# Patient Record
Sex: FEMALE | Race: WHITE | NOT HISPANIC OR LATINO
[De-identification: names, ages, dates, MRNs, and addresses within clinical notes are randomized per-mention and may not be internally consistent; named-entity substitution may affect disease eponyms.]

---

## 2019-08-08 ENCOUNTER — APPOINTMENT (OUTPATIENT)
Dept: VASCULAR SURGERY | Facility: CLINIC | Age: 39
End: 2019-08-08
Payer: COMMERCIAL

## 2019-08-08 VITALS — DIASTOLIC BLOOD PRESSURE: 73 MMHG | HEART RATE: 72 BPM | SYSTOLIC BLOOD PRESSURE: 112 MMHG

## 2019-08-08 VITALS
BODY MASS INDEX: 20.83 KG/M2 | HEIGHT: 65 IN | WEIGHT: 125 LBS | HEART RATE: 69 BPM | DIASTOLIC BLOOD PRESSURE: 59 MMHG | TEMPERATURE: 98.1 F | SYSTOLIC BLOOD PRESSURE: 91 MMHG

## 2019-08-08 DIAGNOSIS — Z86.69 PERSONAL HISTORY OF OTHER DISEASES OF THE NERVOUS SYSTEM AND SENSE ORGANS: ICD-10-CM

## 2019-08-08 DIAGNOSIS — Z87.828 PERSONAL HISTORY OF OTHER (HEALED) PHYSICAL INJURY AND TRAUMA: ICD-10-CM

## 2019-08-08 DIAGNOSIS — Z82.49 FAMILY HISTORY OF ISCHEMIC HEART DISEASE AND OTHER DISEASES OF THE CIRCULATORY SYSTEM: ICD-10-CM

## 2019-08-08 PROCEDURE — 93970 EXTREMITY STUDY: CPT

## 2019-08-08 PROCEDURE — 99203 OFFICE O/P NEW LOW 30 MIN: CPT

## 2019-08-08 RX ORDER — DALFAMPRIDINE 10 MG/1
10 TABLET, FILM COATED, EXTENDED RELEASE ORAL
Refills: 0 | Status: ACTIVE | COMMUNITY

## 2019-08-08 RX ORDER — SERTRALINE HYDROCHLORIDE 100 MG/1
100 TABLET, FILM COATED ORAL
Refills: 0 | Status: ACTIVE | COMMUNITY

## 2019-08-08 NOTE — PHYSICAL EXAM
[2+] : left 2+ [Ankle Swelling On The Left] : moderate [Varicose Veins Of Lower Extremities] : bilaterally [Alert] : alert [Oriented to Person] : oriented to person [Oriented to Place] : oriented to place [Oriented to Time] : oriented to time [JVD] : no jugular venous distention  [Skin Ulcer] : no ulcer [de-identified] : thin pale female with unsteady gait in NAD [Skin Induration] : no induration [de-identified] : unlabored respirations [de-identified] : regular rhythm [de-identified] : a quarter-size area of ecchymosis in the center of her forehead with an area of eschar. no signs of infection [de-identified] : cooperative, pleasant, flat affect [de-identified] : LE vein findings: \par Varicosities (spider, reticular, varicose) bilateral L > R in the GSV distribution\par Edema bilateral\par Skin changes  dry skin, stasis dermatitis\par Ulcer - none\par CEAP classification C4a\par Palpable DP pulses bilaterally

## 2019-08-08 NOTE — VITALS
[Dull] : dull [Gnawing] : gnawing [Aching] : aching [de-identified] : intermittemt [FreeTextEntry3] : b

## 2019-08-08 NOTE — ASSESSMENT
[FreeTextEntry1] : Ms. JAYA AGGARWAL is a 38 year with persistent and worsening bilateral lower extremity venous insufficiency, CEAP classification C4a which is unresponsive to at least 3 months of compression stockings 20-30 mmHg, leg elevation, and over the counter pain medication (Ibuprofen).  Patient has complaints of worsening leg discomfort with swelling, fatigue, heaviness, achiness, cramping, restlessness, and painful varicosities.  The patient’s symptoms interfere with their ADL’s, such as walking, shopping,  and house work. Patient has intact pulses in both legs without evidence of arterial insufficiency.  \par \par Treatment is indicated not for cosmetic reasons but for symptomatic venous reflux disease with symptoms which is refractory to conservative therapy. Venous duplex study demonstrates bilateral lower extremity venous insufficiency. The need for definitive effective treatment is based on severe, interfering and worsening reflux symptoms with evidence of venous insufficiency on venous ultrasound. \par \par Patient is a candidate for endovenous ablation treatment of the bilateral GSV. \par The risks and benefits of endovenous ablation treatment versus continued conservative management were discussed with the patient.  Patient chooses endovenous ablation treatments. Treatment plan to be scheduled. \par \par
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
## 2019-08-08 NOTE — REVIEW OF SYSTEMS
[Lower Ext Edema] : lower extremity edema [Itching] : itching [Feelings Of Weakness] : feelings of weakness [Fever] : no fever [Chills] : no chills [Palpitations] : no palpitations [Chest Pain] : no chest pain [Shortness Of Breath] : no shortness of breath [Leg Claudication] : no intermittent leg claudication [Abdominal Pain] : no abdominal pain [Skin Wound] : no skin wound [Easy Bleeding] : no tendency for easy bleeding [Easy Bruising] : no tendency for easy bruising

## 2019-08-08 NOTE — DATA REVIEWED
[FreeTextEntry1] : Lower extremity venous duplex done today demonstrates bilateral venous insufficiency of the great saphenous veins as well as reflux in the tributary veins. No evidence of DVT.\par

## 2019-10-18 RX ORDER — SODIUM CHLORIDE 9 G/ML
0.9 INJECTION, SOLUTION INTRAVENOUS
Qty: 500 | Refills: 0 | Status: COMPLETED | COMMUNITY
Start: 2019-10-18 | End: 2019-10-25

## 2019-10-18 RX ORDER — SODIUM BICARBONATE 84 MG/ML
8.4 INJECTION, SOLUTION INTRAVENOUS
Qty: 5 | Refills: 0 | Status: COMPLETED | COMMUNITY
Start: 2019-10-18 | End: 2019-10-25

## 2019-10-25 ENCOUNTER — APPOINTMENT (OUTPATIENT)
Dept: VASCULAR SURGERY | Facility: CLINIC | Age: 39
End: 2019-10-25
Payer: COMMERCIAL

## 2019-10-25 PROCEDURE — 36475 ENDOVENOUS RF 1ST VEIN: CPT | Mod: LT

## 2019-10-25 NOTE — PROCEDURE
[FreeTextEntry1] : left GSV RFA [FreeTextEntry3] : Procedural safety checklist and time out completed:\par Confirmed patient identification (Patient Name, , and/or medical record number including when possible affirmation by patient or parent/family/other).\par Confirmed procedure with the patient. Consent present, accurate and signed. \par Confirmed special equipment and supplies are present.\par Sterility confirmed. Position verified. \par Site/ side is marked and visible and confirmed. \par Procedure confirmed by consent. Accurate consent including side and site.\par Review of medical records, including venous ultrasound, noting correct procedure including site and side.\par MD/PA verifies presence and review of imaging studies and or written report of imaging studies.\par Agreement on the procedure to be performed\par Time out completed.\par All of the above has been confirmed by the team.\par All patient-specific concerns have been addressed. \par \par Indication: Left / R lower extremity varicose veins with leg pain, leg swelling, and leg cramping.  Venous insufficiency/ reflux.\par \par Procedure: radiofrequency ablation of the left great saphenous vein. \par 	\par Ms. JAYA AGGARWAL is a 39 year old F with a history of left lower extremity varicose veins previously seen in the office.  Ultrasound examination demonstrated venous insufficiency. A trial of compression stockings, exercise, elevation, and pain medication was attempted without relief and definitive treatment with radiofrequency ablation was offered. \par \par The patient has come for radiofrequency ablation treatment of the left saphenous vein. \par I have discussed the risks of the procedure at length with the patient. The risks discussed were inclusive of but not limited to infection, irritation at the site of infiltration of local anesthesia, and also rare risk of deep venous thrombosis and pulmonary emboli. The patient agrees to proceed with the procedure. \par The patient was escorted into the procedure room and a time out called.\par The entire limb was prepped and draped in sterile fashion. The RF fiber was placed on the sterile field and connected by a sterile cable. Actuation, temperature, and impedance testing were performed to ensure that all components were connected and operating properly. \par The patient was placed on the procedure table and local anesthesia was instilled in the skin overlying the access site. Under ultrasound guidance, the vein was punctured with a micropuncture needle, using the anterior wall technique. A guide wire was now introduced through the needle, and the needle was then exchanged over the guide wire for a 7F sheath. The guide wire was removed and the RF probe was then placed into the left great saphenous vein through the sheath and position confirmed using ultrasound guidance. After the RF probe position was verified by ultrasound, tumescent anesthesia consisting of normal saline, 1% lidocaine with 8.4% sodium bicarbonate was infiltrated, under ultrasound guidance, precisely into the perivenous compartment along the entire length of the vein until a “halo” of fluid was noted around the vein. After RF probe position was again confirmed with ultrasound imaging, RF energy was applied. The probe was gradually and carefully withdrawn at a rate of 6.5cm/20seconds. \par \par The total volume injected was 200 cc. \par Total treatment time was 60 seconds.\par Treatment length was 13 cm and 3 cycles of RF performed using the 7 cm probe. \par The probe is 3.6 cm from the SFJ.\par \par Estimated Blood Loss: minimal\par Repeat ultrasound of the treated vein was performed confirming successful treatment. The catheter and sheath were withdrawn and hemostasis established with direct pressure. After assuring hemostasis, a sterile 4x4 was placed on the access site and an ACE compression wrap was applied. Patient tolerated procedure well. Patient was given post-procedure instructions and follow up appointment was scheduled.\par \par \par

## 2019-10-28 ENCOUNTER — APPOINTMENT (OUTPATIENT)
Dept: VASCULAR SURGERY | Facility: CLINIC | Age: 39
End: 2019-10-28
Payer: COMMERCIAL

## 2019-10-28 PROCEDURE — 93971 EXTREMITY STUDY: CPT

## 2019-11-01 ENCOUNTER — APPOINTMENT (OUTPATIENT)
Dept: VASCULAR SURGERY | Facility: CLINIC | Age: 39
End: 2019-11-01

## 2019-11-04 ENCOUNTER — APPOINTMENT (OUTPATIENT)
Dept: VASCULAR SURGERY | Facility: CLINIC | Age: 39
End: 2019-11-04

## 2019-12-06 ENCOUNTER — APPOINTMENT (OUTPATIENT)
Dept: VASCULAR SURGERY | Facility: CLINIC | Age: 39
End: 2019-12-06

## 2020-09-24 ENCOUNTER — APPOINTMENT (OUTPATIENT)
Dept: VASCULAR SURGERY | Facility: CLINIC | Age: 40
End: 2020-09-24
Payer: COMMERCIAL

## 2020-09-24 VITALS
BODY MASS INDEX: 20.83 KG/M2 | HEIGHT: 65 IN | WEIGHT: 125 LBS | HEART RATE: 80 BPM | SYSTOLIC BLOOD PRESSURE: 103 MMHG | DIASTOLIC BLOOD PRESSURE: 63 MMHG

## 2020-09-24 VITALS — HEART RATE: 75 BPM | SYSTOLIC BLOOD PRESSURE: 106 MMHG | DIASTOLIC BLOOD PRESSURE: 69 MMHG

## 2020-09-24 PROCEDURE — 99213 OFFICE O/P EST LOW 20 MIN: CPT

## 2020-09-24 PROCEDURE — 93970 EXTREMITY STUDY: CPT

## 2020-09-29 NOTE — HISTORY OF PRESENT ILLNESS
[FreeTextEntry1] : JAYA AGGARWAL is a 40 year old female who presents today for leg pain. She previously has L GSV RFA with Dr. Varela in 10/2019. Today she reports worsening symptoms in the right leg. She complains of bilateral LE pain, swelling, fatigue, occasional cramps, heaviness, achiness, pruritus, and skin dryness. Symptoms persist despite routine leg elevation, CS, exercise, and NSAIDs prn. Risk factors include multiple past pregnancies (6 children), family history, and past h/o of venous insufficiency. Otherwise denies claudication, rest pain, and tissue loss. \par \par VLE from today is negative for DVT/SVT. + reflux in the R GSV and L femoral vein. L GSV is not visualized mid thigh-distal calf. Chronic non-occlusive SVT noted in the L thigh. \par \par PMH: MS, VI s/p L GSV RFA, bilateral ORIF

## 2020-09-29 NOTE — PHYSICAL EXAM
[2+] : left 2+ [Skin Ulcer] : no ulcer [Alert] : alert [Oriented to Person] : oriented to person [Oriented to Place] : oriented to place [Oriented to Time] : oriented to time [Calm] : calm [de-identified] : NAD  [de-identified] : unlabored  [de-identified] : regular rate  [FreeTextEntry1] : Mild LE edema\par +VV bilateral LE (L>R)\par xerosis [de-identified] : soft, NT

## 2020-09-29 NOTE — ASSESSMENT
[FreeTextEntry1] : A/P: JAYA AGGARWAL is a 40 year old female with symptomatic varicose veins despite long term adherence to conservative measures. R GSV reflux is evident on ultrasound and amenable to RFA. Treatment is indicated for relief of symptoms. Patient also has palpable ropey varicosities in bilateral legs, worse on the left, which are uncomfortable and often ache. Would recommend LLE stab phlebectomy in addition to R GSV RFA. Nature of both procedures as well as r/b/a discussed in detail with the patient. All questions/concerns addressed. Patient expresses understanding and wishes to proceed.

## 2020-09-29 NOTE — REVIEW OF SYSTEMS
[Chest Pain] : no chest pain [Leg Claudication] : no intermittent leg claudication [Lower Ext Edema] : lower extremity edema [Shortness Of Breath] : no shortness of breath [Abdominal Pain] : no abdominal pain [As Noted in HPI] : as noted in HPI [Negative] : Constitutional

## 2020-10-19 ENCOUNTER — APPOINTMENT (OUTPATIENT)
Dept: VASCULAR SURGERY | Facility: CLINIC | Age: 40
End: 2020-10-19

## 2020-10-20 LAB — SARS-COV-2 N GENE NPH QL NAA+PROBE: NOT DETECTED

## 2020-10-23 ENCOUNTER — APPOINTMENT (OUTPATIENT)
Dept: VASCULAR SURGERY | Facility: CLINIC | Age: 40
End: 2020-10-23
Payer: COMMERCIAL

## 2020-10-23 PROCEDURE — 37766 PHLEB VEINS - EXTREM 20+: CPT | Mod: LT

## 2020-10-23 PROCEDURE — 99072 ADDL SUPL MATRL&STAF TM PHE: CPT

## 2020-10-30 RX ORDER — SODIUM BICARBONATE 84 MG/ML
8.4 INJECTION, SOLUTION INTRAVENOUS
Qty: 5 | Refills: 0 | Status: COMPLETED | COMMUNITY
Start: 2020-10-30 | End: 2020-11-06

## 2020-10-30 RX ORDER — LIDOCAINE HYDROCHLORIDE 10 MG/ML
1 INJECTION, SOLUTION INFILTRATION; PERINEURAL
Qty: 50 | Refills: 0 | Status: COMPLETED | COMMUNITY
Start: 2020-10-30 | End: 2020-11-06

## 2020-11-05 RX ORDER — SODIUM BICARBONATE 84 MG/ML
8.4 INJECTION, SOLUTION INTRAVENOUS
Qty: 5 | Refills: 0 | Status: COMPLETED | COMMUNITY
Start: 2020-11-05 | End: 2020-11-13

## 2020-11-06 RX ORDER — SODIUM BICARBONATE 84 MG/ML
8.4 INJECTION, SOLUTION INTRAVENOUS
Qty: 5 | Refills: 0 | Status: COMPLETED | COMMUNITY
Start: 2020-11-06 | End: 2020-11-13

## 2020-11-10 ENCOUNTER — APPOINTMENT (OUTPATIENT)
Dept: VASCULAR SURGERY | Facility: CLINIC | Age: 40
End: 2020-11-10
Payer: COMMERCIAL

## 2020-11-10 VITALS
WEIGHT: 130 LBS | BODY MASS INDEX: 21.66 KG/M2 | HEIGHT: 65 IN | DIASTOLIC BLOOD PRESSURE: 67 MMHG | SYSTOLIC BLOOD PRESSURE: 110 MMHG | HEART RATE: 99 BPM | TEMPERATURE: 97.7 F

## 2020-11-10 PROCEDURE — 99213 OFFICE O/P EST LOW 20 MIN: CPT

## 2020-11-10 PROCEDURE — 93971 EXTREMITY STUDY: CPT

## 2020-11-10 PROCEDURE — 99072 ADDL SUPL MATRL&STAF TM PHE: CPT

## 2020-11-10 NOTE — HISTORY OF PRESENT ILLNESS
[FreeTextEntry1] : Ms. JAYA AGGARWAL is a 40 year who presents to the office for follow-up evaluation of her varicose veins and venous insufficiency. Ms. AGGARWAL is s/p left leg phlebectomy on 10/23/2020. \par Patient was doing well until a few days ago when she started to have redness, swelling and pain of the left ankle. No fever or chills. No numbness or bruising of the leg. Ms. AGGARWAL has not been compliant with wearing compression stockings. She denies drainage. She reports difficulty walking however she has difficulty walking at baseline due to her MS. \par \par

## 2020-11-10 NOTE — PHYSICAL EXAM
[2+] : left 2+ [Alert] : alert [Oriented to Person] : oriented to person [Oriented to Place] : oriented to place [Oriented to Time] : oriented to time [Calm] : calm [de-identified] : pleasant female in NAD, ambulates with difficulty with the assistance of her daughter [de-identified] : unlabored respirations [de-identified] : reg rhythm [de-identified] : left medial calf incision healed with surrounding erythem, induration and tender to palpation. No drainage. All incisions clean, dry and intact. No parasthesias or ecchymosis.

## 2020-11-10 NOTE — ASSESSMENT
[FreeTextEntry1] : Ms. JAYA AGGARWAL is a 40 year F who is almost 3 weeks s/p left leg phlebectomy. All incisions well healed. Probable cellulitis of the left leg at the most distal incision.\par \par Recommend a course of Keflex, warm compresses 3-4 x/day, ibuprofen TID and leg elevation. She is scheduled for a right GSV ablation on friday. Advised her to reschedule to a later date once the cellulitis has resolved. Follow-up in 2 weeks.\par

## 2020-11-10 NOTE — REVIEW OF SYSTEMS
[Fever] : no fever [Chills] : no chills [Chest Pain] : no chest pain [Lower Ext Edema] : lower extremity edema [Shortness Of Breath] : no shortness of breath [Skin Wound] : no skin wound [Difficulty Walking] : difficulty walking [Easy Bleeding] : no tendency for easy bleeding [Easy Bruising] : no tendency for easy bruising

## 2020-11-12 ENCOUNTER — APPOINTMENT (OUTPATIENT)
Dept: VASCULAR SURGERY | Facility: CLINIC | Age: 40
End: 2020-11-12
Payer: COMMERCIAL

## 2020-11-12 DIAGNOSIS — I83.893 VARICOSE VEINS OF BILATERAL LOWER EXTREMITIES WITH OTHER COMPLICATIONS: ICD-10-CM

## 2020-11-12 DIAGNOSIS — L03.116 CELLULITIS OF LEFT LOWER LIMB: ICD-10-CM

## 2020-11-12 PROCEDURE — 99212 OFFICE O/P EST SF 10 MIN: CPT

## 2020-11-12 PROCEDURE — 99072 ADDL SUPL MATRL&STAF TM PHE: CPT

## 2020-11-12 RX ORDER — LIDOCAINE HYDROCHLORIDE 10 MG/ML
1 INJECTION, SOLUTION INFILTRATION; PERINEURAL
Qty: 1 | Refills: 0 | Status: DISCONTINUED | COMMUNITY
Start: 2019-10-18 | End: 2020-11-12

## 2020-11-12 RX ORDER — ALPRAZOLAM 0.5 MG/1
0.5 TABLET ORAL
Qty: 2 | Refills: 0 | Status: DISCONTINUED | COMMUNITY
Start: 2020-10-16 | End: 2020-11-12

## 2020-11-12 RX ORDER — LIDOCAINE HYDROCHLORIDE 10 MG/ML
1 INJECTION, SOLUTION INFILTRATION; PERINEURAL
Qty: 50 | Refills: 0 | Status: DISCONTINUED | COMMUNITY
Start: 2020-11-06 | End: 2020-11-12

## 2020-11-12 RX ORDER — ALPRAZOLAM 0.5 MG/1
0.5 TABLET ORAL
Qty: 1 | Refills: 0 | Status: DISCONTINUED | COMMUNITY
Start: 2019-10-18 | End: 2020-11-12

## 2020-11-12 RX ORDER — LIDOCAINE HYDROCHLORIDE 10 MG/ML
1 INJECTION, SOLUTION INFILTRATION; PERINEURAL
Qty: 50 | Refills: 0 | Status: DISCONTINUED | COMMUNITY
Start: 2020-11-05 | End: 2020-11-12

## 2020-11-12 NOTE — DATA REVIEWED
[FreeTextEntry1] : Left lower extremity venous duplex done 2 days ago showed no evidence of DVT.\par \par

## 2020-11-12 NOTE — ASSESSMENT
[FreeTextEntry1] : Ms. AJYA AGGARWAL is a 40 year F who presents to the office for an acute visit to followup left leg cellulitis. She is s/p left leg phlebectomy 10/23/2020. Currently D2 or 7 day course of Keflex. No fever. Erythema, induration and edema have improved. \par Strongly recommended and reiterated to apply warm compresses to the area 3-4 x day (do not use a heating pad) and to wear compression stockings daily. She stated she has them at home. I applied a compression stocking to her left leg while she was in the office. Keep area clean and dry. No baths, may shower daily. Continue Keflex and follow-up in 2 weeks.\par \par Patient was also seen by Dr Varela.\par

## 2020-11-12 NOTE — PHYSICAL EXAM
[2+] : left 2+ [No Rash or Lesion] : No rash or lesion [Skin Ulcer] : no ulcer [Alert] : alert [Oriented to Person] : oriented to person [Oriented to Place] : oriented to place [Oriented to Time] : oriented to time [Anxious] : anxious [de-identified] : Patient is well appearing female in NAD, ambulated alone without assistance into the exam room [de-identified] : unlabored respirations [de-identified] : reg rhythm [de-identified] : left medial calf with mild erythema and induration, improved from 2 days prior, with less tenderness to palpation. Minimal serous drainage from the incisional site. No purulent drainage. Not warm to the tough. + edema, however, the patint was wearing ankle socks and the edema was pronounced at the area of the incision. The area cleaned with Betadine and bacitracin applied. [de-identified] : pleasant and cooperative

## 2020-11-12 NOTE — HISTORY OF PRESENT ILLNESS
[FreeTextEntry1] : Ms. JAYA AGGARWAL called the office last night and this am stating she had more pain in her left leg.I advised her to come in for a visit. Ms Aggarwal was seen 2 days ago for a right leg infection and started on a course of Keflex. She denies fever or chills. She states that her left leg is still painful and she has difficluty walking. Her baseline is that she has difficulty walking 2/2 her MS, however, she states she has pain when she walks. She ambulated into the exam room alone without assistance. Ms Aggarwal stated last night the pain and swelling were worse than today and she was concerned. She has not been compliant with compression stockings and she has not applied warm compresses 3-4 x day to the area. She was using a heating pad occasionally. She has been active, bike riding, and not elevating her leg too much. \par

## 2020-11-12 NOTE — VITALS
[Aching] : aching [Throbbing] : throbbing [Continuous] : continuous [FreeTextEntry3] : left lower leg

## 2020-11-12 NOTE — REVIEW OF SYSTEMS
[Fever] : no fever [Chills] : no chills [Lower Ext Edema] : lower extremity edema [Limb Swelling] : limb swelling [Difficulty Walking] : difficulty walking

## 2020-11-13 ENCOUNTER — APPOINTMENT (OUTPATIENT)
Dept: VASCULAR SURGERY | Facility: CLINIC | Age: 40
End: 2020-11-13

## 2020-11-16 ENCOUNTER — APPOINTMENT (OUTPATIENT)
Dept: VASCULAR SURGERY | Facility: CLINIC | Age: 40
End: 2020-11-16

## 2020-11-19 ENCOUNTER — NON-APPOINTMENT (OUTPATIENT)
Age: 40
End: 2020-11-19

## 2021-10-21 ENCOUNTER — APPOINTMENT (OUTPATIENT)
Dept: VASCULAR SURGERY | Facility: CLINIC | Age: 41
End: 2021-10-21
Payer: COMMERCIAL

## 2021-10-21 VITALS — DIASTOLIC BLOOD PRESSURE: 60 MMHG | SYSTOLIC BLOOD PRESSURE: 100 MMHG | HEART RATE: 65 BPM

## 2021-10-21 VITALS
WEIGHT: 130 LBS | DIASTOLIC BLOOD PRESSURE: 50 MMHG | TEMPERATURE: 97.6 F | BODY MASS INDEX: 21.66 KG/M2 | HEIGHT: 65 IN | SYSTOLIC BLOOD PRESSURE: 91 MMHG | HEART RATE: 62 BPM

## 2021-10-21 DIAGNOSIS — I87.2 VENOUS INSUFFICIENCY (CHRONIC) (PERIPHERAL): ICD-10-CM

## 2021-10-21 PROCEDURE — 93971 EXTREMITY STUDY: CPT

## 2021-10-21 PROCEDURE — 99213 OFFICE O/P EST LOW 20 MIN: CPT

## 2021-10-22 PROBLEM — I87.2 VENOUS INSUFFICIENCY OF RIGHT LOWER EXTREMITY: Status: ACTIVE | Noted: 2021-10-22

## 2021-10-22 NOTE — ASSESSMENT
[FreeTextEntry1] : A/P:\par \par Patient with symptomatic venous insufficiency of the RLE. Symptoms persist despite conservative measures for >3 months. Treatment is indicated not for symptomatic relief. Recommend R GSV RFA and RLE stab phlebectomy. \par \par Patient left the office prior to discussion regarding results and plan. I called the home # which rang but had no options to leave a message. I called the cell # and left a VM for call back. \par \par

## 2021-10-22 NOTE — HISTORY OF PRESENT ILLNESS
[FreeTextEntry1] : JAYA AGGARWAL is a 41 year old female who presents today for RLE swelling and pain. She is s/p L GSV RFA (2019) and stab phlebectomy (2020) - has no issues with the LLE. Today she complains of RLE pain, swelling, fatigue, occasional cramps, heaviness, achiness, pruritus, and skin dryness. Symptoms persist despite routine leg elevation, CS, exercise, and NSAIDs prn. Risk factors include multiple pregnancies (6 children), family history, and past h/o of venous insufficiency. Otherwise denies claudication, rest pain, and tissue loss. \par \par PMH: MS, VI s/p L GSV RFA, bilateral ORIF\par \par VLE from today is negative for DVT/SVT. Reflux in the R GSV with tributaries noted in the thigh/calf. \par

## 2021-10-22 NOTE — PHYSICAL EXAM
[2+] : left 2+ [Ankle Swelling (On Exam)] : present [Varicose Veins Of Lower Extremities] : present [] : present [Alert] : alert [Oriented to Person] : oriented to person [Oriented to Place] : oriented to place [Oriented to Time] : oriented to time [Calm] : calm [Skin Ulcer] : no ulcer [de-identified] : well appearing female, NAD  [de-identified] : unlabored  [de-identified] : regular rate  [FreeTextEntry1] : RLE edema, mild hyperpigmentation, and stasis dermatitis\par +VV in the right thigh/calf [de-identified] : soft, NT

## 2023-04-21 ENCOUNTER — APPOINTMENT (OUTPATIENT)
Dept: ORTHOPEDIC SURGERY | Facility: CLINIC | Age: 43
End: 2023-04-21
Payer: COMMERCIAL

## 2023-04-21 VITALS — HEIGHT: 65 IN | WEIGHT: 130 LBS | BODY MASS INDEX: 21.66 KG/M2

## 2023-04-21 DIAGNOSIS — Z63.5 DISRUPTION OF FAMILY BY SEPARATION AND DIVORCE: ICD-10-CM

## 2023-04-21 DIAGNOSIS — R22.42 LOCALIZED SWELLING, MASS AND LUMP, LEFT LOWER LIMB: ICD-10-CM

## 2023-04-21 PROCEDURE — 73564 X-RAY EXAM KNEE 4 OR MORE: CPT | Mod: LT

## 2023-04-21 PROCEDURE — 99203 OFFICE O/P NEW LOW 30 MIN: CPT

## 2023-04-21 SDOH — SOCIAL STABILITY - SOCIAL INSECURITY: DISRUPTION OF FAMILY BY SEPARATION AND DIVORCE: Z63.5

## 2023-04-21 NOTE — IMAGING
[Left] : left knee [There are no fractures, subluxations or dislocations. No significant abnormalities are seen] : There are no fractures, subluxations or dislocations. No significant abnormalities are seen [FreeTextEntry9] : amanda in place

## 2023-04-21 NOTE — HISTORY OF PRESENT ILLNESS
[Sudden] : sudden [7] : 7 [2] : 2 [Stabbing] : stabbing [Intermittent] : intermittent [Leisure] : leisure [Meds] : meds [Nothing helps with pain getting better] : Nothing helps with pain getting better [Standing] : standing [Exercising] : exercising [Stairs] : stairs [Dull/Aching] : dull/aching [] : Post Surgical Visit: no [FreeTextEntry1] : Left knee [FreeTextEntry5] : Patient has had numerous falls and the most recent fall has caused the knee to swell up [FreeTextEntry3] : 04/19/23 [FreeTextEntry7] : moves slightly down to the shin [de-identified] : Patient uses Advil to help with pain.

## 2023-04-21 NOTE — PHYSICAL EXAM
[Left] : left knee [NL (0)] : extension 0 degrees [5___] : hamstring 5[unfilled]/5 [Negative] : negative Dayami's [] : no pain with varus stress [FreeTextEntry3] : swelling anterior patella  [FreeTextEntry8] : firm mass anterior to patella  [TWNoteComboBox7] : flexion 120 degrees

## 2023-04-21 NOTE — DISCUSSION/SUMMARY
[de-identified] : General Dx Discussion\par The patient was advised of the diagnosis. The natural history of the pathology was explained in full to the patient in layman's terms. All questions were answered. The risks and benefits of surgical and non-surgical treatment alternatives were explained in full to the patient.\par \par will get a mri lt knee f/u after mri \par mri to eval mass bursa vs hematoma or occult process \par questions answered

## 2023-04-26 ENCOUNTER — APPOINTMENT (OUTPATIENT)
Dept: ORTHOPEDIC SURGERY | Facility: CLINIC | Age: 43
End: 2023-04-26

## 2023-05-15 ENCOUNTER — NON-APPOINTMENT (OUTPATIENT)
Age: 43
End: 2023-05-15

## 2023-06-08 ENCOUNTER — APPOINTMENT (OUTPATIENT)
Dept: ORTHOPEDIC SURGERY | Facility: CLINIC | Age: 43
End: 2023-06-08
Payer: COMMERCIAL

## 2023-06-08 VITALS — BODY MASS INDEX: 21.66 KG/M2 | WEIGHT: 130 LBS | HEIGHT: 65 IN

## 2023-06-08 DIAGNOSIS — S40.021A CONTUSION OF RIGHT UPPER ARM, INITIAL ENCOUNTER: ICD-10-CM

## 2023-06-08 PROCEDURE — 73080 X-RAY EXAM OF ELBOW: CPT | Mod: RT

## 2023-06-08 PROCEDURE — 99213 OFFICE O/P EST LOW 20 MIN: CPT

## 2023-06-08 NOTE — ASSESSMENT
[FreeTextEntry1] : The patient was advised of the diagnosis. The natural history of the pathology was explained in full to the patient in layman's terms. All questions were answered. The risks and benefits of surgical and non-surgical treatment alternatives were explained in full to the patient.\par Family provided reassurance.\par Will rto prn. \par Recommend prn otc nsaid.\par Formal PT declined.

## 2023-06-08 NOTE — IMAGING
[Right] : right elbow [No acute displaced fracture or dislocation] : No acute displaced fracture or dislocation [de-identified] : Right upper extremity with resolving ecchymosis over the proximal ulna / ttp over this region\par ROM is full with no ligamentous laxity noted.\par STrength is 5/5 in all planes.\par Right shoulder with full and pain free ROM. \par \par PMH: MS\par Allergies: nkda

## 2023-06-08 NOTE — HISTORY OF PRESENT ILLNESS
[de-identified] : 6/8/2023: rhd 41 yo female here with complaint of right arm pain s/p trip and fall last Friday\par Pt had previous hx of forearm fracture.\par Pt complains of right forearm pain to the ulnar region with leaning on it. \par \par PMH: Pt has hx of MS.\par Allergies: NKDA\par

## 2023-11-10 ENCOUNTER — APPOINTMENT (OUTPATIENT)
Dept: ORTHOPEDIC SURGERY | Facility: CLINIC | Age: 43
End: 2023-11-10
Payer: COMMERCIAL

## 2023-11-10 DIAGNOSIS — R06.02 SHORTNESS OF BREATH: ICD-10-CM

## 2023-11-10 DIAGNOSIS — M54.2 CERVICALGIA: ICD-10-CM

## 2023-11-10 DIAGNOSIS — S22.42XA MULTIPLE FRACTURES OF RIBS, LEFT SIDE, INITIAL ENCOUNTER FOR CLOSED FRACTURE: ICD-10-CM

## 2023-11-10 PROCEDURE — 73030 X-RAY EXAM OF SHOULDER: CPT | Mod: LT

## 2023-11-10 PROCEDURE — 99213 OFFICE O/P EST LOW 20 MIN: CPT

## 2023-11-10 PROCEDURE — 73000 X-RAY EXAM OF COLLAR BONE: CPT | Mod: LT

## 2023-11-10 PROCEDURE — 72050 X-RAY EXAM NECK SPINE 4/5VWS: CPT

## 2023-11-10 PROCEDURE — 71100 X-RAY EXAM RIBS UNI 2 VIEWS: CPT | Mod: LT

## 2023-11-15 ENCOUNTER — APPOINTMENT (OUTPATIENT)
Dept: ORTHOPEDIC SURGERY | Facility: CLINIC | Age: 43
End: 2023-11-15
Payer: COMMERCIAL

## 2023-11-15 VITALS — HEIGHT: 65 IN | BODY MASS INDEX: 21.66 KG/M2 | WEIGHT: 130 LBS

## 2023-11-15 DIAGNOSIS — R22.2 LOCALIZED SWELLING, MASS AND LUMP, TRUNK: ICD-10-CM

## 2023-11-15 DIAGNOSIS — S29.011A STRAIN OF MUSCLE AND TENDON OF FRONT WALL OF THORAX, INITIAL ENCOUNTER: ICD-10-CM

## 2023-11-15 PROCEDURE — 99212 OFFICE O/P EST SF 10 MIN: CPT

## 2023-11-28 ENCOUNTER — APPOINTMENT (OUTPATIENT)
Dept: ORTHOPEDIC SURGERY | Facility: CLINIC | Age: 43
End: 2023-11-28

## 2023-12-05 ENCOUNTER — APPOINTMENT (OUTPATIENT)
Dept: MRI IMAGING | Facility: CLINIC | Age: 43
End: 2023-12-05
Payer: COMMERCIAL

## 2023-12-05 ENCOUNTER — APPOINTMENT (OUTPATIENT)
Dept: MRI IMAGING | Facility: CLINIC | Age: 43
End: 2023-12-05

## 2023-12-05 ENCOUNTER — APPOINTMENT (OUTPATIENT)
Dept: ORTHOPEDIC SURGERY | Facility: CLINIC | Age: 43
End: 2023-12-05
Payer: COMMERCIAL

## 2023-12-05 DIAGNOSIS — M75.02 ADHESIVE CAPSULITIS OF LEFT SHOULDER: ICD-10-CM

## 2023-12-05 PROCEDURE — 71550 MRI CHEST W/O DYE: CPT

## 2023-12-05 PROCEDURE — 99213 OFFICE O/P EST LOW 20 MIN: CPT

## 2023-12-06 ENCOUNTER — INPATIENT (INPATIENT)
Facility: HOSPITAL | Age: 43
LOS: 29 days | Discharge: NURSING, REHAB OR LONG-TERM CARE | DRG: 507 | End: 2024-01-05
Attending: GENERAL ACUTE CARE HOSPITAL | Admitting: STUDENT IN AN ORGANIZED HEALTH CARE EDUCATION/TRAINING PROGRAM
Payer: COMMERCIAL

## 2023-12-06 VITALS
HEART RATE: 97 BPM | TEMPERATURE: 98 F | OXYGEN SATURATION: 98 % | WEIGHT: 138.01 LBS | RESPIRATION RATE: 18 BRPM | SYSTOLIC BLOOD PRESSURE: 105 MMHG | HEIGHT: 65.5 IN | DIASTOLIC BLOOD PRESSURE: 69 MMHG

## 2023-12-06 LAB
ALBUMIN SERPL ELPH-MCNC: 3.7 G/DL — SIGNIFICANT CHANGE UP (ref 3.3–5)
ALBUMIN SERPL ELPH-MCNC: 3.7 G/DL — SIGNIFICANT CHANGE UP (ref 3.3–5)
ALP SERPL-CCNC: 248 U/L — HIGH (ref 40–120)
ALP SERPL-CCNC: 248 U/L — HIGH (ref 40–120)
ALT FLD-CCNC: 21 U/L — SIGNIFICANT CHANGE UP (ref 10–45)
ALT FLD-CCNC: 21 U/L — SIGNIFICANT CHANGE UP (ref 10–45)
ANION GAP SERPL CALC-SCNC: 11 MMOL/L — SIGNIFICANT CHANGE UP (ref 5–17)
ANION GAP SERPL CALC-SCNC: 11 MMOL/L — SIGNIFICANT CHANGE UP (ref 5–17)
AST SERPL-CCNC: 23 U/L — SIGNIFICANT CHANGE UP (ref 10–40)
AST SERPL-CCNC: 23 U/L — SIGNIFICANT CHANGE UP (ref 10–40)
BASE EXCESS BLDV CALC-SCNC: 4.3 MMOL/L — HIGH (ref -2–3)
BASE EXCESS BLDV CALC-SCNC: 4.3 MMOL/L — HIGH (ref -2–3)
BASOPHILS # BLD AUTO: 0.06 K/UL — SIGNIFICANT CHANGE UP (ref 0–0.2)
BASOPHILS # BLD AUTO: 0.06 K/UL — SIGNIFICANT CHANGE UP (ref 0–0.2)
BASOPHILS NFR BLD AUTO: 0.7 % — SIGNIFICANT CHANGE UP (ref 0–2)
BASOPHILS NFR BLD AUTO: 0.7 % — SIGNIFICANT CHANGE UP (ref 0–2)
BILIRUB SERPL-MCNC: 0.2 MG/DL — SIGNIFICANT CHANGE UP (ref 0.2–1.2)
BILIRUB SERPL-MCNC: 0.2 MG/DL — SIGNIFICANT CHANGE UP (ref 0.2–1.2)
BUN SERPL-MCNC: 15 MG/DL — SIGNIFICANT CHANGE UP (ref 7–23)
BUN SERPL-MCNC: 15 MG/DL — SIGNIFICANT CHANGE UP (ref 7–23)
CA-I SERPL-SCNC: 1.22 MMOL/L — SIGNIFICANT CHANGE UP (ref 1.15–1.33)
CA-I SERPL-SCNC: 1.22 MMOL/L — SIGNIFICANT CHANGE UP (ref 1.15–1.33)
CALCIUM SERPL-MCNC: 9.8 MG/DL — SIGNIFICANT CHANGE UP (ref 8.4–10.5)
CALCIUM SERPL-MCNC: 9.8 MG/DL — SIGNIFICANT CHANGE UP (ref 8.4–10.5)
CHLORIDE BLDV-SCNC: 103 MMOL/L — SIGNIFICANT CHANGE UP (ref 96–108)
CHLORIDE BLDV-SCNC: 103 MMOL/L — SIGNIFICANT CHANGE UP (ref 96–108)
CHLORIDE SERPL-SCNC: 100 MMOL/L — SIGNIFICANT CHANGE UP (ref 96–108)
CHLORIDE SERPL-SCNC: 100 MMOL/L — SIGNIFICANT CHANGE UP (ref 96–108)
CO2 BLDV-SCNC: 32 MMOL/L — HIGH (ref 22–26)
CO2 BLDV-SCNC: 32 MMOL/L — HIGH (ref 22–26)
CO2 SERPL-SCNC: 24 MMOL/L — SIGNIFICANT CHANGE UP (ref 22–31)
CO2 SERPL-SCNC: 24 MMOL/L — SIGNIFICANT CHANGE UP (ref 22–31)
CREAT SERPL-MCNC: 0.46 MG/DL — LOW (ref 0.5–1.3)
CREAT SERPL-MCNC: 0.46 MG/DL — LOW (ref 0.5–1.3)
CRP SERPL-MCNC: 126 MG/L — HIGH (ref 0–4)
CRP SERPL-MCNC: 126 MG/L — HIGH (ref 0–4)
EGFR: 121 ML/MIN/1.73M2 — SIGNIFICANT CHANGE UP
EGFR: 121 ML/MIN/1.73M2 — SIGNIFICANT CHANGE UP
EOSINOPHIL # BLD AUTO: 0.2 K/UL — SIGNIFICANT CHANGE UP (ref 0–0.5)
EOSINOPHIL # BLD AUTO: 0.2 K/UL — SIGNIFICANT CHANGE UP (ref 0–0.5)
EOSINOPHIL NFR BLD AUTO: 2.2 % — SIGNIFICANT CHANGE UP (ref 0–6)
EOSINOPHIL NFR BLD AUTO: 2.2 % — SIGNIFICANT CHANGE UP (ref 0–6)
GAS PNL BLDV: 135 MMOL/L — LOW (ref 136–145)
GAS PNL BLDV: 135 MMOL/L — LOW (ref 136–145)
GAS PNL BLDV: SIGNIFICANT CHANGE UP
GAS PNL BLDV: SIGNIFICANT CHANGE UP
GLUCOSE BLDV-MCNC: 88 MG/DL — SIGNIFICANT CHANGE UP (ref 70–99)
GLUCOSE BLDV-MCNC: 88 MG/DL — SIGNIFICANT CHANGE UP (ref 70–99)
GLUCOSE SERPL-MCNC: 81 MG/DL — SIGNIFICANT CHANGE UP (ref 70–99)
GLUCOSE SERPL-MCNC: 81 MG/DL — SIGNIFICANT CHANGE UP (ref 70–99)
HCO3 BLDV-SCNC: 30 MMOL/L — HIGH (ref 22–29)
HCO3 BLDV-SCNC: 30 MMOL/L — HIGH (ref 22–29)
HCT VFR BLD CALC: 30.7 % — LOW (ref 34.5–45)
HCT VFR BLD CALC: 30.7 % — LOW (ref 34.5–45)
HCT VFR BLDA CALC: 30 % — LOW (ref 34.5–46.5)
HCT VFR BLDA CALC: 30 % — LOW (ref 34.5–46.5)
HGB BLD CALC-MCNC: 9.9 G/DL — LOW (ref 11.7–16.1)
HGB BLD CALC-MCNC: 9.9 G/DL — LOW (ref 11.7–16.1)
HGB BLD-MCNC: 9.6 G/DL — LOW (ref 11.5–15.5)
HGB BLD-MCNC: 9.6 G/DL — LOW (ref 11.5–15.5)
IMM GRANULOCYTES NFR BLD AUTO: 0.6 % — SIGNIFICANT CHANGE UP (ref 0–0.9)
IMM GRANULOCYTES NFR BLD AUTO: 0.6 % — SIGNIFICANT CHANGE UP (ref 0–0.9)
LACTATE BLDV-MCNC: 0.7 MMOL/L — SIGNIFICANT CHANGE UP (ref 0.5–2)
LACTATE BLDV-MCNC: 0.7 MMOL/L — SIGNIFICANT CHANGE UP (ref 0.5–2)
LYMPHOCYTES # BLD AUTO: 2.4 K/UL — SIGNIFICANT CHANGE UP (ref 1–3.3)
LYMPHOCYTES # BLD AUTO: 2.4 K/UL — SIGNIFICANT CHANGE UP (ref 1–3.3)
LYMPHOCYTES # BLD AUTO: 26.4 % — SIGNIFICANT CHANGE UP (ref 13–44)
LYMPHOCYTES # BLD AUTO: 26.4 % — SIGNIFICANT CHANGE UP (ref 13–44)
MCHC RBC-ENTMCNC: 25.3 PG — LOW (ref 27–34)
MCHC RBC-ENTMCNC: 25.3 PG — LOW (ref 27–34)
MCHC RBC-ENTMCNC: 31.3 GM/DL — LOW (ref 32–36)
MCHC RBC-ENTMCNC: 31.3 GM/DL — LOW (ref 32–36)
MCV RBC AUTO: 81 FL — SIGNIFICANT CHANGE UP (ref 80–100)
MCV RBC AUTO: 81 FL — SIGNIFICANT CHANGE UP (ref 80–100)
MONOCYTES # BLD AUTO: 1.02 K/UL — HIGH (ref 0–0.9)
MONOCYTES # BLD AUTO: 1.02 K/UL — HIGH (ref 0–0.9)
MONOCYTES NFR BLD AUTO: 11.2 % — SIGNIFICANT CHANGE UP (ref 2–14)
MONOCYTES NFR BLD AUTO: 11.2 % — SIGNIFICANT CHANGE UP (ref 2–14)
NEUTROPHILS # BLD AUTO: 5.35 K/UL — SIGNIFICANT CHANGE UP (ref 1.8–7.4)
NEUTROPHILS # BLD AUTO: 5.35 K/UL — SIGNIFICANT CHANGE UP (ref 1.8–7.4)
NEUTROPHILS NFR BLD AUTO: 58.9 % — SIGNIFICANT CHANGE UP (ref 43–77)
NEUTROPHILS NFR BLD AUTO: 58.9 % — SIGNIFICANT CHANGE UP (ref 43–77)
NRBC # BLD: 0 /100 WBCS — SIGNIFICANT CHANGE UP (ref 0–0)
NRBC # BLD: 0 /100 WBCS — SIGNIFICANT CHANGE UP (ref 0–0)
PCO2 BLDV: 51 MMHG — HIGH (ref 39–42)
PCO2 BLDV: 51 MMHG — HIGH (ref 39–42)
PH BLDV: 7.38 — SIGNIFICANT CHANGE UP (ref 7.32–7.43)
PH BLDV: 7.38 — SIGNIFICANT CHANGE UP (ref 7.32–7.43)
PLATELET # BLD AUTO: 504 K/UL — HIGH (ref 150–400)
PLATELET # BLD AUTO: 504 K/UL — HIGH (ref 150–400)
PO2 BLDV: 27 MMHG — SIGNIFICANT CHANGE UP (ref 25–45)
PO2 BLDV: 27 MMHG — SIGNIFICANT CHANGE UP (ref 25–45)
POTASSIUM BLDV-SCNC: 4 MMOL/L — SIGNIFICANT CHANGE UP (ref 3.5–5.1)
POTASSIUM BLDV-SCNC: 4 MMOL/L — SIGNIFICANT CHANGE UP (ref 3.5–5.1)
POTASSIUM SERPL-MCNC: 4.7 MMOL/L — SIGNIFICANT CHANGE UP (ref 3.5–5.3)
POTASSIUM SERPL-MCNC: 4.7 MMOL/L — SIGNIFICANT CHANGE UP (ref 3.5–5.3)
POTASSIUM SERPL-SCNC: 4.7 MMOL/L — SIGNIFICANT CHANGE UP (ref 3.5–5.3)
POTASSIUM SERPL-SCNC: 4.7 MMOL/L — SIGNIFICANT CHANGE UP (ref 3.5–5.3)
PROT SERPL-MCNC: 7.2 G/DL — SIGNIFICANT CHANGE UP (ref 6–8.3)
PROT SERPL-MCNC: 7.2 G/DL — SIGNIFICANT CHANGE UP (ref 6–8.3)
RBC # BLD: 3.79 M/UL — LOW (ref 3.8–5.2)
RBC # BLD: 3.79 M/UL — LOW (ref 3.8–5.2)
RBC # FLD: 13.8 % — SIGNIFICANT CHANGE UP (ref 10.3–14.5)
RBC # FLD: 13.8 % — SIGNIFICANT CHANGE UP (ref 10.3–14.5)
SAO2 % BLDV: 28.1 % — LOW (ref 67–88)
SAO2 % BLDV: 28.1 % — LOW (ref 67–88)
SODIUM SERPL-SCNC: 135 MMOL/L — SIGNIFICANT CHANGE UP (ref 135–145)
SODIUM SERPL-SCNC: 135 MMOL/L — SIGNIFICANT CHANGE UP (ref 135–145)
WBC # BLD: 9.08 K/UL — SIGNIFICANT CHANGE UP (ref 3.8–10.5)
WBC # BLD: 9.08 K/UL — SIGNIFICANT CHANGE UP (ref 3.8–10.5)
WBC # FLD AUTO: 9.08 K/UL — SIGNIFICANT CHANGE UP (ref 3.8–10.5)
WBC # FLD AUTO: 9.08 K/UL — SIGNIFICANT CHANGE UP (ref 3.8–10.5)

## 2023-12-06 PROCEDURE — 99285 EMERGENCY DEPT VISIT HI MDM: CPT

## 2023-12-06 PROCEDURE — 71045 X-RAY EXAM CHEST 1 VIEW: CPT | Mod: 26

## 2023-12-06 PROCEDURE — 71260 CT THORAX DX C+: CPT | Mod: 26,MA

## 2023-12-06 RX ORDER — ACETAMINOPHEN 500 MG
1000 TABLET ORAL ONCE
Refills: 0 | Status: COMPLETED | OUTPATIENT
Start: 2023-12-06 | End: 2023-12-06

## 2023-12-06 RX ORDER — LIDOCAINE 4 G/100G
1 CREAM TOPICAL ONCE
Refills: 0 | Status: COMPLETED | OUTPATIENT
Start: 2023-12-06 | End: 2023-12-06

## 2023-12-06 RX ORDER — PIPERACILLIN AND TAZOBACTAM 4; .5 G/20ML; G/20ML
3.38 INJECTION, POWDER, LYOPHILIZED, FOR SOLUTION INTRAVENOUS ONCE
Refills: 0 | Status: COMPLETED | OUTPATIENT
Start: 2023-12-06 | End: 2023-12-06

## 2023-12-06 RX ORDER — VANCOMYCIN HCL 1 G
1000 VIAL (EA) INTRAVENOUS ONCE
Refills: 0 | Status: COMPLETED | OUTPATIENT
Start: 2023-12-06 | End: 2023-12-06

## 2023-12-06 RX ADMIN — LIDOCAINE 1 PATCH: 4 CREAM TOPICAL at 21:27

## 2023-12-06 RX ADMIN — Medication 1000 MILLIGRAM(S): at 22:00

## 2023-12-06 RX ADMIN — Medication 400 MILLIGRAM(S): at 21:27

## 2023-12-06 RX ADMIN — PIPERACILLIN AND TAZOBACTAM 200 GRAM(S): 4; .5 INJECTION, POWDER, LYOPHILIZED, FOR SOLUTION INTRAVENOUS at 23:48

## 2023-12-06 NOTE — ED PROVIDER NOTE - PHYSICAL EXAMINATION
Pantera Allen DO (PGY2)   Physical Exam:    Gen: NAD, AOx3  Head: NCAT  HEENT: EOMI, PEERLA  Lung: CTAB, no respiratory distress, no wheezes/rhonchi/rales B/L  CV: RRR, no murmurs, rubs or gallops  Abd: soft, NT, ND, no guarding, no rigidity, no rebound tenderness, no CVA tenderness   MSK: no visible deformities, No midline tenderness to entire spine.  Distal pulses intact to left upper extremity.  Neuro: No focal sensory or motor deficits. Sensation intact to light touch all extremities.  Skin: Erythema and tenderness to palpation over the left sternoclavicular joint, no area of fluctuance.  Limited range of motion to left shoulder secondary to pain.

## 2023-12-06 NOTE — ED PROVIDER NOTE - PROGRESS NOTE DETAILS
Discussed with Ortho resident.  They know about the patient and they recommend we consult thoracic surgery.  Radiology results appreciated and CT chest with IV contrast ordered at the recommendation of the radiologist who read the MR scan from yesterday. [facility] at desk - will see pt. Pantera Allen DO (PGY2)  discussed with thoracic team - recommending IV abx, pre-op labs and admission. stated that there is not a primary thoracic admission service and patient to be admitted to medicine with thoracic team consulted and following.

## 2023-12-06 NOTE — ED PROVIDER NOTE - OBJECTIVE STATEMENT
43-year-old female history of MS presenting with left chest wall and shoulder pain.  Patient denies any recent trauma or falls however was sent in for further evaluation by her orthopedic surgeon.  Patient had an MRI of the left sternoclavicular joint.  The results showed significant marrow edema and swelling of the left sternoclavicular joint with a 2 cm fluid collection and adjacent pleural edema in the left chest with concerns for infectious etiology.  Patient initially saw orthopedics with for concerns for septic arthritis of this joint.  Impression of the read showed soft tissue abscess superficial to the joint.  Recommended CT scan with IV contrast of the chest.  Patient denies any fevers however endorsing erythema to the left sternoclavicular joint region. Patient states she was recently hospitalized over a week ago at [facility] for falls secondary to unsteadiness due to her MS.  Denies any recent falls today, vision changes. Endorsing headache. Denies any chest pain, shortness of breath, abdominal pain, nausea vomiting diarrhea, urinary complaints. 43-year-old female history of MS presenting with left chest wall and shoulder pain.  Patient denies any recent trauma or falls however was sent in for further evaluation by her orthopedic surgeon.  Patient had an MRI of the left sternoclavicular joint.  The results showed significant marrow edema and swelling of the left sternoclavicular joint with a 2 cm fluid collection and adjacent pleural edema in the left chest with concerns for infectious etiology.  Patient initially saw orthopedics with for concerns for septic arthritis of this joint.  Impression of the read showed soft tissue abscess superficial to the joint.  Recommended CT scan with IV contrast of the chest.  Patient denies any fevers however endorsing erythema to the left sternoclavicular joint region. Patient states she was recently hospitalized over a week ago at [facility] for falls secondary to unsteadiness due to her MS.  Denies any recent falls today, vision changes. Endorsing headache. Denies any chest pain, shortness of breath, abdominal pain, nausea vomiting diarrhea, urinary complaints. 43-year-old female history of MS presenting with left chest wall and shoulder pain.  Patient denies any recent trauma or falls however was sent in for further evaluation by her orthopedic surgeon.  Patient had an MRI of the left sternoclavicular joint.  The results showed significant marrow edema and swelling of the left sternoclavicular joint with a 2 cm fluid collection and adjacent pleural edema in the left chest with concerns for infectious etiology.  Patient initially saw orthopedics with for concerns for septic arthritis of this joint.  Impression of the read showed soft tissue abscess superficial to the joint.  Recommended CT scan with IV contrast of the chest.  Patient denies any fevers however endorsing erythema to the left sternoclavicular joint region. Patient states she was recently hospitalized over a week ago at [facility] for falls secondary to unsteadiness due to her MS.  Denies any recent falls today, vision changes. Endorsing headache. Denies any chest pain, shortness of breath, abdominal pain, nausea vomiting diarrhea, urinary complaints.    Patient Orthopedic Jesus Bar MD 43-year-old female history of MS presenting with left chest wall and shoulder pain.  Patient denies any recent trauma or falls however was sent in for further evaluation by her orthopedic surgeon.  Patient had an MRI of the left sternoclavicular joint.  The results showed significant marrow edema and swelling of the left sternoclavicular joint with a 2 cm fluid collection and adjacent pleural edema in the left chest with concerns for infectious etiology.  Patient initially saw orthopedics with for concerns for septic arthritis of this joint.  Impression of the read showed soft tissue abscess superficial to the joint.  Recommended CT scan with IV contrast of the chest.  Patient denies any fevers however endorsing erythema to the left sternoclavicular joint region. Patient states she was recently hospitalized over a week ago at [facility] for falls secondary to unsteadiness due to her MS.  Denies any recent falls today, vision changes. Endorsing headache. Denies any chest pain, shortness of breath, abdominal pain, nausea vomiting diarrhea, urinary complaints.    Patient Orthopedic Jesus Bar MD

## 2023-12-06 NOTE — ED ADULT NURSE NOTE - NSFALLUNIVINTERV_ED_ALL_ED
Bed/Stretcher in lowest position, wheels locked, appropriate side rails in place/Call bell, personal items and telephone in reach/Instruct patient to call for assistance before getting out of bed/chair/stretcher/Non-slip footwear applied when patient is off stretcher/[place] to call system/Physically safe environment - no spills, clutter or unnecessary equipment/Purposeful proactive rounding/Room/bathroom lighting operational, light cord in reach Bed/Stretcher in lowest position, wheels locked, appropriate side rails in place/Call bell, personal items and telephone in reach/Instruct patient to call for assistance before getting out of bed/chair/stretcher/Non-slip footwear applied when patient is off stretcher/[place] to call system/Physically safe environment - no spills, clutter or unnecessary equipment/Purposeful proactive rounding/Room/bathroom lighting operational, light cord in reach

## 2023-12-06 NOTE — ED PROVIDER NOTE - ATTENDING CONTRIBUTION TO CARE
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  Left sternal wall is tender and slight skin redness  Discussed with Ortho.  Blood cultures.  I reviewed the blood work from outpatient and the ESR was 80.  MRI results as noted.

## 2023-12-06 NOTE — ED PROVIDER NOTE - CLINICAL SUMMARY MEDICAL DECISION MAKING FREE TEXT BOX
43-year-old female history of MS presenting with left chest wall and shoulder pain.  Patient denies any recent trauma or falls however was sent in for further evaluation by her orthopedic surgeon.  Patient had an MRI of the left sternoclavicular joint.  The results showed significant marrow edema and swelling of the left sternoclavicular joint with a 2 cm fluid collection and adjacent pleural edema in the left chest with concerns for infectious etiology.   Vital signs stable, afebrile, not hypoxic. Plan for basic labs, ESR, CRP, blood cultures, CT chest with IV contrast Eucrisa Pregnancy And Lactation Text: This medication has not been assigned a Pregnancy Risk Category but animal studies failed to show danger with the topical medication. It is unknown if the medication is excreted in breast milk.

## 2023-12-06 NOTE — ED ADULT NURSE NOTE - OBJECTIVE STATEMENT
43 year old female, PMH of MS, presenting to ED complaining of left chest wall and shoulder pain. Patient was evaluated outpatient with MRI results showing edema and swelling of the left sternoclavicular joint. Patient recommended to come to ED today by orthopedic surgeon. Patient endorsing severe 10/10, left sided pain in the shoulder and arm with decreased ROM d/t pain. Denies CP, SOB, HA, n/v/d, abdominal pain, difficulty urinating at this time. Patient is ambulatory with walker at home. IV placed and labs drawn. Safety and comfort measures maintained.

## 2023-12-07 ENCOUNTER — TRANSCRIPTION ENCOUNTER (OUTPATIENT)
Age: 43
End: 2023-12-07

## 2023-12-07 ENCOUNTER — RESULT REVIEW (OUTPATIENT)
Age: 43
End: 2023-12-07

## 2023-12-07 DIAGNOSIS — M00.9 PYOGENIC ARTHRITIS, UNSPECIFIED: ICD-10-CM

## 2023-12-07 DIAGNOSIS — Z98.891 HISTORY OF UTERINE SCAR FROM PREVIOUS SURGERY: Chronic | ICD-10-CM

## 2023-12-07 DIAGNOSIS — G35 MULTIPLE SCLEROSIS: ICD-10-CM

## 2023-12-07 DIAGNOSIS — D64.9 ANEMIA, UNSPECIFIED: ICD-10-CM

## 2023-12-07 DIAGNOSIS — F41.9 ANXIETY DISORDER, UNSPECIFIED: ICD-10-CM

## 2023-12-07 DIAGNOSIS — R09.89 OTHER SPECIFIED SYMPTOMS AND SIGNS INVOLVING THE CIRCULATORY AND RESPIRATORY SYSTEMS: ICD-10-CM

## 2023-12-07 DIAGNOSIS — Z29.9 ENCOUNTER FOR PROPHYLACTIC MEASURES, UNSPECIFIED: ICD-10-CM

## 2023-12-07 LAB
ERYTHROCYTE [SEDIMENTATION RATE] IN BLOOD: 109 MM/HR — HIGH (ref 0–15)
ERYTHROCYTE [SEDIMENTATION RATE] IN BLOOD: 109 MM/HR — HIGH (ref 0–15)
GRAM STN FLD: SIGNIFICANT CHANGE UP
SPECIMEN SOURCE: SIGNIFICANT CHANGE UP

## 2023-12-07 PROCEDURE — 88342 IMHCHEM/IMCYTCHM 1ST ANTB: CPT | Mod: 26

## 2023-12-07 PROCEDURE — 99223 1ST HOSP IP/OBS HIGH 75: CPT

## 2023-12-07 PROCEDURE — 21600 PARTIAL REMOVAL OF RIB: CPT

## 2023-12-07 PROCEDURE — 99222 1ST HOSP IP/OBS MODERATE 55: CPT | Mod: 25

## 2023-12-07 PROCEDURE — 11044 DBRDMT BONE 1ST 20 SQ CM/<: CPT | Mod: 59

## 2023-12-07 PROCEDURE — 88305 TISSUE EXAM BY PATHOLOGIST: CPT | Mod: 26

## 2023-12-07 PROCEDURE — 71045 X-RAY EXAM CHEST 1 VIEW: CPT | Mod: 26

## 2023-12-07 PROCEDURE — 88311 DECALCIFY TISSUE: CPT | Mod: 26

## 2023-12-07 DEVICE — ARISTA 3GR: Type: IMPLANTABLE DEVICE | Site: LEFT | Status: FUNCTIONAL

## 2023-12-07 DEVICE — SURGICEL FIBRILLAR 2 X 4": Type: IMPLANTABLE DEVICE | Site: LEFT | Status: FUNCTIONAL

## 2023-12-07 DEVICE — TACHOSIL 4.8 X 4.8CM: Type: IMPLANTABLE DEVICE | Site: LEFT | Status: FUNCTIONAL

## 2023-12-07 RX ORDER — VANCOMYCIN HCL 1 G
1000 VIAL (EA) INTRAVENOUS EVERY 12 HOURS
Refills: 0 | Status: DISCONTINUED | OUTPATIENT
Start: 2023-12-07 | End: 2023-12-07

## 2023-12-07 RX ORDER — ONDANSETRON 8 MG/1
4 TABLET, FILM COATED ORAL ONCE
Refills: 0 | Status: DISCONTINUED | OUTPATIENT
Start: 2023-12-07 | End: 2023-12-07

## 2023-12-07 RX ORDER — CEFEPIME 1 G/1
2000 INJECTION, POWDER, FOR SOLUTION INTRAMUSCULAR; INTRAVENOUS EVERY 8 HOURS
Refills: 0 | Status: DISCONTINUED | OUTPATIENT
Start: 2023-12-07 | End: 2023-12-07

## 2023-12-07 RX ORDER — LANOLIN ALCOHOL/MO/W.PET/CERES
3 CREAM (GRAM) TOPICAL AT BEDTIME
Refills: 0 | Status: DISCONTINUED | OUTPATIENT
Start: 2023-12-07 | End: 2023-12-07

## 2023-12-07 RX ORDER — HYDROMORPHONE HYDROCHLORIDE 2 MG/ML
0.5 INJECTION INTRAMUSCULAR; INTRAVENOUS; SUBCUTANEOUS
Refills: 0 | Status: DISCONTINUED | OUTPATIENT
Start: 2023-12-07 | End: 2023-12-07

## 2023-12-07 RX ORDER — MIRABEGRON 50 MG/1
50 TABLET, EXTENDED RELEASE ORAL DAILY
Refills: 0 | Status: DISCONTINUED | OUTPATIENT
Start: 2023-12-07 | End: 2023-12-07

## 2023-12-07 RX ORDER — DULOXETINE HYDROCHLORIDE 30 MG/1
60 CAPSULE, DELAYED RELEASE ORAL AT BEDTIME
Refills: 0 | Status: DISCONTINUED | OUTPATIENT
Start: 2023-12-07 | End: 2023-12-07

## 2023-12-07 RX ORDER — VANCOMYCIN HCL 1 G
1000 VIAL (EA) INTRAVENOUS EVERY 12 HOURS
Refills: 0 | Status: DISCONTINUED | OUTPATIENT
Start: 2023-12-07 | End: 2023-12-08

## 2023-12-07 RX ORDER — ACETAMINOPHEN 500 MG
650 TABLET ORAL EVERY 6 HOURS
Refills: 0 | Status: DISCONTINUED | OUTPATIENT
Start: 2023-12-07 | End: 2023-12-07

## 2023-12-07 RX ORDER — NALOXONE HYDROCHLORIDE 4 MG/.1ML
0.1 SPRAY NASAL
Refills: 0 | Status: DISCONTINUED | OUTPATIENT
Start: 2023-12-07 | End: 2023-12-09

## 2023-12-07 RX ORDER — HYDROMORPHONE HYDROCHLORIDE 2 MG/ML
30 INJECTION INTRAMUSCULAR; INTRAVENOUS; SUBCUTANEOUS
Refills: 0 | Status: DISCONTINUED | OUTPATIENT
Start: 2023-12-07 | End: 2023-12-09

## 2023-12-07 RX ORDER — CEFEPIME 1 G/1
2000 INJECTION, POWDER, FOR SOLUTION INTRAMUSCULAR; INTRAVENOUS EVERY 8 HOURS
Refills: 0 | Status: DISCONTINUED | OUTPATIENT
Start: 2023-12-07 | End: 2023-12-15

## 2023-12-07 RX ORDER — SERTRALINE 25 MG/1
100 TABLET, FILM COATED ORAL AT BEDTIME
Refills: 0 | Status: DISCONTINUED | OUTPATIENT
Start: 2023-12-07 | End: 2023-12-07

## 2023-12-07 RX ORDER — HYDROMORPHONE HYDROCHLORIDE 2 MG/ML
0.5 INJECTION INTRAMUSCULAR; INTRAVENOUS; SUBCUTANEOUS
Refills: 0 | Status: DISCONTINUED | OUTPATIENT
Start: 2023-12-07 | End: 2023-12-09

## 2023-12-07 RX ORDER — VIBEGRON 75 MG/1
1 TABLET, FILM COATED ORAL
Refills: 0 | DISCHARGE

## 2023-12-07 RX ORDER — OXYCODONE HYDROCHLORIDE 5 MG/1
5 TABLET ORAL EVERY 6 HOURS
Refills: 0 | Status: DISCONTINUED | OUTPATIENT
Start: 2023-12-07 | End: 2023-12-07

## 2023-12-07 RX ORDER — ONDANSETRON 8 MG/1
4 TABLET, FILM COATED ORAL EVERY 6 HOURS
Refills: 0 | Status: DISCONTINUED | OUTPATIENT
Start: 2023-12-07 | End: 2023-12-09

## 2023-12-07 RX ORDER — CLONAZEPAM 1 MG
0.5 TABLET ORAL AT BEDTIME
Refills: 0 | Status: DISCONTINUED | OUTPATIENT
Start: 2023-12-07 | End: 2023-12-07

## 2023-12-07 RX ORDER — CHLORHEXIDINE GLUCONATE 213 G/1000ML
1 SOLUTION TOPICAL
Refills: 0 | Status: DISCONTINUED | OUTPATIENT
Start: 2023-12-07 | End: 2023-12-15

## 2023-12-07 RX ADMIN — HYDROMORPHONE HYDROCHLORIDE 0.5 MILLIGRAM(S): 2 INJECTION INTRAMUSCULAR; INTRAVENOUS; SUBCUTANEOUS at 13:50

## 2023-12-07 RX ADMIN — HYDROMORPHONE HYDROCHLORIDE 30 MILLILITER(S): 2 INJECTION INTRAMUSCULAR; INTRAVENOUS; SUBCUTANEOUS at 16:29

## 2023-12-07 RX ADMIN — CEFEPIME 100 MILLIGRAM(S): 1 INJECTION, POWDER, FOR SOLUTION INTRAMUSCULAR; INTRAVENOUS at 06:28

## 2023-12-07 RX ADMIN — Medication 250 MILLIGRAM(S): at 01:00

## 2023-12-07 RX ADMIN — HYDROMORPHONE HYDROCHLORIDE 30 MILLILITER(S): 2 INJECTION INTRAMUSCULAR; INTRAVENOUS; SUBCUTANEOUS at 15:03

## 2023-12-07 RX ADMIN — CEFEPIME 100 MILLIGRAM(S): 1 INJECTION, POWDER, FOR SOLUTION INTRAMUSCULAR; INTRAVENOUS at 15:46

## 2023-12-07 RX ADMIN — Medication 650 MILLIGRAM(S): at 04:04

## 2023-12-07 RX ADMIN — Medication 650 MILLIGRAM(S): at 04:37

## 2023-12-07 RX ADMIN — CHLORHEXIDINE GLUCONATE 1 APPLICATION(S): 213 SOLUTION TOPICAL at 22:57

## 2023-12-07 RX ADMIN — HYDROMORPHONE HYDROCHLORIDE 0.5 MILLIGRAM(S): 2 INJECTION INTRAMUSCULAR; INTRAVENOUS; SUBCUTANEOUS at 15:59

## 2023-12-07 RX ADMIN — CEFEPIME 100 MILLIGRAM(S): 1 INJECTION, POWDER, FOR SOLUTION INTRAMUSCULAR; INTRAVENOUS at 22:58

## 2023-12-07 RX ADMIN — MIRABEGRON 50 MILLIGRAM(S): 50 TABLET, EXTENDED RELEASE ORAL at 04:05

## 2023-12-07 RX ADMIN — HYDROMORPHONE HYDROCHLORIDE 30 MILLILITER(S): 2 INJECTION INTRAMUSCULAR; INTRAVENOUS; SUBCUTANEOUS at 16:01

## 2023-12-07 RX ADMIN — LIDOCAINE 1 PATCH: 4 CREAM TOPICAL at 07:06

## 2023-12-07 RX ADMIN — Medication 250 MILLIGRAM(S): at 17:19

## 2023-12-07 RX ADMIN — HYDROMORPHONE HYDROCHLORIDE 30 MILLILITER(S): 2 INJECTION INTRAMUSCULAR; INTRAVENOUS; SUBCUTANEOUS at 19:04

## 2023-12-07 RX ADMIN — Medication 0.5 MILLIGRAM(S): at 04:03

## 2023-12-07 RX ADMIN — HYDROMORPHONE HYDROCHLORIDE 0.5 MILLIGRAM(S): 2 INJECTION INTRAMUSCULAR; INTRAVENOUS; SUBCUTANEOUS at 14:05

## 2023-12-07 NOTE — CONSULT NOTE ADULT - ASSESSMENT
43F PMHx of MS on immunosuppressive medication, presenting with left chest wall and shoulder pain. Imaging workup findings suggestive of left sternoclavicular joint abscess, c/f osteomyeletis.     Plan:  - Recommend medical admission for medical optimization and management of MS and suspected left sternoclavicular joint infection  - Thoracic surgery plan to take to OR tomorrow, 12/8 for left sternoclavicular joint abscess washout and debridement with attending, Dr. Bautista  - OR  aware  - please obtain preop labs, T&S, CBC, CMP, coags, HCg levels  - recommend IV abx  - NPO after midnight  - pain control  -care per primary  will follow    Plan discussed with and approved by attending on call, Dr. Lily Wong MD PGY-3  Thoracic Surgery   [id number] 43F PMHx of MS on immunosuppressive medication, presenting with left chest wall and shoulder pain. Imaging workup findings suggestive of left sternoclavicular joint abscess, c/f osteomyeletis.     Plan:  - Recommend medical admission for medical optimization and management of MS and suspected left sternoclavicular joint infection  - Thoracic surgery plan to take to OR tomorrow, 12/8 for left sternoclavicular joint abscess washout and debridement with attending, Dr. Bautista  - OR  aware  - please obtain preop labs, T&S, CBC, CMP, coags, HCg levels  - recommend IV abx  - NPO after midnight  - pain control  -care per primary  will follow    Plan discussed with and approved by attending on call, Dr. Lily Wong MD PGY-3  Thoracic Surgery   [id number]

## 2023-12-07 NOTE — H&P ADULT - NSHPPHYSICALEXAM_GEN_ALL_CORE
Vital Signs Last 24 Hrs  T(C): 36.6 (12-06-23 @ 22:36), Max: 37 (12-06-23 @ 19:56)  T(F): 97.9 (12-06-23 @ 22:36), Max: 98.6 (12-06-23 @ 19:56)  HR: 87 (12-06-23 @ 22:36) (87 - 97)  BP: 116/58 (12-06-23 @ 22:36) (101/68 - 116/58)  BP(mean): --  RR: 16 (12-06-23 @ 22:36) (16 - 18)  SpO2: 100% (12-06-23 @ 22:36) (97% - 100%)

## 2023-12-07 NOTE — H&P ADULT - NSHPPOADEEPVENOUSTHROMB_GEN_A_CORE
Med refill request:  ALPRAZolam (XANAX) 0.25 MG tablet 90 tablet 1       Last Seen:11/4/21    Next Appointment:5/4/22     Writer looked pt up in PDMP, past fill dates are as follows:     Qty: 90  Days: 90  Refills: 1 Prescribed: 7/30/2021  Dispensed: 12/13/2021          PDMP reviewed?  Yes  [x]    No  []   Requesting/dispensing early refills?  Yes  [x]    No  []   Other controlled substances? norco     Is the patient due for refill of this medication(s): no last dispensed on 12/13/21 for 90 days  PDMP review: Criteria not met     
suspected

## 2023-12-07 NOTE — PROGRESS NOTE ADULT - SUBJECTIVE AND OBJECTIVE BOX
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  She had a recent fall in 2023 secondary to unsteadiness due to multiple sclerosis, with subsequent left chest wall and shoulder pain.  Was seen at [facility] with concern for MS exacerbation but no MRI brain and cervical spine with an without contrast were done at the time without enhancement.  At the time infectious workup was done and notable for UTI which was treated.  She continued to have pain.  In the interim, she had an outpatient MRI of left sternoclavicular joint with results significant for marrow edema and swelling of the left sternoclavicular joint with a 2 cm fluid collection and adjacent pleural edema in the left chest with concerns for infectious etiology.   CT chest w/ IV contrast suggestive of septic arthritis with surrounding erythema. Thoracic surgery consulted and patient going to OR.      ******    Past Medical History:  Multiple sclerosis [G35]        Past Surgical History:  History of  [Z98.891]        Social History:  No toxic habits    Family History:  FAMILY HISTORY:      Allergies:  No Known Allergies      ROS:  Constitutional: Patient offers no complaints of fevers or significant weight loss  Ears, Nose, Mouth and Throat: The patient presents with no abnormalities of the head, ears, eyes, nose or throat  Skin: Patient offers no concerns of new rashes or lesions  Respiratory: The patient presents with no abnormalities of the respiratory tract  Cardiovascular: The patient presents with no cardiac abnormalities  Gastrointestinal: The patient presents with no abnormalities of the GI system  Genitourinary: The patient presents with no dysuria, hematuria or frequent urination  Neurological: See HPI  Endocrine: Patient offers no complaints of excessive thirst, urination, or heat/cold intolerance    Advanced care planning reviewed and noted in the chart.    Medications:  acetaminophen     Tablet .. 650 milliGRAM(s) Oral every 6 hours PRN  cefepime   IVPB 2000 milliGRAM(s) IV Intermittent every 8 hours  clonazePAM  Tablet 0.5 milliGRAM(s) Oral at bedtime PRN  DULoxetine 60 milliGRAM(s) Oral at bedtime  melatonin 3 milliGRAM(s) Oral at bedtime PRN  mirabegron ER 50 milliGRAM(s) Oral daily  ondansetron Injectable 4 milliGRAM(s) IV Push once PRN  oxyCODONE    IR 5 milliGRAM(s) Oral every 6 hours PRN  sertraline 100 milliGRAM(s) Oral at bedtime  vancomycin  IVPB 1000 milliGRAM(s) IV Intermittent every 12 hours      Labs:  CBC Full  -  ( 06 Dec 2023 20:39 )  WBC Count : 9.08 K/uL  RBC Count : 3.79 M/uL  Hemoglobin : 9.6 g/dL  Hematocrit : 30.7 %  Platelet Count - Automated : 504 K/uL  Mean Cell Volume : 81.0 fl  Mean Cell Hemoglobin : 25.3 pg  Mean Cell Hemoglobin Concentration : 31.3 gm/dL  Auto Neutrophil # : 5.35 K/uL  Auto Lymphocyte # : 2.40 K/uL  Auto Monocyte # : 1.02 K/uL  Auto Eosinophil # : 0.20 K/uL  Auto Basophil # : 0.06 K/uL  Auto Neutrophil % : 58.9 %  Auto Lymphocyte % : 26.4 %  Auto Monocyte % : 11.2 %  Auto Eosinophil % : 2.2 %  Auto Basophil % : 0.7 %        135  |  100  |  15  ----------------------------<  81  4.7   |  24  |  0.46<L>    Ca    9.8      06 Dec 2023 20:39    TPro  7.2  /  Alb  3.7  /  TBili  0.2  /  DBili  x   /  AST  23  /  ALT  21  /  AlkPhos  248<H>  12-    CAPILLARY BLOOD GLUCOSE        LIVER FUNCTIONS - ( 06 Dec 2023 20:39 )  Alb: 3.7 g/dL / Pro: 7.2 g/dL / ALK PHOS: 248 U/L / ALT: 21 U/L / AST: 23 U/L / GGT: x             Urinalysis Basic - ( 06 Dec 2023 20:39 )    Color: x / Appearance: x / SG: x / pH: x  Gluc: 81 mg/dL / Ketone: x  / Bili: x / Urobili: x   Blood: x / Protein: x / Nitrite: x   Leuk Esterase: x / RBC: x / WBC x   Sq Epi: x / Non Sq Epi: x / Bacteria: x      Female    Vitals:  Vital Signs Last 24 Hrs  T(C): 36.4 (07 Dec 2023 09:00), Max: 37 (06 Dec 2023 19:56)  T(F): 97.5 (07 Dec 2023 09:00), Max: 98.6 (06 Dec 2023 19:56)  HR: 97 (07 Dec 2023 09:00) (81 - 97)  BP: 104/65 (07 Dec 2023 09:00) (101/65 - 118/72)  BP(mean): --  RR: 18 (07 Dec 2023 09:00) (16 - 18)  SpO2: 99% (07 Dec 2023 09:00) (96% - 100%)    Parameters below as of 07 Dec 2023 09:00  Patient On (Oxygen Delivery Method): room air        NEUROLOGICAL EXAM:    Mental status: Awake, alert, and in no apparent distress. Oriented to person, place and time. Language function is normal.  Tearful and anxious regarding surgery.    Cranial Nerves: Pupils were equal, round, reactive to light. Extraocular movements were intact. Visual field were full. Fundoscopic exam was deferred. Facial sensation was intact to light touch. There was slight left facia droop. The palate was upgoing symmetrically and tongue was midline.     Motor exam: Bulk and tone were normal. Strength was 5/5 in all four extremities except for left arm which was pain-limited.      Reflexes: deferred     Sensation: Intact to light touch    Coordination: did not assess for dysmetria on left side due to severe pain limited state    Gait: did not assess gait, patient going to OR         ACC: [id number] EXAM:  CT CHEST IC   ORDERED BY:  CHARLY FARIAS     PROCEDURE DATE:  2023          INTERPRETATION:  CLINICAL INFORMATION: Left chest wall erythema and   swelling undergoing orthopedic evaluation for septic arthritis of the   left sternoclavicular joint. According to the emergency room physician   note, the patient underwent MRI of the left sternoclavicular joint which   revealed was suspicious for septic arthritis with 2 cm fluid collection.    COMPARISON: No comparison MRI or other images are available.    CONTRAST/COMPLICATIONS:  IV Contrast: Omnipaque 350  60 cc administered   40 cc discarded  Oral Contrast: NONE  Complications: None reported at time of study completion    PROCEDURE:  CT of the Chest was performed.  Sagittal and coronal reformats were performed.    FINDINGS:    LUNGS AND AIRWAYS: Mild tracheal secretions.  Subpleural consolidation   anterior aspect left lobe deep to the region of the left sternoclavicular   joint. Otherwise clear lungs.  PLEURA: No pleural effusion.  MEDIASTINUM AND NELLY: No lymphadenopathy.  VESSELS: Within normal limits.  HEART: Heart size is normal. No pericardial effusion.  VISUALIZED UPPER ABDOMEN: Within normal limits.  BONES, CHEST WALL: Erosions of the left manubrium of the sternum at its   articulation with the left clavicular head and left first chondral   cartilage. Additional erosions of the inferior aspect of the left medial   clavicular head. Surrounding extensive soft tissue swelling with   thickening of the overlying pectoralis musculature, and phlegmonous   material extending posteriorly adjacent to the pleura, into the   retrosternal mediastinal fat. Edema/inflammation extends to the patient's   right across midline. No formed fluid collection is evident. Mild left   supraclavicular lymphadenopathy.    IMPRESSION:  Septic arthritis of the left sternoclavicular joint and involving the   articulation of the sternum with the first costal cartilage.    Extensive surrounding inflammation, with pneumonia in the underlying   subpleural left upper lobe.    No fluid collection is evident.        --- End of Report ---          TWILA LABOY MD; Resident Radiologist  This document has been electronically signed.   MIHIR LOMAS MD; Attending Radiologist  This document has been electronically signed. 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  She had a recent fall in 2023 secondary to unsteadiness due to multiple sclerosis, with subsequent left chest wall and shoulder pain.  Was seen at [facility] with concern for MS exacerbation but no MRI brain and cervical spine with an without contrast were done at the time without enhancement.  At the time infectious workup was done and notable for UTI which was treated.  She continued to have pain.  In the interim, she had an outpatient MRI of left sternoclavicular joint with results significant for marrow edema and swelling of the left sternoclavicular joint with a 2 cm fluid collection and adjacent pleural edema in the left chest with concerns for infectious etiology.   CT chest w/ IV contrast suggestive of septic arthritis with surrounding erythema. Thoracic surgery consulted and patient going to OR.      ******    Past Medical History:  Multiple sclerosis [G35]        Past Surgical History:  History of  [Z98.891]        Social History:  No toxic habits    Family History:  FAMILY HISTORY:      Allergies:  No Known Allergies      ROS:  Constitutional: Patient offers no complaints of fevers or significant weight loss  Ears, Nose, Mouth and Throat: The patient presents with no abnormalities of the head, ears, eyes, nose or throat  Skin: Patient offers no concerns of new rashes or lesions  Respiratory: The patient presents with no abnormalities of the respiratory tract  Cardiovascular: The patient presents with no cardiac abnormalities  Gastrointestinal: The patient presents with no abnormalities of the GI system  Genitourinary: The patient presents with no dysuria, hematuria or frequent urination  Neurological: See HPI  Endocrine: Patient offers no complaints of excessive thirst, urination, or heat/cold intolerance    Advanced care planning reviewed and noted in the chart.    Medications:  acetaminophen     Tablet .. 650 milliGRAM(s) Oral every 6 hours PRN  cefepime   IVPB 2000 milliGRAM(s) IV Intermittent every 8 hours  clonazePAM  Tablet 0.5 milliGRAM(s) Oral at bedtime PRN  DULoxetine 60 milliGRAM(s) Oral at bedtime  melatonin 3 milliGRAM(s) Oral at bedtime PRN  mirabegron ER 50 milliGRAM(s) Oral daily  ondansetron Injectable 4 milliGRAM(s) IV Push once PRN  oxyCODONE    IR 5 milliGRAM(s) Oral every 6 hours PRN  sertraline 100 milliGRAM(s) Oral at bedtime  vancomycin  IVPB 1000 milliGRAM(s) IV Intermittent every 12 hours      Labs:  CBC Full  -  ( 06 Dec 2023 20:39 )  WBC Count : 9.08 K/uL  RBC Count : 3.79 M/uL  Hemoglobin : 9.6 g/dL  Hematocrit : 30.7 %  Platelet Count - Automated : 504 K/uL  Mean Cell Volume : 81.0 fl  Mean Cell Hemoglobin : 25.3 pg  Mean Cell Hemoglobin Concentration : 31.3 gm/dL  Auto Neutrophil # : 5.35 K/uL  Auto Lymphocyte # : 2.40 K/uL  Auto Monocyte # : 1.02 K/uL  Auto Eosinophil # : 0.20 K/uL  Auto Basophil # : 0.06 K/uL  Auto Neutrophil % : 58.9 %  Auto Lymphocyte % : 26.4 %  Auto Monocyte % : 11.2 %  Auto Eosinophil % : 2.2 %  Auto Basophil % : 0.7 %        135  |  100  |  15  ----------------------------<  81  4.7   |  24  |  0.46<L>    Ca    9.8      06 Dec 2023 20:39    TPro  7.2  /  Alb  3.7  /  TBili  0.2  /  DBili  x   /  AST  23  /  ALT  21  /  AlkPhos  248<H>  12-    CAPILLARY BLOOD GLUCOSE        LIVER FUNCTIONS - ( 06 Dec 2023 20:39 )  Alb: 3.7 g/dL / Pro: 7.2 g/dL / ALK PHOS: 248 U/L / ALT: 21 U/L / AST: 23 U/L / GGT: x             Urinalysis Basic - ( 06 Dec 2023 20:39 )    Color: x / Appearance: x / SG: x / pH: x  Gluc: 81 mg/dL / Ketone: x  / Bili: x / Urobili: x   Blood: x / Protein: x / Nitrite: x   Leuk Esterase: x / RBC: x / WBC x   Sq Epi: x / Non Sq Epi: x / Bacteria: x      Female    Vitals:  Vital Signs Last 24 Hrs  T(C): 36.4 (07 Dec 2023 09:00), Max: 37 (06 Dec 2023 19:56)  T(F): 97.5 (07 Dec 2023 09:00), Max: 98.6 (06 Dec 2023 19:56)  HR: 97 (07 Dec 2023 09:00) (81 - 97)  BP: 104/65 (07 Dec 2023 09:00) (101/65 - 118/72)  BP(mean): --  RR: 18 (07 Dec 2023 09:00) (16 - 18)  SpO2: 99% (07 Dec 2023 09:00) (96% - 100%)    Parameters below as of 07 Dec 2023 09:00  Patient On (Oxygen Delivery Method): room air        NEUROLOGICAL EXAM:    Mental status: Awake, alert, and in no apparent distress. Oriented to person, place and time. Language function is normal.  Tearful and anxious regarding surgery.    Cranial Nerves: Pupils were equal, round, reactive to light. Extraocular movements were intact. Visual field were full. Fundoscopic exam was deferred. Facial sensation was intact to light touch. There was slight left facia droop. The palate was upgoing symmetrically and tongue was midline.     Motor exam: Bulk and tone were normal. Strength was 5/5 in all four extremities except for left arm which was pain-limited.      Reflexes: deferred     Sensation: Intact to light touch    Coordination: did not assess for dysmetria on left side due to severe pain limited state    Gait: did not assess gait, patient going to OR         ACC: [id number] EXAM:  CT CHEST IC   ORDERED BY:  CHARLY FARIAS     PROCEDURE DATE:  2023          INTERPRETATION:  CLINICAL INFORMATION: Left chest wall erythema and   swelling undergoing orthopedic evaluation for septic arthritis of the   left sternoclavicular joint. According to the emergency room physician   note, the patient underwent MRI of the left sternoclavicular joint which   revealed was suspicious for septic arthritis with 2 cm fluid collection.    COMPARISON: No comparison MRI or other images are available.    CONTRAST/COMPLICATIONS:  IV Contrast: Omnipaque 350  60 cc administered   40 cc discarded  Oral Contrast: NONE  Complications: None reported at time of study completion    PROCEDURE:  CT of the Chest was performed.  Sagittal and coronal reformats were performed.    FINDINGS:    LUNGS AND AIRWAYS: Mild tracheal secretions.  Subpleural consolidation   anterior aspect left lobe deep to the region of the left sternoclavicular   joint. Otherwise clear lungs.  PLEURA: No pleural effusion.  MEDIASTINUM AND NELLY: No lymphadenopathy.  VESSELS: Within normal limits.  HEART: Heart size is normal. No pericardial effusion.  VISUALIZED UPPER ABDOMEN: Within normal limits.  BONES, CHEST WALL: Erosions of the left manubrium of the sternum at its   articulation with the left clavicular head and left first chondral   cartilage. Additional erosions of the inferior aspect of the left medial   clavicular head. Surrounding extensive soft tissue swelling with   thickening of the overlying pectoralis musculature, and phlegmonous   material extending posteriorly adjacent to the pleura, into the   retrosternal mediastinal fat. Edema/inflammation extends to the patient's   right across midline. No formed fluid collection is evident. Mild left   supraclavicular lymphadenopathy.    IMPRESSION:  Septic arthritis of the left sternoclavicular joint and involving the   articulation of the sternum with the first costal cartilage.    Extensive surrounding inflammation, with pneumonia in the underlying   subpleural left upper lobe.    No fluid collection is evident.        --- End of Report ---          TWILA LABOY MD; Resident Radiologist  This document has been electronically signed.   MIHIR LOMAS MD; Attending Radiologist  This document has been electronically signed. Dec  7 2023 12:04AM

## 2023-12-07 NOTE — CONSULT NOTE ADULT - ATTENDING COMMENTS
patient with left sternoclavicular joint infection - extending down to substernal mediastinal fat. discussed plan for surgery including possible need for open wound vs. vac versus drain placement . discussed possible need to resect clavicle, sternum/manubrium and/or rib . patient very upset - concerned about scar - discussed risks of descending infection and was clear that there would be scarring, decreased mobility of the arm, possible open wound that would require healing by secondary intention (inside --> out). patient was very upset about the possibility of a big scar . made it clear this was a procedure to control the source of infection and unfortunately would have some type of scar - discussed with patient and daughter. including risks of surgery not limited to bleeding, infection, scarring, injury to surrounding tissue. all questions answered  and agreeable to proceed.

## 2023-12-07 NOTE — BRIEF OPERATIVE NOTE - OPERATION/FINDINGS
a Debridement of left sternoclavicular joint with excision of left clavicular head. Purulent drainage at the sternoclavicular joint. Tissue surrounding left clavicle found to be very inflamed. Copious irrigation of surgical site.

## 2023-12-07 NOTE — H&P ADULT - HISTORY OF PRESENT ILLNESS
43F PMHx of MS on immunosuppressive medication, presenting with left chest wall and shoulder pain. Recent history of fall secondary to unsteadiness due to MS. Obtained outpatient MRI of left sternoclavicular joint with results significant for marrow edema and swelling of the left sternoclavicular joint with a 2 cm fluid collection and adjacent pleural edema in the left chest with concerns for infectious etiology.  Patient initially saw orthopedics with for concerns for septic arthritis of this joint.  Impression of the read showed soft tissue abscess superficial to the joint.  Recommended CT scan with IV contrast of the chest.  Patient denies any fevers however endorsing erythema to the left sternoclavicular joint region. Patient states she was recently hospitalized over a week ago at [facility] for falls secondary to unsteadiness due to her MS.  Denies any recent falls today, vision changes. Endorsing headache. Denies any chest pain, shortness of breath, abdominal pain, nausea vomiting diarrhea, urinary complaints.    CT chest with IV in ED significant for Septic arthritis of the left sternoclavicular joint and involving the articulation of the sternum with the first costal cartilage.Extensive surrounding inflammation, with pneumonia in the underlying subpleural left upper lobe.No fluid collection is evident.    Workup significant for elevated alk phos (248), . VSS and afebrile    Thoracic surgery consulted for evaluation of left sternoclavicular joint abscess    In ER: Given IV Zosyn, IV Vancomycin, Tylenol 1gm IVPB, lidocaine patch 43F PMHx of MS on immunosuppressive medication, presenting with left chest wall and shoulder pain. Recent history of fall secondary to unsteadiness due to MS. Obtained outpatient MRI of left sternoclavicular joint with results significant for marrow edema and swelling of the left sternoclavicular joint with a 2 cm fluid collection and adjacent pleural edema in the left chest with concerns for infectious etiology.  Patient initially saw orthopedics with for concerns for septic arthritis of this joint.  Impression of the read showed soft tissue abscess superficial to the joint.  Recommended CT scan with IV contrast of the chest.  Patient denies any fevers however endorsing erythema to the left sternoclavicular joint region. Patient states she was recently hospitalized over a week ago at [facility] for falls secondary to unsteadiness due to her MS.  Denies any recent falls today, vision changes. Endorsing headache. Denies any chest pain, shortness of breath, abdominal pain, nausea vomiting diarrhea, urinary complaints.    CT chest with IV in ED significant for Septic arthritis of the left sternoclavicular joint and involving the articulation of the sternum with the first costal cartilage.Extensive surrounding inflammation, with pneumonia in the underlying subpleural left upper lobe.No fluid collection is evident.    Workup significant for elevated alk phos (248), . VSS and afebrile    Thoracic surgery consulted for evaluation of left sternoclavicular joint abscess    In ER: Given IV Zosyn, IV Vancomycin, Tylenol 1gm IVPB, lidocaine patch 43F PMHx of MS on Rituxan, presenting with left chest wall and shoulder pain. Recent history of fall secondary to unsteadiness due to MS. On review of ortho notes, pt has had multiple falls in November. Obtained outpatient MRI of left sternoclavicular joint with results significant for marrow edema and swelling of the left sternoclavicular joint with a 2 cm fluid collection and adjacent pleural edema in the left chest with concerns for infectious etiology.  Patient initially saw orthopedics with for concerns for septic arthritis of this joint.  Impression of the read showed soft tissue abscess superficial to the joint.  Recommended CT scan with IV contrast of the chest.  Patient denies any fevers however endorsing erythema to the left sternoclavicular joint region. Patient states she was recently hospitalized over a week ago at [facility] for falls secondary to unsteadiness due to her MS and UTI. Hospital stay was for about 6 days. Denies any recent falls today, vision changes. Endorsing headache. Denies any chest pain, shortness of breath, abdominal pain, nausea vomiting diarrhea, urinary complaints    Patient obtained CT chest w/ IV contrast which showed likely septic arthritis with surrounding erythema. Thoracic surgery consulted, plan for OR today with washout and debridement.     In ER: Given IV Zosyn, IV Vancomycin, Tylenol 1gm IVPB, lidocaine patch 43F PMHx of MS on Rituxan, presenting with left chest wall and shoulder pain. Recent history of fall secondary to unsteadiness due to MS. On review of ortho notes, pt has had multiple falls in November. Obtained outpatient MRI of left sternoclavicular joint with results significant for marrow edema and swelling of the left sternoclavicular joint with a 2 cm fluid collection and adjacent pleural edema in the left chest with concerns for infectious etiology.  Patient initially saw orthopedics with for concerns for septic arthritis of this joint.  Impression of the read showed soft tissue abscess superficial to the joint.  Recommended CT scan with IV contrast of the chest.  Patient denies any fevers however endorsing erythema to the left sternoclavicular joint region. Patient states she was recently hospitalized over a week ago at [facility] for falls secondary to unsteadiness due to her MS and UTI. Hospital stay was for about 6 days. Denies any recent falls today, vision changes. Endorsing headache. Denies any chest pain, shortness of breath, abdominal pain, nausea vomiting diarrhea, urinary complaints    Patient obtained CT chest w/ IV contrast which showed likely septic arthritis with surrounding erythema. Thoracic surgery consulted, plan for OR today with washout and debridement.     In ER: Given IV Zosyn, IV Vancomycin, Tylenol 1gm IVPB, lidocaine patch

## 2023-12-07 NOTE — BRIEF OPERATIVE NOTE - NSICDXBRIEFPOSTOP_GEN_ALL_CORE_FT
POST-OP DIAGNOSIS:  Septic arthritis of left sternoclavicular joint 07-Dec-2023 13:39:52  Divya Ansari   POST-OP DIAGNOSIS:  Septic arthritis of left sternoclavicular joint 07-Dec-2023 13:39:52  Divya Ansair

## 2023-12-07 NOTE — H&P ADULT - PROBLEM SELECTOR PLAN 2
Gets Rituxan every 6 months, next dose due tomorrow as per patient. Multiple recent falls.   -On Ampyra BID at home, need to bring home med  -Resume baclofen if oxycodone insufficient for pain  -Mirabegron substitution for Gemtesa  -  -Need full med rec Gets Rituxan every 6 months, next dose due tomorrow as per patient. Multiple recent falls.   -On Ampyra BID at home, need to bring home med  -Resume baclofen if oxycodone insufficient for pain  -Mirabegron substitution for Gemtesa  -Left message with answering service of Dr. Tracy office regarding kitty and postop management of MS.   -Need full med rec

## 2023-12-07 NOTE — H&P ADULT - NSHPLABSRESULTS_GEN_ALL_CORE
I have personally reviewed the labs, imaging and ekg. CXR w/o focal consolidations, EKG with NSR HR 86 Qtc 454 relatively normal appearing ekg.

## 2023-12-07 NOTE — H&P ADULT - PROBLEM SELECTOR PLAN 1
Appreciate ED, ortho and thoracic surgery recommendations. Imaging consistent with septic arthritis. CRP very elevated. Case discussed with thoracics and gen surgery. Patient is add on case. Currently pt is very reluctant to proceed with further discussion with surgery service over details of procedure and sequelae. Will cont. broad spectrum antibiotics given immunocompromised status and recent hospitalization  -Cont. IV Vancomycin  -Cont. IV Cefepime   -F/u BCxs  -Trend inflammatory markers  -NPO  -Preop labs  -RCRI score tentatively 0, unable to assess functional status 2/2 MS. Denies cardiac history. Given urgency of procedure and limited risk factors, further medical work up unlikely to benefit.   -Pain control  -F/u Thoracic recommendations  -Asked surgical team to further discuss with patient Appreciate ED, ortho and thoracic surgery recommendations. Imaging consistent with septic arthritis. CRP very elevated. Case discussed with thoracics and gen surgery. Patient is add on case. Currently pt is very reluctant to proceed with further discussion with surgery service over details of procedure and sequelae. Will cont. broad spectrum antibiotics given immunocompromised status and recent hospitalization  -Cont. IV Vancomycin  -Cont. IV Cefepime   -F/u BCxs  -Trend inflammatory markers  -NPO  -Preop labs  -RCRI score tentatively 0, unable to assess functional status 2/2 MS. Denies cardiac history. Given urgency of procedure and limited risk factors, further medical work up unlikely to benefit.   -Pain control  -F/u Thoracic recommendations  -Asked surgical team to further discuss with patient  -Need full med rec

## 2023-12-07 NOTE — H&P ADULT - PROBLEM SELECTOR PLAN 4
DVT PPx  -SCDs for now given upcoming surgery hgb 9, lower than prior year. Pt endorses some recent anemia but cannot specify  -Trend cbc  -Monitor for bleeding

## 2023-12-07 NOTE — PRE PROCEDURE NOTE - PRE PROCEDURE EVALUATION
Cardiac Surgery Pre-op Note:  CC: Patient is a 43y old  Female who presents with a chief complaint of L shoulder and chest pain (07 Dec 2023 02:49)      Referring Physician:                                                                                             Surgeon: Dr. Bautista  Procedure: (Date) (Procedure) washout & debridement of left sternoclavicular joint    Allergies    No Known Allergies    Intolerances      HPI:  43F PMHx of MS on Rituxan, presenting with left chest wall and shoulder pain. Recent history of fall secondary to unsteadiness due to MS. On review of ortho notes, pt has had multiple falls in November. Obtained outpatient MRI of left sternoclavicular joint with results significant for marrow edema and swelling of the left sternoclavicular joint with a 2 cm fluid collection and adjacent pleural edema in the left chest with concerns for infectious etiology.  Patient initially saw orthopedics with for concerns for septic arthritis of this joint.  Impression of the read showed soft tissue abscess superficial to the joint.  Recommended CT scan with IV contrast of the chest.  Patient denies any fevers however endorsing erythema to the left sternoclavicular joint region. Patient states she was recently hospitalized over a week ago at [facility] for falls secondary to unsteadiness due to her MS and UTI. Hospital stay was for about 6 days. Denies any recent falls today, vision changes. Endorsing headache. Denies any chest pain, shortness of breath, abdominal pain, nausea vomiting diarrhea, urinary complaints    Patient obtained CT chest w/ IV contrast which showed likely septic arthritis with surrounding erythema. Thoracic surgery consulted, plan for OR today with washout and debridement.     In ER: Given IV Zosyn, IV Vancomycin, Tylenol 1gm IVPB, lidocaine patch (07 Dec 2023 02:49)      PAST MEDICAL & SURGICAL HISTORY:  Multiple sclerosis      History of     MEDICATIONS  (STANDING):  cefepime   IVPB 2000 milliGRAM(s) IV Intermittent every 8 hours  DULoxetine 60 milliGRAM(s) Oral at bedtime  mirabegron ER 50 milliGRAM(s) Oral daily  sertraline 100 milliGRAM(s) Oral at bedtime  vancomycin  IVPB 1000 milliGRAM(s) IV Intermittent every 12 hours    MEDICATIONS  (PRN):  acetaminophen     Tablet .. 650 milliGRAM(s) Oral every 6 hours PRN Temp greater or equal to 38C (100.4F), Mild Pain (1 - 3)  clonazePAM  Tablet 0.5 milliGRAM(s) Oral at bedtime PRN anxiety  melatonin 3 milliGRAM(s) Oral at bedtime PRN Insomnia  ondansetron Injectable 4 milliGRAM(s) IV Push once PRN Nausea and/or Vomiting  oxyCODONE    IR 5 milliGRAM(s) Oral every 6 hours PRN Severe Pain (7 - 10)      Labs:                        9.6    9.08  )-----------( 504      ( 06 Dec 2023 20:39 )             30.7         135  |  100  |  15  ----------------------------<  81  4.7   |  24  |  0.46<L>    Ca    9.8      06 Dec 2023 20:39    TPro  7.2  /  Alb  3.7  /  TBili  0.2  /  DBili  x   /  AST  23  /  ALT  21  /  AlkPhos  248<H>      Blood Type: ABO Interpretation: A ( @ 01:54)   hcg negative  Urinalysis Basic - ( 06 Dec 2023 20:39 )    Color: x / Appearance: x / SG: x / pH: x  Gluc: 81 mg/dL / Ketone: x  / Bili: x / Urobili: x   Blood: x / Protein: x / Nitrite: x   Leuk Esterase: x / RBC: x / WBC x   Sq Epi: x / Non Sq Epi: x / Bacteria: x    CXR: < from: Xray Chest 1 View- PORTABLE-Urgent (Xray Chest 1 View- PORTABLE-Urgent .) (23 @ 22:59) >  IMPRESSION:    Clear lungs. Follow-up CT chest for further evaluation.    Gen: WN/WD NAD  Neuro: AAOx3, nonfocal  Pulm: CTA B/L  CV: RRR, S1S2  Abd: Soft, NT, ND +BS  Ext: No edema, + peripheral pulses    Pt has AICD/PPM [ ] Yes  [x ] No             Brand Name:  Type & Cross  [x ] Yes  [ ] No  NPO after Midnight [x ] Yes  [ ] No  Pre-op ABX ordered, to be taped on chart:  [ x] Yes  [ ] No     Consent obtained  [x ] Yes  [ ] No   Cardiac Surgery Pre-op Note:  CC: Patient is a 43y old  Female who presents with a chief complaint of L shoulder and chest pain (07 Dec 2023 02:49)      Referring Physician:                                                                                             Surgeon: Dr. Bautista  Procedure: (Date) (Procedure) washout & debridement of left sternoclavicular joint    Allergies    No Known Allergies    Intolerances      HPI:  43F PMHx of MS on Rituxan, presenting with left chest wall and shoulder pain. Recent history of fall secondary to unsteadiness due to MS. On review of ortho notes, pt has had multiple falls in November. Obtained outpatient MRI of left sternoclavicular joint with results significant for marrow edema and swelling of the left sternoclavicular joint with a 2 cm fluid collection and adjacent pleural edema in the left chest with concerns for infectious etiology.  Patient initially saw orthopedics with for concerns for septic arthritis of this joint.  Impression of the read showed soft tissue abscess superficial to the joint.  Recommended CT scan with IV contrast of the chest.  Patient denies any fevers however endorsing erythema to the left sternoclavicular joint region. Patient states she was recently hospitalized over a week ago at [facility] for falls secondary to unsteadiness due to her MS and UTI. Hospital stay was for about 6 days. Denies any recent falls today, vision changes. Endorsing headache. Denies any chest pain, shortness of breath, abdominal pain, nausea vomiting diarrhea, urinary complaints    Patient obtained CT chest w/ IV contrast which showed likely septic arthritis with surrounding erythema. Thoracic surgery consulted, plan for OR today with washout and debridement.     In ER: Given IV Zosyn, IV Vancomycin, Tylenol 1gm IVPB, lidocaine patch (07 Dec 2023 02:49)      PAST MEDICAL & SURGICAL HISTORY:  Multiple sclerosis      History of     MEDICATIONS  (STANDING):  cefepime   IVPB 2000 milliGRAM(s) IV Intermittent every 8 hours  DULoxetine 60 milliGRAM(s) Oral at bedtime  mirabegron ER 50 milliGRAM(s) Oral daily  sertraline 100 milliGRAM(s) Oral at bedtime  vancomycin  IVPB 1000 milliGRAM(s) IV Intermittent every 12 hours    MEDICATIONS  (PRN):  acetaminophen     Tablet .. 650 milliGRAM(s) Oral every 6 hours PRN Temp greater or equal to 38C (100.4F), Mild Pain (1 - 3)  clonazePAM  Tablet 0.5 milliGRAM(s) Oral at bedtime PRN anxiety  melatonin 3 milliGRAM(s) Oral at bedtime PRN Insomnia  ondansetron Injectable 4 milliGRAM(s) IV Push once PRN Nausea and/or Vomiting  oxyCODONE    IR 5 milliGRAM(s) Oral every 6 hours PRN Severe Pain (7 - 10)      Labs:                        9.6    9.08  )-----------( 504      ( 06 Dec 2023 20:39 )             30.7         135  |  100  |  15  ----------------------------<  81  4.7   |  24  |  0.46<L>    Ca    9.8      06 Dec 2023 20:39    TPro  7.2  /  Alb  3.7  /  TBili  0.2  /  DBili  x   /  AST  23  /  ALT  21  /  AlkPhos  248<H>      Blood Type: ABO Interpretation: A ( @ 01:54)   hcg negative  Urinalysis Basic - ( 06 Dec 2023 20:39 )    Color: x / Appearance: x / SG: x / pH: x  Gluc: 81 mg/dL / Ketone: x  / Bili: x / Urobili: x   Blood: x / Protein: x / Nitrite: x   Leuk Esterase: x / RBC: x / WBC x   Sq Epi: x / Non Sq Epi: x / Bacteria: x    CXR: < from: Xray Chest 1 View- PORTABLE-Urgent (Xray Chest 1 View- PORTABLE-Urgent .) (23 @ 22:59) >  IMPRESSION:    Clear lungs. Follow-up CT chest for further evaluation.    Gen: WN/WD NAD  Neuro: AAOx3, nonfocal  Pulm: CTA B/L  CV: RRR, S1S2  Abd: Soft, NT, ND +BS  Ext: No edema, + peripheral pulses    Pt has AICD/PPM [ ] Yes  [x ] No             Brand Name:  Type & Cross  [x ] Yes  [ ] No  NPO after Midnight [x ] Yes  [ ] No  Pre-op ABX ordered, to be taped on chart:  [ x] Yes  [ ] No     Consent obtained  [x ] Yes  [ ] No

## 2023-12-07 NOTE — H&P ADULT - ASSESSMENT
no 43F PMHx of MS on immunosuppressive medication, presenting with left chest wall and shoulder pain w/ concern for infection now admitted with likely septic arthritis with need for possible washout and debridement 43F PMHx of MS on Rituxan, presenting with left chest wall and shoulder pain w/ concern for infection now admitted with likely septic arthritis with need for possible washout and debridement

## 2023-12-07 NOTE — PATIENT PROFILE ADULT - FALL HARM RISK - HARM RISK INTERVENTIONS
Assistance with ambulation/Assistance OOB with selected safe patient handling equipment/Communicate Risk of Fall with Harm to all staff/Discuss with provider need for PT consult/Monitor gait and stability/Provide patient with walking aids - walker, cane, crutches/Reinforce activity limits and safety measures with patient and family/Tailored Fall Risk Interventions/Visual Cue: Yellow wristband and red socks/Bed in lowest position, wheels locked, appropriate side rails in place/Call bell, personal items and telephone in reach/Instruct patient to call for assistance before getting out of bed or chair/Non-slip footwear when patient is out of bed/[place] to call system/Physically safe environment - no spills, clutter or unnecessary equipment/Purposeful Proactive Rounding/Room/bathroom lighting operational, light cord in reach Assistance with ambulation/Assistance OOB with selected safe patient handling equipment/Communicate Risk of Fall with Harm to all staff/Discuss with provider need for PT consult/Monitor gait and stability/Provide patient with walking aids - walker, cane, crutches/Reinforce activity limits and safety measures with patient and family/Tailored Fall Risk Interventions/Visual Cue: Yellow wristband and red socks/Bed in lowest position, wheels locked, appropriate side rails in place/Call bell, personal items and telephone in reach/Instruct patient to call for assistance before getting out of bed or chair/Non-slip footwear when patient is out of bed/[place] to call system/Physically safe environment - no spills, clutter or unnecessary equipment/Purposeful Proactive Rounding/Room/bathroom lighting operational, light cord in reach

## 2023-12-07 NOTE — BRIEF OPERATIVE NOTE - NSICDXBRIEFPROCEDURE_GEN_ALL_CORE_FT
PROCEDURES:  Arthrotomy, sternoclavicular joint 07-Dec-2023 13:39:17  Divya Ansari  Excision, clavicle, proximal 07-Dec-2023 13:41:23  Divya Ansari   PROCEDURES:  Arthrotomy, sternoclavicular joint 07-Dec-2023 13:39:17  Divya Ansari  Excision, clavicle, proximal 07-Dec-2023 13:41:23  Divya Ansari  Debridement of septic joint 07-Dec-2023 18:02:00  Divya Ansari

## 2023-12-07 NOTE — H&P ADULT - PROBLEM SELECTOR PLAN 3
ISTOP reviewed Reference #: [id number]  -Cont. Clonazepam 0.5mg QHS PRN  -Cont. Sertraline 100mg QHS  -Cont. Duloxetine 60mg QHS, pt unsure of dose ISTOP reviewed Reference #: [id number]  -Cont. Clonazepam 0.5mg QHS PRN  -Cont. Sertraline 100mg QHS  -Cont. Duloxetine 60mg QHS, pt unsure of dose ISTOP reviewed Reference #: [id number]  -Cont. Clonazepam 0.5mg QHS PRN  -Cont. Sertraline 100mg QHS  -Cont. Duloxetine 60mg QHS, pt unsure of dose  -Need full med rec ISTOP reviewed Reference #: [id number]  -Cont. Clonazepam 0.5mg QHS PRN  -Cont. Sertraline 100mg QHS  -Cont. Duloxetine 60mg QHS, pt unsure of dose  -Need full med rec

## 2023-12-07 NOTE — CONSULT NOTE ADULT - SUBJECTIVE AND OBJECTIVE BOX
Thoracic Surgery Consult  Consulting surgical team: Thoracic Surgery  Consulting attending: Deidre Bautista MD    HPI:  43F PMHx of MS on immunosuppressive medication, presenting with left chest wall and shoulder pain. Recent history of fall secondary to unsteadiness due to MS. Obtained outpatient MRI of left sternoclavicular joint with results significant for marrow edema and swelling of the left sternoclavicular joint with a 2 cm fluid collection and adjacent pleural edema in the left chest with concerns for infectious etiology.  Patient initially saw orthopedics with for concerns for septic arthritis of this joint.  Impression of the read showed soft tissue abscess superficial to the joint.  Recommended CT scan with IV contrast of the chest.  Patient denies any fevers however endorsing erythema to the left sternoclavicular joint region. Patient states she was recently hospitalized over a week ago at [facility] for falls secondary to unsteadiness due to her MS.  Denies any recent falls today, vision changes. Endorsing headache. Denies any chest pain, shortness of breath, abdominal pain, nausea vomiting diarrhea, urinary complaints.    CT chest with IV in ED significant for Septic arthritis of the left sternoclavicular joint and involving the articulation of the sternum with the first costal cartilage.Extensive surrounding inflammation, with pneumonia in the underlying subpleural left upper lobe.No fluid collection is evident.    Workup significant for elevated alk phos (248), . VSS and afebrile    Thoracic surgery consulted for evaluation of left sternoclavicular joint abscess    PAST MEDICAL HISTORY:  Multiple sclerosis    PAST SURGICAL HISTORY:    MEDICATIONS:  vancomycin  IVPB. 1000 milliGRAM(s) IV Intermittent once    ALLERGIES:  No Known Allergies      VITALS & I/Os:  Vital Signs Last 24 Hrs  T(C): 36.6 (06 Dec 2023 22:36), Max: 37 (06 Dec 2023 19:56)  T(F): 97.9 (06 Dec 2023 22:36), Max: 98.6 (06 Dec 2023 19:56)  HR: 87 (06 Dec 2023 22:36) (87 - 97)  BP: 116/58 (06 Dec 2023 22:36) (101/68 - 116/58)  BP(mean): --  RR: 16 (06 Dec 2023 22:36) (16 - 18)  SpO2: 100% (06 Dec 2023 22:36) (97% - 100%)    Parameters below as of 06 Dec 2023 22:36  Patient On (Oxygen Delivery Method): room air        I&O's Summary      PHYSICAL EXAM:  General: No acute distress  Respiratory: Nonlabored  Cardiovascular: RRR  Chest: mild left erythema and TTP over left sternoclavicular joint, no area of fluctuance limited ROM to L shoulder 2/2 pain  Abdominal: Soft, nondistended, nontender. No rebound or guarding. No organomegaly, no palpable mass.  Extremities: Warm    LABS:                        9.6    9.08  )-----------( 504      ( 06 Dec 2023 20:39 )             30.7     12-06    135  |  100  |  15  ----------------------------<  81  4.7   |  24  |  0.46<L>    Ca    9.8      06 Dec 2023 20:39    TPro  7.2  /  Alb  3.7  /  TBili  0.2  /  DBili  x   /  AST  23  /  ALT  21  /  AlkPhos  248<H>  12-06    Lactate:  12-06 @ 20:27  0.7              Urinalysis Basic - ( 06 Dec 2023 20:39 )    Color: x / Appearance: x / SG: x / pH: x  Gluc: 81 mg/dL / Ketone: x  / Bili: x / Urobili: x   Blood: x / Protein: x / Nitrite: x   Leuk Esterase: x / RBC: x / WBC x   Sq Epi: x / Non Sq Epi: x / Bacteria: x        IMAGING:                                                                                               Thoracic Surgery Consult  Consulting surgical team: Thoracic Surgery  Consulting attending: Deidre Bautista MD    HPI:  43F PMHx of MS on immunosuppressive medication, presenting with left chest wall and shoulder pain. Recent history of fall secondary to unsteadiness due to MS. Obtained outpatient MRI of left sternoclavicular joint with results significant for marrow edema and swelling of the left sternoclavicular joint with a 2 cm fluid collection and adjacent pleural edema in the left chest with concerns for infectious etiology.  Patient initially saw orthopedics with for concerns for septic arthritis of this joint.  Impression of the read showed soft tissue abscess superficial to the joint.  Recommended CT scan with IV contrast of the chest.  Patient denies any fevers however endorsing erythema to the left sternoclavicular joint region. Patient states she was recently hospitalized over a week ago at [facility] for falls secondary to unsteadiness due to her MS.  Denies any recent falls today, vision changes. Endorsing headache. Denies any chest pain, shortness of breath, abdominal pain, nausea vomiting diarrhea, urinary complaints.    CT chest with IV in ED significant for Septic arthritis of the left sternoclavicular joint and involving the articulation of the sternum with the first costal cartilage.Extensive surrounding inflammation, with pneumonia in the underlying subpleural left upper lobe.No fluid collection is evident.    Workup significant for elevated alk phos (248), . VSS and afebrile    Thoracic surgery consulted for evaluation of left sternoclavicular joint abscess    PAST MEDICAL HISTORY:  Multiple sclerosis    PAST SURGICAL HISTORY:    MEDICATIONS:  vancomycin  IVPB. 1000 milliGRAM(s) IV Intermittent once    ALLERGIES:  No Known Allergies      VITALS & I/Os:  Vital Signs Last 24 Hrs  T(C): 36.6 (06 Dec 2023 22:36), Max: 37 (06 Dec 2023 19:56)  T(F): 97.9 (06 Dec 2023 22:36), Max: 98.6 (06 Dec 2023 19:56)  HR: 87 (06 Dec 2023 22:36) (87 - 97)  BP: 116/58 (06 Dec 2023 22:36) (101/68 - 116/58)  BP(mean): --  RR: 16 (06 Dec 2023 22:36) (16 - 18)  SpO2: 100% (06 Dec 2023 22:36) (97% - 100%)    Parameters below as of 06 Dec 2023 22:36  Patient On (Oxygen Delivery Method): room air        I&O's Summary      PHYSICAL EXAM:  General: No acute distress  Respiratory: Nonlabored  Cardiovascular: RRR  Chest: mild left erythema and TTP over left sternoclavicular joint, no area of fluctuance limited ROM to L shoulder 2/2 pain  Abdominal: Soft, nondistended, nontender. No rebound or guarding. No organomegaly, no palpable mass.  Extremities: Warm    LABS:                        9.6    9.08  )-----------( 504      ( 06 Dec 2023 20:39 )             30.7     12-06    135  |  100  |  15  ----------------------------<  81  4.7   |  24  |  0.46<L>    Ca    9.8      06 Dec 2023 20:39    TPro  7.2  /  Alb  3.7  /  TBili  0.2  /  DBili  x   /  AST  23  /  ALT  21  /  AlkPhos  248<H>  12-06    Lactate:  12-06 @ 20:27  0.7              Urinalysis Basic - ( 06 Dec 2023 20:39 )    Color: x / Appearance: x / SG: x / pH: x  Gluc: 81 mg/dL / Ketone: x  / Bili: x / Urobili: x   Blood: x / Protein: x / Nitrite: x   Leuk Esterase: x / RBC: x / WBC x   Sq Epi: x / Non Sq Epi: x / Bacteria: x        IMAGING:

## 2023-12-07 NOTE — H&P ADULT - TIME BILLING
Need to interview and examine patient, discuss care with family, discuss case with thoracic and general surgery, provide counseling, coordinate care, place orders, document, personally review imaging, ekg and review prior medical records

## 2023-12-07 NOTE — PROGRESS NOTE ADULT - ASSESSMENT
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  She had a recent fall in November 2023 secondary to unsteadiness due to multiple sclerosis, with subsequent left chest wall and shoulder pain.  Was seen at [facility] with concern for MS exacerbation but no MRI brain and cervical spine with an without contrast were done at the time without enhancement.  At the time infectious workup was done and notable for UTI which was treated.  She continued to have pain.  In the interim, she had an outpatient MRI of left sternoclavicular joint with results significant for marrow edema and swelling of the left sternoclavicular joint with a 2 cm fluid collection and adjacent pleural edema in the left chest with concerns for infectious etiology.   CT chest w/ IV contrast suggestive of septic arthritis with surrounding erythema. Thoracic surgery consulted and patient going to OR.    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  Now likely pseudo-flare in setting of septic arthritis    Recommendations:  Management of septic arthritis per primary team  Thoracic surgery consulted and patient going to OR  hold ocrelizumab for now given septic arthritis  continue home meds once able to take PO post-surgically  pain management  discussed with patient and daughter at bedside  discussed with outpatient neurologist, my colleague Dr. 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  She had a recent fall in November 2023 secondary to unsteadiness due to multiple sclerosis, with subsequent left chest wall and shoulder pain.  Was seen at [facility] with concern for MS exacerbation but no MRI brain and cervical spine with an without contrast were done at the time without enhancement.  At the time infectious workup was done and notable for UTI which was treated.  She continued to have pain.  In the interim, she had an outpatient MRI of left sternoclavicular joint with results significant for marrow edema and swelling of the left sternoclavicular joint with a 2 cm fluid collection and adjacent pleural edema in the left chest with concerns for infectious etiology.   CT chest w/ IV contrast suggestive of septic arthritis with surrounding erythema. Thoracic surgery consulted and patient going to OR.    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  Now likely pseudo-flare in setting of septic arthritis    Recommendations:  Management of septic arthritis per primary team  Thoracic surgery consulted and patient going to OR  hold ocrelizumab for now given septic arthritis  continue home meds once able to take PO post-surgically  pain management  discussed with patient and daughter at bedside  discussed with outpatient neurologist, my colleague Dr. Selby

## 2023-12-08 LAB
ALBUMIN SERPL ELPH-MCNC: 3.6 G/DL — SIGNIFICANT CHANGE UP (ref 3.3–5)
ALBUMIN SERPL ELPH-MCNC: 3.6 G/DL — SIGNIFICANT CHANGE UP (ref 3.3–5)
ALP SERPL-CCNC: 158 U/L — HIGH (ref 40–120)
ALP SERPL-CCNC: 158 U/L — HIGH (ref 40–120)
ALT FLD-CCNC: 11 U/L — SIGNIFICANT CHANGE UP (ref 10–45)
ALT FLD-CCNC: 11 U/L — SIGNIFICANT CHANGE UP (ref 10–45)
ANION GAP SERPL CALC-SCNC: 9 MMOL/L — SIGNIFICANT CHANGE UP (ref 5–17)
ANION GAP SERPL CALC-SCNC: 9 MMOL/L — SIGNIFICANT CHANGE UP (ref 5–17)
APTT BLD: 31.7 SEC — SIGNIFICANT CHANGE UP (ref 24.5–35.6)
APTT BLD: 31.7 SEC — SIGNIFICANT CHANGE UP (ref 24.5–35.6)
AST SERPL-CCNC: 12 U/L — SIGNIFICANT CHANGE UP (ref 10–40)
AST SERPL-CCNC: 12 U/L — SIGNIFICANT CHANGE UP (ref 10–40)
BILIRUB SERPL-MCNC: 0.2 MG/DL — SIGNIFICANT CHANGE UP (ref 0.2–1.2)
BILIRUB SERPL-MCNC: 0.2 MG/DL — SIGNIFICANT CHANGE UP (ref 0.2–1.2)
BLD GP AB SCN SERPL QL: NEGATIVE — SIGNIFICANT CHANGE UP
BLD GP AB SCN SERPL QL: NEGATIVE — SIGNIFICANT CHANGE UP
BUN SERPL-MCNC: 12 MG/DL — SIGNIFICANT CHANGE UP (ref 7–23)
BUN SERPL-MCNC: 12 MG/DL — SIGNIFICANT CHANGE UP (ref 7–23)
CALCIUM SERPL-MCNC: 9.1 MG/DL — SIGNIFICANT CHANGE UP (ref 8.4–10.5)
CALCIUM SERPL-MCNC: 9.1 MG/DL — SIGNIFICANT CHANGE UP (ref 8.4–10.5)
CHLORIDE SERPL-SCNC: 102 MMOL/L — SIGNIFICANT CHANGE UP (ref 96–108)
CHLORIDE SERPL-SCNC: 102 MMOL/L — SIGNIFICANT CHANGE UP (ref 96–108)
CO2 SERPL-SCNC: 27 MMOL/L — SIGNIFICANT CHANGE UP (ref 22–31)
CO2 SERPL-SCNC: 27 MMOL/L — SIGNIFICANT CHANGE UP (ref 22–31)
CREAT SERPL-MCNC: 0.48 MG/DL — LOW (ref 0.5–1.3)
CREAT SERPL-MCNC: 0.48 MG/DL — LOW (ref 0.5–1.3)
EGFR: 120 ML/MIN/1.73M2 — SIGNIFICANT CHANGE UP
EGFR: 120 ML/MIN/1.73M2 — SIGNIFICANT CHANGE UP
FERRITIN SERPL-MCNC: 134 NG/ML — SIGNIFICANT CHANGE UP (ref 15–150)
FERRITIN SERPL-MCNC: 134 NG/ML — SIGNIFICANT CHANGE UP (ref 15–150)
FOLATE SERPL-MCNC: 13 NG/ML — SIGNIFICANT CHANGE UP
FOLATE SERPL-MCNC: 13 NG/ML — SIGNIFICANT CHANGE UP
GLUCOSE SERPL-MCNC: 112 MG/DL — HIGH (ref 70–99)
GLUCOSE SERPL-MCNC: 112 MG/DL — HIGH (ref 70–99)
GRAM STN FLD: SIGNIFICANT CHANGE UP
GRAM STN FLD: SIGNIFICANT CHANGE UP
HAPTOGLOB SERPL-MCNC: 319 MG/DL — HIGH (ref 34–200)
HAPTOGLOB SERPL-MCNC: 319 MG/DL — HIGH (ref 34–200)
HCT VFR BLD CALC: 23.6 % — LOW (ref 34.5–45)
HCT VFR BLD CALC: 23.6 % — LOW (ref 34.5–45)
HCT VFR BLD CALC: 24.9 % — LOW (ref 34.5–45)
HCT VFR BLD CALC: 24.9 % — LOW (ref 34.5–45)
HGB BLD-MCNC: 7.3 G/DL — LOW (ref 11.5–15.5)
HGB BLD-MCNC: 7.3 G/DL — LOW (ref 11.5–15.5)
HGB BLD-MCNC: 7.6 G/DL — LOW (ref 11.5–15.5)
HGB BLD-MCNC: 7.6 G/DL — LOW (ref 11.5–15.5)
IGA FLD-MCNC: 87 MG/DL — SIGNIFICANT CHANGE UP (ref 84–499)
IGA FLD-MCNC: 87 MG/DL — SIGNIFICANT CHANGE UP (ref 84–499)
IGG FLD-MCNC: 640 MG/DL — SIGNIFICANT CHANGE UP (ref 610–1660)
IGG FLD-MCNC: 640 MG/DL — SIGNIFICANT CHANGE UP (ref 610–1660)
IGM SERPL-MCNC: 59 MG/DL — SIGNIFICANT CHANGE UP (ref 35–242)
IGM SERPL-MCNC: 59 MG/DL — SIGNIFICANT CHANGE UP (ref 35–242)
INR BLD: 1.16 RATIO — SIGNIFICANT CHANGE UP (ref 0.85–1.18)
INR BLD: 1.16 RATIO — SIGNIFICANT CHANGE UP (ref 0.85–1.18)
IRON SATN MFR SERPL: 11 % — LOW (ref 14–50)
IRON SATN MFR SERPL: 11 % — LOW (ref 14–50)
IRON SATN MFR SERPL: 31 UG/DL — SIGNIFICANT CHANGE UP (ref 30–160)
IRON SATN MFR SERPL: 31 UG/DL — SIGNIFICANT CHANGE UP (ref 30–160)
KAPPA LC SER QL IFE: 2.27 MG/DL — HIGH (ref 0.33–1.94)
KAPPA LC SER QL IFE: 2.27 MG/DL — HIGH (ref 0.33–1.94)
KAPPA/LAMBDA FREE LIGHT CHAIN RATIO, SERUM: 1.75 RATIO — HIGH (ref 0.26–1.65)
KAPPA/LAMBDA FREE LIGHT CHAIN RATIO, SERUM: 1.75 RATIO — HIGH (ref 0.26–1.65)
LAMBDA LC SER QL IFE: 1.3 MG/DL — SIGNIFICANT CHANGE UP (ref 0.57–2.63)
LAMBDA LC SER QL IFE: 1.3 MG/DL — SIGNIFICANT CHANGE UP (ref 0.57–2.63)
LDH SERPL L TO P-CCNC: 104 U/L — SIGNIFICANT CHANGE UP (ref 50–242)
LDH SERPL L TO P-CCNC: 104 U/L — SIGNIFICANT CHANGE UP (ref 50–242)
MCHC RBC-ENTMCNC: 25.2 PG — LOW (ref 27–34)
MCHC RBC-ENTMCNC: 25.2 PG — LOW (ref 27–34)
MCHC RBC-ENTMCNC: 25.6 PG — LOW (ref 27–34)
MCHC RBC-ENTMCNC: 25.6 PG — LOW (ref 27–34)
MCHC RBC-ENTMCNC: 30.5 GM/DL — LOW (ref 32–36)
MCHC RBC-ENTMCNC: 30.5 GM/DL — LOW (ref 32–36)
MCHC RBC-ENTMCNC: 30.9 GM/DL — LOW (ref 32–36)
MCHC RBC-ENTMCNC: 30.9 GM/DL — LOW (ref 32–36)
MCV RBC AUTO: 82.5 FL — SIGNIFICANT CHANGE UP (ref 80–100)
MCV RBC AUTO: 82.5 FL — SIGNIFICANT CHANGE UP (ref 80–100)
MCV RBC AUTO: 82.8 FL — SIGNIFICANT CHANGE UP (ref 80–100)
MCV RBC AUTO: 82.8 FL — SIGNIFICANT CHANGE UP (ref 80–100)
MRSA PCR RESULT.: SIGNIFICANT CHANGE UP
MRSA PCR RESULT.: SIGNIFICANT CHANGE UP
NIGHT BLUE STAIN TISS: SIGNIFICANT CHANGE UP
NRBC # BLD: 0 /100 WBCS — SIGNIFICANT CHANGE UP (ref 0–0)
PLATELET # BLD AUTO: 386 K/UL — SIGNIFICANT CHANGE UP (ref 150–400)
PLATELET # BLD AUTO: 386 K/UL — SIGNIFICANT CHANGE UP (ref 150–400)
PLATELET # BLD AUTO: 401 K/UL — HIGH (ref 150–400)
PLATELET # BLD AUTO: 401 K/UL — HIGH (ref 150–400)
POTASSIUM SERPL-MCNC: 3.8 MMOL/L — SIGNIFICANT CHANGE UP (ref 3.5–5.3)
POTASSIUM SERPL-MCNC: 3.8 MMOL/L — SIGNIFICANT CHANGE UP (ref 3.5–5.3)
POTASSIUM SERPL-SCNC: 3.8 MMOL/L — SIGNIFICANT CHANGE UP (ref 3.5–5.3)
POTASSIUM SERPL-SCNC: 3.8 MMOL/L — SIGNIFICANT CHANGE UP (ref 3.5–5.3)
PROT SERPL-MCNC: 6.2 G/DL — SIGNIFICANT CHANGE UP (ref 6–8.3)
PROT SERPL-MCNC: 6.2 G/DL — SIGNIFICANT CHANGE UP (ref 6–8.3)
PROTHROM AB SERPL-ACNC: 12.1 SEC — SIGNIFICANT CHANGE UP (ref 9.5–13)
PROTHROM AB SERPL-ACNC: 12.1 SEC — SIGNIFICANT CHANGE UP (ref 9.5–13)
RAPID RVP RESULT: DETECTED
RAPID RVP RESULT: DETECTED
RBC # BLD: 2.85 M/UL — LOW (ref 3.8–5.2)
RBC # BLD: 3.02 M/UL — LOW (ref 3.8–5.2)
RBC # BLD: 3.02 M/UL — LOW (ref 3.8–5.2)
RBC # FLD: 13.8 % — SIGNIFICANT CHANGE UP (ref 10.3–14.5)
RBC # FLD: 13.8 % — SIGNIFICANT CHANGE UP (ref 10.3–14.5)
RBC # FLD: 13.9 % — SIGNIFICANT CHANGE UP (ref 10.3–14.5)
RBC # FLD: 13.9 % — SIGNIFICANT CHANGE UP (ref 10.3–14.5)
RETICS #: 46.7 K/UL — SIGNIFICANT CHANGE UP (ref 25–125)
RETICS #: 46.7 K/UL — SIGNIFICANT CHANGE UP (ref 25–125)
RETICS/RBC NFR: 1.6 % — SIGNIFICANT CHANGE UP (ref 0.5–2.5)
RETICS/RBC NFR: 1.6 % — SIGNIFICANT CHANGE UP (ref 0.5–2.5)
RH IG SCN BLD-IMP: POSITIVE — SIGNIFICANT CHANGE UP
RH IG SCN BLD-IMP: POSITIVE — SIGNIFICANT CHANGE UP
RV+EV RNA SPEC QL NAA+PROBE: DETECTED
RV+EV RNA SPEC QL NAA+PROBE: DETECTED
S AUREUS DNA NOSE QL NAA+PROBE: SIGNIFICANT CHANGE UP
S AUREUS DNA NOSE QL NAA+PROBE: SIGNIFICANT CHANGE UP
SARS-COV-2 RNA SPEC QL NAA+PROBE: SIGNIFICANT CHANGE UP
SARS-COV-2 RNA SPEC QL NAA+PROBE: SIGNIFICANT CHANGE UP
SODIUM SERPL-SCNC: 138 MMOL/L — SIGNIFICANT CHANGE UP (ref 135–145)
SODIUM SERPL-SCNC: 138 MMOL/L — SIGNIFICANT CHANGE UP (ref 135–145)
SPECIMEN SOURCE: SIGNIFICANT CHANGE UP
TIBC SERPL-MCNC: 275 UG/DL — SIGNIFICANT CHANGE UP (ref 220–430)
TIBC SERPL-MCNC: 275 UG/DL — SIGNIFICANT CHANGE UP (ref 220–430)
UIBC SERPL-MCNC: 244 UG/DL — SIGNIFICANT CHANGE UP (ref 110–370)
UIBC SERPL-MCNC: 244 UG/DL — SIGNIFICANT CHANGE UP (ref 110–370)
URATE SERPL-MCNC: 3.2 MG/DL — SIGNIFICANT CHANGE UP (ref 2.5–7)
URATE SERPL-MCNC: 3.2 MG/DL — SIGNIFICANT CHANGE UP (ref 2.5–7)
VANCOMYCIN TROUGH SERPL-MCNC: 5.1 UG/ML — LOW (ref 10–20)
VANCOMYCIN TROUGH SERPL-MCNC: 5.1 UG/ML — LOW (ref 10–20)
VIT B12 SERPL-MCNC: 1828 PG/ML — HIGH (ref 232–1245)
VIT B12 SERPL-MCNC: 1828 PG/ML — HIGH (ref 232–1245)
WBC # BLD: 6.64 K/UL — SIGNIFICANT CHANGE UP (ref 3.8–10.5)
WBC # BLD: 6.64 K/UL — SIGNIFICANT CHANGE UP (ref 3.8–10.5)
WBC # BLD: 7.68 K/UL — SIGNIFICANT CHANGE UP (ref 3.8–10.5)
WBC # BLD: 7.68 K/UL — SIGNIFICANT CHANGE UP (ref 3.8–10.5)
WBC # FLD AUTO: 6.64 K/UL — SIGNIFICANT CHANGE UP (ref 3.8–10.5)
WBC # FLD AUTO: 6.64 K/UL — SIGNIFICANT CHANGE UP (ref 3.8–10.5)
WBC # FLD AUTO: 7.68 K/UL — SIGNIFICANT CHANGE UP (ref 3.8–10.5)
WBC # FLD AUTO: 7.68 K/UL — SIGNIFICANT CHANGE UP (ref 3.8–10.5)

## 2023-12-08 PROCEDURE — 99231 SBSQ HOSP IP/OBS SF/LOW 25: CPT

## 2023-12-08 PROCEDURE — 99222 1ST HOSP IP/OBS MODERATE 55: CPT

## 2023-12-08 PROCEDURE — 93306 TTE W/DOPPLER COMPLETE: CPT | Mod: 26

## 2023-12-08 RX ORDER — SERTRALINE 25 MG/1
100 TABLET, FILM COATED ORAL AT BEDTIME
Refills: 0 | Status: DISCONTINUED | OUTPATIENT
Start: 2023-12-08 | End: 2023-12-15

## 2023-12-08 RX ORDER — OXYCODONE HYDROCHLORIDE 5 MG/1
5 TABLET ORAL EVERY 6 HOURS
Refills: 0 | Status: DISCONTINUED | OUTPATIENT
Start: 2023-12-08 | End: 2023-12-08

## 2023-12-08 RX ORDER — ACETAMINOPHEN 500 MG
650 TABLET ORAL EVERY 6 HOURS
Refills: 0 | Status: DISCONTINUED | OUTPATIENT
Start: 2023-12-08 | End: 2023-12-09

## 2023-12-08 RX ORDER — OXYCODONE HYDROCHLORIDE 5 MG/1
10 TABLET ORAL EVERY 8 HOURS
Refills: 0 | Status: DISCONTINUED | OUTPATIENT
Start: 2023-12-08 | End: 2023-12-08

## 2023-12-08 RX ORDER — CLONAZEPAM 1 MG
0.5 TABLET ORAL AT BEDTIME
Refills: 0 | Status: DISCONTINUED | OUTPATIENT
Start: 2023-12-08 | End: 2023-12-11

## 2023-12-08 RX ORDER — HYDROMORPHONE HYDROCHLORIDE 2 MG/ML
1 INJECTION INTRAMUSCULAR; INTRAVENOUS; SUBCUTANEOUS EVERY 8 HOURS
Refills: 0 | Status: DISCONTINUED | OUTPATIENT
Start: 2023-12-08 | End: 2023-12-14

## 2023-12-08 RX ORDER — ACETAMINOPHEN 500 MG
650 TABLET ORAL ONCE
Refills: 0 | Status: COMPLETED | OUTPATIENT
Start: 2023-12-08 | End: 2023-12-08

## 2023-12-08 RX ORDER — DULOXETINE HYDROCHLORIDE 30 MG/1
60 CAPSULE, DELAYED RELEASE ORAL AT BEDTIME
Refills: 0 | Status: DISCONTINUED | OUTPATIENT
Start: 2023-12-08 | End: 2023-12-15

## 2023-12-08 RX ORDER — ENOXAPARIN SODIUM 100 MG/ML
40 INJECTION SUBCUTANEOUS EVERY 24 HOURS
Refills: 0 | Status: DISCONTINUED | OUTPATIENT
Start: 2023-12-08 | End: 2023-12-14

## 2023-12-08 RX ORDER — SENNA PLUS 8.6 MG/1
2 TABLET ORAL AT BEDTIME
Refills: 0 | Status: DISCONTINUED | OUTPATIENT
Start: 2023-12-08 | End: 2023-12-15

## 2023-12-08 RX ORDER — VANCOMYCIN HCL 1 G
1000 VIAL (EA) INTRAVENOUS EVERY 8 HOURS
Refills: 0 | Status: DISCONTINUED | OUTPATIENT
Start: 2023-12-08 | End: 2023-12-10

## 2023-12-08 RX ORDER — DALFAMPRIDINE 10 MG/1
10 TABLET, FILM COATED, EXTENDED RELEASE ORAL EVERY 12 HOURS
Refills: 0 | Status: DISCONTINUED | OUTPATIENT
Start: 2023-12-08 | End: 2023-12-15

## 2023-12-08 RX ADMIN — ENOXAPARIN SODIUM 40 MILLIGRAM(S): 100 INJECTION SUBCUTANEOUS at 12:14

## 2023-12-08 RX ADMIN — CEFEPIME 100 MILLIGRAM(S): 1 INJECTION, POWDER, FOR SOLUTION INTRAMUSCULAR; INTRAVENOUS at 05:53

## 2023-12-08 RX ADMIN — SERTRALINE 100 MILLIGRAM(S): 25 TABLET, FILM COATED ORAL at 22:23

## 2023-12-08 RX ADMIN — HYDROMORPHONE HYDROCHLORIDE 30 MILLILITER(S): 2 INJECTION INTRAMUSCULAR; INTRAVENOUS; SUBCUTANEOUS at 07:33

## 2023-12-08 RX ADMIN — SENNA PLUS 2 TABLET(S): 8.6 TABLET ORAL at 22:23

## 2023-12-08 RX ADMIN — Medication 250 MILLIGRAM(S): at 05:57

## 2023-12-08 RX ADMIN — Medication 650 MILLIGRAM(S): at 19:10

## 2023-12-08 RX ADMIN — Medication 650 MILLIGRAM(S): at 03:25

## 2023-12-08 RX ADMIN — Medication 650 MILLIGRAM(S): at 03:01

## 2023-12-08 RX ADMIN — CEFEPIME 100 MILLIGRAM(S): 1 INJECTION, POWDER, FOR SOLUTION INTRAMUSCULAR; INTRAVENOUS at 16:12

## 2023-12-08 RX ADMIN — CEFEPIME 100 MILLIGRAM(S): 1 INJECTION, POWDER, FOR SOLUTION INTRAMUSCULAR; INTRAVENOUS at 22:24

## 2023-12-08 RX ADMIN — Medication 0.5 MILLIGRAM(S): at 22:22

## 2023-12-08 RX ADMIN — DULOXETINE HYDROCHLORIDE 60 MILLIGRAM(S): 30 CAPSULE, DELAYED RELEASE ORAL at 22:23

## 2023-12-08 RX ADMIN — HYDROMORPHONE HYDROCHLORIDE 30 MILLILITER(S): 2 INJECTION INTRAMUSCULAR; INTRAVENOUS; SUBCUTANEOUS at 19:45

## 2023-12-08 NOTE — CONSULT NOTE ADULT - SUBJECTIVE AND OBJECTIVE BOX
Patient is a 43y old  Female who presents with a chief complaint of L shoulder and chest pain (08 Dec 2023 10:03)    HPI:  43F PMHx of MS on Rituxan, presenting with left chest wall and shoulder pain. Recent history of fall secondary to unsteadiness due to MS. On review of ortho notes, pt has had multiple falls in November. Obtained outpatient MRI of left sternoclavicular joint with results significant for marrow edema and swelling of the left sternoclavicular joint with a 2 cm fluid collection and adjacent pleural edema in the left chest with concerns for infectious etiology.  Patient initially saw orthopedics with for concerns for septic arthritis of this joint.  Impression of the read showed soft tissue abscess superficial to the joint.  Recommended CT scan with IV contrast of the chest.  Patient denies any fevers however endorsing erythema to the left sternoclavicular joint region. Patient states she was recently hospitalized over a week ago at [facility] for falls secondary to unsteadiness due to her MS and UTI. Hospital stay was for about 6 days. Denies any recent falls today, vision changes. Endorsing headache. Denies any chest pain, shortness of breath, abdominal pain, nausea vomiting diarrhea, urinary complaints    Patient obtained CT chest w/ IV contrast which showed likely septic arthritis with surrounding erythema. Thoracic surgery consulted, underwent washout and debridement.     OR Note: Debridement of left sternoclavicular joint with excision of left clavicular head. Purulent drainage at the sternoclavicular joint. Tissue surrounding left clavicle found to be very inflamed. Copious irrigation of surgical site.  Specimen sent: Head of left clavicle, culture of left sternoclavicular joint, culture of left manubrium, culture of deep sternal head of clavicle, culture of first rib costal cartilage      In ER: Afebrile, WBC 9.8, , . Given IV Zosyn, IV Vancomycin.     Remains afebrile, WBC stable.     CT Chest  Septic arthritis of the left sternoclavicular joint and involving the   articulation of the sternum with the first costal cartilage.    Extensive surrounding inflammation, with pneumonia in the underlying   subpleural left upper lobe.    prior hospital charts reviewed [  ]  primary team notes reviewed [ x ]  other consultant notes reviewed [ x ]    PAST MEDICAL & SURGICAL HISTORY:  Multiple sclerosis      History of           Allergies  No Known Allergies    ANTIMICROBIALS (past 90 days)  MEDICATIONS  (STANDING):  cefepime   IVPB   100 mL/Hr IV Intermittent (23 @ 05:53)   100 mL/Hr IV Intermittent (23 @ 22:58)   100 mL/Hr IV Intermittent (23 @ 15:46)    cefepime   IVPB   100 mL/Hr IV Intermittent (23 @ 06:28)    piperacillin/tazobactam IVPB...   200 mL/Hr IV Intermittent (23 @ 23:48)    vancomycin  IVPB   250 mL/Hr IV Intermittent (23 @ 05:57)   250 mL/Hr IV Intermittent (23 @ 17:19)    vancomycin  IVPB.   250 mL/Hr IV Intermittent (23 @ 01:00)        cefepime   IVPB 2000 every 8 hours  vancomycin  IVPB 1000 every 12 hours    MEDICATIONS  (STANDING):  enoxaparin Injectable 40 every 24 hours  HYDROmorphone  Injectable 1 every 8 hours PRN  HYDROmorphone PCA (1 mG/mL) 30 PCA Continuous  HYDROmorphone PCA (1 mG/mL) Rescue Clinician Bolus 0.5 every 15 minutes PRN  ondansetron Injectable 4 every 6 hours PRN  oxyCODONE    IR 5 every 6 hours PRN  oxyCODONE    IR 10 every 8 hours PRN  senna 2 at bedtime    SOCIAL HISTORY:       FAMILY HISTORY:    REVIEW OF SYSTEMS  [  ] ROS unobtainable because:    [  ] All other systems negative except as noted below:	    Constitutional:  [ ] fever [ ] chills  [ ] weight loss  [ ] weakness  Skin:  [ ] rash [ ] phlebitis	  Eyes: [ ] icterus [ ] pain  [ ] discharge	  ENMT: [ ] sore throat  [ ] thrush [ ] ulcers [ ] exudates  Respiratory: [ ] dyspnea [ ] hemoptysis [ ] cough [ ] sputum	  Cardiovascular:  [ ] chest pain [ ] palpitations [ ] edema	  Gastrointestinal:  [ ] nausea [ ] vomiting [ ] diarrhea [ ] constipation [ ] pain	  Genitourinary:  [ ] dysuria [ ] frequency [ ] hematuria [ ] discharge [ ] flank pain  [ ] incontinence  Musculoskeletal:  [ ] myalgias [ ] arthralgias [ ] arthritis  [ ] back pain  Neurological:  [ ] headache [ ] seizures  [ ] confusion/altered mental status  Psychiatric:  [ ] anxiety [ ] depression	  Hematology/Lymphatics:  [ ] lymphadenopathy  Endocrine:  [ ] adrenal [ ] thyroid  Allergic/Immunologic:	 [ ] transplant [ ] seasonal    Vital Signs Last 24 Hrs  T(F): 98.6 (23 @ 10:01), Max: 98.6 (23 @ 19:56)  Vital Signs Last 24 Hrs  HR: 91 (23 @ 10:01) (82 - 107)  BP: 105/69 (23 @ 10:01) (93/63 - 139/63)  RR: 18 (23 @ 10:01)  SpO2: 96% (23 @ 10:01) (92% - 100%)  Wt(kg): --    PHYSICAL EXAM:                              7.3    6.64  )-----------( 401      ( 08 Dec 2023 07:20 )             23.6   12-    138  |  102  |  12  ----------------------------<  112<H>  3.8   |  27  |  0.48<L>    Ca    9.1      08 Dec 2023 07:18    TPro  6.2  /  Alb  3.6  /  TBili  0.2  /  DBili  x   /  AST  12  /  ALT  11  /  AlkPhos  158<H>  12-08    Urinalysis Basic - ( 08 Dec 2023 07:18 )    Color: x / Appearance: x / SG: x / pH: x  Gluc: 112 mg/dL / Ketone: x  / Bili: x / Urobili: x   Blood: x / Protein: x / Nitrite: x   Leuk Esterase: x / RBC: x / WBC x   Sq Epi: x / Non Sq Epi: x / Bacteria: x    MICROBIOLOGY:  Culture - Acid Fast - Tissue w/Smear (collected 07 Dec 2023 15:21)  Source: .Tissue Other    Culture - Acid Fast - Tissue w/Smear (collected 07 Dec 2023 15:21)  Source: .Tissue Other    Culture - Tissue with Gram Stain (collected 07 Dec 2023 15:21)  Source: .Tissue Left Sternal Clavicular Joint # 2  Gram Stain (08 Dec 2023 02:20):    No polymorphonuclear leukocytes seen per low power field    No organisms seen per oil power field    Culture - Acid Fast - Tissue w/Smear (collected 07 Dec 2023 15:21)  Source: .Tissue Other    Culture - Acid Fast - Tissue w/Smear (collected 07 Dec 2023 15:21)  Source: .Tissue Other    Culture - Fungal, Tissue (collected 07 Dec 2023 15:21)  Source: .Tissue Other  Preliminary Report (08 Dec 2023 07:51):    Testing in progress    Culture - Acid Fast - Tissue w/Smear (collected 07 Dec 2023 15:21)  Source: .Tissue Other    Culture - Fungal, Tissue (collected 07 Dec 2023 15:21)  Source: .Tissue Other  Preliminary Report (08 Dec 2023 07:51):    Testing in progress    Culture - Fungal, Tissue (collected 07 Dec 2023 15:21)  Source: .Tissue Other  Preliminary Report (08 Dec 2023 08:07):    Testing in progress    Culture - Fungal, Tissue (collected 07 Dec 2023 15:21)  Source: .Tissue Left Sternal Clavicular Joint # 2  Preliminary Report (08 Dec 2023 08:16):    Testing in progress    Culture - Fungal, Tissue (collected 07 Dec 2023 15:21)  Source: .Tissue Other  Preliminary Report (08 Dec 2023 08:07):    Testing in progress    Culture - Fungal, Tissue (collected 07 Dec 2023 15:21)  Source: .Tissue Other  Preliminary Report (08 Dec 2023 08:07):    Testing in progress    Culture - Fungal, Other (collected 07 Dec 2023 15:21)  Source: .Other Other  Preliminary Report (08 Dec 2023 07:51):    Testing in progress    Culture - Tissue with Gram Stain (collected 07 Dec 2023 15:21)  Source: .Tissue Other  Gram Stain (07 Dec 2023 23:02):    No polymorphonuclear leukocytes seen per low power field    No organisms seen per oil power field    Culture - Tissue with Gram Stain (collected 07 Dec 2023 15:21)  Source: .Tissue Other  Gram Stain (07 Dec 2023 23:29):    No polymorphonuclear cells seen per low power field    No organisms seen per oil power field    Culture - Tissue with Gram Stain (collected 07 Dec 2023 15:21)  Source: .Tissue Other  Gram Stain (07 Dec 2023 23:28):    No polymorphonuclear cells seen per low power field    No organisms seen per oil power field    Culture - Tissue with Gram Stain (collected 07 Dec 2023 15:21)  Source: .Tissue Other  Gram Stain (07 Dec 2023 23:28):    Few polymorphonuclear leukocytes per low power field    No organisms seen per oil power field    Culture - Tissue with Gram Stain (collected 07 Dec 2023 15:21)  Source: .Tissue Other  Gram Stain (07 Dec 2023 23:28):    Few polymorphonuclear leukocytes per low power field    No organisms seen per oil power field    Culture - Blood (collected 06 Dec 2023 20:27)  Source: .Blood Blood  Preliminary Report (08 Dec 2023 04:02):    No growth at 24 hours    Culture - Blood (collected 06 Dec 2023 20:10)  Source: .Blood Blood  Preliminary Report (08 Dec 2023 04:02):    No growth at 24 hours                  RADIOLOGY:  imaging below personally reviewed and agree with findings    < from: Xray Chest 1 View- PORTABLE-Urgent (Xray Chest 1 View- PORTABLE-Urgent .) (23 @ 13:50) >  ACC: [id number] EXAM:  XR CHEST PORTABLE URGENT 1V   ORDERED BY: JOSE RAMON ZUNIGA     PROCEDURE DATE:  2023          INTERPRETATION:  EXAMINATION: XR CHEST URGENT    CLINICAL INDICATION: s/p L sternoclavicular debridement, clavicular head   removal    TECHNIQUE: Single frontal, portable view of the chest was obtained.    COMPARISON: Chest x-ray 2023    FINDINGS:  The heart size is normal in size.  The lungs are clear.  There is no pleural effusion. There is no pneumothorax.  Status post removal of proximal sternum and clavicle with surgical   staples are intact.    IMPRESSION:  Status post removal of proximal sternum and clavicle with surgical   staples intact.    --- End of Report ---    < end of copied text >  < from: CT Chest w/ IV Cont (23 @ 22:35) >    ACC: [id number] EXAM:  CT CHEST IC   ORDERED BY:  CHARLY FARIAS     PROCEDURE DATE:  2023          INTERPRETATION:  CLINICAL INFORMATION: Left chest wall erythema and   swelling undergoing orthopedic evaluation for septic arthritis of the   left sternoclavicular joint. According to the emergency room physician   note, the patient underwent MRI of the left sternoclavicular joint which   revealed was suspicious for septic arthritis with 2 cm fluid collection.    COMPARISON: No comparison MRI or other images are available.    CONTRAST/COMPLICATIONS:  IV Contrast: Omnipaque 350  60 cc administered   40 cc discarded  Oral Contrast: NONE  Complications: None reported at time of study completion    PROCEDURE:  CT of the Chest was performed.  Sagittal and coronal reformats were performed.    FINDINGS:    LUNGS AND AIRWAYS: Mild tracheal secretions.  Subpleural consolidation   anterior aspect left lobe deep to the region of the left sternoclavicular   joint. Otherwise clear lungs.  PLEURA:No pleural effusion.  MEDIASTINUM AND NELLY: No lymphadenopathy.  VESSELS: Within normal limits.  HEART: Heart size is normal. No pericardial effusion.  VISUALIZED UPPER ABDOMEN: Within normal limits.  BONES, CHEST WALL: Erosions of the left manubriumof the sternum at its   articulation with the left clavicular head and left first chondral   cartilage. Additional erosions of the inferior aspect of the left medial   clavicular head. Surrounding extensive soft tissue swelling with   thickening of the overlying pectoralis musculature, and phlegmonous   material extending posteriorly adjacent to the pleura, into the   retrosternal mediastinal fat. Edema/inflammation extends to the patient's   right across midline. No formed fluid collection is evident. Mild left   supraclavicular lymphadenopathy.    IMPRESSION:  Septic arthritis of the left sternoclavicular joint and involving the   articulation of the sternum with the first costal cartilage.    Extensive surrounding inflammation, with pneumonia in the underlying   subpleural left upper lobe.    No fluid collection is evident.        < end of copied text >   Patient is a 43y old  Female who presents with a chief complaint of L shoulder and chest pain (08 Dec 2023 10:03)    HPI:  43F PMHx of MS on Rituxan, presenting with left chest wall and shoulder pain. Recent history of fall secondary to unsteadiness due to MS. On review of ortho notes, pt has had multiple falls in November. Obtained outpatient MRI of left sternoclavicular joint with results significant for marrow edema and swelling of the left sternoclavicular joint with a 2 cm fluid collection and adjacent pleural edema in the left chest with concerns for infectious etiology.  Patient initially saw orthopedics with for concerns for septic arthritis of this joint.  Impression of the read showed soft tissue abscess superficial to the joint.  Recommended CT scan with IV contrast of the chest.  Patient denies any fevers however endorsing erythema to the left sternoclavicular joint region. Patient states she was recently hospitalized over a week ago at [facility] for falls secondary to unsteadiness due to her MS and UTI. Hospital stay was for about 6 days. Denies any recent falls today, vision changes. Endorsing headache. Denies any chest pain, shortness of breath, abdominal pain, nausea vomiting diarrhea, urinary complaints    Patient obtained CT chest w/ IV contrast which showed likely septic arthritis with surrounding erythema. Thoracic surgery consulted, underwent washout and debridement.     OR Note: Debridement of left sternoclavicular joint with excision of left clavicular head. Purulent drainage at the sternoclavicular joint. Tissue surrounding left clavicle found to be very inflamed. Copious irrigation of surgical site.  Specimen sent: Head of left clavicle, culture of left sternoclavicular joint, culture of left manubrium, culture of deep sternal head of clavicle, culture of first rib costal cartilage      In ER: Afebrile, WBC 9.8, , . Given IV Zosyn, IV Vancomycin.     Remains afebrile, WBC stable.     CT Chest  Septic arthritis of the left sternoclavicular joint and involving the   articulation of the sternum with the first costal cartilage.    Extensive surrounding inflammation, with pneumonia in the underlying   subpleural left upper lobe.    prior hospital charts reviewed [  ]  primary team notes reviewed [ x ]  other consultant notes reviewed [ x ]    PAST MEDICAL & SURGICAL HISTORY:  Multiple sclerosis      History of           Allergies  No Known Allergies    ANTIMICROBIALS (past 90 days)  MEDICATIONS  (STANDING):  cefepime   IVPB   100 mL/Hr IV Intermittent (23 @ 05:53)   100 mL/Hr IV Intermittent (23 @ 22:58)   100 mL/Hr IV Intermittent (23 @ 15:46)    cefepime   IVPB   100 mL/Hr IV Intermittent (23 @ 06:28)    piperacillin/tazobactam IVPB...   200 mL/Hr IV Intermittent (23 @ 23:48)    vancomycin  IVPB   250 mL/Hr IV Intermittent (23 @ 05:57)   250 mL/Hr IV Intermittent (23 @ 17:19)    vancomycin  IVPB.   250 mL/Hr IV Intermittent (23 @ 01:00)        cefepime   IVPB 2000 every 8 hours  vancomycin  IVPB 1000 every 12 hours    MEDICATIONS  (STANDING):  enoxaparin Injectable 40 every 24 hours  HYDROmorphone  Injectable 1 every 8 hours PRN  HYDROmorphone PCA (1 mG/mL) 30 PCA Continuous  HYDROmorphone PCA (1 mG/mL) Rescue Clinician Bolus 0.5 every 15 minutes PRN  ondansetron Injectable 4 every 6 hours PRN  oxyCODONE    IR 5 every 6 hours PRN  oxyCODONE    IR 10 every 8 hours PRN  senna 2 at bedtime    SOCIAL HISTORY:       FAMILY HISTORY:    REVIEW OF SYSTEMS  [  ] ROS unobtainable because:    [  ] All other systems negative except as noted below:	    Constitutional:  [ ] fever [ ] chills  [ ] weight loss  [ ] weakness  Skin:  [ ] rash [ ] phlebitis	  Eyes: [ ] icterus [ ] pain  [ ] discharge	  ENMT: [ ] sore throat  [ ] thrush [ ] ulcers [ ] exudates  Respiratory: [ ] dyspnea [ ] hemoptysis [ ] cough [ ] sputum	  Cardiovascular:  [ ] chest pain [ ] palpitations [ ] edema	  Gastrointestinal:  [ ] nausea [ ] vomiting [ ] diarrhea [ ] constipation [ ] pain	  Genitourinary:  [ ] dysuria [ ] frequency [ ] hematuria [ ] discharge [ ] flank pain  [ ] incontinence  Musculoskeletal:  [ ] myalgias [ ] arthralgias [ ] arthritis  [ ] back pain  Neurological:  [ ] headache [ ] seizures  [ ] confusion/altered mental status  Psychiatric:  [ ] anxiety [ ] depression	  Hematology/Lymphatics:  [ ] lymphadenopathy  Endocrine:  [ ] adrenal [ ] thyroid  Allergic/Immunologic:	 [ ] transplant [ ] seasonal    Vital Signs Last 24 Hrs  T(F): 98.6 (23 @ 10:01), Max: 98.6 (23 @ 19:56)  Vital Signs Last 24 Hrs  HR: 91 (23 @ 10:01) (82 - 107)  BP: 105/69 (23 @ 10:01) (93/63 - 139/63)  RR: 18 (23 @ 10:01)  SpO2: 96% (23 @ 10:01) (92% - 100%)  Wt(kg): --    PHYSICAL EXAM:                              7.3    6.64  )-----------( 401      ( 08 Dec 2023 07:20 )             23.6   12-    138  |  102  |  12  ----------------------------<  112<H>  3.8   |  27  |  0.48<L>    Ca    9.1      08 Dec 2023 07:18    TPro  6.2  /  Alb  3.6  /  TBili  0.2  /  DBili  x   /  AST  12  /  ALT  11  /  AlkPhos  158<H>  12-08    Urinalysis Basic - ( 08 Dec 2023 07:18 )    Color: x / Appearance: x / SG: x / pH: x  Gluc: 112 mg/dL / Ketone: x  / Bili: x / Urobili: x   Blood: x / Protein: x / Nitrite: x   Leuk Esterase: x / RBC: x / WBC x   Sq Epi: x / Non Sq Epi: x / Bacteria: x    MICROBIOLOGY:  Culture - Acid Fast - Tissue w/Smear (collected 07 Dec 2023 15:21)  Source: .Tissue Other    Culture - Acid Fast - Tissue w/Smear (collected 07 Dec 2023 15:21)  Source: .Tissue Other    Culture - Tissue with Gram Stain (collected 07 Dec 2023 15:21)  Source: .Tissue Left Sternal Clavicular Joint # 2  Gram Stain (08 Dec 2023 02:20):    No polymorphonuclear leukocytes seen per low power field    No organisms seen per oil power field    Culture - Acid Fast - Tissue w/Smear (collected 07 Dec 2023 15:21)  Source: .Tissue Other    Culture - Acid Fast - Tissue w/Smear (collected 07 Dec 2023 15:21)  Source: .Tissue Other    Culture - Fungal, Tissue (collected 07 Dec 2023 15:21)  Source: .Tissue Other  Preliminary Report (08 Dec 2023 07:51):    Testing in progress    Culture - Acid Fast - Tissue w/Smear (collected 07 Dec 2023 15:21)  Source: .Tissue Other    Culture - Fungal, Tissue (collected 07 Dec 2023 15:21)  Source: .Tissue Other  Preliminary Report (08 Dec 2023 07:51):    Testing in progress    Culture - Fungal, Tissue (collected 07 Dec 2023 15:21)  Source: .Tissue Other  Preliminary Report (08 Dec 2023 08:07):    Testing in progress    Culture - Fungal, Tissue (collected 07 Dec 2023 15:21)  Source: .Tissue Left Sternal Clavicular Joint # 2  Preliminary Report (08 Dec 2023 08:16):    Testing in progress    Culture - Fungal, Tissue (collected 07 Dec 2023 15:21)  Source: .Tissue Other  Preliminary Report (08 Dec 2023 08:07):    Testing in progress    Culture - Fungal, Tissue (collected 07 Dec 2023 15:21)  Source: .Tissue Other  Preliminary Report (08 Dec 2023 08:07):    Testing in progress    Culture - Fungal, Other (collected 07 Dec 2023 15:21)  Source: .Other Other  Preliminary Report (08 Dec 2023 07:51):    Testing in progress    Culture - Tissue with Gram Stain (collected 07 Dec 2023 15:21)  Source: .Tissue Other  Gram Stain (07 Dec 2023 23:02):    No polymorphonuclear leukocytes seen per low power field    No organisms seen per oil power field    Culture - Tissue with Gram Stain (collected 07 Dec 2023 15:21)  Source: .Tissue Other  Gram Stain (07 Dec 2023 23:29):    No polymorphonuclear cells seen per low power field    No organisms seen per oil power field    Culture - Tissue with Gram Stain (collected 07 Dec 2023 15:21)  Source: .Tissue Other  Gram Stain (07 Dec 2023 23:28):    No polymorphonuclear cells seen per low power field    No organisms seen per oil power field    Culture - Tissue with Gram Stain (collected 07 Dec 2023 15:21)  Source: .Tissue Other  Gram Stain (07 Dec 2023 23:28):    Few polymorphonuclear leukocytes per low power field    No organisms seen per oil power field    Culture - Tissue with Gram Stain (collected 07 Dec 2023 15:21)  Source: .Tissue Other  Gram Stain (07 Dec 2023 23:28):    Few polymorphonuclear leukocytes per low power field    No organisms seen per oil power field    Culture - Blood (collected 06 Dec 2023 20:27)  Source: .Blood Blood  Preliminary Report (08 Dec 2023 04:02):    No growth at 24 hours    Culture - Blood (collected 06 Dec 2023 20:10)  Source: .Blood Blood  Preliminary Report (08 Dec 2023 04:02):    No growth at 24 hours                  RADIOLOGY:  imaging below personally reviewed and agree with findings    < from: Xray Chest 1 View- PORTABLE-Urgent (Xray Chest 1 View- PORTABLE-Urgent .) (23 @ 13:50) >  ACC: [id number] EXAM:  XR CHEST PORTABLE URGENT 1V   ORDERED BY: JOSE RAMON ZUNIGA     PROCEDURE DATE:  2023          INTERPRETATION:  EXAMINATION: XR CHEST URGENT    CLINICAL INDICATION: s/p L sternoclavicular debridement, clavicular head   removal    TECHNIQUE: Single frontal, portable view of the chest was obtained.    COMPARISON: Chest x-ray 2023    FINDINGS:  The heart size is normal in size.  The lungs are clear.  There is no pleural effusion. There is no pneumothorax.  Status post removal of proximal sternum and clavicle with surgical   staples are intact.    IMPRESSION:  Status post removal of proximal sternum and clavicle with surgical   staples intact.    --- End of Report ---    < end of copied text >  < from: CT Chest w/ IV Cont (23 @ 22:35) >    ACC: [id number] EXAM:  CT CHEST IC   ORDERED BY:  CHARLY FARIAS     PROCEDURE DATE:  2023          INTERPRETATION:  CLINICAL INFORMATION: Left chest wall erythema and   swelling undergoing orthopedic evaluation for septic arthritis of the   left sternoclavicular joint. According to the emergency room physician   note, the patient underwent MRI of the left sternoclavicular joint which   revealed was suspicious for septic arthritis with 2 cm fluid collection.    COMPARISON: No comparison MRI or other images are available.    CONTRAST/COMPLICATIONS:  IV Contrast: Omnipaque 350  60 cc administered   40 cc discarded  Oral Contrast: NONE  Complications: None reported at time of study completion    PROCEDURE:  CT of the Chest was performed.  Sagittal and coronal reformats were performed.    FINDINGS:    LUNGS AND AIRWAYS: Mild tracheal secretions.  Subpleural consolidation   anterior aspect left lobe deep to the region of the left sternoclavicular   joint. Otherwise clear lungs.  PLEURA:No pleural effusion.  MEDIASTINUM AND NELLY: No lymphadenopathy.  VESSELS: Within normal limits.  HEART: Heart size is normal. No pericardial effusion.  VISUALIZED UPPER ABDOMEN: Within normal limits.  BONES, CHEST WALL: Erosions of the left manubriumof the sternum at its   articulation with the left clavicular head and left first chondral   cartilage. Additional erosions of the inferior aspect of the left medial   clavicular head. Surrounding extensive soft tissue swelling with   thickening of the overlying pectoralis musculature, and phlegmonous   material extending posteriorly adjacent to the pleura, into the   retrosternal mediastinal fat. Edema/inflammation extends to the patient's   right across midline. No formed fluid collection is evident. Mild left   supraclavicular lymphadenopathy.    IMPRESSION:  Septic arthritis of the left sternoclavicular joint and involving the   articulation of the sternum with the first costal cartilage.    Extensive surrounding inflammation, with pneumonia in the underlying   subpleural left upper lobe.    No fluid collection is evident.        < end of copied text >   Patient is a 43y old  Female who presents with a chief complaint of L shoulder and chest pain (08 Dec 2023 10:03)    HPI:  43F PMHx of MS on (Rituxan, Ocrevus) presenting with left chest wall and shoulder pain. Recent history of fall secondary to unsteadiness due to MS. On review of ortho notes, pt has had multiple falls in November. SHe was admitted to [facility] on Last week of November and was found to have Rhinovirus and pseudomonas UTI. She completed 5-7 days course with Ciprofloxacin. Left shoulder continued to remain painful and with skin discoloration so obtained outpatient MRI of left sternoclavicular joint with results significant for marrow edema and swelling of the left sternoclavicular joint with a 2 cm fluid collection and adjacent pleural edema in the left chest with concerns for infectious etiology.  Patient initially saw orthopedics with for concerns for septic arthritis of this joint.  Impression of the read showed soft tissue abscess superficial to the joint.  Recommended CT scan with IV contrast of the chest.  Patient denies any fevers however endorsing erythema to the left sternoclavicular joint region. Patient states she was recently hospitalized over a week ago at [facility] for falls secondary to unsteadiness due to her MS and UTI. Hospital stay was for about 6 days. Denies any recent falls today, vision changes. Endorsing headache. Denies any chest pain, shortness of breath, abdominal pain, nausea vomiting diarrhea, urinary complaints    Patient obtained CT chest w/ IV contrast which showed likely septic arthritis with surrounding erythema. Thoracic surgery consulted, underwent washout and debridement.     OR Note: Debridement of left sternoclavicular joint with excision of left clavicular head. Purulent drainage at the sternoclavicular joint. Tissue surrounding left clavicle found to be very inflamed. Copious irrigation of surgical site.  Specimen sent: Head of left clavicle, culture of left sternoclavicular joint, culture of left manubrium, culture of deep sternal head of clavicle, culture of first rib costal cartilage      In ER: Afebrile, WBC 9.8, , . Given IV Zosyn, IV Vancomycin.     Remains afebrile, WBC stable.     CT Chest  Septic arthritis of the left sternoclavicular joint and involving the   articulation of the sternum with the first costal cartilage.    Extensive surrounding inflammation, with pneumonia in the underlying   subpleural left upper lobe.    prior hospital charts reviewed [  ]  primary team notes reviewed [ x ]  other consultant notes reviewed [ x ]    PAST MEDICAL & SURGICAL HISTORY:  Multiple sclerosis      History of           Allergies  No Known Allergies    ANTIMICROBIALS (past 90 days)  MEDICATIONS  (STANDING):  cefepime   IVPB   100 mL/Hr IV Intermittent (23 @ 05:53)   100 mL/Hr IV Intermittent (23 @ 22:58)   100 mL/Hr IV Intermittent (23 @ 15:46)    cefepime   IVPB   100 mL/Hr IV Intermittent (23 @ 06:28)    piperacillin/tazobactam IVPB...   200 mL/Hr IV Intermittent (23 @ 23:48)    vancomycin  IVPB   250 mL/Hr IV Intermittent (23 @ 05:57)   250 mL/Hr IV Intermittent (23 @ 17:19)    vancomycin  IVPB.   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 No drug, alcohol use, sexually active with men, not monogamous, recent travel [place] 1.5 yrs back. No pets in the house    FAMILY HISTORY: No history of autoiimune/inflammatory disease, cancers in family    REVIEW OF SYSTEMS  [  ] ROS unobtainable because:    [ x ] All other systems negative except as noted below:	    Constitutional:  [ ] fever [ ] chills  [ ] weight loss  [ ] weakness  Skin:  [ ] rash [ ] phlebitis	  Eyes: [ ] icterus [ ] pain  [ ] discharge	  ENMT: [ ] sore throat  [ ] thrush [ ] ulcers [ ] exudates  Respiratory: [ ] dyspnea [ ] hemoptysis [ ] cough [ ] sputum	  Cardiovascular:  [ ] chest pain [ ] palpitations [ ] edema	  Gastrointestinal:  [ ] nausea [ ] vomiting [ ] diarrhea [ ] constipation [ ] pain	  Genitourinary:  [ ] dysuria [ ] frequency [ ] hematuria [ ] discharge [ ] flank pain  [ ] incontinence  Musculoskeletal:  [ ] myalgias [ ] arthralgias [x ] arthritis  [ ] back pain  Neurological:  [ ] headache [ ] seizures  [ ] confusion/altered mental status  Psychiatric:  [ ] anxiety [ ] depression	  Hematology/Lymphatics:  [ ] lymphadenopathy  Endocrine:  [ ] adrenal [ ] thyroid  Allergic/Immunologic:	 [ ] transplant [ ] seasonal    Vital Signs Last 24 Hrs  T(F): 98.6 (23 @ 10:01), Max: 98.6 (23 @ 19:56)  Vital Signs Last 24 Hrs  HR: 91 (23 @ 10:01) (82 - 107)  BP: 105/69 (23 @ 10:01) (93/63 - 139/63)  RR: 18 (23 @ 10:01)  SpO2: 96% (23 @ 10:01) (92% - 100%)  Wt(kg): --    PHYSICAL EXAM:    General: Patient in NAD  HEENT: NCAT, EOMI, PERRL, no oral lesions  CV: S1+S2, no m/r/g appreciated   Lungs: No respiratory distress, CTAB  Abd: Soft, nontender, no guarding, no rebound tenderness, + bowel sounds   : No suprapubic tenderness  Neuro: Alert and oriented to time, place and person. No focal deficits noted.   Ext: No cyanosis, no edema  Skin: Drain Left chest; serosanguinous collection                            7.3    6.64  )-----------( 401      ( 08 Dec 2023 07:20 )             23.6   12-    138  |  102  |  12  ----------------------------<  112<H>  3.8   |  27  |  0.48<L>    Ca    9.1      08 Dec 2023 07:18    TPro  6.2  /  Alb  3.6  /  TBili  0.2  /  DBili  x   /  AST  12  /  ALT  11  /  AlkPhos  158<H>  12-08    Urinalysis Basic - ( 08 Dec 2023 07:18 )    Color: x / Appearance: x / SG: x / pH: x  Gluc: 112 mg/dL / Ketone: x  / Bili: x / Urobili: x   Blood: x / Protein: x / Nitrite: x   Leuk Esterase: x / RBC: x / WBC x   Sq Epi: x / Non Sq Epi: x / Bacteria: x    MICROBIOLOGY:  Culture - Acid Fast - Tissue w/Smear (collected 07 Dec 2023 15:21)  Source: .Tissue Other    Culture - Acid Fast - Tissue w/Smear (collected 07 Dec 2023 15:21)  Source: .Tissue Other    Culture - Tissue with Gram Stain (collected 07 Dec 2023 15:21)  Source: .Tissue Left Sternal Clavicular Joint # 2  Gram Stain (08 Dec 2023 02:20):    No polymorphonuclear leukocytes seen per low power field    No organisms seen per oil power field    Culture - Acid Fast - Tissue w/Smear (collected 07 Dec 2023 15:21)  Source: .Tissue Other    Culture - Acid Fast - Tissue w/Smear (collected 07 Dec 2023 15:21)  Source: .Tissue Other    Culture - Fungal, Tissue (collected 07 Dec 2023 15:21)  Source: .Tissue Other  Preliminary Report (08 Dec 2023 07:51):    Testing in progress    Culture - Acid Fast - Tissue w/Smear (collected 07 Dec 2023 15:21)  Source: .Tissue Other    Culture - Fungal, Tissue (collected 07 Dec 2023 15:21)  Source: .Tissue Other  Preliminary Report (08 Dec 2023 07:51):    Testing in progress    Culture - Fungal, Tissue (collected 07 Dec 2023 15:21)  Source: .Tissue Other  Preliminary Report (08 Dec 2023 08:07):    Testing in progress    Culture - Fungal, Tissue (collected 07 Dec 2023 15:21)  Source: .Tissue Left Sternal Clavicular Joint # 2  Preliminary Report (08 Dec 2023 08:16):    Testing in progress    Culture - Fungal, Tissue (collected 07 Dec 2023 15:21)  Source: .Tissue Other  Preliminary Report (08 Dec 2023 08:07):    Testing in progress    Culture - Fungal, Tissue (collected 07 Dec 2023 15:21)  Source: .Tissue Other  Preliminary Report (08 Dec 2023 08:07):    Testing in progress    Culture - Fungal, Other (collected 07 Dec 2023 15:21)  Source: .Other Other  Preliminary Report (08 Dec 2023 07:51):    Testing in progress    Culture - Tissue with Gram Stain (collected 07 Dec 2023 15:21)  Source: .Tissue Other  Gram Stain (07 Dec 2023 23:02):    No polymorphonuclear leukocytes seen per low power field    No organisms seen per oil power field    Culture - Tissue with Gram Stain (collected 07 Dec 2023 15:21)  Source: .Tissue Other  Gram Stain (07 Dec 2023 23:29):    No polymorphonuclear cells seen per low power field    No organisms seen per oil power field    Culture - Tissue with Gram Stain (collected 07 Dec 2023 15:21)  Source: .Tissue Other  Gram Stain (07 Dec 2023 23:28):    No polymorphonuclear cells seen per low power field    No organisms seen per oil power field    Culture - Tissue with Gram Stain (collected 07 Dec 2023 15:21)  Source: .Tissue Other  Gram Stain (07 Dec 2023 23:28):    Few polymorphonuclear leukocytes per low power field    No organisms seen per oil power field    Culture - Tissue with Gram Stain (collected 07 Dec 2023 15:21)  Source: .Tissue Other  Gram Stain (07 Dec 2023 23:28):    Few polymorphonuclear leukocytes per low power field    No organisms seen per oil power field    Culture - Blood (collected 06 Dec 2023 20:27)  Source: .Blood Blood  Preliminary Report (08 Dec 2023 04:02):    No growth at 24 hours    Culture - Blood (collected 06 Dec 2023 20:10)  Source: .Blood Blood  Preliminary Report (08 Dec 2023 04:02):    No growth at 24 hours            RADIOLOGY:  imaging below personally reviewed and agree with findings    < from: Xray Chest 1 View- PORTABLE-Urgent (Xray Chest 1 View- PORTABLE-Urgent .) (23 @ 13:50) >  ACC: [id number] EXAM:  XR CHEST PORTABLE URGENT 1V   ORDERED BY: JOSE RAMON ZUNIGA     PROCEDURE DATE:  2023          INTERPRETATION:  EXAMINATION: XR CHEST URGENT    CLINICAL INDICATION: s/p L sternoclavicular debridement, clavicular head   removal    TECHNIQUE: Single frontal, portable view of the chest was obtained.    COMPARISON: Chest x-ray 2023    FINDINGS:  The heart size is normal in size.  The lungs are clear.  There is no pleural effusion. There is no pneumothorax.  Status post removal of proximal sternum and clavicle with surgical   staples are intact.    IMPRESSION:  Status post removal of proximal sternum and clavicle with surgical   staples intact.    --- End of Report ---    < end of copied text >  < from: CT Chest w/ IV Cont (23 @ 22:35) >    ACC: [id number] EXAM:  CT CHEST IC   ORDERED BY:  CHARLY JARRETT     PROCEDURE DATE:  2023          INTERPRETATION:  CLINICAL INFORMATION: Left chest wall erythema and   swelling undergoing orthopedic evaluation for septic arthritis of the   left sternoclavicular joint. According to the emergency room physician   note, the patient underwent MRI of the left sternoclavicular joint which   revealed was suspicious for septic arthritis with 2 cm fluid collection.    COMPARISON: No comparison MRI or other images are available.    CONTRAST/COMPLICATIONS:  IV Contrast: Omnipaque 350  60 cc administered   40 cc discarded  Oral Contrast: NONE  Complications: None reported at time of study completion    PROCEDURE:  CT of the Chest was performed.  Sagittal and coronal reformats were performed.    FINDINGS:    LUNGS AND AIRWAYS: Mild tracheal secretions.  Subpleural consolidation   anterior aspect left lobe deep to the region of the left sternoclavicular   joint. Otherwise clear lungs.  PLEURA:No pleural effusion.  MEDIASTINUM AND NELLY: No lymphadenopathy.  VESSELS: Within normal limits.  HEART: Heart size is normal. No pericardial effusion.  VISUALIZED UPPER ABDOMEN: Within normal limits.  BONES, CHEST WALL: Erosions of the left manubriumof the sternum at its   articulation with the left clavicular head and left first chondral   cartilage. Additional erosions of the inferior aspect of the left medial   clavicular head. Surrounding extensive soft tissue swelling with   thickening of the overlying pectoralis musculature, and phlegmonous   material extending posteriorly adjacent to the pleura, into the   retrosternal mediastinal fat. Edema/inflammation extends to the patient's   right across midline. No formed fluid collection is evident. Mild left   supraclavicular lymphadenopathy.    IMPRESSION:  Septic arthritis of the left sternoclavicular joint and involving the   articulation of the sternum with the first costal cartilage.    Extensive surrounding inflammation, with pneumonia in the underlying   subpleural left upper lobe.    No fluid collection is evident.     Patient is a 43y old  Female who presents with a chief complaint of L shoulder and chest pain (08 Dec 2023 10:03)    HPI:  43F PMHx of MS on (Rituxan, Ocrevus) presenting with left chest wall and shoulder pain. Recent history of fall secondary to unsteadiness due to MS. On review of ortho notes, pt has had multiple falls in November. SHe was admitted to [facility] on Last week of November and was found to have Rhinovirus and pseudomonas UTI. She completed 5-7 days course with Ciprofloxacin. Left shoulder continued to remain painful and with skin discoloration so obtained outpatient MRI of left sternoclavicular joint with results significant for marrow edema and swelling of the left sternoclavicular joint with a 2 cm fluid collection and adjacent pleural edema in the left chest with concerns for infectious etiology.  Patient initially saw orthopedics with for concerns for septic arthritis of this joint.  Impression of the read showed soft tissue abscess superficial to the joint.  Recommended CT scan with IV contrast of the chest.  Patient denies any fevers however endorsing erythema to the left sternoclavicular joint region. Patient states she was recently hospitalized over a week ago at [facility] for falls secondary to unsteadiness due to her MS and UTI. Hospital stay was for about 6 days. Denies any recent falls today, vision changes. Endorsing headache. Denies any chest pain, shortness of breath, abdominal pain, nausea vomiting diarrhea, urinary complaints    Patient obtained CT chest w/ IV contrast which showed likely septic arthritis with surrounding erythema. Thoracic surgery consulted, underwent washout and debridement.     OR Note: Debridement of left sternoclavicular joint with excision of left clavicular head. Purulent drainage at the sternoclavicular joint. Tissue surrounding left clavicle found to be very inflamed. Copious irrigation of surgical site.  Specimen sent: Head of left clavicle, culture of left sternoclavicular joint, culture of left manubrium, culture of deep sternal head of clavicle, culture of first rib costal cartilage      In ER: Afebrile, WBC 9.8, , . Given IV Zosyn, IV Vancomycin.     Remains afebrile, WBC stable.     CT Chest  Septic arthritis of the left sternoclavicular joint and involving the   articulation of the sternum with the first costal cartilage.    Extensive surrounding inflammation, with pneumonia in the underlying   subpleural left upper lobe.    prior hospital charts reviewed [  ]  primary team notes reviewed [ x ]  other consultant notes reviewed [ x ]    PAST MEDICAL & SURGICAL HISTORY:  Multiple sclerosis      History of           Allergies  No Known Allergies    ANTIMICROBIALS (past 90 days)  MEDICATIONS  (STANDING):  cefepime   IVPB   100 mL/Hr IV Intermittent (23 @ 05:53)   100 mL/Hr IV Intermittent (23 @ 22:58)   100 mL/Hr IV Intermittent (23 @ 15:46)    cefepime   IVPB   100 mL/Hr IV Intermittent (23 @ 06:28)    piperacillin/tazobactam IVPB...   200 mL/Hr IV Intermittent (23 @ 23:48)    vancomycin  IVPB   250 mL/Hr IV Intermittent (23 @ 05:57)   250 mL/Hr IV Intermittent (23 @ 17:19)    vancomycin  IVPB.   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 No drug, alcohol use, sexually active with men, not monogamous, recent travel [place] 1.5 yrs back. No pets in the house    FAMILY HISTORY: No history of autoiimune/inflammatory disease, cancers in family    REVIEW OF SYSTEMS  [  ] ROS unobtainable because:    [ x ] All other systems negative except as noted below:	    Constitutional:  [ ] fever [ ] chills  [ ] weight loss  [ ] weakness  Skin:  [ ] rash [ ] phlebitis	  Eyes: [ ] icterus [ ] pain  [ ] discharge	  ENMT: [ ] sore throat  [ ] thrush [ ] ulcers [ ] exudates  Respiratory: [ ] dyspnea [ ] hemoptysis [ ] cough [ ] sputum	  Cardiovascular:  [ ] chest pain [ ] palpitations [ ] edema	  Gastrointestinal:  [ ] nausea [ ] vomiting [ ] diarrhea [ ] constipation [ ] pain	  Genitourinary:  [ ] dysuria [ ] frequency [ ] hematuria [ ] discharge [ ] flank pain  [ ] incontinence  Musculoskeletal:  [ ] myalgias [ ] arthralgias [x ] arthritis  [ ] back pain  Neurological:  [ ] headache [ ] seizures  [ ] confusion/altered mental status  Psychiatric:  [ ] anxiety [ ] depression	  Hematology/Lymphatics:  [ ] lymphadenopathy  Endocrine:  [ ] adrenal [ ] thyroid  Allergic/Immunologic:	 [ ] transplant [ ] seasonal    Vital Signs Last 24 Hrs  T(F): 98.6 (23 @ 10:01), Max: 98.6 (23 @ 19:56)  Vital Signs Last 24 Hrs  HR: 91 (23 @ 10:01) (82 - 107)  BP: 105/69 (23 @ 10:01) (93/63 - 139/63)  RR: 18 (23 @ 10:01)  SpO2: 96% (23 @ 10:01) (92% - 100%)  Wt(kg): --    PHYSICAL EXAM:    General: Patient in NAD  HEENT: NCAT, EOMI, PERRL, no oral lesions  CV: S1+S2, no m/r/g appreciated   Lungs: No respiratory distress, CTAB  Abd: Soft, nontender, no guarding, no rebound tenderness, + bowel sounds   : No suprapubic tenderness  Neuro: Alert and oriented to time, place and person. No focal deficits noted.   Ext: No cyanosis, no edema  Skin: Drain Left chest; serosanguinous collection                            7.3    6.64  )-----------( 401      ( 08 Dec 2023 07:20 )             23.6   12-    138  |  102  |  12  ----------------------------<  112<H>  3.8   |  27  |  0.48<L>    Ca    9.1      08 Dec 2023 07:18    TPro  6.2  /  Alb  3.6  /  TBili  0.2  /  DBili  x   /  AST  12  /  ALT  11  /  AlkPhos  158<H>  12-08    Urinalysis Basic - ( 08 Dec 2023 07:18 )    Color: x / Appearance: x / SG: x / pH: x  Gluc: 112 mg/dL / Ketone: x  / Bili: x / Urobili: x   Blood: x / Protein: x / Nitrite: x   Leuk Esterase: x / RBC: x / WBC x   Sq Epi: x / Non Sq Epi: x / Bacteria: x    MICROBIOLOGY:  Culture - Acid Fast - Tissue w/Smear (collected 07 Dec 2023 15:21)  Source: .Tissue Other    Culture - Acid Fast - Tissue w/Smear (collected 07 Dec 2023 15:21)  Source: .Tissue Other    Culture - Tissue with Gram Stain (collected 07 Dec 2023 15:21)  Source: .Tissue Left Sternal Clavicular Joint # 2  Gram Stain (08 Dec 2023 02:20):    No polymorphonuclear leukocytes seen per low power field    No organisms seen per oil power field    Culture - Acid Fast - Tissue w/Smear (collected 07 Dec 2023 15:21)  Source: .Tissue Other    Culture - Acid Fast - Tissue w/Smear (collected 07 Dec 2023 15:21)  Source: .Tissue Other    Culture - Fungal, Tissue (collected 07 Dec 2023 15:21)  Source: .Tissue Other  Preliminary Report (08 Dec 2023 07:51):    Testing in progress    Culture - Acid Fast - Tissue w/Smear (collected 07 Dec 2023 15:21)  Source: .Tissue Other    Culture - Fungal, Tissue (collected 07 Dec 2023 15:21)  Source: .Tissue Other  Preliminary Report (08 Dec 2023 07:51):    Testing in progress    Culture - Fungal, Tissue (collected 07 Dec 2023 15:21)  Source: .Tissue Other  Preliminary Report (08 Dec 2023 08:07):    Testing in progress    Culture - Fungal, Tissue (collected 07 Dec 2023 15:21)  Source: .Tissue Left Sternal Clavicular Joint # 2  Preliminary Report (08 Dec 2023 08:16):    Testing in progress    Culture - Fungal, Tissue (collected 07 Dec 2023 15:21)  Source: .Tissue Other  Preliminary Report (08 Dec 2023 08:07):    Testing in progress    Culture - Fungal, Tissue (collected 07 Dec 2023 15:21)  Source: .Tissue Other  Preliminary Report (08 Dec 2023 08:07):    Testing in progress    Culture - Fungal, Other (collected 07 Dec 2023 15:21)  Source: .Other Other  Preliminary Report (08 Dec 2023 07:51):    Testing in progress    Culture - Tissue with Gram Stain (collected 07 Dec 2023 15:21)  Source: .Tissue Other  Gram Stain (07 Dec 2023 23:02):    No polymorphonuclear leukocytes seen per low power field    No organisms seen per oil power field    Culture - Tissue with Gram Stain (collected 07 Dec 2023 15:21)  Source: .Tissue Other  Gram Stain (07 Dec 2023 23:29):    No polymorphonuclear cells seen per low power field    No organisms seen per oil power field    Culture - Tissue with Gram Stain (collected 07 Dec 2023 15:21)  Source: .Tissue Other  Gram Stain (07 Dec 2023 23:28):    No polymorphonuclear cells seen per low power field    No organisms seen per oil power field    Culture - Tissue with Gram Stain (collected 07 Dec 2023 15:21)  Source: .Tissue Other  Gram Stain (07 Dec 2023 23:28):    Few polymorphonuclear leukocytes per low power field    No organisms seen per oil power field    Culture - Tissue with Gram Stain (collected 07 Dec 2023 15:21)  Source: .Tissue Other  Gram Stain (07 Dec 2023 23:28):    Few polymorphonuclear leukocytes per low power field    No organisms seen per oil power field    Culture - Blood (collected 06 Dec 2023 20:27)  Source: .Blood Blood  Preliminary Report (08 Dec 2023 04:02):    No growth at 24 hours    Culture - Blood (collected 06 Dec 2023 20:10)  Source: .Blood Blood  Preliminary Report (08 Dec 2023 04:02):    No growth at 24 hours            RADIOLOGY:  imaging below personally reviewed and agree with findings    < from: Xray Chest 1 View- PORTABLE-Urgent (Xray Chest 1 View- PORTABLE-Urgent .) (23 @ 13:50) >  ACC: [id number] EXAM:  XR CHEST PORTABLE URGENT 1V   ORDERED BY: JOSE RAMON ZUNIGA     PROCEDURE DATE:  2023          INTERPRETATION:  EXAMINATION: XR CHEST URGENT    CLINICAL INDICATION: s/p L sternoclavicular debridement, clavicular head   removal    TECHNIQUE: Single frontal, portable view of the chest was obtained.    COMPARISON: Chest x-ray 2023    FINDINGS:  The heart size is normal in size.  The lungs are clear.  There is no pleural effusion. There is no pneumothorax.  Status post removal of proximal sternum and clavicle with surgical   staples are intact.    IMPRESSION:  Status post removal of proximal sternum and clavicle with surgical   staples intact.    --- End of Report ---    < end of copied text >  < from: CT Chest w/ IV Cont (23 @ 22:35) >    ACC: [id number] EXAM:  CT CHEST IC   ORDERED BY:  CHARLY JARRETT     PROCEDURE DATE:  2023          INTERPRETATION:  CLINICAL INFORMATION: Left chest wall erythema and   swelling undergoing orthopedic evaluation for septic arthritis of the   left sternoclavicular joint. According to the emergency room physician   note, the patient underwent MRI of the left sternoclavicular joint which   revealed was suspicious for septic arthritis with 2 cm fluid collection.    COMPARISON: No comparison MRI or other images are available.    CONTRAST/COMPLICATIONS:  IV Contrast: Omnipaque 350  60 cc administered   40 cc discarded  Oral Contrast: NONE  Complications: None reported at time of study completion    PROCEDURE:  CT of the Chest was performed.  Sagittal and coronal reformats were performed.    FINDINGS:    LUNGS AND AIRWAYS: Mild tracheal secretions.  Subpleural consolidation   anterior aspect left lobe deep to the region of the left sternoclavicular   joint. Otherwise clear lungs.  PLEURA:No pleural effusion.  MEDIASTINUM AND NELLY: No lymphadenopathy.  VESSELS: Within normal limits.  HEART: Heart size is normal. No pericardial effusion.  VISUALIZED UPPER ABDOMEN: Within normal limits.  BONES, CHEST WALL: Erosions of the left manubriumof the sternum at its   articulation with the left clavicular head and left first chondral   cartilage. Additional erosions of the inferior aspect of the left medial   clavicular head. Surrounding extensive soft tissue swelling with   thickening of the overlying pectoralis musculature, and phlegmonous   material extending posteriorly adjacent to the pleura, into the   retrosternal mediastinal fat. Edema/inflammation extends to the patient's   right across midline. No formed fluid collection is evident. Mild left   supraclavicular lymphadenopathy.    IMPRESSION:  Septic arthritis of the left sternoclavicular joint and involving the   articulation of the sternum with the first costal cartilage.    Extensive surrounding inflammation, with pneumonia in the underlying   subpleural left upper lobe.    No fluid collection is evident.     Patient is a 43y old  Female who presents with a chief complaint of L shoulder and chest pain (08 Dec 2023 10:03)    HPI:  43F PMHx of MS on (Rituxan, Ocrevus) presenting with left chest wall and shoulder pain. Recent history of fall secondary to unsteadiness due to MS. On review of ortho notes, pt has had multiple falls in November. She was admitted to [facility] on Last week of November and was found to have Rhinovirus and pseudomonas UTI. She completed 5-7 days course with Ciprofloxacin. Left shoulder continued to remain painful and with skin discoloration so obtained outpatient MRI of left sternoclavicular joint with results significant for marrow edema and swelling of the left sternoclavicular joint with a 2 cm fluid collection and adjacent pleural edema in the left chest with concerns for infectious etiology.  Patient initially saw orthopedics with for concerns for septic arthritis of this joint.  Impression of the read showed soft tissue abscess superficial to the joint.  Recommended CT scan with IV contrast of the chest. She reports having recent dental works, had a crown placed and RCT recently unclear exact timeline. Reports having fevers while and [facility]. ROS otherwise negative.    Patient obtained CT chest w/ IV contrast which showed likely septic arthritis with surrounding erythema. Thoracic surgery consulted, underwent washout and debridement.     OR Note: Debridement of left sternoclavicular joint with excision of left clavicular head. Purulent drainage at the sternoclavicular joint. Tissue surrounding left clavicle found to be very inflamed. Copious irrigation of surgical site.  Specimen sent: Head of left clavicle, culture of left sternoclavicular joint, culture of left manubrium, culture of deep sternal head of clavicle, culture of first rib costal cartilage      In ER: Afebrile, WBC 9.8, , . Given IV Zosyn, IV Vancomycin.     Remains afebrile, WBC stable.     CT Chest  Septic arthritis of the left sternoclavicular joint and involving the   articulation of the sternum with the first costal cartilage.    Extensive surrounding inflammation, with pneumonia in the underlying   subpleural left upper lobe.    prior hospital charts reviewed [  ]  primary team notes reviewed [ x ]  other consultant notes reviewed [ x ]    PAST MEDICAL & SURGICAL HISTORY:  Multiple sclerosis      History of           Allergies  No Known Allergies    ANTIMICROBIALS (past 90 days)  MEDICATIONS  (STANDING):  cefepime   IVPB   100 mL/Hr IV Intermittent (23 @ 05:53)   100 mL/Hr IV Intermittent (23 @ 22:58)   100 mL/Hr IV Intermittent (23 @ 15:46)    cefepime   IVPB   100 mL/Hr IV Intermittent (23 @ 06:28)    piperacillin/tazobactam IVPB...   200 mL/Hr IV Intermittent (23 @ 23:48)    vancomycin  IVPB   250 mL/Hr IV Intermittent (23 @ 05:57)   250 mL/Hr IV Intermittent (23 @ 17:19)    vancomycin  IVPB.   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 No drug, alcohol use, sexually active with men, not monogamous, recent travel [place] 1.5 yrs back. No pets in the house    FAMILY HISTORY: No history of autoiimune/inflammatory disease, cancers in family    REVIEW OF SYSTEMS  [  ] ROS unobtainable because:    [ x ] All other systems negative except as noted below:	    Constitutional:  [ ] fever [ ] chills  [ ] weight loss  [ ] weakness  Skin:  [ ] rash [ ] phlebitis	  Eyes: [ ] icterus [ ] pain  [ ] discharge	  ENMT: [ ] sore throat  [ ] thrush [ ] ulcers [ ] exudates  Respiratory: [ ] dyspnea [ ] hemoptysis [ ] cough [ ] sputum	  Cardiovascular:  [ ] chest pain [ ] palpitations [ ] edema	  Gastrointestinal:  [ ] nausea [ ] vomiting [ ] diarrhea [ ] constipation [ ] pain	  Genitourinary:  [ ] dysuria [ ] frequency [ ] hematuria [ ] discharge [ ] flank pain  [ ] incontinence  Musculoskeletal:  [ ] myalgias [ ] arthralgias [x ] arthritis  [ ] back pain  Neurological:  [ ] headache [ ] seizures  [ ] confusion/altered mental status  Psychiatric:  [ ] anxiety [ ] depression	  Hematology/Lymphatics:  [ ] lymphadenopathy  Endocrine:  [ ] adrenal [ ] thyroid  Allergic/Immunologic:	 [ ] transplant [ ] seasonal    Vital Signs Last 24 Hrs  T(F): 98.6 (23 @ 10:01), Max: 98.6 (23 @ 19:56)  Vital Signs Last 24 Hrs  HR: 91 (23 @ 10:01) (82 - 107)  BP: 105/69 (23 @ 10:01) (93/63 - 139/63)  RR: 18 (23 @ 10:01)  SpO2: 96% (23 @ 10:01) (92% - 100%)  Wt(kg): --    PHYSICAL EXAM:    General: Patient in NAD  HEENT: NCAT, EOMI, PERRL, no oral lesions  CV: S1+S2, no m/r/g appreciated   Lungs: No respiratory distress, CTAB  Abd: Soft, nontender, no guarding, no rebound tenderness, + bowel sounds   : No suprapubic tenderness  Neuro: Alert and oriented to time, place and person. No focal deficits noted.   Ext: No cyanosis, no edema  Skin: Drain Left chest; serosanguinous collection                            7.3    6.64  )-----------( 401      ( 08 Dec 2023 07:20 )             23.6   12-08    138  |  102  |  12  ----------------------------<  112<H>  3.8   |  27  |  0.48<L>    Ca    9.1      08 Dec 2023 07:18    TPro  6.2  /  Alb  3.6  /  TBili  0.2  /  DBili  x   /  AST  12  /  ALT  11  /  AlkPhos  158<H>  12-08    Urinalysis Basic - ( 08 Dec 2023 07:18 )    Color: x / Appearance: x / SG: x / pH: x  Gluc: 112 mg/dL / Ketone: x  / Bili: x / Urobili: x   Blood: x / Protein: x / Nitrite: x   Leuk Esterase: x / RBC: x / WBC x   Sq Epi: x / Non Sq Epi: x / Bacteria: x    MICROBIOLOGY:  Culture - Acid Fast - Tissue w/Smear (collected 07 Dec 2023 15:21)  Source: .Tissue Other    Culture - Acid Fast - Tissue w/Smear (collected 07 Dec 2023 15:21)  Source: .Tissue Other    Culture - Tissue with Gram Stain (collected 07 Dec 2023 15:21)  Source: .Tissue Left Sternal Clavicular Joint # 2  Gram Stain (08 Dec 2023 02:20):    No polymorphonuclear leukocytes seen per low power field    No organisms seen per oil power field    Culture - Acid Fast - Tissue w/Smear (collected 07 Dec 2023 15:21)  Source: .Tissue Other    Culture - Acid Fast - Tissue w/Smear (collected 07 Dec 2023 15:21)  Source: .Tissue Other    Culture - Fungal, Tissue (collected 07 Dec 2023 15:21)  Source: .Tissue Other  Preliminary Report (08 Dec 2023 07:51):    Testing in progress    Culture - Acid Fast - Tissue w/Smear (collected 07 Dec 2023 15:21)  Source: .Tissue Other    Culture - Fungal, Tissue (collected 07 Dec 2023 15:21)  Source: .Tissue Other  Preliminary Report (08 Dec 2023 07:51):    Testing in progress    Culture - Fungal, Tissue (collected 07 Dec 2023 15:21)  Source: .Tissue Other  Preliminary Report (08 Dec 2023 08:07):    Testing in progress    Culture - Fungal, Tissue (collected 07 Dec 2023 15:21)  Source: .Tissue Left Sternal Clavicular Joint # 2  Preliminary Report (08 Dec 2023 08:16):    Testing in progress    Culture - Fungal, Tissue (collected 07 Dec 2023 15:21)  Source: .Tissue Other  Preliminary Report (08 Dec 2023 08:07):    Testing in progress    Culture - Fungal, Tissue (collected 07 Dec 2023 15:21)  Source: .Tissue Other  Preliminary Report (08 Dec 2023 08:07):    Testing in progress    Culture - Fungal, Other (collected 07 Dec 2023 15:21)  Source: .Other Other  Preliminary Report (08 Dec 2023 07:51):    Testing in progress    Culture - Tissue with Gram Stain (collected 07 Dec 2023 15:21)  Source: .Tissue Other  Gram Stain (07 Dec 2023 23:02):    No polymorphonuclear leukocytes seen per low power field    No organisms seen per oil power field    Culture - Tissue with Gram Stain (collected 07 Dec 2023 15:21)  Source: .Tissue Other  Gram Stain (07 Dec 2023 23:29):    No polymorphonuclear cells seen per low power field    No organisms seen per oil power field    Culture - Tissue with Gram Stain (collected 07 Dec 2023 15:21)  Source: .Tissue Other  Gram Stain (07 Dec 2023 23:28):    No polymorphonuclear cells seen per low power field    No organisms seen per oil power field    Culture - Tissue with Gram Stain (collected 07 Dec 2023 15:21)  Source: .Tissue Other  Gram Stain (07 Dec 2023 23:28):    Few polymorphonuclear leukocytes per low power field    No organisms seen per oil power field    Culture - Tissue with Gram Stain (collected 07 Dec 2023 15:21)  Source: .Tissue Other  Gram Stain (07 Dec 2023 23:28):    Few polymorphonuclear leukocytes per low power field    No organisms seen per oil power field    Culture - Blood (collected 06 Dec 2023 20:27)  Source: .Blood Blood  Preliminary Report (08 Dec 2023 04:02):    No growth at 24 hours    Culture - Blood (collected 06 Dec 2023 20:10)  Source: .Blood Blood  Preliminary Report (08 Dec 2023 04:02):    No growth at 24 hours            RADIOLOGY:  imaging below personally reviewed and agree with findings    < from: Xray Chest 1 View- PORTABLE-Urgent (Xray Chest 1 View- PORTABLE-Urgent .) (23 @ 13:50) >  ACC: [id number] EXAM:  XR CHEST PORTABLE URGENT 1V   ORDERED BY: JOSE RAMON ZUNIGA     PROCEDURE DATE:  2023          INTERPRETATION:  EXAMINATION: XR CHEST URGENT    CLINICAL INDICATION: s/p L sternoclavicular debridement, clavicular head   removal    TECHNIQUE: Single frontal, portable view of the chest was obtained.    COMPARISON: Chest x-ray 2023    FINDINGS:  The heart size is normal in size.  The lungs are clear.  There is no pleural effusion. There is no pneumothorax.  Status post removal of proximal sternum and clavicle with surgical   staples are intact.    IMPRESSION:  Status post removal of proximal sternum and clavicle with surgical   staples intact.    --- End of Report ---    < end of copied text >  < from: CT Chest w/ IV Cont (23 @ 22:35) >    ACC: [id number] EXAM:  CT CHEST IC   ORDERED BY:  CHARLY FARIAS     PROCEDURE DATE:  2023          INTERPRETATION:  CLINICAL INFORMATION: Left chest wall erythema and   swelling undergoing orthopedic evaluation for septic arthritis of the   left sternoclavicular joint. According to the emergency room physician   note, the patient underwent MRI of the left sternoclavicular joint which   revealed was suspicious for septic arthritis with 2 cm fluid collection.    COMPARISON: No comparison MRI or other images are available.    CONTRAST/COMPLICATIONS:  IV Contrast: Omnipaque 350  60 cc administered   40 cc discarded  Oral Contrast: NONE  Complications: None reported at time of study completion    PROCEDURE:  CT of the Chest was performed.  Sagittal and coronal reformats were performed.    FINDINGS:    LUNGS AND AIRWAYS: Mild tracheal secretions.  Subpleural consolidation   anterior aspect left lobe deep to the region of the left sternoclavicular   joint. Otherwise clear lungs.  PLEURA:No pleural effusion.  MEDIASTINUM AND NELLY: No lymphadenopathy.  VESSELS: Within normal limits.  HEART: Heart size is normal. No pericardial effusion.  VISUALIZED UPPER ABDOMEN: Within normal limits.  BONES, CHEST WALL: Erosions of the left manubriumof the sternum at its   articulation with the left clavicular head and left first chondral   cartilage. Additional erosions of the inferior aspect of the left medial   clavicular head. Surrounding extensive soft tissue swelling with   thickening of the overlying pectoralis musculature, and phlegmonous   material extending posteriorly adjacent to the pleura, into the   retrosternal mediastinal fat. Edema/inflammation extends to the patient's   right across midline. No formed fluid collection is evident. Mild left   supraclavicular lymphadenopathy.    IMPRESSION:  Septic arthritis of the left sternoclavicular joint and involving the   articulation of the sternum with the first costal cartilage.    Extensive surrounding inflammation, with pneumonia in the underlying   subpleural left upper lobe.    No fluid collection is evident.     Patient is a 43y old  Female who presents with a chief complaint of L shoulder and chest pain (08 Dec 2023 10:03)    HPI:  43F PMHx of MS on (Rituxan, Ocrevus) presenting with left chest wall and shoulder pain. Recent history of fall secondary to unsteadiness due to MS. On review of ortho notes, pt has had multiple falls in November. She was admitted to [facility] on Last week of November and was found to have Rhinovirus and pseudomonas UTI. She completed 5-7 days course with Ciprofloxacin. Left shoulder continued to remain painful and with skin discoloration so obtained outpatient MRI of left sternoclavicular joint with results significant for marrow edema and swelling of the left sternoclavicular joint with a 2 cm fluid collection and adjacent pleural edema in the left chest with concerns for infectious etiology.  Patient initially saw orthopedics with for concerns for septic arthritis of this joint.  Impression of the read showed soft tissue abscess superficial to the joint.  Recommended CT scan with IV contrast of the chest. She reports having recent dental works, had a crown placed and RCT recently unclear exact timeline. Reports having fevers while and [facility]. ROS otherwise negative.    Patient obtained CT chest w/ IV contrast which showed likely septic arthritis with surrounding erythema. Thoracic surgery consulted, underwent washout and debridement.     OR Note: Debridement of left sternoclavicular joint with excision of left clavicular head. Purulent drainage at the sternoclavicular joint. Tissue surrounding left clavicle found to be very inflamed. Copious irrigation of surgical site.  Specimen sent: Head of left clavicle, culture of left sternoclavicular joint, culture of left manubrium, culture of deep sternal head of clavicle, culture of first rib costal cartilage      In ER: Afebrile, WBC 9.8, , . Given IV Zosyn, IV Vancomycin.     Remains afebrile, WBC stable.     CT Chest  Septic arthritis of the left sternoclavicular joint and involving the   articulation of the sternum with the first costal cartilage.    Extensive surrounding inflammation, with pneumonia in the underlying   subpleural left upper lobe.    prior hospital charts reviewed [  ]  primary team notes reviewed [ x ]  other consultant notes reviewed [ x ]    PAST MEDICAL & SURGICAL HISTORY:  Multiple sclerosis      History of           Allergies  No Known Allergies    ANTIMICROBIALS (past 90 days)  MEDICATIONS  (STANDING):  cefepime   IVPB   100 mL/Hr IV Intermittent (23 @ 05:53)   100 mL/Hr IV Intermittent (23 @ 22:58)   100 mL/Hr IV Intermittent (23 @ 15:46)    cefepime   IVPB   100 mL/Hr IV Intermittent (23 @ 06:28)    piperacillin/tazobactam IVPB...   200 mL/Hr IV Intermittent (23 @ 23:48)    vancomycin  IVPB   250 mL/Hr IV Intermittent (23 @ 05:57)   250 mL/Hr IV Intermittent (23 @ 17:19)    vancomycin  IVPB.   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 No drug, alcohol use, sexually active with men, not monogamous, recent travel [place] 1.5 yrs back. No pets in the house    FAMILY HISTORY: No history of autoiimune/inflammatory disease, cancers in family    REVIEW OF SYSTEMS  [  ] ROS unobtainable because:    [ x ] All other systems negative except as noted below:	    Constitutional:  [ ] fever [ ] chills  [ ] weight loss  [ ] weakness  Skin:  [ ] rash [ ] phlebitis	  Eyes: [ ] icterus [ ] pain  [ ] discharge	  ENMT: [ ] sore throat  [ ] thrush [ ] ulcers [ ] exudates  Respiratory: [ ] dyspnea [ ] hemoptysis [ ] cough [ ] sputum	  Cardiovascular:  [ ] chest pain [ ] palpitations [ ] edema	  Gastrointestinal:  [ ] nausea [ ] vomiting [ ] diarrhea [ ] constipation [ ] pain	  Genitourinary:  [ ] dysuria [ ] frequency [ ] hematuria [ ] discharge [ ] flank pain  [ ] incontinence  Musculoskeletal:  [ ] myalgias [ ] arthralgias [x ] arthritis  [ ] back pain  Neurological:  [ ] headache [ ] seizures  [ ] confusion/altered mental status  Psychiatric:  [ ] anxiety [ ] depression	  Hematology/Lymphatics:  [ ] lymphadenopathy  Endocrine:  [ ] adrenal [ ] thyroid  Allergic/Immunologic:	 [ ] transplant [ ] seasonal    Vital Signs Last 24 Hrs  T(F): 98.6 (23 @ 10:01), Max: 98.6 (23 @ 19:56)  Vital Signs Last 24 Hrs  HR: 91 (23 @ 10:01) (82 - 107)  BP: 105/69 (23 @ 10:01) (93/63 - 139/63)  RR: 18 (23 @ 10:01)  SpO2: 96% (23 @ 10:01) (92% - 100%)  Wt(kg): --    PHYSICAL EXAM:    General: Patient in NAD  HEENT: NCAT, EOMI, PERRL, no oral lesions  CV: S1+S2, no m/r/g appreciated   Lungs: No respiratory distress, CTAB  Abd: Soft, nontender, no guarding, no rebound tenderness, + bowel sounds   : No suprapubic tenderness  Neuro: Alert and oriented to time, place and person. No focal deficits noted.   Ext: No cyanosis, no edema  Skin: Drain Left chest; serosanguinous collection                            7.3    6.64  )-----------( 401      ( 08 Dec 2023 07:20 )             23.6   12-08    138  |  102  |  12  ----------------------------<  112<H>  3.8   |  27  |  0.48<L>    Ca    9.1      08 Dec 2023 07:18    TPro  6.2  /  Alb  3.6  /  TBili  0.2  /  DBili  x   /  AST  12  /  ALT  11  /  AlkPhos  158<H>  12-08    Urinalysis Basic - ( 08 Dec 2023 07:18 )    Color: x / Appearance: x / SG: x / pH: x  Gluc: 112 mg/dL / Ketone: x  / Bili: x / Urobili: x   Blood: x / Protein: x / Nitrite: x   Leuk Esterase: x / RBC: x / WBC x   Sq Epi: x / Non Sq Epi: x / Bacteria: x    MICROBIOLOGY:  Culture - Acid Fast - Tissue w/Smear (collected 07 Dec 2023 15:21)  Source: .Tissue Other    Culture - Acid Fast - Tissue w/Smear (collected 07 Dec 2023 15:21)  Source: .Tissue Other    Culture - Tissue with Gram Stain (collected 07 Dec 2023 15:21)  Source: .Tissue Left Sternal Clavicular Joint # 2  Gram Stain (08 Dec 2023 02:20):    No polymorphonuclear leukocytes seen per low power field    No organisms seen per oil power field    Culture - Acid Fast - Tissue w/Smear (collected 07 Dec 2023 15:21)  Source: .Tissue Other    Culture - Acid Fast - Tissue w/Smear (collected 07 Dec 2023 15:21)  Source: .Tissue Other    Culture - Fungal, Tissue (collected 07 Dec 2023 15:21)  Source: .Tissue Other  Preliminary Report (08 Dec 2023 07:51):    Testing in progress    Culture - Acid Fast - Tissue w/Smear (collected 07 Dec 2023 15:21)  Source: .Tissue Other    Culture - Fungal, Tissue (collected 07 Dec 2023 15:21)  Source: .Tissue Other  Preliminary Report (08 Dec 2023 07:51):    Testing in progress    Culture - Fungal, Tissue (collected 07 Dec 2023 15:21)  Source: .Tissue Other  Preliminary Report (08 Dec 2023 08:07):    Testing in progress    Culture - Fungal, Tissue (collected 07 Dec 2023 15:21)  Source: .Tissue Left Sternal Clavicular Joint # 2  Preliminary Report (08 Dec 2023 08:16):    Testing in progress    Culture - Fungal, Tissue (collected 07 Dec 2023 15:21)  Source: .Tissue Other  Preliminary Report (08 Dec 2023 08:07):    Testing in progress    Culture - Fungal, Tissue (collected 07 Dec 2023 15:21)  Source: .Tissue Other  Preliminary Report (08 Dec 2023 08:07):    Testing in progress    Culture - Fungal, Other (collected 07 Dec 2023 15:21)  Source: .Other Other  Preliminary Report (08 Dec 2023 07:51):    Testing in progress    Culture - Tissue with Gram Stain (collected 07 Dec 2023 15:21)  Source: .Tissue Other  Gram Stain (07 Dec 2023 23:02):    No polymorphonuclear leukocytes seen per low power field    No organisms seen per oil power field    Culture - Tissue with Gram Stain (collected 07 Dec 2023 15:21)  Source: .Tissue Other  Gram Stain (07 Dec 2023 23:29):    No polymorphonuclear cells seen per low power field    No organisms seen per oil power field    Culture - Tissue with Gram Stain (collected 07 Dec 2023 15:21)  Source: .Tissue Other  Gram Stain (07 Dec 2023 23:28):    No polymorphonuclear cells seen per low power field    No organisms seen per oil power field    Culture - Tissue with Gram Stain (collected 07 Dec 2023 15:21)  Source: .Tissue Other  Gram Stain (07 Dec 2023 23:28):    Few polymorphonuclear leukocytes per low power field    No organisms seen per oil power field    Culture - Tissue with Gram Stain (collected 07 Dec 2023 15:21)  Source: .Tissue Other  Gram Stain (07 Dec 2023 23:28):    Few polymorphonuclear leukocytes per low power field    No organisms seen per oil power field    Culture - Blood (collected 06 Dec 2023 20:27)  Source: .Blood Blood  Preliminary Report (08 Dec 2023 04:02):    No growth at 24 hours    Culture - Blood (collected 06 Dec 2023 20:10)  Source: .Blood Blood  Preliminary Report (08 Dec 2023 04:02):    No growth at 24 hours            RADIOLOGY:  imaging below personally reviewed and agree with findings    < from: Xray Chest 1 View- PORTABLE-Urgent (Xray Chest 1 View- PORTABLE-Urgent .) (23 @ 13:50) >  ACC: [id number] EXAM:  XR CHEST PORTABLE URGENT 1V   ORDERED BY: JOSE RAMON ZUNIGA     PROCEDURE DATE:  2023          INTERPRETATION:  EXAMINATION: XR CHEST URGENT    CLINICAL INDICATION: s/p L sternoclavicular debridement, clavicular head   removal    TECHNIQUE: Single frontal, portable view of the chest was obtained.    COMPARISON: Chest x-ray 2023    FINDINGS:  The heart size is normal in size.  The lungs are clear.  There is no pleural effusion. There is no pneumothorax.  Status post removal of proximal sternum and clavicle with surgical   staples are intact.    IMPRESSION:  Status post removal of proximal sternum and clavicle with surgical   staples intact.    --- End of Report ---    < end of copied text >  < from: CT Chest w/ IV Cont (23 @ 22:35) >    ACC: [id number] EXAM:  CT CHEST IC   ORDERED BY:  CHARLY FARIAS     PROCEDURE DATE:  2023          INTERPRETATION:  CLINICAL INFORMATION: Left chest wall erythema and   swelling undergoing orthopedic evaluation for septic arthritis of the   left sternoclavicular joint. According to the emergency room physician   note, the patient underwent MRI of the left sternoclavicular joint which   revealed was suspicious for septic arthritis with 2 cm fluid collection.    COMPARISON: No comparison MRI or other images are available.    CONTRAST/COMPLICATIONS:  IV Contrast: Omnipaque 350  60 cc administered   40 cc discarded  Oral Contrast: NONE  Complications: None reported at time of study completion    PROCEDURE:  CT of the Chest was performed.  Sagittal and coronal reformats were performed.    FINDINGS:    LUNGS AND AIRWAYS: Mild tracheal secretions.  Subpleural consolidation   anterior aspect left lobe deep to the region of the left sternoclavicular   joint. Otherwise clear lungs.  PLEURA:No pleural effusion.  MEDIASTINUM AND NELLY: No lymphadenopathy.  VESSELS: Within normal limits.  HEART: Heart size is normal. No pericardial effusion.  VISUALIZED UPPER ABDOMEN: Within normal limits.  BONES, CHEST WALL: Erosions of the left manubriumof the sternum at its   articulation with the left clavicular head and left first chondral   cartilage. Additional erosions of the inferior aspect of the left medial   clavicular head. Surrounding extensive soft tissue swelling with   thickening of the overlying pectoralis musculature, and phlegmonous   material extending posteriorly adjacent to the pleura, into the   retrosternal mediastinal fat. Edema/inflammation extends to the patient's   right across midline. No formed fluid collection is evident. Mild left   supraclavicular lymphadenopathy.    IMPRESSION:  Septic arthritis of the left sternoclavicular joint and involving the   articulation of the sternum with the first costal cartilage.    Extensive surrounding inflammation, with pneumonia in the underlying   subpleural left upper lobe.    No fluid collection is evident.     Patient is a 43y old  Female who presents with a chief complaint of L shoulder and chest pain (08 Dec 2023 10:03)    HPI:  43F PMHx of MS on (Rituxan, Ocrevus) presenting with left chest wall and shoulder pain. Recent history of fall secondary to unsteadiness due to MS. On review of ortho notes, pt has had multiple falls in November. She was admitted to [facility] on Last week of November and was found to have Rhinovirus and pseudomonas UTI. She completed 5-7 days course with Ciprofloxacin. Left shoulder continued to remain painful and with skin discoloration so obtained outpatient MRI of left sternoclavicular joint with results significant for marrow edema and swelling of the left sternoclavicular joint with a 2 cm fluid collection and adjacent pleural edema in the left chest with concerns for infectious etiology.  Patient initially saw orthopedics with for concerns for septic arthritis of this joint.  Impression of the read showed soft tissue abscess superficial to the joint.  Recommended CT scan with IV contrast of the chest. She reports having recent dental works, had a crown placed and RCT recently unclear exact timeline. Reports having fevers while and [facility]. ROS otherwise negative.    Patient obtained CT chest w/ IV contrast which showed likely septic arthritis with surrounding erythema. Thoracic surgery consulted, underwent washout and debridement.     OR Note: Debridement of left sternoclavicular joint with excision of left clavicular head. Purulent drainage at the sternoclavicular joint. Tissue surrounding left clavicle found to be very inflamed. Copious irrigation of surgical site.  Specimen sent: Head of left clavicle, culture of left sternoclavicular joint, culture of left manubrium, culture of deep sternal head of clavicle, culture of first rib costal cartilage      In ER: Afebrile, WBC 9.8, , . Given IV Zosyn, IV Vancomycin.     Remains afebrile, WBC stable.     CT Chest  Septic arthritis of the left sternoclavicular joint and involving the   articulation of the sternum with the first costal cartilage.    Extensive surrounding inflammation, with pneumonia in the underlying   subpleural left upper lobe.    prior hospital charts reviewed [  ]  primary team notes reviewed [ x ]  other consultant notes reviewed [ x ]      PAST MEDICAL & SURGICAL HISTORY:  Multiple sclerosis      History of       Allergies  No Known Allergies    ANTIMICROBIALS (past 90 days)  MEDICATIONS  (STANDING):  cefepime   IVPB   100 mL/Hr IV Intermittent (23 @ 05:53)   100 mL/Hr IV Intermittent (23 @ 22:58)   100 mL/Hr IV Intermittent (23 @ 15:46)    cefepime   IVPB   100 mL/Hr IV Intermittent (23 @ 06:28)    piperacillin/tazobactam IVPB...   200 mL/Hr IV Intermittent (23 @ 23:48)    vancomycin  IVPB   250 mL/Hr IV Intermittent (23 @ 05:57)   250 mL/Hr IV Intermittent (23 @ 17:19)    vancomycin  IVPB.   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 No drug, alcohol use, sexually active with men, not monogamous, recent travel [place] 1.5 yrs back. No pets in the house      FAMILY HISTORY:   No history of autoiimune/inflammatory disease, cancers in family        REVIEW OF SYSTEMS  [  ] ROS unobtainable because:    [ x ] All other systems negative except as noted below:	    Constitutional:  [ ] fever [ ] chills   Skin:  [ ] rash [ ] phlebitis	  Eyes: [ ] icterus [ ] pain  [ ] discharge	  ENMT: [ ] sore throat  [ ] thrush   Respiratory: [ ] dyspnea [ ] cough [ ] sputum	  Cardiovascular:  [ ] chest pain  Gastrointestinal:  [ ] nausea [ ] vomiting [ ] diarrhea [ ] constipation [ ] pain	  Genitourinary:  [ ] dysuria [ ] frequency   Musculoskeletal:  [ ] myalgias [ ] arthralgias [x ] arthritis  [ ] back pain  Neurological:  [ ] headache [ ] confusion/altered mental status  Psychiatric:  [ ] anxiety [ ] depression	  Endocrine:  [ ] adrenal [ ] thyroid  Allergic/Immunologic:	 [ ] transplant [ ] seasonal      Vital Signs Last 24 Hrs  T(F): 98.6 (23 @ 10:01), Max: 98.6 (23 @ 19:56)  Vital Signs Last 24 Hrs  HR: 91 (23 @ 10:01) (82 - 107)  BP: 105/69 (23 @ 10:01) (93/63 - 139/63)  RR: 18 (23 @ 10:01)  SpO2: 96% (23 @ 10:01) (92% - 100%)  Wt(kg): --        PHYSICAL EXAM:    General: Patient in NAD  EYES: No discharge   ENT: no oral ulcers.   Neck: lt upper chest dressing.   CV: S1+S2, normal   Lungs: No respiratory distress,  Abd: Soft, nontender,   : No suprapubic tenderness  Neuro: Alert and oriented, No focal deficits noted.   Ext: No edema  Skin: Drain Left chest; serosanguinous collection                            7.3    6.64  )-----------( 401      ( 08 Dec 2023 07:20 )             23.6   12-    138  |  102  |  12  ----------------------------<  112<H>  3.8   |  27  |  0.48<L>    Ca    9.1      08 Dec 2023 07:18    TPro  6.2  /  Alb  3.6  /  TBili  0.2  /  DBili  x   /  AST  12  /  ALT  11  /  AlkPhos  158<H>  12-08    Urinalysis Basic - ( 08 Dec 2023 07:18 )    Color: x / Appearance: x / SG: x / pH: x  Gluc: 112 mg/dL / Ketone: x  / Bili: x / Urobili: x   Blood: x / Protein: x / Nitrite: x   Leuk Esterase: x / RBC: x / WBC x   Sq Epi: x / Non Sq Epi: x / Bacteria: x    MICROBIOLOGY:  Culture - Acid Fast - Tissue w/Smear (collected 07 Dec 2023 15:21)  Source: .Tissue Other    Culture - Acid Fast - Tissue w/Smear (collected 07 Dec 2023 15:21)  Source: .Tissue Other    Culture - Tissue with Gram Stain (collected 07 Dec 2023 15:21)  Source: .Tissue Left Sternal Clavicular Joint # 2  Gram Stain (08 Dec 2023 02:20):    No polymorphonuclear leukocytes seen per low power field    No organisms seen per oil power field    Culture - Acid Fast - Tissue w/Smear (collected 07 Dec 2023 15:21)  Source: .Tissue Other    Culture - Acid Fast - Tissue w/Smear (collected 07 Dec 2023 15:21)  Source: .Tissue Other    Culture - Fungal, Tissue (collected 07 Dec 2023 15:21)  Source: .Tissue Other  Preliminary Report (08 Dec 2023 07:51):    Testing in progress    Culture - Acid Fast - Tissue w/Smear (collected 07 Dec 2023 15:21)  Source: .Tissue Other    Culture - Fungal, Tissue (collected 07 Dec 2023 15:21)  Source: .Tissue Other  Preliminary Report (08 Dec 2023 07:51):    Testing in progress    Culture - Fungal, Tissue (collected 07 Dec 2023 15:21)  Source: .Tissue Other  Preliminary Report (08 Dec 2023 08:07):    Testing in progress    Culture - Fungal, Tissue (collected 07 Dec 2023 15:21)  Source: .Tissue Left Sternal Clavicular Joint # 2  Preliminary Report (08 Dec 2023 08:16):    Testing in progress    Culture - Fungal, Tissue (collected 07 Dec 2023 15:21)  Source: .Tissue Other  Preliminary Report (08 Dec 2023 08:07):    Testing in progress    Culture - Fungal, Tissue (collected 07 Dec 2023 15:21)  Source: .Tissue Other  Preliminary Report (08 Dec 2023 08:07):    Testing in progress    Culture - Fungal, Other (collected 07 Dec 2023 15:21)  Source: .Other Other  Preliminary Report (08 Dec 2023 07:51):    Testing in progress    Culture - Tissue with Gram Stain (collected 07 Dec 2023 15:21)  Source: .Tissue Other  Gram Stain (07 Dec 2023 23:02):    No polymorphonuclear leukocytes seen per low power field    No organisms seen per oil power field    Culture - Tissue with Gram Stain (collected 07 Dec 2023 15:21)  Source: .Tissue Other  Gram Stain (07 Dec 2023 23:29):    No polymorphonuclear cells seen per low power field    No organisms seen per oil power field    Culture - Tissue with Gram Stain (collected 07 Dec 2023 15:21)  Source: .Tissue Other  Gram Stain (07 Dec 2023 23:28):    No polymorphonuclear cells seen per low power field    No organisms seen per oil power field    Culture - Tissue with Gram Stain (collected 07 Dec 2023 15:21)  Source: .Tissue Other  Gram Stain (07 Dec 2023 23:28):    Few polymorphonuclear leukocytes per low power field    No organisms seen per oil power field    Culture - Tissue with Gram Stain (collected 07 Dec 2023 15:21)  Source: .Tissue Other  Gram Stain (07 Dec 2023 23:28):    Few polymorphonuclear leukocytes per low power field    No organisms seen per oil power field    Culture - Blood (collected 06 Dec 2023 20:27)  Source: .Blood Blood  Preliminary Report (08 Dec 2023 04:02):    No growth at 24 hours    Culture - Blood (collected 06 Dec 2023 20:10)  Source: .Blood Blood  Preliminary Report (08 Dec 2023 04:02):    No growth at 24 hours            RADIOLOGY:  imaging below personally reviewed and agree with findings    < from: Xray Chest 1 View- PORTABLE-Urgent (Xray Chest 1 View- PORTABLE-Urgent .) (23 @ 13:50) >      IMPRESSION:  Status post removal of proximal sternum and clavicle with surgical   staples intact.      < from: CT Chest w/ IV Cont (23 @ 22:35) >      IMPRESSION:  Septic arthritis of the left sternoclavicular joint and involving the   articulation of the sternum with the first costal cartilage.    Extensive surrounding inflammation, with pneumonia in the underlying   subpleural left upper lobe.    No fluid collection is evident.     Patient is a 43y old  Female who presents with a chief complaint of L shoulder and chest pain (08 Dec 2023 10:03)    HPI:  43F PMHx of MS on (Rituxan, Ocrevus) presenting with left chest wall and shoulder pain. Recent history of fall secondary to unsteadiness due to MS. On review of ortho notes, pt has had multiple falls in November. She was admitted to [facility] on Last week of November and was found to have Rhinovirus and pseudomonas UTI. She completed 5-7 days course with Ciprofloxacin. Left shoulder continued to remain painful and with skin discoloration so obtained outpatient MRI of left sternoclavicular joint with results significant for marrow edema and swelling of the left sternoclavicular joint with a 2 cm fluid collection and adjacent pleural edema in the left chest with concerns for infectious etiology.  Patient initially saw orthopedics with for concerns for septic arthritis of this joint.  Impression of the read showed soft tissue abscess superficial to the joint.  Recommended CT scan with IV contrast of the chest. She reports having recent dental works, had a crown placed and RCT recently unclear exact timeline. Reports having fevers while and [facility]. ROS otherwise negative.    Patient obtained CT chest w/ IV contrast which showed likely septic arthritis with surrounding erythema. Thoracic surgery consulted, underwent washout and debridement.     OR Note: Debridement of left sternoclavicular joint with excision of left clavicular head. Purulent drainage at the sternoclavicular joint. Tissue surrounding left clavicle found to be very inflamed. Copious irrigation of surgical site.  Specimen sent: Head of left clavicle, culture of left sternoclavicular joint, culture of left manubrium, culture of deep sternal head of clavicle, culture of first rib costal cartilage      In ER: Afebrile, WBC 9.8, , . Given IV Zosyn, IV Vancomycin.     Remains afebrile, WBC stable.     CT Chest  Septic arthritis of the left sternoclavicular joint and involving the   articulation of the sternum with the first costal cartilage.    Extensive surrounding inflammation, with pneumonia in the underlying   subpleural left upper lobe.    prior hospital charts reviewed [  ]  primary team notes reviewed [ x ]  other consultant notes reviewed [ x ]      PAST MEDICAL & SURGICAL HISTORY:  Multiple sclerosis      History of       Allergies  No Known Allergies    ANTIMICROBIALS (past 90 days)  MEDICATIONS  (STANDING):  cefepime   IVPB   100 mL/Hr IV Intermittent (23 @ 05:53)   100 mL/Hr IV Intermittent (23 @ 22:58)   100 mL/Hr IV Intermittent (23 @ 15:46)    cefepime   IVPB   100 mL/Hr IV Intermittent (23 @ 06:28)    piperacillin/tazobactam IVPB...   200 mL/Hr IV Intermittent (23 @ 23:48)    vancomycin  IVPB   250 mL/Hr IV Intermittent (23 @ 05:57)   250 mL/Hr IV Intermittent (23 @ 17:19)    vancomycin  IVPB.   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 No drug, alcohol use, sexually active with men, not monogamous, recent travel [place] 1.5 yrs back. No pets in the house      FAMILY HISTORY:   No history of autoiimune/inflammatory disease, cancers in family        REVIEW OF SYSTEMS  [  ] ROS unobtainable because:    [ x ] All other systems negative except as noted below:	    Constitutional:  [ ] fever [ ] chills   Skin:  [ ] rash [ ] phlebitis	  Eyes: [ ] icterus [ ] pain  [ ] discharge	  ENMT: [ ] sore throat  [ ] thrush   Respiratory: [ ] dyspnea [ ] cough [ ] sputum	  Cardiovascular:  [ ] chest pain  Gastrointestinal:  [ ] nausea [ ] vomiting [ ] diarrhea [ ] constipation [ ] pain	  Genitourinary:  [ ] dysuria [ ] frequency   Musculoskeletal:  [ ] myalgias [ ] arthralgias [x ] arthritis  [ ] back pain  Neurological:  [ ] headache [ ] confusion/altered mental status  Psychiatric:  [ ] anxiety [ ] depression	  Endocrine:  [ ] adrenal [ ] thyroid  Allergic/Immunologic:	 [ ] transplant [ ] seasonal      Vital Signs Last 24 Hrs  T(F): 98.6 (23 @ 10:01), Max: 98.6 (23 @ 19:56)  Vital Signs Last 24 Hrs  HR: 91 (23 @ 10:01) (82 - 107)  BP: 105/69 (23 @ 10:01) (93/63 - 139/63)  RR: 18 (23 @ 10:01)  SpO2: 96% (23 @ 10:01) (92% - 100%)  Wt(kg): --        PHYSICAL EXAM:    General: Patient in NAD  EYES: No discharge   ENT: no oral ulcers.   Neck: lt upper chest dressing.   CV: S1+S2, normal   Lungs: No respiratory distress,  Abd: Soft, nontender,   : No suprapubic tenderness  Neuro: Alert and oriented, No focal deficits noted.   Ext: No edema  Skin: Drain Left chest; serosanguinous collection                            7.3    6.64  )-----------( 401      ( 08 Dec 2023 07:20 )             23.6   12-    138  |  102  |  12  ----------------------------<  112<H>  3.8   |  27  |  0.48<L>    Ca    9.1      08 Dec 2023 07:18    TPro  6.2  /  Alb  3.6  /  TBili  0.2  /  DBili  x   /  AST  12  /  ALT  11  /  AlkPhos  158<H>  12-08    Urinalysis Basic - ( 08 Dec 2023 07:18 )    Color: x / Appearance: x / SG: x / pH: x  Gluc: 112 mg/dL / Ketone: x  / Bili: x / Urobili: x   Blood: x / Protein: x / Nitrite: x   Leuk Esterase: x / RBC: x / WBC x   Sq Epi: x / Non Sq Epi: x / Bacteria: x    MICROBIOLOGY:  Culture - Acid Fast - Tissue w/Smear (collected 07 Dec 2023 15:21)  Source: .Tissue Other    Culture - Acid Fast - Tissue w/Smear (collected 07 Dec 2023 15:21)  Source: .Tissue Other    Culture - Tissue with Gram Stain (collected 07 Dec 2023 15:21)  Source: .Tissue Left Sternal Clavicular Joint # 2  Gram Stain (08 Dec 2023 02:20):    No polymorphonuclear leukocytes seen per low power field    No organisms seen per oil power field    Culture - Acid Fast - Tissue w/Smear (collected 07 Dec 2023 15:21)  Source: .Tissue Other    Culture - Acid Fast - Tissue w/Smear (collected 07 Dec 2023 15:21)  Source: .Tissue Other    Culture - Fungal, Tissue (collected 07 Dec 2023 15:21)  Source: .Tissue Other  Preliminary Report (08 Dec 2023 07:51):    Testing in progress    Culture - Acid Fast - Tissue w/Smear (collected 07 Dec 2023 15:21)  Source: .Tissue Other    Culture - Fungal, Tissue (collected 07 Dec 2023 15:21)  Source: .Tissue Other  Preliminary Report (08 Dec 2023 07:51):    Testing in progress    Culture - Fungal, Tissue (collected 07 Dec 2023 15:21)  Source: .Tissue Other  Preliminary Report (08 Dec 2023 08:07):    Testing in progress    Culture - Fungal, Tissue (collected 07 Dec 2023 15:21)  Source: .Tissue Left Sternal Clavicular Joint # 2  Preliminary Report (08 Dec 2023 08:16):    Testing in progress    Culture - Fungal, Tissue (collected 07 Dec 2023 15:21)  Source: .Tissue Other  Preliminary Report (08 Dec 2023 08:07):    Testing in progress    Culture - Fungal, Tissue (collected 07 Dec 2023 15:21)  Source: .Tissue Other  Preliminary Report (08 Dec 2023 08:07):    Testing in progress    Culture - Fungal, Other (collected 07 Dec 2023 15:21)  Source: .Other Other  Preliminary Report (08 Dec 2023 07:51):    Testing in progress    Culture - Tissue with Gram Stain (collected 07 Dec 2023 15:21)  Source: .Tissue Other  Gram Stain (07 Dec 2023 23:02):    No polymorphonuclear leukocytes seen per low power field    No organisms seen per oil power field    Culture - Tissue with Gram Stain (collected 07 Dec 2023 15:21)  Source: .Tissue Other  Gram Stain (07 Dec 2023 23:29):    No polymorphonuclear cells seen per low power field    No organisms seen per oil power field    Culture - Tissue with Gram Stain (collected 07 Dec 2023 15:21)  Source: .Tissue Other  Gram Stain (07 Dec 2023 23:28):    No polymorphonuclear cells seen per low power field    No organisms seen per oil power field    Culture - Tissue with Gram Stain (collected 07 Dec 2023 15:21)  Source: .Tissue Other  Gram Stain (07 Dec 2023 23:28):    Few polymorphonuclear leukocytes per low power field    No organisms seen per oil power field    Culture - Tissue with Gram Stain (collected 07 Dec 2023 15:21)  Source: .Tissue Other  Gram Stain (07 Dec 2023 23:28):    Few polymorphonuclear leukocytes per low power field    No organisms seen per oil power field    Culture - Blood (collected 06 Dec 2023 20:27)  Source: .Blood Blood  Preliminary Report (08 Dec 2023 04:02):    No growth at 24 hours    Culture - Blood (collected 06 Dec 2023 20:10)  Source: .Blood Blood  Preliminary Report (08 Dec 2023 04:02):    No growth at 24 hours            RADIOLOGY:  imaging below personally reviewed and agree with findings    < from: Xray Chest 1 View- PORTABLE-Urgent (Xray Chest 1 View- PORTABLE-Urgent .) (23 @ 13:50) >      IMPRESSION:  Status post removal of proximal sternum and clavicle with surgical   staples intact.      < from: CT Chest w/ IV Cont (23 @ 22:35) >      IMPRESSION:  Septic arthritis of the left sternoclavicular joint and involving the   articulation of the sternum with the first costal cartilage.    Extensive surrounding inflammation, with pneumonia in the underlying   subpleural left upper lobe.    No fluid collection is evident.

## 2023-12-08 NOTE — PHYSICAL THERAPY INITIAL EVALUATION ADULT - MODALITIES TREATMENT COMMENTS
PT woundcare consult rec'd for manageent of subclavicular wound VAC, dressing rec'd C/D/I, pt premedicated for pain. Wound appeared mostly clean, [id number], with 2nd wound .[id number].[id number]. wound cleansed with NS, cavilon to periwound, white foam to base, VAC with black sponge to 50mmHg, continuous therapy, good seal. PT woundcare to continue to follow 3x per week PT woundcare consult rec'd for manageent of subclavicular wound VAC, dressing rec'd C/D/I, pt premedicated for pain. Wound appeared mostly clean, 11.5cmx4.0gfv2ol, with 2nd wound .5cmx.[id number]. wound cleansed with NS, cavilon to periwound, white foam to base, VAC with black sponge to 50mmHg, continuous therapy, good seal. PT woundcare to continue to follow 3x per week

## 2023-12-08 NOTE — PROGRESS NOTE ADULT - ASSESSMENT
43F PMHx of MS on Rituxan, presenting with left chest wall and shoulder pain w/ concern for infection now admitted with likely septic arthritis with need for possible washout and debridement      Problem/Plan - 1:  ·  Problem: Septic arthritis.   ·  Plan: Imaging consistent with septic arthritis  s/p OR   cont abx and fu cultures   check ECHO   ID input       Problem/Plan - 2:  ·  Problem: Multiple sclerosis.   ·  Plan: Gets Rituxan every 6 months, next dose due : holding   -On Ampyra BID at home, need to bring home med    Problem/Plan - 3:  ·  Problem: Anxiety.   ·  Plan: ISTOP reviewed Reference #: [id number]  -Cont. Clonazepam 0.5mg QHS PRN  -Cont. Sertraline 100mg QHS  -Cont. Duloxetine 60mg QHS, pt unsure of dose  -Need full med rec.    Problem/Plan - 4:  ·  Problem: Anemia.   ·  Plan: hgb 9, lower than prior year. Pt endorses some recent anemia but cannot specify  -Trend cbc  -Monitor for bleeding.    Problem/Plan - 5:  ·  Problem: Prophylactic measure.   ·  Plan: DVT PPx  -SCDs for now given upcoming surgery. 43F PMHx of MS on Rituxan, presenting with left chest wall and shoulder pain w/ concern for infection now admitted with likely septic arthritis with need for possible washout and debridement      Problem/Plan - 1:  ·  Problem: Septic arthritis.   ·  Plan: Imaging consistent with septic arthritis  s/p OR   cont abx and fu cultures   check ECHO   ID input       Problem/Plan - 2:  ·  Problem: Multiple sclerosis.   ·  Plan: Gets Rituxan every 6 months, next dose due : holding   -On Ampyra BID at home, need to bring home med    Problem/Plan - 3:  ·  Problem: Anxiety.   ·  Plan: ISTOP reviewed Reference #: [id number]  -Cont. Clonazepam 0.5mg QHS PRN  -Cont. Sertraline 100mg QHS  -Cont. Duloxetine 60mg QHS, pt unsure of dose  -Need full med rec.    Problem/Plan - 4:  ·  Problem: Anemia.   ·  Plan: hgb 9, lower than prior year. Pt endorses some recent anemia but cannot specify  -Trend cbc  -Monitor for bleeding.    Problem/Plan - 5:  ·  Problem: Prophylactic measure.   ·  Plan: DVT PPx  -SCDs for now given upcoming surgery.

## 2023-12-08 NOTE — PROGRESS NOTE ADULT - SUBJECTIVE AND OBJECTIVE BOX
Date of service: 12-08-23 @ 12:31      Patient is a 43y old  Female who presents with a chief complaint of L shoulder and chest pain (08 Dec 2023 11:25)                                                               INTERVAL HPI/OVERNIGHT EVENTS:    REVIEW OF SYSTEMS:     CONSTITUTIONAL: No weakness, fevers or chills  EYES/ENT: No visual changes , no ear ache   NECK: No pain or stiffness  RESPIRATORY: No cough, wheezing,  No shortness of breath  CARDIOVASCULAR: No chest pain or palpitations  GASTROINTESTINAL: No abdominal pain  . No nausea, vomiting, or hematemesis; No diarrhea or constipation. No melena or hematochezia.  GENITOURINARY: No dysuria, frequency or hematuria  NEUROLOGICAL: No numbness or weakness  SKIN: No itching, burning, rashes, or lesions                                                                                                                                                                                                                                                                                 Medications:  MEDICATIONS  (STANDING):  cefepime   IVPB 2000 milliGRAM(s) IV Intermittent every 8 hours  chlorhexidine 2% Cloths 1 Application(s) Topical <User Schedule>  enoxaparin Injectable 40 milliGRAM(s) SubCutaneous every 24 hours  HYDROmorphone PCA (1 mG/mL) 30 milliLiter(s) PCA Continuous PCA Continuous  senna 2 Tablet(s) Oral at bedtime  vancomycin  IVPB 1000 milliGRAM(s) IV Intermittent every 12 hours    MEDICATIONS  (PRN):  HYDROmorphone  Injectable 1 milliGRAM(s) IV Push every 8 hours PRN Severe Pain (7 - 10)  HYDROmorphone PCA (1 mG/mL) Rescue Clinician Bolus 0.5 milliGRAM(s) IV Push every 15 minutes PRN for Pain Scale GREATER THAN 6  naloxone Injectable 0.1 milliGRAM(s) IV Push every 3 minutes PRN For ANY of the following changes in patient status:  A. RR LESS THAN 10 breaths per minute, B. Oxygen saturation LESS THAN 90%, C. Sedation score of 6  ondansetron Injectable 4 milliGRAM(s) IV Push every 6 hours PRN Nausea  oxyCODONE    IR 5 milliGRAM(s) Oral every 6 hours PRN Moderate Pain (4 - 6)  oxyCODONE    IR 10 milliGRAM(s) Oral every 8 hours PRN Severe Pain (7 - 10)       Allergies    No Known Allergies    Intolerances      Vital Signs Last 24 Hrs  T(C): 37 (08 Dec 2023 10:01), Max: 37 (08 Dec 2023 10:01)  T(F): 98.6 (08 Dec 2023 10:01), Max: 98.6 (08 Dec 2023 10:01)  HR: 91 (08 Dec 2023 10:01) (82 - 107)  BP: 105/69 (08 Dec 2023 10:01) (93/63 - 139/63)  BP(mean): 78 (07 Dec 2023 16:00) (70 - 98)  RR: 18 (08 Dec 2023 10:01) (16 - 18)  SpO2: 96% (08 Dec 2023 10:01) (92% - 100%)    Parameters below as of 08 Dec 2023 10:01  Patient On (Oxygen Delivery Method): room air      CAPILLARY BLOOD GLUCOSE          12-07 @ 07:01  -  12-08 @ 07:00  --------------------------------------------------------  IN: 400 mL / OUT: 780 mL / NET: -380 mL    12-08 @ 07:01  -  12-08 @ 12:31  --------------------------------------------------------  IN: 200 mL / OUT: 20 mL / NET: 180 mL      Physical Exam:    Daily     Daily   General:  Well appearing, NAD, not cachetic  HEENT:  Nonicteric, PERRLA  CV:  RRR, S1S2   Lungs:  chest wall : dressing in place and drain in place   Abdomen:  Soft, non-tender, no distended, positive BS  Extremities:  no edema                                                                                                                                                                                                                                                                                                LABS:                               7.6    7.68  )-----------( 386      ( 08 Dec 2023 12:22 )             24.9                      12-08    138  |  102  |  12  ----------------------------<  112<H>  3.8   |  27  |  0.48<L>    Ca    9.1      08 Dec 2023 07:18    TPro  6.2  /  Alb  3.6  /  TBili  0.2  /  DBili  x   /  AST  12  /  ALT  11  /  AlkPhos  158<H>  12-08                       RADIOLOGY & ADDITIONAL TESTS         I personally reviewed: [  ]EKG   [  ]CXR    [  ] CT      A/P:         Discussed with :     [facility] consultants' Notes   Time spent :

## 2023-12-08 NOTE — CONSULT NOTE ADULT - ASSESSMENT
43F PMHx of MS on immunosuppressive medication, presenting with left chest wall and shoulder pain. Recent history of fall secondary to unsteadiness due to MS. Obtained outpatient MRI of left sternoclavicular joint with results significant for marrow edema and swelling of the left sternoclavicular joint with a 2 cm fluid collection and adjacent pleural edema in the left chest with concerns for infectious etiology.  Patient initially saw orthopedics with for concerns for septic arthritis of this joint.  Impression of the read showed soft tissue abscess superficial to the joint.  Recommended CT scan with IV contrast of the chest.  Patient denies any fevers however endorsing erythema to the left sternoclavicular joint region. Patient states she was recently hospitalized over a week ago at [facility] for falls secondary to unsteadiness due to her MS.  Denies any recent falls today, vision changes. Endorsing headache. Denies any chest pain, shortness of breath, abdominal pain, nausea vomiting diarrhea, urinary complaints.  CT chest with IV in ED significant for Septic arthritis of the left sternoclavicular joint and involving the articulation of the sternum with the first costal cartilage.Extensive surrounding inflammation, with pneumonia in the underlying subpleural left upper lobe.No fluid collection is evident.  Workup significant for elevated alk phos (248), . VSS and afebrile  s/p Debridement of left sternoclavicular joint with excision of left clavicular head. Purulent drainage at the sternoclavicular joint. Tissue surrounding left clavicle found to be very inflamed. Copious irrigation of surgical site.   Concerned about pain at surgical site.    at bedside     43F PMHx of MS on immunosuppressive medication, presenting with left chest wall and shoulder pain. Recent history of fall secondary to unsteadiness due to MS. Obtained outpatient MRI of left sternoclavicular joint with results significant for marrow edema and swelling of the left sternoclavicular joint with a 2 cm fluid collection and adjacent pleural edema in the left chest with concerns for infectious etiology.  Patient initially saw orthopedics with for concerns for septic arthritis of this joint.  Impression of the read showed soft tissue abscess superficial to the joint.  Recommended CT scan with IV contrast of the chest.  Patient denies any fevers however endorsing erythema to the left sternoclavicular joint region. Patient states she was recently hospitalized over a week ago at [facility] for falls secondary to unsteadiness due to her MS.  Denies any recent falls today, vision changes. Endorsing headache. Denies any chest pain, shortness of breath, abdominal pain, nausea vomiting diarrhea, urinary complaints.  CT chest with IV in ED significant for Septic arthritis of the left sternoclavicular joint and involving the articulation of the sternum with the first costal cartilage.Extensive surrounding inflammation, with pneumonia in the underlying subpleural left upper lobe.No fluid collection is evident.  Workup significant for elevated alk phos (248), . VSS and afebrile  s/p Debridement of left sternoclavicular joint with excision of left clavicular head. Purulent drainage at the sternoclavicular joint. Tissue surrounding left clavicle found to be very inflamed. Copious irrigation of surgical site.   Concerned about pain at surgical site.    at bedside

## 2023-12-08 NOTE — CONSULT NOTE ADULT - SUBJECTIVE AND OBJECTIVE BOX
HPI:  43F PMHx of MS on Rituxan, presenting with left chest wall and shoulder pain. Recent history of fall secondary to unsteadiness due to MS. On review of ortho notes, pt has had multiple falls in November. Obtained outpatient MRI of left sternoclavicular joint with results significant for marrow edema and swelling of the left sternoclavicular joint with a 2 cm fluid collection and adjacent pleural edema in the left chest with concerns for infectious etiology.  Patient initially saw orthopedics with for concerns for septic arthritis of this joint.  Impression of the read showed soft tissue abscess superficial to the joint.  Recommended CT scan with IV contrast of the chest.  Patient denies any fevers however endorsing erythema to the left sternoclavicular joint region. Patient states she was recently hospitalized over a week ago at [facility] for falls secondary to unsteadiness due to her MS and UTI. Hospital stay was for about 6 days. Denies any recent falls today, vision changes. Endorsing headache. Denies any chest pain, shortness of breath, abdominal pain, nausea vomiting diarrhea, urinary complaints    Patient obtained CT chest w/ IV contrast which showed likely septic arthritis with surrounding erythema. Thoracic surgery consulted, plan for OR today with washout and debridement.     In ER: Given IV Zosyn, IV Vancomycin, Tylenol 1gm IVPB, lidocaine patch (07 Dec 2023 02:49)      PAST MEDICAL & SURGICAL HISTORY:  Multiple sclerosis      History of           Allergies    No Known Allergies    Intolerances        MEDICATIONS  (STANDING):  cefepime   IVPB 2000 milliGRAM(s) IV Intermittent every 8 hours  chlorhexidine 2% Cloths 1 Application(s) Topical <User Schedule>  enoxaparin Injectable 40 milliGRAM(s) SubCutaneous every 24 hours  HYDROmorphone PCA (1 mG/mL) 30 milliLiter(s) PCA Continuous PCA Continuous  senna 2 Tablet(s) Oral at bedtime  vancomycin  IVPB 1000 milliGRAM(s) IV Intermittent every 12 hours    MEDICATIONS  (PRN):  HYDROmorphone  Injectable 1 milliGRAM(s) IV Push every 8 hours PRN Severe Pain (7 - 10)  HYDROmorphone PCA (1 mG/mL) Rescue Clinician Bolus 0.5 milliGRAM(s) IV Push every 15 minutes PRN for Pain Scale GREATER THAN 6  naloxone Injectable 0.1 milliGRAM(s) IV Push every 3 minutes PRN For ANY of the following changes in patient status:  A. RR LESS THAN 10 breaths per minute, B. Oxygen saturation LESS THAN 90%, C. Sedation score of 6  ondansetron Injectable 4 milliGRAM(s) IV Push every 6 hours PRN Nausea  oxyCODONE    IR 5 milliGRAM(s) Oral every 6 hours PRN Moderate Pain (4 - 6)  oxyCODONE    IR 10 milliGRAM(s) Oral every 8 hours PRN Severe Pain (7 - 10)      FAMILY HISTORY:      SOCIAL HISTORY: No EtOH, no tobacco    REVIEW OF SYSTEMS:    CONSTITUTIONAL: No weakness, fevers or chills  EYES/ENT: No visual changes;  No vertigo or throat pain   NECK: No pain or stiffness  RESPIRATORY: No cough, wheezing, hemoptysis; No shortness of breath  CARDIOVASCULAR: No chest pain or palpitations  GASTROINTESTINAL: No abdominal or epigastric pain. No nausea, vomiting, or hematemesis; No diarrhea or constipation. No melena or hematochezia.  GENITOURINARY: No dysuria, frequency or hematuria  NEUROLOGICAL: No numbness or weakness  SKIN: No itching, burning, rashes, or lesions   All other review of systems is negative unless indicated above.        T(F): 98.4 (23 @ 06:34), Max: 98.4 (23 @ 14:00)  HR: 84 (23 @ 06:34)  BP: 93/63 (23 @ 06:34)  RR: 18 (23 @ 06:34)  SpO2: 96% (23 @ 06:34)  Wt(kg): --    GENERAL: NAD, well-developed  HEAD:  Atraumatic, Normocephalic  EYES: EOMI, PERRLA, conjunctiva and sclera clear  NECK: Supple, No JVD  CHEST/LUNG: Clear to auscultation bilaterally; No wheeze  HEART: Regular rate and rhythm; No murmurs, rubs, or gallops  ABDOMEN: Soft, Nontender, Nondistended; Bowel sounds present  EXTREMITIES:  2+ Peripheral Pulses, No clubbing, cyanosis, or edema  NEUROLOGY: non-focal  SKIN: No rashes or lesions                          7.3    6.64  )-----------( 401      ( 08 Dec 2023 07:20 )             23.6       12    138  |  102  |  12  ----------------------------<  112<H>  3.8   |  27  |  0.48<L>    Ca    9.1      08 Dec 2023 07:18    TPro  6.2  /  Alb  3.6  /  TBili  0.2  /  DBili  x   /  AST  12  /  ALT  11  /  AlkPhos  158<H>        Lactate Dehydrogenase, Serum: 104 U/L ( @ 07:18)  Uric Acid: 3.2 mg/dL ( @ 07:18)      PT/INR - ( 08 Dec 2023 07:21 )   PT: 12.1 sec;   INR: 1.16 ratio         PTT - ( 08 Dec 2023 07:21 )  PTT:31.7 sec    .Tissue Other   @ 15:21   Testing in progress  --    No polymorphonuclear leukocytes seen per low power field  No organisms seen per oil power field      .Blood Blood   @ 20:27   No growth at 24 hours  --  --      .Blood Blood   @ 20:10   No growth at 24 hours  --  --       HPI:  43F PMHx of MS on Rituxan, presenting with left chest wall and shoulder pain. Recent history of fall secondary to unsteadiness due to MS. On review of ortho notes, pt has had multiple falls in November. Obtained outpatient MRI of left sternoclavicular joint with results significant for marrow edema and swelling of the left sternoclavicular joint with a 2 cm fluid collection and adjacent pleural edema in the left chest with concerns for infectious etiology.  Patient initially saw orthopedics with for concerns for septic arthritis of this joint.  Impression of the read showed soft tissue abscess superficial to the joint.  Recommended CT scan with IV contrast of the chest.  Patient denies any fevers however endorsing erythema to the left sternoclavicular joint region. Patient states she was recently hospitalized over a week ago at [facility] for falls secondary to unsteadiness due to her MS and UTI. Hospital stay was for about 6 days. Denies any recent falls today, vision changes. Endorsing headache. Denies any chest pain, shortness of breath, abdominal pain, nausea vomiting diarrhea, urinary complaints    Patient obtained CT chest w/ IV contrast which showed likely septic arthritis with surrounding erythema. Thoracic surgery consulted, plan for OR today with washout and debridement.     In ER: Given IV Zosyn, IV Vancomycin, Tylenol 1gm IVPB, lidocaine patch (07 Dec 2023 02:49)      PAST MEDICAL & SURGICAL HISTORY:  Multiple sclerosis      History of           Allergies    No Known Allergies    Intolerances        MEDICATIONS  (STANDING):  cefepime   IVPB 2000 milliGRAM(s) IV Intermittent every 8 hours  chlorhexidine 2% Cloths 1 Application(s) Topical <User Schedule>  enoxaparin Injectable 40 milliGRAM(s) SubCutaneous every 24 hours  HYDROmorphone PCA (1 mG/mL) 30 milliLiter(s) PCA Continuous PCA Continuous  senna 2 Tablet(s) Oral at bedtime  vancomycin  IVPB 1000 milliGRAM(s) IV Intermittent every 12 hours    MEDICATIONS  (PRN):  HYDROmorphone  Injectable 1 milliGRAM(s) IV Push every 8 hours PRN Severe Pain (7 - 10)  HYDROmorphone PCA (1 mG/mL) Rescue Clinician Bolus 0.5 milliGRAM(s) IV Push every 15 minutes PRN for Pain Scale GREATER THAN 6  naloxone Injectable 0.1 milliGRAM(s) IV Push every 3 minutes PRN For ANY of the following changes in patient status:  A. RR LESS THAN 10 breaths per minute, B. Oxygen saturation LESS THAN 90%, C. Sedation score of 6  ondansetron Injectable 4 milliGRAM(s) IV Push every 6 hours PRN Nausea  oxyCODONE    IR 5 milliGRAM(s) Oral every 6 hours PRN Moderate Pain (4 - 6)  oxyCODONE    IR 10 milliGRAM(s) Oral every 8 hours PRN Severe Pain (7 - 10)      FAMILY HISTORY:      SOCIAL HISTORY: No EtOH, no tobacco    REVIEW OF SYSTEMS:    CONSTITUTIONAL: No weakness, fevers or chills  EYES/ENT: No visual changes;  No vertigo or throat pain   NECK: No pain or stiffness  RESPIRATORY: No cough, wheezing, hemoptysis; No shortness of breath  CARDIOVASCULAR: No chest pain or palpitations  GASTROINTESTINAL: No abdominal or epigastric pain. No nausea, vomiting, or hematemesis; No diarrhea or constipation. No melena or hematochezia.  GENITOURINARY: No dysuria, frequency or hematuria  NEUROLOGICAL: No numbness or weakness  SKIN: No itching, burning, rashes, or lesions   All other review of systems is negative unless indicated above.        T(F): 98.4 (23 @ 06:34), Max: 98.4 (23 @ 14:00)  HR: 84 (23 @ 06:34)  BP: 93/63 (23 @ 06:34)  RR: 18 (23 @ 06:34)  SpO2: 96% (23 @ 06:34)  Wt(kg): --    GENERAL: NAD, well-developed  HEAD:  Atraumatic, Normocephalic  EYES: EOMI, PERRLA, conjunctiva and sclera clear  NECK: Supple, No JVD  CHEST/LUNG: Clear to auscultation bilaterally; No wheeze  HEART: Regular rate and rhythm; No murmurs, rubs, or gallops  ABDOMEN: Soft, Nontender, Nondistended; Bowel sounds present  EXTREMITIES:  2+ Peripheral Pulses, No clubbing, cyanosis, or edema  NEUROLOGY: non-focal  SKIN: No rashes or lesions                          7.3    6.64  )-----------( 401      ( 08 Dec 2023 07:20 )             23.6       12    138  |  102  |  12  ----------------------------<  112<H>  3.8   |  27  |  0.48<L>    Ca    9.1      08 Dec 2023 07:18    TPro  6.2  /  Alb  3.6  /  TBili  0.2  /  DBili  x   /  AST  12  /  ALT  11  /  AlkPhos  158<H>        Lactate Dehydrogenase, Serum: 104 U/L ( @ 07:18)  Uric Acid: 3.2 mg/dL ( @ 07:18)      PT/INR - ( 08 Dec 2023 07:21 )   PT: 12.1 sec;   INR: 1.16 ratio         PTT - ( 08 Dec 2023 07:21 )  PTT:31.7 sec    .Tissue Other   @ 15:21   Testing in progress  --    No polymorphonuclear leukocytes seen per low power field  No organisms seen per oil power field      .Blood Blood   @ 20:27   No growth at 24 hours  --  --      .Blood Blood   @ 20:10   No growth at 24 hours  --  --

## 2023-12-08 NOTE — PHYSICAL THERAPY INITIAL EVALUATION ADULT - PERTINENT HX OF CURRENT PROBLEM, REHAB EVAL
43F PMHx of MS on Rituxan, presenting with left chest wall and shoulder pain. Recent history of fall secondary to unsteadiness due to MS. On review of ortho notes, pt has had multiple falls in November. Obtained outpatient MRI of left sternoclavicular joint with results significant for marrow edema and swelling of the left sternoclavicular joint with a 2 cm fluid collection and adjacent pleural edema in the left chest with concerns for infectious etiology.  Patient initially saw orthopedics with for concerns for septic arthritis of this joint.  Impression of the read showed soft tissue abscess superficial to the joint. Recommended CT scan with IV contrast of the chest.  Patient denies any fevers however endorsing erythema to the left sternoclavicular joint region. Patient states she was recently hospitalized over a week ago at [facility] for falls secondary to unsteadiness due to her MS and UTI. Hospital stay was for about 6 days. Denies any recent falls today, vision changes. Endorsing headache. Denies any chest pain, shortness of breath, abdominal pain, nausea vomiting diarrhea, urinary complaints. Patient obtained CT chest w/ IV contrast which showed likely septic arthritis with surrounding erythema. Thoracic surgery consulted, plan for OR today with washout and debridement. In ER: Given IV Zosyn, IV Vancomycin, Tylenol 1gm IVPB, lidocaine patch. Now s/p I&D L clavicular head and arthrotomy SC joint on 12/7. 43F PMHx of MS on Rituxan, presenting with left chest wall and shoulder pain. Recent history of fall secondary to unsteadiness due to MS. On review of ortho notes, pt has had multiple falls in November. Obtained outpatient MRI of left sternoclavicular joint with results significant for marrow edema and swelling of the left sternoclavicular joint with a 2 cm fluid collection and adjacent pleural edema in the left chest with concerns for infectious etiology.  Patient initially saw orthopedics with for concerns for septic arthritis of this joint.  Impression of the read showed soft tissue abscess superficial to the joint. Recommended CT scan with IV contrast of the chest.  Patient denies any fevers however endorsing erythema to the left sternoclavicular joint region. Patient states she was recently hospitalized over a week ago at [facility] for falls secondary to unsteadiness due to her MS and UTI. Hospital stay was for about 6 days. Denies any recent falls today, vision changes. Endorsing headache. Denies any chest pain, shortness of breath, abdominal pain, nausea vomiting diarrhea, urinary complaints. Patient obtained CT chest w/ IV contrast which showed likely septic arthritis with surrounding erythema. Thoracic surgery consulted, plan for OR today with washout and debridement. In ER: Given IV Zosyn, IV Vancomycin, Tylenol 1gm IVPB, lidocaine patch. Now s/p I&D L clavicular head and arthrotomy SC joint on 12/7.

## 2023-12-08 NOTE — CONSULT NOTE ADULT - ASSESSMENT
43F PMHx of MS on Rituxan, presenting with left chest wall and shoulder pain. Recent history of fall secondary to unsteadiness due to MS. On review of ortho notes, pt has had multiple falls in November. Obtained outpatient MRI of left sternoclavicular joint with results significant for marrow edema and swelling of the left sternoclavicular joint with a 2 cm fluid collection and adjacent pleural edema in the left chest with concerns for infectious etiology.  Patient initially saw orthopedics with for concerns for septic arthritis of this joint.  Impression of the read showed soft tissue abscess superficial to the joint.  Recommended CT scan with IV contrast of the chest.  Patient denies any fevers however endorsing erythema to the left sternoclavicular joint region. Patient states she was recently hospitalized over a week ago at [facility] for falls secondary to unsteadiness due to her MS and UTI. Hospital stay was for about 6 days. Denies any recent falls today, vision changes. Endorsing headache. Denies any chest pain, shortness of breath, abdominal pain, nausea vomiting diarrhea, urinary complaints    Patient obtained CT chest w/ IV contrast which showed likely septic arthritis with surrounding erythema. Thoracic surgery consulted, underwent washout and debridement.     OR Note: Debridement of left sternoclavicular joint with excision of left clavicular head. Purulent drainage at the sternoclavicular joint. Tissue surrounding left clavicle found to be very inflamed. Copious irrigation of surgical site.  Specimen sent: Head of left clavicle, culture of left sternoclavicular joint, culture of left manubrium, culture of deep sternal head of clavicle, culture of first rib costal cartilage      In ER: Afebrile, WBC 9.8, , . Given IV Zosyn, IV Vancomycin.     Remains afebrile, WBC stable.     Septic arthritis of the left sternoclavicular joint and involving the   articulation of the sternum with the first costal cartilage.    Extensive surrounding inflammation, with pneumonia in the underlying   subpleural left upper lobe.    # Septic arthritis of L sternoclavicular joint s/p excision and drainage  # L upper lobe pneumonia  # Immunosuppressed patient  _ agree with vancomycin and Zosyn for now  - follow cultures; negative so far  - Trend CBC/ESR/CRP  - will discuss possible workup for unusual pathogen    Incomplete note        All recommendations are tentative pending Attending Attestation.    Calitxo Leroy MD, PGY-4  ID Fellow  [employer] Preferred  After 5pm/weekends call [phone]     43F PMHx of MS on Rituxan, presenting with left chest wall and shoulder pain. Recent history of fall secondary to unsteadiness due to MS. On review of ortho notes, pt has had multiple falls in November. Obtained outpatient MRI of left sternoclavicular joint with results significant for marrow edema and swelling of the left sternoclavicular joint with a 2 cm fluid collection and adjacent pleural edema in the left chest with concerns for infectious etiology.  Patient initially saw orthopedics with for concerns for septic arthritis of this joint.  Impression of the read showed soft tissue abscess superficial to the joint.  Recommended CT scan with IV contrast of the chest.  Patient denies any fevers however endorsing erythema to the left sternoclavicular joint region. Patient states she was recently hospitalized over a week ago at [facility] for falls secondary to unsteadiness due to her MS and UTI. Hospital stay was for about 6 days. Denies any recent falls today, vision changes. Endorsing headache. Denies any chest pain, shortness of breath, abdominal pain, nausea vomiting diarrhea, urinary complaints    Patient obtained CT chest w/ IV contrast which showed likely septic arthritis with surrounding erythema. Thoracic surgery consulted, underwent washout and debridement.     OR Note: Debridement of left sternoclavicular joint with excision of left clavicular head. Purulent drainage at the sternoclavicular joint. Tissue surrounding left clavicle found to be very inflamed. Copious irrigation of surgical site.  Specimen sent: Head of left clavicle, culture of left sternoclavicular joint, culture of left manubrium, culture of deep sternal head of clavicle, culture of first rib costal cartilage      In ER: Afebrile, WBC 9.8, , . Given IV Zosyn, IV Vancomycin.     Remains afebrile, WBC stable.     Septic arthritis of the left sternoclavicular joint and involving the   articulation of the sternum with the first costal cartilage.    Extensive surrounding inflammation, with pneumonia in the underlying   subpleural left upper lobe.    # Septic arthritis of L sternoclavicular joint s/p excision and drainage  # L upper lobe pneumonia  # Immunosuppressed patient  _ agree with vancomycin and Zosyn for now  - follow cultures; negative so far  - Trend CBC/ESR/CRP  - will discuss possible workup for unusual pathogen    Incomplete note        All recommendations are tentative pending Attending Attestation.    Calixto Leroy MD, PGY-4  ID Fellow  [employer] Preferred  After 5pm/weekends call [phone]     43F PMHx of MS on (Rituxan, Ocrevus) presenting with left chest wall and shoulder pain. Recent history of fall secondary to unsteadiness due to MS. On review of ortho notes, pt has had multiple falls in November. She was admitted to [facility] on Last week of November and was found to have Rhinovirus and pseudomonas UTI. She completed 5-7 days course with Ciprofloxacin. Found to have Septic arthritis of SCM joint now s/p excision.    Patient obtained CT chest w/ IV contrast which showed likely septic arthritis with surrounding erythema. Thoracic surgery consulted, underwent washout and debridement.     OR Note: Debridement of left sternoclavicular joint with excision of left clavicular head. Purulent drainage at the sternoclavicular joint. Tissue surrounding left clavicle found to be very inflamed. Copious irrigation of surgical site.  Specimen sent: Head of left clavicle, culture of left sternoclavicular joint, culture of left manubrium, culture of deep sternal head of clavicle, culture of first rib costal cartilage      In ER: Afebrile, WBC 9.8, , . Given IV Zosyn, IV Vancomycin.     Remains afebrile, WBC stable.     Septic arthritis of the left sternoclavicular joint and involving the   articulation of the sternum with the first costal cartilage.    Extensive surrounding inflammation, with pneumonia in the underlying   subpleural left upper lobe.    # Septic arthritis of L sternoclavicular joint s/p excision and drainage  # L upper lobe pneumonia  # Immunosuppressed patient  _ agree with vancomycin 1 gm Q12; check trough before fourth dose; continue cefepime 2 gm Q8hrs  - follow cultures; negative so far  - Trend CBC/ESR/CRP  - will discuss possible workup for unusual pathogen    Incomplete note        All recommendations are tentative pending Attending Attestation.    Calixto Leroy MD, PGY-4  ID Fellow  [employer] Teams Preferred  After 5pm/weekends call [phone]     43F PMHx of MS on (Rituxan, Ocrevus) presenting with left chest wall and shoulder pain. Recent history of fall secondary to unsteadiness due to MS. On review of ortho notes, pt has had multiple falls in November. She was admitted to [facility] on Last week of November and was found to have Rhinovirus and pseudomonas UTI. She completed 5-7 days course with Ciprofloxacin. Found to have Septic arthritis of SCM joint now s/p excision.    Patient obtained CT chest w/ IV contrast which showed likely septic arthritis with surrounding erythema. Thoracic surgery consulted, underwent washout and debridement.     OR Note: Debridement of left sternoclavicular joint with excision of left clavicular head. Purulent drainage at the sternoclavicular joint. Tissue surrounding left clavicle found to be very inflamed. Copious irrigation of surgical site.  Specimen sent: Head of left clavicle, culture of left sternoclavicular joint, culture of left manubrium, culture of deep sternal head of clavicle, culture of first rib costal cartilage      In ER: Afebrile, WBC 9.8, , . Given IV Zosyn, IV Vancomycin.     Remains afebrile, WBC stable.     Septic arthritis of the left sternoclavicular joint and involving the   articulation of the sternum with the first costal cartilage.    Extensive surrounding inflammation, with pneumonia in the underlying   subpleural left upper lobe.    # Septic arthritis of L sternoclavicular joint s/p excision and drainage  # L upper lobe pneumonia  # Immunosuppressed patient  _ agree with vancomycin 1 gm Q12; check trough before fourth dose; continue cefepime 2 gm Q8hrs  - follow cultures; negative so far  - Trend CBC/ESR/CRP  - will discuss possible workup for unusual pathogen    Incomplete note        All recommendations are tentative pending Attending Attestation.    Calixto Leroy MD, PGY-4  ID Fellow  [employer] Teams Preferred  After 5pm/weekends call [phone]     43F PMHx of MS on (Rituxan, Ocrevus) presenting with left chest wall and shoulder pain. Recent history of fall secondary to unsteadiness due to MS. On review of ortho notes, pt has had multiple falls in November. She was admitted to [facility] on Last week of November and was found to have Rhinovirus and pseudomonas UTI. She completed 5-7 days course with Ciprofloxacin. Found to have Septic arthritis of SCM joint now s/p excision.    Patient obtained CT chest w/ IV contrast which showed likely septic arthritis with surrounding erythema. Thoracic surgery consulted, underwent washout and debridement.     OR Note: Debridement of left sternoclavicular joint with excision of left clavicular head. Purulent drainage at the sternoclavicular joint. Tissue surrounding left clavicle found to be very inflamed. Copious irrigation of surgical site.  Specimen sent: Head of left clavicle, culture of left sternoclavicular joint, culture of left manubrium, culture of deep sternal head of clavicle, culture of first rib costal cartilage      In ER: Afebrile, WBC 9.8, , . Given IV Zosyn, IV Vancomycin.     Remains afebrile, WBC stable.     Septic arthritis of the left sternoclavicular joint and involving the   articulation of the sternum with the first costal cartilage.    Extensive surrounding inflammation, with pneumonia in the underlying   subpleural left upper lobe.    # Septic arthritis of L sternoclavicular joint s/p excision and drainage  # L upper lobe opacity  # Immunosuppressed patient  - CATRACHITA opacity likely from continuous inflammation  - agree with vancomycin 1 gm Q12; check trough before fourth dose; continue cefepime 2 gm Q8hrs  - will follow routine/fungal/AFB cultures; negative so far  - Trend CBC/ESR/CRP    Discussed with attending.  Calixto Leroy MD, PGY-4  ID Fellow  [employer] Teams Preferred  After 5pm/weekends call [phone]     43F PMHx of MS on (Rituxan, Ocrevus) presenting with left chest wall and shoulder pain. Recent history of fall secondary to unsteadiness due to MS. On review of ortho notes, pt has had multiple falls in November. She was admitted to [facility] on Last week of November and was found to have Rhinovirus and pseudomonas UTI. She completed 5-7 days course with Ciprofloxacin. Found to have Septic arthritis of SCM joint now s/p excision.    Patient obtained CT chest w/ IV contrast which showed likely septic arthritis with surrounding erythema. Thoracic surgery consulted, underwent washout and debridement.     OR Note: Debridement of left sternoclavicular joint with excision of left clavicular head. Purulent drainage at the sternoclavicular joint. Tissue surrounding left clavicle found to be very inflamed. Copious irrigation of surgical site.  Specimen sent: Head of left clavicle, culture of left sternoclavicular joint, culture of left manubrium, culture of deep sternal head of clavicle, culture of first rib costal cartilage      In ER: Afebrile, WBC 9.8, , . Given IV Zosyn, IV Vancomycin.     Remains afebrile, WBC stable.     Septic arthritis of the left sternoclavicular joint and involving the   articulation of the sternum with the first costal cartilage.    Extensive surrounding inflammation, with pneumonia in the underlying   subpleural left upper lobe.    # Septic arthritis of L sternoclavicular joint s/p excision and drainage  # L upper lobe opacity  # Immunosuppressed patient  - CATRACHITA opacity likely from continuous inflammation  - agree with vancomycin 1 gm Q12; check trough before fourth dose; continue cefepime 2 gm Q8hrs  - will follow routine/fungal/AFB cultures; negative so far  - Trend CBC/ESR/CRP    Discussed with attending.  Calixto Leroy MD, PGY-4  ID Fellow  [employer] Preferred  After 5pm/weekends call [phone]

## 2023-12-08 NOTE — PROGRESS NOTE ADULT - ASSESSMENT
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  She had a recent fall in November 2023 secondary to unsteadiness due to multiple sclerosis, with subsequent left chest wall and shoulder pain.  Was seen at [facility] with concern for MS exacerbation but no MRI brain and cervical spine with an without contrast were done at the time without enhancement.  At the time infectious workup was done and notable for UTI which was treated.  She continued to have pain.  In the interim, she had an outpatient MRI of left sternoclavicular joint with results significant for marrow edema and swelling of the left sternoclavicular joint with a 2 cm fluid collection and adjacent pleural edema in the left chest with concerns for infectious etiology.   CT chest w/ IV contrast suggestive of septic arthritis with surrounding erythema. Thoracic surgery consulted.  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  Now likely pseudo-flare in setting of septic arthritis    Recommendations:  Management of septic arthritis per primary team  s/p surgical debridement 12/7/23  hold ocrelizumab for now given septic arthritis  continue home meds once able to take PO post-surgically  pain management  consider infectious disease consultation given unclear etiology of her septic arthritis, and given her immune compromised state   discussed with patient   discussed with RN Impression:  43-year-old woman with PMHx most pertinent for multiple sclerosis first diagnosed at age 17 with dragging of her leg, since then she has had a complicated course including optic neuritis, 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  She had a recent fall in November 2023 secondary to unsteadiness due to multiple sclerosis, with subsequent left chest wall and shoulder pain.  Was seen at [facility] with concern for MS exacerbation but no MRI brain and cervical spine with an without contrast were done at the time without enhancement.  At the time infectious workup was done and notable for UTI which was treated.  She continued to have pain.  In the interim, she had an outpatient MRI of left sternoclavicular joint with results significant for marrow edema and swelling of the left sternoclavicular joint with a 2 cm fluid collection and adjacent pleural edema in the left chest with concerns for infectious etiology.   CT chest w/ IV contrast suggestive of septic arthritis with surrounding erythema. Thoracic surgery consulted.  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  Now likely pseudo-flare in setting of septic arthritis    Recommendations:  Management of septic arthritis per primary team  s/p surgical debridement 12/7/23  hold ocrelizumab for now given septic arthritis  continue home meds once able to take PO post-surgically  pain management  consider infectious disease consultation given unclear etiology of her septic arthritis, and given her immune compromised state   discussed with patient   discussed with RN

## 2023-12-08 NOTE — PHYSICAL THERAPY INITIAL EVALUATION ADULT - FUNCTIONAL LIMITATIONS, PT EVAL
[FreeTextEntry1] : 56 year old postmenopausal  1 para 0010 is here for an annual exam.  Her Pap smear from 2017 was negative and no oncogenic human papilloma virus was detected.\par  \par Ms. MUELLER had been on Prempro but self-discontinued about one year ago and still has manageable hot flashes, night sweats.\par \par DYLAN’s mother had breast cancer and her mammogram from 2017 was BI-RADS 1.  On inspection of the breasts, there were no skin color changes, dimpling or retraction seen.  No nipple discharge was expressible and no masses or axillary lymphadenopathy were appreciated.  Continued monthly self-breast examination was advised and a referral for mammogram was provided. \par \par The importance of weight bearing exercise and adequate daily calcium with vitamin D3 to slow the progression of bone loss was underscored. \par  \par Ms. MUELLER has no known drug allergies.  She had a right hand surgery in  and a history of eczema.  \par Her father had hypertension and diabetes and her maternal uncle had colon cancer.  The patient had a colonoscopy within 10 years.\par \par All of her concerns were addressed, questions answered and reassurance was given. self-care/home management

## 2023-12-08 NOTE — CHART NOTE - NSCHARTNOTEFT_GEN_A_CORE
Notified by RN patient febrile.       Interventions:     Instructed to take rectal temp- 101.6.   Bcx 2 ordered (last done 12/6).   PO tylenol ordered- observe while on PCA pump.   Monitor fever curve.   Memo tobin ordered.   Endorsed to night ACP.  RVP ordered r/o upper respiratory infections.      [id number] Notified by RN patient febrile.       Interventions:     Instructed to take rectal temp- 101.6.   Bcx 2 ordered (last done 12/6).   PO tylenol ordered- observe while on PCA pump.   Monitor fever curve.   Memo tobin ordered.   Endorsed to night ACP.  RVP ordered r/o upper respiratory infections.      [id number]

## 2023-12-08 NOTE — PROGRESS NOTE ADULT - SUBJECTIVE AND OBJECTIVE BOX
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  She had a recent fall in 2023 secondary to unsteadiness due to multiple sclerosis, with subsequent left chest wall and shoulder pain.  Was seen at [facility] with concern for MS exacerbation but no MRI brain and cervical spine with an without contrast were done at the time without enhancement.  At the time infectious workup was done and notable for UTI which was treated.  She continued to have pain.  In the interim, she had an outpatient MRI of left sternoclavicular joint with results significant for marrow edema and swelling of the left sternoclavicular joint with a 2 cm fluid collection and adjacent pleural edema in the left chest with concerns for infectious etiology.   CT chest w/ IV contrast suggestive of septic arthritis with surrounding erythema. Thoracic surgery consulted.  Patient s/p surgical debridement on 23  ******    Past Medical History:  Multiple sclerosis [G35]        Past Surgical History:  History of  [Z98.891]        Social History:  No toxic habits    Family History:  FAMILY HISTORY:      Allergies:  No Known Allergies      ROS:  Constitutional: Patient offers no complaints of fevers or significant weight loss  Ears, Nose, Mouth and Throat: The patient presents with no abnormalities of the head, ears, eyes, nose or throat  Skin: Patient offers no concerns of new rashes or lesions  Respiratory: The patient presents with no abnormalities of the respiratory tract  Cardiovascular: The patient presents with no cardiac abnormalities  Gastrointestinal: The patient presents with no abnormalities of the GI system  Genitourinary: The patient presents with no dysuria, hematuria or frequent urination  Neurological: See HPI  Endocrine: Patient offers no complaints of excessive thirst, urination, or heat/cold intolerance    Advanced care planning reviewed and noted in the chart.    Medications:  cefepime   IVPB 2000 milliGRAM(s) IV Intermittent every 8 hours  chlorhexidine 2% Cloths 1 Application(s) Topical <User Schedule>  HYDROmorphone PCA (1 mG/mL) 30 milliLiter(s) PCA Continuous PCA Continuous  HYDROmorphone PCA (1 mG/mL) Rescue Clinician Bolus 0.5 milliGRAM(s) IV Push every 15 minutes PRN  naloxone Injectable 0.1 milliGRAM(s) IV Push every 3 minutes PRN  ondansetron Injectable 4 milliGRAM(s) IV Push every 6 hours PRN  vancomycin  IVPB 1000 milliGRAM(s) IV Intermittent every 12 hours      Labs:  CBC Full  -  ( 08 Dec 2023 07:20 )  WBC Count : 6.64 K/uL  RBC Count : 2.85 M/uL  Hemoglobin : 7.3 g/dL  Hematocrit : 23.6 %  Platelet Count - Automated : 401 K/uL  Mean Cell Volume : 82.8 fl  Mean Cell Hemoglobin : 25.6 pg  Mean Cell Hemoglobin Concentration : 30.9 gm/dL  Auto Neutrophil # : x  Auto Lymphocyte # : x  Auto Monocyte # : x  Auto Eosinophil # : x  Auto Basophil # : x  Auto Neutrophil % : x  Auto Lymphocyte % : x  Auto Monocyte % : x  Auto Eosinophil % : x  Auto Basophil % : x    12-08    138  |  102  |  12  ----------------------------<  112<H>  3.8   |  27  |  0.48<L>    Ca    9.1      08 Dec 2023 07:18    TPro  6.2  /  Alb  3.6  /  TBili  0.2  /  DBili  x   /  AST  12  /  ALT  11  /  AlkPhos  158<H>  12-08    CAPILLARY BLOOD GLUCOSE        LIVER FUNCTIONS - ( 08 Dec 2023 07:18 )  Alb: 3.6 g/dL / Pro: 6.2 g/dL / ALK PHOS: 158 U/L / ALT: 11 U/L / AST: 12 U/L / GGT: x           PT/INR - ( 08 Dec 2023 07:21 )   PT: 12.1 sec;   INR: 1.16 ratio         PTT - ( 08 Dec 2023 07:21 )  PTT:31.7 sec  Urinalysis Basic - ( 08 Dec 2023 07:18 )    Color: x / Appearance: x / SG: x / pH: x  Gluc: 112 mg/dL / Ketone: x  / Bili: x / Urobili: x   Blood: x / Protein: x / Nitrite: x   Leuk Esterase: x / RBC: x / WBC x   Sq Epi: x / Non Sq Epi: x / Bacteria: x          Vitals:  Vital Signs Last 24 Hrs  T(C): 36.9 (08 Dec 2023 06:34), Max: 36.9 (07 Dec 2023 14:00)  T(F): 98.4 (08 Dec 2023 06:34), Max: 98.4 (07 Dec 2023 14:00)  HR: 84 (08 Dec 2023 06:34) (82 - 107)  BP: 93/63 (08 Dec 2023 06:34) (93/63 - 139/63)  BP(mean): 78 (07 Dec 2023 16:00) (70 - 98)  RR: 18 (08 Dec 2023 06:34) (16 - 18)  SpO2: 96% (08 Dec 2023 06:34) (92% - 100%)    Parameters below as of 08 Dec 2023 06:34  Patient On (Oxygen Delivery Method): room air            NEUROLOGICAL EXAM:    Mental status: Awake, alert, and in no apparent distress. Oriented to person, place and time. Language function is normal.      Cranial Nerves: Pupils were equal, round, reactive to light. Extraocular movements were intact. Visual field were full. Fundoscopic exam was deferred. Facial sensation was intact to light touch. There was slight left facia droop. The palate was upgoing symmetrically and tongue was midline.     Motor exam: Bulk and tone were normal. Strength was 5/5 in all four extremities distally, did not assess proximal arms due to pain    Reflexes: deferred     Sensation: Intact to light touch    Coordination: did not assess for dysmetria due to recent surgery    Gait: defer         ACC: [id number] EXAM:  CT CHEST IC   ORDERED BY:  CHARLY FARIAS     PROCEDURE DATE:  2023          INTERPRETATION:  CLINICAL INFORMATION: Left chest wall erythema and   swelling undergoing orthopedic evaluation for septic arthritis of the   left sternoclavicular joint. According to the emergency room physician   note, the patient underwent MRI of the left sternoclavicular joint which   revealed was suspicious for septic arthritis with 2 cm fluid collection.    COMPARISON: No comparison MRI or other images are available.    CONTRAST/COMPLICATIONS:  IV Contrast: Omnipaque 350  60 cc administered   40 cc discarded  Oral Contrast: NONE  Complications: None reported at time of study completion    PROCEDURE:  CT of the Chest was performed.  Sagittal and coronal reformats were performed.    FINDINGS:    LUNGS AND AIRWAYS: Mild tracheal secretions.  Subpleural consolidation   anterior aspect left lobe deep to the region of the left sternoclavicular   joint. Otherwise clear lungs.  PLEURA: No pleural effusion.  MEDIASTINUM AND NELLY: No lymphadenopathy.  VESSELS: Within normal limits.  HEART: Heart size is normal. No pericardial effusion.  VISUALIZED UPPER ABDOMEN: Within normal limits.  BONES, CHEST WALL: Erosions of the left manubrium of the sternum at its   articulation with the left clavicular head and left first chondral   cartilage. Additional erosions of the inferior aspect of the left medial   clavicular head. Surrounding extensive soft tissue swelling with   thickening of the overlying pectoralis musculature, and phlegmonous   material extending posteriorly adjacent to the pleura, into the   retrosternal mediastinal fat. Edema/inflammation extends to the patient's   right across midline. No formed fluid collection is evident. Mild left   supraclavicular lymphadenopathy.    IMPRESSION:  Septic arthritis of the left sternoclavicular joint and involving the   articulation of the sternum with the first costal cartilage.    Extensive surrounding inflammation, with pneumonia in the underlying   subpleural left upper lobe.    No fluid collection is evident.        --- End of Report ---          TWILA LABOY MD; Resident Radiologist  This document has been electronically signed.   MIHIR LOMAS MD; Attending Radiologist  This document has been electronically signed. 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  She had a recent fall in 2023 secondary to unsteadiness due to multiple sclerosis, with subsequent left chest wall and shoulder pain.  Was seen at [facility] with concern for MS exacerbation but no MRI brain and cervical spine with an without contrast were done at the time without enhancement.  At the time infectious workup was done and notable for UTI which was treated.  She continued to have pain.  In the interim, she had an outpatient MRI of left sternoclavicular joint with results significant for marrow edema and swelling of the left sternoclavicular joint with a 2 cm fluid collection and adjacent pleural edema in the left chest with concerns for infectious etiology.   CT chest w/ IV contrast suggestive of septic arthritis with surrounding erythema. Thoracic surgery consulted.  Patient s/p surgical debridement on 23  ******    Past Medical History:  Multiple sclerosis [G35]        Past Surgical History:  History of  [Z98.891]        Social History:  No toxic habits    Family History:  FAMILY HISTORY:      Allergies:  No Known Allergies      ROS:  Constitutional: Patient offers no complaints of fevers or significant weight loss  Ears, Nose, Mouth and Throat: The patient presents with no abnormalities of the head, ears, eyes, nose or throat  Skin: Patient offers no concerns of new rashes or lesions  Respiratory: The patient presents with no abnormalities of the respiratory tract  Cardiovascular: The patient presents with no cardiac abnormalities  Gastrointestinal: The patient presents with no abnormalities of the GI system  Genitourinary: The patient presents with no dysuria, hematuria or frequent urination  Neurological: See HPI  Endocrine: Patient offers no complaints of excessive thirst, urination, or heat/cold intolerance    Advanced care planning reviewed and noted in the chart.    Medications:  cefepime   IVPB 2000 milliGRAM(s) IV Intermittent every 8 hours  chlorhexidine 2% Cloths 1 Application(s) Topical <User Schedule>  HYDROmorphone PCA (1 mG/mL) 30 milliLiter(s) PCA Continuous PCA Continuous  HYDROmorphone PCA (1 mG/mL) Rescue Clinician Bolus 0.5 milliGRAM(s) IV Push every 15 minutes PRN  naloxone Injectable 0.1 milliGRAM(s) IV Push every 3 minutes PRN  ondansetron Injectable 4 milliGRAM(s) IV Push every 6 hours PRN  vancomycin  IVPB 1000 milliGRAM(s) IV Intermittent every 12 hours      Labs:  CBC Full  -  ( 08 Dec 2023 07:20 )  WBC Count : 6.64 K/uL  RBC Count : 2.85 M/uL  Hemoglobin : 7.3 g/dL  Hematocrit : 23.6 %  Platelet Count - Automated : 401 K/uL  Mean Cell Volume : 82.8 fl  Mean Cell Hemoglobin : 25.6 pg  Mean Cell Hemoglobin Concentration : 30.9 gm/dL  Auto Neutrophil # : x  Auto Lymphocyte # : x  Auto Monocyte # : x  Auto Eosinophil # : x  Auto Basophil # : x  Auto Neutrophil % : x  Auto Lymphocyte % : x  Auto Monocyte % : x  Auto Eosinophil % : x  Auto Basophil % : x    12-08    138  |  102  |  12  ----------------------------<  112<H>  3.8   |  27  |  0.48<L>    Ca    9.1      08 Dec 2023 07:18    TPro  6.2  /  Alb  3.6  /  TBili  0.2  /  DBili  x   /  AST  12  /  ALT  11  /  AlkPhos  158<H>  12-08    CAPILLARY BLOOD GLUCOSE        LIVER FUNCTIONS - ( 08 Dec 2023 07:18 )  Alb: 3.6 g/dL / Pro: 6.2 g/dL / ALK PHOS: 158 U/L / ALT: 11 U/L / AST: 12 U/L / GGT: x           PT/INR - ( 08 Dec 2023 07:21 )   PT: 12.1 sec;   INR: 1.16 ratio         PTT - ( 08 Dec 2023 07:21 )  PTT:31.7 sec  Urinalysis Basic - ( 08 Dec 2023 07:18 )    Color: x / Appearance: x / SG: x / pH: x  Gluc: 112 mg/dL / Ketone: x  / Bili: x / Urobili: x   Blood: x / Protein: x / Nitrite: x   Leuk Esterase: x / RBC: x / WBC x   Sq Epi: x / Non Sq Epi: x / Bacteria: x          Vitals:  Vital Signs Last 24 Hrs  T(C): 36.9 (08 Dec 2023 06:34), Max: 36.9 (07 Dec 2023 14:00)  T(F): 98.4 (08 Dec 2023 06:34), Max: 98.4 (07 Dec 2023 14:00)  HR: 84 (08 Dec 2023 06:34) (82 - 107)  BP: 93/63 (08 Dec 2023 06:34) (93/63 - 139/63)  BP(mean): 78 (07 Dec 2023 16:00) (70 - 98)  RR: 18 (08 Dec 2023 06:34) (16 - 18)  SpO2: 96% (08 Dec 2023 06:34) (92% - 100%)    Parameters below as of 08 Dec 2023 06:34  Patient On (Oxygen Delivery Method): room air            NEUROLOGICAL EXAM:    Mental status: Awake, alert, and in no apparent distress. Oriented to person, place and time. Language function is normal.      Cranial Nerves: Pupils were equal, round, reactive to light. Extraocular movements were intact. Visual field were full. Fundoscopic exam was deferred. Facial sensation was intact to light touch. There was slight left facia droop. The palate was upgoing symmetrically and tongue was midline.     Motor exam: Bulk and tone were normal. Strength was 5/5 in all four extremities distally, did not assess proximal arms due to pain    Reflexes: deferred     Sensation: Intact to light touch    Coordination: did not assess for dysmetria due to recent surgery    Gait: defer         ACC: [id number] EXAM:  CT CHEST IC   ORDERED BY:  CHARLY FARIAS     PROCEDURE DATE:  2023          INTERPRETATION:  CLINICAL INFORMATION: Left chest wall erythema and   swelling undergoing orthopedic evaluation for septic arthritis of the   left sternoclavicular joint. According to the emergency room physician   note, the patient underwent MRI of the left sternoclavicular joint which   revealed was suspicious for septic arthritis with 2 cm fluid collection.    COMPARISON: No comparison MRI or other images are available.    CONTRAST/COMPLICATIONS:  IV Contrast: Omnipaque 350  60 cc administered   40 cc discarded  Oral Contrast: NONE  Complications: None reported at time of study completion    PROCEDURE:  CT of the Chest was performed.  Sagittal and coronal reformats were performed.    FINDINGS:    LUNGS AND AIRWAYS: Mild tracheal secretions.  Subpleural consolidation   anterior aspect left lobe deep to the region of the left sternoclavicular   joint. Otherwise clear lungs.  PLEURA: No pleural effusion.  MEDIASTINUM AND NELLY: No lymphadenopathy.  VESSELS: Within normal limits.  HEART: Heart size is normal. No pericardial effusion.  VISUALIZED UPPER ABDOMEN: Within normal limits.  BONES, CHEST WALL: Erosions of the left manubrium of the sternum at its   articulation with the left clavicular head and left first chondral   cartilage. Additional erosions of the inferior aspect of the left medial   clavicular head. Surrounding extensive soft tissue swelling with   thickening of the overlying pectoralis musculature, and phlegmonous   material extending posteriorly adjacent to the pleura, into the   retrosternal mediastinal fat. Edema/inflammation extends to the patient's   right across midline. No formed fluid collection is evident. Mild left   supraclavicular lymphadenopathy.    IMPRESSION:  Septic arthritis of the left sternoclavicular joint and involving the   articulation of the sternum with the first costal cartilage.    Extensive surrounding inflammation, with pneumonia in the underlying   subpleural left upper lobe.    No fluid collection is evident.        --- End of Report ---          TWILA LABOY MD; Resident Radiologist  This document has been electronically signed.   MIHIR LOMAS MD; Attending Radiologist  This document has been electronically signed. Dec  7 2023 12:04AM

## 2023-12-08 NOTE — OCCUPATIONAL THERAPY INITIAL EVALUATION ADULT - RANGE OF MOTION EXAMINATION, UPPER EXTREMITY
L shoulder limited 2/2 post op discomfort/bilateral UE Active ROM was WFL  (within functional limits)

## 2023-12-08 NOTE — PHYSICAL THERAPY INITIAL EVALUATION ADULT - PLANNED THERAPY INTERVENTIONS, PT EVAL
stair training: GOAL: Pt will negotiate up/down 1 flight with 1 handrail ascending independently in 2 weeks./balance training/bed mobility training/gait training/strengthening/transfer training

## 2023-12-08 NOTE — CONSULT NOTE ADULT - SUBJECTIVE AND OBJECTIVE BOX
CHIEF COMPLAINT:    HISTORY OF PRESENT ILLNESS:  43F PMHx of MS on immunosuppressive medication, presenting with left chest wall and shoulder pain. Recent history of fall secondary to unsteadiness due to MS. Obtained outpatient MRI of left sternoclavicular joint with results significant for marrow edema and swelling of the left sternoclavicular joint with a 2 cm fluid collection and adjacent pleural edema in the left chest with concerns for infectious etiology.  Patient initially saw orthopedics with for concerns for septic arthritis of this joint.  Impression of the read showed soft tissue abscess superficial to the joint.  Recommended CT scan with IV contrast of the chest.  Patient denies any fevers however endorsing erythema to the left sternoclavicular joint region. Patient states she was recently hospitalized over a week ago at [facility] for falls secondary to unsteadiness due to her MS.  Denies any recent falls today, vision changes. Endorsing headache. Denies any chest pain, shortness of breath, abdominal pain, nausea vomiting diarrhea, urinary complaints.  CT chest with IV in ED significant for Septic arthritis of the left sternoclavicular joint and involving the articulation of the sternum with the first costal cartilage.Extensive surrounding inflammation, with pneumonia in the underlying subpleural left upper lobe.No fluid collection is evident.  Workup significant for elevated alk phos (248), . VSS and afebrile  s/p Debridement of left sternoclavicular joint with excision of left clavicular head. Purulent drainage at the sternoclavicular joint. Tissue surrounding left clavicle found to be very inflamed. Copious irrigation of surgical site.   Concerned about pain at surgical site.    at bedside       PAST MEDICAL & SURGICAL HISTORY:  Multiple sclerosis  History of     MEDICATIONS:    cefepime   IVPB 2000 milliGRAM(s) IV Intermittent every 8 hours  vancomycin  IVPB 1000 milliGRAM(s) IV Intermittent every 12 hours      HYDROmorphone PCA (1 mG/mL) 30 milliLiter(s) PCA Continuous PCA Continuous  HYDROmorphone PCA (1 mG/mL) Rescue Clinician Bolus 0.5 milliGRAM(s) IV Push every 15 minutes PRN  ondansetron Injectable 4 milliGRAM(s) IV Push every 6 hours PRN        chlorhexidine 2% Cloths 1 Application(s) Topical <User Schedule>      FAMILY HISTORY:      SOCIAL HISTORY:    [ ] Non-smoker  [ ] Smoker  [ ] Alcohol    Allergies    No Known Allergies    Intolerances    	    REVIEW OF SYSTEMS:  CONSTITUTIONAL: No fever, weight loss, + fatigue  EYES: No eye pain, visual disturbances, or discharge  ENMT:  No difficulty hearing, tinnitus, vertigo; No sinus or throat pain  NECK: No pain or stiffness  RESPIRATORY: No cough, wheezing, chills or hemoptysis; No Shortness of Breath  CARDIOVASCULAR: No chest pain, palpitations, passing out, dizziness, or leg swelling  GASTROINTESTINAL: No abdominal or epigastric pain. No nausea, vomiting, or hematemesis; No diarrhea or constipation. No melena or hematochezia.  GENITOURINARY: No dysuria, frequency, hematuria, or incontinence  NEUROLOGICAL: No headaches, memory loss, loss of strength, numbness, or tremors  SKIN: No itching, burning, rashes, or lesions   LYMPH Nodes: No enlarged glands  ENDOCRINE: No heat or cold intolerance; No hair loss  MUSCULOSKELETAL: No joint pain or swelling; No muscle, back, or extremity pain  PSYCHIATRIC: No depression, anxiety, mood swings, or difficulty sleeping  HEME/LYMPH: No easy bruising, or bleeding gums  ALLERY AND IMMUNOLOGIC: No hives or eczema	    [ ] All others negative	  [ ] Unable to obtain    PHYSICAL EXAM:  T(C): 36.9 (23 @ 06:34), Max: 36.9 (23 @ 14:00)  HR: 84 (23 @ 06:34) (82 - 107)  BP: 93/63 (23 @ 06:34) (93/63 - 139/63)  RR: 18 (23 @ 06:34) (16 - 18)  SpO2: 96% (23 @ 06:34) (92% - 100%)  Wt(kg): --  I&O's Summary    07 Dec 2023 07:01  -  08 Dec 2023 07:00  --------------------------------------------------------  IN: 400 mL / OUT: 780 mL / NET: -380 mL        Appearance: NAD  HEENT:   Dry  oral mucosa, PERRL, EOMI	  Lymphatic: No lymphadenopathy  Cardiovascular: Normal S1 S2, No JVD, No murmurs, No edema  Respiratory: Lungs clear to auscultation	  Psychiatry: A & O x 3, Mood & affect appropriate  Gastrointestinal:  Soft, Non-tender, + BS	  Skin: No rashes, No ecchymoses, No cyanosis	  Neurologic: Non-focal  Extremities: Normal range of motion, No clubbing, cyanosis or edema  Vascular: Peripheral pulses palpable 2+ bilaterally    TELEMETRY: 	    ECG:  	NSr no acute ischemic stt changes   RADIOLOGY:  < from: CT Chest w/ IV Cont (23 @ 22:35) >    ACC: [id number] EXAM:  CT CHEST IC   ORDERED BY:  CHARLY FARIAS     PROCEDURE DATE:  2023          INTERPRETATION:  CLINICAL INFORMATION: Left chest wall erythema and   swelling undergoing orthopedic evaluation for septic arthritis of the   left sternoclavicular joint. According to the emergency room physician   note, the patient underwent MRI of the left sternoclavicular joint which   revealed was suspicious for septic arthritis with 2 cm fluid collection.    COMPARISON: No comparison MRI or other images are available.    CONTRAST/COMPLICATIONS:  IV Contrast: Omnipaque 350  60 cc administered   40 cc discarded  Oral Contrast: NONE  Complications: None reported at time of study completion    PROCEDURE:  CT of the Chest was performed.  Sagittal and coronal reformats were performed.    FINDINGS:    LUNGS AND AIRWAYS: Mild tracheal secretions.  Subpleural consolidation   anterior aspect left lobe deep to the region of the left sternoclavicular   joint. Otherwise clear lungs.  PLEURA:No pleural effusion.  MEDIASTINUM AND NELLY: No lymphadenopathy.  VESSELS: Within normal limits.  HEART: Heart size is normal. No pericardial effusion.  VISUALIZED UPPER ABDOMEN: Within normal limits.  BONES, CHEST WALL: Erosions of the left manubriumof the sternum at its   articulation with the left clavicular head and left first chondral   cartilage. Additional erosions of the inferior aspect of the left medial   clavicular head. Surrounding extensive soft tissue swelling with   thickening of the overlying pectoralis musculature, and phlegmonous   material extending posteriorly adjacent to the pleura, into the   retrosternal mediastinal fat. Edema/inflammation extends to the patient's   right across midline. No formed fluid collection is evident. Mild left   supraclavicular lymphadenopathy.    IMPRESSION:  Septic arthritis of the left sternoclavicular joint and involving the   articulation of the sternum with the first costal cartilage.    Extensive surrounding inflammation, with pneumonia in the underlying   subpleural left upper lobe.    No fluid collection is evident.        --- End of Report ---      < end of copied text >    OTHER: 	  	  LABS:	 	    CARDIAC MARKERS:      Culture - Fungal, Tissue (23 @ 15:21)   Specimen Source: .Tissue Left Sternal Clavicular Joint # 2  Culture Results:   Testing in progressCulture - Tissue with Gram Stain (23 @ 15:21)   Gram Stain:   No polymorphonuclear leukocytes seen per low power field   No organisms seen per oil power field  Specimen Source: .Tissue Left Sternal Clavicular Joint # 2Culture - Tissue with Gram Stain (23 @ 15:21)   Gram Stain:   Few polymorphonuclear leukocytes per low power field   No organisms seen per oil power field  Specimen Source: .Tissue OtherCulture - Fungal, Tissue (23 @ 15:21)   Specimen Source: .Tissue Other  Culture Results:   Testing in progress                            7.3    6.64  )-----------( 401      ( 08 Dec 2023 07:20 )             23.6     12-    138  |  102  |  12  ----------------------------<  112<H>  3.8   |  27  |  0.48<L>    Ca    9.1      08 Dec 2023 07:18    TPro  6.2  /  Alb  3.6  /  TBili  0.2  /  DBili  x   /  AST  12  /  ALT  11  /  AlkPhos  158<H>  12-08    proBNP:   Lipid Profile:   HgA1c:   TSH:            CHIEF COMPLAINT:    HISTORY OF PRESENT ILLNESS:  43F PMHx of MS on immunosuppressive medication, presenting with left chest wall and shoulder pain. Recent history of fall secondary to unsteadiness due to MS. Obtained outpatient MRI of left sternoclavicular joint with results significant for marrow edema and swelling of the left sternoclavicular joint with a 2 cm fluid collection and adjacent pleural edema in the left chest with concerns for infectious etiology.  Patient initially saw orthopedics with for concerns for septic arthritis of this joint.  Impression of the read showed soft tissue abscess superficial to the joint.  Recommended CT scan with IV contrast of the chest.  Patient denies any fevers however endorsing erythema to the left sternoclavicular joint region. Patient states she was recently hospitalized over a week ago at [facility] for falls secondary to unsteadiness due to her MS.  Denies any recent falls today, vision changes. Endorsing headache. Denies any chest pain, shortness of breath, abdominal pain, nausea vomiting diarrhea, urinary complaints.  CT chest with IV in ED significant for Septic arthritis of the left sternoclavicular joint and involving the articulation of the sternum with the first costal cartilage.Extensive surrounding inflammation, with pneumonia in the underlying subpleural left upper lobe.No fluid collection is evident.  Workup significant for elevated alk phos (248), . VSS and afebrile  s/p Debridement of left sternoclavicular joint with excision of left clavicular head. Purulent drainage at the sternoclavicular joint. Tissue surrounding left clavicle found to be very inflamed. Copious irrigation of surgical site.   Concerned about pain at surgical site.    at bedside       PAST MEDICAL & SURGICAL HISTORY:  Multiple sclerosis  History of     MEDICATIONS:    cefepime   IVPB 2000 milliGRAM(s) IV Intermittent every 8 hours  vancomycin  IVPB 1000 milliGRAM(s) IV Intermittent every 12 hours      HYDROmorphone PCA (1 mG/mL) 30 milliLiter(s) PCA Continuous PCA Continuous  HYDROmorphone PCA (1 mG/mL) Rescue Clinician Bolus 0.5 milliGRAM(s) IV Push every 15 minutes PRN  ondansetron Injectable 4 milliGRAM(s) IV Push every 6 hours PRN        chlorhexidine 2% Cloths 1 Application(s) Topical <User Schedule>      FAMILY HISTORY:      SOCIAL HISTORY:    [ ] Non-smoker  [ ] Smoker  [ ] Alcohol    Allergies    No Known Allergies    Intolerances    	    REVIEW OF SYSTEMS:  CONSTITUTIONAL: No fever, weight loss, + fatigue  EYES: No eye pain, visual disturbances, or discharge  ENMT:  No difficulty hearing, tinnitus, vertigo; No sinus or throat pain  NECK: No pain or stiffness  RESPIRATORY: No cough, wheezing, chills or hemoptysis; No Shortness of Breath  CARDIOVASCULAR: No chest pain, palpitations, passing out, dizziness, or leg swelling  GASTROINTESTINAL: No abdominal or epigastric pain. No nausea, vomiting, or hematemesis; No diarrhea or constipation. No melena or hematochezia.  GENITOURINARY: No dysuria, frequency, hematuria, or incontinence  NEUROLOGICAL: No headaches, memory loss, loss of strength, numbness, or tremors  SKIN: No itching, burning, rashes, or lesions   LYMPH Nodes: No enlarged glands  ENDOCRINE: No heat or cold intolerance; No hair loss  MUSCULOSKELETAL: No joint pain or swelling; No muscle, back, or extremity pain  PSYCHIATRIC: No depression, anxiety, mood swings, or difficulty sleeping  HEME/LYMPH: No easy bruising, or bleeding gums  ALLERY AND IMMUNOLOGIC: No hives or eczema	    [ ] All others negative	  [ ] Unable to obtain    PHYSICAL EXAM:  T(C): 36.9 (23 @ 06:34), Max: 36.9 (23 @ 14:00)  HR: 84 (23 @ 06:34) (82 - 107)  BP: 93/63 (23 @ 06:34) (93/63 - 139/63)  RR: 18 (23 @ 06:34) (16 - 18)  SpO2: 96% (23 @ 06:34) (92% - 100%)  Wt(kg): --  I&O's Summary    07 Dec 2023 07:01  -  08 Dec 2023 07:00  --------------------------------------------------------  IN: 400 mL / OUT: 780 mL / NET: -380 mL        Appearance: NAD  HEENT:   Dry  oral mucosa, PERRL, EOMI	  Lymphatic: No lymphadenopathy  Cardiovascular: Normal S1 S2, No JVD, No murmurs, No edema  Respiratory: Lungs clear to auscultation	  Psychiatry: A & O x 3, Mood & affect appropriate  Gastrointestinal:  Soft, Non-tender, + BS	  Skin: No rashes, No ecchymoses, No cyanosis	  Neurologic: Non-focal  Extremities: Normal range of motion, No clubbing, cyanosis or edema  Vascular: Peripheral pulses palpable 2+ bilaterally    TELEMETRY: 	    ECG:  	NSr no acute ischemic stt changes   RADIOLOGY:  < from: CT Chest w/ IV Cont (23 @ 22:35) >    ACC: [id number] EXAM:  CT CHEST IC   ORDERED BY:  CHARLY FARIAS     PROCEDURE DATE:  2023          INTERPRETATION:  CLINICAL INFORMATION: Left chest wall erythema and   swelling undergoing orthopedic evaluation for septic arthritis of the   left sternoclavicular joint. According to the emergency room physician   note, the patient underwent MRI of the left sternoclavicular joint which   revealed was suspicious for septic arthritis with 2 cm fluid collection.    COMPARISON: No comparison MRI or other images are available.    CONTRAST/COMPLICATIONS:  IV Contrast: Omnipaque 350  60 cc administered   40 cc discarded  Oral Contrast: NONE  Complications: None reported at time of study completion    PROCEDURE:  CT of the Chest was performed.  Sagittal and coronal reformats were performed.    FINDINGS:    LUNGS AND AIRWAYS: Mild tracheal secretions.  Subpleural consolidation   anterior aspect left lobe deep to the region of the left sternoclavicular   joint. Otherwise clear lungs.  PLEURA:No pleural effusion.  MEDIASTINUM AND NELLY: No lymphadenopathy.  VESSELS: Within normal limits.  HEART: Heart size is normal. No pericardial effusion.  VISUALIZED UPPER ABDOMEN: Within normal limits.  BONES, CHEST WALL: Erosions of the left manubriumof the sternum at its   articulation with the left clavicular head and left first chondral   cartilage. Additional erosions of the inferior aspect of the left medial   clavicular head. Surrounding extensive soft tissue swelling with   thickening of the overlying pectoralis musculature, and phlegmonous   material extending posteriorly adjacent to the pleura, into the   retrosternal mediastinal fat. Edema/inflammation extends to the patient's   right across midline. No formed fluid collection is evident. Mild left   supraclavicular lymphadenopathy.    IMPRESSION:  Septic arthritis of the left sternoclavicular joint and involving the   articulation of the sternum with the first costal cartilage.    Extensive surrounding inflammation, with pneumonia in the underlying   subpleural left upper lobe.    No fluid collection is evident.        --- End of Report ---      < end of copied text >    OTHER: 	  	  LABS:	 	    CARDIAC MARKERS:      Culture - Fungal, Tissue (23 @ 15:21)   Specimen Source: .Tissue Left Sternal Clavicular Joint # 2  Culture Results:   Testing in progressCulture - Tissue with Gram Stain (23 @ 15:21)   Gram Stain:   No polymorphonuclear leukocytes seen per low power field   No organisms seen per oil power field  Specimen Source: .Tissue Left Sternal Clavicular Joint # 2Culture - Tissue with Gram Stain (23 @ 15:21)   Gram Stain:   Few polymorphonuclear leukocytes per low power field   No organisms seen per oil power field  Specimen Source: .Tissue OtherCulture - Fungal, Tissue (23 @ 15:21)   Specimen Source: .Tissue Other  Culture Results:   Testing in progress                            7.3    6.64  )-----------( 401      ( 08 Dec 2023 07:20 )             23.6     12-    138  |  102  |  12  ----------------------------<  112<H>  3.8   |  27  |  0.48<L>    Ca    9.1      08 Dec 2023 07:18    TPro  6.2  /  Alb  3.6  /  TBili  0.2  /  DBili  x   /  AST  12  /  ALT  11  /  AlkPhos  158<H>  12-08    proBNP:   Lipid Profile:   HgA1c:   TSH:

## 2023-12-08 NOTE — PROGRESS NOTE ADULT - SUBJECTIVE AND OBJECTIVE BOX
Subjective:  c/o discomfort at surgical site                           MEDICATIONS  cefepime   IVPB 2000 milliGRAM(s) IV Intermittent every 8 hours  chlorhexidine 2% Cloths 1 Application(s) Topical <User Schedule>  enoxaparin Injectable 40 milliGRAM(s) SubCutaneous every 24 hours  HYDROmorphone  Injectable 1 milliGRAM(s) IV Push every 8 hours PRN  HYDROmorphone PCA (1 mG/mL) 30 milliLiter(s) PCA Continuous PCA Continuous  HYDROmorphone PCA (1 mG/mL) Rescue Clinician Bolus 0.5 milliGRAM(s) IV Push every 15 minutes PRN  naloxone Injectable 0.1 milliGRAM(s) IV Push every 3 minutes PRN  ondansetron Injectable 4 milliGRAM(s) IV Push every 6 hours PRN  oxyCODONE    IR 5 milliGRAM(s) Oral every 6 hours PRN  oxyCODONE    IR 10 milliGRAM(s) Oral every 8 hours PRN  senna 2 Tablet(s) Oral at bedtime  vancomycin  IVPB 1000 milliGRAM(s) IV Intermittent every 12 hours      Vital Signs Last 24 Hrs  T(C): 37 (08 Dec 2023 10:01), Max: 37 (08 Dec 2023 10:01)  T(F): 98.6 (08 Dec 2023 10:01), Max: 98.6 (08 Dec 2023 10:01)  HR: 91 (08 Dec 2023 10:01) (82 - 107)  BP: 105/69 (08 Dec 2023 10:01) (93/63 - 139/63)  BP(mean): 78 (07 Dec 2023 16:00) (70 - 98)  RR: 18 (08 Dec 2023 10:01) (16 - 18)  SpO2: 96% (08 Dec 2023 10:01) (92% - 100%)    Parameters below as of 08 Dec 2023 10:01  Patient On (Oxygen Delivery Method): room air          PHYSICAL EXAM:  Neurology: alert and oriented x 3, nonfocal, no gross deficits      sita 80cc since OR serosanguinous.  area non erythematous    LABS      138  |  102  |  12  ----------------------------<  112<H>  3.8   |  27  |  0.48<L>    Ca    9.1      08 Dec 2023 07:18    TPro  6.2  /  Alb  3.6  /  TBili  0.2  /  DBili  x   /  AST  12  /  ALT  11  /  AlkPhos  158<H>                                   7.6    7.68  )-----------( 386      ( 08 Dec 2023 12:22 )             24.9          PT/INR - ( 08 Dec 2023 07:21 )   PT: 12.1 sec;   INR: 1.16 ratio         PTT - ( 08 Dec 2023 07:21 )  PTT:31.7 sec    Culture - Fungal, Tissue (collected 23 @ 15:21)  Source: .Tissue Other  Preliminary Report (23 @ 08:07):    Testing in progress    Culture - Tissue with Gram Stain (collected 23 @ 15:21)  Source: .Tissue Left Sternal Clavicular Joint # 2  Gram Stain (23 @ 02:20):    No polymorphonuclear leukocytes seen per low power field    No organisms seen per oil power field    Culture - Acid Fast - Tissue w/Smear (collected 23 @ 15:21)  Source: .Tissue Other    Culture - Acid Fast - Tissue w/Smear (collected 23 @ 15:21)  Source: .Tissue Other    Culture - Acid Fast - Tissue w/Smear (collected 23 @ 15:21)  Source: .Tissue Other    Culture - Acid Fast - Tissue w/Smear (collected 23 @ 15:21)  Source: .Tissue Other    Culture - Acid Fast - Tissue w/Smear (collected 23 @ 15:21)  Source: .Tissue Other    Culture - Fungal, Tissue (collected 23 @ 15:21)  Source: .Tissue Left Sternal Clavicular Joint # 2  Preliminary Report (23 @ 08:16):    Testing in progress    Culture - Fungal, Tissue (collected 23 @ 15:21)  Source: .Tissue Other  Preliminary Report (23 @ 08:07):    Testing in progress    Culture - Fungal, Tissue (collected 23 @ 15:21)  Source: .Tissue Other  Preliminary Report (23 @ 08:07):    Testing in progress    Culture - Fungal, Other (collected 23 @ 15:21)  Source: .Other Other  Preliminary Report (23 @ 07:51):    Testing in progress    Culture - Fungal, Tissue (collected 23 @ 15:21)  Source: .Tissue Other  Preliminary Report (23:51):    Testing in progress    Culture - Fungal, Tissue (collected 23 @ 15:21)  Source: .Tissue Other  Preliminary Report (23 07:51):    Testing in progress    Culture - Tissue with Gram Stain (collected 23 @ 15:21)  Source: .Tissue Other  Gram Stain (23 @ 23:02):    No polymorphonuclear leukocytes seen per low power field    No organisms seen per oil power field    Culture - Tissue with Gram Stain (collected 23 @ 15:21)  Source: .Tissue Other  Gram Stain (23 @ 23:29):    No polymorphonuclear cells seen per low power field    No organisms seen per oil power field    Culture - Tissue with Gram Stain (collected 23 @ 15:21)  Source: .Tissue Other  Gram Stain (23 @ 23:28):    No polymorphonuclear cells seen per low power field    No organisms seen per oil power field    Culture - Tissue with Gram Stain (collected 23 @ 15:21)  Source: .Tissue Other  Gram Stain (23 @ 23:28):    Few polymorphonuclear leukocytes per low power field    No organisms seen per oil power field    Culture - Tissue with Gram Stain (collected 23 @ 15:21)  Source: .Tissue Other  Gram Stain (23 @ 23:28):    Few polymorphonuclear leukocytes per low power field    No organisms seen per oil power field                      PAST MEDICAL & SURGICAL HISTORY:  Multiple sclerosis      History of

## 2023-12-08 NOTE — CONSULT NOTE ADULT - PROBLEM SELECTOR RECOMMENDATION 9
s/p Debridement of left sternoclavicular joint with excision of left clavicular head. Purulent drainage at the sternoclavicular joint. Tissue surrounding left clavicle found to be very inflamed. Copious irrigation of surgical site.   IV abx   Pain control   Check TTE  Monitor Hgb    DVT ppx

## 2023-12-08 NOTE — PROGRESS NOTE ADULT - SUBJECTIVE AND OBJECTIVE BOX
Day 1 of Anesthesia Pain Management Service    SUBJECTIVE: I'm doing ok    Pain Scale Score:	[X] Refer to charted pain scores    THERAPY:    [ ] IV PCA Morphine		[ ] 5 mg/mL	[ ] 1 mg/mL  [X] IV PCA Hydromorphone	[ ] 5 mg/mL	[X] 1 mg/mL  [ ] IV PCA Fentanyl		[ ] 50 micrograms/mL    Demand dose: 0.2 mg     Lockout: 6 minutes   Continuous Rate: 0 mg/hr  4 Hour Limit: 4 mg    MEDICATIONS  (STANDING):  cefepime   IVPB 2000 milliGRAM(s) IV Intermittent every 8 hours  chlorhexidine 2% Cloths 1 Application(s) Topical <User Schedule>  enoxaparin Injectable 40 milliGRAM(s) SubCutaneous every 24 hours  HYDROmorphone PCA (1 mG/mL) 30 milliLiter(s) PCA Continuous PCA Continuous  senna 2 Tablet(s) Oral at bedtime  vancomycin  IVPB 1000 milliGRAM(s) IV Intermittent every 12 hours    MEDICATIONS  (PRN):  HYDROmorphone  Injectable 1 milliGRAM(s) IV Push every 8 hours PRN Severe Pain (7 - 10)  HYDROmorphone PCA (1 mG/mL) Rescue Clinician Bolus 0.5 milliGRAM(s) IV Push every 15 minutes PRN for Pain Scale GREATER THAN 6  naloxone Injectable 0.1 milliGRAM(s) IV Push every 3 minutes PRN For ANY of the following changes in patient status:  A. RR LESS THAN 10 breaths per minute, B. Oxygen saturation LESS THAN 90%, C. Sedation score of 6  ondansetron Injectable 4 milliGRAM(s) IV Push every 6 hours PRN Nausea  oxyCODONE    IR 5 milliGRAM(s) Oral every 6 hours PRN Moderate Pain (4 - 6)  oxyCODONE    IR 10 milliGRAM(s) Oral every 8 hours PRN Severe Pain (7 - 10)      OBJECTIVE:    Sedation Score:	[ X] Alert 	[ ] Drowsy 	[ ] Arousable	[ ] Asleep	[ ] Unresponsive    Side Effects:	[X ] None	[ ] Nausea	[ ] Vomiting	[ ] Pruritus  		[ ] Other:    Vital Signs Last 24 Hrs  T(C): 37 (08 Dec 2023 10:01), Max: 37 (08 Dec 2023 10:01)  T(F): 98.6 (08 Dec 2023 10:01), Max: 98.6 (08 Dec 2023 10:01)  HR: 91 (08 Dec 2023 10:01) (82 - 107)  BP: 105/69 (08 Dec 2023 10:01) (93/63 - 139/63)  BP(mean): 78 (07 Dec 2023 16:00) (70 - 98)  RR: 18 (08 Dec 2023 10:01) (16 - 18)  SpO2: 96% (08 Dec 2023 10:01) (92% - 100%)    Parameters below as of 08 Dec 2023 10:01  Patient On (Oxygen Delivery Method): room air        ASSESSMENT/ PLAN    Therapy to  be:               [X] Continued   [ ] Discontinued   [ ] Changed to PRN Analgesics    Documentation and Verification of current medications:   [X] Done	[ ] Not done, not eligible    Comments:  Total PCA use 3.4mg / 12 hours. Pain controlled with PCA.

## 2023-12-08 NOTE — PHYSICAL THERAPY INITIAL EVALUATION ADULT - ADDITIONAL COMMENTS
Pt reports that she lives in a pvt house with her 6 children +2 CECE with no handrail +1 flight once inside with a unilateral handrail. Pt states that she was ambulating with a rolling walker intermittently however her ambulation has worsened recently required greater use of rolling walker. Pt reports that family is able to assist as needed.

## 2023-12-08 NOTE — OCCUPATIONAL THERAPY INITIAL EVALUATION ADULT - PERTINENT HX OF CURRENT PROBLEM, REHAB EVAL
43 year old female with MS who was following at [facility] for Ocrevus infusions. Normal hemoglobin from last visit in 2022, now admitted with septic arthritis of sternoclavicular joint s/p surgical debridement and worsening anemia. 43 year old female with MS who was following at [facility] for Ocrevus infusions. Normal hemoglobin from last visit in 2022, now admitted with septic arthritis of sternoclavicular joint s/p surgical debridement and worsening anemia.

## 2023-12-08 NOTE — PROGRESS NOTE ADULT - ASSESSMENT
43F PMHx of MS on Rituxan, presenting with left chest wall and shoulder pain w/ concern for infection now admitted with likely septic arthritis with need for possible washout and debridement    POD # 1 Debridement and washout of Left.  sternoclavicular joint excision Left clavicular head  Tj 80cc serosanguinous.  GS no org seen.  Cultures pending.

## 2023-12-08 NOTE — PROGRESS NOTE ADULT - PROBLEM SELECTOR PLAN 1
-continue Tj  -record outpt  -Cont. IV Vancomycin  -Cont. IV Cefepime   -F/u BCxs  -F/U OR cultures  -Pain control

## 2023-12-08 NOTE — CONSULT NOTE ADULT - ATTENDING COMMENTS
43F PMHx of MS on (Rituxan, Ocrevus) presenting with left chest wall and shoulder pain. Recent history of fall secondary to unsteadiness due to MS. On review of ortho notes, pt has had multiple falls in November. She was admitted to [facility] on Last week of November and was found to have Rhinovirus and pseudomonas UTI. She completed 5-7 days course with Ciprofloxacin. Found to have Septic arthritis of SCM joint now s/p excision.    Patient obtained CT chest w/ IV contrast which showed likely septic arthritis with surrounding erythema. Thoracic surgery consulted, underwent washout and debridement.     OR Note: Debridement of left sternoclavicular joint with excision of left clavicular head. Purulent drainage at the sternoclavicular joint. Tissue surrounding left clavicle found to be very inflamed. Copious irrigation of surgical site.  Specimen sent: Head of left clavicle, culture of left sternoclavicular joint, culture of left manubrium, culture of deep sternal head of clavicle, culture of first rib costal cartilage      In ER: Afebrile, WBC 9.8, , . Given IV Zosyn, IV Vancomycin.     Remains afebrile, WBC stable.     Septic arthritis of the left sternoclavicular joint and involving the   articulation of the sternum with the first costal cartilage.    Extensive surrounding inflammation, with pneumonia in the underlying   subpleural left upper lobe.    # Septic arthritis of L sternoclavicular joint s/p excision and drainage  # L upper lobe opacity  # Immunosuppressed patient  # Elevated ESR/CRP.       PLAN:  - CATRACHITA opacity likely from continuous inflammation  - agree with vancomycin 1 gm Q12; continue cefepime 2 gm Q8hrs  - monitor vancomycin trough and creatinine to avoid nephrotoxicity and ensure efficacy   - will follow routine/fungal/AFB cultures; negative so far  - Trend CBC/ESR/CRP  - Check Quantiferon.       Plan discussed with consulting team.       Odalis Reynaga  Please contact through MS Teams   If no response or past 5 pm/weekend call [phone]. 43F PMHx of MS on (Rituxan, Ocrevus) presenting with left chest wall and shoulder pain. Recent history of fall secondary to unsteadiness due to MS. On review of ortho notes, pt has had multiple falls in November. She was admitted to [facility] on Last week of November and was found to have Rhinovirus and pseudomonas UTI. She completed 5-7 days course with Ciprofloxacin. Found to have Septic arthritis of SCM joint now s/p excision.    Patient obtained CT chest w/ IV contrast which showed likely septic arthritis with surrounding erythema. Thoracic surgery consulted, underwent washout and debridement.     OR Note: Debridement of left sternoclavicular joint with excision of left clavicular head. Purulent drainage at the sternoclavicular joint. Tissue surrounding left clavicle found to be very inflamed. Copious irrigation of surgical site.  Specimen sent: Head of left clavicle, culture of left sternoclavicular joint, culture of left manubrium, culture of deep sternal head of clavicle, culture of first rib costal cartilage      In ER: Afebrile, WBC 9.8, , . Given IV Zosyn, IV Vancomycin.     Remains afebrile, WBC stable.     Septic arthritis of the left sternoclavicular joint and involving the   articulation of the sternum with the first costal cartilage.    Extensive surrounding inflammation, with pneumonia in the underlying   subpleural left upper lobe.    # Septic arthritis of L sternoclavicular joint s/p excision and drainage  # L upper lobe opacity  # Immunosuppressed patient  # Elevated ESR/CRP.       PLAN:  - CATRACHITA opacity likely from continuous inflammation  - agree with vancomycin 1 gm Q12; continue cefepime 2 gm Q8hrs  - monitor vancomycin trough and creatinine to avoid nephrotoxicity and ensure efficacy   - will follow routine/fungal/AFB cultures; negative so far  - Trend CBC/ESR/CRP  - Check Quantiferon.       Plan discussed with consulting team.       Odalis Reynaga  Please contact through MS Teams   If no response or past 5 pm/weekend call [phone].

## 2023-12-08 NOTE — CONSULT NOTE ADULT - ASSESSMENT
43 year old female with MS who was following at [facility] for Ocrevus infusions. Normal hemoglobin from last visit in 2022, now admitted with septic arthritis of sternoclavicular joint s/p surgical debridement and worsening anemia.     1. Anemia   - likely in the setting of inflammation and infection now a sudden decline likely related to postop   - send iron studies, b12, folate, hemolysis labs   - transfuse for Hb <7   - f/u with Dr. Woods after discharge at [facility]     2. MS   - on immunosuppressants   - send quantitative immunoglobulins          43 year old female with MS who was following at [facility] for Ocrevus infusions. Normal hemoglobin from last visit in 2022, now admitted with septic arthritis of sternoclavicular joint s/p surgical debridement and worsening anemia.     1. Anemia   - likely in the setting of inflammation and infection now a sudden decline likely related to postop   - send iron studies, b12, folate, hemolysis labs   - transfuse for Hb <7   - f/u with Dr. Woods after discharge at [facility]     2. MS   - on immunosuppressants   - send quantitative immunoglobulins          43 year old female with MS who was following at [facility] for Ocrevus infusions. Normal hemoglobin from last visit in 2022, now admitted with septic arthritis of sternoclavicular joint s/p surgical debridement and worsening anemia.     1. Anemia   - likely in the setting of inflammation and infection now a sudden decline likely related to postop   - send iron studies, b12, folate, hemolysis labs   - transfuse for Hb <7   - f/u with Dr. Woods after discharge at [facility]     2. MS   - on immunosuppressants   - send quantitative immunoglobulins         Wang Bernal MD   Hematology/Oncology  [facility]  [phone] (office) 43 year old female with MS who was following at [facility] for Ocrevus infusions. Normal hemoglobin from last visit in 2022, now admitted with septic arthritis of sternoclavicular joint s/p surgical debridement and worsening anemia.     1. Anemia   - likely in the setting of inflammation and infection now a sudden decline likely related to postop   - send iron studies, b12, folate, hemolysis labs   - transfuse for Hb <7   - f/u with Dr. Woods after discharge at [facility]     2. MS   - on immunosuppressants   - send quantitative immunoglobulins         Wang Bernal MD   Hematology/Oncology  [facility]  [phone] (office) 43 year old female with MS who was following at [facility] for Ocrevus infusions. Normal hemoglobin from last visit in 2022, now admitted with septic arthritis of sternoclavicular joint s/p surgical debridement and worsening anemia.     1. Anemia   - likely in the setting of inflammation and infection now a sudden decline likely related to postop   - send iron studies, b12, folate, hemolysis labs   - transfuse for Hb <7   - f/u with Dr. Woods after discharge at [facility]     2. thrombocytosis   - reactive post op    3. MS   - on immunosuppressants   - send quantitative immunoglobulins   - plan per neurology       Wang Bernal MD   Hematology/Oncology  [facility]  [phone] (office) 43 year old female with MS who was following at [facility] for Ocrevus infusions. Normal hemoglobin from last visit in 2022, now admitted with septic arthritis of sternoclavicular joint s/p surgical debridement and worsening anemia.     1. Anemia   - likely in the setting of inflammation and infection now a sudden decline likely related to postop   - send iron studies, b12, folate, hemolysis labs   - transfuse for Hb <7   - f/u with Dr. Woods after discharge at [facility]     2. thrombocytosis   - reactive post op    3. MS   - on immunosuppressants   - send quantitative immunoglobulins   - plan per neurology       Wang Bernal MD   Hematology/Oncology  [facility]  [phone] (office)

## 2023-12-09 LAB
FERRITIN SERPL-MCNC: 79 NG/ML — SIGNIFICANT CHANGE UP (ref 15–150)
FERRITIN SERPL-MCNC: 79 NG/ML — SIGNIFICANT CHANGE UP (ref 15–150)
FOLATE SERPL-MCNC: 12.5 NG/ML — SIGNIFICANT CHANGE UP
FOLATE SERPL-MCNC: 12.5 NG/ML — SIGNIFICANT CHANGE UP
IRON SATN MFR SERPL: 27 UG/DL — LOW (ref 30–160)
IRON SATN MFR SERPL: 27 UG/DL — LOW (ref 30–160)
IRON SATN MFR SERPL: 9 % — LOW (ref 14–50)
IRON SATN MFR SERPL: 9 % — LOW (ref 14–50)
TIBC SERPL-MCNC: 291 UG/DL — SIGNIFICANT CHANGE UP (ref 220–430)
TIBC SERPL-MCNC: 291 UG/DL — SIGNIFICANT CHANGE UP (ref 220–430)
TSH SERPL-MCNC: 1.51 UIU/ML — SIGNIFICANT CHANGE UP (ref 0.27–4.2)
TSH SERPL-MCNC: 1.51 UIU/ML — SIGNIFICANT CHANGE UP (ref 0.27–4.2)
UIBC SERPL-MCNC: 264 UG/DL — SIGNIFICANT CHANGE UP (ref 110–370)
UIBC SERPL-MCNC: 264 UG/DL — SIGNIFICANT CHANGE UP (ref 110–370)
VIT B12 SERPL-MCNC: >2000 PG/ML — HIGH (ref 232–1245)
VIT B12 SERPL-MCNC: >2000 PG/ML — HIGH (ref 232–1245)

## 2023-12-09 RX ORDER — SODIUM CHLORIDE 9 MG/ML
1000 INJECTION, SOLUTION INTRAVENOUS
Refills: 0 | Status: DISCONTINUED | OUTPATIENT
Start: 2023-12-09 | End: 2023-12-15

## 2023-12-09 RX ORDER — ACETAMINOPHEN 500 MG
1000 TABLET ORAL ONCE
Refills: 0 | Status: COMPLETED | OUTPATIENT
Start: 2023-12-09 | End: 2023-12-09

## 2023-12-09 RX ADMIN — SODIUM CHLORIDE 75 MILLILITER(S): 9 INJECTION, SOLUTION INTRAVENOUS at 22:26

## 2023-12-09 RX ADMIN — CEFEPIME 100 MILLIGRAM(S): 1 INJECTION, POWDER, FOR SOLUTION INTRAMUSCULAR; INTRAVENOUS at 13:41

## 2023-12-09 RX ADMIN — CEFEPIME 100 MILLIGRAM(S): 1 INJECTION, POWDER, FOR SOLUTION INTRAMUSCULAR; INTRAVENOUS at 05:18

## 2023-12-09 RX ADMIN — Medication 250 MILLIGRAM(S): at 15:01

## 2023-12-09 RX ADMIN — SERTRALINE 100 MILLIGRAM(S): 25 TABLET, FILM COATED ORAL at 21:09

## 2023-12-09 RX ADMIN — Medication 650 MILLIGRAM(S): at 13:40

## 2023-12-09 RX ADMIN — CEFEPIME 100 MILLIGRAM(S): 1 INJECTION, POWDER, FOR SOLUTION INTRAMUSCULAR; INTRAVENOUS at 21:08

## 2023-12-09 RX ADMIN — CHLORHEXIDINE GLUCONATE 1 APPLICATION(S): 213 SOLUTION TOPICAL at 08:12

## 2023-12-09 RX ADMIN — Medication 250 MILLIGRAM(S): at 22:23

## 2023-12-09 RX ADMIN — Medication 250 MILLIGRAM(S): at 06:35

## 2023-12-09 RX ADMIN — DULOXETINE HYDROCHLORIDE 60 MILLIGRAM(S): 30 CAPSULE, DELAYED RELEASE ORAL at 21:09

## 2023-12-09 RX ADMIN — Medication 0.5 MILLIGRAM(S): at 23:19

## 2023-12-09 RX ADMIN — Medication 650 MILLIGRAM(S): at 01:44

## 2023-12-09 RX ADMIN — HYDROMORPHONE HYDROCHLORIDE 30 MILLILITER(S): 2 INJECTION INTRAMUSCULAR; INTRAVENOUS; SUBCUTANEOUS at 07:41

## 2023-12-09 RX ADMIN — ENOXAPARIN SODIUM 40 MILLIGRAM(S): 100 INJECTION SUBCUTANEOUS at 11:37

## 2023-12-09 RX ADMIN — SENNA PLUS 2 TABLET(S): 8.6 TABLET ORAL at 21:09

## 2023-12-09 RX ADMIN — Medication 400 MILLIGRAM(S): at 18:11

## 2023-12-09 NOTE — PROGRESS NOTE ADULT - ASSESSMENT
43F PMHx of MS on Rituxan, presenting with left chest wall and shoulder pain w/ concern for infection now admitted with likely septic arthritis with need for possible washout and debridement    POD # 1 Debridement and washout of Left.  sternoclavicular joint excision Left clavicular head  Tj 80cc serosanguinous.  GS no org seen.  Cultures pending.  12/9 + RVP>no treatment as per ID  DC PCA  somnolent  Pain mgmt notified Transition oral Rx

## 2023-12-09 NOTE — CHART NOTE - NSCHARTNOTEFT_GEN_A_CORE
Pt c/o of Left upper arm fullness/pain that started today. denies fever, chills  No redness, some selling on upper arm noted.  ROM good, Radial pulse palpable.                          7.6    7.68  )-----------( 386      ( 08 Dec 2023 12:22 )             24.9     Vital Signs Last 24 Hrs  T(C): 37 (12-09-23 @ 17:30), Max: 37.3 (12-08-23 @ 21:06)  T(F): 98.6 (12-09-23 @ 17:30), Max: 99.2 (12-08-23 @ 21:06)  HR: 100 (12-09-23 @ 17:30) (94 - 101)  BP: 124/60 (12-09-23 @ 17:30) (98/61 - 124/60)  RR: 18 (12-09-23 @ 17:30) (18 - 18)  SpO2: 100% (12-09-23 @ 17:30) (97% - 100%)        Plan - LUE doppler r/o DVT    [id number] Pt c/o of Left upper arm fullness/pain that started today. denies fever, chills  No redness, some selling on upper arm noted.  ROM good, Radial pulse palpable.                          7.6    7.68  )-----------( 386      ( 08 Dec 2023 12:22 )             24.9     Vital Signs Last 24 Hrs  T(C): 37 (12-09-23 @ 17:30), Max: 37.3 (12-08-23 @ 21:06)  T(F): 98.6 (12-09-23 @ 17:30), Max: 99.2 (12-08-23 @ 21:06)  HR: 100 (12-09-23 @ 17:30) (94 - 101)  BP: 124/60 (12-09-23 @ 17:30) (98/61 - 124/60)  RR: 18 (12-09-23 @ 17:30) (18 - 18)  SpO2: 100% (12-09-23 @ 17:30) (97% - 100%)        Plan - LUE doppler r/o DVT    [id number]

## 2023-12-09 NOTE — PROGRESS NOTE ADULT - ASSESSMENT
43 year old female with MS who was following at [facility] for Ocrevus infusions. Normal hemoglobin from last visit in 2022, now admitted with septic arthritis of sternoclavicular joint s/p surgical debridement and worsening anemia.     1. Anemia   - likely in the setting of inflammation and infection now a sudden decline likely related to postop   - iron studies show iron deficiency anemia  - no sign of other deficiency or hemolysis  - daily CBC  - transfuse for Hb <7   - f/u with Dr. Woods after discharge at [facility]     2. thrombocytosis   - reactive post op    3. MS   - on immunosuppressants   - send quantitative immunoglobulins   - plan per neurology     Will continue to follow 43 year old female with MS who was following at [facility] for Ocrevus infusions. Normal hemoglobin from last visit in 2022, now admitted with septic arthritis of sternoclavicular joint s/p surgical debridement and worsening anemia.     1. Anemia   - likely in the setting of inflammation and infection now a sudden decline likely related to postop   - iron studies show iron deficiency anemia  - no sign of other deficiency or hemolysis  - daily CBC  - transfuse for Hb <7   - f/u with Dr. Woods after discharge at [facility]     2. thrombocytosis   - reactive post op    3. MS   - on immunosuppressants   - send quantitative immunoglobulins   - plan per neurology     Will continue to follow

## 2023-12-09 NOTE — PROGRESS NOTE ADULT - SUBJECTIVE AND OBJECTIVE BOX
Subjective:  "Hi"  Sister at bedside  Lying in bed      V/S  T(C): 36.7 (23 @ 10:00), Max: 38.7 (23 @ 18:58)  HR: 94 (23 @ 10:00) (94 - 110)  BP: 98/61 (23 @ 10:00) (98/61 - 120/64)  RR: 18 (23 @ 10:00) (18 - 18)  SpO2: 97% (23 @ 10:00) (95% - 98%)    I&O's Detail    08 Dec 2023 07:01  -  09 Dec 2023 07:00  --------------------------------------------------------  IN:    IV PiggyBack: 360 mL    Oral Fluid: 1090 mL  Total IN: 1450 mL    OUT:    Bulb (mL): 90 mL  Total OUT: 90 mL    Total NET: 1360 mL      09 Dec 2023 07:01  -  09 Dec 2023 10:39  --------------------------------------------------------  IN:    Oral Fluid: 240 mL  Total IN: 240 mL    OUT:  Total OUT: 0 mL    Total NET: 240 mL      Respiratory Viral Panel with COVID-19 by JOVANNY (23 @ 21:17)    Rapid RVP Result: Detected   SARS-CoV-2: [facility]: This Respiratory Panel uses polymerase chain reaction (PCR) to detect for  adenovirus; coronavirus (HKU1, NL63, 229E, OC43); human metapneumovirus  (hMPV); human enterovirus/rhinovirus (Entero/RV); influenza A; influenza  A/H1; influenza A/H3; influenza A/H1-2009; influenza B; parainfluenza  viruses 1, 2, 3, 4; respiratory syncytial virus; Mycoplasma pneumoniae;  Chlamydophila pneumoniae; and SARS-CoV-2.   Entero/Rhinovirus (RapRVP): Detected        23 @ 07:01  -  23 @ 07:00  --------------------------------------------------------  IN: 1450 mL / OUT: 90 mL / NET: 1360 mL    23 @ 07:01  -  23 @ 10:39  --------------------------------------------------------  IN: 240 mL / OUT: 0 mL / NET: 240 mL      MEDICATIONS  (STANDING):  cefepime   IVPB 2000 milliGRAM(s) IV Intermittent every 8 hours  chlorhexidine 2% Cloths 1 Application(s) Topical <User Schedule>  dalfampridine ER 10 milliGRAM(s) Oral every 12 hours  DULoxetine 60 milliGRAM(s) Oral at bedtime  enoxaparin Injectable 40 milliGRAM(s) SubCutaneous every 24 hours  senna 2 Tablet(s) Oral at bedtime  sertraline 100 milliGRAM(s) Oral at bedtime  vancomycin  IVPB 1000 milliGRAM(s) IV Intermittent every 8 hours  Vibegron (Gemtesa) 75 milliGRAM(s)   Oral daily          138  |  102  |  12  ----------------------------<  112<H>  3.8   |  27  |  0.48<L>    Ca    9.1      08 Dec 2023 07:18    TPro  6.2  /  Alb  3.6  /  TBili  0.2  /  DBili  x   /  AST  12  /  ALT  11  /  AlkPhos  158<H>                                 7.6    7.68  )-----------( 386      ( 08 Dec 2023 12:22 )             24.9        PT/INR - ( 08 Dec 2023 07:21 )   PT: 12.1 sec;   INR: 1.16 ratio         PTT - ( 08 Dec 2023 07:21 )  PTT:31.7 sec      Physical Exam:      Gen:  Lethargic, slurs words, eyes closing during interview  RES : clear , no wheezing    LISBETH self sx serosang           CVS: Regular  rhythm. Normal S1/S2  Abd: Soft, non-distended. Bowel sounds present.  Skin: No rash.  Ext:  no edema              PAST MEDICAL & SURGICAL HISTORY:  Multiple sclerosis      History of                Subjective:  "Hi"  Sister at bedside  Lying in bed      V/S  T(C): 36.7 (23 @ 10:00), Max: 38.7 (23 @ 18:58)  HR: 94 (23 @ 10:00) (94 - 110)  BP: 98/61 (23 @ 10:00) (98/61 - 120/64)  RR: 18 (23 @ 10:00) (18 - 18)  SpO2: 97% (23 @ 10:00) (95% - 98%)    I&O's Detail    08 Dec 2023 07:01  -  09 Dec 2023 07:00  --------------------------------------------------------  IN:    IV PiggyBack: 360 mL    Oral Fluid: 1090 mL  Total IN: 1450 mL    OUT:    Bulb (mL): 90 mL  Total OUT: 90 mL    Total NET: 1360 mL      09 Dec 2023 07:01  -  09 Dec 2023 10:39  --------------------------------------------------------  IN:    Oral Fluid: 240 mL  Total IN: 240 mL    OUT:  Total OUT: 0 mL    Total NET: 240 mL      Respiratory Viral Panel with COVID-19 by JOVANNY (23 @ 21:17)    Rapid RVP Result: Detected   SARS-CoV-2: [facility]: This Respiratory Panel uses polymerase chain reaction (PCR) to detect for  adenovirus; coronavirus (HKU1, NL63, 229E, OC43); human metapneumovirus  (hMPV); human enterovirus/rhinovirus (Entero/RV); influenza A; influenza  A/H1; influenza A/H3; influenza A/H1-2009; influenza B; parainfluenza  viruses 1, 2, 3, 4; respiratory syncytial virus; Mycoplasma pneumoniae;  Chlamydophila pneumoniae; and SARS-CoV-2.   Entero/Rhinovirus (RapRVP): Detected        23 @ 07:01  -  23 @ 07:00  --------------------------------------------------------  IN: 1450 mL / OUT: 90 mL / NET: 1360 mL    23 @ 07:01  -  23 @ 10:39  --------------------------------------------------------  IN: 240 mL / OUT: 0 mL / NET: 240 mL      MEDICATIONS  (STANDING):  cefepime   IVPB 2000 milliGRAM(s) IV Intermittent every 8 hours  chlorhexidine 2% Cloths 1 Application(s) Topical <User Schedule>  dalfampridine ER 10 milliGRAM(s) Oral every 12 hours  DULoxetine 60 milliGRAM(s) Oral at bedtime  enoxaparin Injectable 40 milliGRAM(s) SubCutaneous every 24 hours  senna 2 Tablet(s) Oral at bedtime  sertraline 100 milliGRAM(s) Oral at bedtime  vancomycin  IVPB 1000 milliGRAM(s) IV Intermittent every 8 hours  Vibegron (Gemtesa) 75 milliGRAM(s)   Oral daily          138  |  102  |  12  ----------------------------<  112<H>  3.8   |  27  |  0.48<L>    Ca    9.1      08 Dec 2023 07:18    TPro  6.2  /  Alb  3.6  /  TBili  0.2  /  DBili  x   /  AST  12  /  ALT  11  /  AlkPhos  158<H>                                 7.6    7.68  )-----------( 386      ( 08 Dec 2023 12:22 )             24.9        PT/INR - ( 08 Dec 2023 07:21 )   PT: 12.1 sec;   INR: 1.16 ratio         PTT - ( 08 Dec 2023 07:21 )  PTT:31.7 sec      Physical Exam:      Gen:  Lethargic, slurs words, eyes closing during interview  RES : clear , no wheezing    LISBETH self sx serosang           CVS: Regular  rhythm. Normal S1/S2  Abd: Soft, non-distended. Bowel sounds present.  Skin: No rash.  Ext:  no edema              PAST MEDICAL & SURGICAL HISTORY:  Multiple sclerosis      History of

## 2023-12-09 NOTE — PROGRESS NOTE ADULT - SUBJECTIVE AND OBJECTIVE BOX
Day __2_ of Anesthesia Pain Management Service    SUBJECTIVE:    Pain Scale Score	At rest: ___ 	With Activity: ___ 	[x ] Refer to charted pain scores    THERAPY:    [ ] IV PCA Morphine		[ ] 5 mg/mL	[ ] 1 mg/mL  [x ] IV PCA Hydromorphone	[ ] 5 mg/mL	[ x] 1 mg/mL  [ ] IV PCA Fentanyl		[ ] 50 micrograms/mL    Demand dose: _0.2_    lockout:_6_  minutes  Continuous Rate:_0_       MEDICATIONS  (STANDING):  cefepime   IVPB 2000 milliGRAM(s) IV Intermittent every 8 hours  chlorhexidine 2% Cloths 1 Application(s) Topical <User Schedule>  dalfampridine ER 10 milliGRAM(s) Oral every 12 hours  DULoxetine 60 milliGRAM(s) Oral at bedtime  enoxaparin Injectable 40 milliGRAM(s) SubCutaneous every 24 hours  HYDROmorphone PCA (1 mG/mL) 30 milliLiter(s) PCA Continuous PCA Continuous  senna 2 Tablet(s) Oral at bedtime  sertraline 100 milliGRAM(s) Oral at bedtime  vancomycin  IVPB 1000 milliGRAM(s) IV Intermittent every 8 hours  Vibegron (Gemtesa) 75 milliGRAM(s)   Oral daily    MEDICATIONS  (PRN):  acetaminophen     Tablet .. 650 milliGRAM(s) Oral every 6 hours PRN Temp greater or equal to 38C (100.4F)  clonazePAM  Tablet 0.5 milliGRAM(s) Oral at bedtime PRN Anxiety  HYDROmorphone  Injectable 1 milliGRAM(s) IV Push every 8 hours PRN Severe Pain (7 - 10)  HYDROmorphone PCA (1 mG/mL) Rescue Clinician Bolus 0.5 milliGRAM(s) IV Push every 15 minutes PRN for Pain Scale GREATER THAN 6  naloxone Injectable 0.1 milliGRAM(s) IV Push every 3 minutes PRN For ANY of the following changes in patient status:  A. RR LESS THAN 10 breaths per minute, B. Oxygen saturation LESS THAN 90%, C. Sedation score of 6  ondansetron Injectable 4 milliGRAM(s) IV Push every 6 hours PRN Nausea      OBJECTIVE:    Sedation Score:	[x ] Alert	[ ] Drowsy 	[ ] Arousable	[ ] Asleep	[ ] Unresponsive    Side Effects:	[x ] None	[ ] Nausea	[ ] Vomiting	[ ] Pruritus  		[ ] Other:    Vital Signs Last 24 Hrs  T(C): 36.8 (09 Dec 2023 05:13), Max: 38.7 (08 Dec 2023 18:58)  T(F): 98.2 (09 Dec 2023 05:13), Max: 101.6 (08 Dec 2023 18:58)  HR: 94 (09 Dec 2023 05:13) (91 - 110)  BP: 108/70 (09 Dec 2023 05:13) (99/61 - 120/64)  BP(mean): --  RR: 18 (09 Dec 2023 05:13) (18 - 18)  SpO2: 98% (09 Dec 2023 05:13) (95% - 98%)    Parameters below as of 09 Dec 2023 05:13  Patient On (Oxygen Delivery Method): room air        ASSESSMENT/ PLAN    Therapy to  be:	[x ] Continue   [ ] Discontinued   [ ] Change to prn Analgesics    Documentation and Verification of current medications:   [X] Done	[ ] Not done, not eligible

## 2023-12-09 NOTE — PROGRESS NOTE ADULT - ASSESSMENT
43F PMHx of MS on Rituxan, presenting with left chest wall and shoulder pain w/ concern for infection now admitted with likely septic arthritis with need for possible washout and debridement       Problem/Plan - 1:  ·  Problem: Septic arthritis. left shoulder joint   ·  Plan: Imaging consistent with septic arthritis  s/p OR   cont abx and fu cultures   check ECHO       # c/o left arm swelling and pain in elbow region :  -doppler ordered to r/o DVT   -swelling is not that much but she is c/o weakness of left UE   -? brachial plexus injury ?   -need to find from anesthesia : if she received any nerve block        Problem/Plan - 2:  ·  Problem: Multiple sclerosis.   ·  Plan: Gets Rituxan every 6 months, next dose due : holding   -On Ampyra BID at home, need to bring home med     Problem/Plan - 3:  ·  Problem: Anxiety.   ·  Plan: ISTOP reviewed Reference #: [id number]  -Cont. Clonazepam 0.5mg QHS PRN  -Cont. Sertraline 100mg QHS  -Cont. Duloxetine 60mg QHS, pt unsure of dose  -Need full med rec.     Problem/Plan - 4:  ·  Problem: Anemia.   ·  Plan: hgb 9, lower than prior year. Pt endorses some recent anemia but cannot specify  -Trend cbc  -Monitor for bleeding.     Problem/Plan - 5:  ·  Problem: Prophylactic measure.   ·  Plan: DVT PPx      dispo: please ask anesthesia regarding any nerve block she received during OR for left shoulder joint wash out , as she is c/o some weakness of Left UE    43F PMHx of MS on Rituxan, presenting with left chest wall and shoulder pain w/ concern for infection now admitted with likely septic arthritis with need for possible washout and debridement       Problem/Plan - 1:  ·  Problem: Septic arthritis. left shoulder joint   ·  Plan: Imaging consistent with septic arthritis  s/p OR   cont abx and fu cultures   check ECHO       # c/o left arm swelling and pain in elbow region :  -doppler ordered to r/o DVT   -swelling is not that much but she is c/o weakness of left UE   -? brachial plexus injury ?   -need to find from anesthesia : if she received any nerve block        Problem/Plan - 2:  ·  Problem: Multiple sclerosis.   ·  Plan: Gets Rituxan every 6 months, next dose due : holding   -On Ampyra BID at home, need to bring home med     Problem/Plan - 3:  ·  Problem: Anxiety.   ·  Plan: ISTOP reviewed Reference #: [id number]  -Cont. Clonazepam 0.5mg QHS PRN  -Cont. Sertraline 100mg QHS  -Cont. Duloxetine 60mg QHS, pt unsure of dose  -Need full med rec.     Problem/Plan - 4:  ·  Problem: Anemia.   ·  Plan: hgb 9, lower than prior year. Pt endorses some recent anemia but cannot specify  -Trend cbc  -Monitor for bleeding.     Problem/Plan - 5:  ·  Problem: Prophylactic measure.   ·  Plan: DVT PPx      dispo: please ask anesthesia regarding any nerve block she received during OR for left shoulder joint wash out , as she is c/o some weakness of Left UE

## 2023-12-09 NOTE — PROGRESS NOTE ADULT - PROBLEM SELECTOR PLAN 1
-continue Tj  -record outpt  -Cont. IV Vancomycin  -Cont. IV Cefepime   -F/u BCxs  -F/U OR cultures  -Pain control>oral percocet

## 2023-12-09 NOTE — PROGRESS NOTE ADULT - SUBJECTIVE AND OBJECTIVE BOX
Patient is a 43y old  Female who presents with a chief complaint of L shoulder and chest pain (09 Dec 2023 20:33)      INTERVAL HPI/OVERNIGHT EVENTS: c/o left elbow pain and some swelling of left arm   T(C): 36.6 (23 @ 21:42), Max: 37 (23 @ 17:30)  HR: 91 (23 @ 21:42) (91 - 100)  BP: 106/69 (23 @ 21:42) (98/61 - 124/60)  RR: 18 (23 @ 21:42) (18 - 18)  SpO2: 99% (23 @ 21:42) (97% - 100%)  Wt(kg): --  I&O's Summary    08 Dec 2023 07:01  -  09 Dec 2023 07:00  --------------------------------------------------------  IN: 1450 mL / OUT: 90 mL / NET: 1360 mL    09 Dec 2023 07:01  -  09 Dec 2023 23:44  --------------------------------------------------------  IN: 840 mL / OUT: 0 mL / NET: 840 mL        PAST MEDICAL & SURGICAL HISTORY:  Multiple sclerosis      History of           SOCIAL HISTORY  Alcohol:  Tobacco:  Illicit substance use:    FAMILY HISTORY:    REVIEW OF SYSTEMS:  CONSTITUTIONAL: No fever, weight loss, or fatigue  EYES: No eye pain, visual disturbances, or discharge  ENMT:  No difficulty hearing, tinnitus, vertigo; No sinus or throat pain  NECK: No pain or stiffness  RESPIRATORY: No cough, wheezing, chills or hemoptysis; No shortness of breath  CARDIOVASCULAR: No chest pain, palpitations, dizziness, or leg swelling  GASTROINTESTINAL: No abdominal or epigastric pain. No nausea, vomiting, or hematemesis; No diarrhea or constipation. No melena or hematochezia.  GENITOURINARY: No dysuria, frequency, hematuria, or incontinence  NEUROLOGICAL: No headaches, memory loss, loss of strength, numbness, or tremors  SKIN: No itching, burning, rashes, or lesions   LYMPH NODES: No enlarged glands  ENDOCRINE: No heat or cold intolerance; No hair loss  MUSCULOSKELETAL: No joint pain or swelling; No muscle, back, or extremity pain  PSYCHIATRIC: No depression, anxiety, mood swings, or difficulty sleeping  HEME/LYMPH: No easy bruising, or bleeding gums  ALLERY AND IMMUNOLOGIC: No hives or eczema    RADIOLOGY & ADDITIONAL TESTS:    Imaging Personally Reviewed:  [ ] YES  [ ] NO    Consultant(s) Notes Reviewed:  [ ] YES  [ ] NO    PHYSICAL EXAM:  GENERAL: NAD, well-groomed, well-developed  HEAD:  Atraumatic, Normocephalic  EYES: EOMI, PERRLA, conjunctiva and sclera clear  ENMT: No tonsillar erythema, exudates, or enlargement; Moist mucous membranes, Good dentition, No lesions  NECK: Supple, No JVD, Normal thyroid  NERVOUS SYSTEM:  Alert & Oriented X3, Good concentration; Motor Strength 5/5 B/L upper and lower extremities; DTRs 2+ intact and symmetric  CHEST/LUNG: Clear to percussion bilaterally; No rales, rhonchi, wheezing, or rubs  HEART: Regular rate and rhythm; No murmurs, rubs, or gallops  ABDOMEN: Soft, Nontender, Nondistended; Bowel sounds present  EXTREMITIES:  2+ Peripheral Pulses, No clubbing, cyanosis, or edema  LYMPH: No lymphadenopathy noted  SKIN: No rashes or lesions    LABS:                        7.6    7.68  )-----------( 386      ( 08 Dec 2023 12:22 )             24.9     12-    138  |  102  |  12  ----------------------------<  112<H>  3.8   |  27  |  0.48<L>    Ca    9.1      08 Dec 2023 07:18    TPro  6.2  /  Alb  3.6  /  TBili  0.2  /  DBili  x   /  AST  12  /  ALT  11  /  AlkPhos  158<H>  12-08    PT/INR - ( 08 Dec 2023 07:21 )   PT: 12.1 sec;   INR: 1.16 ratio         PTT - ( 08 Dec 2023 07:21 )  PTT:31.7 sec  Urinalysis Basic - ( 08 Dec 2023 07:18 )    Color: x / Appearance: x / SG: x / pH: x  Gluc: 112 mg/dL / Ketone: x  / Bili: x / Urobili: x   Blood: x / Protein: x / Nitrite: x   Leuk Esterase: x / RBC: x / WBC x   Sq Epi: x / Non Sq Epi: x / Bacteria: x      CAPILLARY BLOOD GLUCOSE            Urinalysis Basic - ( 08 Dec 2023 07:18 )    Color: x / Appearance: x / SG: x / pH: x  Gluc: 112 mg/dL / Ketone: x  / Bili: x / Urobili: x   Blood: x / Protein: x / Nitrite: x   Leuk Esterase: x / RBC: x / WBC x   Sq Epi: x / Non Sq Epi: x / Bacteria: x        MEDICATIONS  (STANDING):  cefepime   IVPB 2000 milliGRAM(s) IV Intermittent every 8 hours  chlorhexidine 2% Cloths 1 Application(s) Topical <User Schedule>  dalfampridine ER 10 milliGRAM(s) Oral every 12 hours  DULoxetine 60 milliGRAM(s) Oral at bedtime  enoxaparin Injectable 40 milliGRAM(s) SubCutaneous every 24 hours  lactated ringers. 1000 milliLiter(s) (75 mL/Hr) IV Continuous <Continuous>  senna 2 Tablet(s) Oral at bedtime  sertraline 100 milliGRAM(s) Oral at bedtime  vancomycin  IVPB 1000 milliGRAM(s) IV Intermittent every 8 hours  Vibegron (Gemtesa) 75 milliGRAM(s) 1 Tablet(s) Oral daily    MEDICATIONS  (PRN):  clonazePAM  Tablet 0.5 milliGRAM(s) Oral at bedtime PRN Anxiety  HYDROmorphone  Injectable 1 milliGRAM(s) IV Push every 8 hours PRN Severe Pain (7 - 10)  oxycodone    5 mG/acetaminophen 325 mG 1 Tablet(s) Oral every 4 hours PRN Moderate Pain (4 - 6)      Care Discussed with Consultants/Other Providers [ ] YES  [ ] NO

## 2023-12-09 NOTE — PROGRESS NOTE ADULT - SUBJECTIVE AND OBJECTIVE BOX
HPI:  43F PMHx of MS on Rituxan, presenting with left chest wall and shoulder pain. Recent history of fall secondary to unsteadiness due to MS. On review of ortho notes, pt has had multiple falls in November. Obtained outpatient MRI of left sternoclavicular joint with results significant for marrow edema and swelling of the left sternoclavicular joint with a 2 cm fluid collection and adjacent pleural edema in the left chest with concerns for infectious etiology.  Patient initially saw orthopedics with for concerns for septic arthritis of this joint.  Impression of the read showed soft tissue abscess superficial to the joint.  Recommended CT scan with IV contrast of the chest.  Patient denies any fevers however endorsing erythema to the left sternoclavicular joint region. Patient states she was recently hospitalized over a week ago at [facility] for falls secondary to unsteadiness due to her MS and UTI. Hospital stay was for about 6 days. Denies any recent falls today, vision changes. Endorsing headache. Denies any chest pain, shortness of breath, abdominal pain, nausea vomiting diarrhea, urinary complaints    Patient obtained CT chest w/ IV contrast which showed likely septic arthritis with surrounding erythema. Thoracic surgery consulted, plan for OR today with washout and debridement.     Subjective:  Patient seen at bedside.  Patient is comfortable.  Still sore at the procedural site. No overnight events.  Afebrile    ROS:  14 point ROS negative except for above    Allergies    No Known Allergies    Intolerances    MEDICATIONS  (STANDING):  cefepime   IVPB 2000 milliGRAM(s) IV Intermittent every 8 hours  chlorhexidine 2% Cloths 1 Application(s) Topical <User Schedule>  enoxaparin Injectable 40 milliGRAM(s) SubCutaneous every 24 hours  HYDROmorphone PCA (1 mG/mL) 30 milliLiter(s) PCA Continuous PCA Continuous  senna 2 Tablet(s) Oral at bedtime  vancomycin  IVPB 1000 milliGRAM(s) IV Intermittent every 12 hours    MEDICATIONS  (PRN):  HYDROmorphone  Injectable 1 milliGRAM(s) IV Push every 8 hours PRN Severe Pain (7 - 10)  HYDROmorphone PCA (1 mG/mL) Rescue Clinician Bolus 0.5 milliGRAM(s) IV Push every 15 minutes PRN for Pain Scale GREATER THAN 6  naloxone Injectable 0.1 milliGRAM(s) IV Push every 3 minutes PRN For ANY of the following changes in patient status:  A. RR LESS THAN 10 breaths per minute, B. Oxygen saturation LESS THAN 90%, C. Sedation score of 6  ondansetron Injectable 4 milliGRAM(s) IV Push every 6 hours PRN Nausea  oxyCODONE    IR 5 milliGRAM(s) Oral every 6 hours PRN Moderate Pain (4 - 6)  oxyCODONE    IR 10 milliGRAM(s) Oral every 8 hours PRN Severe Pain (7 - 10)      Vital Signs Last 24 Hrs  T(C): 37 (09 Dec 2023 17:30), Max: 38.7 (08 Dec 2023 18:58)  T(F): 98.6 (09 Dec 2023 17:30), Max: 101.6 (08 Dec 2023 18:58)  HR: 100 (09 Dec 2023 17:30) (94 - 101)  BP: 124/60 (09 Dec 2023 17:30) (98/61 - 124/60)  BP(mean): --  RR: 18 (09 Dec 2023 17:30) (18 - 18)  SpO2: 100% (09 Dec 2023 17:30) (97% - 100%)    Parameters below as of 09 Dec 2023 17:30  Patient On (Oxygen Delivery Method): room air    GENERAL: NAD, well-developed  HEAD:  Atraumatic, Normocephalic  EYES: EOMI, PERRLA, conjunctiva and sclera clear  NECK: Supple, No JVD  CHEST/LUNG: Clear to auscultation bilaterally; No wheeze  HEART: Regular rate and rhythm; No murmurs, rubs, or gallops  ABDOMEN: Soft, Nontender, Nondistended; Bowel sounds present  EXTREMITIES:  2+ Peripheral Pulses, No clubbing, cyanosis, or edema  NEUROLOGY: non-focal  SKIN: No rashes or lesions                                   7.6    7.68  )-----------( 386      ( 08 Dec 2023 12:22 )             24.9   12-08    138  |  102  |  12  ----------------------------<  112<H>  3.8   |  27  |  0.48<L>    Ca    9.1      08 Dec 2023 07:18    TPro  6.2  /  Alb  3.6  /  TBili  0.2  /  DBili  x   /  AST  12  /  ALT  11  /  AlkPhos  158<H>  12-08   HPI:  43F PMHx of MS on Rituxan, presenting with left chest wall and shoulder pain. Recent history of fall secondary to unsteadiness due to MS. On review of ortho notes, pt has had multiple falls in November. Obtained outpatient MRI of left sternoclavicular joint with results significant for marrow edema and swelling of the left sternoclavicular joint with a 2 cm fluid collection and adjacent pleural edema in the left chest with concerns for infectious etiology.  Patient initially saw orthopedics with for concerns for septic arthritis of this joint.  Impression of the read showed soft tissue abscess superficial to the joint.  Recommended CT scan with IV contrast of the chest.  Patient denies any fevers however endorsing erythema to the left sternoclavicular joint region. Patient states she was recently hospitalized over a week ago at [facility] for falls secondary to unsteadiness due to her MS and UTI. Hospital stay was for about 6 days. Denies any recent falls today, vision changes. Endorsing headache. Denies any chest pain, shortness of breath, abdominal pain, nausea vomiting diarrhea, urinary complaints    Patient obtained CT chest w/ IV contrast which showed likely septic arthritis with surrounding erythema. Thoracic surgery consulted, plan for OR today with washout and debridement.     Subjective:  Patient seen at bedside.  Patient is comfortable.  Still sore at the procedural site. No overnight events.  Afebrile    ROS:  14 point ROS negative except for above    Allergies    No Known Allergies    Intolerances    MEDICATIONS  (STANDING):  cefepime   IVPB 2000 milliGRAM(s) IV Intermittent every 8 hours  chlorhexidine 2% Cloths 1 Application(s) Topical <User Schedule>  enoxaparin Injectable 40 milliGRAM(s) SubCutaneous every 24 hours  HYDROmorphone PCA (1 mG/mL) 30 milliLiter(s) PCA Continuous PCA Continuous  senna 2 Tablet(s) Oral at bedtime  vancomycin  IVPB 1000 milliGRAM(s) IV Intermittent every 12 hours    MEDICATIONS  (PRN):  HYDROmorphone  Injectable 1 milliGRAM(s) IV Push every 8 hours PRN Severe Pain (7 - 10)  HYDROmorphone PCA (1 mG/mL) Rescue Clinician Bolus 0.5 milliGRAM(s) IV Push every 15 minutes PRN for Pain Scale GREATER THAN 6  naloxone Injectable 0.1 milliGRAM(s) IV Push every 3 minutes PRN For ANY of the following changes in patient status:  A. RR LESS THAN 10 breaths per minute, B. Oxygen saturation LESS THAN 90%, C. Sedation score of 6  ondansetron Injectable 4 milliGRAM(s) IV Push every 6 hours PRN Nausea  oxyCODONE    IR 5 milliGRAM(s) Oral every 6 hours PRN Moderate Pain (4 - 6)  oxyCODONE    IR 10 milliGRAM(s) Oral every 8 hours PRN Severe Pain (7 - 10)      Vital Signs Last 24 Hrs  T(C): 37 (09 Dec 2023 17:30), Max: 38.7 (08 Dec 2023 18:58)  T(F): 98.6 (09 Dec 2023 17:30), Max: 101.6 (08 Dec 2023 18:58)  HR: 100 (09 Dec 2023 17:30) (94 - 101)  BP: 124/60 (09 Dec 2023 17:30) (98/61 - 124/60)  BP(mean): --  RR: 18 (09 Dec 2023 17:30) (18 - 18)  SpO2: 100% (09 Dec 2023 17:30) (97% - 100%)    Parameters below as of 09 Dec 2023 17:30  Patient On (Oxygen Delivery Method): room air    GENERAL: NAD, well-developed  HEAD:  Atraumatic, Normocephalic  EYES: EOMI, PERRLA, conjunctiva and sclera clear  NECK: Supple, No JVD  CHEST/LUNG: Clear to auscultation bilaterally; No wheeze  HEART: Regular rate and rhythm; No murmurs, rubs, or gallops  ABDOMEN: Soft, Nontender, Nondistended; Bowel sounds present  EXTREMITIES:  2+ Peripheral Pulses, No clubbing, cyanosis, or edema  NEUROLOGY: non-focal  SKIN: No rashes or lesions                                   7.6    7.68  )-----------( 386      ( 08 Dec 2023 12:22 )             24.9   12-08    138  |  102  |  12  ----------------------------<  112<H>  3.8   |  27  |  0.48<L>    Ca    9.1      08 Dec 2023 07:18    TPro  6.2  /  Alb  3.6  /  TBili  0.2  /  DBili  x   /  AST  12  /  ALT  11  /  AlkPhos  158<H>  12-08

## 2023-12-09 NOTE — PROGRESS NOTE ADULT - ASSESSMENT
43F PMHx of MS on immunosuppressive medication, presenting with left chest wall and shoulder pain. Recent history of fall secondary to unsteadiness due to MS. Obtained outpatient MRI of left sternoclavicular joint with results significant for marrow edema and swelling of the left sternoclavicular joint with a 2 cm fluid collection and adjacent pleural edema in the left chest with concerns for infectious etiology.  Patient initially saw orthopedics with for concerns for septic arthritis of this joint.  Impression of the read showed soft tissue abscess superficial to the joint.  Recommended CT scan with IV contrast of the chest.  Patient denies any fevers however endorsing erythema to the left sternoclavicular joint region. Patient states she was recently hospitalized over a week ago at [facility] for falls secondary to unsteadiness due to her MS.  Denies any recent falls today, vision changes. Endorsing headache. Denies any chest pain, shortness of breath, abdominal pain, nausea vomiting diarrhea, urinary complaints.  CT chest with IV in ED significant for Septic arthritis of the left sternoclavicular joint and involving the articulation of the sternum with the first costal cartilage.Extensive surrounding inflammation, with pneumonia in the underlying subpleural left upper lobe.No fluid collection is evident.  Workup significant for elevated alk phos (248), . VSS and afebrile  s/p Debridement of left sternoclavicular joint with excision of left clavicular head. Purulent drainage at the sternoclavicular joint. Tissue surrounding left clavicle found to be very inflamed. Copious irrigation of surgical site.   Concerned about pain at surgical site.    at bedside     43F PMHx of MS on immunosuppressive medication, presenting with left chest wall and shoulder pain. Recent history of fall secondary to unsteadiness due to MS. Obtained outpatient MRI of left sternoclavicular joint with results significant for marrow edema and swelling of the left sternoclavicular joint with a 2 cm fluid collection and adjacent pleural edema in the left chest with concerns for infectious etiology.  Patient initially saw orthopedics with for concerns for septic arthritis of this joint.  Impression of the read showed soft tissue abscess superficial to the joint.  Recommended CT scan with IV contrast of the chest.  Patient denies any fevers however endorsing erythema to the left sternoclavicular joint region. Patient states she was recently hospitalized over a week ago at [facility] for falls secondary to unsteadiness due to her MS.  Denies any recent falls today, vision changes. Endorsing headache. Denies any chest pain, shortness of breath, abdominal pain, nausea vomiting diarrhea, urinary complaints.  CT chest with IV in ED significant for Septic arthritis of the left sternoclavicular joint and involving the articulation of the sternum with the first costal cartilage.Extensive surrounding inflammation, with pneumonia in the underlying subpleural left upper lobe.No fluid collection is evident.  Workup significant for elevated alk phos (248), . VSS and afebrile  s/p Debridement of left sternoclavicular joint with excision of left clavicular head. Purulent drainage at the sternoclavicular joint. Tissue surrounding left clavicle found to be very inflamed. Copious irrigation of surgical site.   Concerned about pain at surgical site.    at bedside

## 2023-12-09 NOTE — PROGRESS NOTE ADULT - SUBJECTIVE AND OBJECTIVE BOX
Subjective: Patient seen and examined. No new events except as noted.    Left upper arm fullness/pain   febrile     REVIEW OF SYSTEMS:    CONSTITUTIONAL: + weakness, fevers or chills  EYES/ENT: No visual changes;  No vertigo or throat pain   NECK: No pain or stiffness  RESPIRATORY: No cough, wheezing, hemoptysis; No shortness of breath  CARDIOVASCULAR: No chest pain or palpitations  GASTROINTESTINAL: No abdominal or epigastric pain. No nausea, vomiting, or hematemesis; No diarrhea or constipation. No melena or hematochezia.  GENITOURINARY: No dysuria, frequency or hematuria  NEUROLOGICAL: No numbness or weakness  SKIN: No itching, burning, rashes, or lesions   All other review of systems is negative unless indicated above.    MEDICATIONS:  MEDICATIONS  (STANDING):  cefepime   IVPB 2000 milliGRAM(s) IV Intermittent every 8 hours  chlorhexidine 2% Cloths 1 Application(s) Topical <User Schedule>  dalfampridine ER 10 milliGRAM(s) Oral every 12 hours  DULoxetine 60 milliGRAM(s) Oral at bedtime  enoxaparin Injectable 40 milliGRAM(s) SubCutaneous every 24 hours  senna 2 Tablet(s) Oral at bedtime  sertraline 100 milliGRAM(s) Oral at bedtime  vancomycin  IVPB 1000 milliGRAM(s) IV Intermittent every 8 hours  Vibegron (Gemtesa) 75 milliGRAM(s)   Oral daily      PHYSICAL EXAM:  T(C): 37 (12-09-23 @ 17:30), Max: 37.3 (12-08-23 @ 21:06)  HR: 100 (12-09-23 @ 17:30) (94 - 101)  BP: 124/60 (12-09-23 @ 17:30) (98/61 - 124/60)  RR: 18 (12-09-23 @ 17:30) (18 - 18)  SpO2: 100% (12-09-23 @ 17:30) (97% - 100%)  Wt(kg): --  I&O's Summary    08 Dec 2023 07:01  -  09 Dec 2023 07:00  --------------------------------------------------------  IN: 1450 mL / OUT: 90 mL / NET: 1360 mL    09 Dec 2023 07:01  -  09 Dec 2023 20:33  --------------------------------------------------------  IN: 840 mL / OUT: 0 mL / NET: 840 mL          Appearance: NAD  HEENT:   Dry  oral mucosa, PERRL, EOMI	  Lymphatic: No lymphadenopathy  Cardiovascular: Normal S1 S2, No JVD, No murmurs, No edema  Respiratory: Decreased bs   Psychiatry: A & O x 3, sleepy   Gastrointestinal:  Soft, Non-tender, + BS	  Skin: No rashes, No ecchymoses, No cyanosis	  Neurologic: Non-focal  Extremities: Normal range of motion, No clubbing, cyanosis or edema  Vascular: Peripheral pulses palpable 2+ bilaterally    LABS:    CARDIAC MARKERS:                                7.6    7.68  )-----------( 386      ( 08 Dec 2023 12:22 )             24.9     12-08    138  |  102  |  12  ----------------------------<  112<H>  3.8   |  27  |  0.48<L>    Ca    9.1      08 Dec 2023 07:18    TPro  6.2  /  Alb  3.6  /  TBili  0.2  /  DBili  x   /  AST  12  /  ALT  11  /  AlkPhos  158<H>  12-08    proBNP:   Lipid Profile:   HgA1c:   TSH: Thyroid Stimulating Hormone, Serum: 1.51 uIU/mL (12-09 @ 07:17)              TELEMETRY: 	    ECG:  	  RADIOLOGY:   DIAGNOSTIC TESTING:  [ ] Echocardiogram:  [ ]  Catheterization:  [ ] Stress Test:    OTHER:

## 2023-12-09 NOTE — PROGRESS NOTE ADULT - PROBLEM SELECTOR PLAN 1
s/p Debridement of left sternoclavicular joint with excision of left clavicular head. Purulent drainage at the sternoclavicular joint. Tissue surrounding left clavicle found to be very inflamed. Copious irrigation of surgical site.   IV abx   Pain control   Check TTE  Monitor Hgb    DVT ppx.  start Gentle IVF

## 2023-12-10 LAB
ANION GAP SERPL CALC-SCNC: 12 MMOL/L — SIGNIFICANT CHANGE UP (ref 5–17)
ANION GAP SERPL CALC-SCNC: 12 MMOL/L — SIGNIFICANT CHANGE UP (ref 5–17)
BUN SERPL-MCNC: 7 MG/DL — SIGNIFICANT CHANGE UP (ref 7–23)
BUN SERPL-MCNC: 7 MG/DL — SIGNIFICANT CHANGE UP (ref 7–23)
CALCIUM SERPL-MCNC: 9.1 MG/DL — SIGNIFICANT CHANGE UP (ref 8.4–10.5)
CALCIUM SERPL-MCNC: 9.1 MG/DL — SIGNIFICANT CHANGE UP (ref 8.4–10.5)
CHLORIDE SERPL-SCNC: 100 MMOL/L — SIGNIFICANT CHANGE UP (ref 96–108)
CHLORIDE SERPL-SCNC: 100 MMOL/L — SIGNIFICANT CHANGE UP (ref 96–108)
CO2 SERPL-SCNC: 24 MMOL/L — SIGNIFICANT CHANGE UP (ref 22–31)
CO2 SERPL-SCNC: 24 MMOL/L — SIGNIFICANT CHANGE UP (ref 22–31)
CREAT SERPL-MCNC: 0.41 MG/DL — LOW (ref 0.5–1.3)
CREAT SERPL-MCNC: 0.41 MG/DL — LOW (ref 0.5–1.3)
EGFR: 124 ML/MIN/1.73M2 — SIGNIFICANT CHANGE UP
EGFR: 124 ML/MIN/1.73M2 — SIGNIFICANT CHANGE UP
GLUCOSE SERPL-MCNC: 109 MG/DL — HIGH (ref 70–99)
GLUCOSE SERPL-MCNC: 109 MG/DL — HIGH (ref 70–99)
HCT VFR BLD CALC: 25 % — LOW (ref 34.5–45)
HCT VFR BLD CALC: 25 % — LOW (ref 34.5–45)
HGB BLD-MCNC: 7.6 G/DL — LOW (ref 11.5–15.5)
HGB BLD-MCNC: 7.6 G/DL — LOW (ref 11.5–15.5)
MCHC RBC-ENTMCNC: 24.8 PG — LOW (ref 27–34)
MCHC RBC-ENTMCNC: 24.8 PG — LOW (ref 27–34)
MCHC RBC-ENTMCNC: 30.4 GM/DL — LOW (ref 32–36)
MCHC RBC-ENTMCNC: 30.4 GM/DL — LOW (ref 32–36)
MCV RBC AUTO: 81.7 FL — SIGNIFICANT CHANGE UP (ref 80–100)
MCV RBC AUTO: 81.7 FL — SIGNIFICANT CHANGE UP (ref 80–100)
NRBC # BLD: 0 /100 WBCS — SIGNIFICANT CHANGE UP (ref 0–0)
NRBC # BLD: 0 /100 WBCS — SIGNIFICANT CHANGE UP (ref 0–0)
PLATELET # BLD AUTO: 411 K/UL — HIGH (ref 150–400)
PLATELET # BLD AUTO: 411 K/UL — HIGH (ref 150–400)
POTASSIUM SERPL-MCNC: 3.9 MMOL/L — SIGNIFICANT CHANGE UP (ref 3.5–5.3)
POTASSIUM SERPL-MCNC: 3.9 MMOL/L — SIGNIFICANT CHANGE UP (ref 3.5–5.3)
POTASSIUM SERPL-SCNC: 3.9 MMOL/L — SIGNIFICANT CHANGE UP (ref 3.5–5.3)
POTASSIUM SERPL-SCNC: 3.9 MMOL/L — SIGNIFICANT CHANGE UP (ref 3.5–5.3)
RBC # BLD: 3.06 M/UL — LOW (ref 3.8–5.2)
RBC # BLD: 3.06 M/UL — LOW (ref 3.8–5.2)
RBC # FLD: 13.9 % — SIGNIFICANT CHANGE UP (ref 10.3–14.5)
RBC # FLD: 13.9 % — SIGNIFICANT CHANGE UP (ref 10.3–14.5)
SODIUM SERPL-SCNC: 136 MMOL/L — SIGNIFICANT CHANGE UP (ref 135–145)
SODIUM SERPL-SCNC: 136 MMOL/L — SIGNIFICANT CHANGE UP (ref 135–145)
VANCOMYCIN TROUGH SERPL-MCNC: 9.8 UG/ML — LOW (ref 10–20)
VANCOMYCIN TROUGH SERPL-MCNC: 9.8 UG/ML — LOW (ref 10–20)
WBC # BLD: 9.7 K/UL — SIGNIFICANT CHANGE UP (ref 3.8–10.5)
WBC # BLD: 9.7 K/UL — SIGNIFICANT CHANGE UP (ref 3.8–10.5)
WBC # FLD AUTO: 9.7 K/UL — SIGNIFICANT CHANGE UP (ref 3.8–10.5)
WBC # FLD AUTO: 9.7 K/UL — SIGNIFICANT CHANGE UP (ref 3.8–10.5)

## 2023-12-10 PROCEDURE — 93971 EXTREMITY STUDY: CPT | Mod: 26,LT

## 2023-12-10 PROCEDURE — 71045 X-RAY EXAM CHEST 1 VIEW: CPT | Mod: 26

## 2023-12-10 PROCEDURE — 99232 SBSQ HOSP IP/OBS MODERATE 35: CPT

## 2023-12-10 RX ORDER — VANCOMYCIN HCL 1 G
VIAL (EA) INTRAVENOUS
Refills: 0 | Status: DISCONTINUED | OUTPATIENT
Start: 2023-12-10 | End: 2023-12-10

## 2023-12-10 RX ORDER — VANCOMYCIN HCL 1 G
1250 VIAL (EA) INTRAVENOUS ONCE
Refills: 0 | Status: COMPLETED | OUTPATIENT
Start: 2023-12-10 | End: 2023-12-10

## 2023-12-10 RX ORDER — VANCOMYCIN HCL 1 G
1250 VIAL (EA) INTRAVENOUS EVERY 8 HOURS
Refills: 0 | Status: DISCONTINUED | OUTPATIENT
Start: 2023-12-10 | End: 2023-12-10

## 2023-12-10 RX ADMIN — Medication 0.5 MILLIGRAM(S): at 22:26

## 2023-12-10 RX ADMIN — HYDROMORPHONE HYDROCHLORIDE 1 MILLIGRAM(S): 2 INJECTION INTRAMUSCULAR; INTRAVENOUS; SUBCUTANEOUS at 10:03

## 2023-12-10 RX ADMIN — Medication 166.67 MILLIGRAM(S): at 10:06

## 2023-12-10 RX ADMIN — HYDROMORPHONE HYDROCHLORIDE 1 MILLIGRAM(S): 2 INJECTION INTRAMUSCULAR; INTRAVENOUS; SUBCUTANEOUS at 00:15

## 2023-12-10 RX ADMIN — HYDROMORPHONE HYDROCHLORIDE 1 MILLIGRAM(S): 2 INJECTION INTRAMUSCULAR; INTRAVENOUS; SUBCUTANEOUS at 18:40

## 2023-12-10 RX ADMIN — HYDROMORPHONE HYDROCHLORIDE 1 MILLIGRAM(S): 2 INJECTION INTRAMUSCULAR; INTRAVENOUS; SUBCUTANEOUS at 00:45

## 2023-12-10 RX ADMIN — CEFEPIME 100 MILLIGRAM(S): 1 INJECTION, POWDER, FOR SOLUTION INTRAMUSCULAR; INTRAVENOUS at 21:17

## 2023-12-10 RX ADMIN — DULOXETINE HYDROCHLORIDE 60 MILLIGRAM(S): 30 CAPSULE, DELAYED RELEASE ORAL at 21:17

## 2023-12-10 RX ADMIN — DALFAMPRIDINE 10 MILLIGRAM(S): 10 TABLET, FILM COATED, EXTENDED RELEASE ORAL at 22:53

## 2023-12-10 RX ADMIN — HYDROMORPHONE HYDROCHLORIDE 1 MILLIGRAM(S): 2 INJECTION INTRAMUSCULAR; INTRAVENOUS; SUBCUTANEOUS at 18:10

## 2023-12-10 RX ADMIN — HYDROMORPHONE HYDROCHLORIDE 1 MILLIGRAM(S): 2 INJECTION INTRAMUSCULAR; INTRAVENOUS; SUBCUTANEOUS at 11:03

## 2023-12-10 RX ADMIN — CHLORHEXIDINE GLUCONATE 1 APPLICATION(S): 213 SOLUTION TOPICAL at 06:05

## 2023-12-10 RX ADMIN — ENOXAPARIN SODIUM 40 MILLIGRAM(S): 100 INJECTION SUBCUTANEOUS at 11:43

## 2023-12-10 RX ADMIN — CEFEPIME 100 MILLIGRAM(S): 1 INJECTION, POWDER, FOR SOLUTION INTRAMUSCULAR; INTRAVENOUS at 05:57

## 2023-12-10 RX ADMIN — SODIUM CHLORIDE 75 MILLILITER(S): 9 INJECTION, SOLUTION INTRAVENOUS at 10:06

## 2023-12-10 RX ADMIN — SODIUM CHLORIDE 75 MILLILITER(S): 9 INJECTION, SOLUTION INTRAVENOUS at 11:45

## 2023-12-10 RX ADMIN — SERTRALINE 100 MILLIGRAM(S): 25 TABLET, FILM COATED ORAL at 21:17

## 2023-12-10 RX ADMIN — SENNA PLUS 2 TABLET(S): 8.6 TABLET ORAL at 21:17

## 2023-12-10 RX ADMIN — CEFEPIME 100 MILLIGRAM(S): 1 INJECTION, POWDER, FOR SOLUTION INTRAMUSCULAR; INTRAVENOUS at 14:02

## 2023-12-10 RX ADMIN — DALFAMPRIDINE 10 MILLIGRAM(S): 10 TABLET, FILM COATED, EXTENDED RELEASE ORAL at 00:12

## 2023-12-10 NOTE — PROGRESS NOTE ADULT - ASSESSMENT
43 year old female with MS who was following at [facility] for Ocrevus infusions. Normal hemoglobin from last visit in 2022, now admitted with septic arthritis of sternoclavicular joint s/p surgical debridement and worsening anemia.     1. Anemia   - likely in the setting of inflammation and infection now a sudden decline likely related to postop   - iron studies show iron deficiency anemia  - no sign of other deficiency or hemolysis  - daily CBC  - transfuse for Hb <7   - f/u with Dr. Woods after discharge at [facility]     2. thrombocytosis   - reactive post op    3. MS   - on immunosuppressants   - send quantitative immunoglobulins   - plan per neurology     Will continue to follow 43 year old female with MS who was following at [facility] for Ocrevus infusions. Normal hemoglobin from last visit in 2022, now admitted with septic arthritis of sternoclavicular joint s/p surgical debridement and worsening anemia.     1. Anemia   - likely in the setting of inflammation and infection now a sudden decline likely related to postop   - iron studies show iron deficiency anemia  - no sign of other deficiency or hemolysis  - daily CBC  - transfuse for Hb <7   - f/u with Dr. Woods after discharge at [facility]     2. thrombocytosis   - reactive post op    3. MS   - on immunosuppressants   - send quantitative immunoglobulins   - plan per neurology     Will continue to follow

## 2023-12-10 NOTE — PROGRESS NOTE ADULT - SUBJECTIVE AND OBJECTIVE BOX
Patient is a 43y old  Female who presents with a chief complaint of sp Lt Sterno clavicular joint debridement (10 Dec 2023 14:04)      INTERVAL HPI/OVERNIGHT EVENTS:  T(C): 36.9 (12-10-23 @ 21:13), Max: 36.9 (12-10-23 @ 05:06)  HR: 88 (12-10-23 @ 21:13) (84 - 99)  BP: 124/83 (12-10-23 @ 21:13) (94/61 - 124/83)  RR: 18 (12-10-23 @ 21:13) (18 - 18)  SpO2: 99% (12-10-23 @ 21:13) (95% - 99%)  Wt(kg): --  I&O's Summary    09 Dec 2023 07:01  -  10 Dec 2023 07:00  --------------------------------------------------------  IN: 2505 mL / OUT: 5 mL / NET: 2500 mL    10 Dec 2023 07:01  -  11 Dec 2023 00:03  --------------------------------------------------------  IN: 1805 mL / OUT: 60 mL / NET: 1745 mL        PAST MEDICAL & SURGICAL HISTORY:  Multiple sclerosis      History of           SOCIAL HISTORY  Alcohol:  Tobacco:  Illicit substance use:    FAMILY HISTORY:    REVIEW OF SYSTEMS:  CONSTITUTIONAL: No fever, weight loss, or fatigue  EYES: No eye pain, visual disturbances, or discharge  ENMT:  No difficulty hearing, tinnitus, vertigo; No sinus or throat pain  NECK: No pain or stiffness  RESPIRATORY: No cough, wheezing, chills or hemoptysis; No shortness of breath  CARDIOVASCULAR: No chest pain, palpitations, dizziness, or leg swelling  GASTROINTESTINAL: No abdominal or epigastric pain. No nausea, vomiting, or hematemesis; No diarrhea or constipation. No melena or hematochezia.  GENITOURINARY: No dysuria, frequency, hematuria, or incontinence  NEUROLOGICAL: No headaches, memory loss, loss of strength, numbness, or tremors  SKIN: No itching, burning, rashes, or lesions   LYMPH NODES: No enlarged glands  ENDOCRINE: No heat or cold intolerance; No hair loss  MUSCULOSKELETAL: No joint pain or swelling; No muscle, back, or extremity pain  PSYCHIATRIC: No depression, anxiety, mood swings, or difficulty sleeping  HEME/LYMPH: No easy bruising, or bleeding gums  ALLERY AND IMMUNOLOGIC: No hives or eczema    RADIOLOGY & ADDITIONAL TESTS:    Imaging Personally Reviewed:  [ ] YES  [ ] NO    Consultant(s) Notes Reviewed:  [ ] YES  [ ] NO    PHYSICAL EXAM:  GENERAL: NAD, well-groomed, well-developed  HEAD:  Atraumatic, Normocephalic  EYES: EOMI, PERRLA, conjunctiva and sclera clear  ENMT: No tonsillar erythema, exudates, or enlargement; Moist mucous membranes, Good dentition, No lesions  NECK: Supple, No JVD, Normal thyroid  NERVOUS SYSTEM:  Alert & Oriented X3, Good concentration; Motor Strength 5/5 B/L upper and lower extremities; DTRs 2+ intact and symmetric  CHEST/LUNG: Clear to percussion bilaterally; No rales, rhonchi, wheezing, or rubs  HEART: Regular rate and rhythm; No murmurs, rubs, or gallops  ABDOMEN: Soft, Nontender, Nondistended; Bowel sounds present  EXTREMITIES:  2+ Peripheral Pulses, No clubbing, cyanosis, or edema  LYMPH: No lymphadenopathy noted  SKIN: No rashes or lesions    LABS:                        7.6    9.70  )-----------( 411      ( 10 Dec 2023 07:26 )             25.0     12-10    136  |  100  |  7   ----------------------------<  109<H>  3.9   |  24  |  0.41<L>    Ca    9.1      10 Dec 2023 07:28        Urinalysis Basic - ( 10 Dec 2023 07:28 )    Color: x / Appearance: x / SG: x / pH: x  Gluc: 109 mg/dL / Ketone: x  / Bili: x / Urobili: x   Blood: x / Protein: x / Nitrite: x   Leuk Esterase: x / RBC: x / WBC x   Sq Epi: x / Non Sq Epi: x / Bacteria: x      CAPILLARY BLOOD GLUCOSE            Urinalysis Basic - ( 10 Dec 2023 07:28 )    Color: x / Appearance: x / SG: x / pH: x  Gluc: 109 mg/dL / Ketone: x  / Bili: x / Urobili: x   Blood: x / Protein: x / Nitrite: x   Leuk Esterase: x / RBC: x / WBC x   Sq Epi: x / Non Sq Epi: x / Bacteria: x        MEDICATIONS  (STANDING):  cefepime   IVPB 2000 milliGRAM(s) IV Intermittent every 8 hours  chlorhexidine 2% Cloths 1 Application(s) Topical <User Schedule>  dalfampridine ER 10 milliGRAM(s) Oral every 12 hours  DULoxetine 60 milliGRAM(s) Oral at bedtime  enoxaparin Injectable 40 milliGRAM(s) SubCutaneous every 24 hours  lactated ringers. 1000 milliLiter(s) (75 mL/Hr) IV Continuous <Continuous>  senna 2 Tablet(s) Oral at bedtime  sertraline 100 milliGRAM(s) Oral at bedtime  Vibegron (Gemtesa) 75 milliGRAM(s) 1 Tablet(s) Oral daily    MEDICATIONS  (PRN):  clonazePAM  Tablet 0.5 milliGRAM(s) Oral at bedtime PRN Anxiety  HYDROmorphone  Injectable 1 milliGRAM(s) IV Push every 8 hours PRN Severe Pain (7 - 10)  oxycodone    5 mG/acetaminophen 325 mG 1 Tablet(s) Oral every 4 hours PRN Moderate Pain (4 - 6)      Care Discussed with Consultants/Other Providers [ ] YES  [ ] NO

## 2023-12-10 NOTE — CHART NOTE - NSCHARTNOTEFT_GEN_A_CORE
Vanco trough 9.8  Increased Vancomycin to 1250mg G3Osdly  Check vanco trough pre fourth dose    [id number] Vanco trough 9.8  Increased Vancomycin to 1250mg [id number]  Check vanco trough pre fourth dose    [id number]

## 2023-12-10 NOTE — PROGRESS NOTE ADULT - SUBJECTIVE AND OBJECTIVE BOX
HPI:  43F PMHx of MS on Rituxan, presenting with left chest wall and shoulder pain. Recent history of fall secondary to unsteadiness due to MS. On review of ortho notes, pt has had multiple falls in November. Obtained outpatient MRI of left sternoclavicular joint with results significant for marrow edema and swelling of the left sternoclavicular joint with a 2 cm fluid collection and adjacent pleural edema in the left chest with concerns for infectious etiology.  Patient initially saw orthopedics with for concerns for septic arthritis of this joint.  Impression of the read showed soft tissue abscess superficial to the joint.  Recommended CT scan with IV contrast of the chest.  Patient denies any fevers however endorsing erythema to the left sternoclavicular joint region. Patient states she was recently hospitalized over a week ago at [facility] for falls secondary to unsteadiness due to her MS and UTI. Hospital stay was for about 6 days. Denies any recent falls today, vision changes. Endorsing headache. Denies any chest pain, shortness of breath, abdominal pain, nausea vomiting diarrhea, urinary complaints    Patient obtained CT chest w/ IV contrast which showed likely septic arthritis with surrounding erythema. Thoracic surgery consulted, plan for OR today with washout and debridement.     Subjective:  Patient seen at bedside.  Patient is comfortable.  Still sore at the procedural site. No overnight events.  Afebrile.  Her left arm is weak and swollen.    ROS:  14 point ROS negative except for above    Allergies    No Known Allergies    Intolerances    MEDICATIONS  (STANDING):  cefepime   IVPB 2000 milliGRAM(s) IV Intermittent every 8 hours  chlorhexidine 2% Cloths 1 Application(s) Topical <User Schedule>  enoxaparin Injectable 40 milliGRAM(s) SubCutaneous every 24 hours  HYDROmorphone PCA (1 mG/mL) 30 milliLiter(s) PCA Continuous PCA Continuous  senna 2 Tablet(s) Oral at bedtime  vancomycin  IVPB 1000 milliGRAM(s) IV Intermittent every 12 hours    MEDICATIONS  (PRN):  HYDROmorphone  Injectable 1 milliGRAM(s) IV Push every 8 hours PRN Severe Pain (7 - 10)  HYDROmorphone PCA (1 mG/mL) Rescue Clinician Bolus 0.5 milliGRAM(s) IV Push every 15 minutes PRN for Pain Scale GREATER THAN 6  naloxone Injectable 0.1 milliGRAM(s) IV Push every 3 minutes PRN For ANY of the following changes in patient status:  A. RR LESS THAN 10 breaths per minute, B. Oxygen saturation LESS THAN 90%, C. Sedation score of 6  ondansetron Injectable 4 milliGRAM(s) IV Push every 6 hours PRN Nausea  oxyCODONE    IR 5 milliGRAM(s) Oral every 6 hours PRN Moderate Pain (4 - 6)  oxyCODONE    IR 10 milliGRAM(s) Oral every 8 hours PRN Severe Pain (7 - 10)      Vital Signs Last 24 Hrs  T(C): 36.4 (10 Dec 2023 10:20), Max: 37 (09 Dec 2023 17:30)  T(F): 97.5 (10 Dec 2023 10:20), Max: 98.6 (09 Dec 2023 17:30)  HR: 89 (10 Dec 2023 10:20) (89 - 100)  BP: 113/74 (10 Dec 2023 10:20) (102/65 - 124/60)  BP(mean): --  RR: 18 (10 Dec 2023 10:20) (18 - 18)  SpO2: 97% (10 Dec 2023 10:20) (95% - 100%)    Parameters below as of 10 Dec 2023 10:20  Patient On (Oxygen Delivery Method): room air    GENERAL: NAD, well-developed  HEAD:  Atraumatic, Normocephalic  EYES: EOMI, PERRLA, conjunctiva and sclera clear  NECK: Supple, No JVD  CHEST/LUNG: Clear to auscultation bilaterally; No wheeze  HEART: Regular rate and rhythm; No murmurs, rubs, or gallops  ABDOMEN: Soft, Nontender, Nondistended; Bowel sounds present  EXTREMITIES:  2+ Peripheral Pulses, No clubbing, cyanosis, or edema  NEUROLOGY: non-focal  SKIN: No rashes or lesions                                 7.6    9.70  )-----------( 411      ( 10 Dec 2023 07:26 )             25.0   12-10    136  |  100  |  7   ----------------------------<  109<H>  3.9   |  24  |  0.41<L>    Ca    9.1      10 Dec 2023 07:28     HPI:  43F PMHx of MS on Rituxan, presenting with left chest wall and shoulder pain. Recent history of fall secondary to unsteadiness due to MS. On review of ortho notes, pt has had multiple falls in November. Obtained outpatient MRI of left sternoclavicular joint with results significant for marrow edema and swelling of the left sternoclavicular joint with a 2 cm fluid collection and adjacent pleural edema in the left chest with concerns for infectious etiology.  Patient initially saw orthopedics with for concerns for septic arthritis of this joint.  Impression of the read showed soft tissue abscess superficial to the joint.  Recommended CT scan with IV contrast of the chest.  Patient denies any fevers however endorsing erythema to the left sternoclavicular joint region. Patient states she was recently hospitalized over a week ago at [facility] for falls secondary to unsteadiness due to her MS and UTI. Hospital stay was for about 6 days. Denies any recent falls today, vision changes. Endorsing headache. Denies any chest pain, shortness of breath, abdominal pain, nausea vomiting diarrhea, urinary complaints    Patient obtained CT chest w/ IV contrast which showed likely septic arthritis with surrounding erythema. Thoracic surgery consulted, plan for OR today with washout and debridement.     Subjective:  Patient seen at bedside.  Patient is comfortable.  Still sore at the procedural site. No overnight events.  Afebrile.  Her left arm is weak and swollen.    ROS:  14 point ROS negative except for above    Allergies    No Known Allergies    Intolerances    MEDICATIONS  (STANDING):  cefepime   IVPB 2000 milliGRAM(s) IV Intermittent every 8 hours  chlorhexidine 2% Cloths 1 Application(s) Topical <User Schedule>  enoxaparin Injectable 40 milliGRAM(s) SubCutaneous every 24 hours  HYDROmorphone PCA (1 mG/mL) 30 milliLiter(s) PCA Continuous PCA Continuous  senna 2 Tablet(s) Oral at bedtime  vancomycin  IVPB 1000 milliGRAM(s) IV Intermittent every 12 hours    MEDICATIONS  (PRN):  HYDROmorphone  Injectable 1 milliGRAM(s) IV Push every 8 hours PRN Severe Pain (7 - 10)  HYDROmorphone PCA (1 mG/mL) Rescue Clinician Bolus 0.5 milliGRAM(s) IV Push every 15 minutes PRN for Pain Scale GREATER THAN 6  naloxone Injectable 0.1 milliGRAM(s) IV Push every 3 minutes PRN For ANY of the following changes in patient status:  A. RR LESS THAN 10 breaths per minute, B. Oxygen saturation LESS THAN 90%, C. Sedation score of 6  ondansetron Injectable 4 milliGRAM(s) IV Push every 6 hours PRN Nausea  oxyCODONE    IR 5 milliGRAM(s) Oral every 6 hours PRN Moderate Pain (4 - 6)  oxyCODONE    IR 10 milliGRAM(s) Oral every 8 hours PRN Severe Pain (7 - 10)      Vital Signs Last 24 Hrs  T(C): 36.4 (10 Dec 2023 10:20), Max: 37 (09 Dec 2023 17:30)  T(F): 97.5 (10 Dec 2023 10:20), Max: 98.6 (09 Dec 2023 17:30)  HR: 89 (10 Dec 2023 10:20) (89 - 100)  BP: 113/74 (10 Dec 2023 10:20) (102/65 - 124/60)  BP(mean): --  RR: 18 (10 Dec 2023 10:20) (18 - 18)  SpO2: 97% (10 Dec 2023 10:20) (95% - 100%)    Parameters below as of 10 Dec 2023 10:20  Patient On (Oxygen Delivery Method): room air    GENERAL: NAD, well-developed  HEAD:  Atraumatic, Normocephalic  EYES: EOMI, PERRLA, conjunctiva and sclera clear  NECK: Supple, No JVD  CHEST/LUNG: Clear to auscultation bilaterally; No wheeze  HEART: Regular rate and rhythm; No murmurs, rubs, or gallops  ABDOMEN: Soft, Nontender, Nondistended; Bowel sounds present  EXTREMITIES:  2+ Peripheral Pulses, No clubbing, cyanosis, or edema  NEUROLOGY: non-focal  SKIN: No rashes or lesions                                 7.6    9.70  )-----------( 411      ( 10 Dec 2023 07:26 )             25.0   12-10    136  |  100  |  7   ----------------------------<  109<H>  3.9   |  24  |  0.41<L>    Ca    9.1      10 Dec 2023 07:28

## 2023-12-10 NOTE — PROGRESS NOTE ADULT - SUBJECTIVE AND OBJECTIVE BOX
Subjective: Patient seen and examined. No new events except as noted.   feels ok   daughter at bedside   Left arm pain   REVIEW OF SYSTEMS:    CONSTITUTIONAL: +weakness, fevers or chills  EYES/ENT: No visual changes;  No vertigo or throat pain   NECK: No pain or stiffness  RESPIRATORY: No cough, wheezing, hemoptysis; No shortness of breath  CARDIOVASCULAR: No chest pain or palpitations  GASTROINTESTINAL: No abdominal or epigastric pain. No nausea, vomiting, or hematemesis; No diarrhea or constipation. No melena or hematochezia.  GENITOURINARY: No dysuria, frequency or hematuria  NEUROLOGICAL: No numbness or weakness  SKIN: No itching, burning, rashes, or lesions   All other review of systems is negative unless indicated above.    MEDICATIONS:  MEDICATIONS  (STANDING):  cefepime   IVPB 2000 milliGRAM(s) IV Intermittent every 8 hours  chlorhexidine 2% Cloths 1 Application(s) Topical <User Schedule>  dalfampridine ER 10 milliGRAM(s) Oral every 12 hours  DULoxetine 60 milliGRAM(s) Oral at bedtime  enoxaparin Injectable 40 milliGRAM(s) SubCutaneous every 24 hours  lactated ringers. 1000 milliLiter(s) (75 mL/Hr) IV Continuous <Continuous>  senna 2 Tablet(s) Oral at bedtime  sertraline 100 milliGRAM(s) Oral at bedtime  vancomycin  IVPB 1250 milliGRAM(s) IV Intermittent once  vancomycin  IVPB 1250 milliGRAM(s) IV Intermittent every 8 hours  vancomycin  IVPB      Vibegron (Gemtesa) 75 milliGRAM(s) 1 Tablet(s) Oral daily      PHYSICAL EXAM:  T(C): 36.9 (12-10-23 @ 05:06), Max: 37 (12-09-23 @ 17:30)  HR: 93 (12-10-23 @ 05:06) (91 - 100)  BP: 102/65 (12-10-23 @ 05:06) (98/61 - 124/60)  RR: 18 (12-10-23 @ 05:06) (18 - 18)  SpO2: 96% (12-10-23 @ 05:06) (95% - 100%)  Wt(kg): --  I&O's Summary    09 Dec 2023 07:01  -  10 Dec 2023 07:00  --------------------------------------------------------  IN: 2505 mL / OUT: 5 mL / NET: 2500 mL          Appearance: NAD  HEENT:   Dry  oral mucosa, PERRL, EOMI	  Lymphatic: No lymphadenopathy  Cardiovascular: Normal S1 S2, No JVD, No murmurs, No edema  Respiratory: Decreased bs   Psychiatry: A & O x 3, sleepy   Gastrointestinal:  Soft, Non-tender, + BS	  Skin: No rashes, No ecchymoses, No cyanosis	  Neurologic: Non-focal  Extremities: Normal range of motion, No clubbing, cyanosis or edema  Vascular: Peripheral pulses palpable 2+ bilaterally        LABS:    CARDIAC MARKERS:                                7.6    9.70  )-----------( 411      ( 10 Dec 2023 07:26 )             25.0     12-10    136  |  100  |  7   ----------------------------<  109<H>  3.9   |  24  |  0.41<L>    Ca    9.1      10 Dec 2023 07:28      proBNP:   Lipid Profile:   HgA1c:   TSH:             TELEMETRY: 	    ECG:  	  RADIOLOGY:   DIAGNOSTIC TESTING:  [ ] Echocardiogram:  [ ]  Catheterization:  [ ] Stress Test:    OTHER:

## 2023-12-10 NOTE — PROGRESS NOTE ADULT - ASSESSMENT
43F PMHx of MS on immunosuppressive medication, presenting with left chest wall and shoulder pain. Recent history of fall secondary to unsteadiness due to MS. Obtained outpatient MRI of left sternoclavicular joint with results significant for marrow edema and swelling of the left sternoclavicular joint with a 2 cm fluid collection and adjacent pleural edema in the left chest with concerns for infectious etiology.  Patient initially saw orthopedics with for concerns for septic arthritis of this joint.  Impression of the read showed soft tissue abscess superficial to the joint.  Recommended CT scan with IV contrast of the chest.  Patient denies any fevers however endorsing erythema to the left sternoclavicular joint region. Patient states she was recently hospitalized over a week ago at [facility] for falls secondary to unsteadiness due to her MS.  Denies any recent falls today, vision changes. Endorsing headache. Denies any chest pain, shortness of breath, abdominal pain, nausea vomiting diarrhea, urinary complaints.  CT chest with IV in ED significant for Septic arthritis of the left sternoclavicular joint and involving the articulation of the sternum with the first costal cartilage.Extensive surrounding inflammation, with pneumonia in the underlying subpleural left upper lobe.No fluid collection is evident.  Workup significant for elevated alk phos (248), . VSS and afebrile  s/p Debridement of left sternoclavicular joint with excision of left clavicular head. Purulent drainage at the sternoclavicular joint. Tissue surrounding left clavicle found to be very inflamed. Copious irrigation of surgical site.   Concerned about pain at surgical site.    at bedside     43F PMHx of MS on immunosuppressive medication, presenting with left chest wall and shoulder pain. Recent history of fall secondary to unsteadiness due to MS. Obtained outpatient MRI of left sternoclavicular joint with results significant for marrow edema and swelling of the left sternoclavicular joint with a 2 cm fluid collection and adjacent pleural edema in the left chest with concerns for infectious etiology.  Patient initially saw orthopedics with for concerns for septic arthritis of this joint.  Impression of the read showed soft tissue abscess superficial to the joint.  Recommended CT scan with IV contrast of the chest.  Patient denies any fevers however endorsing erythema to the left sternoclavicular joint region. Patient states she was recently hospitalized over a week ago at [facility] for falls secondary to unsteadiness due to her MS.  Denies any recent falls today, vision changes. Endorsing headache. Denies any chest pain, shortness of breath, abdominal pain, nausea vomiting diarrhea, urinary complaints.  CT chest with IV in ED significant for Septic arthritis of the left sternoclavicular joint and involving the articulation of the sternum with the first costal cartilage.Extensive surrounding inflammation, with pneumonia in the underlying subpleural left upper lobe.No fluid collection is evident.  Workup significant for elevated alk phos (248), . VSS and afebrile  s/p Debridement of left sternoclavicular joint with excision of left clavicular head. Purulent drainage at the sternoclavicular joint. Tissue surrounding left clavicle found to be very inflamed. Copious irrigation of surgical site.   Concerned about pain at surgical site.    at bedside

## 2023-12-10 NOTE — PROGRESS NOTE ADULT - PROBLEM SELECTOR PLAN 1
s/p Debridement of left sternoclavicular joint with excision of left clavicular head. Purulent drainage at the sternoclavicular joint. Tissue surrounding left clavicle found to be very inflamed. Copious irrigation of surgical site.   IV abx   Pain control   Check TTE  Monitor Hgb    DVT ppx.  Cont Gentle IVF

## 2023-12-10 NOTE — PROGRESS NOTE ADULT - SUBJECTIVE AND OBJECTIVE BOX
Follow Up:  left chest wall and shoulder pain.    Interval History/ROS:  left arm pain, wants MRI done, anxious about underlying condition    Allergies  No Known Allergies      ANTIMICROBIALS:  cefepime   IVPB 2000 every 8 hours  vancomycin  IVPB 1250 every 8 hours  vancomycin  IVPB        OTHER MEDS:  MEDICATIONS  (STANDING):  clonazePAM  Tablet 0.5 at bedtime PRN  dalfampridine ER 10 every 12 hours  DULoxetine 60 at bedtime  enoxaparin Injectable 40 every 24 hours  HYDROmorphone  Injectable 1 every 8 hours PRN  oxycodone    5 mG/acetaminophen 325 mG 1 every 4 hours PRN  senna 2 at bedtime  sertraline 100 at bedtime      Vital Signs Last 24 Hrs  T(C): 36.4 (10 Dec 2023 10:20), Max: 37 (09 Dec 2023 17:30)  T(F): 97.5 (10 Dec 2023 10:20), Max: 98.6 (09 Dec 2023 17:30)  HR: 89 (10 Dec 2023 10:20) (89 - 100)  BP: 113/74 (10 Dec 2023 10:20) (102/65 - 124/60)  BP(mean): --  RR: 18 (10 Dec 2023 10:20) (18 - 18)  SpO2: 97% (10 Dec 2023 10:20) (95% - 100%)    Parameters below as of 10 Dec 2023 10:20  Patient On (Oxygen Delivery Method): room air      PHYSICAL EXAM:  General: WN/WD NAD, Non-toxic  Neurology: A&Ox3, nonfocal  Respiratory: no resp distress  Chest, intack staple line over area of  left clavicile with dull reactive erythema  Abdominal: Soft, Non-tender, non-distended,  Extremities: No edema, + peripheral pulses  Line Sites: Clear  Skin: No rash                          7.6    9.70  )-----------( 411      ( 10 Dec 2023 07:26 )             25.0   WBC Count: 9.70 (12-10 @ 07:26)  WBC Count: 7.68 (12-08 @ 12:22)  WBC Count: 6.64 (12-08 @ 07:20)  WBC Count: 9.08 (12-06 @ 20:39)      12-10    136  |  100  |  7   ----------------------------<  109<H>  3.9   |  24  |  0.41<L>    Ca    9.1      10 Dec 2023 07:28    MICROBIOLOGY:  .Blood Blood-Peripheral  12-08-23   No growth at 24 hours  --  --      .Blood Blood-Peripheral  12-08-23   No growth at 24 hours  --  --      .Tissue Other  12-07-23   Culture is being performed.  --    No polymorphonuclear leukocytes seen per low power field  No organisms seen per oil power field      .Blood Blood  12-06-23   No growth at 72 Hours  --  --      .Blood Blood  12-06-23   No growth at 72 Hours  --  --      Vancomycin Level, Trough: 9.8 ug/mL (12-10-23 @ 07:28)    Rapid RVP Result: Detected (12-08 @ 21:17)  ENTERO/Rhino    RADIOLOGY:  < from: [facility] Duplex Upper Ext Vein Scan, Left (12.10.23 @ 10:57) >  IMPRESSION:  No evidence of left upper extremity deep venous thrombosis.    < end of copied text >  < from: Xray Chest 1 View- PORTABLE-Urgent (Xray Chest 1 View- PORTABLE-Urgent .) (12.07.23 @ 13:50) >  IMPRESSION:  Status post removal of proximal sternum and clavicle with surgical   staples intact.    < end of copied text >  < from: CT Chest w/ IV Cont (12.06.23 @ 22:35) >  BONES, CHEST WALL: Erosions of the left manubriumof the sternum at its   articulation with the left clavicular head and left first chondral   cartilage. Additional erosions of the inferior aspect of the left medial   clavicular head. Surrounding extensive soft tissue swelling with   thickening of the overlying pectoralis musculature, and phlegmonous   material extending posteriorly adjacent to the pleura, into the   retrosternal mediastinal fat. Edema/inflammation extends to the patient's   right across midline. No formed fluid collection is evident. Mild left   supraclavicular lymphadenopathy.    IMPRESSION:  Septic arthritis of the left sternoclavicular joint and involving the   articulation of the sternum with the first costal cartilage.    Extensive surrounding inflammation, with pneumonia in the underlying   subpleural left upper lobe.    No fluid collection is evident.    < end of copied text >      Ta Belle MD; Division of Infectious Disease; Pager: [phone]; nights and weekends: [phone]   Follow Up:  left chest wall and shoulder pain.    Interval History/ROS:  left arm pain, wants MRI done, anxious about underlying condition    Allergies  No Known Allergies      ANTIMICROBIALS:  cefepime   IVPB 2000 every 8 hours  vancomycin  IVPB 1250 every 8 hours  vancomycin  IVPB        OTHER MEDS:  MEDICATIONS  (STANDING):  clonazePAM  Tablet 0.5 at bedtime PRN  dalfampridine ER 10 every 12 hours  DULoxetine 60 at bedtime  enoxaparin Injectable 40 every 24 hours  HYDROmorphone  Injectable 1 every 8 hours PRN  oxycodone    5 mG/acetaminophen 325 mG 1 every 4 hours PRN  senna 2 at bedtime  sertraline 100 at bedtime      Vital Signs Last 24 Hrs  T(C): 36.4 (10 Dec 2023 10:20), Max: 37 (09 Dec 2023 17:30)  T(F): 97.5 (10 Dec 2023 10:20), Max: 98.6 (09 Dec 2023 17:30)  HR: 89 (10 Dec 2023 10:20) (89 - 100)  BP: 113/74 (10 Dec 2023 10:20) (102/65 - 124/60)  BP(mean): --  RR: 18 (10 Dec 2023 10:20) (18 - 18)  SpO2: 97% (10 Dec 2023 10:20) (95% - 100%)    Parameters below as of 10 Dec 2023 10:20  Patient On (Oxygen Delivery Method): room air      PHYSICAL EXAM:  General: WN/WD NAD, Non-toxic  Neurology: A&Ox3, nonfocal  Respiratory: no resp distress  Chest, intack staple line over area of  left clavicile with dull reactive erythema  Abdominal: Soft, Non-tender, non-distended,  Extremities: No edema, + peripheral pulses  Line Sites: Clear  Skin: No rash                          7.6    9.70  )-----------( 411      ( 10 Dec 2023 07:26 )             25.0   WBC Count: 9.70 (12-10 @ 07:26)  WBC Count: 7.68 (12-08 @ 12:22)  WBC Count: 6.64 (12-08 @ 07:20)  WBC Count: 9.08 (12-06 @ 20:39)      12-10    136  |  100  |  7   ----------------------------<  109<H>  3.9   |  24  |  0.41<L>    Ca    9.1      10 Dec 2023 07:28    MICROBIOLOGY:  .Blood Blood-Peripheral  12-08-23   No growth at 24 hours  --  --      .Blood Blood-Peripheral  12-08-23   No growth at 24 hours  --  --      .Tissue Other  12-07-23   Culture is being performed.  --    No polymorphonuclear leukocytes seen per low power field  No organisms seen per oil power field      .Blood Blood  12-06-23   No growth at 72 Hours  --  --      .Blood Blood  12-06-23   No growth at 72 Hours  --  --      Vancomycin Level, Trough: 9.8 ug/mL (12-10-23 @ 07:28)    Rapid RVP Result: Detected (12-08 @ 21:17)  ENTERO/Rhino    RADIOLOGY:  < from: [facility] Duplex Upper Ext Vein Scan, Left (12.10.23 @ 10:57) >  IMPRESSION:  No evidence of left upper extremity deep venous thrombosis.    < end of copied text >  < from: Xray Chest 1 View- PORTABLE-Urgent (Xray Chest 1 View- PORTABLE-Urgent .) (12.07.23 @ 13:50) >  IMPRESSION:  Status post removal of proximal sternum and clavicle with surgical   staples intact.    < end of copied text >  < from: CT Chest w/ IV Cont (12.06.23 @ 22:35) >  BONES, CHEST WALL: Erosions of the left manubriumof the sternum at its   articulation with the left clavicular head and left first chondral   cartilage. Additional erosions of the inferior aspect of the left medial   clavicular head. Surrounding extensive soft tissue swelling with   thickening of the overlying pectoralis musculature, and phlegmonous   material extending posteriorly adjacent to the pleura, into the   retrosternal mediastinal fat. Edema/inflammation extends to the patient's   right across midline. No formed fluid collection is evident. Mild left   supraclavicular lymphadenopathy.    IMPRESSION:  Septic arthritis of the left sternoclavicular joint and involving the   articulation of the sternum with the first costal cartilage.    Extensive surrounding inflammation, with pneumonia in the underlying   subpleural left upper lobe.    No fluid collection is evident.    < end of copied text >      Ta Belle MD; Division of Infectious Disease; Pager: [phone]; nights and weekends: [phone]

## 2023-12-10 NOTE — PROGRESS NOTE ADULT - SUBJECTIVE AND OBJECTIVE BOX
VITAL SIGNS        Vital Signs Last 24 Hrs  T(C): 36.8 (12-10-23 @ 13:26), Max: 37 (12-09-23 @ 17:30)  T(F): 98.2 (12-10-23 @ 13:26), Max: 98.6 (12-09-23 @ 17:30)  HR: 84 (12-10-23 @ 13:26) (84 - 100)  BP: 94/61 (12-10-23 @ 13:26) (94/61 - 124/60)  RR: 18 (12-10-23 @ 13:26) (18 - 18)  SpO2: 96% (12-10-23 @ 13:26) (95% - 100%)                   Daily     Daily         CAPILLARY BLOOD GLUCOSE              Drains:    lt  CW Bulb    5  cc 24 hrs                      PHYSICAL EXAM    Neurology: alert and oriented x 3,   CV :  RRR     lt CW incision   w / staples   slight clear drainage slight erythema  Lungs:   CTA B/L  Abdomen: soft, nontender, nondistended, positive bowel sounds, last bowel movement   preop  Extremities:     no edema

## 2023-12-10 NOTE — PROGRESS NOTE ADULT - ASSESSMENT
43F PMHx of MS on Rituxan, presenting with left chest wall and shoulder pain w/ concern for infection now admitted with likely septic arthritis with need for possible washout and debridement    POD # 1 Debridement and washout of Left.  sternoclavicular joint excision Left clavicular head  Tj 80cc serosanguinous.  GS no org seen.  Cultures pending.  12/9 + RVP>no treatment as per ID  DC PCA  somnolent  Pain mgmt notified Transition oral Rx    12/10    pain medication w oxycodone and dilaudid for breakthrough,     Lt CW staples w bulb   w mininimal drainage   abx per ID

## 2023-12-10 NOTE — PROGRESS NOTE ADULT - PROBLEM SELECTOR PLAN 1
-continue Tj from lt CW  -record outpt   per shift    -F/u BCxs  -F/U OR cultures  -Pain control>oral percocet   Physical therapy

## 2023-12-10 NOTE — PROGRESS NOTE ADULT - ASSESSMENT
43F PMHx of MS on (Rituxan, Ocrevus) admitted 12/7/23 with left chest wall and shoulder pain. Recent history of fall secondary to unsteadiness due to MS. On review of ortho notes, pt has had multiple falls in November. She was admitted to [facility] on Last week of November and was found to have Rhinovirus and pseudomonas UTI. She completed 5-7 days course with Ciprofloxacin. Found to have Septic arthritis of SCM joint now s/p excision.    Patient obtained CT chest w/ IV contrast which showed likely septic arthritis with surrounding erythema. Thoracic surgery consulted, underwent washout and debridement.     12/7/23: OR Note: Debridement of left sternoclavicular joint with excision of left clavicular head. Purulent drainage at the sternoclavicular joint. Tissue surrounding left clavicle found to be very inflamed. Copious irrigation of surgical site.  Specimen sent: Head of left clavicle, culture of left sternoclavicular joint, culture of left manubrium, culture of deep sternal head of clavicle, culture of first rib costal cartilage      In ER: Afebrile,   12/6/23: WBC 9.8, , .   Given IV Zosyn, IV Vancomycin.  Remains afebrile, WBC stable.   Apparent Septic arthritis of the left sternoclavicular joint and involving the   articulation of the sternum with the first costal cartilage.  Extensive surrounding inflammation, with pneumonia in the underlying   subpleural left upper lobe.  (12.07.23 @ 20:28) MRSA PCR Result.: NotDetec:   Staph aureus PCR Result: NotDetected    # Apparent Septic arthritis of L sternoclavicular joint s/p excision and drainage 12/7 OR cultures negative to date  # L upper lobe opacity  # Immunosuppressed patient  # Elevated ESR/CRP.     Given negative cultures and NEG MRSA/MSSA PCR, Vanco not required  Negative cultures in this immunocompromised host raises possiblity of mycobacterial or fungal etiology  - CATRACHITA opacity likely from continuous inflammation      PLAN:  continue cefepime 2 gm Q8hrs  STOP vancomycin   ID service will continue to monitor cultures  - Check Quantiferon gold tb (in lab 12/9), fungitell, galactomanann     Patient is concerned about LUE pain and requests MRI     43F PMHx of MS on (Rituxan, Ocrevus) admitted 12/7/23 with left chest wall and shoulder pain. Recent history of fall secondary to unsteadiness due to MS. On review of ortho notes, pt has had multiple falls in November. She was admitted to [facility] on Last week of November and was found to have Rhinovirus and pseudomonas UTI. She completed 5-7 days course with Ciprofloxacin. Found to have Septic arthritis of SCM joint now s/p excision.    Patient obtained CT chest w/ IV contrast which showed likely septic arthritis with surrounding erythema. Thoracic surgery consulted, underwent washout and debridement.     12/7/23: OR Note: Debridement of left sternoclavicular joint with excision of left clavicular head. Purulent drainage at the sternoclavicular joint. Tissue surrounding left clavicle found to be very inflamed. Copious irrigation of surgical site.  Specimen sent: Head of left clavicle, culture of left sternoclavicular joint, culture of left manubrium, culture of deep sternal head of clavicle, culture of first rib costal cartilage      In ER: Afebrile,   12/6/23: WBC 9.8, , .   Given IV Zosyn, IV Vancomycin.  Remains afebrile, WBC stable.   Apparent Septic arthritis of the left sternoclavicular joint and involving the   articulation of the sternum with the first costal cartilage.  Extensive surrounding inflammation, with pneumonia in the underlying   subpleural left upper lobe.  (12.07.23 @ 20:28) MRSA PCR Result.: NotDetec:   Staph aureus PCR Result: NotDetected    # Apparent Septic arthritis of L sternoclavicular joint s/p excision and drainage 12/7 OR cultures negative to date  # L upper lobe opacity  # Immunosuppressed patient  # Elevated ESR/CRP.     Given negative cultures and NEG MRSA/MSSA PCR, Vanco not required  Negative cultures in this immunocompromised host raises possiblity of mycobacterial or fungal etiology  - CATRACHITA opacity likely from continuous inflammation      PLAN:  continue cefepime 2 gm Q8hrs  STOP vancomycin   ID service will continue to monitor cultures  - Check Quantiferon gold tb (in lab 12/9), fungitell, galactomanann     Patient is concerned about LUE pain and requests MRI

## 2023-12-10 NOTE — PROGRESS NOTE ADULT - ASSESSMENT
43F PMHx of MS on Rituxan, presenting with left chest wall and shoulder pain w/ concern for infection now admitted with likely septic arthritis with need for possible washout and debridement       Problem/Plan - 1:  ·  Problem: Septic arthritis. left shoulder joint   ·  Plan: Imaging consistent with septic arthritis  s/p OR   cont abx and fu cultures   check ECHO       # c/o left arm swelling and pain in elbow region :  -doppler ordered to r/o DVT   -swelling is not that much but she is c/o weakness of left UE      Problem/Plan - 2:  ·  Problem: Multiple sclerosis.   ·  Plan: Gets Rituxan every 6 months, next dose due : holding   -On Ampyra BID at home, need to bring home med     Problem/Plan - 3:  ·  Problem: Anxiety.   ·  Plan: ISTOP reviewed Reference #: [id number]  -Cont. Clonazepam 0.5mg QHS PRN  -Cont. Sertraline 100mg QHS  -Cont. Duloxetine 60mg QHS, pt unsure of dose  -Need full med rec.     Problem/Plan - 4:  ·  Problem: Anemia.   ·  Plan: hgb 9, lower than prior year. Pt endorses some recent anemia but cannot specify  -Trend cbc  -Monitor for bleeding.     Problem/Plan - 5:  ·  Problem: Prophylactic measure.   ·  Plan: DVT PPx         43F PMHx of MS on Rituxan, presenting with left chest wall and shoulder pain w/ concern for infection now admitted with likely septic arthritis with need for possible washout and debridement       Problem/Plan - 1:  ·  Problem: Septic arthritis. left shoulder joint   ·  Plan: Imaging consistent with septic arthritis  s/p OR   cont abx and fu cultures   check ECHO       # c/o left arm swelling and pain in elbow region :  -doppler ordered to r/o DVT   -swelling is not that much but she is c/o weakness of left UE      Problem/Plan - 2:  ·  Problem: Multiple sclerosis.   ·  Plan: Gets Rituxan every 6 months, next dose due : holding   -On Ampyra BID at home, need to bring home med     Problem/Plan - 3:  ·  Problem: Anxiety.   ·  Plan: ISTOP reviewed Reference #: [id number]  -Cont. Clonazepam 0.5mg QHS PRN  -Cont. Sertraline 100mg QHS  -Cont. Duloxetine 60mg QHS, pt unsure of dose  -Need full med rec.     Problem/Plan - 4:  ·  Problem: Anemia.   ·  Plan: hgb 9, lower than prior year. Pt endorses some recent anemia but cannot specify  -Trend cbc  -Monitor for bleeding.     Problem/Plan - 5:  ·  Problem: Prophylactic measure.   ·  Plan: DVT PPx

## 2023-12-11 LAB
HCT VFR BLD CALC: 29 % — LOW (ref 34.5–45)
HCT VFR BLD CALC: 29 % — LOW (ref 34.5–45)
HGB BLD-MCNC: 8.7 G/DL — LOW (ref 11.5–15.5)
HGB BLD-MCNC: 8.7 G/DL — LOW (ref 11.5–15.5)
MCHC RBC-ENTMCNC: 24.8 PG — LOW (ref 27–34)
MCHC RBC-ENTMCNC: 24.8 PG — LOW (ref 27–34)
MCHC RBC-ENTMCNC: 30 GM/DL — LOW (ref 32–36)
MCHC RBC-ENTMCNC: 30 GM/DL — LOW (ref 32–36)
MCV RBC AUTO: 82.6 FL — SIGNIFICANT CHANGE UP (ref 80–100)
MCV RBC AUTO: 82.6 FL — SIGNIFICANT CHANGE UP (ref 80–100)
NRBC # BLD: 0 /100 WBCS — SIGNIFICANT CHANGE UP (ref 0–0)
NRBC # BLD: 0 /100 WBCS — SIGNIFICANT CHANGE UP (ref 0–0)
PLATELET # BLD AUTO: 496 K/UL — HIGH (ref 150–400)
PLATELET # BLD AUTO: 496 K/UL — HIGH (ref 150–400)
RBC # BLD: 3.51 M/UL — LOW (ref 3.8–5.2)
RBC # BLD: 3.51 M/UL — LOW (ref 3.8–5.2)
RBC # FLD: 14.2 % — SIGNIFICANT CHANGE UP (ref 10.3–14.5)
RBC # FLD: 14.2 % — SIGNIFICANT CHANGE UP (ref 10.3–14.5)
WBC # BLD: 12.26 K/UL — HIGH (ref 3.8–10.5)
WBC # BLD: 12.26 K/UL — HIGH (ref 3.8–10.5)
WBC # FLD AUTO: 12.26 K/UL — HIGH (ref 3.8–10.5)
WBC # FLD AUTO: 12.26 K/UL — HIGH (ref 3.8–10.5)

## 2023-12-11 PROCEDURE — 71260 CT THORAX DX C+: CPT | Mod: 26

## 2023-12-11 PROCEDURE — 99221 1ST HOSP IP/OBS SF/LOW 40: CPT

## 2023-12-11 PROCEDURE — 73221 MRI JOINT UPR EXTREM W/O DYE: CPT | Mod: 26,59,LT

## 2023-12-11 PROCEDURE — 73223 MRI JOINT UPR EXTR W/O&W/DYE: CPT | Mod: 26,LT

## 2023-12-11 RX ORDER — OXYCODONE HYDROCHLORIDE 5 MG/1
10 TABLET ORAL EVERY 8 HOURS
Refills: 0 | Status: DISCONTINUED | OUTPATIENT
Start: 2023-12-11 | End: 2023-12-14

## 2023-12-11 RX ORDER — ACETAMINOPHEN 500 MG
650 TABLET ORAL ONCE
Refills: 0 | Status: COMPLETED | OUTPATIENT
Start: 2023-12-11 | End: 2023-12-11

## 2023-12-11 RX ORDER — CLONAZEPAM 1 MG
0.5 TABLET ORAL AT BEDTIME
Refills: 0 | Status: DISCONTINUED | OUTPATIENT
Start: 2023-12-11 | End: 2023-12-11

## 2023-12-11 RX ORDER — OXYCODONE HYDROCHLORIDE 5 MG/1
5 TABLET ORAL EVERY 6 HOURS
Refills: 0 | Status: DISCONTINUED | OUTPATIENT
Start: 2023-12-11 | End: 2023-12-15

## 2023-12-11 RX ORDER — HYDROMORPHONE HYDROCHLORIDE 2 MG/ML
1 INJECTION INTRAMUSCULAR; INTRAVENOUS; SUBCUTANEOUS EVERY 8 HOURS
Refills: 0 | Status: DISCONTINUED | OUTPATIENT
Start: 2023-12-11 | End: 2023-12-11

## 2023-12-11 RX ORDER — CLONAZEPAM 1 MG
0.5 TABLET ORAL
Refills: 0 | Status: DISCONTINUED | OUTPATIENT
Start: 2023-12-11 | End: 2023-12-14

## 2023-12-11 RX ADMIN — CEFEPIME 100 MILLIGRAM(S): 1 INJECTION, POWDER, FOR SOLUTION INTRAMUSCULAR; INTRAVENOUS at 06:04

## 2023-12-11 RX ADMIN — Medication 650 MILLIGRAM(S): at 16:30

## 2023-12-11 RX ADMIN — HYDROMORPHONE HYDROCHLORIDE 1 MILLIGRAM(S): 2 INJECTION INTRAMUSCULAR; INTRAVENOUS; SUBCUTANEOUS at 18:42

## 2023-12-11 RX ADMIN — DALFAMPRIDINE 10 MILLIGRAM(S): 10 TABLET, FILM COATED, EXTENDED RELEASE ORAL at 13:49

## 2023-12-11 RX ADMIN — ENOXAPARIN SODIUM 40 MILLIGRAM(S): 100 INJECTION SUBCUTANEOUS at 13:45

## 2023-12-11 RX ADMIN — HYDROMORPHONE HYDROCHLORIDE 1 MILLIGRAM(S): 2 INJECTION INTRAMUSCULAR; INTRAVENOUS; SUBCUTANEOUS at 09:40

## 2023-12-11 RX ADMIN — OXYCODONE HYDROCHLORIDE 10 MILLIGRAM(S): 5 TABLET ORAL at 22:50

## 2023-12-11 RX ADMIN — HYDROMORPHONE HYDROCHLORIDE 1 MILLIGRAM(S): 2 INJECTION INTRAMUSCULAR; INTRAVENOUS; SUBCUTANEOUS at 09:10

## 2023-12-11 RX ADMIN — SENNA PLUS 2 TABLET(S): 8.6 TABLET ORAL at 22:04

## 2023-12-11 RX ADMIN — DALFAMPRIDINE 10 MILLIGRAM(S): 10 TABLET, FILM COATED, EXTENDED RELEASE ORAL at 22:14

## 2023-12-11 RX ADMIN — Medication 650 MILLIGRAM(S): at 17:15

## 2023-12-11 RX ADMIN — DULOXETINE HYDROCHLORIDE 60 MILLIGRAM(S): 30 CAPSULE, DELAYED RELEASE ORAL at 22:04

## 2023-12-11 RX ADMIN — CEFEPIME 100 MILLIGRAM(S): 1 INJECTION, POWDER, FOR SOLUTION INTRAMUSCULAR; INTRAVENOUS at 13:42

## 2023-12-11 RX ADMIN — HYDROMORPHONE HYDROCHLORIDE 1 MILLIGRAM(S): 2 INJECTION INTRAMUSCULAR; INTRAVENOUS; SUBCUTANEOUS at 19:10

## 2023-12-11 RX ADMIN — OXYCODONE HYDROCHLORIDE 10 MILLIGRAM(S): 5 TABLET ORAL at 14:03

## 2023-12-11 RX ADMIN — OXYCODONE HYDROCHLORIDE 10 MILLIGRAM(S): 5 TABLET ORAL at 23:50

## 2023-12-11 RX ADMIN — CEFEPIME 100 MILLIGRAM(S): 1 INJECTION, POWDER, FOR SOLUTION INTRAMUSCULAR; INTRAVENOUS at 22:03

## 2023-12-11 RX ADMIN — SERTRALINE 100 MILLIGRAM(S): 25 TABLET, FILM COATED ORAL at 22:04

## 2023-12-11 RX ADMIN — CHLORHEXIDINE GLUCONATE 1 APPLICATION(S): 213 SOLUTION TOPICAL at 06:12

## 2023-12-11 RX ADMIN — OXYCODONE HYDROCHLORIDE 10 MILLIGRAM(S): 5 TABLET ORAL at 13:39

## 2023-12-11 RX ADMIN — Medication 0.5 MILLIGRAM(S): at 22:04

## 2023-12-11 NOTE — PROGRESS NOTE ADULT - PROBLEM SELECTOR PLAN 1
-continue Tj from Left CW  -record outpt per shift  -F/u BCxs and OR cultures  -Pain control   -Physical therapy -continue Tj from Left CW  strict output measurement per shift  may shower covering Incision/staple site with dry dressing and tegaderm    -F/u BCxs and OR cultures  -Pain control   -Physical therapy

## 2023-12-11 NOTE — PROGRESS NOTE ADULT - ASSESSMENT
43F PMHx of MS on immunosuppressive medication, presenting with left chest wall and shoulder pain. Recent history of fall secondary to unsteadiness due to MS. Obtained outpatient MRI of left sternoclavicular joint with results significant for marrow edema and swelling of the left sternoclavicular joint with a 2 cm fluid collection and adjacent pleural edema in the left chest with concerns for infectious etiology.  Patient initially saw orthopedics with for concerns for septic arthritis of this joint.  Impression of the read showed soft tissue abscess superficial to the joint.  Recommended CT scan with IV contrast of the chest.  Patient denies any fevers however endorsing erythema to the left sternoclavicular joint region. Patient states she was recently hospitalized over a week ago at [facility] for falls secondary to unsteadiness due to her MS.  Denies any recent falls today, vision changes. Endorsing headache. Denies any chest pain, shortness of breath, abdominal pain, nausea vomiting diarrhea, urinary complaints.  CT chest with IV in ED significant for Septic arthritis of the left sternoclavicular joint and involving the articulation of the sternum with the first costal cartilage.Extensive surrounding inflammation, with pneumonia in the underlying subpleural left upper lobe.No fluid collection is evident.  Workup significant for elevated alk phos (248), . VSS and afebrile  s/p Debridement of left sternoclavicular joint with excision of left clavicular head. Purulent drainage at the sternoclavicular joint. Tissue surrounding left clavicle found to be very inflamed. Copious irrigation of surgical site.   Concerned about pain at surgical site.    at bedside     43F PMHx of MS on immunosuppressive medication, presenting with left chest wall and shoulder pain. Recent history of fall secondary to unsteadiness due to MS. Obtained outpatient MRI of left sternoclavicular joint with results significant for marrow edema and swelling of the left sternoclavicular joint with a 2 cm fluid collection and adjacent pleural edema in the left chest with concerns for infectious etiology.  Patient initially saw orthopedics with for concerns for septic arthritis of this joint.  Impression of the read showed soft tissue abscess superficial to the joint.  Recommended CT scan with IV contrast of the chest.  Patient denies any fevers however endorsing erythema to the left sternoclavicular joint region. Patient states she was recently hospitalized over a week ago at [facility] for falls secondary to unsteadiness due to her MS.  Denies any recent falls today, vision changes. Endorsing headache. Denies any chest pain, shortness of breath, abdominal pain, nausea vomiting diarrhea, urinary complaints.  CT chest with IV in ED significant for Septic arthritis of the left sternoclavicular joint and involving the articulation of the sternum with the first costal cartilage.Extensive surrounding inflammation, with pneumonia in the underlying subpleural left upper lobe.No fluid collection is evident.  Workup significant for elevated alk phos (248), . VSS and afebrile  s/p Debridement of left sternoclavicular joint with excision of left clavicular head. Purulent drainage at the sternoclavicular joint. Tissue surrounding left clavicle found to be very inflamed. Copious irrigation of surgical site.   Concerned about pain at surgical site.    at bedside

## 2023-12-11 NOTE — PROGRESS NOTE ADULT - SUBJECTIVE AND OBJECTIVE BOX
Date of service: 12-11-23 @ 14:45      Patient is a 43y old  Female who presents with a chief complaint of L shoulder and chest pain (11 Dec 2023 09:28)                                                               INTERVAL HPI/OVERNIGHT EVENTS:    REVIEW OF SYSTEMS:    L chest wall  swellling /pain                                                                                                                                                                                                                                                                              Medications:  MEDICATIONS  (STANDING):  acetaminophen     Tablet .. 650 milliGRAM(s) Oral once  cefepime   IVPB 2000 milliGRAM(s) IV Intermittent every 8 hours  chlorhexidine 2% Cloths 1 Application(s) Topical <User Schedule>  clonazePAM  Tablet 0.5 milliGRAM(s) Oral <User Schedule>  dalfampridine ER 10 milliGRAM(s) Oral every 12 hours  DULoxetine 60 milliGRAM(s) Oral at bedtime  enoxaparin Injectable 40 milliGRAM(s) SubCutaneous every 24 hours  lactated ringers. 1000 milliLiter(s) (75 mL/Hr) IV Continuous <Continuous>  senna 2 Tablet(s) Oral at bedtime  sertraline 100 milliGRAM(s) Oral at bedtime  Vibegron (Gemtesa) 75 milliGRAM(s) 1 Tablet(s) Oral daily    MEDICATIONS  (PRN):  HYDROmorphone  Injectable 1 milliGRAM(s) IV Push every 8 hours PRN Severe Pain (7 - 10)  oxyCODONE    IR 5 milliGRAM(s) Oral every 6 hours PRN Mild Pain (1 - 3)  oxyCODONE    IR 10 milliGRAM(s) Oral every 8 hours PRN Moderate Pain (4 - 6)       Allergies    No Known Allergies    Intolerances      Vital Signs Last 24 Hrs  T(C): 36.9 (11 Dec 2023 13:55), Max: 37 (11 Dec 2023 05:12)  T(F): 98.4 (11 Dec 2023 13:55), Max: 98.6 (11 Dec 2023 05:12)  HR: 98 (11 Dec 2023 13:55) (88 - 99)  BP: 113/59 (11 Dec 2023 13:55) (103/69 - 124/83)  BP(mean): --  RR: 18 (11 Dec 2023 13:55) (18 - 18)  SpO2: 97% (11 Dec 2023 13:55) (94% - 99%)    Parameters below as of 11 Dec 2023 13:55  Patient On (Oxygen Delivery Method): room air      CAPILLARY BLOOD GLUCOSE          12-10 @ 07:01 - 12-11 @ 07:00  --------------------------------------------------------  IN: 3065 mL / OUT: 90 mL / NET: 2975 mL    12-11 @ 07:01 - 12-11 @ 14:45  --------------------------------------------------------  IN: 240 mL / OUT: 0 mL / NET: 240 mL      Physical Exam:    Daily     Daily   General:  Well appearing, NAD, not cachetic  HEENT:  Nonicteric, PERRLA  CV:  RRR, S1S2   Lungs:  CTA B/L, no wheezes, rales, rhonchi  Abdomen:  Soft, non-tender, no distended, positive BS  Extremities:  2+ pulses, no c/c, no edema  Skin:  Warm and dry, no rashes  :  No stanford  Neuro:  AAOx3, non-focal, grossly intact      chect wall : staples in place   surrounding erythema   no warmth but swelling / firmness                                                                                                                                                                                                                                                                                             LABS:                               8.7    12.26 )-----------( 496      ( 11 Dec 2023 08:03 )             29.0                      12-10    136  |  100  |  7   ----------------------------<  109<H>  3.9   |  24  |  0.41<L>    Ca    9.1      10 Dec 2023 07:28                         RADIOLOGY & ADDITIONAL TESTS         I personally reviewed: [  ]EKG   [  ]CXR    [  ] CT      A/P:         Discussed with :     [facility] consultants' Notes   Time spent :

## 2023-12-11 NOTE — PROGRESS NOTE ADULT - PROBLEM SELECTOR PLAN 1
s/p Debridement of left sternoclavicular joint with excision of left clavicular head. Purulent drainage at the sternoclavicular joint. Tissue surrounding left clavicle found to be very inflamed. Copious irrigation of surgical site.   IV abx   Would check MRI Left shloulder arm  Pain control   Check TTE  Monitor Hgb    DVT ppx.

## 2023-12-11 NOTE — PROGRESS NOTE ADULT - ASSESSMENT
43F PMHx of MS on Rituxan, presenting with left chest wall and shoulder pain w/ concern for infection now admitted with likely septic arthritis with need for possible washout and debridement       Problem/Plan - 1:  ·  Problem: Septic arthritis. left shoulder joint   ·  Plan: Imaging consistent with septic arthritis  s/p OR   cont abx and fu cultures   check ECHO : no vegetation   fu with ID   check CT chest r/o collection   MR also ordered         # c/o left arm swelling and pain in elbow region :  -doppler ordered to r/o DVT : Negative      Problem/Plan - 2:  ·  Problem: Multiple sclerosis.   ·  Plan: Gets Rituxan every 6 months, next dose due : holding   -On Ampyra BID at home, need to bring home med     Problem/Plan - 3:  ·  Problem: Anxiety.   ·  Plan: ISTOP reviewed Reference #: [id number]  -Cont. Clonazepam 0.5mg QHS PRN  -Cont. Sertraline 100mg QHS  -Cont. Duloxetine 60mg QHS, pt unsure of dose  -Need full med rec.     Problem/Plan - 4:  ·  Problem: Anemia.   ·  Plan: hgb 9, lower than prior year. Pt endorses some recent anemia but cannot specify  -Trend cbc  -Monitor for bleeding.     Problem/Plan - 5:  ·  Problem: Prophylactic measure.   ·  Plan: DVT PPx         43F PMHx of MS on Rituxan, presenting with left chest wall and shoulder pain w/ concern for infection now admitted with likely septic arthritis with need for possible washout and debridement       Problem/Plan - 1:  ·  Problem: Septic arthritis. left shoulder joint   ·  Plan: Imaging consistent with septic arthritis  s/p OR   cont abx and fu cultures   check ECHO : no vegetation   fu with ID   check CT chest r/o collection   MR also ordered         # c/o left arm swelling and pain in elbow region :  -doppler ordered to r/o DVT : Negative      Problem/Plan - 2:  ·  Problem: Multiple sclerosis.   ·  Plan: Gets Rituxan every 6 months, next dose due : holding   -On Ampyra BID at home, need to bring home med     Problem/Plan - 3:  ·  Problem: Anxiety.   ·  Plan: ISTOP reviewed Reference #: [id number]  -Cont. Clonazepam 0.5mg QHS PRN  -Cont. Sertraline 100mg QHS  -Cont. Duloxetine 60mg QHS, pt unsure of dose  -Need full med rec.     Problem/Plan - 4:  ·  Problem: Anemia.   ·  Plan: hgb 9, lower than prior year. Pt endorses some recent anemia but cannot specify  -Trend cbc  -Monitor for bleeding.     Problem/Plan - 5:  ·  Problem: Prophylactic measure.   ·  Plan: DVT PPx

## 2023-12-11 NOTE — PROGRESS NOTE ADULT - SUBJECTIVE AND OBJECTIVE BOX
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  She had a recent fall in 2023 secondary to unsteadiness due to multiple sclerosis, with subsequent left chest wall and shoulder pain.  Was seen at [facility] with concern for MS exacerbation but no MRI brain and cervical spine with an without contrast were done at the time without enhancement.  At the time infectious workup was done and notable for UTI which was treated.  She continued to have pain.  In the interim, she had an outpatient MRI of left sternoclavicular joint with results significant for marrow edema and swelling of the left sternoclavicular joint with a 2 cm fluid collection and adjacent pleural edema in the left chest with concerns for infectious etiology.   CT chest w/ IV contrast suggestive of septic arthritis with surrounding erythema. Thoracic surgery consulted.  Patient s/p surgical debridement on 23  ******    Pt still with sternal pain    Past Medical History:  Multiple sclerosis [G35]        Past Surgical History:  History of  [Z98.891]        Social History:  No toxic habits    Family History:  FAMILY HISTORY:      Allergies:  No Known Allergies      ROS:  Constitutional: Patient offers no complaints of fevers or significant weight loss  Ears, Nose, Mouth and Throat: The patient presents with no abnormalities of the head, ears, eyes, nose or throat  Skin: Patient offers no concerns of new rashes or lesions  Respiratory: The patient presents with no abnormalities of the respiratory tract  Cardiovascular: The patient presents with no cardiac abnormalities  Gastrointestinal: The patient presents with no abnormalities of the GI system  Genitourinary: The patient presents with no dysuria, hematuria or frequent urination  Neurological: See HPI  Endocrine: Patient offers no complaints of excessive thirst, urination, or heat/cold intolerance    Advanced care planning reviewed and noted in the chart.    Medications:  cefepime   IVPB 2000 milliGRAM(s) IV Intermittent every 8 hours  chlorhexidine 2% Cloths 1 Application(s) Topical <User Schedule>  clonazePAM  Tablet 0.5 milliGRAM(s) Oral at bedtime PRN  dalfampridine ER 10 milliGRAM(s) Oral every 12 hours  DULoxetine 60 milliGRAM(s) Oral at bedtime  enoxaparin Injectable 40 milliGRAM(s) SubCutaneous every 24 hours  HYDROmorphone  Injectable 1 milliGRAM(s) IV Push every 8 hours PRN  lactated ringers. 1000 milliLiter(s) IV Continuous <Continuous>  oxycodone    5 mG/acetaminophen 325 mG 1 Tablet(s) Oral every 4 hours PRN  senna 2 Tablet(s) Oral at bedtime  sertraline 100 milliGRAM(s) Oral at bedtime  Vibegron (Gemtesa) 75 milliGRAM(s) 1 Tablet(s) Oral daily      Labs:  CBC Full  -  ( 11 Dec 2023 08:03 )  WBC Count : 12.26 K/uL  RBC Count : 3.51 M/uL  Hemoglobin : 8.7 g/dL  Hematocrit : 29.0 %  Platelet Count - Automated : 496 K/uL  Mean Cell Volume : 82.6 fl  Mean Cell Hemoglobin : 24.8 pg  Mean Cell Hemoglobin Concentration : 30.0 gm/dL  Auto Neutrophil # : x  Auto Lymphocyte # : x  Auto Monocyte # : x  Auto Eosinophil # : x  Auto Basophil # : x  Auto Neutrophil % : x  Auto Lymphocyte % : x  Auto Monocyte % : x  Auto Eosinophil % : x  Auto Basophil % : x    12-10    136  |  100  |  7   ----------------------------<  109<H>  3.9   |  24  |  0.41<L>    Ca    9.1      10 Dec 2023 07:28      CAPILLARY BLOOD GLUCOSE            Urinalysis Basic - ( 10 Dec 2023 07:28 )    Color: x / Appearance: x / SG: x / pH: x  Gluc: 109 mg/dL / Ketone: x  / Bili: x / Urobili: x   Blood: x / Protein: x / Nitrite: x   Leuk Esterase: x / RBC: x / WBC x   Sq Epi: x / Non Sq Epi: x / Bacteria: x          Vitals:  Vital Signs Last 24 Hrs  T(C): 37 (11 Dec 2023 05:12), Max: 37 (11 Dec 2023 05:12)  T(F): 98.6 (11 Dec 2023 05:12), Max: 98.6 (11 Dec 2023 05:12)  HR: 95 (11 Dec 2023 05:12) (84 - 95)  BP: 108/73 (11 Dec 2023 05:12) (94/61 - 124/83)  BP(mean): --  RR: 18 (11 Dec 2023 05:12) (18 - 18)  SpO2: 99% (11 Dec 2023 05:12) (96% - 99%)    Parameters below as of 11 Dec 2023 05:12  Patient On (Oxygen Delivery Method): room air            NEUROLOGICAL EXAM:    Mental status: Awake, alert, and in no apparent distress. Oriented to person, place and time. Language function is normal.      Cranial Nerves: Pupils were equal, round, reactive to light. Extraocular movements were intact. Visual field were full. Fundoscopic exam was deferred. Facial sensation was intact to light touch. There was slight left facia droop. The palate was upgoing symmetrically and tongue was midline.     Motor exam: Bulk and tone were normal. 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  She had a recent fall in 2023 secondary to unsteadiness due to multiple sclerosis, with subsequent left chest wall and shoulder pain.  Was seen at [facility] with concern for MS exacerbation but no MRI brain and cervical spine with an without contrast were done at the time without enhancement.  At the time infectious workup was done and notable for UTI which was treated.  She continued to have pain.  In the interim, she had an outpatient MRI of left sternoclavicular joint with results significant for marrow edema and swelling of the left sternoclavicular joint with a 2 cm fluid collection and adjacent pleural edema in the left chest with concerns for infectious etiology.   CT chest w/ IV contrast suggestive of septic arthritis with surrounding erythema. Thoracic surgery consulted.  Patient s/p surgical debridement on 23  ******    Pt still with sternal pain    Past Medical History:  Multiple sclerosis [G35]        Past Surgical History:  History of  [Z98.891]        Social History:  No toxic habits    Family History:  FAMILY HISTORY:      Allergies:  No Known Allergies      ROS:  Constitutional: Patient offers no complaints of fevers or significant weight loss  Ears, Nose, Mouth and Throat: The patient presents with no abnormalities of the head, ears, eyes, nose or throat  Skin: Patient offers no concerns of new rashes or lesions  Respiratory: The patient presents with no abnormalities of the respiratory tract  Cardiovascular: The patient presents with no cardiac abnormalities  Gastrointestinal: The patient presents with no abnormalities of the GI system  Genitourinary: The patient presents with no dysuria, hematuria or frequent urination  Neurological: See HPI  Endocrine: Patient offers no complaints of excessive thirst, urination, or heat/cold intolerance    Advanced care planning reviewed and noted in the chart.    Medications:  cefepime   IVPB 2000 milliGRAM(s) IV Intermittent every 8 hours  chlorhexidine 2% Cloths 1 Application(s) Topical <User Schedule>  clonazePAM  Tablet 0.5 milliGRAM(s) Oral at bedtime PRN  dalfampridine ER 10 milliGRAM(s) Oral every 12 hours  DULoxetine 60 milliGRAM(s) Oral at bedtime  enoxaparin Injectable 40 milliGRAM(s) SubCutaneous every 24 hours  HYDROmorphone  Injectable 1 milliGRAM(s) IV Push every 8 hours PRN  lactated ringers. 1000 milliLiter(s) IV Continuous <Continuous>  oxycodone    5 mG/acetaminophen 325 mG 1 Tablet(s) Oral every 4 hours PRN  senna 2 Tablet(s) Oral at bedtime  sertraline 100 milliGRAM(s) Oral at bedtime  Vibegron (Gemtesa) 75 milliGRAM(s) 1 Tablet(s) Oral daily      Labs:  CBC Full  -  ( 11 Dec 2023 08:03 )  WBC Count : 12.26 K/uL  RBC Count : 3.51 M/uL  Hemoglobin : 8.7 g/dL  Hematocrit : 29.0 %  Platelet Count - Automated : 496 K/uL  Mean Cell Volume : 82.6 fl  Mean Cell Hemoglobin : 24.8 pg  Mean Cell Hemoglobin Concentration : 30.0 gm/dL  Auto Neutrophil # : x  Auto Lymphocyte # : x  Auto Monocyte # : x  Auto Eosinophil # : x  Auto Basophil # : x  Auto Neutrophil % : x  Auto Lymphocyte % : x  Auto Monocyte % : x  Auto Eosinophil % : x  Auto Basophil % : x    12-10    136  |  100  |  7   ----------------------------<  109<H>  3.9   |  24  |  0.41<L>    Ca    9.1      10 Dec 2023 07:28      CAPILLARY BLOOD GLUCOSE            Urinalysis Basic - ( 10 Dec 2023 07:28 )    Color: x / Appearance: x / SG: x / pH: x  Gluc: 109 mg/dL / Ketone: x  / Bili: x / Urobili: x   Blood: x / Protein: x / Nitrite: x   Leuk Esterase: x / RBC: x / WBC x   Sq Epi: x / Non Sq Epi: x / Bacteria: x          Vitals:  Vital Signs Last 24 Hrs  T(C): 37 (11 Dec 2023 05:12), Max: 37 (11 Dec 2023 05:12)  T(F): 98.6 (11 Dec 2023 05:12), Max: 98.6 (11 Dec 2023 05:12)  HR: 95 (11 Dec 2023 05:12) (84 - 95)  BP: 108/73 (11 Dec 2023 05:12) (94/61 - 124/83)  BP(mean): --  RR: 18 (11 Dec 2023 05:12) (18 - 18)  SpO2: 99% (11 Dec 2023 05:12) (96% - 99%)    Parameters below as of 11 Dec 2023 05:12  Patient On (Oxygen Delivery Method): room air            NEUROLOGICAL EXAM:    Mental status: Awake, alert, and in no apparent distress. Oriented to person, place and time. Language function is normal.      Cranial Nerves: Pupils were equal, round, reactive to light. Extraocular movements were intact. Visual field were full. Fundoscopic exam was deferred. Facial sensation was intact to light touch. There was slight left facia droop. The palate was upgoing symmetrically and tongue was midline.     Motor exam: Bulk and tone were normal. Strength was 5/5 in all four extremities distally, did not assess proximal arms due to pain    Reflexes: deferred     Sensation: Intact to light touch/temp    Coordination: no gross dysmetria    Gait: defer   sterna wound seen

## 2023-12-11 NOTE — PROGRESS NOTE ADULT - SUBJECTIVE AND OBJECTIVE BOX
Subjective: Patient seen and examined. No new events except as noted.   feels ok   left arm pain   dad at bedside     REVIEW OF SYSTEMS:    CONSTITUTIONAL:+ weakness, fevers or chills  EYES/ENT: No visual changes;  No vertigo or throat pain   NECK: No pain or stiffness  RESPIRATORY: No cough, wheezing, hemoptysis; No shortness of breath  CARDIOVASCULAR: No chest pain or palpitations  GASTROINTESTINAL: No abdominal or epigastric pain. No nausea, vomiting, or hematemesis; No diarrhea or constipation. No melena or hematochezia.  GENITOURINARY: No dysuria, frequency or hematuria  NEUROLOGICAL: No numbness or weakness  SKIN: No itching, burning, rashes, or lesions   All other review of systems is negative unless indicated above.    MEDICATIONS:  MEDICATIONS  (STANDING):  cefepime   IVPB 2000 milliGRAM(s) IV Intermittent every 8 hours  chlorhexidine 2% Cloths 1 Application(s) Topical <User Schedule>  dalfampridine ER 10 milliGRAM(s) Oral every 12 hours  DULoxetine 60 milliGRAM(s) Oral at bedtime  enoxaparin Injectable 40 milliGRAM(s) SubCutaneous every 24 hours  lactated ringers. 1000 milliLiter(s) (75 mL/Hr) IV Continuous <Continuous>  senna 2 Tablet(s) Oral at bedtime  sertraline 100 milliGRAM(s) Oral at bedtime  Vibegron (Gemtesa) 75 milliGRAM(s) 1 Tablet(s) Oral daily      PHYSICAL EXAM:  T(C): 37 (12-11-23 @ 05:12), Max: 37 (12-11-23 @ 05:12)  HR: 95 (12-11-23 @ 05:12) (84 - 95)  BP: 108/73 (12-11-23 @ 05:12) (94/61 - 124/83)  RR: 18 (12-11-23 @ 05:12) (18 - 18)  SpO2: 99% (12-11-23 @ 05:12) (96% - 99%)  Wt(kg): --  I&O's Summary    10 Dec 2023 07:01  -  11 Dec 2023 07:00  --------------------------------------------------------  IN: 3065 mL / OUT: 90 mL / NET: 2975 mL          Appearance: NAD  HEENT:   Dry  oral mucosa, PERRL, EOMI	  Lymphatic: No lymphadenopathy  Incision erythematous tender to touch edema  Cardiovascular: Normal S1 S2, No JVD, No murmurs, No edema  Respiratory: Decreased bs   Psychiatry: A & O x 3, sleepy   Gastrointestinal:  Soft, Non-tender, + BS	  Skin: No rashes, No ecchymoses, No cyanosis	  Neurologic: Non-focal  Extremities: Normal range of motion, No clubbing, cyanosis or edema  Vascular: Peripheral pulses palpable 2+ bilaterally        LABS:    CARDIAC MARKERS:                                8.7    12.26 )-----------( 496      ( 11 Dec 2023 08:03 )             29.0     12-10    136  |  100  |  7   ----------------------------<  109<H>  3.9   |  24  |  0.41<L>    Ca    9.1      10 Dec 2023 07:28      proBNP:   Lipid Profile:   HgA1c:   TSH:             TELEMETRY: 	    ECG:  	  RADIOLOGY:   DIAGNOSTIC TESTING:  [ ] Echocardiogram:  [ ]  Catheterization:  [ ] Stress Test:    OTHER:

## 2023-12-11 NOTE — PROGRESS NOTE ADULT - ASSESSMENT
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  She had a recent fall in November 2023 secondary to unsteadiness due to multiple sclerosis, with subsequent left chest wall and shoulder pain.  Was seen at [facility] with concern for MS exacerbation but no MRI brain and cervical spine with an without contrast were done at the time without enhancement.  At the time infectious workup was done and notable for UTI which was treated.  She continued to have pain.  In the interim, she had an outpatient MRI of left sternoclavicular joint with results significant for marrow edema and swelling of the left sternoclavicular joint with a 2 cm fluid collection and adjacent pleural edema in the left chest with concerns for infectious etiology.   CT chest w/ IV contrast suggestive of septic arthritis with surrounding erythema. Thoracic surgery consulted.  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  Now likely pseudo-flare in setting of septic arthritis    Recommendations:  Management of septic arthritis per primary team  s/p surgical debridement 12/7/23  hold ocrelizumab for now given septic arthritis  continue home meds once able to take PO post-surgically  pain management  ID not appreciated, pt and  asked about antibiotic regimen, told that is determined by the ID and primary team  discussed with patient and family at bedside Impression:  43-year-old woman with PMHx most pertinent for multiple sclerosis first diagnosed at age 17 with dragging of her leg, since then she has had a complicated course including optic neuritis, 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  She had a recent fall in November 2023 secondary to unsteadiness due to multiple sclerosis, with subsequent left chest wall and shoulder pain.  Was seen at [facility] with concern for MS exacerbation but no MRI brain and cervical spine with an without contrast were done at the time without enhancement.  At the time infectious workup was done and notable for UTI which was treated.  She continued to have pain.  In the interim, she had an outpatient MRI of left sternoclavicular joint with results significant for marrow edema and swelling of the left sternoclavicular joint with a 2 cm fluid collection and adjacent pleural edema in the left chest with concerns for infectious etiology.   CT chest w/ IV contrast suggestive of septic arthritis with surrounding erythema. Thoracic surgery consulted.  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  Now likely pseudo-flare in setting of septic arthritis    Recommendations:  Management of septic arthritis per primary team  s/p surgical debridement 12/7/23  hold ocrelizumab for now given septic arthritis  continue home meds once able to take PO post-surgically  pain management  ID not appreciated, pt and  asked about antibiotic regimen, told that is determined by the ID and primary team  discussed with patient and family at bedside

## 2023-12-11 NOTE — PROGRESS NOTE ADULT - ASSESSMENT
43F PMHx of MS on Rituxan, presenting with left chest wall and shoulder pain w/ concern for infection now admitted with likely septic arthritis with need for possible washout and debridement    POD # 1 Debridement and washout of Left.  sternoclavicular joint excision Left clavicular head  Tj 80cc serosanguinous.  GS no org seen.  Cultures pending.  12/9 + RVP>no treatment as per ID  DC PCA  somnolent  Pain mgmt notified Transition oral Rx    12/10    pain medication w oxycodone and dilaudid for breakthrough,     Lt CW staples w bulb   w mininimal drainage   abx per ID  12/11: Left CW with staples with bulb, with minimal drainage. continue antibiotics per ID. continue optimal pain control.

## 2023-12-11 NOTE — PROGRESS NOTE ADULT - SUBJECTIVE AND OBJECTIVE BOX
SUBJECTIVE: "Hi." Denies acute chest pain, palpitations, or shortness of breath.    VITAL SIGNS:  Vital Signs Last 24 Hrs  T(C): 36.9 (12-11-23 @ 13:55), Max: 37 (12-11-23 @ 05:12)  T(F): 98.4 (12-11-23 @ 13:55), Max: 98.6 (12-11-23 @ 05:12)  HR: 98 (12-11-23 @ 13:55) (88 - 99)  BP: 113/59 (12-11-23 @ 13:55) (103/69 - 124/83)  RR: 18 (12-11-23 @ 13:55) (18 - 18)  SpO2: 97% (12-11-23 @ 13:55) (94% - 99%)           INPUT/OUTPUT:  10 Dec 2023 07:01  -  11 Dec 2023 07:00  --------------------------------------------------------  IN:    IV PiggyBack: 250 mL    Lactated Ringers: 1575 mL    Oral Fluid: 1240 mL  Total IN: 3065 mL    OUT:    Bulb (mL): 90 mL  Total OUT: 90 mL    Total NET: 2975 mL      11 Dec 2023 07:01  -  11 Dec 2023 14:38  --------------------------------------------------------  IN:    Oral Fluid: 240 mL  Total IN: 240 mL    OUT:  Total OUT: 0 mL    Total NET: 240 mL        LABS:  12-10    136  |  100  |  7   ----------------------------<  109<H>  3.9   |  24  |  0.41<L>    Ca    9.1      10 Dec 2023 07:28               8.7    12.26 )-----------( 496      ( 11 Dec 2023 08:03 )             29.0            PHYSICAL EXAM:  Neurology: alert and oriented x 3  CV :  s1,s2  + LEFT CW incision   w / staples, slight clear drainage slight erythema  Lungs:   CTA B/L, non-labored respirations  Abdomen: soft, nontender, nondistended, positive bowel sounds  Extremities:  no edema         ACTIVE MEDICATIONS:  acetaminophen     Tablet .. 650 milliGRAM(s) Oral once  cefepime   IVPB 2000 milliGRAM(s) IV Intermittent every 8 hours  chlorhexidine 2% Cloths 1 Application(s) Topical <User Schedule>  clonazePAM  Tablet 0.5 milliGRAM(s) Oral <User Schedule>  dalfampridine ER 10 milliGRAM(s) Oral every 12 hours  DULoxetine 60 milliGRAM(s) Oral at bedtime  enoxaparin Injectable 40 milliGRAM(s) SubCutaneous every 24 hours  HYDROmorphone  Injectable 1 milliGRAM(s) IV Push every 8 hours PRN  lactated ringers. 1000 milliLiter(s) IV Continuous <Continuous>  oxyCODONE    IR 5 milliGRAM(s) Oral every 6 hours PRN  oxyCODONE    IR 10 milliGRAM(s) Oral every 8 hours PRN  senna 2 Tablet(s) Oral at bedtime  sertraline 100 milliGRAM(s) Oral at bedtime  Vibegron (Gemtesa) 75 milliGRAM(s) 1 Tablet(s) Oral daily      Case discussed in detail with Thoracic Team and Attending Dr. Bautista. Plan below as per discussion.

## 2023-12-12 ENCOUNTER — APPOINTMENT (OUTPATIENT)
Dept: MRI IMAGING | Facility: CLINIC | Age: 43
End: 2023-12-12

## 2023-12-12 LAB
1,3 BETA GLUCAN SER QL: NEGATIVE — SIGNIFICANT CHANGE UP
1,3 BETA GLUCAN SER QL: NEGATIVE — SIGNIFICANT CHANGE UP
CRYPTOC AG FLD QL: NEGATIVE — SIGNIFICANT CHANGE UP
CRYPTOC AG FLD QL: NEGATIVE — SIGNIFICANT CHANGE UP
CULTURE RESULTS: SIGNIFICANT CHANGE UP
FUNGITELL: <31 PG/ML — SIGNIFICANT CHANGE UP
FUNGITELL: <31 PG/ML — SIGNIFICANT CHANGE UP
GAMMA INTERFERON BACKGROUND BLD IA-ACNC: 0.02 IU/ML — SIGNIFICANT CHANGE UP
GAMMA INTERFERON BACKGROUND BLD IA-ACNC: 0.02 IU/ML — SIGNIFICANT CHANGE UP
HCT VFR BLD CALC: 27.9 % — LOW (ref 34.5–45)
HCT VFR BLD CALC: 27.9 % — LOW (ref 34.5–45)
HGB BLD-MCNC: 8.4 G/DL — LOW (ref 11.5–15.5)
HGB BLD-MCNC: 8.4 G/DL — LOW (ref 11.5–15.5)
M TB IFN-G BLD-IMP: NEGATIVE — SIGNIFICANT CHANGE UP
M TB IFN-G BLD-IMP: NEGATIVE — SIGNIFICANT CHANGE UP
M TB IFN-G CD4+ BCKGRND COR BLD-ACNC: 0 IU/ML — SIGNIFICANT CHANGE UP
M TB IFN-G CD4+ BCKGRND COR BLD-ACNC: 0 IU/ML — SIGNIFICANT CHANGE UP
M TB IFN-G CD4+CD8+ BCKGRND COR BLD-ACNC: 0.09 IU/ML — SIGNIFICANT CHANGE UP
M TB IFN-G CD4+CD8+ BCKGRND COR BLD-ACNC: 0.09 IU/ML — SIGNIFICANT CHANGE UP
MCHC RBC-ENTMCNC: 25 PG — LOW (ref 27–34)
MCHC RBC-ENTMCNC: 25 PG — LOW (ref 27–34)
MCHC RBC-ENTMCNC: 30.1 GM/DL — LOW (ref 32–36)
MCHC RBC-ENTMCNC: 30.1 GM/DL — LOW (ref 32–36)
MCV RBC AUTO: 83 FL — SIGNIFICANT CHANGE UP (ref 80–100)
MCV RBC AUTO: 83 FL — SIGNIFICANT CHANGE UP (ref 80–100)
NRBC # BLD: 0 /100 WBCS — SIGNIFICANT CHANGE UP (ref 0–0)
NRBC # BLD: 0 /100 WBCS — SIGNIFICANT CHANGE UP (ref 0–0)
PLATELET # BLD AUTO: 453 K/UL — HIGH (ref 150–400)
PLATELET # BLD AUTO: 453 K/UL — HIGH (ref 150–400)
QUANT TB PLUS MITOGEN MINUS NIL: >10 IU/ML — SIGNIFICANT CHANGE UP
QUANT TB PLUS MITOGEN MINUS NIL: >10 IU/ML — SIGNIFICANT CHANGE UP
RBC # BLD: 3.36 M/UL — LOW (ref 3.8–5.2)
RBC # BLD: 3.36 M/UL — LOW (ref 3.8–5.2)
RBC # FLD: 14.2 % — SIGNIFICANT CHANGE UP (ref 10.3–14.5)
RBC # FLD: 14.2 % — SIGNIFICANT CHANGE UP (ref 10.3–14.5)
SPECIMEN SOURCE: SIGNIFICANT CHANGE UP
WBC # BLD: 12.79 K/UL — HIGH (ref 3.8–10.5)
WBC # BLD: 12.79 K/UL — HIGH (ref 3.8–10.5)
WBC # FLD AUTO: 12.79 K/UL — HIGH (ref 3.8–10.5)
WBC # FLD AUTO: 12.79 K/UL — HIGH (ref 3.8–10.5)

## 2023-12-12 PROCEDURE — 72156 MRI NECK SPINE W/O & W/DYE: CPT | Mod: 26

## 2023-12-12 PROCEDURE — 99233 SBSQ HOSP IP/OBS HIGH 50: CPT

## 2023-12-12 RX ORDER — ACETAMINOPHEN 500 MG
650 TABLET ORAL ONCE
Refills: 0 | Status: COMPLETED | OUTPATIENT
Start: 2023-12-12 | End: 2023-12-12

## 2023-12-12 RX ADMIN — DULOXETINE HYDROCHLORIDE 60 MILLIGRAM(S): 30 CAPSULE, DELAYED RELEASE ORAL at 21:09

## 2023-12-12 RX ADMIN — OXYCODONE HYDROCHLORIDE 10 MILLIGRAM(S): 5 TABLET ORAL at 20:20

## 2023-12-12 RX ADMIN — CHLORHEXIDINE GLUCONATE 1 APPLICATION(S): 213 SOLUTION TOPICAL at 05:43

## 2023-12-12 RX ADMIN — HYDROMORPHONE HYDROCHLORIDE 1 MILLIGRAM(S): 2 INJECTION INTRAMUSCULAR; INTRAVENOUS; SUBCUTANEOUS at 04:06

## 2023-12-12 RX ADMIN — OXYCODONE HYDROCHLORIDE 10 MILLIGRAM(S): 5 TABLET ORAL at 08:40

## 2023-12-12 RX ADMIN — Medication 650 MILLIGRAM(S): at 15:50

## 2023-12-12 RX ADMIN — HYDROMORPHONE HYDROCHLORIDE 1 MILLIGRAM(S): 2 INJECTION INTRAMUSCULAR; INTRAVENOUS; SUBCUTANEOUS at 22:20

## 2023-12-12 RX ADMIN — Medication 0.5 MILLIGRAM(S): at 21:10

## 2023-12-12 RX ADMIN — CEFEPIME 100 MILLIGRAM(S): 1 INJECTION, POWDER, FOR SOLUTION INTRAMUSCULAR; INTRAVENOUS at 13:11

## 2023-12-12 RX ADMIN — OXYCODONE HYDROCHLORIDE 10 MILLIGRAM(S): 5 TABLET ORAL at 19:21

## 2023-12-12 RX ADMIN — CEFEPIME 100 MILLIGRAM(S): 1 INJECTION, POWDER, FOR SOLUTION INTRAMUSCULAR; INTRAVENOUS at 05:38

## 2023-12-12 RX ADMIN — CEFEPIME 100 MILLIGRAM(S): 1 INJECTION, POWDER, FOR SOLUTION INTRAMUSCULAR; INTRAVENOUS at 21:10

## 2023-12-12 RX ADMIN — HYDROMORPHONE HYDROCHLORIDE 1 MILLIGRAM(S): 2 INJECTION INTRAMUSCULAR; INTRAVENOUS; SUBCUTANEOUS at 22:03

## 2023-12-12 RX ADMIN — Medication 650 MILLIGRAM(S): at 14:54

## 2023-12-12 RX ADMIN — ENOXAPARIN SODIUM 40 MILLIGRAM(S): 100 INJECTION SUBCUTANEOUS at 13:11

## 2023-12-12 RX ADMIN — OXYCODONE HYDROCHLORIDE 10 MILLIGRAM(S): 5 TABLET ORAL at 09:30

## 2023-12-12 RX ADMIN — SENNA PLUS 2 TABLET(S): 8.6 TABLET ORAL at 21:09

## 2023-12-12 RX ADMIN — HYDROMORPHONE HYDROCHLORIDE 1 MILLIGRAM(S): 2 INJECTION INTRAMUSCULAR; INTRAVENOUS; SUBCUTANEOUS at 03:36

## 2023-12-12 RX ADMIN — DALFAMPRIDINE 10 MILLIGRAM(S): 10 TABLET, FILM COATED, EXTENDED RELEASE ORAL at 13:11

## 2023-12-12 RX ADMIN — HYDROMORPHONE HYDROCHLORIDE 1 MILLIGRAM(S): 2 INJECTION INTRAMUSCULAR; INTRAVENOUS; SUBCUTANEOUS at 11:28

## 2023-12-12 RX ADMIN — HYDROMORPHONE HYDROCHLORIDE 1 MILLIGRAM(S): 2 INJECTION INTRAMUSCULAR; INTRAVENOUS; SUBCUTANEOUS at 12:20

## 2023-12-12 RX ADMIN — SERTRALINE 100 MILLIGRAM(S): 25 TABLET, FILM COATED ORAL at 21:09

## 2023-12-12 NOTE — PROGRESS NOTE ADULT - ASSESSMENT
43F PMHx of MS on Rituxan, presenting with left chest wall and shoulder pain w/ concern for infection now admitted with likely septic arthritis with need for possible washout and debridement    POD # 1 Debridement and washout of Left.  sternoclavicular joint excision Left clavicular head  Tj 80cc serosanguinous.  GS no org seen.  Cultures pending.  12/9 + RVP>no treatment as per ID  DC PCA  somnolent  Pain mgmt notified Transition oral Rx    12/10    pain medication w oxycodone and dilaudid for breakthrough,     Lt CW staples w bulb   w mininimal drainage   abx per ID  12/11: Left CW with staples with bulb, with minimal drainage. continue antibiotics per ID. continue optimal pain control.   12/12    lt cw  staples  w erythma  drain    several staples removed and packed by Dr Bautista,  abx  per ID

## 2023-12-12 NOTE — PROGRESS NOTE ADULT - SUBJECTIVE AND OBJECTIVE BOX
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  She had a recent fall in 2023 secondary to unsteadiness due to multiple sclerosis, with subsequent left chest wall and shoulder pain.  Was seen at [facility] with concern for MS exacerbation but no MRI brain and cervical spine with an without contrast were done at the time without enhancement.  At the time infectious workup was done and notable for UTI which was treated.  She continued to have pain.  In the interim, she had an outpatient MRI of left sternoclavicular joint with results significant for marrow edema and swelling of the left sternoclavicular joint with a 2 cm fluid collection and adjacent pleural edema in the left chest with concerns for infectious etiology.   CT chest w/ IV contrast suggestive of septic arthritis with surrounding erythema. Thoracic surgery consulted.  Patient s/p surgical debridement on 23    Int hx:  mri with 80 x 25 mm rim-enhancing fluid collection persists in the surgical cavity with well-placed drain within the fluid collection.   Marrow signal abnormality in the manubrium concerning for acute   osteomyelitis.    Low-grade partial-thickness bursal surface tear of the supraspinatus tendon.        ******    Pt still with sternal pain    Past Medical History:  Multiple sclerosis [G35]        Past Surgical History:  History of  [Z98.891]        Social History:  No toxic habits    Family History:  FAMILY HISTORY:      Allergies:  No Known Allergies      ROS:  Constitutional: Patient offers no complaints of fevers or significant weight loss  Ears, Nose, Mouth and Throat: The patient presents with no abnormalities of the head, ears, eyes, nose or throat  Skin: Patient offers no concerns of new rashes or lesions  Respiratory: The patient presents with no abnormalities of the respiratory tract  Cardiovascular: The patient presents with no cardiac abnormalities  Gastrointestinal: The patient presents with no abnormalities of the GI system  Genitourinary: The patient presents with no dysuria, hematuria or frequent urination  Neurological: See HPI  Endocrine: Patient offers no complaints of excessive thirst, urination, or heat/cold intolerance    Advanced care planning reviewed and noted in the chart.    Medications:  cefepime   IVPB 2000 milliGRAM(s) IV Intermittent every 8 hours  chlorhexidine 2% Cloths 1 Application(s) Topical <User Schedule>  clonazePAM  Tablet 0.5 milliGRAM(s) Oral at bedtime PRN  dalfampridine ER 10 milliGRAM(s) Oral every 12 hours  DULoxetine 60 milliGRAM(s) Oral at bedtime  enoxaparin Injectable 40 milliGRAM(s) SubCutaneous every 24 hours  HYDROmorphone  Injectable 1 milliGRAM(s) IV Push every 8 hours PRN  lactated ringers. 1000 milliLiter(s) IV Continuous <Continuous>  oxycodone    5 mG/acetaminophen 325 mG 1 Tablet(s) Oral every 4 hours PRN  senna 2 Tablet(s) Oral at bedtime  sertraline 100 milliGRAM(s) Oral at bedtime  Vibegron (Gemtesa) 75 milliGRAM(s) 1 Tablet(s) Oral daily      Labs:  CBC Full  -  ( 11 Dec 2023 08:03 )  WBC Count : 12.26 K/uL  RBC Count : 3.51 M/uL  Hemoglobin : 8.7 g/dL  Hematocrit : 29.0 %  Platelet Count - Automated : 496 K/uL  Mean Cell Volume : 82.6 fl  Mean Cell Hemoglobin : 24.8 pg  Mean Cell Hemoglobin Concentration : 30.0 gm/dL  Auto Neutrophil # : x  Auto Lymphocyte # : x  Auto Monocyte # : x  Auto Eosinophil # : x  Auto Basophil # : x  Auto Neutrophil % : x  Auto Lymphocyte % : x  Auto Monocyte % : x  Auto Eosinophil % : x  Auto Basophil % : x    12-10    136  |  100  |  7   ----------------------------<  109<H>  3.9   |  24  |  0.41<L>    Ca    9.1      10 Dec 2023 07:28      CAPILLARY BLOOD GLUCOSE            Urinalysis Basic - ( 10 Dec 2023 07:28 )    Color: x / Appearance: x / SG: x / pH: x  Gluc: 109 mg/dL / Ketone: x  / Bili: x / Urobili: x   Blood: x / Protein: x / Nitrite: x   Leuk Esterase: x / RBC: x / WBC x   Sq Epi: x / Non Sq Epi: x / Bacteria: x          Vitals:  Vital Signs Last 24 Hrs  T(C): 37 (11 Dec 2023 05:12), Max: 37 (11 Dec 2023 05:12)  T(F): 98.6 (11 Dec 2023 05:12), Max: 98.6 (11 Dec 2023 05:12)  HR: 95 (11 Dec 2023 05:12) (84 - 95)  BP: 108/73 (11 Dec 2023 05:12) (94/61 - 124/83)  BP(mean): --  RR: 18 (11 Dec 2023 05:12) (18 - 18)  SpO2: 99% (11 Dec 2023 05:12) (96% - 99%)    Parameters below as of 11 Dec 2023 05:12  Patient On (Oxygen Delivery Method): room air            NEUROLOGICAL EXAM:    Mental status: Awake, alert, and in no apparent distress. Oriented to person, place and time. Language function is normal.      Cranial Nerves: Pupils were equal, round, reactive to light. Extraocular movements were intact. Visual field were full. Fundoscopic exam was deferred. Facial sensation was intact to light touch. There was slight left facia droop. The palate was upgoing symmetrically and tongue was midline.     Motor exam: Bulk and tone were normal. Strength was 5/5 in all four extremities distally, did not assess proximal arms due to pain    Reflexes: deferred     Sensation: Intact to light touch/temp    Coordination: no gross dysmetria    Gait: defer   sternal wound seen          ACC: [id number] EXAM:  MR SHOULDER WAW IC LT   ORDERED BY:  BRAN SANCHEZ     ACC: [id number] EXAM:  MR STERNOCLAVICULAR JNT LT   ORDERED BY: ANASTASIIA WALKER     PROCEDURE DATE:  2023          INTERPRETATION:  MRI OF THE LEFT STERNOCLAVICULAR JOINT AND SHOULDER    CLINICAL INFORMATION: Left sternal abscess status post washout with   persistent purulent drainage and left shoulder pain.  TECHNIQUE: Multisequence, multiplanar MRI of the left sternoclavicular   joint. The study was performed before and after the intravenous   administration of 6 ml Gadavist (1.5 cc discarded) .    COMPARISON: Chest CT 2023 and 2023.    FINDINGS:    STERNOCLAVICULAR JOINT:    BONE: Patient status post surgical resection of the left clavicular head.   The surgical margin is sharp with trace edema and no loss of T1   hyperintense signal. In the manubrium there is increased STIR signal with   loss of T1 hyperintense signal and postcontrast enhancement involving the   superolateral leftward aspect of the bone concerning for acute   osteomyelitis (6:9 5:9). No acute fracture. No osteonecrosis.    JOINTS: A residual rim-enhancing fluid collection is seen in the region   of the left sternoclavicular joint measuring 80 x 25 mm. A drainage   catheter is seen within the fluid collection.    SOFT TISSUE: Postsurgical changes are seen along the anterior aspect of   the left sternoclavicular joint. There is a mild amount of edema in the   adjacent pectoralis major muscle. No focal fluid collection in the   anterior mediastinum. Susceptibility artifact from surgical staples are   seen along the anterior chest wall.      SHOULDER JOINT:    ROTATOR CUFF: Low-grade partial-thickness bursal surface tearing of the   supraspinatus tendon. Rotator cuff is otherwise intact.  MUSCLES: No focal muscle edema or atrophy.  BICEPS TENDON: Normal in course and caliber.  GLENOID LABRUM AND GLENOHUMERAL LIGAMENTS: No displaced labral tear.   Inferior glenohumeral ligament is intact.  GLENOHUMERAL CARTILAGE AND SUBCHONDRAL BONE: No full-thickness chondral   loss.  AC JOINT: No AC joint arthropathy.  SYNOVIUM/JOINT FLUID: No glenohumeral joint effusion. No focal fluid in   the subacromial/subdeltoid bursa.  BONE MARROW: No fracture or osteonecrosis. No marrow signal change to   suggest acute osteomyelitis.  NEUROVASCULAR STRUCTURES: Structures of the suprascapular notch,   spinoglenoid notch, and quadrilateral space are normal in course and   caliber.  SUBCUTANEOUS SOFT TISSUES: Edema in the subcutaneous fat of the left   shoulder. No rim-enhancing fluid collection to suggest abscess.        IMPRESSION:  1.  Patient status post surgical resection of the left clavicular head   with washout of the left sternoclavicular joint.  2.  A 80 x 25 mm rim-enhancing fluid collection persists in the surgical   cavity with well-placed drain within the fluid collection.  3.  Marrow signal abnormality in the manubrium concerning for acute   osteomyelitis.  4.  Low-grade partial-thickness bursal surface tear of the supraspinatus   tendon.    --- End of Report ---            RASHIDA HYDE MD; Attending Radiologist  This document has been electronically signed. 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  She had a recent fall in 2023 secondary to unsteadiness due to multiple sclerosis, with subsequent left chest wall and shoulder pain.  Was seen at [facility] with concern for MS exacerbation but no MRI brain and cervical spine with an without contrast were done at the time without enhancement.  At the time infectious workup was done and notable for UTI which was treated.  She continued to have pain.  In the interim, she had an outpatient MRI of left sternoclavicular joint with results significant for marrow edema and swelling of the left sternoclavicular joint with a 2 cm fluid collection and adjacent pleural edema in the left chest with concerns for infectious etiology.   CT chest w/ IV contrast suggestive of septic arthritis with surrounding erythema. Thoracic surgery consulted.  Patient s/p surgical debridement on 23    Int hx:  mri with 80 x 25 mm rim-enhancing fluid collection persists in the surgical cavity with well-placed drain within the fluid collection.   Marrow signal abnormality in the manubrium concerning for acute   osteomyelitis.    Low-grade partial-thickness bursal surface tear of the supraspinatus tendon.        ******    Pt still with sternal pain    Past Medical History:  Multiple sclerosis [G35]        Past Surgical History:  History of  [Z98.891]        Social History:  No toxic habits    Family History:  FAMILY HISTORY:      Allergies:  No Known Allergies      ROS:  Constitutional: Patient offers no complaints of fevers or significant weight loss  Ears, Nose, Mouth and Throat: The patient presents with no abnormalities of the head, ears, eyes, nose or throat  Skin: Patient offers no concerns of new rashes or lesions  Respiratory: The patient presents with no abnormalities of the respiratory tract  Cardiovascular: The patient presents with no cardiac abnormalities  Gastrointestinal: The patient presents with no abnormalities of the GI system  Genitourinary: The patient presents with no dysuria, hematuria or frequent urination  Neurological: See HPI  Endocrine: Patient offers no complaints of excessive thirst, urination, or heat/cold intolerance    Advanced care planning reviewed and noted in the chart.    Medications:  cefepime   IVPB 2000 milliGRAM(s) IV Intermittent every 8 hours  chlorhexidine 2% Cloths 1 Application(s) Topical <User Schedule>  clonazePAM  Tablet 0.5 milliGRAM(s) Oral at bedtime PRN  dalfampridine ER 10 milliGRAM(s) Oral every 12 hours  DULoxetine 60 milliGRAM(s) Oral at bedtime  enoxaparin Injectable 40 milliGRAM(s) SubCutaneous every 24 hours  HYDROmorphone  Injectable 1 milliGRAM(s) IV Push every 8 hours PRN  lactated ringers. 1000 milliLiter(s) IV Continuous <Continuous>  oxycodone    5 mG/acetaminophen 325 mG 1 Tablet(s) Oral every 4 hours PRN  senna 2 Tablet(s) Oral at bedtime  sertraline 100 milliGRAM(s) Oral at bedtime  Vibegron (Gemtesa) 75 milliGRAM(s) 1 Tablet(s) Oral daily      Labs:  CBC Full  -  ( 11 Dec 2023 08:03 )  WBC Count : 12.26 K/uL  RBC Count : 3.51 M/uL  Hemoglobin : 8.7 g/dL  Hematocrit : 29.0 %  Platelet Count - Automated : 496 K/uL  Mean Cell Volume : 82.6 fl  Mean Cell Hemoglobin : 24.8 pg  Mean Cell Hemoglobin Concentration : 30.0 gm/dL  Auto Neutrophil # : x  Auto Lymphocyte # : x  Auto Monocyte # : x  Auto Eosinophil # : x  Auto Basophil # : x  Auto Neutrophil % : x  Auto Lymphocyte % : x  Auto Monocyte % : x  Auto Eosinophil % : x  Auto Basophil % : x    12-10    136  |  100  |  7   ----------------------------<  109<H>  3.9   |  24  |  0.41<L>    Ca    9.1      10 Dec 2023 07:28      CAPILLARY BLOOD GLUCOSE            Urinalysis Basic - ( 10 Dec 2023 07:28 )    Color: x / Appearance: x / SG: x / pH: x  Gluc: 109 mg/dL / Ketone: x  / Bili: x / Urobili: x   Blood: x / Protein: x / Nitrite: x   Leuk Esterase: x / RBC: x / WBC x   Sq Epi: x / Non Sq Epi: x / Bacteria: x          Vitals:  Vital Signs Last 24 Hrs  T(C): 37 (11 Dec 2023 05:12), Max: 37 (11 Dec 2023 05:12)  T(F): 98.6 (11 Dec 2023 05:12), Max: 98.6 (11 Dec 2023 05:12)  HR: 95 (11 Dec 2023 05:12) (84 - 95)  BP: 108/73 (11 Dec 2023 05:12) (94/61 - 124/83)  BP(mean): --  RR: 18 (11 Dec 2023 05:12) (18 - 18)  SpO2: 99% (11 Dec 2023 05:12) (96% - 99%)    Parameters below as of 11 Dec 2023 05:12  Patient On (Oxygen Delivery Method): room air            NEUROLOGICAL EXAM:    Mental status: Awake, alert, and in no apparent distress. Oriented to person, place and time. Language function is normal.      Cranial Nerves: Pupils were equal, round, reactive to light. Extraocular movements were intact. Visual field were full. Fundoscopic exam was deferred. Facial sensation was intact to light touch. There was slight left facia droop. The palate was upgoing symmetrically and tongue was midline.     Motor exam: Bulk and tone were normal. Strength was 5/5 in all four extremities distally, did not assess proximal arms due to pain    Reflexes: deferred     Sensation: Intact to light touch/temp    Coordination: no gross dysmetria    Gait: defer   sternal wound seen          ACC: [id number] EXAM:  MR SHOULDER WAW IC LT   ORDERED BY:  BRAN SANCHEZ     ACC: [id number] EXAM:  MR STERNOCLAVICULAR JNT LT   ORDERED BY: ANASTASIIA WALKER     PROCEDURE DATE:  2023          INTERPRETATION:  MRI OF THE LEFT STERNOCLAVICULAR JOINT AND SHOULDER    CLINICAL INFORMATION: Left sternal abscess status post washout with   persistent purulent drainage and left shoulder pain.  TECHNIQUE: Multisequence, multiplanar MRI of the left sternoclavicular   joint. The study was performed before and after the intravenous   administration of 6 ml Gadavist (1.5 cc discarded) .    COMPARISON: Chest CT 2023 and 2023.    FINDINGS:    STERNOCLAVICULAR JOINT:    BONE: Patient status post surgical resection of the left clavicular head.   The surgical margin is sharp with trace edema and no loss of T1   hyperintense signal. In the manubrium there is increased STIR signal with   loss of T1 hyperintense signal and postcontrast enhancement involving the   superolateral leftward aspect of the bone concerning for acute   osteomyelitis (6:9 5:9). No acute fracture. No osteonecrosis.    JOINTS: A residual rim-enhancing fluid collection is seen in the region   of the left sternoclavicular joint measuring 80 x 25 mm. A drainage   catheter is seen within the fluid collection.    SOFT TISSUE: Postsurgical changes are seen along the anterior aspect of   the left sternoclavicular joint. There is a mild amount of edema in the   adjacent pectoralis major muscle. No focal fluid collection in the   anterior mediastinum. Susceptibility artifact from surgical staples are   seen along the anterior chest wall.      SHOULDER JOINT:    ROTATOR CUFF: Low-grade partial-thickness bursal surface tearing of the   supraspinatus tendon. Rotator cuff is otherwise intact.  MUSCLES: No focal muscle edema or atrophy.  BICEPS TENDON: Normal in course and caliber.  GLENOID LABRUM AND GLENOHUMERAL LIGAMENTS: No displaced labral tear.   Inferior glenohumeral ligament is intact.  GLENOHUMERAL CARTILAGE AND SUBCHONDRAL BONE: No full-thickness chondral   loss.  AC JOINT: No AC joint arthropathy.  SYNOVIUM/JOINT FLUID: No glenohumeral joint effusion. No focal fluid in   the subacromial/subdeltoid bursa.  BONE MARROW: No fracture or osteonecrosis. No marrow signal change to   suggest acute osteomyelitis.  NEUROVASCULAR STRUCTURES: Structures of the suprascapular notch,   spinoglenoid notch, and quadrilateral space are normal in course and   caliber.  SUBCUTANEOUS SOFT TISSUES: Edema in the subcutaneous fat of the left   shoulder. No rim-enhancing fluid collection to suggest abscess.        IMPRESSION:  1.  Patient status post surgical resection of the left clavicular head   with washout of the left sternoclavicular joint.  2.  A 80 x 25 mm rim-enhancing fluid collection persists in the surgical   cavity with well-placed drain within the fluid collection.  3.  Marrow signal abnormality in the manubrium concerning for acute   osteomyelitis.  4.  Low-grade partial-thickness bursal surface tear of the supraspinatus   tendon.    --- End of Report ---            RASHIDA HYDE MD; Attending Radiologist  This document has been electronically signed. Dec 11 2023  5:11PM

## 2023-12-12 NOTE — PROGRESS NOTE ADULT - PROBLEM SELECTOR PLAN 1
-continue Tj from Left CW  strict output measurement per shift  Lt cw staples with  packing   ,  please leave  Intact will be  changed by CTS surgery wednesday    -F/u BCxs and OR cultures  -Pain control

## 2023-12-12 NOTE — PROGRESS NOTE ADULT - PROBLEM SELECTOR PLAN 1
s/p Debridement of left sternoclavicular joint with excision of left clavicular head. Purulent drainage at the sternoclavicular joint. Tissue surrounding left clavicle found to be very inflamed. Copious irrigation of surgical site.   IV abx   MRI reviewed. Thoracic follow up to remove staples and possible RTOR  Ortho eval regarding supraspinatus tear  Pain control   Check TTE  Monitor Hgb    DVT ppx.

## 2023-12-12 NOTE — PROGRESS NOTE ADULT - SUBJECTIVE AND OBJECTIVE BOX
43y old  Female who presents with a chief complaint of L shoulder and chest pain (12 Dec 2023 13:23)      Interval history:  Afebrile, complains of pain down the arm. Concerned about being tired.       Allergies:   No Known Allergies      Antimicrobials:  cefepime   IVPB 2000 milliGRAM(s) IV Intermittent every 8 hours      REVIEW OF SYSTEMS:  No SOB  No abdominal pain  No dysuria   No rash.       Vital Signs Last 24 Hrs  T(C): 36.6 (12-12-23 @ 14:20), Max: 37 (12-12-23 @ 05:10)  T(F): 97.8 (12-12-23 @ 14:20), Max: 98.6 (12-12-23 @ 05:10)  HR: 100 (12-12-23 @ 14:20) (90 - 100)  BP: 101/62 (12-12-23 @ 14:20) (101/62 - 114/75)  BP(mean): --  RR: 18 (12-12-23 @ 14:20) (18 - 18)  SpO2: 100% (12-12-23 @ 14:20) (92% - 100%)        PHYSICAL EXAM:  Pt in no acute distress, alert, awake.   lt chest dressing, + LISBETH   non distended abdomen  no edema LE   no phlebitis                             8.4    12.79 )-----------( 453      ( 12 Dec 2023 07:29 )             27.9             Culture - Tissue with Gram Stain (12.07.23 @ 15:21)   Gram Stain:   No polymorphonuclear leukocytes seen per low power field   No organisms seen per oil power field  Specimen Source: .Tissue Left Sternal Clavicular Joint # 2  Culture Results:   No growth at 5 days      Radiology:    < from: MR Shoulder Joint w/wo IV Cont, Left (12.11.23 @ 15:47) >  IMPRESSION:  1.  Patient status post surgical resection of the left clavicular head   with washout of the left sternoclavicular joint.  2.  A 80 x 25 mm rim-enhancing fluid collection persists in the surgical   cavity with well-placed drain within the fluid collection.  3.  Marrow signal abnormality in the manubrium concerning for acute   osteomyelitis.  4.  Low-grade partial-thickness bursal surface tear ofthe supraspinatus   tendon.

## 2023-12-12 NOTE — PROGRESS NOTE ADULT - ASSESSMENT
43F PMHx of MS on Rituxan, presenting with left chest wall and shoulder pain w/ concern for infection now admitted with likely septic arthritis with need for possible washout and debridement       Problem/Plan - 1:  ·  Problem: Septic arthritis. left shoulder joint   ·  Plan: Imaging consistent with septic arthritis  s/p OR   cont abx and fu cultures   check ECHO : no vegetation   fu with ID   d/w with CTS at bedside : drainage adjusted and fx improved   cont current abx   pt with LUE pain : tessie referred pain from L [id number] and chest however will check MR cervical spine         # c/o left arm swelling and pain in elbow region :  -doppler ordered to r/o DVT : Negative      Problem/Plan - 2:  ·  Problem: Multiple sclerosis.   ·  Plan: Gets Rituxan every 6 months, next dose due : holding   -On Ampyra BID at home, need to bring home med     Problem/Plan - 3:  ·  Problem: Anxiety.   ·  Plan: ISTOP reviewed Reference #: [id number]  -Cont. Clonazepam 0.5mg QHS PRN  -Cont. Sertraline 100mg QHS  -Cont. Duloxetine 60mg QHS, pt unsure of dose  -Need full med rec.     Problem/Plan - 4:  ·  Problem: Anemia.   ·  Plan: hgb 9, lower than prior year. Pt endorses some recent anemia but cannot specify  -Trend cbc  -Monitor for bleeding.     Problem/Plan - 5:  ·  Problem: Prophylactic measure.   ·  Plan: DVT PPx         43F PMHx of MS on Rituxan, presenting with left chest wall and shoulder pain w/ concern for infection now admitted with likely septic arthritis with need for possible washout and debridement       Problem/Plan - 1:  ·  Problem: Septic arthritis. left shoulder joint   ·  Plan: Imaging consistent with septic arthritis  s/p OR   cont abx and fu cultures   check ECHO : no vegetation   fu with ID   d/w with CTS at bedside : drainage adjusted and fx improved   cont current abx   pt with LUE pain : tessie referred pain from L [id number] and chest however will check MR cervical spine         # c/o left arm swelling and pain in elbow region :  -doppler ordered to r/o DVT : Negative      Problem/Plan - 2:  ·  Problem: Multiple sclerosis.   ·  Plan: Gets Rituxan every 6 months, next dose due : holding   -On Ampyra BID at home, need to bring home med     Problem/Plan - 3:  ·  Problem: Anxiety.   ·  Plan: ISTOP reviewed Reference #: [id number]  -Cont. Clonazepam 0.5mg QHS PRN  -Cont. Sertraline 100mg QHS  -Cont. Duloxetine 60mg QHS, pt unsure of dose  -Need full med rec.     Problem/Plan - 4:  ·  Problem: Anemia.   ·  Plan: hgb 9, lower than prior year. Pt endorses some recent anemia but cannot specify  -Trend cbc  -Monitor for bleeding.     Problem/Plan - 5:  ·  Problem: Prophylactic measure.   ·  Plan: DVT PPx

## 2023-12-12 NOTE — PROGRESS NOTE ADULT - ASSESSMENT
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  She had a recent fall in November 2023 secondary to unsteadiness due to multiple sclerosis, with subsequent left chest wall and shoulder pain.  Was seen at [facility] with concern for MS exacerbation but no MRI brain and cervical spine with an without contrast were done at the time without enhancement.  At the time infectious workup was done and notable for UTI which was treated.  She continued to have pain.  In the interim, she had an outpatient MRI of left sternoclavicular joint with results significant for marrow edema and swelling of the left sternoclavicular joint with a 2 cm fluid collection and adjacent pleural edema in the left chest with concerns for infectious etiology.   CT chest w/ IV contrast suggestive of septic arthritis with surrounding erythema. Thoracic surgery consulted.  Patient s/p surgical debridement on 12/7/23    mri with 80 x 25 mm rim-enhancing fluid collection persists in the surgical cavity with well-placed drain within the fluid collection.   Marrow signal abnormality in the manubrium concerning for acute   osteomyelitis.    Low-grade partial-thickness bursal surface tear of the supraspinatus tendon.    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  Now likely pseudo-flare in setting of septic arthritis    Recommendations:  Management of septic arthritis per primary team  s/p surgical debridement 12/7/23  hold ocrelizumab for now given septic arthritis  continue home meds once able to take PO post-surgically  pain management  ID for infection  defer to primary team for low-grade partial-thickness bursal surface tear of the supraspinatus tendon.    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  She had a recent fall in November 2023 secondary to unsteadiness due to multiple sclerosis, with subsequent left chest wall and shoulder pain.  Was seen at [facility] with concern for MS exacerbation but no MRI brain and cervical spine with an without contrast were done at the time without enhancement.  At the time infectious workup was done and notable for UTI which was treated.  She continued to have pain.  In the interim, she had an outpatient MRI of left sternoclavicular joint with results significant for marrow edema and swelling of the left sternoclavicular joint with a 2 cm fluid collection and adjacent pleural edema in the left chest with concerns for infectious etiology.   CT chest w/ IV contrast suggestive of septic arthritis with surrounding erythema. Thoracic surgery consulted.  Patient s/p surgical debridement on 12/7/23    mri with 80 x 25 mm rim-enhancing fluid collection persists in the surgical cavity with well-placed drain within the fluid collection.   Marrow signal abnormality in the manubrium concerning for acute   osteomyelitis.    Low-grade partial-thickness bursal surface tear of the supraspinatus tendon.    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  Now likely pseudo-flare in setting of septic arthritis    Recommendations:  Management of septic arthritis per primary team  s/p surgical debridement 12/7/23  hold ocrelizumab for now given septic arthritis  continue home meds once able to take PO post-surgically  pain management  ID for infection  defer to primary team for low-grade partial-thickness bursal surface tear of the supraspinatus tendon.    discussed with patient and family at bedside

## 2023-12-12 NOTE — PROGRESS NOTE ADULT - NS ATTEND AMEND GEN_ALL_CORE FT
patient with worsening redness, and induration - staples removed in mid portion of wound - drainage of murky fluid -   drain will keep in place one additional day  but likely removal tomorrow.   will continue wet to dry - discussed possible need for return to OR if not improving.

## 2023-12-12 NOTE — PROGRESS NOTE ADULT - ASSESSMENT
43F PMHx of MS on immunosuppressive medication, presenting with left chest wall and shoulder pain. Recent history of fall secondary to unsteadiness due to MS. Obtained outpatient MRI of left sternoclavicular joint with results significant for marrow edema and swelling of the left sternoclavicular joint with a 2 cm fluid collection and adjacent pleural edema in the left chest with concerns for infectious etiology.  Patient initially saw orthopedics with for concerns for septic arthritis of this joint.  Impression of the read showed soft tissue abscess superficial to the joint.  Recommended CT scan with IV contrast of the chest.  Patient denies any fevers however endorsing erythema to the left sternoclavicular joint region. Patient states she was recently hospitalized over a week ago at [facility] for falls secondary to unsteadiness due to her MS.  Denies any recent falls today, vision changes. Endorsing headache. Denies any chest pain, shortness of breath, abdominal pain, nausea vomiting diarrhea, urinary complaints.  CT chest with IV in ED significant for Septic arthritis of the left sternoclavicular joint and involving the articulation of the sternum with the first costal cartilage.Extensive surrounding inflammation, with pneumonia in the underlying subpleural left upper lobe.No fluid collection is evident.  Workup significant for elevated alk phos (248), . VSS and afebrile  s/p Debridement of left sternoclavicular joint with excision of left clavicular head. Purulent drainage at the sternoclavicular joint. Tissue surrounding left clavicle found to be very inflamed. Copious irrigation of surgical site.   Concerned about pain at surgical site.    at bedside     43F PMHx of MS on immunosuppressive medication, presenting with left chest wall and shoulder pain. Recent history of fall secondary to unsteadiness due to MS. Obtained outpatient MRI of left sternoclavicular joint with results significant for marrow edema and swelling of the left sternoclavicular joint with a 2 cm fluid collection and adjacent pleural edema in the left chest with concerns for infectious etiology.  Patient initially saw orthopedics with for concerns for septic arthritis of this joint.  Impression of the read showed soft tissue abscess superficial to the joint.  Recommended CT scan with IV contrast of the chest.  Patient denies any fevers however endorsing erythema to the left sternoclavicular joint region. Patient states she was recently hospitalized over a week ago at [facility] for falls secondary to unsteadiness due to her MS.  Denies any recent falls today, vision changes. Endorsing headache. Denies any chest pain, shortness of breath, abdominal pain, nausea vomiting diarrhea, urinary complaints.  CT chest with IV in ED significant for Septic arthritis of the left sternoclavicular joint and involving the articulation of the sternum with the first costal cartilage.Extensive surrounding inflammation, with pneumonia in the underlying subpleural left upper lobe.No fluid collection is evident.  Workup significant for elevated alk phos (248), . VSS and afebrile  s/p Debridement of left sternoclavicular joint with excision of left clavicular head. Purulent drainage at the sternoclavicular joint. Tissue surrounding left clavicle found to be very inflamed. Copious irrigation of surgical site.   Concerned about pain at surgical site.    at bedside

## 2023-12-12 NOTE — PROGRESS NOTE ADULT - ASSESSMENT
43F PMHx of MS on (Rituxan, Ocrevus) presenting with left chest wall and shoulder pain. Recent history of fall secondary to unsteadiness due to MS. On review of ortho notes, pt has had multiple falls in November. She was admitted to [facility] on Last week of November and was found to have Rhinovirus and pseudomonas UTI. She completed 5-7 days course with Ciprofloxacin. Found to have Septic arthritis of SCM joint now s/p excision.    Patient obtained CT chest w/ IV contrast which showed likely septic arthritis with surrounding erythema. Thoracic surgery consulted, underwent washout and debridement.     OR Note: Debridement of left sternoclavicular joint with excision of left clavicular head. Purulent drainage at the sternoclavicular joint. Tissue surrounding left clavicle found to be very inflamed. Copious irrigation of surgical site.  Specimen sent: Head of left clavicle, culture of left sternoclavicular joint, culture of left manubrium, culture of deep sternal head of clavicle, culture of first rib costal cartilage      In ER: Afebrile, WBC 9.8, , . Given IV Zosyn, IV Vancomycin.     Remains afebrile, WBC stable.     Septic arthritis of the left sternoclavicular joint and involving the   articulation of the sternum with the first costal cartilage.    Extensive surrounding inflammation, with pneumonia in the underlying   subpleural left upper lobe.    # Septic arthritis of L sternoclavicular joint s/p excision and drainage  # L upper lobe opacity  # Immunosuppressed patient  # Elevated ESR/CRP.   #Manubrium OM.         PLAN:  - continue cefepime 2 gm Q8hrs  - OR cx negative for bacterial cx.   - No MRSA in cx and MRSA PCR negative, off vanco.   - follow fungal/AFB cultures; negative so far  - recheck ESR/CRP in am   - Quantiferon in lab   - All blood cultures negative to date, repeat in lab  - TTE negative   - Recent CT chest with improvement.       Plan discussed with pt's outpt Neurology Attending and ID Attending at prior hospital admission.          Odalis Reynaga  Please contact through MS Teams   If no response or past 5 pm/weekend call [phone].    43F PMHx of MS on (Rituxan, Ocrevus) presenting with left chest wall and shoulder pain. Recent history of fall secondary to unsteadiness due to MS. On review of ortho notes, pt has had multiple falls in November. She was admitted to [facility] on Last week of November and was found to have Rhinovirus and pseudomonas UTI. She completed 5-7 days course with Ciprofloxacin. Found to have Septic arthritis of SCM joint now s/p excision.    Patient obtained CT chest w/ IV contrast which showed likely septic arthritis with surrounding erythema. Thoracic surgery consulted, underwent washout and debridement.     OR Note: Debridement of left sternoclavicular joint with excision of left clavicular head. Purulent drainage at the sternoclavicular joint. Tissue surrounding left clavicle found to be very inflamed. Copious irrigation of surgical site.  Specimen sent: Head of left clavicle, culture of left sternoclavicular joint, culture of left manubrium, culture of deep sternal head of clavicle, culture of first rib costal cartilage      In ER: Afebrile, WBC 9.8, , . Given IV Zosyn, IV Vancomycin.     Remains afebrile, WBC stable.     Septic arthritis of the left sternoclavicular joint and involving the   articulation of the sternum with the first costal cartilage.    Extensive surrounding inflammation, with pneumonia in the underlying   subpleural left upper lobe.    # Septic arthritis of L sternoclavicular joint s/p excision and drainage  # L upper lobe opacity  # Immunosuppressed patient  # Elevated ESR/CRP.   #Manubrium OM.         PLAN:  - continue cefepime 2 gm Q8hrs  - OR cx negative for bacterial cx.   - No MRSA in cx and MRSA PCR negative, off vanco.   - follow fungal/AFB cultures; negative so far  - recheck ESR/CRP in am   - Quantiferon in lab   - All blood cultures negative to date, repeat in lab  - TTE negative   - Recent CT chest with improvement.       Plan discussed with pt's outpt Neurology Attending and ID Attending at prior hospital admission.          Odalis Reynaga  Please contact through MS Teams   If no response or past 5 pm/weekend call [phone].

## 2023-12-12 NOTE — PROGRESS NOTE ADULT - SUBJECTIVE AND OBJECTIVE BOX
Subjective: Patient seen and examined. No new events except as noted.   L arm pain   father and BF at bedside     REVIEW OF SYSTEMS:    CONSTITUTIONAL: +weakness, fevers or chills  EYES/ENT: No visual changes;  No vertigo or throat pain   NECK: No pain or stiffness  RESPIRATORY: No cough, wheezing, hemoptysis; No shortness of breath  CARDIOVASCULAR: No chest pain or palpitations  GASTROINTESTINAL: No abdominal or epigastric pain. No nausea, vomiting, or hematemesis; No diarrhea or constipation. No melena or hematochezia.  GENITOURINARY: No dysuria, frequency or hematuria  NEUROLOGICAL: No numbness or weakness  SKIN: No itching, burning, rashes, or lesions   All other review of systems is negative unless indicated above.    MEDICATIONS:  MEDICATIONS  (STANDING):  cefepime   IVPB 2000 milliGRAM(s) IV Intermittent every 8 hours  chlorhexidine 2% Cloths 1 Application(s) Topical <User Schedule>  clonazePAM  Tablet 0.5 milliGRAM(s) Oral <User Schedule>  dalfampridine ER 10 milliGRAM(s) Oral every 12 hours  DULoxetine 60 milliGRAM(s) Oral at bedtime  enoxaparin Injectable 40 milliGRAM(s) SubCutaneous every 24 hours  lactated ringers. 1000 milliLiter(s) (75 mL/Hr) IV Continuous <Continuous>  senna 2 Tablet(s) Oral at bedtime  sertraline 100 milliGRAM(s) Oral at bedtime  Vibegron (Gemtesa) 75 milliGRAM(s) 1 Tablet(s) Oral daily      PHYSICAL EXAM:  T(C): 36.8 (12-12-23 @ 09:52), Max: 37 (12-12-23 @ 05:10)  HR: 90 (12-12-23 @ 09:52) (90 - 100)  BP: 114/75 (12-12-23 @ 09:52) (101/62 - 114/75)  RR: 18 (12-12-23 @ 09:52) (18 - 18)  SpO2: 92% (12-12-23 @ 09:52) (92% - 98%)  Wt(kg): --  I&O's Summary    11 Dec 2023 07:01  -  12 Dec 2023 07:00  --------------------------------------------------------  IN: 2950 mL / OUT: 105 mL / NET: 2845 mL            Appearance: NAD  HEENT:   Dry  oral mucosa, PERRL, EOMI	  Lymphatic: No lymphadenopathy  Incision erythematous tender to touch edema erythema demarcated   Cardiovascular: Normal S1 S2, No JVD, No murmurs, No edema  Respiratory: Decreased bs   Psychiatry: A & O x 3, sleepy   Gastrointestinal:  Soft, Non-tender, + BS	  Skin: No rashes, No ecchymoses, No cyanosis	  Neurologic: Non-focal  Extremities: Normal range of motion, No clubbing, cyanosis or edema  Vascular: Peripheral pulses palpable 2+ bilaterally        LABS:    CARDIAC MARKERS:                                8.4    12.79 )-----------( 453      ( 12 Dec 2023 07:29 )             27.9                 TELEMETRY: 	    ECG:  	  RADIOLOGY: < from: MR Shoulder Joint w/wo IV Cont, Left (12.11.23 @ 15:47) >    ACC: [id number] EXAM:  MR SHOULDER WAW IC LT   ORDERED BY:  BRAN SANCHEZ     ACC: [id number] EXAM:  MR STERNOCLAVICULAR JNT LT   ORDERED BY: ANASTASIIA WALKER     PROCEDURE DATE:  12/11/2023          INTERPRETATION:  MRI OF THE LEFT STERNOCLAVICULAR JOINT AND SHOULDER    CLINICAL INFORMATION: Left sternal abscess status post washout with   persistent purulent drainage and left shoulder pain.  TECHNIQUE: Multisequence, multiplanar MRI of the left sternoclavicular   joint. The study was performed before and after the intravenous   administration of 6 ml Gadavist (1.5 cc discarded) .    COMPARISON: Chest CT 12/6/2023 and 12/11/2023.    FINDINGS:    STERNOCLAVICULAR JOINT:    BONE: Patient status post surgical resection of the left clavicular head.  The surgical margin is sharp with trace edema and no loss of T1   hyperintense signal. In the manubrium there is increased STIR signal with   loss of T1 hyperintense signal and postcontrast enhancement involving the   superolateral leftward aspect of the bone concerning for acute   osteomyelitis (6:9 5:9). No acute fracture. No osteonecrosis.    JOINTS: A residual rim-enhancing fluid collection is seen in the region   of the left sternoclavicular joint measuring 80 x 25 mm. A drainage   catheter is seen within the fluid collection.    SOFT TISSUE: Postsurgical changes are seen along the anterior aspect of   the left sternoclavicular joint. There is a mild amount of edema in the   adjacent pectoralis major muscle. No focal fluid collection in the   anterior mediastinum. Susceptibility artifact from surgical staples are   seen along the anterior chest wall.      SHOULDER JOINT:    ROTATOR CUFF: Low-grade partial-thickness bursal surface tearing of the   supraspinatus tendon. Rotator cuff is otherwise intact.  MUSCLES: No focal muscle edema or atrophy.  BICEPS TENDON: Normal in course and caliber.  GLENOID LABRUM AND GLENOHUMERAL LIGAMENTS: No displaced labral tear.   Inferior glenohumeral ligament is intact.  GLENOHUMERAL CARTILAGE AND SUBCHONDRAL BONE: No full-thickness chondral   loss.  AC JOINT: No AC joint arthropathy.  SYNOVIUM/JOINT FLUID: No glenohumeral joint effusion. No focal fluid in   the subacromial/subdeltoid bursa.  BONE MARROW: No fracture or osteonecrosis. No marrow signal change to   suggest acute osteomyelitis.  NEUROVASCULAR STRUCTURES: Structures of the suprascapular notch,   spinoglenoid notch, and quadrilateral space are normal in course and   caliber.  SUBCUTANEOUS SOFT TISSUES: Edema in the subcutaneous fat of the left   shoulder. No rim-enhancing fluid collection to suggest abscess.        IMPRESSION:  1.  Patient status post surgical resection of the left clavicular head   with washout of the left sternoclavicular joint.  2.  A 80 x 25 mm rim-enhancing fluid collection persists in the surgical   cavity with well-placed drain within the fluid collection.  3.  Marrow signal abnormality in the manubrium concerning for acute   osteomyelitis.  4.  Low-grade partial-thickness bursal surface tear ofthe supraspinatus   tendon.    --- End of Report ---            RASHIDA HYDE MD; Attending Radiologist  This document has been electronically signed. Dec 11 2023  5:11PM    < end of copied text >  Culture - Blood (12.08.23 @ 21:10)   Specimen Source: .Blood Blood-Peripheral  Culture Results:   No growth at 72 HoursCulture - Blood (12.08.23 @ 21:03)   Specimen Source: .Blood Blood-Peripheral  Culture Results:   No growth at 72 Hours    Culture - Fungal, Tissue (12.07.23 @ 15:21)   Specimen Source: .Tissue Left Sternal Clavicular Joint # 2  Culture Results:   Culture is being performed. Fungal cultures are held for 4 weeks.  DIAGNOSTIC TESTING:  [ ] Echocardiogram:  [ ]  Catheterization:  [ ] Stress Test:    OTHER: 	           Subjective: Patient seen and examined. No new events except as noted.   L arm pain   father and BF at bedside     REVIEW OF SYSTEMS:    CONSTITUTIONAL: +weakness, fevers or chills  EYES/ENT: No visual changes;  No vertigo or throat pain   NECK: No pain or stiffness  RESPIRATORY: No cough, wheezing, hemoptysis; No shortness of breath  CARDIOVASCULAR: No chest pain or palpitations  GASTROINTESTINAL: No abdominal or epigastric pain. No nausea, vomiting, or hematemesis; No diarrhea or constipation. No melena or hematochezia.  GENITOURINARY: No dysuria, frequency or hematuria  NEUROLOGICAL: No numbness or weakness  SKIN: No itching, burning, rashes, or lesions   All other review of systems is negative unless indicated above.    MEDICATIONS:  MEDICATIONS  (STANDING):  cefepime   IVPB 2000 milliGRAM(s) IV Intermittent every 8 hours  chlorhexidine 2% Cloths 1 Application(s) Topical <User Schedule>  clonazePAM  Tablet 0.5 milliGRAM(s) Oral <User Schedule>  dalfampridine ER 10 milliGRAM(s) Oral every 12 hours  DULoxetine 60 milliGRAM(s) Oral at bedtime  enoxaparin Injectable 40 milliGRAM(s) SubCutaneous every 24 hours  lactated ringers. 1000 milliLiter(s) (75 mL/Hr) IV Continuous <Continuous>  senna 2 Tablet(s) Oral at bedtime  sertraline 100 milliGRAM(s) Oral at bedtime  Vibegron (Gemtesa) 75 milliGRAM(s) 1 Tablet(s) Oral daily      PHYSICAL EXAM:  T(C): 36.8 (12-12-23 @ 09:52), Max: 37 (12-12-23 @ 05:10)  HR: 90 (12-12-23 @ 09:52) (90 - 100)  BP: 114/75 (12-12-23 @ 09:52) (101/62 - 114/75)  RR: 18 (12-12-23 @ 09:52) (18 - 18)  SpO2: 92% (12-12-23 @ 09:52) (92% - 98%)  Wt(kg): --  I&O's Summary    11 Dec 2023 07:01  -  12 Dec 2023 07:00  --------------------------------------------------------  IN: 2950 mL / OUT: 105 mL / NET: 2845 mL            Appearance: NAD  HEENT:   Dry  oral mucosa, PERRL, EOMI	  Lymphatic: No lymphadenopathy  Incision erythematous tender to touch edema erythema demarcated   Cardiovascular: Normal S1 S2, No JVD, No murmurs, No edema  Respiratory: Decreased bs   Psychiatry: A & O x 3, sleepy   Gastrointestinal:  Soft, Non-tender, + BS	  Skin: No rashes, No ecchymoses, No cyanosis	  Neurologic: Non-focal  Extremities: Normal range of motion, No clubbing, cyanosis or edema  Vascular: Peripheral pulses palpable 2+ bilaterally        LABS:    CARDIAC MARKERS:                                8.4    12.79 )-----------( 453      ( 12 Dec 2023 07:29 )             27.9                 TELEMETRY: 	    ECG:  	  RADIOLOGY: < from: MR Shoulder Joint w/wo IV Cont, Left (12.11.23 @ 15:47) >    ACC: [id number] EXAM:  MR SHOULDER WAW IC LT   ORDERED BY:  BRAN SANCHEZ     ACC: [id number] EXAM:  MR STERNOCLAVICULAR JNT LT   ORDERED BY: ANASTASIIA WALKER     PROCEDURE DATE:  12/11/2023          INTERPRETATION:  MRI OF THE LEFT STERNOCLAVICULAR JOINT AND SHOULDER    CLINICAL INFORMATION: Left sternal abscess status post washout with   persistent purulent drainage and left shoulder pain.  TECHNIQUE: Multisequence, multiplanar MRI of the left sternoclavicular   joint. The study was performed before and after the intravenous   administration of 6 ml Gadavist (1.5 cc discarded) .    COMPARISON: Chest CT 12/6/2023 and 12/11/2023.    FINDINGS:    STERNOCLAVICULAR JOINT:    BONE: Patient status post surgical resection of the left clavicular head.  The surgical margin is sharp with trace edema and no loss of T1   hyperintense signal. In the manubrium there is increased STIR signal with   loss of T1 hyperintense signal and postcontrast enhancement involving the   superolateral leftward aspect of the bone concerning for acute   osteomyelitis (6:9 5:9). No acute fracture. No osteonecrosis.    JOINTS: A residual rim-enhancing fluid collection is seen in the region   of the left sternoclavicular joint measuring 80 x 25 mm. A drainage   catheter is seen within the fluid collection.    SOFT TISSUE: Postsurgical changes are seen along the anterior aspect of   the left sternoclavicular joint. There is a mild amount of edema in the   adjacent pectoralis major muscle. No focal fluid collection in the   anterior mediastinum. Susceptibility artifact from surgical staples are   seen along the anterior chest wall.      SHOULDER JOINT:    ROTATOR CUFF: Low-grade partial-thickness bursal surface tearing of the   supraspinatus tendon. Rotator cuff is otherwise intact.  MUSCLES: No focal muscle edema or atrophy.  BICEPS TENDON: Normal in course and caliber.  GLENOID LABRUM AND GLENOHUMERAL LIGAMENTS: No displaced labral tear.   Inferior glenohumeral ligament is intact.  GLENOHUMERAL CARTILAGE AND SUBCHONDRAL BONE: No full-thickness chondral   loss.  AC JOINT: No AC joint arthropathy.  SYNOVIUM/JOINT FLUID: No glenohumeral joint effusion. No focal fluid in   the subacromial/subdeltoid bursa.  BONE MARROW: No fracture or osteonecrosis. No marrow signal change to   suggest acute osteomyelitis.  NEUROVASCULAR STRUCTURES: Structures of the suprascapular notch,   spinoglenoid notch, and quadrilateral space are normal in course and   caliber.  SUBCUTANEOUS SOFT TISSUES: Edema in the subcutaneous fat of the left   shoulder. No rim-enhancing fluid collection to suggest abscess.        IMPRESSION:  1.  Patient status post surgical resection of the left clavicular head   with washout of the left sternoclavicular joint.  2.  A 80 x 25 mm rim-enhancing fluid collection persists in the surgical   cavity with well-placed drain within the fluid collection.  3.  Marrow signal abnormality in the manubrium concerning for acute   osteomyelitis.  4.  Low-grade partial-thickness bursal surface tear ofthe supraspinatus   tendon.    --- End of Report ---            RASHIDA HYDE MD; Attending Radiologist  This document has been electronically signed. Dec 11 2023  5:11PM    < end of copied text >  Culture - Blood (12.08.23 @ 21:10)   Specimen Source: .Blood Blood-Peripheral  Culture Results:   No growth at 72 HoursCulture - Blood (12.08.23 @ 21:03)   Specimen Source: .Blood Blood-Peripheral  Culture Results:   No growth at 72 Hours    Culture - Fungal, Tissue (12.07.23 @ 15:21)   Specimen Source: .Tissue Left Sternal Clavicular Joint # 2  Culture Results:   Culture is being performed. Fungal cultures are held for 4 weeks.  DIAGNOSTIC TESTING:  [ ] Echocardiogram:  [ ]  Catheterization:  [ ] Stress Test:    OTHER:

## 2023-12-12 NOTE — PROGRESS NOTE ADULT - SUBJECTIVE AND OBJECTIVE BOX
Date of service: 12-12-23 @ 13:24      Patient is a 43y old  Female who presents with a chief complaint of L shoulder and chest pain (12 Dec 2023 11:19)                                                               INTERVAL HPI/OVERNIGHT EVENTS:    REVIEW OF SYSTEMS:     CONSTITUTIONAL: No weakness, fevers or chills  EYES/ENT: No visual changes , no ear ache   NECK: No pain or stiffness  RESPIRATORY: No cough, wheezing,  No shortness of breath  CARDIOVASCULAR: No chest pain or palpitations  GASTROINTESTINAL: No abdominal pain  . No nausea, vomiting, or hematemesis; No diarrhea or constipation. No melena or hematochezia.  GENITOURINARY: No dysuria, frequency or hematuria  NEUROLOGICAL: No numbness or weakness  SKIN: No itching, burning, rashes, or lesions                                                                                                                                                                                                                                                                                 Medications:  MEDICATIONS  (STANDING):  cefepime   IVPB 2000 milliGRAM(s) IV Intermittent every 8 hours  chlorhexidine 2% Cloths 1 Application(s) Topical <User Schedule>  clonazePAM  Tablet 0.5 milliGRAM(s) Oral <User Schedule>  dalfampridine ER 10 milliGRAM(s) Oral every 12 hours  DULoxetine 60 milliGRAM(s) Oral at bedtime  enoxaparin Injectable 40 milliGRAM(s) SubCutaneous every 24 hours  lactated ringers. 1000 milliLiter(s) (75 mL/Hr) IV Continuous <Continuous>  senna 2 Tablet(s) Oral at bedtime  sertraline 100 milliGRAM(s) Oral at bedtime  Vibegron (Gemtesa) 75 milliGRAM(s) 1 Tablet(s) Oral daily    MEDICATIONS  (PRN):  HYDROmorphone  Injectable 1 milliGRAM(s) IV Push every 8 hours PRN Severe Pain (7 - 10)  oxyCODONE    IR 5 milliGRAM(s) Oral every 6 hours PRN Mild Pain (1 - 3)  oxyCODONE    IR 10 milliGRAM(s) Oral every 8 hours PRN Moderate Pain (4 - 6)       Allergies    No Known Allergies    Intolerances      Vital Signs Last 24 Hrs  T(C): 36.8 (12 Dec 2023 09:52), Max: 37 (12 Dec 2023 05:10)  T(F): 98.3 (12 Dec 2023 09:52), Max: 98.6 (12 Dec 2023 05:10)  HR: 90 (12 Dec 2023 09:52) (90 - 100)  BP: 114/75 (12 Dec 2023 09:52) (101/62 - 114/75)  BP(mean): --  RR: 18 (12 Dec 2023 09:52) (18 - 18)  SpO2: 92% (12 Dec 2023 09:52) (92% - 98%)    Parameters below as of 12 Dec 2023 09:52  Patient On (Oxygen Delivery Method): room air      CAPILLARY BLOOD GLUCOSE          12-11 @ 07:01  -  12-12 @ 07:00  --------------------------------------------------------  IN: 2950 mL / OUT: 105 mL / NET: 2845 mL      Physical Exam:    Daily     Daily   General:  Well appearing, NAD, not cachetic  HEENT:  Nonicteric, PERRLA  CV:  RRR, S1S2   Lungs:  CTA B/L, no wheezes, rales, rhonchi  Abdomen:  Soft, non-tender, no distended, positive BS  Extremities:  no edema   L chest : referr to CT   Neuro:  AAOx3, non-focal, grossly intact    LUE limited ROM sec to pain ?                                                                                                                                                                                                                                                                                               LABS:                               8.4    12.79 )-----------( 453      ( 12 Dec 2023 07:29 )             27.9                                               RADIOLOGY & ADDITIONAL TESTS         I personally reviewed: [  ]EKG   [  ]CXR    [  ] CT      A/P:         Discussed with :     [facility] consultants' Notes   Time spent :

## 2023-12-12 NOTE — PROGRESS NOTE ADULT - SUBJECTIVE AND OBJECTIVE BOX
VITAL SIGNS    Telemetry:      Vital Signs Last 24 Hrs  T(C): 36.8 (12-12-23 @ 09:52), Max: 37 (12-12-23 @ 05:10)  T(F): 98.3 (12-12-23 @ 09:52), Max: 98.6 (12-12-23 @ 05:10)  HR: 90 (12-12-23 @ 09:52) (90 - 100)  BP: 114/75 (12-12-23 @ 09:52) (101/62 - 114/75)  RR: 18 (12-12-23 @ 09:52) (18 - 18)  SpO2: 92% (12-12-23 @ 09:52) (92% - 98%)                   Daily     Daily         CAPILLARY BLOOD GLUCOSE                              PHYSICAL EXAM    Neurology: alert and oriented x 3, moves all extremities with no defecits  CV :  RRR  lt clavicular incison w errythema/staples   drain  Extremities:     no edema

## 2023-12-13 PROBLEM — G35 MULTIPLE SCLEROSIS: Chronic | Status: ACTIVE | Noted: 2023-12-06

## 2023-12-13 LAB
ALBUMIN SERPL ELPH-MCNC: 3.8 G/DL — SIGNIFICANT CHANGE UP (ref 3.3–5)
ALBUMIN SERPL ELPH-MCNC: 3.8 G/DL — SIGNIFICANT CHANGE UP (ref 3.3–5)
ALP SERPL-CCNC: 199 U/L — HIGH (ref 40–120)
ALP SERPL-CCNC: 199 U/L — HIGH (ref 40–120)
ALT FLD-CCNC: 14 U/L — SIGNIFICANT CHANGE UP (ref 10–45)
ALT FLD-CCNC: 14 U/L — SIGNIFICANT CHANGE UP (ref 10–45)
ANION GAP SERPL CALC-SCNC: 18 MMOL/L — HIGH (ref 5–17)
ANION GAP SERPL CALC-SCNC: 18 MMOL/L — HIGH (ref 5–17)
ANION GAP SERPL CALC-SCNC: 9 MMOL/L — SIGNIFICANT CHANGE UP (ref 5–17)
ANION GAP SERPL CALC-SCNC: 9 MMOL/L — SIGNIFICANT CHANGE UP (ref 5–17)
AST SERPL-CCNC: 14 U/L — SIGNIFICANT CHANGE UP (ref 10–40)
AST SERPL-CCNC: 14 U/L — SIGNIFICANT CHANGE UP (ref 10–40)
BILIRUB SERPL-MCNC: 0.1 MG/DL — LOW (ref 0.2–1.2)
BILIRUB SERPL-MCNC: 0.1 MG/DL — LOW (ref 0.2–1.2)
BUN SERPL-MCNC: 10 MG/DL — SIGNIFICANT CHANGE UP (ref 7–23)
BUN SERPL-MCNC: 10 MG/DL — SIGNIFICANT CHANGE UP (ref 7–23)
BUN SERPL-MCNC: 7 MG/DL — SIGNIFICANT CHANGE UP (ref 7–23)
BUN SERPL-MCNC: 7 MG/DL — SIGNIFICANT CHANGE UP (ref 7–23)
CALCIUM SERPL-MCNC: 9.5 MG/DL — SIGNIFICANT CHANGE UP (ref 8.4–10.5)
CALCIUM SERPL-MCNC: 9.5 MG/DL — SIGNIFICANT CHANGE UP (ref 8.4–10.5)
CALCIUM SERPL-MCNC: <3 MG/DL — CRITICAL LOW (ref 8.4–10.5)
CALCIUM SERPL-MCNC: <3 MG/DL — CRITICAL LOW (ref 8.4–10.5)
CHLORIDE SERPL-SCNC: 97 MMOL/L — SIGNIFICANT CHANGE UP (ref 96–108)
CHLORIDE SERPL-SCNC: 97 MMOL/L — SIGNIFICANT CHANGE UP (ref 96–108)
CHLORIDE SERPL-SCNC: 98 MMOL/L — SIGNIFICANT CHANGE UP (ref 96–108)
CHLORIDE SERPL-SCNC: 98 MMOL/L — SIGNIFICANT CHANGE UP (ref 96–108)
CK SERPL-CCNC: 18 U/L — LOW (ref 25–170)
CK SERPL-CCNC: 18 U/L — LOW (ref 25–170)
CO2 SERPL-SCNC: 22 MMOL/L — SIGNIFICANT CHANGE UP (ref 22–31)
CO2 SERPL-SCNC: 22 MMOL/L — SIGNIFICANT CHANGE UP (ref 22–31)
CO2 SERPL-SCNC: 29 MMOL/L — SIGNIFICANT CHANGE UP (ref 22–31)
CO2 SERPL-SCNC: 29 MMOL/L — SIGNIFICANT CHANGE UP (ref 22–31)
CREAT SERPL-MCNC: 0.43 MG/DL — LOW (ref 0.5–1.3)
EGFR: 123 ML/MIN/1.73M2 — SIGNIFICANT CHANGE UP
GLUCOSE SERPL-MCNC: 121 MG/DL — HIGH (ref 70–99)
GLUCOSE SERPL-MCNC: 121 MG/DL — HIGH (ref 70–99)
GLUCOSE SERPL-MCNC: 92 MG/DL — SIGNIFICANT CHANGE UP (ref 70–99)
GLUCOSE SERPL-MCNC: 92 MG/DL — SIGNIFICANT CHANGE UP (ref 70–99)
HCT VFR BLD CALC: 24 % — LOW (ref 34.5–45)
HCT VFR BLD CALC: 24 % — LOW (ref 34.5–45)
HCT VFR BLD CALC: 24.7 % — LOW (ref 34.5–45)
HCT VFR BLD CALC: 24.7 % — LOW (ref 34.5–45)
HGB BLD-MCNC: 7.4 G/DL — LOW (ref 11.5–15.5)
HGB BLD-MCNC: 7.4 G/DL — LOW (ref 11.5–15.5)
HGB BLD-MCNC: 7.6 G/DL — LOW (ref 11.5–15.5)
HGB BLD-MCNC: 7.6 G/DL — LOW (ref 11.5–15.5)
MCHC RBC-ENTMCNC: 24.7 PG — LOW (ref 27–34)
MCHC RBC-ENTMCNC: 24.7 PG — LOW (ref 27–34)
MCHC RBC-ENTMCNC: 24.8 PG — LOW (ref 27–34)
MCHC RBC-ENTMCNC: 24.8 PG — LOW (ref 27–34)
MCHC RBC-ENTMCNC: 30.8 GM/DL — LOW (ref 32–36)
MCV RBC AUTO: 80.3 FL — SIGNIFICANT CHANGE UP (ref 80–100)
MCV RBC AUTO: 80.3 FL — SIGNIFICANT CHANGE UP (ref 80–100)
MCV RBC AUTO: 80.5 FL — SIGNIFICANT CHANGE UP (ref 80–100)
MCV RBC AUTO: 80.5 FL — SIGNIFICANT CHANGE UP (ref 80–100)
NRBC # BLD: 0 /100 WBCS — SIGNIFICANT CHANGE UP (ref 0–0)
PLATELET # BLD AUTO: 388 K/UL — SIGNIFICANT CHANGE UP (ref 150–400)
PLATELET # BLD AUTO: 388 K/UL — SIGNIFICANT CHANGE UP (ref 150–400)
PLATELET # BLD AUTO: 433 K/UL — HIGH (ref 150–400)
PLATELET # BLD AUTO: 433 K/UL — HIGH (ref 150–400)
POTASSIUM SERPL-MCNC: 4.3 MMOL/L — SIGNIFICANT CHANGE UP (ref 3.5–5.3)
POTASSIUM SERPL-MCNC: 4.3 MMOL/L — SIGNIFICANT CHANGE UP (ref 3.5–5.3)
POTASSIUM SERPL-MCNC: >9 MMOL/L — CRITICAL HIGH (ref 3.5–5.3)
POTASSIUM SERPL-MCNC: >9 MMOL/L — CRITICAL HIGH (ref 3.5–5.3)
POTASSIUM SERPL-SCNC: 4.3 MMOL/L — SIGNIFICANT CHANGE UP (ref 3.5–5.3)
POTASSIUM SERPL-SCNC: 4.3 MMOL/L — SIGNIFICANT CHANGE UP (ref 3.5–5.3)
POTASSIUM SERPL-SCNC: >9 MMOL/L — CRITICAL HIGH (ref 3.5–5.3)
POTASSIUM SERPL-SCNC: >9 MMOL/L — CRITICAL HIGH (ref 3.5–5.3)
PROT SERPL-MCNC: 6.7 G/DL — SIGNIFICANT CHANGE UP (ref 6–8.3)
PROT SERPL-MCNC: 6.7 G/DL — SIGNIFICANT CHANGE UP (ref 6–8.3)
RBC # BLD: 2.99 M/UL — LOW (ref 3.8–5.2)
RBC # BLD: 2.99 M/UL — LOW (ref 3.8–5.2)
RBC # BLD: 3.07 M/UL — LOW (ref 3.8–5.2)
RBC # BLD: 3.07 M/UL — LOW (ref 3.8–5.2)
RBC # FLD: 14.2 % — SIGNIFICANT CHANGE UP (ref 10.3–14.5)
SODIUM SERPL-SCNC: 136 MMOL/L — SIGNIFICANT CHANGE UP (ref 135–145)
SODIUM SERPL-SCNC: 136 MMOL/L — SIGNIFICANT CHANGE UP (ref 135–145)
SODIUM SERPL-SCNC: 137 MMOL/L — SIGNIFICANT CHANGE UP (ref 135–145)
SODIUM SERPL-SCNC: 137 MMOL/L — SIGNIFICANT CHANGE UP (ref 135–145)
WBC # BLD: 10.89 K/UL — HIGH (ref 3.8–10.5)
WBC # BLD: 10.89 K/UL — HIGH (ref 3.8–10.5)
WBC # BLD: 14.06 K/UL — HIGH (ref 3.8–10.5)
WBC # BLD: 14.06 K/UL — HIGH (ref 3.8–10.5)
WBC # FLD AUTO: 10.89 K/UL — HIGH (ref 3.8–10.5)
WBC # FLD AUTO: 10.89 K/UL — HIGH (ref 3.8–10.5)
WBC # FLD AUTO: 14.06 K/UL — HIGH (ref 3.8–10.5)
WBC # FLD AUTO: 14.06 K/UL — HIGH (ref 3.8–10.5)

## 2023-12-13 PROCEDURE — 73020 X-RAY EXAM OF SHOULDER: CPT | Mod: 26,59,LT

## 2023-12-13 PROCEDURE — 99221 1ST HOSP IP/OBS SF/LOW 40: CPT

## 2023-12-13 PROCEDURE — 99231 SBSQ HOSP IP/OBS SF/LOW 25: CPT

## 2023-12-13 PROCEDURE — 73060 X-RAY EXAM OF HUMERUS: CPT | Mod: 26,LT

## 2023-12-13 PROCEDURE — 73030 X-RAY EXAM OF SHOULDER: CPT | Mod: 26,LT

## 2023-12-13 PROCEDURE — 99232 SBSQ HOSP IP/OBS MODERATE 35: CPT

## 2023-12-13 PROCEDURE — 73090 X-RAY EXAM OF FOREARM: CPT | Mod: 26,LT

## 2023-12-13 RX ORDER — HYDROMORPHONE HYDROCHLORIDE 2 MG/ML
0.5 INJECTION INTRAMUSCULAR; INTRAVENOUS; SUBCUTANEOUS ONCE
Refills: 0 | Status: DISCONTINUED | OUTPATIENT
Start: 2023-12-13 | End: 2023-12-13

## 2023-12-13 RX ORDER — ACETAMINOPHEN 500 MG
650 TABLET ORAL ONCE
Refills: 0 | Status: COMPLETED | OUTPATIENT
Start: 2023-12-13 | End: 2023-12-13

## 2023-12-13 RX ADMIN — SODIUM CHLORIDE 75 MILLILITER(S): 9 INJECTION, SOLUTION INTRAVENOUS at 17:17

## 2023-12-13 RX ADMIN — Medication 0.5 MILLIGRAM(S): at 22:28

## 2023-12-13 RX ADMIN — HYDROMORPHONE HYDROCHLORIDE 1 MILLIGRAM(S): 2 INJECTION INTRAMUSCULAR; INTRAVENOUS; SUBCUTANEOUS at 09:13

## 2023-12-13 RX ADMIN — OXYCODONE HYDROCHLORIDE 10 MILLIGRAM(S): 5 TABLET ORAL at 12:43

## 2023-12-13 RX ADMIN — DALFAMPRIDINE 10 MILLIGRAM(S): 10 TABLET, FILM COATED, EXTENDED RELEASE ORAL at 00:52

## 2023-12-13 RX ADMIN — Medication 650 MILLIGRAM(S): at 01:51

## 2023-12-13 RX ADMIN — CEFEPIME 100 MILLIGRAM(S): 1 INJECTION, POWDER, FOR SOLUTION INTRAMUSCULAR; INTRAVENOUS at 05:08

## 2023-12-13 RX ADMIN — OXYCODONE HYDROCHLORIDE 10 MILLIGRAM(S): 5 TABLET ORAL at 11:43

## 2023-12-13 RX ADMIN — SENNA PLUS 2 TABLET(S): 8.6 TABLET ORAL at 22:29

## 2023-12-13 RX ADMIN — HYDROMORPHONE HYDROCHLORIDE 1 MILLIGRAM(S): 2 INJECTION INTRAMUSCULAR; INTRAVENOUS; SUBCUTANEOUS at 17:42

## 2023-12-13 RX ADMIN — CHLORHEXIDINE GLUCONATE 1 APPLICATION(S): 213 SOLUTION TOPICAL at 09:18

## 2023-12-13 RX ADMIN — Medication 650 MILLIGRAM(S): at 00:51

## 2023-12-13 RX ADMIN — HYDROMORPHONE HYDROCHLORIDE 0.5 MILLIGRAM(S): 2 INJECTION INTRAMUSCULAR; INTRAVENOUS; SUBCUTANEOUS at 23:58

## 2023-12-13 RX ADMIN — OXYCODONE HYDROCHLORIDE 10 MILLIGRAM(S): 5 TABLET ORAL at 04:11

## 2023-12-13 RX ADMIN — OXYCODONE HYDROCHLORIDE 10 MILLIGRAM(S): 5 TABLET ORAL at 05:11

## 2023-12-13 RX ADMIN — SERTRALINE 100 MILLIGRAM(S): 25 TABLET, FILM COATED ORAL at 22:29

## 2023-12-13 RX ADMIN — CEFEPIME 100 MILLIGRAM(S): 1 INJECTION, POWDER, FOR SOLUTION INTRAMUSCULAR; INTRAVENOUS at 13:07

## 2023-12-13 RX ADMIN — SODIUM CHLORIDE 75 MILLILITER(S): 9 INJECTION, SOLUTION INTRAVENOUS at 09:15

## 2023-12-13 RX ADMIN — DALFAMPRIDINE 10 MILLIGRAM(S): 10 TABLET, FILM COATED, EXTENDED RELEASE ORAL at 13:14

## 2023-12-13 RX ADMIN — HYDROMORPHONE HYDROCHLORIDE 1 MILLIGRAM(S): 2 INJECTION INTRAMUSCULAR; INTRAVENOUS; SUBCUTANEOUS at 09:43

## 2023-12-13 RX ADMIN — DULOXETINE HYDROCHLORIDE 60 MILLIGRAM(S): 30 CAPSULE, DELAYED RELEASE ORAL at 22:29

## 2023-12-13 RX ADMIN — Medication 650 MILLIGRAM(S): at 14:42

## 2023-12-13 RX ADMIN — HYDROMORPHONE HYDROCHLORIDE 1 MILLIGRAM(S): 2 INJECTION INTRAMUSCULAR; INTRAVENOUS; SUBCUTANEOUS at 17:12

## 2023-12-13 RX ADMIN — CEFEPIME 100 MILLIGRAM(S): 1 INJECTION, POWDER, FOR SOLUTION INTRAMUSCULAR; INTRAVENOUS at 22:28

## 2023-12-13 RX ADMIN — OXYCODONE HYDROCHLORIDE 10 MILLIGRAM(S): 5 TABLET ORAL at 21:38

## 2023-12-13 RX ADMIN — ENOXAPARIN SODIUM 40 MILLIGRAM(S): 100 INJECTION SUBCUTANEOUS at 13:07

## 2023-12-13 RX ADMIN — OXYCODONE HYDROCHLORIDE 10 MILLIGRAM(S): 5 TABLET ORAL at 20:38

## 2023-12-13 RX ADMIN — Medication 650 MILLIGRAM(S): at 13:42

## 2023-12-13 NOTE — PROGRESS NOTE ADULT - PROBLEM SELECTOR PLAN 1
s/p Debridement of left sternoclavicular joint with excision of left clavicular head. Purulent drainage at the sternoclavicular joint. Tissue surrounding left clavicle found to be very inflamed. Copious irrigation of surgical site.   IV abx    OR c/s NGTD  BC  NGTD.   MRI reviewed. s/p  removal of staples  Ortho eval regarding supraspinatus tear  Pain control   Check TTE  Monitor Hgb    DVT ppx.

## 2023-12-13 NOTE — PROGRESS NOTE ADULT - ASSESSMENT
43F PMHx of MS on (Rituxan, Ocrevus) presenting with left chest wall and shoulder pain. Recent history of fall secondary to unsteadiness due to MS. On review of ortho notes, pt has had multiple falls in November. She was admitted to [facility] on Last week of November and was found to have Rhinovirus and pseudomonas UTI. She completed 5-7 days course with Ciprofloxacin. Found to have Septic arthritis of SCM joint now s/p excision.    Patient obtained CT chest w/ IV contrast which showed likely septic arthritis with surrounding erythema. Thoracic surgery consulted, underwent washout and debridement.     OR Note: Debridement of left sternoclavicular joint with excision of left clavicular head. Purulent drainage at the sternoclavicular joint. Tissue surrounding left clavicle found to be very inflamed. Copious irrigation of surgical site.  Specimen sent: Head of left clavicle, culture of left sternoclavicular joint, culture of left manubrium, culture of deep sternal head of clavicle, culture of first rib costal cartilage      In ER: Afebrile, WBC 9.8, , . Given IV Zosyn, IV Vancomycin.     Remains afebrile, WBC stable.     Septic arthritis of the left sternoclavicular joint and involving the   articulation of the sternum with the first costal cartilage.    Extensive surrounding inflammation, with pneumonia in the underlying   subpleural left upper lobe.    # Septic arthritis of L sternoclavicular joint s/p excision and drainage  # L upper lobe opacity  # Immunosuppressed patient  # Elevated ESR/CRP.   #Manubrium OM.         PLAN:  - continue cefepime 2 gm Q8hrs  - OR cx negative for bacterial cx.   - No MRSA in cx and MRSA PCR negative, off vanco.   - follow fungal/AFB cultures; negative so far  - recheck ESR/CRP in am   - Quantiferon negative   - All blood cultures negative to date  - TTE negative   - Recent CT chest with improvement.   - reviewed MRI with radiology the collection could be infected fluid, discussed with thoracic surgery, plan for repeat washout on friday.       Plan discussed with Medicine, Thoracic surgery and radiology Attending.         Odalis Reynaga  Please contact through MS Teams   If no response or past 5 pm/weekend call [phone].    43F PMHx of MS on (Rituxan, Ocrevus) presenting with left chest wall and shoulder pain. Recent history of fall secondary to unsteadiness due to MS. On review of ortho notes, pt has had multiple falls in November. She was admitted to [facility] on Last week of November and was found to have Rhinovirus and pseudomonas UTI. She completed 5-7 days course with Ciprofloxacin. Found to have Septic arthritis of SCM joint now s/p excision.    Patient obtained CT chest w/ IV contrast which showed likely septic arthritis with surrounding erythema. Thoracic surgery consulted, underwent washout and debridement.     OR Note: Debridement of left sternoclavicular joint with excision of left clavicular head. Purulent drainage at the sternoclavicular joint. Tissue surrounding left clavicle found to be very inflamed. Copious irrigation of surgical site.  Specimen sent: Head of left clavicle, culture of left sternoclavicular joint, culture of left manubrium, culture of deep sternal head of clavicle, culture of first rib costal cartilage      In ER: Afebrile, WBC 9.8, , . Given IV Zosyn, IV Vancomycin.     Remains afebrile, WBC stable.     Septic arthritis of the left sternoclavicular joint and involving the   articulation of the sternum with the first costal cartilage.    Extensive surrounding inflammation, with pneumonia in the underlying   subpleural left upper lobe.    # Septic arthritis of L sternoclavicular joint s/p excision and drainage  # L upper lobe opacity  # Immunosuppressed patient  # Elevated ESR/CRP.   #Manubrium OM.         PLAN:  - continue cefepime 2 gm Q8hrs  - OR cx negative for bacterial cx.   - No MRSA in cx and MRSA PCR negative, off vanco.   - follow fungal/AFB cultures; negative so far  - recheck ESR/CRP in am   - Quantiferon negative   - All blood cultures negative to date  - TTE negative   - Recent CT chest with improvement.   - reviewed MRI with radiology the collection could be infected fluid, discussed with thoracic surgery, plan for repeat washout on friday.       Plan discussed with Medicine, Thoracic surgery and radiology Attending.         Odalis Reynaga  Please contact through MS Teams   If no response or past 5 pm/weekend call [phone].

## 2023-12-13 NOTE — PROGRESS NOTE ADULT - ASSESSMENT
43F PMHx of MS on Rituxan, presenting with left chest wall and shoulder pain w/ concern for infection now admitted with likely septic arthritis with need for possible washout and debridement    POD # 1 Debridement and washout of Left.  sternoclavicular joint excision Left clavicular head  Tj 80cc serosanguinous.  GS no org seen.  Cultures pending.  12/9 + RVP>no treatment as per ID  DC PCA  somnolent  Pain mgmt notified Transition oral Rx    12/10    pain medication w oxycodone and dilaudid for breakthrough,     Lt CW staples w bulb   w mininimal drainage   abx per ID  12/11: Left CW with staples with bulb, with minimal drainage. continue antibiotics per ID. continue optimal pain control.   12/12    lt cw  staples  w erythma  drain    several staples removed and packed by Dr Bautista,  abx  per ID 43F PMHx of MS on Rituxan, presenting with left chest wall and shoulder pain w/ concern for infection now admitted with likely septic arthritis with need for possible washout and debridement    POD # 1 Debridement and washout of Left.  sternoclavicular joint excision Left clavicular head  Tj 80cc serosanguinous.  GS no org seen.  Cultures pending.  12/9 + RVP>no treatment as per ID  DC PCA  somnolent  Pain mgmt notified Transition oral Rx    12/10    pain medication w oxycodone and dilaudid for breakthrough,     Lt CW staples w bulb   w mininimal drainage   abx per ID  12/11: Left CW with staples with bulb, with minimal drainage. continue antibiotics per ID. continue optimal pain control.   12/12    lt cw  staples  w erythma  drain    several staples removed and packed by Dr Bautista,  abx  per ID  12/13  wbc 10.8  afebrile.  wound without change since yesterday.    OR c/s NGTD  BC  NGTD.

## 2023-12-13 NOTE — PROGRESS NOTE ADULT - ASSESSMENT
43F PMHx of MS on Rituxan, presenting with left chest wall and shoulder pain w/ concern for infection now admitted with likely septic arthritis with need for possible washout and debridement       Problem/Plan - 1:  ·  Problem: Septic arthritis. left shoulder joint   ·  Plan: Imaging consistent with septic arthritis  s/p OR   cont abx and fu cultures   check ECHO : no vegetation   fu with ID   d/w with CTS at bedside : drainage adjusted and fx improved   cont current abx   pt with LUE pain : tessie referred pain from L [id number] and chest however  Cervical MRI : no disciits / abcess           # c/o left arm swelling and pain in elbow region :  -doppler ordered to r/o DVT : Negative      Problem/Plan - 2:  ·  Problem: Multiple sclerosis.   ·  Plan: Gets Rituxan every 6 months, next dose due : holding   -On Ampyra BID at home, need to bring home med     Problem/Plan - 3:  ·  Problem: Anxiety.   ·  Plan: ISTOP reviewed Reference #: [id number]  -Cont. Clonazepam 0.5mg QHS PRN  -Cont. Sertraline 100mg QHS  -Cont. Duloxetine 60mg QHS, pt unsure of dose  -Need full med rec.     Problem/Plan - 4:  ·  Problem: Anemia.   ·  Plan: hgb 9, lower than prior year. Pt endorses some recent anemia but cannot specify  -Trend cbc  -Monitor for bleeding.     Problem/Plan - 5:  ·  Problem: Prophylactic measure.   ·  Plan: DVT PPx    hyperkalemia : repeat   likely error        43F PMHx of MS on Rituxan, presenting with left chest wall and shoulder pain w/ concern for infection now admitted with likely septic arthritis with need for possible washout and debridement       Problem/Plan - 1:  ·  Problem: Septic arthritis. left shoulder joint   ·  Plan: Imaging consistent with septic arthritis  s/p OR   cont abx and fu cultures   check ECHO : no vegetation   fu with ID   d/w with CTS at bedside : drainage adjusted and fx improved   cont current abx   pt with LUE pain : tessie referred pain from L [id number] and chest however  Cervical MRI : no disciits / abcess           # c/o left arm swelling and pain in elbow region :  -doppler ordered to r/o DVT : Negative      Problem/Plan - 2:  ·  Problem: Multiple sclerosis.   ·  Plan: Gets Rituxan every 6 months, next dose due : holding   -On Ampyra BID at home, need to bring home med     Problem/Plan - 3:  ·  Problem: Anxiety.   ·  Plan: ISTOP reviewed Reference #: [id number]  -Cont. Clonazepam 0.5mg QHS PRN  -Cont. Sertraline 100mg QHS  -Cont. Duloxetine 60mg QHS, pt unsure of dose  -Need full med rec.     Problem/Plan - 4:  ·  Problem: Anemia.   ·  Plan: hgb 9, lower than prior year. Pt endorses some recent anemia but cannot specify  -Trend cbc  -Monitor for bleeding.     Problem/Plan - 5:  ·  Problem: Prophylactic measure.   ·  Plan: DVT PPx    hyperkalemia : repeat   likely error

## 2023-12-13 NOTE — PROGRESS NOTE ADULT - SUBJECTIVE AND OBJECTIVE BOX
Subjective: Patient seen and examined. No new events except as noted.   father at bedside   staples removed yesterday   no cp or sob     REVIEW OF SYSTEMS:    CONSTITUTIONAL: + weakness, fevers or chills  EYES/ENT: No visual changes;  No vertigo or throat pain   NECK: No pain or stiffness  RESPIRATORY: No cough, wheezing, hemoptysis; No shortness of breath  CARDIOVASCULAR: No chest pain or palpitations  GASTROINTESTINAL: No abdominal or epigastric pain. No nausea, vomiting, or hematemesis; No diarrhea or constipation. No melena or hematochezia.  GENITOURINARY: No dysuria, frequency or hematuria  NEUROLOGICAL: No numbness or weakness  SKIN: No itching, burning, rashes, or lesions   All other review of systems is negative unless indicated above.    MEDICATIONS:  MEDICATIONS  (STANDING):  cefepime   IVPB 2000 milliGRAM(s) IV Intermittent every 8 hours  chlorhexidine 2% Cloths 1 Application(s) Topical <User Schedule>  clonazePAM  Tablet 0.5 milliGRAM(s) Oral <User Schedule>  dalfampridine ER 10 milliGRAM(s) Oral every 12 hours  DULoxetine 60 milliGRAM(s) Oral at bedtime  enoxaparin Injectable 40 milliGRAM(s) SubCutaneous every 24 hours  lactated ringers. 1000 milliLiter(s) (75 mL/Hr) IV Continuous <Continuous>  senna 2 Tablet(s) Oral at bedtime  sertraline 100 milliGRAM(s) Oral at bedtime  Vibegron (Gemtesa) 75 milliGRAM(s) 1 Tablet(s) Oral daily      PHYSICAL EXAM:  T(C): 37 (12-13-23 @ 13:57), Max: 37 (12-13-23 @ 13:57)  HR: 92 (12-13-23 @ 13:57) (84 - 100)  BP: 116/75 (12-13-23 @ 13:57) (96/60 - 123/73)  RR: 18 (12-13-23 @ 13:57) (18 - 18)  SpO2: 99% (12-13-23 @ 13:57) (95% - 99%)  Wt(kg): --  I&O's Summary    12 Dec 2023 07:01  -  13 Dec 2023 07:00  --------------------------------------------------------  IN: 2300 mL / OUT: 20 mL / NET: 2280 mL    13 Dec 2023 07:01  -  13 Dec 2023 14:56  --------------------------------------------------------  IN: 1130 mL / OUT: 2 mL / NET: 1128 mL          Appearance: Normal	  HEENT:   Normal oral mucosa, PERRL, EOMI	  Lymphatic: No lymphadenopathy , no edema    upper anterior chest wound open drainage catheter visible no pus. some staples were removed. dressing was saturated with brownish drainage. + erythema around wound which has not extended  Cardiovascular: Normal S1 S2, No JVD, No murmurs , Peripheral pulses palpable 2+ bilaterally  Respiratory: Lungs clear to auscultation, normal effort 	  Gastrointestinal:  Soft, Non-tender, + BS	  Skin: No rashes, No ecchymoses, No cyanosis, warm to touch  Musculoskeletal: Normal range of motion, normal strength  Psychiatry:  Mood & affect appropriate  Ext: No edema      LABS:    CARDIAC MARKERS:                                7.4    10.89 )-----------( 433      ( 13 Dec 2023 07:16 )             24.0     12-13    137  |  97  |  7   ----------------------------<  92  >9.0<HH>   |  22  |  0.43<L>    Ca    <3.0<LL>      13 Dec 2023 07:18      proBNP:   Lipid Profile:   HgA1c:   TSH:             TELEMETRY: 	    ECG:  	  RADIOLOGY:   DIAGNOSTIC TESTING:  [ ] Echocardiogram:  [ ]  Catheterization:  [ ] Stress Test:    OTHER:

## 2023-12-13 NOTE — CONSULT NOTE ADULT - SUBJECTIVE AND OBJECTIVE BOX
43yFemale c/o Lshoulder pain s/p mechanical fall. Fall happened 6 weeks ago with aggravating episode 4 weeks ago where she tugged the left arm. Patient denies head hit or LOC.   Patient followed up with Dr. Bar because of persistent left shoulder pain. Patient was noted to have a hematoma in left chest which had consolidated around left SC joint. OP MRI of SC ordered and patient was found to have OM and was told to come into ED.  CT Surgery resected head of left rib and did I&D of SC joint.  Patient had persistent left shoulder pain and MR of shoulder and cervical spine were obtained.  Patient denies numbness or tingling in LUE at baseline or currently. Patient denies any other injuries.  No neck pain, no radicular symptoms down LUE.     ROS: 10 point ROS otherwise negative    PMH:  Multiple sclerosis      PSH:  History of       AH:  No Known Allergies    Meds: See med rec    T(C): 37 (23 @ 13:57)  HR: 92 (23 @ 13:57)  BP: 116/75 (23 @ 13:57)  RR: 18 (23 @ 13:57)  SpO2: 99% (23 @ 13:57)  Wt(kg): --    Gen: NAD  Resp: Unlabored breathing  PE LUE:  Skin intact,    SILT axillary/med/rad/ulnar  +Motor AIN/PIN/Ulnar  +painless elbow/wrist ROM,   shoulder ROM limited 2/2 pain and weakness; AROM FF 30, Abduction 30 degrees; PROM , Abduction 130  +radial pulse, soft compartments.  No TTP over Cervical spine    Secondary Survey:   No TTP over bony prominences, SILT, palpable pulses, full/painless A/PROM, compartments soft. No TTP over spinous processes or paraspinal muscles at C/T/L spine. No palpable step off. No other injuries or complaints.      Imaging:  MRI L Shoulder  L Rotator cuff tear partial bursal sided  MRI CSp: Negative for compression  MRI SC joint: possible manubrium OM    43yFemale with L Rotator cuff tear partial bursal sided  -Shoulder pain with significant loss of active motion of left arm 2/2 pain and some weakness  -Patient offered Intraarticular joint injection with lidocaine and corticosteroid, both for therapeutic and diagnostic benefit. Patient and  will consider it and will reach out to orthopaedic team with decision  -pain control  -WBAT  -Would recommend PT/OT to left shoulder for rotator cuff partial tear, pending no objections from CTSx after their ipsilateral procedure  -Can follow up with Dr. Herman or Dr. Bar in 1-2 weeks or upon discharge  -Will update plan based on clinical course

## 2023-12-13 NOTE — PROGRESS NOTE ADULT - PROBLEM SELECTOR PLAN 1
-continue Tj from Left CW  strict output measurement per shift  Lt cw staples with  packing   ,  please leave  Intact will be  changed by CTS surgery wednesday    -F/u BCxs and OR cultures  -Pain control -continue Tj from Left CW  strict output measurement per shift  Lt cw linda with  packing.   dressing changed   wet to dry bid  OOB  add protein supplement to meals  -F/u BCxs and OR cultures  -Pain control

## 2023-12-13 NOTE — PROGRESS NOTE ADULT - SUBJECTIVE AND OBJECTIVE BOX
43yPatient is a 43y old  Female who presents with a chief complaint of L shoulder and chest pain (13 Dec 2023 17:15)      Interval history:  Afebrile, concerned about the pain in arm, worried if she is getting better or not.     Allergies:   No Known Allergies      Antimicrobials:  cefepime   IVPB 2000 milliGRAM(s) IV Intermittent every 8 hours      REVIEW OF SYSTEMS:  No SOB  No abdominal pain  No rash.     Vital Signs Last 24 Hrs  T(C): 36.9 (12-13-23 @ 21:52), Max: 37 (12-13-23 @ 13:57)  T(F): 98.5 (12-13-23 @ 21:52), Max: 98.6 (12-13-23 @ 13:57)  HR: 90 (12-13-23 @ 21:52) (84 - 100)  BP: 112/64 (12-13-23 @ 21:52) (96/60 - 123/73)  BP(mean): --  RR: 18 (12-13-23 @ 21:52) (18 - 18)  SpO2: 98% (12-13-23 @ 21:52) (95% - 99%)      PHYSICAL EXAM:  Pt in no acute distress, alert, awake.   lt chest dressing, + LISBETH   non distended abdomen  no edema LE   no phlebitis                               7.6    14.06 )-----------( 388      ( 13 Dec 2023 17:11 )             24.7   12-13    136  |  98  |  10  ----------------------------<  121<H>  4.3   |  29  |  0.43<L>    Ca    9.5      13 Dec 2023 17:11    TPro  6.7  /  Alb  3.8  /  TBili  0.1<L>  /  DBili  x   /  AST  14  /  ALT  14  /  AlkPhos  199<H>  12-13      LIVER FUNCTIONS - ( 13 Dec 2023 17:11 )  Alb: 3.8 g/dL / Pro: 6.7 g/dL / ALK PHOS: 199 U/L / ALT: 14 U/L / AST: 14 U/L / GGT: x               Culture - Blood (collected 11 Dec 2023 17:46)  Source: .Blood Blood  Preliminary Report (12 Dec 2023 23:02):    No growth at 24 hours    Culture - Blood (collected 11 Dec 2023 17:45)  Source: .Blood Blood  Preliminary Report (12 Dec 2023 23:02):    No growth at 24 hours        Radiology:  < from: Xray Shoulder Axillary View, Left (12.13.23 @ 14:49) >  IMPRESSION:  No acute displaced fracture.      < from: MR Cervical Spine w/wo IV Cont (12.12.23 @ 16:54) >    IMPRESSION:    No cervical spine discitis/osteomyelitis or spinal abscess identified.   Partially visualized left inferior sternocleidomastoid myositis, better   visualized on MRI chest 12/11/23.

## 2023-12-13 NOTE — PROGRESS NOTE ADULT - SUBJECTIVE AND OBJECTIVE BOX
Date of service: 23 @ 14:45      Patient is a 43y old  Female who presents with a chief complaint of L shoulder and chest pain (13 Dec 2023 09:34)                                                               INTERVAL HPI/OVERNIGHT EVENTS:    REVIEW OF SYSTEMS:     L  chest wall pain                                                                                                                                                                                                                                                                          Medications:  MEDICATIONS  (STANDING):  cefepime   IVPB 2000 milliGRAM(s) IV Intermittent every 8 hours  chlorhexidine 2% Cloths 1 Application(s) Topical <User Schedule>  clonazePAM  Tablet 0.5 milliGRAM(s) Oral <User Schedule>  dalfampridine ER 10 milliGRAM(s) Oral every 12 hours  DULoxetine 60 milliGRAM(s) Oral at bedtime  enoxaparin Injectable 40 milliGRAM(s) SubCutaneous every 24 hours  lactated ringers. 1000 milliLiter(s) (75 mL/Hr) IV Continuous <Continuous>  senna 2 Tablet(s) Oral at bedtime  sertraline 100 milliGRAM(s) Oral at bedtime  Vibegron (Gemtesa) 75 milliGRAM(s) 1 Tablet(s) Oral daily    MEDICATIONS  (PRN):  HYDROmorphone  Injectable 1 milliGRAM(s) IV Push every 8 hours PRN Severe Pain (7 - 10)  oxyCODONE    IR 5 milliGRAM(s) Oral every 6 hours PRN Mild Pain (1 - 3)  oxyCODONE    IR 10 milliGRAM(s) Oral every 8 hours PRN Moderate Pain (4 - 6)       Allergies    No Known Allergies    Intolerances      Vital Signs Last 24 Hrs  T(C): 37 (13 Dec 2023 13:57), Max: 37 (13 Dec 2023 13:57)  T(F): 98.6 (13 Dec 2023 13:57), Max: 98.6 (13 Dec 2023 13:57)  HR: 92 (13 Dec 2023 13:57) (84 - 100)  BP: 116/75 (13 Dec 2023 13:57) (96/60 - 123/73)  BP(mean): --  RR: 18 (13 Dec 2023 13:57) (18 - 18)  SpO2: 99% (13 Dec 2023 13:57) (95% - 99%)    Parameters below as of 13 Dec 2023 13:57  Patient On (Oxygen Delivery Method): room air      CAPILLARY BLOOD GLUCOSE          - @ 07:01  -  12-13 @ 07:00  --------------------------------------------------------  IN: 2300 mL / OUT: 20 mL / NET: 2280 mL     @ 07:01  -   @ 14:45  --------------------------------------------------------  IN: 1130 mL / OUT: 2 mL / NET: 1128 mL      Physical Exam:    Daily     Daily Weight in k.2 (13 Dec 2023 10:53)  General:  Well appearing, NAD, not cachetic  HEENT:  Nonicteric, PERRLA  CV:  RRR, S1S2   Lungs:  CTA B/L, no wheezes, rales, rhonchi  Abdomen:  Soft, non-tender, no distended, positive BS  Extremities: no edema                                                                                                                                                                                                                                                                                        LABS:                               7.4    10.89 )-----------( 433      ( 13 Dec 2023 07:16 )             24.0                          137  |  97  |  7   ----------------------------<  92  >9.0<HH>   |  22  |  0.43<L>    Ca    <3.0<LL>      13 Dec 2023 07:18                         RADIOLOGY & ADDITIONAL TESTS         I personally reviewed: [  ]EKG   [  ]CXR    [  ] CT      A/P:         Discussed with :     [facility] consultants' Notes   Time spent :

## 2023-12-13 NOTE — PROGRESS NOTE ADULT - ASSESSMENT
43F PMHx of MS on immunosuppressive medication, presenting with left chest wall and shoulder pain. Recent history of fall secondary to unsteadiness due to MS. Obtained outpatient MRI of left sternoclavicular joint with results significant for marrow edema and swelling of the left sternoclavicular joint with a 2 cm fluid collection and adjacent pleural edema in the left chest with concerns for infectious etiology.  Patient initially saw orthopedics with for concerns for septic arthritis of this joint.  Impression of the read showed soft tissue abscess superficial to the joint.  Recommended CT scan with IV contrast of the chest.  Patient denies any fevers however endorsing erythema to the left sternoclavicular joint region. Patient states she was recently hospitalized over a week ago at [facility] for falls secondary to unsteadiness due to her MS.  Denies any recent falls today, vision changes. Endorsing headache. Denies any chest pain, shortness of breath, abdominal pain, nausea vomiting diarrhea, urinary complaints.  CT chest with IV in ED significant for Septic arthritis of the left sternoclavicular joint and involving the articulation of the sternum with the first costal cartilage.Extensive surrounding inflammation, with pneumonia in the underlying subpleural left upper lobe.No fluid collection is evident.  Workup significant for elevated alk phos (248), . VSS and afebrile  s/p Debridement of left sternoclavicular joint with excision of left clavicular head. Purulent drainage at the sternoclavicular joint. Tissue surrounding left clavicle found to be very inflamed. Copious irrigation of surgical site.   Concerned about pain at surgical site.    at bedside     43F PMHx of MS on immunosuppressive medication, presenting with left chest wall and shoulder pain. Recent history of fall secondary to unsteadiness due to MS. Obtained outpatient MRI of left sternoclavicular joint with results significant for marrow edema and swelling of the left sternoclavicular joint with a 2 cm fluid collection and adjacent pleural edema in the left chest with concerns for infectious etiology.  Patient initially saw orthopedics with for concerns for septic arthritis of this joint.  Impression of the read showed soft tissue abscess superficial to the joint.  Recommended CT scan with IV contrast of the chest.  Patient denies any fevers however endorsing erythema to the left sternoclavicular joint region. Patient states she was recently hospitalized over a week ago at [facility] for falls secondary to unsteadiness due to her MS.  Denies any recent falls today, vision changes. Endorsing headache. Denies any chest pain, shortness of breath, abdominal pain, nausea vomiting diarrhea, urinary complaints.  CT chest with IV in ED significant for Septic arthritis of the left sternoclavicular joint and involving the articulation of the sternum with the first costal cartilage.Extensive surrounding inflammation, with pneumonia in the underlying subpleural left upper lobe.No fluid collection is evident.  Workup significant for elevated alk phos (248), . VSS and afebrile  s/p Debridement of left sternoclavicular joint with excision of left clavicular head. Purulent drainage at the sternoclavicular joint. Tissue surrounding left clavicle found to be very inflamed. Copious irrigation of surgical site.   Concerned about pain at surgical site.    at bedside

## 2023-12-13 NOTE — PROGRESS NOTE ADULT - SUBJECTIVE AND OBJECTIVE BOX
Subjective:                     MEDICATIONS  cefepime   IVPB 2000 milliGRAM(s) IV Intermittent every 8 hours  chlorhexidine 2% Cloths 1 Application(s) Topical <User Schedule>  clonazePAM  Tablet 0.5 milliGRAM(s) Oral <User Schedule>  dalfampridine ER 10 milliGRAM(s) Oral every 12 hours  DULoxetine 60 milliGRAM(s) Oral at bedtime  enoxaparin Injectable 40 milliGRAM(s) SubCutaneous every 24 hours  HYDROmorphone  Injectable 1 milliGRAM(s) IV Push every 8 hours PRN  lactated ringers. 1000 milliLiter(s) IV Continuous <Continuous>  oxyCODONE    IR 5 milliGRAM(s) Oral every 6 hours PRN  oxyCODONE    IR 10 milliGRAM(s) Oral every 8 hours PRN  senna 2 Tablet(s) Oral at bedtime  sertraline 100 milliGRAM(s) Oral at bedtime  Vibegron (Gemtesa) 75 milliGRAM(s) 1 Tablet(s) Oral daily      Vital Signs Last 24 Hrs  T(C): 36.6 (13 Dec 2023 06:00), Max: 36.8 (12 Dec 2023 09:52)  T(F): 97.8 (13 Dec 2023 06:00), Max: 98.3 (12 Dec 2023 09:52)  HR: 84 (13 Dec 2023 06:00) (84 - 100)  BP: 96/60 (13 Dec 2023 06:00) (96/60 - 116/62)  BP(mean): --  RR: 18 (13 Dec 2023 06:00) (18 - 18)  SpO2: 99% (13 Dec 2023 06:00) (92% - 100%)    Parameters below as of 13 Dec 2023 06:00  Patient On (Oxygen Delivery Method): room air          PHYSICAL EXAM:        LABS      137  |  97  |  7   ----------------------------<  92  >9.0<HH>   |  22  |  0.43<L>    Ca    <3.0<LL>      13 Dec 2023 07:18                                   7.4    10.89 )-----------( 433      ( 13 Dec 2023 07:16 )             24.0                              PAST MEDICAL & SURGICAL HISTORY:  Multiple sclerosis      History of                       Subjective:  c/o pain at surgical site                     MEDICATIONS  cefepime   IVPB 2000 milliGRAM(s) IV Intermittent every 8 hours  chlorhexidine 2% Cloths 1 Application(s) Topical <User Schedule>  clonazePAM  Tablet 0.5 milliGRAM(s) Oral <User Schedule>  dalfampridine ER 10 milliGRAM(s) Oral every 12 hours  DULoxetine 60 milliGRAM(s) Oral at bedtime  enoxaparin Injectable 40 milliGRAM(s) SubCutaneous every 24 hours  HYDROmorphone  Injectable 1 milliGRAM(s) IV Push every 8 hours PRN  lactated ringers. 1000 milliLiter(s) IV Continuous <Continuous>  oxyCODONE    IR 5 milliGRAM(s) Oral every 6 hours PRN  oxyCODONE    IR 10 milliGRAM(s) Oral every 8 hours PRN  senna 2 Tablet(s) Oral at bedtime  sertraline 100 milliGRAM(s) Oral at bedtime  Vibegron (Gemtesa) 75 milliGRAM(s) 1 Tablet(s) Oral daily      Vital Signs Last 24 Hrs  T(C): 36.6 (13 Dec 2023 06:00), Max: 36.8 (12 Dec 2023 09:52)  T(F): 97.8 (13 Dec 2023 06:00), Max: 98.3 (12 Dec 2023 09:52)  HR: 84 (13 Dec 2023 06:00) (84 - 100)  BP: 96/60 (13 Dec 2023 06:00) (96/60 - 116/62)  BP(mean): --  RR: 18 (13 Dec 2023 06:00) (18 - 18)  SpO2: 99% (13 Dec 2023 06:00) (92% - 100%)    Parameters below as of 13 Dec 2023 06:00  Patient On (Oxygen Delivery Method): room air          PHYSICAL EXAM:        LABS      137  |  97  |  7   ----------------------------<  92  >9.0<HH>   |  22  |  0.43<L>    Ca    <3.0<LL>      13 Dec 2023 07:18                                   7.4    10.89 )-----------( 433      ( 13 Dec 2023 07:16 )             24.0                upper anterior chest wound open drainage catheter visible no pus. some staples were removed. dressing was saturated with brownish drainage. + erythema around wound which has not extended               PAST MEDICAL & SURGICAL HISTORY:  Multiple sclerosis      History of

## 2023-12-13 NOTE — CONSULT NOTE ADULT - ATTENDING COMMENTS
An extensive discussion is had with this patient with her family present.  Her pain does not seem to be coming from the incidental finding of a rotator cuff tear on her MRI.  Her pain is more chest and clavicle based.  She has some tightness in her left upper extremity with passive range of motion.  She has minimal active range of motion.  There is no signs of radiculopathy.  Her pain seems to be coming from her infection and recent surgery.  She is instructed on gentle passive range of motion exercises for the left shoulder to prevent a frozen shoulder.  The patient understands that she will need physical therapy as an outpatient.  Ice is applied to the left shoulder and clavicle region to help with inflammation and pain.  She will follow-up as an outpatient for physical therapy.  At this point, care as per medicine, infectious disease and cardiothoracic surgery.

## 2023-12-14 ENCOUNTER — TRANSCRIPTION ENCOUNTER (OUTPATIENT)
Age: 43
End: 2023-12-14

## 2023-12-14 LAB
ALBUMIN SERPL ELPH-MCNC: 3.7 G/DL — SIGNIFICANT CHANGE UP (ref 3.3–5)
ALBUMIN SERPL ELPH-MCNC: 3.7 G/DL — SIGNIFICANT CHANGE UP (ref 3.3–5)
ALP SERPL-CCNC: 185 U/L — HIGH (ref 40–120)
ALP SERPL-CCNC: 185 U/L — HIGH (ref 40–120)
ALT FLD-CCNC: 13 U/L — SIGNIFICANT CHANGE UP (ref 10–45)
ALT FLD-CCNC: 13 U/L — SIGNIFICANT CHANGE UP (ref 10–45)
ANION GAP SERPL CALC-SCNC: 14 MMOL/L — SIGNIFICANT CHANGE UP (ref 5–17)
ANION GAP SERPL CALC-SCNC: 14 MMOL/L — SIGNIFICANT CHANGE UP (ref 5–17)
APTT BLD: 31.2 SEC — SIGNIFICANT CHANGE UP (ref 24.5–35.6)
APTT BLD: 31.2 SEC — SIGNIFICANT CHANGE UP (ref 24.5–35.6)
AST SERPL-CCNC: 12 U/L — SIGNIFICANT CHANGE UP (ref 10–40)
AST SERPL-CCNC: 12 U/L — SIGNIFICANT CHANGE UP (ref 10–40)
BILIRUB SERPL-MCNC: 0.1 MG/DL — LOW (ref 0.2–1.2)
BILIRUB SERPL-MCNC: 0.1 MG/DL — LOW (ref 0.2–1.2)
BLD GP AB SCN SERPL QL: NEGATIVE — SIGNIFICANT CHANGE UP
BLD GP AB SCN SERPL QL: NEGATIVE — SIGNIFICANT CHANGE UP
BUN SERPL-MCNC: 9 MG/DL — SIGNIFICANT CHANGE UP (ref 7–23)
BUN SERPL-MCNC: 9 MG/DL — SIGNIFICANT CHANGE UP (ref 7–23)
CALCIUM SERPL-MCNC: 9.4 MG/DL — SIGNIFICANT CHANGE UP (ref 8.4–10.5)
CALCIUM SERPL-MCNC: 9.4 MG/DL — SIGNIFICANT CHANGE UP (ref 8.4–10.5)
CHLORIDE SERPL-SCNC: 99 MMOL/L — SIGNIFICANT CHANGE UP (ref 96–108)
CHLORIDE SERPL-SCNC: 99 MMOL/L — SIGNIFICANT CHANGE UP (ref 96–108)
CO2 SERPL-SCNC: 25 MMOL/L — SIGNIFICANT CHANGE UP (ref 22–31)
CO2 SERPL-SCNC: 25 MMOL/L — SIGNIFICANT CHANGE UP (ref 22–31)
CREAT SERPL-MCNC: 0.42 MG/DL — LOW (ref 0.5–1.3)
CREAT SERPL-MCNC: 0.42 MG/DL — LOW (ref 0.5–1.3)
CRP SERPL-MCNC: 134 MG/L — HIGH (ref 0–4)
CRP SERPL-MCNC: 134 MG/L — HIGH (ref 0–4)
CULTURE RESULTS: SIGNIFICANT CHANGE UP
EGFR: 124 ML/MIN/1.73M2 — SIGNIFICANT CHANGE UP
EGFR: 124 ML/MIN/1.73M2 — SIGNIFICANT CHANGE UP
ERYTHROCYTE [SEDIMENTATION RATE] IN BLOOD: 60 MM/HR — HIGH (ref 0–15)
ERYTHROCYTE [SEDIMENTATION RATE] IN BLOOD: 60 MM/HR — HIGH (ref 0–15)
GLUCOSE SERPL-MCNC: 106 MG/DL — HIGH (ref 70–99)
GLUCOSE SERPL-MCNC: 106 MG/DL — HIGH (ref 70–99)
HCT VFR BLD CALC: 24.1 % — LOW (ref 34.5–45)
HCT VFR BLD CALC: 24.1 % — LOW (ref 34.5–45)
HGB BLD-MCNC: 7.6 G/DL — LOW (ref 11.5–15.5)
HGB BLD-MCNC: 7.6 G/DL — LOW (ref 11.5–15.5)
INR BLD: 1.2 RATIO — HIGH (ref 0.85–1.18)
INR BLD: 1.2 RATIO — HIGH (ref 0.85–1.18)
MCHC RBC-ENTMCNC: 25.4 PG — LOW (ref 27–34)
MCHC RBC-ENTMCNC: 25.4 PG — LOW (ref 27–34)
MCHC RBC-ENTMCNC: 31.5 GM/DL — LOW (ref 32–36)
MCHC RBC-ENTMCNC: 31.5 GM/DL — LOW (ref 32–36)
MCV RBC AUTO: 80.6 FL — SIGNIFICANT CHANGE UP (ref 80–100)
MCV RBC AUTO: 80.6 FL — SIGNIFICANT CHANGE UP (ref 80–100)
NRBC # BLD: 0 /100 WBCS — SIGNIFICANT CHANGE UP (ref 0–0)
NRBC # BLD: 0 /100 WBCS — SIGNIFICANT CHANGE UP (ref 0–0)
PLATELET # BLD AUTO: 397 K/UL — SIGNIFICANT CHANGE UP (ref 150–400)
PLATELET # BLD AUTO: 397 K/UL — SIGNIFICANT CHANGE UP (ref 150–400)
POTASSIUM SERPL-MCNC: 4 MMOL/L — SIGNIFICANT CHANGE UP (ref 3.5–5.3)
POTASSIUM SERPL-MCNC: 4 MMOL/L — SIGNIFICANT CHANGE UP (ref 3.5–5.3)
POTASSIUM SERPL-SCNC: 4 MMOL/L — SIGNIFICANT CHANGE UP (ref 3.5–5.3)
POTASSIUM SERPL-SCNC: 4 MMOL/L — SIGNIFICANT CHANGE UP (ref 3.5–5.3)
PROT SERPL-MCNC: 6.4 G/DL — SIGNIFICANT CHANGE UP (ref 6–8.3)
PROT SERPL-MCNC: 6.4 G/DL — SIGNIFICANT CHANGE UP (ref 6–8.3)
PROTHROM AB SERPL-ACNC: 13.1 SEC — HIGH (ref 9.5–13)
PROTHROM AB SERPL-ACNC: 13.1 SEC — HIGH (ref 9.5–13)
RBC # BLD: 2.99 M/UL — LOW (ref 3.8–5.2)
RBC # BLD: 2.99 M/UL — LOW (ref 3.8–5.2)
RBC # FLD: 14.2 % — SIGNIFICANT CHANGE UP (ref 10.3–14.5)
RBC # FLD: 14.2 % — SIGNIFICANT CHANGE UP (ref 10.3–14.5)
RH IG SCN BLD-IMP: POSITIVE — SIGNIFICANT CHANGE UP
RH IG SCN BLD-IMP: POSITIVE — SIGNIFICANT CHANGE UP
SODIUM SERPL-SCNC: 138 MMOL/L — SIGNIFICANT CHANGE UP (ref 135–145)
SODIUM SERPL-SCNC: 138 MMOL/L — SIGNIFICANT CHANGE UP (ref 135–145)
SPECIMEN SOURCE: SIGNIFICANT CHANGE UP
WBC # BLD: 9.09 K/UL — SIGNIFICANT CHANGE UP (ref 3.8–10.5)
WBC # BLD: 9.09 K/UL — SIGNIFICANT CHANGE UP (ref 3.8–10.5)
WBC # FLD AUTO: 9.09 K/UL — SIGNIFICANT CHANGE UP (ref 3.8–10.5)
WBC # FLD AUTO: 9.09 K/UL — SIGNIFICANT CHANGE UP (ref 3.8–10.5)

## 2023-12-14 PROCEDURE — 99231 SBSQ HOSP IP/OBS SF/LOW 25: CPT | Mod: GC

## 2023-12-14 PROCEDURE — 99232 SBSQ HOSP IP/OBS MODERATE 35: CPT

## 2023-12-14 RX ORDER — CLONAZEPAM 1 MG
0.5 TABLET ORAL THREE TIMES A DAY
Refills: 0 | Status: DISCONTINUED | OUTPATIENT
Start: 2023-12-14 | End: 2023-12-15

## 2023-12-14 RX ORDER — OXYCODONE HYDROCHLORIDE 5 MG/1
10 TABLET ORAL EVERY 6 HOURS
Refills: 0 | Status: DISCONTINUED | OUTPATIENT
Start: 2023-12-14 | End: 2023-12-15

## 2023-12-14 RX ORDER — HYDROMORPHONE HYDROCHLORIDE 2 MG/ML
1 INJECTION INTRAMUSCULAR; INTRAVENOUS; SUBCUTANEOUS EVERY 6 HOURS
Refills: 0 | Status: DISCONTINUED | OUTPATIENT
Start: 2023-12-14 | End: 2023-12-14

## 2023-12-14 RX ORDER — HYDROMORPHONE HYDROCHLORIDE 2 MG/ML
1 INJECTION INTRAMUSCULAR; INTRAVENOUS; SUBCUTANEOUS
Refills: 0 | Status: DISCONTINUED | OUTPATIENT
Start: 2023-12-14 | End: 2023-12-15

## 2023-12-14 RX ADMIN — OXYCODONE HYDROCHLORIDE 10 MILLIGRAM(S): 5 TABLET ORAL at 06:29

## 2023-12-14 RX ADMIN — HYDROMORPHONE HYDROCHLORIDE 1 MILLIGRAM(S): 2 INJECTION INTRAMUSCULAR; INTRAVENOUS; SUBCUTANEOUS at 08:58

## 2023-12-14 RX ADMIN — HYDROMORPHONE HYDROCHLORIDE 1 MILLIGRAM(S): 2 INJECTION INTRAMUSCULAR; INTRAVENOUS; SUBCUTANEOUS at 18:31

## 2023-12-14 RX ADMIN — DALFAMPRIDINE 10 MILLIGRAM(S): 10 TABLET, FILM COATED, EXTENDED RELEASE ORAL at 12:47

## 2023-12-14 RX ADMIN — OXYCODONE HYDROCHLORIDE 10 MILLIGRAM(S): 5 TABLET ORAL at 18:21

## 2023-12-14 RX ADMIN — HYDROMORPHONE HYDROCHLORIDE 1 MILLIGRAM(S): 2 INJECTION INTRAMUSCULAR; INTRAVENOUS; SUBCUTANEOUS at 02:03

## 2023-12-14 RX ADMIN — HYDROMORPHONE HYDROCHLORIDE 1 MILLIGRAM(S): 2 INJECTION INTRAMUSCULAR; INTRAVENOUS; SUBCUTANEOUS at 01:33

## 2023-12-14 RX ADMIN — HYDROMORPHONE HYDROCHLORIDE 1 MILLIGRAM(S): 2 INJECTION INTRAMUSCULAR; INTRAVENOUS; SUBCUTANEOUS at 14:50

## 2023-12-14 RX ADMIN — DALFAMPRIDINE 10 MILLIGRAM(S): 10 TABLET, FILM COATED, EXTENDED RELEASE ORAL at 01:41

## 2023-12-14 RX ADMIN — HYDROMORPHONE HYDROCHLORIDE 1 MILLIGRAM(S): 2 INJECTION INTRAMUSCULAR; INTRAVENOUS; SUBCUTANEOUS at 23:03

## 2023-12-14 RX ADMIN — SENNA PLUS 2 TABLET(S): 8.6 TABLET ORAL at 21:17

## 2023-12-14 RX ADMIN — HYDROMORPHONE HYDROCHLORIDE 1 MILLIGRAM(S): 2 INJECTION INTRAMUSCULAR; INTRAVENOUS; SUBCUTANEOUS at 19:30

## 2023-12-14 RX ADMIN — OXYCODONE HYDROCHLORIDE 10 MILLIGRAM(S): 5 TABLET ORAL at 17:21

## 2023-12-14 RX ADMIN — CHLORHEXIDINE GLUCONATE 1 APPLICATION(S): 213 SOLUTION TOPICAL at 05:48

## 2023-12-14 RX ADMIN — DULOXETINE HYDROCHLORIDE 60 MILLIGRAM(S): 30 CAPSULE, DELAYED RELEASE ORAL at 21:18

## 2023-12-14 RX ADMIN — HYDROMORPHONE HYDROCHLORIDE 1 MILLIGRAM(S): 2 INJECTION INTRAMUSCULAR; INTRAVENOUS; SUBCUTANEOUS at 14:02

## 2023-12-14 RX ADMIN — HYDROMORPHONE HYDROCHLORIDE 0.5 MILLIGRAM(S): 2 INJECTION INTRAMUSCULAR; INTRAVENOUS; SUBCUTANEOUS at 00:28

## 2023-12-14 RX ADMIN — CEFEPIME 100 MILLIGRAM(S): 1 INJECTION, POWDER, FOR SOLUTION INTRAMUSCULAR; INTRAVENOUS at 21:15

## 2023-12-14 RX ADMIN — SERTRALINE 100 MILLIGRAM(S): 25 TABLET, FILM COATED ORAL at 21:18

## 2023-12-14 RX ADMIN — HYDROMORPHONE HYDROCHLORIDE 1 MILLIGRAM(S): 2 INJECTION INTRAMUSCULAR; INTRAVENOUS; SUBCUTANEOUS at 09:50

## 2023-12-14 RX ADMIN — CEFEPIME 100 MILLIGRAM(S): 1 INJECTION, POWDER, FOR SOLUTION INTRAMUSCULAR; INTRAVENOUS at 14:02

## 2023-12-14 RX ADMIN — OXYCODONE HYDROCHLORIDE 10 MILLIGRAM(S): 5 TABLET ORAL at 05:29

## 2023-12-14 RX ADMIN — HYDROMORPHONE HYDROCHLORIDE 1 MILLIGRAM(S): 2 INJECTION INTRAMUSCULAR; INTRAVENOUS; SUBCUTANEOUS at 23:45

## 2023-12-14 RX ADMIN — OXYCODONE HYDROCHLORIDE 10 MILLIGRAM(S): 5 TABLET ORAL at 12:27

## 2023-12-14 RX ADMIN — CEFEPIME 100 MILLIGRAM(S): 1 INJECTION, POWDER, FOR SOLUTION INTRAMUSCULAR; INTRAVENOUS at 05:29

## 2023-12-14 RX ADMIN — Medication 0.5 MILLIGRAM(S): at 22:36

## 2023-12-14 RX ADMIN — OXYCODONE HYDROCHLORIDE 10 MILLIGRAM(S): 5 TABLET ORAL at 13:20

## 2023-12-14 NOTE — PROGRESS NOTE ADULT - ASSESSMENT
43F PMHx of MS on Rituxan, presenting with left chest wall and shoulder pain w/ concern for infection now admitted with likely septic arthritis with need for possible washout and debridement    POD # 1 Debridement and washout of Left.  sternoclavicular joint excision Left clavicular head  Sita 80cc serosanguinous.  GS no org seen.  Cultures pending.  12/9 + RVP>no treatment as per ID  DC PCA  somnolent  Pain mgmt notified Transition oral Rx    12/10    pain medication w oxycodone and dilaudid for breakthrough,     Lt CW staples w bulb   w mininimal drainage   abx per ID  12/11: Left CW with staples with bulb, with minimal drainage. continue antibiotics per ID. continue optimal pain control.   12/12    lt cw  staples  w erythma  drain    several staples removed and packed by Dr Bautista,  abx  per ID  12/13  wbc 10.8  afebrile.  wound without change since yesterday.    OR c/s NGTD  BC  NGTD.    12/14 VSS. afebrile. wound dressing changed at bedside. Awaiting final OR/Blood cultures. Left sita d/c'ed (output 0cc/2cc overnight/ 24hr). Keep NPO p MN. Need 2 active T&S. Plan for OR washing out & wound vac placement tomorrow.

## 2023-12-14 NOTE — PROGRESS NOTE ADULT - SUBJECTIVE AND OBJECTIVE BOX
43yPatient is a 43y old  Female who presents with a chief complaint of L shoulder and chest pain (14 Dec 2023 16:11)      Interval history:  Afebrile, complains of the arm pain, feels like something wrong with her shoulder.         Allergies:   No Known Allergies      Antimicrobials:  cefepime   IVPB 2000 milliGRAM(s) IV Intermittent every 8 hours      REVIEW OF SYSTEMS:  No SOB  No abdominal pain  No dysuria   No rash.       Vital Signs Last 24 Hrs  T(C): 37.1 (12-14-23 @ 13:55), Max: 37.5 (12-14-23 @ 01:07)  T(F): 98.8 (12-14-23 @ 13:55), Max: 99.5 (12-14-23 @ 01:07)  HR: 97 (12-14-23 @ 13:55) (90 - 98)  BP: 110/65 (12-14-23 @ 13:55) (105/65 - 130/84)  BP(mean): --  RR: 18 (12-14-23 @ 13:55) (18 - 18)  SpO2: 98% (12-14-23 @ 13:55) (95% - 98%)      PHYSICAL EXAM:  Pt in no acute distress, alert, awake.   lt chest dressing, + LISBETH   non distended abdomen  no edema LE   no phlebitis                             7.6    9.09  )-----------( 397      ( 14 Dec 2023 07:15 )             24.1   12-14    138  |  99  |  9   ----------------------------<  106<H>  4.0   |  25  |  0.42<L>    Ca    9.4      14 Dec 2023 07:12    TPro  6.4  /  Alb  3.7  /  TBili  0.1<L>  /  DBili  x   /  AST  12  /  ALT  13  /  AlkPhos  185<H>  12-14      LIVER FUNCTIONS - ( 14 Dec 2023 07:12 )  Alb: 3.7 g/dL / Pro: 6.4 g/dL / ALK PHOS: 185 U/L / ALT: 13 U/L / AST: 12 U/L / GGT: x

## 2023-12-14 NOTE — PROGRESS NOTE ADULT - PROBLEM SELECTOR PLAN 1
- Left Tj d/c'ed today  - Left cw staples with packing  - dressing changed  - wet to dry bid  - OOB  - add protein supplement to meals  - F/u BCxs and OR cultures  - Pain control  - Please keep NPOpMN. Need 2 active T&S. Plan for OR washout with wound VAC placement tomorrow 12/15.   - Plan d/w Attending

## 2023-12-14 NOTE — PROGRESS NOTE ADULT - ASSESSMENT
43F PMHx of MS on Rituxan, presenting with left chest wall and shoulder pain w/ concern for infection now admitted with likely septic arthritis with need for possible washout and debridement       Problem/Plan - 1:  ·  Problem: Septic arthritis. left shoulder joint   ·  Plan: Imaging consistent with septic arthritis  s/p OR   cont abx and fu cultures   check ECHO : no vegetation   fu with ID   d/w with CTS at bedside : drainage adjusted and fx improved   cont current abx   pt with LUE pain : tessie referred pain from L [id number] and chest however  Cervical MRI : no disciits / abcess   discussed with pt , family at length at bedside   d/w surgery : plan for OR tmmrw for vac and washout       # c/o left arm swelling and pain in elbow region :  -doppler ordered to r/o DVT : Negative      Problem/Plan - 2:  ·  Problem: Multiple sclerosis.   ·  Plan: Gets Rituxan every 6 months, next dose due : holding   -On Ampyra BID at home, need to bring home med     Problem/Plan - 3:  ·  Problem: Anxiety.   ·  Plan: ISTOP reviewed Reference #: [id number]  -Cont. Clonazepam 0.5mg QHS PRN  -Cont. Sertraline 100mg QHS  -Cont. Duloxetine 60mg QHS, pt unsure of dose  -Need full med rec.     Problem/Plan - 4:  ·  Problem: Anemia.   ·  Plan: hgb 9, lower than prior year. Pt endorses some recent anemia but cannot specify  -Trend cbc  -Monitor for bleeding.     Problem/Plan - 5:  ·  Problem: Prophylactic measure.   ·  Plan: DVT PPx    hyperkalemia : normalized    43F PMHx of MS on Rituxan, presenting with left chest wall and shoulder pain w/ concern for infection now admitted with likely septic arthritis with need for possible washout and debridement       Problem/Plan - 1:  ·  Problem: Septic arthritis. left shoulder joint   ·  Plan: Imaging consistent with septic arthritis  s/p OR   cont abx and fu cultures   check ECHO : no vegetation   fu with ID   d/w with CTS at bedside : drainage adjusted and fx improved   cont current abx   pt with LUE pain : tessie referred pain from L [id number] and chest however  Cervical MRI : no disciits / abcess   discussed with pt , family at length at bedside   d/w surgery : plan for OR tmmrw for vac and washout       # c/o left arm swelling and pain in elbow region :  -doppler ordered to r/o DVT : Negative      Problem/Plan - 2:  ·  Problem: Multiple sclerosis.   ·  Plan: Gets Rituxan every 6 months, next dose due : holding   -On Ampyra BID at home, need to bring home med     Problem/Plan - 3:  ·  Problem: Anxiety.   ·  Plan: ISTOP reviewed Reference #: [id number]  -Cont. Clonazepam 0.5mg QHS PRN  -Cont. Sertraline 100mg QHS  -Cont. Duloxetine 60mg QHS, pt unsure of dose  -Need full med rec.     Problem/Plan - 4:  ·  Problem: Anemia.   ·  Plan: hgb 9, lower than prior year. Pt endorses some recent anemia but cannot specify  -Trend cbc  -Monitor for bleeding.     Problem/Plan - 5:  ·  Problem: Prophylactic measure.   ·  Plan: DVT PPx    hyperkalemia : normalized

## 2023-12-14 NOTE — PROGRESS NOTE ADULT - SUBJECTIVE AND OBJECTIVE BOX
Subjective: Patient seen and examined. No new events except as noted.   family at bedside   dressing changed by Dr. Bautista at bedside   L arm pain   plan for RTOR tomorrow     REVIEW OF SYSTEMS:    CONSTITUTIONAL:+ weakness, fevers or chills  EYES/ENT: No visual changes;  No vertigo or throat pain   NECK: No pain or stiffness  RESPIRATORY: No cough, wheezing, hemoptysis; No shortness of breath  CARDIOVASCULAR: No chest pain or palpitations  GASTROINTESTINAL: No abdominal or epigastric pain. No nausea, vomiting, or hematemesis; No diarrhea or constipation. No melena or hematochezia.  GENITOURINARY: No dysuria, frequency or hematuria  NEUROLOGICAL: No numbness or weakness  SKIN: No itching, burning, rashes, or lesions   All other review of systems is negative unless indicated above.    MEDICATIONS:  MEDICATIONS  (STANDING):  cefepime   IVPB 2000 milliGRAM(s) IV Intermittent every 8 hours  chlorhexidine 2% Cloths 1 Application(s) Topical <User Schedule>  clonazePAM  Tablet 0.5 milliGRAM(s) Oral <User Schedule>  dalfampridine ER 10 milliGRAM(s) Oral every 12 hours  DULoxetine 60 milliGRAM(s) Oral at bedtime  enoxaparin Injectable 40 milliGRAM(s) SubCutaneous every 24 hours  lactated ringers. 1000 milliLiter(s) (75 mL/Hr) IV Continuous <Continuous>  senna 2 Tablet(s) Oral at bedtime  sertraline 100 milliGRAM(s) Oral at bedtime  Vibegron (Gemtesa) 75 milliGRAM(s) 1 Tablet(s) Oral daily      PHYSICAL EXAM:  T(C): 36.6 (12-14-23 @ 10:37), Max: 37.5 (12-14-23 @ 01:07)  HR: 95 (12-14-23 @ 10:37) (90 - 98)  BP: 117/69 (12-14-23 @ 10:37) (100/66 - 130/84)  RR: 18 (12-14-23 @ 10:37) (18 - 18)  SpO2: 96% (12-14-23 @ 10:37) (95% - 99%)  Wt(kg): --  I&O's Summary    13 Dec 2023 07:01  -  14 Dec 2023 07:00  --------------------------------------------------------  IN: 2815 mL / OUT: 2 mL / NET: 2813 mL    14 Dec 2023 07:01  -  14 Dec 2023 10:55  --------------------------------------------------------  IN: 240 mL / OUT: 0 mL / NET: 240 mL              Appearance: Normal	  HEENT:   Normal oral mucosa, PERRL, EOMI	  Lymphatic: No lymphadenopathy , no edema    upper anterior chest wound open drainage catheter visible no pus. some staples were removed. dressing was saturated with brownish drainage. + erythema around wound which has not extended  Cardiovascular: Normal S1 S2, No JVD, No murmurs , Peripheral pulses palpable 2+ bilaterally  Respiratory: Lungs clear to auscultation, normal effort 	  Gastrointestinal:  Soft, Non-tender, + BS	  Skin: No rashes, No ecchymoses, No cyanosis, warm to touch  Musculoskeletal: Normal range of motion, normal strength  Psychiatry:  Mood & affect appropriate  Ext: No edema        LABS:    CARDIAC MARKERS:  CARDIAC MARKERS ( 13 Dec 2023 17:11 )  x     / x     / 18 U/L / x     / x                                    7.6    9.09  )-----------( 397      ( 14 Dec 2023 07:15 )             24.1     12-14    138  |  99  |  9   ----------------------------<  106<H>  4.0   |  25  |  0.42<L>    Ca    9.4      14 Dec 2023 07:12    TPro  6.4  /  Alb  3.7  /  TBili  0.1<L>  /  DBili  x   /  AST  12  /  ALT  13  /  AlkPhos  185<H>  12-14    proBNP:   Lipid Profile:   HgA1c:   TSH:             TELEMETRY: 	    ECG:  	  RADIOLOGY:   DIAGNOSTIC TESTING:  [ ] Echocardiogram:  [ ]  Catheterization:  [ ] Stress Test:    OTHER:

## 2023-12-14 NOTE — PROGRESS NOTE ADULT - ASSESSMENT
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  She had a recent fall in November 2023 secondary to unsteadiness due to multiple sclerosis, with subsequent left chest wall and shoulder pain.  Was seen at [facility] with concern for MS exacerbation but no MRI brain and cervical spine with an without contrast were done at the time without enhancement.  At the time infectious workup was done and notable for UTI which was treated.  She continued to have pain.  In the interim, she had an outpatient MRI of left sternoclavicular joint with results significant for marrow edema and swelling of the left sternoclavicular joint with a 2 cm fluid collection and adjacent pleural edema in the left chest with concerns for infectious etiology.   CT chest w/ IV contrast suggestive of septic arthritis with surrounding erythema. Thoracic surgery consulted.  Patient s/p surgical debridement on 12/7/23    mri with 80 x 25 mm rim-enhancing fluid collection persists in the surgical cavity with well-placed drain within the fluid collection.   Marrow signal abnormality in the manubrium concerning for acute   osteomyelitis.    Low-grade partial-thickness bursal surface tear of the supraspinatus tendon.    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  Now likely pseudo-flare in setting of septic arthritis    Recommendations:  Management of septic arthritis per primary team  s/p surgical debridement 12/7/23  hold ocrelizumab for now given septic arthritis  continue home meds once able to take PO post-surgically  pain management  ID for infection  defer to primary team for low-grade partial-thickness bursal surface tear of the supraspinatus tendon.  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  She had a recent fall in November 2023 secondary to unsteadiness due to multiple sclerosis, with subsequent left chest wall and shoulder pain.  Was seen at [facility] with concern for MS exacerbation but no MRI brain and cervical spine with an without contrast were done at the time without enhancement.  At the time infectious workup was done and notable for UTI which was treated.  She continued to have pain.  In the interim, she had an outpatient MRI of left sternoclavicular joint with results significant for marrow edema and swelling of the left sternoclavicular joint with a 2 cm fluid collection and adjacent pleural edema in the left chest with concerns for infectious etiology.   CT chest w/ IV contrast suggestive of septic arthritis with surrounding erythema. Thoracic surgery consulted.  Patient s/p surgical debridement on 12/7/23    mri with 80 x 25 mm rim-enhancing fluid collection persists in the surgical cavity with well-placed drain within the fluid collection.   Marrow signal abnormality in the manubrium concerning for acute   osteomyelitis.    Low-grade partial-thickness bursal surface tear of the supraspinatus tendon.    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  Now likely pseudo-flare in setting of septic arthritis    Recommendations:  Management of septic arthritis per primary team  s/p surgical debridement 12/7/23  hold ocrelizumab for now given septic arthritis  continue home meds once able to take PO post-surgically  pain management  ID for infection  defer to primary team for low-grade partial-thickness bursal surface tear of the supraspinatus tendon.  ID needs to tell pt whether it is acceptable to have orthopedic procedure  Possible wash out planned  Pt level of pain high, spoke to RN, called PA to ask to have pain regimen reevaluated

## 2023-12-14 NOTE — PROGRESS NOTE ADULT - SUBJECTIVE AND OBJECTIVE BOX
Patient is a 43y old  Female who presents with a chief complaint of L shoulder and chest pain (14 Dec 2023 10:55)    Patient seen and examined. No acute overnight events  Pt reports constipation x 1 week    MEDICATIONS  (STANDING):  cefepime   IVPB 2000 milliGRAM(s) IV Intermittent every 8 hours  chlorhexidine 2% Cloths 1 Application(s) Topical <User Schedule>  dalfampridine ER 10 milliGRAM(s) Oral every 12 hours  DULoxetine 60 milliGRAM(s) Oral at bedtime  lactated ringers. 1000 milliLiter(s) (75 mL/Hr) IV Continuous <Continuous>  senna 2 Tablet(s) Oral at bedtime  sertraline 100 milliGRAM(s) Oral at bedtime  Vibegron (Gemtesa) 75 milliGRAM(s) 1 Tablet(s) Oral daily    MEDICATIONS  (PRN):  clonazePAM  Tablet 0.5 milliGRAM(s) Oral three times a day PRN anxiety  HYDROmorphone  Injectable 1 milliGRAM(s) IV Push every 3 hours PRN Severe Pain (7 - 10)  oxyCODONE    IR 5 milliGRAM(s) Oral every 6 hours PRN Mild Pain (1 - 3)  oxyCODONE    IR 10 milliGRAM(s) Oral every 6 hours PRN Moderate Pain (4 - 6)      Vital Signs Last 24 Hrs  T(C): 36.6 (14 Dec 2023 10:37), Max: 37.5 (14 Dec 2023 01:07)  T(F): 97.9 (14 Dec 2023 10:37), Max: 99.5 (14 Dec 2023 01:07)  HR: 95 (14 Dec 2023 10:37) (90 - 98)  BP: 117/69 (14 Dec 2023 10:37) (100/66 - 130/84)  BP(mean): --  RR: 18 (14 Dec 2023 10:37) (18 - 18)  SpO2: 96% (14 Dec 2023 10:37) (95% - 99%)    Parameters below as of 14 Dec 2023 10:37  Patient On (Oxygen Delivery Method): room air        PE  NAD  Awake, alert  Anicteric, MMM  RRR  CTAB  Abd soft, NT, ND  No c/c/e  No rash grossly  FROM                          7.6    9.09  )-----------( 397      ( 14 Dec 2023 07:15 )             24.1       12-14    138  |  99  |  9   ----------------------------<  106<H>  4.0   |  25  |  0.42<L>    Ca    9.4      14 Dec 2023 07:12    TPro  6.4  /  Alb  3.7  /  TBili  0.1<L>  /  DBili  x   /  AST  12  /  ALT  13  /  AlkPhos  185<H>  12-14

## 2023-12-14 NOTE — PROGRESS NOTE ADULT - ASSESSMENT
43F PMHx of MS on (Rituxan, Ocrevus) presenting with left chest wall and shoulder pain. Recent history of fall secondary to unsteadiness due to MS. On review of ortho notes, pt has had multiple falls in November. She was admitted to [facility] on Last week of November and was found to have Rhinovirus and pseudomonas UTI. She completed 5-7 days course with Ciprofloxacin. Found to have Septic arthritis of SCM joint now s/p excision.    Patient obtained CT chest w/ IV contrast which showed likely septic arthritis with surrounding erythema. Thoracic surgery consulted, underwent washout and debridement.     OR Note: Debridement of left sternoclavicular joint with excision of left clavicular head. Purulent drainage at the sternoclavicular joint. Tissue surrounding left clavicle found to be very inflamed. Copious irrigation of surgical site.  Specimen sent: Head of left clavicle, culture of left sternoclavicular joint, culture of left manubrium, culture of deep sternal head of clavicle, culture of first rib costal cartilage      In ER: Afebrile, WBC 9.8, , . Given IV Zosyn, IV Vancomycin.     Remains afebrile, WBC stable.     Septic arthritis of the left sternoclavicular joint and involving the   articulation of the sternum with the first costal cartilage.    Extensive surrounding inflammation, with pneumonia in the underlying   subpleural left upper lobe.    # Septic arthritis of L sternoclavicular joint s/p excision and drainage  # L upper lobe opacity  # Immunosuppressed patient  # Elevated ESR/CRP.   #Manubrium OM.         PLAN:  - continue cefepime 2 gm Q8hrs  - OR cx negative for bacterial cx.   - MRSA PCR negative,  - follow fungal/AFB cultures; negative so far  - repeat ESR/CRP noted  - Quantiferon negative   - All blood cultures negative to date  - TTE negative   - Recent CT chest with improvement.   - thoracic following, plan for repeat washout on friday.           Odalis Reynaga  Please contact through MS Teams   If no response or past 5 pm/weekend call [phone].    43F PMHx of MS on (Rituxan, Ocrevus) presenting with left chest wall and shoulder pain. Recent history of fall secondary to unsteadiness due to MS. On review of ortho notes, pt has had multiple falls in November. She was admitted to [facility] on Last week of November and was found to have Rhinovirus and pseudomonas UTI. She completed 5-7 days course with Ciprofloxacin. Found to have Septic arthritis of SCM joint now s/p excision.    Patient obtained CT chest w/ IV contrast which showed likely septic arthritis with surrounding erythema. Thoracic surgery consulted, underwent washout and debridement.     OR Note: Debridement of left sternoclavicular joint with excision of left clavicular head. Purulent drainage at the sternoclavicular joint. Tissue surrounding left clavicle found to be very inflamed. Copious irrigation of surgical site.  Specimen sent: Head of left clavicle, culture of left sternoclavicular joint, culture of left manubrium, culture of deep sternal head of clavicle, culture of first rib costal cartilage      In ER: Afebrile, WBC 9.8, , . Given IV Zosyn, IV Vancomycin.     Remains afebrile, WBC stable.     Septic arthritis of the left sternoclavicular joint and involving the   articulation of the sternum with the first costal cartilage.    Extensive surrounding inflammation, with pneumonia in the underlying   subpleural left upper lobe.    # Septic arthritis of L sternoclavicular joint s/p excision and drainage  # L upper lobe opacity  # Immunosuppressed patient  # Elevated ESR/CRP.   #Manubrium OM.         PLAN:  - continue cefepime 2 gm Q8hrs  - OR cx negative for bacterial cx.   - MRSA PCR negative,  - follow fungal/AFB cultures; negative so far  - repeat ESR/CRP noted  - Quantiferon negative   - All blood cultures negative to date  - TTE negative   - Recent CT chest with improvement.   - thoracic following, plan for repeat washout on friday.           Odalis Reyngaa  Please contact through MS Teams   If no response or past 5 pm/weekend call [phone].    Size Of Lesion In Cm: 0.6

## 2023-12-14 NOTE — PROGRESS NOTE ADULT - ASSESSMENT
43 year old female with MS who was following at [facility] for Ocrevus infusions. Normal hemoglobin from last visit in 2022, now admitted with septic arthritis of sternoclavicular joint s/p surgical debridement and worsening anemia.     1. Anemia   - stable  - likely in the setting of inflammation and infection now a sudden decline likely related to postop   - iron studies show iron deficiency anemia  - no sign of other deficiency or hemolysis  - daily CBC  - transfuse for Hb <7   - f/u with Dr. Woods after discharge at [facility]     2. thrombocytosis   - reactive post op    3. MS   - on immunosuppressants   - send quantitative immunoglobulins   - plan per neurology     Will continue to follow    Chasity Shaver NP  Hematology/ Oncology  [facility]  [phone] (office)  [phone] (alt office)  Evenings and weekends please call MD on call or office   43 year old female with MS who was following at [facility] for Ocrevus infusions. Normal hemoglobin from last visit in 2022, now admitted with septic arthritis of sternoclavicular joint s/p surgical debridement and worsening anemia.     1. Anemia   - stable  - likely in the setting of inflammation and infection now a sudden decline likely related to postop   - iron studies show iron deficiency anemia  - no sign of other deficiency or hemolysis  - daily CBC  - transfuse for Hb <7   - f/u with Dr. Woods after discharge at [facility]     2. thrombocytosis   - reactive post op    3. MS   - on immunosuppressants   - send quantitative immunoglobulins   - plan per neurology     Will continue to follow    Chasity Shaver NP  Hematology/ Oncology  [facility]  [phone] (office)  [phone] (alt office)  Evenings and weekends please call MD on call or office

## 2023-12-14 NOTE — PROGRESS NOTE ADULT - NS ATTEND AMEND GEN_ALL_CORE FT
MS who was following at [facility] for Ocrevus infusions  here with septic arthritis  anemia w/component of iron deficiency, wouldn't give oral iron now d/t constipation, f/u w/Dr. Woods after d/c for IV iron  advise more aggressive bowel regimen, pt reports no BM in the last week, on opioids MS who was following at [facility] for Ocrevus infusions  here with septic arthritis  anemia w/component of iron deficiency, wouldn't give oral iron now d/t constipation, f/u w/Dr. Woods after d/c for IV iron  advise more aggressive bowel regimen, pt reports no BM in the last week, on opioids

## 2023-12-14 NOTE — PROGRESS NOTE ADULT - SUBJECTIVE AND OBJECTIVE BOX
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  She had a recent fall in 2023 secondary to unsteadiness due to multiple sclerosis, with subsequent left chest wall and shoulder pain.  Was seen at [facility] with concern for MS exacerbation but no MRI brain and cervical spine with an without contrast were done at the time without enhancement.  At the time infectious workup was done and notable for UTI which was treated.  She continued to have pain.  In the interim, she had an outpatient MRI of left sternoclavicular joint with results significant for marrow edema and swelling of the left sternoclavicular joint with a 2 cm fluid collection and adjacent pleural edema in the left chest with concerns for infectious etiology.   CT chest w/ IV contrast suggestive of septic arthritis with surrounding erythema. Thoracic surgery consulted.  Patient s/p surgical debridement on 23    Int hx:  mri with 80 x 25 mm rim-enhancing fluid collection persists in the surgical cavity with well-placed drain within the fluid collection.   Marrow signal abnormality in the manubrium concerning for acute   osteomyelitis.    Low-grade partial-thickness bursal surface tear of the supraspinatus tendon.        ******    Pt still with sternal pain, says 10/10    Past Medical History:  Multiple sclerosis [G35]        Past Surgical History:  History of  [Z98.891]        Social History:  No toxic habits    Family History:  FAMILY HISTORY:      Allergies:  No Known Allergies      ROS:  Constitutional: Patient offers no complaints of fevers or significant weight loss  Ears, Nose, Mouth and Throat: The patient presents with no abnormalities of the head, ears, eyes, nose or throat  Skin: Patient offers no concerns of new rashes or lesions  Respiratory: The patient presents with no abnormalities of the respiratory tract  Cardiovascular: The patient presents with no cardiac abnormalities  Gastrointestinal: The patient presents with no abnormalities of the GI system  Genitourinary: The patient presents with no dysuria, hematuria or frequent urination  Neurological: See HPI  Endocrine: Patient offers no complaints of excessive thirst, urination, or heat/cold intolerance    Advanced care planning reviewed and noted in the chart.    Medications:  cefepime   IVPB 2000 milliGRAM(s) IV Intermittent every 8 hours  chlorhexidine 2% Cloths 1 Application(s) Topical <User Schedule>  clonazePAM  Tablet 0.5 milliGRAM(s) Oral <User Schedule>  dalfampridine ER 10 milliGRAM(s) Oral every 12 hours  DULoxetine 60 milliGRAM(s) Oral at bedtime  enoxaparin Injectable 40 milliGRAM(s) SubCutaneous every 24 hours  HYDROmorphone  Injectable 1 milliGRAM(s) IV Push every 8 hours PRN  lactated ringers. 1000 milliLiter(s) IV Continuous <Continuous>  oxyCODONE    IR 5 milliGRAM(s) Oral every 6 hours PRN  oxyCODONE    IR 10 milliGRAM(s) Oral every 8 hours PRN  senna 2 Tablet(s) Oral at bedtime  sertraline 100 milliGRAM(s) Oral at bedtime  Vibegron (Gemtesa) 75 milliGRAM(s) 1 Tablet(s) Oral daily      Labs:  CBC Full  -  ( 14 Dec 2023 07:15 )  WBC Count : 9.09 K/uL  RBC Count : 2.99 M/uL  Hemoglobin : 7.6 g/dL  Hematocrit : 24.1 %  Platelet Count - Automated : 397 K/uL  Mean Cell Volume : 80.6 fl  Mean Cell Hemoglobin : 25.4 pg  Mean Cell Hemoglobin Concentration : 31.5 gm/dL  Auto Neutrophil # : x  Auto Lymphocyte # : x  Auto Monocyte # : x  Auto Eosinophil # : x  Auto Basophil # : x  Auto Neutrophil % : x  Auto Lymphocyte % : x  Auto Monocyte % : x  Auto Eosinophil % : x  Auto Basophil % : x    12-14    138  |  99  |  9   ----------------------------<  106<H>  4.0   |  25  |  0.42<L>    Ca    9.4      14 Dec 2023 07:12    TPro  6.4  /  Alb  3.7  /  TBili  0.1<L>  /  DBili  x   /  AST  12  /  ALT  13  /  AlkPhos  185<H>  12-14    CAPILLARY BLOOD GLUCOSE        LIVER FUNCTIONS - ( 14 Dec 2023 07:12 )  Alb: 3.7 g/dL / Pro: 6.4 g/dL / ALK PHOS: 185 U/L / ALT: 13 U/L / AST: 12 U/L / GGT: x           PT/INR - ( 14 Dec 2023 07:10 )   PT: 13.1 sec;   INR: 1.20 ratio         PTT - ( 14 Dec 2023 07:10 )  PTT:31.2 sec  Urinalysis Basic - ( 14 Dec 2023 07:12 )    Color: x / Appearance: x / SG: x / pH: x  Gluc: 106 mg/dL / Ketone: x  / Bili: x / Urobili: x   Blood: x / Protein: x / Nitrite: x   Leuk Esterase: x / RBC: x / WBC x   Sq Epi: x / Non Sq Epi: x / Bacteria: x          Vitals:  Vital Signs Last 24 Hrs  T(C): 36.5 (14 Dec 2023 05:35), Max: 37.5 (14 Dec 2023 01:07)  T(F): 97.7 (14 Dec 2023 05:35), Max: 99.5 (14 Dec 2023 01:07)  HR: 95 (14 Dec 2023 05:35) (86 - 98)  BP: 130/84 (14 Dec 2023 05:35) (100/66 - 130/84)  BP(mean): --  RR: 18 (14 Dec 2023 05:35) (18 - 18)  SpO2: 96% (14 Dec 2023 05:35) (95% - 99%)    Parameters below as of 14 Dec 2023 05:35  Patient On (Oxygen Delivery Method): room air          NEUROLOGICAL EXAM:    Mental status: Awake, alert, and in much apparent distress. Oriented to person, place and time. Language function is normal.      Cranial Nerves: Pupils were equal, round, reactive to light. Extraocular movements were intact. Visual field were full. Fundoscopic exam was deferred. Facial sensation was intact to light touch. There was slight left facia droop. The palate was upgoing symmetrically and tongue was midline.     Motor exam: Bulk and tone were normal. moves all ext difficult to assess due to overall pain    Reflexes: deferred     Sensation: Intact to light touch/temp    Coordination: no gross dysmetria    Gait: defer   sternal wound seen          ACC: [id number] EXAM:  MR SHOULDER WAW IC LT   ORDERED BY:  BRAN SANCHEZ     ACC: [id number] EXAM:  MR STERNOCLAVICULAR JNT LT   ORDERED BY: ANASTASIIA WALKER     PROCEDURE DATE:  2023          INTERPRETATION:  MRI OF THE LEFT STERNOCLAVICULAR JOINT AND SHOULDER    CLINICAL INFORMATION: Left sternal abscess status post washout with   persistent purulent drainage and left shoulder pain.  TECHNIQUE: Multisequence, multiplanar MRI of the left sternoclavicular   joint. The study was performed before and after the intravenous   administration of 6 ml Gadavist (1.5 cc discarded) .    COMPARISON: Chest CT 2023 and 2023.    FINDINGS:    STERNOCLAVICULAR JOINT:    BONE: Patient status post surgical resection of the left clavicular head.   The surgical margin is sharp with trace edema and no loss of T1   hyperintense signal. In the manubrium there is increased STIR signal with   loss of T1 hyperintense signal and postcontrast enhancement involving the   superolateral leftward aspect of the bone concerning for acute   osteomyelitis (6:9 5:9). No acute fracture. No osteonecrosis.    JOINTS: A residual rim-enhancing fluid collection is seen in the region   of the left sternoclavicular joint measuring 80 x 25 mm. A drainage   catheter is seen within the fluid collection.    SOFT TISSUE: Postsurgical changes are seen along the anterior aspect of   the left sternoclavicular joint. There is a mild amount of edema in the   adjacent pectoralis major muscle. No focal fluid collection in the   anterior mediastinum. Susceptibility artifact from surgical staples are   seen along the anterior chest wall.      SHOULDER JOINT:    ROTATOR CUFF: Low-grade partial-thickness bursal surface tearing of the   supraspinatus tendon. Rotator cuff is otherwise intact.  MUSCLES: No focal muscle edema or atrophy.  BICEPS TENDON: Normal in course and caliber.  GLENOID LABRUM AND GLENOHUMERAL LIGAMENTS: No displaced labral tear.   Inferior glenohumeral ligament is intact.  GLENOHUMERAL CARTILAGE AND SUBCHONDRAL BONE: No full-thickness chondral   loss.  AC JOINT: No AC joint arthropathy.  SYNOVIUM/JOINT FLUID: No glenohumeral joint effusion. No focal fluid in   the subacromial/subdeltoid bursa.  BONE MARROW: No fracture or osteonecrosis. No marrow signal change to   suggest acute osteomyelitis.  NEUROVASCULAR STRUCTURES: Structures of the suprascapular notch,   spinoglenoid notch, and quadrilateral space are normal in course and   caliber.  SUBCUTANEOUS SOFT TISSUES: Edema in the subcutaneous fat of the left   shoulder. No rim-enhancing fluid collection to suggest abscess.        IMPRESSION:  1.  Patient status post surgical resection of the left clavicular head   with washout of the left sternoclavicular joint.  2.  A 80 x 25 mm rim-enhancing fluid collection persists in the surgical   cavity with well-placed drain within the fluid collection.  3.  Marrow signal abnormality in the manubrium concerning for acute   osteomyelitis.  4.  Low-grade partial-thickness bursal surface tear of the supraspinatus   tendon.    --- End of Report ---            RASHIDA HYDE MD; Attending Radiologist  This document has been electronically signed. 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  She had a recent fall in 2023 secondary to unsteadiness due to multiple sclerosis, with subsequent left chest wall and shoulder pain.  Was seen at [facility] with concern for MS exacerbation but no MRI brain and cervical spine with an without contrast were done at the time without enhancement.  At the time infectious workup was done and notable for UTI which was treated.  She continued to have pain.  In the interim, she had an outpatient MRI of left sternoclavicular joint with results significant for marrow edema and swelling of the left sternoclavicular joint with a 2 cm fluid collection and adjacent pleural edema in the left chest with concerns for infectious etiology.   CT chest w/ IV contrast suggestive of septic arthritis with surrounding erythema. Thoracic surgery consulted.  Patient s/p surgical debridement on 23    Int hx:  mri with 80 x 25 mm rim-enhancing fluid collection persists in the surgical cavity with well-placed drain within the fluid collection.   Marrow signal abnormality in the manubrium concerning for acute   osteomyelitis.    Low-grade partial-thickness bursal surface tear of the supraspinatus tendon.        ******    Pt still with sternal pain, says 10/10    Past Medical History:  Multiple sclerosis [G35]        Past Surgical History:  History of  [Z98.891]        Social History:  No toxic habits    Family History:  FAMILY HISTORY:      Allergies:  No Known Allergies      ROS:  Constitutional: Patient offers no complaints of fevers or significant weight loss  Ears, Nose, Mouth and Throat: The patient presents with no abnormalities of the head, ears, eyes, nose or throat  Skin: Patient offers no concerns of new rashes or lesions  Respiratory: The patient presents with no abnormalities of the respiratory tract  Cardiovascular: The patient presents with no cardiac abnormalities  Gastrointestinal: The patient presents with no abnormalities of the GI system  Genitourinary: The patient presents with no dysuria, hematuria or frequent urination  Neurological: See HPI  Endocrine: Patient offers no complaints of excessive thirst, urination, or heat/cold intolerance    Advanced care planning reviewed and noted in the chart.    Medications:  cefepime   IVPB 2000 milliGRAM(s) IV Intermittent every 8 hours  chlorhexidine 2% Cloths 1 Application(s) Topical <User Schedule>  clonazePAM  Tablet 0.5 milliGRAM(s) Oral <User Schedule>  dalfampridine ER 10 milliGRAM(s) Oral every 12 hours  DULoxetine 60 milliGRAM(s) Oral at bedtime  enoxaparin Injectable 40 milliGRAM(s) SubCutaneous every 24 hours  HYDROmorphone  Injectable 1 milliGRAM(s) IV Push every 8 hours PRN  lactated ringers. 1000 milliLiter(s) IV Continuous <Continuous>  oxyCODONE    IR 5 milliGRAM(s) Oral every 6 hours PRN  oxyCODONE    IR 10 milliGRAM(s) Oral every 8 hours PRN  senna 2 Tablet(s) Oral at bedtime  sertraline 100 milliGRAM(s) Oral at bedtime  Vibegron (Gemtesa) 75 milliGRAM(s) 1 Tablet(s) Oral daily      Labs:  CBC Full  -  ( 14 Dec 2023 07:15 )  WBC Count : 9.09 K/uL  RBC Count : 2.99 M/uL  Hemoglobin : 7.6 g/dL  Hematocrit : 24.1 %  Platelet Count - Automated : 397 K/uL  Mean Cell Volume : 80.6 fl  Mean Cell Hemoglobin : 25.4 pg  Mean Cell Hemoglobin Concentration : 31.5 gm/dL  Auto Neutrophil # : x  Auto Lymphocyte # : x  Auto Monocyte # : x  Auto Eosinophil # : x  Auto Basophil # : x  Auto Neutrophil % : x  Auto Lymphocyte % : x  Auto Monocyte % : x  Auto Eosinophil % : x  Auto Basophil % : x    12-14    138  |  99  |  9   ----------------------------<  106<H>  4.0   |  25  |  0.42<L>    Ca    9.4      14 Dec 2023 07:12    TPro  6.4  /  Alb  3.7  /  TBili  0.1<L>  /  DBili  x   /  AST  12  /  ALT  13  /  AlkPhos  185<H>  12-14    CAPILLARY BLOOD GLUCOSE        LIVER FUNCTIONS - ( 14 Dec 2023 07:12 )  Alb: 3.7 g/dL / Pro: 6.4 g/dL / ALK PHOS: 185 U/L / ALT: 13 U/L / AST: 12 U/L / GGT: x           PT/INR - ( 14 Dec 2023 07:10 )   PT: 13.1 sec;   INR: 1.20 ratio         PTT - ( 14 Dec 2023 07:10 )  PTT:31.2 sec  Urinalysis Basic - ( 14 Dec 2023 07:12 )    Color: x / Appearance: x / SG: x / pH: x  Gluc: 106 mg/dL / Ketone: x  / Bili: x / Urobili: x   Blood: x / Protein: x / Nitrite: x   Leuk Esterase: x / RBC: x / WBC x   Sq Epi: x / Non Sq Epi: x / Bacteria: x          Vitals:  Vital Signs Last 24 Hrs  T(C): 36.5 (14 Dec 2023 05:35), Max: 37.5 (14 Dec 2023 01:07)  T(F): 97.7 (14 Dec 2023 05:35), Max: 99.5 (14 Dec 2023 01:07)  HR: 95 (14 Dec 2023 05:35) (86 - 98)  BP: 130/84 (14 Dec 2023 05:35) (100/66 - 130/84)  BP(mean): --  RR: 18 (14 Dec 2023 05:35) (18 - 18)  SpO2: 96% (14 Dec 2023 05:35) (95% - 99%)    Parameters below as of 14 Dec 2023 05:35  Patient On (Oxygen Delivery Method): room air          NEUROLOGICAL EXAM:    Mental status: Awake, alert, and in much apparent distress. Oriented to person, place and time. Language function is normal.      Cranial Nerves: Pupils were equal, round, reactive to light. Extraocular movements were intact. Visual field were full. Fundoscopic exam was deferred. Facial sensation was intact to light touch. There was slight left facia droop. The palate was upgoing symmetrically and tongue was midline.     Motor exam: Bulk and tone were normal. moves all ext difficult to assess due to overall pain    Reflexes: deferred     Sensation: Intact to light touch/temp    Coordination: no gross dysmetria    Gait: defer   sternal wound seen          ACC: [id number] EXAM:  MR SHOULDER WAW IC LT   ORDERED BY:  BRAN SANCHEZ     ACC: [id number] EXAM:  MR STERNOCLAVICULAR JNT LT   ORDERED BY: ANASTASIIA WALKER     PROCEDURE DATE:  2023          INTERPRETATION:  MRI OF THE LEFT STERNOCLAVICULAR JOINT AND SHOULDER    CLINICAL INFORMATION: Left sternal abscess status post washout with   persistent purulent drainage and left shoulder pain.  TECHNIQUE: Multisequence, multiplanar MRI of the left sternoclavicular   joint. The study was performed before and after the intravenous   administration of 6 ml Gadavist (1.5 cc discarded) .    COMPARISON: Chest CT 2023 and 2023.    FINDINGS:    STERNOCLAVICULAR JOINT:    BONE: Patient status post surgical resection of the left clavicular head.   The surgical margin is sharp with trace edema and no loss of T1   hyperintense signal. In the manubrium there is increased STIR signal with   loss of T1 hyperintense signal and postcontrast enhancement involving the   superolateral leftward aspect of the bone concerning for acute   osteomyelitis (6:9 5:9). No acute fracture. No osteonecrosis.    JOINTS: A residual rim-enhancing fluid collection is seen in the region   of the left sternoclavicular joint measuring 80 x 25 mm. A drainage   catheter is seen within the fluid collection.    SOFT TISSUE: Postsurgical changes are seen along the anterior aspect of   the left sternoclavicular joint. There is a mild amount of edema in the   adjacent pectoralis major muscle. No focal fluid collection in the   anterior mediastinum. Susceptibility artifact from surgical staples are   seen along the anterior chest wall.      SHOULDER JOINT:    ROTATOR CUFF: Low-grade partial-thickness bursal surface tearing of the   supraspinatus tendon. Rotator cuff is otherwise intact.  MUSCLES: No focal muscle edema or atrophy.  BICEPS TENDON: Normal in course and caliber.  GLENOID LABRUM AND GLENOHUMERAL LIGAMENTS: No displaced labral tear.   Inferior glenohumeral ligament is intact.  GLENOHUMERAL CARTILAGE AND SUBCHONDRAL BONE: No full-thickness chondral   loss.  AC JOINT: No AC joint arthropathy.  SYNOVIUM/JOINT FLUID: No glenohumeral joint effusion. No focal fluid in   the subacromial/subdeltoid bursa.  BONE MARROW: No fracture or osteonecrosis. No marrow signal change to   suggest acute osteomyelitis.  NEUROVASCULAR STRUCTURES: Structures of the suprascapular notch,   spinoglenoid notch, and quadrilateral space are normal in course and   caliber.  SUBCUTANEOUS SOFT TISSUES: Edema in the subcutaneous fat of the left   shoulder. No rim-enhancing fluid collection to suggest abscess.        IMPRESSION:  1.  Patient status post surgical resection of the left clavicular head   with washout of the left sternoclavicular joint.  2.  A 80 x 25 mm rim-enhancing fluid collection persists in the surgical   cavity with well-placed drain within the fluid collection.  3.  Marrow signal abnormality in the manubrium concerning for acute   osteomyelitis.  4.  Low-grade partial-thickness bursal surface tear of the supraspinatus   tendon.    --- End of Report ---            RASHIDA HYDE MD; Attending Radiologist  This document has been electronically signed. Dec 11 2023  5:11PM

## 2023-12-14 NOTE — PROGRESS NOTE ADULT - NS ATTEND AMEND GEN_ALL_CORE FT
drain removed - wound packed - improved appearance overall but would proceed with OR for debridement/irrigation and vac placement.

## 2023-12-14 NOTE — PROGRESS NOTE ADULT - ASSESSMENT
43F PMHx of MS on immunosuppressive medication, presenting with left chest wall and shoulder pain. Recent history of fall secondary to unsteadiness due to MS. Obtained outpatient MRI of left sternoclavicular joint with results significant for marrow edema and swelling of the left sternoclavicular joint with a 2 cm fluid collection and adjacent pleural edema in the left chest with concerns for infectious etiology.  Patient initially saw orthopedics with for concerns for septic arthritis of this joint.  Impression of the read showed soft tissue abscess superficial to the joint.  Recommended CT scan with IV contrast of the chest.  Patient denies any fevers however endorsing erythema to the left sternoclavicular joint region. Patient states she was recently hospitalized over a week ago at [facility] for falls secondary to unsteadiness due to her MS.  Denies any recent falls today, vision changes. Endorsing headache. Denies any chest pain, shortness of breath, abdominal pain, nausea vomiting diarrhea, urinary complaints.  CT chest with IV in ED significant for Septic arthritis of the left sternoclavicular joint and involving the articulation of the sternum with the first costal cartilage.Extensive surrounding inflammation, with pneumonia in the underlying subpleural left upper lobe.No fluid collection is evident.  Workup significant for elevated alk phos (248), . VSS and afebrile  s/p Debridement of left sternoclavicular joint with excision of left clavicular head. Purulent drainage at the sternoclavicular joint. Tissue surrounding left clavicle found to be very inflamed. Copious irrigation of surgical site.   Concerned about pain at surgical site.    at bedside     43F PMHx of MS on immunosuppressive medication, presenting with left chest wall and shoulder pain. Recent history of fall secondary to unsteadiness due to MS. Obtained outpatient MRI of left sternoclavicular joint with results significant for marrow edema and swelling of the left sternoclavicular joint with a 2 cm fluid collection and adjacent pleural edema in the left chest with concerns for infectious etiology.  Patient initially saw orthopedics with for concerns for septic arthritis of this joint.  Impression of the read showed soft tissue abscess superficial to the joint.  Recommended CT scan with IV contrast of the chest.  Patient denies any fevers however endorsing erythema to the left sternoclavicular joint region. Patient states she was recently hospitalized over a week ago at [facility] for falls secondary to unsteadiness due to her MS.  Denies any recent falls today, vision changes. Endorsing headache. Denies any chest pain, shortness of breath, abdominal pain, nausea vomiting diarrhea, urinary complaints.  CT chest with IV in ED significant for Septic arthritis of the left sternoclavicular joint and involving the articulation of the sternum with the first costal cartilage.Extensive surrounding inflammation, with pneumonia in the underlying subpleural left upper lobe.No fluid collection is evident.  Workup significant for elevated alk phos (248), . VSS and afebrile  s/p Debridement of left sternoclavicular joint with excision of left clavicular head. Purulent drainage at the sternoclavicular joint. Tissue surrounding left clavicle found to be very inflamed. Copious irrigation of surgical site.   Concerned about pain at surgical site.    at bedside

## 2023-12-14 NOTE — PROGRESS NOTE ADULT - PROBLEM SELECTOR PLAN 1
s/p Debridement of left sternoclavicular joint with excision of left clavicular head. Purulent drainage at the sternoclavicular joint. Tissue surrounding left clavicle found to be very inflamed. Copious irrigation of surgical site.   IV abx    OR c/s NGTD  BC  NGTD.   Plan for RTOR tomorrow   MRI reviewed. s/p  removal of staples  Ortho consulted regarding supraspinatus tear. Would prefer NO steroid injection at present time given infection   Pain control   Check TTE  Monitor Hgb    DVT ppx.

## 2023-12-14 NOTE — PROGRESS NOTE ADULT - SUBJECTIVE AND OBJECTIVE BOX
SUBJECTIVE:     TELEMETRY:      VITAL SIGNS:    Vital Signs Last 24 Hrs  T(C): 36.5 (23 @ 05:35), Max: 37.5 (23 @ 01:07)  T(F): 97.7 (23 @ 05:35), Max: 99.5 (23 @ 01:07)  HR: 95 (23 @ 05:35) (86 - 98)  BP: 130/84 (23 @ 05:35) (100/66 - 130/84)  RR: 18 (23 @ 05:35) (18 - 18)  SpO2: 96% (23 @ 05:35) (95% - 99%)           INPUT/OUTPUT:     @ 07:01  -   @ 07:00  --------------------------------------------------------  IN: 2815 mL / OUT: 2 mL / NET: 2813 mL     @ 07:  -   @ 10:36  --------------------------------------------------------  IN: 240 mL / OUT: 0 mL / NET: 240 mL        OR      I&O's Detail    13 Dec 2023 07:  -  14 Dec 2023 07:00  --------------------------------------------------------  IN:    IV PiggyBack: 950 mL    Lactated Ringers: 825 mL    Oral Fluid: 1040 mL  Total IN: 2815 mL    OUT:    Bulb (mL): 2 mL  Total OUT: 2 mL    Total NET: 2813 mL      14 Dec 2023 07:01  -  14 Dec 2023 10:36  --------------------------------------------------------  IN:    Oral Fluid: 240 mL  Total IN: 240 mL    OUT:  Total OUT: 0 mL    Total NET: 240 mL            LABS:      138  |  99  |  9   ----------------------------<  106<H>  4.0   |  25  |  0.42<L>    Ca    9.4      14 Dec 2023 07:12    TPro  6.4  /  Alb  3.7  /  TBili  0.1<L>  /  DBili  x   /  AST  12  /  ALT  13  /  AlkPhos  185<H>                        7.6    9.09  )-----------( 397      ( 14 Dec 2023 07:15 )             24.1          PT/INR - ( 14 Dec 2023 07:10 )   PT: 13.1 sec;   INR: 1.20 ratio      PTT - ( 14 Dec 2023 07:10 )  PTT:31.2 sec      Daily Weight in k.2 (13 Dec 2023 10:53)        PHYSICAL EXAM:  GEN: NAD  HEAD: NCAT  CVS: S1, S2  (+)Left clavicular incisions with errthema/staples, +left sita to LISBETH bulb (OP 0cc/2cc drainage overnight/24 hours)  RESP: non-labored respirations;   GI: +BS in all four quadrants  EXT: no edema  NEURO: AAOx3    ACTIVE MEDICATIONS:  cefepime   IVPB 2000 milliGRAM(s) IV Intermittent every 8 hours  chlorhexidine 2% Cloths 1 Application(s) Topical <User Schedule>  clonazePAM  Tablet 0.5 milliGRAM(s) Oral <User Schedule>  dalfampridine ER 10 milliGRAM(s) Oral every 12 hours  DULoxetine 60 milliGRAM(s) Oral at bedtime  enoxaparin Injectable 40 milliGRAM(s) SubCutaneous every 24 hours  HYDROmorphone  Injectable 1 milliGRAM(s) IV Push every 6 hours PRN  lactated ringers. 1000 milliLiter(s) IV Continuous <Continuous>  oxyCODONE    IR 5 milliGRAM(s) Oral every 6 hours PRN  oxyCODONE    IR 10 milliGRAM(s) Oral every 6 hours PRN  senna 2 Tablet(s) Oral at bedtime  sertraline 100 milliGRAM(s) Oral at bedtime  Vibegron (Gemtesa) 75 milliGRAM(s) 1 Tablet(s) Oral daily      Case discussed in detail with Thoracic Team and Attending Dr. Bautista. Plan below as per discussion. SUBJECTIVE: "Hi"    VITAL SIGNS:  Vital Signs Last 24 Hrs  T(C): 36.5 (23 @ 05:35), Max: 37.5 (23 @ 01:07)  T(F): 97.7 (23 @ 05:35), Max: 99.5 (23 @ 01:07)  HR: 95 (23 @ 05:35) (86 - 98)  BP: 130/84 (23 @ 05:35) (100/66 - 130/84)  RR: 18 (23 @ 05:35) (18 - 18)  SpO2: 96% (23 @ 05:35) (95% - 99%)           INPUT/OUTPUT:    13 Dec 2023 07:01  -  14 Dec 2023 07:00  --------------------------------------------------------  IN:    IV PiggyBack: 950 mL    Lactated Ringers: 825 mL    Oral Fluid: 1040 mL  Total IN: 2815 mL    OUT:    Bulb (mL): 2 mL  Total OUT: 2 mL    Total NET: 2813 mL      LABS:      138  |  99  |  9   ----------------------------<  106<H>  4.0   |  25  |  0.42<L>    Ca    9.4      14 Dec 2023 07:12    TPro  6.4  /  Alb  3.7  /  TBili  0.1<L>  /  DBili  x   /  AST  12  /  ALT  13  /  AlkPhos  185<H>                        7.6    9.09  )-----------( 397      ( 14 Dec 2023 07:15 )             24.1          PT/INR - ( 14 Dec 2023 07:10 )   PT: 13.1 sec;   INR: 1.20 ratio      PTT - ( 14 Dec 2023 07:10 )  PTT:31.2 sec      Daily Weight in k.2 (13 Dec 2023 10:53)        PHYSICAL EXAM:  GEN: NAD  HEAD: NCAT  CVS: S1, S2  (+)Left clavicular incisions with errthema/staples, +left sita to LISBETH bulb (output 0cc/2cc drainage overnight/24 hours)  RESP: non-labored respirations;   GI: +BS in all four quadrants  EXT: no edema  NEURO: AAOx3    ACTIVE MEDICATIONS:  cefepime   IVPB 2000 milliGRAM(s) IV Intermittent every 8 hours  chlorhexidine 2% Cloths 1 Application(s) Topical <User Schedule>  clonazePAM  Tablet 0.5 milliGRAM(s) Oral <User Schedule>  dalfampridine ER 10 milliGRAM(s) Oral every 12 hours  DULoxetine 60 milliGRAM(s) Oral at bedtime  enoxaparin Injectable 40 milliGRAM(s) SubCutaneous every 24 hours  HYDROmorphone  Injectable 1 milliGRAM(s) IV Push every 6 hours PRN  lactated ringers. 1000 milliLiter(s) IV Continuous <Continuous>  oxyCODONE    IR 5 milliGRAM(s) Oral every 6 hours PRN  oxyCODONE    IR 10 milliGRAM(s) Oral every 6 hours PRN  senna 2 Tablet(s) Oral at bedtime  sertraline 100 milliGRAM(s) Oral at bedtime  Vibegron (Gemtesa) 75 milliGRAM(s) 1 Tablet(s) Oral daily      Case discussed in detail with Thoracic Team and Attending Dr. Bautista. Plan below as per discussion.

## 2023-12-14 NOTE — PROGRESS NOTE ADULT - SUBJECTIVE AND OBJECTIVE BOX
Date of service: 12-14-23 @ 16:11      Patient is a 43y old  Female who presents with a chief complaint of L shoulder and chest pain (14 Dec 2023 12:52)                                                               INTERVAL HPI/OVERNIGHT EVENTS:    REVIEW OF SYSTEMS:     CONSTITUTIONAL: No weakness, fevers or chills  EYES/ENT: No visual changes , no ear ache   NECK: No pain or stiffness  RESPIRATORY: No cough, wheezing,  No shortness of breath  CARDIOVASCULAR: No chest pain or palpitations  GASTROINTESTINAL: No abdominal pain  . No nausea, vomiting, or hematemesis; No diarrhea or constipation. No melena or hematochezia.  GENITOURINARY: No dysuria, frequency or hematuria  NEUROLOGICAL: No numbness or weakness  SKIN: No itching, burning, rashes, or lesions                                                                                                                                                                                                                                                                                 Medications:  MEDICATIONS  (STANDING):  cefepime   IVPB 2000 milliGRAM(s) IV Intermittent every 8 hours  chlorhexidine 2% Cloths 1 Application(s) Topical <User Schedule>  dalfampridine ER 10 milliGRAM(s) Oral every 12 hours  DULoxetine 60 milliGRAM(s) Oral at bedtime  lactated ringers. 1000 milliLiter(s) (75 mL/Hr) IV Continuous <Continuous>  senna 2 Tablet(s) Oral at bedtime  sertraline 100 milliGRAM(s) Oral at bedtime  Vibegron (Gemtesa) 75 milliGRAM(s) 1 Tablet(s) Oral daily    MEDICATIONS  (PRN):  clonazePAM  Tablet 0.5 milliGRAM(s) Oral three times a day PRN anxiety  HYDROmorphone  Injectable 1 milliGRAM(s) IV Push every 3 hours PRN Severe Pain (7 - 10)  oxyCODONE    IR 5 milliGRAM(s) Oral every 6 hours PRN Mild Pain (1 - 3)  oxyCODONE    IR 10 milliGRAM(s) Oral every 6 hours PRN Moderate Pain (4 - 6)       Allergies    No Known Allergies    Intolerances      Vital Signs Last 24 Hrs  T(C): 37.1 (14 Dec 2023 13:55), Max: 37.5 (14 Dec 2023 01:07)  T(F): 98.8 (14 Dec 2023 13:55), Max: 99.5 (14 Dec 2023 01:07)  HR: 97 (14 Dec 2023 13:55) (90 - 98)  BP: 110/65 (14 Dec 2023 13:55) (100/66 - 130/84)  BP(mean): --  RR: 18 (14 Dec 2023 13:55) (18 - 18)  SpO2: 98% (14 Dec 2023 13:55) (95% - 99%)    Parameters below as of 14 Dec 2023 13:55  Patient On (Oxygen Delivery Method): room air      CAPILLARY BLOOD GLUCOSE          12-13 @ 07:01  -  12-14 @ 07:00  --------------------------------------------------------  IN: 2815 mL / OUT: 2 mL / NET: 2813 mL    12-14 @ 07:01  -  12-14 @ 16:11  --------------------------------------------------------  IN: 480 mL / OUT: 0 mL / NET: 480 mL      Physical Exam:    Daily     Daily   General:  Well appearing, NAD, not cachetic  HEENT:  Nonicteric, PERRLA  CV:  RRR, S1S2   Lungs:  CTA B/L, no wheezes, rales, rhonchi  Abdomen:  Soft, non-tender, no distended, positive BS  Extremities: no edema   Neuro:  AAOx3, non-focal, grossly intact                                                                                                                                                                                                                                                                                                LABS:                               7.6    9.09  )-----------( 397      ( 14 Dec 2023 07:15 )             24.1                      12-14    138  |  99  |  9   ----------------------------<  106<H>  4.0   |  25  |  0.42<L>    Ca    9.4      14 Dec 2023 07:12    TPro  6.4  /  Alb  3.7  /  TBili  0.1<L>  /  DBili  x   /  AST  12  /  ALT  13  /  AlkPhos  185<H>  12-14                       RADIOLOGY & ADDITIONAL TESTS         I personally reviewed: [  ]EKG   [  ]CXR    [  ] CT      A/P:         Discussed with :     [facility] consultants' Notes   Time spent :   Date of service: 12-14-23 @ 16:11      Patient is a 43y old  Female who presents with a chief complaint of L shoulder and chest pain (14 Dec 2023 12:52)                                                               INTERVAL HPI/OVERNIGHT EVENTS:    REVIEW OF SYSTEMS:     CONSTITUTIONAL: No weakness, fevers or chills  EYES/ENT: No visual changes , no ear ache   NECK: No pain or stiffness  RESPIRATORY: No cough, wheezing,  No shortness of breath  CARDIOVASCULAR: No chest pain or palpitations  GASTROINTESTINAL: No abdominal pain  . No nausea, vomiting, or hematemesis; No diarrhea or constipation. No melena or hematochezia.  GENITOURINARY: No dysuria, frequency or hematuria  NEUROLOGICAL: No numbness or weakness  SKIN: No itching, burning, rashes, or lesions                                                                                                                                                                                                                                                                                 Medications:  MEDICATIONS  (STANDING):  cefepime   IVPB 2000 milliGRAM(s) IV Intermittent every 8 hours  chlorhexidine 2% Cloths 1 Application(s) Topical <User Schedule>  dalfampridine ER 10 milliGRAM(s) Oral every 12 hours  DULoxetine 60 milliGRAM(s) Oral at bedtime  lactated ringers. 1000 milliLiter(s) (75 mL/Hr) IV Continuous <Continuous>  senna 2 Tablet(s) Oral at bedtime  sertraline 100 milliGRAM(s) Oral at bedtime  Vibegron (Gemtesa) 75 milliGRAM(s) 1 Tablet(s) Oral daily    MEDICATIONS  (PRN):  clonazePAM  Tablet 0.5 milliGRAM(s) Oral three times a day PRN anxiety  HYDROmorphone  Injectable 1 milliGRAM(s) IV Push every 3 hours PRN Severe Pain (7 - 10)  oxyCODONE    IR 5 milliGRAM(s) Oral every 6 hours PRN Mild Pain (1 - 3)  oxyCODONE    IR 10 milliGRAM(s) Oral every 6 hours PRN Moderate Pain (4 - 6)       Allergies    No Known Allergies    Intolerances      Vital Signs Last 24 Hrs  T(C): 37.1 (14 Dec 2023 13:55), Max: 37.5 (14 Dec 2023 01:07)  T(F): 98.8 (14 Dec 2023 13:55), Max: 99.5 (14 Dec 2023 01:07)  HR: 97 (14 Dec 2023 13:55) (90 - 98)  BP: 110/65 (14 Dec 2023 13:55) (100/66 - 130/84)  BP(mean): --  RR: 18 (14 Dec 2023 13:55) (18 - 18)  SpO2: 98% (14 Dec 2023 13:55) (95% - 99%)    Parameters below as of 14 Dec 2023 13:55  Patient On (Oxygen Delivery Method): room air      CAPILLARY BLOOD GLUCOSE          12-13 @ 07:01  -  12-14 @ 07:00  --------------------------------------------------------  IN: 2815 mL / OUT: 2 mL / NET: 2813 mL    12-14 @ 07:01  -  12-14 @ 16:11  --------------------------------------------------------  IN: 480 mL / OUT: 0 mL / NET: 480 mL      Physical Exam:    Daily     Daily   General:  Well appearing, NAD, not cachetic  HEENT:  Nonicteric, PERRLA  CV:  RRR, S1S2   Lungs:  CTA B/L, no wheezes, rales, rhonchi  Abdomen:  Soft, non-tender, no distended, positive BS  Extremities: no edema   Neuro:  AAOx3, non-focal, grossly intact                                                                                                                                                                                                                                                                                                LABS:                               7.6    9.09  )-----------( 397      ( 14 Dec 2023 07:15 )             24.1                      12-14    138  |  99  |  9   ----------------------------<  106<H>  4.0   |  25  |  0.42<L>    Ca    9.4      14 Dec 2023 07:12    TPro  6.4  /  Alb  3.7  /  TBili  0.1<L>  /  DBili  x   /  AST  12  /  ALT  13  /  AlkPhos  185<H>  12-14                       RADIOLOGY & ADDITIONAL TESTS         I personally reviewed: [  ]EKG   [  ]CXR    [  ] CT      A/P:         Discussed with :     [employer] consultants' Notes   Time spent :

## 2023-12-14 NOTE — PROGRESS NOTE ADULT - SUBJECTIVE AND OBJECTIVE BOX
43yFemale c/o Lshoulder pain s/p mechanical fall 6 weeks ago.  Patient endorsing left shoulder pain is unchanged from yesterday.  Patient denies numbness or tingling in LUE at baseline or currently. Patient denies any other injuries.    LABS:                        7.6    9.09  )-----------( 397      ( 14 Dec 2023 07:15 )             24.1     12-14    138  |  99  |  9   ----------------------------<  106<H>  4.0   |  25  |  0.42<L>    Ca    9.4      14 Dec 2023 07:12    TPro  6.4  /  Alb  3.7  /  TBili  0.1<L>  /  DBili  x   /  AST  12  /  ALT  13  /  AlkPhos  185<H>  12-14    PT/INR - ( 14 Dec 2023 07:10 )   PT: 13.1 sec;   INR: 1.20 ratio         PTT - ( 14 Dec 2023 07:10 )  PTT:31.2 sec  Urinalysis Basic - ( 14 Dec 2023 07:12 )    Color: x / Appearance: x / SG: x / pH: x  Gluc: 106 mg/dL / Ketone: x  / Bili: x / Urobili: x   Blood: x / Protein: x / Nitrite: x   Leuk Esterase: x / RBC: x / WBC x   Sq Epi: x / Non Sq Epi: x / Bacteria: x        VITAL SIGNS:  T(C): 36.5 (12-14-23 @ 05:35), Max: 37.5 (12-14-23 @ 01:07)  HR: 95 (12-14-23 @ 05:35) (86 - 98)  BP: 130/84 (12-14-23 @ 05:35) (100/66 - 130/84)  RR: 18 (12-14-23 @ 05:35) (18 - 18)  SpO2: 96% (12-14-23 @ 05:35) (95% - 99%)    Gen: NAD  Resp: Unlabored breathing  PE LUE:  Skin intact,    SILT axillary/med/rad/ulnar  +Motor AIN/PIN/Ulnar  +painless elbow/wrist ROM,   shoulder ROM limited 2/2 pain and weakness; AROM FF 30, Abduction 30 degrees; PROM , Abduction 130  +radial pulse, soft compartments.        43yFemale with L Rotator cuff tear partial bursal sided  -Shoulder pain with significant loss of active motion of left arm 2/2 pain and some weakness  -Patient offered Intraarticular joint injection with lidocaine and corticosteroid, both for therapeutic and diagnostic benefit. Revisited topic with patient this morning, patient states that she is still considering it but is currently concerned with any persistent infection in her sternum/manubrium.  -Patient and  will consider it and will reach out to orthopaedic team with decision  -pain control  -WBAT  -Would recommend PT/OT to left shoulder for rotator cuff partial tear, pending no objections from CTSx after their ipsilateral procedure  -Can follow up with Dr. Herman or Dr. Bar in 1-2 weeks or upon discharge  -Will update plan based on clinical course

## 2023-12-15 ENCOUNTER — APPOINTMENT (OUTPATIENT)
Dept: THORACIC SURGERY | Facility: HOSPITAL | Age: 43
End: 2023-12-15

## 2023-12-15 ENCOUNTER — TRANSCRIPTION ENCOUNTER (OUTPATIENT)
Age: 43
End: 2023-12-15

## 2023-12-15 LAB
ANION GAP SERPL CALC-SCNC: 10 MMOL/L — SIGNIFICANT CHANGE UP (ref 5–17)
ANION GAP SERPL CALC-SCNC: 10 MMOL/L — SIGNIFICANT CHANGE UP (ref 5–17)
BUN SERPL-MCNC: 9 MG/DL — SIGNIFICANT CHANGE UP (ref 7–23)
BUN SERPL-MCNC: 9 MG/DL — SIGNIFICANT CHANGE UP (ref 7–23)
CALCIUM SERPL-MCNC: 9.5 MG/DL — SIGNIFICANT CHANGE UP (ref 8.4–10.5)
CALCIUM SERPL-MCNC: 9.5 MG/DL — SIGNIFICANT CHANGE UP (ref 8.4–10.5)
CHLORIDE SERPL-SCNC: 99 MMOL/L — SIGNIFICANT CHANGE UP (ref 96–108)
CHLORIDE SERPL-SCNC: 99 MMOL/L — SIGNIFICANT CHANGE UP (ref 96–108)
CO2 SERPL-SCNC: 28 MMOL/L — SIGNIFICANT CHANGE UP (ref 22–31)
CO2 SERPL-SCNC: 28 MMOL/L — SIGNIFICANT CHANGE UP (ref 22–31)
CREAT SERPL-MCNC: 0.43 MG/DL — LOW (ref 0.5–1.3)
CREAT SERPL-MCNC: 0.43 MG/DL — LOW (ref 0.5–1.3)
EGFR: 123 ML/MIN/1.73M2 — SIGNIFICANT CHANGE UP
EGFR: 123 ML/MIN/1.73M2 — SIGNIFICANT CHANGE UP
GALACTOMANNAN AG SERPL-ACNC: 0.05 INDEX — SIGNIFICANT CHANGE UP (ref 0–0.49)
GALACTOMANNAN AG SERPL-ACNC: 0.05 INDEX — SIGNIFICANT CHANGE UP (ref 0–0.49)
GLUCOSE SERPL-MCNC: 96 MG/DL — SIGNIFICANT CHANGE UP (ref 70–99)
GLUCOSE SERPL-MCNC: 96 MG/DL — SIGNIFICANT CHANGE UP (ref 70–99)
HCT VFR BLD CALC: 23 % — LOW (ref 34.5–45)
HCT VFR BLD CALC: 23 % — LOW (ref 34.5–45)
HGB BLD-MCNC: 7.1 G/DL — LOW (ref 11.5–15.5)
HGB BLD-MCNC: 7.1 G/DL — LOW (ref 11.5–15.5)
MAGNESIUM SERPL-MCNC: 2 MG/DL — SIGNIFICANT CHANGE UP (ref 1.6–2.6)
MAGNESIUM SERPL-MCNC: 2 MG/DL — SIGNIFICANT CHANGE UP (ref 1.6–2.6)
MCHC RBC-ENTMCNC: 25.1 PG — LOW (ref 27–34)
MCHC RBC-ENTMCNC: 25.1 PG — LOW (ref 27–34)
MCHC RBC-ENTMCNC: 30.9 GM/DL — LOW (ref 32–36)
MCHC RBC-ENTMCNC: 30.9 GM/DL — LOW (ref 32–36)
MCV RBC AUTO: 81.3 FL — SIGNIFICANT CHANGE UP (ref 80–100)
MCV RBC AUTO: 81.3 FL — SIGNIFICANT CHANGE UP (ref 80–100)
NRBC # BLD: 0 /100 WBCS — SIGNIFICANT CHANGE UP (ref 0–0)
NRBC # BLD: 0 /100 WBCS — SIGNIFICANT CHANGE UP (ref 0–0)
PHOSPHATE SERPL-MCNC: 3.6 MG/DL — SIGNIFICANT CHANGE UP (ref 2.5–4.5)
PHOSPHATE SERPL-MCNC: 3.6 MG/DL — SIGNIFICANT CHANGE UP (ref 2.5–4.5)
PLATELET # BLD AUTO: 371 K/UL — SIGNIFICANT CHANGE UP (ref 150–400)
PLATELET # BLD AUTO: 371 K/UL — SIGNIFICANT CHANGE UP (ref 150–400)
POTASSIUM SERPL-MCNC: 4.2 MMOL/L — SIGNIFICANT CHANGE UP (ref 3.5–5.3)
POTASSIUM SERPL-MCNC: 4.2 MMOL/L — SIGNIFICANT CHANGE UP (ref 3.5–5.3)
POTASSIUM SERPL-SCNC: 4.2 MMOL/L — SIGNIFICANT CHANGE UP (ref 3.5–5.3)
POTASSIUM SERPL-SCNC: 4.2 MMOL/L — SIGNIFICANT CHANGE UP (ref 3.5–5.3)
RBC # BLD: 2.83 M/UL — LOW (ref 3.8–5.2)
RBC # BLD: 2.83 M/UL — LOW (ref 3.8–5.2)
RBC # FLD: 14.3 % — SIGNIFICANT CHANGE UP (ref 10.3–14.5)
RBC # FLD: 14.3 % — SIGNIFICANT CHANGE UP (ref 10.3–14.5)
SODIUM SERPL-SCNC: 137 MMOL/L — SIGNIFICANT CHANGE UP (ref 135–145)
SODIUM SERPL-SCNC: 137 MMOL/L — SIGNIFICANT CHANGE UP (ref 135–145)
WBC # BLD: 7.58 K/UL — SIGNIFICANT CHANGE UP (ref 3.8–10.5)
WBC # BLD: 7.58 K/UL — SIGNIFICANT CHANGE UP (ref 3.8–10.5)
WBC # FLD AUTO: 7.58 K/UL — SIGNIFICANT CHANGE UP (ref 3.8–10.5)
WBC # FLD AUTO: 7.58 K/UL — SIGNIFICANT CHANGE UP (ref 3.8–10.5)

## 2023-12-15 PROCEDURE — 99232 SBSQ HOSP IP/OBS MODERATE 35: CPT

## 2023-12-15 PROCEDURE — 97606 NEG PRS WND THER DME>50 SQCM: CPT

## 2023-12-15 PROCEDURE — 21620 OSTECTOMY STERNUM PARTIAL: CPT | Mod: 78

## 2023-12-15 PROCEDURE — 99231 SBSQ HOSP IP/OBS SF/LOW 25: CPT

## 2023-12-15 PROCEDURE — 21600 PARTIAL REMOVAL OF RIB: CPT | Mod: 78

## 2023-12-15 PROCEDURE — 11042 DBRDMT SUBQ TIS 1ST 20SQCM/<: CPT | Mod: 59,78

## 2023-12-15 DEVICE — SURGICEL NU-KNIT 6 X 9": Type: IMPLANTABLE DEVICE | Site: LEFT | Status: FUNCTIONAL

## 2023-12-15 RX ORDER — ACETAMINOPHEN 500 MG
1000 TABLET ORAL ONCE
Refills: 0 | Status: COMPLETED | OUTPATIENT
Start: 2023-12-15 | End: 2023-12-15

## 2023-12-15 RX ORDER — SODIUM CHLORIDE 9 MG/ML
1000 INJECTION, SOLUTION INTRAVENOUS
Refills: 0 | Status: DISCONTINUED | OUTPATIENT
Start: 2023-12-15 | End: 2023-12-17

## 2023-12-15 RX ORDER — SERTRALINE 25 MG/1
100 TABLET, FILM COATED ORAL AT BEDTIME
Refills: 0 | Status: DISCONTINUED | OUTPATIENT
Start: 2023-12-15 | End: 2024-01-05

## 2023-12-15 RX ORDER — SENNA PLUS 8.6 MG/1
2 TABLET ORAL AT BEDTIME
Refills: 0 | Status: DISCONTINUED | OUTPATIENT
Start: 2023-12-15 | End: 2024-01-05

## 2023-12-15 RX ORDER — DULOXETINE HYDROCHLORIDE 30 MG/1
60 CAPSULE, DELAYED RELEASE ORAL AT BEDTIME
Refills: 0 | Status: DISCONTINUED | OUTPATIENT
Start: 2023-12-15 | End: 2023-12-15

## 2023-12-15 RX ORDER — CEFEPIME 1 G/1
2000 INJECTION, POWDER, FOR SOLUTION INTRAMUSCULAR; INTRAVENOUS EVERY 8 HOURS
Refills: 0 | Status: DISCONTINUED | OUTPATIENT
Start: 2023-12-15 | End: 2023-12-15

## 2023-12-15 RX ORDER — SODIUM CHLORIDE 9 MG/ML
1000 INJECTION, SOLUTION INTRAVENOUS
Refills: 0 | Status: DISCONTINUED | OUTPATIENT
Start: 2023-12-15 | End: 2023-12-15

## 2023-12-15 RX ORDER — DULOXETINE HYDROCHLORIDE 30 MG/1
60 CAPSULE, DELAYED RELEASE ORAL AT BEDTIME
Refills: 0 | Status: DISCONTINUED | OUTPATIENT
Start: 2023-12-15 | End: 2024-01-05

## 2023-12-15 RX ORDER — CHLORHEXIDINE GLUCONATE 213 G/1000ML
1 SOLUTION TOPICAL
Refills: 0 | Status: DISCONTINUED | OUTPATIENT
Start: 2023-12-15 | End: 2023-12-15

## 2023-12-15 RX ORDER — OXYCODONE HYDROCHLORIDE 5 MG/1
5 TABLET ORAL EVERY 6 HOURS
Refills: 0 | Status: DISCONTINUED | OUTPATIENT
Start: 2023-12-15 | End: 2023-12-19

## 2023-12-15 RX ORDER — CEFEPIME 1 G/1
2000 INJECTION, POWDER, FOR SOLUTION INTRAMUSCULAR; INTRAVENOUS EVERY 8 HOURS
Refills: 0 | Status: DISCONTINUED | OUTPATIENT
Start: 2023-12-15 | End: 2024-01-05

## 2023-12-15 RX ORDER — CLONAZEPAM 1 MG
0.5 TABLET ORAL THREE TIMES A DAY
Refills: 0 | Status: DISCONTINUED | OUTPATIENT
Start: 2023-12-15 | End: 2023-12-15

## 2023-12-15 RX ORDER — OXYCODONE HYDROCHLORIDE 5 MG/1
10 TABLET ORAL EVERY 6 HOURS
Refills: 0 | Status: DISCONTINUED | OUTPATIENT
Start: 2023-12-15 | End: 2023-12-19

## 2023-12-15 RX ORDER — SENNA PLUS 8.6 MG/1
2 TABLET ORAL AT BEDTIME
Refills: 0 | Status: DISCONTINUED | OUTPATIENT
Start: 2023-12-15 | End: 2023-12-15

## 2023-12-15 RX ORDER — CLONAZEPAM 1 MG
0.5 TABLET ORAL THREE TIMES A DAY
Refills: 0 | Status: DISCONTINUED | OUTPATIENT
Start: 2023-12-15 | End: 2023-12-21

## 2023-12-15 RX ORDER — OXYCODONE HYDROCHLORIDE 5 MG/1
5 TABLET ORAL EVERY 6 HOURS
Refills: 0 | Status: DISCONTINUED | OUTPATIENT
Start: 2023-12-15 | End: 2023-12-15

## 2023-12-15 RX ORDER — SERTRALINE 25 MG/1
100 TABLET, FILM COATED ORAL AT BEDTIME
Refills: 0 | Status: DISCONTINUED | OUTPATIENT
Start: 2023-12-15 | End: 2023-12-15

## 2023-12-15 RX ORDER — OXYCODONE HYDROCHLORIDE 5 MG/1
10 TABLET ORAL EVERY 6 HOURS
Refills: 0 | Status: DISCONTINUED | OUTPATIENT
Start: 2023-12-15 | End: 2023-12-15

## 2023-12-15 RX ORDER — HYDROMORPHONE HYDROCHLORIDE 2 MG/ML
1 INJECTION INTRAMUSCULAR; INTRAVENOUS; SUBCUTANEOUS
Refills: 0 | Status: DISCONTINUED | OUTPATIENT
Start: 2023-12-15 | End: 2023-12-22

## 2023-12-15 RX ORDER — CHLORHEXIDINE GLUCONATE 213 G/1000ML
1 SOLUTION TOPICAL
Refills: 0 | Status: DISCONTINUED | OUTPATIENT
Start: 2023-12-15 | End: 2024-01-05

## 2023-12-15 RX ORDER — HYDROMORPHONE HYDROCHLORIDE 2 MG/ML
1 INJECTION INTRAMUSCULAR; INTRAVENOUS; SUBCUTANEOUS
Refills: 0 | Status: DISCONTINUED | OUTPATIENT
Start: 2023-12-15 | End: 2023-12-15

## 2023-12-15 RX ADMIN — Medication 0.5 MILLIGRAM(S): at 15:11

## 2023-12-15 RX ADMIN — Medication 400 MILLIGRAM(S): at 16:20

## 2023-12-15 RX ADMIN — SODIUM CHLORIDE 75 MILLILITER(S): 9 INJECTION, SOLUTION INTRAVENOUS at 01:19

## 2023-12-15 RX ADMIN — HYDROMORPHONE HYDROCHLORIDE 1 MILLIGRAM(S): 2 INJECTION INTRAMUSCULAR; INTRAVENOUS; SUBCUTANEOUS at 03:16

## 2023-12-15 RX ADMIN — HYDROMORPHONE HYDROCHLORIDE 1 MILLIGRAM(S): 2 INJECTION INTRAMUSCULAR; INTRAVENOUS; SUBCUTANEOUS at 09:05

## 2023-12-15 RX ADMIN — DALFAMPRIDINE 10 MILLIGRAM(S): 10 TABLET, FILM COATED, EXTENDED RELEASE ORAL at 00:44

## 2023-12-15 RX ADMIN — CHLORHEXIDINE GLUCONATE 1 APPLICATION(S): 213 SOLUTION TOPICAL at 06:04

## 2023-12-15 RX ADMIN — HYDROMORPHONE HYDROCHLORIDE 1 MILLIGRAM(S): 2 INJECTION INTRAMUSCULAR; INTRAVENOUS; SUBCUTANEOUS at 10:05

## 2023-12-15 RX ADMIN — OXYCODONE HYDROCHLORIDE 10 MILLIGRAM(S): 5 TABLET ORAL at 06:30

## 2023-12-15 RX ADMIN — OXYCODONE HYDROCHLORIDE 10 MILLIGRAM(S): 5 TABLET ORAL at 06:04

## 2023-12-15 RX ADMIN — CEFEPIME 100 MILLIGRAM(S): 1 INJECTION, POWDER, FOR SOLUTION INTRAMUSCULAR; INTRAVENOUS at 13:07

## 2023-12-15 RX ADMIN — DALFAMPRIDINE 10 MILLIGRAM(S): 10 TABLET, FILM COATED, EXTENDED RELEASE ORAL at 13:12

## 2023-12-15 RX ADMIN — CEFEPIME 100 MILLIGRAM(S): 1 INJECTION, POWDER, FOR SOLUTION INTRAMUSCULAR; INTRAVENOUS at 06:04

## 2023-12-15 RX ADMIN — HYDROMORPHONE HYDROCHLORIDE 1 MILLIGRAM(S): 2 INJECTION INTRAMUSCULAR; INTRAVENOUS; SUBCUTANEOUS at 03:40

## 2023-12-15 NOTE — PROGRESS NOTE ADULT - ASSESSMENT
43F PMHx of MS on Rituxan, presenting with left chest wall and shoulder pain w/ concern for infection now admitted with likely septic arthritis with need for possible washout and debridement       Problem/Plan - 1:  ·  Problem: Septic arthritis. left shoulder joint   ·  Plan: Imaging consistent with septic arthritis  s/p OR   cont abx and fu cultures   check ECHO : no vegetation   fu with ID   d/w with CTS at bedside : drainage adjusted and fx improved   cont current abx   pt with LUE pain : tessie referred pain from L [id number] and chest however  Cervical MRI : no disciits / abcess   discussed with pt , family at length at bedside   d/w surgery : OR today       # c/o left arm swelling and pain in elbow region :  -doppler ordered to r/o DVT : Negative      Problem/Plan - 2:  ·  Problem: Multiple sclerosis.   ·  Plan: Gets Rituxan every 6 months, next dose due : holding   -On Ampyra BID at home, need to bring home med     Problem/Plan - 3:  ·  Problem: Anxiety.   ·  Plan: ISTOP reviewed Reference #: [id number]  -Cont. Clonazepam 0.5mg QHS PRN  -Cont. Sertraline 100mg QHS  -Cont. Duloxetine 60mg QHS, pt unsure of dose  -Need full med rec.     Problem/Plan - 4:  ·  Problem: Anemia.   ·  Plan: hgb 9, lower than prior year. Pt endorses some recent anemia but cannot specify  -Trend cbc  -Monitor for bleeding.     Problem/Plan - 5:  ·  Problem: Prophylactic measure.   ·  Plan: DVT PPx    hyperkalemia : normalized    43F PMHx of MS on Rituxan, presenting with left chest wall and shoulder pain w/ concern for infection now admitted with likely septic arthritis with need for possible washout and debridement       Problem/Plan - 1:  ·  Problem: Septic arthritis. left shoulder joint   ·  Plan: Imaging consistent with septic arthritis  s/p OR   cont abx and fu cultures   check ECHO : no vegetation   fu with ID   d/w with CTS at bedside : drainage adjusted and fx improved   cont current abx   pt with LUE pain : tessie referred pain from L [id number] and chest however  Cervical MRI : no disciits / abcess   discussed with pt , family at length at bedside   d/w surgery : OR today       # c/o left arm swelling and pain in elbow region :  -doppler ordered to r/o DVT : Negative      Problem/Plan - 2:  ·  Problem: Multiple sclerosis.   ·  Plan: Gets Rituxan every 6 months, next dose due : holding   -On Ampyra BID at home, need to bring home med     Problem/Plan - 3:  ·  Problem: Anxiety.   ·  Plan: ISTOP reviewed Reference #: [id number]  -Cont. Clonazepam 0.5mg QHS PRN  -Cont. Sertraline 100mg QHS  -Cont. Duloxetine 60mg QHS, pt unsure of dose  -Need full med rec.     Problem/Plan - 4:  ·  Problem: Anemia.   ·  Plan: hgb 9, lower than prior year. Pt endorses some recent anemia but cannot specify  -Trend cbc  -Monitor for bleeding.     Problem/Plan - 5:  ·  Problem: Prophylactic measure.   ·  Plan: DVT PPx    hyperkalemia : normalized

## 2023-12-15 NOTE — PROGRESS NOTE ADULT - ASSESSMENT
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  She had a recent fall in November 2023 secondary to unsteadiness due to multiple sclerosis, with subsequent left chest wall and shoulder pain.  Was seen at [facility] with concern for MS exacerbation but no MRI brain and cervical spine with an without contrast were done at the time without enhancement.  At the time infectious workup was done and notable for UTI which was treated.  She continued to have pain.  In the interim, she had an outpatient MRI of left sternoclavicular joint with results significant for marrow edema and swelling of the left sternoclavicular joint with a 2 cm fluid collection and adjacent pleural edema in the left chest with concerns for infectious etiology.   CT chest w/ IV contrast suggestive of septic arthritis with surrounding erythema. Thoracic surgery consulted.  Patient s/p surgical debridement on 12/7/23    mri with 80 x 25 mm rim-enhancing fluid collection persists in the surgical cavity with well-placed drain within the fluid collection.   Marrow signal abnormality in the manubrium concerning for acute   osteomyelitis.    Low-grade partial-thickness bursal surface tear of the supraspinatus tendon.    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  She had a recent fall in November 2023 secondary to unsteadiness due to multiple sclerosis, with subsequent left chest wall and shoulder pain.  Was seen at [facility] with concern for MS exacerbation but no MRI brain and cervical spine with an without contrast were done at the time without enhancement.  At the time infectious workup was done and notable for UTI which was treated.  She continued to have pain.  In the interim, she had an outpatient MRI of left sternoclavicular joint with results significant for marrow edema and swelling of the left sternoclavicular joint with a 2 cm fluid collection and adjacent pleural edema in the left chest with concerns for infectious etiology.   CT chest w/ IV contrast suggestive of septic arthritis with surrounding erythema. Thoracic surgery consulted.  Patient s/p surgical debridement on 12/7/23    mri with 80 x 25 mm rim-enhancing fluid collection persists in the surgical cavity with well-placed drain within the fluid collection.   Marrow signal abnormality in the manubrium concerning for acute   osteomyelitis.    Low-grade partial-thickness bursal surface tear of the supraspinatus tendon.    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  Now likely pseudo-flare in setting of septic arthritis    Recommendations:  Management of septic arthritis per primary team  s/p surgical debridement 12/7/23  hold ocrelizumab for now given septic arthritis  continue home meds once able to take PO post-surgically  abx as per ID  pain seems under better control, for washout today  d/w pt at bedside

## 2023-12-15 NOTE — BRIEF OPERATIVE NOTE - NSICDXBRIEFPROCEDURE_GEN_ALL_CORE_FT
PROCEDURES:  Arthrotomy, sternoclavicular joint 07-Dec-2023 13:39:17  Divya Ansari  Debridement of septic joint 07-Dec-2023 18:02:00  Divya Ansari  Excision, manubrium 15-Dec-2023 23:09:42  Zahra Wong

## 2023-12-15 NOTE — PRE-ANESTHESIA EVALUATION ADULT - NSANTHPEFT_GEN_ALL_CORE
GENERAL: NAD  HEAD:  Atraumatic, Normocephalic  CHEST/LUNG: Clear to auscultation bilaterally  HEART: Normal S1/S2  PSYCH: AAOx3  NEUROLOGY: non-focal  SKIN: No obvious rashes or lesions, chest wound
PE reviewed

## 2023-12-15 NOTE — PROGRESS NOTE ADULT - ASSESSMENT
43F PMHx of MS on (Rituxan, Ocrevus) presenting with left chest wall and shoulder pain. Recent history of fall secondary to unsteadiness due to MS. On review of ortho notes, pt has had multiple falls in November. She was admitted to [facility] on Last week of November and was found to have Rhinovirus and pseudomonas UTI. She completed 5-7 days course with Ciprofloxacin. Found to have Septic arthritis of SCM joint now s/p excision.    Patient obtained CT chest w/ IV contrast which showed likely septic arthritis with surrounding erythema. Thoracic surgery consulted, underwent washout and debridement.     OR Note: Debridement of left sternoclavicular joint with excision of left clavicular head. Purulent drainage at the sternoclavicular joint. Tissue surrounding left clavicle found to be very inflamed. Copious irrigation of surgical site.  Specimen sent: Head of left clavicle, culture of left sternoclavicular joint, culture of left manubrium, culture of deep sternal head of clavicle, culture of first rib costal cartilage      In ER: Afebrile, WBC 9.8, , . Given IV Zosyn, IV Vancomycin.     Remains afebrile, WBC stable.     Septic arthritis of the left sternoclavicular joint and involving the   articulation of the sternum with the first costal cartilage.    Extensive surrounding inflammation, with pneumonia in the underlying   subpleural left upper lobe.      # Septic arthritis of L sternoclavicular joint s/p excision and drainage  # L upper lobe opacity, improving on recent CT   # Immunosuppressed patient  # Elevated ESR/CRP.   #Manubrium OM.         PLAN:  - continue cefepime 2 gm Q8hrs  - OR cx negative for bacterial cx.   - MRSA PCR negative,  - follow fungal/AFB cultures; negative so far  - repeat ESR/CRP noted  - Quantiferon negative   - All blood cultures negative to date  - TTE negative   - Recent CT chest with improvement.   - thoracic following, plan for repeat washout today.  - will need PICC and 6 weeks of abx. iv.          Odalis Reynaga  Please contact through MS Teams   If no response or past 5 pm/weekend call [phone].    43F PMHx of MS on (Rituxan, Ocrevus) presenting with left chest wall and shoulder pain. Recent history of fall secondary to unsteadiness due to MS. On review of ortho notes, pt has had multiple falls in November. She was admitted to [facility] on Last week of November and was found to have Rhinovirus and pseudomonas UTI. She completed 5-7 days course with Ciprofloxacin. Found to have Septic arthritis of SCM joint now s/p excision.    Patient obtained CT chest w/ IV contrast which showed likely septic arthritis with surrounding erythema. Thoracic surgery consulted, underwent washout and debridement.     OR Note: Debridement of left sternoclavicular joint with excision of left clavicular head. Purulent drainage at the sternoclavicular joint. Tissue surrounding left clavicle found to be very inflamed. Copious irrigation of surgical site.  Specimen sent: Head of left clavicle, culture of left sternoclavicular joint, culture of left manubrium, culture of deep sternal head of clavicle, culture of first rib costal cartilage      In ER: Afebrile, WBC 9.8, , . Given IV Zosyn, IV Vancomycin.     Remains afebrile, WBC stable.     Septic arthritis of the left sternoclavicular joint and involving the   articulation of the sternum with the first costal cartilage.    Extensive surrounding inflammation, with pneumonia in the underlying   subpleural left upper lobe.      # Septic arthritis of L sternoclavicular joint s/p excision and drainage  # L upper lobe opacity, improving on recent CT   # Immunosuppressed patient  # Elevated ESR/CRP.   #Manubrium OM.         PLAN:  - continue cefepime 2 gm Q8hrs  - OR cx negative for bacterial cx.   - MRSA PCR negative,  - follow fungal/AFB cultures; negative so far  - repeat ESR/CRP noted  - Quantiferon negative   - All blood cultures negative to date  - TTE negative   - Recent CT chest with improvement.   - thoracic following, plan for repeat washout today.  - will need PICC and 6 weeks of abx. iv.          Odalis Reynaga  Please contact through MS Teams   If no response or past 5 pm/weekend call [phone].

## 2023-12-15 NOTE — PROGRESS NOTE ADULT - SUBJECTIVE AND OBJECTIVE BOX
43yPatient is a 43y old  Female who presents with a chief complaint of L shoulder and chest pain (15 Dec 2023 15:22)      Interval history:  Afebrile, s/p removal of LISBETH.       Allergies:   No Known Allergies      Antimicrobials:  cefepime   IVPB 2000 milliGRAM(s) IV Intermittent every 8 hours      REVIEW OF SYSTEMS:  No SOB  No abdominal pain  No dysuria   No rash.       Vital Signs Last 24 Hrs  T(C): 36.7 (12-15-23 @ 13:15), Max: 37.3 (12-14-23 @ 17:50)  T(F): 98 (12-15-23 @ 13:15), Max: 99.1 (12-14-23 @ 17:50)  HR: 90 (12-15-23 @ 14:25) (71 - 95)  BP: 108/64 (12-15-23 @ 14:25) (97/63 - 116/70)  BP(mean): --  RR: 18 (12-15-23 @ 14:25) (18 - 18)  SpO2: 96% (12-15-23 @ 14:25) (96% - 99%)      PHYSICAL EXAM:  Pt in no acute distress, alert, awake.   lt chest dressing.  non distended abdomen  no edema LE   no phlebitis                             7.1    7.58  )-----------( 371      ( 15 Dec 2023 06:37 )             23.0   12-15    137  |  99  |  9   ----------------------------<  96  4.2   |  28  |  0.43<L>    Ca    9.5      15 Dec 2023 06:37  Phos  3.6     12-15  Mg     2.0     12-15    TPro  6.4  /  Alb  3.7  /  TBili  0.1<L>  /  DBili  x   /  AST  12  /  ALT  13  /  AlkPhos  185<H>  12-14      LIVER FUNCTIONS - ( 14 Dec 2023 07:12 )  Alb: 3.7 g/dL / Pro: 6.4 g/dL / ALK PHOS: 185 U/L / ALT: 13 U/L / AST: 12 U/L / GGT: x               Culture - Blood (12.11.23 @ 17:46)   Specimen Source: .Blood Blood  Culture Results:   No growth at 72 Hours

## 2023-12-15 NOTE — PROGRESS NOTE ADULT - PROBLEM SELECTOR PLAN 1
s/p Debridement of left sternoclavicular joint with excision of left clavicular head. Purulent drainage at the sternoclavicular joint. Tissue surrounding left clavicle found to be very inflamed. Copious irrigation of surgical site.   IV abx    OR c/s NGTD  BC  NGTD.   Plan for RTOR today   MRI reviewed. s/p  removal of staples  Ortho consulted regarding supraspinatus tear. Would prefer NO steroid injection at present time given infection   Pain control   normal TTE  Monitor Hgb    DVT ppx.

## 2023-12-15 NOTE — PROGRESS NOTE ADULT - SUBJECTIVE AND OBJECTIVE BOX
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  She had a recent fall in 2023 secondary to unsteadiness due to multiple sclerosis, with subsequent left chest wall and shoulder pain.  Was seen at [facility] with concern for MS exacerbation but no MRI brain and cervical spine with an without contrast were done at the time without enhancement.  At the time infectious workup was done and notable for UTI which was treated.  She continued to have pain.  In the interim, she had an outpatient MRI of left sternoclavicular joint with results significant for marrow edema and swelling of the left sternoclavicular joint with a 2 cm fluid collection and adjacent pleural edema in the left chest with concerns for infectious etiology.   CT chest w/ IV contrast suggestive of septic arthritis with surrounding erythema. Thoracic surgery consulted.  Patient s/p surgical debridement on 23    Int hx:  mri with 80 x 25 mm rim-enhancing fluid collection persists in the surgical cavity with well-placed drain within the fluid collection.   Marrow signal abnormality in the manubrium concerning for acute   osteomyelitis.    Low-grade partial-thickness bursal surface tear of the supraspinatus tendon.        ******    Pt still with sternal pain, says 10/10, pain also in left arm    Past Medical History:  Multiple sclerosis [G35]        Past Surgical History:  History of  [Z98.891]        Social History:  No toxic habits    Family History:  FAMILY HISTORY:      Allergies:  No Known Allergies      ROS:  Constitutional: Patient offers no complaints of fevers or significant weight loss  Ears, Nose, Mouth and Throat: The patient presents with no abnormalities of the head, ears, eyes, nose or throat  Skin: Patient offers no concerns of new rashes or lesions  Respiratory: The patient presents with no abnormalities of the respiratory tract  Cardiovascular: The patient presents with no cardiac abnormalities  Gastrointestinal: The patient presents with no abnormalities of the GI system  Genitourinary: The patient presents with no dysuria, hematuria or frequent urination  Neurological: See HPI  Endocrine: Patient offers no complaints of excessive thirst, urination, or heat/cold intolerance    Advanced care planning reviewed and noted in the chart.    Medications:  cefepime   IVPB 2000 milliGRAM(s) IV Intermittent every 8 hours  chlorhexidine 2% Cloths 1 Application(s) Topical <User Schedule>  clonazePAM  Tablet 0.5 milliGRAM(s) Oral three times a day PRN  dalfampridine ER 10 milliGRAM(s) Oral every 12 hours  DULoxetine 60 milliGRAM(s) Oral at bedtime  HYDROmorphone  Injectable 1 milliGRAM(s) IV Push every 3 hours PRN  lactated ringers. 1000 milliLiter(s) IV Continuous <Continuous>  oxyCODONE    IR 5 milliGRAM(s) Oral every 6 hours PRN  oxyCODONE    IR 10 milliGRAM(s) Oral every 6 hours PRN  senna 2 Tablet(s) Oral at bedtime  sertraline 100 milliGRAM(s) Oral at bedtime  Vibegron (Gemtesa) 75 milliGRAM(s) 1 Tablet(s) Oral daily      Labs:  CBC Full  -  ( 15 Dec 2023 06:37 )  WBC Count : 7.58 K/uL  RBC Count : 2.83 M/uL  Hemoglobin : 7.1 g/dL  Hematocrit : 23.0 %  Platelet Count - Automated : 371 K/uL  Mean Cell Volume : 81.3 fl  Mean Cell Hemoglobin : 25.1 pg  Mean Cell Hemoglobin Concentration : 30.9 gm/dL  Auto Neutrophil # : x  Auto Lymphocyte # : x  Auto Monocyte # : x  Auto Eosinophil # : x  Auto Basophil # : x  Auto Neutrophil % : x  Auto Lymphocyte % : x  Auto Monocyte % : x  Auto Eosinophil % : x  Auto Basophil % : x    12-15    137  |  99  |  9   ----------------------------<  96  4.2   |  28  |  0.43<L>    Ca    9.5      15 Dec 2023 06:37  Phos  3.6     12-15  Mg     2.0     12-15    TPro  6.4  /  Alb  3.7  /  TBili  0.1<L>  /  DBili  x   /  AST  12  /  ALT  13  /  AlkPhos  185<H>  12-14    CAPILLARY BLOOD GLUCOSE        LIVER FUNCTIONS - ( 14 Dec 2023 07:12 )  Alb: 3.7 g/dL / Pro: 6.4 g/dL / ALK PHOS: 185 U/L / ALT: 13 U/L / AST: 12 U/L / GGT: x           PT/INR - ( 14 Dec 2023 07:10 )   PT: 13.1 sec;   INR: 1.20 ratio         PTT - ( 14 Dec 2023 07:10 )  PTT:31.2 sec  Urinalysis Basic - ( 15 Dec 2023 06:37 )    Color: x / Appearance: x / SG: x / pH: x  Gluc: 96 mg/dL / Ketone: x  / Bili: x / Urobili: x   Blood: x / Protein: x / Nitrite: x   Leuk Esterase: x / RBC: x / WBC x   Sq Epi: x / Non Sq Epi: x / Bacteria: x          Vitals:  Vital Signs Last 24 Hrs  T(C): 36.5 (15 Dec 2023 05:42), Max: 37.3 (14 Dec 2023 17:50)  T(F): 97.7 (15 Dec 2023 05:42), Max: 99.1 (14 Dec 2023 17:50)  HR: 85 (15 Dec 2023 05:42) (85 - 97)  BP: 103/68 (15 Dec 2023 05:42) (101/66 - 117/69)  BP(mean): --  RR: 18 (15 Dec 2023 05:42) (18 - 18)  SpO2: 98% (15 Dec 2023 05:42) (96% - 99%)    Parameters below as of 15 Dec 2023 05:42  Patient On (Oxygen Delivery Method): room air                NEUROLOGICAL EXAM:    Mental status: Awake, alert, and in less apparent distress. Oriented to person, place and time. Language function is normal.      Cranial Nerves: Pupils were equal, round, reactive to light. Extraocular movements were intact. Visual field were full. Fundoscopic exam was deferred. Facial sensation was intact to light touch. There was slight left facia droop. The palate was upgoing symmetrically and tongue was midline.     Motor exam: Bulk and tone were normal. able to move all ext fully without much pain    Reflexes: deferred     Sensation: Intact to light touch/temp  Coordination: no gross dysmetria    Gait: defer   sternal wound seen          ACC: [id number] EXAM:  MR SHOULDER WAW IC LT   ORDERED BY:  BRAN SANCHEZ     ACC: [id number] EXAM:  MR STERNOCLAVICULAR JNT LT   ORDERED BY: ANASTASIIA WALKER     PROCEDURE DATE:  2023          INTERPRETATION:  MRI OF THE LEFT STERNOCLAVICULAR JOINT AND SHOULDER    CLINICAL INFORMATION: Left sternal abscess status post washout with   persistent purulent drainage and left shoulder pain.  TECHNIQUE: Multisequence, multiplanar MRI of the left sternoclavicular   joint. The study was performed before and after the intravenous   administration of 6 ml Gadavist (1.5 cc discarded) .    COMPARISON: Chest CT 2023 and 2023.    FINDINGS:    STERNOCLAVICULAR JOINT:    BONE: Patient status post surgical resection of the left clavicular head.   The surgical margin is sharp with trace edema and no loss of T1   hyperintense signal. In the manubrium there is increased STIR signal with   loss of T1 hyperintense signal and postcontrast enhancement involving the   superolateral leftward aspect of the bone concerning for acute   osteomyelitis (6:9 5:9). No acute fracture. No osteonecrosis.    JOINTS: A residual rim-enhancing fluid collection is seen in the region   of the left sternoclavicular joint measuring 80 x 25 mm. A drainage   catheter is seen within the fluid collection.    SOFT TISSUE: Postsurgical changes are seen along the anterior aspect of   the left sternoclavicular joint. There is a mild amount of edema in the   adjacent pectoralis major muscle. No focal fluid collection in the   anterior mediastinum. Susceptibility artifact from surgical staples are   seen along the anterior chest wall.      SHOULDER JOINT:    ROTATOR CUFF: Low-grade partial-thickness bursal surface tearing of the   supraspinatus tendon. Rotator cuff is otherwise intact.  MUSCLES: No focal muscle edema or atrophy.  BICEPS TENDON: Normal in course and caliber.  GLENOID LABRUM AND GLENOHUMERAL LIGAMENTS: No displaced labral tear.   Inferior glenohumeral ligament is intact.  GLENOHUMERAL CARTILAGE AND SUBCHONDRAL BONE: No full-thickness chondral   loss.  AC JOINT: No AC joint arthropathy.  SYNOVIUM/JOINT FLUID: No glenohumeral joint effusion. No focal fluid in   the subacromial/subdeltoid bursa.  BONE MARROW: No fracture or osteonecrosis. No marrow signal change to   suggest acute osteomyelitis.  NEUROVASCULAR STRUCTURES: Structures of the suprascapular notch,   spinoglenoid notch, and quadrilateral space are normal in course and   caliber.  SUBCUTANEOUS SOFT TISSUES: Edema in the subcutaneous fat of the left   shoulder. No rim-enhancing fluid collection to suggest abscess.        IMPRESSION:  1.  Patient status post surgical resection of the left clavicular head   with washout of the left sternoclavicular joint.  2.  A 80 x 25 mm rim-enhancing fluid collection persists in the surgical   cavity with well-placed drain within the fluid collection.  3.  Marrow signal abnormality in the manubrium concerning for acute   osteomyelitis.  4.  Low-grade partial-thickness bursal surface tear of the supraspinatus   tendon.    --- End of Report ---            RASHIDA HYDE MD; Attending Radiologist  This document has been electronically signed. 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  She had a recent fall in 2023 secondary to unsteadiness due to multiple sclerosis, with subsequent left chest wall and shoulder pain.  Was seen at [facility] with concern for MS exacerbation but no MRI brain and cervical spine with an without contrast were done at the time without enhancement.  At the time infectious workup was done and notable for UTI which was treated.  She continued to have pain.  In the interim, she had an outpatient MRI of left sternoclavicular joint with results significant for marrow edema and swelling of the left sternoclavicular joint with a 2 cm fluid collection and adjacent pleural edema in the left chest with concerns for infectious etiology.   CT chest w/ IV contrast suggestive of septic arthritis with surrounding erythema. Thoracic surgery consulted.  Patient s/p surgical debridement on 23    Int hx:  mri with 80 x 25 mm rim-enhancing fluid collection persists in the surgical cavity with well-placed drain within the fluid collection.   Marrow signal abnormality in the manubrium concerning for acute   osteomyelitis.    Low-grade partial-thickness bursal surface tear of the supraspinatus tendon.        ******    Pt still with sternal pain, says 10/10, pain also in left arm    Past Medical History:  Multiple sclerosis [G35]        Past Surgical History:  History of  [Z98.891]        Social History:  No toxic habits    Family History:  FAMILY HISTORY:      Allergies:  No Known Allergies      ROS:  Constitutional: Patient offers no complaints of fevers or significant weight loss  Ears, Nose, Mouth and Throat: The patient presents with no abnormalities of the head, ears, eyes, nose or throat  Skin: Patient offers no concerns of new rashes or lesions  Respiratory: The patient presents with no abnormalities of the respiratory tract  Cardiovascular: The patient presents with no cardiac abnormalities  Gastrointestinal: The patient presents with no abnormalities of the GI system  Genitourinary: The patient presents with no dysuria, hematuria or frequent urination  Neurological: See HPI  Endocrine: Patient offers no complaints of excessive thirst, urination, or heat/cold intolerance    Advanced care planning reviewed and noted in the chart.    Medications:  cefepime   IVPB 2000 milliGRAM(s) IV Intermittent every 8 hours  chlorhexidine 2% Cloths 1 Application(s) Topical <User Schedule>  clonazePAM  Tablet 0.5 milliGRAM(s) Oral three times a day PRN  dalfampridine ER 10 milliGRAM(s) Oral every 12 hours  DULoxetine 60 milliGRAM(s) Oral at bedtime  HYDROmorphone  Injectable 1 milliGRAM(s) IV Push every 3 hours PRN  lactated ringers. 1000 milliLiter(s) IV Continuous <Continuous>  oxyCODONE    IR 5 milliGRAM(s) Oral every 6 hours PRN  oxyCODONE    IR 10 milliGRAM(s) Oral every 6 hours PRN  senna 2 Tablet(s) Oral at bedtime  sertraline 100 milliGRAM(s) Oral at bedtime  Vibegron (Gemtesa) 75 milliGRAM(s) 1 Tablet(s) Oral daily      Labs:  CBC Full  -  ( 15 Dec 2023 06:37 )  WBC Count : 7.58 K/uL  RBC Count : 2.83 M/uL  Hemoglobin : 7.1 g/dL  Hematocrit : 23.0 %  Platelet Count - Automated : 371 K/uL  Mean Cell Volume : 81.3 fl  Mean Cell Hemoglobin : 25.1 pg  Mean Cell Hemoglobin Concentration : 30.9 gm/dL  Auto Neutrophil # : x  Auto Lymphocyte # : x  Auto Monocyte # : x  Auto Eosinophil # : x  Auto Basophil # : x  Auto Neutrophil % : x  Auto Lymphocyte % : x  Auto Monocyte % : x  Auto Eosinophil % : x  Auto Basophil % : x    12-15    137  |  99  |  9   ----------------------------<  96  4.2   |  28  |  0.43<L>    Ca    9.5      15 Dec 2023 06:37  Phos  3.6     12-15  Mg     2.0     12-15    TPro  6.4  /  Alb  3.7  /  TBili  0.1<L>  /  DBili  x   /  AST  12  /  ALT  13  /  AlkPhos  185<H>  12-14    CAPILLARY BLOOD GLUCOSE        LIVER FUNCTIONS - ( 14 Dec 2023 07:12 )  Alb: 3.7 g/dL / Pro: 6.4 g/dL / ALK PHOS: 185 U/L / ALT: 13 U/L / AST: 12 U/L / GGT: x           PT/INR - ( 14 Dec 2023 07:10 )   PT: 13.1 sec;   INR: 1.20 ratio         PTT - ( 14 Dec 2023 07:10 )  PTT:31.2 sec  Urinalysis Basic - ( 15 Dec 2023 06:37 )    Color: x / Appearance: x / SG: x / pH: x  Gluc: 96 mg/dL / Ketone: x  / Bili: x / Urobili: x   Blood: x / Protein: x / Nitrite: x   Leuk Esterase: x / RBC: x / WBC x   Sq Epi: x / Non Sq Epi: x / Bacteria: x          Vitals:  Vital Signs Last 24 Hrs  T(C): 36.5 (15 Dec 2023 05:42), Max: 37.3 (14 Dec 2023 17:50)  T(F): 97.7 (15 Dec 2023 05:42), Max: 99.1 (14 Dec 2023 17:50)  HR: 85 (15 Dec 2023 05:42) (85 - 97)  BP: 103/68 (15 Dec 2023 05:42) (101/66 - 117/69)  BP(mean): --  RR: 18 (15 Dec 2023 05:42) (18 - 18)  SpO2: 98% (15 Dec 2023 05:42) (96% - 99%)    Parameters below as of 15 Dec 2023 05:42  Patient On (Oxygen Delivery Method): room air                NEUROLOGICAL EXAM:    Mental status: Awake, alert, and in less apparent distress. Oriented to person, place and time. Language function is normal.      Cranial Nerves: Pupils were equal, round, reactive to light. Extraocular movements were intact. Visual field were full. Fundoscopic exam was deferred. Facial sensation was intact to light touch. There was slight left facia droop. The palate was upgoing symmetrically and tongue was midline.     Motor exam: Bulk and tone were normal. able to move all ext fully without much pain    Reflexes: deferred     Sensation: Intact to light touch/temp  Coordination: no gross dysmetria    Gait: defer   sternal wound seen          ACC: [id number] EXAM:  MR SHOULDER WAW IC LT   ORDERED BY:  BRAN SANCHEZ     ACC: [id number] EXAM:  MR STERNOCLAVICULAR JNT LT   ORDERED BY: ANASTASIIA WALKER     PROCEDURE DATE:  2023          INTERPRETATION:  MRI OF THE LEFT STERNOCLAVICULAR JOINT AND SHOULDER    CLINICAL INFORMATION: Left sternal abscess status post washout with   persistent purulent drainage and left shoulder pain.  TECHNIQUE: Multisequence, multiplanar MRI of the left sternoclavicular   joint. The study was performed before and after the intravenous   administration of 6 ml Gadavist (1.5 cc discarded) .    COMPARISON: Chest CT 2023 and 2023.    FINDINGS:    STERNOCLAVICULAR JOINT:    BONE: Patient status post surgical resection of the left clavicular head.   The surgical margin is sharp with trace edema and no loss of T1   hyperintense signal. In the manubrium there is increased STIR signal with   loss of T1 hyperintense signal and postcontrast enhancement involving the   superolateral leftward aspect of the bone concerning for acute   osteomyelitis (6:9 5:9). No acute fracture. No osteonecrosis.    JOINTS: A residual rim-enhancing fluid collection is seen in the region   of the left sternoclavicular joint measuring 80 x 25 mm. A drainage   catheter is seen within the fluid collection.    SOFT TISSUE: Postsurgical changes are seen along the anterior aspect of   the left sternoclavicular joint. There is a mild amount of edema in the   adjacent pectoralis major muscle. No focal fluid collection in the   anterior mediastinum. Susceptibility artifact from surgical staples are   seen along the anterior chest wall.      SHOULDER JOINT:    ROTATOR CUFF: Low-grade partial-thickness bursal surface tearing of the   supraspinatus tendon. Rotator cuff is otherwise intact.  MUSCLES: No focal muscle edema or atrophy.  BICEPS TENDON: Normal in course and caliber.  GLENOID LABRUM AND GLENOHUMERAL LIGAMENTS: No displaced labral tear.   Inferior glenohumeral ligament is intact.  GLENOHUMERAL CARTILAGE AND SUBCHONDRAL BONE: No full-thickness chondral   loss.  AC JOINT: No AC joint arthropathy.  SYNOVIUM/JOINT FLUID: No glenohumeral joint effusion. No focal fluid in   the subacromial/subdeltoid bursa.  BONE MARROW: No fracture or osteonecrosis. No marrow signal change to   suggest acute osteomyelitis.  NEUROVASCULAR STRUCTURES: Structures of the suprascapular notch,   spinoglenoid notch, and quadrilateral space are normal in course and   caliber.  SUBCUTANEOUS SOFT TISSUES: Edema in the subcutaneous fat of the left   shoulder. No rim-enhancing fluid collection to suggest abscess.        IMPRESSION:  1.  Patient status post surgical resection of the left clavicular head   with washout of the left sternoclavicular joint.  2.  A 80 x 25 mm rim-enhancing fluid collection persists in the surgical   cavity with well-placed drain within the fluid collection.  3.  Marrow signal abnormality in the manubrium concerning for acute   osteomyelitis.  4.  Low-grade partial-thickness bursal surface tear of the supraspinatus   tendon.    --- End of Report ---            RASHIDA HYDE MD; Attending Radiologist  This document has been electronically signed. Dec 11 2023  5:11PM

## 2023-12-15 NOTE — PROGRESS NOTE ADULT - ASSESSMENT
43F PMHx of MS on Rituxan, presenting with left chest wall and shoulder pain w/ concern for infection now admitted with likely septic arthritis with need for possible washout and debridement    POD # 1 Debridement and washout of Left.  sternoclavicular joint excision Left clavicular head  Sita 80cc serosanguinous.  GS no org seen.  Cultures pending.  12/9 + RVP>no treatment as per ID  DC PCA  somnolent  Pain mgmt notified Transition oral Rx    12/10    pain medication w oxycodone and dilaudid for breakthrough,     Lt CW staples w bulb   w mininimal drainage   abx per ID  12/11: Left CW with staples with bulb, with minimal drainage. continue antibiotics per ID. continue optimal pain control.   12/12    lt cw  staples  w erythma  drain    several staples removed and packed by Dr Bautista,  abx  per ID  12/13  wbc 10.8  afebrile.  wound without change since yesterday.    OR c/s NGTD  BC  NGTD.    12/14 VSS. afebrile. wound dressing changed at bedside. Awaiting final OR/Blood cultures. Left sita d/c'ed (output 0cc/2cc overnight/ 24hr). Keep NPO p MN. Need 2 active T&S. Plan for OR washing out & wound vac placement tomorrow.  12/15 OR today

## 2023-12-15 NOTE — PROGRESS NOTE ADULT - PROBLEM SELECTOR PLAN 1
- Left cw staples with packing  - dressing changed  - OOB  - add protein supplement to meals  - Pain control  - Please keep NPOpMN. Need 2 active T&S. Plan for OR washout with wound VAC placement today 12/15.   - Plan d/w Attending

## 2023-12-15 NOTE — PROGRESS NOTE ADULT - ASSESSMENT
43F PMHx of MS on immunosuppressive medication, presenting with left chest wall and shoulder pain. Recent history of fall secondary to unsteadiness due to MS. Obtained outpatient MRI of left sternoclavicular joint with results significant for marrow edema and swelling of the left sternoclavicular joint with a 2 cm fluid collection and adjacent pleural edema in the left chest with concerns for infectious etiology.  Patient initially saw orthopedics with for concerns for septic arthritis of this joint.  Impression of the read showed soft tissue abscess superficial to the joint.  Recommended CT scan with IV contrast of the chest.  Patient denies any fevers however endorsing erythema to the left sternoclavicular joint region. Patient states she was recently hospitalized over a week ago at [facility] for falls secondary to unsteadiness due to her MS.  Denies any recent falls today, vision changes. Endorsing headache. Denies any chest pain, shortness of breath, abdominal pain, nausea vomiting diarrhea, urinary complaints.  CT chest with IV in ED significant for Septic arthritis of the left sternoclavicular joint and involving the articulation of the sternum with the first costal cartilage.Extensive surrounding inflammation, with pneumonia in the underlying subpleural left upper lobe.No fluid collection is evident.  Workup significant for elevated alk phos (248), . VSS and afebrile  s/p Debridement of left sternoclavicular joint with excision of left clavicular head. Purulent drainage at the sternoclavicular joint. Tissue surrounding left clavicle found to be very inflamed. Copious irrigation of surgical site.   Concerned about pain at surgical site.    at bedside     43F PMHx of MS on immunosuppressive medication, presenting with left chest wall and shoulder pain. Recent history of fall secondary to unsteadiness due to MS. Obtained outpatient MRI of left sternoclavicular joint with results significant for marrow edema and swelling of the left sternoclavicular joint with a 2 cm fluid collection and adjacent pleural edema in the left chest with concerns for infectious etiology.  Patient initially saw orthopedics with for concerns for septic arthritis of this joint.  Impression of the read showed soft tissue abscess superficial to the joint.  Recommended CT scan with IV contrast of the chest.  Patient denies any fevers however endorsing erythema to the left sternoclavicular joint region. Patient states she was recently hospitalized over a week ago at [facility] for falls secondary to unsteadiness due to her MS.  Denies any recent falls today, vision changes. Endorsing headache. Denies any chest pain, shortness of breath, abdominal pain, nausea vomiting diarrhea, urinary complaints.  CT chest with IV in ED significant for Septic arthritis of the left sternoclavicular joint and involving the articulation of the sternum with the first costal cartilage.Extensive surrounding inflammation, with pneumonia in the underlying subpleural left upper lobe.No fluid collection is evident.  Workup significant for elevated alk phos (248), . VSS and afebrile  s/p Debridement of left sternoclavicular joint with excision of left clavicular head. Purulent drainage at the sternoclavicular joint. Tissue surrounding left clavicle found to be very inflamed. Copious irrigation of surgical site.   Concerned about pain at surgical site.    at bedside

## 2023-12-15 NOTE — BRIEF OPERATIVE NOTE - OPERATION/FINDINGS
washout and debridement of sternoclavicular joint; resection of medial manubrium; white and black wound vac placed

## 2023-12-15 NOTE — PROGRESS NOTE ADULT - SUBJECTIVE AND OBJECTIVE BOX
Subjective: feels ok                         MEDICATIONS  cefepime   IVPB 2000 milliGRAM(s) IV Intermittent every 8 hours  chlorhexidine 2% Cloths 1 Application(s) Topical <User Schedule>  clonazePAM  Tablet 0.5 milliGRAM(s) Oral three times a day PRN  dalfampridine ER 10 milliGRAM(s) Oral every 12 hours  DULoxetine 60 milliGRAM(s) Oral at bedtime  HYDROmorphone  Injectable 1 milliGRAM(s) IV Push every 3 hours PRN  lactated ringers. 1000 milliLiter(s) IV Continuous <Continuous>  oxyCODONE    IR 5 milliGRAM(s) Oral every 6 hours PRN  oxyCODONE    IR 10 milliGRAM(s) Oral every 6 hours PRN  senna 2 Tablet(s) Oral at bedtime  sertraline 100 milliGRAM(s) Oral at bedtime  Vibegron (Gemtesa) 75 milliGRAM(s) 1 Tablet(s) Oral daily      Vital Signs Last 24 Hrs  T(C): 36.7 (15 Dec 2023 13:15), Max: 37.3 (14 Dec 2023 17:50)  T(F): 98 (15 Dec 2023 13:15), Max: 99.1 (14 Dec 2023 17:50)  HR: 90 (15 Dec 2023 14:25) (71 - 95)  BP: 108/64 (15 Dec 2023 14:25) (97/63 - 116/70)  BP(mean): --  RR: 18 (15 Dec 2023 14:25) (18 - 18)  SpO2: 96% (15 Dec 2023 14:25) (96% - 99%)    Parameters below as of 15 Dec 2023 14:25  Patient On (Oxygen Delivery Method): room air          PHYSICAL EXAM:     left sterno clavicular wound partially opened.  copious brownish drainage in wound.  surrounding skin less erythematous    LABS  12-15    137  |  99  |  9   ----------------------------<  96  4.2   |  28  |  0.43<L>    Ca    9.5      15 Dec 2023 06:37  Phos  3.6     12-15  Mg     2.0     12-15    TPro  6.4  /  Alb  3.7  /  TBili  0.1<L>  /  DBili  x   /  AST  12  /  ALT  13  /  AlkPhos  185<H>  12-14                                 7.1    7.58  )-----------( 371      ( 15 Dec 2023 06:37 )             23.0          PT/INR - ( 14 Dec 2023 07:10 )   PT: 13.1 sec;   INR: 1.20 ratio         PTT - ( 14 Dec 2023 07:10 )  PTT:31.2 sec                    PAST MEDICAL & SURGICAL HISTORY:  Multiple sclerosis      History of

## 2023-12-15 NOTE — PROGRESS NOTE ADULT - SUBJECTIVE AND OBJECTIVE BOX
Subjective: Patient seen and examined. No new events except as noted.   For OR today   feels nervous   left arm pain   no cp or sob    REVIEW OF SYSTEMS:    CONSTITUTIONAL: No weakness, fevers or chills  EYES/ENT: No visual changes;  No vertigo or throat pain   NECK: No pain or stiffness  RESPIRATORY: No cough, wheezing, hemoptysis; No shortness of breath  CARDIOVASCULAR: No chest pain or palpitations  GASTROINTESTINAL: No abdominal or epigastric pain. No nausea, vomiting, or hematemesis; No diarrhea or constipation. No melena or hematochezia.  GENITOURINARY: No dysuria, frequency or hematuria  NEUROLOGICAL: No numbness or weakness  SKIN: No itching, burning, rashes, or lesions   All other review of systems is negative unless indicated above.    MEDICATIONS:  MEDICATIONS  (STANDING):  cefepime   IVPB 2000 milliGRAM(s) IV Intermittent every 8 hours  chlorhexidine 2% Cloths 1 Application(s) Topical <User Schedule>  dalfampridine ER 10 milliGRAM(s) Oral every 12 hours  DULoxetine 60 milliGRAM(s) Oral at bedtime  lactated ringers. 1000 milliLiter(s) (75 mL/Hr) IV Continuous <Continuous>  senna 2 Tablet(s) Oral at bedtime  sertraline 100 milliGRAM(s) Oral at bedtime  Vibegron (Gemtesa) 75 milliGRAM(s) 1 Tablet(s) Oral daily      PHYSICAL EXAM:  T(C): 36.6 (12-15-23 @ 09:01), Max: 37.3 (12-14-23 @ 17:50)  HR: 88 (12-15-23 @ 09:01) (85 - 97)  BP: 102/67 (12-15-23 @ 09:01) (101/66 - 117/69)  RR: 18 (12-15-23 @ 09:01) (18 - 18)  SpO2: 98% (12-15-23 @ 09:01) (96% - 99%)  Wt(kg): --  I&O's Summary    14 Dec 2023 07:01  -  15 Dec 2023 07:00  --------------------------------------------------------  IN: 1160 mL / OUT: 0 mL / NET: 1160 mL        Appearance: Normal	  HEENT:   Normal oral mucosa, PERRL, EOMI	  Lymphatic: No lymphadenopathy , no edema    upper anterior chest wound open drainage catheter visible no pus. some staples were removed. dressing was saturated with brownish drainage. + erythema around wound which has not extended  Cardiovascular: Normal S1 S2, No JVD, No murmurs , Peripheral pulses palpable 2+ bilaterally  Respiratory: Lungs clear to auscultation, normal effort 	  Gastrointestinal:  Soft, Non-tender, + BS	  Skin: No rashes, No ecchymoses, No cyanosis, warm to touch  Musculoskeletal: Normal range of motion, normal strength  Psychiatry:  Mood & affect appropriate  Ext: No edema      LABS:    CARDIAC MARKERS:  CARDIAC MARKERS ( 13 Dec 2023 17:11 )  x     / x     / 18 U/L / x     / x                                    7.1    7.58  )-----------( 371      ( 15 Dec 2023 06:37 )             23.0     12-15    137  |  99  |  9   ----------------------------<  96  4.2   |  28  |  0.43<L>    Ca    9.5      15 Dec 2023 06:37  Phos  3.6     12-15  Mg     2.0     12-15    TPro  6.4  /  Alb  3.7  /  TBili  0.1<L>  /  DBili  x   /  AST  12  /  ALT  13  /  AlkPhos  185<H>  12-14    proBNP:   Lipid Profile:   HgA1c:   TSH:             TELEMETRY: 	    ECG:  	  RADIOLOGY:   DIAGNOSTIC TESTING:  [ ] Echocardiogram:  < from: TTE W or WO Ultrasound Enhancing Agent (12.08.23 @ 15:48) >    TRANSTHORACIC ECHOCARDIOGRAM REPORT  ________________________________________________________________________________                                      _______       Pt. Name:       JAYA M ALESSIA Study Date:    12/8/2023  MRN:            [id number]         YOB: 1980  Accession #:    [id number]         Age:           43 years  Account#:       [id number]      Gender:        F  Heart Rate:     133 bpm           Height:        65.00 in (165.10 cm)  Rhythm:         sinus tachycardia Weight:        138.00 lb (62.60 kg)  Blood Pressure: 99/65 mmHg        BSA/BMI:       1.69 m² / 22.96 kg/m²  ________________________________________________________________________________________  Referring Physician:    [id number] Vel Brantley  Interpreting Physician: Sherry Maier  Primary Sonographer:    Refugio Randle [facility]    CPT:               ECHO TTE WO CON COMP W DOPP - 56241.m  Indication(s):     Acute and subacute endocarditis, unspecified - I33.9  Procedure:         Transthoracic echocardiogram with 2-D, M-mode and complete                     spectral and color flow Doppler.  Ordering Location: [facility]  Admission Status:  Inpatient  Study Information: Image quality for this study is fair.    _______________________________________________________________________________________     CONCLUSIONS:      1. Left ventricular cavity is normal. Left ventricular wall thickness is normal. Left ventricular systolic function is normal with an ejection fraction of 58 % by Tomlinson's method of disks. There are no regional wall motion abnormalities seen.   2. Normal left ventricular diastolic function, with normal filling pressure.   3. Normal right ventricular cavity size and systolic function.   4. Normal atria.   5. No significant valvular disease.   6. Pulmonary artery systolic pressure could not be estimated.   7. No pericardial effusion seen.   8. No prior echocardiogram is available for comparison.    ________________________________________________________________________________________    < end of copied text >    [ ]  Catheterization:  [ ] Stress Test:    OTHER: 	           Subjective: Patient seen and examined. No new events except as noted.   For OR today   feels nervous   left arm pain   no cp or sob    REVIEW OF SYSTEMS:    CONSTITUTIONAL: No weakness, fevers or chills  EYES/ENT: No visual changes;  No vertigo or throat pain   NECK: No pain or stiffness  RESPIRATORY: No cough, wheezing, hemoptysis; No shortness of breath  CARDIOVASCULAR: No chest pain or palpitations  GASTROINTESTINAL: No abdominal or epigastric pain. No nausea, vomiting, or hematemesis; No diarrhea or constipation. No melena or hematochezia.  GENITOURINARY: No dysuria, frequency or hematuria  NEUROLOGICAL: No numbness or weakness  SKIN: No itching, burning, rashes, or lesions   All other review of systems is negative unless indicated above.    MEDICATIONS:  MEDICATIONS  (STANDING):  cefepime   IVPB 2000 milliGRAM(s) IV Intermittent every 8 hours  chlorhexidine 2% Cloths 1 Application(s) Topical <User Schedule>  dalfampridine ER 10 milliGRAM(s) Oral every 12 hours  DULoxetine 60 milliGRAM(s) Oral at bedtime  lactated ringers. 1000 milliLiter(s) (75 mL/Hr) IV Continuous <Continuous>  senna 2 Tablet(s) Oral at bedtime  sertraline 100 milliGRAM(s) Oral at bedtime  Vibegron (Gemtesa) 75 milliGRAM(s) 1 Tablet(s) Oral daily      PHYSICAL EXAM:  T(C): 36.6 (12-15-23 @ 09:01), Max: 37.3 (12-14-23 @ 17:50)  HR: 88 (12-15-23 @ 09:01) (85 - 97)  BP: 102/67 (12-15-23 @ 09:01) (101/66 - 117/69)  RR: 18 (12-15-23 @ 09:01) (18 - 18)  SpO2: 98% (12-15-23 @ 09:01) (96% - 99%)  Wt(kg): --  I&O's Summary    14 Dec 2023 07:01  -  15 Dec 2023 07:00  --------------------------------------------------------  IN: 1160 mL / OUT: 0 mL / NET: 1160 mL        Appearance: Normal	  HEENT:   Normal oral mucosa, PERRL, EOMI	  Lymphatic: No lymphadenopathy , no edema    upper anterior chest wound open drainage catheter visible no pus. some staples were removed. dressing was saturated with brownish drainage. + erythema around wound which has not extended  Cardiovascular: Normal S1 S2, No JVD, No murmurs , Peripheral pulses palpable 2+ bilaterally  Respiratory: Lungs clear to auscultation, normal effort 	  Gastrointestinal:  Soft, Non-tender, + BS	  Skin: No rashes, No ecchymoses, No cyanosis, warm to touch  Musculoskeletal: Normal range of motion, normal strength  Psychiatry:  Mood & affect appropriate  Ext: No edema      LABS:    CARDIAC MARKERS:  CARDIAC MARKERS ( 13 Dec 2023 17:11 )  x     / x     / 18 U/L / x     / x                                    7.1    7.58  )-----------( 371      ( 15 Dec 2023 06:37 )             23.0     12-15    137  |  99  |  9   ----------------------------<  96  4.2   |  28  |  0.43<L>    Ca    9.5      15 Dec 2023 06:37  Phos  3.6     12-15  Mg     2.0     12-15    TPro  6.4  /  Alb  3.7  /  TBili  0.1<L>  /  DBili  x   /  AST  12  /  ALT  13  /  AlkPhos  185<H>  12-14    proBNP:   Lipid Profile:   HgA1c:   TSH:             TELEMETRY: 	    ECG:  	  RADIOLOGY:   DIAGNOSTIC TESTING:  [ ] Echocardiogram:  < from: TTE W or WO Ultrasound Enhancing Agent (12.08.23 @ 15:48) >    TRANSTHORACIC ECHOCARDIOGRAM REPORT  ________________________________________________________________________________                                      _______       Pt. Name:       JAYA M ALESSIA Study Date:    12/8/2023  MRN:            [id number]         YOB: 1980  Accession #:    [id number]         Age:           43 years  Account#:       [id number]      Gender:        F  Heart Rate:     133 bpm           Height:        65.00 in (165.10 cm)  Rhythm:         sinus tachycardia Weight:        138.00 lb (62.60 kg)  Blood Pressure: 99/65 mmHg        BSA/BMI:       1.69 m² / 22.96 kg/m²  ________________________________________________________________________________________  Referring Physician:    [id number] Vel Brantley  Interpreting Physician: Sherry Maier  Primary Sonographer:    Refugio Randle [facility]    CPT:               ECHO TTE WO CON COMP W DOPP - 48224.m  Indication(s):     Acute and subacute endocarditis, unspecified - I33.9  Procedure:         Transthoracic echocardiogram with 2-D, M-mode and complete                     spectral and color flow Doppler.  Ordering Location: [facility]  Admission Status:  Inpatient  Study Information: Image quality for this study is fair.    _______________________________________________________________________________________     CONCLUSIONS:      1. Left ventricular cavity is normal. Left ventricular wall thickness is normal. Left ventricular systolic function is normal with an ejection fraction of 58 % by Tomlinson's method of disks. There are no regional wall motion abnormalities seen.   2. Normal left ventricular diastolic function, with normal filling pressure.   3. Normal right ventricular cavity size and systolic function.   4. Normal atria.   5. No significant valvular disease.   6. Pulmonary artery systolic pressure could not be estimated.   7. No pericardial effusion seen.   8. No prior echocardiogram is available for comparison.    ________________________________________________________________________________________    < end of copied text >    [ ]  Catheterization:  [ ] Stress Test:    OTHER:

## 2023-12-15 NOTE — PROGRESS NOTE ADULT - SUBJECTIVE AND OBJECTIVE BOX
Date of service: 12-15-23 @ 15:22      Patient is a 43y old  Female who presents with a chief complaint of L shoulder and chest pain (15 Dec 2023 10:15)                                                               INTERVAL HPI/OVERNIGHT EVENTS:    REVIEW OF SYSTEMS:     CONSTITUTIONAL: No weakness, fevers or chills  EYES/ENT: No visual changes , no ear ache   NECK: No pain or stiffness  RESPIRATORY: No cough, wheezing,  No shortness of breath  CARDIOVASCULAR: No chest pain or palpitations  GASTROINTESTINAL: No abdominal pain  . No nausea, vomiting, or hematemesis; No diarrhea or constipation. No melena or hematochezia.  GENITOURINARY: No dysuria, frequency or hematuria  NEUROLOGICAL: No numbness or weakness  SKIN: No itching, burning, rashes, or lesions                                                                                                                                                                                                                                                                                 Medications:  MEDICATIONS  (STANDING):  cefepime   IVPB 2000 milliGRAM(s) IV Intermittent every 8 hours  chlorhexidine 2% Cloths 1 Application(s) Topical <User Schedule>  dalfampridine ER 10 milliGRAM(s) Oral every 12 hours  DULoxetine 60 milliGRAM(s) Oral at bedtime  lactated ringers. 1000 milliLiter(s) (75 mL/Hr) IV Continuous <Continuous>  senna 2 Tablet(s) Oral at bedtime  sertraline 100 milliGRAM(s) Oral at bedtime  Vibegron (Gemtesa) 75 milliGRAM(s) 1 Tablet(s) Oral daily    MEDICATIONS  (PRN):  clonazePAM  Tablet 0.5 milliGRAM(s) Oral three times a day PRN anxiety  HYDROmorphone  Injectable 1 milliGRAM(s) IV Push every 3 hours PRN Severe Pain (7 - 10)  oxyCODONE    IR 5 milliGRAM(s) Oral every 6 hours PRN Mild Pain (1 - 3)  oxyCODONE    IR 10 milliGRAM(s) Oral every 6 hours PRN Moderate Pain (4 - 6)       Allergies    No Known Allergies    Intolerances      Vital Signs Last 24 Hrs  T(C): 36.7 (15 Dec 2023 13:15), Max: 37.3 (14 Dec 2023 17:50)  T(F): 98 (15 Dec 2023 13:15), Max: 99.1 (14 Dec 2023 17:50)  HR: 90 (15 Dec 2023 14:25) (71 - 95)  BP: 108/64 (15 Dec 2023 14:25) (97/63 - 116/70)  BP(mean): --  RR: 18 (15 Dec 2023 14:25) (18 - 18)  SpO2: 96% (15 Dec 2023 14:25) (96% - 99%)    Parameters below as of 15 Dec 2023 14:25  Patient On (Oxygen Delivery Method): room air      CAPILLARY BLOOD GLUCOSE          12-14 @ 07:01  -  12-15 @ 07:00  --------------------------------------------------------  IN: 1160 mL / OUT: 0 mL / NET: 1160 mL    12-15 @ 07:01  -  12-15 @ 15:22  --------------------------------------------------------  IN: 450 mL / OUT: 0 mL / NET: 450 mL      Physical Exam:    Daily     Daily   General:  Well appearing, NAD, not cachetic  HEENT:  Nonicteric, PERRLA  CV:  RRR, S1S2   Lungs:  CTA B/L, no wheezes, rales, rhonchi   chest wall  Abdomen:  Soft, non-tender, no distended, positive BS  Extremities:  2+ pulses, no c/c, no edema  Skin:  Warm and dry, no rashes  :  No stanford  Neuro:  AAOx3, non-focal, grossly intact                                                                                                                                                                                                                                                                                                LABS:                               7.1    7.58  )-----------( 371      ( 15 Dec 2023 06:37 )             23.0                      12-15    137  |  99  |  9   ----------------------------<  96  4.2   |  28  |  0.43<L>    Ca    9.5      15 Dec 2023 06:37  Phos  3.6     12-15  Mg     2.0     12-15    TPro  6.4  /  Alb  3.7  /  TBili  0.1<L>  /  DBili  x   /  AST  12  /  ALT  13  /  AlkPhos  185<H>  12-14                       RADIOLOGY & ADDITIONAL TESTS         I personally reviewed: [  ]EKG   [  ]CXR    [  ] CT      A/P:         Discussed with :     [facility] consultants' Notes   Time spent :

## 2023-12-15 NOTE — BRIEF OPERATIVE NOTE - NSICDXBRIEFPOSTOP_GEN_ALL_CORE_FT
POST-OP DIAGNOSIS:  Septic arthritis of left sternoclavicular joint 07-Dec-2023 13:39:52  Divya Ansari

## 2023-12-16 LAB
ALBUMIN SERPL ELPH-MCNC: 3.2 G/DL — LOW (ref 3.3–5)
ALBUMIN SERPL ELPH-MCNC: 3.2 G/DL — LOW (ref 3.3–5)
ALP SERPL-CCNC: 155 U/L — HIGH (ref 40–120)
ALP SERPL-CCNC: 155 U/L — HIGH (ref 40–120)
ALT FLD-CCNC: 10 U/L — SIGNIFICANT CHANGE UP (ref 10–45)
ALT FLD-CCNC: 10 U/L — SIGNIFICANT CHANGE UP (ref 10–45)
ANION GAP SERPL CALC-SCNC: 11 MMOL/L — SIGNIFICANT CHANGE UP (ref 5–17)
ANION GAP SERPL CALC-SCNC: 11 MMOL/L — SIGNIFICANT CHANGE UP (ref 5–17)
AST SERPL-CCNC: 9 U/L — LOW (ref 10–40)
AST SERPL-CCNC: 9 U/L — LOW (ref 10–40)
BASE EXCESS BLDV CALC-SCNC: 3 MMOL/L — SIGNIFICANT CHANGE UP (ref -2–3)
BASE EXCESS BLDV CALC-SCNC: 3 MMOL/L — SIGNIFICANT CHANGE UP (ref -2–3)
BASOPHILS # BLD AUTO: 0.04 K/UL — SIGNIFICANT CHANGE UP (ref 0–0.2)
BASOPHILS # BLD AUTO: 0.04 K/UL — SIGNIFICANT CHANGE UP (ref 0–0.2)
BASOPHILS NFR BLD AUTO: 0.3 % — SIGNIFICANT CHANGE UP (ref 0–2)
BASOPHILS NFR BLD AUTO: 0.3 % — SIGNIFICANT CHANGE UP (ref 0–2)
BILIRUB SERPL-MCNC: 0.2 MG/DL — SIGNIFICANT CHANGE UP (ref 0.2–1.2)
BILIRUB SERPL-MCNC: 0.2 MG/DL — SIGNIFICANT CHANGE UP (ref 0.2–1.2)
BUN SERPL-MCNC: 8 MG/DL — SIGNIFICANT CHANGE UP (ref 7–23)
BUN SERPL-MCNC: 8 MG/DL — SIGNIFICANT CHANGE UP (ref 7–23)
CALCIUM SERPL-MCNC: 8.9 MG/DL — SIGNIFICANT CHANGE UP (ref 8.4–10.5)
CALCIUM SERPL-MCNC: 8.9 MG/DL — SIGNIFICANT CHANGE UP (ref 8.4–10.5)
CHLORIDE SERPL-SCNC: 99 MMOL/L — SIGNIFICANT CHANGE UP (ref 96–108)
CHLORIDE SERPL-SCNC: 99 MMOL/L — SIGNIFICANT CHANGE UP (ref 96–108)
CO2 BLDV-SCNC: 30 MMOL/L — HIGH (ref 22–26)
CO2 BLDV-SCNC: 30 MMOL/L — HIGH (ref 22–26)
CO2 SERPL-SCNC: 26 MMOL/L — SIGNIFICANT CHANGE UP (ref 22–31)
CO2 SERPL-SCNC: 26 MMOL/L — SIGNIFICANT CHANGE UP (ref 22–31)
CREAT SERPL-MCNC: 0.41 MG/DL — LOW (ref 0.5–1.3)
CREAT SERPL-MCNC: 0.41 MG/DL — LOW (ref 0.5–1.3)
CULTURE RESULTS: SIGNIFICANT CHANGE UP
EGFR: 124 ML/MIN/1.73M2 — SIGNIFICANT CHANGE UP
EGFR: 124 ML/MIN/1.73M2 — SIGNIFICANT CHANGE UP
EOSINOPHIL # BLD AUTO: 0.15 K/UL — SIGNIFICANT CHANGE UP (ref 0–0.5)
EOSINOPHIL # BLD AUTO: 0.15 K/UL — SIGNIFICANT CHANGE UP (ref 0–0.5)
EOSINOPHIL NFR BLD AUTO: 1.1 % — SIGNIFICANT CHANGE UP (ref 0–6)
EOSINOPHIL NFR BLD AUTO: 1.1 % — SIGNIFICANT CHANGE UP (ref 0–6)
GAS PNL BLDV: SIGNIFICANT CHANGE UP
GAS PNL BLDV: SIGNIFICANT CHANGE UP
GLUCOSE SERPL-MCNC: 122 MG/DL — HIGH (ref 70–99)
GLUCOSE SERPL-MCNC: 122 MG/DL — HIGH (ref 70–99)
GRAM STN FLD: SIGNIFICANT CHANGE UP
HCO3 BLDV-SCNC: 28 MMOL/L — SIGNIFICANT CHANGE UP (ref 22–29)
HCO3 BLDV-SCNC: 28 MMOL/L — SIGNIFICANT CHANGE UP (ref 22–29)
HCT VFR BLD CALC: 20.9 % — CRITICAL LOW (ref 34.5–45)
HCT VFR BLD CALC: 20.9 % — CRITICAL LOW (ref 34.5–45)
HCT VFR BLD CALC: 23.2 % — LOW (ref 34.5–45)
HCT VFR BLD CALC: 23.2 % — LOW (ref 34.5–45)
HCT VFR BLD CALC: 23.3 % — LOW (ref 34.5–45)
HCT VFR BLD CALC: 23.3 % — LOW (ref 34.5–45)
HGB BLD-MCNC: 6.4 G/DL — CRITICAL LOW (ref 11.5–15.5)
HGB BLD-MCNC: 6.4 G/DL — CRITICAL LOW (ref 11.5–15.5)
HGB BLD-MCNC: 6.9 G/DL — CRITICAL LOW (ref 11.5–15.5)
HGB BLD-MCNC: 6.9 G/DL — CRITICAL LOW (ref 11.5–15.5)
HGB BLD-MCNC: 7.2 G/DL — LOW (ref 11.5–15.5)
HGB BLD-MCNC: 7.2 G/DL — LOW (ref 11.5–15.5)
IMM GRANULOCYTES NFR BLD AUTO: 0.8 % — SIGNIFICANT CHANGE UP (ref 0–0.9)
IMM GRANULOCYTES NFR BLD AUTO: 0.8 % — SIGNIFICANT CHANGE UP (ref 0–0.9)
LYMPHOCYTES # BLD AUTO: 1.41 K/UL — SIGNIFICANT CHANGE UP (ref 1–3.3)
LYMPHOCYTES # BLD AUTO: 1.41 K/UL — SIGNIFICANT CHANGE UP (ref 1–3.3)
LYMPHOCYTES # BLD AUTO: 10 % — LOW (ref 13–44)
LYMPHOCYTES # BLD AUTO: 10 % — LOW (ref 13–44)
MAGNESIUM SERPL-MCNC: 2.1 MG/DL — SIGNIFICANT CHANGE UP (ref 1.6–2.6)
MAGNESIUM SERPL-MCNC: 2.1 MG/DL — SIGNIFICANT CHANGE UP (ref 1.6–2.6)
MCHC RBC-ENTMCNC: 24.1 PG — LOW (ref 27–34)
MCHC RBC-ENTMCNC: 24.1 PG — LOW (ref 27–34)
MCHC RBC-ENTMCNC: 24.7 PG — LOW (ref 27–34)
MCHC RBC-ENTMCNC: 29.7 GM/DL — LOW (ref 32–36)
MCHC RBC-ENTMCNC: 29.7 GM/DL — LOW (ref 32–36)
MCHC RBC-ENTMCNC: 30.6 GM/DL — LOW (ref 32–36)
MCHC RBC-ENTMCNC: 30.6 GM/DL — LOW (ref 32–36)
MCHC RBC-ENTMCNC: 30.9 GM/DL — LOW (ref 32–36)
MCHC RBC-ENTMCNC: 30.9 GM/DL — LOW (ref 32–36)
MCV RBC AUTO: 79.8 FL — LOW (ref 80–100)
MCV RBC AUTO: 79.8 FL — LOW (ref 80–100)
MCV RBC AUTO: 80.7 FL — SIGNIFICANT CHANGE UP (ref 80–100)
MCV RBC AUTO: 80.7 FL — SIGNIFICANT CHANGE UP (ref 80–100)
MCV RBC AUTO: 81.1 FL — SIGNIFICANT CHANGE UP (ref 80–100)
MCV RBC AUTO: 81.1 FL — SIGNIFICANT CHANGE UP (ref 80–100)
MONOCYTES # BLD AUTO: 0.43 K/UL — SIGNIFICANT CHANGE UP (ref 0–0.9)
MONOCYTES # BLD AUTO: 0.43 K/UL — SIGNIFICANT CHANGE UP (ref 0–0.9)
MONOCYTES NFR BLD AUTO: 3 % — SIGNIFICANT CHANGE UP (ref 2–14)
MONOCYTES NFR BLD AUTO: 3 % — SIGNIFICANT CHANGE UP (ref 2–14)
NEUTROPHILS # BLD AUTO: 11.98 K/UL — HIGH (ref 1.8–7.4)
NEUTROPHILS # BLD AUTO: 11.98 K/UL — HIGH (ref 1.8–7.4)
NEUTROPHILS NFR BLD AUTO: 84.8 % — HIGH (ref 43–77)
NEUTROPHILS NFR BLD AUTO: 84.8 % — HIGH (ref 43–77)
NRBC # BLD: 0 /100 WBCS — SIGNIFICANT CHANGE UP (ref 0–0)
PCO2 BLDV: 46 MMHG — HIGH (ref 39–42)
PCO2 BLDV: 46 MMHG — HIGH (ref 39–42)
PH BLDV: 7.4 — SIGNIFICANT CHANGE UP (ref 7.32–7.43)
PH BLDV: 7.4 — SIGNIFICANT CHANGE UP (ref 7.32–7.43)
PHOSPHATE SERPL-MCNC: 3.1 MG/DL — SIGNIFICANT CHANGE UP (ref 2.5–4.5)
PHOSPHATE SERPL-MCNC: 3.1 MG/DL — SIGNIFICANT CHANGE UP (ref 2.5–4.5)
PLATELET # BLD AUTO: 406 K/UL — HIGH (ref 150–400)
PLATELET # BLD AUTO: 406 K/UL — HIGH (ref 150–400)
PLATELET # BLD AUTO: 411 K/UL — HIGH (ref 150–400)
PLATELET # BLD AUTO: 411 K/UL — HIGH (ref 150–400)
PLATELET # BLD AUTO: 415 K/UL — HIGH (ref 150–400)
PLATELET # BLD AUTO: 415 K/UL — HIGH (ref 150–400)
PO2 BLDV: 67 MMHG — HIGH (ref 25–45)
PO2 BLDV: 67 MMHG — HIGH (ref 25–45)
POTASSIUM SERPL-MCNC: 4.4 MMOL/L — SIGNIFICANT CHANGE UP (ref 3.5–5.3)
POTASSIUM SERPL-MCNC: 4.4 MMOL/L — SIGNIFICANT CHANGE UP (ref 3.5–5.3)
POTASSIUM SERPL-SCNC: 4.4 MMOL/L — SIGNIFICANT CHANGE UP (ref 3.5–5.3)
POTASSIUM SERPL-SCNC: 4.4 MMOL/L — SIGNIFICANT CHANGE UP (ref 3.5–5.3)
PROT SERPL-MCNC: 6 G/DL — SIGNIFICANT CHANGE UP (ref 6–8.3)
PROT SERPL-MCNC: 6 G/DL — SIGNIFICANT CHANGE UP (ref 6–8.3)
RBC # BLD: 2.59 M/UL — LOW (ref 3.8–5.2)
RBC # BLD: 2.59 M/UL — LOW (ref 3.8–5.2)
RBC # BLD: 2.86 M/UL — LOW (ref 3.8–5.2)
RBC # BLD: 2.86 M/UL — LOW (ref 3.8–5.2)
RBC # BLD: 2.92 M/UL — LOW (ref 3.8–5.2)
RBC # BLD: 2.92 M/UL — LOW (ref 3.8–5.2)
RBC # FLD: 14.1 % — SIGNIFICANT CHANGE UP (ref 10.3–14.5)
RBC # FLD: 14.1 % — SIGNIFICANT CHANGE UP (ref 10.3–14.5)
RBC # FLD: 14.2 % — SIGNIFICANT CHANGE UP (ref 10.3–14.5)
RBC # FLD: 14.2 % — SIGNIFICANT CHANGE UP (ref 10.3–14.5)
RBC # FLD: 14.3 % — SIGNIFICANT CHANGE UP (ref 10.3–14.5)
RBC # FLD: 14.3 % — SIGNIFICANT CHANGE UP (ref 10.3–14.5)
SAO2 % BLDV: 98.8 % — HIGH (ref 67–88)
SAO2 % BLDV: 98.8 % — HIGH (ref 67–88)
SODIUM SERPL-SCNC: 136 MMOL/L — SIGNIFICANT CHANGE UP (ref 135–145)
SODIUM SERPL-SCNC: 136 MMOL/L — SIGNIFICANT CHANGE UP (ref 135–145)
SPECIMEN SOURCE: SIGNIFICANT CHANGE UP
WBC # BLD: 14.13 K/UL — HIGH (ref 3.8–10.5)
WBC # BLD: 14.13 K/UL — HIGH (ref 3.8–10.5)
WBC # BLD: 8.4 K/UL — SIGNIFICANT CHANGE UP (ref 3.8–10.5)
WBC # BLD: 8.4 K/UL — SIGNIFICANT CHANGE UP (ref 3.8–10.5)
WBC # BLD: 9.28 K/UL — SIGNIFICANT CHANGE UP (ref 3.8–10.5)
WBC # BLD: 9.28 K/UL — SIGNIFICANT CHANGE UP (ref 3.8–10.5)
WBC # FLD AUTO: 14.13 K/UL — HIGH (ref 3.8–10.5)
WBC # FLD AUTO: 14.13 K/UL — HIGH (ref 3.8–10.5)
WBC # FLD AUTO: 8.4 K/UL — SIGNIFICANT CHANGE UP (ref 3.8–10.5)
WBC # FLD AUTO: 8.4 K/UL — SIGNIFICANT CHANGE UP (ref 3.8–10.5)
WBC # FLD AUTO: 9.28 K/UL — SIGNIFICANT CHANGE UP (ref 3.8–10.5)
WBC # FLD AUTO: 9.28 K/UL — SIGNIFICANT CHANGE UP (ref 3.8–10.5)

## 2023-12-16 PROCEDURE — 71045 X-RAY EXAM CHEST 1 VIEW: CPT | Mod: 26

## 2023-12-16 RX ORDER — ACETAMINOPHEN 500 MG
1000 TABLET ORAL ONCE
Refills: 0 | Status: COMPLETED | OUTPATIENT
Start: 2023-12-16 | End: 2023-12-16

## 2023-12-16 RX ORDER — ONDANSETRON 8 MG/1
4 TABLET, FILM COATED ORAL ONCE
Refills: 0 | Status: DISCONTINUED | OUTPATIENT
Start: 2023-12-16 | End: 2023-12-16

## 2023-12-16 RX ORDER — HYDROMORPHONE HYDROCHLORIDE 2 MG/ML
0.5 INJECTION INTRAMUSCULAR; INTRAVENOUS; SUBCUTANEOUS
Refills: 0 | Status: DISCONTINUED | OUTPATIENT
Start: 2023-12-16 | End: 2023-12-16

## 2023-12-16 RX ORDER — ENOXAPARIN SODIUM 100 MG/ML
40 INJECTION SUBCUTANEOUS EVERY 24 HOURS
Refills: 0 | Status: DISCONTINUED | OUTPATIENT
Start: 2023-12-17 | End: 2023-12-18

## 2023-12-16 RX ORDER — FENTANYL CITRATE 50 UG/ML
25 INJECTION INTRAVENOUS
Refills: 0 | Status: DISCONTINUED | OUTPATIENT
Start: 2023-12-16 | End: 2023-12-16

## 2023-12-16 RX ADMIN — HYDROMORPHONE HYDROCHLORIDE 1 MILLIGRAM(S): 2 INJECTION INTRAMUSCULAR; INTRAVENOUS; SUBCUTANEOUS at 10:13

## 2023-12-16 RX ADMIN — HYDROMORPHONE HYDROCHLORIDE 1 MILLIGRAM(S): 2 INJECTION INTRAMUSCULAR; INTRAVENOUS; SUBCUTANEOUS at 07:00

## 2023-12-16 RX ADMIN — HYDROMORPHONE HYDROCHLORIDE 1 MILLIGRAM(S): 2 INJECTION INTRAMUSCULAR; INTRAVENOUS; SUBCUTANEOUS at 06:30

## 2023-12-16 RX ADMIN — Medication 1000 MILLIGRAM(S): at 09:36

## 2023-12-16 RX ADMIN — HYDROMORPHONE HYDROCHLORIDE 0.5 MILLIGRAM(S): 2 INJECTION INTRAMUSCULAR; INTRAVENOUS; SUBCUTANEOUS at 00:30

## 2023-12-16 RX ADMIN — CEFEPIME 100 MILLIGRAM(S): 1 INJECTION, POWDER, FOR SOLUTION INTRAMUSCULAR; INTRAVENOUS at 06:32

## 2023-12-16 RX ADMIN — CHLORHEXIDINE GLUCONATE 1 APPLICATION(S): 213 SOLUTION TOPICAL at 16:40

## 2023-12-16 RX ADMIN — CEFEPIME 100 MILLIGRAM(S): 1 INJECTION, POWDER, FOR SOLUTION INTRAMUSCULAR; INTRAVENOUS at 23:04

## 2023-12-16 RX ADMIN — HYDROMORPHONE HYDROCHLORIDE 1 MILLIGRAM(S): 2 INJECTION INTRAMUSCULAR; INTRAVENOUS; SUBCUTANEOUS at 15:09

## 2023-12-16 RX ADMIN — HYDROMORPHONE HYDROCHLORIDE 0.5 MILLIGRAM(S): 2 INJECTION INTRAMUSCULAR; INTRAVENOUS; SUBCUTANEOUS at 00:46

## 2023-12-16 RX ADMIN — SODIUM CHLORIDE 75 MILLILITER(S): 9 INJECTION, SOLUTION INTRAVENOUS at 22:29

## 2023-12-16 RX ADMIN — OXYCODONE HYDROCHLORIDE 10 MILLIGRAM(S): 5 TABLET ORAL at 14:01

## 2023-12-16 RX ADMIN — HYDROMORPHONE HYDROCHLORIDE 1 MILLIGRAM(S): 2 INJECTION INTRAMUSCULAR; INTRAVENOUS; SUBCUTANEOUS at 17:39

## 2023-12-16 RX ADMIN — HYDROMORPHONE HYDROCHLORIDE 1 MILLIGRAM(S): 2 INJECTION INTRAMUSCULAR; INTRAVENOUS; SUBCUTANEOUS at 09:43

## 2023-12-16 RX ADMIN — HYDROMORPHONE HYDROCHLORIDE 1 MILLIGRAM(S): 2 INJECTION INTRAMUSCULAR; INTRAVENOUS; SUBCUTANEOUS at 20:34

## 2023-12-16 RX ADMIN — Medication 400 MILLIGRAM(S): at 09:06

## 2023-12-16 RX ADMIN — OXYCODONE HYDROCHLORIDE 10 MILLIGRAM(S): 5 TABLET ORAL at 13:01

## 2023-12-16 RX ADMIN — Medication 1000 MILLIGRAM(S): at 23:00

## 2023-12-16 RX ADMIN — Medication 0.5 MILLIGRAM(S): at 22:21

## 2023-12-16 RX ADMIN — HYDROMORPHONE HYDROCHLORIDE 1 MILLIGRAM(S): 2 INJECTION INTRAMUSCULAR; INTRAVENOUS; SUBCUTANEOUS at 21:30

## 2023-12-16 RX ADMIN — HYDROMORPHONE HYDROCHLORIDE 1 MILLIGRAM(S): 2 INJECTION INTRAMUSCULAR; INTRAVENOUS; SUBCUTANEOUS at 17:09

## 2023-12-16 RX ADMIN — HYDROMORPHONE HYDROCHLORIDE 0.5 MILLIGRAM(S): 2 INJECTION INTRAMUSCULAR; INTRAVENOUS; SUBCUTANEOUS at 00:25

## 2023-12-16 RX ADMIN — CEFEPIME 100 MILLIGRAM(S): 1 INJECTION, POWDER, FOR SOLUTION INTRAMUSCULAR; INTRAVENOUS at 14:40

## 2023-12-16 RX ADMIN — Medication 400 MILLIGRAM(S): at 22:24

## 2023-12-16 RX ADMIN — HYDROMORPHONE HYDROCHLORIDE 1 MILLIGRAM(S): 2 INJECTION INTRAMUSCULAR; INTRAVENOUS; SUBCUTANEOUS at 14:39

## 2023-12-16 RX ADMIN — SERTRALINE 100 MILLIGRAM(S): 25 TABLET, FILM COATED ORAL at 22:23

## 2023-12-16 RX ADMIN — HYDROMORPHONE HYDROCHLORIDE 0.5 MILLIGRAM(S): 2 INJECTION INTRAMUSCULAR; INTRAVENOUS; SUBCUTANEOUS at 00:51

## 2023-12-16 RX ADMIN — SENNA PLUS 2 TABLET(S): 8.6 TABLET ORAL at 22:22

## 2023-12-16 RX ADMIN — DULOXETINE HYDROCHLORIDE 60 MILLIGRAM(S): 30 CAPSULE, DELAYED RELEASE ORAL at 22:23

## 2023-12-16 RX ADMIN — SODIUM CHLORIDE 75 MILLILITER(S): 9 INJECTION, SOLUTION INTRAVENOUS at 01:31

## 2023-12-16 NOTE — PROGRESS NOTE ADULT - SUBJECTIVE AND OBJECTIVE BOX
Date of service: 12-16-23 @ 22:39      Patient is a 43y old  Female who presents with a chief complaint of L shoulder and chest pain (16 Dec 2023 20:12)                                                               INTERVAL HPI/OVERNIGHT EVENTS:    REVIEW OF SYSTEMS:     CONSTITUTIONAL: No weakness, fevers or chills  EYES/ENT: No visual changes , no ear ache   NECK: No pain or stiffness  RESPIRATORY: No cough, wheezing,  No shortness of breath  CARDIOVASCULAR: No chest pain or palpitations  GASTROINTESTINAL: No abdominal pain  . No nausea, vomiting, or hematemesis; No diarrhea or constipation. No melena or hematochezia.  GENITOURINARY: No dysuria, frequency or hematuria  NEUROLOGICAL: No numbness or weakness  chest wall pain                                                                                                                                                                                                                                                                                    Medications:  MEDICATIONS  (STANDING):  cefepime   IVPB 2000 milliGRAM(s) IV Intermittent every 8 hours  chlorhexidine 2% Cloths 1 Application(s) Topical <User Schedule>  DULoxetine 60 milliGRAM(s) Oral at bedtime  lactated ringers. 1000 milliLiter(s) (75 mL/Hr) IV Continuous <Continuous>  senna 2 Tablet(s) Oral at bedtime  sertraline 100 milliGRAM(s) Oral at bedtime    MEDICATIONS  (PRN):  clonazePAM  Tablet 0.5 milliGRAM(s) Oral three times a day PRN anxiety  HYDROmorphone  Injectable 1 milliGRAM(s) IV Push every 3 hours PRN Severe Pain (7 - 10)  oxyCODONE    IR 5 milliGRAM(s) Oral every 6 hours PRN Mild Pain (1 - 3)  oxyCODONE    IR 10 milliGRAM(s) Oral every 6 hours PRN Moderate Pain (4 - 6)       Allergies    No Known Allergies    Intolerances      Vital Signs Last 24 Hrs  T(C): 36.7 (16 Dec 2023 19:46), Max: 36.7 (16 Dec 2023 04:30)  T(F): 98 (16 Dec 2023 19:46), Max: 98 (16 Dec 2023 04:30)  HR: 85 (16 Dec 2023 19:46) (78 - 95)  BP: 108/68 (16 Dec 2023 19:46) (92/49 - 133/58)  BP(mean): 74 (16 Dec 2023 01:30) (74 - 89)  RR: 17 (16 Dec 2023 19:46) (16 - 18)  SpO2: 99% (16 Dec 2023 19:46) (95% - 99%)    Parameters below as of 16 Dec 2023 19:46  Patient On (Oxygen Delivery Method): room air      CAPILLARY BLOOD GLUCOSE          12-15 @ 07:01  -  12-16 @ 07:00  --------------------------------------------------------  IN: 2000 mL / OUT: 950 mL / NET: 1050 mL    12-16 @ 07:01  -  12-16 @ 22:39  --------------------------------------------------------  IN: 1300 mL / OUT: 0 mL / NET: 1300 mL      Physical Exam:    Daily     Daily   General:  Well appearing, NAD, not cachetic  HEENT:  Nonicteric, PERRLA  CV:  RRR, S1S2   Lungs:  CTA B/L, no wheezes, rales, rhonchi  chest wall dressing   Abdomen:  Soft, non-tender, no distended, positive BS  Extremities:  2+ pulses, no c/c, no edema  Skin:  Warm and dry, no rashes  :  No stanford  Neuro:  AAOx3, non-focal, grossly intact                                                                                                                                                                                                                                                                                                LABS:                               6.4    8.40  )-----------( 406      ( 16 Dec 2023 10:16 )             20.9                      12-15    136  |  99  |  8   ----------------------------<  122<H>  4.4   |  26  |  0.41<L>    Ca    8.9      15 Dec 2023 23:59  Phos  3.1     12-15  Mg     2.1     12-15    TPro  6.0  /  Alb  3.2<L>  /  TBili  0.2  /  DBili  x   /  AST  9<L>  /  ALT  10  /  AlkPhos  155<H>  12-15                       RADIOLOGY & ADDITIONAL TESTS         I personally reviewed: [  ]EKG   [  ]CXR    [  ] CT      A/P:         Discussed with :     [facility] consultants' Notes   Time spent :

## 2023-12-16 NOTE — PROGRESS NOTE ADULT - ASSESSMENT
43F PMHx of MS on immunosuppressive medication, presenting with left chest wall and shoulder pain. Recent history of fall secondary to unsteadiness due to MS. Obtained outpatient MRI of left sternoclavicular joint with results significant for marrow edema and swelling of the left sternoclavicular joint with a 2 cm fluid collection and adjacent pleural edema in the left chest with concerns for infectious etiology.  Patient initially saw orthopedics with for concerns for septic arthritis of this joint.  Impression of the read showed soft tissue abscess superficial to the joint.  Recommended CT scan with IV contrast of the chest.  Patient denies any fevers however endorsing erythema to the left sternoclavicular joint region. Patient states she was recently hospitalized over a week ago at [facility] for falls secondary to unsteadiness due to her MS.  Denies any recent falls today, vision changes. Endorsing headache. Denies any chest pain, shortness of breath, abdominal pain, nausea vomiting diarrhea, urinary complaints.  CT chest with IV in ED significant for Septic arthritis of the left sternoclavicular joint and involving the articulation of the sternum with the first costal cartilage.Extensive surrounding inflammation, with pneumonia in the underlying subpleural left upper lobe.No fluid collection is evident.  Workup significant for elevated alk phos (248), . VSS and afebrile  s/p Debridement of left sternoclavicular joint with excision of left clavicular head. Purulent drainage at the sternoclavicular joint. Tissue surrounding left clavicle found to be very inflamed. Copious irrigation of surgical site.   Concerned about pain at surgical site.    at bedside     43F PMHx of MS on immunosuppressive medication, presenting with left chest wall and shoulder pain. Recent history of fall secondary to unsteadiness due to MS. Obtained outpatient MRI of left sternoclavicular joint with results significant for marrow edema and swelling of the left sternoclavicular joint with a 2 cm fluid collection and adjacent pleural edema in the left chest with concerns for infectious etiology.  Patient initially saw orthopedics with for concerns for septic arthritis of this joint.  Impression of the read showed soft tissue abscess superficial to the joint.  Recommended CT scan with IV contrast of the chest.  Patient denies any fevers however endorsing erythema to the left sternoclavicular joint region. Patient states she was recently hospitalized over a week ago at [facility] for falls secondary to unsteadiness due to her MS.  Denies any recent falls today, vision changes. Endorsing headache. Denies any chest pain, shortness of breath, abdominal pain, nausea vomiting diarrhea, urinary complaints.  CT chest with IV in ED significant for Septic arthritis of the left sternoclavicular joint and involving the articulation of the sternum with the first costal cartilage.Extensive surrounding inflammation, with pneumonia in the underlying subpleural left upper lobe.No fluid collection is evident.  Workup significant for elevated alk phos (248), . VSS and afebrile  s/p Debridement of left sternoclavicular joint with excision of left clavicular head. Purulent drainage at the sternoclavicular joint. Tissue surrounding left clavicle found to be very inflamed. Copious irrigation of surgical site.   Concerned about pain at surgical site.    at bedside

## 2023-12-16 NOTE — PROGRESS NOTE ADULT - SUBJECTIVE AND OBJECTIVE BOX
Subjective: Patient seen and examined. No new events except as noted.   s/p washout and debridement of sternoclavicular joint; resection of medial manubrium; white and black wound vac placedREVIEW OF SYSTEMS:    CONSTITUTIONAL: N+weakness, fevers or chills  EYES/ENT: No visual changes;  No vertigo or throat pain   NECK: No pain or stiffness  RESPIRATORY: No cough, wheezing, hemoptysis; No shortness of breath  CARDIOVASCULAR: No chest pain or palpitations  GASTROINTESTINAL: No abdominal or epigastric pain. No nausea, vomiting, or hematemesis; No diarrhea or constipation. No melena or hematochezia.  GENITOURINARY: No dysuria, frequency or hematuria  NEUROLOGICAL: No numbness or weakness  SKIN: No itching, burning, rashes, or lesions   All other review of systems is negative unless indicated above.    MEDICATIONS:  MEDICATIONS  (STANDING):  cefepime   IVPB 2000 milliGRAM(s) IV Intermittent every 8 hours  chlorhexidine 2% Cloths 1 Application(s) Topical <User Schedule>  DULoxetine 60 milliGRAM(s) Oral at bedtime  lactated ringers. 1000 milliLiter(s) (75 mL/Hr) IV Continuous <Continuous>  senna 2 Tablet(s) Oral at bedtime  sertraline 100 milliGRAM(s) Oral at bedtime      PHYSICAL EXAM:  T(C): 36.7 (12-16-23 @ 19:46), Max: 36.7 (12-16-23 @ 04:30)  HR: 85 (12-16-23 @ 19:46) (78 - 95)  BP: 108/68 (12-16-23 @ 19:46) (92/49 - 133/58)  RR: 17 (12-16-23 @ 19:46) (16 - 18)  SpO2: 99% (12-16-23 @ 19:46) (95% - 99%)  Wt(kg): --  I&O's Summary    15 Dec 2023 07:01  -  16 Dec 2023 07:00  --------------------------------------------------------  IN: 2000 mL / OUT: 950 mL / NET: 1050 mL    16 Dec 2023 07:01  -  16 Dec 2023 20:12  --------------------------------------------------------  IN: 1120 mL / OUT: 0 mL / NET: 1120 mL      Height (cm): 166.4 (12-15 @ 20:36)  Weight (kg): 62.6 (12-15 @ 20:36)  BMI (kg/m2): 22.6 (12-15 @ 20:36)  BSA (m2): 1.7 (12-15 @ 20:36)        Appearance: NAD  HEENT:   Normal oral mucosa, PERRL, EOMI	  Lymphatic: No lymphadenopathy , no edema   L clavicular region w/ black sponge CAV dsg secured w/ good seal, minimal serousag drainage noted in collection chamber  Cardiovascular: Normal S1 S2, No JVD, No murmurs , Peripheral pulses palpable 2+ bilaterally  Respiratory: Lungs clear to auscultation, normal effort 	  Gastrointestinal:  Soft, Non-tender, + BS	  Skin: No rashes, No ecchymoses, No cyanosis, warm to touch  Musculoskeletal: Normal range of motion, normal strength  Psychiatry:  Mood & affect appropriate  Ext: No edema        LABS:    CARDIAC MARKERS:                                6.4    8.40  )-----------( 406      ( 16 Dec 2023 10:16 )             20.9     12-15    136  |  99  |  8   ----------------------------<  122<H>  4.4   |  26  |  0.41<L>    Ca    8.9      15 Dec 2023 23:59  Phos  3.1     12-15  Mg     2.1     12-15    TPro  6.0  /  Alb  3.2<L>  /  TBili  0.2  /  DBili  x   /  AST  9<L>  /  ALT  10  /  AlkPhos  155<H>  12-15    proBNP:   Lipid Profile:   HgA1c:   TSH:             TELEMETRY: 	    ECG:  	  RADIOLOGY:   DIAGNOSTIC TESTING:  [ ] Echocardiogram:  [ ]  Catheterization:  [ ] Stress Test:    OTHER:

## 2023-12-16 NOTE — PROGRESS NOTE ADULT - PROBLEM SELECTOR PLAN 1
- Left cw VAC in place w/ good seal  - OOB  - add protein supplement to meals  - Pain control   - Plan d/w Attending

## 2023-12-16 NOTE — PROGRESS NOTE ADULT - ASSESSMENT
43F PMHx of MS on Rituxan, presenting with left chest wall and shoulder pain w/ concern for infection now admitted with likely septic arthritis with need for possible washout and debridement    POD # 1 Debridement and washout of Left.  sternoclavicular joint excision Left clavicular head  Sita 80cc serosanguinous.  GS no org seen.  Cultures pending.  12/9 + RVP>no treatment as per ID  DC PCA  somnolent  Pain mgmt notified Transition oral Rx    12/10    pain medication w oxycodone and dilaudid for breakthrough,     Lt CW staples w bulb   w mininimal drainage   abx per ID  12/11: Left CW with staples with bulb, with minimal drainage. continue antibiotics per ID. continue optimal pain control.   12/12    lt cw  staples  w erythma  drain    several staples removed and packed by Dr Bautista,  abx  per ID  12/13  wbc 10.8  afebrile.  wound without change since yesterday.    OR c/s NGTD  BC  NGTD.    12/14 VSS. afebrile. wound dressing changed at bedside. Awaiting final OR/Blood cultures. Left sita d/c'ed (output 0cc/2cc overnight/ 24hr). Keep NPO p MN. Need 2 active T&S. Plan for OR washing out & wound vac placement tomorrow.  12/15 OR today  Washout/debridement sternoclavicular joint> VAC placed  12/16 Cont abx as per ID Maintain VAC

## 2023-12-16 NOTE — PROVIDER CONTACT NOTE (CRITICAL VALUE NOTIFICATION) - BACKGROUND
pt w/ PMH MS and falls. currently admitted for septic arthritic left shoulder, s/p mult. I&D/washouts
pt w/ PMH MS and falls. currently admitted for septic arthritic left shoulder, s/p mult. I&D/washouts

## 2023-12-16 NOTE — PROGRESS NOTE ADULT - PROBLEM SELECTOR PLAN 1
s/p Debridement of left sternoclavicular joint with excision of left clavicular head. Purulent drainage at the sternoclavicular joint. Tissue surrounding left clavicle found to be very inflamed. Copious irrigation of surgical site.   IV abx    OR c/s NGTD  BC  NGTD.   s/p RTOR debridement  MRI reviewed. s/p  removal of staples  Ortho consulted regarding supraspinatus tear. Would prefer NO steroid injection at present time given infection   Pain control   normal TTE  Monitor Hgb . transfuse PRBC x 1   DVT ppx.

## 2023-12-16 NOTE — CHART NOTE - NSCHARTNOTEFT_GEN_A_CORE
43F PMHx of MS on Rituxan, presenting with left shoulder pain w/ admitted with septic arthritis s/p or debridement 12/7, 12/15 on cefepime; now with hgb 6.4/hct 20.9, no apparent active bleeding; one unit PRBC+ stool occult blood ordered; Dr. Brantley informed.

## 2023-12-16 NOTE — PROGRESS NOTE ADULT - SUBJECTIVE AND OBJECTIVE BOX
Patient is a 43y old  Female who presents with a chief complaint of L shoulder pain w/ clavicular joint infection      Vital Signs Last 24 Hrs  T(C): 36.6 (12-16-23 @ 08:37), Max: 36.7 (12-15-23 @ 13:15)  T(F): 97.9 (12-16-23 @ 08:37), Max: 98 (12-15-23 @ 13:15)  HR: 84 (12-16-23 @ 08:37) (71 - 95)  BP: 92/49 (12-16-23 @ 08:37) (92/49 - 133/58)  RR: 18 (12-16-23 @ 08:37) (16 - 18)  SpO2: 97% (12-16-23 @ 05:45) (95% - 99%)                12-15-23 @ 07:01  -  12-16-23 @ 07:00  --------------------------------------------------------  IN: 2000 mL / OUT: 950 mL / NET: 1050 mL        Daily Height in cm: 166.37 (15 Dec 2023 20:36)    Daily                           6.9    9.28  )-----------( 415      ( 16 Dec 2023 07:14 )             23.2     12-15    136  |  99  |  8   ----------------------------<  122<H>  4.4   |  26  |  0.41<L>    Ca    8.9      15 Dec 2023 23:59  Phos  3.1     12-15  Mg     2.1     12-15    TPro  6.0  /  Alb  3.2<L>  /  TBili  0.2  /  DBili  x   /  AST  9<L>  /  ALT  10  /  AlkPhos  155<H>  12-15          PHYSICAL EXAM  Neurology: A&Ox3, NAD, no gross deficits  CV : RRR+S1S2  Lungs: Respirations non-labored, B/L BS  Abdomen: Soft, NT/ND, +BSx4Q  Extremities: B/L LE edema, negative calf tenderness, +PP         Wound: L clavicular region w/ black sponge CAV dsg secured w/ good seal, minimal serousag drainage noted in collection chamber  MEDICATIONS  acetaminophen   IVPB .. 1000 milliGRAM(s) IV Intermittent once  cefepime   IVPB 2000 milliGRAM(s) IV Intermittent every 8 hours  chlorhexidine 2% Cloths 1 Application(s) Topical <User Schedule>  clonazePAM  Tablet 0.5 milliGRAM(s) Oral three times a day PRN  DULoxetine 60 milliGRAM(s) Oral at bedtime  HYDROmorphone  Injectable 1 milliGRAM(s) IV Push every 3 hours PRN  lactated ringers. 1000 milliLiter(s) IV Continuous <Continuous>  oxyCODONE    IR 5 milliGRAM(s) Oral every 6 hours PRN  oxyCODONE    IR 10 milliGRAM(s) Oral every 6 hours PRN  senna 2 Tablet(s) Oral at bedtime  sertraline 100 milliGRAM(s) Oral at bedtime

## 2023-12-16 NOTE — PROGRESS NOTE ADULT - ASSESSMENT
43F PMHx of MS on Rituxan, presenting with left chest wall and shoulder pain w/ concern for infection now admitted with likely septic arthritis with need for possible washout and debridement       Problem/Plan - 1:  ·  Problem: Septic arthritis. left shoulder joint   ·  Plan: Imaging consistent with septic arthritis  s/p OR   cont abx and fu cultures   check ECHO : no vegetation   fu with ID   d/w with CTS at bedside : drainage adjusted and fx improved   cont current abx   pt with LUE pain : tessie referred pain from L [id number] and chest however  Cervical MRI : no disciits / abcess   discussed with pt , family at length at bedside   d/w surgery : OR :   washout and debridement of sternoclavicular joint; resection of medial manubrium; white and black wound vac placed      # c/o left arm swelling and pain in elbow region :  -doppler ordered to r/o DVT : Negative      Problem/Plan - 2:  ·  Problem: Multiple sclerosis.   ·  Plan: Gets Rituxan every 6 months, next dose due : holding   -On Ampyra BID at home, need to bring home med     Problem/Plan - 3:  ·  Problem: Anxiety.   ·  Plan: ISTOP reviewed Reference #: [id number]  -Cont. Clonazepam 0.5mg QHS PRN  -Cont. Sertraline 100mg QHS  -Cont. Duloxetine 60mg QHS, pt unsure of dose  -Need full med rec.     Problem/Plan - 4:  ·  Problem: Anemia.   ·  Plan: hgb 9, lower than prior year. Pt endorses some recent anemia but cannot specify  -Trend cbc  -Monitor for bleeding.     Problem/Plan - 5:  ·  Problem: Prophylactic measure.   ·  Plan: DVT PPx    hyperkalemia : normalized    43F PMHx of MS on Rituxan, presenting with left chest wall and shoulder pain w/ concern for infection now admitted with likely septic arthritis with need for possible washout and debridement       Problem/Plan - 1:  ·  Problem: Septic arthritis. left shoulder joint   ·  Plan: Imaging consistent with septic arthritis  s/p OR   cont abx and fu cultures   check ECHO : no vegetation   fu with ID   d/w with CTS at bedside : drainage adjusted and fx improved   cont current abx   pt with LUE pain : tessie referred pain from L [id number] and chest however  Cervical MRI : no disciits / abcess   discussed with pt , family at length at bedside   d/w surgery : OR :   washout and debridement of sternoclavicular joint; resection of medial manubrium; white and black wound vac placed      # c/o left arm swelling and pain in elbow region :  -doppler ordered to r/o DVT : Negative      Problem/Plan - 2:  ·  Problem: Multiple sclerosis.   ·  Plan: Gets Rituxan every 6 months, next dose due : holding   -On Ampyra BID at home, need to bring home med     Problem/Plan - 3:  ·  Problem: Anxiety.   ·  Plan: ISTOP reviewed Reference #: [id number]  -Cont. Clonazepam 0.5mg QHS PRN  -Cont. Sertraline 100mg QHS  -Cont. Duloxetine 60mg QHS, pt unsure of dose  -Need full med rec.     Problem/Plan - 4:  ·  Problem: Anemia.   ·  Plan: hgb 9, lower than prior year. Pt endorses some recent anemia but cannot specify  -Trend cbc  -Monitor for bleeding.     Problem/Plan - 5:  ·  Problem: Prophylactic measure.   ·  Plan: DVT PPx    hyperkalemia : normalized

## 2023-12-16 NOTE — PROVIDER CONTACT NOTE (CRITICAL VALUE NOTIFICATION) - ASSESSMENT
pt A&Ox4, VSS. no s/s bleeding noted. wound vac dressing on left shoulder remains intact and no output from wound vac this AM thus far.
pt A&Ox4, VSS. no s/s bleeding noted. wound vac dressing on left shoulder remains intact and no output from wound vac at this moment

## 2023-12-17 ENCOUNTER — TRANSCRIPTION ENCOUNTER (OUTPATIENT)
Age: 43
End: 2023-12-17

## 2023-12-17 LAB
GRAM STN FLD: SIGNIFICANT CHANGE UP
GRAM STN FLD: SIGNIFICANT CHANGE UP
HCT VFR BLD CALC: 26.3 % — LOW (ref 34.5–45)
HCT VFR BLD CALC: 26.3 % — LOW (ref 34.5–45)
HGB BLD-MCNC: 8 G/DL — LOW (ref 11.5–15.5)
HGB BLD-MCNC: 8 G/DL — LOW (ref 11.5–15.5)
MCHC RBC-ENTMCNC: 25.5 PG — LOW (ref 27–34)
MCHC RBC-ENTMCNC: 25.5 PG — LOW (ref 27–34)
MCHC RBC-ENTMCNC: 30.4 GM/DL — LOW (ref 32–36)
MCHC RBC-ENTMCNC: 30.4 GM/DL — LOW (ref 32–36)
MCV RBC AUTO: 83.8 FL — SIGNIFICANT CHANGE UP (ref 80–100)
MCV RBC AUTO: 83.8 FL — SIGNIFICANT CHANGE UP (ref 80–100)
NRBC # BLD: 0 /100 WBCS — SIGNIFICANT CHANGE UP (ref 0–0)
NRBC # BLD: 0 /100 WBCS — SIGNIFICANT CHANGE UP (ref 0–0)
PLATELET # BLD AUTO: 421 K/UL — HIGH (ref 150–400)
PLATELET # BLD AUTO: 421 K/UL — HIGH (ref 150–400)
RBC # BLD: 3.14 M/UL — LOW (ref 3.8–5.2)
RBC # BLD: 3.14 M/UL — LOW (ref 3.8–5.2)
RBC # FLD: 14.7 % — HIGH (ref 10.3–14.5)
RBC # FLD: 14.7 % — HIGH (ref 10.3–14.5)
SPECIMEN SOURCE: SIGNIFICANT CHANGE UP
SPECIMEN SOURCE: SIGNIFICANT CHANGE UP
WBC # BLD: 10.9 K/UL — HIGH (ref 3.8–10.5)
WBC # BLD: 10.9 K/UL — HIGH (ref 3.8–10.5)
WBC # FLD AUTO: 10.9 K/UL — HIGH (ref 3.8–10.5)
WBC # FLD AUTO: 10.9 K/UL — HIGH (ref 3.8–10.5)

## 2023-12-17 PROCEDURE — 99232 SBSQ HOSP IP/OBS MODERATE 35: CPT

## 2023-12-17 RX ORDER — ACETAMINOPHEN 500 MG
1000 TABLET ORAL ONCE
Refills: 0 | Status: COMPLETED | OUTPATIENT
Start: 2023-12-17 | End: 2023-12-17

## 2023-12-17 RX ORDER — DALFAMPRIDINE 10 MG/1
10 TABLET, FILM COATED, EXTENDED RELEASE ORAL EVERY 12 HOURS
Refills: 0 | Status: DISCONTINUED | OUTPATIENT
Start: 2023-12-17 | End: 2024-01-05

## 2023-12-17 RX ADMIN — ENOXAPARIN SODIUM 40 MILLIGRAM(S): 100 INJECTION SUBCUTANEOUS at 05:32

## 2023-12-17 RX ADMIN — HYDROMORPHONE HYDROCHLORIDE 1 MILLIGRAM(S): 2 INJECTION INTRAMUSCULAR; INTRAVENOUS; SUBCUTANEOUS at 21:41

## 2023-12-17 RX ADMIN — DULOXETINE HYDROCHLORIDE 60 MILLIGRAM(S): 30 CAPSULE, DELAYED RELEASE ORAL at 21:22

## 2023-12-17 RX ADMIN — HYDROMORPHONE HYDROCHLORIDE 1 MILLIGRAM(S): 2 INJECTION INTRAMUSCULAR; INTRAVENOUS; SUBCUTANEOUS at 14:01

## 2023-12-17 RX ADMIN — HYDROMORPHONE HYDROCHLORIDE 1 MILLIGRAM(S): 2 INJECTION INTRAMUSCULAR; INTRAVENOUS; SUBCUTANEOUS at 00:40

## 2023-12-17 RX ADMIN — HYDROMORPHONE HYDROCHLORIDE 1 MILLIGRAM(S): 2 INJECTION INTRAMUSCULAR; INTRAVENOUS; SUBCUTANEOUS at 09:17

## 2023-12-17 RX ADMIN — CEFEPIME 100 MILLIGRAM(S): 1 INJECTION, POWDER, FOR SOLUTION INTRAMUSCULAR; INTRAVENOUS at 05:32

## 2023-12-17 RX ADMIN — Medication 400 MILLIGRAM(S): at 16:09

## 2023-12-17 RX ADMIN — HYDROMORPHONE HYDROCHLORIDE 1 MILLIGRAM(S): 2 INJECTION INTRAMUSCULAR; INTRAVENOUS; SUBCUTANEOUS at 06:10

## 2023-12-17 RX ADMIN — OXYCODONE HYDROCHLORIDE 10 MILLIGRAM(S): 5 TABLET ORAL at 08:35

## 2023-12-17 RX ADMIN — HYDROMORPHONE HYDROCHLORIDE 1 MILLIGRAM(S): 2 INJECTION INTRAMUSCULAR; INTRAVENOUS; SUBCUTANEOUS at 18:33

## 2023-12-17 RX ADMIN — DALFAMPRIDINE 10 MILLIGRAM(S): 10 TABLET, FILM COATED, EXTENDED RELEASE ORAL at 11:43

## 2023-12-17 RX ADMIN — Medication 0.5 MILLIGRAM(S): at 16:11

## 2023-12-17 RX ADMIN — CHLORHEXIDINE GLUCONATE 1 APPLICATION(S): 213 SOLUTION TOPICAL at 11:41

## 2023-12-17 RX ADMIN — HYDROMORPHONE HYDROCHLORIDE 1 MILLIGRAM(S): 2 INJECTION INTRAMUSCULAR; INTRAVENOUS; SUBCUTANEOUS at 14:31

## 2023-12-17 RX ADMIN — HYDROMORPHONE HYDROCHLORIDE 1 MILLIGRAM(S): 2 INJECTION INTRAMUSCULAR; INTRAVENOUS; SUBCUTANEOUS at 05:33

## 2023-12-17 RX ADMIN — HYDROMORPHONE HYDROCHLORIDE 1 MILLIGRAM(S): 2 INJECTION INTRAMUSCULAR; INTRAVENOUS; SUBCUTANEOUS at 08:47

## 2023-12-17 RX ADMIN — CEFEPIME 100 MILLIGRAM(S): 1 INJECTION, POWDER, FOR SOLUTION INTRAMUSCULAR; INTRAVENOUS at 13:58

## 2023-12-17 RX ADMIN — HYDROMORPHONE HYDROCHLORIDE 1 MILLIGRAM(S): 2 INJECTION INTRAMUSCULAR; INTRAVENOUS; SUBCUTANEOUS at 00:10

## 2023-12-17 RX ADMIN — CEFEPIME 100 MILLIGRAM(S): 1 INJECTION, POWDER, FOR SOLUTION INTRAMUSCULAR; INTRAVENOUS at 21:37

## 2023-12-17 RX ADMIN — Medication 0.5 MILLIGRAM(S): at 21:20

## 2023-12-17 RX ADMIN — OXYCODONE HYDROCHLORIDE 10 MILLIGRAM(S): 5 TABLET ORAL at 07:35

## 2023-12-17 RX ADMIN — SERTRALINE 100 MILLIGRAM(S): 25 TABLET, FILM COATED ORAL at 21:21

## 2023-12-17 RX ADMIN — SENNA PLUS 2 TABLET(S): 8.6 TABLET ORAL at 21:21

## 2023-12-17 RX ADMIN — HYDROMORPHONE HYDROCHLORIDE 1 MILLIGRAM(S): 2 INJECTION INTRAMUSCULAR; INTRAVENOUS; SUBCUTANEOUS at 17:33

## 2023-12-17 RX ADMIN — HYDROMORPHONE HYDROCHLORIDE 1 MILLIGRAM(S): 2 INJECTION INTRAMUSCULAR; INTRAVENOUS; SUBCUTANEOUS at 22:30

## 2023-12-17 RX ADMIN — Medication 1000 MILLIGRAM(S): at 16:39

## 2023-12-17 NOTE — PROGRESS NOTE ADULT - ASSESSMENT
43F PMHx of MS on Rituxan, presenting with left chest wall and shoulder pain w/ concern for infection now admitted with likely septic arthritis with need for possible washout and debridement    POD # 1 Debridement and washout of Left.  sternoclavicular joint excision Left clavicular head  Sita 80cc serosanguinous.  GS no org seen.  Cultures pending.  12/9 + RVP>no treatment as per ID  DC PCA  somnolent  Pain mgmt notified Transition oral Rx    12/10    pain medication w oxycodone and dilaudid for breakthrough,     Lt CW staples w bulb   w mininimal drainage   abx per ID  12/11: Left CW with staples with bulb, with minimal drainage. continue antibiotics per ID. continue optimal pain control.   12/12    lt cw  staples  w erythma  drain    several staples removed and packed by Dr Bautista,  abx  per ID  12/13  wbc 10.8  afebrile.  wound without change since yesterday.    OR c/s NGTD  BC  NGTD.    12/14 VSS. afebrile. wound dressing changed at bedside. Awaiting final OR/Blood cultures. Left sita d/c'ed (output 0cc/2cc overnight/ 24hr). Keep NPO p MN. Need 2 active T&S. Plan for OR washing out & wound vac placement tomorrow.  12/15 OR today  Washout/debridement sternoclavicular joint> VAC placed  12/16 Cont abx as per ID Maintain VAC  12/17 VSS - IV antibx - Maintain VAC.  Care as per Primary Team.

## 2023-12-17 NOTE — PROGRESS NOTE ADULT - SUBJECTIVE AND OBJECTIVE BOX
Subjective: Patient seen and examined. No new events except as noted.   anxious   left arm pain   sister at bedside   didnt sleep last night   REVIEW OF SYSTEMS:    CONSTITUTIONAL: +weakness, fevers or chills  EYES/ENT: No visual changes;  No vertigo or throat pain   NECK: No pain or stiffness  RESPIRATORY: No cough, wheezing, hemoptysis; No shortness of breath  CARDIOVASCULAR: No chest pain or palpitations  GASTROINTESTINAL: No abdominal or epigastric pain. No nausea, vomiting, or hematemesis; No diarrhea or constipation. No melena or hematochezia.  GENITOURINARY: No dysuria, frequency or hematuria  NEUROLOGICAL: No numbness or weakness  SKIN: No itching, burning, rashes, or lesions   All other review of systems is negative unless indicated above.    MEDICATIONS:  MEDICATIONS  (STANDING):  acetaminophen   IVPB .. 1000 milliGRAM(s) IV Intermittent once  cefepime   IVPB 2000 milliGRAM(s) IV Intermittent every 8 hours  chlorhexidine 2% Cloths 1 Application(s) Topical <User Schedule>  DULoxetine 60 milliGRAM(s) Oral at bedtime  enoxaparin Injectable 40 milliGRAM(s) SubCutaneous every 24 hours  lactated ringers. 1000 milliLiter(s) (75 mL/Hr) IV Continuous <Continuous>  senna 2 Tablet(s) Oral at bedtime  sertraline 100 milliGRAM(s) Oral at bedtime      PHYSICAL EXAM:  T(C): 36.8 (12-17-23 @ 05:18), Max: 36.9 (12-16-23 @ 22:45)  HR: 84 (12-17-23 @ 05:18) (82 - 91)  BP: 106/69 (12-17-23 @ 05:18) (93/58 - 108/68)  RR: 18 (12-17-23 @ 05:18) (17 - 18)  SpO2: 97% (12-17-23 @ 05:18) (96% - 99%)  Wt(kg): --  I&O's Summary    16 Dec 2023 07:01  -  17 Dec 2023 07:00  --------------------------------------------------------  IN: 1300 mL / OUT: 0 mL / NET: 1300 mL          Appearance: NAD  HEENT:   Normal oral mucosa, PERRL, EOMI	  Lymphatic: No lymphadenopathy , no edema   L clavicular region w/ black sponge CAV dsg secured w/ good seal, minimal serousag drainage noted in collection chamber  Cardiovascular: Normal S1 S2, No JVD, No murmurs , Peripheral pulses palpable 2+ bilaterally  Respiratory: Lungs clear to auscultation, normal effort 	  Gastrointestinal:  Soft, Non-tender, + BS	  Skin: No rashes, No ecchymoses, No cyanosis, warm to touch  Musculoskeletal: Normal range of motion, normal strength  Psychiatry:  Mood & affect appropriate  Ext: No edema        LABS:    CARDIAC MARKERS:                                8.0    10.90 )-----------( 421      ( 17 Dec 2023 07:29 )             26.3     12-15    136  |  99  |  8   ----------------------------<  122<H>  4.4   |  26  |  0.41<L>    Ca    8.9      15 Dec 2023 23:59  Phos  3.1     12-15  Mg     2.1     12-15    TPro  6.0  /  Alb  3.2<L>  /  TBili  0.2  /  DBili  x   /  AST  9<L>  /  ALT  10  /  AlkPhos  155<H>  12-15    proBNP:   Lipid Profile:   HgA1c:   TSH:             TELEMETRY: 	    ECG:  	  RADIOLOGY:   DIAGNOSTIC TESTING:  [ ] Echocardiogram:  [ ]  Catheterization:  [ ] Stress Test:    OTHER:

## 2023-12-17 NOTE — PROGRESS NOTE ADULT - SUBJECTIVE AND OBJECTIVE BOX
Date of service: 12-17-23 @ 13:23      Patient is a 43y old  Female who presents with a chief complaint of L shoulder and chest pain (17 Dec 2023 10:43)                                                               INTERVAL HPI/OVERNIGHT EVENTS:    REVIEW OF SYSTEMS:     CONSTITUTIONAL: No weakness, fevers or chills  RESPIRATORY: No cough, wheezing,  No shortness of breath  CARDIOVASCULAR: No chest pain or palpitations  GASTROINTESTINAL: No abdominal pain  . No nausea, vomiting, or hematemesis; No diarrhea or constipation. No melena or hematochezia.  GENITOURINARY: No dysuria, frequency or hematuria  NEUROLOGICAL: No numbness or weakness                                                                                                                                                                                                                                                                                   Medications:  MEDICATIONS  (STANDING):  acetaminophen   IVPB .. 1000 milliGRAM(s) IV Intermittent once  cefepime   IVPB 2000 milliGRAM(s) IV Intermittent every 8 hours  chlorhexidine 2% Cloths 1 Application(s) Topical <User Schedule>  dalfampridine ER 10 milliGRAM(s) Oral every 12 hours  DULoxetine 60 milliGRAM(s) Oral at bedtime  enoxaparin Injectable 40 milliGRAM(s) SubCutaneous every 24 hours  senna 2 Tablet(s) Oral at bedtime  sertraline 100 milliGRAM(s) Oral at bedtime  Vibegron (Gemtesa) 75 milliGRAM(s),Vibegron (Gemtesa) 75mg tablet 1 Tablet(s) 1 Tablet(s) Oral daily    MEDICATIONS  (PRN):  clonazePAM  Tablet 0.5 milliGRAM(s) Oral three times a day PRN anxiety  HYDROmorphone  Injectable 1 milliGRAM(s) IV Push every 3 hours PRN Severe Pain (7 - 10)  oxyCODONE    IR 10 milliGRAM(s) Oral every 6 hours PRN Moderate Pain (4 - 6)  oxyCODONE    IR 5 milliGRAM(s) Oral every 6 hours PRN Mild Pain (1 - 3)       Allergies    No Known Allergies    Intolerances      Vital Signs Last 24 Hrs  T(C): 36.6 (17 Dec 2023 13:20), Max: 36.9 (16 Dec 2023 22:45)  T(F): 97.9 (17 Dec 2023 13:20), Max: 98.4 (16 Dec 2023 22:45)  HR: 81 (17 Dec 2023 13:20) (81 - 91)  BP: 106/70 (17 Dec 2023 13:20) (93/58 - 109/65)  BP(mean): --  RR: 18 (17 Dec 2023 13:20) (17 - 18)  SpO2: 98% (17 Dec 2023 13:20) (95% - 99%)    Parameters below as of 17 Dec 2023 13:20  Patient On (Oxygen Delivery Method): room air      CAPILLARY BLOOD GLUCOSE          12-16 @ 07:01  -  12-17 @ 07:00  --------------------------------------------------------  IN: 1300 mL / OUT: 0 mL / NET: 1300 mL    12-17 @ 07:01  -  12-17 @ 13:23  --------------------------------------------------------  IN: 1215 mL / OUT: 0 mL / NET: 1215 mL      Physical Exam:    Daily     Daily   General:  Well appearing, NAD, not cachetic  HEENT:  Nonicteric, PERRLA  CV:  RRR, S1S2   Lungs:  CTA B/L, no wheezes, rales, rhonchi  vac in place   chest wall in dressing   Abdomen:  Soft, non-tender, no distended, positive BS  Extremities:  2+ pulses, no c/c, no edema  Skin:  Warm and dry, no rashes  :  No st[place]  Neuro:  AAOx3, non-focal, grossly intact                                                                                                                                                                                                                                                                                                LABS:                               8.0    10.90 )-----------( 421      ( 17 Dec 2023 07:29 )             26.3                      12-15    136  |  99  |  8   ----------------------------<  122<H>  4.4   |  26  |  0.41<L>    Ca    8.9      15 Dec 2023 23:59  Phos  3.1     12-15  Mg     2.1     12-15    TPro  6.0  /  Alb  3.2<L>  /  TBili  0.2  /  DBili  x   /  AST  9<L>  /  ALT  10  /  AlkPhos  155<H>  12-15                       RADIOLOGY & ADDITIONAL TESTS         I personally reviewed: [  ]EKG   [  ]CXR    [  ] CT      A/P:         Discussed with :     [facility] consultants' Notes   Time spent :

## 2023-12-17 NOTE — PROGRESS NOTE ADULT - PROBLEM SELECTOR PLAN 1
s/p Debridement of left sternoclavicular joint with excision of left clavicular head. Purulent drainage at the sternoclavicular joint. Tissue surrounding left clavicle found to be very inflamed. Copious irrigation of surgical site.   IV abx   OR c/s NGTD  BC  NGTD.   s/p RTOR debridement. VAC placement   MRI reviewed. s/p  removal of staples  Ortho consulted regarding supraspinatus tear. Would prefer NO steroid injection at present time given infection   Pain control   normal TTE  Monitor Hgb . s/p  PRBC x 1 transfusion  DVT ppx.

## 2023-12-17 NOTE — PROGRESS NOTE ADULT - ASSESSMENT
43F PMHx of MS on (Rituxan, Ocrevus) presenting with left chest wall and shoulder pain. Recent history of fall secondary to unsteadiness due to MS. On review of ortho notes, pt has had multiple falls in November. She was admitted to [facility] on Last week of November and was found to have Rhinovirus and pseudomonas UTI. She completed 5-7 days course with Ciprofloxacin. Found to have Septic arthritis of SCM joint now s/p excision.    Patient obtained CT chest w/ IV contrast which showed likely septic arthritis with surrounding erythema. Thoracic surgery consulted, underwent washout and debridement.     OR Note: Debridement of left sternoclavicular joint with excision of left clavicular head. Purulent drainage at the sternoclavicular joint. Tissue surrounding left clavicle found to be very inflamed. Copious irrigation of surgical site.  Specimen sent: Head of left clavicle, culture of left sternoclavicular joint, culture of left manubrium, culture of deep sternal head of clavicle, culture of first rib costal cartilage      In ER: Afebrile, WBC 9.8, , . Given IV Zosyn, IV Vancomycin.     Remains afebrile, WBC stable.     Septic arthritis of the left sternoclavicular joint and involving the   articulation of the sternum with the first costal cartilage.    Extensive surrounding inflammation, with pneumonia in the underlying   subpleural left upper lobe.      # Septic arthritis of L sternoclavicular joint s/p excision and drainage  # L upper lobe opacity, improving on recent CT   # Immunosuppressed patient  # Elevated ESR/CRP.   #Manubrium OM.         PLAN:  - continue cefepime 2 gm Q8hrs  - OR cx negative for bacterial cx.   - MRSA PCR negative,  - follow fungal/AFB cultures; negative so far  - repeat ESR/CRP noted  - Quantiferon negative   - All blood cultures negative to date  - TTE negative   - Recent CT chest with improvement.   - thoracic following, plan for repeat washout today - f/up repeat OR cultures, wound vac in place  - will need PICC and 6 weeks of abx. iv.          D/w pt and family and cardiology    My associate to see pt tomorrow.  ID service available today x [phone]    Leslie Navarro MD  [phone] (pager)  [phone] (office)    43F PMHx of MS on (Rituxan, Ocrevus) presenting with left chest wall and shoulder pain. Recent history of fall secondary to unsteadiness due to MS. On review of ortho notes, pt has had multiple falls in November. She was admitted to [facility] on Last week of November and was found to have Rhinovirus and pseudomonas UTI. She completed 5-7 days course with Ciprofloxacin. Found to have Septic arthritis of SCM joint now s/p excision.    Patient obtained CT chest w/ IV contrast which showed likely septic arthritis with surrounding erythema. Thoracic surgery consulted, underwent washout and debridement.     OR Note: Debridement of left sternoclavicular joint with excision of left clavicular head. Purulent drainage at the sternoclavicular joint. Tissue surrounding left clavicle found to be very inflamed. Copious irrigation of surgical site.  Specimen sent: Head of left clavicle, culture of left sternoclavicular joint, culture of left manubrium, culture of deep sternal head of clavicle, culture of first rib costal cartilage      In ER: Afebrile, WBC 9.8, , . Given IV Zosyn, IV Vancomycin.     Remains afebrile, WBC stable.     Septic arthritis of the left sternoclavicular joint and involving the   articulation of the sternum with the first costal cartilage.    Extensive surrounding inflammation, with pneumonia in the underlying   subpleural left upper lobe.      # Septic arthritis of L sternoclavicular joint s/p excision and drainage  # L upper lobe opacity, improving on recent CT   # Immunosuppressed patient  # Elevated ESR/CRP.   #Manubrium OM.         PLAN:  - continue cefepime 2 gm Q8hrs  - OR cx negative for bacterial cx.   - MRSA PCR negative,  - follow fungal/AFB cultures; negative so far  - repeat ESR/CRP noted  - Quantiferon negative   - All blood cultures negative to date  - TTE negative   - Recent CT chest with improvement.   - thoracic following, plan for repeat washout today - f/up repeat OR cultures, wound vac in place  - will need PICC and 6 weeks of abx. iv.          D/w pt and family and cardiology    My associate to see pt tomorrow.  ID service available today x [phone]    Leslie Navarro MD  [phone] (pager)  [phone] (office)

## 2023-12-17 NOTE — PROGRESS NOTE ADULT - ASSESSMENT
43F PMHx of MS on immunosuppressive medication, presenting with left chest wall and shoulder pain. Recent history of fall secondary to unsteadiness due to MS. Obtained outpatient MRI of left sternoclavicular joint with results significant for marrow edema and swelling of the left sternoclavicular joint with a 2 cm fluid collection and adjacent pleural edema in the left chest with concerns for infectious etiology.  Patient initially saw orthopedics with for concerns for septic arthritis of this joint.  Impression of the read showed soft tissue abscess superficial to the joint.  Recommended CT scan with IV contrast of the chest.  Patient denies any fevers however endorsing erythema to the left sternoclavicular joint region. Patient states she was recently hospitalized over a week ago at [facility] for falls secondary to unsteadiness due to her MS.  Denies any recent falls today, vision changes. Endorsing headache. Denies any chest pain, shortness of breath, abdominal pain, nausea vomiting diarrhea, urinary complaints.  CT chest with IV in ED significant for Septic arthritis of the left sternoclavicular joint and involving the articulation of the sternum with the first costal cartilage.Extensive surrounding inflammation, with pneumonia in the underlying subpleural left upper lobe.No fluid collection is evident.  Workup significant for elevated alk phos (248), . VSS and afebrile  s/p Debridement of left sternoclavicular joint with excision of left clavicular head. Purulent drainage at the sternoclavicular joint. Tissue surrounding left clavicle found to be very inflamed. Copious irrigation of surgical site.   Concerned about pain at surgical site.    at bedside     43F PMHx of MS on immunosuppressive medication, presenting with left chest wall and shoulder pain. Recent history of fall secondary to unsteadiness due to MS. Obtained outpatient MRI of left sternoclavicular joint with results significant for marrow edema and swelling of the left sternoclavicular joint with a 2 cm fluid collection and adjacent pleural edema in the left chest with concerns for infectious etiology.  Patient initially saw orthopedics with for concerns for septic arthritis of this joint.  Impression of the read showed soft tissue abscess superficial to the joint.  Recommended CT scan with IV contrast of the chest.  Patient denies any fevers however endorsing erythema to the left sternoclavicular joint region. Patient states she was recently hospitalized over a week ago at [facility] for falls secondary to unsteadiness due to her MS.  Denies any recent falls today, vision changes. Endorsing headache. Denies any chest pain, shortness of breath, abdominal pain, nausea vomiting diarrhea, urinary complaints.  CT chest with IV in ED significant for Septic arthritis of the left sternoclavicular joint and involving the articulation of the sternum with the first costal cartilage.Extensive surrounding inflammation, with pneumonia in the underlying subpleural left upper lobe.No fluid collection is evident.  Workup significant for elevated alk phos (248), . VSS and afebrile  s/p Debridement of left sternoclavicular joint with excision of left clavicular head. Purulent drainage at the sternoclavicular joint. Tissue surrounding left clavicle found to be very inflamed. Copious irrigation of surgical site.   Concerned about pain at surgical site.    at bedside

## 2023-12-17 NOTE — PROGRESS NOTE ADULT - SUBJECTIVE AND OBJECTIVE BOX
43yPatient is a 43y old  Female who presents with a chief complaint of L shoulder and chest pain (15 Dec 2023 15:22)      Interval history:  Afebrile, s/p removal of LISBETH.   Has wound vac now  S/p debridement  OR cultures pending      Allergies:   No Known Allergies      Antimicrobials:  cefepime   IVPB 2000 milliGRAM(s) IV Intermittent every 8 hours      REVIEW OF SYSTEMS:  No SOB  No abdominal pain  No dysuria   No rash.         Vital Signs Last 24 Hrs  T(F): 98.3 (12-17-23 @ 05:18), Max: 98.4 (12-16-23 @ 22:45)  HR: 84 (12-17-23 @ 05:18)  BP: 106/69 (12-17-23 @ 05:18)  RR: 18 (12-17-23 @ 05:18)  SpO2: 97% (12-17-23 @ 05:18) (96% - 99%)  Wt(kg): --      PHYSICAL EXAM:  Pt in no acute distress, alert, awake.   lt chest dressing.  non distended abdomen  no edema LE   no phlebitis   Wound vac in place left shoulder                          8.0    10.90 )-----------( 421      ( 17 Dec 2023 07:29 )             26.3 12-15    136  |  99  |  8   ----------------------------<  122  4.4   |  26  |  0.41  Ca    8.9      15 Dec 2023 23:59Phos  3.1     12-15Mg     2.1     12-15  TPro  6.0  /  Alb  3.2  /  TBili  0.2  /  DBili  x   /  AST  9   /  ALT  10  /  AlkPhos  155  12-15                            7.1    7.58  )-----------( 371      ( 15 Dec 2023 06:37 )             23.0   12-15    137  |  99  |  9   ----------------------------<  96  4.2   |  28  |  0.43<L>    Ca    9.5      15 Dec 2023 06:37  Phos  3.6     12-15  Mg     2.0     12-15    TPro  6.4  /  Alb  3.7  /  TBili  0.1<L>  /  DBili  x   /  AST  12  /  ALT  13  /  AlkPhos  185<H>  12-14      LIVER FUNCTIONS - ( 14 Dec 2023 07:12 )  Alb: 3.7 g/dL / Pro: 6.4 g/dL / ALK PHOS: 185 U/L / ALT: 13 U/L / AST: 12 U/L / GGT: x             Culture - Tissue with Gram Stain (collected 16 Dec 2023 03:41)  Source: .Tissue Other  Gram Stain (16 Dec 2023 22:11):    No polymorphonuclear cells seen per low power field    No organisms seen per oil power field  Preliminary Report (17 Dec 2023 08:02):    No growth    Culture - Tissue with Gram Stain (collected 16 Dec 2023 03:30)  Source: .Tissue Other  Gram Stain (16 Dec 2023 22:15):    No polymorphonuclear cells seen per low power field    No organisms seen per oil power field  Preliminary Report (17 Dec 2023 08:00):    No growth        Culture - Blood (12.11.23 @ 17:46)   Specimen Source: .Blood Blood  Culture Results:   No growth at 72 Hours      r< from: Xray Chest 1 View AP/PA (12.16.23 @ 02:42) >  IMPRESSION:  No pneumothorax. Lungs are clear.    < end of copied text >

## 2023-12-17 NOTE — PROGRESS NOTE ADULT - ASSESSMENT
43F PMHx of MS on Rituxan, presenting with left chest wall and shoulder pain w/ concern for infection now admitted with likely septic arthritis with need for possible washout and debridement       Problem/Plan - 1:  ·  Problem: Septic arthritis. left shoulder joint   ·  Plan: Imaging consistent with septic arthritis  s/p OR   cont abx and fu cultures   check ECHO : no vegetation   fu with ID   d/w with CTS at bedside : drainage adjusted and fx improved   cont current abx   pt with LUE pain : tessie referred pain from L [id number] and chest however  Cervical MRI : no disciits / abcess   discussed with pt , family at length at bedside   d/w surgery : OR :   washout and debridement of sternoclavicular joint; resection of medial manubrium; white and black wound vac placed  will plan picc         # c/o left arm swelling and pain in elbow region :  -doppler ordered to r/o DVT : Negative      Problem/Plan - 2:  ·  Problem: Multiple sclerosis.   ·  Plan: Gets Rituxan every 6 months, next dose due : holding   -On Ampyra BID at home, need to bring home med     Problem/Plan - 3:  ·  Problem: Anxiety.   ·  Plan: ISTOP reviewed Reference #: [id number]  -Cont. Clonazepam 0.5mg QHS PRN  -Cont. Sertraline 100mg QHS  -Cont. Duloxetine 60mg QHS, pt unsure of dose  -Need full med rec.     Problem/Plan - 4:  ·  Problem: Anemia.   ·  Plan: hgb 9, lower than prior year. Pt endorses some recent anemia but cannot specify  -Trend cbc  -Monitor for bleeding.     Problem/Plan - 5:  ·  Problem: Prophylactic measure.   ·  Plan: DVT PPx    hyperkalemia : normalized    43F PMHx of MS on Rituxan, presenting with left chest wall and shoulder pain w/ concern for infection now admitted with likely septic arthritis with need for possible washout and debridement       Problem/Plan - 1:  ·  Problem: Septic arthritis. left shoulder joint   ·  Plan: Imaging consistent with septic arthritis  s/p OR   cont abx and fu cultures   check ECHO : no vegetation   fu with ID   d/w with CTS at bedside : drainage adjusted and fx improved   cont current abx   pt with LUE pain : tessie referred pain from L [id number] and chest however  Cervical MRI : no disciits / abcess   discussed with pt , family at length at bedside   d/w surgery : OR :   washout and debridement of sternoclavicular joint; resection of medial manubrium; white and black wound vac placed  will plan picc         # c/o left arm swelling and pain in elbow region :  -doppler ordered to r/o DVT : Negative      Problem/Plan - 2:  ·  Problem: Multiple sclerosis.   ·  Plan: Gets Rituxan every 6 months, next dose due : holding   -On Ampyra BID at home, need to bring home med     Problem/Plan - 3:  ·  Problem: Anxiety.   ·  Plan: ISTOP reviewed Reference #: [id number]  -Cont. Clonazepam 0.5mg QHS PRN  -Cont. Sertraline 100mg QHS  -Cont. Duloxetine 60mg QHS, pt unsure of dose  -Need full med rec.     Problem/Plan - 4:  ·  Problem: Anemia.   ·  Plan: hgb 9, lower than prior year. Pt endorses some recent anemia but cannot specify  -Trend cbc  -Monitor for bleeding.     Problem/Plan - 5:  ·  Problem: Prophylactic measure.   ·  Plan: DVT PPx    hyperkalemia : normalized

## 2023-12-18 ENCOUNTER — TRANSCRIPTION ENCOUNTER (OUTPATIENT)
Age: 43
End: 2023-12-18

## 2023-12-18 LAB
HCT VFR BLD CALC: 26.7 % — LOW (ref 34.5–45)
HCT VFR BLD CALC: 26.7 % — LOW (ref 34.5–45)
HGB BLD-MCNC: 8.3 G/DL — LOW (ref 11.5–15.5)
HGB BLD-MCNC: 8.3 G/DL — LOW (ref 11.5–15.5)
MCHC RBC-ENTMCNC: 25.4 PG — LOW (ref 27–34)
MCHC RBC-ENTMCNC: 25.4 PG — LOW (ref 27–34)
MCHC RBC-ENTMCNC: 31.1 GM/DL — LOW (ref 32–36)
MCHC RBC-ENTMCNC: 31.1 GM/DL — LOW (ref 32–36)
MCV RBC AUTO: 81.7 FL — SIGNIFICANT CHANGE UP (ref 80–100)
MCV RBC AUTO: 81.7 FL — SIGNIFICANT CHANGE UP (ref 80–100)
NRBC # BLD: 0 /100 WBCS — SIGNIFICANT CHANGE UP (ref 0–0)
NRBC # BLD: 0 /100 WBCS — SIGNIFICANT CHANGE UP (ref 0–0)
PLATELET # BLD AUTO: 404 K/UL — HIGH (ref 150–400)
PLATELET # BLD AUTO: 404 K/UL — HIGH (ref 150–400)
RBC # BLD: 3.27 M/UL — LOW (ref 3.8–5.2)
RBC # BLD: 3.27 M/UL — LOW (ref 3.8–5.2)
RBC # FLD: 14.8 % — HIGH (ref 10.3–14.5)
RBC # FLD: 14.8 % — HIGH (ref 10.3–14.5)
WBC # BLD: 12.51 K/UL — HIGH (ref 3.8–10.5)
WBC # BLD: 12.51 K/UL — HIGH (ref 3.8–10.5)
WBC # FLD AUTO: 12.51 K/UL — HIGH (ref 3.8–10.5)
WBC # FLD AUTO: 12.51 K/UL — HIGH (ref 3.8–10.5)

## 2023-12-18 PROCEDURE — 99232 SBSQ HOSP IP/OBS MODERATE 35: CPT

## 2023-12-18 PROCEDURE — 97606 NEG PRS WND THER DME>50 SQCM: CPT | Mod: 78

## 2023-12-18 RX ORDER — HYDROMORPHONE HYDROCHLORIDE 2 MG/ML
2 INJECTION INTRAMUSCULAR; INTRAVENOUS; SUBCUTANEOUS ONCE
Refills: 0 | Status: DISCONTINUED | OUTPATIENT
Start: 2023-12-18 | End: 2023-12-18

## 2023-12-18 RX ORDER — HYDROMORPHONE HYDROCHLORIDE 2 MG/ML
1 INJECTION INTRAMUSCULAR; INTRAVENOUS; SUBCUTANEOUS
Refills: 0 | Status: DISCONTINUED | OUTPATIENT
Start: 2023-12-18 | End: 2023-12-18

## 2023-12-18 RX ORDER — ACETAMINOPHEN 500 MG
1000 TABLET ORAL ONCE
Refills: 0 | Status: COMPLETED | OUTPATIENT
Start: 2023-12-18 | End: 2023-12-18

## 2023-12-18 RX ORDER — HYDROMORPHONE HYDROCHLORIDE 2 MG/ML
0.5 INJECTION INTRAMUSCULAR; INTRAVENOUS; SUBCUTANEOUS
Refills: 0 | Status: DISCONTINUED | OUTPATIENT
Start: 2023-12-18 | End: 2023-12-18

## 2023-12-18 RX ADMIN — Medication 1000 MILLIGRAM(S): at 09:16

## 2023-12-18 RX ADMIN — ENOXAPARIN SODIUM 40 MILLIGRAM(S): 100 INJECTION SUBCUTANEOUS at 05:35

## 2023-12-18 RX ADMIN — HYDROMORPHONE HYDROCHLORIDE 0.5 MILLIGRAM(S): 2 INJECTION INTRAMUSCULAR; INTRAVENOUS; SUBCUTANEOUS at 17:30

## 2023-12-18 RX ADMIN — HYDROMORPHONE HYDROCHLORIDE 1 MILLIGRAM(S): 2 INJECTION INTRAMUSCULAR; INTRAVENOUS; SUBCUTANEOUS at 10:45

## 2023-12-18 RX ADMIN — CEFEPIME 100 MILLIGRAM(S): 1 INJECTION, POWDER, FOR SOLUTION INTRAMUSCULAR; INTRAVENOUS at 05:34

## 2023-12-18 RX ADMIN — HYDROMORPHONE HYDROCHLORIDE 1 MILLIGRAM(S): 2 INJECTION INTRAMUSCULAR; INTRAVENOUS; SUBCUTANEOUS at 21:27

## 2023-12-18 RX ADMIN — DALFAMPRIDINE 10 MILLIGRAM(S): 10 TABLET, FILM COATED, EXTENDED RELEASE ORAL at 22:15

## 2023-12-18 RX ADMIN — Medication 1000 MILLIGRAM(S): at 02:30

## 2023-12-18 RX ADMIN — Medication 0.5 MILLIGRAM(S): at 14:44

## 2023-12-18 RX ADMIN — HYDROMORPHONE HYDROCHLORIDE 1 MILLIGRAM(S): 2 INJECTION INTRAMUSCULAR; INTRAVENOUS; SUBCUTANEOUS at 05:35

## 2023-12-18 RX ADMIN — HYDROMORPHONE HYDROCHLORIDE 1 MILLIGRAM(S): 2 INJECTION INTRAMUSCULAR; INTRAVENOUS; SUBCUTANEOUS at 17:30

## 2023-12-18 RX ADMIN — HYDROMORPHONE HYDROCHLORIDE 0.5 MILLIGRAM(S): 2 INJECTION INTRAMUSCULAR; INTRAVENOUS; SUBCUTANEOUS at 16:50

## 2023-12-18 RX ADMIN — CHLORHEXIDINE GLUCONATE 1 APPLICATION(S): 213 SOLUTION TOPICAL at 08:42

## 2023-12-18 RX ADMIN — Medication 0.5 MILLIGRAM(S): at 23:18

## 2023-12-18 RX ADMIN — HYDROMORPHONE HYDROCHLORIDE 1 MILLIGRAM(S): 2 INJECTION INTRAMUSCULAR; INTRAVENOUS; SUBCUTANEOUS at 12:54

## 2023-12-18 RX ADMIN — HYDROMORPHONE HYDROCHLORIDE 1 MILLIGRAM(S): 2 INJECTION INTRAMUSCULAR; INTRAVENOUS; SUBCUTANEOUS at 06:05

## 2023-12-18 RX ADMIN — HYDROMORPHONE HYDROCHLORIDE 1 MILLIGRAM(S): 2 INJECTION INTRAMUSCULAR; INTRAVENOUS; SUBCUTANEOUS at 09:45

## 2023-12-18 RX ADMIN — HYDROMORPHONE HYDROCHLORIDE 1 MILLIGRAM(S): 2 INJECTION INTRAMUSCULAR; INTRAVENOUS; SUBCUTANEOUS at 21:57

## 2023-12-18 RX ADMIN — CEFEPIME 100 MILLIGRAM(S): 1 INJECTION, POWDER, FOR SOLUTION INTRAMUSCULAR; INTRAVENOUS at 14:43

## 2023-12-18 RX ADMIN — HYDROMORPHONE HYDROCHLORIDE 1 MILLIGRAM(S): 2 INJECTION INTRAMUSCULAR; INTRAVENOUS; SUBCUTANEOUS at 13:55

## 2023-12-18 RX ADMIN — Medication 400 MILLIGRAM(S): at 08:16

## 2023-12-18 RX ADMIN — DULOXETINE HYDROCHLORIDE 60 MILLIGRAM(S): 30 CAPSULE, DELAYED RELEASE ORAL at 21:29

## 2023-12-18 RX ADMIN — SERTRALINE 100 MILLIGRAM(S): 25 TABLET, FILM COATED ORAL at 21:29

## 2023-12-18 RX ADMIN — DALFAMPRIDINE 10 MILLIGRAM(S): 10 TABLET, FILM COATED, EXTENDED RELEASE ORAL at 00:18

## 2023-12-18 RX ADMIN — HYDROMORPHONE HYDROCHLORIDE 0.5 MILLIGRAM(S): 2 INJECTION INTRAMUSCULAR; INTRAVENOUS; SUBCUTANEOUS at 17:00

## 2023-12-18 RX ADMIN — Medication 400 MILLIGRAM(S): at 01:35

## 2023-12-18 RX ADMIN — CEFEPIME 100 MILLIGRAM(S): 1 INJECTION, POWDER, FOR SOLUTION INTRAMUSCULAR; INTRAVENOUS at 21:31

## 2023-12-18 RX ADMIN — HYDROMORPHONE HYDROCHLORIDE 0.5 MILLIGRAM(S): 2 INJECTION INTRAMUSCULAR; INTRAVENOUS; SUBCUTANEOUS at 17:15

## 2023-12-18 RX ADMIN — DALFAMPRIDINE 10 MILLIGRAM(S): 10 TABLET, FILM COATED, EXTENDED RELEASE ORAL at 09:46

## 2023-12-18 RX ADMIN — SENNA PLUS 2 TABLET(S): 8.6 TABLET ORAL at 21:28

## 2023-12-18 NOTE — BRIEF OPERATIVE NOTE - NSICDXBRIEFPROCEDURE_GEN_ALL_CORE_FT
PROCEDURES:  Wound VAC dressing change for area 50 sq cm or less 18-Dec-2023 16:31:13  Deidre Bautista E

## 2023-12-18 NOTE — PRE-ANESTHESIA EVALUATION ADULT - NSPREOPDXFT_GEN_ALL_CORE
Septic Arthritis Left Sternoclavicular Joint
septic sternoclavicular joint
Septic sternoclavicular joint

## 2023-12-18 NOTE — PRE-OP CHECKLIST - 2.
emotional support provided
Patient stated she has a tubal ligation. Dr. Bautista aware, no need for HCG.

## 2023-12-18 NOTE — BRIEF OPERATIVE NOTE - NSICDXBRIEFPREOP_GEN_ALL_CORE_FT
PRE-OP DIAGNOSIS:  Septic arthritis of left sternoclavicular joint 07-Dec-2023 13:39:40  Divya Ansari  

## 2023-12-18 NOTE — PROGRESS NOTE ADULT - SUBJECTIVE AND OBJECTIVE BOX
VITAL SIGNS  Vital Signs Last 24 Hrs  T(C): 36.4 (12-18-23 @ 05:18), Max: 36.7 (12-17-23 @ 10:46)  T(F): 97.6 (12-18-23 @ 05:18), Max: 98.1 (12-17-23 @ 17:06)  HR: 80 (12-18-23 @ 05:18) (80 - 88)  BP: 118/75 (12-18-23 @ 05:18) (100/65 - 135/75)  RR: 18 (12-18-23 @ 05:18) (18 - 18)  SpO2: 97% (12-18-23 @ 05:18) (95% - 99%)         12-17 @ 07:01  -  12-18 @ 07:00  --------------------------------------------------------  IN: 2025 mL / OUT: 0 mL / NET: 2025 mL    PHYSICAL EXAM:  Neurology: alert and oriented x 3, nonfocal, no gross deficits  CV : S1S2  LCW VAC IN PLACE-  Lungs: cta  Abdomen: soft, nontender, nondistended, positive bowel sounds, last bowel movement    12/17     Extremities:     no edema, no calf tenderness    cefepime   IVPB 2000 milliGRAM(s) IV Intermittent every 8 hours  chlorhexidine 2% Cloths 1 Application(s) Topical <User Schedule>  clonazePAM  Tablet 0.5 milliGRAM(s) Oral three times a day PRN  dalfampridine ER 10 milliGRAM(s) Oral every 12 hours  DULoxetine 60 milliGRAM(s) Oral at bedtime  enoxaparin Injectable 40 milliGRAM(s) SubCutaneous every 24 hours  HYDROmorphone  Injectable 1 milliGRAM(s) IV Push every 3 hours PRN  oxyCODONE    IR 5 milliGRAM(s) Oral every 6 hours PRN  oxyCODONE    IR 10 milliGRAM(s) Oral every 6 hours PRN  senna 2 Tablet(s) Oral at bedtime  sertraline 100 milliGRAM(s) Oral at bedtime  Vibegron (Gemtesa) 75 milliGRAM(s),Vibegron (Gemtesa) 75mg tablet 1 Tablet(s) 1 Tablet(s) Oral daily    Physical Therapy Rec:   Home  [  ]   Home w/ PT  [ X ]  Rehab  [  ]  Discussed with Cardiothoracic Team at AM rounds.

## 2023-12-18 NOTE — PRE-ANESTHESIA EVALUATION ADULT - NSANTHOSAYNRD_GEN_A_CORE
No. NESTOR screening performed.  STOP BANG Legend: 0-2 = LOW Risk; 3-4 = INTERMEDIATE Risk; 5-8 = HIGH Risk
No. NESTOR screening performed.  STOP BANG Legend: 0-2 = LOW Risk; 3-4 = INTERMEDIATE Risk; 5-8 = HIGH Risk

## 2023-12-18 NOTE — PROGRESS NOTE ADULT - SUBJECTIVE AND OBJECTIVE BOX
Subjective: Patient seen and examined. No new events except as noted.   seen this am   mom at bedside   for VAC change today     REVIEW OF SYSTEMS:    CONSTITUTIONAL: + weakness, fevers or chills  EYES/ENT: No visual changes;  No vertigo or throat pain   NECK: No pain or stiffness  RESPIRATORY: No cough, wheezing, hemoptysis; No shortness of breath  CARDIOVASCULAR: No chest pain or palpitations  GASTROINTESTINAL: No abdominal or epigastric pain. No nausea, vomiting, or hematemesis; No diarrhea or constipation. No melena or hematochezia.  GENITOURINARY: No dysuria, frequency or hematuria  NEUROLOGICAL: No numbness or weakness  SKIN: No itching, burning, rashes, or lesions   All other review of systems is negative unless indicated above.    MEDICATIONS:  MEDICATIONS  (STANDING):  cefepime   IVPB 2000 milliGRAM(s) IV Intermittent every 8 hours  chlorhexidine 2% Cloths 1 Application(s) Topical <User Schedule>  dalfampridine ER 10 milliGRAM(s) Oral every 12 hours  DULoxetine 60 milliGRAM(s) Oral at bedtime  senna 2 Tablet(s) Oral at bedtime  sertraline 100 milliGRAM(s) Oral at bedtime  Vibegron (Gemtesa) 75 milliGRAM(s),Vibegron (Gemtesa) 75mg tablet 1 Tablet(s) 1 Tablet(s) Oral daily      PHYSICAL EXAM:  T(C): 37.1 (12-18-23 @ 19:01), Max: 37.1 (12-18-23 @ 19:01)  HR: 90 (12-18-23 @ 19:01) (80 - 100)  BP: 101/68 (12-18-23 @ 19:01) (100/65 - 135/75)  RR: 18 (12-18-23 @ 19:01) (14 - 18)  SpO2: 98% (12-18-23 @ 19:01) (94% - 99%)  Wt(kg): --  I&O's Summary    17 Dec 2023 07:01  -  18 Dec 2023 07:00  --------------------------------------------------------  IN: 2025 mL / OUT: 0 mL / NET: 2025 mL    18 Dec 2023 07:01  -  18 Dec 2023 19:06  --------------------------------------------------------  IN: 500 mL / OUT: 0 mL / NET: 500 mL      Height (cm): 166.4 (12-18 @ 15:11)  Weight (kg): 62.6 (12-18 @ 15:11)  BMI (kg/m2): 22.6 (12-18 @ 15:11)  BSA (m2): 1.7 (12-18 @ 15:11)    Appearance: NAD  HEENT:   Normal oral mucosa, PERRL, EOMI	  Lymphatic: No lymphadenopathy , no edema   L clavicular region w/ black sponge CAV dsg secured w/ good seal, minimal serousag drainage noted in collection chamber  Cardiovascular: Normal S1 S2, No JVD, No murmurs , Peripheral pulses palpable 2+ bilaterally  Respiratory: Lungs clear to auscultation, normal effort 	  Gastrointestinal:  Soft, Non-tender, + BS	  Skin: No rashes, No ecchymoses, No cyanosis, warm to touch  Musculoskeletal: Normal range of motion, normal strength  Psychiatry:  Mood & affect appropriate  Ext: No edema      LABS:    CARDIAC MARKERS:                                8.3    12.51 )-----------( 404      ( 18 Dec 2023 12:00 )             26.7           proBNP:   Lipid Profile:   HgA1c:   TSH:             TELEMETRY: 	    ECG:  	  RADIOLOGY:   DIAGNOSTIC TESTING:  [ ] Echocardiogram:  [ ]  Catheterization:  [ ] Stress Test:    OTHER:

## 2023-12-18 NOTE — BRIEF OPERATIVE NOTE - OPERATION/FINDINGS
exchange of left chest wall vac - 6 x 4 base, 11 x 4 x 1.5 cm superficial   white sponge at base, black on top.   small drain wound - packed with piece of black sponge and bridge for suction

## 2023-12-18 NOTE — PROGRESS NOTE ADULT - ASSESSMENT
43F PMHx of MS on (Rituxan, Ocrevus) presenting with left chest wall and shoulder pain. Recent history of fall secondary to unsteadiness due to MS. On review of ortho notes, pt has had multiple falls in November. She was admitted to [facility] on Last week of November and was found to have Rhinovirus and pseudomonas UTI. She completed 5-7 days course with Ciprofloxacin. Found to have Septic arthritis of SCM joint now s/p excision.    Patient obtained CT chest w/ IV contrast which showed likely septic arthritis with surrounding erythema. Thoracic surgery consulted, underwent washout and debridement.     OR Note: Debridement of left sternoclavicular joint with excision of left clavicular head. Purulent drainage at the sternoclavicular joint. Tissue surrounding left clavicle found to be very inflamed. Copious irrigation of surgical site.  Specimen sent: Head of left clavicle, culture of left sternoclavicular joint, culture of left manubrium, culture of deep sternal head of clavicle, culture of first rib costal cartilage      In ER: Afebrile, WBC 9.8, , . Given IV Zosyn, IV Vancomycin.     Remains afebrile, WBC stable.     Septic arthritis of the left sternoclavicular joint and involving the   articulation of the sternum with the first costal cartilage.    Extensive surrounding inflammation, with pneumonia in the underlying   subpleural left upper lobe.      # Septic arthritis of L sternoclavicular joint s/p excision and drainage  # L upper lobe opacity, improving on recent CT   # Immunosuppressed patient  # Elevated ESR/CRP.   #Manubrium OM. s/p repeat washout and debridement.         PLAN:  - continue cefepime 2 gm Q8hrs  - OR cx negative for bacterial cx.   - MRSA PCR negative,  - follow fungal/AFB cultures; negative so far  - repeat ESR/CRP noted  - Quantiferon negative   - All blood cultures negative.   - TTE negative   - thoracic following, plan for vac in OR today.   - will need PICC and 6 weeks of abx. iv. from the day of repeat debridement until 1/25/24.           Odalis Reynaga  Please contact through MS Teams   If no response or past 5 pm/weekend call [phone].    43F PMHx of MS on (Rituxan, Ocrevus) presenting with left chest wall and shoulder pain. Recent history of fall secondary to unsteadiness due to MS. On review of ortho notes, pt has had multiple falls in November. She was admitted to [facility] on Last week of November and was found to have Rhinovirus and pseudomonas UTI. She completed 5-7 days course with Ciprofloxacin. Found to have Septic arthritis of SCM joint now s/p excision.    Patient obtained CT chest w/ IV contrast which showed likely septic arthritis with surrounding erythema. Thoracic surgery consulted, underwent washout and debridement.     OR Note: Debridement of left sternoclavicular joint with excision of left clavicular head. Purulent drainage at the sternoclavicular joint. Tissue surrounding left clavicle found to be very inflamed. Copious irrigation of surgical site.  Specimen sent: Head of left clavicle, culture of left sternoclavicular joint, culture of left manubrium, culture of deep sternal head of clavicle, culture of first rib costal cartilage      In ER: Afebrile, WBC 9.8, , . Given IV Zosyn, IV Vancomycin.     Remains afebrile, WBC stable.     Septic arthritis of the left sternoclavicular joint and involving the   articulation of the sternum with the first costal cartilage.    Extensive surrounding inflammation, with pneumonia in the underlying   subpleural left upper lobe.      # Septic arthritis of L sternoclavicular joint s/p excision and drainage  # L upper lobe opacity, improving on recent CT   # Immunosuppressed patient  # Elevated ESR/CRP.   #Manubrium OM. s/p repeat washout and debridement.         PLAN:  - continue cefepime 2 gm Q8hrs  - OR cx negative for bacterial cx.   - MRSA PCR negative,  - follow fungal/AFB cultures; negative so far  - repeat ESR/CRP noted  - Quantiferon negative   - All blood cultures negative.   - TTE negative   - thoracic following, plan for vac in OR today.   - will need PICC and 6 weeks of abx. iv. from the day of repeat debridement until 1/25/24.           Odalis Reynaga  Please contact through MS Teams   If no response or past 5 pm/weekend call [phone].

## 2023-12-18 NOTE — PROGRESS NOTE ADULT - PROBLEM SELECTOR PLAN 1
- Left cw VAC in place w/ good seal  - OOB  - add protein supplement to meals  - Pain control   - Plan d/w Attending  12/18 NPO FOR VAC CHANGE TODAY

## 2023-12-18 NOTE — BRIEF OPERATIVE NOTE - SPECIMENS
Head of left clavicle, culture of left sternoclavicular joint, culture of left manubrium, culture of deep sternal head of clavicle, culture of first rib costal cartilage
manubrium; left 1st rib
none

## 2023-12-18 NOTE — PROGRESS NOTE ADULT - ASSESSMENT
43F PMHx of MS on immunosuppressive medication, presenting with left chest wall and shoulder pain. Recent history of fall secondary to unsteadiness due to MS. Obtained outpatient MRI of left sternoclavicular joint with results significant for marrow edema and swelling of the left sternoclavicular joint with a 2 cm fluid collection and adjacent pleural edema in the left chest with concerns for infectious etiology.  Patient initially saw orthopedics with for concerns for septic arthritis of this joint.  Impression of the read showed soft tissue abscess superficial to the joint.  Recommended CT scan with IV contrast of the chest.  Patient denies any fevers however endorsing erythema to the left sternoclavicular joint region. Patient states she was recently hospitalized over a week ago at [facility] for falls secondary to unsteadiness due to her MS.  Denies any recent falls today, vision changes. Endorsing headache. Denies any chest pain, shortness of breath, abdominal pain, nausea vomiting diarrhea, urinary complaints.  CT chest with IV in ED significant for Septic arthritis of the left sternoclavicular joint and involving the articulation of the sternum with the first costal cartilage.Extensive surrounding inflammation, with pneumonia in the underlying subpleural left upper lobe.No fluid collection is evident.  Workup significant for elevated alk phos (248), . VSS and afebrile  s/p Debridement of left sternoclavicular joint with excision of left clavicular head. Purulent drainage at the sternoclavicular joint. Tissue surrounding left clavicle found to be very inflamed. Copious irrigation of surgical site.   Concerned about pain at surgical site.    at bedside     43F PMHx of MS on immunosuppressive medication, presenting with left chest wall and shoulder pain. Recent history of fall secondary to unsteadiness due to MS. Obtained outpatient MRI of left sternoclavicular joint with results significant for marrow edema and swelling of the left sternoclavicular joint with a 2 cm fluid collection and adjacent pleural edema in the left chest with concerns for infectious etiology.  Patient initially saw orthopedics with for concerns for septic arthritis of this joint.  Impression of the read showed soft tissue abscess superficial to the joint.  Recommended CT scan with IV contrast of the chest.  Patient denies any fevers however endorsing erythema to the left sternoclavicular joint region. Patient states she was recently hospitalized over a week ago at [facility] for falls secondary to unsteadiness due to her MS.  Denies any recent falls today, vision changes. Endorsing headache. Denies any chest pain, shortness of breath, abdominal pain, nausea vomiting diarrhea, urinary complaints.  CT chest with IV in ED significant for Septic arthritis of the left sternoclavicular joint and involving the articulation of the sternum with the first costal cartilage.Extensive surrounding inflammation, with pneumonia in the underlying subpleural left upper lobe.No fluid collection is evident.  Workup significant for elevated alk phos (248), . VSS and afebrile  s/p Debridement of left sternoclavicular joint with excision of left clavicular head. Purulent drainage at the sternoclavicular joint. Tissue surrounding left clavicle found to be very inflamed. Copious irrigation of surgical site.   Concerned about pain at surgical site.    at bedside

## 2023-12-18 NOTE — PROGRESS NOTE ADULT - ASSESSMENT
43F PMHx of MS on Rituxan, presenting with left chest wall and shoulder pain w/ concern for infection now admitted with likely septic arthritis with need for possible washout and debridement    POD # 1 Debridement and washout of Left.  sternoclavicular joint excision Left clavicular head  Sita 80cc serosanguinous.  GS no org seen.  Cultures pending.  12/9 + RVP>no treatment as per ID  DC PCA  somnolent  Pain mgmt notified Transition oral Rx    12/10    pain medication w oxycodone and dilaudid for breakthrough,     Lt CW staples w bulb   w mininimal drainage   abx per ID  12/11: Left CW with staples with bulb, with minimal drainage. continue antibiotics per ID. continue optimal pain control.   12/12    lt cw  staples  w erythma  drain    several staples removed and packed by Dr Lilly,  abx  per ID  12/13  wbc 10.8  afebrile.  wound without change since yesterday.    OR c/s NGTD  BC  NGTD.    12/14 VSS. afebrile. wound dressing changed at bedside. Awaiting final OR/Blood cultures. Left sita d/c'ed (output 0cc/2cc overnight/ 24hr). Keep NPO p MN. Need 2 active T&S. Plan for OR washing out & wound vac placement tomorrow.  12/15 OR today  Washout/debridement sternoclavicular joint> VAC placed  12/16 Cont abx as per ID Maintain VAC  12/17 VSS - IV antibx - Maintain VAC.  Care as per Primary Team.  12/18 vss- NPO FOR VAC CHANGE IN OR - NO PREGNANCY TEST REQUIRED AS PT. UNDERWENT TUBAL LIGATION.  SPOKE TO HERB IN OR- DR LILLY MADE AWARE

## 2023-12-18 NOTE — PROGRESS NOTE ADULT - SUBJECTIVE AND OBJECTIVE BOX
Date of service: 12-18-23 @ 15:52      Patient is a 43y old  Female who presents with a chief complaint of L shoulder and chest pain (18 Dec 2023 15:37)                                                               INTERVAL HPI/OVERNIGHT EVENTS:    REVIEW OF SYSTEMS:     CONSTITUTIONAL: No weakness, fevers or chills  RESPIRATORY: No cough, wheezing,  No shortness of breath  CARDIOVASCULAR: No chest pain or palpitations  GASTROINTESTINAL: No abdominal pain  . No nausea, vomiting, or hematemesis; No diarrhea or constipation. No melena or hematochezia.  GENITOURINARY: No dysuria, frequency or hematuria  NEUROLOGICAL: No numbness or weakness                                                                                                                                                                                                                                                                             Medications:  MEDICATIONS  (STANDING):  cefepime   IVPB 2000 milliGRAM(s) IV Intermittent every 8 hours  chlorhexidine 2% Cloths 1 Application(s) Topical <User Schedule>  dalfampridine ER 10 milliGRAM(s) Oral every 12 hours  DULoxetine 60 milliGRAM(s) Oral at bedtime  senna 2 Tablet(s) Oral at bedtime  sertraline 100 milliGRAM(s) Oral at bedtime  Vibegron (Gemtesa) 75 milliGRAM(s),Vibegron (Gemtesa) 75mg tablet 1 Tablet(s) 1 Tablet(s) Oral daily    MEDICATIONS  (PRN):  clonazePAM  Tablet 0.5 milliGRAM(s) Oral three times a day PRN anxiety  HYDROmorphone  Injectable 1 milliGRAM(s) IV Push every 3 hours PRN Severe Pain (7 - 10)  oxyCODONE    IR 10 milliGRAM(s) Oral every 6 hours PRN Moderate Pain (4 - 6)  oxyCODONE    IR 5 milliGRAM(s) Oral every 6 hours PRN Mild Pain (1 - 3)       Allergies    No Known Allergies    Intolerances      Vital Signs Last 24 Hrs  T(C): 36.7 (18 Dec 2023 15:34), Max: 36.7 (17 Dec 2023 17:06)  T(F): 98.1 (18 Dec 2023 15:34), Max: 98.1 (17 Dec 2023 17:06)  HR: 90 (18 Dec 2023 15:34) (80 - 90)  BP: 107/71 (18 Dec 2023 15:34) (100/65 - 135/75)  BP(mean): 74 (18 Dec 2023 15:11) (74 - 74)  RR: 16 (18 Dec 2023 15:34) (16 - 18)  SpO2: 97% (18 Dec 2023 15:34) (96% - 99%)    Parameters below as of 18 Dec 2023 15:11    O2 Flow (L/min): 2    CAPILLARY BLOOD GLUCOSE          12-17 @ 07:01  -  12-18 @ 07:00  --------------------------------------------------------  IN: 2025 mL / OUT: 0 mL / NET: 2025 mL      Physical Exam:    Daily Height in cm: 166.37 (18 Dec 2023 15:11)    Daily   General:  Well appearing, NAD, not cachetic  HEENT:  Nonicteric, PERRLA  CV:  RRR, S1S2   Lungs:  CTA B/L, no wheezes, rales, rhonchi  chest walll dressing and   Abdomen:  Soft, non-tender, no distended, positive BS  Extremities:  no edema   Neuro:  AAOx3, non-focal, grossly intact                                                                                                                                                                                                                                                                                                LABS:                               8.3    12.51 )-----------( 404      ( 18 Dec 2023 12:00 )             26.7                                               RADIOLOGY & ADDITIONAL TESTS         I personally reviewed: [  ]EKG   [  ]CXR    [  ] CT      A/P:         Discussed with :     [facility] consultants' Notes   Time spent :

## 2023-12-18 NOTE — PROGRESS NOTE ADULT - ASSESSMENT
43F PMHx of MS on Rituxan, presenting with left chest wall and shoulder pain w/ concern for infection now admitted with likely septic arthritis with need for possible washout and debridement       Problem/Plan - 1:  ·  Problem: Septic arthritis. left shoulder joint   ·  Plan: Imaging consistent with septic arthritis  s/p OR   cont abx and fu cultures   check ECHO : no vegetation   fu with ID   d/w with CTS at bedside : drainage adjusted and fx improved   cont current abx   pt with LUE pain : tessie referred pain from L [id number] and chest however  Cervical MRI : no disciits / abcess   discussed with pt , family at length at bedside   washout and debridement of sternoclavicular joint; resection of medial manubrium; white and black wound vac placed    again discussed with surgery :plan for vac change in OR   PICC   and arrangements for dc         # c/o left arm swelling and pain in elbow region :  -doppler ordered to r/o DVT : Negative      Problem/Plan - 2:  ·  Problem: Multiple sclerosis.   ·  Plan: Gets Rituxan every 6 months, next dose due : holding   -On Ampyra BID at home, need to bring home med     Problem/Plan - 3:  ·  Problem: Anxiety.   ·  Plan: ISTOP reviewed Reference #: [id number]  -Cont. Clonazepam 0.5mg QHS PRN  -Cont. Sertraline 100mg QHS  -Cont. Duloxetine 60mg QHS, pt unsure of dose  -Need full med rec.     Problem/Plan - 4:  ·  Problem: Anemia.   ·  Plan: hgb 9, lower than prior year. Pt endorses some recent anemia but cannot specify  -Trend cbc  -Monitor for bleeding.     Problem/Plan - 5:  ·  Problem: Prophylactic measure.   ·  Plan: DVT PPx    hyperkalemia : normalized    43F PMHx of MS on Rituxan, presenting with left chest wall and shoulder pain w/ concern for infection now admitted with likely septic arthritis with need for possible washout and debridement       Problem/Plan - 1:  ·  Problem: Septic arthritis. left shoulder joint   ·  Plan: Imaging consistent with septic arthritis  s/p OR   cont abx and fu cultures   check ECHO : no vegetation   fu with ID   d/w with CTS at bedside : drainage adjusted and fx improved   cont current abx   pt with LUE pain : tessie referred pain from L [id number] and chest however  Cervical MRI : no disciits / abcess   discussed with pt , family at length at bedside   washout and debridement of sternoclavicular joint; resection of medial manubrium; white and black wound vac placed    again discussed with surgery :plan for vac change in OR   PICC   and arrangements for dc         # c/o left arm swelling and pain in elbow region :  -doppler ordered to r/o DVT : Negative      Problem/Plan - 2:  ·  Problem: Multiple sclerosis.   ·  Plan: Gets Rituxan every 6 months, next dose due : holding   -On Ampyra BID at home, need to bring home med     Problem/Plan - 3:  ·  Problem: Anxiety.   ·  Plan: ISTOP reviewed Reference #: [id number]  -Cont. Clonazepam 0.5mg QHS PRN  -Cont. Sertraline 100mg QHS  -Cont. Duloxetine 60mg QHS, pt unsure of dose  -Need full med rec.     Problem/Plan - 4:  ·  Problem: Anemia.   ·  Plan: hgb 9, lower than prior year. Pt endorses some recent anemia but cannot specify  -Trend cbc  -Monitor for bleeding.     Problem/Plan - 5:  ·  Problem: Prophylactic measure.   ·  Plan: DVT PPx    hyperkalemia : normalized

## 2023-12-18 NOTE — PRE-OP CHECKLIST - 1.
Emotional support and pre op teaching provided to patient and boyfriend NADJA with verbalized understanding,.
anxious

## 2023-12-19 ENCOUNTER — APPOINTMENT (OUTPATIENT)
Dept: ORTHOPEDIC SURGERY | Facility: CLINIC | Age: 43
End: 2023-12-19

## 2023-12-19 LAB
ANION GAP SERPL CALC-SCNC: 13 MMOL/L — SIGNIFICANT CHANGE UP (ref 5–17)
ANION GAP SERPL CALC-SCNC: 13 MMOL/L — SIGNIFICANT CHANGE UP (ref 5–17)
BUN SERPL-MCNC: 6 MG/DL — LOW (ref 7–23)
BUN SERPL-MCNC: 6 MG/DL — LOW (ref 7–23)
CALCIUM SERPL-MCNC: 9.4 MG/DL — SIGNIFICANT CHANGE UP (ref 8.4–10.5)
CALCIUM SERPL-MCNC: 9.4 MG/DL — SIGNIFICANT CHANGE UP (ref 8.4–10.5)
CHLORIDE SERPL-SCNC: 98 MMOL/L — SIGNIFICANT CHANGE UP (ref 96–108)
CHLORIDE SERPL-SCNC: 98 MMOL/L — SIGNIFICANT CHANGE UP (ref 96–108)
CO2 SERPL-SCNC: 26 MMOL/L — SIGNIFICANT CHANGE UP (ref 22–31)
CO2 SERPL-SCNC: 26 MMOL/L — SIGNIFICANT CHANGE UP (ref 22–31)
CREAT SERPL-MCNC: 0.41 MG/DL — LOW (ref 0.5–1.3)
CREAT SERPL-MCNC: 0.41 MG/DL — LOW (ref 0.5–1.3)
EGFR: 124 ML/MIN/1.73M2 — SIGNIFICANT CHANGE UP
EGFR: 124 ML/MIN/1.73M2 — SIGNIFICANT CHANGE UP
GLUCOSE SERPL-MCNC: 101 MG/DL — HIGH (ref 70–99)
GLUCOSE SERPL-MCNC: 101 MG/DL — HIGH (ref 70–99)
HCT VFR BLD CALC: 28.5 % — LOW (ref 34.5–45)
HCT VFR BLD CALC: 28.5 % — LOW (ref 34.5–45)
HGB BLD-MCNC: 8.9 G/DL — LOW (ref 11.5–15.5)
HGB BLD-MCNC: 8.9 G/DL — LOW (ref 11.5–15.5)
MCHC RBC-ENTMCNC: 25.4 PG — LOW (ref 27–34)
MCHC RBC-ENTMCNC: 25.4 PG — LOW (ref 27–34)
MCHC RBC-ENTMCNC: 31.2 GM/DL — LOW (ref 32–36)
MCHC RBC-ENTMCNC: 31.2 GM/DL — LOW (ref 32–36)
MCV RBC AUTO: 81.2 FL — SIGNIFICANT CHANGE UP (ref 80–100)
MCV RBC AUTO: 81.2 FL — SIGNIFICANT CHANGE UP (ref 80–100)
NRBC # BLD: 0 /100 WBCS — SIGNIFICANT CHANGE UP (ref 0–0)
NRBC # BLD: 0 /100 WBCS — SIGNIFICANT CHANGE UP (ref 0–0)
PLATELET # BLD AUTO: 471 K/UL — HIGH (ref 150–400)
PLATELET # BLD AUTO: 471 K/UL — HIGH (ref 150–400)
POTASSIUM SERPL-MCNC: 4.2 MMOL/L — SIGNIFICANT CHANGE UP (ref 3.5–5.3)
POTASSIUM SERPL-MCNC: 4.2 MMOL/L — SIGNIFICANT CHANGE UP (ref 3.5–5.3)
POTASSIUM SERPL-SCNC: 4.2 MMOL/L — SIGNIFICANT CHANGE UP (ref 3.5–5.3)
POTASSIUM SERPL-SCNC: 4.2 MMOL/L — SIGNIFICANT CHANGE UP (ref 3.5–5.3)
RBC # BLD: 3.51 M/UL — LOW (ref 3.8–5.2)
RBC # BLD: 3.51 M/UL — LOW (ref 3.8–5.2)
RBC # FLD: 15.3 % — HIGH (ref 10.3–14.5)
RBC # FLD: 15.3 % — HIGH (ref 10.3–14.5)
SARS-COV-2 RNA SPEC QL NAA+PROBE: SIGNIFICANT CHANGE UP
SARS-COV-2 RNA SPEC QL NAA+PROBE: SIGNIFICANT CHANGE UP
SODIUM SERPL-SCNC: 137 MMOL/L — SIGNIFICANT CHANGE UP (ref 135–145)
SODIUM SERPL-SCNC: 137 MMOL/L — SIGNIFICANT CHANGE UP (ref 135–145)
WBC # BLD: 9.37 K/UL — SIGNIFICANT CHANGE UP (ref 3.8–10.5)
WBC # BLD: 9.37 K/UL — SIGNIFICANT CHANGE UP (ref 3.8–10.5)
WBC # FLD AUTO: 9.37 K/UL — SIGNIFICANT CHANGE UP (ref 3.8–10.5)
WBC # FLD AUTO: 9.37 K/UL — SIGNIFICANT CHANGE UP (ref 3.8–10.5)

## 2023-12-19 PROCEDURE — 99232 SBSQ HOSP IP/OBS MODERATE 35: CPT

## 2023-12-19 PROCEDURE — 71045 X-RAY EXAM CHEST 1 VIEW: CPT | Mod: 26

## 2023-12-19 PROCEDURE — 99222 1ST HOSP IP/OBS MODERATE 55: CPT

## 2023-12-19 RX ORDER — HYDROMORPHONE HYDROCHLORIDE 2 MG/ML
4 INJECTION INTRAMUSCULAR; INTRAVENOUS; SUBCUTANEOUS EVERY 6 HOURS
Refills: 0 | Status: DISCONTINUED | OUTPATIENT
Start: 2023-12-19 | End: 2023-12-26

## 2023-12-19 RX ORDER — ACETAMINOPHEN 500 MG
650 TABLET ORAL EVERY 6 HOURS
Refills: 0 | Status: DISCONTINUED | OUTPATIENT
Start: 2023-12-19 | End: 2023-12-27

## 2023-12-19 RX ORDER — CYCLOBENZAPRINE HYDROCHLORIDE 10 MG/1
5 TABLET, FILM COATED ORAL
Refills: 0 | Status: DISCONTINUED | OUTPATIENT
Start: 2023-12-19 | End: 2024-01-05

## 2023-12-19 RX ORDER — OXYCODONE HYDROCHLORIDE 5 MG/1
10 TABLET ORAL EVERY 12 HOURS
Refills: 0 | Status: DISCONTINUED | OUTPATIENT
Start: 2023-12-19 | End: 2023-12-26

## 2023-12-19 RX ORDER — HYDROMORPHONE HYDROCHLORIDE 2 MG/ML
2 INJECTION INTRAMUSCULAR; INTRAVENOUS; SUBCUTANEOUS EVERY 4 HOURS
Refills: 0 | Status: DISCONTINUED | OUTPATIENT
Start: 2023-12-19 | End: 2023-12-26

## 2023-12-19 RX ADMIN — SERTRALINE 100 MILLIGRAM(S): 25 TABLET, FILM COATED ORAL at 22:19

## 2023-12-19 RX ADMIN — CHLORHEXIDINE GLUCONATE 1 APPLICATION(S): 213 SOLUTION TOPICAL at 05:21

## 2023-12-19 RX ADMIN — HYDROMORPHONE HYDROCHLORIDE 1 MILLIGRAM(S): 2 INJECTION INTRAMUSCULAR; INTRAVENOUS; SUBCUTANEOUS at 02:12

## 2023-12-19 RX ADMIN — Medication 650 MILLIGRAM(S): at 18:24

## 2023-12-19 RX ADMIN — HYDROMORPHONE HYDROCHLORIDE 1 MILLIGRAM(S): 2 INJECTION INTRAMUSCULAR; INTRAVENOUS; SUBCUTANEOUS at 05:46

## 2023-12-19 RX ADMIN — SENNA PLUS 2 TABLET(S): 8.6 TABLET ORAL at 22:19

## 2023-12-19 RX ADMIN — HYDROMORPHONE HYDROCHLORIDE 1 MILLIGRAM(S): 2 INJECTION INTRAMUSCULAR; INTRAVENOUS; SUBCUTANEOUS at 01:42

## 2023-12-19 RX ADMIN — CEFEPIME 100 MILLIGRAM(S): 1 INJECTION, POWDER, FOR SOLUTION INTRAMUSCULAR; INTRAVENOUS at 14:10

## 2023-12-19 RX ADMIN — HYDROMORPHONE HYDROCHLORIDE 1 MILLIGRAM(S): 2 INJECTION INTRAMUSCULAR; INTRAVENOUS; SUBCUTANEOUS at 18:50

## 2023-12-19 RX ADMIN — HYDROMORPHONE HYDROCHLORIDE 4 MILLIGRAM(S): 2 INJECTION INTRAMUSCULAR; INTRAVENOUS; SUBCUTANEOUS at 22:04

## 2023-12-19 RX ADMIN — HYDROMORPHONE HYDROCHLORIDE 1 MILLIGRAM(S): 2 INJECTION INTRAMUSCULAR; INTRAVENOUS; SUBCUTANEOUS at 12:25

## 2023-12-19 RX ADMIN — CEFEPIME 100 MILLIGRAM(S): 1 INJECTION, POWDER, FOR SOLUTION INTRAMUSCULAR; INTRAVENOUS at 22:19

## 2023-12-19 RX ADMIN — CYCLOBENZAPRINE HYDROCHLORIDE 5 MILLIGRAM(S): 10 TABLET, FILM COATED ORAL at 18:24

## 2023-12-19 RX ADMIN — Medication 0.5 MILLIGRAM(S): at 20:29

## 2023-12-19 RX ADMIN — DALFAMPRIDINE 10 MILLIGRAM(S): 10 TABLET, FILM COATED, EXTENDED RELEASE ORAL at 10:04

## 2023-12-19 RX ADMIN — OXYCODONE HYDROCHLORIDE 10 MILLIGRAM(S): 5 TABLET ORAL at 18:24

## 2023-12-19 RX ADMIN — DULOXETINE HYDROCHLORIDE 60 MILLIGRAM(S): 30 CAPSULE, DELAYED RELEASE ORAL at 22:19

## 2023-12-19 RX ADMIN — HYDROMORPHONE HYDROCHLORIDE 1 MILLIGRAM(S): 2 INJECTION INTRAMUSCULAR; INTRAVENOUS; SUBCUTANEOUS at 08:32

## 2023-12-19 RX ADMIN — CEFEPIME 100 MILLIGRAM(S): 1 INJECTION, POWDER, FOR SOLUTION INTRAMUSCULAR; INTRAVENOUS at 05:16

## 2023-12-19 RX ADMIN — HYDROMORPHONE HYDROCHLORIDE 4 MILLIGRAM(S): 2 INJECTION INTRAMUSCULAR; INTRAVENOUS; SUBCUTANEOUS at 15:47

## 2023-12-19 RX ADMIN — HYDROMORPHONE HYDROCHLORIDE 1 MILLIGRAM(S): 2 INJECTION INTRAMUSCULAR; INTRAVENOUS; SUBCUTANEOUS at 05:16

## 2023-12-19 RX ADMIN — HYDROMORPHONE HYDROCHLORIDE 4 MILLIGRAM(S): 2 INJECTION INTRAMUSCULAR; INTRAVENOUS; SUBCUTANEOUS at 22:34

## 2023-12-19 NOTE — DIETITIAN INITIAL EVALUATION ADULT - PROBLEM SELECTOR PLAN 2
Gets Rituxan every 6 months, next dose due tomorrow as per patient. Multiple recent falls.   -On Ampyra BID at home, need to bring home med  -Resume baclofen if oxycodone insufficient for pain  -Mirabegron substitution for Gemtesa  -Left message with answering service of Dr. Tracy office regarding kitty and postop management of MS.   -Need full med rec

## 2023-12-19 NOTE — DIETITIAN INITIAL EVALUATION ADULT - NSFNSGIASSESSMENTFT_GEN_A_CORE
Denies nausea/vomiting/constipation/diarrhea at present. Last BM was 2 days ago, not on any bowel regimen. Will monitor GI function.

## 2023-12-19 NOTE — PROGRESS NOTE ADULT - SUBJECTIVE AND OBJECTIVE BOX
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  She had a recent fall in 2023 secondary to unsteadiness due to multiple sclerosis, with subsequent left chest wall and shoulder pain.  Was seen at [facility] with concern for MS exacerbation but no MRI brain and cervical spine with an without contrast were done at the time without enhancement.  At the time infectious workup was done and notable for UTI which was treated.  She continued to have pain.  In the interim, she had an outpatient MRI of left sternoclavicular joint with results significant for marrow edema and swelling of the left sternoclavicular joint with a 2 cm fluid collection and adjacent pleural edema in the left chest with concerns for infectious etiology.   CT chest w/ IV contrast suggestive of septic arthritis with surrounding erythema. Thoracic surgery consulted.  Patient s/p surgical debridement on 23    Int hx:  mri with 80 x 25 mm rim-enhancing fluid collection persists in the surgical cavity with well-placed drain within the fluid collection.   Marrow signal abnormality in the manubrium concerning for acute   osteomyelitis.    Low-grade partial-thickness bursal surface tear of the supraspinatus tendon.    Wound vac replaced yesterday.  Patient complains of pain        ******    Pt still with sternal pain, says 10/10, pain also in left arm    Past Medical History:  Multiple sclerosis [G35]        Past Surgical History:  History of  [Z98.891]        Social History:  No toxic habits    Family History:  FAMILY HISTORY:      Allergies:  No Known Allergies      ROS:  Constitutional: Patient offers no complaints of fevers or significant weight loss  Ears, Nose, Mouth and Throat: The patient presents with no abnormalities of the head, ears, eyes, nose or throat  Skin: Patient offers no concerns of new rashes or lesions  Respiratory: The patient presents with no abnormalities of the respiratory tract  Cardiovascular: The patient presents with no cardiac abnormalities  Gastrointestinal: The patient presents with no abnormalities of the GI system  Genitourinary: The patient presents with no dysuria, hematuria or frequent urination  Neurological: See HPI  Endocrine: Patient offers no complaints of excessive thirst, urination, or heat/cold intolerance    Advanced care planning reviewed and noted in the chart.    Medications:  cefepime   IVPB 2000 milliGRAM(s) IV Intermittent every 8 hours  chlorhexidine 2% Cloths 1 Application(s) Topical <User Schedule>  clonazePAM  Tablet 0.5 milliGRAM(s) Oral three times a day PRN  dalfampridine ER 10 milliGRAM(s) Oral every 12 hours  DULoxetine 60 milliGRAM(s) Oral at bedtime  HYDROmorphone  Injectable 1 milliGRAM(s) IV Push every 3 hours PRN  oxyCODONE    IR 5 milliGRAM(s) Oral every 6 hours PRN  oxyCODONE    IR 10 milliGRAM(s) Oral every 6 hours PRN  senna 2 Tablet(s) Oral at bedtime  sertraline 100 milliGRAM(s) Oral at bedtime  Vibegron (Gemtesa) 75 milliGRAM(s),Vibegron (Gemtesa) 75mg tablet 1 Tablet(s) 1 Tablet(s) Oral daily      Labs:  CBC Full  -  ( 19 Dec 2023 06:48 )  WBC Count : 9.37 K/uL  RBC Count : 3.51 M/uL  Hemoglobin : 8.9 g/dL  Hematocrit : 28.5 %  Platelet Count - Automated : 471 K/uL  Mean Cell Volume : 81.2 fl  Mean Cell Hemoglobin : 25.4 pg  Mean Cell Hemoglobin Concentration : 31.2 gm/dL  Auto Neutrophil # : x  Auto Lymphocyte # : x  Auto Monocyte # : x  Auto Eosinophil # : x  Auto Basophil # : x  Auto Neutrophil % : x  Auto Lymphocyte % : x  Auto Monocyte % : x  Auto Eosinophil % : x  Auto Basophil % : x    -    137  |  98  |  6<L>  ----------------------------<  101<H>  4.2   |  26  |  0.41<L>    Ca    9.4      19 Dec 2023 06:48      CAPILLARY BLOOD GLUCOSE            Urinalysis Basic - ( 19 Dec 2023 06:48 )    Color: x / Appearance: x / SG: x / pH: x  Gluc: 101 mg/dL / Ketone: x  / Bili: x / Urobili: x   Blood: x / Protein: x / Nitrite: x   Leuk Esterase: x / RBC: x / WBC x   Sq Epi: x / Non Sq Epi: x / Bacteria: x          Vitals:  Vital Signs Last 24 Hrs  T(C): 36.8 (19 Dec 2023 09:09), Max: 37.3 (18 Dec 2023 20:00)  T(F): 98.3 (19 Dec 2023 09:09), Max: 99.2 (18 Dec 2023 20:00)  HR: 90 (19 Dec 2023 09:09) (80 - 100)  BP: 108/71 (19 Dec 2023 09:09) (101/52 - 126/72)  BP(mean): 80 (18 Dec 2023 18:00) (71 - 84)  RR: 18 (19 Dec 2023 09:09) (14 - 18)  SpO2: 95% (19 Dec 2023 09:09) (94% - 99%)    Parameters below as of 19 Dec 2023 09:09  Patient On (Oxygen Delivery Method): room air                  NEUROLOGICAL EXAM:    Mental status: Awake, alert, and in less apparent distress. Oriented to person, place and time. Language function is normal.      Cranial Nerves: Pupils were equal, round, reactive to light. Extraocular movements were intact. Visual field were full. Fundoscopic exam was deferred. Facial sensation was intact to light touch. There was slight left facia droop. The palate was upgoing symmetrically and tongue was midline.     Motor exam: Bulk and tone were normal. able to move all ext fully without much pain    Reflexes: deferred     Sensation: Intact to light touch/temp    Coordination: no gross dysmetria    Gait: defer   sternal wound seen          ACC: [id number] EXAM:  MR SHOULDER WAW IC LT   ORDERED BY:  BRAN SANCHEZ     ACC: [id number] EXAM:  MR STERNOCLAVICULAR JNT LT   ORDERED BY: ANASTASIIA WALKER     PROCEDURE DATE:  2023          INTERPRETATION:  MRI OF THE LEFT STERNOCLAVICULAR JOINT AND SHOULDER    CLINICAL INFORMATION: Left sternal abscess status post washout with   persistent purulent drainage and left shoulder pain.  TECHNIQUE: Multisequence, multiplanar MRI of the left sternoclavicular   joint. The study was performed before and after the intravenous   administration of 6 ml Gadavist (1.5 cc discarded) .    COMPARISON: Chest CT 2023 and 2023.    FINDINGS:    STERNOCLAVICULAR JOINT:    BONE: Patient status post surgical resection of the left clavicular head.   The surgical margin is sharp with trace edema and no loss of T1   hyperintense signal. In the manubrium there is increased STIR signal with   loss of T1 hyperintense signal and postcontrast enhancement involving the   superolateral leftward aspect of the bone concerning for acute   osteomyelitis (6:9 5:9). No acute fracture. No osteonecrosis.    JOINTS: A residual rim-enhancing fluid collection is seen in the region   of the left sternoclavicular joint measuring 80 x 25 mm. A drainage   catheter is seen within the fluid collection.    SOFT TISSUE: Postsurgical changes are seen along the anterior aspect of   the left sternoclavicular joint. There is a mild amount of edema in the   adjacent pectoralis major muscle. No focal fluid collection in the   anterior mediastinum. Susceptibility artifact from surgical staples are   seen along the anterior chest wall.      SHOULDER JOINT:    ROTATOR CUFF: Low-grade partial-thickness bursal surface tearing of the   supraspinatus tendon. Rotator cuff is otherwise intact.  MUSCLES: No focal muscle edema or atrophy.  BICEPS TENDON: Normal in course and caliber.  GLENOID LABRUM AND GLENOHUMERAL LIGAMENTS: No displaced labral tear.   Inferior glenohumeral ligament is intact.  GLENOHUMERAL CARTILAGE AND SUBCHONDRAL BONE: No full-thickness chondral   loss.  AC JOINT: No AC joint arthropathy.  SYNOVIUM/JOINT FLUID: No glenohumeral joint effusion. No focal fluid in   the subacromial/subdeltoid bursa.  BONE MARROW: No fracture or osteonecrosis. No marrow signal change to   suggest acute osteomyelitis.  NEUROVASCULAR STRUCTURES: Structures of the suprascapular notch,   spinoglenoid notch, and quadrilateral space are normal in course and   caliber.  SUBCUTANEOUS SOFT TISSUES: Edema in the subcutaneous fat of the left   shoulder. No rim-enhancing fluid collection to suggest abscess.        IMPRESSION:  1.  Patient status post surgical resection of the left clavicular head   with washout of the left sternoclavicular joint.  2.  A 80 x 25 mm rim-enhancing fluid collection persists in the surgical   cavity with well-placed drain within the fluid collection.  3.  Marrow signal abnormality in the manubrium concerning for acute   osteomyelitis.  4.  Low-grade partial-thickness bursal surface tear of the supraspinatus   tendon.    --- End of Report ---            RASHIDA HYDE MD; Attending Radiologist  This document has been electronically signed. 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  She had a recent fall in 2023 secondary to unsteadiness due to multiple sclerosis, with subsequent left chest wall and shoulder pain.  Was seen at [facility] with concern for MS exacerbation but no MRI brain and cervical spine with an without contrast were done at the time without enhancement.  At the time infectious workup was done and notable for UTI which was treated.  She continued to have pain.  In the interim, she had an outpatient MRI of left sternoclavicular joint with results significant for marrow edema and swelling of the left sternoclavicular joint with a 2 cm fluid collection and adjacent pleural edema in the left chest with concerns for infectious etiology.   CT chest w/ IV contrast suggestive of septic arthritis with surrounding erythema. Thoracic surgery consulted.  Patient s/p surgical debridement on 23    Int hx:  mri with 80 x 25 mm rim-enhancing fluid collection persists in the surgical cavity with well-placed drain within the fluid collection.   Marrow signal abnormality in the manubrium concerning for acute   osteomyelitis.    Low-grade partial-thickness bursal surface tear of the supraspinatus tendon.    Wound vac replaced yesterday.  Patient complains of pain        ******    Pt still with sternal pain, says 10/10, pain also in left arm    Past Medical History:  Multiple sclerosis [G35]        Past Surgical History:  History of  [Z98.891]        Social History:  No toxic habits    Family History:  FAMILY HISTORY:      Allergies:  No Known Allergies      ROS:  Constitutional: Patient offers no complaints of fevers or significant weight loss  Ears, Nose, Mouth and Throat: The patient presents with no abnormalities of the head, ears, eyes, nose or throat  Skin: Patient offers no concerns of new rashes or lesions  Respiratory: The patient presents with no abnormalities of the respiratory tract  Cardiovascular: The patient presents with no cardiac abnormalities  Gastrointestinal: The patient presents with no abnormalities of the GI system  Genitourinary: The patient presents with no dysuria, hematuria or frequent urination  Neurological: See HPI  Endocrine: Patient offers no complaints of excessive thirst, urination, or heat/cold intolerance    Advanced care planning reviewed and noted in the chart.    Medications:  cefepime   IVPB 2000 milliGRAM(s) IV Intermittent every 8 hours  chlorhexidine 2% Cloths 1 Application(s) Topical <User Schedule>  clonazePAM  Tablet 0.5 milliGRAM(s) Oral three times a day PRN  dalfampridine ER 10 milliGRAM(s) Oral every 12 hours  DULoxetine 60 milliGRAM(s) Oral at bedtime  HYDROmorphone  Injectable 1 milliGRAM(s) IV Push every 3 hours PRN  oxyCODONE    IR 5 milliGRAM(s) Oral every 6 hours PRN  oxyCODONE    IR 10 milliGRAM(s) Oral every 6 hours PRN  senna 2 Tablet(s) Oral at bedtime  sertraline 100 milliGRAM(s) Oral at bedtime  Vibegron (Gemtesa) 75 milliGRAM(s),Vibegron (Gemtesa) 75mg tablet 1 Tablet(s) 1 Tablet(s) Oral daily      Labs:  CBC Full  -  ( 19 Dec 2023 06:48 )  WBC Count : 9.37 K/uL  RBC Count : 3.51 M/uL  Hemoglobin : 8.9 g/dL  Hematocrit : 28.5 %  Platelet Count - Automated : 471 K/uL  Mean Cell Volume : 81.2 fl  Mean Cell Hemoglobin : 25.4 pg  Mean Cell Hemoglobin Concentration : 31.2 gm/dL  Auto Neutrophil # : x  Auto Lymphocyte # : x  Auto Monocyte # : x  Auto Eosinophil # : x  Auto Basophil # : x  Auto Neutrophil % : x  Auto Lymphocyte % : x  Auto Monocyte % : x  Auto Eosinophil % : x  Auto Basophil % : x    -    137  |  98  |  6<L>  ----------------------------<  101<H>  4.2   |  26  |  0.41<L>    Ca    9.4      19 Dec 2023 06:48      CAPILLARY BLOOD GLUCOSE            Urinalysis Basic - ( 19 Dec 2023 06:48 )    Color: x / Appearance: x / SG: x / pH: x  Gluc: 101 mg/dL / Ketone: x  / Bili: x / Urobili: x   Blood: x / Protein: x / Nitrite: x   Leuk Esterase: x / RBC: x / WBC x   Sq Epi: x / Non Sq Epi: x / Bacteria: x          Vitals:  Vital Signs Last 24 Hrs  T(C): 36.8 (19 Dec 2023 09:09), Max: 37.3 (18 Dec 2023 20:00)  T(F): 98.3 (19 Dec 2023 09:09), Max: 99.2 (18 Dec 2023 20:00)  HR: 90 (19 Dec 2023 09:09) (80 - 100)  BP: 108/71 (19 Dec 2023 09:09) (101/52 - 126/72)  BP(mean): 80 (18 Dec 2023 18:00) (71 - 84)  RR: 18 (19 Dec 2023 09:09) (14 - 18)  SpO2: 95% (19 Dec 2023 09:09) (94% - 99%)    Parameters below as of 19 Dec 2023 09:09  Patient On (Oxygen Delivery Method): room air                  NEUROLOGICAL EXAM:    Mental status: Awake, alert, and in less apparent distress. Oriented to person, place and time. Language function is normal.      Cranial Nerves: Pupils were equal, round, reactive to light. Extraocular movements were intact. Visual field were full. Fundoscopic exam was deferred. Facial sensation was intact to light touch. There was slight left facia droop. The palate was upgoing symmetrically and tongue was midline.     Motor exam: Bulk and tone were normal. able to move all ext fully without much pain    Reflexes: deferred     Sensation: Intact to light touch/temp    Coordination: no gross dysmetria    Gait: defer   sternal wound seen          ACC: [id number] EXAM:  MR SHOULDER WAW IC LT   ORDERED BY:  BRAN SANCHEZ     ACC: [id number] EXAM:  MR STERNOCLAVICULAR JNT LT   ORDERED BY: ANASTASIIA WALKER     PROCEDURE DATE:  2023          INTERPRETATION:  MRI OF THE LEFT STERNOCLAVICULAR JOINT AND SHOULDER    CLINICAL INFORMATION: Left sternal abscess status post washout with   persistent purulent drainage and left shoulder pain.  TECHNIQUE: Multisequence, multiplanar MRI of the left sternoclavicular   joint. The study was performed before and after the intravenous   administration of 6 ml Gadavist (1.5 cc discarded) .    COMPARISON: Chest CT 2023 and 2023.    FINDINGS:    STERNOCLAVICULAR JOINT:    BONE: Patient status post surgical resection of the left clavicular head.   The surgical margin is sharp with trace edema and no loss of T1   hyperintense signal. In the manubrium there is increased STIR signal with   loss of T1 hyperintense signal and postcontrast enhancement involving the   superolateral leftward aspect of the bone concerning for acute   osteomyelitis (6:9 5:9). No acute fracture. No osteonecrosis.    JOINTS: A residual rim-enhancing fluid collection is seen in the region   of the left sternoclavicular joint measuring 80 x 25 mm. A drainage   catheter is seen within the fluid collection.    SOFT TISSUE: Postsurgical changes are seen along the anterior aspect of   the left sternoclavicular joint. There is a mild amount of edema in the   adjacent pectoralis major muscle. No focal fluid collection in the   anterior mediastinum. Susceptibility artifact from surgical staples are   seen along the anterior chest wall.      SHOULDER JOINT:    ROTATOR CUFF: Low-grade partial-thickness bursal surface tearing of the   supraspinatus tendon. Rotator cuff is otherwise intact.  MUSCLES: No focal muscle edema or atrophy.  BICEPS TENDON: Normal in course and caliber.  GLENOID LABRUM AND GLENOHUMERAL LIGAMENTS: No displaced labral tear.   Inferior glenohumeral ligament is intact.  GLENOHUMERAL CARTILAGE AND SUBCHONDRAL BONE: No full-thickness chondral   loss.  AC JOINT: No AC joint arthropathy.  SYNOVIUM/JOINT FLUID: No glenohumeral joint effusion. No focal fluid in   the subacromial/subdeltoid bursa.  BONE MARROW: No fracture or osteonecrosis. No marrow signal change to   suggest acute osteomyelitis.  NEUROVASCULAR STRUCTURES: Structures of the suprascapular notch,   spinoglenoid notch, and quadrilateral space are normal in course and   caliber.  SUBCUTANEOUS SOFT TISSUES: Edema in the subcutaneous fat of the left   shoulder. No rim-enhancing fluid collection to suggest abscess.        IMPRESSION:  1.  Patient status post surgical resection of the left clavicular head   with washout of the left sternoclavicular joint.  2.  A 80 x 25 mm rim-enhancing fluid collection persists in the surgical   cavity with well-placed drain within the fluid collection.  3.  Marrow signal abnormality in the manubrium concerning for acute   osteomyelitis.  4.  Low-grade partial-thickness bursal surface tear of the supraspinatus   tendon.    --- End of Report ---            RASHIDA HYDE MD; Attending Radiologist  This document has been electronically signed. Dec 11 2023  5:11PM

## 2023-12-19 NOTE — DIETITIAN INITIAL EVALUATION ADULT - ADD RECOMMEND
-- RD will provide vegan protein-fortified smoothie of day 1x/day (Monday-Friday, 300 amanda 18 gm protein) to provide additional calories and nutrients to encourage PO intake while in house and to aid wound healing.   -- Recommend MVI, pending no medical contraindications, for micronutrient support.   -- Monitor PO intake, GI tolerance, skin integrity, labs, weight, and bowel movement regularity.   -- Will continue to honor food and beverage preferences to maximize level of nutrient intake.  -- Assist with meals PRN and encourage PO intake.

## 2023-12-19 NOTE — DIETITIAN INITIAL EVALUATION ADULT - ENERGY INTAKE
Pt reports good PO intake ~75% of foods provided while in house. Pt aware of menu ordering procedure in house, has been ordering foods as needed to optimize PO intake while in house. Willing to try protein-fortified smoothie of day 1x/day (Monday-Friday, 300 amanda 18 gm protein) to ensure adequate PO intake while in house. Adequate (%)

## 2023-12-19 NOTE — DIETITIAN INITIAL EVALUATION ADULT - OTHER INFO
-- On Abx for septic arthritis, s/p washout and debridement of sternoclavicular joint; resection of medial manubrium, wound vac placed.   -- Most recent lab on 12/19 revealed abnormal BUN 6L, Cr 0.41L.   -- s/p PRBC on 12/16

## 2023-12-19 NOTE — PROGRESS NOTE ADULT - ASSESSMENT
43F PMHx of MS on Rituxan, presenting with left chest wall and shoulder pain w/ concern for infection now admitted with likely septic arthritis with need for possible washout and debridement       Problem/Plan - 1:  ·  Problem: Septic arthritis. left shoulder joint   ·  Plan: Imaging consistent with septic arthritis  s/p OR   cont abx and fu cultures   check ECHO : no vegetation   fu with ID   d/w with CTS at bedside : drainage adjusted and fx improved   cont current abx   pt with LUE pain : kaciley referred pain from L [id number] and chest however  Cervical MRI : no disciits / abcess   discussed with pt , family at length at bedside   washout and debridement of sternoclavicular joint; resection of medial manubrium; white and black wound vac placed  now s/p vac change  PICC oredered   and arrangements for dc : d/w pt and family at [facility]   d/w CM    # c/o left arm swelling and pain in elbow region :  -doppler ordered to r/o DVT : Negative      Problem/Plan - 2:  ·  Problem: Multiple sclerosis.   ·  Plan: Gets Rituxan every 6 months, next dose due : holding   -On Ampyra BID at home, need to bring home med     Problem/Plan - 3:  ·  Problem: Anxiety.   ·  Plan: ISTOP reviewed Reference #: [id number]  -Cont. Clonazepam 0.5mg QHS PRN  -Cont. Sertraline 100mg QHS  -Cont. Duloxetine 60mg QHS, pt unsure of dose  -Need full med rec.     Problem/Plan - 4:  ·  Problem: Anemia.   ·  Plan: hgb 9, lower than prior year. Pt endorses some recent anemia but cannot specify  -Trend cbc  -Monitor for bleeding.     Problem/Plan - 5:  ·  Problem: Prophylactic measure.   ·  Plan: DVT PPx    hyperkalemia : normalized    43F PMHx of MS on Rituxan, presenting with left chest wall and shoulder pain w/ concern for infection now admitted with likely septic arthritis with need for possible washout and debridement       Problem/Plan - 1:  ·  Problem: Septic arthritis. left shoulder joint   ·  Plan: Imaging consistent with septic arthritis  s/p OR   cont abx and fu cultures   check ECHO : no vegetation   fu with ID   d/w with CTS at bedside : drainage adjusted and fx improved   cont current abx   pt with LUE pain : kaciley referred pain from L [id number] and chest however  Cervical MRI : no disciits / abcess   discussed with pt , family at length at bedside   washout and debridement of sternoclavicular joint; resection of medial manubrium; white and black wound vac placed  now s/p vac change  PICC oredered   and arrangements for dc : d/w pt and family at [facility]   d/w CM    # c/o left arm swelling and pain in elbow region :  -doppler ordered to r/o DVT : Negative      Problem/Plan - 2:  ·  Problem: Multiple sclerosis.   ·  Plan: Gets Rituxan every 6 months, next dose due : holding   -On Ampyra BID at home, need to bring home med     Problem/Plan - 3:  ·  Problem: Anxiety.   ·  Plan: ISTOP reviewed Reference #: [id number]  -Cont. Clonazepam 0.5mg QHS PRN  -Cont. Sertraline 100mg QHS  -Cont. Duloxetine 60mg QHS, pt unsure of dose  -Need full med rec.     Problem/Plan - 4:  ·  Problem: Anemia.   ·  Plan: hgb 9, lower than prior year. Pt endorses some recent anemia but cannot specify  -Trend cbc  -Monitor for bleeding.     Problem/Plan - 5:  ·  Problem: Prophylactic measure.   ·  Plan: DVT PPx    hyperkalemia : normalized

## 2023-12-19 NOTE — DIETITIAN INITIAL EVALUATION ADULT - PROBLEM SELECTOR PLAN 1
Appreciate ED, ortho and thoracic surgery recommendations. Imaging consistent with septic arthritis. CRP very elevated. Case discussed with thoracics and gen surgery. Patient is add on case. Currently pt is very reluctant to proceed with further discussion with surgery service over details of procedure and sequelae. Will cont. broad spectrum antibiotics given immunocompromised status and recent hospitalization  -Cont. IV Vancomycin  -Cont. IV Cefepime   -F/u BCxs  -Trend inflammatory markers  -NPO  -Preop labs  -RCRI score tentatively 0, unable to assess functional status 2/2 MS. Denies cardiac history. Given urgency of procedure and limited risk factors, further medical work up unlikely to benefit.   -Pain control  -F/u Thoracic recommendations  -Asked surgical team to further discuss with patient  -Need full med rec

## 2023-12-19 NOTE — PROGRESS NOTE ADULT - SUBJECTIVE AND OBJECTIVE BOX
43y old  Female who presents with a chief complaint of Septic arthritis        Interval history:  Afebrile, lt arm ROM much improved, feels like rt side is now hurting.       Allergies:   No Known Allergies      Antimicrobials:  cefepime   IVPB 2000 milliGRAM(s) IV Intermittent every 8 hours      REVIEW OF SYSTEMS:  No SOB  No abdominal pain  No dysuria   No rash.       Vital Signs Last 24 Hrs  T(C): 37 (12-19-23 @ 17:23), Max: 37.3 (12-18-23 @ 20:00)  T(F): 98.6 (12-19-23 @ 17:23), Max: 99.2 (12-18-23 @ 20:00)  HR: 94 (12-19-23 @ 17:23) (84 - 94)  BP: 105/66 (12-19-23 @ 17:23) (101/68 - 126/72)  BP(mean): --  RR: 18 (12-19-23 @ 17:23) (18 - 18)  SpO2: 94% (12-19-23 @ 17:23) (94% - 99%)      PHYSICAL EXAM:  Pt in no acute distress, alert, awake.   non distended abdomen  no edema LE   no phlebitis   Wound vac in place left shoulder  ROM rt side mostly preserved.                           8.9    9.37  )-----------( 471      ( 19 Dec 2023 06:48 )             28.5   12-19    137  |  98  |  6<L>  ----------------------------<  101<H>  4.2   |  26  |  0.41<L>    Ca    9.4      19 Dec 2023 06:48          Culture - Tissue with Gram Stain (12.16.23 @ 03:41)   Gram Stain:   No polymorphonuclear cells seen per low power field   No organisms seen per oil power field  Specimen Source: .Tissue Other  Culture Results:   No growth    Radiology:      < from: Xray Chest 1 View AP/PA (12.16.23 @ 02:42) >  IMPRESSION:  No pneumothorax. Lungs are clear.

## 2023-12-19 NOTE — PROGRESS NOTE ADULT - ASSESSMENT
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  She had a recent fall in November 2023 secondary to unsteadiness due to multiple sclerosis, with subsequent left chest wall and shoulder pain.  Was seen at [facility] with concern for MS exacerbation but no MRI brain and cervical spine with an without contrast were done at the time without enhancement.  At the time infectious workup was done and notable for UTI which was treated.  She continued to have pain.  In the interim, she had an outpatient MRI of left sternoclavicular joint with results significant for marrow edema and swelling of the left sternoclavicular joint with a 2 cm fluid collection and adjacent pleural edema in the left chest with concerns for infectious etiology.   CT chest w/ IV contrast suggestive of septic arthritis with surrounding erythema. Thoracic surgery consulted.  Patient s/p surgical debridement on 12/7/23    mri with 80 x 25 mm rim-enhancing fluid collection persists in the surgical cavity with well-placed drain within the fluid collection.   Marrow signal abnormality in the manubrium concerning for acute   osteomyelitis.    Low-grade partial-thickness bursal surface tear of the supraspinatus tendon.    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  She had a recent fall in November 2023 secondary to unsteadiness due to multiple sclerosis, with subsequent left chest wall and shoulder pain.  Was seen at [facility] with concern for MS exacerbation but no MRI brain and cervical spine with an without contrast were done at the time without enhancement.  At the time infectious workup was done and notable for UTI which was treated.  She continued to have pain.  In the interim, she had an outpatient MRI of left sternoclavicular joint with results significant for marrow edema and swelling of the left sternoclavicular joint with a 2 cm fluid collection and adjacent pleural edema in the left chest with concerns for infectious etiology.   CT chest w/ IV contrast suggestive of septic arthritis with surrounding erythema. Thoracic surgery consulted.  Patient s/p surgical debridement on 12/7/23    mri with 80 x 25 mm rim-enhancing fluid collection persists in the surgical cavity with well-placed drain within the fluid collection.   Marrow signal abnormality in the manubrium concerning for acute   osteomyelitis.    Low-grade partial-thickness bursal surface tear of the supraspinatus tendon.    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  Now likely pseudo-flare in setting of septic arthritis    Recommendations:  Management of septic arthritis per primary team  s/p surgical debridement 12/7/23  hold ocrelizumab for now given septic arthritis  continue home meds once able to take PO post-surgically  abx as per ID  defer to primary team for pain control   d/w pt and con at bedside

## 2023-12-19 NOTE — DIETITIAN INITIAL EVALUATION ADULT - REASON INDICATOR FOR ASSESSMENT
seen for length of stay. Information obtained from pt, RN, electronic medical record. Chart reviewed, events noted.

## 2023-12-19 NOTE — PROGRESS NOTE ADULT - ASSESSMENT
43F PMHx of MS on Rituxan, presenting with left chest wall and shoulder pain w/ concern for infection now admitted with likely septic arthritis with need for possible washout and debridement    POD # 1 Debridement and washout of Left.  sternoclavicular joint excision Left clavicular head  Sita 80cc serosanguinous.  GS no org seen.  Cultures pending.  12/9 + RVP>no treatment as per ID  DC PCA  somnolent  Pain mgmt notified Transition oral Rx    12/10    pain medication w oxycodone and dilaudid for breakthrough,     Lt CW staples w bulb   w mininimal drainage   abx per ID  12/11: Left CW with staples with bulb, with minimal drainage. continue antibiotics per ID. continue optimal pain control.   12/12    lt cw  staples  w erythma  drain    several staples removed and packed by Dr Lilly,  abx  per ID  12/13  wbc 10.8  afebrile.  wound without change since yesterday.    OR c/s NGTD  BC  NGTD.    12/14 VSS. afebrile. wound dressing changed at bedside. Awaiting final OR/Blood cultures. Left sita d/c'ed (output 0cc/2cc overnight/ 24hr). Keep NPO p MN. Need 2 active T&S. Plan for OR washing out & wound vac placement tomorrow.  12/15 OR today  Washout/debridement sternoclavicular joint> VAC placed  12/16 Cont abx as per ID Maintain VAC  12/17 VSS - IV antibx - Maintain VAC.  Care as per Primary Team.  12/18 vss- NPO FOR VAC CHANGE IN OR - NO PREGNANCY TEST REQUIRED AS PT. UNDERWENT TUBAL LIGATION.  SPOKE TO HERB IN OR- DR LILLY MADE AWARE  12/19 VSS - L sternoclavicular wound vac dressing CDI, draining scant amount 2-3mL of dark blood. + LUE pain radiating to upper back and R shoulder. Receiving Dilaudid 1mg q3h. Discussed with Attending Dr. Lilly.

## 2023-12-19 NOTE — DIETITIAN INITIAL EVALUATION ADULT - PERTINENT MEDS FT
MEDICATIONS  (STANDING):  acetaminophen     Tablet .. 650 milliGRAM(s) Oral every 6 hours  cefepime   IVPB 2000 milliGRAM(s) IV Intermittent every 8 hours  chlorhexidine 2% Cloths 1 Application(s) Topical <User Schedule>  cyclobenzaprine 5 milliGRAM(s) Oral two times a day  dalfampridine ER 10 milliGRAM(s) Oral every 12 hours  DULoxetine 60 milliGRAM(s) Oral at bedtime  oxyCODONE  ER Tablet 10 milliGRAM(s) Oral every 12 hours  senna 2 Tablet(s) Oral at bedtime  sertraline 100 milliGRAM(s) Oral at bedtime  Vibegron (Gemtesa) 75 milliGRAM(s),Vibegron (Gemtesa) 75mg tablet 1 Tablet(s) 1 Tablet(s) Oral daily    MEDICATIONS  (PRN):  clonazePAM  Tablet 0.5 milliGRAM(s) Oral three times a day PRN anxiety  HYDROmorphone   Tablet 4 milliGRAM(s) Oral every 6 hours PRN Severe Pain (7 - 10)  HYDROmorphone   Tablet 2 milliGRAM(s) Oral every 4 hours PRN Moderate Pain (4 - 6)  HYDROmorphone  Injectable 1 milliGRAM(s) IV Push every 3 hours PRN Breakthrough pain

## 2023-12-19 NOTE — DIETITIAN INITIAL EVALUATION ADULT - PERTINENT LABORATORY DATA
12-19    137  |  98  |  6<L>  ----------------------------<  101<H>  4.2   |  26  |  0.41<L>    Ca    9.4      19 Dec 2023 06:48

## 2023-12-19 NOTE — PROGRESS NOTE ADULT - ASSESSMENT
43F PMHx of MS on (Rituxan, Ocrevus) presenting with left chest wall and shoulder pain. Recent history of fall secondary to unsteadiness due to MS. On review of ortho notes, pt has had multiple falls in November. She was admitted to [place] on Last week of November and was found to have Rhinovirus and pseudomonas UTI. She completed 5-7 days course with Ciprofloxacin. Found to have Septic arthritis of SCM joint now s/p excision.    Patient obtained CT chest w/ IV contrast which showed likely septic arthritis with surrounding erythema. Thoracic surgery consulted, underwent washout and debridement.     OR Note: Debridement of left sternoclavicular joint with excision of left clavicular head. Purulent drainage at the sternoclavicular joint. Tissue surrounding left clavicle found to be very inflamed. Copious irrigation of surgical site.  Specimen sent: Head of left clavicle, culture of left sternoclavicular joint, culture of left manubrium, culture of deep sternal head of clavicle, culture of first rib costal cartilage      In ER: Afebrile, WBC 9.8, , . Given IV Zosyn, IV Vancomycin.     Remains afebrile, WBC stable.     Septic arthritis of the left sternoclavicular joint and involving the   articulation of the sternum with the first costal cartilage.    Extensive surrounding inflammation, with pneumonia in the underlying   subpleural left upper lobe.      # Septic arthritis of L sternoclavicular joint s/p excision and drainage  # L upper lobe opacity, improving on recent CT   # Immunosuppressed patient  # Elevated ESR/CRP.   #Manubrium OM. s/p repeat washout and debridement.         PLAN:  - continue cefepime 2 gm Q8hrs  - OR cx negative for bacterial cx.   - MRSA PCR negative,  - follow fungal/AFB cultures; negative so far  - Quantiferon negative   - All blood cultures negative.   - TTE negative   - thoracic following, plan for vac in OR today.   - pending PICC and 6 weeks of abx. iv. from the day of repeat debridement until 1/25/24. CBC, CMP once a week while on OPAT.   Pt to follow wit me in office in 4 weeks.       Plan discussed with Medicine Attending.       Odalis Reynaga  Please contact through MS Teams   If no response or past 5 pm/weekend call [phone].    43F PMHx of MS on (Rituxan, Ocrevus) presenting with left chest wall and shoulder pain. Recent history of fall secondary to unsteadiness due to MS. On review of ortho notes, pt has had multiple falls in November. She was admitted to [facility] on Last week of November and was found to have Rhinovirus and pseudomonas UTI. She completed 5-7 days course with Ciprofloxacin. Found to have Septic arthritis of SCM joint now s/p excision.    Patient obtained CT chest w/ IV contrast which showed likely septic arthritis with surrounding erythema. Thoracic surgery consulted, underwent washout and debridement.     OR Note: Debridement of left sternoclavicular joint with excision of left clavicular head. Purulent drainage at the sternoclavicular joint. Tissue surrounding left clavicle found to be very inflamed. Copious irrigation of surgical site.  Specimen sent: Head of left clavicle, culture of left sternoclavicular joint, culture of left manubrium, culture of deep sternal head of clavicle, culture of first rib costal cartilage      In ER: Afebrile, WBC 9.8, , . Given IV Zosyn, IV Vancomycin.     Remains afebrile, WBC stable.     Septic arthritis of the left sternoclavicular joint and involving the   articulation of the sternum with the first costal cartilage.    Extensive surrounding inflammation, with pneumonia in the underlying   subpleural left upper lobe.      # Septic arthritis of L sternoclavicular joint s/p excision and drainage  # L upper lobe opacity, improving on recent CT   # Immunosuppressed patient  # Elevated ESR/CRP.   #Manubrium OM. s/p repeat washout and debridement.         PLAN:  - continue cefepime 2 gm Q8hrs  - OR cx negative for bacterial cx.   - MRSA PCR negative,  - follow fungal/AFB cultures; negative so far  - Quantiferon negative   - All blood cultures negative.   - TTE negative   - thoracic following, plan for vac in OR today.   - pending PICC and 6 weeks of abx. iv. from the day of repeat debridement until 1/25/24. CBC, CMP once a week while on OPAT.   Pt to follow wit me in office in 4 weeks.       Plan discussed with Medicine Attending.       Odalis Reynaga  Please contact through MS Teams   If no response or past 5 pm/weekend call [phone].

## 2023-12-19 NOTE — DIETITIAN INITIAL EVALUATION ADULT - OBTAIN DAILY WEIGHT
[Good] : ~his/her~  mood as  good [Yes] : Yes [2 - 4 times a month (2 pts)] : 2-4 times a month (2 points) [No] : In the past 12 months have you used drugs other than those required for medical reasons? No [No falls in past year] : Patient reported no falls in the past year [0] : 2) Feeling down, depressed, or hopeless: Not at all (0) [PHQ-2 Negative - No further assessment needed] : PHQ-2 Negative - No further assessment needed [Patient reported mammogram was normal] : Patient reported mammogram was normal [Patient reported PAP Smear was normal] : Patient reported PAP Smear was normal [Never] : Never [Change in mental status noted] : No change in mental status noted [ColonoscopyComments] : due yes

## 2023-12-19 NOTE — PROGRESS NOTE ADULT - SUBJECTIVE AND OBJECTIVE BOX
Subjective: Patient seen and examined. No new events except as noted.   didnt sleep last night due to pain   daughter at bedside   states to feel very weak     REVIEW OF SYSTEMS:    CONSTITUTIONAL: + weakness, fevers or chills  EYES/ENT: No visual changes;  No vertigo or throat pain   NECK: No pain or stiffness  RESPIRATORY: No cough, wheezing, hemoptysis; No shortness of breath  CARDIOVASCULAR: No chest pain or palpitations  GASTROINTESTINAL: No abdominal or epigastric pain. No nausea, vomiting, or hematemesis; No diarrhea or constipation. No melena or hematochezia.  GENITOURINARY: No dysuria, frequency or hematuria  NEUROLOGICAL: No numbness or weakness  SKIN: No itching, burning, rashes, or lesions   All other review of systems is negative unless indicated above.    MEDICATIONS:  MEDICATIONS  (STANDING):  cefepime   IVPB 2000 milliGRAM(s) IV Intermittent every 8 hours  chlorhexidine 2% Cloths 1 Application(s) Topical <User Schedule>  dalfampridine ER 10 milliGRAM(s) Oral every 12 hours  DULoxetine 60 milliGRAM(s) Oral at bedtime  senna 2 Tablet(s) Oral at bedtime  sertraline 100 milliGRAM(s) Oral at bedtime  Vibegron (Gemtesa) 75 milliGRAM(s),Vibegron (Gemtesa) 75mg tablet 1 Tablet(s) 1 Tablet(s) Oral daily      PHYSICAL EXAM:  T(C): 36.8 (12-19-23 @ 09:09), Max: 37.3 (12-18-23 @ 20:00)  HR: 90 (12-19-23 @ 09:09) (80 - 100)  BP: 108/71 (12-19-23 @ 09:09) (101/52 - 126/72)  RR: 18 (12-19-23 @ 09:09) (14 - 18)  SpO2: 95% (12-19-23 @ 09:09) (94% - 99%)  Wt(kg): --  I&O's Summary    18 Dec 2023 07:01  -  19 Dec 2023 07:00  --------------------------------------------------------  IN: 900 mL / OUT: 0 mL / NET: 900 mL      Height (cm): 166.4 (12-18 @ 15:11)  Weight (kg): 62.6 (12-18 @ 15:11)  BMI (kg/m2): 22.6 (12-18 @ 15:11)  BSA (m2): 1.7 (12-18 @ 15:11)      Appearance: NAD  HEENT:   Normal oral mucosa, PERRL, EOMI	  Lymphatic: No lymphadenopathy , no edema   L clavicular region w/ black sponge CAV dsg secured w/ good seal, minimal serousag drainage noted in collection chamber  Cardiovascular: Normal S1 S2, No JVD, No murmurs , Peripheral pulses palpable 2+ bilaterally  Respiratory: Lungs clear to auscultation, normal effort 	  Gastrointestinal:  Soft, Non-tender, + BS	  Skin: No rashes, No ecchymoses, No cyanosis, warm to touch  Musculoskeletal: Normal range of motion, normal strength  Psychiatry:  lethargic weak   Ext: No edema        LABS:    CARDIAC MARKERS:                                8.9    9.37  )-----------( 471      ( 19 Dec 2023 06:48 )             28.5     12-19    137  |  98  |  6<L>  ----------------------------<  101<H>  4.2   |  26  |  0.41<L>    Ca    9.4      19 Dec 2023 06:48      proBNP:   Lipid Profile:   HgA1c:   TSH:             TELEMETRY: 	    ECG:  	  RADIOLOGY:   DIAGNOSTIC TESTING:  [ ] Echocardiogram:  [ ]  Catheterization:  [ ] Stress Test:    OTHER:

## 2023-12-19 NOTE — DIETITIAN INITIAL EVALUATION ADULT - ORAL INTAKE PTA/DIET HISTORY
Pt reports good PO intake, denies history of poor PO intake PTA.   Denies difficulty chewing/swallowing PTA  Denies nausea/vomiting/constipation/diarrhea PTA  Follows kosher diet at home   Confirms NKFA/intolerance  Micronutrient/Other supplementation: none  Protein-energy supplementation: none

## 2023-12-19 NOTE — PROGRESS NOTE ADULT - SUBJECTIVE AND OBJECTIVE BOX
Date of service: 12-19-23 @ 14:43      Patient is a 43y old  Female who presents with a chief complaint of L shoulder and chest pain (19 Dec 2023 12:33)                                                               INTERVAL HPI/OVERNIGHT EVENTS:    REVIEW OF SYSTEMS:     CONSTITUTIONAL: No weakness, fevers or chills  EYES/ENT: No visual changes , no ear ache   NECK: No pain or stiffness  RESPIRATORY: No cough, wheezing,  No shortness of breath  CARDIOVASCULAR: No chest pain or palpitations  GASTROINTESTINAL: No abdominal pain  . No nausea, vomiting, or hematemesis; No diarrhea or constipation. No melena or hematochezia.  GENITOURINARY: No dysuria, frequency or hematuria  NEUROLOGICAL: No numbness or weakness  SKIN: No itching, burning, rashes, or lesions              R shoulder pain                                                                                                                                                                                                                                                                      Medications:  MEDICATIONS  (STANDING):  acetaminophen     Tablet .. 650 milliGRAM(s) Oral every 6 hours  cefepime   IVPB 2000 milliGRAM(s) IV Intermittent every 8 hours  chlorhexidine 2% Cloths 1 Application(s) Topical <User Schedule>  cyclobenzaprine 5 milliGRAM(s) Oral two times a day  dalfampridine ER 10 milliGRAM(s) Oral every 12 hours  DULoxetine 60 milliGRAM(s) Oral at bedtime  oxyCODONE  ER Tablet 10 milliGRAM(s) Oral every 12 hours  senna 2 Tablet(s) Oral at bedtime  sertraline 100 milliGRAM(s) Oral at bedtime  Vibegron (Gemtesa) 75 milliGRAM(s),Vibegron (Gemtesa) 75mg tablet 1 Tablet(s) 1 Tablet(s) Oral daily    MEDICATIONS  (PRN):  clonazePAM  Tablet 0.5 milliGRAM(s) Oral three times a day PRN anxiety  HYDROmorphone   Tablet 2 milliGRAM(s) Oral every 4 hours PRN Moderate Pain (4 - 6)  HYDROmorphone   Tablet 4 milliGRAM(s) Oral every 6 hours PRN Severe Pain (7 - 10)  HYDROmorphone  Injectable 1 milliGRAM(s) IV Push every 3 hours PRN Severe Pain (7 - 10)       Allergies    No Known Allergies    Intolerances      Vital Signs Last 24 Hrs  T(C): 36.6 (19 Dec 2023 13:22), Max: 37.3 (18 Dec 2023 20:00)  T(F): 97.8 (19 Dec 2023 13:22), Max: 99.2 (18 Dec 2023 20:00)  HR: 84 (19 Dec 2023 13:22) (80 - 100)  BP: 109/73 (19 Dec 2023 13:22) (101/52 - 126/72)  BP(mean): 80 (18 Dec 2023 18:00) (71 - 84)  RR: 18 (19 Dec 2023 13:22) (14 - 18)  SpO2: 94% (19 Dec 2023 13:22) (94% - 99%)    Parameters below as of 19 Dec 2023 13:22  Patient On (Oxygen Delivery Method): room air      CAPILLARY BLOOD GLUCOSE          12-18 @ 07:01  -  12-19 @ 07:00  --------------------------------------------------------  IN: 900 mL / OUT: 0 mL / NET: 900 mL    12-19 @ 07:01  -  12-19 @ 14:43  --------------------------------------------------------  IN: 300 mL / OUT: 0 mL / NET: 300 mL      Physical Exam:    Daily Height in cm: 166.37 (18 Dec 2023 15:11)    Daily   General:  Well appearing, NAD, not cachetic  HEENT:  Nonicteric, PERRLA  CV:  RRR, S1S2   Lungs:  CTA  chest wall in dressing /vac  Abdomen:  Soft, non-tender, no distended, positive BS  Extremities:  2+ pulses, no c/c, no edema  Skin:  Warm and dry, no rashes  :  No stanford  Neuro:  AAOx3, non-focal, grossly intact                                                                                                                                                                                                                                                                                                LABS:                               8.9    9.37  )-----------( 471      ( 19 Dec 2023 06:48 )             28.5                      12-19    137  |  98  |  6<L>  ----------------------------<  101<H>  4.2   |  26  |  0.41<L>    Ca    9.4      19 Dec 2023 06:48                         RADIOLOGY & ADDITIONAL TESTS         I personally reviewed: [  ]EKG   [  ]CXR    [  ] CT      A/P:         Discussed with :     [facility] consultants' Notes   Time spent :

## 2023-12-19 NOTE — PROGRESS NOTE ADULT - NSPROGADDITIONALINFOA_GEN_ALL_CORE
Will continue to follow up.  Please contact [phone] if you have further questions. Will continue to follow up.  Please contact [phone] if you have further questions.

## 2023-12-19 NOTE — PROGRESS NOTE ADULT - PROBLEM SELECTOR PLAN 1
12/18: Vac change   - Remain left VAC in place w/ good seal  - OOB  - Optimize nutrition supplement to meals  - Optimize pain control   - Plan d/w Attending Dr. Bautista

## 2023-12-19 NOTE — PROGRESS NOTE ADULT - ASSESSMENT
43F PMHx of MS on immunosuppressive medication, presenting with left chest wall and shoulder pain. Recent history of fall secondary to unsteadiness due to MS. Obtained outpatient MRI of left sternoclavicular joint with results significant for marrow edema and swelling of the left sternoclavicular joint with a 2 cm fluid collection and adjacent pleural edema in the left chest with concerns for infectious etiology.  Patient initially saw orthopedics with for concerns for septic arthritis of this joint.  Impression of the read showed soft tissue abscess superficial to the joint.  Recommended CT scan with IV contrast of the chest.  Patient denies any fevers however endorsing erythema to the left sternoclavicular joint region. Patient states she was recently hospitalized over a week ago at [facility] for falls secondary to unsteadiness due to her MS.  Denies any recent falls today, vision changes. Endorsing headache. Denies any chest pain, shortness of breath, abdominal pain, nausea vomiting diarrhea, urinary complaints.  CT chest with IV in ED significant for Septic arthritis of the left sternoclavicular joint and involving the articulation of the sternum with the first costal cartilage.Extensive surrounding inflammation, with pneumonia in the underlying subpleural left upper lobe.No fluid collection is evident.  Workup significant for elevated alk phos (248), . VSS and afebrile  s/p Debridement of left sternoclavicular joint with excision of left clavicular head. Purulent drainage at the sternoclavicular joint. Tissue surrounding left clavicle found to be very inflamed. Copious irrigation of surgical site.   Concerned about pain at surgical site.    at bedside     43F PMHx of MS on immunosuppressive medication, presenting with left chest wall and shoulder pain. Recent history of fall secondary to unsteadiness due to MS. Obtained outpatient MRI of left sternoclavicular joint with results significant for marrow edema and swelling of the left sternoclavicular joint with a 2 cm fluid collection and adjacent pleural edema in the left chest with concerns for infectious etiology.  Patient initially saw orthopedics with for concerns for septic arthritis of this joint.  Impression of the read showed soft tissue abscess superficial to the joint.  Recommended CT scan with IV contrast of the chest.  Patient denies any fevers however endorsing erythema to the left sternoclavicular joint region. Patient states she was recently hospitalized over a week ago at [facility] for falls secondary to unsteadiness due to her MS.  Denies any recent falls today, vision changes. Endorsing headache. Denies any chest pain, shortness of breath, abdominal pain, nausea vomiting diarrhea, urinary complaints.  CT chest with IV in ED significant for Septic arthritis of the left sternoclavicular joint and involving the articulation of the sternum with the first costal cartilage.Extensive surrounding inflammation, with pneumonia in the underlying subpleural left upper lobe.No fluid collection is evident.  Workup significant for elevated alk phos (248), . VSS and afebrile  s/p Debridement of left sternoclavicular joint with excision of left clavicular head. Purulent drainage at the sternoclavicular joint. Tissue surrounding left clavicle found to be very inflamed. Copious irrigation of surgical site.   Concerned about pain at surgical site.    at bedside

## 2023-12-19 NOTE — DIETITIAN INITIAL EVALUATION ADULT - EDUCATION DIETARY MODIFICATIONS
Encouraged PO intake as tolerated with emphasis on protein foods as tolerated. Educated pt on foods rich in protein and encouraged consumption as able. Pt amenable to recommendations. All nutrition-related questions answered. Pt made aware RD remains available./(1) partially meets; needs review/practice

## 2023-12-19 NOTE — DIETITIAN INITIAL EVALUATION ADULT - PHYSCIAL ASSESSMENT
Drug Dosing Weight  Height (cm): 166.4 (18 Dec 2023 15:11)  Weight (kg): 62.6 (18 Dec 2023 15:11)  BMI (kg/m2): 22.6 (18 Dec 2023 15:11)    Daily wt (bed scale): 61.2kg (12/13)   UBW: ~138lb per pt, denies history of wt loss PTA  Wt history per [facility] HIE: 59kg (6/8/23), 59kg (10/21/21), 61.5kg (2/10/21)      ** no significant wt change since admission. RD will continue to monitor wt trends as available/able.     IBW: 128lb, 108% IBW Drug Dosing Weight  Height (cm): 166.4 (18 Dec 2023 15:11)  Weight (kg): 62.6 (18 Dec 2023 15:11)  BMI (kg/m2): 22.6 (18 Dec 2023 15:11)    Daily wt (bed scale): 61.2kg (12/13)   UBW: ~138lb per pt, denies history of wt loss PTA  Wt history per [facility] HIE: 59kg (6/8/23), 59kg (10/21/21), 61.5kg (2/10/21)      ** no significant wt change since admission. RD will continue to monitor wt trends as available/able.     IBW: 128lb, 108% IBW

## 2023-12-19 NOTE — DIETITIAN INITIAL EVALUATION ADULT - PROBLEM SELECTOR PLAN 4
hgb 9, lower than prior year. Pt endorses some recent anemia but cannot specify  -Trend cbc  -Monitor for bleeding

## 2023-12-19 NOTE — PROGRESS NOTE ADULT - SUBJECTIVE AND OBJECTIVE BOX
Patient is a 43y old  Female who presents with a chief complaint of L shoulder and chest pain (19 Dec 2023 09:57)      Vital Signs Last 24 Hrs  T(C): 36.8 (12-19-23 @ 09:09), Max: 37.3 (12-18-23 @ 20:00)  T(F): 98.3 (12-19-23 @ 09:09), Max: 99.2 (12-18-23 @ 20:00)  HR: 90 (12-19-23 @ 09:09) (80 - 100)  BP: 108/71 (12-19-23 @ 09:09) (101/52 - 126/72)  RR: 18 (12-19-23 @ 09:09) (14 - 18)  SpO2: 95% (12-19-23 @ 09:09) (94% - 99%)                12-18-23 @ 07:01  -  12-19-23 @ 07:00  --------------------------------------------------------  IN: 900 mL / OUT: 0 mL / NET: 900 mL        Daily Height in cm: 166.37 (18 Dec 2023 15:11)    Daily                           8.9    9.37  )-----------( 471      ( 19 Dec 2023 06:48 )             28.5     12-19    137  |  98  |  6<L>  ----------------------------<  101<H>  4.2   |  26  |  0.41<L>    Ca    9.4      19 Dec 2023 06:48            PHYSICAL EXAM  Neurology: A&Ox3, NAD  CV : RRR+S1S2  Lungs: Respirations non-labored, B/L BS CTA  Abdomen: Soft, NT/ND, +BSx4Q  Extremities: B/L LE warm, no edema, +PP         Skin: L sternoclavicular wound vac dressing CDI, draining scant amount 2-3mL of dark blood.    MEDICATIONS  cefepime   IVPB 2000 milliGRAM(s) IV Intermittent every 8 hours  chlorhexidine 2% Cloths 1 Application(s) Topical <User Schedule>  clonazePAM  Tablet 0.5 milliGRAM(s) Oral three times a day PRN  dalfampridine ER 10 milliGRAM(s) Oral every 12 hours  DULoxetine 60 milliGRAM(s) Oral at bedtime  HYDROmorphone  Injectable 1 milliGRAM(s) IV Push every 3 hours PRN  oxyCODONE    IR 5 milliGRAM(s) Oral every 6 hours PRN  oxyCODONE    IR 10 milliGRAM(s) Oral every 6 hours PRN  senna 2 Tablet(s) Oral at bedtime  sertraline 100 milliGRAM(s) Oral at bedtime  Vibegron (Gemtesa) 75 milliGRAM(s),Vibegron (Gemtesa) 75mg tablet 1 Tablet(s) 1 Tablet(s) Oral daily

## 2023-12-19 NOTE — CONSULT NOTE ADULT - SUBJECTIVE AND OBJECTIVE BOX
HPI:  43F PMHx of MS on Rituxan, presenting with left chest wall and shoulder pain. Recent history of fall secondary to unsteadiness due to MS. On review of ortho notes, pt has had multiple falls in November. Obtained outpatient MRI of left sternoclavicular joint with results significant for marrow edema and swelling of the left sternoclavicular joint with a 2 cm fluid collection and adjacent pleural edema in the left chest with concerns for infectious etiology.  Patient initially saw orthopedics with for concerns for septic arthritis of this joint.  Impression of the read showed soft tissue abscess superficial to the joint.  Recommended CT scan with IV contrast of the chest.  Patient denies any fevers however endorsing erythema to the left sternoclavicular joint region. Patient states she was recently hospitalized over a week ago at [facility] for falls secondary to unsteadiness due to her MS and UTI. Hospital stay was for about 6 days. Denies any recent falls today, vision changes. Endorsing headache. Denies any chest pain, shortness of breath, abdominal pain, nausea vomiting diarrhea, urinary complaints    Patient obtained CT chest w/ IV contrast which showed likely septic arthritis with surrounding erythema. Thoracic surgery consulted, plan for OR today with washout and debridement.     In ER: Given IV Zosyn, IV Vancomycin, Tylenol 1gm IVPB, lidocaine patch (07 Dec 2023 02:49)    Patient was admitted on , s/p debridement of left SC Joint, excision of left clavicular head, return to OR on 12/15 for washout, resection of medial manubrium, wound vac, seen today, visitors at bedside, complains of pain.     REVIEW OF SYSTEMS  Denies chest pain, SOB, N/V, F/C, abdominal pain     VITALS  T(C): 36.8 (23 @ 09:09), Max: 37.3 (23 @ 20:00)  HR: 90 (23 @ 09:09) (80 - 100)  BP: 108/71 (23 @ 09:09) (101/52 - 126/72)  RR: 18 (23 @ 09:09) (14 - 18)  SpO2: 95% (23 @ 09:09) (94% - 99%)  Wt(kg): --    PAST MEDICAL & SURGICAL HISTORY  Multiple sclerosis    History of         FUNCTIONAL HISTORY  Lives with family, 2 steps to enter, one flight inside   Independent AMB and ADLs PTA, started using RW after falls      CURRENT FUNCTIONAL STATUS  PT    transfers min assist   gait mod assist x2, HHA   sitting rest     OT   bed mobility  CG  transfers min to mod assist   dressing mod to max assist     RECENT LABS/IMAGING  CBC Full  -  ( 19 Dec 2023 06:48 )  WBC Count : 9.37 K/uL  RBC Count : 3.51 M/uL  Hemoglobin : 8.9 g/dL  Hematocrit : 28.5 %  Platelet Count - Automated : 471 K/uL  Mean Cell Volume : 81.2 fl  Mean Cell Hemoglobin : 25.4 pg  Mean Cell Hemoglobin Concentration : 31.2 gm/dL  Auto Neutrophil # : x  Auto Lymphocyte # : x  Auto Monocyte # : x  Auto Eosinophil # : x  Auto Basophil # : x  Auto Neutrophil % : x  Auto Lymphocyte % : x  Auto Monocyte % : x  Auto Eosinophil % : x  Auto Basophil % : x        137  |  98  |  6<L>  ----------------------------<  101<H>  4.2   |  26  |  0.41<L>    Ca    9.4      19 Dec 2023 06:48      Urinalysis Basic - ( 19 Dec 2023 06:48 )    Color: x / Appearance: x / SG: x / pH: x  Gluc: 101 mg/dL / Ketone: x  / Bili: x / Urobili: x   Blood: x / Protein: x / Nitrite: x   Leuk Esterase: x / RBC: x / WBC x   Sq Epi: x / Non Sq Epi: x / Bacteria: x    < from: MR Cervical Spine w/wo IV Cont (23 @ 16:54) >    IMPRESSION:    No cervical spine discitis/osteomyelitis or spinal abscess identified.   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    < end of copied text >    < from: Xray Chest 1 View AP/PA (23 @ 02:42) >    IMPRESSION:  No pneumothorax. Lungs are clear.    < end of copied text >    < from: MR Shoulder Joint w/wo IV Cont, Left (23 @ 15:47) >      IMPRESSION:  1.  Patient status post surgical resection of the left clavicular head   with washout of the left sternoclavicular joint.  2.  A 80 x 25 mm rim-enhancing fluid collection persists in the surgical   cavity with well-placed drain within the fluid collection.  3.  Marrow signal abnormality in the manubrium concerning for acute   osteomyelitis.  4.  Low-grade partial-thickness bursal surface tear ofthe supraspinatus   tendon.    < end of copied text >      ALLERGIES  No Known Allergies      MEDICATIONS   cefepime   IVPB 2000 milliGRAM(s) IV Intermittent every 8 hours  chlorhexidine 2% Cloths 1 Application(s) Topical <User Schedule>  clonazePAM  Tablet 0.5 milliGRAM(s) Oral three times a day PRN  dalfampridine ER 10 milliGRAM(s) Oral every 12 hours  DULoxetine 60 milliGRAM(s) Oral at bedtime  HYDROmorphone  Injectable 1 milliGRAM(s) IV Push every 3 hours PRN  oxyCODONE    IR 10 milliGRAM(s) Oral every 6 hours PRN  oxyCODONE    IR 5 milliGRAM(s) Oral every 6 hours PRN  senna 2 Tablet(s) Oral at bedtime  sertraline 100 milliGRAM(s) Oral at bedtime  Vibegron (Gemtesa) 75 milliGRAM(s),Vibegron (Gemtesa) 75mg tablet 1 Tablet(s) 1 Tablet(s) Oral daily      ----------------------------------------------------------------------------------------  PHYSICAL EXAM  Constitutional - NAD, Comfortable, in chair  +wound vac, left chest wall   Chest - Breathing comfortably  Cardiovascular - S1S2   Abdomen - Soft   Extremities - limited ROM b/l shoulders   Neurologic Exam -     commands                 Cognitive - Awake, Alert, AAO to self, place, date, year, situation     Communication - Fluent, No dysarthria        Motor - 5/5 within range        Sensory - Intact to LT     Psychiatric - Mood stable, Affect WNL  ----------------------------------------------------------------------------------------  ASSESSMENT/PLAN  43yFemale h/o MS on rituxan with functional deficits after fall, septic arthritis  s/p debridement, left clavicular head excision, resection of medial manubrium, NWB LUCHERELLE, wound vac  on cefepime, plan for PICC, 6 weeks through 24  Pain - Tylenol oxycodone, dilaudid IV    DVT PPX - SCDs  Rehab - Will continue to follow for ongoing rehab needs and recommendations.   patient with falls at home, requires min to mod assist with transfers, mobility    Recommend ACUTE inpatient rehabilitation for the functional deficits consisting of 3 hours of therapy/day & x 4 weeks, 24 hour RN/daily PMR physician for comorbid medical management. Patient will be able to tolerate 3 hours a day.    HPI:  43F PMHx of MS on Rituxan, presenting with left chest wall and shoulder pain. Recent history of fall secondary to unsteadiness due to MS. On review of ortho notes, pt has had multiple falls in November. Obtained outpatient MRI of left sternoclavicular joint with results significant for marrow edema and swelling of the left sternoclavicular joint with a 2 cm fluid collection and adjacent pleural edema in the left chest with concerns for infectious etiology.  Patient initially saw orthopedics with for concerns for septic arthritis of this joint.  Impression of the read showed soft tissue abscess superficial to the joint.  Recommended CT scan with IV contrast of the chest.  Patient denies any fevers however endorsing erythema to the left sternoclavicular joint region. Patient states she was recently hospitalized over a week ago at [facility] for falls secondary to unsteadiness due to her MS and UTI. Hospital stay was for about 6 days. Denies any recent falls today, vision changes. Endorsing headache. Denies any chest pain, shortness of breath, abdominal pain, nausea vomiting diarrhea, urinary complaints    Patient obtained CT chest w/ IV contrast which showed likely septic arthritis with surrounding erythema. Thoracic surgery consulted, plan for OR today with washout and debridement.     In ER: Given IV Zosyn, IV Vancomycin, Tylenol 1gm IVPB, lidocaine patch (07 Dec 2023 02:49)    Patient was admitted on , s/p debridement of left SC Joint, excision of left clavicular head, return to OR on 12/15 for washout, resection of medial manubrium, wound vac, seen today, visitors at bedside, complains of pain.     REVIEW OF SYSTEMS  Denies chest pain, SOB, N/V, F/C, abdominal pain     VITALS  T(C): 36.8 (23 @ 09:09), Max: 37.3 (23 @ 20:00)  HR: 90 (23 @ 09:09) (80 - 100)  BP: 108/71 (23 @ 09:09) (101/52 - 126/72)  RR: 18 (23 @ 09:09) (14 - 18)  SpO2: 95% (23 @ 09:09) (94% - 99%)  Wt(kg): --    PAST MEDICAL & SURGICAL HISTORY  Multiple sclerosis    History of         FUNCTIONAL HISTORY  Lives with family, 2 steps to enter, one flight inside   Independent AMB and ADLs PTA, started using RW after falls      CURRENT FUNCTIONAL STATUS  PT    transfers min assist   gait mod assist x2, HHA   sitting rest     OT   bed mobility  CG  transfers min to mod assist   dressing mod to max assist     RECENT LABS/IMAGING  CBC Full  -  ( 19 Dec 2023 06:48 )  WBC Count : 9.37 K/uL  RBC Count : 3.51 M/uL  Hemoglobin : 8.9 g/dL  Hematocrit : 28.5 %  Platelet Count - Automated : 471 K/uL  Mean Cell Volume : 81.2 fl  Mean Cell Hemoglobin : 25.4 pg  Mean Cell Hemoglobin Concentration : 31.2 gm/dL  Auto Neutrophil # : x  Auto Lymphocyte # : x  Auto Monocyte # : x  Auto Eosinophil # : x  Auto Basophil # : x  Auto Neutrophil % : x  Auto Lymphocyte % : x  Auto Monocyte % : x  Auto Eosinophil % : x  Auto Basophil % : x        137  |  98  |  6<L>  ----------------------------<  101<H>  4.2   |  26  |  0.41<L>    Ca    9.4      19 Dec 2023 06:48      Urinalysis Basic - ( 19 Dec 2023 06:48 )    Color: x / Appearance: x / SG: x / pH: x  Gluc: 101 mg/dL / Ketone: x  / Bili: x / Urobili: x   Blood: x / Protein: x / Nitrite: x   Leuk Esterase: x / RBC: x / WBC x   Sq Epi: x / Non Sq Epi: x / Bacteria: x    < from: MR Cervical Spine w/wo IV Cont (23 @ 16:54) >    IMPRESSION:    No cervical spine discitis/osteomyelitis or spinal abscess identified.   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    < end of copied text >    < from: Xray Chest 1 View AP/PA (23 @ 02:42) >    IMPRESSION:  No pneumothorax. Lungs are clear.    < end of copied text >    < from: MR Shoulder Joint w/wo IV Cont, Left (23 @ 15:47) >      IMPRESSION:  1.  Patient status post surgical resection of the left clavicular head   with washout of the left sternoclavicular joint.  2.  A 80 x 25 mm rim-enhancing fluid collection persists in the surgical   cavity with well-placed drain within the fluid collection.  3.  Marrow signal abnormality in the manubrium concerning for acute   osteomyelitis.  4.  Low-grade partial-thickness bursal surface tear ofthe supraspinatus   tendon.    < end of copied text >      ALLERGIES  No Known Allergies      MEDICATIONS   cefepime   IVPB 2000 milliGRAM(s) IV Intermittent every 8 hours  chlorhexidine 2% Cloths 1 Application(s) Topical <User Schedule>  clonazePAM  Tablet 0.5 milliGRAM(s) Oral three times a day PRN  dalfampridine ER 10 milliGRAM(s) Oral every 12 hours  DULoxetine 60 milliGRAM(s) Oral at bedtime  HYDROmorphone  Injectable 1 milliGRAM(s) IV Push every 3 hours PRN  oxyCODONE    IR 10 milliGRAM(s) Oral every 6 hours PRN  oxyCODONE    IR 5 milliGRAM(s) Oral every 6 hours PRN  senna 2 Tablet(s) Oral at bedtime  sertraline 100 milliGRAM(s) Oral at bedtime  Vibegron (Gemtesa) 75 milliGRAM(s),Vibegron (Gemtesa) 75mg tablet 1 Tablet(s) 1 Tablet(s) Oral daily      ----------------------------------------------------------------------------------------  PHYSICAL EXAM  Constitutional - NAD, Comfortable, in chair  +wound vac, left chest wall   Chest - Breathing comfortably  Cardiovascular - S1S2   Abdomen - Soft   Extremities - limited ROM b/l shoulders   Neurologic Exam -     commands                 Cognitive - Awake, Alert, AAO to self, place, date, year, situation     Communication - Fluent, No dysarthria        Motor - 5/5 within range        Sensory - Intact to LT     Psychiatric - Mood stable, Affect WNL  ----------------------------------------------------------------------------------------  ASSESSMENT/PLAN  43yFemale h/o MS on rituxan with functional deficits after fall, septic arthritis  s/p debridement, left clavicular head excision, resection of medial manubrium, NWB LUCHERELLE, wound vac  on cefepime, plan for PICC, 6 weeks through 24  Pain - Tylenol oxycodone, dilaudid IV    DVT PPX - SCDs  Rehab - Will continue to follow for ongoing rehab needs and recommendations.   patient with falls at home, requires min to mod assist with transfers, mobility    Recommend ACUTE inpatient rehabilitation for the functional deficits consisting of 3 hours of therapy/day & x 4 weeks, 24 hour RN/daily PMR physician for comorbid medical management. Patient will be able to tolerate 3 hours a day.

## 2023-12-19 NOTE — DIETITIAN INITIAL EVALUATION ADULT - PROBLEM SELECTOR PLAN 3
ISTOP reviewed Reference #: [id number]  -Cont. Clonazepam 0.5mg QHS PRN  -Cont. Sertraline 100mg QHS  -Cont. Duloxetine 60mg QHS, pt unsure of dose  -Need full med rec ISTOP reviewed Reference #: [id number]  -Cont. Clonazepam 0.5mg QHS PRN  -Cont. Sertraline 100mg QHS  -Cont. Duloxetine 60mg QHS, pt unsure of dose  -Need full med rec

## 2023-12-20 LAB
ANION GAP SERPL CALC-SCNC: 12 MMOL/L — SIGNIFICANT CHANGE UP (ref 5–17)
ANION GAP SERPL CALC-SCNC: 12 MMOL/L — SIGNIFICANT CHANGE UP (ref 5–17)
BUN SERPL-MCNC: 8 MG/DL — SIGNIFICANT CHANGE UP (ref 7–23)
BUN SERPL-MCNC: 8 MG/DL — SIGNIFICANT CHANGE UP (ref 7–23)
CALCIUM SERPL-MCNC: 9.9 MG/DL — SIGNIFICANT CHANGE UP (ref 8.4–10.5)
CALCIUM SERPL-MCNC: 9.9 MG/DL — SIGNIFICANT CHANGE UP (ref 8.4–10.5)
CHLORIDE SERPL-SCNC: 99 MMOL/L — SIGNIFICANT CHANGE UP (ref 96–108)
CHLORIDE SERPL-SCNC: 99 MMOL/L — SIGNIFICANT CHANGE UP (ref 96–108)
CO2 SERPL-SCNC: 26 MMOL/L — SIGNIFICANT CHANGE UP (ref 22–31)
CO2 SERPL-SCNC: 26 MMOL/L — SIGNIFICANT CHANGE UP (ref 22–31)
CREAT SERPL-MCNC: 0.49 MG/DL — LOW (ref 0.5–1.3)
CREAT SERPL-MCNC: 0.49 MG/DL — LOW (ref 0.5–1.3)
EGFR: 119 ML/MIN/1.73M2 — SIGNIFICANT CHANGE UP
EGFR: 119 ML/MIN/1.73M2 — SIGNIFICANT CHANGE UP
GLUCOSE SERPL-MCNC: 104 MG/DL — HIGH (ref 70–99)
GLUCOSE SERPL-MCNC: 104 MG/DL — HIGH (ref 70–99)
HCT VFR BLD CALC: 30.2 % — LOW (ref 34.5–45)
HCT VFR BLD CALC: 30.2 % — LOW (ref 34.5–45)
HGB BLD-MCNC: 9.2 G/DL — LOW (ref 11.5–15.5)
HGB BLD-MCNC: 9.2 G/DL — LOW (ref 11.5–15.5)
MCHC RBC-ENTMCNC: 25.1 PG — LOW (ref 27–34)
MCHC RBC-ENTMCNC: 25.1 PG — LOW (ref 27–34)
MCHC RBC-ENTMCNC: 30.5 GM/DL — LOW (ref 32–36)
MCHC RBC-ENTMCNC: 30.5 GM/DL — LOW (ref 32–36)
MCV RBC AUTO: 82.5 FL — SIGNIFICANT CHANGE UP (ref 80–100)
MCV RBC AUTO: 82.5 FL — SIGNIFICANT CHANGE UP (ref 80–100)
NRBC # BLD: 0 /100 WBCS — SIGNIFICANT CHANGE UP (ref 0–0)
NRBC # BLD: 0 /100 WBCS — SIGNIFICANT CHANGE UP (ref 0–0)
PLATELET # BLD AUTO: 564 K/UL — HIGH (ref 150–400)
PLATELET # BLD AUTO: 564 K/UL — HIGH (ref 150–400)
POTASSIUM SERPL-MCNC: 4 MMOL/L — SIGNIFICANT CHANGE UP (ref 3.5–5.3)
POTASSIUM SERPL-MCNC: 4 MMOL/L — SIGNIFICANT CHANGE UP (ref 3.5–5.3)
POTASSIUM SERPL-SCNC: 4 MMOL/L — SIGNIFICANT CHANGE UP (ref 3.5–5.3)
POTASSIUM SERPL-SCNC: 4 MMOL/L — SIGNIFICANT CHANGE UP (ref 3.5–5.3)
RBC # BLD: 3.66 M/UL — LOW (ref 3.8–5.2)
RBC # BLD: 3.66 M/UL — LOW (ref 3.8–5.2)
RBC # FLD: 15.5 % — HIGH (ref 10.3–14.5)
RBC # FLD: 15.5 % — HIGH (ref 10.3–14.5)
SODIUM SERPL-SCNC: 137 MMOL/L — SIGNIFICANT CHANGE UP (ref 135–145)
SODIUM SERPL-SCNC: 137 MMOL/L — SIGNIFICANT CHANGE UP (ref 135–145)
WBC # BLD: 11.69 K/UL — HIGH (ref 3.8–10.5)
WBC # BLD: 11.69 K/UL — HIGH (ref 3.8–10.5)
WBC # FLD AUTO: 11.69 K/UL — HIGH (ref 3.8–10.5)
WBC # FLD AUTO: 11.69 K/UL — HIGH (ref 3.8–10.5)

## 2023-12-20 PROCEDURE — 99232 SBSQ HOSP IP/OBS MODERATE 35: CPT

## 2023-12-20 PROCEDURE — 71045 X-RAY EXAM CHEST 1 VIEW: CPT | Mod: 26,76

## 2023-12-20 RX ORDER — FENTANYL CITRATE 50 UG/ML
1 INJECTION INTRAVENOUS
Refills: 0 | Status: DISCONTINUED | OUTPATIENT
Start: 2023-12-20 | End: 2023-12-26

## 2023-12-20 RX ORDER — ACETAMINOPHEN 500 MG
1000 TABLET ORAL ONCE
Refills: 0 | Status: COMPLETED | OUTPATIENT
Start: 2023-12-20 | End: 2023-12-21

## 2023-12-20 RX ADMIN — OXYCODONE HYDROCHLORIDE 10 MILLIGRAM(S): 5 TABLET ORAL at 18:18

## 2023-12-20 RX ADMIN — FENTANYL CITRATE 1 PATCH: 50 INJECTION INTRAVENOUS at 19:04

## 2023-12-20 RX ADMIN — SENNA PLUS 2 TABLET(S): 8.6 TABLET ORAL at 21:02

## 2023-12-20 RX ADMIN — CEFEPIME 100 MILLIGRAM(S): 1 INJECTION, POWDER, FOR SOLUTION INTRAMUSCULAR; INTRAVENOUS at 21:00

## 2023-12-20 RX ADMIN — HYDROMORPHONE HYDROCHLORIDE 1 MILLIGRAM(S): 2 INJECTION INTRAMUSCULAR; INTRAVENOUS; SUBCUTANEOUS at 09:08

## 2023-12-20 RX ADMIN — HYDROMORPHONE HYDROCHLORIDE 4 MILLIGRAM(S): 2 INJECTION INTRAMUSCULAR; INTRAVENOUS; SUBCUTANEOUS at 08:35

## 2023-12-20 RX ADMIN — HYDROMORPHONE HYDROCHLORIDE 1 MILLIGRAM(S): 2 INJECTION INTRAMUSCULAR; INTRAVENOUS; SUBCUTANEOUS at 18:37

## 2023-12-20 RX ADMIN — DULOXETINE HYDROCHLORIDE 60 MILLIGRAM(S): 30 CAPSULE, DELAYED RELEASE ORAL at 21:01

## 2023-12-20 RX ADMIN — HYDROMORPHONE HYDROCHLORIDE 1 MILLIGRAM(S): 2 INJECTION INTRAMUSCULAR; INTRAVENOUS; SUBCUTANEOUS at 23:42

## 2023-12-20 RX ADMIN — DALFAMPRIDINE 10 MILLIGRAM(S): 10 TABLET, FILM COATED, EXTENDED RELEASE ORAL at 21:16

## 2023-12-20 RX ADMIN — SERTRALINE 100 MILLIGRAM(S): 25 TABLET, FILM COATED ORAL at 21:01

## 2023-12-20 RX ADMIN — HYDROMORPHONE HYDROCHLORIDE 1 MILLIGRAM(S): 2 INJECTION INTRAMUSCULAR; INTRAVENOUS; SUBCUTANEOUS at 02:41

## 2023-12-20 RX ADMIN — HYDROMORPHONE HYDROCHLORIDE 4 MILLIGRAM(S): 2 INJECTION INTRAMUSCULAR; INTRAVENOUS; SUBCUTANEOUS at 14:07

## 2023-12-20 RX ADMIN — HYDROMORPHONE HYDROCHLORIDE 1 MILLIGRAM(S): 2 INJECTION INTRAMUSCULAR; INTRAVENOUS; SUBCUTANEOUS at 02:11

## 2023-12-20 RX ADMIN — Medication 650 MILLIGRAM(S): at 12:25

## 2023-12-20 RX ADMIN — HYDROMORPHONE HYDROCHLORIDE 1 MILLIGRAM(S): 2 INJECTION INTRAMUSCULAR; INTRAVENOUS; SUBCUTANEOUS at 19:15

## 2023-12-20 RX ADMIN — HYDROMORPHONE HYDROCHLORIDE 4 MILLIGRAM(S): 2 INJECTION INTRAMUSCULAR; INTRAVENOUS; SUBCUTANEOUS at 07:59

## 2023-12-20 RX ADMIN — OXYCODONE HYDROCHLORIDE 10 MILLIGRAM(S): 5 TABLET ORAL at 17:20

## 2023-12-20 RX ADMIN — OXYCODONE HYDROCHLORIDE 10 MILLIGRAM(S): 5 TABLET ORAL at 05:42

## 2023-12-20 RX ADMIN — DALFAMPRIDINE 10 MILLIGRAM(S): 10 TABLET, FILM COATED, EXTENDED RELEASE ORAL at 01:07

## 2023-12-20 RX ADMIN — Medication 650 MILLIGRAM(S): at 06:15

## 2023-12-20 RX ADMIN — CEFEPIME 100 MILLIGRAM(S): 1 INJECTION, POWDER, FOR SOLUTION INTRAMUSCULAR; INTRAVENOUS at 05:44

## 2023-12-20 RX ADMIN — HYDROMORPHONE HYDROCHLORIDE 1 MILLIGRAM(S): 2 INJECTION INTRAMUSCULAR; INTRAVENOUS; SUBCUTANEOUS at 15:53

## 2023-12-20 RX ADMIN — HYDROMORPHONE HYDROCHLORIDE 4 MILLIGRAM(S): 2 INJECTION INTRAMUSCULAR; INTRAVENOUS; SUBCUTANEOUS at 21:19

## 2023-12-20 RX ADMIN — CEFEPIME 100 MILLIGRAM(S): 1 INJECTION, POWDER, FOR SOLUTION INTRAMUSCULAR; INTRAVENOUS at 14:55

## 2023-12-20 RX ADMIN — HYDROMORPHONE HYDROCHLORIDE 1 MILLIGRAM(S): 2 INJECTION INTRAMUSCULAR; INTRAVENOUS; SUBCUTANEOUS at 16:30

## 2023-12-20 RX ADMIN — OXYCODONE HYDROCHLORIDE 10 MILLIGRAM(S): 5 TABLET ORAL at 06:15

## 2023-12-20 RX ADMIN — HYDROMORPHONE HYDROCHLORIDE 1 MILLIGRAM(S): 2 INJECTION INTRAMUSCULAR; INTRAVENOUS; SUBCUTANEOUS at 10:00

## 2023-12-20 RX ADMIN — Medication 650 MILLIGRAM(S): at 13:10

## 2023-12-20 RX ADMIN — HYDROMORPHONE HYDROCHLORIDE 4 MILLIGRAM(S): 2 INJECTION INTRAMUSCULAR; INTRAVENOUS; SUBCUTANEOUS at 14:59

## 2023-12-20 RX ADMIN — CYCLOBENZAPRINE HYDROCHLORIDE 5 MILLIGRAM(S): 10 TABLET, FILM COATED ORAL at 17:20

## 2023-12-20 RX ADMIN — HYDROMORPHONE HYDROCHLORIDE 4 MILLIGRAM(S): 2 INJECTION INTRAMUSCULAR; INTRAVENOUS; SUBCUTANEOUS at 22:19

## 2023-12-20 RX ADMIN — Medication 650 MILLIGRAM(S): at 18:18

## 2023-12-20 RX ADMIN — CYCLOBENZAPRINE HYDROCHLORIDE 5 MILLIGRAM(S): 10 TABLET, FILM COATED ORAL at 05:44

## 2023-12-20 RX ADMIN — DALFAMPRIDINE 10 MILLIGRAM(S): 10 TABLET, FILM COATED, EXTENDED RELEASE ORAL at 11:21

## 2023-12-20 RX ADMIN — Medication 650 MILLIGRAM(S): at 05:45

## 2023-12-20 RX ADMIN — Medication 650 MILLIGRAM(S): at 17:20

## 2023-12-20 RX ADMIN — CHLORHEXIDINE GLUCONATE 1 APPLICATION(S): 213 SOLUTION TOPICAL at 05:45

## 2023-12-20 NOTE — PROGRESS NOTE ADULT - ASSESSMENT
43F PMHx of MS on (Rituxan, Ocrevus) presenting with left chest wall and shoulder pain. Recent history of fall secondary to unsteadiness due to MS. On review of ortho notes, pt has had multiple falls in November. She was admitted to [facility] on Last week of November and was found to have Rhinovirus and pseudomonas UTI. She completed 5-7 days course with Ciprofloxacin. Found to have Septic arthritis of SCM joint now s/p excision.    Patient obtained CT chest w/ IV contrast which showed likely septic arthritis with surrounding erythema. Thoracic surgery consulted, underwent washout and debridement.     OR Note: Debridement of left sternoclavicular joint with excision of left clavicular head. Purulent drainage at the sternoclavicular joint. Tissue surrounding left clavicle found to be very inflamed. Copious irrigation of surgical site.  Specimen sent: Head of left clavicle, culture of left sternoclavicular joint, culture of left manubrium, culture of deep sternal head of clavicle, culture of first rib costal cartilage      In ER: Afebrile, WBC 9.8, , . Given IV Zosyn, IV Vancomycin.     Remains afebrile, WBC stable.     Septic arthritis of the left sternoclavicular joint and involving the   articulation of the sternum with the first costal cartilage.    Extensive surrounding inflammation, with pneumonia in the underlying   subpleural left upper lobe.      # Septic arthritis of L sternoclavicular joint s/p excision and drainage  # L upper lobe opacity, improving on recent CT   # Immunosuppressed patient  # Elevated ESR/CRP.   #Manubrium OM. s/p repeat washout and debridement.     clinically improving.     PLAN:  - continue cefepime 2 gm Q8hrs  - OR cx negative for bacterial cx.   - MRSA PCR negative,  - follow fungal/AFB cultures; negative so far  - Quantiferon negative   - All blood cultures negative.   - TTE negative   - thoracic following, plan for vac in OR today.   - s/p PICC and 6 weeks of abx. iv. from the day of repeat debridement until 1/25/24. CBC, CMP once a week while on OPAT.   Pt to follow wit me in office in 4 weeks.         Odalis Reynaga  Please contact through MS Teams   If no response or past 5 pm/weekend call [phone].     My colleague will cover 12/21 43F PMHx of MS on (Rituxan, Ocrevus) presenting with left chest wall and shoulder pain. Recent history of fall secondary to unsteadiness due to MS. On review of ortho notes, pt has had multiple falls in November. She was admitted to [facility] on Last week of November and was found to have Rhinovirus and pseudomonas UTI. She completed 5-7 days course with Ciprofloxacin. Found to have Septic arthritis of SCM joint now s/p excision.    Patient obtained CT chest w/ IV contrast which showed likely septic arthritis with surrounding erythema. Thoracic surgery consulted, underwent washout and debridement.     OR Note: Debridement of left sternoclavicular joint with excision of left clavicular head. Purulent drainage at the sternoclavicular joint. Tissue surrounding left clavicle found to be very inflamed. Copious irrigation of surgical site.  Specimen sent: Head of left clavicle, culture of left sternoclavicular joint, culture of left manubrium, culture of deep sternal head of clavicle, culture of first rib costal cartilage      In ER: Afebrile, WBC 9.8, , . Given IV Zosyn, IV Vancomycin.     Remains afebrile, WBC stable.     Septic arthritis of the left sternoclavicular joint and involving the   articulation of the sternum with the first costal cartilage.    Extensive surrounding inflammation, with pneumonia in the underlying   subpleural left upper lobe.      # Septic arthritis of L sternoclavicular joint s/p excision and drainage  # L upper lobe opacity, improving on recent CT   # Immunosuppressed patient  # Elevated ESR/CRP.   #Manubrium OM. s/p repeat washout and debridement.     clinically improving.     PLAN:  - continue cefepime 2 gm Q8hrs  - OR cx negative for bacterial cx.   - MRSA PCR negative,  - follow fungal/AFB cultures; negative so far  - Quantiferon negative   - All blood cultures negative.   - TTE negative   - thoracic following, plan for vac in OR today.   - s/p PICC and 6 weeks of abx. iv. from the day of repeat debridement until 1/25/24. CBC, CMP once a week while on OPAT.   Pt to follow wit me in office in 4 weeks.         Odalis Reynaga  Please contact through MS Teams   If no response or past 5 pm/weekend call [phone].     My colleague will cover 12/21

## 2023-12-20 NOTE — PROGRESS NOTE ADULT - ASSESSMENT
43F PMHx of MS on immunosuppressive medication, presenting with left chest wall and shoulder pain. Recent history of fall secondary to unsteadiness due to MS. Obtained outpatient MRI of left sternoclavicular joint with results significant for marrow edema and swelling of the left sternoclavicular joint with a 2 cm fluid collection and adjacent pleural edema in the left chest with concerns for infectious etiology.  Patient initially saw orthopedics with for concerns for septic arthritis of this joint.  Impression of the read showed soft tissue abscess superficial to the joint.  Recommended CT scan with IV contrast of the chest.  Patient denies any fevers however endorsing erythema to the left sternoclavicular joint region. Patient states she was recently hospitalized over a week ago at [facility] for falls secondary to unsteadiness due to her MS.  Denies any recent falls today, vision changes. Endorsing headache. Denies any chest pain, shortness of breath, abdominal pain, nausea vomiting diarrhea, urinary complaints.  CT chest with IV in ED significant for Septic arthritis of the left sternoclavicular joint and involving the articulation of the sternum with the first costal cartilage.Extensive surrounding inflammation, with pneumonia in the underlying subpleural left upper lobe.No fluid collection is evident.  Workup significant for elevated alk phos (248), . VSS and afebrile  s/p Debridement of left sternoclavicular joint with excision of left clavicular head. Purulent drainage at the sternoclavicular joint. Tissue surrounding left clavicle found to be very inflamed. Copious irrigation of surgical site.   Concerned about pain at surgical site.    at bedside     43F PMHx of MS on immunosuppressive medication, presenting with left chest wall and shoulder pain. Recent history of fall secondary to unsteadiness due to MS. Obtained outpatient MRI of left sternoclavicular joint with results significant for marrow edema and swelling of the left sternoclavicular joint with a 2 cm fluid collection and adjacent pleural edema in the left chest with concerns for infectious etiology.  Patient initially saw orthopedics with for concerns for septic arthritis of this joint.  Impression of the read showed soft tissue abscess superficial to the joint.  Recommended CT scan with IV contrast of the chest.  Patient denies any fevers however endorsing erythema to the left sternoclavicular joint region. Patient states she was recently hospitalized over a week ago at [facility] for falls secondary to unsteadiness due to her MS.  Denies any recent falls today, vision changes. Endorsing headache. Denies any chest pain, shortness of breath, abdominal pain, nausea vomiting diarrhea, urinary complaints.  CT chest with IV in ED significant for Septic arthritis of the left sternoclavicular joint and involving the articulation of the sternum with the first costal cartilage.Extensive surrounding inflammation, with pneumonia in the underlying subpleural left upper lobe.No fluid collection is evident.  Workup significant for elevated alk phos (248), . VSS and afebrile  s/p Debridement of left sternoclavicular joint with excision of left clavicular head. Purulent drainage at the sternoclavicular joint. Tissue surrounding left clavicle found to be very inflamed. Copious irrigation of surgical site.   Concerned about pain at surgical site.    at bedside

## 2023-12-20 NOTE — PROGRESS NOTE ADULT - SUBJECTIVE AND OBJECTIVE BOX
43yPatient is a 43y old  Female who presents with a chief complaint of L shoulder and chest pain (20 Dec 2023 18:28)      Interval history:  Afebrile, walked in the [place] today.       Allergies:   No Known Allergies      Antimicrobials:  cefepime   IVPB 2000 milliGRAM(s) IV Intermittent every 8 hours      REVIEW OF SYSTEMS:  No SOB  No abdominal pain  No rash.       Vital Signs Last 24 Hrs  T(C): 36.7 (12-20-23 @ 22:37), Max: 36.7 (12-20-23 @ 13:25)  T(F): 98 (12-20-23 @ 22:37), Max: 98.1 (12-20-23 @ 13:25)  HR: 89 (12-20-23 @ 22:37) (61 - 89)  BP: 100/62 (12-20-23 @ 22:37) (100/62 - 109/75)  BP(mean): --  RR: 18 (12-20-23 @ 22:37) (18 - 18)  SpO2: 96% (12-20-23 @ 22:37) (96% - 100%)      PHYSICAL EXAM:  Pt in no acute distress, alert, awake.   non distended abdomen  no edema LE   rt arm PICC    Wound vac in place left shoulder  ROM rt side mostly preserved.                             9.2    11.69 )-----------( 564      ( 20 Dec 2023 07:23 )             30.2   12-20    137  |  99  |  8   ----------------------------<  104<H>  4.0   |  26  |  0.49<L>    Ca    9.9      20 Dec 2023 07:20        Culture - Tissue with Gram Stain (12.16.23 @ 03:41)   Gram Stain:   No polymorphonuclear cells seen per low power field   No organisms seen per oil power field  Specimen Source: .Tissue Other  Culture Results:   No growth

## 2023-12-20 NOTE — PROGRESS NOTE ADULT - SUBJECTIVE AND OBJECTIVE BOX
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  She had a recent fall in 2023 secondary to unsteadiness due to multiple sclerosis, with subsequent left chest wall and shoulder pain.  Was seen at [facility] with concern for MS exacerbation but no MRI brain and cervical spine with an without contrast were done at the time without enhancement.  At the time infectious workup was done and notable for UTI which was treated.  She continued to have pain.  In the interim, she had an outpatient MRI of left sternoclavicular joint with results significant for marrow edema and swelling of the left sternoclavicular joint with a 2 cm fluid collection and adjacent pleural edema in the left chest with concerns for infectious etiology.   CT chest w/ IV contrast suggestive of septic arthritis with surrounding erythema. Thoracic surgery consulted.  Patient s/p surgical debridement on 23    Int hx:  mri with 80 x 25 mm rim-enhancing fluid collection persists in the surgical cavity with well-placed drain within the fluid collection.   Marrow signal abnormality in the manubrium concerning for acute   osteomyelitis.    Low-grade partial-thickness bursal surface tear of the supraspinatus tendon.    Patient in pain, tearful.  Having wound sponges replaced         ******    Pt still with sternal pain, says 10/10, pain also in left arm    Past Medical History:  Multiple sclerosis [G35]        Past Surgical History:  History of  [Z98.891]        Social History:  No toxic habits    Family History:  FAMILY HISTORY:      Allergies:  No Known Allergies      ROS:  Constitutional: Patient offers no complaints of fevers or significant weight loss  Ears, Nose, Mouth and Throat: The patient presents with no abnormalities of the head, ears, eyes, nose or throat  Skin: Patient offers no concerns of new rashes or lesions  Respiratory: The patient presents with no abnormalities of the respiratory tract  Cardiovascular: The patient presents with no cardiac abnormalities  Gastrointestinal: The patient presents with no abnormalities of the GI system  Genitourinary: The patient presents with no dysuria, hematuria or frequent urination  Neurological: See HPI  Endocrine: Patient offers no complaints of excessive thirst, urination, or heat/cold intolerance    Advanced care planning reviewed and noted in the chart.    Medications:  acetaminophen     Tablet .. 650 milliGRAM(s) Oral every 6 hours  cefepime   IVPB 2000 milliGRAM(s) IV Intermittent every 8 hours  chlorhexidine 2% Cloths 1 Application(s) Topical <User Schedule>  clonazePAM  Tablet 0.5 milliGRAM(s) Oral three times a day PRN  cyclobenzaprine 5 milliGRAM(s) Oral two times a day  dalfampridine ER 10 milliGRAM(s) Oral every 12 hours  DULoxetine 60 milliGRAM(s) Oral at bedtime  HYDROmorphone   Tablet 2 milliGRAM(s) Oral every 4 hours PRN  HYDROmorphone   Tablet 4 milliGRAM(s) Oral every 6 hours PRN  HYDROmorphone  Injectable 1 milliGRAM(s) IV Push every 3 hours PRN  oxyCODONE  ER Tablet 10 milliGRAM(s) Oral every 12 hours  senna 2 Tablet(s) Oral at bedtime  sertraline 100 milliGRAM(s) Oral at bedtime  Vibegron (Gemtesa) 75 milliGRAM(s),Vibegron (Gemtesa) 75mg tablet 1 Tablet(s) 1 Tablet(s) Oral daily      Labs:  CBC Full  -  ( 20 Dec 2023 07:23 )  WBC Count : 11.69 K/uL  RBC Count : 3.66 M/uL  Hemoglobin : 9.2 g/dL  Hematocrit : 30.2 %  Platelet Count - Automated : 564 K/uL  Mean Cell Volume : 82.5 fl  Mean Cell Hemoglobin : 25.1 pg  Mean Cell Hemoglobin Concentration : 30.5 gm/dL  Auto Neutrophil # : x  Auto Lymphocyte # : x  Auto Monocyte # : x  Auto Eosinophil # : x  Auto Basophil # : x  Auto Neutrophil % : x  Auto Lymphocyte % : x  Auto Monocyte % : x  Auto Eosinophil % : x  Auto Basophil % : x    12-20    137  |  99  |  8   ----------------------------<  104<H>  4.0   |  26  |  0.49<L>    Ca    9.9      20 Dec 2023 07:20      CAPILLARY BLOOD GLUCOSE            Urinalysis Basic - ( 20 Dec 2023 07:20 )    Color: x / Appearance: x / SG: x / pH: x  Gluc: 104 mg/dL / Ketone: x  / Bili: x / Urobili: x   Blood: x / Protein: x / Nitrite: x   Leuk Esterase: x / RBC: x / WBC x   Sq Epi: x / Non Sq Epi: x / Bacteria: x          Vitals:  Vital Signs Last 24 Hrs  T(C): 36.5 (20 Dec 2023 05:47), Max: 37 (19 Dec 2023 17:23)  T(F): 97.7 (20 Dec 2023 05:47), Max: 98.6 (19 Dec 2023 17:23)  HR: 62 (20 Dec 2023 05:47) (62 - 94)  BP: 107/73 (20 Dec 2023 05:47) (104/67 - 109/73)  BP(mean): --  RR: 18 (20 Dec 2023 05:47) (18 - 18)  SpO2: 100% (20 Dec 2023 05:47) (94% - 100%)    Parameters below as of 20 Dec 2023 05:47  Patient On (Oxygen Delivery Method): room air        NEUROLOGICAL EXAM:    Mental status: Awake, alert, and in less apparent distress. Oriented to person, place and time. Language function is normal.      Cranial Nerves: Pupils were equal, round, reactive to light. Extraocular movements were intact. Visual field were full. Fundoscopic exam was deferred. Facial sensation was intact to light touch. There was slight left facia droop. The palate was upgoing symmetrically and tongue was midline.     Motor exam: Bulk and tone were normal. able to move all ext fully without much pain    Reflexes: deferred     Sensation: Intact to light touch/temp    Coordination: no gross dysmetria    Gait: defer   sternal wound seen          ACC: [id number] EXAM:  MR SHOULDER WAW IC LT   ORDERED BY:  BRAN SANCHEZ     ACC: [id number] EXAM:  MR STERNOCLAVICULAR JNT LT   ORDERED BY: ANASTASIIA WALKER     PROCEDURE DATE:  2023          INTERPRETATION:  MRI OF THE LEFT STERNOCLAVICULAR JOINT AND SHOULDER    CLINICAL INFORMATION: Left sternal abscess status post washout with   persistent purulent drainage and left shoulder pain.  TECHNIQUE: Multisequence, multiplanar MRI of the left sternoclavicular   joint. The study was performed before and after the intravenous   administration of 6 ml Gadavist (1.5 cc discarded) .    COMPARISON: Chest CT 2023 and 2023.    FINDINGS:    STERNOCLAVICULAR JOINT:    BONE: Patient status post surgical resection of the left clavicular head.   The surgical margin is sharp with trace edema and no loss of T1   hyperintense signal. In the manubrium there is increased STIR signal with   loss of T1 hyperintense signal and postcontrast enhancement involving the   superolateral leftward aspect of the bone concerning for acute   osteomyelitis (6:9 5:9). No acute fracture. No osteonecrosis.    JOINTS: A residual rim-enhancing fluid collection is seen in the region   of the left sternoclavicular joint measuring 80 x 25 mm. A drainage   catheter is seen within the fluid collection.    SOFT TISSUE: Postsurgical changes are seen along the anterior aspect of   the left sternoclavicular joint. There is a mild amount of edema in the   adjacent pectoralis major muscle. No focal fluid collection in the   anterior mediastinum. Susceptibility artifact from surgical staples are   seen along the anterior chest wall.      SHOULDER JOINT:    ROTATOR CUFF: Low-grade partial-thickness bursal surface tearing of the   supraspinatus tendon. Rotator cuff is otherwise intact.  MUSCLES: No focal muscle edema or atrophy.  BICEPS TENDON: Normal in course and caliber.  GLENOID LABRUM AND GLENOHUMERAL LIGAMENTS: No displaced labral tear.   Inferior glenohumeral ligament is intact.  GLENOHUMERAL CARTILAGE AND SUBCHONDRAL BONE: No full-thickness chondral   loss.  AC JOINT: No AC joint arthropathy.  SYNOVIUM/JOINT FLUID: No glenohumeral joint effusion. No focal fluid in   the subacromial/subdeltoid bursa.  BONE MARROW: No fracture or osteonecrosis. No marrow signal change to   suggest acute osteomyelitis.  NEUROVASCULAR STRUCTURES: Structures of the suprascapular notch,   spinoglenoid notch, and quadrilateral space are normal in course and   caliber.  SUBCUTANEOUS SOFT TISSUES: Edema in the subcutaneous fat of the left   shoulder. No rim-enhancing fluid collection to suggest abscess.        IMPRESSION:  1.  Patient status post surgical resection of the left clavicular head   with washout of the left sternoclavicular joint.  2.  A 80 x 25 mm rim-enhancing fluid collection persists in the surgical   cavity with well-placed drain within the fluid collection.  3.  Marrow signal abnormality in the manubrium concerning for acute   osteomyelitis.  4.  Low-grade partial-thickness bursal surface tear of the supraspinatus   tendon.    --- End of Report ---            RASHIDA HYDE MD; Attending Radiologist  This document has been electronically signed. 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  She had a recent fall in 2023 secondary to unsteadiness due to multiple sclerosis, with subsequent left chest wall and shoulder pain.  Was seen at [facility] with concern for MS exacerbation but no MRI brain and cervical spine with an without contrast were done at the time without enhancement.  At the time infectious workup was done and notable for UTI which was treated.  She continued to have pain.  In the interim, she had an outpatient MRI of left sternoclavicular joint with results significant for marrow edema and swelling of the left sternoclavicular joint with a 2 cm fluid collection and adjacent pleural edema in the left chest with concerns for infectious etiology.   CT chest w/ IV contrast suggestive of septic arthritis with surrounding erythema. Thoracic surgery consulted.  Patient s/p surgical debridement on 23    Int hx:  mri with 80 x 25 mm rim-enhancing fluid collection persists in the surgical cavity with well-placed drain within the fluid collection.   Marrow signal abnormality in the manubrium concerning for acute   osteomyelitis.    Low-grade partial-thickness bursal surface tear of the supraspinatus tendon.    Patient in pain, tearful.  Having wound sponges replaced         ******    Pt still with sternal pain, says 10/10, pain also in left arm    Past Medical History:  Multiple sclerosis [G35]        Past Surgical History:  History of  [Z98.891]        Social History:  No toxic habits    Family History:  FAMILY HISTORY:      Allergies:  No Known Allergies      ROS:  Constitutional: Patient offers no complaints of fevers or significant weight loss  Ears, Nose, Mouth and Throat: The patient presents with no abnormalities of the head, ears, eyes, nose or throat  Skin: Patient offers no concerns of new rashes or lesions  Respiratory: The patient presents with no abnormalities of the respiratory tract  Cardiovascular: The patient presents with no cardiac abnormalities  Gastrointestinal: The patient presents with no abnormalities of the GI system  Genitourinary: The patient presents with no dysuria, hematuria or frequent urination  Neurological: See HPI  Endocrine: Patient offers no complaints of excessive thirst, urination, or heat/cold intolerance    Advanced care planning reviewed and noted in the chart.    Medications:  acetaminophen     Tablet .. 650 milliGRAM(s) Oral every 6 hours  cefepime   IVPB 2000 milliGRAM(s) IV Intermittent every 8 hours  chlorhexidine 2% Cloths 1 Application(s) Topical <User Schedule>  clonazePAM  Tablet 0.5 milliGRAM(s) Oral three times a day PRN  cyclobenzaprine 5 milliGRAM(s) Oral two times a day  dalfampridine ER 10 milliGRAM(s) Oral every 12 hours  DULoxetine 60 milliGRAM(s) Oral at bedtime  HYDROmorphone   Tablet 2 milliGRAM(s) Oral every 4 hours PRN  HYDROmorphone   Tablet 4 milliGRAM(s) Oral every 6 hours PRN  HYDROmorphone  Injectable 1 milliGRAM(s) IV Push every 3 hours PRN  oxyCODONE  ER Tablet 10 milliGRAM(s) Oral every 12 hours  senna 2 Tablet(s) Oral at bedtime  sertraline 100 milliGRAM(s) Oral at bedtime  Vibegron (Gemtesa) 75 milliGRAM(s),Vibegron (Gemtesa) 75mg tablet 1 Tablet(s) 1 Tablet(s) Oral daily      Labs:  CBC Full  -  ( 20 Dec 2023 07:23 )  WBC Count : 11.69 K/uL  RBC Count : 3.66 M/uL  Hemoglobin : 9.2 g/dL  Hematocrit : 30.2 %  Platelet Count - Automated : 564 K/uL  Mean Cell Volume : 82.5 fl  Mean Cell Hemoglobin : 25.1 pg  Mean Cell Hemoglobin Concentration : 30.5 gm/dL  Auto Neutrophil # : x  Auto Lymphocyte # : x  Auto Monocyte # : x  Auto Eosinophil # : x  Auto Basophil # : x  Auto Neutrophil % : x  Auto Lymphocyte % : x  Auto Monocyte % : x  Auto Eosinophil % : x  Auto Basophil % : x    12-20    137  |  99  |  8   ----------------------------<  104<H>  4.0   |  26  |  0.49<L>    Ca    9.9      20 Dec 2023 07:20      CAPILLARY BLOOD GLUCOSE            Urinalysis Basic - ( 20 Dec 2023 07:20 )    Color: x / Appearance: x / SG: x / pH: x  Gluc: 104 mg/dL / Ketone: x  / Bili: x / Urobili: x   Blood: x / Protein: x / Nitrite: x   Leuk Esterase: x / RBC: x / WBC x   Sq Epi: x / Non Sq Epi: x / Bacteria: x          Vitals:  Vital Signs Last 24 Hrs  T(C): 36.5 (20 Dec 2023 05:47), Max: 37 (19 Dec 2023 17:23)  T(F): 97.7 (20 Dec 2023 05:47), Max: 98.6 (19 Dec 2023 17:23)  HR: 62 (20 Dec 2023 05:47) (62 - 94)  BP: 107/73 (20 Dec 2023 05:47) (104/67 - 109/73)  BP(mean): --  RR: 18 (20 Dec 2023 05:47) (18 - 18)  SpO2: 100% (20 Dec 2023 05:47) (94% - 100%)    Parameters below as of 20 Dec 2023 05:47  Patient On (Oxygen Delivery Method): room air        NEUROLOGICAL EXAM:    Mental status: Awake, alert, and in less apparent distress. Oriented to person, place and time. Language function is normal.      Cranial Nerves: Pupils were equal, round, reactive to light. Extraocular movements were intact. Visual field were full. Fundoscopic exam was deferred. Facial sensation was intact to light touch. There was slight left facia droop. The palate was upgoing symmetrically and tongue was midline.     Motor exam: Bulk and tone were normal. able to move all ext fully without much pain    Reflexes: deferred     Sensation: Intact to light touch/temp    Coordination: no gross dysmetria    Gait: defer   sternal wound seen          ACC: [id number] EXAM:  MR SHOULDER WAW IC LT   ORDERED BY:  BRAN SANCHEZ     ACC: [id number] EXAM:  MR STERNOCLAVICULAR JNT LT   ORDERED BY: ANASTASIIA WALKER     PROCEDURE DATE:  2023          INTERPRETATION:  MRI OF THE LEFT STERNOCLAVICULAR JOINT AND SHOULDER    CLINICAL INFORMATION: Left sternal abscess status post washout with   persistent purulent drainage and left shoulder pain.  TECHNIQUE: Multisequence, multiplanar MRI of the left sternoclavicular   joint. The study was performed before and after the intravenous   administration of 6 ml Gadavist (1.5 cc discarded) .    COMPARISON: Chest CT 2023 and 2023.    FINDINGS:    STERNOCLAVICULAR JOINT:    BONE: Patient status post surgical resection of the left clavicular head.   The surgical margin is sharp with trace edema and no loss of T1   hyperintense signal. In the manubrium there is increased STIR signal with   loss of T1 hyperintense signal and postcontrast enhancement involving the   superolateral leftward aspect of the bone concerning for acute   osteomyelitis (6:9 5:9). No acute fracture. No osteonecrosis.    JOINTS: A residual rim-enhancing fluid collection is seen in the region   of the left sternoclavicular joint measuring 80 x 25 mm. A drainage   catheter is seen within the fluid collection.    SOFT TISSUE: Postsurgical changes are seen along the anterior aspect of   the left sternoclavicular joint. There is a mild amount of edema in the   adjacent pectoralis major muscle. No focal fluid collection in the   anterior mediastinum. Susceptibility artifact from surgical staples are   seen along the anterior chest wall.      SHOULDER JOINT:    ROTATOR CUFF: Low-grade partial-thickness bursal surface tearing of the   supraspinatus tendon. Rotator cuff is otherwise intact.  MUSCLES: No focal muscle edema or atrophy.  BICEPS TENDON: Normal in course and caliber.  GLENOID LABRUM AND GLENOHUMERAL LIGAMENTS: No displaced labral tear.   Inferior glenohumeral ligament is intact.  GLENOHUMERAL CARTILAGE AND SUBCHONDRAL BONE: No full-thickness chondral   loss.  AC JOINT: No AC joint arthropathy.  SYNOVIUM/JOINT FLUID: No glenohumeral joint effusion. No focal fluid in   the subacromial/subdeltoid bursa.  BONE MARROW: No fracture or osteonecrosis. No marrow signal change to   suggest acute osteomyelitis.  NEUROVASCULAR STRUCTURES: Structures of the suprascapular notch,   spinoglenoid notch, and quadrilateral space are normal in course and   caliber.  SUBCUTANEOUS SOFT TISSUES: Edema in the subcutaneous fat of the left   shoulder. No rim-enhancing fluid collection to suggest abscess.        IMPRESSION:  1.  Patient status post surgical resection of the left clavicular head   with washout of the left sternoclavicular joint.  2.  A 80 x 25 mm rim-enhancing fluid collection persists in the surgical   cavity with well-placed drain within the fluid collection.  3.  Marrow signal abnormality in the manubrium concerning for acute   osteomyelitis.  4.  Low-grade partial-thickness bursal surface tear of the supraspinatus   tendon.    --- End of Report ---            RASHIDA HYDE MD; Attending Radiologist  This document has been electronically signed. Dec 11 2023  5:11PM

## 2023-12-20 NOTE — PROGRESS NOTE ADULT - PROBLEM SELECTOR PLAN 1
12/18: Vac change   - Remain left VAC in place w/ good seal  - OOB  - Optimize nutrition supplement to meals  - Optimize pain control   - Plan d/w Attending Dr. Bautista 12/18: Vac change   - Remain left VAC in place w/ good seal  VAC change Mon - wed- fri  plan - ordered   d/w PT   12/20 VAC change @bedside  - OOB  - Optimize nutrition supplement to meals  - Optimize pain control   - Plan d/w Attending Dr. Bautista

## 2023-12-20 NOTE — ADVANCED PRACTICE NURSE CONSULT - ASSESSMENT
Central Line Catheter Insertion Note  Patient or patient representative educated about central line associated blood stream infection prevention practices.    Catheter type: SL Power PICC  : Bard/ [id number]  Power injectable: Yes  Procedure assisted by: Aliyah Purdy RN    Informed consent obtained by covering floor team. Time out was preformed, confirming the patient's first and last name, date of birth, procedure, and correct site prior to state of procedure.    Patient was placed with HOB up 30 degrees. Patient placement site was prepped with chlorhexidine solution, then draped using maximum sterile barrier protection. The area was injected with 2 ml of 1% lidocaine. Using the ultrasound, the catheter was introduced. Strict adherence to outline aseptic technique. Upon completion of line placement, the insertion site was covered with a sterile occlusive dressing. Pt tolerated procedure well. Minimal blood loss.     All materials used for catheter insertion, including the intact guide wires, were accounted for at the end of the procedure.    Number of attempts: 1  Complications/Comments: None    Emergency Placement: No  Site: New site  Anatomical Site of insertion: R Brachial   Catheter size/length: 4Fr., 33 cm.  US guided Bard SL Power PICC placed    Pre-procedure vitals  T:36.5C  HR:62  BP:107/73  RR:16  02:100%  Post-procedure vitals WDL    Post procedure verification with CXR as per orders.   Central Line Catheter Insertion Note  Patient or patient representative educated about central line associated blood stream infection prevention practices.    Catheter type: SL Power PICC  : Bard/ [id number]  Power injectable: Yes  Procedure assisted by: Aliyah Purdy RN    Informed consent obtained by covering floor team. Time out was preformed, confirming the patient's first and last name, date of birth, procedure, and correct site prior to state of procedure.    Patient was placed with HOB up 30 degrees. Patient placement site was prepped with chlorhexidine solution, then draped using maximum sterile barrier protection. The area was injected with 2 ml of 1% lidocaine. Using the ultrasound, the catheter was introduced. Strict adherence to outline aseptic technique. Upon completion of line placement, the insertion site was covered with a sterile occlusive dressing. Pt tolerated procedure well. Minimal blood loss.     All materials used for catheter insertion, including the intact guide wires, were accounted for at the end of the procedure.    Number of attempts: 1  Complications/Comments: None    Emergency Placement: No  Site: New site  Anatomical Site of insertion: R Brachial   Catheter size/length: 4Fr., 33 cm.  US guided Bard SL Power PICC placed    Pre-procedure vitals  T:36.5C  HR:62  BP:107/73  RR:16  02:100%  Post-procedure vitals WDL    Post procedure verification with CXR as per orders.

## 2023-12-20 NOTE — PROGRESS NOTE ADULT - ASSESSMENT
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  She had a recent fall in November 2023 secondary to unsteadiness due to multiple sclerosis, with subsequent left chest wall and shoulder pain.  Was seen at [facility] with concern for MS exacerbation but no MRI brain and cervical spine with an without contrast were done at the time without enhancement.  At the time infectious workup was done and notable for UTI which was treated.  She continued to have pain.  In the interim, she had an outpatient MRI of left sternoclavicular joint with results significant for marrow edema and swelling of the left sternoclavicular joint with a 2 cm fluid collection and adjacent pleural edema in the left chest with concerns for infectious etiology.   CT chest w/ IV contrast suggestive of septic arthritis with surrounding erythema. Thoracic surgery consulted.  Patient s/p surgical debridement on 12/7/23    mri with 80 x 25 mm rim-enhancing fluid collection persists in the surgical cavity with well-placed drain within the fluid collection.   Marrow signal abnormality in the manubrium concerning for acute   osteomyelitis.    Low-grade partial-thickness bursal surface tear of the supraspinatus tendon.    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  She had a recent fall in November 2023 secondary to unsteadiness due to multiple sclerosis, with subsequent left chest wall and shoulder pain.  Was seen at [facility] with concern for MS exacerbation but no MRI brain and cervical spine with an without contrast were done at the time without enhancement.  At the time infectious workup was done and notable for UTI which was treated.  She continued to have pain.  In the interim, she had an outpatient MRI of left sternoclavicular joint with results significant for marrow edema and swelling of the left sternoclavicular joint with a 2 cm fluid collection and adjacent pleural edema in the left chest with concerns for infectious etiology.   CT chest w/ IV contrast suggestive of septic arthritis with surrounding erythema. Thoracic surgery consulted.  Patient s/p surgical debridement on 12/7/23    mri with 80 x 25 mm rim-enhancing fluid collection persists in the surgical cavity with well-placed drain within the fluid collection.   Marrow signal abnormality in the manubrium concerning for acute   osteomyelitis.    Low-grade partial-thickness bursal surface tear of the supraspinatus tendon.    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  Now likely pseudo-flare in setting of septic arthritis    Recommendations:  Management of septic arthritis per primary team  s/p surgical debridement 12/7/23  hold ocrelizumab for now given septic arthritis  continue home meds once able to take PO post-surgically  abx as per ID  defer to primary team for pain control   d/w pt and her father at bedside

## 2023-12-20 NOTE — PROGRESS NOTE ADULT - ASSESSMENT
43F PMHx of MS on Rituxan, presenting with left chest wall and shoulder pain w/ concern for infection now admitted with likely septic arthritis with need for possible washout and debridement    POD # 1 Debridement and washout of Left.  sternoclavicular joint excision Left clavicular head  Sita 80cc serosanguinous.  GS no org seen.  Cultures pending.  12/9 + RVP>no treatment as per ID  DC PCA  somnolent  Pain mgmt notified Transition oral Rx    12/10    pain medication w oxycodone and dilaudid for breakthrough,     Lt CW staples w bulb   w mininimal drainage   abx per ID  12/11: Left CW with staples with bulb, with minimal drainage. continue antibiotics per ID. continue optimal pain control.   12/12    lt cw  staples  w erythma  drain    several staples removed and packed by Dr Lilly,  abx  per ID  12/13  wbc 10.8  afebrile.  wound without change since yesterday.    OR c/s NGTD  BC  NGTD.    12/14 VSS. afebrile. wound dressing changed at bedside. Awaiting final OR/Blood cultures. Left sita d/c'ed (output 0cc/2cc overnight/ 24hr). Keep NPO p MN. Need 2 active T&S. Plan for OR washing out & wound vac placement tomorrow.  12/15 OR today  Washout/debridement sternoclavicular joint> VAC placed  12/16 Cont abx as per ID Maintain VAC  12/17 VSS - IV antibx - Maintain VAC.  Care as per Primary Team.  12/18 vss- NPO FOR VAC CHANGE IN OR - NO PREGNANCY TEST REQUIRED AS PT. UNDERWENT TUBAL LIGATION.  SPOKE TO HERB IN OR- DR LILLY MADE AWARE  12/19 VSS - L sternoclavicular wound vac dressing CDI, draining scant amount 2-3mL of dark blood. + LUE pain radiating to upper back and R shoulder. Receiving Dilaudid 1mg q3h. Discussed with Attending Dr. Lilly.  12/20 VSS - WBC 11.9 43F PMHx of MS on Rituxan, presenting with left chest wall and shoulder pain w/ concern for infection now admitted with likely septic arthritis with need for possible washout and debridement    POD # 1 Debridement and washout of Left.  sternoclavicular joint excision Left clavicular head  Sita 80cc serosanguinous.  GS no org seen.  Cultures pending.  12/9 + RVP>no treatment as per ID  DC PCA  somnolent  Pain mgmt notified Transition oral Rx    12/10    pain medication w oxycodone and dilaudid for breakthrough,     Lt CW staples w bulb   w mininimal drainage   abx per ID  12/11: Left CW with staples with bulb, with minimal drainage. continue antibiotics per ID. continue optimal pain control.   12/12    lt cw  staples  w erythma  drain    several staples removed and packed by Dr Lilly,  abx  per ID  12/13  wbc 10.8  afebrile.  wound without change since yesterday.    OR c/s NGTD  BC  NGTD.    12/14 VSS. afebrile. wound dressing changed at bedside. Awaiting final OR/Blood cultures. Left ista d/c'ed (output 0cc/2cc overnight/ 24hr). Keep NPO p MN. Need 2 active T&S. Plan for OR washing out & wound vac placement tomorrow.  12/15 OR today  Washout/debridement sternoclavicular joint> VAC placed  12/16 Cont abx as per ID Maintain VAC  12/17 VSS - IV antibx - Maintain VAC.  Care as per Primary Team.  12/18 vss- NPO FOR VAC CHANGE IN OR - NO PREGNANCY TEST REQUIRED AS PT. UNDERWENT TUBAL LIGATION.  SPOKE TO HERB IN OR- DR LILLY MADE AWARE  12/19 VSS - L sternoclavicular wound vac dressing CDI, draining scant amount 2-3mL of dark blood. + LUE pain radiating to upper back and R shoulder. Receiving Dilaudid 1mg q3h. Discussed with Attending Dr. Lilly.  12/20 VSS - WBC 11.9 43F PMHx of MS on Rituxan, presenting with left chest wall and shoulder pain w/ concern for infection now admitted with likely septic arthritis with need for possible washout and debridement    POD # 1 Debridement and washout of Left.  sternoclavicular joint excision Left clavicular head  Sita 80cc serosanguinous.  GS no org seen.  Cultures pending.  12/9 + RVP>no treatment as per ID  DC PCA  somnolent  Pain mgmt notified Transition oral Rx    12/10    pain medication w oxycodone and dilaudid for breakthrough,     Lt CW staples w bulb   w mininimal drainage   abx per ID  12/11: Left CW with staples with bulb, with minimal drainage. continue antibiotics per ID. continue optimal pain control.   12/12    lt cw  staples  w erythma  drain    several staples removed and packed by Dr Lilly,  abx  per ID  12/13  wbc 10.8  afebrile.  wound without change since yesterday.    OR c/s NGTD  BC  NGTD.    12/14 VSS. afebrile. wound dressing changed at bedside. Awaiting final OR/Blood cultures. Left sita d/c'ed (output 0cc/2cc overnight/ 24hr). Keep NPO p MN. Need 2 active T&S. Plan for OR washing out & wound vac placement tomorrow.  12/15 OR today  Washout/debridement sternoclavicular joint> VAC placed  12/16 Cont abx as per ID Maintain VAC  12/17 VSS - IV antibx - Maintain VAC.  Care as per Primary Team.  12/18 vss- NPO FOR VAC CHANGE IN OR - NO PREGNANCY TEST REQUIRED AS PT. UNDERWENT TUBAL LIGATION.  SPOKE TO HERB IN OR- DR LILLY MADE AWARE  12/19 VSS - L sternoclavicular wound vac dressing CDI, draining scant amount 2-3mL of dark blood. + LUE pain radiating to upper back and R shoulder. Receiving Dilaudid 1mg q3h. Discussed with Attending Dr. Lilly.  12/20 VSS - WBC 11.9 d/w with PT VAC to  be  changed today then every  [id number]-wed-fri .

## 2023-12-20 NOTE — PROGRESS NOTE ADULT - SUBJECTIVE AND OBJECTIVE BOX
Subjective " hello"       Vital Signs Last 24 Hrs  T(C): 36.5 (12-20-23 @ 05:47), Max: 37 (12-19-23 @ 17:23)  T(F): 97.7 (12-20-23 @ 05:47), Max: 98.6 (12-19-23 @ 17:23)  HR: 62 (12-20-23 @ 05:47) (62 - 94)  BP: 107/73 (12-20-23 @ 05:47) (104/67 - 109/73)  RR: 18 (12-20-23 @ 05:47) (18 - 18)  SpO2: 100% (12-20-23 @ 05:47) (94% - 100%)           12-19 @ 07:01  -  12-20 @ 07:00  --------------------------------------------------------  IN: 1000 mL / OUT: 20 mL / NET: 980 mL                          9.2    11.69 )-----------( 564      ( 20 Dec 2023 07:23 )             30.2       12-20    137  |  99  |  8   ----------------------------<  104<H>  4.0   |  26  |  0.49<L>    Ca    9.9      20 Dec 2023 07:20          MEDICATIONS  (STANDING):  acetaminophen     Tablet .. 650 milliGRAM(s) Oral every 6 hours  cefepime   IVPB 2000 milliGRAM(s) IV Intermittent every 8 hours  chlorhexidine 2% Cloths 1 Application(s) Topical <User Schedule>  cyclobenzaprine 5 milliGRAM(s) Oral two times a day  dalfampridine ER 10 milliGRAM(s) Oral every 12 hours  DULoxetine 60 milliGRAM(s) Oral at bedtime  oxyCODONE  ER Tablet 10 milliGRAM(s) Oral every 12 hours  senna 2 Tablet(s) Oral at bedtime  sertraline 100 milliGRAM(s) Oral at bedtime  Vibegron (Gemtesa) 75 milliGRAM(s),Vibegron (Gemtesa) 75mg tablet 1 Tablet(s) 1 Tablet(s) Oral daily    MEDICATIONS  (PRN):  clonazePAM  Tablet 0.5 milliGRAM(s) Oral three times a day PRN anxiety  HYDROmorphone   Tablet 2 milliGRAM(s) Oral every 4 hours PRN Moderate Pain (4 - 6)  HYDROmorphone   Tablet 4 milliGRAM(s) Oral every 6 hours PRN Severe Pain (7 - 10)  HYDROmorphone  Injectable 1 milliGRAM(s) IV Push every 3 hours PRN Breakthrough pain        CAPILLARY BLOOD GLUCOSE              Drains:  VAC  subclavian patent     on:                               PHYSICAL EXAM        Neurology: alert and oriented x 3, nonfocal, no gross deficits    CV :S1S2     Wound : vac patenet CDI , Stable    Lungs: B/l breath sounds on room air     Abdomen: soft, nontender, nondistended, positive bowel sounds, + bm    :   voids            Extremities:   warm well perfused equal strength throughout                                         Physical Therapy Rec:  pending    Discussed with Cardiothoracic Team at AM rounds.

## 2023-12-20 NOTE — PROGRESS NOTE ADULT - SUBJECTIVE AND OBJECTIVE BOX
Date of service: 12-20-23 @ 18:29      Patient is a 43y old  Female who presents with a chief complaint of L shoulder and chest pain (20 Dec 2023 11:08)                                                               INTERVAL HPI/OVERNIGHT EVENTS:    REVIEW OF SYSTEMS:     CONSTITUTIONAL: No weakness, fevers or chills  RESPIRATORY: No cough, wheezing,  No shortness of breath  CARDIOVASCULAR: No chest pain or palpitations  GASTROINTESTINAL: No abdominal pain  . No nausea, vomiting, or hematemesis; No diarrhea or constipation. No melena or hematochezia.  GENITOURINARY: No dysuria, frequency or hematuria  NEUROLOGICAL: No numbness or weakness                                                                                                                                                                                                                                                                                Medications:  MEDICATIONS  (STANDING):  acetaminophen     Tablet .. 650 milliGRAM(s) Oral every 6 hours  cefepime   IVPB 2000 milliGRAM(s) IV Intermittent every 8 hours  chlorhexidine 2% Cloths 1 Application(s) Topical <User Schedule>  cyclobenzaprine 5 milliGRAM(s) Oral two times a day  dalfampridine ER 10 milliGRAM(s) Oral every 12 hours  DULoxetine 60 milliGRAM(s) Oral at bedtime  oxyCODONE  ER Tablet 10 milliGRAM(s) Oral every 12 hours  senna 2 Tablet(s) Oral at bedtime  sertraline 100 milliGRAM(s) Oral at bedtime  Vibegron (Gemtesa) 75 milliGRAM(s),Vibegron (Gemtesa) 75mg tablet 1 Tablet(s) 1 Tablet(s) Oral daily    MEDICATIONS  (PRN):  clonazePAM  Tablet 0.5 milliGRAM(s) Oral three times a day PRN anxiety  HYDROmorphone   Tablet 2 milliGRAM(s) Oral every 4 hours PRN Moderate Pain (4 - 6)  HYDROmorphone   Tablet 4 milliGRAM(s) Oral every 6 hours PRN Severe Pain (7 - 10)  HYDROmorphone  Injectable 1 milliGRAM(s) IV Push every 3 hours PRN Breakthrough pain       Allergies    No Known Allergies    Intolerances      Vital Signs Last 24 Hrs  T(C): 36.7 (20 Dec 2023 13:25), Max: 36.8 (19 Dec 2023 21:10)  T(F): 98.1 (20 Dec 2023 13:25), Max: 98.2 (19 Dec 2023 21:10)  HR: 61 (20 Dec 2023 13:25) (61 - 84)  BP: 109/75 (20 Dec 2023 13:25) (104/67 - 109/75)  BP(mean): --  RR: 18 (20 Dec 2023 13:25) (18 - 18)  SpO2: 99% (20 Dec 2023 13:25) (96% - 100%)    Parameters below as of 20 Dec 2023 13:25  Patient On (Oxygen Delivery Method): room air      CAPILLARY BLOOD GLUCOSE          12-19 @ 07:01  -  12-20 @ 07:00  --------------------------------------------------------  IN: 1000 mL / OUT: 20 mL / NET: 980 mL    12-20 @ 07:01  -  12-20 @ 18:29  --------------------------------------------------------  IN: 480 mL / OUT: 0 mL / NET: 480 mL      Physical Exam:    Daily     Daily   General:  Well appearing, NAD, not cachetic  HEENT:  Nonicteric, PERRLA  CV:  RRR, S1S2   Lungs:  CTA B/L, no wheezes, rales, rhonchi  Abdomen:  Soft, non-tender, no distended, positive BS  Extremities:  no edema                                                                                                                                                                                                                                                                                         LABS:                               9.2    11.69 )-----------( 564      ( 20 Dec 2023 07:23 )             30.2                      12-20    137  |  99  |  8   ----------------------------<  104<H>  4.0   |  26  |  0.49<L>    Ca    9.9      20 Dec 2023 07:20                         RADIOLOGY & ADDITIONAL TESTS         I personally reviewed: [  ]EKG   [  ]CXR    [  ] CT      A/P:         Discussed with :     [facility] consultants' Notes   Time spent :

## 2023-12-20 NOTE — PROGRESS NOTE ADULT - ASSESSMENT
43F PMHx of MS on Rituxan, presenting with left chest wall and shoulder pain w/ concern for infection now admitted with likely septic arthritis with need for possible washout and debridement       Problem/Plan - 1:  ·  Problem: Septic arthritis. left shoulder joint   ·  Plan: Imaging consistent with septic arthritis  s/p OR   cont abx and fu cultures   check ECHO : no vegetation   fu with ID   d/w with CTS at bedside : drainage adjusted and fx improved   cont current abx   pt with LUE pain : tessie referred pain from L [id number] and chest however  Cervical MRI : no disciits / abcess   discussed with pt , family at length at bedside   washout and debridement of sternoclavicular joint; resection of medial manubrium; white and black wound vac placed  now s/p vac change  PICC oredered    d/w pt and family at length       # c/o left arm swelling and pain in elbow region :  -doppler ordered to r/o DVT : Negative      Problem/Plan - 2:  ·  Problem: Multiple sclerosis.   ·  Plan: Gets Rituxan every 6 months, next dose due : holding   -On Ampyra BID at home, need to bring home med     Problem/Plan - 3:  ·  Problem: Anxiety.   ·  Plan: ISTOP reviewed Reference #: [id number]  -Cont. Clonazepam 0.5mg QHS PRN  -Cont. Sertraline 100mg QHS  -Cont. Duloxetine 60mg QHS, pt unsure of dose  -Need full med rec.     Problem/Plan - 4:  ·  Problem: Anemia.   ·  Plan: hgb 9, lower than prior year. Pt endorses some recent anemia but cannot specify  -Trend cbc  -Monitor for bleeding.     Problem/Plan - 5:  ·  Problem: Prophylactic measure.   ·  Plan: DVT PPx    hyperkalemia : normalized    43F PMHx of MS on Rituxan, presenting with left chest wall and shoulder pain w/ concern for infection now admitted with likely septic arthritis with need for possible washout and debridement       Problem/Plan - 1:  ·  Problem: Septic arthritis. left shoulder joint   ·  Plan: Imaging consistent with septic arthritis  s/p OR   cont abx and fu cultures   check ECHO : no vegetation   fu with ID   d/w with CTS at bedside : drainage adjusted and fx improved   cont current abx   pt with LUE pain : tessie referred pain from L [id number] and chest however  Cervical MRI : no disciits / abcess   discussed with pt , family at length at bedside   washout and debridement of sternoclavicular joint; resection of medial manubrium; white and black wound vac placed  now s/p vac change  PICC oredered    d/w pt and family at length       # c/o left arm swelling and pain in elbow region :  -doppler ordered to r/o DVT : Negative      Problem/Plan - 2:  ·  Problem: Multiple sclerosis.   ·  Plan: Gets Rituxan every 6 months, next dose due : holding   -On Ampyra BID at home, need to bring home med     Problem/Plan - 3:  ·  Problem: Anxiety.   ·  Plan: ISTOP reviewed Reference #: [id number]  -Cont. Clonazepam 0.5mg QHS PRN  -Cont. Sertraline 100mg QHS  -Cont. Duloxetine 60mg QHS, pt unsure of dose  -Need full med rec.     Problem/Plan - 4:  ·  Problem: Anemia.   ·  Plan: hgb 9, lower than prior year. Pt endorses some recent anemia but cannot specify  -Trend cbc  -Monitor for bleeding.     Problem/Plan - 5:  ·  Problem: Prophylactic measure.   ·  Plan: DVT PPx    hyperkalemia : normalized

## 2023-12-20 NOTE — PROGRESS NOTE ADULT - SUBJECTIVE AND OBJECTIVE BOX
Subjective: Patient seen and examined. No new events except as noted.   weak   in pain   VAC changed this am   Patients father at bedside had cardiac arrest when i walked into the room   Code blue called. Started compressions. Taken to ER     REVIEW OF SYSTEMS:    CONSTITUTIONAL: + weakness, fevers or chills  EYES/ENT: No visual changes;  No vertigo or throat pain   NECK: No pain or stiffness  RESPIRATORY: No cough, wheezing, hemoptysis; No shortness of breath  CARDIOVASCULAR: No chest pain or palpitations  GASTROINTESTINAL: No abdominal or epigastric pain. No nausea, vomiting, or hematemesis; No diarrhea or constipation. No melena or hematochezia.  GENITOURINARY: No dysuria, frequency or hematuria  NEUROLOGICAL: No numbness or weakness  SKIN: No itching, burning, rashes, or lesions   All other review of systems is negative unless indicated above.    MEDICATIONS:  MEDICATIONS  (STANDING):  acetaminophen     Tablet .. 650 milliGRAM(s) Oral every 6 hours  cefepime   IVPB 2000 milliGRAM(s) IV Intermittent every 8 hours  chlorhexidine 2% Cloths 1 Application(s) Topical <User Schedule>  cyclobenzaprine 5 milliGRAM(s) Oral two times a day  dalfampridine ER 10 milliGRAM(s) Oral every 12 hours  DULoxetine 60 milliGRAM(s) Oral at bedtime  oxyCODONE  ER Tablet 10 milliGRAM(s) Oral every 12 hours  senna 2 Tablet(s) Oral at bedtime  sertraline 100 milliGRAM(s) Oral at bedtime  Vibegron (Gemtesa) 75 milliGRAM(s),Vibegron (Gemtesa) 75mg tablet 1 Tablet(s) 1 Tablet(s) Oral daily      PHYSICAL EXAM:  T(C): 36.5 (12-20-23 @ 05:47), Max: 37 (12-19-23 @ 17:23)  HR: 62 (12-20-23 @ 05:47) (62 - 94)  BP: 107/73 (12-20-23 @ 05:47) (104/67 - 109/73)  RR: 18 (12-20-23 @ 05:47) (18 - 18)  SpO2: 100% (12-20-23 @ 05:47) (94% - 100%)  Wt(kg): --  I&O's Summary    19 Dec 2023 07:01  -  20 Dec 2023 07:00  --------------------------------------------------------  IN: 1000 mL / OUT: 20 mL / NET: 980 mL          Appearance: NAD  HEENT:   Normal oral mucosa, PERRL, EOMI	  Lymphatic: No lymphadenopathy , no edema   L clavicular region w/ black sponge CAV dsg secured w/ good seal, minimal serousag drainage noted in collection chamber  Cardiovascular: Normal S1 S2, No JVD, No murmurs , Peripheral pulses palpable 2+ bilaterally  Respiratory: Lungs clear to auscultation, normal effort 	  Gastrointestinal:  Soft, Non-tender, + BS	  Skin: No rashes, No ecchymoses, No cyanosis, warm to touch  Musculoskeletal: Normal range of motion, normal strength  Psychiatry:  lethargic weak   Ext: No edema          LABS:    CARDIAC MARKERS:                                9.2    11.69 )-----------( 564      ( 20 Dec 2023 07:23 )             30.2     12-20    137  |  99  |  8   ----------------------------<  104<H>  4.0   |  26  |  0.49<L>    Ca    9.9      20 Dec 2023 07:20      proBNP:   Lipid Profile:   HgA1c:   TSH:             TELEMETRY: 	    ECG:  	  RADIOLOGY:   DIAGNOSTIC TESTING:  [ ] Echocardiogram:  [ ]  Catheterization:  [ ] Stress Test:    OTHER:

## 2023-12-21 LAB
ANION GAP SERPL CALC-SCNC: 11 MMOL/L — SIGNIFICANT CHANGE UP (ref 5–17)
ANION GAP SERPL CALC-SCNC: 11 MMOL/L — SIGNIFICANT CHANGE UP (ref 5–17)
BUN SERPL-MCNC: 9 MG/DL — SIGNIFICANT CHANGE UP (ref 7–23)
BUN SERPL-MCNC: 9 MG/DL — SIGNIFICANT CHANGE UP (ref 7–23)
CALCIUM SERPL-MCNC: 9.3 MG/DL — SIGNIFICANT CHANGE UP (ref 8.4–10.5)
CALCIUM SERPL-MCNC: 9.3 MG/DL — SIGNIFICANT CHANGE UP (ref 8.4–10.5)
CHLORIDE SERPL-SCNC: 98 MMOL/L — SIGNIFICANT CHANGE UP (ref 96–108)
CHLORIDE SERPL-SCNC: 98 MMOL/L — SIGNIFICANT CHANGE UP (ref 96–108)
CO2 SERPL-SCNC: 26 MMOL/L — SIGNIFICANT CHANGE UP (ref 22–31)
CO2 SERPL-SCNC: 26 MMOL/L — SIGNIFICANT CHANGE UP (ref 22–31)
CREAT SERPL-MCNC: 0.44 MG/DL — LOW (ref 0.5–1.3)
CREAT SERPL-MCNC: 0.44 MG/DL — LOW (ref 0.5–1.3)
CULTURE RESULTS: NO GROWTH — SIGNIFICANT CHANGE UP
EGFR: 122 ML/MIN/1.73M2 — SIGNIFICANT CHANGE UP
EGFR: 122 ML/MIN/1.73M2 — SIGNIFICANT CHANGE UP
GLUCOSE SERPL-MCNC: 102 MG/DL — HIGH (ref 70–99)
GLUCOSE SERPL-MCNC: 102 MG/DL — HIGH (ref 70–99)
HCT VFR BLD CALC: 27.1 % — LOW (ref 34.5–45)
HCT VFR BLD CALC: 27.1 % — LOW (ref 34.5–45)
HGB BLD-MCNC: 8.4 G/DL — LOW (ref 11.5–15.5)
HGB BLD-MCNC: 8.4 G/DL — LOW (ref 11.5–15.5)
MCHC RBC-ENTMCNC: 25.3 PG — LOW (ref 27–34)
MCHC RBC-ENTMCNC: 25.3 PG — LOW (ref 27–34)
MCHC RBC-ENTMCNC: 31 GM/DL — LOW (ref 32–36)
MCHC RBC-ENTMCNC: 31 GM/DL — LOW (ref 32–36)
MCV RBC AUTO: 81.6 FL — SIGNIFICANT CHANGE UP (ref 80–100)
MCV RBC AUTO: 81.6 FL — SIGNIFICANT CHANGE UP (ref 80–100)
NRBC # BLD: 0 /100 WBCS — SIGNIFICANT CHANGE UP (ref 0–0)
NRBC # BLD: 0 /100 WBCS — SIGNIFICANT CHANGE UP (ref 0–0)
PLATELET # BLD AUTO: 437 K/UL — HIGH (ref 150–400)
PLATELET # BLD AUTO: 437 K/UL — HIGH (ref 150–400)
POTASSIUM SERPL-MCNC: 3.8 MMOL/L — SIGNIFICANT CHANGE UP (ref 3.5–5.3)
POTASSIUM SERPL-MCNC: 3.8 MMOL/L — SIGNIFICANT CHANGE UP (ref 3.5–5.3)
POTASSIUM SERPL-SCNC: 3.8 MMOL/L — SIGNIFICANT CHANGE UP (ref 3.5–5.3)
POTASSIUM SERPL-SCNC: 3.8 MMOL/L — SIGNIFICANT CHANGE UP (ref 3.5–5.3)
RBC # BLD: 3.32 M/UL — LOW (ref 3.8–5.2)
RBC # BLD: 3.32 M/UL — LOW (ref 3.8–5.2)
RBC # FLD: 15.5 % — HIGH (ref 10.3–14.5)
RBC # FLD: 15.5 % — HIGH (ref 10.3–14.5)
SODIUM SERPL-SCNC: 135 MMOL/L — SIGNIFICANT CHANGE UP (ref 135–145)
SODIUM SERPL-SCNC: 135 MMOL/L — SIGNIFICANT CHANGE UP (ref 135–145)
SPECIMEN SOURCE: SIGNIFICANT CHANGE UP
SURGICAL PATHOLOGY STUDY: SIGNIFICANT CHANGE UP
SURGICAL PATHOLOGY STUDY: SIGNIFICANT CHANGE UP
WBC # BLD: 10.39 K/UL — SIGNIFICANT CHANGE UP (ref 3.8–10.5)
WBC # BLD: 10.39 K/UL — SIGNIFICANT CHANGE UP (ref 3.8–10.5)
WBC # FLD AUTO: 10.39 K/UL — SIGNIFICANT CHANGE UP (ref 3.8–10.5)
WBC # FLD AUTO: 10.39 K/UL — SIGNIFICANT CHANGE UP (ref 3.8–10.5)

## 2023-12-21 PROCEDURE — 99232 SBSQ HOSP IP/OBS MODERATE 35: CPT

## 2023-12-21 RX ORDER — BENZOCAINE AND MENTHOL 5; 1 G/100ML; G/100ML
1 LIQUID ORAL THREE TIMES A DAY
Refills: 0 | Status: DISCONTINUED | OUTPATIENT
Start: 2023-12-21 | End: 2024-01-05

## 2023-12-21 RX ADMIN — CYCLOBENZAPRINE HYDROCHLORIDE 5 MILLIGRAM(S): 10 TABLET, FILM COATED ORAL at 17:26

## 2023-12-21 RX ADMIN — Medication 0.5 MILLIGRAM(S): at 15:12

## 2023-12-21 RX ADMIN — CHLORHEXIDINE GLUCONATE 1 APPLICATION(S): 213 SOLUTION TOPICAL at 08:24

## 2023-12-21 RX ADMIN — HYDROMORPHONE HYDROCHLORIDE 1 MILLIGRAM(S): 2 INJECTION INTRAMUSCULAR; INTRAVENOUS; SUBCUTANEOUS at 20:16

## 2023-12-21 RX ADMIN — CEFEPIME 100 MILLIGRAM(S): 1 INJECTION, POWDER, FOR SOLUTION INTRAMUSCULAR; INTRAVENOUS at 06:28

## 2023-12-21 RX ADMIN — HYDROMORPHONE HYDROCHLORIDE 4 MILLIGRAM(S): 2 INJECTION INTRAMUSCULAR; INTRAVENOUS; SUBCUTANEOUS at 05:36

## 2023-12-21 RX ADMIN — DULOXETINE HYDROCHLORIDE 60 MILLIGRAM(S): 30 CAPSULE, DELAYED RELEASE ORAL at 21:50

## 2023-12-21 RX ADMIN — BENZOCAINE AND MENTHOL 1 LOZENGE: 5; 1 LIQUID ORAL at 18:57

## 2023-12-21 RX ADMIN — CEFEPIME 100 MILLIGRAM(S): 1 INJECTION, POWDER, FOR SOLUTION INTRAMUSCULAR; INTRAVENOUS at 13:27

## 2023-12-21 RX ADMIN — Medication 650 MILLIGRAM(S): at 17:26

## 2023-12-21 RX ADMIN — HYDROMORPHONE HYDROCHLORIDE 1 MILLIGRAM(S): 2 INJECTION INTRAMUSCULAR; INTRAVENOUS; SUBCUTANEOUS at 13:57

## 2023-12-21 RX ADMIN — CEFEPIME 100 MILLIGRAM(S): 1 INJECTION, POWDER, FOR SOLUTION INTRAMUSCULAR; INTRAVENOUS at 21:51

## 2023-12-21 RX ADMIN — HYDROMORPHONE HYDROCHLORIDE 4 MILLIGRAM(S): 2 INJECTION INTRAMUSCULAR; INTRAVENOUS; SUBCUTANEOUS at 19:14

## 2023-12-21 RX ADMIN — SENNA PLUS 2 TABLET(S): 8.6 TABLET ORAL at 21:49

## 2023-12-21 RX ADMIN — HYDROMORPHONE HYDROCHLORIDE 1 MILLIGRAM(S): 2 INJECTION INTRAMUSCULAR; INTRAVENOUS; SUBCUTANEOUS at 13:27

## 2023-12-21 RX ADMIN — HYDROMORPHONE HYDROCHLORIDE 1 MILLIGRAM(S): 2 INJECTION INTRAMUSCULAR; INTRAVENOUS; SUBCUTANEOUS at 19:46

## 2023-12-21 RX ADMIN — HYDROMORPHONE HYDROCHLORIDE 4 MILLIGRAM(S): 2 INJECTION INTRAMUSCULAR; INTRAVENOUS; SUBCUTANEOUS at 10:40

## 2023-12-21 RX ADMIN — OXYCODONE HYDROCHLORIDE 10 MILLIGRAM(S): 5 TABLET ORAL at 07:28

## 2023-12-21 RX ADMIN — Medication 0.5 MILLIGRAM(S): at 21:50

## 2023-12-21 RX ADMIN — OXYCODONE HYDROCHLORIDE 10 MILLIGRAM(S): 5 TABLET ORAL at 18:26

## 2023-12-21 RX ADMIN — HYDROMORPHONE HYDROCHLORIDE 4 MILLIGRAM(S): 2 INJECTION INTRAMUSCULAR; INTRAVENOUS; SUBCUTANEOUS at 04:36

## 2023-12-21 RX ADMIN — HYDROMORPHONE HYDROCHLORIDE 1 MILLIGRAM(S): 2 INJECTION INTRAMUSCULAR; INTRAVENOUS; SUBCUTANEOUS at 08:47

## 2023-12-21 RX ADMIN — Medication 650 MILLIGRAM(S): at 06:27

## 2023-12-21 RX ADMIN — CYCLOBENZAPRINE HYDROCHLORIDE 5 MILLIGRAM(S): 10 TABLET, FILM COATED ORAL at 06:28

## 2023-12-21 RX ADMIN — OXYCODONE HYDROCHLORIDE 10 MILLIGRAM(S): 5 TABLET ORAL at 06:28

## 2023-12-21 RX ADMIN — FENTANYL CITRATE 1 PATCH: 50 INJECTION INTRAVENOUS at 19:05

## 2023-12-21 RX ADMIN — HYDROMORPHONE HYDROCHLORIDE 1 MILLIGRAM(S): 2 INJECTION INTRAMUSCULAR; INTRAVENOUS; SUBCUTANEOUS at 00:42

## 2023-12-21 RX ADMIN — Medication 650 MILLIGRAM(S): at 11:59

## 2023-12-21 RX ADMIN — DALFAMPRIDINE 10 MILLIGRAM(S): 10 TABLET, FILM COATED, EXTENDED RELEASE ORAL at 10:43

## 2023-12-21 RX ADMIN — OXYCODONE HYDROCHLORIDE 10 MILLIGRAM(S): 5 TABLET ORAL at 17:26

## 2023-12-21 RX ADMIN — FENTANYL CITRATE 1 PATCH: 50 INJECTION INTRAVENOUS at 08:10

## 2023-12-21 RX ADMIN — Medication 650 MILLIGRAM(S): at 12:59

## 2023-12-21 RX ADMIN — Medication 650 MILLIGRAM(S): at 07:28

## 2023-12-21 RX ADMIN — HYDROMORPHONE HYDROCHLORIDE 1 MILLIGRAM(S): 2 INJECTION INTRAMUSCULAR; INTRAVENOUS; SUBCUTANEOUS at 08:17

## 2023-12-21 RX ADMIN — HYDROMORPHONE HYDROCHLORIDE 4 MILLIGRAM(S): 2 INJECTION INTRAMUSCULAR; INTRAVENOUS; SUBCUTANEOUS at 11:40

## 2023-12-21 RX ADMIN — SERTRALINE 100 MILLIGRAM(S): 25 TABLET, FILM COATED ORAL at 21:50

## 2023-12-21 RX ADMIN — Medication 650 MILLIGRAM(S): at 18:26

## 2023-12-21 RX ADMIN — HYDROMORPHONE HYDROCHLORIDE 4 MILLIGRAM(S): 2 INJECTION INTRAMUSCULAR; INTRAVENOUS; SUBCUTANEOUS at 18:14

## 2023-12-21 NOTE — PROGRESS NOTE ADULT - ASSESSMENT
43F PMHx of MS on immunosuppressive medication, presenting with left chest wall and shoulder pain. Recent history of fall secondary to unsteadiness due to MS. Obtained outpatient MRI of left sternoclavicular joint with results significant for marrow edema and swelling of the left sternoclavicular joint with a 2 cm fluid collection and adjacent pleural edema in the left chest with concerns for infectious etiology.  Patient initially saw orthopedics with for concerns for septic arthritis of this joint.  Impression of the read showed soft tissue abscess superficial to the joint.  Recommended CT scan with IV contrast of the chest.  Patient denies any fevers however endorsing erythema to the left sternoclavicular joint region. Patient states she was recently hospitalized over a week ago at [facility] for falls secondary to unsteadiness due to her MS.  Denies any recent falls today, vision changes. Endorsing headache. Denies any chest pain, shortness of breath, abdominal pain, nausea vomiting diarrhea, urinary complaints.  CT chest with IV in ED significant for Septic arthritis of the left sternoclavicular joint and involving the articulation of the sternum with the first costal cartilage.Extensive surrounding inflammation, with pneumonia in the underlying subpleural left upper lobe.No fluid collection is evident.  Workup significant for elevated alk phos (248), . VSS and afebrile  s/p Debridement of left sternoclavicular joint with excision of left clavicular head. Purulent drainage at the sternoclavicular joint. Tissue surrounding left clavicle found to be very inflamed. Copious irrigation of surgical site.   Concerned about pain at surgical site.    at bedside     43F PMHx of MS on immunosuppressive medication, presenting with left chest wall and shoulder pain. Recent history of fall secondary to unsteadiness due to MS. Obtained outpatient MRI of left sternoclavicular joint with results significant for marrow edema and swelling of the left sternoclavicular joint with a 2 cm fluid collection and adjacent pleural edema in the left chest with concerns for infectious etiology.  Patient initially saw orthopedics with for concerns for septic arthritis of this joint.  Impression of the read showed soft tissue abscess superficial to the joint.  Recommended CT scan with IV contrast of the chest.  Patient denies any fevers however endorsing erythema to the left sternoclavicular joint region. Patient states she was recently hospitalized over a week ago at [facility] for falls secondary to unsteadiness due to her MS.  Denies any recent falls today, vision changes. Endorsing headache. Denies any chest pain, shortness of breath, abdominal pain, nausea vomiting diarrhea, urinary complaints.  CT chest with IV in ED significant for Septic arthritis of the left sternoclavicular joint and involving the articulation of the sternum with the first costal cartilage.Extensive surrounding inflammation, with pneumonia in the underlying subpleural left upper lobe.No fluid collection is evident.  Workup significant for elevated alk phos (248), . VSS and afebrile  s/p Debridement of left sternoclavicular joint with excision of left clavicular head. Purulent drainage at the sternoclavicular joint. Tissue surrounding left clavicle found to be very inflamed. Copious irrigation of surgical site.   Concerned about pain at surgical site.    at bedside

## 2023-12-21 NOTE — PROGRESS NOTE ADULT - SUBJECTIVE AND OBJECTIVE BOX
SUBJECTIVE: "Hi"    VITAL SIGNS:  Vital Signs Last 24 Hrs  T(C): 36.9 (12-21-23 @ 09:45), Max: 36.9 (12-21-23 @ 01:40)  T(F): 98.4 (12-21-23 @ 09:45), Max: 98.4 (12-21-23 @ 01:40)  HR: 97 (12-21-23 @ 09:45) (61 - 97)  BP: 101/70 (12-21-23 @ 09:45) (100/62 - 119/72)  RR: 18 (12-21-23 @ 09:45) (18 - 18)  SpO2: 96% (12-21-23 @ 09:45) (96% - 99%)             INPUT/OUTPUT:  12-20 @ 07:01  -  12-21 @ 07:00  --------------------------------------------------------  IN: 730 mL / OUT: 0 mL / NET: 730 mL    12-21 @ 07:01  -  12-21 @ 11:38  --------------------------------------------------------  IN: 240 mL / OUT: 0 mL / NET: 240 mL          LABS:  12-21    135  |  98  |  9   ----------------------------<  102<H>  3.8   |  26  |  0.44<L>    Ca    9.3      21 Dec 2023 07:24               8.4    10.39 )-----------( 437      ( 21 Dec 2023 07:24 )             27.1              PHYSICAL EXAM:  Neurology: alert and oriented x 3, nonfocal, no gross deficits  CV :S1S2  Wound : Left VAC in place, CDI , Stable  Lungs: B/l breath sounds on room air   Abdomen: soft, nontender, nondistended, positive bowel sounds, + bm  :   voids          Extremities:   warm well perfused equal strength throughout         ACTIVE MEDICATIONS:  acetaminophen     Tablet .. 650 milliGRAM(s) Oral every 6 hours  cefepime   IVPB 2000 milliGRAM(s) IV Intermittent every 8 hours  chlorhexidine 2% Cloths 1 Application(s) Topical <User Schedule>  clonazePAM  Tablet 0.5 milliGRAM(s) Oral three times a day PRN  cyclobenzaprine 5 milliGRAM(s) Oral two times a day  dalfampridine ER 10 milliGRAM(s) Oral every 12 hours  DULoxetine 60 milliGRAM(s) Oral at bedtime  fentaNYL   Patch  12 MICROgram(s)/Hr 1 Patch Transdermal every 72 hours  HYDROmorphone   Tablet 2 milliGRAM(s) Oral every 4 hours PRN  HYDROmorphone   Tablet 4 milliGRAM(s) Oral every 6 hours PRN  HYDROmorphone  Injectable 1 milliGRAM(s) IV Push every 3 hours PRN  oxyCODONE  ER Tablet 10 milliGRAM(s) Oral every 12 hours  senna 2 Tablet(s) Oral at bedtime  sertraline 100 milliGRAM(s) Oral at bedtime  Vibegron (Gemtesa) 75 milliGRAM(s),Vibegron (Gemtesa) 75mg tablet 1 Tablet(s) 1 Tablet(s) Oral daily      Case discussed in detail with Thoracic Team and Attending Dr. Bautista. Plan below as per discussion.

## 2023-12-21 NOTE — PROGRESS NOTE ADULT - SUBJECTIVE AND OBJECTIVE BOX
Date of service: 12-21-23 @ 23:59      Patient is a 43y old  Female who presents with a chief complaint of L shoulder and chest pain (21 Dec 2023 10:31)                                                               INTERVAL HPI/OVERNIGHT EVENTS:    REVIEW OF SYSTEMS:     CONSTITUTIONAL: No weakness, fevers or chills  EYES/ENT: No visual changes , no ear ache   NECK: No pain or stiffness  RESPIRATORY: No cough, wheezing,  No shortness of breath  CARDIOVASCULAR: No chest pain or palpitations  GASTROINTESTINAL: No abdominal pain  . No nausea, vomiting, or hematemesis; No diarrhea or constipation. No melena or hematochezia.  GENITOURINARY: No dysuria, frequency or hematuria  NEUROLOGICAL: No numbness or weakness  SKIN: No itching, burning, rashes, or lesions                                                                                                                                                                                                                                                                                 Medications:  MEDICATIONS  (STANDING):  acetaminophen     Tablet .. 650 milliGRAM(s) Oral every 6 hours  cefepime   IVPB 2000 milliGRAM(s) IV Intermittent every 8 hours  chlorhexidine 2% Cloths 1 Application(s) Topical <User Schedule>  cyclobenzaprine 5 milliGRAM(s) Oral two times a day  dalfampridine ER 10 milliGRAM(s) Oral every 12 hours  DULoxetine 60 milliGRAM(s) Oral at bedtime  fentaNYL   Patch  12 MICROgram(s)/Hr 1 Patch Transdermal every 72 hours  oxyCODONE  ER Tablet 10 milliGRAM(s) Oral every 12 hours  senna 2 Tablet(s) Oral at bedtime  sertraline 100 milliGRAM(s) Oral at bedtime  Vibegron (Gemtesa) 75 milliGRAM(s),Vibegron (Gemtesa) 75mg tablet 1 Tablet(s) 1 Tablet(s) Oral daily    MEDICATIONS  (PRN):  benzocaine/menthol Lozenge 1 Lozenge Oral three times a day PRN Sore Throat  clonazePAM  Tablet 0.5 milliGRAM(s) Oral three times a day PRN anxiety  HYDROmorphone   Tablet 4 milliGRAM(s) Oral every 6 hours PRN Severe Pain (7 - 10)  HYDROmorphone   Tablet 2 milliGRAM(s) Oral every 4 hours PRN Moderate Pain (4 - 6)  HYDROmorphone  Injectable 1 milliGRAM(s) IV Push every 3 hours PRN Breakthrough pain       Allergies    No Known Allergies    Intolerances      Vital Signs Last 24 Hrs  T(C): 37.1 (21 Dec 2023 21:03), Max: 37.1 (21 Dec 2023 21:03)  T(F): 98.8 (21 Dec 2023 21:03), Max: 98.8 (21 Dec 2023 21:03)  HR: 96 (21 Dec 2023 21:03) (83 - 97)  BP: 114/72 (21 Dec 2023 21:03) (101/70 - 119/72)  BP(mean): --  RR: 17 (21 Dec 2023 21:03) (17 - 18)  SpO2: 99% (21 Dec 2023 21:03) (96% - 100%)    Parameters below as of 21 Dec 2023 21:03  Patient On (Oxygen Delivery Method): room air      CAPILLARY BLOOD GLUCOSE          12-20 @ 07:01  -  12-21 @ 07:00  --------------------------------------------------------  IN: 730 mL / OUT: 0 mL / NET: 730 mL    12-21 @ 07:01  -  12-21 @ 23:59  --------------------------------------------------------  IN: 960 mL / OUT: 250 mL / NET: 710 mL      Physical Exam:    Daily     Daily   General:  Well appearing, NAD, not cachetic  HEENT:  Nonicteric, PERRLA  CV:  RRR, S1S2   Lungs:  CTA B/L, no wheezes, rales, rhonchi  Abdomen:  Soft, non-tender, no distended, positive BS  Extremities:  2+ pulses, no c/c, no edema  Skin:  Warm and dry, no rashes  :  No stanford  Neuro:  AAOx3, non-focal, grossly intact                                                                                                                                                                                                                                                                                                LABS:                               8.4    10.39 )-----------( 437      ( 21 Dec 2023 07:24 )             27.1                      12-21    135  |  98  |  9   ----------------------------<  102<H>  3.8   |  26  |  0.44<L>    Ca    9.3      21 Dec 2023 07:24                         RADIOLOGY & ADDITIONAL TESTS         I personally reviewed: [  ]EKG   [  ]CXR    [  ] CT      A/P:         Discussed with :     [facility] consultants' Notes   Time spent :

## 2023-12-21 NOTE — PROGRESS NOTE ADULT - PROBLEM SELECTOR PLAN 1
- 12/18: Vac change   - Remain left VAC in place w/ good seal  - 12/20 VAC change @bedside; VAC change Mon - wed- fri  plan - ordered   d/w PT   - OOB  - Optimize nutrition supplement to meals  - Optimize pain control   - Plan d/w Attending Dr. Bautista

## 2023-12-21 NOTE — PROGRESS NOTE ADULT - ASSESSMENT
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  She had a recent fall in November 2023 secondary to unsteadiness due to multiple sclerosis, with subsequent left chest wall and shoulder pain.  Was seen at [facility] with concern for MS exacerbation but no MRI brain and cervical spine with an without contrast were done at the time without enhancement.  At the time infectious workup was done and notable for UTI which was treated.  She continued to have pain.  In the interim, she had an outpatient MRI of left sternoclavicular joint with results significant for marrow edema and swelling of the left sternoclavicular joint with a 2 cm fluid collection and adjacent pleural edema in the left chest with concerns for infectious etiology.   CT chest w/ IV contrast suggestive of septic arthritis with surrounding erythema. Thoracic surgery consulted.  Patient s/p surgical debridement on 12/7/23    mri with 80 x 25 mm rim-enhancing fluid collection persists in the surgical cavity with well-placed drain within the fluid collection.   Marrow signal abnormality in the manubrium concerning for acute   osteomyelitis.    Low-grade partial-thickness bursal surface tear of the supraspinatus tendon.    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  Now likely pseudo-flare in setting of septic arthritis    Recommendations:  Management of septic arthritis per primary team  s/p surgical debridement 12/7/23  hold ocrelizumab for now given septic arthritis  continue home meds once able to take PO post-surgically  abx as per ID  defer to primary team for pain control   difficulty standing suspect due to weakness.  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  She had a recent fall in November 2023 secondary to unsteadiness due to multiple sclerosis, with subsequent left chest wall and shoulder pain.  Was seen at [facility] with concern for MS exacerbation but no MRI brain and cervical spine with an without contrast were done at the time without enhancement.  At the time infectious workup was done and notable for UTI which was treated.  She continued to have pain.  In the interim, she had an outpatient MRI of left sternoclavicular joint with results significant for marrow edema and swelling of the left sternoclavicular joint with a 2 cm fluid collection and adjacent pleural edema in the left chest with concerns for infectious etiology.   CT chest w/ IV contrast suggestive of septic arthritis with surrounding erythema. Thoracic surgery consulted.  Patient s/p surgical debridement on 12/7/23    mri with 80 x 25 mm rim-enhancing fluid collection persists in the surgical cavity with well-placed drain within the fluid collection.   Marrow signal abnormality in the manubrium concerning for acute   osteomyelitis.    Low-grade partial-thickness bursal surface tear of the supraspinatus tendon.    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  Now likely pseudo-flare in setting of septic arthritis    Recommendations:  Management of septic arthritis per primary team  s/p surgical debridement 12/7/23  hold ocrelizumab for now given septic arthritis  continue home meds once able to take PO post-surgically  abx as per ID  defer to primary team for pain control   difficulty standing suspect due to weakness.  Needs aggressive physical therapy as tolerated

## 2023-12-21 NOTE — PROGRESS NOTE ADULT - SUBJECTIVE AND OBJECTIVE BOX
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  She had a recent fall in 2023 secondary to unsteadiness due to multiple sclerosis, with subsequent left chest wall and shoulder pain.  Was seen at [facility] with concern for MS exacerbation but no MRI brain and cervical spine with an without contrast were done at the time without enhancement.  At the time infectious workup was done and notable for UTI which was treated.  She continued to have pain.  In the interim, she had an outpatient MRI of left sternoclavicular joint with results significant for marrow edema and swelling of the left sternoclavicular joint with a 2 cm fluid collection and adjacent pleural edema in the left chest with concerns for infectious etiology.   CT chest w/ IV contrast suggestive of septic arthritis with surrounding erythema. Thoracic surgery consulted.  Patient s/p surgical debridement on 23    Int hx:  mri with 80 x 25 mm rim-enhancing fluid collection persists in the surgical cavity with well-placed drain within the fluid collection.   Marrow signal abnormality in the manubrium concerning for acute   osteomyelitis.    Low-grade partial-thickness bursal surface tear of the supraspinatus tendon.    sitting in chair        ******    Pt still with sternal pain, says 10/10, pain also in left arm    Past Medical History:  Multiple sclerosis [G35]        Past Surgical History:  History of  [Z98.891]        Social History:  No toxic habits    Family History:  FAMILY HISTORY:      Allergies:  No Known Allergies      ROS:  Constitutional: Patient offers no complaints of fevers or significant weight loss  Ears, Nose, Mouth and Throat: The patient presents with no abnormalities of the head, ears, eyes, nose or throat  Skin: Patient offers no concerns of new rashes or lesions  Respiratory: The patient presents with no abnormalities of the respiratory tract  Cardiovascular: The patient presents with no cardiac abnormalities  Gastrointestinal: The patient presents with no abnormalities of the GI system  Genitourinary: The patient presents with no dysuria, hematuria or frequent urination  Neurological: See HPI  Endocrine: Patient offers no complaints of excessive thirst, urination, or heat/cold intolerance    Advanced care planning reviewed and noted in the chart.    Medications:  acetaminophen     Tablet .. 650 milliGRAM(s) Oral every 6 hours  cefepime   IVPB 2000 milliGRAM(s) IV Intermittent every 8 hours  chlorhexidine 2% Cloths 1 Application(s) Topical <User Schedule>  clonazePAM  Tablet 0.5 milliGRAM(s) Oral three times a day PRN  cyclobenzaprine 5 milliGRAM(s) Oral two times a day  dalfampridine ER 10 milliGRAM(s) Oral every 12 hours  DULoxetine 60 milliGRAM(s) Oral at bedtime  fentaNYL   Patch  12 MICROgram(s)/Hr 1 Patch Transdermal every 72 hours  HYDROmorphone   Tablet 2 milliGRAM(s) Oral every 4 hours PRN  HYDROmorphone   Tablet 4 milliGRAM(s) Oral every 6 hours PRN  HYDROmorphone  Injectable 1 milliGRAM(s) IV Push every 3 hours PRN  oxyCODONE  ER Tablet 10 milliGRAM(s) Oral every 12 hours  senna 2 Tablet(s) Oral at bedtime  sertraline 100 milliGRAM(s) Oral at bedtime  Vibegron (Gemtesa) 75 milliGRAM(s),Vibegron (Gemtesa) 75mg tablet 1 Tablet(s) 1 Tablet(s) Oral daily      Labs:  CBC Full  -  ( 21 Dec 2023 07:24 )  WBC Count : 10.39 K/uL  RBC Count : 3.32 M/uL  Hemoglobin : 8.4 g/dL  Hematocrit : 27.1 %  Platelet Count - Automated : 437 K/uL  Mean Cell Volume : 81.6 fl  Mean Cell Hemoglobin : 25.3 pg  Mean Cell Hemoglobin Concentration : 31.0 gm/dL  Auto Neutrophil # : x  Auto Lymphocyte # : x  Auto Monocyte # : x  Auto Eosinophil # : x  Auto Basophil # : x  Auto Neutrophil % : x  Auto Lymphocyte % : x  Auto Monocyte % : x  Auto Eosinophil % : x  Auto Basophil % : x    -    135  |  98  |  9   ----------------------------<  102<H>  3.8   |  26  |  0.44<L>    Ca    9.3      21 Dec 2023 07:24      CAPILLARY BLOOD GLUCOSE            Urinalysis Basic - ( 21 Dec 2023 07:24 )    Color: x / Appearance: x / SG: x / pH: x  Gluc: 102 mg/dL / Ketone: x  / Bili: x / Urobili: x   Blood: x / Protein: x / Nitrite: x   Leuk Esterase: x / RBC: x / WBC x   Sq Epi: x / Non Sq Epi: x / Bacteria: x          Vitals:  Vital Signs Last 24 Hrs  T(C): 36.9 (21 Dec 2023 04:40), Max: 36.9 (21 Dec 2023 01:40)  T(F): 98.4 (21 Dec 2023 04:40), Max: 98.4 (21 Dec 2023 01:40)  HR: 88 (21 Dec 2023 04:40) (61 - 89)  BP: 119/72 (21 Dec 2023 04:40) (100/62 - 119/72)  BP(mean): --  RR: 18 (21 Dec 2023 04:40) (18 - 18)  SpO2: 96% (21 Dec 2023 04:40) (96% - 99%)    Parameters below as of 21 Dec 2023 04:40  Patient On (Oxygen Delivery Method): room air          NEUROLOGICAL EXAM:    Mental status: Awake, alert, and in less apparent distress. Oriented to person, place and time. Language function is normal.      Cranial Nerves: Pupils were equal, round, reactive to light. Extraocular movements were intact. Visual field were full. Fundoscopic exam was deferred. Facial sensation was intact to light touch. There was slight left facia droop. The palate was upgoing symmetrically and tongue was midline.     Motor exam: Bulk and tone were normal. able to move all ext fully without much pain    Reflexes: deferred     Sensation: Intact to light touch/temp    Coordination: no gross dysmetria    Gait: defer   sternal wound seen          ACC: [id number] EXAM:  MR SHOULDER WAW IC LT   ORDERED BY:  BRAN SANCHEZ     ACC: [id number] EXAM:  MR STERNOCLAVICULAR JNT LT   ORDERED BY: ANASTASIIA WALKER     PROCEDURE DATE:  2023          INTERPRETATION:  MRI OF THE LEFT STERNOCLAVICULAR JOINT AND SHOULDER    CLINICAL INFORMATION: Left sternal abscess status post washout with   persistent purulent drainage and left shoulder pain.  TECHNIQUE: Multisequence, multiplanar MRI of the left sternoclavicular   joint. The study was performed before and after the intravenous   administration of 6 ml Gadavist (1.5 cc discarded) .    COMPARISON: Chest CT 2023 and 2023.    FINDINGS:    STERNOCLAVICULAR JOINT:    BONE: Patient status post surgical resection of the left clavicular head.   The surgical margin is sharp with trace edema and no loss of T1   hyperintense signal. In the manubrium there is increased STIR signal with   loss of T1 hyperintense signal and postcontrast enhancement involving the   superolateral leftward aspect of the bone concerning for acute   osteomyelitis (6:9 5:9). No acute fracture. No osteonecrosis.    JOINTS: A residual rim-enhancing fluid collection is seen in the region   of the left sternoclavicular joint measuring 80 x 25 mm. A drainage   catheter is seen within the fluid collection.    SOFT TISSUE: Postsurgical changes are seen along the anterior aspect of   the left sternoclavicular joint. There is a mild amount of edema in the   adjacent pectoralis major muscle. No focal fluid collection in the   anterior mediastinum. Susceptibility artifact from surgical staples are   seen along the anterior chest wall.      SHOULDER JOINT:    ROTATOR CUFF: Low-grade partial-thickness bursal surface tearing of the   supraspinatus tendon. Rotator cuff is otherwise intact.  MUSCLES: No focal muscle edema or atrophy.  BICEPS TENDON: Normal in course and caliber.  GLENOID LABRUM AND GLENOHUMERAL LIGAMENTS: No displaced labral tear.   Inferior glenohumeral ligament is intact.  GLENOHUMERAL CARTILAGE AND SUBCHONDRAL BONE: No full-thickness chondral   loss.  AC JOINT: No AC joint arthropathy.  SYNOVIUM/JOINT FLUID: No glenohumeral joint effusion. No focal fluid in   the subacromial/subdeltoid bursa.  BONE MARROW: No fracture or osteonecrosis. No marrow signal change to   suggest acute osteomyelitis.  NEUROVASCULAR STRUCTURES: Structures of the suprascapular notch,   spinoglenoid notch, and quadrilateral space are normal in course and   caliber.  SUBCUTANEOUS SOFT TISSUES: Edema in the subcutaneous fat of the left   shoulder. No rim-enhancing fluid collection to suggest abscess.        IMPRESSION:  1.  Patient status post surgical resection of the left clavicular head   with washout of the left sternoclavicular joint.  2.  A 80 x 25 mm rim-enhancing fluid collection persists in the surgical   cavity with well-placed drain within the fluid collection.  3.  Marrow signal abnormality in the manubrium concerning for acute   osteomyelitis.  4.  Low-grade partial-thickness bursal surface tear of the supraspinatus   tendon.    --- End of Report ---            RASHIDA HYDE MD; Attending Radiologist  This document has been electronically signed. 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  She had a recent fall in 2023 secondary to unsteadiness due to multiple sclerosis, with subsequent left chest wall and shoulder pain.  Was seen at [facility] with concern for MS exacerbation but no MRI brain and cervical spine with an without contrast were done at the time without enhancement.  At the time infectious workup was done and notable for UTI which was treated.  She continued to have pain.  In the interim, she had an outpatient MRI of left sternoclavicular joint with results significant for marrow edema and swelling of the left sternoclavicular joint with a 2 cm fluid collection and adjacent pleural edema in the left chest with concerns for infectious etiology.   CT chest w/ IV contrast suggestive of septic arthritis with surrounding erythema. Thoracic surgery consulted.  Patient s/p surgical debridement on 23    Int hx:  mri with 80 x 25 mm rim-enhancing fluid collection persists in the surgical cavity with well-placed drain within the fluid collection.   Marrow signal abnormality in the manubrium concerning for acute   osteomyelitis.    Low-grade partial-thickness bursal surface tear of the supraspinatus tendon.    sitting in chair        ******    Pt still with sternal pain, says 10/10, pain also in left arm    Past Medical History:  Multiple sclerosis [G35]        Past Surgical History:  History of  [Z98.891]        Social History:  No toxic habits    Family History:  FAMILY HISTORY:      Allergies:  No Known Allergies      ROS:  Constitutional: Patient offers no complaints of fevers or significant weight loss  Ears, Nose, Mouth and Throat: The patient presents with no abnormalities of the head, ears, eyes, nose or throat  Skin: Patient offers no concerns of new rashes or lesions  Respiratory: The patient presents with no abnormalities of the respiratory tract  Cardiovascular: The patient presents with no cardiac abnormalities  Gastrointestinal: The patient presents with no abnormalities of the GI system  Genitourinary: The patient presents with no dysuria, hematuria or frequent urination  Neurological: See HPI  Endocrine: Patient offers no complaints of excessive thirst, urination, or heat/cold intolerance    Advanced care planning reviewed and noted in the chart.    Medications:  acetaminophen     Tablet .. 650 milliGRAM(s) Oral every 6 hours  cefepime   IVPB 2000 milliGRAM(s) IV Intermittent every 8 hours  chlorhexidine 2% Cloths 1 Application(s) Topical <User Schedule>  clonazePAM  Tablet 0.5 milliGRAM(s) Oral three times a day PRN  cyclobenzaprine 5 milliGRAM(s) Oral two times a day  dalfampridine ER 10 milliGRAM(s) Oral every 12 hours  DULoxetine 60 milliGRAM(s) Oral at bedtime  fentaNYL   Patch  12 MICROgram(s)/Hr 1 Patch Transdermal every 72 hours  HYDROmorphone   Tablet 2 milliGRAM(s) Oral every 4 hours PRN  HYDROmorphone   Tablet 4 milliGRAM(s) Oral every 6 hours PRN  HYDROmorphone  Injectable 1 milliGRAM(s) IV Push every 3 hours PRN  oxyCODONE  ER Tablet 10 milliGRAM(s) Oral every 12 hours  senna 2 Tablet(s) Oral at bedtime  sertraline 100 milliGRAM(s) Oral at bedtime  Vibegron (Gemtesa) 75 milliGRAM(s),Vibegron (Gemtesa) 75mg tablet 1 Tablet(s) 1 Tablet(s) Oral daily      Labs:  CBC Full  -  ( 21 Dec 2023 07:24 )  WBC Count : 10.39 K/uL  RBC Count : 3.32 M/uL  Hemoglobin : 8.4 g/dL  Hematocrit : 27.1 %  Platelet Count - Automated : 437 K/uL  Mean Cell Volume : 81.6 fl  Mean Cell Hemoglobin : 25.3 pg  Mean Cell Hemoglobin Concentration : 31.0 gm/dL  Auto Neutrophil # : x  Auto Lymphocyte # : x  Auto Monocyte # : x  Auto Eosinophil # : x  Auto Basophil # : x  Auto Neutrophil % : x  Auto Lymphocyte % : x  Auto Monocyte % : x  Auto Eosinophil % : x  Auto Basophil % : x    -    135  |  98  |  9   ----------------------------<  102<H>  3.8   |  26  |  0.44<L>    Ca    9.3      21 Dec 2023 07:24      CAPILLARY BLOOD GLUCOSE            Urinalysis Basic - ( 21 Dec 2023 07:24 )    Color: x / Appearance: x / SG: x / pH: x  Gluc: 102 mg/dL / Ketone: x  / Bili: x / Urobili: x   Blood: x / Protein: x / Nitrite: x   Leuk Esterase: x / RBC: x / WBC x   Sq Epi: x / Non Sq Epi: x / Bacteria: x          Vitals:  Vital Signs Last 24 Hrs  T(C): 36.9 (21 Dec 2023 04:40), Max: 36.9 (21 Dec 2023 01:40)  T(F): 98.4 (21 Dec 2023 04:40), Max: 98.4 (21 Dec 2023 01:40)  HR: 88 (21 Dec 2023 04:40) (61 - 89)  BP: 119/72 (21 Dec 2023 04:40) (100/62 - 119/72)  BP(mean): --  RR: 18 (21 Dec 2023 04:40) (18 - 18)  SpO2: 96% (21 Dec 2023 04:40) (96% - 99%)    Parameters below as of 21 Dec 2023 04:40  Patient On (Oxygen Delivery Method): room air          NEUROLOGICAL EXAM:    Mental status: Awake, alert, and in less apparent distress. Oriented to person, place and time. Language function is normal.      Cranial Nerves: Pupils were equal, round, reactive to light. Extraocular movements were intact. Visual field were full. Fundoscopic exam was deferred. Facial sensation was intact to light touch. There was slight left facia droop. The palate was upgoing symmetrically and tongue was midline.     Motor exam: Bulk and tone were normal. able to move all ext fully without much pain    Reflexes: deferred     Sensation: Intact to light touch/temp    Coordination: no gross dysmetria    Gait: defer   sternal wound seen          ACC: [id number] EXAM:  MR SHOULDER WAW IC LT   ORDERED BY:  BRAN SANCHEZ     ACC: [id number] EXAM:  MR STERNOCLAVICULAR JNT LT   ORDERED BY: ANASTASIIA WALKER     PROCEDURE DATE:  2023          INTERPRETATION:  MRI OF THE LEFT STERNOCLAVICULAR JOINT AND SHOULDER    CLINICAL INFORMATION: Left sternal abscess status post washout with   persistent purulent drainage and left shoulder pain.  TECHNIQUE: Multisequence, multiplanar MRI of the left sternoclavicular   joint. The study was performed before and after the intravenous   administration of 6 ml Gadavist (1.5 cc discarded) .    COMPARISON: Chest CT 2023 and 2023.    FINDINGS:    STERNOCLAVICULAR JOINT:    BONE: Patient status post surgical resection of the left clavicular head.   The surgical margin is sharp with trace edema and no loss of T1   hyperintense signal. In the manubrium there is increased STIR signal with   loss of T1 hyperintense signal and postcontrast enhancement involving the   superolateral leftward aspect of the bone concerning for acute   osteomyelitis (6:9 5:9). No acute fracture. No osteonecrosis.    JOINTS: A residual rim-enhancing fluid collection is seen in the region   of the left sternoclavicular joint measuring 80 x 25 mm. A drainage   catheter is seen within the fluid collection.    SOFT TISSUE: Postsurgical changes are seen along the anterior aspect of   the left sternoclavicular joint. There is a mild amount of edema in the   adjacent pectoralis major muscle. No focal fluid collection in the   anterior mediastinum. Susceptibility artifact from surgical staples are   seen along the anterior chest wall.      SHOULDER JOINT:    ROTATOR CUFF: Low-grade partial-thickness bursal surface tearing of the   supraspinatus tendon. Rotator cuff is otherwise intact.  MUSCLES: No focal muscle edema or atrophy.  BICEPS TENDON: Normal in course and caliber.  GLENOID LABRUM AND GLENOHUMERAL LIGAMENTS: No displaced labral tear.   Inferior glenohumeral ligament is intact.  GLENOHUMERAL CARTILAGE AND SUBCHONDRAL BONE: No full-thickness chondral   loss.  AC JOINT: No AC joint arthropathy.  SYNOVIUM/JOINT FLUID: No glenohumeral joint effusion. No focal fluid in   the subacromial/subdeltoid bursa.  BONE MARROW: No fracture or osteonecrosis. No marrow signal change to   suggest acute osteomyelitis.  NEUROVASCULAR STRUCTURES: Structures of the suprascapular notch,   spinoglenoid notch, and quadrilateral space are normal in course and   caliber.  SUBCUTANEOUS SOFT TISSUES: Edema in the subcutaneous fat of the left   shoulder. No rim-enhancing fluid collection to suggest abscess.        IMPRESSION:  1.  Patient status post surgical resection of the left clavicular head   with washout of the left sternoclavicular joint.  2.  A 80 x 25 mm rim-enhancing fluid collection persists in the surgical   cavity with well-placed drain within the fluid collection.  3.  Marrow signal abnormality in the manubrium concerning for acute   osteomyelitis.  4.  Low-grade partial-thickness bursal surface tear of the supraspinatus   tendon.    --- End of Report ---            RASHIDA HYDE MD; Attending Radiologist  This document has been electronically signed. Dec 11 2023  5:11PM

## 2023-12-21 NOTE — PROGRESS NOTE ADULT - ASSESSMENT
43F PMHx of MS on Rituxan, presenting with left chest wall and shoulder pain w/ concern for infection now admitted with likely septic arthritis with need for possible washout and debridement    POD # 1 Debridement and washout of Left.  sternoclavicular joint excision Left clavicular head  Sita 80cc serosanguinous.  GS no org seen.  Cultures pending.  12/9 + RVP>no treatment as per ID  DC PCA  somnolent  Pain mgmt notified Transition oral Rx    12/10    pain medication w oxycodone and dilaudid for breakthrough,     Lt CW staples w bulb   w mininimal drainage   abx per ID  12/11: Left CW with staples with bulb, with minimal drainage. continue antibiotics per ID. continue optimal pain control.   12/12    lt cw  staples  w erythma  drain    several staples removed and packed by Dr Lilly,  abx  per ID  12/13  wbc 10.8  afebrile.  wound without change since yesterday.    OR c/s NGTD  BC  NGTD.    12/14 VSS. afebrile. wound dressing changed at bedside. Awaiting final OR/Blood cultures. Left sita d/c'ed (output 0cc/2cc overnight/ 24hr). Keep NPO p MN. Need 2 active T&S. Plan for OR washing out & wound vac placement tomorrow.  12/15 OR today  Washout/debridement sternoclavicular joint> VAC placed  12/16 Cont abx as per ID Maintain VAC  12/17 VSS - IV antibx - Maintain VAC.  Care as per Primary Team.  12/18 vss- NPO FOR VAC CHANGE IN OR - NO PREGNANCY TEST REQUIRED AS PT. UNDERWENT TUBAL LIGATION.  SPOKE TO HERB IN OR- DR LILLY MADE AWARE  12/19 VSS - L sternoclavicular wound vac dressing CDI, draining scant amount 2-3mL of dark blood. + LUE pain radiating to upper back and R shoulder. Receiving Dilaudid 1mg q3h. Discussed with Attending Dr. Lilly.  12/20 VSS - WBC 11.9 d/w with PT VAC to  be  changed today then every  Mon-wed-fri .  12/21 VSS - WBC 10.39. VAC in place. VAC change every M-W-F. Pain control per primary team.

## 2023-12-21 NOTE — PROGRESS NOTE ADULT - ASSESSMENT
43F PMHx of MS on Rituxan, presenting with left chest wall and shoulder pain w/ concern for infection now admitted with likely septic arthritis with need for possible washout and debridement       Problem/Plan - 1:  ·  Problem: Septic arthritis. left shoulder joint   ·  Plan: Imaging consistent with septic arthritis  s/p OR   cont abx and fu cultures   check ECHO : no vegetation   fu with ID   d/w with CTS at bedside : drainage adjusted and fx improved   cont current abx   pt with LUE pain : tessie referred pain from L [id number] and chest however  Cervical MRI : no disciits / abcess   discussed with pt , family at length at bedside   washout and debridement of sternoclavicular joint; resection of medial manubrium; white and black wound vac placed  now s/p vac change  PICC oredered    d/w pt and family at length       # c/o left arm swelling and pain in elbow region :  -doppler ordered to r/o DVT : Negative      Problem/Plan - 2:  ·  Problem: Multiple sclerosis.   ·  Plan: Gets Rituxan every 6 months, next dose due : holding   -On Ampyra BID at home, need to bring home med     Problem/Plan - 3:  ·  Problem: Anxiety.   ·  Plan: ISTOP reviewed Reference #: [id number]  -Cont. Clonazepam 0.5mg QHS PRN  -Cont. Sertraline 100mg QHS  -Cont. Duloxetine 60mg QHS, pt unsure of dose  -Need full med rec.     Problem/Plan - 4:  ·  Problem: Anemia.   ·  Plan: hgb 9, lower than prior year. Pt endorses some recent anemia but cannot specify  -Trend cbc  -Monitor for bleeding.     Problem/Plan - 5:  ·  Problem: Prophylactic measure.   ·  Plan: DVT PPx    hyperkalemia : normalized    43F PMHx of MS on Rituxan, presenting with left chest wall and shoulder pain w/ concern for infection now admitted with likely septic arthritis with need for possible washout and debridement       Problem/Plan - 1:  ·  Problem: Septic arthritis. left shoulder joint   ·  Plan: Imaging consistent with septic arthritis  s/p OR   cont abx and fu cultures   check ECHO : no vegetation   fu with ID   d/w with CTS at bedside : drainage adjusted and fx improved   cont current abx   pt with LUE pain : tessie referred pain from L [id number] and chest however  Cervical MRI : no disciits / abcess   discussed with pt , family at length at bedside   washout and debridement of sternoclavicular joint; resection of medial manubrium; white and black wound vac placed  now s/p vac change  PICC oredered    d/w pt and family at length       # c/o left arm swelling and pain in elbow region :  -doppler ordered to r/o DVT : Negative      Problem/Plan - 2:  ·  Problem: Multiple sclerosis.   ·  Plan: Gets Rituxan every 6 months, next dose due : holding   -On Ampyra BID at home, need to bring home med     Problem/Plan - 3:  ·  Problem: Anxiety.   ·  Plan: ISTOP reviewed Reference #: [id number]  -Cont. Clonazepam 0.5mg QHS PRN  -Cont. Sertraline 100mg QHS  -Cont. Duloxetine 60mg QHS, pt unsure of dose  -Need full med rec.     Problem/Plan - 4:  ·  Problem: Anemia.   ·  Plan: hgb 9, lower than prior year. Pt endorses some recent anemia but cannot specify  -Trend cbc  -Monitor for bleeding.     Problem/Plan - 5:  ·  Problem: Prophylactic measure.   ·  Plan: DVT PPx    hyperkalemia : normalized

## 2023-12-21 NOTE — PROGRESS NOTE ADULT - SUBJECTIVE AND OBJECTIVE BOX
Subjective: Patient seen and examined. No new events except as noted.   weak   s/p PICC   sitting in chair at bedside   left and right arm pain   brother at bedside     REVIEW OF SYSTEMS:    CONSTITUTIONAL:+eakness, fevers or chills  EYES/ENT: No visual changes;  No vertigo or throat pain   NECK: No pain or stiffness  RESPIRATORY: No cough, wheezing, hemoptysis; No shortness of breath  CARDIOVASCULAR: No chest pain or palpitations  GASTROINTESTINAL: No abdominal or epigastric pain. No nausea, vomiting, or hematemesis; No diarrhea or constipation. No melena or hematochezia.  GENITOURINARY: No dysuria, frequency or hematuria  NEUROLOGICAL: No numbness or weakness  SKIN: No itching, burning, rashes, or lesions   All other review of systems is negative unless indicated above.    MEDICATIONS:  MEDICATIONS  (STANDING):  acetaminophen     Tablet .. 650 milliGRAM(s) Oral every 6 hours  cefepime   IVPB 2000 milliGRAM(s) IV Intermittent every 8 hours  chlorhexidine 2% Cloths 1 Application(s) Topical <User Schedule>  cyclobenzaprine 5 milliGRAM(s) Oral two times a day  dalfampridine ER 10 milliGRAM(s) Oral every 12 hours  DULoxetine 60 milliGRAM(s) Oral at bedtime  fentaNYL   Patch  12 MICROgram(s)/Hr 1 Patch Transdermal every 72 hours  oxyCODONE  ER Tablet 10 milliGRAM(s) Oral every 12 hours  senna 2 Tablet(s) Oral at bedtime  sertraline 100 milliGRAM(s) Oral at bedtime  Vibegron (Gemtesa) 75 milliGRAM(s),Vibegron (Gemtesa) 75mg tablet 1 Tablet(s) 1 Tablet(s) Oral daily      PHYSICAL EXAM:  T(C): 36.9 (12-21-23 @ 09:45), Max: 36.9 (12-21-23 @ 01:40)  HR: 97 (12-21-23 @ 09:45) (61 - 97)  BP: 101/70 (12-21-23 @ 09:45) (100/62 - 119/72)  RR: 18 (12-21-23 @ 09:45) (18 - 18)  SpO2: 96% (12-21-23 @ 09:45) (96% - 99%)  Wt(kg): --  I&O's Summary    20 Dec 2023 07:01  -  21 Dec 2023 07:00  --------------------------------------------------------  IN: 730 mL / OUT: 0 mL / NET: 730 mL    21 Dec 2023 07:01  -  21 Dec 2023 10:31  --------------------------------------------------------  IN: 240 mL / OUT: 0 mL / NET: 240 mL              Appearance: NAD  HEENT:   Normal oral mucosa, PERRL, EOMI	  Lymphatic: No lymphadenopathy , no edema   L clavicular region w/ black sponge CAV dsg secured w/ good seal, minimal serousag drainage noted in collection chamber  Cardiovascular: Normal S1 S2, No JVD, No murmurs , Peripheral pulses palpable 2+ bilaterally  Respiratory: Lungs clear to auscultation, normal effort 	  Gastrointestinal:  Soft, Non-tender, + BS	  Skin: No rashes, No ecchymoses, No cyanosis, warm to touch  Musculoskeletal: Normal range of motion, normal strength  Psychiatry:  lethargic weak   Ext: No edema      LABS:    CARDIAC MARKERS:                                8.4    10.39 )-----------( 437      ( 21 Dec 2023 07:24 )             27.1     12-21    135  |  98  |  9   ----------------------------<  102<H>  3.8   |  26  |  0.44<L>    Ca    9.3      21 Dec 2023 07:24      proBNP:   Lipid Profile:   HgA1c:   TSH:             TELEMETRY: 	    ECG:  	  RADIOLOGY:   DIAGNOSTIC TESTING:  [ ] Echocardiogram:  [ ]  Catheterization:  [ ] Stress Test:    OTHER:

## 2023-12-22 LAB
ANION GAP SERPL CALC-SCNC: 11 MMOL/L — SIGNIFICANT CHANGE UP (ref 5–17)
ANION GAP SERPL CALC-SCNC: 11 MMOL/L — SIGNIFICANT CHANGE UP (ref 5–17)
BUN SERPL-MCNC: 7 MG/DL — SIGNIFICANT CHANGE UP (ref 7–23)
BUN SERPL-MCNC: 7 MG/DL — SIGNIFICANT CHANGE UP (ref 7–23)
CALCIUM SERPL-MCNC: 9 MG/DL — SIGNIFICANT CHANGE UP (ref 8.4–10.5)
CALCIUM SERPL-MCNC: 9 MG/DL — SIGNIFICANT CHANGE UP (ref 8.4–10.5)
CHLORIDE SERPL-SCNC: 102 MMOL/L — SIGNIFICANT CHANGE UP (ref 96–108)
CHLORIDE SERPL-SCNC: 102 MMOL/L — SIGNIFICANT CHANGE UP (ref 96–108)
CO2 SERPL-SCNC: 25 MMOL/L — SIGNIFICANT CHANGE UP (ref 22–31)
CO2 SERPL-SCNC: 25 MMOL/L — SIGNIFICANT CHANGE UP (ref 22–31)
CREAT SERPL-MCNC: 0.39 MG/DL — LOW (ref 0.5–1.3)
CREAT SERPL-MCNC: 0.39 MG/DL — LOW (ref 0.5–1.3)
EGFR: 126 ML/MIN/1.73M2 — SIGNIFICANT CHANGE UP
EGFR: 126 ML/MIN/1.73M2 — SIGNIFICANT CHANGE UP
GLUCOSE SERPL-MCNC: 102 MG/DL — HIGH (ref 70–99)
GLUCOSE SERPL-MCNC: 102 MG/DL — HIGH (ref 70–99)
HCT VFR BLD CALC: 25.3 % — LOW (ref 34.5–45)
HCT VFR BLD CALC: 25.3 % — LOW (ref 34.5–45)
HGB BLD-MCNC: 7.7 G/DL — LOW (ref 11.5–15.5)
HGB BLD-MCNC: 7.7 G/DL — LOW (ref 11.5–15.5)
MCHC RBC-ENTMCNC: 25 PG — LOW (ref 27–34)
MCHC RBC-ENTMCNC: 25 PG — LOW (ref 27–34)
MCHC RBC-ENTMCNC: 30.4 GM/DL — LOW (ref 32–36)
MCHC RBC-ENTMCNC: 30.4 GM/DL — LOW (ref 32–36)
MCV RBC AUTO: 82.1 FL — SIGNIFICANT CHANGE UP (ref 80–100)
MCV RBC AUTO: 82.1 FL — SIGNIFICANT CHANGE UP (ref 80–100)
NRBC # BLD: 0 /100 WBCS — SIGNIFICANT CHANGE UP (ref 0–0)
NRBC # BLD: 0 /100 WBCS — SIGNIFICANT CHANGE UP (ref 0–0)
PLATELET # BLD AUTO: 433 K/UL — HIGH (ref 150–400)
PLATELET # BLD AUTO: 433 K/UL — HIGH (ref 150–400)
POTASSIUM SERPL-MCNC: 3.9 MMOL/L — SIGNIFICANT CHANGE UP (ref 3.5–5.3)
POTASSIUM SERPL-MCNC: 3.9 MMOL/L — SIGNIFICANT CHANGE UP (ref 3.5–5.3)
POTASSIUM SERPL-SCNC: 3.9 MMOL/L — SIGNIFICANT CHANGE UP (ref 3.5–5.3)
POTASSIUM SERPL-SCNC: 3.9 MMOL/L — SIGNIFICANT CHANGE UP (ref 3.5–5.3)
RBC # BLD: 3.08 M/UL — LOW (ref 3.8–5.2)
RBC # BLD: 3.08 M/UL — LOW (ref 3.8–5.2)
RBC # FLD: 15.5 % — HIGH (ref 10.3–14.5)
RBC # FLD: 15.5 % — HIGH (ref 10.3–14.5)
SODIUM SERPL-SCNC: 138 MMOL/L — SIGNIFICANT CHANGE UP (ref 135–145)
SODIUM SERPL-SCNC: 138 MMOL/L — SIGNIFICANT CHANGE UP (ref 135–145)
WBC # BLD: 11.21 K/UL — HIGH (ref 3.8–10.5)
WBC # BLD: 11.21 K/UL — HIGH (ref 3.8–10.5)
WBC # FLD AUTO: 11.21 K/UL — HIGH (ref 3.8–10.5)
WBC # FLD AUTO: 11.21 K/UL — HIGH (ref 3.8–10.5)

## 2023-12-22 PROCEDURE — 73222 MRI JOINT UPR EXTREM W/DYE: CPT | Mod: 26,RT

## 2023-12-22 PROCEDURE — 99232 SBSQ HOSP IP/OBS MODERATE 35: CPT

## 2023-12-22 RX ORDER — CLONAZEPAM 1 MG
0.5 TABLET ORAL THREE TIMES A DAY
Refills: 0 | Status: DISCONTINUED | OUTPATIENT
Start: 2023-12-23 | End: 2023-12-23

## 2023-12-22 RX ORDER — CLONAZEPAM 1 MG
0.5 TABLET ORAL THREE TIMES A DAY
Refills: 0 | Status: DISCONTINUED | OUTPATIENT
Start: 2023-12-23 | End: 2023-12-30

## 2023-12-22 RX ADMIN — HYDROMORPHONE HYDROCHLORIDE 1 MILLIGRAM(S): 2 INJECTION INTRAMUSCULAR; INTRAVENOUS; SUBCUTANEOUS at 04:58

## 2023-12-22 RX ADMIN — Medication 650 MILLIGRAM(S): at 06:34

## 2023-12-22 RX ADMIN — Medication 650 MILLIGRAM(S): at 07:04

## 2023-12-22 RX ADMIN — FENTANYL CITRATE 1 PATCH: 50 INJECTION INTRAVENOUS at 07:36

## 2023-12-22 RX ADMIN — OXYCODONE HYDROCHLORIDE 10 MILLIGRAM(S): 5 TABLET ORAL at 07:04

## 2023-12-22 RX ADMIN — DULOXETINE HYDROCHLORIDE 60 MILLIGRAM(S): 30 CAPSULE, DELAYED RELEASE ORAL at 21:23

## 2023-12-22 RX ADMIN — CEFEPIME 100 MILLIGRAM(S): 1 INJECTION, POWDER, FOR SOLUTION INTRAMUSCULAR; INTRAVENOUS at 13:44

## 2023-12-22 RX ADMIN — Medication 650 MILLIGRAM(S): at 17:09

## 2023-12-22 RX ADMIN — HYDROMORPHONE HYDROCHLORIDE 1 MILLIGRAM(S): 2 INJECTION INTRAMUSCULAR; INTRAVENOUS; SUBCUTANEOUS at 14:55

## 2023-12-22 RX ADMIN — CYCLOBENZAPRINE HYDROCHLORIDE 5 MILLIGRAM(S): 10 TABLET, FILM COATED ORAL at 17:09

## 2023-12-22 RX ADMIN — HYDROMORPHONE HYDROCHLORIDE 4 MILLIGRAM(S): 2 INJECTION INTRAMUSCULAR; INTRAVENOUS; SUBCUTANEOUS at 13:44

## 2023-12-22 RX ADMIN — HYDROMORPHONE HYDROCHLORIDE 1 MILLIGRAM(S): 2 INJECTION INTRAMUSCULAR; INTRAVENOUS; SUBCUTANEOUS at 21:23

## 2023-12-22 RX ADMIN — SENNA PLUS 2 TABLET(S): 8.6 TABLET ORAL at 21:23

## 2023-12-22 RX ADMIN — HYDROMORPHONE HYDROCHLORIDE 4 MILLIGRAM(S): 2 INJECTION INTRAMUSCULAR; INTRAVENOUS; SUBCUTANEOUS at 19:47

## 2023-12-22 RX ADMIN — HYDROMORPHONE HYDROCHLORIDE 1 MILLIGRAM(S): 2 INJECTION INTRAMUSCULAR; INTRAVENOUS; SUBCUTANEOUS at 08:37

## 2023-12-22 RX ADMIN — DALFAMPRIDINE 10 MILLIGRAM(S): 10 TABLET, FILM COATED, EXTENDED RELEASE ORAL at 11:20

## 2023-12-22 RX ADMIN — HYDROMORPHONE HYDROCHLORIDE 1 MILLIGRAM(S): 2 INJECTION INTRAMUSCULAR; INTRAVENOUS; SUBCUTANEOUS at 04:28

## 2023-12-22 RX ADMIN — HYDROMORPHONE HYDROCHLORIDE 4 MILLIGRAM(S): 2 INJECTION INTRAMUSCULAR; INTRAVENOUS; SUBCUTANEOUS at 01:34

## 2023-12-22 RX ADMIN — HYDROMORPHONE HYDROCHLORIDE 1 MILLIGRAM(S): 2 INJECTION INTRAMUSCULAR; INTRAVENOUS; SUBCUTANEOUS at 21:53

## 2023-12-22 RX ADMIN — HYDROMORPHONE HYDROCHLORIDE 1 MILLIGRAM(S): 2 INJECTION INTRAMUSCULAR; INTRAVENOUS; SUBCUTANEOUS at 15:25

## 2023-12-22 RX ADMIN — CEFEPIME 100 MILLIGRAM(S): 1 INJECTION, POWDER, FOR SOLUTION INTRAMUSCULAR; INTRAVENOUS at 21:23

## 2023-12-22 RX ADMIN — Medication 650 MILLIGRAM(S): at 11:17

## 2023-12-22 RX ADMIN — CHLORHEXIDINE GLUCONATE 1 APPLICATION(S): 213 SOLUTION TOPICAL at 06:34

## 2023-12-22 RX ADMIN — HYDROMORPHONE HYDROCHLORIDE 4 MILLIGRAM(S): 2 INJECTION INTRAMUSCULAR; INTRAVENOUS; SUBCUTANEOUS at 20:17

## 2023-12-22 RX ADMIN — CYCLOBENZAPRINE HYDROCHLORIDE 5 MILLIGRAM(S): 10 TABLET, FILM COATED ORAL at 06:34

## 2023-12-22 RX ADMIN — CEFEPIME 100 MILLIGRAM(S): 1 INJECTION, POWDER, FOR SOLUTION INTRAMUSCULAR; INTRAVENOUS at 06:34

## 2023-12-22 RX ADMIN — SERTRALINE 100 MILLIGRAM(S): 25 TABLET, FILM COATED ORAL at 21:23

## 2023-12-22 RX ADMIN — HYDROMORPHONE HYDROCHLORIDE 4 MILLIGRAM(S): 2 INJECTION INTRAMUSCULAR; INTRAVENOUS; SUBCUTANEOUS at 02:04

## 2023-12-22 RX ADMIN — DALFAMPRIDINE 10 MILLIGRAM(S): 10 TABLET, FILM COATED, EXTENDED RELEASE ORAL at 00:49

## 2023-12-22 RX ADMIN — OXYCODONE HYDROCHLORIDE 10 MILLIGRAM(S): 5 TABLET ORAL at 06:34

## 2023-12-22 RX ADMIN — HYDROMORPHONE HYDROCHLORIDE 1 MILLIGRAM(S): 2 INJECTION INTRAMUSCULAR; INTRAVENOUS; SUBCUTANEOUS at 09:07

## 2023-12-22 RX ADMIN — OXYCODONE HYDROCHLORIDE 10 MILLIGRAM(S): 5 TABLET ORAL at 17:09

## 2023-12-22 RX ADMIN — OXYCODONE HYDROCHLORIDE 10 MILLIGRAM(S): 5 TABLET ORAL at 18:09

## 2023-12-22 RX ADMIN — FENTANYL CITRATE 1 PATCH: 50 INJECTION INTRAVENOUS at 18:30

## 2023-12-22 RX ADMIN — Medication 650 MILLIGRAM(S): at 01:34

## 2023-12-22 RX ADMIN — HYDROMORPHONE HYDROCHLORIDE 4 MILLIGRAM(S): 2 INJECTION INTRAMUSCULAR; INTRAVENOUS; SUBCUTANEOUS at 14:44

## 2023-12-22 RX ADMIN — Medication 650 MILLIGRAM(S): at 02:04

## 2023-12-22 RX ADMIN — DALFAMPRIDINE 10 MILLIGRAM(S): 10 TABLET, FILM COATED, EXTENDED RELEASE ORAL at 22:05

## 2023-12-22 RX ADMIN — HYDROMORPHONE HYDROCHLORIDE 4 MILLIGRAM(S): 2 INJECTION INTRAMUSCULAR; INTRAVENOUS; SUBCUTANEOUS at 07:36

## 2023-12-22 RX ADMIN — Medication 650 MILLIGRAM(S): at 18:09

## 2023-12-22 RX ADMIN — Medication 650 MILLIGRAM(S): at 12:17

## 2023-12-22 RX ADMIN — HYDROMORPHONE HYDROCHLORIDE 4 MILLIGRAM(S): 2 INJECTION INTRAMUSCULAR; INTRAVENOUS; SUBCUTANEOUS at 08:36

## 2023-12-22 NOTE — PROGRESS NOTE ADULT - SUBJECTIVE AND OBJECTIVE BOX
Date of service: 12-22-23 @ 15:09      Patient is a 43y old  Female who presents with a chief complaint of L shoulder and chest pain (22 Dec 2023 09:52)                                                               INTERVAL HPI/OVERNIGHT EVENTS:    REVIEW OF SYSTEMS:     CONSTITUTIONAL: No weakness, fevers or chills  EYES/ENT: No visual changes , no ear ache   NECK: No pain or stiffness  RESPIRATORY: No cough, wheezing,  No shortness of breath  CARDIOVASCULAR: No chest pain or palpitations  GASTROINTESTINAL: No abdominal pain  . No nausea, vomiting, or hematemesis; No diarrhea or constipation. No melena or hematochezia.  GENITOURINARY: No dysuria, frequency or hematuria  NEUROLOGICAL: No numbness or weakness  SKIN: No itching, burning, rashes, or lesions                                                                                                                                                                                                                                                                                 Medications:  MEDICATIONS  (STANDING):  acetaminophen     Tablet .. 650 milliGRAM(s) Oral every 6 hours  cefepime   IVPB 2000 milliGRAM(s) IV Intermittent every 8 hours  chlorhexidine 2% Cloths 1 Application(s) Topical <User Schedule>  cyclobenzaprine 5 milliGRAM(s) Oral two times a day  dalfampridine ER 10 milliGRAM(s) Oral every 12 hours  DULoxetine 60 milliGRAM(s) Oral at bedtime  fentaNYL   Patch  12 MICROgram(s)/Hr 1 Patch Transdermal every 72 hours  oxyCODONE  ER Tablet 10 milliGRAM(s) Oral every 12 hours  senna 2 Tablet(s) Oral at bedtime  sertraline 100 milliGRAM(s) Oral at bedtime  Vibegron (Gemtesa) 75 milliGRAM(s),Vibegron (Gemtesa) 75mg tablet 1 Tablet(s) 1 Tablet(s) Oral daily    MEDICATIONS  (PRN):  benzocaine/menthol Lozenge 1 Lozenge Oral three times a day PRN Sore Throat  clonazePAM  Tablet 0.5 milliGRAM(s) Oral three times a day PRN anxiety  HYDROmorphone   Tablet 2 milliGRAM(s) Oral every 4 hours PRN Moderate Pain (4 - 6)  HYDROmorphone   Tablet 4 milliGRAM(s) Oral every 6 hours PRN Severe Pain (7 - 10)  HYDROmorphone  Injectable 1 milliGRAM(s) IV Push every 3 hours PRN Breakthrough pain       Allergies    No Known Allergies    Intolerances      Vital Signs Last 24 Hrs  T(C): 36.5 (22 Dec 2023 13:41), Max: 37.1 (21 Dec 2023 21:03)  T(F): 97.7 (22 Dec 2023 13:41), Max: 98.8 (21 Dec 2023 21:03)  HR: 86 (22 Dec 2023 13:41) (86 - 100)  BP: 101/62 (22 Dec 2023 13:41) (99/66 - 119/72)  BP(mean): --  RR: 18 (22 Dec 2023 13:41) (17 - 18)  SpO2: 97% (22 Dec 2023 13:41) (97% - 100%)    Parameters below as of 22 Dec 2023 13:41  Patient On (Oxygen Delivery Method): room air      CAPILLARY BLOOD GLUCOSE          12-21 @ 07:01  -  12-22 @ 07:00  --------------------------------------------------------  IN: 1540 mL / OUT: 350 mL / NET: 1190 mL    12-22 @ 07:01  -  12-22 @ 15:09  --------------------------------------------------------  IN: 530 mL / OUT: 0 mL / NET: 530 mL      Physical Exam:    Daily     Daily   General:  Well appearing, NAD, not cachetic  HEENT:  Nonicteric, PERRLA  CV:  RRR, S1S2   Lungs:  CTA B/L, no wheezes, rales, rhonchi  Abdomen:  Soft, non-tender, no distended, positive BS  Extremities:  2+ pulses, no c/c, no edema  Skin:  Warm and dry, no rashes  :  No stanford  Neuro:  AAOx3, non-focal, grossly intact                                                                                                                                                                                                                                                                                                LABS:                               7.7    11.21 )-----------( 433      ( 22 Dec 2023 08:19 )             25.3                      12-22    138  |  102  |  7   ----------------------------<  102<H>  3.9   |  25  |  0.39<L>    Ca    9.0      22 Dec 2023 08:19                         RADIOLOGY & ADDITIONAL TESTS         I personally reviewed: [  ]EKG   [  ]CXR    [  ] CT      A/P:         Discussed with :     [facility] consultants' Notes   Time spent :

## 2023-12-22 NOTE — PROGRESS NOTE ADULT - SUBJECTIVE AND OBJECTIVE BOX
VITAL SIGNS      Vital Signs Last 24 Hrs  T(C): 37.1 (23 @ 06:04), Max: 37.1 (23 @ 06:04)  T(F): 98.7 (23 @ 06:04), Max: 98.7 (23 @ 06:04)  HR: 98 (23 @ 06:04) (88 - 100)  BP: 106/70 (23 @ 06:04) (103/69 - 106/70)  RR: 17 (23 @ 06:04) (17 - 17)  SpO2: 94% (23 @ 06:04) (94% - 100%)             @ 07:01  -   @ 07:00  --------------------------------------------------------  IN: 1350 mL / OUT: 55 mL / NET: 1295 mL     @ 07:01  -   @ 16:16  --------------------------------------------------------  IN: 240 mL / OUT: 0 mL / NET: 240 mL    Daily   Admit Wt: Drug Dosing Weight  Height (cm): 166.4 (18 Dec 2023 15:11)  Weight (kg): 62.6 (18 Dec 2023 15:11)  BMI (kg/m2): 22.6 (18 Dec 2023 15:11)  BSA (m2): 1.7 (18 Dec 2023 15:11)      CAPILLARY BLOOD GLUCOSE              MEDICATIONS  acetaminophen     Tablet .. 650 milliGRAM(s) Oral every 6 hours  benzocaine/menthol Lozenge 1 Lozenge Oral three times a day PRN  cefepime   IVPB 2000 milliGRAM(s) IV Intermittent every 8 hours  chlorhexidine 2% Cloths 1 Application(s) Topical <User Schedule>  clonazePAM  Tablet 0.5 milliGRAM(s) Oral three times a day PRN  cyclobenzaprine 5 milliGRAM(s) Oral two times a day  dalfampridine ER 10 milliGRAM(s) Oral every 12 hours  DULoxetine 60 milliGRAM(s) Oral at bedtime  fentaNYL   Patch  12 MICROgram(s)/Hr 1 Patch Transdermal every 72 hours  HYDROmorphone   Tablet 2 milliGRAM(s) Oral every 4 hours PRN  HYDROmorphone   Tablet 4 milliGRAM(s) Oral every 6 hours PRN  HYDROmorphone  Injectable 1 milliGRAM(s) IV Push every 3 hours PRN  oxyCODONE  ER Tablet 10 milliGRAM(s) Oral every 12 hours  senna 2 Tablet(s) Oral at bedtime  sertraline 100 milliGRAM(s) Oral at bedtime  Vibegron (Gemtesa) 75 milliGRAM(s),Vibegron (Gemtesa) 75mg tablet 1 Tablet(s) 1 Tablet(s) Oral daily      >>> <<<  PHYSICAL EXAM  Subjective: NAD  Neurology: alert and oriented x 3, nonfocal, no gross deficits  Sternal Wound wound vac intact  Lungs: CTA  Abdomen: soft, NT,ND, (+ )BM  :  voiding  Extremities:  -c/c/e     LABS      138  |  102  |  7   ----------------------------<  102<H>  3.9   |  25  |  0.39<L>    Ca    9.0      22 Dec 2023 08:19                                   7.7    11.21 )-----------( 433      ( 22 Dec 2023 08:19 )             25.3                 PAST MEDICAL & SURGICAL HISTORY:  Multiple sclerosis      History of

## 2023-12-22 NOTE — PROGRESS NOTE ADULT - PROBLEM SELECTOR PLAN 1
- 12/18: Vac change   - Remain left VAC in place w/ good seal  - 12/20 VAC change @bedside; VAC change Mon - wed- fri  plan - ordered   d/w PT   - OOB  - Optimize nutrition supplement to meals  - Optimize pain control   -Findings from MR shoulder reviewed w/Dr Bautista. No further thoracic surgical intervention planned. Further management per primary team.  If for any circumstances patient decompensates thoracic surgery is available for assistance Welcome to re-consult.  [id number] - 12/18: Vac change   - Remain left VAC in place w/ good seal  - 12/20 VAC change @bedside; VAC change Mon - wed- fri  plan - ordered   d/w PT   - OOB  - Optimize nutrition supplement to meals  - Optimize pain control   -Findings from MR shoulder reviewed w/Dr Bautista. No further thoracic surgical intervention planned. Further management per primary team.  If for any circumstances patient decompensates thoracic surgery is available for assistance Welcome to re-consult.  [id number]

## 2023-12-22 NOTE — PROGRESS NOTE ADULT - ASSESSMENT
43F PMHx of MS on (Rituxan, Ocrevus) presenting with left chest wall and shoulder pain. Recent history of fall secondary to unsteadiness due to MS. On review of ortho notes, pt has had multiple falls in November. She was admitted to [facility] on Last week of November and was found to have Rhinovirus and pseudomonas UTI. She completed 5-7 days course with Ciprofloxacin. Found to have Septic arthritis of SCM joint now s/p excision.    Patient obtained CT chest w/ IV contrast which showed likely septic arthritis with surrounding erythema. Thoracic surgery consulted, underwent washout and debridement.     OR Note: Debridement of left sternoclavicular joint with excision of left clavicular head. Purulent drainage at the sternoclavicular joint. Tissue surrounding left clavicle found to be very inflamed. Copious irrigation of surgical site.  Specimen sent: Head of left clavicle, culture of left sternoclavicular joint, culture of left manubrium, culture of deep sternal head of clavicle, culture of first rib costal cartilage      In ER: Afebrile, WBC 9.8, , . Given IV Zosyn, IV Vancomycin.     Remains afebrile, WBC stable.     Septic arthritis of the left sternoclavicular joint and involving the   articulation of the sternum with the first costal cartilage.    Extensive surrounding inflammation, with pneumonia in the underlying   subpleural left upper lobe.      # Septic arthritis of L sternoclavicular joint s/p excision and drainage  # L upper lobe opacity, improving on recent CT   # Immunosuppressed patient  # Elevated ESR/CRP.   #Manubrium OM. s/p repeat washout and debridement.     clinically improving.     PLAN:  - continue cefepime 2 gm Q8hrs  - OR cx negative for bacterial cx.   - MRSA PCR negative,  - follow fungal/AFB cultures; negative so far  - Quantiferon negative   - All blood cultures negative.   - TTE negative   - thoracic following,   - s/p PICC and 6 weeks of abx. iv. from the day of repeat debridement until 1/25/24. CBC, CMP once a week while on OPAT.   - Rt shoulder MRI with no acute infection.   Pt to follow with me in office in 4 weeks.       Will sign off, please call with questions.     Odalis Reynaga  Please contact through MS Teams   If no response or past 5 pm/weekend call [phone].      43F PMHx of MS on (Rituxan, Ocrevus) presenting with left chest wall and shoulder pain. Recent history of fall secondary to unsteadiness due to MS. On review of ortho notes, pt has had multiple falls in November. She was admitted to [facility] on Last week of November and was found to have Rhinovirus and pseudomonas UTI. She completed 5-7 days course with Ciprofloxacin. Found to have Septic arthritis of SCM joint now s/p excision.    Patient obtained CT chest w/ IV contrast which showed likely septic arthritis with surrounding erythema. Thoracic surgery consulted, underwent washout and debridement.     OR Note: Debridement of left sternoclavicular joint with excision of left clavicular head. Purulent drainage at the sternoclavicular joint. Tissue surrounding left clavicle found to be very inflamed. Copious irrigation of surgical site.  Specimen sent: Head of left clavicle, culture of left sternoclavicular joint, culture of left manubrium, culture of deep sternal head of clavicle, culture of first rib costal cartilage      In ER: Afebrile, WBC 9.8, , . Given IV Zosyn, IV Vancomycin.     Remains afebrile, WBC stable.     Septic arthritis of the left sternoclavicular joint and involving the   articulation of the sternum with the first costal cartilage.    Extensive surrounding inflammation, with pneumonia in the underlying   subpleural left upper lobe.      # Septic arthritis of L sternoclavicular joint s/p excision and drainage  # L upper lobe opacity, improving on recent CT   # Immunosuppressed patient  # Elevated ESR/CRP.   #Manubrium OM. s/p repeat washout and debridement.     clinically improving.     PLAN:  - continue cefepime 2 gm Q8hrs  - OR cx negative for bacterial cx.   - MRSA PCR negative,  - follow fungal/AFB cultures; negative so far  - Quantiferon negative   - All blood cultures negative.   - TTE negative   - thoracic following,   - s/p PICC and 6 weeks of abx. iv. from the day of repeat debridement until 1/25/24. CBC, CMP once a week while on OPAT.   - Rt shoulder MRI with no acute infection.   Pt to follow with me in office in 4 weeks.       Will sign off, please call with questions.     Odalis Reynaga  Please contact through MS Teams   If no response or past 5 pm/weekend call [phone].

## 2023-12-22 NOTE — PROGRESS NOTE ADULT - ASSESSMENT
43F PMHx of MS on Rituxan, presenting with left chest wall and shoulder pain w/ concern for infection now admitted with likely septic arthritis with need for possible washout and debridement       Problem/Plan - 1:  ·  Problem: Septic arthritis. left shoulder joint   ·  Plan: Imaging consistent with septic arthritis  s/p OR   cont abx and fu cultures   check ECHO : no vegetation   fu with ID   d/w with CTS at bedside : drainage adjusted and fx improved   cont current abx   pt with LUE pain : tessie referred pain from L [id number] and chest however  Cervical MRI : no disciits / abcess   discussed with pt , family at length at bedside   washout and debridement of sternoclavicular joint; resection of medial manubrium; white and black wound vac placed  now s/p vac change  PICC oredered    d/w pt and family at length       # c/o left arm swelling and pain in elbow region :  -doppler ordered to r/o DVT : Negative      Problem/Plan - 2:  ·  Problem: Multiple sclerosis.   ·  Plan: Gets Rituxan every 6 months, next dose due : holding   -On Ampyra BID at home, need to bring home med     Problem/Plan - 3:  ·  Problem: Anxiety.   ·  Plan: ISTOP reviewed Reference #: [id number]  -Cont. Clonazepam 0.5mg QHS PRN  -Cont. Sertraline 100mg QHS  -Cont. Duloxetine 60mg QHS, pt unsure of dose  -Need full med rec.     Problem/Plan - 4:  ·  Problem: Anemia.   ·  Plan: hgb 9, lower than prior year. Pt endorses some recent anemia but cannot specify  -Trend cbc  -Monitor for bleeding.     Problem/Plan - 5:  ·  Problem: Prophylactic measure.   ·  Plan: DVT PPx    hyperkalemia : normalized    43F PMHx of MS on Rituxan, presenting with left chest wall and shoulder pain w/ concern for infection now admitted with likely septic arthritis with need for possible washout and debridement       Problem/Plan - 1:  ·  Problem: Septic arthritis. left shoulder joint   ·  Plan: Imaging consistent with septic arthritis  s/p OR   cont abx and fu cultures   check ECHO : no vegetation   fu with ID   d/w with CTS at bedside : drainage adjusted and fx improved   cont current abx   pt with LUE pain : tessie referred pain from L [id number] and chest however  Cervical MRI : no disciits / abcess   discussed with pt , family at length at bedside   washout and debridement of sternoclavicular joint; resection of medial manubrium; white and black wound vac placed  now s/p vac change  PICC oredered    d/w pt and family at length       # c/o left arm swelling and pain in elbow region :  -doppler ordered to r/o DVT : Negative      Problem/Plan - 2:  ·  Problem: Multiple sclerosis.   ·  Plan: Gets Rituxan every 6 months, next dose due : holding   -On Ampyra BID at home, need to bring home med     Problem/Plan - 3:  ·  Problem: Anxiety.   ·  Plan: ISTOP reviewed Reference #: [id number]  -Cont. Clonazepam 0.5mg QHS PRN  -Cont. Sertraline 100mg QHS  -Cont. Duloxetine 60mg QHS, pt unsure of dose  -Need full med rec.     Problem/Plan - 4:  ·  Problem: Anemia.   ·  Plan: hgb 9, lower than prior year. Pt endorses some recent anemia but cannot specify  -Trend cbc  -Monitor for bleeding.     Problem/Plan - 5:  ·  Problem: Prophylactic measure.   ·  Plan: DVT PPx    hyperkalemia : normalized

## 2023-12-22 NOTE — PROGRESS NOTE ADULT - PROBLEM SELECTOR PLAN 1
s/p Debridement of left sternoclavicular joint with excision of left clavicular head. Purulent drainage at the sternoclavicular joint. Tissue surrounding left clavicle found to be very inflamed. Copious irrigation of surgical site.   IV abx   OR c/s NGTD  BC  NGTD.   s/p RTOR debridement. VAC placement   PICC   MRI reviewed. s/p  removal of staples  Ortho consulted regarding supraspinatus tear. Would prefer NO steroid injection at present time given infection   Pain control   normal TTE  Monitor Hgb . s/p  PRBC x 1 transfusion  DVT ppx.

## 2023-12-22 NOTE — PROGRESS NOTE ADULT - SUBJECTIVE AND OBJECTIVE BOX
43yPatient is a 43y old  Female who presents with a chief complaint of L shoulder and chest pain (22 Dec 2023 15:09)      Interval history:  Afebrile, feels tired.       Allergies:   No Known Allergies      Antimicrobials:  cefepime   IVPB 2000 milliGRAM(s) IV Intermittent every 8 hours      REVIEW OF SYSTEMS:   No SOB  No abdominal pain  No rash.       Vital Signs Last 24 Hrs  T(C): 36.6 (12-22-23 @ 16:48), Max: 37.1 (12-21-23 @ 21:03)  T(F): 97.8 (12-22-23 @ 16:48), Max: 98.8 (12-21-23 @ 21:03)  HR: 100 (12-22-23 @ 16:48) (86 - 100)  BP: 103/69 (12-22-23 @ 16:48) (99/66 - 119/72)  BP(mean): --  RR: 17 (12-22-23 @ 16:48) (17 - 18)  SpO2: 100% (12-22-23 @ 16:48) (97% - 100%)      PHYSICAL EXAM:  Pt in no acute distress, alert, awake.   non distended abdomen  no edema LE   rt arm PICC    Wound vac in place left shoulder  ROM rt side mostly preserved.                             7.7    11.21 )-----------( 433      ( 22 Dec 2023 08:19 )             25.3   12-22    138  |  102  |  7   ----------------------------<  102<H>  3.9   |  25  |  0.39<L>    Ca    9.0      22 Dec 2023 08:19            Radiology:      < from: MR Shoulder Joint w/ IV Cont, Right (12.22.23 @ 01:17) >  Impression:    No drainable RIGHT glenohumeral joint effusion. No convincing evidence of   osteomyelitis.    Other findings as above.

## 2023-12-22 NOTE — PROGRESS NOTE ADULT - SUBJECTIVE AND OBJECTIVE BOX
Subjective: Patient seen and examined. No new events except as noted.   weak   friends at bedside     REVIEW OF SYSTEMS:    CONSTITUTIONAL: + weakness, fevers or chills  EYES/ENT: No visual changes;  No vertigo or throat pain   NECK: No pain or stiffness  RESPIRATORY: No cough, wheezing, hemoptysis; No shortness of breath  CARDIOVASCULAR: No chest pain or palpitations  GASTROINTESTINAL: No abdominal or epigastric pain. No nausea, vomiting, or hematemesis; No diarrhea or constipation. No melena or hematochezia.  GENITOURINARY: No dysuria, frequency or hematuria  NEUROLOGICAL: No numbness or weakness  SKIN: No itching, burning, rashes, or lesions   All other review of systems is negative unless indicated above.    MEDICATIONS:  MEDICATIONS  (STANDING):  acetaminophen     Tablet .. 650 milliGRAM(s) Oral every 6 hours  cefepime   IVPB 2000 milliGRAM(s) IV Intermittent every 8 hours  chlorhexidine 2% Cloths 1 Application(s) Topical <User Schedule>  cyclobenzaprine 5 milliGRAM(s) Oral two times a day  dalfampridine ER 10 milliGRAM(s) Oral every 12 hours  DULoxetine 60 milliGRAM(s) Oral at bedtime  fentaNYL   Patch  12 MICROgram(s)/Hr 1 Patch Transdermal every 72 hours  oxyCODONE  ER Tablet 10 milliGRAM(s) Oral every 12 hours  senna 2 Tablet(s) Oral at bedtime  sertraline 100 milliGRAM(s) Oral at bedtime  Vibegron (Gemtesa) 75 milliGRAM(s),Vibegron (Gemtesa) 75mg tablet 1 Tablet(s) 1 Tablet(s) Oral daily      PHYSICAL EXAM:  T(C): 36.6 (12-22-23 @ 05:22), Max: 37.1 (12-21-23 @ 21:03)  HR: 90 (12-22-23 @ 05:22) (90 - 98)  BP: 102/65 (12-22-23 @ 05:22) (102/65 - 119/72)  RR: 18 (12-22-23 @ 05:22) (17 - 18)  SpO2: 99% (12-22-23 @ 05:22) (97% - 100%)  Wt(kg): --  I&O's Summary    21 Dec 2023 07:01  -  22 Dec 2023 07:00  --------------------------------------------------------  IN: 1540 mL / OUT: 350 mL / NET: 1190 mL          Appearance: NAD  HEENT:   Normal oral mucosa, PERRL, EOMI	  Lymphatic: No lymphadenopathy , no edema   L clavicular region w/ black sponge CAV dsg secured w/ good seal, minimal serousag drainage noted in collection chamber  Cardiovascular: Normal S1 S2, No JVD, No murmurs , Peripheral pulses palpable 2+ bilaterally  Respiratory: Lungs clear to auscultation, normal effort 	  Gastrointestinal:  Soft, Non-tender, + BS	  Skin: No rashes, No ecchymoses, No cyanosis, warm to touch  Musculoskeletal: Normal range of motion, normal strength  Psychiatry:  lethargic weak   Ext: No edema    LABS:    CARDIAC MARKERS:                                7.7    11.21 )-----------( 433      ( 22 Dec 2023 08:19 )             25.3     12-22    138  |  102  |  7   ----------------------------<  102<H>  3.9   |  25  |  0.39<L>    Ca    9.0      22 Dec 2023 08:19            TELEMETRY: 	    ECG:  	  RADIOLOGY:  < from: MR Shoulder Joint w/ IV Cont, Right (12.22.23 @ 01:17) >    ACC: [id number] EXAM:  MR SHOULDER IC RT   ORDERED BY: ANASTASIIA WALKER     PROCEDURE DATE:  12/22/2023          INTERPRETATION:  History: LEFT sternoclavicular septic arthritis. Patient   now has RIGHT shoulder pain with limited range of motion.    Technique: Multiplanar and multisequence MRI images were obtained through   the RIGHT shoulder without and with contrast. Approximately 6 cc   intravenous Gadavist was administered.    Comparison: None available    Findings:    Rotator Cuff: Mild supraspinatus tendinosis without high-grade tear.   Infraspinatus tendon is intact. Teres minor tendon is intact.   Subscapularis tendinosis is intact. No disproportionate fatty   infiltration of the rotator cuff muscles. No significant   subacromial/subdeltoid bursitis.    AC Joint/Acromion: Mild osteoarthritic changes within the   acromioclavicular joint space. Scarring with probable areas of   heterotopic ossification within the coracoclavicular ligament.    Labrum/Capsule: Labrum is suboptimally evaluated due to the protocol   used. No displaced labral tear seen.    Biceps Tendon: Intact.    Joint: Articular cartilage is intact. No drainable glenohumeral joint   effusion.    Bone marrow: No convincing evidence of osteomyelitis. No acute fracture.    Soft tissues: Partially evaluated nonspecific subcutaneous along the   right anterior chest wall.    Impression:    No drainable RIGHT glenohumeral joint effusion. No convincing evidence of   osteomyelitis.    Other findings as above.    --- End of Report ---            NGA MINAYA M.D., ATTENDING RADIOLOGIST  This document has been electronically signed. Dec 22 2023  8:20AM    < end of copied text >    DIAGNOSTIC TESTING:  [ ] Echocardiogram:  [ ]  Catheterization:  [ ] Stress Test:    OTHER: 	           Subjective: Patient seen and examined. No new events except as noted.   weak   friends at bedside     REVIEW OF SYSTEMS:    CONSTITUTIONAL: + weakness, fevers or chills  EYES/ENT: No visual changes;  No vertigo or throat pain   NECK: No pain or stiffness  RESPIRATORY: No cough, wheezing, hemoptysis; No shortness of breath  CARDIOVASCULAR: No chest pain or palpitations  GASTROINTESTINAL: No abdominal or epigastric pain. No nausea, vomiting, or hematemesis; No diarrhea or constipation. No melena or hematochezia.  GENITOURINARY: No dysuria, frequency or hematuria  NEUROLOGICAL: No numbness or weakness  SKIN: No itching, burning, rashes, or lesions   All other review of systems is negative unless indicated above.    MEDICATIONS:  MEDICATIONS  (STANDING):  acetaminophen     Tablet .. 650 milliGRAM(s) Oral every 6 hours  cefepime   IVPB 2000 milliGRAM(s) IV Intermittent every 8 hours  chlorhexidine 2% Cloths 1 Application(s) Topical <User Schedule>  cyclobenzaprine 5 milliGRAM(s) Oral two times a day  dalfampridine ER 10 milliGRAM(s) Oral every 12 hours  DULoxetine 60 milliGRAM(s) Oral at bedtime  fentaNYL   Patch  12 MICROgram(s)/Hr 1 Patch Transdermal every 72 hours  oxyCODONE  ER Tablet 10 milliGRAM(s) Oral every 12 hours  senna 2 Tablet(s) Oral at bedtime  sertraline 100 milliGRAM(s) Oral at bedtime  Vibegron (Gemtesa) 75 milliGRAM(s),Vibegron (Gemtesa) 75mg tablet 1 Tablet(s) 1 Tablet(s) Oral daily      PHYSICAL EXAM:  T(C): 36.6 (12-22-23 @ 05:22), Max: 37.1 (12-21-23 @ 21:03)  HR: 90 (12-22-23 @ 05:22) (90 - 98)  BP: 102/65 (12-22-23 @ 05:22) (102/65 - 119/72)  RR: 18 (12-22-23 @ 05:22) (17 - 18)  SpO2: 99% (12-22-23 @ 05:22) (97% - 100%)  Wt(kg): --  I&O's Summary    21 Dec 2023 07:01  -  22 Dec 2023 07:00  --------------------------------------------------------  IN: 1540 mL / OUT: 350 mL / NET: 1190 mL          Appearance: NAD  HEENT:   Normal oral mucosa, PERRL, EOMI	  Lymphatic: No lymphadenopathy , no edema   L clavicular region w/ black sponge CAV dsg secured w/ good seal, minimal serousag drainage noted in collection chamber  Cardiovascular: Normal S1 S2, No JVD, No murmurs , Peripheral pulses palpable 2+ bilaterally  Respiratory: Lungs clear to auscultation, normal effort 	  Gastrointestinal:  Soft, Non-tender, + BS	  Skin: No rashes, No ecchymoses, No cyanosis, warm to touch  Musculoskeletal: Normal range of motion, normal strength  Psychiatry:  lethargic weak   Ext: No edema    LABS:    CARDIAC MARKERS:                                7.7    11.21 )-----------( 433      ( 22 Dec 2023 08:19 )             25.3     12-22    138  |  102  |  7   ----------------------------<  102<H>  3.9   |  25  |  0.39<L>    Ca    9.0      22 Dec 2023 08:19            TELEMETRY: 	    ECG:  	  RADIOLOGY:  < from: MR Shoulder Joint w/ IV Cont, Right (12.22.23 @ 01:17) >    ACC: [id number] EXAM:  MR SHOULDER IC RT   ORDERED BY: ANASTASIIA WALKER     PROCEDURE DATE:  12/22/2023          INTERPRETATION:  History: LEFT sternoclavicular septic arthritis. Patient   now has RIGHT shoulder pain with limited range of motion.    Technique: Multiplanar and multisequence MRI images were obtained through   the RIGHT shoulder without and with contrast. Approximately 6 cc   intravenous Gadavist was administered.    Comparison: None available    Findings:    Rotator Cuff: Mild supraspinatus tendinosis without high-grade tear.   Infraspinatus tendon is intact. Teres minor tendon is intact.   Subscapularis tendinosis is intact. No disproportionate fatty   infiltration of the rotator cuff muscles. No significant   subacromial/subdeltoid bursitis.    AC Joint/Acromion: Mild osteoarthritic changes within the   acromioclavicular joint space. Scarring with probable areas of   heterotopic ossification within the coracoclavicular ligament.    Labrum/Capsule: Labrum is suboptimally evaluated due to the protocol   used. No displaced labral tear seen.    Biceps Tendon: Intact.    Joint: Articular cartilage is intact. No drainable glenohumeral joint   effusion.    Bone marrow: No convincing evidence of osteomyelitis. No acute fracture.    Soft tissues: Partially evaluated nonspecific subcutaneous along the   right anterior chest wall.    Impression:    No drainable RIGHT glenohumeral joint effusion. No convincing evidence of   osteomyelitis.    Other findings as above.    --- End of Report ---            NGA MINAYA M.D., ATTENDING RADIOLOGIST  This document has been electronically signed. Dec 22 2023  8:20AM    < end of copied text >    DIAGNOSTIC TESTING:  [ ] Echocardiogram:  [ ]  Catheterization:  [ ] Stress Test:    OTHER:

## 2023-12-22 NOTE — PROGRESS NOTE ADULT - ASSESSMENT
43F PMHx of MS on immunosuppressive medication, presenting with left chest wall and shoulder pain. Recent history of fall secondary to unsteadiness due to MS. Obtained outpatient MRI of left sternoclavicular joint with results significant for marrow edema and swelling of the left sternoclavicular joint with a 2 cm fluid collection and adjacent pleural edema in the left chest with concerns for infectious etiology.  Patient initially saw orthopedics with for concerns for septic arthritis of this joint.  Impression of the read showed soft tissue abscess superficial to the joint.  Recommended CT scan with IV contrast of the chest.  Patient denies any fevers however endorsing erythema to the left sternoclavicular joint region. Patient states she was recently hospitalized over a week ago at [facility] for falls secondary to unsteadiness due to her MS.  Denies any recent falls today, vision changes. Endorsing headache. Denies any chest pain, shortness of breath, abdominal pain, nausea vomiting diarrhea, urinary complaints.  CT chest with IV in ED significant for Septic arthritis of the left sternoclavicular joint and involving the articulation of the sternum with the first costal cartilage.Extensive surrounding inflammation, with pneumonia in the underlying subpleural left upper lobe.No fluid collection is evident.  Workup significant for elevated alk phos (248), . VSS and afebrile  s/p Debridement of left sternoclavicular joint with excision of left clavicular head. Purulent drainage at the sternoclavicular joint. Tissue surrounding left clavicle found to be very inflamed. Copious irrigation of surgical site.   Concerned about pain at surgical site.    at bedside     43F PMHx of MS on immunosuppressive medication, presenting with left chest wall and shoulder pain. Recent history of fall secondary to unsteadiness due to MS. Obtained outpatient MRI of left sternoclavicular joint with results significant for marrow edema and swelling of the left sternoclavicular joint with a 2 cm fluid collection and adjacent pleural edema in the left chest with concerns for infectious etiology.  Patient initially saw orthopedics with for concerns for septic arthritis of this joint.  Impression of the read showed soft tissue abscess superficial to the joint.  Recommended CT scan with IV contrast of the chest.  Patient denies any fevers however endorsing erythema to the left sternoclavicular joint region. Patient states she was recently hospitalized over a week ago at [facility] for falls secondary to unsteadiness due to her MS.  Denies any recent falls today, vision changes. Endorsing headache. Denies any chest pain, shortness of breath, abdominal pain, nausea vomiting diarrhea, urinary complaints.  CT chest with IV in ED significant for Septic arthritis of the left sternoclavicular joint and involving the articulation of the sternum with the first costal cartilage.Extensive surrounding inflammation, with pneumonia in the underlying subpleural left upper lobe.No fluid collection is evident.  Workup significant for elevated alk phos (248), . VSS and afebrile  s/p Debridement of left sternoclavicular joint with excision of left clavicular head. Purulent drainage at the sternoclavicular joint. Tissue surrounding left clavicle found to be very inflamed. Copious irrigation of surgical site.   Concerned about pain at surgical site.    at bedside

## 2023-12-23 LAB
CRP SERPL-MCNC: 181 MG/L — HIGH (ref 0–4)
CRP SERPL-MCNC: 181 MG/L — HIGH (ref 0–4)
ERYTHROCYTE [SEDIMENTATION RATE] IN BLOOD: 53 MM/HR — HIGH (ref 0–15)
ERYTHROCYTE [SEDIMENTATION RATE] IN BLOOD: 53 MM/HR — HIGH (ref 0–15)

## 2023-12-23 RX ORDER — HYDROMORPHONE HYDROCHLORIDE 2 MG/ML
1 INJECTION INTRAMUSCULAR; INTRAVENOUS; SUBCUTANEOUS
Refills: 0 | Status: DISCONTINUED | OUTPATIENT
Start: 2023-12-23 | End: 2023-12-23

## 2023-12-23 RX ORDER — HYDROMORPHONE HYDROCHLORIDE 2 MG/ML
1 INJECTION INTRAMUSCULAR; INTRAVENOUS; SUBCUTANEOUS
Refills: 0 | Status: DISCONTINUED | OUTPATIENT
Start: 2023-12-23 | End: 2023-12-24

## 2023-12-23 RX ADMIN — HYDROMORPHONE HYDROCHLORIDE 4 MILLIGRAM(S): 2 INJECTION INTRAMUSCULAR; INTRAVENOUS; SUBCUTANEOUS at 10:05

## 2023-12-23 RX ADMIN — CEFEPIME 100 MILLIGRAM(S): 1 INJECTION, POWDER, FOR SOLUTION INTRAMUSCULAR; INTRAVENOUS at 06:05

## 2023-12-23 RX ADMIN — CYCLOBENZAPRINE HYDROCHLORIDE 5 MILLIGRAM(S): 10 TABLET, FILM COATED ORAL at 05:01

## 2023-12-23 RX ADMIN — HYDROMORPHONE HYDROCHLORIDE 1 MILLIGRAM(S): 2 INJECTION INTRAMUSCULAR; INTRAVENOUS; SUBCUTANEOUS at 23:51

## 2023-12-23 RX ADMIN — SENNA PLUS 2 TABLET(S): 8.6 TABLET ORAL at 22:18

## 2023-12-23 RX ADMIN — HYDROMORPHONE HYDROCHLORIDE 1 MILLIGRAM(S): 2 INJECTION INTRAMUSCULAR; INTRAVENOUS; SUBCUTANEOUS at 10:37

## 2023-12-23 RX ADMIN — Medication 650 MILLIGRAM(S): at 06:35

## 2023-12-23 RX ADMIN — FENTANYL CITRATE 1 PATCH: 50 INJECTION INTRAVENOUS at 07:50

## 2023-12-23 RX ADMIN — Medication 0.5 MILLIGRAM(S): at 20:36

## 2023-12-23 RX ADMIN — DALFAMPRIDINE 10 MILLIGRAM(S): 10 TABLET, FILM COATED, EXTENDED RELEASE ORAL at 10:13

## 2023-12-23 RX ADMIN — FENTANYL CITRATE 1 PATCH: 50 INJECTION INTRAVENOUS at 18:23

## 2023-12-23 RX ADMIN — Medication 650 MILLIGRAM(S): at 12:29

## 2023-12-23 RX ADMIN — HYDROMORPHONE HYDROCHLORIDE 1 MILLIGRAM(S): 2 INJECTION INTRAMUSCULAR; INTRAVENOUS; SUBCUTANEOUS at 23:23

## 2023-12-23 RX ADMIN — Medication 0.5 MILLIGRAM(S): at 16:41

## 2023-12-23 RX ADMIN — HYDROMORPHONE HYDROCHLORIDE 1 MILLIGRAM(S): 2 INJECTION INTRAMUSCULAR; INTRAVENOUS; SUBCUTANEOUS at 10:07

## 2023-12-23 RX ADMIN — HYDROMORPHONE HYDROCHLORIDE 4 MILLIGRAM(S): 2 INJECTION INTRAMUSCULAR; INTRAVENOUS; SUBCUTANEOUS at 15:09

## 2023-12-23 RX ADMIN — CEFEPIME 100 MILLIGRAM(S): 1 INJECTION, POWDER, FOR SOLUTION INTRAMUSCULAR; INTRAVENOUS at 22:11

## 2023-12-23 RX ADMIN — HYDROMORPHONE HYDROCHLORIDE 4 MILLIGRAM(S): 2 INJECTION INTRAMUSCULAR; INTRAVENOUS; SUBCUTANEOUS at 15:39

## 2023-12-23 RX ADMIN — HYDROMORPHONE HYDROCHLORIDE 4 MILLIGRAM(S): 2 INJECTION INTRAMUSCULAR; INTRAVENOUS; SUBCUTANEOUS at 09:08

## 2023-12-23 RX ADMIN — FENTANYL CITRATE 1 PATCH: 50 INJECTION INTRAVENOUS at 18:10

## 2023-12-23 RX ADMIN — Medication 650 MILLIGRAM(S): at 23:21

## 2023-12-23 RX ADMIN — HYDROMORPHONE HYDROCHLORIDE 1 MILLIGRAM(S): 2 INJECTION INTRAMUSCULAR; INTRAVENOUS; SUBCUTANEOUS at 19:06

## 2023-12-23 RX ADMIN — HYDROMORPHONE HYDROCHLORIDE 1 MILLIGRAM(S): 2 INJECTION INTRAMUSCULAR; INTRAVENOUS; SUBCUTANEOUS at 16:43

## 2023-12-23 RX ADMIN — CHLORHEXIDINE GLUCONATE 1 APPLICATION(S): 213 SOLUTION TOPICAL at 09:13

## 2023-12-23 RX ADMIN — SERTRALINE 100 MILLIGRAM(S): 25 TABLET, FILM COATED ORAL at 22:18

## 2023-12-23 RX ADMIN — HYDROMORPHONE HYDROCHLORIDE 1 MILLIGRAM(S): 2 INJECTION INTRAMUSCULAR; INTRAVENOUS; SUBCUTANEOUS at 13:18

## 2023-12-23 RX ADMIN — HYDROMORPHONE HYDROCHLORIDE 1 MILLIGRAM(S): 2 INJECTION INTRAMUSCULAR; INTRAVENOUS; SUBCUTANEOUS at 13:48

## 2023-12-23 RX ADMIN — Medication 650 MILLIGRAM(S): at 18:33

## 2023-12-23 RX ADMIN — Medication 650 MILLIGRAM(S): at 06:05

## 2023-12-23 RX ADMIN — OXYCODONE HYDROCHLORIDE 10 MILLIGRAM(S): 5 TABLET ORAL at 06:05

## 2023-12-23 RX ADMIN — DULOXETINE HYDROCHLORIDE 60 MILLIGRAM(S): 30 CAPSULE, DELAYED RELEASE ORAL at 22:17

## 2023-12-23 RX ADMIN — HYDROMORPHONE HYDROCHLORIDE 4 MILLIGRAM(S): 2 INJECTION INTRAMUSCULAR; INTRAVENOUS; SUBCUTANEOUS at 03:36

## 2023-12-23 RX ADMIN — HYDROMORPHONE HYDROCHLORIDE 1 MILLIGRAM(S): 2 INJECTION INTRAMUSCULAR; INTRAVENOUS; SUBCUTANEOUS at 19:45

## 2023-12-23 RX ADMIN — HYDROMORPHONE HYDROCHLORIDE 1 MILLIGRAM(S): 2 INJECTION INTRAMUSCULAR; INTRAVENOUS; SUBCUTANEOUS at 04:23

## 2023-12-23 RX ADMIN — OXYCODONE HYDROCHLORIDE 10 MILLIGRAM(S): 5 TABLET ORAL at 17:33

## 2023-12-23 RX ADMIN — OXYCODONE HYDROCHLORIDE 10 MILLIGRAM(S): 5 TABLET ORAL at 06:35

## 2023-12-23 RX ADMIN — HYDROMORPHONE HYDROCHLORIDE 1 MILLIGRAM(S): 2 INJECTION INTRAMUSCULAR; INTRAVENOUS; SUBCUTANEOUS at 16:12

## 2023-12-23 RX ADMIN — Medication 650 MILLIGRAM(S): at 13:29

## 2023-12-23 RX ADMIN — DALFAMPRIDINE 10 MILLIGRAM(S): 10 TABLET, FILM COATED, EXTENDED RELEASE ORAL at 22:27

## 2023-12-23 RX ADMIN — CEFEPIME 100 MILLIGRAM(S): 1 INJECTION, POWDER, FOR SOLUTION INTRAMUSCULAR; INTRAVENOUS at 13:50

## 2023-12-23 RX ADMIN — HYDROMORPHONE HYDROCHLORIDE 4 MILLIGRAM(S): 2 INJECTION INTRAMUSCULAR; INTRAVENOUS; SUBCUTANEOUS at 22:47

## 2023-12-23 RX ADMIN — CYCLOBENZAPRINE HYDROCHLORIDE 5 MILLIGRAM(S): 10 TABLET, FILM COATED ORAL at 17:33

## 2023-12-23 RX ADMIN — HYDROMORPHONE HYDROCHLORIDE 1 MILLIGRAM(S): 2 INJECTION INTRAMUSCULAR; INTRAVENOUS; SUBCUTANEOUS at 04:53

## 2023-12-23 RX ADMIN — Medication 650 MILLIGRAM(S): at 23:51

## 2023-12-23 RX ADMIN — HYDROMORPHONE HYDROCHLORIDE 4 MILLIGRAM(S): 2 INJECTION INTRAMUSCULAR; INTRAVENOUS; SUBCUTANEOUS at 22:17

## 2023-12-23 RX ADMIN — OXYCODONE HYDROCHLORIDE 10 MILLIGRAM(S): 5 TABLET ORAL at 18:06

## 2023-12-23 RX ADMIN — FENTANYL CITRATE 1 PATCH: 50 INJECTION INTRAVENOUS at 19:00

## 2023-12-23 RX ADMIN — Medication 650 MILLIGRAM(S): at 17:33

## 2023-12-23 RX ADMIN — HYDROMORPHONE HYDROCHLORIDE 4 MILLIGRAM(S): 2 INJECTION INTRAMUSCULAR; INTRAVENOUS; SUBCUTANEOUS at 03:06

## 2023-12-23 NOTE — PROGRESS NOTE ADULT - ASSESSMENT
Abbey Villalpando    1982  [id number]    I have reviewed the e-Visit questionnaire and patient's answers, my assessment and plan are as follows:      HPI  Abbey Villalpando is a 39 y.o. with a >2 week history of cloudy dark yellow urine with white vaginal discharge, back pain.  She denies fever/chills, nausea/vomiting, frequency/urgency, hematuria.  She has had similar symptoms in the past and was treated for yeast infection and UTI.    Review of Systems - Negative except symptoms listed in HPI      Diagnoses and all orders for this visit:    1. Suspected UTI (Primary)  -     nitrofurantoin, macrocrystal-monohydrate, (MACROBID) 100 MG capsule; Take 1 capsule by mouth 2 (Two) Times a Day for 7 days.  Dispense: 14 capsule; Refill: 0    2. Vaginal yeast infection  -     fluconazole (DIFLUCAN) 150 MG tablet; Take 1 tablet now, repeat in 72 hours if symptoms continue  Dispense: 2 tablet; Refill: 0    ---Macrobid as prescribed - complete entire course of medication even if you begin to feel better.   --Fluconazole is for yeast infection; take as prescribed    -Continue to increase your fluid intake.   -Abstain from intercourse during antibiotic treatment.   -Practice good perineal hygiene: wipe front to back  -Do not hold your urine- go to the bathroom every 2-3 hours.     -Warning signs: severe abdominal/pelvic/back pain, fever >101, blood in urine - seek medical attention as soon as possible for a hands on/objective exam and possible labs.     -Follow up with your PCP in 2 days if no improvement in symptoms or if symptoms begin to worsen.       Any medications prescribed have been sent electronically to   [facility] #[id number] - [place] - [address] AT [facility] OF [address] - [phone] [place] - [phone] FX  [address] [place] [address]  Phone: [phone] Fax: [phone]    [facility] #[id number] - [place] - [address] AT [facility] [address] - [phone] [facility] [phone] FX  [address] [place] [address]  Phone: [phone] Fax: [phone]      Time Documentation  Counseled patient  Counseling topics: diagnosis, treatment options and return instructions  Total encounter time: counseling time more than 50% of visit: 20 minutes        WALDO Mike  08/27/21  13:56 EDT          43F PMHx of MS on immunosuppressive medication, presenting with left chest wall and shoulder pain. Recent history of fall secondary to unsteadiness due to MS. Obtained outpatient MRI of left sternoclavicular joint with results significant for marrow edema and swelling of the left sternoclavicular joint with a 2 cm fluid collection and adjacent pleural edema in the left chest with concerns for infectious etiology.  Patient initially saw orthopedics with for concerns for septic arthritis of this joint.  Impression of the read showed soft tissue abscess superficial to the joint.  Recommended CT scan with IV contrast of the chest.  Patient denies any fevers however endorsing erythema to the left sternoclavicular joint region. Patient states she was recently hospitalized over a week ago at [facility] for falls secondary to unsteadiness due to her MS.  Denies any recent falls today, vision changes. Endorsing headache. Denies any chest pain, shortness of breath, abdominal pain, nausea vomiting diarrhea, urinary complaints.  CT chest with IV in ED significant for Septic arthritis of the left sternoclavicular joint and involving the articulation of the sternum with the first costal cartilage.Extensive surrounding inflammation, with pneumonia in the underlying subpleural left upper lobe.No fluid collection is evident.  Workup significant for elevated alk phos (248), . VSS and afebrile  s/p Debridement of left sternoclavicular joint with excision of left clavicular head. Purulent drainage at the sternoclavicular joint. Tissue surrounding left clavicle found to be very inflamed. Copious irrigation of surgical site.   Concerned about pain at surgical site.    at bedside     43F PMHx of MS on immunosuppressive medication, presenting with left chest wall and shoulder pain. Recent history of fall secondary to unsteadiness due to MS. Obtained outpatient MRI of left sternoclavicular joint with results significant for marrow edema and swelling of the left sternoclavicular joint with a 2 cm fluid collection and adjacent pleural edema in the left chest with concerns for infectious etiology.  Patient initially saw orthopedics with for concerns for septic arthritis of this joint.  Impression of the read showed soft tissue abscess superficial to the joint.  Recommended CT scan with IV contrast of the chest.  Patient denies any fevers however endorsing erythema to the left sternoclavicular joint region. Patient states she was recently hospitalized over a week ago at [facility] for falls secondary to unsteadiness due to her MS.  Denies any recent falls today, vision changes. Endorsing headache. Denies any chest pain, shortness of breath, abdominal pain, nausea vomiting diarrhea, urinary complaints.  CT chest with IV in ED significant for Septic arthritis of the left sternoclavicular joint and involving the articulation of the sternum with the first costal cartilage.Extensive surrounding inflammation, with pneumonia in the underlying subpleural left upper lobe.No fluid collection is evident.  Workup significant for elevated alk phos (248), . VSS and afebrile  s/p Debridement of left sternoclavicular joint with excision of left clavicular head. Purulent drainage at the sternoclavicular joint. Tissue surrounding left clavicle found to be very inflamed. Copious irrigation of surgical site.   Concerned about pain at surgical site.    at bedside

## 2023-12-23 NOTE — PROGRESS NOTE ADULT - ASSESSMENT
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  She had a recent fall in November 2023 secondary to unsteadiness due to multiple sclerosis, with subsequent left chest wall and shoulder pain.  Was seen at [facility] with concern for MS exacerbation but no MRI brain and cervical spine with an without contrast were done at the time without enhancement.  At the time infectious workup was done and notable for UTI which was treated.  She continued to have pain.  In the interim, she had an outpatient MRI of left sternoclavicular joint with results significant for marrow edema and swelling of the left sternoclavicular joint with a 2 cm fluid collection and adjacent pleural edema in the left chest with concerns for infectious etiology.   CT chest w/ IV contrast suggestive of septic arthritis with surrounding erythema. Thoracic surgery consulted.  Patient s/p surgical debridement on 12/7/23    mri with 80 x 25 mm rim-enhancing fluid collection persists in the surgical cavity with well-placed drain within the fluid collection.   Marrow signal abnormality in the manubrium concerning for acute   osteomyelitis.    Low-grade partial-thickness bursal surface tear of the supraspinatus tendon.    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  Now likely pseudo-flare in setting of septic arthritis    Recommendations:  Management of septic arthritis per primary team  s/p surgical debridement 12/7/23  hold ocrelizumab for now given septic arthritis  continue home meds once able to take PO post-surgically  abx as per ID  defer to primary team for pain control   difficulty standing suspect due to weakness.  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  She had a recent fall in November 2023 secondary to unsteadiness due to multiple sclerosis, with subsequent left chest wall and shoulder pain.  Was seen at [facility] with concern for MS exacerbation but no MRI brain and cervical spine with an without contrast were done at the time without enhancement.  At the time infectious workup was done and notable for UTI which was treated.  She continued to have pain.  In the interim, she had an outpatient MRI of left sternoclavicular joint with results significant for marrow edema and swelling of the left sternoclavicular joint with a 2 cm fluid collection and adjacent pleural edema in the left chest with concerns for infectious etiology.   CT chest w/ IV contrast suggestive of septic arthritis with surrounding erythema. Thoracic surgery consulted.  Patient s/p surgical debridement on 12/7/23    mri with 80 x 25 mm rim-enhancing fluid collection persists in the surgical cavity with well-placed drain within the fluid collection.   Marrow signal abnormality in the manubrium concerning for acute   osteomyelitis.    Low-grade partial-thickness bursal surface tear of the supraspinatus tendon.    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  Now likely pseudo-flare in setting of septic arthritis    Recommendations:  Management of septic arthritis per primary team  s/p surgical debridement 12/7/23  hold ocrelizumab for now given septic arthritis  continue home meds once able to take PO post-surgically  abx as per ID  defer to primary team for pain control   difficulty standing suspect due to weakness.  Needs aggressive physical therapy as tolerated   will benefit from rehab

## 2023-12-23 NOTE — PROGRESS NOTE ADULT - ASSESSMENT
43F PMHx of MS on Rituxan, presenting with left chest wall and shoulder pain w/ concern for infection now admitted with likely septic arthritis with need for possible washout and debridement       Problem/Plan - 1:  ·  Problem: Septic arthritis. left shoulder joint   ·  Plan: Imaging consistent with septic arthritis  s/p OR   cont abx and fu cultures   check ECHO : no vegetation   fu with ID   d/w with CTS at bedside : drainage adjusted and fx improved   cont current abx   pt with LUE pain : tessie referred pain from L [id number] and chest however  Cervical MRI : no disciits / abcess   discussed with pt , family at length at bedside   washout and debridement of sternoclavicular joint; resection of medial manubrium; white and black wound vac placed  now s/p vac change  PICC oredered    d/w pt and family at length       # c/o left arm swelling and pain in elbow region :  -doppler ordered to r/o DVT : Negative      Problem/Plan - 2:  ·  Problem: Multiple sclerosis.   ·  Plan: Gets Rituxan every 6 months, next dose due : holding   -On Ampyra BID at home, need to bring home med     Problem/Plan - 3:  ·  Problem: Anxiety.   ·  Plan: ISTOP reviewed Reference #: [id number]  -Cont. Clonazepam 0.5mg QHS PRN  -Cont. Sertraline 100mg QHS  -Cont. Duloxetine 60mg QHS, pt unsure of dose  -Need full med rec.     Problem/Plan - 4:  ·  Problem: Anemia.   ·  Plan: hgb 9, lower than prior year. Pt endorses some recent anemia but cannot specify  -Trend cbc  -Monitor for bleeding.     Problem/Plan - 5:  ·  Problem: Prophylactic measure.   ·  Plan: DVT PPx    hyperkalemia : normalized    43F PMHx of MS on Rituxan, presenting with left chest wall and shoulder pain w/ concern for infection now admitted with likely septic arthritis with need for possible washout and debridement       Problem/Plan - 1:  ·  Problem: Septic arthritis. left shoulder joint   ·  Plan: Imaging consistent with septic arthritis  s/p OR   cont abx and fu cultures   check ECHO : no vegetation   fu with ID   d/w with CTS at bedside : drainage adjusted and fx improved   cont current abx   pt with LUE pain : tessie referred pain from L [id number] and chest however  Cervical MRI : no disciits / abcess   discussed with pt , family at length at bedside   washout and debridement of sternoclavicular joint; resection of medial manubrium; white and black wound vac placed  now s/p vac change  PICC oredered    d/w pt and family at length       # c/o left arm swelling and pain in elbow region :  -doppler ordered to r/o DVT : Negative      Problem/Plan - 2:  ·  Problem: Multiple sclerosis.   ·  Plan: Gets Rituxan every 6 months, next dose due : holding   -On Ampyra BID at home, need to bring home med     Problem/Plan - 3:  ·  Problem: Anxiety.   ·  Plan: ISTOP reviewed Reference #: [id number]  -Cont. Clonazepam 0.5mg QHS PRN  -Cont. Sertraline 100mg QHS  -Cont. Duloxetine 60mg QHS, pt unsure of dose  -Need full med rec.     Problem/Plan - 4:  ·  Problem: Anemia.   ·  Plan: hgb 9, lower than prior year. Pt endorses some recent anemia but cannot specify  -Trend cbc  -Monitor for bleeding.     Problem/Plan - 5:  ·  Problem: Prophylactic measure.   ·  Plan: DVT PPx    hyperkalemia : normalized

## 2023-12-23 NOTE — PROGRESS NOTE ADULT - SUBJECTIVE AND OBJECTIVE BOX
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  She had a recent fall in 2023 secondary to unsteadiness due to multiple sclerosis, with subsequent left chest wall and shoulder pain.  Was seen at [facility] with concern for MS exacerbation but no MRI brain and cervical spine with an without contrast were done at the time without enhancement.  At the time infectious workup was done and notable for UTI which was treated.  She continued to have pain.  In the interim, she had an outpatient MRI of left sternoclavicular joint with results significant for marrow edema and swelling of the left sternoclavicular joint with a 2 cm fluid collection and adjacent pleural edema in the left chest with concerns for infectious etiology.   CT chest w/ IV contrast suggestive of septic arthritis with surrounding erythema. Thoracic surgery consulted.  Patient s/p surgical debridement on 23    Int hx:  mri with 80 x 25 mm rim-enhancing fluid collection persists in the surgical cavity with well-placed drain within the fluid collection.   Marrow signal abnormality in the manubrium concerning for acute   osteomyelitis.    Low-grade partial-thickness bursal surface tear of the supraspinatus tendon.    sitting in chair.  c/o pain        ******    Pt still with sternal pain, says 10/10, pain also in left arm    Past Medical History:  Multiple sclerosis [G35]        Past Surgical History:  History of  [Z98.891]        Social History:  No toxic habits    Family History:  FAMILY HISTORY:      Allergies:  No Known Allergies      ROS:  Constitutional: Patient offers no complaints of fevers or significant weight loss  Ears, Nose, Mouth and Throat: The patient presents with no abnormalities of the head, ears, eyes, nose or throat  Skin: Patient offers no concerns of new rashes or lesions  Respiratory: The patient presents with no abnormalities of the respiratory tract  Cardiovascular: The patient presents with no cardiac abnormalities  Gastrointestinal: The patient presents with no abnormalities of the GI system  Genitourinary: The patient presents with no dysuria, hematuria or frequent urination  Neurological: See HPI  Endocrine: Patient offers no complaints of excessive thirst, urination, or heat/cold intolerance    Advanced care planning reviewed and noted in the chart.    Medications:  acetaminophen     Tablet .. 650 milliGRAM(s) Oral every 6 hours  benzocaine/menthol Lozenge 1 Lozenge Oral three times a day PRN  cefepime   IVPB 2000 milliGRAM(s) IV Intermittent every 8 hours  chlorhexidine 2% Cloths 1 Application(s) Topical <User Schedule>  clonazePAM  Tablet 0.5 milliGRAM(s) Oral three times a day PRN  cyclobenzaprine 5 milliGRAM(s) Oral two times a day  dalfampridine ER 10 milliGRAM(s) Oral every 12 hours  DULoxetine 60 milliGRAM(s) Oral at bedtime  fentaNYL   Patch  12 MICROgram(s)/Hr 1 Patch Transdermal every 72 hours  HYDROmorphone   Tablet 2 milliGRAM(s) Oral every 4 hours PRN  HYDROmorphone   Tablet 4 milliGRAM(s) Oral every 6 hours PRN  HYDROmorphone  Injectable 1 milliGRAM(s) IV Push every 3 hours PRN  oxyCODONE  ER Tablet 10 milliGRAM(s) Oral every 12 hours  senna 2 Tablet(s) Oral at bedtime  sertraline 100 milliGRAM(s) Oral at bedtime  Vibegron (Gemtesa) 75 milliGRAM(s),Vibegron (Gemtesa) 75mg tablet 1 Tablet(s) 1 Tablet(s) Oral daily      Labs:  CBC Full  -  ( 22 Dec 2023 08:19 )  WBC Count : 11.21 K/uL  RBC Count : 3.08 M/uL  Hemoglobin : 7.7 g/dL  Hematocrit : 25.3 %  Platelet Count - Automated : 433 K/uL  Mean Cell Volume : 82.1 fl  Mean Cell Hemoglobin : 25.0 pg  Mean Cell Hemoglobin Concentration : 30.4 gm/dL  Auto Neutrophil # : x  Auto Lymphocyte # : x  Auto Monocyte # : x  Auto Eosinophil # : x  Auto Basophil # : x  Auto Neutrophil % : x  Auto Lymphocyte % : x  Auto Monocyte % : x  Auto Eosinophil % : x  Auto Basophil % : x    12-    138  |  102  |  7   ----------------------------<  102<H>  3.9   |  25  |  0.39<L>    Ca    9.0      22 Dec 2023 08:19      CAPILLARY BLOOD GLUCOSE            Urinalysis Basic - ( 22 Dec 2023 08:19 )    Color: x / Appearance: x / SG: x / pH: x  Gluc: 102 mg/dL / Ketone: x  / Bili: x / Urobili: x   Blood: x / Protein: x / Nitrite: x   Leuk Esterase: x / RBC: x / WBC x   Sq Epi: x / Non Sq Epi: x / Bacteria: x          Vitals:  Vital Signs Last 24 Hrs  T(C): 37.1 (23 Dec 2023 06:04), Max: 37.1 (23 Dec 2023 06:04)  T(F): 98.7 (23 Dec 2023 06:04), Max: 98.7 (23 Dec 2023 06:04)  HR: 98 (23 Dec 2023 06:04) (88 - 100)  BP: 106/70 (23 Dec 2023 06:04) (103/69 - 106/70)  BP(mean): --  RR: 17 (23 Dec 2023 06:04) (17 - 17)  SpO2: 94% (23 Dec 2023 06:04) (94% - 100%)    Parameters below as of 23 Dec 2023 06:04  Patient On (Oxygen Delivery Method): room air          NEUROLOGICAL EXAM:    Mental status: Awake, alert, and in less apparent distress. Oriented to person, place and time. Language function is normal.      Cranial Nerves: Pupils were equal, round, reactive to light. Extraocular movements were intact. Visual field were full. Fundoscopic exam was deferred. Facial sensation was intact to light touch. There was slight left facia droop. The palate was upgoing symmetrically and tongue was midline.     Motor exam: Bulk and tone were normal. able to move all ext fully without much pain    Reflexes: deferred     Sensation: Intact to light touch/temp    Coordination: no gross dysmetria    Gait: defer   sternal wound seen          ACC: [id number] EXAM:  MR SHOULDER WAW IC LT   ORDERED BY:  BRAN SANCHEZ     ACC: [id number] EXAM:  MR STERNOCLAVICULAR JNT LT   ORDERED BY: ANASTASIIA WALKER     PROCEDURE DATE:  2023          INTERPRETATION:  MRI OF THE LEFT STERNOCLAVICULAR JOINT AND SHOULDER    CLINICAL INFORMATION: Left sternal abscess status post washout with   persistent purulent drainage and left shoulder pain.  TECHNIQUE: Multisequence, multiplanar MRI of the left sternoclavicular   joint. The study was performed before and after the intravenous   administration of 6 ml Gadavist (1.5 cc discarded) .    COMPARISON: Chest CT 2023 and 2023.    FINDINGS:    STERNOCLAVICULAR JOINT:    BONE: Patient status post surgical resection of the left clavicular head.   The surgical margin is sharp with trace edema and no loss of T1   hyperintense signal. In the manubrium there is increased STIR signal with   loss of T1 hyperintense signal and postcontrast enhancement involving the   superolateral leftward aspect of the bone concerning for acute   osteomyelitis (6:9 5:9). No acute fracture. No osteonecrosis.    JOINTS: A residual rim-enhancing fluid collection is seen in the region   of the left sternoclavicular joint measuring 80 x 25 mm. A drainage   catheter is seen within the fluid collection.    SOFT TISSUE: Postsurgical changes are seen along the anterior aspect of   the left sternoclavicular joint. There is a mild amount of edema in the   adjacent pectoralis major muscle. No focal fluid collection in the   anterior mediastinum. Susceptibility artifact from surgical staples are   seen along the anterior chest wall.      SHOULDER JOINT:    ROTATOR CUFF: Low-grade partial-thickness bursal surface tearing of the   supraspinatus tendon. Rotator cuff is otherwise intact.  MUSCLES: No focal muscle edema or atrophy.  BICEPS TENDON: Normal in course and caliber.  GLENOID LABRUM AND GLENOHUMERAL LIGAMENTS: No displaced labral tear.   Inferior glenohumeral ligament is intact.  GLENOHUMERAL CARTILAGE AND SUBCHONDRAL BONE: No full-thickness chondral   loss.  AC JOINT: No AC joint arthropathy.  SYNOVIUM/JOINT FLUID: No glenohumeral joint effusion. No focal fluid in   the subacromial/subdeltoid bursa.  BONE MARROW: No fracture or osteonecrosis. No marrow signal change to   suggest acute osteomyelitis.  NEUROVASCULAR STRUCTURES: Structures of the suprascapular notch,   spinoglenoid notch, and quadrilateral space are normal in course and   caliber.  SUBCUTANEOUS SOFT TISSUES: Edema in the subcutaneous fat of the left   shoulder. No rim-enhancing fluid collection to suggest abscess.        IMPRESSION:  1.  Patient status post surgical resection of the left clavicular head   with washout of the left sternoclavicular joint.  2.  A 80 x 25 mm rim-enhancing fluid collection persists in the surgical   cavity with well-placed drain within the fluid collection.  3.  Marrow signal abnormality in the manubrium concerning for acute   osteomyelitis.  4.  Low-grade partial-thickness bursal surface tear of the supraspinatus   tendon.    --- End of Report ---            RASHIDA HYDE MD; Attending Radiologist  This document has been electronically signed. 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  She had a recent fall in 2023 secondary to unsteadiness due to multiple sclerosis, with subsequent left chest wall and shoulder pain.  Was seen at [facility] with concern for MS exacerbation but no MRI brain and cervical spine with an without contrast were done at the time without enhancement.  At the time infectious workup was done and notable for UTI which was treated.  She continued to have pain.  In the interim, she had an outpatient MRI of left sternoclavicular joint with results significant for marrow edema and swelling of the left sternoclavicular joint with a 2 cm fluid collection and adjacent pleural edema in the left chest with concerns for infectious etiology.   CT chest w/ IV contrast suggestive of septic arthritis with surrounding erythema. Thoracic surgery consulted.  Patient s/p surgical debridement on 23    Int hx:  mri with 80 x 25 mm rim-enhancing fluid collection persists in the surgical cavity with well-placed drain within the fluid collection.   Marrow signal abnormality in the manubrium concerning for acute   osteomyelitis.    Low-grade partial-thickness bursal surface tear of the supraspinatus tendon.    sitting in chair.  c/o pain        ******    Pt still with sternal pain, says 10/10, pain also in left arm    Past Medical History:  Multiple sclerosis [G35]        Past Surgical History:  History of  [Z98.891]        Social History:  No toxic habits    Family History:  FAMILY HISTORY:      Allergies:  No Known Allergies      ROS:  Constitutional: Patient offers no complaints of fevers or significant weight loss  Ears, Nose, Mouth and Throat: The patient presents with no abnormalities of the head, ears, eyes, nose or throat  Skin: Patient offers no concerns of new rashes or lesions  Respiratory: The patient presents with no abnormalities of the respiratory tract  Cardiovascular: The patient presents with no cardiac abnormalities  Gastrointestinal: The patient presents with no abnormalities of the GI system  Genitourinary: The patient presents with no dysuria, hematuria or frequent urination  Neurological: See HPI  Endocrine: Patient offers no complaints of excessive thirst, urination, or heat/cold intolerance    Advanced care planning reviewed and noted in the chart.    Medications:  acetaminophen     Tablet .. 650 milliGRAM(s) Oral every 6 hours  benzocaine/menthol Lozenge 1 Lozenge Oral three times a day PRN  cefepime   IVPB 2000 milliGRAM(s) IV Intermittent every 8 hours  chlorhexidine 2% Cloths 1 Application(s) Topical <User Schedule>  clonazePAM  Tablet 0.5 milliGRAM(s) Oral three times a day PRN  cyclobenzaprine 5 milliGRAM(s) Oral two times a day  dalfampridine ER 10 milliGRAM(s) Oral every 12 hours  DULoxetine 60 milliGRAM(s) Oral at bedtime  fentaNYL   Patch  12 MICROgram(s)/Hr 1 Patch Transdermal every 72 hours  HYDROmorphone   Tablet 2 milliGRAM(s) Oral every 4 hours PRN  HYDROmorphone   Tablet 4 milliGRAM(s) Oral every 6 hours PRN  HYDROmorphone  Injectable 1 milliGRAM(s) IV Push every 3 hours PRN  oxyCODONE  ER Tablet 10 milliGRAM(s) Oral every 12 hours  senna 2 Tablet(s) Oral at bedtime  sertraline 100 milliGRAM(s) Oral at bedtime  Vibegron (Gemtesa) 75 milliGRAM(s),Vibegron (Gemtesa) 75mg tablet 1 Tablet(s) 1 Tablet(s) Oral daily      Labs:  CBC Full  -  ( 22 Dec 2023 08:19 )  WBC Count : 11.21 K/uL  RBC Count : 3.08 M/uL  Hemoglobin : 7.7 g/dL  Hematocrit : 25.3 %  Platelet Count - Automated : 433 K/uL  Mean Cell Volume : 82.1 fl  Mean Cell Hemoglobin : 25.0 pg  Mean Cell Hemoglobin Concentration : 30.4 gm/dL  Auto Neutrophil # : x  Auto Lymphocyte # : x  Auto Monocyte # : x  Auto Eosinophil # : x  Auto Basophil # : x  Auto Neutrophil % : x  Auto Lymphocyte % : x  Auto Monocyte % : x  Auto Eosinophil % : x  Auto Basophil % : x    12-    138  |  102  |  7   ----------------------------<  102<H>  3.9   |  25  |  0.39<L>    Ca    9.0      22 Dec 2023 08:19      CAPILLARY BLOOD GLUCOSE            Urinalysis Basic - ( 22 Dec 2023 08:19 )    Color: x / Appearance: x / SG: x / pH: x  Gluc: 102 mg/dL / Ketone: x  / Bili: x / Urobili: x   Blood: x / Protein: x / Nitrite: x   Leuk Esterase: x / RBC: x / WBC x   Sq Epi: x / Non Sq Epi: x / Bacteria: x          Vitals:  Vital Signs Last 24 Hrs  T(C): 37.1 (23 Dec 2023 06:04), Max: 37.1 (23 Dec 2023 06:04)  T(F): 98.7 (23 Dec 2023 06:04), Max: 98.7 (23 Dec 2023 06:04)  HR: 98 (23 Dec 2023 06:04) (88 - 100)  BP: 106/70 (23 Dec 2023 06:04) (103/69 - 106/70)  BP(mean): --  RR: 17 (23 Dec 2023 06:04) (17 - 17)  SpO2: 94% (23 Dec 2023 06:04) (94% - 100%)    Parameters below as of 23 Dec 2023 06:04  Patient On (Oxygen Delivery Method): room air          NEUROLOGICAL EXAM:    Mental status: Awake, alert, and in less apparent distress. Oriented to person, place and time. Language function is normal.      Cranial Nerves: Pupils were equal, round, reactive to light. Extraocular movements were intact. Visual field were full. Fundoscopic exam was deferred. Facial sensation was intact to light touch. There was slight left facia droop. The palate was upgoing symmetrically and tongue was midline.     Motor exam: Bulk and tone were normal. able to move all ext fully without much pain    Reflexes: deferred     Sensation: Intact to light touch/temp    Coordination: no gross dysmetria    Gait: defer   sternal wound seen          ACC: [id number] EXAM:  MR SHOULDER WAW IC LT   ORDERED BY:  BRAN SANCHEZ     ACC: [id number] EXAM:  MR STERNOCLAVICULAR JNT LT   ORDERED BY: ANASTASIIA WALKER     PROCEDURE DATE:  2023          INTERPRETATION:  MRI OF THE LEFT STERNOCLAVICULAR JOINT AND SHOULDER    CLINICAL INFORMATION: Left sternal abscess status post washout with   persistent purulent drainage and left shoulder pain.  TECHNIQUE: Multisequence, multiplanar MRI of the left sternoclavicular   joint. The study was performed before and after the intravenous   administration of 6 ml Gadavist (1.5 cc discarded) .    COMPARISON: Chest CT 2023 and 2023.    FINDINGS:    STERNOCLAVICULAR JOINT:    BONE: Patient status post surgical resection of the left clavicular head.   The surgical margin is sharp with trace edema and no loss of T1   hyperintense signal. In the manubrium there is increased STIR signal with   loss of T1 hyperintense signal and postcontrast enhancement involving the   superolateral leftward aspect of the bone concerning for acute   osteomyelitis (6:9 5:9). No acute fracture. No osteonecrosis.    JOINTS: A residual rim-enhancing fluid collection is seen in the region   of the left sternoclavicular joint measuring 80 x 25 mm. A drainage   catheter is seen within the fluid collection.    SOFT TISSUE: Postsurgical changes are seen along the anterior aspect of   the left sternoclavicular joint. There is a mild amount of edema in the   adjacent pectoralis major muscle. No focal fluid collection in the   anterior mediastinum. Susceptibility artifact from surgical staples are   seen along the anterior chest wall.      SHOULDER JOINT:    ROTATOR CUFF: Low-grade partial-thickness bursal surface tearing of the   supraspinatus tendon. Rotator cuff is otherwise intact.  MUSCLES: No focal muscle edema or atrophy.  BICEPS TENDON: Normal in course and caliber.  GLENOID LABRUM AND GLENOHUMERAL LIGAMENTS: No displaced labral tear.   Inferior glenohumeral ligament is intact.  GLENOHUMERAL CARTILAGE AND SUBCHONDRAL BONE: No full-thickness chondral   loss.  AC JOINT: No AC joint arthropathy.  SYNOVIUM/JOINT FLUID: No glenohumeral joint effusion. No focal fluid in   the subacromial/subdeltoid bursa.  BONE MARROW: No fracture or osteonecrosis. No marrow signal change to   suggest acute osteomyelitis.  NEUROVASCULAR STRUCTURES: Structures of the suprascapular notch,   spinoglenoid notch, and quadrilateral space are normal in course and   caliber.  SUBCUTANEOUS SOFT TISSUES: Edema in the subcutaneous fat of the left   shoulder. No rim-enhancing fluid collection to suggest abscess.        IMPRESSION:  1.  Patient status post surgical resection of the left clavicular head   with washout of the left sternoclavicular joint.  2.  A 80 x 25 mm rim-enhancing fluid collection persists in the surgical   cavity with well-placed drain within the fluid collection.  3.  Marrow signal abnormality in the manubrium concerning for acute   osteomyelitis.  4.  Low-grade partial-thickness bursal surface tear of the supraspinatus   tendon.    --- End of Report ---            RASHIDA HYDE MD; Attending Radiologist  This document has been electronically signed. Dec 11 2023  5:11PM

## 2023-12-23 NOTE — PROGRESS NOTE ADULT - SUBJECTIVE AND OBJECTIVE BOX
Date of service: 12-23-23 @ 22:58      Patient is a 43y old  Female who presents with a chief complaint of L shoulder and chest pain (23 Dec 2023 18:30)                                                               INTERVAL HPI/OVERNIGHT EVENTS:    REVIEW OF SYSTEMS:     CONSTITUTIONAL: No weakness, fevers or chills  RESPIRATORY: No cough, wheezing,  No shortness of breath  CARDIOVASCULAR: No chest pain or palpitations  GASTROINTESTINAL: No abdominal pain  . No nausea, vomiting, or hematemesis; No diarrhea or constipation. No melena or hematochezia.  GENITOURINARY: No dysuria, frequency or hematuria  NEUROLOGICAL: No numbness or weakness                                                                                                                                                                                                                                                                               Medications:  MEDICATIONS  (STANDING):  acetaminophen     Tablet .. 650 milliGRAM(s) Oral every 6 hours  cefepime   IVPB 2000 milliGRAM(s) IV Intermittent every 8 hours  chlorhexidine 2% Cloths 1 Application(s) Topical <User Schedule>  cyclobenzaprine 5 milliGRAM(s) Oral two times a day  dalfampridine ER 10 milliGRAM(s) Oral every 12 hours  DULoxetine 60 milliGRAM(s) Oral at bedtime  fentaNYL   Patch  12 MICROgram(s)/Hr 1 Patch Transdermal every 72 hours  oxyCODONE  ER Tablet 10 milliGRAM(s) Oral every 12 hours  senna 2 Tablet(s) Oral at bedtime  sertraline 100 milliGRAM(s) Oral at bedtime  Vibegron (Gemtesa) 75 milliGRAM(s),Vibegron (Gemtesa) 75mg tablet 1 Tablet(s) 1 Tablet(s) Oral daily    MEDICATIONS  (PRN):  benzocaine/menthol Lozenge 1 Lozenge Oral three times a day PRN Sore Throat  clonazePAM  Tablet 0.5 milliGRAM(s) Oral three times a day PRN anxiety  HYDROmorphone   Tablet 2 milliGRAM(s) Oral every 4 hours PRN Moderate Pain (4 - 6)  HYDROmorphone   Tablet 4 milliGRAM(s) Oral every 6 hours PRN Severe Pain (7 - 10)  HYDROmorphone  Injectable 1 milliGRAM(s) IV Push every 3 hours PRN Breakthrough Pain       Allergies    No Known Allergies    Intolerances      Vital Signs Last 24 Hrs  T(C): 36.7 (23 Dec 2023 21:28), Max: 37.1 (23 Dec 2023 06:04)  T(F): 98 (23 Dec 2023 21:28), Max: 98.7 (23 Dec 2023 06:04)  HR: 95 (23 Dec 2023 21:28) (95 - 98)  BP: 100/65 (23 Dec 2023 21:28) (100/65 - 106/70)  BP(mean): --  RR: 17 (23 Dec 2023 21:28) (17 - 17)  SpO2: 97% (23 Dec 2023 21:28) (94% - 97%)    Parameters below as of 23 Dec 2023 21:28  Patient On (Oxygen Delivery Method): room air      CAPILLARY BLOOD GLUCOSE          12-22 @ 07:01  -  12-23 @ 07:00  --------------------------------------------------------  IN: 1350 mL / OUT: 55 mL / NET: 1295 mL    12-23 @ 07:01  -  12-23 @ 22:58  --------------------------------------------------------  IN: 680 mL / OUT: 0 mL / NET: 680 mL      Physical Exam:    Daily     Daily   General:  Well appearing, NAD, not cachetic  HEENT:  Nonicteric, PERRLA  CV:  RRR, S1S2   Lungs:  CTA B/L, no wheezes, rales, rhonchi  Abdomen:  Soft, non-tender, no distended, positive BS  Extremities:  2+ pulses, no c/c, no edema  Skin:  Warm and dry, no rashes  :  No stanford  Neuro:  AAOx3, non-focal, grossly intact                                                                                                                                                                                                                                                                                                LABS:                               7.7    11.21 )-----------( 433      ( 22 Dec 2023 08:19 )             25.3                      12-22    138  |  102  |  7   ----------------------------<  102<H>  3.9   |  25  |  0.39<L>    Ca    9.0      22 Dec 2023 08:19                         RADIOLOGY & ADDITIONAL TESTS         I personally reviewed: [  ]EKG   [  ]CXR    [  ] CT      A/P:         Discussed with :     [facility] consultants' Notes   Time spent :

## 2023-12-23 NOTE — PROGRESS NOTE ADULT - SUBJECTIVE AND OBJECTIVE BOX
Subjective: Patient seen and examined. No new events except as noted.     REVIEW OF SYSTEMS:    CONSTITUTIONAL:+weakness, fevers or chills  EYES/ENT: No visual changes;  No vertigo or throat pain   NECK: No pain or stiffness  RESPIRATORY: No cough, wheezing, hemoptysis; No shortness of breath  CARDIOVASCULAR: No chest pain or palpitations  GASTROINTESTINAL: No abdominal or epigastric pain. No nausea, vomiting, or hematemesis; No diarrhea or constipation. No melena or hematochezia.  GENITOURINARY: No dysuria, frequency or hematuria  NEUROLOGICAL: No numbness or weakness  SKIN: No itching, burning, rashes, or lesions   All other review of systems is negative unless indicated above.    MEDICATIONS:  MEDICATIONS  (STANDING):  acetaminophen     Tablet .. 650 milliGRAM(s) Oral every 6 hours  cefepime   IVPB 2000 milliGRAM(s) IV Intermittent every 8 hours  chlorhexidine 2% Cloths 1 Application(s) Topical <User Schedule>  cyclobenzaprine 5 milliGRAM(s) Oral two times a day  dalfampridine ER 10 milliGRAM(s) Oral every 12 hours  DULoxetine 60 milliGRAM(s) Oral at bedtime  fentaNYL   Patch  12 MICROgram(s)/Hr 1 Patch Transdermal every 72 hours  oxyCODONE  ER Tablet 10 milliGRAM(s) Oral every 12 hours  senna 2 Tablet(s) Oral at bedtime  sertraline 100 milliGRAM(s) Oral at bedtime  Vibegron (Gemtesa) 75 milliGRAM(s),Vibegron (Gemtesa) 75mg tablet 1 Tablet(s) 1 Tablet(s) Oral daily      PHYSICAL EXAM:  T(C): 37.1 (12-23-23 @ 06:04), Max: 37.1 (12-23-23 @ 06:04)  HR: 98 (12-23-23 @ 06:04) (88 - 98)  BP: 106/70 (12-23-23 @ 06:04) (106/68 - 106/70)  RR: 17 (12-23-23 @ 06:04) (17 - 17)  SpO2: 94% (12-23-23 @ 06:04) (94% - 98%)  Wt(kg): --  I&O's Summary    22 Dec 2023 07:01  -  23 Dec 2023 07:00  --------------------------------------------------------  IN: 1350 mL / OUT: 55 mL / NET: 1295 mL    23 Dec 2023 07:01  -  23 Dec 2023 18:30  --------------------------------------------------------  IN: 240 mL / OUT: 0 mL / NET: 240 mL            Appearance: NAD  HEENT:   Normal oral mucosa, PERRL, EOMI	  Lymphatic: No lymphadenopathy , no edema   L clavicular region w/ black sponge CAV dsg secured w/ good seal, minimal serousag drainage noted in collection chamber  Cardiovascular: Normal S1 S2, No JVD, No murmurs , Peripheral pulses palpable 2+ bilaterally  Respiratory: Lungs clear to auscultation, normal effort 	  Gastrointestinal:  Soft, Non-tender, + BS	  Skin: No rashes, No ecchymoses, No cyanosis, warm to touch  Musculoskeletal: Normal range of motion, normal strength  Psychiatry:  lethargic weak   Ext: No edema      LABS:    CARDIAC MARKERS:                                7.7    11.21 )-----------( 433      ( 22 Dec 2023 08:19 )             25.3     12-22    138  |  102  |  7   ----------------------------<  102<H>  3.9   |  25  |  0.39<L>    Ca    9.0      22 Dec 2023 08:19      proBNP:   Lipid Profile:   HgA1c:   TSH:             TELEMETRY: 	    ECG:  	  RADIOLOGY:   DIAGNOSTIC TESTING:  [ ] Echocardiogram:  [ ]  Catheterization:  [ ] Stress Test:    OTHER:

## 2023-12-24 RX ORDER — HYDROMORPHONE HYDROCHLORIDE 2 MG/ML
1 INJECTION INTRAMUSCULAR; INTRAVENOUS; SUBCUTANEOUS
Refills: 0 | Status: DISCONTINUED | OUTPATIENT
Start: 2023-12-24 | End: 2023-12-25

## 2023-12-24 RX ORDER — POLYETHYLENE GLYCOL 3350 17 G/17G
17 POWDER, FOR SOLUTION ORAL DAILY
Refills: 0 | Status: DISCONTINUED | OUTPATIENT
Start: 2023-12-24 | End: 2023-12-29

## 2023-12-24 RX ADMIN — Medication 650 MILLIGRAM(S): at 06:30

## 2023-12-24 RX ADMIN — HYDROMORPHONE HYDROCHLORIDE 1 MILLIGRAM(S): 2 INJECTION INTRAMUSCULAR; INTRAVENOUS; SUBCUTANEOUS at 12:50

## 2023-12-24 RX ADMIN — DULOXETINE HYDROCHLORIDE 60 MILLIGRAM(S): 30 CAPSULE, DELAYED RELEASE ORAL at 22:37

## 2023-12-24 RX ADMIN — HYDROMORPHONE HYDROCHLORIDE 4 MILLIGRAM(S): 2 INJECTION INTRAMUSCULAR; INTRAVENOUS; SUBCUTANEOUS at 17:21

## 2023-12-24 RX ADMIN — SENNA PLUS 2 TABLET(S): 8.6 TABLET ORAL at 22:37

## 2023-12-24 RX ADMIN — HYDROMORPHONE HYDROCHLORIDE 4 MILLIGRAM(S): 2 INJECTION INTRAMUSCULAR; INTRAVENOUS; SUBCUTANEOUS at 11:46

## 2023-12-24 RX ADMIN — FENTANYL CITRATE 1 PATCH: 50 INJECTION INTRAVENOUS at 07:30

## 2023-12-24 RX ADMIN — Medication 650 MILLIGRAM(S): at 05:56

## 2023-12-24 RX ADMIN — Medication 650 MILLIGRAM(S): at 11:15

## 2023-12-24 RX ADMIN — HYDROMORPHONE HYDROCHLORIDE 4 MILLIGRAM(S): 2 INJECTION INTRAMUSCULAR; INTRAVENOUS; SUBCUTANEOUS at 11:14

## 2023-12-24 RX ADMIN — CYCLOBENZAPRINE HYDROCHLORIDE 5 MILLIGRAM(S): 10 TABLET, FILM COATED ORAL at 17:41

## 2023-12-24 RX ADMIN — HYDROMORPHONE HYDROCHLORIDE 4 MILLIGRAM(S): 2 INJECTION INTRAMUSCULAR; INTRAVENOUS; SUBCUTANEOUS at 23:31

## 2023-12-24 RX ADMIN — OXYCODONE HYDROCHLORIDE 10 MILLIGRAM(S): 5 TABLET ORAL at 05:57

## 2023-12-24 RX ADMIN — CYCLOBENZAPRINE HYDROCHLORIDE 5 MILLIGRAM(S): 10 TABLET, FILM COATED ORAL at 05:56

## 2023-12-24 RX ADMIN — OXYCODONE HYDROCHLORIDE 10 MILLIGRAM(S): 5 TABLET ORAL at 17:41

## 2023-12-24 RX ADMIN — Medication 650 MILLIGRAM(S): at 23:31

## 2023-12-24 RX ADMIN — OXYCODONE HYDROCHLORIDE 10 MILLIGRAM(S): 5 TABLET ORAL at 18:20

## 2023-12-24 RX ADMIN — CHLORHEXIDINE GLUCONATE 1 APPLICATION(S): 213 SOLUTION TOPICAL at 07:32

## 2023-12-24 RX ADMIN — CEFEPIME 100 MILLIGRAM(S): 1 INJECTION, POWDER, FOR SOLUTION INTRAMUSCULAR; INTRAVENOUS at 05:55

## 2023-12-24 RX ADMIN — OXYCODONE HYDROCHLORIDE 10 MILLIGRAM(S): 5 TABLET ORAL at 06:30

## 2023-12-24 RX ADMIN — Medication 650 MILLIGRAM(S): at 12:15

## 2023-12-24 RX ADMIN — HYDROMORPHONE HYDROCHLORIDE 1 MILLIGRAM(S): 2 INJECTION INTRAMUSCULAR; INTRAVENOUS; SUBCUTANEOUS at 04:34

## 2023-12-24 RX ADMIN — HYDROMORPHONE HYDROCHLORIDE 1 MILLIGRAM(S): 2 INJECTION INTRAMUSCULAR; INTRAVENOUS; SUBCUTANEOUS at 18:55

## 2023-12-24 RX ADMIN — Medication 650 MILLIGRAM(S): at 17:40

## 2023-12-24 RX ADMIN — Medication 650 MILLIGRAM(S): at 18:20

## 2023-12-24 RX ADMIN — CEFEPIME 100 MILLIGRAM(S): 1 INJECTION, POWDER, FOR SOLUTION INTRAMUSCULAR; INTRAVENOUS at 21:59

## 2023-12-24 RX ADMIN — HYDROMORPHONE HYDROCHLORIDE 1 MILLIGRAM(S): 2 INJECTION INTRAMUSCULAR; INTRAVENOUS; SUBCUTANEOUS at 04:04

## 2023-12-24 RX ADMIN — HYDROMORPHONE HYDROCHLORIDE 1 MILLIGRAM(S): 2 INJECTION INTRAMUSCULAR; INTRAVENOUS; SUBCUTANEOUS at 22:58

## 2023-12-24 RX ADMIN — HYDROMORPHONE HYDROCHLORIDE 1 MILLIGRAM(S): 2 INJECTION INTRAMUSCULAR; INTRAVENOUS; SUBCUTANEOUS at 19:30

## 2023-12-24 RX ADMIN — HYDROMORPHONE HYDROCHLORIDE 1 MILLIGRAM(S): 2 INJECTION INTRAMUSCULAR; INTRAVENOUS; SUBCUTANEOUS at 08:10

## 2023-12-24 RX ADMIN — Medication 0.5 MILLIGRAM(S): at 22:50

## 2023-12-24 RX ADMIN — HYDROMORPHONE HYDROCHLORIDE 1 MILLIGRAM(S): 2 INJECTION INTRAMUSCULAR; INTRAVENOUS; SUBCUTANEOUS at 08:40

## 2023-12-24 RX ADMIN — CEFEPIME 100 MILLIGRAM(S): 1 INJECTION, POWDER, FOR SOLUTION INTRAMUSCULAR; INTRAVENOUS at 13:55

## 2023-12-24 RX ADMIN — HYDROMORPHONE HYDROCHLORIDE 4 MILLIGRAM(S): 2 INJECTION INTRAMUSCULAR; INTRAVENOUS; SUBCUTANEOUS at 16:51

## 2023-12-24 RX ADMIN — SERTRALINE 100 MILLIGRAM(S): 25 TABLET, FILM COATED ORAL at 22:37

## 2023-12-24 RX ADMIN — HYDROMORPHONE HYDROCHLORIDE 1 MILLIGRAM(S): 2 INJECTION INTRAMUSCULAR; INTRAVENOUS; SUBCUTANEOUS at 21:58

## 2023-12-24 RX ADMIN — HYDROMORPHONE HYDROCHLORIDE 1 MILLIGRAM(S): 2 INJECTION INTRAMUSCULAR; INTRAVENOUS; SUBCUTANEOUS at 13:20

## 2023-12-24 NOTE — PROGRESS NOTE ADULT - ASSESSMENT
43F PMHx of MS on Rituxan, presenting with left chest wall and shoulder pain w/ concern for infection now admitted with likely septic arthritis with need for possible washout and debridement       Problem/Plan - 1:  ·  Problem: Septic arthritis. left shoulder joint   ·  Plan: Imaging consistent with septic arthritis  s/p OR   cont abx and fu cultures   check ECHO : no vegetation   fu with ID   d/w with CTS at bedside : drainage adjusted and fx improved   cont current abx   pt with LUE pain : tessie referred pain from L [id number] and chest however  Cervical MRI : no disciits / abcess   discussed with pt , family at length at bedside   washout and debridement of sternoclavicular joint; resection of medial manubrium; white and black wound vac placed  now s/p vac change  PICC oredered    d/w pt and family at length       # c/o left arm swelling and pain in elbow region :  -doppler ordered to r/o DVT : Negative      Problem/Plan - 2:  ·  Problem: Multiple sclerosis.   ·  Plan: Gets Rituxan every 6 months, next dose due : holding   -On Ampyra BID at home, need to bring home med     Problem/Plan - 3:  ·  Problem: Anxiety.   ·  Plan: ISTOP reviewed Reference #: [id number]  -Cont. Clonazepam 0.5mg QHS PRN  -Cont. Sertraline 100mg QHS  -Cont. Duloxetine 60mg QHS, pt unsure of dose  -Need full med rec.     Problem/Plan - 4:  ·  Problem: Anemia.   ·  Plan: hgb 9, lower than prior year. Pt endorses some recent anemia but cannot specify  -Trend cbc  -Monitor for bleeding.     Problem/Plan - 5:  ·  Problem: Prophylactic measure.   ·  Plan: DVT PPx    hyperkalemia : normalized     awaiting placement    43F PMHx of MS on Rituxan, presenting with left chest wall and shoulder pain w/ concern for infection now admitted with likely septic arthritis with need for possible washout and debridement       Problem/Plan - 1:  ·  Problem: Septic arthritis. left shoulder joint   ·  Plan: Imaging consistent with septic arthritis  s/p OR   cont abx and fu cultures   check ECHO : no vegetation   fu with ID   d/w with CTS at bedside : drainage adjusted and fx improved   cont current abx   pt with LUE pain : tessie referred pain from L [id number] and chest however  Cervical MRI : no disciits / abcess   discussed with pt , family at length at bedside   washout and debridement of sternoclavicular joint; resection of medial manubrium; white and black wound vac placed  now s/p vac change  PICC oredered    d/w pt and family at length       # c/o left arm swelling and pain in elbow region :  -doppler ordered to r/o DVT : Negative      Problem/Plan - 2:  ·  Problem: Multiple sclerosis.   ·  Plan: Gets Rituxan every 6 months, next dose due : holding   -On Ampyra BID at home, need to bring home med     Problem/Plan - 3:  ·  Problem: Anxiety.   ·  Plan: ISTOP reviewed Reference #: [id number]  -Cont. Clonazepam 0.5mg QHS PRN  -Cont. Sertraline 100mg QHS  -Cont. Duloxetine 60mg QHS, pt unsure of dose  -Need full med rec.     Problem/Plan - 4:  ·  Problem: Anemia.   ·  Plan: hgb 9, lower than prior year. Pt endorses some recent anemia but cannot specify  -Trend cbc  -Monitor for bleeding.     Problem/Plan - 5:  ·  Problem: Prophylactic measure.   ·  Plan: DVT PPx    hyperkalemia : normalized     awaiting placement

## 2023-12-24 NOTE — PROGRESS NOTE ADULT - SUBJECTIVE AND OBJECTIVE BOX
Date of service: 12-24-23 @ 12:15      Patient is a 43y old  Female who presents with a chief complaint of L shoulder and chest pain (23 Dec 2023 22:58)                                                               INTERVAL HPI/OVERNIGHT EVENTS:    REVIEW OF SYSTEMS:     CONSTITUTIONAL: No weakness, fevers or chills  EYES/ENT: No visual changes , no ear ache   NECK: No pain or stiffness  RESPIRATORY: No cough, wheezing,  No shortness of breath  CARDIOVASCULAR: No chest pain or palpitations  GASTROINTESTINAL: No abdominal pain  . No nausea, vomiting, or hematemesis; No diarrhea or constipation. No melena or hematochezia.  GENITOURINARY: No dysuria, frequency or hematuria  NEUROLOGICAL: No numbness or weakness  SKIN: No itching, burning, rashes, or lesions                                                                                                                                                                                                                                                                                 Medications:  MEDICATIONS  (STANDING):  acetaminophen     Tablet .. 650 milliGRAM(s) Oral every 6 hours  cefepime   IVPB 2000 milliGRAM(s) IV Intermittent every 8 hours  chlorhexidine 2% Cloths 1 Application(s) Topical <User Schedule>  cyclobenzaprine 5 milliGRAM(s) Oral two times a day  dalfampridine ER 10 milliGRAM(s) Oral every 12 hours  DULoxetine 60 milliGRAM(s) Oral at bedtime  fentaNYL   Patch  12 MICROgram(s)/Hr 1 Patch Transdermal every 72 hours  oxyCODONE  ER Tablet 10 milliGRAM(s) Oral every 12 hours  senna 2 Tablet(s) Oral at bedtime  sertraline 100 milliGRAM(s) Oral at bedtime  Vibegron (Gemtesa) 75 milliGRAM(s),Vibegron (Gemtesa) 75mg tablet 1 Tablet(s) 1 Tablet(s) Oral daily    MEDICATIONS  (PRN):  benzocaine/menthol Lozenge 1 Lozenge Oral three times a day PRN Sore Throat  clonazePAM  Tablet 0.5 milliGRAM(s) Oral three times a day PRN anxiety  HYDROmorphone   Tablet 2 milliGRAM(s) Oral every 4 hours PRN Moderate Pain (4 - 6)  HYDROmorphone   Tablet 4 milliGRAM(s) Oral every 6 hours PRN Severe Pain (7 - 10)  HYDROmorphone  Injectable 1 milliGRAM(s) IV Push every 3 hours PRN for breakthrough pain       Allergies    No Known Allergies    Intolerances      Vital Signs Last 24 Hrs  T(C): 36.8 (24 Dec 2023 09:06), Max: 36.8 (24 Dec 2023 09:06)  T(F): 98.3 (24 Dec 2023 09:06), Max: 98.3 (24 Dec 2023 09:06)  HR: 96 (24 Dec 2023 09:06) (93 - 96)  BP: 103/51 (24 Dec 2023 09:06) (100/65 - 103/51)  BP(mean): --  RR: 18 (24 Dec 2023 09:06) (17 - 18)  SpO2: 94% (24 Dec 2023 09:06) (93% - 97%)    Parameters below as of 24 Dec 2023 09:06  Patient On (Oxygen Delivery Method): room air      CAPILLARY BLOOD GLUCOSE          12-23 @ 07:01  -  12-24 @ 07:00  --------------------------------------------------------  IN: 850 mL / OUT: 0 mL / NET: 850 mL    12-24 @ 07:01  -  12-24 @ 12:15  --------------------------------------------------------  IN: 250 mL / OUT: 0 mL / NET: 250 mL      Physical Exam:    Daily     Daily   General:  Well appearing, NAD, not cachetic  HEENT:  Nonicteric, PERRLA  CV:  RRR, S1S2   Lungs:  CTA B/L, no wheezes, rales, rhonchi  Abdomen:  Soft, non-tender, no distended, positive BS  Extremities:  2+ pulses, no c/c, no edema  Skin:  Warm and dry, no rashes  :  No stanford  Neuro:  AAOx3, non-focal, grossly intact                                                                                                                                                                                                                                                                                                LABS:                                                     RADIOLOGY & ADDITIONAL TESTS         I personally reviewed: [  ]EKG   [  ]CXR    [  ] CT      A/P:         Discussed with :     [facility] consultants' Notes   Time spent :

## 2023-12-25 LAB
ALBUMIN SERPL ELPH-MCNC: 3.3 G/DL — SIGNIFICANT CHANGE UP (ref 3.3–5)
ALBUMIN SERPL ELPH-MCNC: 3.3 G/DL — SIGNIFICANT CHANGE UP (ref 3.3–5)
ALP SERPL-CCNC: 297 U/L — HIGH (ref 40–120)
ALP SERPL-CCNC: 297 U/L — HIGH (ref 40–120)
ALT FLD-CCNC: 12 U/L — SIGNIFICANT CHANGE UP (ref 10–45)
ALT FLD-CCNC: 12 U/L — SIGNIFICANT CHANGE UP (ref 10–45)
ANION GAP SERPL CALC-SCNC: 11 MMOL/L — SIGNIFICANT CHANGE UP (ref 5–17)
ANION GAP SERPL CALC-SCNC: 11 MMOL/L — SIGNIFICANT CHANGE UP (ref 5–17)
AST SERPL-CCNC: 10 U/L — SIGNIFICANT CHANGE UP (ref 10–40)
AST SERPL-CCNC: 10 U/L — SIGNIFICANT CHANGE UP (ref 10–40)
BASOPHILS # BLD AUTO: 0.07 K/UL — SIGNIFICANT CHANGE UP (ref 0–0.2)
BASOPHILS # BLD AUTO: 0.07 K/UL — SIGNIFICANT CHANGE UP (ref 0–0.2)
BASOPHILS NFR BLD AUTO: 0.6 % — SIGNIFICANT CHANGE UP (ref 0–2)
BASOPHILS NFR BLD AUTO: 0.6 % — SIGNIFICANT CHANGE UP (ref 0–2)
BILIRUB SERPL-MCNC: 0.2 MG/DL — SIGNIFICANT CHANGE UP (ref 0.2–1.2)
BILIRUB SERPL-MCNC: 0.2 MG/DL — SIGNIFICANT CHANGE UP (ref 0.2–1.2)
BLD GP AB SCN SERPL QL: NEGATIVE — SIGNIFICANT CHANGE UP
BUN SERPL-MCNC: 7 MG/DL — SIGNIFICANT CHANGE UP (ref 7–23)
BUN SERPL-MCNC: 7 MG/DL — SIGNIFICANT CHANGE UP (ref 7–23)
CALCIUM SERPL-MCNC: 9 MG/DL — SIGNIFICANT CHANGE UP (ref 8.4–10.5)
CALCIUM SERPL-MCNC: 9 MG/DL — SIGNIFICANT CHANGE UP (ref 8.4–10.5)
CHLORIDE SERPL-SCNC: 101 MMOL/L — SIGNIFICANT CHANGE UP (ref 96–108)
CHLORIDE SERPL-SCNC: 101 MMOL/L — SIGNIFICANT CHANGE UP (ref 96–108)
CO2 SERPL-SCNC: 26 MMOL/L — SIGNIFICANT CHANGE UP (ref 22–31)
CO2 SERPL-SCNC: 26 MMOL/L — SIGNIFICANT CHANGE UP (ref 22–31)
CREAT SERPL-MCNC: 0.42 MG/DL — LOW (ref 0.5–1.3)
CREAT SERPL-MCNC: 0.42 MG/DL — LOW (ref 0.5–1.3)
DAT C3-SP REAG RBC QL: NEGATIVE — SIGNIFICANT CHANGE UP
DAT C3-SP REAG RBC QL: NEGATIVE — SIGNIFICANT CHANGE UP
DAT POLY-SP REAG RBC QL: POSITIVE — SIGNIFICANT CHANGE UP
DAT POLY-SP REAG RBC QL: POSITIVE — SIGNIFICANT CHANGE UP
EGFR: 124 ML/MIN/1.73M2 — SIGNIFICANT CHANGE UP
EGFR: 124 ML/MIN/1.73M2 — SIGNIFICANT CHANGE UP
EOSINOPHIL # BLD AUTO: 0.25 K/UL — SIGNIFICANT CHANGE UP (ref 0–0.5)
EOSINOPHIL # BLD AUTO: 0.25 K/UL — SIGNIFICANT CHANGE UP (ref 0–0.5)
EOSINOPHIL NFR BLD AUTO: 2.2 % — SIGNIFICANT CHANGE UP (ref 0–6)
EOSINOPHIL NFR BLD AUTO: 2.2 % — SIGNIFICANT CHANGE UP (ref 0–6)
GLUCOSE SERPL-MCNC: 90 MG/DL — SIGNIFICANT CHANGE UP (ref 70–99)
GLUCOSE SERPL-MCNC: 90 MG/DL — SIGNIFICANT CHANGE UP (ref 70–99)
HCT VFR BLD CALC: 24.6 % — LOW (ref 34.5–45)
HCT VFR BLD CALC: 24.6 % — LOW (ref 34.5–45)
HCT VFR BLD CALC: 26.6 % — LOW (ref 34.5–45)
HCT VFR BLD CALC: 26.6 % — LOW (ref 34.5–45)
HGB BLD-MCNC: 7.6 G/DL — LOW (ref 11.5–15.5)
HGB BLD-MCNC: 7.6 G/DL — LOW (ref 11.5–15.5)
HGB BLD-MCNC: 8.3 G/DL — LOW (ref 11.5–15.5)
HGB BLD-MCNC: 8.3 G/DL — LOW (ref 11.5–15.5)
IMM GRANULOCYTES NFR BLD AUTO: 0.9 % — SIGNIFICANT CHANGE UP (ref 0–0.9)
IMM GRANULOCYTES NFR BLD AUTO: 0.9 % — SIGNIFICANT CHANGE UP (ref 0–0.9)
LDH SERPL L TO P-CCNC: 165 U/L — SIGNIFICANT CHANGE UP (ref 50–242)
LDH SERPL L TO P-CCNC: 165 U/L — SIGNIFICANT CHANGE UP (ref 50–242)
LYMPHOCYTES # BLD AUTO: 17.8 % — SIGNIFICANT CHANGE UP (ref 13–44)
LYMPHOCYTES # BLD AUTO: 17.8 % — SIGNIFICANT CHANGE UP (ref 13–44)
LYMPHOCYTES # BLD AUTO: 2.03 K/UL — SIGNIFICANT CHANGE UP (ref 1–3.3)
LYMPHOCYTES # BLD AUTO: 2.03 K/UL — SIGNIFICANT CHANGE UP (ref 1–3.3)
MCHC RBC-ENTMCNC: 24.6 PG — LOW (ref 27–34)
MCHC RBC-ENTMCNC: 24.6 PG — LOW (ref 27–34)
MCHC RBC-ENTMCNC: 24.7 PG — LOW (ref 27–34)
MCHC RBC-ENTMCNC: 24.7 PG — LOW (ref 27–34)
MCHC RBC-ENTMCNC: 30.9 GM/DL — LOW (ref 32–36)
MCHC RBC-ENTMCNC: 30.9 GM/DL — LOW (ref 32–36)
MCHC RBC-ENTMCNC: 31.2 GM/DL — LOW (ref 32–36)
MCHC RBC-ENTMCNC: 31.2 GM/DL — LOW (ref 32–36)
MCV RBC AUTO: 78.9 FL — LOW (ref 80–100)
MCV RBC AUTO: 78.9 FL — LOW (ref 80–100)
MCV RBC AUTO: 79.9 FL — LOW (ref 80–100)
MCV RBC AUTO: 79.9 FL — LOW (ref 80–100)
MONOCYTES # BLD AUTO: 1.38 K/UL — HIGH (ref 0–0.9)
MONOCYTES # BLD AUTO: 1.38 K/UL — HIGH (ref 0–0.9)
MONOCYTES NFR BLD AUTO: 12.1 % — SIGNIFICANT CHANGE UP (ref 2–14)
MONOCYTES NFR BLD AUTO: 12.1 % — SIGNIFICANT CHANGE UP (ref 2–14)
NEUTROPHILS # BLD AUTO: 7.57 K/UL — HIGH (ref 1.8–7.4)
NEUTROPHILS # BLD AUTO: 7.57 K/UL — HIGH (ref 1.8–7.4)
NEUTROPHILS NFR BLD AUTO: 66.4 % — SIGNIFICANT CHANGE UP (ref 43–77)
NEUTROPHILS NFR BLD AUTO: 66.4 % — SIGNIFICANT CHANGE UP (ref 43–77)
NRBC # BLD: 0 /100 WBCS — SIGNIFICANT CHANGE UP (ref 0–0)
PHOSPHATE SERPL-MCNC: 3.5 MG/DL — SIGNIFICANT CHANGE UP (ref 2.5–4.5)
PHOSPHATE SERPL-MCNC: 3.5 MG/DL — SIGNIFICANT CHANGE UP (ref 2.5–4.5)
PLATELET # BLD AUTO: 475 K/UL — HIGH (ref 150–400)
PLATELET # BLD AUTO: 475 K/UL — HIGH (ref 150–400)
PLATELET # BLD AUTO: 476 K/UL — HIGH (ref 150–400)
PLATELET # BLD AUTO: 476 K/UL — HIGH (ref 150–400)
POTASSIUM SERPL-MCNC: 3.8 MMOL/L — SIGNIFICANT CHANGE UP (ref 3.5–5.3)
POTASSIUM SERPL-MCNC: 3.8 MMOL/L — SIGNIFICANT CHANGE UP (ref 3.5–5.3)
POTASSIUM SERPL-SCNC: 3.8 MMOL/L — SIGNIFICANT CHANGE UP (ref 3.5–5.3)
POTASSIUM SERPL-SCNC: 3.8 MMOL/L — SIGNIFICANT CHANGE UP (ref 3.5–5.3)
PROT SERPL-MCNC: 6.1 G/DL — SIGNIFICANT CHANGE UP (ref 6–8.3)
PROT SERPL-MCNC: 6.1 G/DL — SIGNIFICANT CHANGE UP (ref 6–8.3)
RBC # BLD: 3.08 M/UL — LOW (ref 3.8–5.2)
RBC # BLD: 3.08 M/UL — LOW (ref 3.8–5.2)
RBC # BLD: 3.37 M/UL — LOW (ref 3.8–5.2)
RBC # BLD: 3.37 M/UL — LOW (ref 3.8–5.2)
RBC # FLD: 15.9 % — HIGH (ref 10.3–14.5)
RH IG SCN BLD-IMP: POSITIVE — SIGNIFICANT CHANGE UP
SODIUM SERPL-SCNC: 138 MMOL/L — SIGNIFICANT CHANGE UP (ref 135–145)
SODIUM SERPL-SCNC: 138 MMOL/L — SIGNIFICANT CHANGE UP (ref 135–145)
WBC # BLD: 11.4 K/UL — HIGH (ref 3.8–10.5)
WBC # BLD: 11.4 K/UL — HIGH (ref 3.8–10.5)
WBC # BLD: 15.03 K/UL — HIGH (ref 3.8–10.5)
WBC # BLD: 15.03 K/UL — HIGH (ref 3.8–10.5)
WBC # FLD AUTO: 11.4 K/UL — HIGH (ref 3.8–10.5)
WBC # FLD AUTO: 11.4 K/UL — HIGH (ref 3.8–10.5)
WBC # FLD AUTO: 15.03 K/UL — HIGH (ref 3.8–10.5)
WBC # FLD AUTO: 15.03 K/UL — HIGH (ref 3.8–10.5)

## 2023-12-25 PROCEDURE — 86078 PHYS BLOOD BANK SERV REACTJ: CPT

## 2023-12-25 PROCEDURE — 71045 X-RAY EXAM CHEST 1 VIEW: CPT | Mod: 26

## 2023-12-25 RX ORDER — ENOXAPARIN SODIUM 100 MG/ML
40 INJECTION SUBCUTANEOUS EVERY 24 HOURS
Refills: 0 | Status: DISCONTINUED | OUTPATIENT
Start: 2023-12-25 | End: 2024-01-05

## 2023-12-25 RX ORDER — MULTIVIT-MIN/FERROUS GLUCONATE 9 MG/15 ML
1 LIQUID (ML) ORAL DAILY
Refills: 0 | Status: DISCONTINUED | OUTPATIENT
Start: 2023-12-25 | End: 2024-01-05

## 2023-12-25 RX ORDER — SODIUM CHLORIDE 9 MG/ML
1000 INJECTION INTRAMUSCULAR; INTRAVENOUS; SUBCUTANEOUS
Refills: 0 | Status: COMPLETED | OUTPATIENT
Start: 2023-12-25 | End: 2023-12-25

## 2023-12-25 RX ADMIN — HYDROMORPHONE HYDROCHLORIDE 1 MILLIGRAM(S): 2 INJECTION INTRAMUSCULAR; INTRAVENOUS; SUBCUTANEOUS at 03:18

## 2023-12-25 RX ADMIN — Medication 650 MILLIGRAM(S): at 06:16

## 2023-12-25 RX ADMIN — HYDROMORPHONE HYDROCHLORIDE 4 MILLIGRAM(S): 2 INJECTION INTRAMUSCULAR; INTRAVENOUS; SUBCUTANEOUS at 09:21

## 2023-12-25 RX ADMIN — CEFEPIME 100 MILLIGRAM(S): 1 INJECTION, POWDER, FOR SOLUTION INTRAMUSCULAR; INTRAVENOUS at 22:29

## 2023-12-25 RX ADMIN — SENNA PLUS 2 TABLET(S): 8.6 TABLET ORAL at 23:20

## 2023-12-25 RX ADMIN — SODIUM CHLORIDE 50 MILLILITER(S): 9 INJECTION INTRAMUSCULAR; INTRAVENOUS; SUBCUTANEOUS at 22:30

## 2023-12-25 RX ADMIN — Medication 0.5 MILLIGRAM(S): at 20:38

## 2023-12-25 RX ADMIN — Medication 650 MILLIGRAM(S): at 00:31

## 2023-12-25 RX ADMIN — SERTRALINE 100 MILLIGRAM(S): 25 TABLET, FILM COATED ORAL at 23:21

## 2023-12-25 RX ADMIN — HYDROMORPHONE HYDROCHLORIDE 4 MILLIGRAM(S): 2 INJECTION INTRAMUSCULAR; INTRAVENOUS; SUBCUTANEOUS at 10:21

## 2023-12-25 RX ADMIN — Medication 650 MILLIGRAM(S): at 07:16

## 2023-12-25 RX ADMIN — OXYCODONE HYDROCHLORIDE 10 MILLIGRAM(S): 5 TABLET ORAL at 07:16

## 2023-12-25 RX ADMIN — Medication 650 MILLIGRAM(S): at 18:57

## 2023-12-25 RX ADMIN — Medication 650 MILLIGRAM(S): at 11:13

## 2023-12-25 RX ADMIN — HYDROMORPHONE HYDROCHLORIDE 1 MILLIGRAM(S): 2 INJECTION INTRAMUSCULAR; INTRAVENOUS; SUBCUTANEOUS at 15:01

## 2023-12-25 RX ADMIN — FENTANYL CITRATE 1 PATCH: 50 INJECTION INTRAVENOUS at 19:50

## 2023-12-25 RX ADMIN — CYCLOBENZAPRINE HYDROCHLORIDE 5 MILLIGRAM(S): 10 TABLET, FILM COATED ORAL at 18:58

## 2023-12-25 RX ADMIN — CEFEPIME 100 MILLIGRAM(S): 1 INJECTION, POWDER, FOR SOLUTION INTRAMUSCULAR; INTRAVENOUS at 15:04

## 2023-12-25 RX ADMIN — Medication 650 MILLIGRAM(S): at 12:13

## 2023-12-25 RX ADMIN — HYDROMORPHONE HYDROCHLORIDE 4 MILLIGRAM(S): 2 INJECTION INTRAMUSCULAR; INTRAVENOUS; SUBCUTANEOUS at 18:39

## 2023-12-25 RX ADMIN — HYDROMORPHONE HYDROCHLORIDE 1 MILLIGRAM(S): 2 INJECTION INTRAMUSCULAR; INTRAVENOUS; SUBCUTANEOUS at 15:31

## 2023-12-25 RX ADMIN — HYDROMORPHONE HYDROCHLORIDE 1 MILLIGRAM(S): 2 INJECTION INTRAMUSCULAR; INTRAVENOUS; SUBCUTANEOUS at 23:30

## 2023-12-25 RX ADMIN — HYDROMORPHONE HYDROCHLORIDE 4 MILLIGRAM(S): 2 INJECTION INTRAMUSCULAR; INTRAVENOUS; SUBCUTANEOUS at 17:39

## 2023-12-25 RX ADMIN — HYDROMORPHONE HYDROCHLORIDE 1 MILLIGRAM(S): 2 INJECTION INTRAMUSCULAR; INTRAVENOUS; SUBCUTANEOUS at 07:44

## 2023-12-25 RX ADMIN — FENTANYL CITRATE 1 PATCH: 50 INJECTION INTRAVENOUS at 07:40

## 2023-12-25 RX ADMIN — HYDROMORPHONE HYDROCHLORIDE 1 MILLIGRAM(S): 2 INJECTION INTRAMUSCULAR; INTRAVENOUS; SUBCUTANEOUS at 02:18

## 2023-12-25 RX ADMIN — CHLORHEXIDINE GLUCONATE 1 APPLICATION(S): 213 SOLUTION TOPICAL at 07:25

## 2023-12-25 RX ADMIN — HYDROMORPHONE HYDROCHLORIDE 1 MILLIGRAM(S): 2 INJECTION INTRAMUSCULAR; INTRAVENOUS; SUBCUTANEOUS at 22:30

## 2023-12-25 RX ADMIN — CYCLOBENZAPRINE HYDROCHLORIDE 5 MILLIGRAM(S): 10 TABLET, FILM COATED ORAL at 07:08

## 2023-12-25 RX ADMIN — CEFEPIME 100 MILLIGRAM(S): 1 INJECTION, POWDER, FOR SOLUTION INTRAMUSCULAR; INTRAVENOUS at 06:17

## 2023-12-25 RX ADMIN — HYDROMORPHONE HYDROCHLORIDE 1 MILLIGRAM(S): 2 INJECTION INTRAMUSCULAR; INTRAVENOUS; SUBCUTANEOUS at 08:14

## 2023-12-25 RX ADMIN — HYDROMORPHONE HYDROCHLORIDE 1 MILLIGRAM(S): 2 INJECTION INTRAMUSCULAR; INTRAVENOUS; SUBCUTANEOUS at 12:13

## 2023-12-25 RX ADMIN — OXYCODONE HYDROCHLORIDE 10 MILLIGRAM(S): 5 TABLET ORAL at 06:16

## 2023-12-25 RX ADMIN — DULOXETINE HYDROCHLORIDE 60 MILLIGRAM(S): 30 CAPSULE, DELAYED RELEASE ORAL at 23:20

## 2023-12-25 RX ADMIN — HYDROMORPHONE HYDROCHLORIDE 1 MILLIGRAM(S): 2 INJECTION INTRAMUSCULAR; INTRAVENOUS; SUBCUTANEOUS at 11:13

## 2023-12-25 NOTE — CHART NOTE - NSCHARTNOTEFT_GEN_A_CORE
12/25	she will be on po pain meds, dcp--acute rehab  Addendum--spiking temp while on PRBC trans--102.9, tachy--119, stop BT, tylenol , and monitor, cbc, haptoglobin, LDH, UC, BC, CT chest wcontrast, new T&S, ivf, all ordered, spoke to Attending, blood Bank and mom @ bedside. The rest of blood bag sent to [facility], copy of transf record signed and sent to the [facility].   Pt is AA & O x 3, clinically stable.  Ye [place] ([place]) [facility] # [id number] 12/25	she will be on po pain meds, dcp--acute rehab  Addendum--spiking temp while on PRBC trans--102.9, tachy--119, stop BT, tylenol , and monitor, cbc, haptoglobin, LDH, UC, BC, CT chest wcontrast, new T&S, ivf, all ordered, spoke to Attending, blood Bank and mom @ bedside. The rest of blood bag sent to [facility], copy of transf record signed and sent to the [facility].   Pt is AA & O x 3, clinically stable.  Ye [place]field ([place]) [facility] # [id number]

## 2023-12-25 NOTE — PROGRESS NOTE ADULT - ASSESSMENT
43F PMHx of MS on Rituxan, presenting with left chest wall and shoulder pain w/ concern for infection now admitted with likely septic arthritis with need for possible washout and debridement       Problem/Plan - 1:  ·  Problem: Septic arthritis. left shoulder joint   ·  Plan: Imaging consistent with septic arthritis  s/p OR   cont abx and fu cultures   check ECHO : no vegetation   fu with ID   d/w with CTS at bedside : drainage adjusted and fx improved   cont current abx   pt with LUE pain : tessie referred pain from L [id number] and chest however  Cervical MRI : no disciits / abcess   discussed with pt , family at length at bedside   washout and debridement of sternoclavicular joint; resection of medial manubrium; white and black wound vac placed  now s/p vac change  PICC in place         # c/o left arm swelling and pain in elbow region :  -doppler ordered to r/o DVT : Negative      Problem/Plan - 2:  ·  Problem: Multiple sclerosis.   ·  Plan: Gets Rituxan every 6 months, next dose due : holding   -On Ampyra BID at home, need to bring home med     Problem/Plan - 3:  ·  Problem: Anxiety.   ·  Plan: ISTOP reviewed Reference #: [id number]  -Cont. Clonazepam 0.5mg QHS PRN  -Cont. Sertraline 100mg QHS  -Cont. Duloxetine 60mg QHS, pt unsure of dose  -Need full med rec.     Problem/Plan - 4:  ·  Problem: Anemia.   ·  Plan: hgb 9, lower than prior year. Pt endorses some recent anemia but cannot specify  - pt feeling weak and fatigued   will chemo one unit of prbc        Problem/Plan - 5:  ·  Problem: Prophylactic measure.   ·  Plan: DVT PPx    hyperkalemia : normalized     awaiting placement    43F PMHx of MS on Rituxan, presenting with left chest wall and shoulder pain w/ concern for infection now admitted with likely septic arthritis with need for possible washout and debridement       Problem/Plan - 1:  ·  Problem: Septic arthritis. left shoulder joint   ·  Plan: Imaging consistent with septic arthritis  s/p OR   cont abx and fu cultures   check ECHO : no vegetation   fu with ID   d/w with CTS at bedside : drainage adjusted and fx improved   cont current abx   pt with LUE pain : tessie referred pain from L [id number] and chest however  Cervical MRI : no disciits / abcess   discussed with pt , family at length at bedside   washout and debridement of sternoclavicular joint; resection of medial manubrium; white and black wound vac placed  now s/p vac change  PICC in place         # c/o left arm swelling and pain in elbow region :  -doppler ordered to r/o DVT : Negative      Problem/Plan - 2:  ·  Problem: Multiple sclerosis.   ·  Plan: Gets Rituxan every 6 months, next dose due : holding   -On Ampyra BID at home, need to bring home med     Problem/Plan - 3:  ·  Problem: Anxiety.   ·  Plan: ISTOP reviewed Reference #: [id number]  -Cont. Clonazepam 0.5mg QHS PRN  -Cont. Sertraline 100mg QHS  -Cont. Duloxetine 60mg QHS, pt unsure of dose  -Need full med rec.     Problem/Plan - 4:  ·  Problem: Anemia.   ·  Plan: hgb 9, lower than prior year. Pt endorses some recent anemia but cannot specify  - pt feeling weak and fatigued   will chemo one unit of prbc        Problem/Plan - 5:  ·  Problem: Prophylactic measure.   ·  Plan: DVT PPx    hyperkalemia : normalized     awaiting placement

## 2023-12-25 NOTE — PROGRESS NOTE ADULT - SUBJECTIVE AND OBJECTIVE BOX
Date of service: 12-25-23 @ 16:14      Patient is a 43y old  Female who presents with a chief complaint of L shoulder and chest pain (24 Dec 2023 12:15)                                                               INTERVAL HPI/OVERNIGHT EVENTS:    REVIEW OF SYSTEMS:     CONSTITUTIONAL: No weakness, fevers or chills  EYES/ENT: No visual changes , no ear ache   RESPIRATORY: No cough, wheezing,  No shortness of breath  CARDIOVASCULAR: No chest pain or palpitations  GASTROINTESTINAL: No abdominal pain  . No nausea, vomiting, or hematemesis; No diarrhea or constipation. No melena or hematochezia.  GENITOURINARY: No dysuria, frequency or hematuria  NEUROLOGICAL: No numbness or weakness                                                                                                                                                                                                                                                                             Medications:  MEDICATIONS  (STANDING):  acetaminophen     Tablet .. 650 milliGRAM(s) Oral every 6 hours  cefepime   IVPB 2000 milliGRAM(s) IV Intermittent every 8 hours  chlorhexidine 2% Cloths 1 Application(s) Topical <User Schedule>  cyclobenzaprine 5 milliGRAM(s) Oral two times a day  dalfampridine ER 10 milliGRAM(s) Oral every 12 hours  DULoxetine 60 milliGRAM(s) Oral at bedtime  fentaNYL   Patch  12 MICROgram(s)/Hr 1 Patch Transdermal every 72 hours  oxyCODONE  ER Tablet 10 milliGRAM(s) Oral every 12 hours  senna 2 Tablet(s) Oral at bedtime  sertraline 100 milliGRAM(s) Oral at bedtime  Vibegron (Gemtesa) 75 milliGRAM(s),Vibegron (Gemtesa) 75mg tablet 1 Tablet(s) 1 Tablet(s) Oral daily    MEDICATIONS  (PRN):  benzocaine/menthol Lozenge 1 Lozenge Oral three times a day PRN Sore Throat  clonazePAM  Tablet 0.5 milliGRAM(s) Oral three times a day PRN anxiety  HYDROmorphone   Tablet 2 milliGRAM(s) Oral every 4 hours PRN Moderate Pain (4 - 6)  HYDROmorphone   Tablet 4 milliGRAM(s) Oral every 6 hours PRN Severe Pain (7 - 10)  HYDROmorphone  Injectable 1 milliGRAM(s) IV Push every 3 hours PRN for breakthrough pain  polyethylene glycol 3350 17 Gram(s) Oral daily PRN Constipation       Allergies    No Known Allergies    Intolerances      Vital Signs Last 24 Hrs  T(C): 36.8 (25 Dec 2023 13:59), Max: 37.1 (25 Dec 2023 05:56)  T(F): 98.2 (25 Dec 2023 13:59), Max: 98.7 (25 Dec 2023 05:56)  HR: 105 (25 Dec 2023 13:59) (92 - 108)  BP: 102/68 (25 Dec 2023 13:59) (98/61 - 121/59)  BP(mean): --  RR: 18 (25 Dec 2023 13:59) (18 - 18)  SpO2: 96% (25 Dec 2023 13:59) (94% - 96%)    Parameters below as of 25 Dec 2023 13:59  Patient On (Oxygen Delivery Method): room air      CAPILLARY BLOOD GLUCOSE          12-24 @ 07:01  -  12-25 @ 07:00  --------------------------------------------------------  IN: 1175 mL / OUT: 0 mL / NET: 1175 mL    12-25 @ 07:01  -  12-25 @ 16:14  --------------------------------------------------------  IN: 340 mL / OUT: 0 mL / NET: 340 mL      Physical Exam:    Daily     Daily   General:  Well appearing, NAD, not cachetic  HEENT:  Nonicteric, PERRLA  CV:  RRR, S1S2   Lungs:  CTA B/L, no wheezes, rales, rhonchi  Abdomen:  Soft, non-tender, no distended, positive BS  Extremities:  2+ pulses, no c/c, no edema  Skin:  Warm and dry, no rashes  :  No stanford  Neuro:  AAOx3, non-focal, grossly intact                                                                                                                                                                                                                                                                                                LABS:                               7.6    11.40 )-----------( 475      ( 25 Dec 2023 09:00 )             24.6                      12-25    138  |  101  |  7   ----------------------------<  90  3.8   |  26  |  0.42<L>    Ca    9.0      25 Dec 2023 09:00  Phos  3.5     12-25    TPro  6.1  /  Alb  3.3  /  TBili  0.2  /  DBili  x   /  AST  10  /  ALT  12  /  AlkPhos  297<H>  12-25                       RADIOLOGY & ADDITIONAL TESTS         I personally reviewed: [  ]EKG   [  ]CXR    [  ] CT      A/P:         Discussed with :     [facility] consultants' Notes   Time spent :

## 2023-12-26 LAB
ALBUMIN SERPL ELPH-MCNC: 3.2 G/DL — LOW (ref 3.3–5)
ALBUMIN SERPL ELPH-MCNC: 3.2 G/DL — LOW (ref 3.3–5)
ALP SERPL-CCNC: 363 U/L — HIGH (ref 40–120)
ALP SERPL-CCNC: 363 U/L — HIGH (ref 40–120)
ALT FLD-CCNC: 18 U/L — SIGNIFICANT CHANGE UP (ref 10–45)
ALT FLD-CCNC: 18 U/L — SIGNIFICANT CHANGE UP (ref 10–45)
ANION GAP SERPL CALC-SCNC: 10 MMOL/L — SIGNIFICANT CHANGE UP (ref 5–17)
ANION GAP SERPL CALC-SCNC: 10 MMOL/L — SIGNIFICANT CHANGE UP (ref 5–17)
AST SERPL-CCNC: 20 U/L — SIGNIFICANT CHANGE UP (ref 10–40)
AST SERPL-CCNC: 20 U/L — SIGNIFICANT CHANGE UP (ref 10–40)
BASOPHILS # BLD AUTO: 0.04 K/UL — SIGNIFICANT CHANGE UP (ref 0–0.2)
BASOPHILS # BLD AUTO: 0.04 K/UL — SIGNIFICANT CHANGE UP (ref 0–0.2)
BASOPHILS NFR BLD AUTO: 0.3 % — SIGNIFICANT CHANGE UP (ref 0–2)
BASOPHILS NFR BLD AUTO: 0.3 % — SIGNIFICANT CHANGE UP (ref 0–2)
BILIRUB SERPL-MCNC: 0.3 MG/DL — SIGNIFICANT CHANGE UP (ref 0.2–1.2)
BILIRUB SERPL-MCNC: 0.3 MG/DL — SIGNIFICANT CHANGE UP (ref 0.2–1.2)
BUN SERPL-MCNC: 7 MG/DL — SIGNIFICANT CHANGE UP (ref 7–23)
BUN SERPL-MCNC: 7 MG/DL — SIGNIFICANT CHANGE UP (ref 7–23)
CALCIUM SERPL-MCNC: 9 MG/DL — SIGNIFICANT CHANGE UP (ref 8.4–10.5)
CALCIUM SERPL-MCNC: 9 MG/DL — SIGNIFICANT CHANGE UP (ref 8.4–10.5)
CHLORIDE SERPL-SCNC: 102 MMOL/L — SIGNIFICANT CHANGE UP (ref 96–108)
CHLORIDE SERPL-SCNC: 102 MMOL/L — SIGNIFICANT CHANGE UP (ref 96–108)
CO2 SERPL-SCNC: 24 MMOL/L — SIGNIFICANT CHANGE UP (ref 22–31)
CO2 SERPL-SCNC: 24 MMOL/L — SIGNIFICANT CHANGE UP (ref 22–31)
CREAT SERPL-MCNC: 0.4 MG/DL — LOW (ref 0.5–1.3)
CREAT SERPL-MCNC: 0.4 MG/DL — LOW (ref 0.5–1.3)
EGFR: 125 ML/MIN/1.73M2 — SIGNIFICANT CHANGE UP
EGFR: 125 ML/MIN/1.73M2 — SIGNIFICANT CHANGE UP
EOSINOPHIL # BLD AUTO: 0.13 K/UL — SIGNIFICANT CHANGE UP (ref 0–0.5)
EOSINOPHIL # BLD AUTO: 0.13 K/UL — SIGNIFICANT CHANGE UP (ref 0–0.5)
EOSINOPHIL NFR BLD AUTO: 1.1 % — SIGNIFICANT CHANGE UP (ref 0–6)
EOSINOPHIL NFR BLD AUTO: 1.1 % — SIGNIFICANT CHANGE UP (ref 0–6)
GLUCOSE SERPL-MCNC: 117 MG/DL — HIGH (ref 70–99)
GLUCOSE SERPL-MCNC: 117 MG/DL — HIGH (ref 70–99)
HAPTOGLOB SERPL-MCNC: 400 MG/DL — HIGH (ref 34–200)
HAPTOGLOB SERPL-MCNC: 400 MG/DL — HIGH (ref 34–200)
HCT VFR BLD CALC: 25.2 % — LOW (ref 34.5–45)
HCT VFR BLD CALC: 25.2 % — LOW (ref 34.5–45)
HGB BLD-MCNC: 7.7 G/DL — LOW (ref 11.5–15.5)
HGB BLD-MCNC: 7.7 G/DL — LOW (ref 11.5–15.5)
IMM GRANULOCYTES NFR BLD AUTO: 1.1 % — HIGH (ref 0–0.9)
IMM GRANULOCYTES NFR BLD AUTO: 1.1 % — HIGH (ref 0–0.9)
LYMPHOCYTES # BLD AUTO: 1.66 K/UL — SIGNIFICANT CHANGE UP (ref 1–3.3)
LYMPHOCYTES # BLD AUTO: 1.66 K/UL — SIGNIFICANT CHANGE UP (ref 1–3.3)
LYMPHOCYTES # BLD AUTO: 13.5 % — SIGNIFICANT CHANGE UP (ref 13–44)
LYMPHOCYTES # BLD AUTO: 13.5 % — SIGNIFICANT CHANGE UP (ref 13–44)
MCHC RBC-ENTMCNC: 24.1 PG — LOW (ref 27–34)
MCHC RBC-ENTMCNC: 24.1 PG — LOW (ref 27–34)
MCHC RBC-ENTMCNC: 30.6 GM/DL — LOW (ref 32–36)
MCHC RBC-ENTMCNC: 30.6 GM/DL — LOW (ref 32–36)
MCV RBC AUTO: 79 FL — LOW (ref 80–100)
MCV RBC AUTO: 79 FL — LOW (ref 80–100)
MONOCYTES # BLD AUTO: 1.45 K/UL — HIGH (ref 0–0.9)
MONOCYTES # BLD AUTO: 1.45 K/UL — HIGH (ref 0–0.9)
MONOCYTES NFR BLD AUTO: 11.8 % — SIGNIFICANT CHANGE UP (ref 2–14)
MONOCYTES NFR BLD AUTO: 11.8 % — SIGNIFICANT CHANGE UP (ref 2–14)
NEUTROPHILS # BLD AUTO: 8.85 K/UL — HIGH (ref 1.8–7.4)
NEUTROPHILS # BLD AUTO: 8.85 K/UL — HIGH (ref 1.8–7.4)
NEUTROPHILS NFR BLD AUTO: 72.2 % — SIGNIFICANT CHANGE UP (ref 43–77)
NEUTROPHILS NFR BLD AUTO: 72.2 % — SIGNIFICANT CHANGE UP (ref 43–77)
NRBC # BLD: 0 /100 WBCS — SIGNIFICANT CHANGE UP (ref 0–0)
NRBC # BLD: 0 /100 WBCS — SIGNIFICANT CHANGE UP (ref 0–0)
PLATELET # BLD AUTO: 426 K/UL — HIGH (ref 150–400)
PLATELET # BLD AUTO: 426 K/UL — HIGH (ref 150–400)
POTASSIUM SERPL-MCNC: 3.5 MMOL/L — SIGNIFICANT CHANGE UP (ref 3.5–5.3)
POTASSIUM SERPL-MCNC: 3.5 MMOL/L — SIGNIFICANT CHANGE UP (ref 3.5–5.3)
POTASSIUM SERPL-SCNC: 3.5 MMOL/L — SIGNIFICANT CHANGE UP (ref 3.5–5.3)
POTASSIUM SERPL-SCNC: 3.5 MMOL/L — SIGNIFICANT CHANGE UP (ref 3.5–5.3)
PROT SERPL-MCNC: 6.1 G/DL — SIGNIFICANT CHANGE UP (ref 6–8.3)
PROT SERPL-MCNC: 6.1 G/DL — SIGNIFICANT CHANGE UP (ref 6–8.3)
RAPID RVP RESULT: SIGNIFICANT CHANGE UP
RAPID RVP RESULT: SIGNIFICANT CHANGE UP
RBC # BLD: 3.19 M/UL — LOW (ref 3.8–5.2)
RBC # BLD: 3.19 M/UL — LOW (ref 3.8–5.2)
RBC # FLD: 15.8 % — HIGH (ref 10.3–14.5)
RBC # FLD: 15.8 % — HIGH (ref 10.3–14.5)
SARS-COV-2 RNA SPEC QL NAA+PROBE: SIGNIFICANT CHANGE UP
SARS-COV-2 RNA SPEC QL NAA+PROBE: SIGNIFICANT CHANGE UP
SODIUM SERPL-SCNC: 136 MMOL/L — SIGNIFICANT CHANGE UP (ref 135–145)
SODIUM SERPL-SCNC: 136 MMOL/L — SIGNIFICANT CHANGE UP (ref 135–145)
WBC # BLD: 12.26 K/UL — HIGH (ref 3.8–10.5)
WBC # BLD: 12.26 K/UL — HIGH (ref 3.8–10.5)
WBC # FLD AUTO: 12.26 K/UL — HIGH (ref 3.8–10.5)
WBC # FLD AUTO: 12.26 K/UL — HIGH (ref 3.8–10.5)

## 2023-12-26 PROCEDURE — 71260 CT THORAX DX C+: CPT | Mod: 26

## 2023-12-26 PROCEDURE — 93010 ELECTROCARDIOGRAM REPORT: CPT

## 2023-12-26 PROCEDURE — 99233 SBSQ HOSP IP/OBS HIGH 50: CPT

## 2023-12-26 RX ORDER — HYDROMORPHONE HYDROCHLORIDE 2 MG/ML
4 INJECTION INTRAMUSCULAR; INTRAVENOUS; SUBCUTANEOUS EVERY 6 HOURS
Refills: 0 | Status: DISCONTINUED | OUTPATIENT
Start: 2023-12-26 | End: 2024-01-02

## 2023-12-26 RX ORDER — BENZOCAINE AND MENTHOL 5; 1 G/100ML; G/100ML
1 LIQUID ORAL ONCE
Refills: 0 | Status: COMPLETED | OUTPATIENT
Start: 2023-12-26 | End: 2023-12-26

## 2023-12-26 RX ORDER — HYDROMORPHONE HYDROCHLORIDE 2 MG/ML
2 INJECTION INTRAMUSCULAR; INTRAVENOUS; SUBCUTANEOUS EVERY 4 HOURS
Refills: 0 | Status: DISCONTINUED | OUTPATIENT
Start: 2023-12-26 | End: 2024-01-02

## 2023-12-26 RX ORDER — OXYCODONE HYDROCHLORIDE 5 MG/1
10 TABLET ORAL EVERY 12 HOURS
Refills: 0 | Status: DISCONTINUED | OUTPATIENT
Start: 2023-12-26 | End: 2024-01-02

## 2023-12-26 RX ORDER — HYDROMORPHONE HYDROCHLORIDE 2 MG/ML
1 INJECTION INTRAMUSCULAR; INTRAVENOUS; SUBCUTANEOUS
Refills: 0 | Status: DISCONTINUED | OUTPATIENT
Start: 2023-12-26 | End: 2023-12-28

## 2023-12-26 RX ORDER — ALTEPLASE 100 MG
2 KIT INTRAVENOUS ONCE
Refills: 0 | Status: COMPLETED | OUTPATIENT
Start: 2023-12-26 | End: 2023-12-26

## 2023-12-26 RX ADMIN — Medication 650 MILLIGRAM(S): at 04:42

## 2023-12-26 RX ADMIN — HYDROMORPHONE HYDROCHLORIDE 1 MILLIGRAM(S): 2 INJECTION INTRAMUSCULAR; INTRAVENOUS; SUBCUTANEOUS at 12:37

## 2023-12-26 RX ADMIN — CHLORHEXIDINE GLUCONATE 1 APPLICATION(S): 213 SOLUTION TOPICAL at 13:07

## 2023-12-26 RX ADMIN — SENNA PLUS 2 TABLET(S): 8.6 TABLET ORAL at 21:53

## 2023-12-26 RX ADMIN — CEFEPIME 100 MILLIGRAM(S): 1 INJECTION, POWDER, FOR SOLUTION INTRAMUSCULAR; INTRAVENOUS at 23:46

## 2023-12-26 RX ADMIN — CEFEPIME 100 MILLIGRAM(S): 1 INJECTION, POWDER, FOR SOLUTION INTRAMUSCULAR; INTRAVENOUS at 15:41

## 2023-12-26 RX ADMIN — Medication 0.5 MILLIGRAM(S): at 15:13

## 2023-12-26 RX ADMIN — CEFEPIME 100 MILLIGRAM(S): 1 INJECTION, POWDER, FOR SOLUTION INTRAMUSCULAR; INTRAVENOUS at 05:59

## 2023-12-26 RX ADMIN — POLYETHYLENE GLYCOL 3350 17 GRAM(S): 17 POWDER, FOR SOLUTION ORAL at 12:06

## 2023-12-26 RX ADMIN — HYDROMORPHONE HYDROCHLORIDE 1 MILLIGRAM(S): 2 INJECTION INTRAMUSCULAR; INTRAVENOUS; SUBCUTANEOUS at 23:20

## 2023-12-26 RX ADMIN — HYDROMORPHONE HYDROCHLORIDE 4 MILLIGRAM(S): 2 INJECTION INTRAMUSCULAR; INTRAVENOUS; SUBCUTANEOUS at 06:49

## 2023-12-26 RX ADMIN — OXYCODONE HYDROCHLORIDE 10 MILLIGRAM(S): 5 TABLET ORAL at 21:01

## 2023-12-26 RX ADMIN — ALTEPLASE 2 MILLIGRAM(S): KIT at 20:48

## 2023-12-26 RX ADMIN — Medication 650 MILLIGRAM(S): at 19:08

## 2023-12-26 RX ADMIN — Medication 650 MILLIGRAM(S): at 19:38

## 2023-12-26 RX ADMIN — SERTRALINE 100 MILLIGRAM(S): 25 TABLET, FILM COATED ORAL at 21:53

## 2023-12-26 RX ADMIN — FENTANYL CITRATE 1 PATCH: 50 INJECTION INTRAVENOUS at 21:06

## 2023-12-26 RX ADMIN — OXYCODONE HYDROCHLORIDE 10 MILLIGRAM(S): 5 TABLET ORAL at 20:01

## 2023-12-26 RX ADMIN — HYDROMORPHONE HYDROCHLORIDE 4 MILLIGRAM(S): 2 INJECTION INTRAMUSCULAR; INTRAVENOUS; SUBCUTANEOUS at 15:25

## 2023-12-26 RX ADMIN — Medication 650 MILLIGRAM(S): at 05:42

## 2023-12-26 RX ADMIN — ENOXAPARIN SODIUM 40 MILLIGRAM(S): 100 INJECTION SUBCUTANEOUS at 06:02

## 2023-12-26 RX ADMIN — HYDROMORPHONE HYDROCHLORIDE 4 MILLIGRAM(S): 2 INJECTION INTRAMUSCULAR; INTRAVENOUS; SUBCUTANEOUS at 22:52

## 2023-12-26 RX ADMIN — Medication 1 TABLET(S): at 12:06

## 2023-12-26 RX ADMIN — HYDROMORPHONE HYDROCHLORIDE 4 MILLIGRAM(S): 2 INJECTION INTRAMUSCULAR; INTRAVENOUS; SUBCUTANEOUS at 21:52

## 2023-12-26 RX ADMIN — OXYCODONE HYDROCHLORIDE 10 MILLIGRAM(S): 5 TABLET ORAL at 05:42

## 2023-12-26 RX ADMIN — HYDROMORPHONE HYDROCHLORIDE 4 MILLIGRAM(S): 2 INJECTION INTRAMUSCULAR; INTRAVENOUS; SUBCUTANEOUS at 07:49

## 2023-12-26 RX ADMIN — BENZOCAINE AND MENTHOL 1 LOZENGE: 5; 1 LIQUID ORAL at 13:02

## 2023-12-26 RX ADMIN — HYDROMORPHONE HYDROCHLORIDE 4 MILLIGRAM(S): 2 INJECTION INTRAMUSCULAR; INTRAVENOUS; SUBCUTANEOUS at 15:55

## 2023-12-26 RX ADMIN — HYDROMORPHONE HYDROCHLORIDE 2 MILLIGRAM(S): 2 INJECTION INTRAMUSCULAR; INTRAVENOUS; SUBCUTANEOUS at 09:05

## 2023-12-26 RX ADMIN — DULOXETINE HYDROCHLORIDE 60 MILLIGRAM(S): 30 CAPSULE, DELAYED RELEASE ORAL at 21:53

## 2023-12-26 RX ADMIN — ENOXAPARIN SODIUM 40 MILLIGRAM(S): 100 INJECTION SUBCUTANEOUS at 21:07

## 2023-12-26 RX ADMIN — BENZOCAINE AND MENTHOL 1 LOZENGE: 5; 1 LIQUID ORAL at 05:59

## 2023-12-26 RX ADMIN — OXYCODONE HYDROCHLORIDE 10 MILLIGRAM(S): 5 TABLET ORAL at 04:42

## 2023-12-26 RX ADMIN — FENTANYL CITRATE 1 PATCH: 50 INJECTION INTRAVENOUS at 21:33

## 2023-12-26 RX ADMIN — HYDROMORPHONE HYDROCHLORIDE 1 MILLIGRAM(S): 2 INJECTION INTRAMUSCULAR; INTRAVENOUS; SUBCUTANEOUS at 22:57

## 2023-12-26 RX ADMIN — CYCLOBENZAPRINE HYDROCHLORIDE 5 MILLIGRAM(S): 10 TABLET, FILM COATED ORAL at 19:08

## 2023-12-26 RX ADMIN — HYDROMORPHONE HYDROCHLORIDE 2 MILLIGRAM(S): 2 INJECTION INTRAMUSCULAR; INTRAVENOUS; SUBCUTANEOUS at 09:35

## 2023-12-26 RX ADMIN — CYCLOBENZAPRINE HYDROCHLORIDE 5 MILLIGRAM(S): 10 TABLET, FILM COATED ORAL at 05:59

## 2023-12-26 RX ADMIN — HYDROMORPHONE HYDROCHLORIDE 1 MILLIGRAM(S): 2 INJECTION INTRAMUSCULAR; INTRAVENOUS; SUBCUTANEOUS at 13:07

## 2023-12-26 NOTE — PROGRESS NOTE ADULT - ASSESSMENT
43F PMHx of MS on Rituxan, presenting with left chest wall and shoulder pain w/ concern for infection now admitted with likely septic arthritis with need for possible washout and debridement       Problem/Plan - 1:  ·  Problem: Septic arthritis. left shoulder joint   ·  Plan: Imaging consistent with septic arthritis  s/p OR   cont abx and fu cultures   check ECHO : no vegetation   fu with ID   d/w with CTS at bedside : drainage adjusted and fx improved   cont current abx   pt with LUE pain : kaciley referred pain from L [id number] and chest however  Cervical MRI : no disciits / abcess   discussed with pt , family at length at bedside   washout and debridement of sternoclavicular joint; resection of medial manubrium; white and black wound vac placed  now s/p vac change  PICC in place   pt had an episode of fever .. d/w ID : fu gonzalez cultuers and ct chest   cont current abx           # c/o left arm swelling and pain in elbow region :  -doppler ordered to r/o DVT : Negative      Problem/Plan - 2:  ·  Problem: Multiple sclerosis.   ·  Plan: Gets Rituxan every 6 months, next dose due : holding   -On Ampyra BID at home, need to bring home med     Problem/Plan - 3:  ·  Problem: Anxiety.   ·  Plan: ISTOP reviewed Reference #: [id number]  -Cont. Clonazepam 0.5mg QHS PRN  -Cont. Sertraline 100mg QHS  -Cont. Duloxetine 60mg QHS, pt unsure of dose  -Need full med rec.     Problem/Plan - 4:  ·  Problem: Anemia.   ·  Plan: hgb 9, lower than prior year. Pt endorses some recent anemia but cannot specify  - pt feeling weak and fatigued   will chemo one unit of prbc        Problem/Plan - 5:  ·  Problem: Prophylactic measure.   ·  Plan: DVT PPx    hyperkalemia : normalized     awaiting placement    43F PMHx of MS on Rituxan, presenting with left chest wall and shoulder pain w/ concern for infection now admitted with likely septic arthritis with need for possible washout and debridement       Problem/Plan - 1:  ·  Problem: Septic arthritis. left shoulder joint   ·  Plan: Imaging consistent with septic arthritis  s/p OR   cont abx and fu cultures   check ECHO : no vegetation   fu with ID   d/w with CTS at bedside : drainage adjusted and fx improved   cont current abx   pt with LUE pain : kaciley referred pain from L [id number] and chest however  Cervical MRI : no disciits / abcess   discussed with pt , family at length at bedside   washout and debridement of sternoclavicular joint; resection of medial manubrium; white and black wound vac placed  now s/p vac change  PICC in place   pt had an episode of fever .. d/w ID : fu gonzalez cultuers and ct chest   cont current abx           # c/o left arm swelling and pain in elbow region :  -doppler ordered to r/o DVT : Negative      Problem/Plan - 2:  ·  Problem: Multiple sclerosis.   ·  Plan: Gets Rituxan every 6 months, next dose due : holding   -On Ampyra BID at home, need to bring home med     Problem/Plan - 3:  ·  Problem: Anxiety.   ·  Plan: ISTOP reviewed Reference #: [id number]  -Cont. Clonazepam 0.5mg QHS PRN  -Cont. Sertraline 100mg QHS  -Cont. Duloxetine 60mg QHS, pt unsure of dose  -Need full med rec.     Problem/Plan - 4:  ·  Problem: Anemia.   ·  Plan: hgb 9, lower than prior year. Pt endorses some recent anemia but cannot specify  - pt feeling weak and fatigued   will chemo one unit of prbc        Problem/Plan - 5:  ·  Problem: Prophylactic measure.   ·  Plan: DVT PPx    hyperkalemia : normalized     awaiting placement

## 2023-12-26 NOTE — PROGRESS NOTE ADULT - SUBJECTIVE AND OBJECTIVE BOX
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  She had a recent fall in 2023 secondary to unsteadiness due to multiple sclerosis, with subsequent left chest wall and shoulder pain.  Was seen at [facility] with concern for MS exacerbation but no MRI brain and cervical spine with an without contrast were done at the time without enhancement.  At the time infectious workup was done and notable for UTI which was treated.  She continued to have pain.  In the interim, she had an outpatient MRI of left sternoclavicular joint with results significant for marrow edema and swelling of the left sternoclavicular joint with a 2 cm fluid collection and adjacent pleural edema in the left chest with concerns for infectious etiology.   CT chest w/ IV contrast suggestive of septic arthritis with surrounding erythema. Thoracic surgery consulted.  Patient s/p surgical debridement on 23    Int hx:  mri with 80 x 25 mm rim-enhancing fluid collection persists in the surgical cavity with well-placed drain within the fluid collection.   Marrow signal abnormality in the manubrium concerning for acute   osteomyelitis.    Low-grade partial-thickness bursal surface tear of the supraspinatus tendon.    had transient fever getting workup         ******    Pt still with sternal pain, says 10/10, pain also in left arm    Past Medical History:  Multiple sclerosis [G35]        Past Surgical History:  History of  [Z98.891]        Social History:  No toxic habits    Family History:  FAMILY HISTORY:      Allergies:  No Known Allergies      ROS:  Constitutional: Patient offers no complaints of fevers or significant weight loss  Ears, Nose, Mouth and Throat: The patient presents with no abnormalities of the head, ears, eyes, nose or throat  Skin: Patient offers no concerns of new rashes or lesions  Respiratory: The patient presents with no abnormalities of the respiratory tract  Cardiovascular: The patient presents with no cardiac abnormalities  Gastrointestinal: The patient presents with no abnormalities of the GI system  Genitourinary: The patient presents with no dysuria, hematuria or frequent urination  Neurological: See HPI  Endocrine: Patient offers no complaints of excessive thirst, urination, or heat/cold intolerance    Advanced care planning reviewed and noted in the chart.    Medications:  acetaminophen     Tablet .. 650 milliGRAM(s) Oral every 6 hours  benzocaine/menthol Lozenge 1 Lozenge Oral three times a day PRN  cefepime   IVPB 2000 milliGRAM(s) IV Intermittent every 8 hours  chlorhexidine 2% Cloths 1 Application(s) Topical <User Schedule>  clonazePAM  Tablet 0.5 milliGRAM(s) Oral three times a day PRN  cyclobenzaprine 5 milliGRAM(s) Oral two times a day  dalfampridine ER 10 milliGRAM(s) Oral every 12 hours  DULoxetine 60 milliGRAM(s) Oral at bedtime  enoxaparin Injectable 40 milliGRAM(s) SubCutaneous every 24 hours  fentaNYL   Patch  12 MICROgram(s)/Hr 1 Patch Transdermal every 72 hours  HYDROmorphone   Tablet 4 milliGRAM(s) Oral every 6 hours PRN  HYDROmorphone   Tablet 2 milliGRAM(s) Oral every 4 hours PRN  HYDROmorphone  Injectable 1 milliGRAM(s) IV Push every 3 hours PRN  multivitamin/minerals 1 Tablet(s) Oral daily  oxyCODONE  ER Tablet 10 milliGRAM(s) Oral every 12 hours  polyethylene glycol 3350 17 Gram(s) Oral daily PRN  senna 2 Tablet(s) Oral at bedtime  sertraline 100 milliGRAM(s) Oral at bedtime  Vibegron (Gemtesa) 75 milliGRAM(s),Vibegron (Gemtesa) 75mg tablet 1 Tablet(s) 1 Tablet(s) Oral daily      Labs:  CBC Full  -  ( 26 Dec 2023 08:53 )  WBC Count : 12.26 K/uL  RBC Count : 3.19 M/uL  Hemoglobin : 7.7 g/dL  Hematocrit : 25.2 %  Platelet Count - Automated : 426 K/uL  Mean Cell Volume : 79.0 fl  Mean Cell Hemoglobin : 24.1 pg  Mean Cell Hemoglobin Concentration : 30.6 gm/dL  Auto Neutrophil # : 8.85 K/uL  Auto Lymphocyte # : 1.66 K/uL  Auto Monocyte # : 1.45 K/uL  Auto Eosinophil # : 0.13 K/uL  Auto Basophil # : 0.04 K/uL  Auto Neutrophil % : 72.2 %  Auto Lymphocyte % : 13.5 %  Auto Monocyte % : 11.8 %  Auto Eosinophil % : 1.1 %  Auto Basophil % : 0.3 %        136  |  102  |  7   ----------------------------<  117<H>  3.5   |  24  |  0.40<L>    Ca    9.0      26 Dec 2023 08:52  Phos  3.5         TPro  6.1  /  Alb  3.2<L>  /  TBili  0.3  /  DBili  x   /  AST  20  /  ALT  18  /  AlkPhos  363<H>      CAPILLARY BLOOD GLUCOSE        LIVER FUNCTIONS - ( 26 Dec 2023 08:52 )  Alb: 3.2 g/dL / Pro: 6.1 g/dL / ALK PHOS: 363 U/L / ALT: 18 U/L / AST: 20 U/L / GGT: x             Urinalysis Basic - ( 26 Dec 2023 08:52 )    Color: x / Appearance: x / SG: x / pH: x  Gluc: 117 mg/dL / Ketone: x  / Bili: x / Urobili: x   Blood: x / Protein: x / Nitrite: x   Leuk Esterase: x / RBC: x / WBC x   Sq Epi: x / Non Sq Epi: x / Bacteria: x          Vitals:  Vital Signs Last 24 Hrs  T(C): 36.7 (26 Dec 2023 10:47), Max: 39.4 (25 Dec 2023 18:55)  T(F): 98.1 (26 Dec 2023 10:47), Max: 102.9 (25 Dec 2023 18:55)  HR: 106 (26 Dec 2023 10:47) (91 - 118)  BP: 115/77 (26 Dec 2023 10:47) (102/68 - 115/77)  BP(mean): --  RR: 18 (26 Dec 2023 10:47) (18 - 18)  SpO2: 96% (26 Dec 2023 10:47) (90% - 96%)    Parameters below as of 26 Dec 2023 10:47  Patient On (Oxygen Delivery Method): nasal cannula  O2 Flow (L/min): 2          NEUROLOGICAL EXAM:    Mental status: Awake, alert, and in less apparent distress. Oriented to person, place and time. Language function is normal.      Cranial Nerves: Pupils were equal, round, reactive to light. Extraocular movements were intact. Visual field were full. Fundoscopic exam was deferred. Facial sensation was intact to light touch. There was slight left facial droop. The palate was upgoing symmetrically and tongue was midline.     Motor exam: Bulk and tone were normal.  Antigravity in all extremities without drift.  Chronic relative right hemiparesis.    Reflexes: deferred     Sensation: Intact to light touch    Coordination: no gross dysmetria    Gait: deferred   sternal wound seen          ACC: [id number] EXAM:  MR SHOULDER WAW IC LT   ORDERED BY:  BRAN SANCHEZ     ACC: [id number] EXAM:  MR STERNOCLAVICULAR JNT LT   ORDERED BY: ANASTASIIA WALKER     PROCEDURE DATE:  2023          INTERPRETATION:  MRI OF THE LEFT STERNOCLAVICULAR JOINT AND SHOULDER    CLINICAL INFORMATION: Left sternal abscess status post washout with   persistent purulent drainage and left shoulder pain.  TECHNIQUE: Multisequence, multiplanar MRI of the left sternoclavicular   joint. The study was performed before and after the intravenous   administration of 6 ml Gadavist (1.5 cc discarded) .    COMPARISON: Chest CT 2023 and 2023.    FINDINGS:    STERNOCLAVICULAR JOINT:    BONE: Patient status post surgical resection of the left clavicular head.   The surgical margin is sharp with trace edema and no loss of T1   hyperintense signal. In the manubrium there is increased STIR signal with   loss of T1 hyperintense signal and postcontrast enhancement involving the   superolateral leftward aspect of the bone concerning for acute   osteomyelitis (6:9 5:9). No acute fracture. No osteonecrosis.    JOINTS: A residual rim-enhancing fluid collection is seen in the region   of the left sternoclavicular joint measuring 80 x 25 mm. A drainage   catheter is seen within the fluid collection.    SOFT TISSUE: Postsurgical changes are seen along the anterior aspect of   the left sternoclavicular joint. There is a mild amount of edema in the   adjacent pectoralis major muscle. No focal fluid collection in the   anterior mediastinum. Susceptibility artifact from surgical staples are   seen along the anterior chest wall.      SHOULDER JOINT:    ROTATOR CUFF: Low-grade partial-thickness bursal surface tearing of the   supraspinatus tendon. Rotator cuff is otherwise intact.  MUSCLES: No focal muscle edema or atrophy.  BICEPS TENDON: Normal in course and caliber.  GLENOID LABRUM AND GLENOHUMERAL LIGAMENTS: No displaced labral tear.   Inferior glenohumeral ligament is intact.  GLENOHUMERAL CARTILAGE AND SUBCHONDRAL BONE: No full-thickness chondral   loss.  AC JOINT: No AC joint arthropathy.  SYNOVIUM/JOINT FLUID: No glenohumeral joint effusion. No focal fluid in   the subacromial/subdeltoid bursa.  BONE MARROW: No fracture or osteonecrosis. No marrow signal change to   suggest acute osteomyelitis.  NEUROVASCULAR STRUCTURES: Structures of the suprascapular notch,   spinoglenoid notch, and quadrilateral space are normal in course and   caliber.  SUBCUTANEOUS SOFT TISSUES: Edema in the subcutaneous fat of the left   shoulder. No rim-enhancing fluid collection to suggest abscess.        IMPRESSION:  1.  Patient status post surgical resection of the left clavicular head   with washout of the left sternoclavicular joint.  2.  A 80 x 25 mm rim-enhancing fluid collection persists in the surgical   cavity with well-placed drain within the fluid collection.  3.  Marrow signal abnormality in the manubrium concerning for acute   osteomyelitis.  4.  Low-grade partial-thickness bursal surface tear of the supraspinatus   tendon.    --- End of Report ---            RASHIDA HYDE MD; Attending Radiologist  This document has been electronically signed. 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  She had a recent fall in 2023 secondary to unsteadiness due to multiple sclerosis, with subsequent left chest wall and shoulder pain.  Was seen at  with concern for MS exacerbation but no MRI brain and cervical spine with an without contrast were done at the time without enhancement.  At the time infectious workup was done and notable for UTI which was treated.  She continued to have pain.  In the interim, she had an outpatient MRI of left sternoclavicular joint with results significant for marrow edema and swelling of the left sternoclavicular joint with a 2 cm fluid collection and adjacent pleural edema in the left chest with concerns for infectious etiology.   CT chest w/ IV contrast suggestive of septic arthritis with surrounding erythema. Thoracic surgery consulted.  Patient s/p surgical debridement on 23    Int hx:  mri with 80 x 25 mm rim-enhancing fluid collection persists in the surgical cavity with well-placed drain within the fluid collection.   Marrow signal abnormality in the manubrium concerning for acute   osteomyelitis.    Low-grade partial-thickness bursal surface tear of the supraspinatus tendon.    had transient fever getting workup         ******    Pt still with sternal pain, says 10/10, pain also in left arm    Past Medical History:  Multiple sclerosis [G35]        Past Surgical History:  History of  [Z98.891]        Social History:  No toxic habits    Family History:  FAMILY HISTORY:      Allergies:  No Known Allergies      ROS:  Constitutional: Patient offers no complaints of fevers or significant weight loss  Ears, Nose, Mouth and Throat: The patient presents with no abnormalities of the head, ears, eyes, nose or throat  Skin: Patient offers no concerns of new rashes or lesions  Respiratory: The patient presents with no abnormalities of the respiratory tract  Cardiovascular: The patient presents with no cardiac abnormalities  Gastrointestinal: The patient presents with no abnormalities of the GI system  Genitourinary: The patient presents with no dysuria, hematuria or frequent urination  Neurological: See HPI  Endocrine: Patient offers no complaints of excessive thirst, urination, or heat/cold intolerance    Advanced care planning reviewed and noted in the chart.    Medications:  acetaminophen     Tablet .. 650 milliGRAM(s) Oral every 6 hours  benzocaine/menthol Lozenge 1 Lozenge Oral three times a day PRN  cefepime   IVPB 2000 milliGRAM(s) IV Intermittent every 8 hours  chlorhexidine 2% Cloths 1 Application(s) Topical <User Schedule>  clonazePAM  Tablet 0.5 milliGRAM(s) Oral three times a day PRN  cyclobenzaprine 5 milliGRAM(s) Oral two times a day  dalfampridine ER 10 milliGRAM(s) Oral every 12 hours  DULoxetine 60 milliGRAM(s) Oral at bedtime  enoxaparin Injectable 40 milliGRAM(s) SubCutaneous every 24 hours  fentaNYL   Patch  12 MICROgram(s)/Hr 1 Patch Transdermal every 72 hours  HYDROmorphone   Tablet 4 milliGRAM(s) Oral every 6 hours PRN  HYDROmorphone   Tablet 2 milliGRAM(s) Oral every 4 hours PRN  HYDROmorphone  Injectable 1 milliGRAM(s) IV Push every 3 hours PRN  multivitamin/minerals 1 Tablet(s) Oral daily  oxyCODONE  ER Tablet 10 milliGRAM(s) Oral every 12 hours  polyethylene glycol 3350 17 Gram(s) Oral daily PRN  senna 2 Tablet(s) Oral at bedtime  sertraline 100 milliGRAM(s) Oral at bedtime  Vibegron (Gemtesa) 75 milliGRAM(s),Vibegron (Gemtesa) 75mg tablet 1 Tablet(s) 1 Tablet(s) Oral daily      Labs:  CBC Full  -  ( 26 Dec 2023 08:53 )  WBC Count : 12.26 K/uL  RBC Count : 3.19 M/uL  Hemoglobin : 7.7 g/dL  Hematocrit : 25.2 %  Platelet Count - Automated : 426 K/uL  Mean Cell Volume : 79.0 fl  Mean Cell Hemoglobin : 24.1 pg  Mean Cell Hemoglobin Concentration : 30.6 gm/dL  Auto Neutrophil # : 8.85 K/uL  Auto Lymphocyte # : 1.66 K/uL  Auto Monocyte # : 1.45 K/uL  Auto Eosinophil # : 0.13 K/uL  Auto Basophil # : 0.04 K/uL  Auto Neutrophil % : 72.2 %  Auto Lymphocyte % : 13.5 %  Auto Monocyte % : 11.8 %  Auto Eosinophil % : 1.1 %  Auto Basophil % : 0.3 %        136  |  102  |  7   ----------------------------<  117<H>  3.5   |  24  |  0.40<L>    Ca    9.0      26 Dec 2023 08:52  Phos  3.5         TPro  6.1  /  Alb  3.2<L>  /  TBili  0.3  /  DBili  x   /  AST  20  /  ALT  18  /  AlkPhos  363<H>      CAPILLARY BLOOD GLUCOSE        LIVER FUNCTIONS - ( 26 Dec 2023 08:52 )  Alb: 3.2 g/dL / Pro: 6.1 g/dL / ALK PHOS: 363 U/L / ALT: 18 U/L / AST: 20 U/L / GGT: x             Urinalysis Basic - ( 26 Dec 2023 08:52 )    Color: x / Appearance: x / SG: x / pH: x  Gluc: 117 mg/dL / Ketone: x  / Bili: x / Urobili: x   Blood: x / Protein: x / Nitrite: x   Leuk Esterase: x / RBC: x / WBC x   Sq Epi: x / Non Sq Epi: x / Bacteria: x          Vitals:  Vital Signs Last 24 Hrs  T(C): 36.7 (26 Dec 2023 10:47), Max: 39.4 (25 Dec 2023 18:55)  T(F): 98.1 (26 Dec 2023 10:47), Max: 102.9 (25 Dec 2023 18:55)  HR: 106 (26 Dec 2023 10:47) (91 - 118)  BP: 115/77 (26 Dec 2023 10:47) (102/68 - 115/77)  BP(mean): --  RR: 18 (26 Dec 2023 10:47) (18 - 18)  SpO2: 96% (26 Dec 2023 10:47) (90% - 96%)    Parameters below as of 26 Dec 2023 10:47  Patient On (Oxygen Delivery Method): nasal cannula  O2 Flow (L/min): 2          NEUROLOGICAL EXAM:    Mental status: Awake, alert, and in less apparent distress. Oriented to person, place and time. Language function is normal.      Cranial Nerves: Pupils were equal, round, reactive to light. Extraocular movements were intact. Visual field were full. Fundoscopic exam was deferred. Facial sensation was intact to light touch. There was slight left facial droop. The palate was upgoing symmetrically and tongue was midline.     Motor exam: Bulk and tone were normal.  Antigravity in all extremities without drift.  Chronic relative right hemiparesis.    Reflexes: deferred     Sensation: Intact to light touch    Coordination: no gross dysmetria    Gait: deferred   sternal wound seen          ACC: [id number] EXAM:  MR SHOULDER WAW IC LT   ORDERED BY:  BRAN SANCHEZ     ACC: [id number] EXAM:  MR STERNOCLAVICULAR JNT LT   ORDERED BY: ANASTASIIA WALKER     PROCEDURE DATE:  2023          INTERPRETATION:  MRI OF THE LEFT STERNOCLAVICULAR JOINT AND SHOULDER    CLINICAL INFORMATION: Left sternal abscess status post washout with   persistent purulent drainage and left shoulder pain.  TECHNIQUE: Multisequence, multiplanar MRI of the left sternoclavicular   joint. The study was performed before and after the intravenous   administration of 6 ml Gadavist (1.5 cc discarded) .    COMPARISON: Chest CT 2023 and 2023.    FINDINGS:    STERNOCLAVICULAR JOINT:    BONE: Patient status post surgical resection of the left clavicular head.   The surgical margin is sharp with trace edema and no loss of T1   hyperintense signal. In the manubrium there is increased STIR signal with   loss of T1 hyperintense signal and postcontrast enhancement involving the   superolateral leftward aspect of the bone concerning for acute   osteomyelitis (6:9 5:9). No acute fracture. No osteonecrosis.    JOINTS: A residual rim-enhancing fluid collection is seen in the region   of the left sternoclavicular joint measuring 80 x 25 mm. A drainage   catheter is seen within the fluid collection.    SOFT TISSUE: Postsurgical changes are seen along the anterior aspect of   the left sternoclavicular joint. There is a mild amount of edema in the   adjacent pectoralis major muscle. No focal fluid collection in the   anterior mediastinum. Susceptibility artifact from surgical staples are   seen along the anterior chest wall.      SHOULDER JOINT:    ROTATOR CUFF: Low-grade partial-thickness bursal surface tearing of the   supraspinatus tendon. Rotator cuff is otherwise intact.  MUSCLES: No focal muscle edema or atrophy.  BICEPS TENDON: Normal in course and caliber.  GLENOID LABRUM AND GLENOHUMERAL LIGAMENTS: No displaced labral tear.   Inferior glenohumeral ligament is intact.  GLENOHUMERAL CARTILAGE AND SUBCHONDRAL BONE: No full-thickness chondral   loss.  AC JOINT: No AC joint arthropathy.  SYNOVIUM/JOINT FLUID: No glenohumeral joint effusion. No focal fluid in   the subacromial/subdeltoid bursa.  BONE MARROW: No fracture or osteonecrosis. No marrow signal change to   suggest acute osteomyelitis.  NEUROVASCULAR STRUCTURES: Structures of the suprascapular notch,   spinoglenoid notch, and quadrilateral space are normal in course and   caliber.  SUBCUTANEOUS SOFT TISSUES: Edema in the subcutaneous fat of the left   shoulder. No rim-enhancing fluid collection to suggest abscess.        IMPRESSION:  1.  Patient status post surgical resection of the left clavicular head   with washout of the left sternoclavicular joint.  2.  A 80 x 25 mm rim-enhancing fluid collection persists in the surgical   cavity with well-placed drain within the fluid collection.  3.  Marrow signal abnormality in the manubrium concerning for acute   osteomyelitis.  4.  Low-grade partial-thickness bursal surface tear of the supraspinatus   tendon.    --- End of Report ---            RASHIDA HYDE MD; Attending Radiologist  This document has been electronically signed. Dec 11 2023  5:11PM

## 2023-12-26 NOTE — PROGRESS NOTE ADULT - ASSESSMENT
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  She had a recent fall in November 2023 secondary to unsteadiness due to multiple sclerosis, with subsequent left chest wall and shoulder pain.  Was seen at [facility] with concern for MS exacerbation but no MRI brain and cervical spine with an without contrast were done at the time without enhancement.  At the time infectious workup was done and notable for UTI which was treated.  She continued to have pain.  In the interim, she had an outpatient MRI of left sternoclavicular joint with results significant for marrow edema and swelling of the left sternoclavicular joint with a 2 cm fluid collection and adjacent pleural edema in the left chest with concerns for infectious etiology.   CT chest w/ IV contrast suggestive of septic arthritis with surrounding erythema. Thoracic surgery consulted.  Patient s/p surgical debridement on 12/7/23    mri with 80 x 25 mm rim-enhancing fluid collection persists in the surgical cavity with well-placed drain within the fluid collection.   Marrow signal abnormality in the manubrium concerning for acute   osteomyelitis.    Low-grade partial-thickness bursal surface tear of the supraspinatus tendon.    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  Now likely pseudo-flare in setting of septic arthritis    Recommendations:  Management of septic arthritis per primary team  s/p surgical debridement 12/7/23  hold ocrelizumab for now given septic arthritis  continue home meds once able to take PO post-surgically  abx as per ID  defer to primary team for pain control   difficulty standing suspect due to weakness.  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  She had a recent fall in November 2023 secondary to unsteadiness due to multiple sclerosis, with subsequent left chest wall and shoulder pain.  Was seen at [facility] with concern for MS exacerbation but no MRI brain and cervical spine with an without contrast were done at the time without enhancement.  At the time infectious workup was done and notable for UTI which was treated.  She continued to have pain.  In the interim, she had an outpatient MRI of left sternoclavicular joint with results significant for marrow edema and swelling of the left sternoclavicular joint with a 2 cm fluid collection and adjacent pleural edema in the left chest with concerns for infectious etiology.   CT chest w/ IV contrast suggestive of septic arthritis with surrounding erythema. Thoracic surgery consulted.  Patient s/p surgical debridement on 12/7/23    mri with 80 x 25 mm rim-enhancing fluid collection persists in the surgical cavity with well-placed drain within the fluid collection.   Marrow signal abnormality in the manubrium concerning for acute   osteomyelitis.    Low-grade partial-thickness bursal surface tear of the supraspinatus tendon.    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  Now likely pseudo-flare in setting of septic arthritis    Recommendations:  Management of septic arthritis per primary team  s/p surgical debridement 12/7/23  hold ocrelizumab for now given septic arthritis  continue home meds once able to take PO post-surgically  abx as per ID  defer to primary team for pain control   difficulty standing suspect due to weakness.  Needs aggressive physical therapy as tolerated   will benefit from rehab

## 2023-12-26 NOTE — PROGRESS NOTE ADULT - SUBJECTIVE AND OBJECTIVE BOX
Date of service: 12-26-23 @ 23:29      Patient is a 43y old  Female who presents with a chief complaint of L shoulder and chest pain (26 Dec 2023 14:08)                                                               INTERVAL HPI/OVERNIGHT EVENTS:    REVIEW OF SYSTEMS:     CONSTITUTIONAL: No weakness, fevers or chills  RESPIRATORY: No cough, wheezing,  No shortness of breath  CARDIOVASCULAR: No chest pain or palpitations  GASTROINTESTINAL: No abdominal pain  . No nausea, vomiting, or hematemesis; No diarrhea or constipation. No melena or hematochezia.  GENITOURINARY: No dysuria, frequency or hematuria  NEUROLOGICAL: No numbness or weakness                                                                                                                                                                                                                                                                           Medications:  MEDICATIONS  (STANDING):  acetaminophen     Tablet .. 650 milliGRAM(s) Oral every 6 hours  cefepime   IVPB 2000 milliGRAM(s) IV Intermittent every 8 hours  chlorhexidine 2% Cloths 1 Application(s) Topical <User Schedule>  cyclobenzaprine 5 milliGRAM(s) Oral two times a day  dalfampridine ER 10 milliGRAM(s) Oral every 12 hours  DULoxetine 60 milliGRAM(s) Oral at bedtime  enoxaparin Injectable 40 milliGRAM(s) SubCutaneous every 24 hours  multivitamin/minerals 1 Tablet(s) Oral daily  oxyCODONE  ER Tablet 10 milliGRAM(s) Oral every 12 hours  senna 2 Tablet(s) Oral at bedtime  sertraline 100 milliGRAM(s) Oral at bedtime  Vibegron (Gemtesa) 75 milliGRAM(s),Vibegron (Gemtesa) 75mg tablet 1 Tablet(s) 1 Tablet(s) Oral daily    MEDICATIONS  (PRN):  benzocaine/menthol Lozenge 1 Lozenge Oral three times a day PRN Sore Throat  clonazePAM  Tablet 0.5 milliGRAM(s) Oral three times a day PRN anxiety  HYDROmorphone   Tablet 4 milliGRAM(s) Oral every 6 hours PRN Severe Pain (7 - 10)  HYDROmorphone   Tablet 2 milliGRAM(s) Oral every 4 hours PRN Moderate Pain (4 - 6)  HYDROmorphone  Injectable 1 milliGRAM(s) IV Push every 3 hours PRN for breakthrough pain  polyethylene glycol 3350 17 Gram(s) Oral daily PRN Constipation       Allergies    No Known Allergies    Intolerances      Vital Signs Last 24 Hrs  T(C): 36.9 (26 Dec 2023 22:07), Max: 38.6 (26 Dec 2023 17:01)  T(F): 98.4 (26 Dec 2023 22:07), Max: 101.4 (26 Dec 2023 17:01)  HR: 102 (26 Dec 2023 22:07) (69 - 115)  BP: 99/64 (26 Dec 2023 22:07) (99/64 - 115/77)  BP(mean): --  RR: 20 (26 Dec 2023 22:07) (18 - 20)  SpO2: 94% (26 Dec 2023 22:07) (94% - 98%)    Parameters below as of 26 Dec 2023 22:07  Patient On (Oxygen Delivery Method): room air      CAPILLARY BLOOD GLUCOSE          12-25 @ 07:01  -  12-26 @ 07:00  --------------------------------------------------------  IN: 700 mL / OUT: 0 mL / NET: 700 mL    12-26 @ 07:01  -  12-26 @ 23:29  --------------------------------------------------------  IN: 650 mL / OUT: 0 mL / NET: 650 mL      Physical Exam:    Daily     Daily   General:  Well appearing, NAD, not cachetic  HEENT:  Nonicteric, PERRLA  CV:  RRR, S1S2   Lungs:  CTA B/L, no wheezes, rales, rhonchi  Abdomen:  Soft, non-tender, no distended, positive BS  Extremities: no edema                                                                                                                                                                                                                                                                                     LABS:                               7.7    12.26 )-----------( 426      ( 26 Dec 2023 08:53 )             25.2                      12-26    136  |  102  |  7   ----------------------------<  117<H>  3.5   |  24  |  0.40<L>    Ca    9.0      26 Dec 2023 08:52  Phos  3.5     12-25    TPro  6.1  /  Alb  3.2<L>  /  TBili  0.3  /  DBili  x   /  AST  20  /  ALT  18  /  AlkPhos  363<H>  12-26                       RADIOLOGY & ADDITIONAL TESTS         I personally reviewed: [  ]EKG   [  ]CXR    [  ] CT      A/P:         Discussed with :     [facility] consultants' Notes   Time spent :

## 2023-12-26 NOTE — PROVIDER CONTACT NOTE (OTHER) - BACKGROUND
Pt admitted for septic arthritis, pmh of MS, Anemia, anxiety
Pt came in with left sternoclavicular joint fluid collection. S/p debridement of left sternoclavicular joint with excision of left clavicular head.
s/p L clavicular debridement and washout with wound vac placement

## 2023-12-26 NOTE — PROGRESS NOTE ADULT - ASSESSMENT
43F PMHx of MS on (Rituxan, Ocrevus) presenting with left chest wall and shoulder pain. Recent history of fall secondary to unsteadiness due to MS. On review of ortho notes, pt has had multiple falls in November. She was admitted to [facility] on Last week of November and was found to have Rhinovirus and pseudomonas UTI. She completed 5-7 days course with Ciprofloxacin. Found to have Septic arthritis of SCM joint now s/p excision.    Patient obtained CT chest w/ IV contrast which showed likely septic arthritis with surrounding erythema. Thoracic surgery consulted, underwent washout and debridement.     OR Note: Debridement of left sternoclavicular joint with excision of left clavicular head. Purulent drainage at the sternoclavicular joint. Tissue surrounding left clavicle found to be very inflamed. Copious irrigation of surgical site.  Specimen sent: Head of left clavicle, culture of left sternoclavicular joint, culture of left manubrium, culture of deep sternal head of clavicle, culture of first rib costal cartilage      In ER: Afebrile, WBC 9.8, , . Given IV Zosyn, IV Vancomycin.         Septic arthritis of the left sternoclavicular joint and involving the   articulation of the sternum with the first costal cartilage.    Extensive surrounding inflammation, with pneumonia in the underlying   subpleural left upper lobe.      # Septic arthritis of L sternoclavicular joint s/p excision and drainage  # L upper lobe opacity,   # Immunosuppressed patient  # Elevated ESR/CRP.   #Manubrium OM. s/p repeat washout and debridement.   #New fever.     clinically no localizing symptom.   given fever after transfusion, ? transfusion related.       PLAN:  - continue cefepime 2 gm Q8hrs  - OR cx negative for bacterial cx.   - MRSA PCR negative,  - follow fungal/AFB cultures; negative so far  - Quantiferon negative   - repeat blood cx in lab.   - TTE negative   - thoracic following,   - s/p PICC and 6 weeks of abx. iv. from the day of repeat debridement until 1/25/24. CBC, CMP once a week while on OPAT.   - Rt shoulder MRI with no acute infection.   - repeat CT chest with no collection.     Pt to follow with me in office in 4 weeks.     Plan discussed with Medicine Attending.     Odalis Reynaga  Please contact through MS Teams   If no response or past 5 pm/weekend call [phone].      43F PMHx of MS on (Rituxan, Ocrevus) presenting with left chest wall and shoulder pain. Recent history of fall secondary to unsteadiness due to MS. On review of ortho notes, pt has had multiple falls in November. She was admitted to [facility] on Last week of November and was found to have Rhinovirus and pseudomonas UTI. She completed 5-7 days course with Ciprofloxacin. Found to have Septic arthritis of SCM joint now s/p excision.    Patient obtained CT chest w/ IV contrast which showed likely septic arthritis with surrounding erythema. Thoracic surgery consulted, underwent washout and debridement.     OR Note: Debridement of left sternoclavicular joint with excision of left clavicular head. Purulent drainage at the sternoclavicular joint. Tissue surrounding left clavicle found to be very inflamed. Copious irrigation of surgical site.  Specimen sent: Head of left clavicle, culture of left sternoclavicular joint, culture of left manubrium, culture of deep sternal head of clavicle, culture of first rib costal cartilage      In ER: Afebrile, WBC 9.8, , . Given IV Zosyn, IV Vancomycin.         Septic arthritis of the left sternoclavicular joint and involving the   articulation of the sternum with the first costal cartilage.    Extensive surrounding inflammation, with pneumonia in the underlying   subpleural left upper lobe.      # Septic arthritis of L sternoclavicular joint s/p excision and drainage  # L upper lobe opacity,   # Immunosuppressed patient  # Elevated ESR/CRP.   #Manubrium OM. s/p repeat washout and debridement.   #New fever.     clinically no localizing symptom.   given fever after transfusion, ? transfusion related.       PLAN:  - continue cefepime 2 gm Q8hrs  - OR cx negative for bacterial cx.   - MRSA PCR negative,  - follow fungal/AFB cultures; negative so far  - Quantiferon negative   - repeat blood cx in lab.   - TTE negative   - thoracic following,   - s/p PICC and 6 weeks of abx. iv. from the day of repeat debridement until 1/25/24. CBC, CMP once a week while on OPAT.   - Rt shoulder MRI with no acute infection.   - repeat CT chest with no collection.     Pt to follow with me in office in 4 weeks.     Plan discussed with Medicine Attending.     Odalis Reynaga  Please contact through MS Teams   If no response or past 5 pm/weekend call [phone].

## 2023-12-27 LAB
ANION GAP SERPL CALC-SCNC: 10 MMOL/L — SIGNIFICANT CHANGE UP (ref 5–17)
ANION GAP SERPL CALC-SCNC: 10 MMOL/L — SIGNIFICANT CHANGE UP (ref 5–17)
APPEARANCE UR: CLEAR — SIGNIFICANT CHANGE UP
APPEARANCE UR: CLEAR — SIGNIFICANT CHANGE UP
BACTERIA # UR AUTO: NEGATIVE /HPF — SIGNIFICANT CHANGE UP
BACTERIA # UR AUTO: NEGATIVE /HPF — SIGNIFICANT CHANGE UP
BILIRUB UR-MCNC: NEGATIVE — SIGNIFICANT CHANGE UP
BILIRUB UR-MCNC: NEGATIVE — SIGNIFICANT CHANGE UP
BUN SERPL-MCNC: 7 MG/DL — SIGNIFICANT CHANGE UP (ref 7–23)
BUN SERPL-MCNC: 7 MG/DL — SIGNIFICANT CHANGE UP (ref 7–23)
CALCIUM SERPL-MCNC: 9.1 MG/DL — SIGNIFICANT CHANGE UP (ref 8.4–10.5)
CALCIUM SERPL-MCNC: 9.1 MG/DL — SIGNIFICANT CHANGE UP (ref 8.4–10.5)
CAST: 0 /LPF — SIGNIFICANT CHANGE UP (ref 0–4)
CAST: 0 /LPF — SIGNIFICANT CHANGE UP (ref 0–4)
CHLORIDE SERPL-SCNC: 103 MMOL/L — SIGNIFICANT CHANGE UP (ref 96–108)
CHLORIDE SERPL-SCNC: 103 MMOL/L — SIGNIFICANT CHANGE UP (ref 96–108)
CO2 SERPL-SCNC: 24 MMOL/L — SIGNIFICANT CHANGE UP (ref 22–31)
CO2 SERPL-SCNC: 24 MMOL/L — SIGNIFICANT CHANGE UP (ref 22–31)
COLOR SPEC: YELLOW — SIGNIFICANT CHANGE UP
COLOR SPEC: YELLOW — SIGNIFICANT CHANGE UP
CREAT SERPL-MCNC: 0.4 MG/DL — LOW (ref 0.5–1.3)
CREAT SERPL-MCNC: 0.4 MG/DL — LOW (ref 0.5–1.3)
CULTURE RESULTS: SIGNIFICANT CHANGE UP
CULTURE RESULTS: SIGNIFICANT CHANGE UP
DIFF PNL FLD: ABNORMAL
DIFF PNL FLD: ABNORMAL
EGFR: 125 ML/MIN/1.73M2 — SIGNIFICANT CHANGE UP
EGFR: 125 ML/MIN/1.73M2 — SIGNIFICANT CHANGE UP
GLUCOSE SERPL-MCNC: 84 MG/DL — SIGNIFICANT CHANGE UP (ref 70–99)
GLUCOSE SERPL-MCNC: 84 MG/DL — SIGNIFICANT CHANGE UP (ref 70–99)
GLUCOSE UR QL: NEGATIVE MG/DL — SIGNIFICANT CHANGE UP
GLUCOSE UR QL: NEGATIVE MG/DL — SIGNIFICANT CHANGE UP
HCT VFR BLD CALC: 23 % — LOW (ref 34.5–45)
HCT VFR BLD CALC: 23 % — LOW (ref 34.5–45)
HGB BLD-MCNC: 7.2 G/DL — LOW (ref 11.5–15.5)
HGB BLD-MCNC: 7.2 G/DL — LOW (ref 11.5–15.5)
KETONES UR-MCNC: NEGATIVE MG/DL — SIGNIFICANT CHANGE UP
KETONES UR-MCNC: NEGATIVE MG/DL — SIGNIFICANT CHANGE UP
LEUKOCYTE ESTERASE UR-ACNC: NEGATIVE — SIGNIFICANT CHANGE UP
LEUKOCYTE ESTERASE UR-ACNC: NEGATIVE — SIGNIFICANT CHANGE UP
MCHC RBC-ENTMCNC: 24.8 PG — LOW (ref 27–34)
MCHC RBC-ENTMCNC: 24.8 PG — LOW (ref 27–34)
MCHC RBC-ENTMCNC: 31.3 GM/DL — LOW (ref 32–36)
MCHC RBC-ENTMCNC: 31.3 GM/DL — LOW (ref 32–36)
MCV RBC AUTO: 79.3 FL — LOW (ref 80–100)
MCV RBC AUTO: 79.3 FL — LOW (ref 80–100)
NITRITE UR-MCNC: NEGATIVE — SIGNIFICANT CHANGE UP
NITRITE UR-MCNC: NEGATIVE — SIGNIFICANT CHANGE UP
NRBC # BLD: 0 /100 WBCS — SIGNIFICANT CHANGE UP (ref 0–0)
NRBC # BLD: 0 /100 WBCS — SIGNIFICANT CHANGE UP (ref 0–0)
PH UR: 7 — SIGNIFICANT CHANGE UP (ref 5–8)
PH UR: 7 — SIGNIFICANT CHANGE UP (ref 5–8)
PLATELET # BLD AUTO: 419 K/UL — HIGH (ref 150–400)
PLATELET # BLD AUTO: 419 K/UL — HIGH (ref 150–400)
POTASSIUM SERPL-MCNC: 4 MMOL/L — SIGNIFICANT CHANGE UP (ref 3.5–5.3)
POTASSIUM SERPL-MCNC: 4 MMOL/L — SIGNIFICANT CHANGE UP (ref 3.5–5.3)
POTASSIUM SERPL-SCNC: 4 MMOL/L — SIGNIFICANT CHANGE UP (ref 3.5–5.3)
POTASSIUM SERPL-SCNC: 4 MMOL/L — SIGNIFICANT CHANGE UP (ref 3.5–5.3)
PROT UR-MCNC: NEGATIVE MG/DL — SIGNIFICANT CHANGE UP
PROT UR-MCNC: NEGATIVE MG/DL — SIGNIFICANT CHANGE UP
RAPID RVP RESULT: SIGNIFICANT CHANGE UP
RAPID RVP RESULT: SIGNIFICANT CHANGE UP
RBC # BLD: 2.9 M/UL — LOW (ref 3.8–5.2)
RBC # BLD: 2.9 M/UL — LOW (ref 3.8–5.2)
RBC # FLD: 16.3 % — HIGH (ref 10.3–14.5)
RBC # FLD: 16.3 % — HIGH (ref 10.3–14.5)
RBC CASTS # UR COMP ASSIST: 65 /HPF — HIGH (ref 0–4)
RBC CASTS # UR COMP ASSIST: 65 /HPF — HIGH (ref 0–4)
SARS-COV-2 RNA SPEC QL NAA+PROBE: SIGNIFICANT CHANGE UP
SARS-COV-2 RNA SPEC QL NAA+PROBE: SIGNIFICANT CHANGE UP
SODIUM SERPL-SCNC: 137 MMOL/L — SIGNIFICANT CHANGE UP (ref 135–145)
SODIUM SERPL-SCNC: 137 MMOL/L — SIGNIFICANT CHANGE UP (ref 135–145)
SP GR SPEC: 1.01 — SIGNIFICANT CHANGE UP (ref 1–1.03)
SP GR SPEC: 1.01 — SIGNIFICANT CHANGE UP (ref 1–1.03)
SPECIMEN SOURCE: SIGNIFICANT CHANGE UP
SPECIMEN SOURCE: SIGNIFICANT CHANGE UP
SQUAMOUS # UR AUTO: 1 /HPF — SIGNIFICANT CHANGE UP (ref 0–5)
SQUAMOUS # UR AUTO: 1 /HPF — SIGNIFICANT CHANGE UP (ref 0–5)
UROBILINOGEN FLD QL: 0.2 MG/DL — SIGNIFICANT CHANGE UP (ref 0.2–1)
UROBILINOGEN FLD QL: 0.2 MG/DL — SIGNIFICANT CHANGE UP (ref 0.2–1)
WBC # BLD: 11.25 K/UL — HIGH (ref 3.8–10.5)
WBC # BLD: 11.25 K/UL — HIGH (ref 3.8–10.5)
WBC # FLD AUTO: 11.25 K/UL — HIGH (ref 3.8–10.5)
WBC # FLD AUTO: 11.25 K/UL — HIGH (ref 3.8–10.5)
WBC UR QL: 1 /HPF — SIGNIFICANT CHANGE UP (ref 0–5)
WBC UR QL: 1 /HPF — SIGNIFICANT CHANGE UP (ref 0–5)

## 2023-12-27 PROCEDURE — 99232 SBSQ HOSP IP/OBS MODERATE 35: CPT

## 2023-12-27 PROCEDURE — 93970 EXTREMITY STUDY: CPT | Mod: 26

## 2023-12-27 RX ORDER — FENTANYL CITRATE 50 UG/ML
1 INJECTION INTRAVENOUS
Refills: 0 | Status: DISCONTINUED | OUTPATIENT
Start: 2023-12-27 | End: 2024-01-03

## 2023-12-27 RX ORDER — ACETAMINOPHEN 500 MG
650 TABLET ORAL EVERY 6 HOURS
Refills: 0 | Status: DISCONTINUED | OUTPATIENT
Start: 2023-12-27 | End: 2024-01-05

## 2023-12-27 RX ORDER — VALACYCLOVIR 500 MG/1
1000 TABLET, FILM COATED ORAL EVERY 12 HOURS
Refills: 0 | Status: DISCONTINUED | OUTPATIENT
Start: 2023-12-27 | End: 2024-01-05

## 2023-12-27 RX ADMIN — HYDROMORPHONE HYDROCHLORIDE 4 MILLIGRAM(S): 2 INJECTION INTRAMUSCULAR; INTRAVENOUS; SUBCUTANEOUS at 09:27

## 2023-12-27 RX ADMIN — SERTRALINE 100 MILLIGRAM(S): 25 TABLET, FILM COATED ORAL at 22:31

## 2023-12-27 RX ADMIN — Medication 650 MILLIGRAM(S): at 08:30

## 2023-12-27 RX ADMIN — VALACYCLOVIR 1000 MILLIGRAM(S): 500 TABLET, FILM COATED ORAL at 17:04

## 2023-12-27 RX ADMIN — CEFEPIME 100 MILLIGRAM(S): 1 INJECTION, POWDER, FOR SOLUTION INTRAMUSCULAR; INTRAVENOUS at 23:11

## 2023-12-27 RX ADMIN — FENTANYL CITRATE 1 PATCH: 50 INJECTION INTRAVENOUS at 08:22

## 2023-12-27 RX ADMIN — SENNA PLUS 2 TABLET(S): 8.6 TABLET ORAL at 22:30

## 2023-12-27 RX ADMIN — Medication 1 TABLET(S): at 13:04

## 2023-12-27 RX ADMIN — Medication 650 MILLIGRAM(S): at 07:59

## 2023-12-27 RX ADMIN — OXYCODONE HYDROCHLORIDE 10 MILLIGRAM(S): 5 TABLET ORAL at 23:00

## 2023-12-27 RX ADMIN — CYCLOBENZAPRINE HYDROCHLORIDE 5 MILLIGRAM(S): 10 TABLET, FILM COATED ORAL at 06:49

## 2023-12-27 RX ADMIN — HYDROMORPHONE HYDROCHLORIDE 2 MILLIGRAM(S): 2 INJECTION INTRAMUSCULAR; INTRAVENOUS; SUBCUTANEOUS at 17:34

## 2023-12-27 RX ADMIN — Medication 650 MILLIGRAM(S): at 13:04

## 2023-12-27 RX ADMIN — Medication 0.5 MILLIGRAM(S): at 22:34

## 2023-12-27 RX ADMIN — Medication 650 MILLIGRAM(S): at 17:34

## 2023-12-27 RX ADMIN — CEFEPIME 100 MILLIGRAM(S): 1 INJECTION, POWDER, FOR SOLUTION INTRAMUSCULAR; INTRAVENOUS at 15:50

## 2023-12-27 RX ADMIN — HYDROMORPHONE HYDROCHLORIDE 4 MILLIGRAM(S): 2 INJECTION INTRAMUSCULAR; INTRAVENOUS; SUBCUTANEOUS at 16:18

## 2023-12-27 RX ADMIN — Medication 650 MILLIGRAM(S): at 17:04

## 2023-12-27 RX ADMIN — Medication 650 MILLIGRAM(S): at 02:15

## 2023-12-27 RX ADMIN — ENOXAPARIN SODIUM 40 MILLIGRAM(S): 100 INJECTION SUBCUTANEOUS at 17:16

## 2023-12-27 RX ADMIN — CYCLOBENZAPRINE HYDROCHLORIDE 5 MILLIGRAM(S): 10 TABLET, FILM COATED ORAL at 17:04

## 2023-12-27 RX ADMIN — HYDROMORPHONE HYDROCHLORIDE 4 MILLIGRAM(S): 2 INJECTION INTRAMUSCULAR; INTRAVENOUS; SUBCUTANEOUS at 15:48

## 2023-12-27 RX ADMIN — Medication 650 MILLIGRAM(S): at 13:34

## 2023-12-27 RX ADMIN — OXYCODONE HYDROCHLORIDE 10 MILLIGRAM(S): 5 TABLET ORAL at 22:28

## 2023-12-27 RX ADMIN — HYDROMORPHONE HYDROCHLORIDE 2 MILLIGRAM(S): 2 INJECTION INTRAMUSCULAR; INTRAVENOUS; SUBCUTANEOUS at 17:04

## 2023-12-27 RX ADMIN — Medication 0.5 MILLIGRAM(S): at 17:11

## 2023-12-27 RX ADMIN — HYDROMORPHONE HYDROCHLORIDE 4 MILLIGRAM(S): 2 INJECTION INTRAMUSCULAR; INTRAVENOUS; SUBCUTANEOUS at 10:00

## 2023-12-27 RX ADMIN — DULOXETINE HYDROCHLORIDE 60 MILLIGRAM(S): 30 CAPSULE, DELAYED RELEASE ORAL at 22:31

## 2023-12-27 RX ADMIN — CEFEPIME 100 MILLIGRAM(S): 1 INJECTION, POWDER, FOR SOLUTION INTRAMUSCULAR; INTRAVENOUS at 07:59

## 2023-12-27 RX ADMIN — HYDROMORPHONE HYDROCHLORIDE 2 MILLIGRAM(S): 2 INJECTION INTRAMUSCULAR; INTRAVENOUS; SUBCUTANEOUS at 05:11

## 2023-12-27 RX ADMIN — HYDROMORPHONE HYDROCHLORIDE 2 MILLIGRAM(S): 2 INJECTION INTRAMUSCULAR; INTRAVENOUS; SUBCUTANEOUS at 06:11

## 2023-12-27 RX ADMIN — CHLORHEXIDINE GLUCONATE 1 APPLICATION(S): 213 SOLUTION TOPICAL at 13:06

## 2023-12-27 NOTE — PROGRESS NOTE ADULT - SUBJECTIVE AND OBJECTIVE BOX
43yPatient is a 43y old  Female who presents with a chief complaint of L shoulder and chest pain (27 Dec 2023 10:31)      Interval history:  febrile, swollen lt sided upper lip.       Allergies:   No Known Allergies    Antimicrobials:  cefepime   IVPB 2000 milliGRAM(s) IV Intermittent every 8 hours  valACYclovir 1000 milliGRAM(s) Oral every 12 hours      REVIEW OF SYSTEMS:  No SOB  No abdominal pain  No dysuria   No rash.       Vital Signs Last 24 Hrs  T(C): 37.1 (12-27-23 @ 17:04), Max: 38.3 (12-26-23 @ 19:15)  T(F): 98.7 (12-27-23 @ 17:04), Max: 101 (12-26-23 @ 19:15)  HR: 91 (12-27-23 @ 17:04) (83 - 102)  BP: 106/64 (12-27-23 @ 17:04) (97/60 - 106/64)  BP(mean): --  RR: 18 (12-27-23 @ 17:04) (18 - 20)  SpO2: 94% (12-27-23 @ 17:04) (92% - 98%)      PHYSICAL EXAM:  Pt in no acute distress, alert, awake.   lt sided upper lip swelling, with ulcer inside.   breathing comfortably on RA.   non distended abdomen  no edema LE   rt arm PICC    Wound vac in place left shoulder                            7.2    11.25 )-----------( 419      ( 27 Dec 2023 07:43 )             23.0   12-27    137  |  103  |  7   ----------------------------<  84  4.0   |  24  |  0.40<L>    Ca    9.1      27 Dec 2023 07:44    TPro  6.1  /  Alb  3.2<L>  /  TBili  0.3  /  DBili  x   /  AST  20  /  ALT  18  /  AlkPhos  363<H>  12-26      LIVER FUNCTIONS - ( 26 Dec 2023 08:52 )  Alb: 3.2 g/dL / Pro: 6.1 g/dL / ALK PHOS: 363 U/L / ALT: 18 U/L / AST: 20 U/L / GGT: x               Culture - Urine (collected 26 Dec 2023 01:22)  Source: Clean Catch Clean Catch (Midstream)  Final Report (27 Dec 2023 11:56):    <10,000 CFU/mL Normal Urogenital Renae    Culture - Blood (collected 25 Dec 2023 21:54)  Source: .Blood Blood-Peripheral  Preliminary Report (27 Dec 2023 03:02):    No growth at 24 hours    Culture - Blood (collected 25 Dec 2023 21:54)  Source: .Blood Blood-Peripheral  Preliminary Report (27 Dec 2023 03:02):    No growth at 24 hours        Radiology:    < from: CT Chest w/ IV Cont (12.26.23 @ 14:54) >  IMPRESSION:    New small right and trace left pleural effusions.    Status post resection of a portion of the left clavicle, left first rib,   and sternum. No walled off fluid collection in this region. Unchanged   small consolidation in the adjacent left upper lobe and infiltration of   the adjacent substernal mediastinal fat.

## 2023-12-27 NOTE — PROGRESS NOTE ADULT - ASSESSMENT
43F PMHx of MS on (Rituxan, Ocrevus) presenting with left chest wall and shoulder pain. Recent history of fall secondary to unsteadiness due to MS. On review of ortho notes, pt has had multiple falls in November. She was admitted to [facility] on Last week of November and was found to have Rhinovirus and pseudomonas UTI. She completed 5-7 days course with Ciprofloxacin. Found to have Septic arthritis of SCM joint now s/p excision.    Patient obtained CT chest w/ IV contrast which showed likely septic arthritis with surrounding erythema. Thoracic surgery consulted, underwent washout and debridement.     OR Note: Debridement of left sternoclavicular joint with excision of left clavicular head. Purulent drainage at the sternoclavicular joint. Tissue surrounding left clavicle found to be very inflamed. Copious irrigation of surgical site.  Specimen sent: Head of left clavicle, culture of left sternoclavicular joint, culture of left manubrium, culture of deep sternal head of clavicle, culture of first rib costal cartilage      In ER: Afebrile, WBC 9.8, , . Given IV Zosyn, IV Vancomycin.         Septic arthritis of the left sternoclavicular joint and involving the   articulation of the sternum with the first costal cartilage.    Extensive surrounding inflammation, with pneumonia in the underlying   subpleural left upper lobe.      # Septic arthritis of L sternoclavicular joint s/p excision and drainage  # L upper lobe opacity,   # Immunosuppressed patient  # Elevated ESR/CRP.   #Manubrium OM. s/p repeat washout and debridement.   #New fever.   lt upper lip ulcer         PLAN:  - continue cefepime 2 gm Q8hrs  - repeat blood cx NTD   - HSV PCR sent.  - initiated empiric valtrex for now  - dental eval pending.   - OR cx negative for bacterial cx.   - follow fungal/AFB cultures; negative so far  - Quantiferon negative   - TTE negative   - thoracic following,   - Rt shoulder MRI with no acute infection.   - repeat CT chest with no collection.         Plan discussed with Medicine Attending.     Odalis Reynaga  Please contact through MS Teams   If no response or past 5 pm/weekend call [phone].      43F PMHx of MS on (Rituxan, Ocrevus) presenting with left chest wall and shoulder pain. Recent history of fall secondary to unsteadiness due to MS. On review of ortho notes, pt has had multiple falls in November. She was admitted to [facility] on Last week of November and was found to have Rhinovirus and pseudomonas UTI. She completed 5-7 days course with Ciprofloxacin. Found to have Septic arthritis of SCM joint now s/p excision.    Patient obtained CT chest w/ IV contrast which showed likely septic arthritis with surrounding erythema. Thoracic surgery consulted, underwent washout and debridement.     OR Note: Debridement of left sternoclavicular joint with excision of left clavicular head. Purulent drainage at the sternoclavicular joint. Tissue surrounding left clavicle found to be very inflamed. Copious irrigation of surgical site.  Specimen sent: Head of left clavicle, culture of left sternoclavicular joint, culture of left manubrium, culture of deep sternal head of clavicle, culture of first rib costal cartilage      In ER: Afebrile, WBC 9.8, , . Given IV Zosyn, IV Vancomycin.         Septic arthritis of the left sternoclavicular joint and involving the   articulation of the sternum with the first costal cartilage.    Extensive surrounding inflammation, with pneumonia in the underlying   subpleural left upper lobe.      # Septic arthritis of L sternoclavicular joint s/p excision and drainage  # L upper lobe opacity,   # Immunosuppressed patient  # Elevated ESR/CRP.   #Manubrium OM. s/p repeat washout and debridement.   #New fever.   lt upper lip ulcer         PLAN:  - continue cefepime 2 gm Q8hrs  - repeat blood cx NTD   - HSV PCR sent.  - initiated empiric valtrex for now  - dental eval pending.   - OR cx negative for bacterial cx.   - follow fungal/AFB cultures; negative so far  - Quantiferon negative   - TTE negative   - thoracic following,   - Rt shoulder MRI with no acute infection.   - repeat CT chest with no collection.         Plan discussed with Medicine Attending.     Odalis Reynaga  Please contact through MS Teams   If no response or past 5 pm/weekend call [phone].

## 2023-12-27 NOTE — PROGRESS NOTE ADULT - SUBJECTIVE AND OBJECTIVE BOX
Date of service: 12-27-23 @ 21:57      Patient is a 43y old  Female who presents with a chief complaint of L shoulder and chest pain (27 Dec 2023 16:59)                                                               INTERVAL HPI/OVERNIGHT EVENTS:    REVIEW OF SYSTEMS:     CONSTITUTIONAL: No weakness, fevers or chills  EYES/ENT: No visual changes , no ear ache   NECK: No pain or stiffness  RESPIRATORY: No cough, wheezing,  No shortness of breath  CARDIOVASCULAR: No chest pain or palpitations  GASTROINTESTINAL: No abdominal pain  . No nausea, vomiting, or hematemesis; No diarrhea or constipation. No melena or hematochezia.  GENITOURINARY: No dysuria, frequency or hematuria  NEUROLOGICAL: No numbness or weakness  SKIN: No itching, burning, rashes, or lesions                                                                                                                                                                                                                                                                                 Medications:  MEDICATIONS  (STANDING):  acetaminophen     Tablet .. 650 milliGRAM(s) Oral every 6 hours  cefepime   IVPB 2000 milliGRAM(s) IV Intermittent every 8 hours  chlorhexidine 2% Cloths 1 Application(s) Topical <User Schedule>  cyclobenzaprine 5 milliGRAM(s) Oral two times a day  dalfampridine ER 10 milliGRAM(s) Oral every 12 hours  DULoxetine 60 milliGRAM(s) Oral at bedtime  enoxaparin Injectable 40 milliGRAM(s) SubCutaneous every 24 hours  fentaNYL   Patch  12 MICROgram(s)/Hr 1 Patch Transdermal every 72 hours  multivitamin/minerals 1 Tablet(s) Oral daily  oxyCODONE  ER Tablet 10 milliGRAM(s) Oral every 12 hours  senna 2 Tablet(s) Oral at bedtime  sertraline 100 milliGRAM(s) Oral at bedtime  valACYclovir 1000 milliGRAM(s) Oral every 12 hours  Vibegron (Gemtesa) 75 milliGRAM(s),Vibegron (Gemtesa) 75mg tablet 1 Tablet(s) 1 Tablet(s) Oral daily    MEDICATIONS  (PRN):  benzocaine/menthol Lozenge 1 Lozenge Oral three times a day PRN Sore Throat  clonazePAM  Tablet 0.5 milliGRAM(s) Oral three times a day PRN anxiety  HYDROmorphone   Tablet 4 milliGRAM(s) Oral every 6 hours PRN Severe Pain (7 - 10)  HYDROmorphone   Tablet 2 milliGRAM(s) Oral every 4 hours PRN Moderate Pain (4 - 6)  HYDROmorphone  Injectable 1 milliGRAM(s) IV Push every 3 hours PRN for breakthrough pain  polyethylene glycol 3350 17 Gram(s) Oral daily PRN Constipation       Allergies    No Known Allergies    Intolerances      Vital Signs Last 24 Hrs  T(C): 37.1 (27 Dec 2023 17:04), Max: 37.4 (27 Dec 2023 13:21)  T(F): 98.7 (27 Dec 2023 17:04), Max: 99.4 (27 Dec 2023 13:21)  HR: 91 (27 Dec 2023 17:04) (83 - 102)  BP: 106/64 (27 Dec 2023 17:04) (97/60 - 106/64)  BP(mean): --  RR: 18 (27 Dec 2023 17:04) (18 - 20)  SpO2: 94% (27 Dec 2023 17:04) (92% - 95%)    Parameters below as of 27 Dec 2023 17:04  Patient On (Oxygen Delivery Method): room air      CAPILLARY BLOOD GLUCOSE          12-26 @ 07:01  -  12-27 @ 07:00  --------------------------------------------------------  IN: 1390 mL / OUT: 0 mL / NET: 1390 mL    12-27 @ 07:01  -  12-27 @ 21:57  --------------------------------------------------------  IN: 600 mL / OUT: 300 mL / NET: 300 mL      Physical Exam:    Daily     Daily   General:  Well appearing, NAD, not cachetic  HEENT:  Nonicteric, PERRLA  CV:  RRR, S1S2   Lungs:  CTA B/L, no wheezes, rales, rhonchi  Abdomen:  Soft, non-tender, no distended, positive BS  Extremities:  2+ pulses, no c/c, no edema  Skin:  Warm and dry, no rashes  :  No stanford  Neuro:  AAOx3, non-focal, grossly intact                                                                                                                                                                                                                                                                                                LABS:                               7.2    11.25 )-----------( 419      ( 27 Dec 2023 07:43 )             23.0                      12-27    137  |  103  |  7   ----------------------------<  84  4.0   |  24  |  0.40<L>    Ca    9.1      27 Dec 2023 07:44    TPro  6.1  /  Alb  3.2<L>  /  TBili  0.3  /  DBili  x   /  AST  20  /  ALT  18  /  AlkPhos  363<H>  12-26                       RADIOLOGY & ADDITIONAL TESTS         I personally reviewed: [  ]EKG   [  ]CXR    [  ] CT      A/P:         Discussed with :     [facility] consultants' Notes   Time spent :

## 2023-12-27 NOTE — PROGRESS NOTE ADULT - ASSESSMENT
43F PMHx of MS on Rituxan, presenting with left chest wall and shoulder pain w/ concern for infection now admitted with likely septic arthritis with need for possible washout and debridement       Problem/Plan - 1:  ·  Problem: Septic arthritis. left shoulder joint   ·  Plan: Imaging consistent with septic arthritis  s/p OR   cont abx and fu cultures   check ECHO : no vegetation   fu with ID   d/w with CTS at bedside : drainage adjusted and fx improved   cont current abx   pt with LUE pain : kaciley referred pain from L [id number] and chest however  Cervical MRI : no disciits / abcess   discussed with pt , family at length at bedside   washout and debridement of sternoclavicular joint; resection of medial manubrium; white and black wound vac placed  now s/p vac change  PICC in place   pt had an episode of fever .. d/w ID : fu gonzalez cultuers and ct chest   cont current abx           # c/o left arm swelling and pain in elbow region :  -doppler ordered to r/o DVT : Negative      Problem/Plan - 2:  ·  Problem: Multiple sclerosis.   ·  Plan: Gets Rituxan every 6 months, next dose due : holding   -On Ampyra BID at home, need to bring home med     Problem/Plan - 3:  ·  Problem: Anxiety.   ·  Plan: ISTOP reviewed Reference #: [id number]  -Cont. Clonazepam 0.5mg QHS PRN  -Cont. Sertraline 100mg QHS  -Cont. Duloxetine 60mg QHS, pt unsure of dose  -Need full med rec.     Problem/Plan - 4:  ·  Problem: Anemia.   ·  Plan: hgb 9, lower than prior year. Pt endorses some recent anemia but cannot specify  - pt feeling weak and fatigued   will chemo one unit of prbc        Problem/Plan - 5:  ·  Problem: Prophylactic measure.   ·  Plan: DVT PPx    hyperkalemia : normalized     awaiting placement    43F PMHx of MS on Rituxan, presenting with left chest wall and shoulder pain w/ concern for infection now admitted with likely septic arthritis with need for possible washout and debridement       Problem/Plan - 1:  ·  Problem: Septic arthritis. left shoulder joint   ·  Plan: Imaging consistent with septic arthritis  s/p OR   cont abx and fu cultures   check ECHO : no vegetation   fu with ID   d/w with CTS at bedside : drainage adjusted and fx improved   cont current abx   pt with LUE pain : kaciley referred pain from L [id number] and chest however  Cervical MRI : no disciits / abcess   discussed with pt , family at length at bedside   washout and debridement of sternoclavicular joint; resection of medial manubrium; white and black wound vac placed  now s/p vac change  PICC in place   pt had an episode of fever .. d/w ID : fu gonzalez cultuers and ct chest   cont current abx           # c/o left arm swelling and pain in elbow region :  -doppler ordered to r/o DVT : Negative      Problem/Plan - 2:  ·  Problem: Multiple sclerosis.   ·  Plan: Gets Rituxan every 6 months, next dose due : holding   -On Ampyra BID at home, need to bring home med     Problem/Plan - 3:  ·  Problem: Anxiety.   ·  Plan: ISTOP reviewed Reference #: [id number]  -Cont. Clonazepam 0.5mg QHS PRN  -Cont. Sertraline 100mg QHS  -Cont. Duloxetine 60mg QHS, pt unsure of dose  -Need full med rec.     Problem/Plan - 4:  ·  Problem: Anemia.   ·  Plan: hgb 9, lower than prior year. Pt endorses some recent anemia but cannot specify  - pt feeling weak and fatigued   will chemo one unit of prbc        Problem/Plan - 5:  ·  Problem: Prophylactic measure.   ·  Plan: DVT PPx    hyperkalemia : normalized     awaiting placement

## 2023-12-27 NOTE — CHART NOTE - NSCHARTNOTEFT_GEN_A_CORE
Patient will not require ocrelizumab infusions while at rehab, discussed with Dr. Loredo (neurology). No

## 2023-12-27 NOTE — PROGRESS NOTE ADULT - SUBJECTIVE AND OBJECTIVE BOX
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  She had a recent fall in 2023 secondary to unsteadiness due to multiple sclerosis, with subsequent left chest wall and shoulder pain.  Was seen at [facility] with concern for MS exacerbation but no MRI brain and cervical spine with an without contrast were done at the time without enhancement.  At the time infectious workup was done and notable for UTI which was treated.  She continued to have pain.  In the interim, she had an outpatient MRI of left sternoclavicular joint with results significant for marrow edema and swelling of the left sternoclavicular joint with a 2 cm fluid collection and adjacent pleural edema in the left chest with concerns for infectious etiology.   CT chest w/ IV contrast suggestive of septic arthritis with surrounding erythema. Thoracic surgery consulted.  Patient s/p surgical debridement on 23    Int hx:  mri with 80 x 25 mm rim-enhancing fluid collection persists in the surgical cavity with well-placed drain within the fluid collection.   Marrow signal abnormality in the manubrium concerning for acute   osteomyelitis.    Low-grade partial-thickness bursal surface tear of the supraspinatus tendon.    had transient fever getting workup         ******    Pt still with sternal pain, says 10/10, pain also in left arm    Past Medical History:  Multiple sclerosis [G35]        Past Surgical History:  History of  [Z98.891]        Social History:  No toxic habits    Family History:  FAMILY HISTORY:      Allergies:  No Known Allergies      ROS:  Constitutional: Patient offers no complaints of fevers or significant weight loss  Ears, Nose, Mouth and Throat: The patient presents with no abnormalities of the head, ears, eyes, nose or throat  Skin: Patient offers no concerns of new rashes or lesions  Respiratory: The patient presents with no abnormalities of the respiratory tract  Cardiovascular: The patient presents with no cardiac abnormalities  Gastrointestinal: The patient presents with no abnormalities of the GI system  Genitourinary: The patient presents with no dysuria, hematuria or frequent urination  Neurological: See HPI  Endocrine: Patient offers no complaints of excessive thirst, urination, or heat/cold intolerance    Advanced care planning reviewed and noted in the chart.      Medications:  acetaminophen     Tablet .. 650 milliGRAM(s) Oral every 6 hours  benzocaine/menthol Lozenge 1 Lozenge Oral three times a day PRN  cefepime   IVPB 2000 milliGRAM(s) IV Intermittent every 8 hours  chlorhexidine 2% Cloths 1 Application(s) Topical <User Schedule>  clonazePAM  Tablet 0.5 milliGRAM(s) Oral three times a day PRN  cyclobenzaprine 5 milliGRAM(s) Oral two times a day  dalfampridine ER 10 milliGRAM(s) Oral every 12 hours  DULoxetine 60 milliGRAM(s) Oral at bedtime  enoxaparin Injectable 40 milliGRAM(s) SubCutaneous every 24 hours  fentaNYL   Patch  12 MICROgram(s)/Hr 1 Patch Transdermal every 72 hours  HYDROmorphone   Tablet 4 milliGRAM(s) Oral every 6 hours PRN  HYDROmorphone   Tablet 2 milliGRAM(s) Oral every 4 hours PRN  HYDROmorphone  Injectable 1 milliGRAM(s) IV Push every 3 hours PRN  multivitamin/minerals 1 Tablet(s) Oral daily  oxyCODONE  ER Tablet 10 milliGRAM(s) Oral every 12 hours  polyethylene glycol 3350 17 Gram(s) Oral daily PRN  senna 2 Tablet(s) Oral at bedtime  sertraline 100 milliGRAM(s) Oral at bedtime  Vibegron (Gemtesa) 75 milliGRAM(s),Vibegron (Gemtesa) 75mg tablet 1 Tablet(s) 1 Tablet(s) Oral daily      Labs:  CBC Full  -  ( 27 Dec 2023 07:43 )  WBC Count : 11.25 K/uL  RBC Count : 2.90 M/uL  Hemoglobin : 7.2 g/dL  Hematocrit : 23.0 %  Platelet Count - Automated : 419 K/uL  Mean Cell Volume : 79.3 fl  Mean Cell Hemoglobin : 24.8 pg  Mean Cell Hemoglobin Concentration : 31.3 gm/dL  Auto Neutrophil # : x  Auto Lymphocyte # : x  Auto Monocyte # : x  Auto Eosinophil # : x  Auto Basophil # : x  Auto Neutrophil % : x  Auto Lymphocyte % : x  Auto Monocyte % : x  Auto Eosinophil % : x  Auto Basophil % : x    12    137  |  103  |  7   ----------------------------<  84  4.0   |  24  |  0.40<L>    Ca    9.1      27 Dec 2023 07:44    TPro  6.1  /  Alb  3.2<L>  /  TBili  0.3  /  DBili  x   /  AST  20  /  ALT  18  /  AlkPhos  363<H>  12-26    CAPILLARY BLOOD GLUCOSE        LIVER FUNCTIONS - ( 26 Dec 2023 08:52 )  Alb: 3.2 g/dL / Pro: 6.1 g/dL / ALK PHOS: 363 U/L / ALT: 18 U/L / AST: 20 U/L / GGT: x             Urinalysis Basic - ( 27 Dec 2023 07:44 )    Color: x / Appearance: x / SG: x / pH: x  Gluc: 84 mg/dL / Ketone: x  / Bili: x / Urobili: x   Blood: x / Protein: x / Nitrite: x   Leuk Esterase: x / RBC: x / WBC x   Sq Epi: x / Non Sq Epi: x / Bacteria: x          Vitals:  Vital Signs Last 24 Hrs  T(C): 36.9 (27 Dec 2023 10:09), Max: 38.6 (26 Dec 2023 17:01)  T(F): 98.5 (27 Dec 2023 10:09), Max: 101.4 (26 Dec 2023 17:01)  HR: 93 (27 Dec 2023 10:09) (69 - 115)  BP: 97/60 (27 Dec 2023 10:09) (97/60 - 115/77)  BP(mean): --  RR: 18 (27 Dec 2023 10:09) (18 - 20)  SpO2: 93% (27 Dec 2023 10:09) (92% - 98%)    Parameters below as of 27 Dec 2023 10:09  Patient On (Oxygen Delivery Method): room air          NEUROLOGICAL EXAM:    Mental status: Awake, alert, and in less apparent distress. Oriented to person, place and time. Language function is normal.  dysarthric    Cranial Nerves: Pupils were equal, round, reactive to light. Extraocular movements were intact. Visual field were full. Fundoscopic exam was deferred. Facial sensation was intact to light touch. There was slight left facial droop. The palate was upgoing symmetrically and tongue was midline.     Motor exam: Bulk and tone were normal.  Antigravity in all extremities without drift.  Chronic relative right hemiparesis.    Reflexes: deferred     Sensation: Intact to light touch    Coordination: no gross dysmetria    Gait: deferred   sternal wound seen          ACC: [id number] EXAM:  MR SHOULDER WAW IC LT   ORDERED BY:  BRAN SANCHEZ     ACC: [id number] EXAM:  MR STERNOCLAVICULAR JNT LT   ORDERED BY: ANASTASIIA WALKER     PROCEDURE DATE:  2023          INTERPRETATION:  MRI OF THE LEFT STERNOCLAVICULAR JOINT AND SHOULDER    CLINICAL INFORMATION: Left sternal abscess status post washout with   persistent purulent drainage and left shoulder pain.  TECHNIQUE: Multisequence, multiplanar MRI of the left sternoclavicular   joint. The study was performed before and after the intravenous   administration of 6 ml Gadavist (1.5 cc discarded) .    COMPARISON: Chest CT 2023 and 2023.    FINDINGS:    STERNOCLAVICULAR JOINT:    BONE: Patient status post surgical resection of the left clavicular head.   The surgical margin is sharp with trace edema and no loss of T1   hyperintense signal. In the manubrium there is increased STIR signal with   loss of T1 hyperintense signal and postcontrast enhancement involving the   superolateral leftward aspect of the bone concerning for acute   osteomyelitis (6:9 5:9). No acute fracture. No osteonecrosis.    JOINTS: A residual rim-enhancing fluid collection is seen in the region   of the left sternoclavicular joint measuring 80 x 25 mm. A drainage   catheter is seen within the fluid collection.    SOFT TISSUE: Postsurgical changes are seen along the anterior aspect of   the left sternoclavicular joint. There is a mild amount of edema in the   adjacent pectoralis major muscle. No focal fluid collection in the   anterior mediastinum. Susceptibility artifact from surgical staples are   seen along the anterior chest wall.      SHOULDER JOINT:    ROTATOR CUFF: Low-grade partial-thickness bursal surface tearing of the   supraspinatus tendon. Rotator cuff is otherwise intact.  MUSCLES: No focal muscle edema or atrophy.  BICEPS TENDON: Normal in course and caliber.  GLENOID LABRUM AND GLENOHUMERAL LIGAMENTS: No displaced labral tear.   Inferior glenohumeral ligament is intact.  GLENOHUMERAL CARTILAGE AND SUBCHONDRAL BONE: No full-thickness chondral   loss.  AC JOINT: No AC joint arthropathy.  SYNOVIUM/JOINT FLUID: No glenohumeral joint effusion. No focal fluid in   the subacromial/subdeltoid bursa.  BONE MARROW: No fracture or osteonecrosis. No marrow signal change to   suggest acute osteomyelitis.  NEUROVASCULAR STRUCTURES: Structures of the suprascapular notch,   spinoglenoid notch, and quadrilateral space are normal in course and   caliber.  SUBCUTANEOUS SOFT TISSUES: Edema in the subcutaneous fat of the left   shoulder. No rim-enhancing fluid collection to suggest abscess.        IMPRESSION:  1.  Patient status post surgical resection of the left clavicular head   with washout of the left sternoclavicular joint.  2.  A 80 x 25 mm rim-enhancing fluid collection persists in the surgical   cavity with well-placed drain within the fluid collection.  3.  Marrow signal abnormality in the manubrium concerning for acute   osteomyelitis.  4.  Low-grade partial-thickness bursal surface tear of the supraspinatus   tendon.    --- End of Report ---            RASHIDA HYDE MD; Attending Radiologist  This document has been electronically signed. 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  She had a recent fall in 2023 secondary to unsteadiness due to multiple sclerosis, with subsequent left chest wall and shoulder pain.  Was seen at [facility] with concern for MS exacerbation but no MRI brain and cervical spine with an without contrast were done at the time without enhancement.  At the time infectious workup was done and notable for UTI which was treated.  She continued to have pain.  In the interim, she had an outpatient MRI of left sternoclavicular joint with results significant for marrow edema and swelling of the left sternoclavicular joint with a 2 cm fluid collection and adjacent pleural edema in the left chest with concerns for infectious etiology.   CT chest w/ IV contrast suggestive of septic arthritis with surrounding erythema. Thoracic surgery consulted.  Patient s/p surgical debridement on 23    Int hx:  mri with 80 x 25 mm rim-enhancing fluid collection persists in the surgical cavity with well-placed drain within the fluid collection.   Marrow signal abnormality in the manubrium concerning for acute   osteomyelitis.    Low-grade partial-thickness bursal surface tear of the supraspinatus tendon.    had transient fever getting workup         ******    Pt still with sternal pain, says 10/10, pain also in left arm    Past Medical History:  Multiple sclerosis [G35]        Past Surgical History:  History of  [Z98.891]        Social History:  No toxic habits    Family History:  FAMILY HISTORY:      Allergies:  No Known Allergies      ROS:  Constitutional: Patient offers no complaints of fevers or significant weight loss  Ears, Nose, Mouth and Throat: The patient presents with no abnormalities of the head, ears, eyes, nose or throat  Skin: Patient offers no concerns of new rashes or lesions  Respiratory: The patient presents with no abnormalities of the respiratory tract  Cardiovascular: The patient presents with no cardiac abnormalities  Gastrointestinal: The patient presents with no abnormalities of the GI system  Genitourinary: The patient presents with no dysuria, hematuria or frequent urination  Neurological: See HPI  Endocrine: Patient offers no complaints of excessive thirst, urination, or heat/cold intolerance    Advanced care planning reviewed and noted in the chart.      Medications:  acetaminophen     Tablet .. 650 milliGRAM(s) Oral every 6 hours  benzocaine/menthol Lozenge 1 Lozenge Oral three times a day PRN  cefepime   IVPB 2000 milliGRAM(s) IV Intermittent every 8 hours  chlorhexidine 2% Cloths 1 Application(s) Topical <User Schedule>  clonazePAM  Tablet 0.5 milliGRAM(s) Oral three times a day PRN  cyclobenzaprine 5 milliGRAM(s) Oral two times a day  dalfampridine ER 10 milliGRAM(s) Oral every 12 hours  DULoxetine 60 milliGRAM(s) Oral at bedtime  enoxaparin Injectable 40 milliGRAM(s) SubCutaneous every 24 hours  fentaNYL   Patch  12 MICROgram(s)/Hr 1 Patch Transdermal every 72 hours  HYDROmorphone   Tablet 4 milliGRAM(s) Oral every 6 hours PRN  HYDROmorphone   Tablet 2 milliGRAM(s) Oral every 4 hours PRN  HYDROmorphone  Injectable 1 milliGRAM(s) IV Push every 3 hours PRN  multivitamin/minerals 1 Tablet(s) Oral daily  oxyCODONE  ER Tablet 10 milliGRAM(s) Oral every 12 hours  polyethylene glycol 3350 17 Gram(s) Oral daily PRN  senna 2 Tablet(s) Oral at bedtime  sertraline 100 milliGRAM(s) Oral at bedtime  Vibegron (Gemtesa) 75 milliGRAM(s),Vibegron (Gemtesa) 75mg tablet 1 Tablet(s) 1 Tablet(s) Oral daily      Labs:  CBC Full  -  ( 27 Dec 2023 07:43 )  WBC Count : 11.25 K/uL  RBC Count : 2.90 M/uL  Hemoglobin : 7.2 g/dL  Hematocrit : 23.0 %  Platelet Count - Automated : 419 K/uL  Mean Cell Volume : 79.3 fl  Mean Cell Hemoglobin : 24.8 pg  Mean Cell Hemoglobin Concentration : 31.3 gm/dL  Auto Neutrophil # : x  Auto Lymphocyte # : x  Auto Monocyte # : x  Auto Eosinophil # : x  Auto Basophil # : x  Auto Neutrophil % : x  Auto Lymphocyte % : x  Auto Monocyte % : x  Auto Eosinophil % : x  Auto Basophil % : x    12    137  |  103  |  7   ----------------------------<  84  4.0   |  24  |  0.40<L>    Ca    9.1      27 Dec 2023 07:44    TPro  6.1  /  Alb  3.2<L>  /  TBili  0.3  /  DBili  x   /  AST  20  /  ALT  18  /  AlkPhos  363<H>  12-26    CAPILLARY BLOOD GLUCOSE        LIVER FUNCTIONS - ( 26 Dec 2023 08:52 )  Alb: 3.2 g/dL / Pro: 6.1 g/dL / ALK PHOS: 363 U/L / ALT: 18 U/L / AST: 20 U/L / GGT: x             Urinalysis Basic - ( 27 Dec 2023 07:44 )    Color: x / Appearance: x / SG: x / pH: x  Gluc: 84 mg/dL / Ketone: x  / Bili: x / Urobili: x   Blood: x / Protein: x / Nitrite: x   Leuk Esterase: x / RBC: x / WBC x   Sq Epi: x / Non Sq Epi: x / Bacteria: x          Vitals:  Vital Signs Last 24 Hrs  T(C): 36.9 (27 Dec 2023 10:09), Max: 38.6 (26 Dec 2023 17:01)  T(F): 98.5 (27 Dec 2023 10:09), Max: 101.4 (26 Dec 2023 17:01)  HR: 93 (27 Dec 2023 10:09) (69 - 115)  BP: 97/60 (27 Dec 2023 10:09) (97/60 - 115/77)  BP(mean): --  RR: 18 (27 Dec 2023 10:09) (18 - 20)  SpO2: 93% (27 Dec 2023 10:09) (92% - 98%)    Parameters below as of 27 Dec 2023 10:09  Patient On (Oxygen Delivery Method): room air          NEUROLOGICAL EXAM:    Mental status: Awake, alert, and in less apparent distress. Oriented to person, place and time. Language function is normal.  dysarthric    Cranial Nerves: Pupils were equal, round, reactive to light. Extraocular movements were intact. Visual field were full. Fundoscopic exam was deferred. Facial sensation was intact to light touch. There was slight left facial droop. The palate was upgoing symmetrically and tongue was midline.     Motor exam: Bulk and tone were normal.  Antigravity in all extremities without drift.  Chronic relative right hemiparesis.    Reflexes: deferred     Sensation: Intact to light touch    Coordination: no gross dysmetria    Gait: deferred   sternal wound seen          ACC: [id number] EXAM:  MR SHOULDER WAW IC LT   ORDERED BY:  BRAN SANCHEZ     ACC: [id number] EXAM:  MR STERNOCLAVICULAR JNT LT   ORDERED BY: ANASTASIIA WALKER     PROCEDURE DATE:  2023          INTERPRETATION:  MRI OF THE LEFT STERNOCLAVICULAR JOINT AND SHOULDER    CLINICAL INFORMATION: Left sternal abscess status post washout with   persistent purulent drainage and left shoulder pain.  TECHNIQUE: Multisequence, multiplanar MRI of the left sternoclavicular   joint. The study was performed before and after the intravenous   administration of 6 ml Gadavist (1.5 cc discarded) .    COMPARISON: Chest CT 2023 and 2023.    FINDINGS:    STERNOCLAVICULAR JOINT:    BONE: Patient status post surgical resection of the left clavicular head.   The surgical margin is sharp with trace edema and no loss of T1   hyperintense signal. In the manubrium there is increased STIR signal with   loss of T1 hyperintense signal and postcontrast enhancement involving the   superolateral leftward aspect of the bone concerning for acute   osteomyelitis (6:9 5:9). No acute fracture. No osteonecrosis.    JOINTS: A residual rim-enhancing fluid collection is seen in the region   of the left sternoclavicular joint measuring 80 x 25 mm. A drainage   catheter is seen within the fluid collection.    SOFT TISSUE: Postsurgical changes are seen along the anterior aspect of   the left sternoclavicular joint. There is a mild amount of edema in the   adjacent pectoralis major muscle. No focal fluid collection in the   anterior mediastinum. Susceptibility artifact from surgical staples are   seen along the anterior chest wall.      SHOULDER JOINT:    ROTATOR CUFF: Low-grade partial-thickness bursal surface tearing of the   supraspinatus tendon. Rotator cuff is otherwise intact.  MUSCLES: No focal muscle edema or atrophy.  BICEPS TENDON: Normal in course and caliber.  GLENOID LABRUM AND GLENOHUMERAL LIGAMENTS: No displaced labral tear.   Inferior glenohumeral ligament is intact.  GLENOHUMERAL CARTILAGE AND SUBCHONDRAL BONE: No full-thickness chondral   loss.  AC JOINT: No AC joint arthropathy.  SYNOVIUM/JOINT FLUID: No glenohumeral joint effusion. No focal fluid in   the subacromial/subdeltoid bursa.  BONE MARROW: No fracture or osteonecrosis. No marrow signal change to   suggest acute osteomyelitis.  NEUROVASCULAR STRUCTURES: Structures of the suprascapular notch,   spinoglenoid notch, and quadrilateral space are normal in course and   caliber.  SUBCUTANEOUS SOFT TISSUES: Edema in the subcutaneous fat of the left   shoulder. No rim-enhancing fluid collection to suggest abscess.        IMPRESSION:  1.  Patient status post surgical resection of the left clavicular head   with washout of the left sternoclavicular joint.  2.  A 80 x 25 mm rim-enhancing fluid collection persists in the surgical   cavity with well-placed drain within the fluid collection.  3.  Marrow signal abnormality in the manubrium concerning for acute   osteomyelitis.  4.  Low-grade partial-thickness bursal surface tear of the supraspinatus   tendon.    --- End of Report ---            RASHIDA HYDE MD; Attending Radiologist  This document has been electronically signed. Dec 11 2023  5:11PM

## 2023-12-27 NOTE — PROGRESS NOTE ADULT - ASSESSMENT
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  She had a recent fall in November 2023 secondary to unsteadiness due to multiple sclerosis, with subsequent left chest wall and shoulder pain.  Was seen at [facility] with concern for MS exacerbation but no MRI brain and cervical spine with an without contrast were done at the time without enhancement.  At the time infectious workup was done and notable for UTI which was treated.  She continued to have pain.  In the interim, she had an outpatient MRI of left sternoclavicular joint with results significant for marrow edema and swelling of the left sternoclavicular joint with a 2 cm fluid collection and adjacent pleural edema in the left chest with concerns for infectious etiology.   CT chest w/ IV contrast suggestive of septic arthritis with surrounding erythema. Thoracic surgery consulted.  Patient s/p surgical debridement on 12/7/23    mri with 80 x 25 mm rim-enhancing fluid collection persists in the surgical cavity with well-placed drain within the fluid collection.   Marrow signal abnormality in the manubrium concerning for acute   osteomyelitis.    Low-grade partial-thickness bursal surface tear of the supraspinatus tendon.    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  Now likely pseudo-flare in setting of septic arthritis    Recommendations:  Management of septic arthritis per primary team  s/p surgical debridement 12/7/23  hold ocrelizumab for now given septic arthritis  continue home meds once able to take PO post-surgically  abx as per ID  defer to primary team for pain control   difficulty standing suspect due to weakness.  Needs aggressive physical therapy as tolerated   will benefit from rehab.  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  She had a recent fall in November 2023 secondary to unsteadiness due to multiple sclerosis, with subsequent left chest wall and shoulder pain.  Was seen at [facility] with concern for MS exacerbation but no MRI brain and cervical spine with an without contrast were done at the time without enhancement.  At the time infectious workup was done and notable for UTI which was treated.  She continued to have pain.  In the interim, she had an outpatient MRI of left sternoclavicular joint with results significant for marrow edema and swelling of the left sternoclavicular joint with a 2 cm fluid collection and adjacent pleural edema in the left chest with concerns for infectious etiology.   CT chest w/ IV contrast suggestive of septic arthritis with surrounding erythema. Thoracic surgery consulted.  Patient s/p surgical debridement on 12/7/23    mri with 80 x 25 mm rim-enhancing fluid collection persists in the surgical cavity with well-placed drain within the fluid collection.   Marrow signal abnormality in the manubrium concerning for acute   osteomyelitis.    Low-grade partial-thickness bursal surface tear of the supraspinatus tendon.    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  Now likely pseudo-flare in setting of septic arthritis    Recommendations:  Management of septic arthritis per primary team  s/p surgical debridement 12/7/23  hold ocrelizumab for now given septic arthritis  continue home meds once able to take PO post-surgically  abx as per ID  defer to primary team for pain control   difficulty standing suspect due to weakness.  Needs aggressive physical therapy as tolerated   will benefit from rehab.  hold off on ocralizumab until healed from wound and not infected

## 2023-12-28 LAB
HCT VFR BLD CALC: 24.2 % — LOW (ref 34.5–45)
HCT VFR BLD CALC: 24.2 % — LOW (ref 34.5–45)
HGB BLD-MCNC: 7.7 G/DL — LOW (ref 11.5–15.5)
HGB BLD-MCNC: 7.7 G/DL — LOW (ref 11.5–15.5)
HSV+VZV DNA SPEC QL NAA+PROBE: SIGNIFICANT CHANGE UP
HSV+VZV DNA SPEC QL NAA+PROBE: SIGNIFICANT CHANGE UP
MCHC RBC-ENTMCNC: 25 PG — LOW (ref 27–34)
MCHC RBC-ENTMCNC: 25 PG — LOW (ref 27–34)
MCHC RBC-ENTMCNC: 31.8 GM/DL — LOW (ref 32–36)
MCHC RBC-ENTMCNC: 31.8 GM/DL — LOW (ref 32–36)
MCV RBC AUTO: 78.6 FL — LOW (ref 80–100)
MCV RBC AUTO: 78.6 FL — LOW (ref 80–100)
NRBC # BLD: 0 /100 WBCS — SIGNIFICANT CHANGE UP (ref 0–0)
NRBC # BLD: 0 /100 WBCS — SIGNIFICANT CHANGE UP (ref 0–0)
PLATELET # BLD AUTO: 496 K/UL — HIGH (ref 150–400)
PLATELET # BLD AUTO: 496 K/UL — HIGH (ref 150–400)
RBC # BLD: 3.08 M/UL — LOW (ref 3.8–5.2)
RBC # BLD: 3.08 M/UL — LOW (ref 3.8–5.2)
RBC # FLD: 16.6 % — HIGH (ref 10.3–14.5)
RBC # FLD: 16.6 % — HIGH (ref 10.3–14.5)
SPECIMEN SOURCE: SIGNIFICANT CHANGE UP
SPECIMEN SOURCE: SIGNIFICANT CHANGE UP
WBC # BLD: 12.44 K/UL — HIGH (ref 3.8–10.5)
WBC # BLD: 12.44 K/UL — HIGH (ref 3.8–10.5)
WBC # FLD AUTO: 12.44 K/UL — HIGH (ref 3.8–10.5)
WBC # FLD AUTO: 12.44 K/UL — HIGH (ref 3.8–10.5)

## 2023-12-28 PROCEDURE — 93321 DOPPLER ECHO F-UP/LMTD STD: CPT | Mod: 26

## 2023-12-28 PROCEDURE — 99232 SBSQ HOSP IP/OBS MODERATE 35: CPT

## 2023-12-28 PROCEDURE — 74177 CT ABD & PELVIS W/CONTRAST: CPT | Mod: 26

## 2023-12-28 PROCEDURE — 93308 TTE F-UP OR LMTD: CPT | Mod: 26

## 2023-12-28 PROCEDURE — 99233 SBSQ HOSP IP/OBS HIGH 50: CPT

## 2023-12-28 RX ADMIN — HYDROMORPHONE HYDROCHLORIDE 2 MILLIGRAM(S): 2 INJECTION INTRAMUSCULAR; INTRAVENOUS; SUBCUTANEOUS at 18:53

## 2023-12-28 RX ADMIN — CEFEPIME 100 MILLIGRAM(S): 1 INJECTION, POWDER, FOR SOLUTION INTRAMUSCULAR; INTRAVENOUS at 17:31

## 2023-12-28 RX ADMIN — BENZOCAINE AND MENTHOL 1 LOZENGE: 5; 1 LIQUID ORAL at 00:30

## 2023-12-28 RX ADMIN — HYDROMORPHONE HYDROCHLORIDE 1 MILLIGRAM(S): 2 INJECTION INTRAMUSCULAR; INTRAVENOUS; SUBCUTANEOUS at 04:19

## 2023-12-28 RX ADMIN — BENZOCAINE AND MENTHOL 1 LOZENGE: 5; 1 LIQUID ORAL at 20:23

## 2023-12-28 RX ADMIN — HYDROMORPHONE HYDROCHLORIDE 4 MILLIGRAM(S): 2 INJECTION INTRAMUSCULAR; INTRAVENOUS; SUBCUTANEOUS at 00:30

## 2023-12-28 RX ADMIN — OXYCODONE HYDROCHLORIDE 10 MILLIGRAM(S): 5 TABLET ORAL at 23:05

## 2023-12-28 RX ADMIN — Medication 0.5 MILLIGRAM(S): at 23:43

## 2023-12-28 RX ADMIN — CHLORHEXIDINE GLUCONATE 1 APPLICATION(S): 213 SOLUTION TOPICAL at 05:55

## 2023-12-28 RX ADMIN — Medication 650 MILLIGRAM(S): at 23:43

## 2023-12-28 RX ADMIN — HYDROMORPHONE HYDROCHLORIDE 4 MILLIGRAM(S): 2 INJECTION INTRAMUSCULAR; INTRAVENOUS; SUBCUTANEOUS at 01:30

## 2023-12-28 RX ADMIN — Medication 1 TABLET(S): at 11:25

## 2023-12-28 RX ADMIN — CYCLOBENZAPRINE HYDROCHLORIDE 5 MILLIGRAM(S): 10 TABLET, FILM COATED ORAL at 17:31

## 2023-12-28 RX ADMIN — SENNA PLUS 2 TABLET(S): 8.6 TABLET ORAL at 21:36

## 2023-12-28 RX ADMIN — OXYCODONE HYDROCHLORIDE 10 MILLIGRAM(S): 5 TABLET ORAL at 11:41

## 2023-12-28 RX ADMIN — ENOXAPARIN SODIUM 40 MILLIGRAM(S): 100 INJECTION SUBCUTANEOUS at 21:35

## 2023-12-28 RX ADMIN — VALACYCLOVIR 1000 MILLIGRAM(S): 500 TABLET, FILM COATED ORAL at 17:31

## 2023-12-28 RX ADMIN — FENTANYL CITRATE 1 PATCH: 50 INJECTION INTRAVENOUS at 07:30

## 2023-12-28 RX ADMIN — SERTRALINE 100 MILLIGRAM(S): 25 TABLET, FILM COATED ORAL at 21:36

## 2023-12-28 RX ADMIN — CEFEPIME 100 MILLIGRAM(S): 1 INJECTION, POWDER, FOR SOLUTION INTRAMUSCULAR; INTRAVENOUS at 23:53

## 2023-12-28 RX ADMIN — BENZOCAINE AND MENTHOL 1 LOZENGE: 5; 1 LIQUID ORAL at 11:23

## 2023-12-28 RX ADMIN — DULOXETINE HYDROCHLORIDE 60 MILLIGRAM(S): 30 CAPSULE, DELAYED RELEASE ORAL at 21:36

## 2023-12-28 RX ADMIN — CYCLOBENZAPRINE HYDROCHLORIDE 5 MILLIGRAM(S): 10 TABLET, FILM COATED ORAL at 05:54

## 2023-12-28 RX ADMIN — HYDROMORPHONE HYDROCHLORIDE 1 MILLIGRAM(S): 2 INJECTION INTRAMUSCULAR; INTRAVENOUS; SUBCUTANEOUS at 05:00

## 2023-12-28 RX ADMIN — VALACYCLOVIR 1000 MILLIGRAM(S): 500 TABLET, FILM COATED ORAL at 05:54

## 2023-12-28 RX ADMIN — FENTANYL CITRATE 1 PATCH: 50 INJECTION INTRAVENOUS at 20:10

## 2023-12-28 RX ADMIN — OXYCODONE HYDROCHLORIDE 10 MILLIGRAM(S): 5 TABLET ORAL at 22:05

## 2023-12-28 RX ADMIN — OXYCODONE HYDROCHLORIDE 10 MILLIGRAM(S): 5 TABLET ORAL at 11:23

## 2023-12-28 RX ADMIN — CEFEPIME 100 MILLIGRAM(S): 1 INJECTION, POWDER, FOR SOLUTION INTRAMUSCULAR; INTRAVENOUS at 08:39

## 2023-12-28 NOTE — PROGRESS NOTE ADULT - SUBJECTIVE AND OBJECTIVE BOX
43yPatient is a 43y old  Female who presents with a chief complaint of L shoulder and chest pain (28 Dec 2023 17:38)      Interval history:  febrile overnight, mouth ulcer bothering her.       Allergies:   No Known Allergies    Antimicrobials:  cefepime   IVPB 2000 milliGRAM(s) IV Intermittent every 8 hours  valACYclovir 1000 milliGRAM(s) Oral every 12 hours      REVIEW OF SYSTEMS:  No SOB  No abdominal pain  No dysuria   No rash.       Vital Signs Last 24 Hrs  T(C): 37.6 (12-28-23 @ 21:13), Max: 38.7 (12-28-23 @ 01:00)  T(F): 99.7 (12-28-23 @ 21:13), Max: 101.7 (12-28-23 @ 01:00)  HR: 94 (12-28-23 @ 21:13) (93 - 103)  BP: 108/70 (12-28-23 @ 21:13) (95/60 - 111/50)  BP(mean): --  RR: 18 (12-28-23 @ 21:13) (18 - 18)  SpO2: 93% (12-28-23 @ 21:13) (93% - 98%)        PHYSICAL EXAM:  Pt in no acute distress, alert, awake.   lt sided upper lip swelling, with oral ulcers.   breathing comfortably on RA.   non distended abdomen  no edema LE   rt arm PICC    Wound vac in place left shoulder                              7.7    12.44 )-----------( 496      ( 28 Dec 2023 07:26 )             24.2   12-27    137  |  103  |  7   ----------------------------<  84  4.0   |  24  |  0.40<L>    Ca    9.1      27 Dec 2023 07:44      Urinalysis (12.28.23 @ 23:44)   Glucose Qualitative, Urine: Negative mg/dL  pH Urine: 7.0  Blood, Urine: Large  Color: Yellow  Urine Appearance: Clear  Bilirubin: Negative  Ketone - Urine: Negative mg/dL  Specific Gravity: 1.006  Protein, Urine: Negative mg/dL  Urobilinogen: 0.2 mg/dL  Nitrite: Negative  Leukocyte Esterase Concentration: Negative  Urine Microscopic-Add On (NC) (12.28.23 @ 23:44)   Red Blood Cell - Urine: 32 /HPF  White Blood Cell - Urine: 0 /HPF  Bacteria: Negative /HPF  Epithelial Cells: 1 /HPF  Cast: 0 /LPF      Culture - Urine (collected 26 Dec 2023 01:22)  Source: Clean Catch Clean Catch (Midstream)  Final Report (27 Dec 2023 11:56):    <10,000 CFU/mL Normal Urogenital Renae

## 2023-12-28 NOTE — PROGRESS NOTE ADULT - SUBJECTIVE AND OBJECTIVE BOX
patient upset, reports her voice is hoarse with slurred speech  has pain across her chest     REVIEW OF SYSTEMS  Constitutional - + fever  HEENT - No vertigo, No neck pain  Neurological - No headaches, No memory loss  Skin - No rashes, No lesions   Musculoskeletal - No joint pain, No joint swelling, No muscle pain    FUNCTIONAL PROGRESS   PT  bed mobility min assist  transfers min assist  gait min assist x 40 feet   unsteady gait, decreased toe clearance      OT  bed mobility min assist  transfers min assist     VITALS  T(C): 37 (23 @ 06:24), Max: 38.7 (23 @ 01:00)  HR: 97 (23 @ 06:24) (90 - 103)  BP: 95/60 (23 @ 06:24) (95/60 - 108/70)  RR: 18 (23 @ 06:24) (18 - 18)  SpO2: 97% (23 @ 06:24) (93% - 97%)  Wt(kg): --    MEDICATIONS   acetaminophen     Tablet .. 650 milliGRAM(s) every 6 hours PRN  benzocaine/menthol Lozenge 1 Lozenge three times a day PRN  cefepime   IVPB 2000 milliGRAM(s) every 8 hours  chlorhexidine 2% Cloths 1 Application(s) <User Schedule>  clonazePAM  Tablet 0.5 milliGRAM(s) three times a day PRN  cyclobenzaprine 5 milliGRAM(s) two times a day  dalfampridine ER 10 milliGRAM(s) every 12 hours  DULoxetine 60 milliGRAM(s) at bedtime  enoxaparin Injectable 40 milliGRAM(s) every 24 hours  fentaNYL   Patch  12 MICROgram(s)/Hr 1 Patch every 72 hours  HYDROmorphone   Tablet 2 milliGRAM(s) every 4 hours PRN  HYDROmorphone   Tablet 4 milliGRAM(s) every 6 hours PRN  HYDROmorphone  Injectable 1 milliGRAM(s) every 3 hours PRN  multivitamin/minerals 1 Tablet(s) daily  oxyCODONE  ER Tablet 10 milliGRAM(s) every 12 hours  polyethylene glycol 3350 17 Gram(s) daily PRN  senna 2 Tablet(s) at bedtime  sertraline 100 milliGRAM(s) at bedtime  valACYclovir 1000 milliGRAM(s) every 12 hours  Vibegron (Gemtesa) 75 milliGRAM(s),Vibegron (Gemtesa) 75mg tablet 1 Tablet(s) 1 Tablet(s) daily      RECENT LABS - Reviewed                        7.7     )-----------( 496      ( 28 Dec 2023 07:26 )             24.2     12    137  |  103  |  7   ----------------------------<  84  4.0   |  24  |  0.40<L>    Ca    9.1      27 Dec 2023 07:44        Urinalysis Basic - ( 27 Dec 2023 18:46 )    Color: Yellow / Appearance: Clear / S.008 / pH: x  Gluc: x / Ketone: Negative mg/dL  / Bili: Negative / Urobili: 0.2 mg/dL   Blood: x / Protein: Negative mg/dL / Nitrite: Negative   Leuk Esterase: Negative / RBC: 65 /HPF / WBC 1 /HPF   Sq Epi: x / Non Sq Epi: 1 /HPF / Bacteria: Negative /HPF    < from: MR Shoulder Joint w/ IV Cont, Right (23 @ 01:17) >    Impression:    No drainable RIGHT glenohumeral joint effusion. No convincing evidence of   osteomyelitis.    Other findings as above.      < end of copied text >    -------------------------------------------------------------------------------  PHYSICAL EXAM  Constitutional - NAD, Comfortable, in chair  +wound vac, left chest wall   Chest - Breathing comfortably  Cardiovascular - S1S2   Abdomen - Soft   Extremities - limited ROM b/l shoulders   Neurologic Exam -     commands                 Cognitive - Awake, Alert, AAO to self, place, date, year, situation     Communication - Fluent, No dysarthria        Motor - 5/5 within range        Sensory - Intact to LT     Psychiatric - Mood stable, Affect WNL  ----------------------------------------------------------------------------------------  ASSESSMENT/PLAN  43yFemale h/o MS with functional deficits after fall, septic arthritis  s/p debridement, left clavicular head excision, resection of medial manubrium, NWJOSE BUTLER, wound vac  on cefepime, plan for PICC, 6 weeks through 24  MS, secondary progressive on ocrelizumab, now on hold, with pseudo-flare   anemia, s/p transfusion, Heme consulted, plan for IV venofer x 3 days   anxiety, on clonazepam, sertraline, duloxetine   Pain - Tylenol oxycodone, dilaudid PO/IV  fentanyl   DVT PPX - SCDs lovenox   Rehab - Will continue to follow for ongoing rehab needs and recommendations.   patient with falls at home, requires min assist with transfers, mobility, decreased balance    Recommend ACUTE inpatient rehabilitation for the functional deficits consisting of 3 hours of therapy/day & x 4 weeks, 24 hour RN/daily PMR physician for comorbid medical management. Patient will be able to tolerate 3 hours a day.    d/w    peer to peer scheduled  patient upset, reports her voice is hoarse with slurred speech  has pain across her chest     REVIEW OF SYSTEMS  Constitutional - + fever  HEENT - No vertigo, No neck pain  Neurological - No headaches, No memory loss  Skin - No rashes, No lesions   Musculoskeletal - No joint pain, No joint swelling, No muscle pain    FUNCTIONAL PROGRESS   PT  bed mobility min assist  transfers min assist  gait min assist x 40 feet   unsteady gait, decreased toe clearance      OT  bed mobility min assist  transfers min assist     VITALS  T(C): 37 (23 @ 06:24), Max: 38.7 (23 @ 01:00)  HR: 97 (23 @ 06:24) (90 - 103)  BP: 95/60 (23 @ 06:24) (95/60 - 108/70)  RR: 18 (23 @ 06:24) (18 - 18)  SpO2: 97% (23 @ 06:24) (93% - 97%)  Wt(kg): --    MEDICATIONS   acetaminophen     Tablet .. 650 milliGRAM(s) every 6 hours PRN  benzocaine/menthol Lozenge 1 Lozenge three times a day PRN  cefepime   IVPB 2000 milliGRAM(s) every 8 hours  chlorhexidine 2% Cloths 1 Application(s) <User Schedule>  clonazePAM  Tablet 0.5 milliGRAM(s) three times a day PRN  cyclobenzaprine 5 milliGRAM(s) two times a day  dalfampridine ER 10 milliGRAM(s) every 12 hours  DULoxetine 60 milliGRAM(s) at bedtime  enoxaparin Injectable 40 milliGRAM(s) every 24 hours  fentaNYL   Patch  12 MICROgram(s)/Hr 1 Patch every 72 hours  HYDROmorphone   Tablet 2 milliGRAM(s) every 4 hours PRN  HYDROmorphone   Tablet 4 milliGRAM(s) every 6 hours PRN  HYDROmorphone  Injectable 1 milliGRAM(s) every 3 hours PRN  multivitamin/minerals 1 Tablet(s) daily  oxyCODONE  ER Tablet 10 milliGRAM(s) every 12 hours  polyethylene glycol 3350 17 Gram(s) daily PRN  senna 2 Tablet(s) at bedtime  sertraline 100 milliGRAM(s) at bedtime  valACYclovir 1000 milliGRAM(s) every 12 hours  Vibegron (Gemtesa) 75 milliGRAM(s),Vibegron (Gemtesa) 75mg tablet 1 Tablet(s) 1 Tablet(s) daily      RECENT LABS - Reviewed                        7.7     )-----------( 496      ( 28 Dec 2023 07:26 )             24.2     12    137  |  103  |  7   ----------------------------<  84  4.0   |  24  |  0.40<L>    Ca    9.1      27 Dec 2023 07:44        Urinalysis Basic - ( 27 Dec 2023 18:46 )    Color: Yellow / Appearance: Clear / S.008 / pH: x  Gluc: x / Ketone: Negative mg/dL  / Bili: Negative / Urobili: 0.2 mg/dL   Blood: x / Protein: Negative mg/dL / Nitrite: Negative   Leuk Esterase: Negative / RBC: 65 /HPF / WBC 1 /HPF   Sq Epi: x / Non Sq Epi: 1 /HPF / Bacteria: Negative /HPF    < from: MR Shoulder Joint w/ IV Cont, Right (23 @ 01:17) >    Impression:    No drainable RIGHT glenohumeral joint effusion. No convincing evidence of   osteomyelitis.    Other findings as above.      < end of copied text >    -------------------------------------------------------------------------------  PHYSICAL EXAM  Constitutional - NAD, Comfortable, in chair  +wound vac  Chest - Breathing comfortably  Cardiovascular - S1S2   Abdomen - Soft   Extremities - limited ROM b/l shoulders   Neurologic Exam -    follows commands                 Cognitive - Awake, Alert, AAO to self, place, date, year, situation     Communication - hypophonic        Motor - 5/5 within range        Sensory - Intact to LT     Psychiatric - tearful   ----------------------------------------------------------------------------------------  ASSESSMENT/PLAN  43yFemale h/o MS with functional deficits after fall, septic arthritis  s/p debridement, left clavicular head excision, resection of medial manubrium, NWB LUKE, wound vac  on cefepime, plan for PICC, 6 weeks through 24  MS, secondary progressive on ocrelizumab, now on hold, with pseudo-flare   anemia, s/p transfusion, Heme consulted, plan for IV venofer x 3 days   anxiety, on clonazepam, sertraline, duloxetine   Pain - Tylenol oxycodone, dilaudid PO/IV  fentanyl   DVT PPX - SCDs lovenox   Rehab - Will continue to follow for ongoing rehab needs and recommendations.   patient with falls at home, requires min assist with transfers, mobility, decreased balance    Recommend ACUTE inpatient rehabilitation for the functional deficits consisting of 3 hours of therapy/day & x 4 weeks, 24 hour RN/daily PMR physician for comorbid medical management. Patient will be able to tolerate 3 hours a day.    d/w    peer to peer scheduled

## 2023-12-28 NOTE — CONSULT NOTE ADULT - ASSESSMENT
42 y/o woman with h/o MS. On Rituxan. Now s/p washout debridement of Sternoclavicular joint.    Pt with Anemia, borderline microcytic. Anemia w/u fairly unremarkable.  Ferritin of 79 . Suspect component of RAMONE in this patient who currently is being managed for an active infection.  Will give 3 days of iv venofer.  Will order stool guaiac.   - monitor cbc, provide transfusional support as needed.     - Case  reviewed with blood bank. Thought to have experienced a febrile non-hemolytic transfusion rxn.  - Blood bank says pt safe to transfuse.  - Pt apparently followed by [facility], will sign off.      42 y/o woman with h/o MS. On Rituxan. Now s/p washout debridement of Sternoclavicular joint.    Pt with Anemia, borderline microcytic. Anemia w/u fairly unremarkable.  Ferritin of 79 . Suspect component of RAMONE in this patient who currently is being managed for an active infection.  Will give 3 days of iv venofer.  Will order stool guaiac.   - monitor cbc, provide transfusional support as needed.     - Case  reviewed with blood bank. Thought to have experienced a febrile non-hemolytic transfusion rxn.  - Blood bank says pt safe to transfuse.  - Pt apparently followed by [facility], will sign off.

## 2023-12-28 NOTE — PROGRESS NOTE ADULT - ASSESSMENT
43F PMHx of MS on Rituxan, presenting with left chest wall and shoulder pain w/ concern for infection now admitted with likely septic arthritis with need for possible washout and debridement       Problem/Plan - 1:  ·  Problem: Septic arthritis. left shoulder joint   ·  Plan: Imaging consistent with septic arthritis  s/p OR   cont abx and fu cultures   check ECHO : no vegetation   fu with ID   d/w with CTS at bedside : drainage adjusted and fx improved   cont current abx   pt with LUE pain : kaciley referred pain from L [id number] and chest however  Cervical MRI : no disciits / abcess   discussed with pt , family at length at bedside   washout and debridement of sternoclavicular joint; resection of medial manubrium; white and black wound vac placed  now s/p vac change  PICC in place   cont current abx   pt still with episodes of fever   cultures neg   ct chest no acute pathology   d/w ID will check CT a/p   check Echo   check procal and esr /crp   dental input           # c/o left arm swelling and pain in elbow region :  -doppler ordered to r/o DVT : Negative      Problem/Plan - 2:  ·  Problem: Multiple sclerosis.   ·  Plan: Gets Rituxan every 6 months, next dose due : holding   -On Ampyra BID at home, need to bring home med     Problem/Plan - 3:  ·  Problem: Anxiety.   ·  Plan: ISTOP reviewed Reference #: [id number]  -Cont. Clonazepam 0.5mg QHS PRN  -Cont. Sertraline 100mg QHS  -Cont. Duloxetine 60mg QHS, pt unsure of dose  -Need full med rec.     Problem/Plan - 4:  ·  Problem: Anemia.   ·  Plan: hgb 9, lower than prior year. Pt endorses some recent anemia but cannot specify  - pt feeling weak and fatigued   will chemo one unit of prbc        Problem/Plan - 5:  ·  Problem: Prophylactic measure.   ·  Plan: DVT PPx    hyperkalemia : normalized     awaiting placement    43F PMHx of MS on Rituxan, presenting with left chest wall and shoulder pain w/ concern for infection now admitted with likely septic arthritis with need for possible washout and debridement       Problem/Plan - 1:  ·  Problem: Septic arthritis. left shoulder joint   ·  Plan: Imaging consistent with septic arthritis  s/p OR   cont abx and fu cultures   check ECHO : no vegetation   fu with ID   d/w with CTS at bedside : drainage adjusted and fx improved   cont current abx   pt with LUE pain : kaciley referred pain from L [id number] and chest however  Cervical MRI : no disciits / abcess   discussed with pt , family at length at bedside   washout and debridement of sternoclavicular joint; resection of medial manubrium; white and black wound vac placed  now s/p vac change  PICC in place   cont current abx   pt still with episodes of fever   cultures neg   ct chest no acute pathology   d/w ID will check CT a/p   check Echo   check procal and esr /crp   dental input           # c/o left arm swelling and pain in elbow region :  -doppler ordered to r/o DVT : Negative      Problem/Plan - 2:  ·  Problem: Multiple sclerosis.   ·  Plan: Gets Rituxan every 6 months, next dose due : holding   -On Ampyra BID at home, need to bring home med     Problem/Plan - 3:  ·  Problem: Anxiety.   ·  Plan: ISTOP reviewed Reference #: [id number]  -Cont. Clonazepam 0.5mg QHS PRN  -Cont. Sertraline 100mg QHS  -Cont. Duloxetine 60mg QHS, pt unsure of dose  -Need full med rec.     Problem/Plan - 4:  ·  Problem: Anemia.   ·  Plan: hgb 9, lower than prior year. Pt endorses some recent anemia but cannot specify  - pt feeling weak and fatigued   will chemo one unit of prbc        Problem/Plan - 5:  ·  Problem: Prophylactic measure.   ·  Plan: DVT PPx    hyperkalemia : normalized     awaiting placement

## 2023-12-28 NOTE — PROGRESS NOTE ADULT - SUBJECTIVE AND OBJECTIVE BOX
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  She had a recent fall in 2023 secondary to unsteadiness due to multiple sclerosis, with subsequent left chest wall and shoulder pain.  Was seen at [facility] with concern for MS exacerbation but no MRI brain and cervical spine with an without contrast were done at the time without enhancement.  At the time infectious workup was done and notable for UTI which was treated.  She continued to have pain.  In the interim, she had an outpatient MRI of left sternoclavicular joint with results significant for marrow edema and swelling of the left sternoclavicular joint with a 2 cm fluid collection and adjacent pleural edema in the left chest with concerns for infectious etiology.   CT chest w/ IV contrast suggestive of septic arthritis with surrounding erythema. Thoracic surgery consulted.  Patient s/p surgical debridement on 23    Int hx:  mri with 80 x 25 mm rim-enhancing fluid collection persists in the surgical cavity with well-placed drain within the fluid collection.   Marrow signal abnormality in the manubrium concerning for acute   osteomyelitis.    Low-grade partial-thickness bursal surface tear of the supraspinatus tendon.    lost her voice         ******    Pt still with sternal pain, says 10/10, pain also in left arm    Past Medical History:  Multiple sclerosis [G35]        Past Surgical History:  History of  [Z98.891]        Social History:  No toxic habits    Family History:  FAMILY HISTORY:      Allergies:  No Known Allergies      ROS:  Constitutional: Patient offers no complaints of fevers or significant weight loss  Ears, Nose, Mouth and Throat: The patient presents with no abnormalities of the head, ears, eyes, nose or throat  Skin: Patient offers no concerns of new rashes or lesions  Respiratory: The patient presents with no abnormalities of the respiratory tract  Cardiovascular: The patient presents with no cardiac abnormalities  Gastrointestinal: The patient presents with no abnormalities of the GI system  Genitourinary: The patient presents with no dysuria, hematuria or frequent urination  Neurological: See HPI  Endocrine: Patient offers no complaints of excessive thirst, urination, or heat/cold intolerance    Advanced care planning reviewed and noted in the chart.    Medications:  acetaminophen     Tablet .. 650 milliGRAM(s) Oral every 6 hours PRN  benzocaine/menthol Lozenge 1 Lozenge Oral three times a day PRN  cefepime   IVPB 2000 milliGRAM(s) IV Intermittent every 8 hours  chlorhexidine 2% Cloths 1 Application(s) Topical <User Schedule>  clonazePAM  Tablet 0.5 milliGRAM(s) Oral three times a day PRN  cyclobenzaprine 5 milliGRAM(s) Oral two times a day  dalfampridine ER 10 milliGRAM(s) Oral every 12 hours  DULoxetine 60 milliGRAM(s) Oral at bedtime  enoxaparin Injectable 40 milliGRAM(s) SubCutaneous every 24 hours  fentaNYL   Patch  12 MICROgram(s)/Hr 1 Patch Transdermal every 72 hours  HYDROmorphone   Tablet 2 milliGRAM(s) Oral every 4 hours PRN  HYDROmorphone   Tablet 4 milliGRAM(s) Oral every 6 hours PRN  HYDROmorphone  Injectable 1 milliGRAM(s) IV Push every 3 hours PRN  multivitamin/minerals 1 Tablet(s) Oral daily  oxyCODONE  ER Tablet 10 milliGRAM(s) Oral every 12 hours  polyethylene glycol 3350 17 Gram(s) Oral daily PRN  senna 2 Tablet(s) Oral at bedtime  sertraline 100 milliGRAM(s) Oral at bedtime  valACYclovir 1000 milliGRAM(s) Oral every 12 hours  Vibegron (Gemtesa) 75 milliGRAM(s),Vibegron (Gemtesa) 75mg tablet 1 Tablet(s) 1 Tablet(s) Oral daily      Labs:  CBC Full  -  ( 28 Dec 2023 07:26 )  WBC Count : 12.44 K/uL  RBC Count : 3.08 M/uL  Hemoglobin : 7.7 g/dL  Hematocrit : 24.2 %  Platelet Count - Automated : 496 K/uL  Mean Cell Volume : 78.6 fl  Mean Cell Hemoglobin : 25.0 pg  Mean Cell Hemoglobin Concentration : 31.8 gm/dL  Auto Neutrophil # : x  Auto Lymphocyte # : x  Auto Monocyte # : x  Auto Eosinophil # : x  Auto Basophil # : x  Auto Neutrophil % : x  Auto Lymphocyte % : x  Auto Monocyte % : x  Auto Eosinophil % : x  Auto Basophil % : x    12-27    137  |  103  |  7   ----------------------------<  84  4.0   |  24  |  0.40<L>    Ca    9.1      27 Dec 2023 07:44      CAPILLARY BLOOD GLUCOSE            Urinalysis Basic - ( 27 Dec 2023 18:46 )    Color: Yellow / Appearance: Clear / S.008 / pH: x  Gluc: x / Ketone: Negative mg/dL  / Bili: Negative / Urobili: 0.2 mg/dL   Blood: x / Protein: Negative mg/dL / Nitrite: Negative   Leuk Esterase: Negative / RBC: 65 /HPF / WBC 1 /HPF   Sq Epi: x / Non Sq Epi: 1 /HPF / Bacteria: Negative /HPF          Vitals:  Vital Signs Last 24 Hrs  T(C): 37 (28 Dec 2023 06:24), Max: 38.7 (28 Dec 2023 01:00)  T(F): 98.6 (28 Dec 2023 06:24), Max: 101.7 (28 Dec 2023 01:00)  HR: 97 (28 Dec 2023 06:24) (90 - 103)  BP: 95/60 (28 Dec 2023 06:24) (95/60 - 108/70)  BP(mean): --  RR: 18 (28 Dec 2023 06:24) (18 - 18)  SpO2: 97% (28 Dec 2023 06:24) (93% - 97%)    Parameters below as of 28 Dec 2023 06:24  Patient On (Oxygen Delivery Method): room air        NEUROLOGICAL EXAM:    Mental status: Awake, alert, and in less apparent distress. Oriented to person, place and time. Language function is normal.  dysarthric    Cranial Nerves: Pupils were equal, round, reactive to light. Extraocular movements were intact. Visual field were full. Fundoscopic exam was deferred. Facial sensation was intact to light touch. There was slight left facial droop. The palate was upgoing symmetrically and tongue was midline.     Motor exam: Bulk and tone were normal.  Antigravity in all extremities without drift.  Chronic relative right hemiparesis.  Fine finger movements slower on the right    Reflexes: deferred     Sensation: Intact to light touch    Coordination: no gross dysmetria    Gait: deferred     sternal wound seen          ACC: [id number] EXAM:  MR SHOULDER WAW IC LT   ORDERED BY:  BRAN SANCHEZ     ACC: [id number] EXAM:  MR STERNOCLAVICULAR JNT LT   ORDERED BY: ANASTASIIA WALKER     PROCEDURE DATE:  2023          INTERPRETATION:  MRI OF THE LEFT STERNOCLAVICULAR JOINT AND SHOULDER    CLINICAL INFORMATION: Left sternal abscess status post washout with   persistent purulent drainage and left shoulder pain.  TECHNIQUE: Multisequence, multiplanar MRI of the left sternoclavicular   joint. The study was performed before and after the intravenous   administration of 6 ml Gadavist (1.5 cc discarded) .    COMPARISON: Chest CT 2023 and 2023.    FINDINGS:    STERNOCLAVICULAR JOINT:    BONE: Patient status post surgical resection of the left clavicular head.   The surgical margin is sharp with trace edema and no loss of T1   hyperintense signal. In the manubrium there is increased STIR signal with   loss of T1 hyperintense signal and postcontrast enhancement involving the   superolateral leftward aspect of the bone concerning for acute   osteomyelitis (6:9 5:9). No acute fracture. No osteonecrosis.    JOINTS: A residual rim-enhancing fluid collection is seen in the region   of the left sternoclavicular joint measuring 80 x 25 mm. A drainage   catheter is seen within the fluid collection.    SOFT TISSUE: Postsurgical changes are seen along the anterior aspect of   the left sternoclavicular joint. There is a mild amount of edema in the   adjacent pectoralis major muscle. No focal fluid collection in the   anterior mediastinum. Susceptibility artifact from surgical staples are   seen along the anterior chest wall.      SHOULDER JOINT:    ROTATOR CUFF: Low-grade partial-thickness bursal surface tearing of the   supraspinatus tendon. Rotator cuff is otherwise intact.  MUSCLES: No focal muscle edema or atrophy.  BICEPS TENDON: Normal in course and caliber.  GLENOID LABRUM AND GLENOHUMERAL LIGAMENTS: No displaced labral tear.   Inferior glenohumeral ligament is intact.  GLENOHUMERAL CARTILAGE AND SUBCHONDRAL BONE: No full-thickness chondral   loss.  AC JOINT: No AC joint arthropathy.  SYNOVIUM/JOINT FLUID: No glenohumeral joint effusion. No focal fluid in   the subacromial/subdeltoid bursa.  BONE MARROW: No fracture or osteonecrosis. No marrow signal change to   suggest acute osteomyelitis.  NEUROVASCULAR STRUCTURES: Structures of the suprascapular notch,   spinoglenoid notch, and quadrilateral space are normal in course and   caliber.  SUBCUTANEOUS SOFT TISSUES: Edema in the subcutaneous fat of the left   shoulder. No rim-enhancing fluid collection to suggest abscess.        IMPRESSION:  1.  Patient status post surgical resection of the left clavicular head   with washout of the left sternoclavicular joint.  2.  A 80 x 25 mm rim-enhancing fluid collection persists in the surgical   cavity with well-placed drain within the fluid collection.  3.  Marrow signal abnormality in the manubrium concerning for acute   osteomyelitis.  4.  Low-grade partial-thickness bursal surface tear of the supraspinatus   tendon.    --- End of Report ---            RASHIDA HYDE MD; Attending Radiologist  This document has been electronically signed. 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  She had a recent fall in 2023 secondary to unsteadiness due to multiple sclerosis, with subsequent left chest wall and shoulder pain.  Was seen at [facility] with concern for MS exacerbation but no MRI brain and cervical spine with an without contrast were done at the time without enhancement.  At the time infectious workup was done and notable for UTI which was treated.  She continued to have pain.  In the interim, she had an outpatient MRI of left sternoclavicular joint with results significant for marrow edema and swelling of the left sternoclavicular joint with a 2 cm fluid collection and adjacent pleural edema in the left chest with concerns for infectious etiology.   CT chest w/ IV contrast suggestive of septic arthritis with surrounding erythema. Thoracic surgery consulted.  Patient s/p surgical debridement on 23    Int hx:  mri with 80 x 25 mm rim-enhancing fluid collection persists in the surgical cavity with well-placed drain within the fluid collection.   Marrow signal abnormality in the manubrium concerning for acute   osteomyelitis.    Low-grade partial-thickness bursal surface tear of the supraspinatus tendon.    lost her voice         ******    Pt still with sternal pain, says 10/10, pain also in left arm    Past Medical History:  Multiple sclerosis [G35]        Past Surgical History:  History of  [Z98.891]        Social History:  No toxic habits    Family History:  FAMILY HISTORY:      Allergies:  No Known Allergies      ROS:  Constitutional: Patient offers no complaints of fevers or significant weight loss  Ears, Nose, Mouth and Throat: The patient presents with no abnormalities of the head, ears, eyes, nose or throat  Skin: Patient offers no concerns of new rashes or lesions  Respiratory: The patient presents with no abnormalities of the respiratory tract  Cardiovascular: The patient presents with no cardiac abnormalities  Gastrointestinal: The patient presents with no abnormalities of the GI system  Genitourinary: The patient presents with no dysuria, hematuria or frequent urination  Neurological: See HPI  Endocrine: Patient offers no complaints of excessive thirst, urination, or heat/cold intolerance    Advanced care planning reviewed and noted in the chart.    Medications:  acetaminophen     Tablet .. 650 milliGRAM(s) Oral every 6 hours PRN  benzocaine/menthol Lozenge 1 Lozenge Oral three times a day PRN  cefepime   IVPB 2000 milliGRAM(s) IV Intermittent every 8 hours  chlorhexidine 2% Cloths 1 Application(s) Topical <User Schedule>  clonazePAM  Tablet 0.5 milliGRAM(s) Oral three times a day PRN  cyclobenzaprine 5 milliGRAM(s) Oral two times a day  dalfampridine ER 10 milliGRAM(s) Oral every 12 hours  DULoxetine 60 milliGRAM(s) Oral at bedtime  enoxaparin Injectable 40 milliGRAM(s) SubCutaneous every 24 hours  fentaNYL   Patch  12 MICROgram(s)/Hr 1 Patch Transdermal every 72 hours  HYDROmorphone   Tablet 2 milliGRAM(s) Oral every 4 hours PRN  HYDROmorphone   Tablet 4 milliGRAM(s) Oral every 6 hours PRN  HYDROmorphone  Injectable 1 milliGRAM(s) IV Push every 3 hours PRN  multivitamin/minerals 1 Tablet(s) Oral daily  oxyCODONE  ER Tablet 10 milliGRAM(s) Oral every 12 hours  polyethylene glycol 3350 17 Gram(s) Oral daily PRN  senna 2 Tablet(s) Oral at bedtime  sertraline 100 milliGRAM(s) Oral at bedtime  valACYclovir 1000 milliGRAM(s) Oral every 12 hours  Vibegron (Gemtesa) 75 milliGRAM(s),Vibegron (Gemtesa) 75mg tablet 1 Tablet(s) 1 Tablet(s) Oral daily      Labs:  CBC Full  -  ( 28 Dec 2023 07:26 )  WBC Count : 12.44 K/uL  RBC Count : 3.08 M/uL  Hemoglobin : 7.7 g/dL  Hematocrit : 24.2 %  Platelet Count - Automated : 496 K/uL  Mean Cell Volume : 78.6 fl  Mean Cell Hemoglobin : 25.0 pg  Mean Cell Hemoglobin Concentration : 31.8 gm/dL  Auto Neutrophil # : x  Auto Lymphocyte # : x  Auto Monocyte # : x  Auto Eosinophil # : x  Auto Basophil # : x  Auto Neutrophil % : x  Auto Lymphocyte % : x  Auto Monocyte % : x  Auto Eosinophil % : x  Auto Basophil % : x    12-27    137  |  103  |  7   ----------------------------<  84  4.0   |  24  |  0.40<L>    Ca    9.1      27 Dec 2023 07:44      CAPILLARY BLOOD GLUCOSE            Urinalysis Basic - ( 27 Dec 2023 18:46 )    Color: Yellow / Appearance: Clear / S.008 / pH: x  Gluc: x / Ketone: Negative mg/dL  / Bili: Negative / Urobili: 0.2 mg/dL   Blood: x / Protein: Negative mg/dL / Nitrite: Negative   Leuk Esterase: Negative / RBC: 65 /HPF / WBC 1 /HPF   Sq Epi: x / Non Sq Epi: 1 /HPF / Bacteria: Negative /HPF          Vitals:  Vital Signs Last 24 Hrs  T(C): 37 (28 Dec 2023 06:24), Max: 38.7 (28 Dec 2023 01:00)  T(F): 98.6 (28 Dec 2023 06:24), Max: 101.7 (28 Dec 2023 01:00)  HR: 97 (28 Dec 2023 06:24) (90 - 103)  BP: 95/60 (28 Dec 2023 06:24) (95/60 - 108/70)  BP(mean): --  RR: 18 (28 Dec 2023 06:24) (18 - 18)  SpO2: 97% (28 Dec 2023 06:24) (93% - 97%)    Parameters below as of 28 Dec 2023 06:24  Patient On (Oxygen Delivery Method): room air        NEUROLOGICAL EXAM:    Mental status: Awake, alert, and in less apparent distress. Oriented to person, place and time. Language function is normal.  dysarthric    Cranial Nerves: Pupils were equal, round, reactive to light. Extraocular movements were intact. Visual field were full. Fundoscopic exam was deferred. Facial sensation was intact to light touch. There was slight left facial droop. The palate was upgoing symmetrically and tongue was midline.     Motor exam: Bulk and tone were normal.  Antigravity in all extremities without drift.  Chronic relative right hemiparesis.  Fine finger movements slower on the right    Reflexes: deferred     Sensation: Intact to light touch    Coordination: no gross dysmetria    Gait: deferred     sternal wound seen          ACC: [id number] EXAM:  MR SHOULDER WAW IC LT   ORDERED BY:  BRAN SANCHEZ     ACC: [id number] EXAM:  MR STERNOCLAVICULAR JNT LT   ORDERED BY: ANASTASIIA WALKER     PROCEDURE DATE:  2023          INTERPRETATION:  MRI OF THE LEFT STERNOCLAVICULAR JOINT AND SHOULDER    CLINICAL INFORMATION: Left sternal abscess status post washout with   persistent purulent drainage and left shoulder pain.  TECHNIQUE: Multisequence, multiplanar MRI of the left sternoclavicular   joint. The study was performed before and after the intravenous   administration of 6 ml Gadavist (1.5 cc discarded) .    COMPARISON: Chest CT 2023 and 2023.    FINDINGS:    STERNOCLAVICULAR JOINT:    BONE: Patient status post surgical resection of the left clavicular head.   The surgical margin is sharp with trace edema and no loss of T1   hyperintense signal. In the manubrium there is increased STIR signal with   loss of T1 hyperintense signal and postcontrast enhancement involving the   superolateral leftward aspect of the bone concerning for acute   osteomyelitis (6:9 5:9). No acute fracture. No osteonecrosis.    JOINTS: A residual rim-enhancing fluid collection is seen in the region   of the left sternoclavicular joint measuring 80 x 25 mm. A drainage   catheter is seen within the fluid collection.    SOFT TISSUE: Postsurgical changes are seen along the anterior aspect of   the left sternoclavicular joint. There is a mild amount of edema in the   adjacent pectoralis major muscle. No focal fluid collection in the   anterior mediastinum. Susceptibility artifact from surgical staples are   seen along the anterior chest wall.      SHOULDER JOINT:    ROTATOR CUFF: Low-grade partial-thickness bursal surface tearing of the   supraspinatus tendon. Rotator cuff is otherwise intact.  MUSCLES: No focal muscle edema or atrophy.  BICEPS TENDON: Normal in course and caliber.  GLENOID LABRUM AND GLENOHUMERAL LIGAMENTS: No displaced labral tear.   Inferior glenohumeral ligament is intact.  GLENOHUMERAL CARTILAGE AND SUBCHONDRAL BONE: No full-thickness chondral   loss.  AC JOINT: No AC joint arthropathy.  SYNOVIUM/JOINT FLUID: No glenohumeral joint effusion. No focal fluid in   the subacromial/subdeltoid bursa.  BONE MARROW: No fracture or osteonecrosis. No marrow signal change to   suggest acute osteomyelitis.  NEUROVASCULAR STRUCTURES: Structures of the suprascapular notch,   spinoglenoid notch, and quadrilateral space are normal in course and   caliber.  SUBCUTANEOUS SOFT TISSUES: Edema in the subcutaneous fat of the left   shoulder. No rim-enhancing fluid collection to suggest abscess.        IMPRESSION:  1.  Patient status post surgical resection of the left clavicular head   with washout of the left sternoclavicular joint.  2.  A 80 x 25 mm rim-enhancing fluid collection persists in the surgical   cavity with well-placed drain within the fluid collection.  3.  Marrow signal abnormality in the manubrium concerning for acute   osteomyelitis.  4.  Low-grade partial-thickness bursal surface tear of the supraspinatus   tendon.    --- End of Report ---            RASHIDA HYDE MD; Attending Radiologist  This document has been electronically signed. Dec 11 2023  5:11PM

## 2023-12-28 NOTE — CONSULT NOTE ADULT - REASON FOR ADMISSION
L shoulder and chest pain

## 2023-12-28 NOTE — PROGRESS NOTE ADULT - ASSESSMENT
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  She had a recent fall in November 2023 secondary to unsteadiness due to multiple sclerosis, with subsequent left chest wall and shoulder pain.  Was seen at [facility] with concern for MS exacerbation but no MRI brain and cervical spine with an without contrast were done at the time without enhancement.  At the time infectious workup was done and notable for UTI which was treated.  She continued to have pain.  In the interim, she had an outpatient MRI of left sternoclavicular joint with results significant for marrow edema and swelling of the left sternoclavicular joint with a 2 cm fluid collection and adjacent pleural edema in the left chest with concerns for infectious etiology.   CT chest w/ IV contrast suggestive of septic arthritis with surrounding erythema. Thoracic surgery consulted.  Patient s/p surgical debridement on 12/7/23    mri with 80 x 25 mm rim-enhancing fluid collection persists in the surgical cavity with well-placed drain within the fluid collection.   Marrow signal abnormality in the manubrium concerning for acute   osteomyelitis.    Low-grade partial-thickness bursal surface tear of the supraspinatus tendon.    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  Now likely pseudo-flare in setting of septic arthritis    Recommendations:  Management of septic arthritis per primary team  s/p surgical debridement 12/7/23  hold ocrelizumab for now given septic arthritis  continue home meds once able to take PO post-surgically  abx as per ID  defer to primary team for pain control   difficulty standing suspect due to weakness.  Needs aggressive physical therapy as tolerated   will benefit from rehab.  hold off on ocralizumab until healed from wound and not infected   discussed with Dr. 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  She had a recent fall in November 2023 secondary to unsteadiness due to multiple sclerosis, with subsequent left chest wall and shoulder pain.  Was seen at [facility] with concern for MS exacerbation but no MRI brain and cervical spine with an without contrast were done at the time without enhancement.  At the time infectious workup was done and notable for UTI which was treated.  She continued to have pain.  In the interim, she had an outpatient MRI of left sternoclavicular joint with results significant for marrow edema and swelling of the left sternoclavicular joint with a 2 cm fluid collection and adjacent pleural edema in the left chest with concerns for infectious etiology.   CT chest w/ IV contrast suggestive of septic arthritis with surrounding erythema. Thoracic surgery consulted.  Patient s/p surgical debridement on 12/7/23    mri with 80 x 25 mm rim-enhancing fluid collection persists in the surgical cavity with well-placed drain within the fluid collection.   Marrow signal abnormality in the manubrium concerning for acute   osteomyelitis.    Low-grade partial-thickness bursal surface tear of the supraspinatus tendon.    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  Now likely pseudo-flare in setting of septic arthritis    Recommendations:  Management of septic arthritis per primary team  s/p surgical debridement 12/7/23  hold ocrelizumab for now given septic arthritis  continue home meds once able to take PO post-surgically  abx as per ID  defer to primary team for pain control   difficulty standing suspect due to weakness.  Needs aggressive physical therapy as tolerated   will benefit from rehab.  hold off on ocralizumab until healed from wound and not infected   discussed with Dr. Selby

## 2023-12-28 NOTE — PROGRESS NOTE ADULT - ASSESSMENT
43F PMHx of MS on (Rituxan, Ocrevus) presenting with left chest wall and shoulder pain. Recent history of fall secondary to unsteadiness due to MS. On review of ortho notes, pt has had multiple falls in November. She was admitted to [facility] on Last week of November and was found to have Rhinovirus and pseudomonas UTI. She completed 5-7 days course with Ciprofloxacin. Found to have Septic arthritis of SCM joint now s/p excision.    Patient obtained CT chest w/ IV contrast which showed likely septic arthritis with surrounding erythema. Thoracic surgery consulted, underwent washout and debridement.     OR Note: Debridement of left sternoclavicular joint with excision of left clavicular head. Purulent drainage at the sternoclavicular joint. Tissue surrounding left clavicle found to be very inflamed. Copious irrigation of surgical site.  Specimen sent: Head of left clavicle, culture of left sternoclavicular joint, culture of left manubrium, culture of deep sternal head of clavicle, culture of first rib costal cartilage      In ER: Afebrile, WBC 9.8, , . Given IV Zosyn, IV Vancomycin.         Septic arthritis of the left sternoclavicular joint and involving the   articulation of the sternum with the first costal cartilage.    Extensive surrounding inflammation, with pneumonia in the underlying   subpleural left upper lobe.      # Septic arthritis of L sternoclavicular joint s/p excision and drainage  # L upper lobe opacity,   # Immunosuppressed patient  # Elevated ESR/CRP.   #Manubrium OM. s/p repeat washout and debridement.   #New persistent fever.   oral ulcers         PLAN:  - continue cefepime 2 gm Q8hrs  - repeat blood cx in lab  - U/A with no pyuria   - HSV PCR negative but lip swelling improving   - on empiric valtrex for now  - s/p dental eval pending.   - OR cx negative for bacterial cx.   - follow fungal/AFB cultures; negative so far  - Quantiferon negative   - TTE negative   - thoracic following,   - Rt shoulder MRI with no acute infection.   - repeat CT chest with no collection.   - recommend CT abd/pelvis to look for occult source of fever.       Plan discussed with Medicine Attending.     Odalis Reynaga  Please contact through MS Teams   If no response or past 5 pm/weekend call [phone].      43F PMHx of MS on (Rituxan, Ocrevus) presenting with left chest wall and shoulder pain. Recent history of fall secondary to unsteadiness due to MS. On review of ortho notes, pt has had multiple falls in November. She was admitted to [facility] on Last week of November and was found to have Rhinovirus and pseudomonas UTI. She completed 5-7 days course with Ciprofloxacin. Found to have Septic arthritis of SCM joint now s/p excision.    Patient obtained CT chest w/ IV contrast which showed likely septic arthritis with surrounding erythema. Thoracic surgery consulted, underwent washout and debridement.     OR Note: Debridement of left sternoclavicular joint with excision of left clavicular head. Purulent drainage at the sternoclavicular joint. Tissue surrounding left clavicle found to be very inflamed. Copious irrigation of surgical site.  Specimen sent: Head of left clavicle, culture of left sternoclavicular joint, culture of left manubrium, culture of deep sternal head of clavicle, culture of first rib costal cartilage      In ER: Afebrile, WBC 9.8, , . Given IV Zosyn, IV Vancomycin.         Septic arthritis of the left sternoclavicular joint and involving the   articulation of the sternum with the first costal cartilage.    Extensive surrounding inflammation, with pneumonia in the underlying   subpleural left upper lobe.      # Septic arthritis of L sternoclavicular joint s/p excision and drainage  # L upper lobe opacity,   # Immunosuppressed patient  # Elevated ESR/CRP.   #Manubrium OM. s/p repeat washout and debridement.   #New persistent fever.   oral ulcers         PLAN:  - continue cefepime 2 gm Q8hrs  - repeat blood cx in lab  - U/A with no pyuria   - HSV PCR negative but lip swelling improving   - on empiric valtrex for now  - s/p dental eval pending.   - OR cx negative for bacterial cx.   - follow fungal/AFB cultures; negative so far  - Quantiferon negative   - TTE negative   - thoracic following,   - Rt shoulder MRI with no acute infection.   - repeat CT chest with no collection.   - recommend CT abd/pelvis to look for occult source of fever.       Plan discussed with Medicine Attending.     Odalis Reynaga  Please contact through MS Teams   If no response or past 5 pm/weekend call [phone].

## 2023-12-28 NOTE — CONSULT NOTE ADULT - CONSULT REQUESTED DATE/TIME
13-Dec-2023 17:16
19-Dec-2023 12:26
28-Dec-2023 05:52
28-Dec-2023 16:45
07-Dec-2023 00:43
08-Dec-2023 09:49
08-Dec-2023 09:55
08-Dec-2023 10:28

## 2023-12-28 NOTE — CONSULT NOTE ADULT - SUBJECTIVE AND OBJECTIVE BOX
43y old female presents with a chief complaint of L shoulder and chest pain. Dental consulted to rule out odontogenic infection as source of septic arthritis of left sternoclavicular joint.    HPI:  43F PMHx of MS on Rituxan, presenting with left chest wall and shoulder pain. Recent history of fall secondary to unsteadiness due to MS. On review of ortho notes, pt has had multiple falls in November. Obtained outpatient MRI of left sternoclavicular joint with results significant for marrow edema and swelling of the left sternoclavicular joint with a 2 cm fluid collection and adjacent pleural edema in the left chest with concerns for infectious etiology.  Patient initially saw orthopedics with for concerns for septic arthritis of this joint.  Impression of the read showed soft tissue abscess superficial to the joint.  Recommended CT scan with IV contrast of the chest.  Patient denies any fevers however endorsing erythema to the left sternoclavicular joint region. Patient states she was recently hospitalized over a week ago at [facility] for falls secondary to unsteadiness due to her MS and UTI. Hospital stay was for about 6 days. Denies any recent falls today, vision changes. Endorsing headache. Denies any chest pain, shortness of breath, abdominal pain, nausea vomiting diarrhea, urinary complaints  Patient obtained CT chest w/ IV contrast which showed likely septic arthritis with surrounding erythema. Thoracic surgery consulted, plan for OR today with washout and debridement.  In ER: Given IV Zosyn, IV Vancomycin, Tylenol 1gm IVPB, lidocaine patch (07 Dec 2023 02:49)  PAST MEDICAL & SURGICAL HISTORY:  Multiple sclerosis  History of     MEDICATIONS  (STANDING):  cefepime   IVPB 2000 milliGRAM(s) IV Intermittent every 8 hours  chlorhexidine 2% Cloths 1 Application(s) Topical <User Schedule>  cyclobenzaprine 5 milliGRAM(s) Oral two times a day  dalfampridine ER 10 milliGRAM(s) Oral every 12 hours  DULoxetine 60 milliGRAM(s) Oral at bedtime  enoxaparin Injectable 40 milliGRAM(s) SubCutaneous every 24 hours  fentaNYL   Patch  12 MICROgram(s)/Hr 1 Patch Transdermal every 72 hours  multivitamin/minerals 1 Tablet(s) Oral daily  oxyCODONE  ER Tablet 10 milliGRAM(s) Oral every 12 hours  senna 2 Tablet(s) Oral at bedtime  sertraline 100 milliGRAM(s) Oral at bedtime  valACYclovir 1000 milliGRAM(s) Oral every 12 hours  Vibegron (Gemtesa) 75 milliGRAM(s),Vibegron (Gemtesa) 75mg tablet 1 Tablet(s) 1 Tablet(s) Oral daily    MEDICATIONS  (PRN):  acetaminophen     Tablet .. 650 milliGRAM(s) Oral every 6 hours PRN Temp greater or equal to 38C (100.4F), Mild Pain (1 - 3)  benzocaine/menthol Lozenge 1 Lozenge Oral three times a day PRN Sore Throat  clonazePAM  Tablet 0.5 milliGRAM(s) Oral three times a day PRN anxiety  HYDROmorphone   Tablet 2 milliGRAM(s) Oral every 4 hours PRN Moderate Pain (4 - 6)  HYDROmorphone   Tablet 4 milliGRAM(s) Oral every 6 hours PRN Severe Pain (7 - 10)  polyethylene glycol 3350 17 Gram(s) Oral daily PRN Constipation    Allergies:  No Known Allergies    Last Dental Visit:  2 RCTs done 4 weeks ago, 2 crowns of the same teeth done 2 weeks ago  Patient reports RCT done in [place] and [place]    Vital Signs Last 24 Hrs:  T(C): 37.3 (28 Dec 2023 14:05), Max: 38.7 (28 Dec 2023 01:00)  T(F): 99.1 (28 Dec 2023 14:05), Max: 101.7 (28 Dec 2023 01:00)  HR: 93 (28 Dec 2023 14:05) (93 - 103)  BP: 111/50 (28 Dec 2023 14:05) (95/60 - 111/50)  BP(mean): --  RR: 18 (28 Dec 2023 14:05) (18 - 18)  SpO2: 98% (28 Dec 2023 14:05) (94% - 98%)    EOE:  TMJ (-) clicks                  (-) pops                  (-) crepitus             Mandible FROM             Facial bones and MOM grossly intact             (-) trismus             (-) LAD             (-) swelling             (-) asymmetry             (-) palpation             (-) SOB             (-) dysphagia             (-) LOC  Bilateral angular cheilitis  IOE:  permanent dentition: multiple missing and carious teeth           hard/soft palate:  (-) palatal torus           tongue/FOM WNL           labial/buccal mucosa WNL           (-) percussion           (-) palpation           (-) swelling           (-) mobility           (+) missing: #1, #16, #17, #32           (+) gross caries: #19, #30           (+) root tips remaining: #18, #30           (+) multiple restorations;                    Implants: #2, #3                   RCT: #4, #12, #13, #14, #15, #18, #20, #30    Radiographs: Panoramic radiograph and 6 selective periapical radiographs showing #2 - 4, #10 - #15, #19 - 21, #30 - 31   Plate and screws present in right frontal process of maxilla and kitty-orbital region  (+) Kitty-apical radiolucency: #4, #14, #19, #31    LABS:  WBC Count: 12.44 K/uL *H* [3.80 - 10.50] ( @ 07:26)  Platelet Count - Automated: 496 K/uL *H* [150 - 400] ( @ 07:26)  Platelet Count - Automated: 419 K/uL *H* [150 - 400] ( @ 07:43)    ASSESSMENT:  Based on clinical and radiographic exam, there are no signs of acute odontogenic infection; no evidence of swelling, abscess, or fistula. However, there are signs of chronic dental infection present in teeth #4, #14, #19, #31. Teeth #4 and #14 are possibly addressed by the patient's endodontist one month ago, but patient is unsure of the exact teeth. Odontogenic source is unlikely the source of septic arthritis, but cannot be fully ruled out due to chronic odontogenic infection.    PROCEDURE:  Performed exam and took radiographs with patient's verbal consent. Discussed with findings with patient and all questions answered.    RECOMMENDATIONS:   1) Dental F/U with outpatient dentist or [facility] dental clinic ([phone]) for treatment of teeth #4, #14, #19, #31, and for comprehensive dental care.    Dahlia Walton, SEBASTIAN #[id number]  Consulted Dr Castano 43y old female presents with a chief complaint of L shoulder and chest pain. Dental consulted to rule out odontogenic infection as source of septic arthritis of left sternoclavicular joint.    HPI:  43F PMHx of MS on Rituxan, presenting with left chest wall and shoulder pain. Recent history of fall secondary to unsteadiness due to MS. On review of ortho notes, pt has had multiple falls in November. Obtained outpatient MRI of left sternoclavicular joint with results significant for marrow edema and swelling of the left sternoclavicular joint with a 2 cm fluid collection and adjacent pleural edema in the left chest with concerns for infectious etiology.  Patient initially saw orthopedics with for concerns for septic arthritis of this joint.  Impression of the read showed soft tissue abscess superficial to the joint.  Recommended CT scan with IV contrast of the chest.  Patient denies any fevers however endorsing erythema to the left sternoclavicular joint region. Patient states she was recently hospitalized over a week ago at [facility] for falls secondary to unsteadiness due to her MS and UTI. Hospital stay was for about 6 days. Denies any recent falls today, vision changes. Endorsing headache. Denies any chest pain, shortness of breath, abdominal pain, nausea vomiting diarrhea, urinary complaints  Patient obtained CT chest w/ IV contrast which showed likely septic arthritis with surrounding erythema. Thoracic surgery consulted, plan for OR today with washout and debridement.  In ER: Given IV Zosyn, IV Vancomycin, Tylenol 1gm IVPB, lidocaine patch (07 Dec 2023 02:49)  PAST MEDICAL & SURGICAL HISTORY:  Multiple sclerosis  History of     MEDICATIONS  (STANDING):  cefepime   IVPB 2000 milliGRAM(s) IV Intermittent every 8 hours  chlorhexidine 2% Cloths 1 Application(s) Topical <User Schedule>  cyclobenzaprine 5 milliGRAM(s) Oral two times a day  dalfampridine ER 10 milliGRAM(s) Oral every 12 hours  DULoxetine 60 milliGRAM(s) Oral at bedtime  enoxaparin Injectable 40 milliGRAM(s) SubCutaneous every 24 hours  fentaNYL   Patch  12 MICROgram(s)/Hr 1 Patch Transdermal every 72 hours  multivitamin/minerals 1 Tablet(s) Oral daily  oxyCODONE  ER Tablet 10 milliGRAM(s) Oral every 12 hours  senna 2 Tablet(s) Oral at bedtime  sertraline 100 milliGRAM(s) Oral at bedtime  valACYclovir 1000 milliGRAM(s) Oral every 12 hours  Vibegron (Gemtesa) 75 milliGRAM(s),Vibegron (Gemtesa) 75mg tablet 1 Tablet(s) 1 Tablet(s) Oral daily    MEDICATIONS  (PRN):  acetaminophen     Tablet .. 650 milliGRAM(s) Oral every 6 hours PRN Temp greater or equal to 38C (100.4F), Mild Pain (1 - 3)  benzocaine/menthol Lozenge 1 Lozenge Oral three times a day PRN Sore Throat  clonazePAM  Tablet 0.5 milliGRAM(s) Oral three times a day PRN anxiety  HYDROmorphone   Tablet 2 milliGRAM(s) Oral every 4 hours PRN Moderate Pain (4 - 6)  HYDROmorphone   Tablet 4 milliGRAM(s) Oral every 6 hours PRN Severe Pain (7 - 10)  polyethylene glycol 3350 17 Gram(s) Oral daily PRN Constipation    Allergies:  No Known Allergies    Last Dental Visit:  2 RCTs done 4 weeks ago, 2 crowns of the same teeth done 2 weeks ago  Patient reports RCT done in [facility] and [place]    Vital Signs Last 24 Hrs:  T(C): 37.3 (28 Dec 2023 14:05), Max: 38.7 (28 Dec 2023 01:00)  T(F): 99.1 (28 Dec 2023 14:05), Max: 101.7 (28 Dec 2023 01:00)  HR: 93 (28 Dec 2023 14:05) (93 - 103)  BP: 111/50 (28 Dec 2023 14:05) (95/60 - 111/50)  BP(mean): --  RR: 18 (28 Dec 2023 14:05) (18 - 18)  SpO2: 98% (28 Dec 2023 14:05) (94% - 98%)    EOE:  TMJ (-) clicks                  (-) pops                  (-) crepitus             Mandible FROM             Facial bones and MOM grossly intact             (-) trismus             (-) LAD             (-) swelling             (-) asymmetry             (-) palpation             (-) SOB             (-) dysphagia             (-) LOC  Bilateral angular cheilitis  IOE:  permanent dentition: multiple missing and carious teeth           hard/soft palate:  (-) palatal torus           tongue/FOM WNL           labial/buccal mucosa WNL           (-) percussion           (-) palpation           (-) swelling           (-) mobility           (+) missing: #1, #16, #17, #32           (+) gross caries: #19, #30           (+) root tips remaining: #18, #30           (+) multiple restorations;                    Implants: #2, #3                   RCT: #4, #12, #13, #14, #15, #18, #20, #30    Radiographs: Panoramic radiograph and 6 selective periapical radiographs showing #2 - 4, #10 - #15, #19 - 21, #30 - 31   Plate and screws present in right frontal process of maxilla and kitty-orbital region  (+) Kitty-apical radiolucency: #4, #14, #19, #31    LABS:  WBC Count: 12.44 K/uL *H* [3.80 - 10.50] ( @ 07:26)  Platelet Count - Automated: 496 K/uL *H* [150 - 400] ( @ 07:26)  Platelet Count - Automated: 419 K/uL *H* [150 - 400] ( @ 07:43)    ASSESSMENT:  Based on clinical and radiographic exam, there are no signs of acute odontogenic infection; no evidence of swelling, abscess, or fistula. However, there are signs of chronic dental infection present in teeth #4, #14, #19, #31. Teeth #4 and #14 are possibly addressed by the patient's endodontist one month ago, but patient is unsure of the exact teeth. Odontogenic source is unlikely the source of septic arthritis, but cannot be fully ruled out due to chronic odontogenic infection.    PROCEDURE:  Performed exam and took radiographs with patient's verbal consent. Discussed with findings with patient and all questions answered.    RECOMMENDATIONS:   1) Dental F/U with outpatient dentist or [facility] dental clinic ([phone]) for treatment of teeth #4, #14, #19, #31, and for comprehensive dental care.    Dahlia Walton, SEBASTIAN #[id number]  Consulted Dr Castano

## 2023-12-28 NOTE — CONSULT NOTE ADULT - SUBJECTIVE AND OBJECTIVE BOX
JAYA AGGARWAL  MRN-[id number]    Patient is a 43y old  Female who presents with a chief complaint of L shoulder and chest pain (27 Dec 2023 21:55)      HPI  HPI:  43F PMHx of MS on Rituxan, presenting with left chest wall and shoulder pain. Recent history of fall secondary to unsteadiness due to MS. On review of ortho notes, pt has had multiple falls in November. Obtained outpatient MRI of left sternoclavicular joint with results significant for marrow edema and swelling of the left sternoclavicular joint with a 2 cm fluid collection and adjacent pleural edema in the left chest with concerns for infectious etiology.  Patient initially saw orthopedics with for concerns for septic arthritis of this joint.  Impression of the read showed soft tissue abscess superficial to the joint.  Recommended CT scan with IV contrast of the chest.  Patient denies any fevers however endorsing erythema to the left sternoclavicular joint region. Patient states she was recently hospitalized over a week ago at [facility] for falls secondary to unsteadiness due to her MS and UTI. Hospital stay was for about 6 days. Denies any recent falls today, vision changes. Endorsing headache. Denies any chest pain, shortness of breath, abdominal pain, nausea vomiting diarrhea, urinary complaints    Patient obtained CT chest w/ IV contrast which showed likely septic arthritis with surrounding erythema. Thoracic surgery consulted, plan for OR today with washout and debridement.     In ER: Given IV Zosyn, IV Vancomycin, Tylenol 1gm IVPB, lidocaine patch (07 Dec 2023 02:49)      Past Medical History  PAST MEDICAL & SURGICAL HISTORY:  Multiple sclerosis      History of           Current Meds  MEDICATIONS  (STANDING):  cefepime   IVPB 2000 milliGRAM(s) IV Intermittent every 8 hours  chlorhexidine 2% Cloths 1 Application(s) Topical <User Schedule>  cyclobenzaprine 5 milliGRAM(s) Oral two times a day  dalfampridine ER 10 milliGRAM(s) Oral every 12 hours  DULoxetine 60 milliGRAM(s) Oral at bedtime  enoxaparin Injectable 40 milliGRAM(s) SubCutaneous every 24 hours  fentaNYL   Patch  12 MICROgram(s)/Hr 1 Patch Transdermal every 72 hours  multivitamin/minerals 1 Tablet(s) Oral daily  oxyCODONE  ER Tablet 10 milliGRAM(s) Oral every 12 hours  senna 2 Tablet(s) Oral at bedtime  sertraline 100 milliGRAM(s) Oral at bedtime  valACYclovir 1000 milliGRAM(s) Oral every 12 hours  Vibegron (Gemtesa) 75 milliGRAM(s),Vibegron (Gemtesa) 75mg tablet 1 Tablet(s) 1 Tablet(s) Oral daily    MEDICATIONS  (PRN):  acetaminophen     Tablet .. 650 milliGRAM(s) Oral every 6 hours PRN Temp greater or equal to 38C (100.4F), Mild Pain (1 - 3)  benzocaine/menthol Lozenge 1 Lozenge Oral three times a day PRN Sore Throat  clonazePAM  Tablet 0.5 milliGRAM(s) Oral three times a day PRN anxiety  HYDROmorphone   Tablet 4 milliGRAM(s) Oral every 6 hours PRN Severe Pain (7 - 10)  HYDROmorphone   Tablet 2 milliGRAM(s) Oral every 4 hours PRN Moderate Pain (4 - 6)  HYDROmorphone  Injectable 1 milliGRAM(s) IV Push every 3 hours PRN for breakthrough pain  polyethylene glycol 3350 17 Gram(s) Oral daily PRN Constipation      Allergies  Allergies    No Known Allergies    Intolerances        Social History  , Retired. No tob.  No etoh    Family History  FAMILY HISTORY:      Review of System  REVIEW OF SYSTEMS      General:	Denies fatigue, fevers, chills, sweats, decreased appetite.    Skin/Breast: denies pruritis, rash  	  Ophthalmologic: no change in vision or blurring  	  HEENT	Denies dry mouth, oral sores, dysphagia,  change in hearing.    Respiratory and Thorax:  cough, sob, wheeze, hemoptysis  	  Cardiovascular:	no cp , palp, orthopnea    Gastrointestinal:	no n/v/d constipation    Genitourinary:	no dysuria of frequency, no hematuria, no flank pain    Musculoskeletal:	no bone or joint pain. no muscle aches.     Neurological:	no change in sensory or motor function. no headache. no weakness.     Psychiatric:	no depression, no anxiety, insomnia.     Hematology/Lymphatics:	no bleeding or bruising        Vitals  Vital Signs Last 24 Hrs  T(C): 36.5 (28 Dec 2023 01:42), Max: 38.7 (28 Dec 2023 01:00)  T(F): 97.7 (28 Dec 2023 01:42), Max: 101.7 (28 Dec 2023 01:00)  HR: 103 (28 Dec 2023 01:42) (90 - 103)  BP: 108/70 (28 Dec 2023 01:42) (97/60 - 108/70)  BP(mean): --  RR: 18 (28 Dec 2023 01:42) (18 - 18)  SpO2: 94% (28 Dec 2023 01:42) (93% - 95%)    Parameters below as of 28 Dec 2023 01:42  Patient On (Oxygen Delivery Method): room air        Physical Exam  PHYSICAL EXAM:      Constitutional: NAD    Eyes: PERRLA EOMI, anicteric sclera    Heent :No oral sores, no pharyngeal injection. moist mucosa.    Neck: supple, no jvd, no LAD    Respiratory: CTA b/l     Cardiovascular: s1s2, no m/g/r    Gastrointestinal: soft, nt, nd, + BS    Extremities: no c/c/e    Neurological:A&O x 3 moves all ext.    Skin: no rash on exposed skin    Lymph Nodes: no lymphadenopathy.              Lab  CBC Full  -  ( 27 Dec 2023 07:43 )  WBC Count : 11.25 K/uL  RBC Count : 2.90 M/uL  Hemoglobin : 7.2 g/dL  Hematocrit : 23.0 %  Platelet Count - Automated : 419 K/uL  Mean Cell Volume : 79.3 fl  Mean Cell Hemoglobin : 24.8 pg  Mean Cell Hemoglobin Concentration : 31.3 gm/dL  Auto Neutrophil # : x  Auto Lymphocyte # : x  Auto Monocyte # : x  Auto Eosinophil # : x  Auto Basophil # : x  Auto Neutrophil % : x  Auto Lymphocyte % : x  Auto Monocyte % : x  Auto Eosinophil % : x  Auto Basophil % : x    12-    137  |  103  |  7   ----------------------------<  84  4.0   |  24  |  0.40<L>    Ca    9.1      27 Dec 2023 07:44    TPro  6.1  /  Alb  3.2<L>  /  TBili  0.3  /  DBili  x   /  AST  20  /  ALT  18  /  AlkPhos  363<H>  12-26        Rad:    Assessment/Plan         JAYA AGGARWAL  MRN-[id number]    Patient is a 43y old  Female who presents with a chief complaint of L shoulder and chest pain (27 Dec 2023 21:55)      HPI  HPI:  43F PMHx of MS on Rituxan, presenting with left chest wall and shoulder pain. Recent history of fall secondary to unsteadiness due to MS. On review of ortho notes, pt has had multiple falls in November. Obtained outpatient MRI of left sternoclavicular joint with results significant for marrow edema and swelling of the left sternoclavicular joint with a 2 cm fluid collection and adjacent pleural edema in the left chest with concerns for infectious etiology.  Patient initially saw orthopedics with for concerns for septic arthritis of this joint.  Impression of the read showed soft tissue abscess superficial to the joint.  Recommended CT scan with IV contrast of the chest.  Patient denies any fevers however endorsing erythema to the left sternoclavicular joint region. Patient states she was recently hospitalized over a week ago at [facility] for falls secondary to unsteadiness due to her MS and UTI. Hospital stay was for about 6 days. Denies any recent falls today, vision changes. Endorsing headache. Denies any chest pain, shortness of breath, abdominal pain, nausea vomiting diarrhea, urinary complaints    Patient obtained CT chest w/ IV contrast which showed likely septic arthritis with surrounding erythema. Thoracic surgery consulted, plan for OR today with washout and debridement.     In ER: Given IV Zosyn, IV Vancomycin, Tylenol 1gm IVPB, lidocaine patch (07 Dec 2023 02:49)      Past Medical History  PAST MEDICAL & SURGICAL HISTORY:  Multiple sclerosis      History of           Current Meds  MEDICATIONS  (STANDING):  cefepime   IVPB 2000 milliGRAM(s) IV Intermittent every 8 hours  chlorhexidine 2% Cloths 1 Application(s) Topical <User Schedule>  cyclobenzaprine 5 milliGRAM(s) Oral two times a day  dalfampridine ER 10 milliGRAM(s) Oral every 12 hours  DULoxetine 60 milliGRAM(s) Oral at bedtime  enoxaparin Injectable 40 milliGRAM(s) SubCutaneous every 24 hours  fentaNYL   Patch  12 MICROgram(s)/Hr 1 Patch Transdermal every 72 hours  multivitamin/minerals 1 Tablet(s) Oral daily  oxyCODONE  ER Tablet 10 milliGRAM(s) Oral every 12 hours  senna 2 Tablet(s) Oral at bedtime  sertraline 100 milliGRAM(s) Oral at bedtime  valACYclovir 1000 milliGRAM(s) Oral every 12 hours  Vibegron (Gemtesa) 75 milliGRAM(s),Vibegron (Gemtesa) 75mg tablet 1 Tablet(s) 1 Tablet(s) Oral daily    MEDICATIONS  (PRN):  acetaminophen     Tablet .. 650 milliGRAM(s) Oral every 6 hours PRN Temp greater or equal to 38C (100.4F), Mild Pain (1 - 3)  benzocaine/menthol Lozenge 1 Lozenge Oral three times a day PRN Sore Throat  clonazePAM  Tablet 0.5 milliGRAM(s) Oral three times a day PRN anxiety  HYDROmorphone   Tablet 4 milliGRAM(s) Oral every 6 hours PRN Severe Pain (7 - 10)  HYDROmorphone   Tablet 2 milliGRAM(s) Oral every 4 hours PRN Moderate Pain (4 - 6)  HYDROmorphone  Injectable 1 milliGRAM(s) IV Push every 3 hours PRN for breakthrough pain  polyethylene glycol 3350 17 Gram(s) Oral daily PRN Constipation      Allergies  Allergies    No Known Allergies    Intolerances        Social History  , Retired. No tob.  No etoh    Family History  FAMILY HISTORY:      Review of System  REVIEW OF SYSTEMS      General:	Denies fatigue, fevers, chills, sweats, decreased appetite.    Skin/Breast: denies pruritis, rash  	  Ophthalmologic: no change in vision or blurring  	  HEENT	Denies dry mouth, oral sores, dysphagia,  change in hearing.    Respiratory and Thorax:  cough, sob, wheeze, hemoptysis  	  Cardiovascular:	no cp , palp, orthopnea    Gastrointestinal:	no n/v/d constipation    Genitourinary:	no dysuria of frequency, no hematuria, no flank pain    Musculoskeletal:	no bone or joint pain. no muscle aches.     Neurological:	no change in sensory or motor function. no headache. no weakness.     Psychiatric:	no depression, no anxiety, insomnia.     Hematology/Lymphatics:	no bleeding or bruising        Vitals  Vital Signs Last 24 Hrs  T(C): 36.5 (28 Dec 2023 01:42), Max: 38.7 (28 Dec 2023 01:00)  T(F): 97.7 (28 Dec 2023 01:42), Max: 101.7 (28 Dec 2023 01:00)  HR: 103 (28 Dec 2023 01:42) (90 - 103)  BP: 108/70 (28 Dec 2023 01:42) (97/60 - 108/70)  BP(mean): --  RR: 18 (28 Dec 2023 01:42) (18 - 18)  SpO2: 94% (28 Dec 2023 01:42) (93% - 95%)    Parameters below as of 28 Dec 2023 01:42  Patient On (Oxygen Delivery Method): room air        Physical Exam  PHYSICAL EXAM:      Constitutional: NAD    Eyes: PERRLA EOMI, anicteric sclera    Heent :No oral sores, no pharyngeal injection. moist mucosa.    Neck: supple, no jvd, no LAD    Respiratory: CTA b/l     Cardiovascular: s1s2, no m/g/r    Gastrointestinal: soft, nt, nd, + BS    Extremities: no c/c/e    Neurological:A&O x 3 moves all ext.    Skin: no rash on exposed skin    Lymph Nodes: no lymphadenopathy.              Lab  CBC Full  -  ( 27 Dec 2023 07:43 )  WBC Count : 11.25 K/uL  RBC Count : 2.90 M/uL  Hemoglobin : 7.2 g/dL  Hematocrit : 23.0 %  Platelet Count - Automated : 419 K/uL  Mean Cell Volume : 79.3 fl  Mean Cell Hemoglobin : 24.8 pg  Mean Cell Hemoglobin Concentration : 31.3 gm/dL  Auto Neutrophil # : x  Auto Lymphocyte # : x  Auto Monocyte # : x  Auto Eosinophil # : x  Auto Basophil # : x  Auto Neutrophil % : x  Auto Lymphocyte % : x  Auto Monocyte % : x  Auto Eosinophil % : x  Auto Basophil % : x    12-    137  |  103  |  7   ----------------------------<  84  4.0   |  24  |  0.40<L>    Ca    9.1      27 Dec 2023 07:44    TPro  6.1  /  Alb  3.2<L>  /  TBili  0.3  /  DBili  x   /  AST  20  /  ALT  18  /  AlkPhos  363<H>  12-26        Rad:    Assessment/Plan         JAYA AGGARWAL  MRN-[id number]    Patient is a 43y old  Female who presents with a chief complaint of L shoulder and chest pain (27 Dec 2023 21:55)      HPI:  43F PMHx of MS on Rituxan, presenting with left chest wall and shoulder pain. Recent history of fall secondary to unsteadiness due to MS. On review of ortho notes, pt has had multiple falls in November. Obtained outpatient MRI of left sternoclavicular joint with results significant for marrow edema and swelling of the left sternoclavicular joint with a 2 cm fluid collection and adjacent pleural edema in the left chest with concerns for infectious etiology.  Patient initially saw orthopedics with for concerns for septic arthritis of this joint.  Impression of the read showed soft tissue abscess superficial to the joint.  Recommended CT scan with IV contrast of the chest.  Patient denies any fevers however endorsing erythema to the left sternoclavicular joint region. Patient states she was recently hospitalized over a week ago at [facility] for falls secondary to unsteadiness due to her MS and UTI. Hospital stay was for about 6 days. Denies any recent falls today, vision changes. Endorsing headache. Denies any chest pain, shortness of breath, abdominal pain, nausea vomiting diarrhea, urinary complaints    Patient obtained CT chest w/ IV contrast which showed likely septic arthritis with surrounding erythema. Thoracic surgery consulted, now s/p washout and debridement.     In ER: Given IV Zosyn, IV Vancomycin, Tylenol 1gm IVPB, lidocaine patch (07 Dec 2023 02:49)      PAST MEDICAL & SURGICAL HISTORY:  Multiple sclerosis    History of     Current Meds  MEDICATIONS  (STANDING):  cefepime   IVPB 2000 milliGRAM(s) IV Intermittent every 8 hours  chlorhexidine 2% Cloths 1 Application(s) Topical <User Schedule>  cyclobenzaprine 5 milliGRAM(s) Oral two times a day  dalfampridine ER 10 milliGRAM(s) Oral every 12 hours  DULoxetine 60 milliGRAM(s) Oral at bedtime  enoxaparin Injectable 40 milliGRAM(s) SubCutaneous every 24 hours  fentaNYL   Patch  12 MICROgram(s)/Hr 1 Patch Transdermal every 72 hours  multivitamin/minerals 1 Tablet(s) Oral daily  oxyCODONE  ER Tablet 10 milliGRAM(s) Oral every 12 hours  senna 2 Tablet(s) Oral at bedtime  sertraline 100 milliGRAM(s) Oral at bedtime  valACYclovir 1000 milliGRAM(s) Oral every 12 hours  Vibegron (Gemtesa) 75 milliGRAM(s),Vibegron (Gemtesa) 75mg tablet 1 Tablet(s) 1 Tablet(s) Oral daily    MEDICATIONS  (PRN):  acetaminophen     Tablet .. 650 milliGRAM(s) Oral every 6 hours PRN Temp greater or equal to 38C (100.4F), Mild Pain (1 - 3)  benzocaine/menthol Lozenge 1 Lozenge Oral three times a day PRN Sore Throat  clonazePAM  Tablet 0.5 milliGRAM(s) Oral three times a day PRN anxiety  HYDROmorphone   Tablet 4 milliGRAM(s) Oral every 6 hours PRN Severe Pain (7 - 10)  HYDROmorphone   Tablet 2 milliGRAM(s) Oral every 4 hours PRN Moderate Pain (4 - 6)  HYDROmorphone  Injectable 1 milliGRAM(s) IV Push every 3 hours PRN for breakthrough pain  polyethylene glycol 3350 17 Gram(s) Oral daily PRN Constipation    Allergies    No Known Allergies    Social History  No tob.  No etoh    FAMILY HISTORY:non-contrib      REVIEW OF SYSTEMS    General:	Denies fevers, chills, sweats, decreased appetite.    Skin/Breast: denies pruritis, rash  	  Ophthalmologic: no change in vision or blurring  	  HEENT	Denies dry mouth, oral sores, dysphagia,  change in hearing.    Respiratory and Thorax:  as above  	  Cardiovascular:	as above    Gastrointestinal:	no n/v/d constipation    Genitourinary:	no dysuria of frequency, no hematuria, no flank pain    Musculoskeletal:	no bone or joint pain. no muscle aches.     Neurological:	no change in sensory or motor function. no headache. no weakness.     Psychiatric:	no depression, no anxiety, insomnia.     Hematology/Lymphatics:	no bleeding or bruising      Vital Signs Last 24 Hrs  T(C): 36.5 (28 Dec 2023 01:42), Max: 38.7 (28 Dec 2023 01:00)  T(F): 97.7 (28 Dec 2023 01:42), Max: 101.7 (28 Dec 2023 01:00)  HR: 103 (28 Dec 2023 01:42) (90 - 103)  BP: 108/70 (28 Dec 2023 01:42) (97/60 - 108/70)  BP(mean): --  RR: 18 (28 Dec 2023 01:42) (18 - 18)  SpO2: 94% (28 Dec 2023 01:42) (93% - 95%)    Parameters below as of 28 Dec 2023 01:42  Patient On (Oxygen Delivery Method): room air      PHYSICAL EXAM:    Constitutional: NAD    Eyes: PERRLA EOMI, anicteric sclera    Heent :No oral sores, no pharyngeal injection. moist mucosa.    Neck: supple, no jvd, no LAD    Respiratory: CTA b/l     Cardiovascular: s1s2, no m/g/r    Gastrointestinal: soft, nt, nd, + BS    Extremities: no c/c/e    Neurological:A&O x 3 moves all ext.    Skin: no rash on exposed skin    Lymph Nodes: no lymphadenopathy.      Lab  CBC Full  -  ( 27 Dec 2023 07:43 )  WBC Count : 11.25 K/uL  RBC Count : 2.90 M/uL  Hemoglobin : 7.2 g/dL  Hematocrit : 23.0 %  Platelet Count - Automated : 419 K/uL  Mean Cell Volume : 79.3 fl  Mean Cell Hemoglobin : 24.8 pg  Mean Cell Hemoglobin Concentration : 31.3 gm/dL  Auto Neutrophil # : x  Auto Lymphocyte # : x  Auto Monocyte # : x  Auto Eosinophil # : x  Auto Basophil # : x  Auto Neutrophil % : x  Auto Lymphocyte % : x  Auto Monocyte % : x  Auto Eosinophil % : x  Auto Basophil % : x    12-    137  |  103  |  7   ----------------------------<  84  4.0   |  24  |  0.40<L>    Ca    9.1      27 Dec 2023 07:44    TPro  6.1  /  Alb  3.2<L>  /  TBili  0.3  /  DBili  x   /  AST  20  /  ALT  18  /  AlkPhos  363<H>  12-26        Rad:    Assessment/Plan         JAYA AGGARWAL  MRN-[id number]    Patient is a 43y old  Female who presents with a chief complaint of L shoulder and chest pain (27 Dec 2023 21:55)      HPI:  43F PMHx of MS on Rituxan, presenting with left chest wall and shoulder pain. Recent history of fall secondary to unsteadiness due to MS. On review of ortho notes, pt has had multiple falls in November. Obtained outpatient MRI of left sternoclavicular joint with results significant for marrow edema and swelling of the left sternoclavicular joint with a 2 cm fluid collection and adjacent pleural edema in the left chest with concerns for infectious etiology.  Patient initially saw orthopedics with for concerns for septic arthritis of this joint.  Impression of the read showed soft tissue abscess superficial to the joint.  Recommended CT scan with IV contrast of the chest.  Patient denies any fevers however endorsing erythema to the left sternoclavicular joint region. Patient states she was recently hospitalized over a week ago at [facility] for falls secondary to unsteadiness due to her MS and UTI. Hospital stay was for about 6 days. Denies any recent falls today, vision changes. Endorsing headache. Denies any chest pain, shortness of breath, abdominal pain, nausea vomiting diarrhea, urinary complaints    Patient obtained CT chest w/ IV contrast which showed likely septic arthritis with surrounding erythema. Thoracic surgery consulted, now s/p washout and debridement.     In ER: Given IV Zosyn, IV Vancomycin, Tylenol 1gm IVPB, lidocaine patch (07 Dec 2023 02:49)      PAST MEDICAL & SURGICAL HISTORY:  Multiple sclerosis    History of     Current Meds  MEDICATIONS  (STANDING):  cefepime   IVPB 2000 milliGRAM(s) IV Intermittent every 8 hours  chlorhexidine 2% Cloths 1 Application(s) Topical <User Schedule>  cyclobenzaprine 5 milliGRAM(s) Oral two times a day  dalfampridine ER 10 milliGRAM(s) Oral every 12 hours  DULoxetine 60 milliGRAM(s) Oral at bedtime  enoxaparin Injectable 40 milliGRAM(s) SubCutaneous every 24 hours  fentaNYL   Patch  12 MICROgram(s)/Hr 1 Patch Transdermal every 72 hours  multivitamin/minerals 1 Tablet(s) Oral daily  oxyCODONE  ER Tablet 10 milliGRAM(s) Oral every 12 hours  senna 2 Tablet(s) Oral at bedtime  sertraline 100 milliGRAM(s) Oral at bedtime  valACYclovir 1000 milliGRAM(s) Oral every 12 hours  Vibegron (Gemtesa) 75 milliGRAM(s),Vibegron (Gemtesa) 75mg tablet 1 Tablet(s) 1 Tablet(s) Oral daily    MEDICATIONS  (PRN):  acetaminophen     Tablet .. 650 milliGRAM(s) Oral every 6 hours PRN Temp greater or equal to 38C (100.4F), Mild Pain (1 - 3)  benzocaine/menthol Lozenge 1 Lozenge Oral three times a day PRN Sore Throat  clonazePAM  Tablet 0.5 milliGRAM(s) Oral three times a day PRN anxiety  HYDROmorphone   Tablet 4 milliGRAM(s) Oral every 6 hours PRN Severe Pain (7 - 10)  HYDROmorphone   Tablet 2 milliGRAM(s) Oral every 4 hours PRN Moderate Pain (4 - 6)  HYDROmorphone  Injectable 1 milliGRAM(s) IV Push every 3 hours PRN for breakthrough pain  polyethylene glycol 3350 17 Gram(s) Oral daily PRN Constipation    Allergies    No Known Allergies    Social History  No tob.  No etoh    FAMILY HISTORY:non-contrib      REVIEW OF SYSTEMS    General:	Denies fevers, chills, sweats, decreased appetite.    Skin/Breast: denies pruritis, rash  	  Ophthalmologic: no change in vision or blurring  	  HEENT	Denies dry mouth, oral sores, dysphagia,  change in hearing.    Respiratory and Thorax:  as above  	  Cardiovascular:	as above    Gastrointestinal:	no n/v/d constipation    Genitourinary:	no dysuria of frequency, no hematuria, no flank pain    Musculoskeletal:	no bone or joint pain. no muscle aches.     Neurological:	no change in sensory or motor function. no headache. no weakness.     Psychiatric:	no depression, no anxiety, insomnia.     Hematology/Lymphatics:	no bleeding or bruising      Vital Signs Last 24 Hrs  T(C): 36.5 (28 Dec 2023 01:42), Max: 38.7 (28 Dec 2023 01:00)  T(F): 97.7 (28 Dec 2023 01:42), Max: 101.7 (28 Dec 2023 01:00)  HR: 103 (28 Dec 2023 01:42) (90 - 103)  BP: 108/70 (28 Dec 2023 01:42) (97/60 - 108/70)  BP(mean): --  RR: 18 (28 Dec 2023 01:42) (18 - 18)  SpO2: 94% (28 Dec 2023 01:42) (93% - 95%)    Parameters below as of 28 Dec 2023 01:42  Patient On (Oxygen Delivery Method): room air      PHYSICAL EXAM:    Constitutional: NAD    Eyes: PERRLA EOMI, anicteric sclera    Heent :No oral sores, no pharyngeal injection. moist mucosa.    Neck: supple, no jvd, no LAD    Respiratory: CTA b/l     Cardiovascular: s1s2, no m/g/r    Gastrointestinal: soft, nt, nd, + BS    Extremities: no c/c/e    Neurological:A&O x 3 moves all ext.    Skin: no rash on exposed skin    Lymph Nodes: no lymphadenopathy.      Lab  CBC Full  -  ( 27 Dec 2023 07:43 )  WBC Count : 11.25 K/uL  RBC Count : 2.90 M/uL  Hemoglobin : 7.2 g/dL  Hematocrit : 23.0 %  Platelet Count - Automated : 419 K/uL  Mean Cell Volume : 79.3 fl  Mean Cell Hemoglobin : 24.8 pg  Mean Cell Hemoglobin Concentration : 31.3 gm/dL  Auto Neutrophil # : x  Auto Lymphocyte # : x  Auto Monocyte # : x  Auto Eosinophil # : x  Auto Basophil # : x  Auto Neutrophil % : x  Auto Lymphocyte % : x  Auto Monocyte % : x  Auto Eosinophil % : x  Auto Basophil % : x    12-    137  |  103  |  7   ----------------------------<  84  4.0   |  24  |  0.40<L>    Ca    9.1      27 Dec 2023 07:44    TPro  6.1  /  Alb  3.2<L>  /  TBili  0.3  /  DBili  x   /  AST  20  /  ALT  18  /  AlkPhos  363<H>  12-26        Rad:    Assessment/Plan

## 2023-12-28 NOTE — PROGRESS NOTE ADULT - SUBJECTIVE AND OBJECTIVE BOX
Date of service: 12-28-23 @ 17:38      Patient is a 43y old  Female who presents with a chief complaint of L shoulder and chest pain (28 Dec 2023 09:54)                                                               INTERVAL HPI/OVERNIGHT EVENTS:    REVIEW OF SYSTEMS:     CONSTITUTIONAL: No weakness, fevers or chills  RESPIRATORY: No cough, wheezing,  No shortness of breath  CARDIOVASCULAR: No chest pain or palpitations  GASTROINTESTINAL: No abdominal pain  . No nausea, vomiting, or hematemesis; No diarrhea or constipation. No melena or hematochezia.  GENITOURINARY: No dysuria, frequency or hematuria  NEUROLOGICAL: No numbness or weakness                                                                                                                                                                                                                                                                                Medications:  MEDICATIONS  (STANDING):  cefepime   IVPB 2000 milliGRAM(s) IV Intermittent every 8 hours  chlorhexidine 2% Cloths 1 Application(s) Topical <User Schedule>  cyclobenzaprine 5 milliGRAM(s) Oral two times a day  dalfampridine ER 10 milliGRAM(s) Oral every 12 hours  DULoxetine 60 milliGRAM(s) Oral at bedtime  enoxaparin Injectable 40 milliGRAM(s) SubCutaneous every 24 hours  fentaNYL   Patch  12 MICROgram(s)/Hr 1 Patch Transdermal every 72 hours  multivitamin/minerals 1 Tablet(s) Oral daily  oxyCODONE  ER Tablet 10 milliGRAM(s) Oral every 12 hours  senna 2 Tablet(s) Oral at bedtime  sertraline 100 milliGRAM(s) Oral at bedtime  valACYclovir 1000 milliGRAM(s) Oral every 12 hours  Vibegron (Gemtesa) 75 milliGRAM(s),Vibegron (Gemtesa) 75mg tablet 1 Tablet(s) 1 Tablet(s) Oral daily    MEDICATIONS  (PRN):  acetaminophen     Tablet .. 650 milliGRAM(s) Oral every 6 hours PRN Temp greater or equal to 38C (100.4F), Mild Pain (1 - 3)  benzocaine/menthol Lozenge 1 Lozenge Oral three times a day PRN Sore Throat  clonazePAM  Tablet 0.5 milliGRAM(s) Oral three times a day PRN anxiety  HYDROmorphone   Tablet 2 milliGRAM(s) Oral every 4 hours PRN Moderate Pain (4 - 6)  HYDROmorphone   Tablet 4 milliGRAM(s) Oral every 6 hours PRN Severe Pain (7 - 10)  polyethylene glycol 3350 17 Gram(s) Oral daily PRN Constipation       Allergies    No Known Allergies    Intolerances      Vital Signs Last 24 Hrs  T(C): 37.3 (28 Dec 2023 14:05), Max: 38.7 (28 Dec 2023 01:00)  T(F): 99.1 (28 Dec 2023 14:05), Max: 101.7 (28 Dec 2023 01:00)  HR: 93 (28 Dec 2023 14:05) (93 - 103)  BP: 111/50 (28 Dec 2023 14:05) (95/60 - 111/50)  BP(mean): --  RR: 18 (28 Dec 2023 14:05) (18 - 18)  SpO2: 98% (28 Dec 2023 14:05) (94% - 98%)    Parameters below as of 28 Dec 2023 14:05  Patient On (Oxygen Delivery Method): room air      CAPILLARY BLOOD GLUCOSE          12-27 @ 07:01  -  12-28 @ 07:00  --------------------------------------------------------  IN: 1720 mL / OUT: 300 mL / NET: 1420 mL    12-28 @ 07:01  -  12-28 @ 17:38  --------------------------------------------------------  IN: 260 mL / OUT: 0 mL / NET: 260 mL      Physical Exam:    Daily     Daily   General: NAD   HEENT:  Nonicteric, PERRLA  CV:  RRR, S1S2   Lungs:  CTA B/L, no wheezes, rales, rhonchi  Abdomen:  Soft, non-tender, no distended, positive BS  Extremities:  no edema                                                                                                                                                                                                                                                                                       LABS:                               7.7    12.44 )-----------( 496      ( 28 Dec 2023 07:26 )             24.2                      12-27    137  |  103  |  7   ----------------------------<  84  4.0   |  24  |  0.40<L>    Ca    9.1      27 Dec 2023 07:44                         RADIOLOGY & ADDITIONAL TESTS         I personally reviewed: [  ]EKG   [  ]CXR    [  ] CT      A/P:         Discussed with :     [facility] consultants' Notes   Time spent :

## 2023-12-28 NOTE — CONSULT NOTE ADULT - CONSULT REASON
rule out odontogenic infection as source of septic arthritis of left sternoclavicular joint
Left Sternoclavicular Joint Abscess
Septic arthritis
Rehabilitation consult
Anemia
Anemia
VILMA RCT
Cardiac Management

## 2023-12-28 NOTE — PROGRESS NOTE ADULT - TIME BILLING
Medical screening examination initiated.  I have conducted a focused provider triage encounter, findings are as follows:    Brief history of present illness:  Patient states that she began with dizziness and weakness PTA. States that she checked her BP and it was high. States headache.     There were no vitals filed for this visit.    Pertinent physical exam:  Awake, alert, ambulatory      Brief workup plan:  labs, imaging.     Preliminary workup initiated; this workup will be continued and followed by the physician or advanced practice provider that is assigned to the patient when roomed.   pt, id, neuro

## 2023-12-29 LAB
APPEARANCE UR: CLEAR — SIGNIFICANT CHANGE UP
APPEARANCE UR: CLEAR — SIGNIFICANT CHANGE UP
BACTERIA # UR AUTO: NEGATIVE /HPF — SIGNIFICANT CHANGE UP
BACTERIA # UR AUTO: NEGATIVE /HPF — SIGNIFICANT CHANGE UP
BILIRUB UR-MCNC: NEGATIVE — SIGNIFICANT CHANGE UP
BILIRUB UR-MCNC: NEGATIVE — SIGNIFICANT CHANGE UP
CAST: 0 /LPF — SIGNIFICANT CHANGE UP (ref 0–4)
CAST: 0 /LPF — SIGNIFICANT CHANGE UP (ref 0–4)
COLOR SPEC: YELLOW — SIGNIFICANT CHANGE UP
COLOR SPEC: YELLOW — SIGNIFICANT CHANGE UP
CRP SERPL-MCNC: 242 MG/L — HIGH (ref 0–4)
CRP SERPL-MCNC: 242 MG/L — HIGH (ref 0–4)
DIFF PNL FLD: ABNORMAL
DIFF PNL FLD: ABNORMAL
ERYTHROCYTE [SEDIMENTATION RATE] IN BLOOD: 77 MM/HR — HIGH (ref 0–15)
ERYTHROCYTE [SEDIMENTATION RATE] IN BLOOD: 77 MM/HR — HIGH (ref 0–15)
GLUCOSE UR QL: NEGATIVE MG/DL — SIGNIFICANT CHANGE UP
GLUCOSE UR QL: NEGATIVE MG/DL — SIGNIFICANT CHANGE UP
KETONES UR-MCNC: NEGATIVE MG/DL — SIGNIFICANT CHANGE UP
KETONES UR-MCNC: NEGATIVE MG/DL — SIGNIFICANT CHANGE UP
LEUKOCYTE ESTERASE UR-ACNC: NEGATIVE — SIGNIFICANT CHANGE UP
LEUKOCYTE ESTERASE UR-ACNC: NEGATIVE — SIGNIFICANT CHANGE UP
NITRITE UR-MCNC: NEGATIVE — SIGNIFICANT CHANGE UP
NITRITE UR-MCNC: NEGATIVE — SIGNIFICANT CHANGE UP
PH UR: 7 — SIGNIFICANT CHANGE UP (ref 5–8)
PH UR: 7 — SIGNIFICANT CHANGE UP (ref 5–8)
PROCALCITONIN SERPL-MCNC: 0.06 NG/ML — SIGNIFICANT CHANGE UP (ref 0.02–0.1)
PROCALCITONIN SERPL-MCNC: 0.06 NG/ML — SIGNIFICANT CHANGE UP (ref 0.02–0.1)
PROT UR-MCNC: NEGATIVE MG/DL — SIGNIFICANT CHANGE UP
PROT UR-MCNC: NEGATIVE MG/DL — SIGNIFICANT CHANGE UP
RBC CASTS # UR COMP ASSIST: 32 /HPF — HIGH (ref 0–4)
RBC CASTS # UR COMP ASSIST: 32 /HPF — HIGH (ref 0–4)
SP GR SPEC: 1.01 — SIGNIFICANT CHANGE UP (ref 1–1.03)
SP GR SPEC: 1.01 — SIGNIFICANT CHANGE UP (ref 1–1.03)
SQUAMOUS # UR AUTO: 1 /HPF — SIGNIFICANT CHANGE UP (ref 0–5)
SQUAMOUS # UR AUTO: 1 /HPF — SIGNIFICANT CHANGE UP (ref 0–5)
UROBILINOGEN FLD QL: 0.2 MG/DL — SIGNIFICANT CHANGE UP (ref 0.2–1)
UROBILINOGEN FLD QL: 0.2 MG/DL — SIGNIFICANT CHANGE UP (ref 0.2–1)
WBC UR QL: 0 /HPF — SIGNIFICANT CHANGE UP (ref 0–5)
WBC UR QL: 0 /HPF — SIGNIFICANT CHANGE UP (ref 0–5)

## 2023-12-29 PROCEDURE — 99232 SBSQ HOSP IP/OBS MODERATE 35: CPT

## 2023-12-29 RX ORDER — POLYETHYLENE GLYCOL 3350 17 G/17G
17 POWDER, FOR SOLUTION ORAL
Refills: 0 | Status: DISCONTINUED | OUTPATIENT
Start: 2023-12-29 | End: 2024-01-05

## 2023-12-29 RX ADMIN — HYDROMORPHONE HYDROCHLORIDE 4 MILLIGRAM(S): 2 INJECTION INTRAMUSCULAR; INTRAVENOUS; SUBCUTANEOUS at 09:50

## 2023-12-29 RX ADMIN — HYDROMORPHONE HYDROCHLORIDE 4 MILLIGRAM(S): 2 INJECTION INTRAMUSCULAR; INTRAVENOUS; SUBCUTANEOUS at 08:49

## 2023-12-29 RX ADMIN — Medication 650 MILLIGRAM(S): at 07:38

## 2023-12-29 RX ADMIN — FENTANYL CITRATE 1 PATCH: 50 INJECTION INTRAVENOUS at 07:58

## 2023-12-29 RX ADMIN — HYDROMORPHONE HYDROCHLORIDE 2 MILLIGRAM(S): 2 INJECTION INTRAMUSCULAR; INTRAVENOUS; SUBCUTANEOUS at 18:07

## 2023-12-29 RX ADMIN — VALACYCLOVIR 1000 MILLIGRAM(S): 500 TABLET, FILM COATED ORAL at 06:47

## 2023-12-29 RX ADMIN — DULOXETINE HYDROCHLORIDE 60 MILLIGRAM(S): 30 CAPSULE, DELAYED RELEASE ORAL at 21:10

## 2023-12-29 RX ADMIN — VALACYCLOVIR 1000 MILLIGRAM(S): 500 TABLET, FILM COATED ORAL at 18:07

## 2023-12-29 RX ADMIN — Medication 650 MILLIGRAM(S): at 15:13

## 2023-12-29 RX ADMIN — FENTANYL CITRATE 1 PATCH: 50 INJECTION INTRAVENOUS at 23:35

## 2023-12-29 RX ADMIN — SERTRALINE 100 MILLIGRAM(S): 25 TABLET, FILM COATED ORAL at 21:10

## 2023-12-29 RX ADMIN — DALFAMPRIDINE 10 MILLIGRAM(S): 10 TABLET, FILM COATED, EXTENDED RELEASE ORAL at 21:11

## 2023-12-29 RX ADMIN — CYCLOBENZAPRINE HYDROCHLORIDE 5 MILLIGRAM(S): 10 TABLET, FILM COATED ORAL at 18:06

## 2023-12-29 RX ADMIN — OXYCODONE HYDROCHLORIDE 10 MILLIGRAM(S): 5 TABLET ORAL at 23:10

## 2023-12-29 RX ADMIN — DALFAMPRIDINE 10 MILLIGRAM(S): 10 TABLET, FILM COATED, EXTENDED RELEASE ORAL at 10:41

## 2023-12-29 RX ADMIN — BENZOCAINE AND MENTHOL 1 LOZENGE: 5; 1 LIQUID ORAL at 22:09

## 2023-12-29 RX ADMIN — Medication 0.5 MILLIGRAM(S): at 19:14

## 2023-12-29 RX ADMIN — Medication 1 TABLET(S): at 12:19

## 2023-12-29 RX ADMIN — CEFEPIME 100 MILLIGRAM(S): 1 INJECTION, POWDER, FOR SOLUTION INTRAMUSCULAR; INTRAVENOUS at 08:17

## 2023-12-29 RX ADMIN — Medication 650 MILLIGRAM(S): at 14:13

## 2023-12-29 RX ADMIN — HYDROMORPHONE HYDROCHLORIDE 2 MILLIGRAM(S): 2 INJECTION INTRAMUSCULAR; INTRAVENOUS; SUBCUTANEOUS at 19:07

## 2023-12-29 RX ADMIN — FENTANYL CITRATE 1 PATCH: 50 INJECTION INTRAVENOUS at 21:09

## 2023-12-29 RX ADMIN — OXYCODONE HYDROCHLORIDE 10 MILLIGRAM(S): 5 TABLET ORAL at 22:10

## 2023-12-29 RX ADMIN — Medication 650 MILLIGRAM(S): at 08:38

## 2023-12-29 RX ADMIN — ENOXAPARIN SODIUM 40 MILLIGRAM(S): 100 INJECTION SUBCUTANEOUS at 19:14

## 2023-12-29 RX ADMIN — CEFEPIME 100 MILLIGRAM(S): 1 INJECTION, POWDER, FOR SOLUTION INTRAMUSCULAR; INTRAVENOUS at 23:40

## 2023-12-29 RX ADMIN — POLYETHYLENE GLYCOL 3350 17 GRAM(S): 17 POWDER, FOR SOLUTION ORAL at 22:35

## 2023-12-29 RX ADMIN — POLYETHYLENE GLYCOL 3350 17 GRAM(S): 17 POWDER, FOR SOLUTION ORAL at 14:13

## 2023-12-29 RX ADMIN — CYCLOBENZAPRINE HYDROCHLORIDE 5 MILLIGRAM(S): 10 TABLET, FILM COATED ORAL at 06:48

## 2023-12-29 RX ADMIN — SENNA PLUS 2 TABLET(S): 8.6 TABLET ORAL at 21:09

## 2023-12-29 RX ADMIN — CHLORHEXIDINE GLUCONATE 1 APPLICATION(S): 213 SOLUTION TOPICAL at 08:49

## 2023-12-29 RX ADMIN — CEFEPIME 100 MILLIGRAM(S): 1 INJECTION, POWDER, FOR SOLUTION INTRAMUSCULAR; INTRAVENOUS at 16:54

## 2023-12-29 RX ADMIN — Medication 650 MILLIGRAM(S): at 00:43

## 2023-12-29 NOTE — PROGRESS NOTE ADULT - PROBLEM SELECTOR PLAN 1
s/p Debridement of left sternoclavicular joint with excision of left clavicular head. Purulent drainage at the sternoclavicular joint. Tissue surrounding left clavicle found to be very inflamed. Copious irrigation of surgical site.   IV abx . Still with ongoing intermittent fevers. NO source identified thus far. ?? colonic stool burden as source   Miralax twice daily   OR c/s NGTD  BC  NGTD.   s/p RTOR debridement. VAC placement   PICC   MRI reviewed. s/p  removal of staples  Ortho consulted regarding supraspinatus tear. Would prefer NO steroid injection at present time given infection   Pain control   normal TTE  Monitor Hgb . s/p  PRBC x 1 transfusion  DVT ppx.

## 2023-12-29 NOTE — PROGRESS NOTE ADULT - ASSESSMENT
43F PMHx of MS on Rituxan, presenting with left chest wall and shoulder pain w/ concern for infection now admitted with likely septic arthritis with need for possible washout and debridement       Problem/Plan - 1:  ·  Problem: Septic arthritis. left shoulder joint   ·  Plan: Imaging consistent with septic arthritis  s/p OR   cont abx and fu cultures   check ECHO : no vegetation   fu with ID   d/w with CTS at bedside : drainage adjusted and fx improved   cont current abx   pt with LUE pain : kaciley referred pain from L [id number] and chest however  Cervical MRI : no disciits / abcess   discussed with pt , family at length at bedside   washout and debridement of sternoclavicular joint; resection of medial manubrium; white and black wound vac placed  now s/p vac change  PICC in place   cont current abx   pt still with episodes of fever   cultures neg   ct chest no acute pathology   d/w ID will check CT a/p   check Echo   check procal and esr /crp   dental input           # c/o left arm swelling and pain in elbow region :  -doppler ordered to r/o DVT : Negative      Problem/Plan - 2:  ·  Problem: Multiple sclerosis.   ·  Plan: Gets Rituxan every 6 months, next dose due : holding   -On Ampyra BID at home, need to bring home med     Problem/Plan - 3:  ·  Problem: Anxiety.   ·  Plan: ISTOP reviewed Reference #: [id number]  -Cont. Clonazepam 0.5mg QHS PRN  -Cont. Sertraline 100mg QHS  -Cont. Duloxetine 60mg QHS, pt unsure of dose  -Need full med rec.     Problem/Plan - 4:  ·  Problem: Anemia.   ·  Plan: hgb 9, lower than prior year. Pt endorses some recent anemia but cannot specify  - pt feeling weak and fatigued   will chemo one unit of prbc        Problem/Plan - 5:  ·  Problem: Prophylactic measure.   ·  Plan: DVT PPx    hyperkalemia : normalized     awaiting placement    43F PMHx of MS on Rituxan, presenting with left chest wall and shoulder pain w/ concern for infection now admitted with likely septic arthritis with need for possible washout and debridement       Problem/Plan - 1:  ·  Problem: Septic arthritis. left shoulder joint   ·  Plan: Imaging consistent with septic arthritis  s/p OR   cont abx and fu cultures   check ECHO : no vegetation   fu with ID   d/w with CTS at bedside : drainage adjusted and fx improved   cont current abx   pt with LUE pain : kaciley referred pain from L [id number] and chest however  Cervical MRI : no disciits / abcess   discussed with pt , family at length at bedside   washout and debridement of sternoclavicular joint; resection of medial manubrium; white and black wound vac placed  now s/p vac change  PICC in place   cont current abx   pt still with episodes of fever   cultures neg   ct chest no acute pathology   d/w ID will check CT a/p   check Echo   check procal and esr /crp   dental input           # c/o left arm swelling and pain in elbow region :  -doppler ordered to r/o DVT : Negative      Problem/Plan - 2:  ·  Problem: Multiple sclerosis.   ·  Plan: Gets Rituxan every 6 months, next dose due : holding   -On Ampyra BID at home, need to bring home med     Problem/Plan - 3:  ·  Problem: Anxiety.   ·  Plan: ISTOP reviewed Reference #: [id number]  -Cont. Clonazepam 0.5mg QHS PRN  -Cont. Sertraline 100mg QHS  -Cont. Duloxetine 60mg QHS, pt unsure of dose  -Need full med rec.     Problem/Plan - 4:  ·  Problem: Anemia.   ·  Plan: hgb 9, lower than prior year. Pt endorses some recent anemia but cannot specify  - pt feeling weak and fatigued   will chemo one unit of prbc        Problem/Plan - 5:  ·  Problem: Prophylactic measure.   ·  Plan: DVT PPx    hyperkalemia : normalized     awaiting placement    43F PMHx of MS on Rituxan, presenting with left chest wall and shoulder pain w/ concern for infection now admitted with likely septic arthritis with need for possible washout and debridement       Problem/Plan - 1:  ·  Problem: Septic arthritis. left shoulder joint   ·  Plan: Imaging consistent with septic arthritis  s/p OR   cont abx and fu cultures   check ECHO : no vegetation   fu with ID   d/w with CTS at bedside : drainage adjusted and fx improved   cont current abx   pt with LUE pain : kaciley referred pain from L [id number] and chest however  Cervical MRI : no disciits / abcess   discussed with pt , family at length at bedside   washout and debridement of sternoclavicular joint; resection of medial manubrium; white and black wound vac placed  now s/p vac change  PICC in place   cont current abx   pt still with episodes of fever   cultures neg   ct chest no acute pathology   d/w ID will check CT a/p : no acute pathology   check Echo : no vegetation   check procal and esr /crp : noted   dental input appreciated   seems to be responding to valtrex and no further fever .. monitor   if recurrent fever consider drug fever         # c/o left arm swelling and pain in elbow region :  -doppler ordered to r/o DVT : Negative      Problem/Plan - 2:  ·  Problem: Multiple sclerosis.   ·  Plan: Gets Rituxan every 6 months, next dose due : holding   -On Ampyra BID at home, need to bring home med     Problem/Plan - 3:  ·  Problem: Anxiety.   ·  Plan: ISTOP reviewed Reference #: [id number]  -Cont. Clonazepam 0.5mg QHS PRN  -Cont. Sertraline 100mg QHS  -Cont. Duloxetine 60mg QHS, pt unsure of dose  -Need full med rec.     Problem/Plan - 4:  ·  Problem: Anemia.   ·  Plan: hgb 9, lower than prior year. Pt endorses some recent anemia but cannot specify  - pt feeling weak and fatigued   will chemo one unit of prbc        Problem/Plan - 5:  ·  Problem: Prophylactic measure.   ·  Plan: DVT PPx    hyperkalemia : normalized     awaiting placement    43F PMHx of MS on Rituxan, presenting with left chest wall and shoulder pain w/ concern for infection now admitted with likely septic arthritis with need for possible washout and debridement       Problem/Plan - 1:  ·  Problem: Septic arthritis. left shoulder joint   ·  Plan: Imaging consistent with septic arthritis  s/p OR   cont abx and fu cultures   check ECHO : no vegetation   fu with ID   d/w with CTS at bedside : drainage adjusted and fx improved   cont current abx   pt with LUE pain : kaciley referred pain from L [id number] and chest however  Cervical MRI : no disciits / abcess   discussed with pt , family at length at bedside   washout and debridement of sternoclavicular joint; resection of medial manubrium; white and black wound vac placed  now s/p vac change  PICC in place   cont current abx   pt still with episodes of fever   cultures neg   ct chest no acute pathology   d/w ID will check CT a/p : no acute pathology   check Echo : no vegetation   check procal and esr /crp : noted   dental input appreciated   seems to be responding to valtrex and no further fever .. monitor   if recurrent fever consider drug fever         # c/o left arm swelling and pain in elbow region :  -doppler ordered to r/o DVT : Negative      Problem/Plan - 2:  ·  Problem: Multiple sclerosis.   ·  Plan: Gets Rituxan every 6 months, next dose due : holding   -On Ampyra BID at home, need to bring home med     Problem/Plan - 3:  ·  Problem: Anxiety.   ·  Plan: ISTOP reviewed Reference #: [id number]  -Cont. Clonazepam 0.5mg QHS PRN  -Cont. Sertraline 100mg QHS  -Cont. Duloxetine 60mg QHS, pt unsure of dose  -Need full med rec.     Problem/Plan - 4:  ·  Problem: Anemia.   ·  Plan: hgb 9, lower than prior year. Pt endorses some recent anemia but cannot specify  - pt feeling weak and fatigued   will chemo one unit of prbc        Problem/Plan - 5:  ·  Problem: Prophylactic measure.   ·  Plan: DVT PPx    hyperkalemia : normalized     awaiting placement

## 2023-12-29 NOTE — PROGRESS NOTE ADULT - SUBJECTIVE AND OBJECTIVE BOX
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  She had a recent fall in 2023 secondary to unsteadiness due to multiple sclerosis, with subsequent left chest wall and shoulder pain.  Was seen at [facility] with concern for MS exacerbation but no MRI brain and cervical spine with an without contrast were done at the time without enhancement.  At the time infectious workup was done and notable for UTI which was treated.  She continued to have pain.  In the interim, she had an outpatient MRI of left sternoclavicular joint with results significant for marrow edema and swelling of the left sternoclavicular joint with a 2 cm fluid collection and adjacent pleural edema in the left chest with concerns for infectious etiology.   CT chest w/ IV contrast suggestive of septic arthritis with surrounding erythema. Thoracic surgery consulted.  Patient s/p surgical debridement on 23    Int hx:  mri with 80 x 25 mm rim-enhancing fluid collection persists in the surgical cavity with well-placed drain within the fluid collection.   Marrow signal abnormality in the manubrium concerning for acute   osteomyelitis.    Low-grade partial-thickness bursal surface tear of the supraspinatus tendon.    CT A/P done for persistent fevers, results pending       ******    Pt still with sternal pain, says 10/10, pain also in left arm    Past Medical History:  Multiple sclerosis [G35]        Past Surgical History:  History of  [Z98.891]        Social History:  No toxic habits    Family History:  FAMILY HISTORY:      Allergies:  No Known Allergies      ROS:  Constitutional: Patient offers no complaints of fevers or significant weight loss  Ears, Nose, Mouth and Throat: The patient presents with no abnormalities of the head, ears, eyes, nose or throat  Skin: Patient offers no concerns of new rashes or lesions  Respiratory: The patient presents with no abnormalities of the respiratory tract  Cardiovascular: The patient presents with no cardiac abnormalities  Gastrointestinal: The patient presents with no abnormalities of the GI system  Genitourinary: The patient presents with no dysuria, hematuria or frequent urination  Neurological: See HPI  Endocrine: Patient offers no complaints of excessive thirst, urination, or heat/cold intolerance    Medications:  acetaminophen     Tablet .. 650 milliGRAM(s) Oral every 6 hours PRN  benzocaine/menthol Lozenge 1 Lozenge Oral three times a day PRN  cefepime   IVPB 2000 milliGRAM(s) IV Intermittent every 8 hours  chlorhexidine 2% Cloths 1 Application(s) Topical <User Schedule>  clonazePAM  Tablet 0.5 milliGRAM(s) Oral three times a day PRN  cyclobenzaprine 5 milliGRAM(s) Oral two times a day  dalfampridine ER 10 milliGRAM(s) Oral every 12 hours  DULoxetine 60 milliGRAM(s) Oral at bedtime  enoxaparin Injectable 40 milliGRAM(s) SubCutaneous every 24 hours  fentaNYL   Patch  12 MICROgram(s)/Hr 1 Patch Transdermal every 72 hours  HYDROmorphone   Tablet 4 milliGRAM(s) Oral every 6 hours PRN  HYDROmorphone   Tablet 2 milliGRAM(s) Oral every 4 hours PRN  multivitamin/minerals 1 Tablet(s) Oral daily  oxyCODONE  ER Tablet 10 milliGRAM(s) Oral every 12 hours  polyethylene glycol 3350 17 Gram(s) Oral daily PRN  senna 2 Tablet(s) Oral at bedtime  sertraline 100 milliGRAM(s) Oral at bedtime  valACYclovir 1000 milliGRAM(s) Oral every 12 hours  Vibegron (Gemtesa) 75 milliGRAM(s),Vibegron (Gemtesa) 75mg tablet 1 Tablet(s) 1 Tablet(s) Oral daily      Labs:  CBC Full  -  ( 28 Dec 2023 07:26 )  WBC Count : 12.44 K/uL  RBC Count : 3.08 M/uL  Hemoglobin : 7.7 g/dL  Hematocrit : 24.2 %  Platelet Count - Automated : 496 K/uL  Mean Cell Volume : 78.6 fl  Mean Cell Hemoglobin : 25.0 pg  Mean Cell Hemoglobin Concentration : 31.8 gm/dL  Auto Neutrophil # : x  Auto Lymphocyte # : x  Auto Monocyte # : x  Auto Eosinophil # : x  Auto Basophil # : x  Auto Neutrophil % : x  Auto Lymphocyte % : x  Auto Monocyte % : x  Auto Eosinophil % : x  Auto Basophil % : x          CAPILLARY BLOOD GLUCOSE            Urinalysis Basic - ( 28 Dec 2023 23:44 )    Color: Yellow / Appearance: Clear / S.006 / pH: x  Gluc: x / Ketone: Negative mg/dL  / Bili: Negative / Urobili: 0.2 mg/dL   Blood: x / Protein: Negative mg/dL / Nitrite: Negative   Leuk Esterase: Negative / RBC: 32 /HPF / WBC 0 /HPF   Sq Epi: x / Non Sq Epi: 1 /HPF / Bacteria: Negative /HPF          Vitals:  Vital Signs Last 24 Hrs  T(C): 36.6 (29 Dec 2023 05:32), Max: 37.6 (28 Dec 2023 21:13)  T(F): 97.9 (29 Dec 2023 05:32), Max: 99.7 (28 Dec 2023 21:13)  HR: 75 (29 Dec 2023 06:45) (75 - 95)  BP: 98/65 (29 Dec 2023 06:45) (96/61 - 111/50)  BP(mean): --  RR: 18 (29 Dec 2023 05:32) (18 - 18)  SpO2: 96% (29 Dec 2023 05:32) (93% - 98%)    Parameters below as of 29 Dec 2023 05:32  Patient On (Oxygen Delivery Method): room air        NEUROLOGICAL EXAM:    Mental status: Awake, alert, and in less apparent distress. Oriented to person, place and time. Language function is normal.  dysarthric    Cranial Nerves: Pupils were equal, round, reactive to light. Extraocular movements were intact. Visual field were full. Fundoscopic exam was deferred. Facial sensation was intact to light touch. There was slight left facial droop. The palate was upgoing symmetrically and tongue was midline.     Motor exam: Bulk and tone were normal.  Antigravity in all extremities without drift.  Chronic relative right hemiparesis.  Fine finger movements slower on the right    Reflexes: deferred     Sensation: Intact to light touch    Coordination: no gross dysmetria    Gait: deferred     sternal wound seen          ACC: [id number] EXAM:  MR SHOULDER WAW IC LT   ORDERED BY:  BRAN SANCHEZ     ACC: [id number] EXAM:  MR STERNOCLAVICULAR JNT LT   ORDERED BY: ANASTASIIA WALKER     PROCEDURE DATE:  2023          INTERPRETATION:  MRI OF THE LEFT STERNOCLAVICULAR JOINT AND SHOULDER    CLINICAL INFORMATION: Left sternal abscess status post washout with   persistent purulent drainage and left shoulder pain.  TECHNIQUE: Multisequence, multiplanar MRI of the left sternoclavicular   joint. The study was performed before and after the intravenous   administration of 6 ml Gadavist (1.5 cc discarded) .    COMPARISON: Chest CT 2023 and 2023.    FINDINGS:    STERNOCLAVICULAR JOINT:    BONE: Patient status post surgical resection of the left clavicular head.   The surgical margin is sharp with trace edema and no loss of T1   hyperintense signal. In the manubrium there is increased STIR signal with   loss of T1 hyperintense signal and postcontrast enhancement involving the   superolateral leftward aspect of the bone concerning for acute   osteomyelitis (6:9 5:9). No acute fracture. No osteonecrosis.    JOINTS: A residual rim-enhancing fluid collection is seen in the region   of the left sternoclavicular joint measuring 80 x 25 mm. A drainage   catheter is seen within the fluid collection.    SOFT TISSUE: Postsurgical changes are seen along the anterior aspect of   the left sternoclavicular joint. There is a mild amount of edema in the   adjacent pectoralis major muscle. No focal fluid collection in the   anterior mediastinum. Susceptibility artifact from surgical staples are   seen along the anterior chest wall.      SHOULDER JOINT:    ROTATOR CUFF: Low-grade partial-thickness bursal surface tearing of the   supraspinatus tendon. Rotator cuff is otherwise intact.  MUSCLES: No focal muscle edema or atrophy.  BICEPS TENDON: Normal in course and caliber.  GLENOID LABRUM AND GLENOHUMERAL LIGAMENTS: No displaced labral tear.   Inferior glenohumeral ligament is intact.  GLENOHUMERAL CARTILAGE AND SUBCHONDRAL BONE: No full-thickness chondral   loss.  AC JOINT: No AC joint arthropathy.  SYNOVIUM/JOINT FLUID: No glenohumeral joint effusion. No focal fluid in   the subacromial/subdeltoid bursa.  BONE MARROW: No fracture or osteonecrosis. No marrow signal change to   suggest acute osteomyelitis.  NEUROVASCULAR STRUCTURES: Structures of the suprascapular notch,   spinoglenoid notch, and quadrilateral space are normal in course and   caliber.  SUBCUTANEOUS SOFT TISSUES: Edema in the subcutaneous fat of the left   shoulder. No rim-enhancing fluid collection to suggest abscess.        IMPRESSION:  1.  Patient status post surgical resection of the left clavicular head   with washout of the left sternoclavicular joint.  2.  A 80 x 25 mm rim-enhancing fluid collection persists in the surgical   cavity with well-placed drain within the fluid collection.  3.  Marrow signal abnormality in the manubrium concerning for acute   osteomyelitis.  4.  Low-grade partial-thickness bursal surface tear of the supraspinatus   tendon.    --- End of Report ---            RASHIDA HYDE MD; Attending Radiologist  This document has been electronically signed. 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  She had a recent fall in 2023 secondary to unsteadiness due to multiple sclerosis, with subsequent left chest wall and shoulder pain.  Was seen at [facility] with concern for MS exacerbation but no MRI brain and cervical spine with an without contrast were done at the time without enhancement.  At the time infectious workup was done and notable for UTI which was treated.  She continued to have pain.  In the interim, she had an outpatient MRI of left sternoclavicular joint with results significant for marrow edema and swelling of the left sternoclavicular joint with a 2 cm fluid collection and adjacent pleural edema in the left chest with concerns for infectious etiology.   CT chest w/ IV contrast suggestive of septic arthritis with surrounding erythema. Thoracic surgery consulted.  Patient s/p surgical debridement on 23    Int hx:  mri with 80 x 25 mm rim-enhancing fluid collection persists in the surgical cavity with well-placed drain within the fluid collection.   Marrow signal abnormality in the manubrium concerning for acute   osteomyelitis.    Low-grade partial-thickness bursal surface tear of the supraspinatus tendon.    CT A/P done for persistent fevers, results pending       ******    Pt still with sternal pain, says 10/10, pain also in left arm    Past Medical History:  Multiple sclerosis [G35]        Past Surgical History:  History of  [Z98.891]        Social History:  No toxic habits    Family History:  FAMILY HISTORY:      Allergies:  No Known Allergies      ROS:  Constitutional: Patient offers no complaints of fevers or significant weight loss  Ears, Nose, Mouth and Throat: The patient presents with no abnormalities of the head, ears, eyes, nose or throat  Skin: Patient offers no concerns of new rashes or lesions  Respiratory: The patient presents with no abnormalities of the respiratory tract  Cardiovascular: The patient presents with no cardiac abnormalities  Gastrointestinal: The patient presents with no abnormalities of the GI system  Genitourinary: The patient presents with no dysuria, hematuria or frequent urination  Neurological: See HPI  Endocrine: Patient offers no complaints of excessive thirst, urination, or heat/cold intolerance    Medications:  acetaminophen     Tablet .. 650 milliGRAM(s) Oral every 6 hours PRN  benzocaine/menthol Lozenge 1 Lozenge Oral three times a day PRN  cefepime   IVPB 2000 milliGRAM(s) IV Intermittent every 8 hours  chlorhexidine 2% Cloths 1 Application(s) Topical <User Schedule>  clonazePAM  Tablet 0.5 milliGRAM(s) Oral three times a day PRN  cyclobenzaprine 5 milliGRAM(s) Oral two times a day  dalfampridine ER 10 milliGRAM(s) Oral every 12 hours  DULoxetine 60 milliGRAM(s) Oral at bedtime  enoxaparin Injectable 40 milliGRAM(s) SubCutaneous every 24 hours  fentaNYL   Patch  12 MICROgram(s)/Hr 1 Patch Transdermal every 72 hours  HYDROmorphone   Tablet 4 milliGRAM(s) Oral every 6 hours PRN  HYDROmorphone   Tablet 2 milliGRAM(s) Oral every 4 hours PRN  multivitamin/minerals 1 Tablet(s) Oral daily  oxyCODONE  ER Tablet 10 milliGRAM(s) Oral every 12 hours  polyethylene glycol 3350 17 Gram(s) Oral daily PRN  senna 2 Tablet(s) Oral at bedtime  sertraline 100 milliGRAM(s) Oral at bedtime  valACYclovir 1000 milliGRAM(s) Oral every 12 hours  Vibegron (Gemtesa) 75 milliGRAM(s),Vibegron (Gemtesa) 75mg tablet 1 Tablet(s) 1 Tablet(s) Oral daily      Labs:  CBC Full  -  ( 28 Dec 2023 07:26 )  WBC Count : 12.44 K/uL  RBC Count : 3.08 M/uL  Hemoglobin : 7.7 g/dL  Hematocrit : 24.2 %  Platelet Count - Automated : 496 K/uL  Mean Cell Volume : 78.6 fl  Mean Cell Hemoglobin : 25.0 pg  Mean Cell Hemoglobin Concentration : 31.8 gm/dL  Auto Neutrophil # : x  Auto Lymphocyte # : x  Auto Monocyte # : x  Auto Eosinophil # : x  Auto Basophil # : x  Auto Neutrophil % : x  Auto Lymphocyte % : x  Auto Monocyte % : x  Auto Eosinophil % : x  Auto Basophil % : x          CAPILLARY BLOOD GLUCOSE            Urinalysis Basic - ( 28 Dec 2023 23:44 )    Color: Yellow / Appearance: Clear / S.006 / pH: x  Gluc: x / Ketone: Negative mg/dL  / Bili: Negative / Urobili: 0.2 mg/dL   Blood: x / Protein: Negative mg/dL / Nitrite: Negative   Leuk Esterase: Negative / RBC: 32 /HPF / WBC 0 /HPF   Sq Epi: x / Non Sq Epi: 1 /HPF / Bacteria: Negative /HPF          Vitals:  Vital Signs Last 24 Hrs  T(C): 36.6 (29 Dec 2023 05:32), Max: 37.6 (28 Dec 2023 21:13)  T(F): 97.9 (29 Dec 2023 05:32), Max: 99.7 (28 Dec 2023 21:13)  HR: 75 (29 Dec 2023 06:45) (75 - 95)  BP: 98/65 (29 Dec 2023 06:45) (96/61 - 111/50)  BP(mean): --  RR: 18 (29 Dec 2023 05:32) (18 - 18)  SpO2: 96% (29 Dec 2023 05:32) (93% - 98%)    Parameters below as of 29 Dec 2023 05:32  Patient On (Oxygen Delivery Method): room air        NEUROLOGICAL EXAM:    Mental status: Awake, alert, and in less apparent distress. Oriented to person, place and time. Language function is normal.  dysarthric    Cranial Nerves: Pupils were equal, round, reactive to light. Extraocular movements were intact. Visual field were full. Fundoscopic exam was deferred. Facial sensation was intact to light touch. There was slight left facial droop. The palate was upgoing symmetrically and tongue was midline.     Motor exam: Bulk and tone were normal.  Antigravity in all extremities without drift.  Chronic relative right hemiparesis.  Fine finger movements slower on the right    Reflexes: deferred     Sensation: Intact to light touch    Coordination: no gross dysmetria    Gait: deferred     sternal wound seen          ACC: [id number] EXAM:  MR SHOULDER WAW IC LT   ORDERED BY:  BRAN SANCHEZ     ACC: [id number] EXAM:  MR STERNOCLAVICULAR JNT LT   ORDERED BY: ANASTASIIA WALKER     PROCEDURE DATE:  2023          INTERPRETATION:  MRI OF THE LEFT STERNOCLAVICULAR JOINT AND SHOULDER    CLINICAL INFORMATION: Left sternal abscess status post washout with   persistent purulent drainage and left shoulder pain.  TECHNIQUE: Multisequence, multiplanar MRI of the left sternoclavicular   joint. The study was performed before and after the intravenous   administration of 6 ml Gadavist (1.5 cc discarded) .    COMPARISON: Chest CT 2023 and 2023.    FINDINGS:    STERNOCLAVICULAR JOINT:    BONE: Patient status post surgical resection of the left clavicular head.   The surgical margin is sharp with trace edema and no loss of T1   hyperintense signal. In the manubrium there is increased STIR signal with   loss of T1 hyperintense signal and postcontrast enhancement involving the   superolateral leftward aspect of the bone concerning for acute   osteomyelitis (6:9 5:9). No acute fracture. No osteonecrosis.    JOINTS: A residual rim-enhancing fluid collection is seen in the region   of the left sternoclavicular joint measuring 80 x 25 mm. A drainage   catheter is seen within the fluid collection.    SOFT TISSUE: Postsurgical changes are seen along the anterior aspect of   the left sternoclavicular joint. There is a mild amount of edema in the   adjacent pectoralis major muscle. No focal fluid collection in the   anterior mediastinum. Susceptibility artifact from surgical staples are   seen along the anterior chest wall.      SHOULDER JOINT:    ROTATOR CUFF: Low-grade partial-thickness bursal surface tearing of the   supraspinatus tendon. Rotator cuff is otherwise intact.  MUSCLES: No focal muscle edema or atrophy.  BICEPS TENDON: Normal in course and caliber.  GLENOID LABRUM AND GLENOHUMERAL LIGAMENTS: No displaced labral tear.   Inferior glenohumeral ligament is intact.  GLENOHUMERAL CARTILAGE AND SUBCHONDRAL BONE: No full-thickness chondral   loss.  AC JOINT: No AC joint arthropathy.  SYNOVIUM/JOINT FLUID: No glenohumeral joint effusion. No focal fluid in   the subacromial/subdeltoid bursa.  BONE MARROW: No fracture or osteonecrosis. No marrow signal change to   suggest acute osteomyelitis.  NEUROVASCULAR STRUCTURES: Structures of the suprascapular notch,   spinoglenoid notch, and quadrilateral space are normal in course and   caliber.  SUBCUTANEOUS SOFT TISSUES: Edema in the subcutaneous fat of the left   shoulder. No rim-enhancing fluid collection to suggest abscess.        IMPRESSION:  1.  Patient status post surgical resection of the left clavicular head   with washout of the left sternoclavicular joint.  2.  A 80 x 25 mm rim-enhancing fluid collection persists in the surgical   cavity with well-placed drain within the fluid collection.  3.  Marrow signal abnormality in the manubrium concerning for acute   osteomyelitis.  4.  Low-grade partial-thickness bursal surface tear of the supraspinatus   tendon.    --- End of Report ---            RASHIDA HYDE MD; Attending Radiologist  This document has been electronically signed. Dec 11 2023  5:11PM

## 2023-12-29 NOTE — PROGRESS NOTE ADULT - SUBJECTIVE AND OBJECTIVE BOX
Subjective: Patient seen and examined. No new events except as noted.   sitting in reclincer at bedside   pain   no BM in past 8 days     REVIEW OF SYSTEMS:    CONSTITUTIONAL: + weakness, fevers or chills  EYES/ENT: No visual changes;  No vertigo or throat pain   NECK: No pain or stiffness  RESPIRATORY: No cough, wheezing, hemoptysis; No shortness of breath  CARDIOVASCULAR: No chest pain or palpitations  GASTROINTESTINAL: No abdominal or epigastric pain. No nausea, vomiting, or hematemesis; No diarrhea or constipation. No melena or hematochezia.  GENITOURINARY: No dysuria, frequency or hematuria  NEUROLOGICAL: No numbness or weakness  SKIN: No itching, burning, rashes, or lesions   All other review of systems is negative unless indicated above.    MEDICATIONS:  MEDICATIONS  (STANDING):  cefepime   IVPB 2000 milliGRAM(s) IV Intermittent every 8 hours  chlorhexidine 2% Cloths 1 Application(s) Topical <User Schedule>  cyclobenzaprine 5 milliGRAM(s) Oral two times a day  dalfampridine ER 10 milliGRAM(s) Oral every 12 hours  DULoxetine 60 milliGRAM(s) Oral at bedtime  enoxaparin Injectable 40 milliGRAM(s) SubCutaneous every 24 hours  fentaNYL   Patch  12 MICROgram(s)/Hr 1 Patch Transdermal every 72 hours  multivitamin/minerals 1 Tablet(s) Oral daily  oxyCODONE  ER Tablet 10 milliGRAM(s) Oral every 12 hours  senna 2 Tablet(s) Oral at bedtime  sertraline 100 milliGRAM(s) Oral at bedtime  valACYclovir 1000 milliGRAM(s) Oral every 12 hours  Vibegron (Gemtesa) 75 milliGRAM(s),Vibegron (Gemtesa) 75mg tablet 1 Tablet(s) 1 Tablet(s) Oral daily      PHYSICAL EXAM:  T(C): 36.5 (12-29-23 @ 10:40), Max: 37.6 (12-28-23 @ 21:13)  HR: 86 (12-29-23 @ 10:40) (75 - 95)  BP: 92/59 (12-29-23 @ 10:40) (92/59 - 111/50)  RR: 18 (12-29-23 @ 10:40) (18 - 18)  SpO2: 94% (12-29-23 @ 10:40) (93% - 98%)  Wt(kg): --  I&O's Summary    28 Dec 2023 07:01  -  29 Dec 2023 07:00  --------------------------------------------------------  IN: 960 mL / OUT: 0 mL / NET: 960 mL    29 Dec 2023 07:01  -  29 Dec 2023 12:14  --------------------------------------------------------  IN: 120 mL / OUT: 0 mL / NET: 120 mL              Appearance: NAD  HEENT:   Normal oral mucosa, PERRL, EOMI	  Lymphatic: No lymphadenopathy , no edema   L clavicular region w/ black sponge CAV dsg secured w/ good seal, minimal serousag drainage noted in collection chamber  Cardiovascular: Normal S1 S2, No JVD, No murmurs , Peripheral pulses palpable 2+ bilaterally  Respiratory: Lungs clear to auscultation, normal effort 	  Gastrointestinal:  Soft, Non-tender, + BS	  Skin: No rashes, No ecchymoses, No cyanosis, warm to touch  Musculoskeletal: Normal range of motion, normal strength  Psychiatry:  lethargic weak   Ext: No edema      LABS:    CARDIAC MARKERS:                                7.7    12.44 )-----------( 496      ( 28 Dec 2023 07:26 )             24.2           TELEMETRY: 	    ECG:  	  RADIOLOGY:  < from: CT Abdomen and Pelvis w/ Oral Cont and w/ IV Cont (12.28.23 @ 23:35) >    ACC: [id number] EXAM:  CT ABDOMEN AND PELVIS OC IC   ORDERED BY: EMANUEL AVILA     PROCEDURE DATE:  12/28/2023          INTERPRETATION:  CLINICAL INFORMATION: Left sternoclavicular joint septic   arthritis status post excision and drainage and repeat washout and   debridement. Abdominal pain. Persistent fever, evaluate for infectious   source.    COMPARISON: CT chest 12/26/2023 and 12/6/2023.    CONTRAST/COMPLICATIONS:  IV Contrast: Omnipaque 350  90 cc administered   10 cc discarded  Oral Contrast: NONE  Complications: None reported at time of study completion    PROCEDURE:  CT of the Abdomen and Pelvis was performed.  Sagittal and coronal reformats were performed.    FINDINGS:  LOWER CHEST: Right middle lobe nodule measuring 4 mm, unchanged since at   least 12/6/2023. Small right and trace left pleural effusions, unchanged   from 12/26/2023. Bibasilar subsegmental atelectasis.    LIVER: Within normal limits.  BILE DUCTS: Normal caliber.  GALLBLADDER: Within normal limits.  SPLEEN: Within normal limits.  PANCREAS: Within normal limits.  ADRENALS: Within normal limits.  KIDNEYS/URETERS: Within normal limits.    BLADDER: Within normal limits.  REPRODUCTIVE ORGANS: Uterus and adnexa within normal limits.    BOWEL: Moderate to largecolonic stool burden. No bowel obstruction.   Appendix is normal. No appreciable bowel wall thickening or surrounding   inflammatory changes.  PERITONEUM: No ascites.  VESSELS: Within normal limits.  RETROPERITONEUM/LYMPH NODES: No lymphadenopathy.  ABDOMINAL WALL: Small fat-containing left inguinal hernia. Droplets of   gas within the ventral abdominal wall subcutaneously, which could be due   to recent injection.  BONES: Left femoral intramedullary amanda and nail fixation. Mild to   moderate compression fracture at T12, unchanged since at least 12/6/2023.   Degenerative changes.    IMPRESSION:  *  No source of infection identified within the abdomen or pelvis.  *  Moderate to large colonic stool burden. No bowel obstruction. No   evidence foracute bowel inflammation.  *  Unchanged small right and trace left pleural effusions.        --- End of Report ---            LYLY BRANCH MD; Attending Radiologist  This document has been electronically signed. Dec 29 2023  8:45AM    < end of copied text >  < from: [facility] Duplex Lower Ext Vein Scan, Bilat (12.27.23 @ 21:48) >    ACC: [id number] EXAM:  DUPLEX SCAN EXT VEINS LOWER BI   ORDERED BY: ANASTASIIA WALKER     PROCEDURE DATE:  12/27/2023          INTERPRETATION:  CLINICAL INDICATION: Lower extremity tenderness and   redness, r/o dvt    TECHNIQUE: Grayscale, color Doppler and spectral Doppler ultrasound was   utilized to evaluate bilateral lower extremity deep venous system.    COMPARISON: None.    FINDINGS: There is no obvious thrombus in bilateral common femoral veins,   femoral veins or popliteal veins. Visualizedcalf veins are patent.    IMPRESSION:    No obvious deep vein thrombosis in either lower extremity.    --- End of Report ---        < end of copied text >    DIAGNOSTIC TESTING:  [ ] Echocardiogram:  [ ]  Catheterization:  [ ] Stress Test:    OTHER: 	           Subjective: Patient seen and examined. No new events except as noted.   sitting in reclincer at bedside   pain   no BM in past 8 days     REVIEW OF SYSTEMS:    CONSTITUTIONAL: + weakness, fevers or chills  EYES/ENT: No visual changes;  No vertigo or throat pain   NECK: No pain or stiffness  RESPIRATORY: No cough, wheezing, hemoptysis; No shortness of breath  CARDIOVASCULAR: No chest pain or palpitations  GASTROINTESTINAL: No abdominal or epigastric pain. No nausea, vomiting, or hematemesis; No diarrhea or constipation. No melena or hematochezia.  GENITOURINARY: No dysuria, frequency or hematuria  NEUROLOGICAL: No numbness or weakness  SKIN: No itching, burning, rashes, or lesions   All other review of systems is negative unless indicated above.    MEDICATIONS:  MEDICATIONS  (STANDING):  cefepime   IVPB 2000 milliGRAM(s) IV Intermittent every 8 hours  chlorhexidine 2% Cloths 1 Application(s) Topical <User Schedule>  cyclobenzaprine 5 milliGRAM(s) Oral two times a day  dalfampridine ER 10 milliGRAM(s) Oral every 12 hours  DULoxetine 60 milliGRAM(s) Oral at bedtime  enoxaparin Injectable 40 milliGRAM(s) SubCutaneous every 24 hours  fentaNYL   Patch  12 MICROgram(s)/Hr 1 Patch Transdermal every 72 hours  multivitamin/minerals 1 Tablet(s) Oral daily  oxyCODONE  ER Tablet 10 milliGRAM(s) Oral every 12 hours  senna 2 Tablet(s) Oral at bedtime  sertraline 100 milliGRAM(s) Oral at bedtime  valACYclovir 1000 milliGRAM(s) Oral every 12 hours  Vibegron (Gemtesa) 75 milliGRAM(s),Vibegron (Gemtesa) 75mg tablet 1 Tablet(s) 1 Tablet(s) Oral daily      PHYSICAL EXAM:  T(C): 36.5 (12-29-23 @ 10:40), Max: 37.6 (12-28-23 @ 21:13)  HR: 86 (12-29-23 @ 10:40) (75 - 95)  BP: 92/59 (12-29-23 @ 10:40) (92/59 - 111/50)  RR: 18 (12-29-23 @ 10:40) (18 - 18)  SpO2: 94% (12-29-23 @ 10:40) (93% - 98%)  Wt(kg): --  I&O's Summary    28 Dec 2023 07:01  -  29 Dec 2023 07:00  --------------------------------------------------------  IN: 960 mL / OUT: 0 mL / NET: 960 mL    29 Dec 2023 07:01  -  29 Dec 2023 12:14  --------------------------------------------------------  IN: 120 mL / OUT: 0 mL / NET: 120 mL              Appearance: NAD  HEENT:   Normal oral mucosa, PERRL, EOMI	  Lymphatic: No lymphadenopathy , no edema   L clavicular region w/ black sponge CAV dsg secured w/ good seal, minimal serousag drainage noted in collection chamber  Cardiovascular: Normal S1 S2, No JVD, No murmurs , Peripheral pulses palpable 2+ bilaterally  Respiratory: Lungs clear to auscultation, normal effort 	  Gastrointestinal:  Soft, Non-tender, + BS	  Skin: No rashes, No ecchymoses, No cyanosis, warm to touch  Musculoskeletal: Normal range of motion, normal strength  Psychiatry:  lethargic weak   Ext: No edema      LABS:    CARDIAC MARKERS:                                7.7    12.44 )-----------( 496      ( 28 Dec 2023 07:26 )             24.2           TELEMETRY: 	    ECG:  	  RADIOLOGY:  < from: CT Abdomen and Pelvis w/ Oral Cont and w/ IV Cont (12.28.23 @ 23:35) >    ACC: [id number] EXAM:  CT ABDOMEN AND PELVIS OC IC   ORDERED BY: EMANUEL AVILA     PROCEDURE DATE:  12/28/2023          INTERPRETATION:  CLINICAL INFORMATION: Left sternoclavicular joint septic   arthritis status post excision and drainage and repeat washout and   debridement. Abdominal pain. Persistent fever, evaluate for infectious   source.    COMPARISON: CT chest 12/26/2023 and 12/6/2023.    CONTRAST/COMPLICATIONS:  IV Contrast: Omnipaque 350  90 cc administered   10 cc discarded  Oral Contrast: NONE  Complications: None reported at time of study completion    PROCEDURE:  CT of the Abdomen and Pelvis was performed.  Sagittal and coronal reformats were performed.    FINDINGS:  LOWER CHEST: Right middle lobe nodule measuring 4 mm, unchanged since at   least 12/6/2023. Small right and trace left pleural effusions, unchanged   from 12/26/2023. Bibasilar subsegmental atelectasis.    LIVER: Within normal limits.  BILE DUCTS: Normal caliber.  GALLBLADDER: Within normal limits.  SPLEEN: Within normal limits.  PANCREAS: Within normal limits.  ADRENALS: Within normal limits.  KIDNEYS/URETERS: Within normal limits.    BLADDER: Within normal limits.  REPRODUCTIVE ORGANS: Uterus and adnexa within normal limits.    BOWEL: Moderate to largecolonic stool burden. No bowel obstruction.   Appendix is normal. No appreciable bowel wall thickening or surrounding   inflammatory changes.  PERITONEUM: No ascites.  VESSELS: Within normal limits.  RETROPERITONEUM/LYMPH NODES: No lymphadenopathy.  ABDOMINAL WALL: Small fat-containing left inguinal hernia. Droplets of   gas within the ventral abdominal wall subcutaneously, which could be due   to recent injection.  BONES: Left femoral intramedullary amanda and nail fixation. Mild to   moderate compression fracture at T12, unchanged since at least 12/6/2023.   Degenerative changes.    IMPRESSION:  *  No source of infection identified within the abdomen or pelvis.  *  Moderate to large colonic stool burden. No bowel obstruction. No   evidence foracute bowel inflammation.  *  Unchanged small right and trace left pleural effusions.        --- End of Report ---            LYLY BRANCH MD; Attending Radiologist  This document has been electronically signed. Dec 29 2023  8:45AM    < end of copied text >  < from: [facility] Duplex Lower Ext Vein Scan, Bilat (12.27.23 @ 21:48) >    ACC: [id number] EXAM:  DUPLEX SCAN EXT VEINS LOWER BI   ORDERED BY: ANASTASIIA WALKER     PROCEDURE DATE:  12/27/2023          INTERPRETATION:  CLINICAL INDICATION: Lower extremity tenderness and   redness, r/o dvt    TECHNIQUE: Grayscale, color Doppler and spectral Doppler ultrasound was   utilized to evaluate bilateral lower extremity deep venous system.    COMPARISON: None.    FINDINGS: There is no obvious thrombus in bilateral common femoral veins,   femoral veins or popliteal veins. Visualizedcalf veins are patent.    IMPRESSION:    No obvious deep vein thrombosis in either lower extremity.    --- End of Report ---        < end of copied text >    DIAGNOSTIC TESTING:  [ ] Echocardiogram:  [ ]  Catheterization:  [ ] Stress Test:    OTHER:

## 2023-12-29 NOTE — PROGRESS NOTE ADULT - ASSESSMENT
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  She had a recent fall in November 2023 secondary to unsteadiness due to multiple sclerosis, with subsequent left chest wall and shoulder pain.  Was seen at [facility] with concern for MS exacerbation but no MRI brain and cervical spine with an without contrast were done at the time without enhancement.  At the time infectious workup was done and notable for UTI which was treated.  She continued to have pain.  In the interim, she had an outpatient MRI of left sternoclavicular joint with results significant for marrow edema and swelling of the left sternoclavicular joint with a 2 cm fluid collection and adjacent pleural edema in the left chest with concerns for infectious etiology.   CT chest w/ IV contrast suggestive of septic arthritis with surrounding erythema. Thoracic surgery consulted.  Patient s/p surgical debridement on 12/7/23    mri with 80 x 25 mm rim-enhancing fluid collection persists in the surgical cavity with well-placed drain within the fluid collection.   Marrow signal abnormality in the manubrium concerning for acute   osteomyelitis.    Low-grade partial-thickness bursal surface tear of the supraspinatus tendon.    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  Now likely pseudo-flare in setting of septic arthritis    Recommendations:  Management of septic arthritis per primary team  s/p surgical debridement 12/7/23  hold ocrelizumab for now given septic arthritis  continue home meds once able to take PO post-surgically  abx as per ID  defer to primary team for pain control   difficulty standing suspect due to weakness.  Needs aggressive physical therapy as tolerated   will benefit from rehab.  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  She had a recent fall in November 2023 secondary to unsteadiness due to multiple sclerosis, with subsequent left chest wall and shoulder pain.  Was seen at [facility] with concern for MS exacerbation but no MRI brain and cervical spine with an without contrast were done at the time without enhancement.  At the time infectious workup was done and notable for UTI which was treated.  She continued to have pain.  In the interim, she had an outpatient MRI of left sternoclavicular joint with results significant for marrow edema and swelling of the left sternoclavicular joint with a 2 cm fluid collection and adjacent pleural edema in the left chest with concerns for infectious etiology.   CT chest w/ IV contrast suggestive of septic arthritis with surrounding erythema. Thoracic surgery consulted.  Patient s/p surgical debridement on 12/7/23    mri with 80 x 25 mm rim-enhancing fluid collection persists in the surgical cavity with well-placed drain within the fluid collection.   Marrow signal abnormality in the manubrium concerning for acute   osteomyelitis.    Low-grade partial-thickness bursal surface tear of the supraspinatus tendon.    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  Now likely pseudo-flare in setting of septic arthritis    Recommendations:  Management of septic arthritis per primary team  s/p surgical debridement 12/7/23  hold ocrelizumab for now given septic arthritis  continue home meds once able to take PO post-surgically  abx as per ID  defer to primary team for pain control   difficulty standing suspect due to weakness.  Needs aggressive physical therapy as tolerated   will benefit from rehab.  hold off on ocralizumab until healed from wound and not infected

## 2023-12-29 NOTE — PROGRESS NOTE ADULT - SUBJECTIVE AND OBJECTIVE BOX
Date of service: 12-29-23 @ 15:13      Patient is a 43y old  Female who presents with a chief complaint of L shoulder and chest pain (29 Dec 2023 12:14)                                                               INTERVAL HPI/OVERNIGHT EVENTS:    REVIEW OF SYSTEMS:     CONSTITUTIONAL: No weakness, fevers or chills  EYES/ENT: No visual changes , no ear ache   NECK: No pain or stiffness  RESPIRATORY: No cough, wheezing,  No shortness of breath  CARDIOVASCULAR: No chest pain or palpitations  GASTROINTESTINAL: No abdominal pain  . No nausea, vomiting, or hematemesis; No diarrhea or constipation. No melena or hematochezia.  GENITOURINARY: No dysuria, frequency or hematuria  NEUROLOGICAL: No numbness or weakness  SKIN: No itching, burning, rashes, or lesions                                                                                                                                                                                                                                                                                 Medications:  MEDICATIONS  (STANDING):  cefepime   IVPB 2000 milliGRAM(s) IV Intermittent every 8 hours  chlorhexidine 2% Cloths 1 Application(s) Topical <User Schedule>  cyclobenzaprine 5 milliGRAM(s) Oral two times a day  dalfampridine ER 10 milliGRAM(s) Oral every 12 hours  DULoxetine 60 milliGRAM(s) Oral at bedtime  enoxaparin Injectable 40 milliGRAM(s) SubCutaneous every 24 hours  fentaNYL   Patch  12 MICROgram(s)/Hr 1 Patch Transdermal every 72 hours  multivitamin/minerals 1 Tablet(s) Oral daily  oxyCODONE  ER Tablet 10 milliGRAM(s) Oral every 12 hours  senna 2 Tablet(s) Oral at bedtime  sertraline 100 milliGRAM(s) Oral at bedtime  valACYclovir 1000 milliGRAM(s) Oral every 12 hours  Vibegron (Gemtesa) 75 milliGRAM(s),Vibegron (Gemtesa) 75mg tablet 1 Tablet(s) 1 Tablet(s) Oral daily    MEDICATIONS  (PRN):  acetaminophen     Tablet .. 650 milliGRAM(s) Oral every 6 hours PRN Temp greater or equal to 38C (100.4F), Mild Pain (1 - 3)  benzocaine/menthol Lozenge 1 Lozenge Oral three times a day PRN Sore Throat  clonazePAM  Tablet 0.5 milliGRAM(s) Oral three times a day PRN anxiety  HYDROmorphone   Tablet 2 milliGRAM(s) Oral every 4 hours PRN Moderate Pain (4 - 6)  HYDROmorphone   Tablet 4 milliGRAM(s) Oral every 6 hours PRN Severe Pain (7 - 10)  polyethylene glycol 3350 17 Gram(s) Oral two times a day PRN Constipation       Allergies    No Known Allergies    Intolerances      Vital Signs Last 24 Hrs  T(C): 36.6 (29 Dec 2023 13:43), Max: 37.6 (28 Dec 2023 21:13)  T(F): 97.9 (29 Dec 2023 13:43), Max: 99.7 (28 Dec 2023 21:13)  HR: 89 (29 Dec 2023 13:43) (75 - 95)  BP: 103/70 (29 Dec 2023 13:43) (92/59 - 108/70)  BP(mean): --  RR: 18 (29 Dec 2023 13:43) (18 - 18)  SpO2: 96% (29 Dec 2023 13:43) (93% - 96%)    Parameters below as of 29 Dec 2023 13:43  Patient On (Oxygen Delivery Method): room air      CAPILLARY BLOOD GLUCOSE          12-28 @ 07:01 - 12-29 @ 07:00  --------------------------------------------------------  IN: 960 mL / OUT: 0 mL / NET: 960 mL    12-29 @ 07:01  -  12-29 @ 15:13  --------------------------------------------------------  IN: 320 mL / OUT: 0 mL / NET: 320 mL      Physical Exam:    Daily     Daily   General:  Well appearing, NAD, not cachetic  HEENT:  Nonicteric, PERRLA  CV:  RRR, S1S2   Lungs:  CTA B/L, no wheezes, rales, rhonchi  Abdomen:  Soft, non-tender, no distended, positive BS  Extremities:  2+ pulses, no c/c, no edema  Skin:  Warm and dry, no rashes  :  No stanford  Neuro:  AAOx3, non-focal, grossly intact                                                                                                                                                                                                                                                                                                LABS:                               7.7    12.44 )-----------( 496      ( 28 Dec 2023 07:26 )             24.2                                               RADIOLOGY & ADDITIONAL TESTS         I personally reviewed: [  ]EKG   [  ]CXR    [  ] CT      A/P:         Discussed with :     [facility] consultants' Notes   Time spent :

## 2023-12-29 NOTE — PROGRESS NOTE ADULT - ASSESSMENT
43F PMHx of MS on immunosuppressive medication, presenting with left chest wall and shoulder pain. Recent history of fall secondary to unsteadiness due to MS. Obtained outpatient MRI of left sternoclavicular joint with results significant for marrow edema and swelling of the left sternoclavicular joint with a 2 cm fluid collection and adjacent pleural edema in the left chest with concerns for infectious etiology.  Patient initially saw orthopedics with for concerns for septic arthritis of this joint.  Impression of the read showed soft tissue abscess superficial to the joint.  Recommended CT scan with IV contrast of the chest.  Patient denies any fevers however endorsing erythema to the left sternoclavicular joint region. Patient states she was recently hospitalized over a week ago at [facility] for falls secondary to unsteadiness due to her MS.  Denies any recent falls today, vision changes. Endorsing headache. Denies any chest pain, shortness of breath, abdominal pain, nausea vomiting diarrhea, urinary complaints.  CT chest with IV in ED significant for Septic arthritis of the left sternoclavicular joint and involving the articulation of the sternum with the first costal cartilage.Extensive surrounding inflammation, with pneumonia in the underlying subpleural left upper lobe.No fluid collection is evident.  Workup significant for elevated alk phos (248), . VSS and afebrile  s/p Debridement of left sternoclavicular joint with excision of left clavicular head. Purulent drainage at the sternoclavicular joint. Tissue surrounding left clavicle found to be very inflamed. Copious irrigation of surgical site.   Concerned about pain at surgical site.    at bedside     43F PMHx of MS on immunosuppressive medication, presenting with left chest wall and shoulder pain. Recent history of fall secondary to unsteadiness due to MS. Obtained outpatient MRI of left sternoclavicular joint with results significant for marrow edema and swelling of the left sternoclavicular joint with a 2 cm fluid collection and adjacent pleural edema in the left chest with concerns for infectious etiology.  Patient initially saw orthopedics with for concerns for septic arthritis of this joint.  Impression of the read showed soft tissue abscess superficial to the joint.  Recommended CT scan with IV contrast of the chest.  Patient denies any fevers however endorsing erythema to the left sternoclavicular joint region. Patient states she was recently hospitalized over a week ago at [facility] for falls secondary to unsteadiness due to her MS.  Denies any recent falls today, vision changes. Endorsing headache. Denies any chest pain, shortness of breath, abdominal pain, nausea vomiting diarrhea, urinary complaints.  CT chest with IV in ED significant for Septic arthritis of the left sternoclavicular joint and involving the articulation of the sternum with the first costal cartilage.Extensive surrounding inflammation, with pneumonia in the underlying subpleural left upper lobe.No fluid collection is evident.  Workup significant for elevated alk phos (248), . VSS and afebrile  s/p Debridement of left sternoclavicular joint with excision of left clavicular head. Purulent drainage at the sternoclavicular joint. Tissue surrounding left clavicle found to be very inflamed. Copious irrigation of surgical site.   Concerned about pain at surgical site.    at bedside

## 2023-12-29 NOTE — PROGRESS NOTE ADULT - SUBJECTIVE AND OBJECTIVE BOX
43yPatient is a 43y old  Female who presents with a chief complaint of L shoulder and chest pain (29 Dec 2023 15:13)      Interval history:  Afebrile overnight, feeling better, still with oral ulcers.       Allergies:   No Known Allergies      Antimicrobials:  cefepime   IVPB 2000 milliGRAM(s) IV Intermittent every 8 hours  valACYclovir 1000 milliGRAM(s) Oral every 12 hours      REVIEW OF SYSTEMS:   No SOB  No abdominal pain  No dysuria         Vital Signs Last 24 Hrs  T(C): 36.6 (12-29-23 @ 13:43), Max: 37.6 (12-28-23 @ 21:13)  T(F): 97.9 (12-29-23 @ 13:43), Max: 99.7 (12-28-23 @ 21:13)  HR: 89 (12-29-23 @ 13:43) (75 - 95)  BP: 103/70 (12-29-23 @ 13:43) (92/59 - 108/70)  BP(mean): --  RR: 18 (12-29-23 @ 13:43) (18 - 18)  SpO2: 96% (12-29-23 @ 13:43) (93% - 96%)      PHYSICAL EXAM:  Pt in no acute distress, alert, awake.   lt sided upper lip swelling, with oral ulcers,   breathing comfortably on RA.   non distended abdomen  no edema LE   rt arm PICC    Wound vac in place left shoulder                            7.7    12.44 )-----------( 496      ( 28 Dec 2023 07:26 )             24.2         Culture - Blood (12.25.23 @ 21:54)   Specimen Source: .Blood Blood-Peripheral  Culture Results:   No growth at 72 Hours      Radiology:    < from: CT Abdomen and Pelvis w/ Oral Cont and w/ IV Cont (12.28.23 @ 23:35) >  IMPRESSION:  *  No source of infection identified within the abdomen or pelvis.  *  Moderate to large colonic stool burden. No bowel obstruction. No   evidence foracute bowel inflammation.  *  Unchanged small right and trace left pleural effusions.

## 2023-12-29 NOTE — PROGRESS NOTE ADULT - ASSESSMENT
43F PMHx of MS on (Rituxan, Ocrevus) presenting with left chest wall and shoulder pain. Recent history of fall secondary to unsteadiness due to MS. On review of ortho notes, pt has had multiple falls in November. She was admitted to [facility] on Last week of November and was found to have Rhinovirus and pseudomonas UTI. She completed 5-7 days course with Ciprofloxacin. Found to have Septic arthritis of SCM joint now s/p excision.    Patient obtained CT chest w/ IV contrast which showed likely septic arthritis with surrounding erythema. Thoracic surgery consulted, underwent washout and debridement.     OR Note: Debridement of left sternoclavicular joint with excision of left clavicular head. Purulent drainage at the sternoclavicular joint. Tissue surrounding left clavicle found to be very inflamed. Copious irrigation of surgical site.  Specimen sent: Head of left clavicle, culture of left sternoclavicular joint, culture of left manubrium, culture of deep sternal head of clavicle, culture of first rib costal cartilage      In ER: Afebrile, WBC 9.8, , . Given IV Zosyn, IV Vancomycin.         Septic arthritis of the left sternoclavicular joint and involving the   articulation of the sternum with the first costal cartilage.    Extensive surrounding inflammation, with pneumonia in the underlying   subpleural left upper lobe.      # Septic arthritis of L sternoclavicular joint s/p excision and drainage  # L upper lobe opacity,   # Immunosuppressed patient  # Elevated ESR/CRP.   #Manubrium OM. s/p repeat washout and debridement.   #New persistent fever.   oral ulcers         PLAN:  - continue cefepime 2 gm Q8hrs  - repeat blood cx in lab  - U/A with no pyuria   - HSV PCR negative but lip swelling improving   - on empiric valtrex for now  - s/p dental eval  - OR cx negative for bacterial cx.   - follow fungal/AFB cultures; negative so far  - Quantiferon negative   - TTE negative   - thoracic following,   - Rt shoulder MRI with no acute infection.   - repeat CT chest with no collection.   - CT abd/pelvis with no source of fever.   - if pt continues to be afebrile, plan to complete 7 days of valtrex and 6 weeks of abx from last debridement   - if febrile again, plan to switch cefepime to bryson for ? concern for drug fever vs uncovered infection.       Plan discussed with Medicine Attending.     Odalis Reynaga  Please contact through MS Teams   If no response or past 5 pm/weekend call [phone].      43F PMHx of MS on (Rituxan, Ocrevus) presenting with left chest wall and shoulder pain. Recent history of fall secondary to unsteadiness due to MS. On review of ortho notes, pt has had multiple falls in November. She was admitted to [facility] on Last week of November and was found to have Rhinovirus and pseudomonas UTI. She completed 5-7 days course with Ciprofloxacin. Found to have Septic arthritis of SCM joint now s/p excision.    Patient obtained CT chest w/ IV contrast which showed likely septic arthritis with surrounding erythema. Thoracic surgery consulted, underwent washout and debridement.     OR Note: Debridement of left sternoclavicular joint with excision of left clavicular head. Purulent drainage at the sternoclavicular joint. Tissue surrounding left clavicle found to be very inflamed. Copious irrigation of surgical site.  Specimen sent: Head of left clavicle, culture of left sternoclavicular joint, culture of left manubrium, culture of deep sternal head of clavicle, culture of first rib costal cartilage      In ER: Afebrile, WBC 9.8, , . Given IV Zosyn, IV Vancomycin.         Septic arthritis of the left sternoclavicular joint and involving the   articulation of the sternum with the first costal cartilage.    Extensive surrounding inflammation, with pneumonia in the underlying   subpleural left upper lobe.      # Septic arthritis of L sternoclavicular joint s/p excision and drainage  # L upper lobe opacity,   # Immunosuppressed patient  # Elevated ESR/CRP.   #Manubrium OM. s/p repeat washout and debridement.   #New persistent fever.   oral ulcers         PLAN:  - continue cefepime 2 gm Q8hrs  - repeat blood cx in lab  - U/A with no pyuria   - HSV PCR negative but lip swelling improving   - on empiric valtrex for now  - s/p dental eval  - OR cx negative for bacterial cx.   - follow fungal/AFB cultures; negative so far  - Quantiferon negative   - TTE negative   - thoracic following,   - Rt shoulder MRI with no acute infection.   - repeat CT chest with no collection.   - CT abd/pelvis with no source of fever.   - if pt continues to be afebrile, plan to complete 7 days of valtrex and 6 weeks of abx from last debridement   - if febrile again, plan to switch cefepime to bryson for ? concern for drug fever vs uncovered infection.       Plan discussed with Medicine Attending.     Odalis Reynaga  Please contact through MS Teams   If no response or past 5 pm/weekend call [phone].

## 2023-12-30 LAB
CULTURE RESULTS: SIGNIFICANT CHANGE UP
CULTURE RESULTS: SIGNIFICANT CHANGE UP
SPECIMEN SOURCE: SIGNIFICANT CHANGE UP
SPECIMEN SOURCE: SIGNIFICANT CHANGE UP

## 2023-12-30 RX ORDER — ONDANSETRON 8 MG/1
4 TABLET, FILM COATED ORAL ONCE
Refills: 0 | Status: COMPLETED | OUTPATIENT
Start: 2023-12-30 | End: 2023-12-30

## 2023-12-30 RX ORDER — FENTANYL CITRATE 50 UG/ML
1 INJECTION INTRAVENOUS ONCE
Refills: 0 | Status: DISCONTINUED | OUTPATIENT
Start: 2023-12-30 | End: 2023-12-30

## 2023-12-30 RX ADMIN — Medication 0.5 MILLIGRAM(S): at 17:49

## 2023-12-30 RX ADMIN — HYDROMORPHONE HYDROCHLORIDE 4 MILLIGRAM(S): 2 INJECTION INTRAMUSCULAR; INTRAVENOUS; SUBCUTANEOUS at 22:07

## 2023-12-30 RX ADMIN — Medication 650 MILLIGRAM(S): at 13:10

## 2023-12-30 RX ADMIN — CEFEPIME 100 MILLIGRAM(S): 1 INJECTION, POWDER, FOR SOLUTION INTRAMUSCULAR; INTRAVENOUS at 08:54

## 2023-12-30 RX ADMIN — VALACYCLOVIR 1000 MILLIGRAM(S): 500 TABLET, FILM COATED ORAL at 17:49

## 2023-12-30 RX ADMIN — HYDROMORPHONE HYDROCHLORIDE 4 MILLIGRAM(S): 2 INJECTION INTRAMUSCULAR; INTRAVENOUS; SUBCUTANEOUS at 09:55

## 2023-12-30 RX ADMIN — Medication 650 MILLIGRAM(S): at 17:49

## 2023-12-30 RX ADMIN — ONDANSETRON 4 MILLIGRAM(S): 8 TABLET, FILM COATED ORAL at 23:07

## 2023-12-30 RX ADMIN — HYDROMORPHONE HYDROCHLORIDE 4 MILLIGRAM(S): 2 INJECTION INTRAMUSCULAR; INTRAVENOUS; SUBCUTANEOUS at 01:36

## 2023-12-30 RX ADMIN — Medication 1 TABLET(S): at 12:07

## 2023-12-30 RX ADMIN — DALFAMPRIDINE 10 MILLIGRAM(S): 10 TABLET, FILM COATED, EXTENDED RELEASE ORAL at 12:08

## 2023-12-30 RX ADMIN — OXYCODONE HYDROCHLORIDE 10 MILLIGRAM(S): 5 TABLET ORAL at 22:08

## 2023-12-30 RX ADMIN — FENTANYL CITRATE 1 PATCH: 50 INJECTION INTRAVENOUS at 08:55

## 2023-12-30 RX ADMIN — SERTRALINE 100 MILLIGRAM(S): 25 TABLET, FILM COATED ORAL at 22:08

## 2023-12-30 RX ADMIN — FENTANYL CITRATE 1 PATCH: 50 INJECTION INTRAVENOUS at 07:30

## 2023-12-30 RX ADMIN — Medication 650 MILLIGRAM(S): at 12:07

## 2023-12-30 RX ADMIN — CEFEPIME 100 MILLIGRAM(S): 1 INJECTION, POWDER, FOR SOLUTION INTRAMUSCULAR; INTRAVENOUS at 23:07

## 2023-12-30 RX ADMIN — ENOXAPARIN SODIUM 40 MILLIGRAM(S): 100 INJECTION SUBCUTANEOUS at 17:49

## 2023-12-30 RX ADMIN — FENTANYL CITRATE 1 PATCH: 50 INJECTION INTRAVENOUS at 21:42

## 2023-12-30 RX ADMIN — DULOXETINE HYDROCHLORIDE 60 MILLIGRAM(S): 30 CAPSULE, DELAYED RELEASE ORAL at 22:07

## 2023-12-30 RX ADMIN — HYDROMORPHONE HYDROCHLORIDE 4 MILLIGRAM(S): 2 INJECTION INTRAMUSCULAR; INTRAVENOUS; SUBCUTANEOUS at 00:36

## 2023-12-30 RX ADMIN — CHLORHEXIDINE GLUCONATE 1 APPLICATION(S): 213 SOLUTION TOPICAL at 11:25

## 2023-12-30 RX ADMIN — Medication 650 MILLIGRAM(S): at 18:46

## 2023-12-30 RX ADMIN — SENNA PLUS 2 TABLET(S): 8.6 TABLET ORAL at 22:07

## 2023-12-30 RX ADMIN — CYCLOBENZAPRINE HYDROCHLORIDE 5 MILLIGRAM(S): 10 TABLET, FILM COATED ORAL at 17:49

## 2023-12-30 RX ADMIN — HYDROMORPHONE HYDROCHLORIDE 4 MILLIGRAM(S): 2 INJECTION INTRAMUSCULAR; INTRAVENOUS; SUBCUTANEOUS at 08:54

## 2023-12-30 RX ADMIN — CEFEPIME 100 MILLIGRAM(S): 1 INJECTION, POWDER, FOR SOLUTION INTRAMUSCULAR; INTRAVENOUS at 16:15

## 2023-12-30 RX ADMIN — CYCLOBENZAPRINE HYDROCHLORIDE 5 MILLIGRAM(S): 10 TABLET, FILM COATED ORAL at 05:48

## 2023-12-30 RX ADMIN — VALACYCLOVIR 1000 MILLIGRAM(S): 500 TABLET, FILM COATED ORAL at 05:48

## 2023-12-30 RX ADMIN — DALFAMPRIDINE 10 MILLIGRAM(S): 10 TABLET, FILM COATED, EXTENDED RELEASE ORAL at 22:09

## 2023-12-30 RX ADMIN — OXYCODONE HYDROCHLORIDE 10 MILLIGRAM(S): 5 TABLET ORAL at 22:31

## 2023-12-30 NOTE — PROGRESS NOTE ADULT - SUBJECTIVE AND OBJECTIVE BOX
Date of service: 12-30-23 @ 22:48      Patient is a 43y old  Female who presents with a chief complaint of L shoulder and chest pain (30 Dec 2023 19:57)                                                               INTERVAL HPI/OVERNIGHT EVENTS:    REVIEW OF SYSTEMS:     CONSTITUTIONAL: No weakness, fevers or chills  EYES/ENT: No visual changes , no ear ache   NECK: No pain or stiffness  RESPIRATORY: No cough, wheezing,  No shortness of breath  CARDIOVASCULAR: No chest pain or palpitations  GASTROINTESTINAL: No abdominal pain  . No nausea, vomiting, or hematemesis; No diarrhea or constipation. No melena or hematochezia.  GENITOURINARY: No dysuria, frequency or hematuria  NEUROLOGICAL: No numbness or weakness  SKIN: No itching, burning, rashes, or lesions                                                                                                                                                                                                                                                                                 Medications:  MEDICATIONS  (STANDING):  cefepime   IVPB 2000 milliGRAM(s) IV Intermittent every 8 hours  chlorhexidine 2% Cloths 1 Application(s) Topical <User Schedule>  cyclobenzaprine 5 milliGRAM(s) Oral two times a day  dalfampridine ER 10 milliGRAM(s) Oral every 12 hours  DULoxetine 60 milliGRAM(s) Oral at bedtime  enoxaparin Injectable 40 milliGRAM(s) SubCutaneous every 24 hours  fentaNYL   Patch  12 MICROgram(s)/Hr 1 Patch Transdermal every 72 hours  multivitamin/minerals 1 Tablet(s) Oral daily  oxyCODONE  ER Tablet 10 milliGRAM(s) Oral every 12 hours  senna 2 Tablet(s) Oral at bedtime  sertraline 100 milliGRAM(s) Oral at bedtime  valACYclovir 1000 milliGRAM(s) Oral every 12 hours  Vibegron (Gemtesa) 75 milliGRAM(s),Vibegron (Gemtesa) 75mg tablet 1 Tablet(s) 1 Tablet(s) Oral daily    MEDICATIONS  (PRN):  acetaminophen     Tablet .. 650 milliGRAM(s) Oral every 6 hours PRN Temp greater or equal to 38C (100.4F), Mild Pain (1 - 3)  benzocaine/menthol Lozenge 1 Lozenge Oral three times a day PRN Sore Throat  clonazePAM  Tablet 0.5 milliGRAM(s) Oral three times a day PRN anxiety  HYDROmorphone   Tablet 4 milliGRAM(s) Oral every 6 hours PRN Severe Pain (7 - 10)  HYDROmorphone   Tablet 2 milliGRAM(s) Oral every 4 hours PRN Moderate Pain (4 - 6)  polyethylene glycol 3350 17 Gram(s) Oral two times a day PRN Constipation       Allergies    No Known Allergies    Intolerances      Vital Signs Last 24 Hrs  T(C): 36.8 (30 Dec 2023 21:35), Max: 37 (30 Dec 2023 00:32)  T(F): 98.2 (30 Dec 2023 21:35), Max: 98.6 (30 Dec 2023 00:32)  HR: 86 (30 Dec 2023 21:35) (82 - 102)  BP: 98/63 (30 Dec 2023 21:35) (88/52 - 118/65)  BP(mean): --  RR: 17 (30 Dec 2023 21:35) (17 - 18)  SpO2: 96% (30 Dec 2023 21:35) (94% - 97%)    Parameters below as of 30 Dec 2023 21:35  Patient On (Oxygen Delivery Method): room air      CAPILLARY BLOOD GLUCOSE          12-29 @ 07:01  -  12-30 @ 07:00  --------------------------------------------------------  IN: 980 mL / OUT: 0 mL / NET: 980 mL    12-30 @ 07:01  -  12-30 @ 22:48  --------------------------------------------------------  IN: 780 mL / OUT: 0 mL / NET: 780 mL      Physical Exam:    Daily     Daily   General:  Well appearing, NAD, not cachetic  HEENT:  Nonicteric, PERRLA  CV:  RRR, S1S2   Lungs:  CTA B/L, no wheezes, rales, rhonchi  Abdomen:  Soft, non-tender, no distended, positive BS  Extremities: no edema                                                                                                                                                                                                                                                                                        LABS:                                                     RADIOLOGY & ADDITIONAL TESTS         I personally reviewed: [  ]EKG   [  ]CXR    [  ] CT      A/P:         Discussed with :     Taylor consultants' Notes   Time spent :

## 2023-12-30 NOTE — PROVIDER CONTACT NOTE (OTHER) - ASSESSMENT
A&OX4, VSS, on 2L NC, c/o of non radiating chest pain. Pt says its about a 5/10, pain medicine given prior to complaints of chest pain. Per pt, this feeling is new.
Oral temp 100.0.
Pt A&Ox4, denying left arm sling placement. Pt educated on importance of sling. Family at bedside and attempted to encourage pt to use sling. Pt denied and stated "the sling will decrease her mobility and cause muscle stiffness". Educated pt again on importance.
upon verification check for fentanyl patch with Night nurse Mishly. Day PCA contacted and confirmed removal and not aware it was a medication.

## 2023-12-30 NOTE — PROGRESS NOTE ADULT - ASSESSMENT
43F PMHx of MS on immunosuppressive medication, presenting with left chest wall and shoulder pain. Recent history of fall secondary to unsteadiness due to MS. Obtained outpatient MRI of left sternoclavicular joint with results significant for marrow edema and swelling of the left sternoclavicular joint with a 2 cm fluid collection and adjacent pleural edema in the left chest with concerns for infectious etiology.  Patient initially saw orthopedics with for concerns for septic arthritis of this joint.  Impression of the read showed soft tissue abscess superficial to the joint.  Recommended CT scan with IV contrast of the chest.  Patient denies any fevers however endorsing erythema to the left sternoclavicular joint region. Patient states she was recently hospitalized over a week ago at [facility] for falls secondary to unsteadiness due to her MS.  Denies any recent falls today, vision changes. Endorsing headache. Denies any chest pain, shortness of breath, abdominal pain, nausea vomiting diarrhea, urinary complaints.  CT chest with IV in ED significant for Septic arthritis of the left sternoclavicular joint and involving the articulation of the sternum with the first costal cartilage.Extensive surrounding inflammation, with pneumonia in the underlying subpleural left upper lobe.No fluid collection is evident.  Workup significant for elevated alk phos (248), . VSS and afebrile  s/p Debridement of left sternoclavicular joint with excision of left clavicular head. Purulent drainage at the sternoclavicular joint. Tissue surrounding left clavicle found to be very inflamed. Copious irrigation of surgical site.   Concerned about pain at surgical site.    at bedside     43F PMHx of MS on immunosuppressive medication, presenting with left chest wall and shoulder pain. Recent history of fall secondary to unsteadiness due to MS. Obtained outpatient MRI of left sternoclavicular joint with results significant for marrow edema and swelling of the left sternoclavicular joint with a 2 cm fluid collection and adjacent pleural edema in the left chest with concerns for infectious etiology.  Patient initially saw orthopedics with for concerns for septic arthritis of this joint.  Impression of the read showed soft tissue abscess superficial to the joint.  Recommended CT scan with IV contrast of the chest.  Patient denies any fevers however endorsing erythema to the left sternoclavicular joint region. Patient states she was recently hospitalized over a week ago at [facility] for falls secondary to unsteadiness due to her MS.  Denies any recent falls today, vision changes. Endorsing headache. Denies any chest pain, shortness of breath, abdominal pain, nausea vomiting diarrhea, urinary complaints.  CT chest with IV in ED significant for Septic arthritis of the left sternoclavicular joint and involving the articulation of the sternum with the first costal cartilage.Extensive surrounding inflammation, with pneumonia in the underlying subpleural left upper lobe.No fluid collection is evident.  Workup significant for elevated alk phos (248), . VSS and afebrile  s/p Debridement of left sternoclavicular joint with excision of left clavicular head. Purulent drainage at the sternoclavicular joint. Tissue surrounding left clavicle found to be very inflamed. Copious irrigation of surgical site.   Concerned about pain at surgical site.    at bedside

## 2023-12-30 NOTE — PROVIDER CONTACT NOTE (OTHER) - REASON
Pt A&Ox4, denying Left arm sling application
Fentanyl patch removed without notice
Low grade fever of 100.0.
Pt c/o of non radiating chest pain 5/10

## 2023-12-30 NOTE — PROVIDER CONTACT NOTE (OTHER) - ACTION/TREATMENT ORDERED:
Provider notified, site verification marked as not done, new fentanyl patch to be ordered.
Rectal temp.
no new orders obtained ok to transfer pt to floor bed after postop cxr done
MD aware and at bedside to educate pt on importance of sling placement. However, pt denied sling placement.
EKG ordered

## 2023-12-30 NOTE — CHART NOTE - NSCHARTNOTEFT_GEN_A_CORE
Called by nurse who noticed the fentanyl patch on this patient to be missing at 8pm tonight. According to patient she noticed earlier the patch to be missing. As per nurse manager note was written and documentation for missing patch. Placed order for one STAT patch and will continue to monitor patient. Previous order of Fentanyl patch q72h to remain and patch to be changed in 72h

## 2023-12-30 NOTE — PROVIDER CONTACT NOTE (OTHER) - SITUATION
CONNIE Carvajal notified pt postop h/h=7.2/23.3
Pt c/o of non radiating chest pain 5/10
Pt A&Ox4, denying Left arm sling application
S/P left sternoclavicular debridement and clavicle head excision.
Low grade fever of 100.0.

## 2023-12-30 NOTE — PROGRESS NOTE ADULT - ASSESSMENT
43F PMHx of MS on Rituxan, presenting with left chest wall and shoulder pain w/ concern for infection now admitted with likely septic arthritis with need for possible washout and debridement       Problem/Plan - 1:  ·  Problem: Septic arthritis. left shoulder joint   ·  Plan: Imaging consistent with septic arthritis  s/p OR   cont abx and fu cultures   check ECHO : no vegetation   fu with ID   d/w with CTS at bedside : drainage adjusted and fx improved   cont current abx   pt with LUE pain : kaciley referred pain from L [id number] and chest however  Cervical MRI : no disciits / abcess   discussed with pt , family at length at bedside   washout and debridement of sternoclavicular joint; resection of medial manubrium; white and black wound vac placed  now s/p vac change  PICC in place   cont current abx   pt still with episodes of fever   cultures neg   ct chest no acute pathology   d/w ID will check CT a/p : no acute pathology   check Echo : no vegetation   check procal and esr /crp : noted   dental input appreciated           # c/o left arm swelling and pain in elbow region :  -doppler ordered to r/o DVT : Negative      Problem/Plan - 2:  ·  Problem: Multiple sclerosis.   ·  Plan: Gets Rituxan every 6 months, next dose due : holding   -On Ampyra BID at home, need to bring home med     Problem/Plan - 3:  ·  Problem: Anxiety.   ·  Plan: ISTOP reviewed Reference #: [id number]  -Cont. Clonazepam 0.5mg QHS PRN  -Cont. Sertraline 100mg QHS  -Cont. Duloxetine 60mg QHS, pt unsure of dose  -Need full med rec.     Problem/Plan - 4:  ·  Problem: Anemia.   ·  Plan: hgb 9, lower than prior year. Pt endorses some recent anemia but cannot specify  - pt feeling weak and fatigued   will chemo one unit of prbc        Problem/Plan - 5:  ·  Problem: Prophylactic measure.   ·  Plan: DVT PPx    hyperkalemia : normalized     awaiting placement : d/w family at bedsider    43F PMHx of MS on Rituxan, presenting with left chest wall and shoulder pain w/ concern for infection now admitted with likely septic arthritis with need for possible washout and debridement       Problem/Plan - 1:  ·  Problem: Septic arthritis. left shoulder joint   ·  Plan: Imaging consistent with septic arthritis  s/p OR   cont abx and fu cultures   check ECHO : no vegetation   fu with ID   d/w with CTS at bedside : drainage adjusted and fx improved   cont current abx   pt with LUE pain : kaciley referred pain from L [id number] and chest however  Cervical MRI : no disciits / abcess   discussed with pt , family at length at bedside   washout and debridement of sternoclavicular joint; resection of medial manubrium; white and black wound vac placed  now s/p vac change  PICC in place   cont current abx   pt still with episodes of fever   cultures neg   ct chest no acute pathology   d/w ID will check CT a/p : no acute pathology   check Echo : no vegetation   check procal and esr /crp : noted   dental input appreciated           # c/o left arm swelling and pain in elbow region :  -doppler ordered to r/o DVT : Negative      Problem/Plan - 2:  ·  Problem: Multiple sclerosis.   ·  Plan: Gets Rituxan every 6 months, next dose due : holding   -On Ampyra BID at home, need to bring home med     Problem/Plan - 3:  ·  Problem: Anxiety.   ·  Plan: ISTOP reviewed Reference #: [id number]  -Cont. Clonazepam 0.5mg QHS PRN  -Cont. Sertraline 100mg QHS  -Cont. Duloxetine 60mg QHS, pt unsure of dose  -Need full med rec.     Problem/Plan - 4:  ·  Problem: Anemia.   ·  Plan: hgb 9, lower than prior year. Pt endorses some recent anemia but cannot specify  - pt feeling weak and fatigued   will chemo one unit of prbc        Problem/Plan - 5:  ·  Problem: Prophylactic measure.   ·  Plan: DVT PPx    hyperkalemia : normalized     awaiting placement : d/w family at bedsider

## 2023-12-30 NOTE — PROGRESS NOTE ADULT - SUBJECTIVE AND OBJECTIVE BOX
Subjective: Patient seen and examined. No new events except as noted.   feels ok   sister at bedside     REVIEW OF SYSTEMS:    CONSTITUTIONAL: + weakness, fevers or chills  EYES/ENT: No visual changes;  No vertigo or throat pain   NECK: No pain or stiffness  RESPIRATORY: No cough, wheezing, hemoptysis; No shortness of breath  CARDIOVASCULAR: No chest pain or palpitations  GASTROINTESTINAL: No abdominal or epigastric pain. No nausea, vomiting, or hematemesis; No diarrhea or constipation. No melena or hematochezia.  GENITOURINARY: No dysuria, frequency or hematuria  NEUROLOGICAL: No numbness or weakness  SKIN: No itching, burning, rashes, or lesions   All other review of systems is negative unless indicated above.    MEDICATIONS:  MEDICATIONS  (STANDING):  cefepime   IVPB 2000 milliGRAM(s) IV Intermittent every 8 hours  chlorhexidine 2% Cloths 1 Application(s) Topical <User Schedule>  cyclobenzaprine 5 milliGRAM(s) Oral two times a day  dalfampridine ER 10 milliGRAM(s) Oral every 12 hours  DULoxetine 60 milliGRAM(s) Oral at bedtime  enoxaparin Injectable 40 milliGRAM(s) SubCutaneous every 24 hours  fentaNYL   Patch  12 MICROgram(s)/Hr 1 Patch Transdermal every 72 hours  multivitamin/minerals 1 Tablet(s) Oral daily  oxyCODONE  ER Tablet 10 milliGRAM(s) Oral every 12 hours  senna 2 Tablet(s) Oral at bedtime  sertraline 100 milliGRAM(s) Oral at bedtime  valACYclovir 1000 milliGRAM(s) Oral every 12 hours  Vibegron (Gemtesa) 75 milliGRAM(s),Vibegron (Gemtesa) 75mg tablet 1 Tablet(s) 1 Tablet(s) Oral daily      PHYSICAL EXAM:  T(C): 36.4 (12-30-23 @ 17:05), Max: 37.1 (12-29-23 @ 21:20)  HR: 87 (12-30-23 @ 17:05) (82 - 105)  BP: 98/64 (12-30-23 @ 17:05) (88/52 - 118/65)  RR: 17 (12-30-23 @ 17:05) (17 - 18)  SpO2: 96% (12-30-23 @ 17:05) (94% - 97%)  Wt(kg): --  I&O's Summary    29 Dec 2023 07:01  -  30 Dec 2023 07:00  --------------------------------------------------------  IN: 980 mL / OUT: 0 mL / NET: 980 mL    30 Dec 2023 07:01  -  30 Dec 2023 19:57  --------------------------------------------------------  IN: 360 mL / OUT: 0 mL / NET: 360 mL        Appearance: NAD  HEENT:   Normal oral mucosa, PERRL, EOMI	  Lymphatic: No lymphadenopathy , no edema   L clavicular region w/ black sponge CAV dsg secured w/ good seal, minimal serousag drainage noted in collection chamber  Cardiovascular: Normal S1 S2, No JVD, No murmurs , Peripheral pulses palpable 2+ bilaterally  Respiratory: Lungs clear to auscultation, normal effort 	  Gastrointestinal:  Soft, Non-tender, + BS	  Skin: No rashes, No ecchymoses, No cyanosis, warm to touch  Musculoskeletal: Normal range of motion, normal strength  Psychiatry:  lethargic weak   Ext: No edema  LABS:    CARDIAC MARKERS:          TELEMETRY: 	    ECG:  	  RADIOLOGY:   DIAGNOSTIC TESTING:  [ ] Echocardiogram:  [ ]  Catheterization:  [ ] Stress Test:    OTHER:

## 2023-12-31 LAB
CULTURE RESULTS: SIGNIFICANT CHANGE UP
SPECIMEN SOURCE: SIGNIFICANT CHANGE UP

## 2023-12-31 PROCEDURE — 99232 SBSQ HOSP IP/OBS MODERATE 35: CPT

## 2023-12-31 RX ORDER — CLONAZEPAM 1 MG
0.5 TABLET ORAL THREE TIMES A DAY
Refills: 0 | Status: DISCONTINUED | OUTPATIENT
Start: 2023-12-31 | End: 2024-01-05

## 2023-12-31 RX ADMIN — CEFEPIME 100 MILLIGRAM(S): 1 INJECTION, POWDER, FOR SOLUTION INTRAMUSCULAR; INTRAVENOUS at 08:17

## 2023-12-31 RX ADMIN — CEFEPIME 100 MILLIGRAM(S): 1 INJECTION, POWDER, FOR SOLUTION INTRAMUSCULAR; INTRAVENOUS at 23:26

## 2023-12-31 RX ADMIN — SERTRALINE 100 MILLIGRAM(S): 25 TABLET, FILM COATED ORAL at 22:05

## 2023-12-31 RX ADMIN — OXYCODONE HYDROCHLORIDE 10 MILLIGRAM(S): 5 TABLET ORAL at 22:03

## 2023-12-31 RX ADMIN — HYDROMORPHONE HYDROCHLORIDE 2 MILLIGRAM(S): 2 INJECTION INTRAMUSCULAR; INTRAVENOUS; SUBCUTANEOUS at 11:25

## 2023-12-31 RX ADMIN — FENTANYL CITRATE 1 PATCH: 50 INJECTION INTRAVENOUS at 19:30

## 2023-12-31 RX ADMIN — DULOXETINE HYDROCHLORIDE 60 MILLIGRAM(S): 30 CAPSULE, DELAYED RELEASE ORAL at 22:05

## 2023-12-31 RX ADMIN — ENOXAPARIN SODIUM 40 MILLIGRAM(S): 100 INJECTION SUBCUTANEOUS at 19:37

## 2023-12-31 RX ADMIN — DALFAMPRIDINE 10 MILLIGRAM(S): 10 TABLET, FILM COATED, EXTENDED RELEASE ORAL at 11:53

## 2023-12-31 RX ADMIN — Medication 650 MILLIGRAM(S): at 16:50

## 2023-12-31 RX ADMIN — CEFEPIME 100 MILLIGRAM(S): 1 INJECTION, POWDER, FOR SOLUTION INTRAMUSCULAR; INTRAVENOUS at 15:48

## 2023-12-31 RX ADMIN — SENNA PLUS 2 TABLET(S): 8.6 TABLET ORAL at 22:08

## 2023-12-31 RX ADMIN — HYDROMORPHONE HYDROCHLORIDE 4 MILLIGRAM(S): 2 INJECTION INTRAMUSCULAR; INTRAVENOUS; SUBCUTANEOUS at 17:08

## 2023-12-31 RX ADMIN — Medication 650 MILLIGRAM(S): at 15:50

## 2023-12-31 RX ADMIN — VALACYCLOVIR 1000 MILLIGRAM(S): 500 TABLET, FILM COATED ORAL at 17:08

## 2023-12-31 RX ADMIN — OXYCODONE HYDROCHLORIDE 10 MILLIGRAM(S): 5 TABLET ORAL at 11:44

## 2023-12-31 RX ADMIN — CYCLOBENZAPRINE HYDROCHLORIDE 5 MILLIGRAM(S): 10 TABLET, FILM COATED ORAL at 17:08

## 2023-12-31 RX ADMIN — OXYCODONE HYDROCHLORIDE 10 MILLIGRAM(S): 5 TABLET ORAL at 23:00

## 2023-12-31 RX ADMIN — VALACYCLOVIR 1000 MILLIGRAM(S): 500 TABLET, FILM COATED ORAL at 05:13

## 2023-12-31 RX ADMIN — HYDROMORPHONE HYDROCHLORIDE 2 MILLIGRAM(S): 2 INJECTION INTRAMUSCULAR; INTRAVENOUS; SUBCUTANEOUS at 10:25

## 2023-12-31 RX ADMIN — DALFAMPRIDINE 10 MILLIGRAM(S): 10 TABLET, FILM COATED, EXTENDED RELEASE ORAL at 22:54

## 2023-12-31 RX ADMIN — Medication 650 MILLIGRAM(S): at 05:15

## 2023-12-31 RX ADMIN — FENTANYL CITRATE 1 PATCH: 50 INJECTION INTRAVENOUS at 08:45

## 2023-12-31 RX ADMIN — CYCLOBENZAPRINE HYDROCHLORIDE 5 MILLIGRAM(S): 10 TABLET, FILM COATED ORAL at 05:12

## 2023-12-31 RX ADMIN — Medication 1 TABLET(S): at 11:44

## 2023-12-31 RX ADMIN — HYDROMORPHONE HYDROCHLORIDE 4 MILLIGRAM(S): 2 INJECTION INTRAMUSCULAR; INTRAVENOUS; SUBCUTANEOUS at 18:08

## 2023-12-31 RX ADMIN — CHLORHEXIDINE GLUCONATE 1 APPLICATION(S): 213 SOLUTION TOPICAL at 05:12

## 2023-12-31 NOTE — PROGRESS NOTE ADULT - ASSESSMENT
43F PMHx of MS on (Rituxan, Ocrevus) presenting with left chest wall and shoulder pain. Recent history of fall secondary to unsteadiness due to MS. On review of ortho notes, pt has had multiple falls in November. She was admitted to [facility] on Last week of November and was found to have Rhinovirus and pseudomonas UTI. She completed 5-7 days course with Ciprofloxacin. Found to have Septic arthritis of SCM joint now s/p excision.    Patient obtained CT chest w/ IV contrast which showed likely septic arthritis with surrounding erythema. Thoracic surgery consulted, underwent washout and debridement.     OR Note: Debridement of left sternoclavicular joint with excision of left clavicular head. Purulent drainage at the sternoclavicular joint. Tissue surrounding left clavicle found to be very inflamed. Copious irrigation of surgical site.  Specimen sent: Head of left clavicle, culture of left sternoclavicular joint, culture of left manubrium, culture of deep sternal head of clavicle, culture of first rib costal cartilage      In ER: Afebrile, WBC 9.8, , . Given IV Zosyn, IV Vancomycin.         Septic arthritis of the left sternoclavicular joint and involving the   articulation of the sternum with the first costal cartilage.    Extensive surrounding inflammation, with pneumonia in the underlying   subpleural left upper lobe.      # Septic arthritis of L sternoclavicular joint s/p excision and drainage  # L upper lobe opacity,   # Immunosuppressed patient  # Elevated ESR/CRP.   #Manubrium OM. s/p repeat washout and debridement.   #New persistent fever.   oral ulcers         PLAN:  - continue cefepime 2 gm Q8hrs  - repeat blood cx in lab  - U/A with no pyuria   - HSV PCR negative but lip swelling improving   - on empiric valtrex for now  - s/p dental eval  - OR cx negative for bacterial cx.- possibly due to recent po abx (cipro for UTI)  - follow fungal/AFB cultures; negative so far  - Quantiferon negative   - TTE negative   - thoracic following,   - Rt shoulder MRI with no acute infection.   - repeat CT chest with no collection.   - CT abd/pelvis with no source of fever.   - if pt remains afebrile, plan to complete 7 days of valtrex and 6 weeks of abx from last debridement   - if febrile again, plan to switch cefepime to bryson for ? concern for drug fever vs uncovered infection.       Plan discussed with Medicine Attending.     Emory Pearson MD  pager [phone]  office [phone]  Over the weekend please call [phone].   Dr Odalis Mendez will resume care 1/2/24     43F PMHx of MS on (Rituxan, Ocrevus) presenting with left chest wall and shoulder pain. Recent history of fall secondary to unsteadiness due to MS. On review of ortho notes, pt has had multiple falls in November. She was admitted to [facility] on Last week of November and was found to have Rhinovirus and pseudomonas UTI. She completed 5-7 days course with Ciprofloxacin. Found to have Septic arthritis of SCM joint now s/p excision.    Patient obtained CT chest w/ IV contrast which showed likely septic arthritis with surrounding erythema. Thoracic surgery consulted, underwent washout and debridement.     OR Note: Debridement of left sternoclavicular joint with excision of left clavicular head. Purulent drainage at the sternoclavicular joint. Tissue surrounding left clavicle found to be very inflamed. Copious irrigation of surgical site.  Specimen sent: Head of left clavicle, culture of left sternoclavicular joint, culture of left manubrium, culture of deep sternal head of clavicle, culture of first rib costal cartilage      In ER: Afebrile, WBC 9.8, , . Given IV Zosyn, IV Vancomycin.         Septic arthritis of the left sternoclavicular joint and involving the   articulation of the sternum with the first costal cartilage.    Extensive surrounding inflammation, with pneumonia in the underlying   subpleural left upper lobe.      # Septic arthritis of L sternoclavicular joint s/p excision and drainage  # L upper lobe opacity,   # Immunosuppressed patient  # Elevated ESR/CRP.   #Manubrium OM. s/p repeat washout and debridement.   #New persistent fever.   oral ulcers         PLAN:  - continue cefepime 2 gm Q8hrs  - repeat blood cx in lab  - U/A with no pyuria   - HSV PCR negative but lip swelling improving   - on empiric valtrex for now  - s/p dental eval  - OR cx negative for bacterial cx.- possibly due to recent po abx (cipro for UTI)  - follow fungal/AFB cultures; negative so far  - Quantiferon negative   - TTE negative   - thoracic following,   - Rt shoulder MRI with no acute infection.   - repeat CT chest with no collection.   - CT abd/pelvis with no source of fever.   - if pt remains afebrile, plan to complete 7 days of valtrex and 6 weeks of abx from last debridement   - if febrile again, plan to switch cefepime to bryson for ? concern for drug fever vs uncovered infection.       Plan discussed with Medicine Attending.     Emory Pearson MD  pager [phone]  office [phone]  Over the weekend please call [phone].   Dr Odalis Mendez will resume care 1/2/24     43F PMHx of MS on (Rituxan, Ocrevus) presenting with left chest wall and shoulder pain. Recent history of fall secondary to unsteadiness due to MS. On review of ortho notes, pt has had multiple falls in November. She was admitted to [facility] on Last week of November and was found to have Rhinovirus and pseudomonas UTI. She completed 5-7 days course with Ciprofloxacin. Found to have Septic arthritis of SCM joint now s/p excision.    Patient obtained CT chest w/ IV contrast which showed likely septic arthritis with surrounding erythema. Thoracic surgery consulted, underwent washout and debridement.     OR Note: Debridement of left sternoclavicular joint with excision of left clavicular head. Purulent drainage at the sternoclavicular joint. Tissue surrounding left clavicle found to be very inflamed. Copious irrigation of surgical site.  Specimen sent: Head of left clavicle, culture of left sternoclavicular joint, culture of left manubrium, culture of deep sternal head of clavicle, culture of first rib costal cartilage      In ER: Afebrile, WBC 9.8, , . Given IV Zosyn, IV Vancomycin.         Septic arthritis of the left sternoclavicular joint and involving the   articulation of the sternum with the first costal cartilage.    Extensive surrounding inflammation, with pneumonia in the underlying   subpleural left upper lobe.      # Septic arthritis of L sternoclavicular joint s/p excision and drainage  # L upper lobe opacity,   # Immunosuppressed patient  # Elevated ESR/CRP.   #Manubrium OM. s/p repeat washout and debridement.   #New persistent fever.   oral ulcers         PLAN:  - continue cefepime 2 gm Q8hrs  - repeat blood cx in lab  - U/A with no pyuria   - HSV PCR negative but lip swelling improving   - on empiric valtrex for now  - s/p dental eval  - OR cx negative for bacterial cx.- possibly due to recent po abx (cipro for UTI)  - follow fungal/AFB cultures; negative so far  - Quantiferon negative   - TTE negative   - thoracic following,   - Rt shoulder discomfort- Rt Shoulder MRI with no acute infection.   - repeat CT chest with no collection.   - CT abd/pelvis with no source of fever.   - if pt remains afebrile, plan to complete 7 days of valtrex (+/- response so far- may be apthous) ) and 6 weeks of abx from last debridement   - if febrile again, plan to switch cefepime to bryson for ? concern for drug fever vs uncovered infection.         Emory Pearson MD  pager [phone]  office [phone]  Over the weekend please call [phone].   Dr Odalis Mendez will resume care 1/2/24     43F PMHx of MS on (Rituxan, Ocrevus) presenting with left chest wall and shoulder pain. Recent history of fall secondary to unsteadiness due to MS. On review of ortho notes, pt has had multiple falls in November. She was admitted to [facility] on Last week of November and was found to have Rhinovirus and pseudomonas UTI. She completed 5-7 days course with Ciprofloxacin. Found to have Septic arthritis of SCM joint now s/p excision.    Patient obtained CT chest w/ IV contrast which showed likely septic arthritis with surrounding erythema. Thoracic surgery consulted, underwent washout and debridement.     OR Note: Debridement of left sternoclavicular joint with excision of left clavicular head. Purulent drainage at the sternoclavicular joint. Tissue surrounding left clavicle found to be very inflamed. Copious irrigation of surgical site.  Specimen sent: Head of left clavicle, culture of left sternoclavicular joint, culture of left manubrium, culture of deep sternal head of clavicle, culture of first rib costal cartilage      In ER: Afebrile, WBC 9.8, , . Given IV Zosyn, IV Vancomycin.         Septic arthritis of the left sternoclavicular joint and involving the   articulation of the sternum with the first costal cartilage.    Extensive surrounding inflammation, with pneumonia in the underlying   subpleural left upper lobe.      # Septic arthritis of L sternoclavicular joint s/p excision and drainage  # L upper lobe opacity,   # Immunosuppressed patient  # Elevated ESR/CRP.   #Manubrium OM. s/p repeat washout and debridement.   #New persistent fever.   oral ulcers         PLAN:  - continue cefepime 2 gm Q8hrs  - repeat blood cx in lab  - U/A with no pyuria   - HSV PCR negative but lip swelling improving   - on empiric valtrex for now  - s/p dental eval  - OR cx negative for bacterial cx.- possibly due to recent po abx (cipro for UTI)  - follow fungal/AFB cultures; negative so far  - Quantiferon negative   - TTE negative   - thoracic following,   - Rt shoulder discomfort- Rt Shoulder MRI with no acute infection.   - repeat CT chest with no collection.   - CT abd/pelvis with no source of fever.   - if pt remains afebrile, plan to complete 7 days of valtrex (+/- response so far- may be apthous) ) and 6 weeks of abx from last debridement   - if febrile again, plan to switch cefepime to bryson for ? concern for drug fever vs uncovered infection.         Emory Pearson MD  pager [phone]  office [phone]  Over the weekend please call [phone].   Dr Odalis Mendez will resume care 1/2/24     43F PMHx of MS on (Rituxan, Ocrevus) presenting with left chest wall and shoulder pain. Recent history of fall secondary to unsteadiness due to MS. On review of ortho notes, pt has had multiple falls in November. She was admitted to [facility] on Last week of November and was found to have Rhinovirus and pseudomonas UTI. She completed 5-7 days course with Ciprofloxacin. Found to have Septic arthritis of SCM joint now s/p excision.    Patient obtained CT chest w/ IV contrast which showed likely septic arthritis with surrounding erythema. Thoracic surgery consulted, underwent washout and debridement.     OR Note: Debridement of left sternoclavicular joint with excision of left clavicular head. Purulent drainage at the sternoclavicular joint. Tissue surrounding left clavicle found to be very inflamed. Copious irrigation of surgical site.  Specimen sent: Head of left clavicle, culture of left sternoclavicular joint, culture of left manubrium, culture of deep sternal head of clavicle, culture of first rib costal cartilage      In ER: Afebrile, WBC 9.8, , . Given IV Zosyn, IV Vancomycin.         Septic arthritis of the left sternoclavicular joint and involving the   articulation of the sternum with the first costal cartilage.    Extensive surrounding inflammation, with pneumonia in the underlying   subpleural left upper lobe.      # Septic arthritis of L sternoclavicular joint s/p excision and drainage  # L upper lobe opacity,   # Immunosuppressed patient  # Elevated ESR/CRP.   #Manubrium OM. s/p repeat washout and debridement.   #New persistent fever.   oral ulcers         PLAN:  - continue cefepime 2 gm Q8hrs  - repeat blood cx in lab  - U/A with no pyuria   - HSV PCR negative but lip swelling improving   - on empiric valtrex for now  - s/p dental eval  - OR cx negative for bacterial cx.- possibly due to recent po abx (cipro for UTI)  - follow fungal/AFB cultures; negative so far  - Quantiferon negative   - TTE negative   - thoracic following,   - Rt shoulder discomfort- Rt Shoulder MRI with no acute infection.   - repeat CT chest with no collection.   - CT abd/pelvis with no source of fever.   - if pt remains afebrile, plan to complete 7 days of valtrex (+/- response so far- may be apthous) ) and 6 weeks of abx from last debridement   - if febrile again, plan to switch cefepime to bryson for ? concern for drug fever vs uncovered infection.         Emory Pearson MD  pager [phone]  office [phone]  Over the weekend please call [phone].   Dr Williams Mckeon will assume care 1/2/24     43F PMHx of MS on (Rituxan, Ocrevus) presenting with left chest wall and shoulder pain. Recent history of fall secondary to unsteadiness due to MS. On review of ortho notes, pt has had multiple falls in November. She was admitted to [facility] on Last week of November and was found to have Rhinovirus and pseudomonas UTI. She completed 5-7 days course with Ciprofloxacin. Found to have Septic arthritis of SCM joint now s/p excision.    Patient obtained CT chest w/ IV contrast which showed likely septic arthritis with surrounding erythema. Thoracic surgery consulted, underwent washout and debridement.     OR Note: Debridement of left sternoclavicular joint with excision of left clavicular head. Purulent drainage at the sternoclavicular joint. Tissue surrounding left clavicle found to be very inflamed. Copious irrigation of surgical site.  Specimen sent: Head of left clavicle, culture of left sternoclavicular joint, culture of left manubrium, culture of deep sternal head of clavicle, culture of first rib costal cartilage      In ER: Afebrile, WBC 9.8, , . Given IV Zosyn, IV Vancomycin.         Septic arthritis of the left sternoclavicular joint and involving the   articulation of the sternum with the first costal cartilage.    Extensive surrounding inflammation, with pneumonia in the underlying   subpleural left upper lobe.      # Septic arthritis of L sternoclavicular joint s/p excision and drainage  # L upper lobe opacity,   # Immunosuppressed patient  # Elevated ESR/CRP.   #Manubrium OM. s/p repeat washout and debridement.   #New persistent fever.   oral ulcers         PLAN:  - continue cefepime 2 gm Q8hrs  - repeat blood cx in lab  - U/A with no pyuria   - HSV PCR negative but lip swelling improving   - on empiric valtrex for now  - s/p dental eval  - OR cx negative for bacterial cx.- possibly due to recent po abx (cipro for UTI)  - follow fungal/AFB cultures; negative so far  - Quantiferon negative   - TTE negative   - thoracic following,   - Rt shoulder discomfort- Rt Shoulder MRI with no acute infection.   - repeat CT chest with no collection.   - CT abd/pelvis with no source of fever.   - if pt remains afebrile, plan to complete 7 days of valtrex (+/- response so far- may be apthous) ) and 6 weeks of abx from last debridement   - if febrile again, plan to switch cefepime to bryson for ? concern for drug fever vs uncovered infection.         Emory Pearson MD  pager [phone]  office [phone]  Over the weekend please call [phone].   Dr Williams Mckeon will assume care 1/2/24

## 2023-12-31 NOTE — PROGRESS NOTE ADULT - SUBJECTIVE AND OBJECTIVE BOX
Date of service: 12-31-23 @ 15:44      Patient is a 43y old  Female who presents with a chief complaint of L shoulder and chest pain (31 Dec 2023 08:50)                                                               INTERVAL HPI/OVERNIGHT EVENTS:    REVIEW OF SYSTEMS:     CONSTITUTIONAL: No weakness, fevers or chills  EYES/ENT: No visual changes , no ear ache   NECK: No pain or stiffness  RESPIRATORY: No cough, wheezing,  No shortness of breath  CARDIOVASCULAR: No chest pain or palpitations  GASTROINTESTINAL: No abdominal pain  . No nausea, vomiting, or hematemesis; No diarrhea or constipation. No melena or hematochezia.  GENITOURINARY: No dysuria, frequency or hematuria  NEUROLOGICAL: No numbness or weakness                                                                                                                                                                                                                                                                               Medications:  MEDICATIONS  (STANDING):  cefepime   IVPB 2000 milliGRAM(s) IV Intermittent every 8 hours  chlorhexidine 2% Cloths 1 Application(s) Topical <User Schedule>  cyclobenzaprine 5 milliGRAM(s) Oral two times a day  dalfampridine ER 10 milliGRAM(s) Oral every 12 hours  DULoxetine 60 milliGRAM(s) Oral at bedtime  enoxaparin Injectable 40 milliGRAM(s) SubCutaneous every 24 hours  fentaNYL   Patch  12 MICROgram(s)/Hr 1 Patch Transdermal every 72 hours  multivitamin/minerals 1 Tablet(s) Oral daily  oxyCODONE  ER Tablet 10 milliGRAM(s) Oral every 12 hours  senna 2 Tablet(s) Oral at bedtime  sertraline 100 milliGRAM(s) Oral at bedtime  valACYclovir 1000 milliGRAM(s) Oral every 12 hours  Vibegron (Gemtesa) 75 milliGRAM(s),Vibegron (Gemtesa) 75mg tablet 1 Tablet(s) 1 Tablet(s) Oral daily    MEDICATIONS  (PRN):  acetaminophen     Tablet .. 650 milliGRAM(s) Oral every 6 hours PRN Temp greater or equal to 38C (100.4F), Mild Pain (1 - 3)  benzocaine/menthol Lozenge 1 Lozenge Oral three times a day PRN Sore Throat  clonazePAM  Tablet 0.5 milliGRAM(s) Oral three times a day PRN anxiety  HYDROmorphone   Tablet 4 milliGRAM(s) Oral every 6 hours PRN Severe Pain (7 - 10)  HYDROmorphone   Tablet 2 milliGRAM(s) Oral every 4 hours PRN Moderate Pain (4 - 6)  polyethylene glycol 3350 17 Gram(s) Oral two times a day PRN Constipation       Allergies    No Known Allergies    Intolerances      Vital Signs Last 24 Hrs  T(C): 36.6 (31 Dec 2023 13:12), Max: 36.8 (30 Dec 2023 21:35)  T(F): 97.9 (31 Dec 2023 13:12), Max: 98.2 (30 Dec 2023 21:35)  HR: 86 (31 Dec 2023 13:12) (86 - 88)  BP: 94/48 (31 Dec 2023 13:12) (91/52 - 98/64)  BP(mean): --  RR: 17 (31 Dec 2023 13:12) (17 - 17)  SpO2: 99% (31 Dec 2023 13:12) (96% - 99%)    Parameters below as of 31 Dec 2023 13:12  Patient On (Oxygen Delivery Method): room air      CAPILLARY BLOOD GLUCOSE          12-30 @ 07:01  -  12-31 @ 07:00  --------------------------------------------------------  IN: 780 mL / OUT: 0 mL / NET: 780 mL    12-31 @ 07:01  -  12-31 @ 15:44  --------------------------------------------------------  IN: 400 mL / OUT: 0 mL / NET: 400 mL      Physical Exam:    Daily     Daily   General:  Well appearing, NAD, not cachetic  HEENT:  Nonicteric, PERRLA  CV:  RRR, S1S2   Lungs:  CTA B/L, no wheezes, rales, rhonchi  Abdomen:  Soft, non-tender, no distended, positive BS  Extremities:  2+ pulses, no c/c, no edema  Skin:  Warm and dry, no rashes  :  No stanford  Neuro:  AAOx3, non-focal, grossly intact                                                                                                                                                                                                                                                                                                LABS:                                                     RADIOLOGY & ADDITIONAL TESTS         I personally reviewed: [  ]EKG   [  ]CXR    [  ] CT      A/P:         Discussed with :     [facility] consultants' Notes   Time spent :

## 2023-12-31 NOTE — PROGRESS NOTE ADULT - ASSESSMENT
43F PMHx of MS on Rituxan, presenting with left chest wall and shoulder pain w/ concern for infection now admitted with likely septic arthritis with need for possible washout and debridement       Problem/Plan - 1:  ·  Problem: Septic arthritis. left shoulder joint   ·  Plan: Imaging consistent with septic arthritis  s/p OR   cont abx and fu cultures   check ECHO : no vegetation   fu with ID   d/w with CTS at bedside : drainage adjusted and fx improved   cont current abx   pt with LUE pain : kaciley referred pain from L [id number] and chest however  Cervical MRI : no disciits / abcess   discussed with pt , family at length at bedside   washout and debridement of sternoclavicular joint; resection of medial manubrium; white and black wound vac placed  now s/p vac change  PICC in place   cont current abx   pt still with episodes of fever   cultures neg   ct chest no acute pathology   d/w ID will check CT a/p : no acute pathology   check Echo : no vegetation   check procal and esr /crp : noted   dental input appreciated           # c/o left arm swelling and pain in elbow region :  -doppler ordered to r/o DVT : Negative      Problem/Plan - 2:  ·  Problem: Multiple sclerosis.   ·  Plan: Gets Rituxan every 6 months, next dose due : holding   -On Ampyra BID at home, need to bring home med     Problem/Plan - 3:  ·  Problem: Anxiety.   ·  Plan: ISTOP reviewed Reference #: [id number]  -Cont. Clonazepam 0.5mg QHS PRN  -Cont. Sertraline 100mg QHS  -Cont. Duloxetine 60mg QHS, pt unsure of dose  -Need full med rec.     Problem/Plan - 4:  ·  Problem: Anemia.   ·  Plan: hgb 9, lower than prior year. Pt endorses some recent anemia but cannot specify  - pt feeling weak and fatigued   will chemo one unit of prbc        Problem/Plan - 5:  ·  Problem: Prophylactic measure.   ·  Plan: DVT PPx    hyperkalemia : normalized      43F PMHx of MS on Rituxan, presenting with left chest wall and shoulder pain w/ concern for infection now admitted with likely septic arthritis with need for possible washout and debridement       Problem/Plan - 1:  ·  Problem: Septic arthritis. left shoulder joint   ·  Plan: Imaging consistent with septic arthritis  s/p OR   cont abx and fu cultures   check ECHO : no vegetation   fu with ID   d/w with CTS at bedside : drainage adjusted and fx improved   cont current abx   pt with LUE pain : kaciley referred pain from L [id number] and chest however  Cervical MRI : no disciits / abcess   discussed with pt , family at length at bedside   washout and debridement of sternoclavicular joint; resection of medial manubrium; white and black wound vac placed  now s/p vac change  PICC in place   cont current abx   pt still with episodes of fever   cultures neg   ct chest no acute pathology   d/w ID will check CT a/p : no acute pathology   check Echo : no vegetation   check procal and esr /crp : noted   dental input appreciated           # c/o left arm swelling and pain in elbow region :  -doppler ordered to r/o DVT : Negative      Problem/Plan - 2:  ·  Problem: Multiple sclerosis.   ·  Plan: Gets Rituxan every 6 months, next dose due : holding   -On Ampyra BID at home, need to bring home med     Problem/Plan - 3:  ·  Problem: Anxiety.   ·  Plan: ISTOP reviewed Reference #: [id number]  -Cont. Clonazepam 0.5mg QHS PRN  -Cont. Sertraline 100mg QHS  -Cont. Duloxetine 60mg QHS, pt unsure of dose  -Need full med rec.     Problem/Plan - 4:  ·  Problem: Anemia.   ·  Plan: hgb 9, lower than prior year. Pt endorses some recent anemia but cannot specify  - pt feeling weak and fatigued   will chemo one unit of prbc        Problem/Plan - 5:  ·  Problem: Prophylactic measure.   ·  Plan: DVT PPx    hyperkalemia : normalized

## 2023-12-31 NOTE — PROGRESS NOTE ADULT - SUBJECTIVE AND OBJECTIVE BOX
Subjective: Patient seen and examined. No new events except as noted.   feels ok     REVIEW OF SYSTEMS:    CONSTITUTIONAL: + weakness, fevers or chills  EYES/ENT: No visual changes;  No vertigo or throat pain   NECK: No pain or stiffness  RESPIRATORY: No cough, wheezing, hemoptysis; No shortness of breath  CARDIOVASCULAR: No chest pain or palpitations  GASTROINTESTINAL: No abdominal or epigastric pain. No nausea, vomiting, or hematemesis; No diarrhea or constipation. No melena or hematochezia.  GENITOURINARY: No dysuria, frequency or hematuria  NEUROLOGICAL: No numbness or weakness  SKIN: No itching, burning, rashes, or lesions   All other review of systems is negative unless indicated above.    MEDICATIONS:  MEDICATIONS  (STANDING):  cefepime   IVPB 2000 milliGRAM(s) IV Intermittent every 8 hours  chlorhexidine 2% Cloths 1 Application(s) Topical <User Schedule>  cyclobenzaprine 5 milliGRAM(s) Oral two times a day  dalfampridine ER 10 milliGRAM(s) Oral every 12 hours  DULoxetine 60 milliGRAM(s) Oral at bedtime  enoxaparin Injectable 40 milliGRAM(s) SubCutaneous every 24 hours  fentaNYL   Patch  12 MICROgram(s)/Hr 1 Patch Transdermal every 72 hours  multivitamin/minerals 1 Tablet(s) Oral daily  oxyCODONE  ER Tablet 10 milliGRAM(s) Oral every 12 hours  senna 2 Tablet(s) Oral at bedtime  sertraline 100 milliGRAM(s) Oral at bedtime  valACYclovir 1000 milliGRAM(s) Oral every 12 hours  Vibegron (Gemtesa) 75 milliGRAM(s),Vibegron (Gemtesa) 75mg tablet 1 Tablet(s) 1 Tablet(s) Oral daily      PHYSICAL EXAM:  T(C): 36.6 (12-31-23 @ 05:21), Max: 36.8 (12-30-23 @ 21:35)  HR: 88 (12-31-23 @ 05:21) (82 - 88)  BP: 91/52 (12-31-23 @ 05:21) (88/52 - 98/64)  RR: 17 (12-31-23 @ 05:21) (17 - 18)  SpO2: 98% (12-31-23 @ 05:21) (94% - 98%)  Wt(kg): --  I&O's Summary    30 Dec 2023 07:01  -  31 Dec 2023 07:00  --------------------------------------------------------  IN: 780 mL / OUT: 0 mL / NET: 780 mL            Appearance: NAD  HEENT:   Normal oral mucosa, PERRL, EOMI	  Lymphatic: No lymphadenopathy , no edema   L clavicular region w/ black sponge CAV dsg secured w/ good seal, minimal serousag drainage noted in collection chamber  Cardiovascular: Normal S1 S2, No JVD, No murmurs , Peripheral pulses palpable 2+ bilaterally  Respiratory: Lungs clear to auscultation, normal effort 	  Gastrointestinal:  Soft, Non-tender, + BS	  Skin: No rashes, No ecchymoses, No cyanosis, warm to touch  Musculoskeletal: Normal range of motion, normal strength  Psychiatry:  lethargic weak   Ext: No edema      LABS:    CARDIAC MARKERS:                  proBNP:   Lipid Profile:   HgA1c:   TSH:     Negative          TELEMETRY: 	    ECG:  	  RADIOLOGY:   DIAGNOSTIC TESTING:  [ ] Echocardiogram:  [ ]  Catheterization:  [ ] Stress Test:    OTHER:

## 2023-12-31 NOTE — PROGRESS NOTE ADULT - ASSESSMENT
43F PMHx of MS on immunosuppressive medication, presenting with left chest wall and shoulder pain. Recent history of fall secondary to unsteadiness due to MS. Obtained outpatient MRI of left sternoclavicular joint with results significant for marrow edema and swelling of the left sternoclavicular joint with a 2 cm fluid collection and adjacent pleural edema in the left chest with concerns for infectious etiology.  Patient initially saw orthopedics with for concerns for septic arthritis of this joint.  Impression of the read showed soft tissue abscess superficial to the joint.  Recommended CT scan with IV contrast of the chest.  Patient denies any fevers however endorsing erythema to the left sternoclavicular joint region. Patient states she was recently hospitalized over a week ago at [facility] for falls secondary to unsteadiness due to her MS.  Denies any recent falls today, vision changes. Endorsing headache. Denies any chest pain, shortness of breath, abdominal pain, nausea vomiting diarrhea, urinary complaints.  CT chest with IV in ED significant for Septic arthritis of the left sternoclavicular joint and involving the articulation of the sternum with the first costal cartilage.Extensive surrounding inflammation, with pneumonia in the underlying subpleural left upper lobe.No fluid collection is evident.  Workup significant for elevated alk phos (248), . VSS and afebrile  s/p Debridement of left sternoclavicular joint with excision of left clavicular head. Purulent drainage at the sternoclavicular joint. Tissue surrounding left clavicle found to be very inflamed. Copious irrigation of surgical site.   Concerned about pain at surgical site.    at bedside     43F PMHx of MS on immunosuppressive medication, presenting with left chest wall and shoulder pain. Recent history of fall secondary to unsteadiness due to MS. Obtained outpatient MRI of left sternoclavicular joint with results significant for marrow edema and swelling of the left sternoclavicular joint with a 2 cm fluid collection and adjacent pleural edema in the left chest with concerns for infectious etiology.  Patient initially saw orthopedics with for concerns for septic arthritis of this joint.  Impression of the read showed soft tissue abscess superficial to the joint.  Recommended CT scan with IV contrast of the chest.  Patient denies any fevers however endorsing erythema to the left sternoclavicular joint region. Patient states she was recently hospitalized over a week ago at [facility] for falls secondary to unsteadiness due to her MS.  Denies any recent falls today, vision changes. Endorsing headache. Denies any chest pain, shortness of breath, abdominal pain, nausea vomiting diarrhea, urinary complaints.  CT chest with IV in ED significant for Septic arthritis of the left sternoclavicular joint and involving the articulation of the sternum with the first costal cartilage.Extensive surrounding inflammation, with pneumonia in the underlying subpleural left upper lobe.No fluid collection is evident.  Workup significant for elevated alk phos (248), . VSS and afebrile  s/p Debridement of left sternoclavicular joint with excision of left clavicular head. Purulent drainage at the sternoclavicular joint. Tissue surrounding left clavicle found to be very inflamed. Copious irrigation of surgical site.   Concerned about pain at surgical site.    at bedside

## 2023-12-31 NOTE — PROGRESS NOTE ADULT - SUBJECTIVE AND OBJECTIVE BOX
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    Interval history:  Afebrile since 12/28  No acute events      ROS:  No cough, SOB, CP  No N/V/D./abd pain  No other complaints      Antimicrobials:    cefepime   IVPB 2000 milliGRAM(s) IV Intermittent every 8 hours  valACYclovir 1000 milliGRAM(s) Oral every 12 hours      Vital Signs Last 24 Hrs  T(C): 36.6 (12-31-23 @ 05:21), Max: 36.8 (12-30-23 @ 21:35)  T(F): 97.9 (12-31-23 @ 05:21), Max: 98.2 (12-30-23 @ 21:35)  HR: 88 (12-31-23 @ 05:21) (82 - 88)  BP: 91/52 (12-31-23 @ 05:21) (88/52 - 98/64)  BP(mean): --  RR: 17 (12-31-23 @ 05:21) (17 - 18)  SpO2: 98% (12-31-23 @ 05:21) (94% - 98%)    Physical Exam:    Constitutional well preserved, NAD    HEENT EOMI, No pallor or icterus    No oral exudate or erythema    Neck supple no LN    Chest Clear to auscultation    CVS S1 S2 WNl No murmur or rub or gallop    Abd soft BS normal No tenderness no masses    Ext No cyanosis clubbing or edema    IV site no erythema tenderness or discharge    Joints no swelling or LOM    CNS AAO X 3 non focal    Lab Data:                  Clean Catch Clean Catch (Midstream)  12-28-23   <10,000 CFU/mL Normal Urogenital Renae  --  --      .Blood Blood-Peripheral  12-28-23   No growth at 48 Hours  --  --      .Blood Blood-Peripheral  12-28-23   No growth at 48 Hours  --  --      Clean Catch Clean Catch (Midstream)  12-26-23   <10,000 CFU/mL Normal Urogenital Renae  --  --      .Blood Blood-Peripheral  12-25-23   No growth at 5 days  --  --    < from: CT Abdomen and Pelvis w/ Oral Cont and w/ IV Cont (12.28.23 @ 23:35) >  ACC: [id number] EXAM:  CT ABDOMEN AND PELVIS OC IC   ORDERED BY: EMANUEL AVILA     PROCEDURE DATE:  12/28/2023          INTERPRETATION:  CLINICAL INFORMATION: Left sternoclavicular joint septic   arthritis status post excision and drainage and repeat washout and   debridement. Abdominal pain. Persistent fever, evaluate for infectious   source.    COMPARISON: CT chest 12/26/2023 and 12/6/2023.    CONTRAST/COMPLICATIONS:  IV Contrast: Omnipaque 350  90 cc administered   10 cc discarded  Oral Contrast: NONE  Complications: None reported at time of study completion    PROCEDURE:  CT of the Abdomen and Pelvis was performed.  Sagittal and coronal reformats were performed.    FINDINGS:  LOWER CHEST: Right middle lobe nodule measuring 4 mm, unchanged since at   least 12/6/2023. Small right and trace left pleural effusions, unchanged   from 12/26/2023. Bibasilar subsegmental atelectasis.    LIVER: Within normal limits.  BILE DUCTS: Normal caliber.  GALLBLADDER: Within normal limits.  SPLEEN: Within normal limits.  PANCREAS: Within normal limits.  ADRENALS: Within normal limits.  KIDNEYS/URETERS: Within normal limits.    BLADDER: Within normal limits.  REPRODUCTIVE ORGANS: Uterus and adnexa within normal limits.    BOWEL: Moderate to largecolonic stool burden. No bowel obstruction.   Appendix is normal. No appreciable bowel wall thickening or surrounding   inflammatory changes.  PERITONEUM: No ascites.  VESSELS: Within normal limits.  RETROPERITONEUM/LYMPH NODES: No lymphadenopathy.  ABDOMINAL WALL: Small fat-containing left inguinal hernia. Droplets of   gas within the ventral abdominal wall subcutaneously, which could be due   to recent injection.  BONES: Left femoral intramedullary amanda and nail fixation. Mild to   moderate compression fracture at T12, unchanged since at least 12/6/2023.   Degenerative changes.    IMPRESSION:  *  No source of infection identified within the abdomen or pelvis.  *  Moderate to large colonic stool burden. No bowel obstruction. No   evidence foracute bowel inflammation.  *  Unchanged small right and trace left pleural effusions.    < end of copied text >  < from: [facility] Duplex Lower Ext Vein Scan, Bilat (12.27.23 @ 21:48) >  ACC: [id number] EXAM:  DUPLEX SCAN EXT VEINS LOWER BI   ORDERED BY: ANASTASIIA WALKER     PROCEDURE DATE:  12/27/2023          INTERPRETATION:  CLINICAL INDICATION: Lower extremity tenderness and   redness, r/o dvt    TECHNIQUE: Grayscale, color Doppler and spectral Doppler ultrasound was   utilized to evaluate bilateral lower extremity deep venous system.    COMPARISON: None.    FINDINGS: There is no obvious thrombus in bilateral common femoral veins,   femoral veins or popliteal veins. Visualizedcalf veins are patent.    IMPRESSION:    No obvious deep vein thrombosis in either lower extremity.    < end of copied text >  < from: CT Chest w/ IV Cont (12.26.23 @ 14:54) >  ACC: [id number] EXAM:  CT CHEST IC   ORDERED BY: CARL KU     PROCEDURE DATE:  12/26/2023          INTERPRETATION:  INDICATION: Fever while on blood transfusion    TECHNIQUE: Helical acquisition of the chest after the administration of   90 mL of Omnipaque 350. Maximum intensity projection images were   generated.    COMPARISON: 12/11/2023 CT chest    FINDINGS:    LUNGS/AIRWAYS/PLEURA: Unchanged small left upper lobe subpleural   consolidation. New small right and trace left pleural effusions,and   associated passive atelectasis in the lower lobes. Patent trachea and   bronchi.    LYMPH NODES: No lymphadenopathy.    HEART/VASCULATURE: Normal heart size. No pericardial effusion. Normal   caliber aorta. Right PICC tip near the superior cavoatrial junction.    UPPER ABDOMEN: Unremarkable.    BONES/SOFT TISSUES: Status post partial resection of the medial left   clavicle, left first ribs, and sternum. New skin defect at the resection   site. No significant change of infiltration of the the substernal   anterior mediastinal fat. No new fluid collection.      IMPRESSION:    New small right and trace left pleural effusions.    Status post resection of a portion of the left clavicle, left first rib,   and sternum. No walled off fluid collection in this region. Unchanged   small consolidation in the adjacent left upper lobe and infiltration of   the adjacent substernal mediastinal fat.    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    Interval history:  Afebrile since 12/28  No acute events      ROS:  No cough, SOB, CP  No N/V/D./abd pain  No other complaints      Antimicrobials:    cefepime   IVPB 2000 milliGRAM(s) IV Intermittent every 8 hours  valACYclovir 1000 milliGRAM(s) Oral every 12 hours      Vital Signs Last 24 Hrs  T(C): 36.6 (12-31-23 @ 05:21), Max: 36.8 (12-30-23 @ 21:35)  T(F): 97.9 (12-31-23 @ 05:21), Max: 98.2 (12-30-23 @ 21:35)  HR: 88 (12-31-23 @ 05:21) (82 - 88)  BP: 91/52 (12-31-23 @ 05:21) (88/52 - 98/64)  BP(mean): --  RR: 17 (12-31-23 @ 05:21) (17 - 18)  SpO2: 98% (12-31-23 @ 05:21) (94% - 98%)    Physical Exam:    Constitutional well preserved, NAD    HEENT EOMI, No pallor or icterus    No oral exudate or erythema    Neck supple no LN    Chest Clear to auscultation    CVS S1 S2 WNl No murmur or rub or gallop    Abd soft BS normal No tenderness no masses    Ext No cyanosis clubbing or edema    IV site no erythema tenderness or discharge    Joints no swelling or LOM    CNS AAO X 3 non focal    Lab Data:                  Clean Catch Clean Catch (Midstream)  12-28-23   <10,000 CFU/mL Normal Urogenital Renae  --  --      .Blood Blood-Peripheral  12-28-23   No growth at 48 Hours  --  --      .Blood Blood-Peripheral  12-28-23   No growth at 48 Hours  --  --      Clean Catch Clean Catch (Midstream)  12-26-23   <10,000 CFU/mL Normal Urogenital Renae  --  --      .Blood Blood-Peripheral  12-25-23   No growth at 5 days  --  --    < from: CT Abdomen and Pelvis w/ Oral Cont and w/ IV Cont (12.28.23 @ 23:35) >  ACC: [id number] EXAM:  CT ABDOMEN AND PELVIS OC IC   ORDERED BY: EMANUEL AVILA     PROCEDURE DATE:  12/28/2023          INTERPRETATION:  CLINICAL INFORMATION: Left sternoclavicular joint septic   arthritis status post excision and drainage and repeat washout and   debridement. Abdominal pain. Persistent fever, evaluate for infectious   source.    COMPARISON: CT chest 12/26/2023 and 12/6/2023.    CONTRAST/COMPLICATIONS:  IV Contrast: Omnipaque 350  90 cc administered   10 cc discarded  Oral Contrast: NONE  Complications: None reported at time of study completion    PROCEDURE:  CT of the Abdomen and Pelvis was performed.  Sagittal and coronal reformats were performed.    FINDINGS:  LOWER CHEST: Right middle lobe nodule measuring 4 mm, unchanged since at   least 12/6/2023. Small right and trace left pleural effusions, unchanged   from 12/26/2023. Bibasilar subsegmental atelectasis.    LIVER: Within normal limits.  BILE DUCTS: Normal caliber.  GALLBLADDER: Within normal limits.  SPLEEN: Within normal limits.  PANCREAS: Within normal limits.  ADRENALS: Within normal limits.  KIDNEYS/URETERS: Within normal limits.    BLADDER: Within normal limits.  REPRODUCTIVE ORGANS: Uterus and adnexa within normal limits.    BOWEL: Moderate to largecolonic stool burden. No bowel obstruction.   Appendix is normal. No appreciable bowel wall thickening or surrounding   inflammatory changes.  PERITONEUM: No ascites.  VESSELS: Within normal limits.  RETROPERITONEUM/LYMPH NODES: No lymphadenopathy.  ABDOMINAL WALL: Small fat-containing left inguinal hernia. Droplets of   gas within the ventral abdominal wall subcutaneously, which could be due   to recent injection.  BONES: Left femoral intramedullary amanda and nail fixation. Mild to   moderate compression fracture at T12, unchanged since at least 12/6/2023.   Degenerative changes.    IMPRESSION:  *  No source of infection identified within the abdomen or pelvis.  *  Moderate to large colonic stool burden. No bowel obstruction. No   evidence foracute bowel inflammation.  *  Unchanged small right and trace left pleural effusions.    < end of copied text >  < from: [facility] Duplex Lower Ext Vein Scan, Bilat (12.27.23 @ 21:48) >  ACC: [id number] EXAM:  DUPLEX SCAN EXT VEINS LOWER BI   ORDERED BY: ANASTASIIA WALKER     PROCEDURE DATE:  12/27/2023          INTERPRETATION:  CLINICAL INDICATION: Lower extremity tenderness and   redness, r/o dvt    TECHNIQUE: Grayscale, color Doppler and spectral Doppler ultrasound was   utilized to evaluate bilateral lower extremity deep venous system.    COMPARISON: None.    FINDINGS: There is no obvious thrombus in bilateral common femoral veins,   femoral veins or popliteal veins. Visualizedcalf veins are patent.    IMPRESSION:    No obvious deep vein thrombosis in either lower extremity.    < end of copied text >  < from: CT Chest w/ IV Cont (12.26.23 @ 14:54) >  ACC: [id number] EXAM:  CT CHEST IC   ORDERED BY: CRAL KU     PROCEDURE DATE:  12/26/2023          INTERPRETATION:  INDICATION: Fever while on blood transfusion    TECHNIQUE: Helical acquisition of the chest after the administration of   90 mL of Omnipaque 350. Maximum intensity projection images were   generated.    COMPARISON: 12/11/2023 CT chest    FINDINGS:    LUNGS/AIRWAYS/PLEURA: Unchanged small left upper lobe subpleural   consolidation. New small right and trace left pleural effusions,and   associated passive atelectasis in the lower lobes. Patent trachea and   bronchi.    LYMPH NODES: No lymphadenopathy.    HEART/VASCULATURE: Normal heart size. No pericardial effusion. Normal   caliber aorta. Right PICC tip near the superior cavoatrial junction.    UPPER ABDOMEN: Unremarkable.    BONES/SOFT TISSUES: Status post partial resection of the medial left   clavicle, left first ribs, and sternum. New skin defect at the resection   site. No significant change of infiltration of the the substernal   anterior mediastinal fat. No new fluid collection.      IMPRESSION:    New small right and trace left pleural effusions.    Status post resection of a portion of the left clavicle, left first rib,   and sternum. No walled off fluid collection in this region. Unchanged   small consolidation in the adjacent left upper lobe and infiltration of   the adjacent substernal mediastinal fat.    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    Interval history:  Afebrile since 12/28  No acute events      ROS: C/O RUE pain- "feels off"   No cough, SOB, CP  No N/V/D./abd pain  No other complaints      Antimicrobials:    cefepime   IVPB 2000 milliGRAM(s) IV Intermittent every 8 hours  valACYclovir 1000 milliGRAM(s) Oral every 12 hours      Vital Signs Last 24 Hrs  T(C): 36.6 (12-31-23 @ 05:21), Max: 36.8 (12-30-23 @ 21:35)  T(F): 97.9 (12-31-23 @ 05:21), Max: 98.2 (12-30-23 @ 21:35)  HR: 88 (12-31-23 @ 05:21) (82 - 88)  BP: 91/52 (12-31-23 @ 05:21) (88/52 - 98/64)  BP(mean): --  RR: 17 (12-31-23 @ 05:21) (17 - 18)  SpO2: 98% (12-31-23 @ 05:21) (94% - 98%)    Physical Exam:    Constitutional well preserved, NAD    HEENT EOMI, No pallor or icterus, shallow ulcers over lips, gingiva    No oral exudate or erythema    Neck supple no LN    Chest Clear to auscultation, VAC dressing Left sternoclavicular    CVS S1 S2 WNl No murmur or rub or gallop    Abd soft BS normal No tenderness no masses    Ext No cyanosis clubbing or edema    IV site no erythema tenderness or discharge    Joints no swelling or LOM    CNS AAO X 3, anxious, non focal    Lab Data:                  Clean Catch Clean Catch (Midstream)  12-28-23   <10,000 CFU/mL Normal Urogenital Renae  --  --      .Blood Blood-Peripheral  12-28-23   No growth at 48 Hours  --  --      .Blood Blood-Peripheral  12-28-23   No growth at 48 Hours  --  --      Clean Catch Clean Catch (Midstream)  12-26-23   <10,000 CFU/mL Normal Urogenital Renae  --  --      .Blood Blood-Peripheral  12-25-23   No growth at 5 days  --  --    < from: CT Abdomen and Pelvis w/ Oral Cont and w/ IV Cont (12.28.23 @ 23:35) >  ACC: [id number] EXAM:  CT ABDOMEN AND PELVIS OC IC   ORDERED BY: EMANUEL AVILA     PROCEDURE DATE:  12/28/2023          INTERPRETATION:  CLINICAL INFORMATION: Left sternoclavicular joint septic   arthritis status post excision and drainage and repeat washout and   debridement. Abdominal pain. Persistent fever, evaluate for infectious   source.    COMPARISON: CT chest 12/26/2023 and 12/6/2023.    CONTRAST/COMPLICATIONS:  IV Contrast: Omnipaque 350  90 cc administered   10 cc discarded  Oral Contrast: NONE  Complications: None reported at time of study completion    PROCEDURE:  CT of the Abdomen and Pelvis was performed.  Sagittal and coronal reformats were performed.    FINDINGS:  LOWER CHEST: Right middle lobe nodule measuring 4 mm, unchanged since at   least 12/6/2023. Small right and trace left pleural effusions, unchanged   from 12/26/2023. Bibasilar subsegmental atelectasis.    LIVER: Within normal limits.  BILE DUCTS: Normal caliber.  GALLBLADDER: Within normal limits.  SPLEEN: Within normal limits.  PANCREAS: Within normal limits.  ADRENALS: Within normal limits.  KIDNEYS/URETERS: Within normal limits.    BLADDER: Within normal limits.  REPRODUCTIVE ORGANS: Uterus and adnexa within normal limits.    BOWEL: Moderate to largecolonic stool burden. No bowel obstruction.   Appendix is normal. No appreciable bowel wall thickening or surrounding   inflammatory changes.  PERITONEUM: No ascites.  VESSELS: Within normal limits.  RETROPERITONEUM/LYMPH NODES: No lymphadenopathy.  ABDOMINAL WALL: Small fat-containing left inguinal hernia. Droplets of   gas within the ventral abdominal wall subcutaneously, which could be due   to recent injection.  BONES: Left femoral intramedullary amanda and nail fixation. Mild to   moderate compression fracture at T12, unchanged since at least 12/6/2023.   Degenerative changes.    IMPRESSION:  *  No source of infection identified within the abdomen or pelvis.  *  Moderate to large colonic stool burden. No bowel obstruction. No   evidence foracute bowel inflammation.  *  Unchanged small right and trace left pleural effusions.    < end of copied text >  < from: [facility] Duplex Lower Ext Vein Scan, Bilat (12.27.23 @ 21:48) >  ACC: [id number] EXAM:  DUPLEX SCAN EXT VEINS LOWER BI   ORDERED BY: ANASTASIIA WALKER     PROCEDURE DATE:  12/27/2023          INTERPRETATION:  CLINICAL INDICATION: Lower extremity tenderness and   redness, r/o dvt    TECHNIQUE: Grayscale, color Doppler and spectral Doppler ultrasound was   utilized to evaluate bilateral lower extremity deep venous system.    COMPARISON: None.    FINDINGS: There is no obvious thrombus in bilateral common femoral veins,   femoral veins or popliteal veins. Visualizedcalf veins are patent.    IMPRESSION:    No obvious deep vein thrombosis in either lower extremity.    < end of copied text >  < from: CT Chest w/ IV Cont (12.26.23 @ 14:54) >  ACC: [id number] EXAM:  CT CHEST IC   ORDERED BY: CARL KU     PROCEDURE DATE:  12/26/2023          INTERPRETATION:  INDICATION: Fever while on blood transfusion    TECHNIQUE: Helical acquisition of the chest after the administration of   90 mL of Omnipaque 350. Maximum intensity projection images were   generated.    COMPARISON: 12/11/2023 CT chest    FINDINGS:    LUNGS/AIRWAYS/PLEURA: Unchanged small left upper lobe subpleural   consolidation. New small right and trace left pleural effusions,and   associated passive atelectasis in the lower lobes. Patent trachea and   bronchi.    LYMPH NODES: No lymphadenopathy.    HEART/VASCULATURE: Normal heart size. No pericardial effusion. Normal   caliber aorta. Right PICC tip near the superior cavoatrial junction.    UPPER ABDOMEN: Unremarkable.    BONES/SOFT TISSUES: Status post partial resection of the medial left   clavicle, left first ribs, and sternum. New skin defect at the resection   site. No significant change of infiltration of the the substernal   anterior mediastinal fat. No new fluid collection.      IMPRESSION:    New small right and trace left pleural effusions.    Status post resection of a portion of the left clavicle, left first rib,   and sternum. No walled off fluid collection in this region. Unchanged   small consolidation in the adjacent left upper lobe and infiltration of   the adjacent substernal mediastinal fat.    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    Interval history:  Afebrile since 12/28  No acute events      ROS: C/O RUE pain- "feels off"   No cough, SOB, CP  No N/V/D./abd pain  No other complaints      Antimicrobials:    cefepime   IVPB 2000 milliGRAM(s) IV Intermittent every 8 hours  valACYclovir 1000 milliGRAM(s) Oral every 12 hours      Vital Signs Last 24 Hrs  T(C): 36.6 (12-31-23 @ 05:21), Max: 36.8 (12-30-23 @ 21:35)  T(F): 97.9 (12-31-23 @ 05:21), Max: 98.2 (12-30-23 @ 21:35)  HR: 88 (12-31-23 @ 05:21) (82 - 88)  BP: 91/52 (12-31-23 @ 05:21) (88/52 - 98/64)  BP(mean): --  RR: 17 (12-31-23 @ 05:21) (17 - 18)  SpO2: 98% (12-31-23 @ 05:21) (94% - 98%)    Physical Exam:    Constitutional well preserved, NAD    HEENT EOMI, No pallor or icterus, shallow ulcers over lips, gingiva    No oral exudate or erythema    Neck supple no LN    Chest Clear to auscultation, VAC dressing Left sternoclavicular    CVS S1 S2 WNl No murmur or rub or gallop    Abd soft BS normal No tenderness no masses    Ext No cyanosis clubbing or edema    IV site no erythema tenderness or discharge    Joints no swelling or LOM    CNS AAO X 3, anxious, non focal    Lab Data:                  Clean Catch Clean Catch (Midstream)  12-28-23   <10,000 CFU/mL Normal Urogenital Renae  --  --      .Blood Blood-Peripheral  12-28-23   No growth at 48 Hours  --  --      .Blood Blood-Peripheral  12-28-23   No growth at 48 Hours  --  --      Clean Catch Clean Catch (Midstream)  12-26-23   <10,000 CFU/mL Normal Urogenital Renae  --  --      .Blood Blood-Peripheral  12-25-23   No growth at 5 days  --  --    < from: CT Abdomen and Pelvis w/ Oral Cont and w/ IV Cont (12.28.23 @ 23:35) >  ACC: [id number] EXAM:  CT ABDOMEN AND PELVIS OC IC   ORDERED BY: EMANUEL AVILA     PROCEDURE DATE:  12/28/2023          INTERPRETATION:  CLINICAL INFORMATION: Left sternoclavicular joint septic   arthritis status post excision and drainage and repeat washout and   debridement. Abdominal pain. Persistent fever, evaluate for infectious   source.    COMPARISON: CT chest 12/26/2023 and 12/6/2023.    CONTRAST/COMPLICATIONS:  IV Contrast: Omnipaque 350  90 cc administered   10 cc discarded  Oral Contrast: NONE  Complications: None reported at time of study completion    PROCEDURE:  CT of the Abdomen and Pelvis was performed.  Sagittal and coronal reformats were performed.    FINDINGS:  LOWER CHEST: Right middle lobe nodule measuring 4 mm, unchanged since at   least 12/6/2023. Small right and trace left pleural effusions, unchanged   from 12/26/2023. Bibasilar subsegmental atelectasis.    LIVER: Within normal limits.  BILE DUCTS: Normal caliber.  GALLBLADDER: Within normal limits.  SPLEEN: Within normal limits.  PANCREAS: Within normal limits.  ADRENALS: Within normal limits.  KIDNEYS/URETERS: Within normal limits.    BLADDER: Within normal limits.  REPRODUCTIVE ORGANS: Uterus and adnexa within normal limits.    BOWEL: Moderate to largecolonic stool burden. No bowel obstruction.   Appendix is normal. No appreciable bowel wall thickening or surrounding   inflammatory changes.  PERITONEUM: No ascites.  VESSELS: Within normal limits.  RETROPERITONEUM/LYMPH NODES: No lymphadenopathy.  ABDOMINAL WALL: Small fat-containing left inguinal hernia. Droplets of   gas within the ventral abdominal wall subcutaneously, which could be due   to recent injection.  BONES: Left femoral intramedullary amanda and nail fixation. Mild to   moderate compression fracture at T12, unchanged since at least 12/6/2023.   Degenerative changes.    IMPRESSION:  *  No source of infection identified within the abdomen or pelvis.  *  Moderate to large colonic stool burden. No bowel obstruction. No   evidence foracute bowel inflammation.  *  Unchanged small right and trace left pleural effusions.    < end of copied text >  < from: [facility] Duplex Lower Ext Vein Scan, Bilat (12.27.23 @ 21:48) >  ACC: [id number] EXAM:  DUPLEX SCAN EXT VEINS LOWER BI   ORDERED BY: ANASTASIIA WALKER     PROCEDURE DATE:  12/27/2023          INTERPRETATION:  CLINICAL INDICATION: Lower extremity tenderness and   redness, r/o dvt    TECHNIQUE: Grayscale, color Doppler and spectral Doppler ultrasound was   utilized to evaluate bilateral lower extremity deep venous system.    COMPARISON: None.    FINDINGS: There is no obvious thrombus in bilateral common femoral veins,   femoral veins or popliteal veins. Visualizedcalf veins are patent.    IMPRESSION:    No obvious deep vein thrombosis in either lower extremity.    < end of copied text >  < from: CT Chest w/ IV Cont (12.26.23 @ 14:54) >  ACC: [id number] EXAM:  CT CHEST IC   ORDERED BY: CARL KU     PROCEDURE DATE:  12/26/2023          INTERPRETATION:  INDICATION: Fever while on blood transfusion    TECHNIQUE: Helical acquisition of the chest after the administration of   90 mL of Omnipaque 350. Maximum intensity projection images were   generated.    COMPARISON: 12/11/2023 CT chest    FINDINGS:    LUNGS/AIRWAYS/PLEURA: Unchanged small left upper lobe subpleural   consolidation. New small right and trace left pleural effusions,and   associated passive atelectasis in the lower lobes. Patent trachea and   bronchi.    LYMPH NODES: No lymphadenopathy.    HEART/VASCULATURE: Normal heart size. No pericardial effusion. Normal   caliber aorta. Right PICC tip near the superior cavoatrial junction.    UPPER ABDOMEN: Unremarkable.    BONES/SOFT TISSUES: Status post partial resection of the medial left   clavicle, left first ribs, and sternum. New skin defect at the resection   site. No significant change of infiltration of the the substernal   anterior mediastinal fat. No new fluid collection.      IMPRESSION:    New small right and trace left pleural effusions.    Status post resection of a portion of the left clavicle, left first rib,   and sternum. No walled off fluid collection in this region. Unchanged   small consolidation in the adjacent left upper lobe and infiltration of   the adjacent substernal mediastinal fat.    < end of copied text >

## 2024-01-01 LAB
ANION GAP SERPL CALC-SCNC: 13 MMOL/L — SIGNIFICANT CHANGE UP (ref 5–17)
ANION GAP SERPL CALC-SCNC: 13 MMOL/L — SIGNIFICANT CHANGE UP (ref 5–17)
BLD GP AB SCN SERPL QL: NEGATIVE — SIGNIFICANT CHANGE UP
BLD GP AB SCN SERPL QL: NEGATIVE — SIGNIFICANT CHANGE UP
BUN SERPL-MCNC: 8 MG/DL — SIGNIFICANT CHANGE UP (ref 7–23)
BUN SERPL-MCNC: 8 MG/DL — SIGNIFICANT CHANGE UP (ref 7–23)
CALCIUM SERPL-MCNC: 9.6 MG/DL — SIGNIFICANT CHANGE UP (ref 8.4–10.5)
CALCIUM SERPL-MCNC: 9.6 MG/DL — SIGNIFICANT CHANGE UP (ref 8.4–10.5)
CHLORIDE SERPL-SCNC: 99 MMOL/L — SIGNIFICANT CHANGE UP (ref 96–108)
CHLORIDE SERPL-SCNC: 99 MMOL/L — SIGNIFICANT CHANGE UP (ref 96–108)
CO2 SERPL-SCNC: 25 MMOL/L — SIGNIFICANT CHANGE UP (ref 22–31)
CO2 SERPL-SCNC: 25 MMOL/L — SIGNIFICANT CHANGE UP (ref 22–31)
CREAT SERPL-MCNC: 0.51 MG/DL — SIGNIFICANT CHANGE UP (ref 0.5–1.3)
CREAT SERPL-MCNC: 0.51 MG/DL — SIGNIFICANT CHANGE UP (ref 0.5–1.3)
EGFR: 118 ML/MIN/1.73M2 — SIGNIFICANT CHANGE UP
EGFR: 118 ML/MIN/1.73M2 — SIGNIFICANT CHANGE UP
GLUCOSE SERPL-MCNC: 89 MG/DL — SIGNIFICANT CHANGE UP (ref 70–99)
GLUCOSE SERPL-MCNC: 89 MG/DL — SIGNIFICANT CHANGE UP (ref 70–99)
HCT VFR BLD CALC: 26 % — LOW (ref 34.5–45)
HCT VFR BLD CALC: 26 % — LOW (ref 34.5–45)
HGB BLD-MCNC: 8 G/DL — LOW (ref 11.5–15.5)
HGB BLD-MCNC: 8 G/DL — LOW (ref 11.5–15.5)
MCHC RBC-ENTMCNC: 24 PG — LOW (ref 27–34)
MCHC RBC-ENTMCNC: 24 PG — LOW (ref 27–34)
MCHC RBC-ENTMCNC: 30.8 GM/DL — LOW (ref 32–36)
MCHC RBC-ENTMCNC: 30.8 GM/DL — LOW (ref 32–36)
MCV RBC AUTO: 78.1 FL — LOW (ref 80–100)
MCV RBC AUTO: 78.1 FL — LOW (ref 80–100)
NRBC # BLD: 0 /100 WBCS — SIGNIFICANT CHANGE UP (ref 0–0)
NRBC # BLD: 0 /100 WBCS — SIGNIFICANT CHANGE UP (ref 0–0)
PLATELET # BLD AUTO: 530 K/UL — HIGH (ref 150–400)
PLATELET # BLD AUTO: 530 K/UL — HIGH (ref 150–400)
POTASSIUM SERPL-MCNC: 4.5 MMOL/L — SIGNIFICANT CHANGE UP (ref 3.5–5.3)
POTASSIUM SERPL-MCNC: 4.5 MMOL/L — SIGNIFICANT CHANGE UP (ref 3.5–5.3)
POTASSIUM SERPL-SCNC: 4.5 MMOL/L — SIGNIFICANT CHANGE UP (ref 3.5–5.3)
POTASSIUM SERPL-SCNC: 4.5 MMOL/L — SIGNIFICANT CHANGE UP (ref 3.5–5.3)
RBC # BLD: 3.33 M/UL — LOW (ref 3.8–5.2)
RBC # BLD: 3.33 M/UL — LOW (ref 3.8–5.2)
RBC # FLD: 16.7 % — HIGH (ref 10.3–14.5)
RBC # FLD: 16.7 % — HIGH (ref 10.3–14.5)
RH IG SCN BLD-IMP: POSITIVE — SIGNIFICANT CHANGE UP
RH IG SCN BLD-IMP: POSITIVE — SIGNIFICANT CHANGE UP
SODIUM SERPL-SCNC: 137 MMOL/L — SIGNIFICANT CHANGE UP (ref 135–145)
SODIUM SERPL-SCNC: 137 MMOL/L — SIGNIFICANT CHANGE UP (ref 135–145)
WBC # BLD: 10.3 K/UL — SIGNIFICANT CHANGE UP (ref 3.8–10.5)
WBC # BLD: 10.3 K/UL — SIGNIFICANT CHANGE UP (ref 3.8–10.5)
WBC # FLD AUTO: 10.3 K/UL — SIGNIFICANT CHANGE UP (ref 3.8–10.5)
WBC # FLD AUTO: 10.3 K/UL — SIGNIFICANT CHANGE UP (ref 3.8–10.5)

## 2024-01-01 RX ORDER — DIPHENHYDRAMINE HYDROCHLORIDE AND LIDOCAINE HYDROCHLORIDE AND ALUMINUM HYDROXIDE AND MAGNESIUM HYDRO
10 KIT
Refills: 0 | Status: DISCONTINUED | OUTPATIENT
Start: 2024-01-01 | End: 2024-01-05

## 2024-01-01 RX ORDER — DIPHENHYDRAMINE HYDROCHLORIDE AND LIDOCAINE HYDROCHLORIDE AND ALUMINUM HYDROXIDE AND MAGNESIUM HYDRO
10 KIT ONCE
Refills: 0 | Status: COMPLETED | OUTPATIENT
Start: 2024-01-01 | End: 2024-01-01

## 2024-01-01 RX ADMIN — VALACYCLOVIR 1000 MILLIGRAM(S): 500 TABLET, FILM COATED ORAL at 18:51

## 2024-01-01 RX ADMIN — Medication 0.5 MILLIGRAM(S): at 21:56

## 2024-01-01 RX ADMIN — CEFEPIME 100 MILLIGRAM(S): 1 INJECTION, POWDER, FOR SOLUTION INTRAMUSCULAR; INTRAVENOUS at 08:15

## 2024-01-01 RX ADMIN — CEFEPIME 100 MILLIGRAM(S): 1 INJECTION, POWDER, FOR SOLUTION INTRAMUSCULAR; INTRAVENOUS at 16:45

## 2024-01-01 RX ADMIN — FENTANYL CITRATE 1 PATCH: 50 INJECTION INTRAVENOUS at 19:30

## 2024-01-01 RX ADMIN — HYDROMORPHONE HYDROCHLORIDE 2 MILLIGRAM(S): 2 INJECTION INTRAMUSCULAR; INTRAVENOUS; SUBCUTANEOUS at 19:36

## 2024-01-01 RX ADMIN — CEFEPIME 100 MILLIGRAM(S): 1 INJECTION, POWDER, FOR SOLUTION INTRAMUSCULAR; INTRAVENOUS at 23:30

## 2024-01-01 RX ADMIN — DIPHENHYDRAMINE HYDROCHLORIDE AND LIDOCAINE HYDROCHLORIDE AND ALUMINUM HYDROXIDE AND MAGNESIUM HYDRO 10 MILLILITER(S): KIT at 18:46

## 2024-01-01 RX ADMIN — FENTANYL CITRATE 1 PATCH: 50 INJECTION INTRAVENOUS at 23:35

## 2024-01-01 RX ADMIN — Medication 650 MILLIGRAM(S): at 16:45

## 2024-01-01 RX ADMIN — OXYCODONE HYDROCHLORIDE 10 MILLIGRAM(S): 5 TABLET ORAL at 22:22

## 2024-01-01 RX ADMIN — SERTRALINE 100 MILLIGRAM(S): 25 TABLET, FILM COATED ORAL at 21:54

## 2024-01-01 RX ADMIN — DALFAMPRIDINE 10 MILLIGRAM(S): 10 TABLET, FILM COATED, EXTENDED RELEASE ORAL at 10:33

## 2024-01-01 RX ADMIN — OXYCODONE HYDROCHLORIDE 10 MILLIGRAM(S): 5 TABLET ORAL at 21:52

## 2024-01-01 RX ADMIN — DALFAMPRIDINE 10 MILLIGRAM(S): 10 TABLET, FILM COATED, EXTENDED RELEASE ORAL at 22:20

## 2024-01-01 RX ADMIN — HYDROMORPHONE HYDROCHLORIDE 4 MILLIGRAM(S): 2 INJECTION INTRAMUSCULAR; INTRAVENOUS; SUBCUTANEOUS at 13:42

## 2024-01-01 RX ADMIN — HYDROMORPHONE HYDROCHLORIDE 4 MILLIGRAM(S): 2 INJECTION INTRAMUSCULAR; INTRAVENOUS; SUBCUTANEOUS at 09:15

## 2024-01-01 RX ADMIN — HYDROMORPHONE HYDROCHLORIDE 2 MILLIGRAM(S): 2 INJECTION INTRAMUSCULAR; INTRAVENOUS; SUBCUTANEOUS at 18:51

## 2024-01-01 RX ADMIN — SENNA PLUS 2 TABLET(S): 8.6 TABLET ORAL at 21:53

## 2024-01-01 RX ADMIN — DULOXETINE HYDROCHLORIDE 60 MILLIGRAM(S): 30 CAPSULE, DELAYED RELEASE ORAL at 21:52

## 2024-01-01 RX ADMIN — Medication 1 TABLET(S): at 13:41

## 2024-01-01 RX ADMIN — HYDROMORPHONE HYDROCHLORIDE 4 MILLIGRAM(S): 2 INJECTION INTRAMUSCULAR; INTRAVENOUS; SUBCUTANEOUS at 14:42

## 2024-01-01 RX ADMIN — ENOXAPARIN SODIUM 40 MILLIGRAM(S): 100 INJECTION SUBCUTANEOUS at 21:55

## 2024-01-01 RX ADMIN — FENTANYL CITRATE 1 PATCH: 50 INJECTION INTRAVENOUS at 07:31

## 2024-01-01 RX ADMIN — CHLORHEXIDINE GLUCONATE 1 APPLICATION(S): 213 SOLUTION TOPICAL at 11:25

## 2024-01-01 RX ADMIN — Medication 650 MILLIGRAM(S): at 17:45

## 2024-01-01 RX ADMIN — CYCLOBENZAPRINE HYDROCHLORIDE 5 MILLIGRAM(S): 10 TABLET, FILM COATED ORAL at 06:55

## 2024-01-01 RX ADMIN — VALACYCLOVIR 1000 MILLIGRAM(S): 500 TABLET, FILM COATED ORAL at 06:55

## 2024-01-01 RX ADMIN — HYDROMORPHONE HYDROCHLORIDE 4 MILLIGRAM(S): 2 INJECTION INTRAMUSCULAR; INTRAVENOUS; SUBCUTANEOUS at 08:15

## 2024-01-01 RX ADMIN — OXYCODONE HYDROCHLORIDE 10 MILLIGRAM(S): 5 TABLET ORAL at 10:03

## 2024-01-01 RX ADMIN — CYCLOBENZAPRINE HYDROCHLORIDE 5 MILLIGRAM(S): 10 TABLET, FILM COATED ORAL at 18:51

## 2024-01-01 RX ADMIN — Medication 650 MILLIGRAM(S): at 11:03

## 2024-01-01 RX ADMIN — Medication 650 MILLIGRAM(S): at 23:29

## 2024-01-01 RX ADMIN — Medication 650 MILLIGRAM(S): at 10:03

## 2024-01-01 RX ADMIN — OXYCODONE HYDROCHLORIDE 10 MILLIGRAM(S): 5 TABLET ORAL at 11:03

## 2024-01-01 NOTE — PROGRESS NOTE ADULT - SUBJECTIVE AND OBJECTIVE BOX
Subjective: Patient seen and examined. No new events except as noted.       REVIEW OF SYSTEMS:    CONSTITUTIONAL: +weakness, fevers or chills  EYES/ENT: No visual changes;  No vertigo or throat pain   NECK: No pain or stiffness  RESPIRATORY: No cough, wheezing, hemoptysis; No shortness of breath  CARDIOVASCULAR: No chest pain or palpitations  GASTROINTESTINAL: No abdominal or epigastric pain. No nausea, vomiting, or hematemesis; No diarrhea or constipation. No melena or hematochezia.  GENITOURINARY: No dysuria, frequency or hematuria  NEUROLOGICAL: No numbness or weakness  SKIN: No itching, burning, rashes, or lesions   All other review of systems is negative unless indicated above.    MEDICATIONS:  MEDICATIONS  (STANDING):  cefepime   IVPB 2000 milliGRAM(s) IV Intermittent every 8 hours  chlorhexidine 2% Cloths 1 Application(s) Topical <User Schedule>  cyclobenzaprine 5 milliGRAM(s) Oral two times a day  dalfampridine ER 10 milliGRAM(s) Oral every 12 hours  DULoxetine 60 milliGRAM(s) Oral at bedtime  enoxaparin Injectable 40 milliGRAM(s) SubCutaneous every 24 hours  fentaNYL   Patch  12 MICROgram(s)/Hr 1 Patch Transdermal every 72 hours  multivitamin/minerals 1 Tablet(s) Oral daily  oxyCODONE  ER Tablet 10 milliGRAM(s) Oral every 12 hours  senna 2 Tablet(s) Oral at bedtime  sertraline 100 milliGRAM(s) Oral at bedtime  valACYclovir 1000 milliGRAM(s) Oral every 12 hours  Vibegron (Gemtesa) 75 milliGRAM(s),Vibegron (Gemtesa) 75mg tablet 1 Tablet(s) 1 Tablet(s) Oral daily      PHYSICAL EXAM:  T(C): 37.3 (01-01-24 @ 10:30), Max: 37.3 (01-01-24 @ 10:30)  HR: 88 (01-01-24 @ 10:30) (86 - 94)  BP: 110/75 (01-01-24 @ 10:30) (91/60 - 110/75)  RR: 18 (01-01-24 @ 10:30) (17 - 18)  SpO2: 97% (01-01-24 @ 10:30) (95% - 99%)  Wt(kg): --  I&O's Summary    31 Dec 2023 07:01  -  01 Jan 2024 07:00  --------------------------------------------------------  IN: 950 mL / OUT: 0 mL / NET: 950 mL          Appearance: NAD  HEENT:   Normal oral mucosa, PERRL, EOMI	  Lymphatic: No lymphadenopathy , no edema   L clavicular region w/ black sponge CAV dsg secured w/ good seal, minimal serousag drainage noted in collection chamber  Cardiovascular: Normal S1 S2, No JVD, No murmurs , Peripheral pulses palpable 2+ bilaterally  Respiratory: Lungs clear to auscultation, normal effort 	  Gastrointestinal:  Soft, Non-tender, + BS	  Skin: No rashes, No ecchymoses, No cyanosis, warm to touch  Musculoskeletal: Normal range of motion, normal strength  Psychiatry:  lethargic weak   Ext: No edema      LABS:    CARDIAC MARKERS:                                8.0    10.30 )-----------( 530      ( 01 Jan 2024 07:33 )             26.0     01-01    137  |  99  |  8   ----------------------------<  89  4.5   |  25  |  0.51    Ca    9.6      01 Jan 2024 07:33        TELEMETRY: 	    ECG:  	  RADIOLOGY:   DIAGNOSTIC TESTING:  [ ] Echocardiogram:  [ ]  Catheterization:  [ ] Stress Test:    OTHER:

## 2024-01-01 NOTE — PROGRESS NOTE ADULT - ASSESSMENT
43F PMHx of MS on immunosuppressive medication, presenting with left chest wall and shoulder pain. Recent history of fall secondary to unsteadiness due to MS. Obtained outpatient MRI of left sternoclavicular joint with results significant for marrow edema and swelling of the left sternoclavicular joint with a 2 cm fluid collection and adjacent pleural edema in the left chest with concerns for infectious etiology.  Patient initially saw orthopedics with for concerns for septic arthritis of this joint.  Impression of the read showed soft tissue abscess superficial to the joint.  Recommended CT scan with IV contrast of the chest.  Patient denies any fevers however endorsing erythema to the left sternoclavicular joint region. Patient states she was recently hospitalized over a week ago at [facility] for falls secondary to unsteadiness due to her MS.  Denies any recent falls today, vision changes. Endorsing headache. Denies any chest pain, shortness of breath, abdominal pain, nausea vomiting diarrhea, urinary complaints.  CT chest with IV in ED significant for Septic arthritis of the left sternoclavicular joint and involving the articulation of the sternum with the first costal cartilage.Extensive surrounding inflammation, with pneumonia in the underlying subpleural left upper lobe.No fluid collection is evident.  Workup significant for elevated alk phos (248), . VSS and afebrile  s/p Debridement of left sternoclavicular joint with excision of left clavicular head. Purulent drainage at the sternoclavicular joint. Tissue surrounding left clavicle found to be very inflamed. Copious irrigation of surgical site.   Concerned about pain at surgical site.    at bedside     43F PMHx of MS on immunosuppressive medication, presenting with left chest wall and shoulder pain. Recent history of fall secondary to unsteadiness due to MS. Obtained outpatient MRI of left sternoclavicular joint with results significant for marrow edema and swelling of the left sternoclavicular joint with a 2 cm fluid collection and adjacent pleural edema in the left chest with concerns for infectious etiology.  Patient initially saw orthopedics with for concerns for septic arthritis of this joint.  Impression of the read showed soft tissue abscess superficial to the joint.  Recommended CT scan with IV contrast of the chest.  Patient denies any fevers however endorsing erythema to the left sternoclavicular joint region. Patient states she was recently hospitalized over a week ago at [facility] for falls secondary to unsteadiness due to her MS.  Denies any recent falls today, vision changes. Endorsing headache. Denies any chest pain, shortness of breath, abdominal pain, nausea vomiting diarrhea, urinary complaints.  CT chest with IV in ED significant for Septic arthritis of the left sternoclavicular joint and involving the articulation of the sternum with the first costal cartilage.Extensive surrounding inflammation, with pneumonia in the underlying subpleural left upper lobe.No fluid collection is evident.  Workup significant for elevated alk phos (248), . VSS and afebrile  s/p Debridement of left sternoclavicular joint with excision of left clavicular head. Purulent drainage at the sternoclavicular joint. Tissue surrounding left clavicle found to be very inflamed. Copious irrigation of surgical site.   Concerned about pain at surgical site.    at bedside

## 2024-01-01 NOTE — PROGRESS NOTE ADULT - SUBJECTIVE AND OBJECTIVE BOX
Date of service: 01-01-24 @ 20:53      Patient is a 43y old  Female who presents with a chief complaint of L shoulder and chest pain (01 Jan 2024 11:54)                                                               INTERVAL HPI/OVERNIGHT EVENTS:    REVIEW OF SYSTEMS:     CONSTITUTIONAL: No weakness, fevers or chills  EYES/ENT: No visual changes , no ear ache   NECK: No pain or stiffness  RESPIRATORY: No cough, wheezing,  No shortness of breath  CARDIOVASCULAR: No chest pain or palpitations  GASTROINTESTINAL: No abdominal pain  . No nausea, vomiting, or hematemesis; No diarrhea or constipation. No melena or hematochezia.  GENITOURINARY: No dysuria, frequency or hematuria  NEUROLOGICAL: No numbness or weakness  SKIN: No itching, burning, rashes, or lesions                                                                                                                                                                                                                                                                                 Medications:  MEDICATIONS  (STANDING):  cefepime   IVPB 2000 milliGRAM(s) IV Intermittent every 8 hours  chlorhexidine 2% Cloths 1 Application(s) Topical <User Schedule>  cyclobenzaprine 5 milliGRAM(s) Oral two times a day  dalfampridine ER 10 milliGRAM(s) Oral every 12 hours  DULoxetine 60 milliGRAM(s) Oral at bedtime  enoxaparin Injectable 40 milliGRAM(s) SubCutaneous every 24 hours  fentaNYL   Patch  12 MICROgram(s)/Hr 1 Patch Transdermal every 72 hours  FIRST- Mouthwash  BLM 10 milliLiter(s) Swish and Spit two times a day  multivitamin/minerals 1 Tablet(s) Oral daily  oxyCODONE  ER Tablet 10 milliGRAM(s) Oral every 12 hours  senna 2 Tablet(s) Oral at bedtime  sertraline 100 milliGRAM(s) Oral at bedtime  valACYclovir 1000 milliGRAM(s) Oral every 12 hours  Vibegron (Gemtesa) 75 milliGRAM(s),Vibegron (Gemtesa) 75mg tablet 1 Tablet(s) 1 Tablet(s) Oral daily    MEDICATIONS  (PRN):  acetaminophen     Tablet .. 650 milliGRAM(s) Oral every 6 hours PRN Temp greater or equal to 38C (100.4F), Mild Pain (1 - 3)  benzocaine/menthol Lozenge 1 Lozenge Oral three times a day PRN Sore Throat  clonazePAM  Tablet 0.5 milliGRAM(s) Oral three times a day PRN anxiety  HYDROmorphone   Tablet 4 milliGRAM(s) Oral every 6 hours PRN Severe Pain (7 - 10)  HYDROmorphone   Tablet 2 milliGRAM(s) Oral every 4 hours PRN Moderate Pain (4 - 6)  polyethylene glycol 3350 17 Gram(s) Oral two times a day PRN Constipation       Allergies    No Known Allergies    Intolerances      Vital Signs Last 24 Hrs  T(C): 36.9 (01 Jan 2024 18:00), Max: 37.3 (01 Jan 2024 10:30)  T(F): 98.5 (01 Jan 2024 18:00), Max: 99.1 (01 Jan 2024 10:30)  HR: 77 (01 Jan 2024 18:00) (77 - 90)  BP: 98/63 (01 Jan 2024 18:00) (94/60 - 110/75)  BP(mean): --  RR: 18 (01 Jan 2024 18:00) (18 - 18)  SpO2: 99% (01 Jan 2024 18:00) (96% - 99%)    Parameters below as of 01 Jan 2024 18:00  Patient On (Oxygen Delivery Method): room air      CAPILLARY BLOOD GLUCOSE          12-31 @ 07:01 - 01-01 @ 07:00  --------------------------------------------------------  IN: 950 mL / OUT: 0 mL / NET: 950 mL    01-01 @ 07:01 - 01-01 @ 20:53  --------------------------------------------------------  IN: 600 mL / OUT: 0 mL / NET: 600 mL      Physical Exam:    Daily     Daily   General:  Well appearing, NAD, not cachetic  HEENT:  Nonicteric, PERRLA  CV:  RRR, S1S2   Lungs:  CTA B/L, no wheezes, rales, rhonchi  Abdomen:  Soft, non-tender, no distended, positive BS  Extremities:no edema                                                                                                                                                                                                                                                                                            LABS:                               8.0    10.30 )-----------( 530      ( 01 Jan 2024 07:33 )             26.0                      01-01    137  |  99  |  8   ----------------------------<  89  4.5   |  25  |  0.51    Ca    9.6      01 Jan 2024 07:33                         RADIOLOGY & ADDITIONAL TESTS         I personally reviewed: [  ]EKG   [  ]CXR    [  ] CT      A/P:         Discussed with :     [facility] consultants' Notes   Time spent :

## 2024-01-01 NOTE — PROGRESS NOTE ADULT - ASSESSMENT
43F PMHx of MS on Rituxan, presenting with left chest wall and shoulder pain w/ concern for infection now admitted with likely septic arthritis with need for possible washout and debridement       Problem/Plan - 1:  ·  Problem: Septic arthritis. left shoulder joint   ·  Plan: Imaging consistent with septic arthritis  s/p OR   cont abx and fu cultures   check ECHO : no vegetation   fu with ID   d/w with CTS at bedside : drainage adjusted and fx improved   cont current abx   pt with LUE pain : kaciley referred pain from L [id number] and chest however  Cervical MRI : no disciits / abcess   discussed with pt , family at length at bedside   washout and debridement of sternoclavicular joint; resection of medial manubrium; white and black wound vac placed  now s/p vac change  PICC in place   cont current abx   pt still with episodes of fever   cultures neg   ct chest no acute pathology   d/w ID will check CT a/p : no acute pathology   check Echo : no vegetation   check procal and esr /crp : noted   dental input appreciated           # c/o left arm swelling and pain in elbow region :  -doppler ordered to r/o DVT : Negative      Problem/Plan - 2:  ·  Problem: Multiple sclerosis.   ·  Plan: Gets Rituxan every 6 months, next dose due : holding   -On Ampyra BID at home, need to bring home med     Problem/Plan - 3:  ·  Problem: Anxiety.   ·  Plan: ISTOP reviewed Reference #: [id number]  -Cont. Clonazepam 0.5mg QHS PRN  -Cont. Sertraline 100mg QHS  -Cont. Duloxetine 60mg QHS, pt unsure of dose  -Need full med rec.     Problem/Plan - 4:  ·  Problem: Anemia.   ·  Plan: hgb 9, lower than prior year. Pt endorses some recent anemia but cannot specify  - pt feeling weak and fatigued   will chemo one unit of prbc        Problem/Plan - 5:  ·  Problem: Prophylactic measure.   ·  Plan: DVT PPx    hyperkalemia : normalized     afeb   prepare for Dc      43F PMHx of MS on Rituxan, presenting with left chest wall and shoulder pain w/ concern for infection now admitted with likely septic arthritis with need for possible washout and debridement       Problem/Plan - 1:  ·  Problem: Septic arthritis. left shoulder joint   ·  Plan: Imaging consistent with septic arthritis  s/p OR   cont abx and fu cultures   check ECHO : no vegetation   fu with ID   d/w with CTS at bedside : drainage adjusted and fx improved   cont current abx   pt with LUE pain : kaciley referred pain from L [id number] and chest however  Cervical MRI : no disciits / abcess   discussed with pt , family at length at bedside   washout and debridement of sternoclavicular joint; resection of medial manubrium; white and black wound vac placed  now s/p vac change  PICC in place   cont current abx   pt still with episodes of fever   cultures neg   ct chest no acute pathology   d/w ID will check CT a/p : no acute pathology   check Echo : no vegetation   check procal and esr /crp : noted   dental input appreciated           # c/o left arm swelling and pain in elbow region :  -doppler ordered to r/o DVT : Negative      Problem/Plan - 2:  ·  Problem: Multiple sclerosis.   ·  Plan: Gets Rituxan every 6 months, next dose due : holding   -On Ampyra BID at home, need to bring home med     Problem/Plan - 3:  ·  Problem: Anxiety.   ·  Plan: ISTOP reviewed Reference #: [id number]  -Cont. Clonazepam 0.5mg QHS PRN  -Cont. Sertraline 100mg QHS  -Cont. Duloxetine 60mg QHS, pt unsure of dose  -Need full med rec.     Problem/Plan - 4:  ·  Problem: Anemia.   ·  Plan: hgb 9, lower than prior year. Pt endorses some recent anemia but cannot specify  - pt feeling weak and fatigued   will chemo one unit of prbc        Problem/Plan - 5:  ·  Problem: Prophylactic measure.   ·  Plan: DVT PPx    hyperkalemia : normalized     afeb   prepare for Dc

## 2024-01-02 ENCOUNTER — TRANSCRIPTION ENCOUNTER (OUTPATIENT)
Age: 44
End: 2024-01-02

## 2024-01-02 LAB
CULTURE RESULTS: SIGNIFICANT CHANGE UP
SPECIMEN SOURCE: SIGNIFICANT CHANGE UP

## 2024-01-02 PROCEDURE — 99232 SBSQ HOSP IP/OBS MODERATE 35: CPT

## 2024-01-02 RX ORDER — HYDROMORPHONE HYDROCHLORIDE 2 MG/ML
2 INJECTION INTRAMUSCULAR; INTRAVENOUS; SUBCUTANEOUS EVERY 4 HOURS
Refills: 0 | Status: DISCONTINUED | OUTPATIENT
Start: 2024-01-02 | End: 2024-01-05

## 2024-01-02 RX ORDER — OXYCODONE HYDROCHLORIDE 5 MG/1
10 TABLET ORAL EVERY 12 HOURS
Refills: 0 | Status: DISCONTINUED | OUTPATIENT
Start: 2024-01-03 | End: 2024-01-05

## 2024-01-02 RX ORDER — HYDROMORPHONE HYDROCHLORIDE 2 MG/ML
4 INJECTION INTRAMUSCULAR; INTRAVENOUS; SUBCUTANEOUS EVERY 6 HOURS
Refills: 0 | Status: DISCONTINUED | OUTPATIENT
Start: 2024-01-02 | End: 2024-01-05

## 2024-01-02 RX ADMIN — CEFEPIME 100 MILLIGRAM(S): 1 INJECTION, POWDER, FOR SOLUTION INTRAMUSCULAR; INTRAVENOUS at 09:01

## 2024-01-02 RX ADMIN — Medication 650 MILLIGRAM(S): at 13:38

## 2024-01-02 RX ADMIN — ENOXAPARIN SODIUM 40 MILLIGRAM(S): 100 INJECTION SUBCUTANEOUS at 22:08

## 2024-01-02 RX ADMIN — CYCLOBENZAPRINE HYDROCHLORIDE 5 MILLIGRAM(S): 10 TABLET, FILM COATED ORAL at 06:06

## 2024-01-02 RX ADMIN — SERTRALINE 100 MILLIGRAM(S): 25 TABLET, FILM COATED ORAL at 22:09

## 2024-01-02 RX ADMIN — DULOXETINE HYDROCHLORIDE 60 MILLIGRAM(S): 30 CAPSULE, DELAYED RELEASE ORAL at 22:09

## 2024-01-02 RX ADMIN — HYDROMORPHONE HYDROCHLORIDE 4 MILLIGRAM(S): 2 INJECTION INTRAMUSCULAR; INTRAVENOUS; SUBCUTANEOUS at 16:00

## 2024-01-02 RX ADMIN — OXYCODONE HYDROCHLORIDE 10 MILLIGRAM(S): 5 TABLET ORAL at 17:40

## 2024-01-02 RX ADMIN — DALFAMPRIDINE 10 MILLIGRAM(S): 10 TABLET, FILM COATED, EXTENDED RELEASE ORAL at 22:08

## 2024-01-02 RX ADMIN — VALACYCLOVIR 1000 MILLIGRAM(S): 500 TABLET, FILM COATED ORAL at 06:06

## 2024-01-02 RX ADMIN — FENTANYL CITRATE 1 PATCH: 50 INJECTION INTRAVENOUS at 20:10

## 2024-01-02 RX ADMIN — Medication 650 MILLIGRAM(S): at 14:30

## 2024-01-02 RX ADMIN — HYDROMORPHONE HYDROCHLORIDE 4 MILLIGRAM(S): 2 INJECTION INTRAMUSCULAR; INTRAVENOUS; SUBCUTANEOUS at 21:10

## 2024-01-02 RX ADMIN — DALFAMPRIDINE 10 MILLIGRAM(S): 10 TABLET, FILM COATED, EXTENDED RELEASE ORAL at 09:21

## 2024-01-02 RX ADMIN — DIPHENHYDRAMINE HYDROCHLORIDE AND LIDOCAINE HYDROCHLORIDE AND ALUMINUM HYDROXIDE AND MAGNESIUM HYDRO 10 MILLILITER(S): KIT at 06:07

## 2024-01-02 RX ADMIN — HYDROMORPHONE HYDROCHLORIDE 4 MILLIGRAM(S): 2 INJECTION INTRAMUSCULAR; INTRAVENOUS; SUBCUTANEOUS at 10:05

## 2024-01-02 RX ADMIN — Medication 1 TABLET(S): at 12:47

## 2024-01-02 RX ADMIN — VALACYCLOVIR 1000 MILLIGRAM(S): 500 TABLET, FILM COATED ORAL at 17:40

## 2024-01-02 RX ADMIN — Medication 650 MILLIGRAM(S): at 22:11

## 2024-01-02 RX ADMIN — CEFEPIME 100 MILLIGRAM(S): 1 INJECTION, POWDER, FOR SOLUTION INTRAMUSCULAR; INTRAVENOUS at 15:03

## 2024-01-02 RX ADMIN — BENZOCAINE AND MENTHOL 1 LOZENGE: 5; 1 LIQUID ORAL at 23:32

## 2024-01-02 RX ADMIN — FENTANYL CITRATE 1 PATCH: 50 INJECTION INTRAVENOUS at 07:46

## 2024-01-02 RX ADMIN — CYCLOBENZAPRINE HYDROCHLORIDE 5 MILLIGRAM(S): 10 TABLET, FILM COATED ORAL at 17:40

## 2024-01-02 RX ADMIN — HYDROMORPHONE HYDROCHLORIDE 4 MILLIGRAM(S): 2 INJECTION INTRAMUSCULAR; INTRAVENOUS; SUBCUTANEOUS at 09:02

## 2024-01-02 RX ADMIN — Medication 650 MILLIGRAM(S): at 23:11

## 2024-01-02 RX ADMIN — CHLORHEXIDINE GLUCONATE 1 APPLICATION(S): 213 SOLUTION TOPICAL at 09:02

## 2024-01-02 RX ADMIN — Medication 650 MILLIGRAM(S): at 00:09

## 2024-01-02 RX ADMIN — HYDROMORPHONE HYDROCHLORIDE 4 MILLIGRAM(S): 2 INJECTION INTRAMUSCULAR; INTRAVENOUS; SUBCUTANEOUS at 22:10

## 2024-01-02 RX ADMIN — SENNA PLUS 2 TABLET(S): 8.6 TABLET ORAL at 22:10

## 2024-01-02 RX ADMIN — FENTANYL CITRATE 1 PATCH: 50 INJECTION INTRAVENOUS at 22:20

## 2024-01-02 RX ADMIN — DIPHENHYDRAMINE HYDROCHLORIDE AND LIDOCAINE HYDROCHLORIDE AND ALUMINUM HYDROXIDE AND MAGNESIUM HYDRO 10 MILLILITER(S): KIT at 17:43

## 2024-01-02 RX ADMIN — HYDROMORPHONE HYDROCHLORIDE 4 MILLIGRAM(S): 2 INJECTION INTRAMUSCULAR; INTRAVENOUS; SUBCUTANEOUS at 15:03

## 2024-01-02 RX ADMIN — OXYCODONE HYDROCHLORIDE 10 MILLIGRAM(S): 5 TABLET ORAL at 18:45

## 2024-01-02 RX ADMIN — Medication 0.5 MILLIGRAM(S): at 17:40

## 2024-01-02 NOTE — PROGRESS NOTE ADULT - ASSESSMENT
43F PMHx of MS on immunosuppressive medication, presenting with left chest wall and shoulder pain. Recent history of fall secondary to unsteadiness due to MS. Obtained outpatient MRI of left sternoclavicular joint with results significant for marrow edema and swelling of the left sternoclavicular joint with a 2 cm fluid collection and adjacent pleural edema in the left chest with concerns for infectious etiology.  Patient initially saw orthopedics with for concerns for septic arthritis of this joint.  Impression of the read showed soft tissue abscess superficial to the joint.  Recommended CT scan with IV contrast of the chest.  Patient denies any fevers however endorsing erythema to the left sternoclavicular joint region. Patient states she was recently hospitalized over a week ago at [facility] for falls secondary to unsteadiness due to her MS.  Denies any recent falls today, vision changes. Endorsing headache. Denies any chest pain, shortness of breath, abdominal pain, nausea vomiting diarrhea, urinary complaints.  CT chest with IV in ED significant for Septic arthritis of the left sternoclavicular joint and involving the articulation of the sternum with the first costal cartilage.Extensive surrounding inflammation, with pneumonia in the underlying subpleural left upper lobe.No fluid collection is evident.  Workup significant for elevated alk phos (248), . VSS and afebrile  s/p Debridement of left sternoclavicular joint with excision of left clavicular head. Purulent drainage at the sternoclavicular joint. Tissue surrounding left clavicle found to be very inflamed. Copious irrigation of surgical site.   Concerned about pain at surgical site.    at bedside     43F PMHx of MS on immunosuppressive medication, presenting with left chest wall and shoulder pain. Recent history of fall secondary to unsteadiness due to MS. Obtained outpatient MRI of left sternoclavicular joint with results significant for marrow edema and swelling of the left sternoclavicular joint with a 2 cm fluid collection and adjacent pleural edema in the left chest with concerns for infectious etiology.  Patient initially saw orthopedics with for concerns for septic arthritis of this joint.  Impression of the read showed soft tissue abscess superficial to the joint.  Recommended CT scan with IV contrast of the chest.  Patient denies any fevers however endorsing erythema to the left sternoclavicular joint region. Patient states she was recently hospitalized over a week ago at [facility] for falls secondary to unsteadiness due to her MS.  Denies any recent falls today, vision changes. Endorsing headache. Denies any chest pain, shortness of breath, abdominal pain, nausea vomiting diarrhea, urinary complaints.  CT chest with IV in ED significant for Septic arthritis of the left sternoclavicular joint and involving the articulation of the sternum with the first costal cartilage.Extensive surrounding inflammation, with pneumonia in the underlying subpleural left upper lobe.No fluid collection is evident.  Workup significant for elevated alk phos (248), . VSS and afebrile  s/p Debridement of left sternoclavicular joint with excision of left clavicular head. Purulent drainage at the sternoclavicular joint. Tissue surrounding left clavicle found to be very inflamed. Copious irrigation of surgical site.   Concerned about pain at surgical site.    at bedside

## 2024-01-02 NOTE — PROGRESS NOTE ADULT - ASSESSMENT
43F PMHx of MS on (Rituxan, Ocrevus) presenting with left chest wall and shoulder pain  Recent history of fall secondary to unsteadiness due to MS  On review of ortho notes, pt has had multiple falls in November  She was admitted to [facility] on Last week of November and was found to have Rhinovirus and pseudomonas UTI--treated with Cipro  Found to have Septic arthritis of SCM joint now s/p excision  Patient obtained CT chest w/ IV contrast which showed likely septic arthritis with surrounding erythema. Thoracic surgery consulted, underwent washout and debridement.   Quantiferon negative   TTE negative     Arthrotomy, sternoclavicular joint 07-Dec-2023 13:39:17  Debridement of septic joint 07-Dec-2023 18:02:00   Excision, manubrium 15-Dec-2023 23:09:42     OR Note: Debridement of left sternoclavicular joint with excision of left clavicular head. Purulent drainage at the sternoclavicular joint. Tissue surrounding left clavicle found to be very inflamed Copious irrigation of surgical site.  Specimen sent: Head of left clavicle, culture of left sternoclavicular joint, culture of left manubrium, culture of deep sternal head of clavicle, culture of first rib costal cartilage    Septic arthritis of the left sternoclavicular joint and involving the   articulation of the sternum with the first costal cartilage.    Extensive surrounding inflammation, with pneumonia in the underlying   subpleural left upper lobe.    OR cultures reviewed--all NGTD    Overall, septic arthritis L sternoclavicular joint, CATRACHITA opacity, manubrium OM  - Cefepime 2g q 8, complete 6 weeks from last debridement procedure  - F/U pending cultures  - Valtrex 7 days then discontinue  - Rt shoulder discomfort- Rt Shoulder MRI with no acute infection    Williams Mckeon MD  Contact on TEAMS messaging from 9am - 5pm  From 5pm-9am, on weekends, or if no response call [phone] 43F PMHx of MS on (Rituxan, Ocrevus) presenting with left chest wall and shoulder pain  Recent history of fall secondary to unsteadiness due to MS  On review of ortho notes, pt has had multiple falls in November  She was admitted to [facility] on Last week of November and was found to have Rhinovirus and pseudomonas UTI--treated with Cipro  Found to have Septic arthritis of SCM joint now s/p excision  Patient obtained CT chest w/ IV contrast which showed likely septic arthritis with surrounding erythema. Thoracic surgery consulted, underwent washout and debridement.   Quantiferon negative   TTE negative     Arthrotomy, sternoclavicular joint 07-Dec-2023 13:39:17  Debridement of septic joint 07-Dec-2023 18:02:00   Excision, manubrium 15-Dec-2023 23:09:42     OR Note: Debridement of left sternoclavicular joint with excision of left clavicular head. Purulent drainage at the sternoclavicular joint. Tissue surrounding left clavicle found to be very inflamed Copious irrigation of surgical site.  Specimen sent: Head of left clavicle, culture of left sternoclavicular joint, culture of left manubrium, culture of deep sternal head of clavicle, culture of first rib costal cartilage    Septic arthritis of the left sternoclavicular joint and involving the   articulation of the sternum with the first costal cartilage.    Extensive surrounding inflammation, with pneumonia in the underlying   subpleural left upper lobe.    OR cultures reviewed--all NGTD    Overall, septic arthritis L sternoclavicular joint, CATRACHITA opacity, manubrium OM  - Cefepime 2g q 8, complete 6 weeks from last debridement procedure  - F/U pending cultures  - Valtrex 7 days then discontinue  - Rt shoulder discomfort- Rt Shoulder MRI with no acute infection    Williams Mckeon MD  Contact on TEAMS messaging from 9am - 5pm  From 5pm-9am, on weekends, or if no response call [phone]

## 2024-01-02 NOTE — PROGRESS NOTE ADULT - ASSESSMENT
43F PMHx of MS on Rituxan, presenting with left chest wall and shoulder pain w/ concern for infection now admitted with likely septic arthritis with need for possible washout and debridement       Problem/Plan - 1:  ·  Problem: Septic arthritis. left shoulder joint   ·  Plan: Imaging consistent with septic arthritis  s/p OR   cont abx and fu cultures   check ECHO : no vegetation   fu with ID   d/w with CTS at bedside : drainage adjusted and fx improved   cont current abx   pt with LUE pain : kaciley referred pain from L [id number] and chest however  Cervical MRI : no disciits / abcess   discussed with pt , family at length at bedside   washout and debridement of sternoclavicular joint; resection of medial manubrium; white and black wound vac placed  now s/p vac change  PICC in place   cont current abx   pt still with episodes of fever   cultures neg   ct chest no acute pathology   d/w ID will check CT a/p : no acute pathology   check Echo : no vegetation   check procal and esr /crp : noted   dental input appreciated   R shouulder pain : MR had been done and was with no acute pathology           # c/o left arm swelling and pain in elbow region :  -doppler ordered to r/o DVT : Negative      Problem/Plan - 2:  ·  Problem: Multiple sclerosis.   ·  Plan: Gets Rituxan every 6 months, next dose due : holding   -On Ampyra BID at home, need to bring home med     Problem/Plan - 3:  ·  Problem: Anxiety.   ·  Plan: ISTOP reviewed Reference #: [id number]  -Cont. Clonazepam 0.5mg QHS PRN  -Cont. Sertraline 100mg QHS  -Cont. Duloxetine 60mg QHS, pt unsure of dose  -Need full med rec.     Problem/Plan - 4:  ·  Problem: Anemia.   ·  Plan: hgb 9, lower than prior year. Pt endorses some recent anemia but cannot specify  - pt feeling weak and fatigued   will chemo one unit of prbc        Problem/Plan - 5:  ·  Problem: Prophylactic measure.   ·  Plan: DVT PPx    hyperkalemia : normalized     afeb   prepare for Dc      43F PMHx of MS on Rituxan, presenting with left chest wall and shoulder pain w/ concern for infection now admitted with likely septic arthritis with need for possible washout and debridement       Problem/Plan - 1:  ·  Problem: Septic arthritis. left shoulder joint   ·  Plan: Imaging consistent with septic arthritis  s/p OR   cont abx and fu cultures   check ECHO : no vegetation   fu with ID   d/w with CTS at bedside : drainage adjusted and fx improved   cont current abx   pt with LUE pain : kaciley referred pain from L [id number] and chest however  Cervical MRI : no disciits / abcess   discussed with pt , family at length at bedside   washout and debridement of sternoclavicular joint; resection of medial manubrium; white and black wound vac placed  now s/p vac change  PICC in place   cont current abx   pt still with episodes of fever   cultures neg   ct chest no acute pathology   d/w ID will check CT a/p : no acute pathology   check Echo : no vegetation   check procal and esr /crp : noted   dental input appreciated   R shouulder pain : MR had been done and was with no acute pathology           # c/o left arm swelling and pain in elbow region :  -doppler ordered to r/o DVT : Negative      Problem/Plan - 2:  ·  Problem: Multiple sclerosis.   ·  Plan: Gets Rituxan every 6 months, next dose due : holding   -On Ampyra BID at home, need to bring home med     Problem/Plan - 3:  ·  Problem: Anxiety.   ·  Plan: ISTOP reviewed Reference #: [id number]  -Cont. Clonazepam 0.5mg QHS PRN  -Cont. Sertraline 100mg QHS  -Cont. Duloxetine 60mg QHS, pt unsure of dose  -Need full med rec.     Problem/Plan - 4:  ·  Problem: Anemia.   ·  Plan: hgb 9, lower than prior year. Pt endorses some recent anemia but cannot specify  - pt feeling weak and fatigued   will chemo one unit of prbc        Problem/Plan - 5:  ·  Problem: Prophylactic measure.   ·  Plan: DVT PPx    hyperkalemia : normalized     afeb   prepare for Dc

## 2024-01-02 NOTE — PROGRESS NOTE ADULT - SUBJECTIVE AND OBJECTIVE BOX
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  She had a recent fall in 2023 secondary to unsteadiness due to multiple sclerosis, with subsequent left chest wall and shoulder pain.  Was seen at [facility] with concern for MS exacerbation but no MRI brain and cervical spine with an without contrast were done at the time without enhancement.  At the time infectious workup was done and notable for UTI which was treated.  She continued to have pain.  In the interim, she had an outpatient MRI of left sternoclavicular joint with results significant for marrow edema and swelling of the left sternoclavicular joint with a 2 cm fluid collection and adjacent pleural edema in the left chest with concerns for infectious etiology.   CT chest w/ IV contrast suggestive of septic arthritis with surrounding erythema. Thoracic surgery consulted.  Patient s/p surgical debridement on 23  mri with 80 x 25 mm rim-enhancing fluid collection persists in the surgical cavity with well-placed drain within the fluid collection.   Marrow signal abnormality in the manubrium concerning for acute   osteomyelitis.    Low-grade partial-thickness bursal surface tear of the supraspinatus tendon.    CT A/P done for persistent fevers, results pending       Int hx:  now with some right shoulder pain, MRI of the right shoulder did not show osteomyelitis     ******        Past Medical History:  Multiple sclerosis [G35]        Past Surgical History:  History of  [Z98.891]        Social History:  No toxic habits    Family History:  FAMILY HISTORY:      Allergies:  No Known Allergies      ROS:  Constitutional: Patient offers no complaints of fevers or significant weight loss  Ears, Nose, Mouth and Throat: The patient presents with no abnormalities of the head, ears, eyes, nose or throat  Skin: Patient offers no concerns of new rashes or lesions  Respiratory: The patient presents with no abnormalities of the respiratory tract  Cardiovascular: The patient presents with no cardiac abnormalities  Gastrointestinal: The patient presents with no abnormalities of the GI system  Genitourinary: The patient presents with no dysuria, hematuria or frequent urination  Neurological: See HPI  Endocrine: Patient offers no complaints of excessive thirst, urination, or heat/cold intolerance      Medications:  acetaminophen     Tablet .. 650 milliGRAM(s) Oral every 6 hours PRN  benzocaine/menthol Lozenge 1 Lozenge Oral three times a day PRN  cefepime   IVPB 2000 milliGRAM(s) IV Intermittent every 8 hours  chlorhexidine 2% Cloths 1 Application(s) Topical <User Schedule>  clonazePAM  Tablet 0.5 milliGRAM(s) Oral three times a day PRN  cyclobenzaprine 5 milliGRAM(s) Oral two times a day  dalfampridine ER 10 milliGRAM(s) Oral every 12 hours  DULoxetine 60 milliGRAM(s) Oral at bedtime  enoxaparin Injectable 40 milliGRAM(s) SubCutaneous every 24 hours  fentaNYL   Patch  12 MICROgram(s)/Hr 1 Patch Transdermal every 72 hours  FIRST- Mouthwash  BLM 10 milliLiter(s) Swish and Spit two times a day  HYDROmorphone   Tablet 2 milliGRAM(s) Oral every 4 hours PRN  HYDROmorphone   Tablet 4 milliGRAM(s) Oral every 6 hours PRN  multivitamin/minerals 1 Tablet(s) Oral daily  oxyCODONE  ER Tablet 10 milliGRAM(s) Oral every 12 hours  polyethylene glycol 3350 17 Gram(s) Oral two times a day PRN  senna 2 Tablet(s) Oral at bedtime  sertraline 100 milliGRAM(s) Oral at bedtime  valACYclovir 1000 milliGRAM(s) Oral every 12 hours  Vibegron (Gemtesa) 75 milliGRAM(s),Vibegron (Gemtesa) 75mg tablet 1 Tablet(s) 1 Tablet(s) Oral daily      Labs:  CBC Full  -  ( 2024 07:33 )  WBC Count : 10.30 K/uL  RBC Count : 3.33 M/uL  Hemoglobin : 8.0 g/dL  Hematocrit : 26.0 %  Platelet Count - Automated : 530 K/uL  Mean Cell Volume : 78.1 fl  Mean Cell Hemoglobin : 24.0 pg  Mean Cell Hemoglobin Concentration : 30.8 gm/dL  Auto Neutrophil # : x  Auto Lymphocyte # : x  Auto Monocyte # : x  Auto Eosinophil # : x  Auto Basophil # : x  Auto Neutrophil % : x  Auto Lymphocyte % : x  Auto Monocyte % : x  Auto Eosinophil % : x  Auto Basophil % : x        137  |  99  |  8   ----------------------------<  89  4.5   |  25  |  0.51    Ca    9.6      2024 07:33      CAPILLARY BLOOD GLUCOSE            Urinalysis Basic - ( 2024 07:33 )    Color: x / Appearance: x / SG: x / pH: x  Gluc: 89 mg/dL / Ketone: x  / Bili: x / Urobili: x   Blood: x / Protein: x / Nitrite: x   Leuk Esterase: x / RBC: x / WBC x   Sq Epi: x / Non Sq Epi: x / Bacteria: x          Vitals:  Vital Signs Last 24 Hrs  T(C): 36.6 (2024 09:04), Max: 37.3 (2024 10:30)  T(F): 97.8 (2024 09:04), Max: 99.1 (2024 10:30)  HR: 74 (2024 09:04) (71 - 90)  BP: 90/55 (2024 09:04) (90/55 - 110/75)  BP(mean): --  RR: 18 (2024 09:04) (18 - 18)  SpO2: 96% (2024 09:04) (96% - 99%)    Parameters below as of 2024 09:04  Patient On (Oxygen Delivery Method): room air            NEUROLOGICAL EXAM:    Mental status: Awake, alert, and in less apparent distress. Oriented to person, place and time. Language function is normal.  dysarthric    Cranial Nerves: Pupils were equal, round, reactive to light. Extraocular movements were intact. Visual field were full. Fundoscopic exam was deferred. Facial sensation was intact to light touch. There was slight left facial droop. The palate was upgoing symmetrically and tongue was midline.     Motor exam: Bulk and tone were normal.  Antigravity in all extremities without drift.  Chronic relative right hemiparesis.  Fine finger movements slower on the right    Reflexes: deferred     Sensation: Intact to light touch    Coordination: no gross dysmetria    Gait: deferred     sternal wound seen          ACC: [id number] EXAM:  MR SHOULDER WAW IC LT   ORDERED BY:  BRAN SANCHEZ     ACC: [id number] EXAM:  MR STERNOCLAVICULAR JNT LT   ORDERED BY: ANASTASIIA WALKER     PROCEDURE DATE:  2023          INTERPRETATION:  MRI OF THE LEFT STERNOCLAVICULAR JOINT AND SHOULDER    CLINICAL INFORMATION: Left sternal abscess status post washout with   persistent purulent drainage and left shoulder pain.  TECHNIQUE: Multisequence, multiplanar MRI of the left sternoclavicular   joint. The study was performed before and after the intravenous   administration of 6 ml Gadavist (1.5 cc discarded) .    COMPARISON: Chest CT 2023 and 2023.    FINDINGS:    STERNOCLAVICULAR JOINT:    BONE: Patient status post surgical resection of the left clavicular head.   The surgical margin is sharp with trace edema and no loss of T1   hyperintense signal. In the manubrium there is increased STIR signal with   loss of T1 hyperintense signal and postcontrast enhancement involving the   superolateral leftward aspect of the bone concerning for acute   osteomyelitis (6:9 5:9). No acute fracture. No osteonecrosis.    JOINTS: A residual rim-enhancing fluid collection is seen in the region   of the left sternoclavicular joint measuring 80 x 25 mm. A drainage   catheter is seen within the fluid collection.    SOFT TISSUE: Postsurgical changes are seen along the anterior aspect of   the left sternoclavicular joint. There is a mild amount of edema in the   adjacent pectoralis major muscle. No focal fluid collection in the   anterior mediastinum. Susceptibility artifact from surgical staples are   seen along the anterior chest wall.      SHOULDER JOINT:    ROTATOR CUFF: Low-grade partial-thickness bursal surface tearing of the   supraspinatus tendon. Rotator cuff is otherwise intact.  MUSCLES: No focal muscle edema or atrophy.  BICEPS TENDON: Normal in course and caliber.  GLENOID LABRUM AND GLENOHUMERAL LIGAMENTS: No displaced labral tear.   Inferior glenohumeral ligament is intact.  GLENOHUMERAL CARTILAGE AND SUBCHONDRAL BONE: No full-thickness chondral   loss.  AC JOINT: No AC joint arthropathy.  SYNOVIUM/JOINT FLUID: No glenohumeral joint effusion. No focal fluid in   the subacromial/subdeltoid bursa.  BONE MARROW: No fracture or osteonecrosis. No marrow signal change to   suggest acute osteomyelitis.  NEUROVASCULAR STRUCTURES: Structures of the suprascapular notch,   spinoglenoid notch, and quadrilateral space are normal in course and   caliber.  SUBCUTANEOUS SOFT TISSUES: Edema in the subcutaneous fat of the left   shoulder. No rim-enhancing fluid collection to suggest abscess.        IMPRESSION:  1.  Patient status post surgical resection of the left clavicular head   with washout of the left sternoclavicular joint.  2.  A 80 x 25 mm rim-enhancing fluid collection persists in the surgical   cavity with well-placed drain within the fluid collection.  3.  Marrow signal abnormality in the manubrium concerning for acute   osteomyelitis.  4.  Low-grade partial-thickness bursal surface tear of the supraspinatus   tendon.    --- End of Report ---            RASHIDA HYDE MD; Attending Radiologist  This document has been electronically signed. 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  She had a recent fall in 2023 secondary to unsteadiness due to multiple sclerosis, with subsequent left chest wall and shoulder pain.  Was seen at [facility] with concern for MS exacerbation but no MRI brain and cervical spine with an without contrast were done at the time without enhancement.  At the time infectious workup was done and notable for UTI which was treated.  She continued to have pain.  In the interim, she had an outpatient MRI of left sternoclavicular joint with results significant for marrow edema and swelling of the left sternoclavicular joint with a 2 cm fluid collection and adjacent pleural edema in the left chest with concerns for infectious etiology.   CT chest w/ IV contrast suggestive of septic arthritis with surrounding erythema. Thoracic surgery consulted.  Patient s/p surgical debridement on 23  mri with 80 x 25 mm rim-enhancing fluid collection persists in the surgical cavity with well-placed drain within the fluid collection.   Marrow signal abnormality in the manubrium concerning for acute   osteomyelitis.    Low-grade partial-thickness bursal surface tear of the supraspinatus tendon.    CT A/P done for persistent fevers, results pending       Int hx:  now with some right shoulder pain, MRI of the right shoulder did not show osteomyelitis     ******        Past Medical History:  Multiple sclerosis [G35]        Past Surgical History:  History of  [Z98.891]        Social History:  No toxic habits    Family History:  FAMILY HISTORY:      Allergies:  No Known Allergies      ROS:  Constitutional: Patient offers no complaints of fevers or significant weight loss  Ears, Nose, Mouth and Throat: The patient presents with no abnormalities of the head, ears, eyes, nose or throat  Skin: Patient offers no concerns of new rashes or lesions  Respiratory: The patient presents with no abnormalities of the respiratory tract  Cardiovascular: The patient presents with no cardiac abnormalities  Gastrointestinal: The patient presents with no abnormalities of the GI system  Genitourinary: The patient presents with no dysuria, hematuria or frequent urination  Neurological: See HPI  Endocrine: Patient offers no complaints of excessive thirst, urination, or heat/cold intolerance      Medications:  acetaminophen     Tablet .. 650 milliGRAM(s) Oral every 6 hours PRN  benzocaine/menthol Lozenge 1 Lozenge Oral three times a day PRN  cefepime   IVPB 2000 milliGRAM(s) IV Intermittent every 8 hours  chlorhexidine 2% Cloths 1 Application(s) Topical <User Schedule>  clonazePAM  Tablet 0.5 milliGRAM(s) Oral three times a day PRN  cyclobenzaprine 5 milliGRAM(s) Oral two times a day  dalfampridine ER 10 milliGRAM(s) Oral every 12 hours  DULoxetine 60 milliGRAM(s) Oral at bedtime  enoxaparin Injectable 40 milliGRAM(s) SubCutaneous every 24 hours  fentaNYL   Patch  12 MICROgram(s)/Hr 1 Patch Transdermal every 72 hours  FIRST- Mouthwash  BLM 10 milliLiter(s) Swish and Spit two times a day  HYDROmorphone   Tablet 2 milliGRAM(s) Oral every 4 hours PRN  HYDROmorphone   Tablet 4 milliGRAM(s) Oral every 6 hours PRN  multivitamin/minerals 1 Tablet(s) Oral daily  oxyCODONE  ER Tablet 10 milliGRAM(s) Oral every 12 hours  polyethylene glycol 3350 17 Gram(s) Oral two times a day PRN  senna 2 Tablet(s) Oral at bedtime  sertraline 100 milliGRAM(s) Oral at bedtime  valACYclovir 1000 milliGRAM(s) Oral every 12 hours  Vibegron (Gemtesa) 75 milliGRAM(s),Vibegron (Gemtesa) 75mg tablet 1 Tablet(s) 1 Tablet(s) Oral daily      Labs:  CBC Full  -  ( 2024 07:33 )  WBC Count : 10.30 K/uL  RBC Count : 3.33 M/uL  Hemoglobin : 8.0 g/dL  Hematocrit : 26.0 %  Platelet Count - Automated : 530 K/uL  Mean Cell Volume : 78.1 fl  Mean Cell Hemoglobin : 24.0 pg  Mean Cell Hemoglobin Concentration : 30.8 gm/dL  Auto Neutrophil # : x  Auto Lymphocyte # : x  Auto Monocyte # : x  Auto Eosinophil # : x  Auto Basophil # : x  Auto Neutrophil % : x  Auto Lymphocyte % : x  Auto Monocyte % : x  Auto Eosinophil % : x  Auto Basophil % : x        137  |  99  |  8   ----------------------------<  89  4.5   |  25  |  0.51    Ca    9.6      2024 07:33      CAPILLARY BLOOD GLUCOSE            Urinalysis Basic - ( 2024 07:33 )    Color: x / Appearance: x / SG: x / pH: x  Gluc: 89 mg/dL / Ketone: x  / Bili: x / Urobili: x   Blood: x / Protein: x / Nitrite: x   Leuk Esterase: x / RBC: x / WBC x   Sq Epi: x / Non Sq Epi: x / Bacteria: x          Vitals:  Vital Signs Last 24 Hrs  T(C): 36.6 (2024 09:04), Max: 37.3 (2024 10:30)  T(F): 97.8 (2024 09:04), Max: 99.1 (2024 10:30)  HR: 74 (2024 09:04) (71 - 90)  BP: 90/55 (2024 09:04) (90/55 - 110/75)  BP(mean): --  RR: 18 (2024 09:04) (18 - 18)  SpO2: 96% (2024 09:04) (96% - 99%)    Parameters below as of 2024 09:04  Patient On (Oxygen Delivery Method): room air            NEUROLOGICAL EXAM:    Mental status: Awake, alert, and in less apparent distress. Oriented to person, place and time. Language function is normal.  dysarthric    Cranial Nerves: Pupils were equal, round, reactive to light. Extraocular movements were intact. Visual field were full. Fundoscopic exam was deferred. Facial sensation was intact to light touch. There was slight left facial droop. The palate was upgoing symmetrically and tongue was midline.     Motor exam: Bulk and tone were normal.  Antigravity in all extremities without drift.  Chronic relative right hemiparesis.  Fine finger movements slower on the right    Reflexes: deferred     Sensation: Intact to light touch    Coordination: no gross dysmetria    Gait: deferred     sternal wound seen          ACC: [id number] EXAM:  MR SHOULDER WAW IC LT   ORDERED BY:  BRAN SANCHEZ     ACC: [id number] EXAM:  MR STERNOCLAVICULAR JNT LT   ORDERED BY: ANASTASIIA WALKER     PROCEDURE DATE:  2023          INTERPRETATION:  MRI OF THE LEFT STERNOCLAVICULAR JOINT AND SHOULDER    CLINICAL INFORMATION: Left sternal abscess status post washout with   persistent purulent drainage and left shoulder pain.  TECHNIQUE: Multisequence, multiplanar MRI of the left sternoclavicular   joint. The study was performed before and after the intravenous   administration of 6 ml Gadavist (1.5 cc discarded) .    COMPARISON: Chest CT 2023 and 2023.    FINDINGS:    STERNOCLAVICULAR JOINT:    BONE: Patient status post surgical resection of the left clavicular head.   The surgical margin is sharp with trace edema and no loss of T1   hyperintense signal. In the manubrium there is increased STIR signal with   loss of T1 hyperintense signal and postcontrast enhancement involving the   superolateral leftward aspect of the bone concerning for acute   osteomyelitis (6:9 5:9). No acute fracture. No osteonecrosis.    JOINTS: A residual rim-enhancing fluid collection is seen in the region   of the left sternoclavicular joint measuring 80 x 25 mm. A drainage   catheter is seen within the fluid collection.    SOFT TISSUE: Postsurgical changes are seen along the anterior aspect of   the left sternoclavicular joint. There is a mild amount of edema in the   adjacent pectoralis major muscle. No focal fluid collection in the   anterior mediastinum. Susceptibility artifact from surgical staples are   seen along the anterior chest wall.      SHOULDER JOINT:    ROTATOR CUFF: Low-grade partial-thickness bursal surface tearing of the   supraspinatus tendon. Rotator cuff is otherwise intact.  MUSCLES: No focal muscle edema or atrophy.  BICEPS TENDON: Normal in course and caliber.  GLENOID LABRUM AND GLENOHUMERAL LIGAMENTS: No displaced labral tear.   Inferior glenohumeral ligament is intact.  GLENOHUMERAL CARTILAGE AND SUBCHONDRAL BONE: No full-thickness chondral   loss.  AC JOINT: No AC joint arthropathy.  SYNOVIUM/JOINT FLUID: No glenohumeral joint effusion. No focal fluid in   the subacromial/subdeltoid bursa.  BONE MARROW: No fracture or osteonecrosis. No marrow signal change to   suggest acute osteomyelitis.  NEUROVASCULAR STRUCTURES: Structures of the suprascapular notch,   spinoglenoid notch, and quadrilateral space are normal in course and   caliber.  SUBCUTANEOUS SOFT TISSUES: Edema in the subcutaneous fat of the left   shoulder. No rim-enhancing fluid collection to suggest abscess.        IMPRESSION:  1.  Patient status post surgical resection of the left clavicular head   with washout of the left sternoclavicular joint.  2.  A 80 x 25 mm rim-enhancing fluid collection persists in the surgical   cavity with well-placed drain within the fluid collection.  3.  Marrow signal abnormality in the manubrium concerning for acute   osteomyelitis.  4.  Low-grade partial-thickness bursal surface tear of the supraspinatus   tendon.    --- End of Report ---            RASHIDA HYDE MD; Attending Radiologist  This document has been electronically signed. Dec 11 2023  5:11PM

## 2024-01-02 NOTE — PROGRESS NOTE ADULT - SUBJECTIVE AND OBJECTIVE BOX
Date of service: 01-02-24 @ 15:29      Patient is a 43y old  Female who presents with a chief complaint of L shoulder and chest pain (02 Jan 2024 09:59)                                                               INTERVAL HPI/OVERNIGHT EVENTS:    REVIEW OF SYSTEMS:     CONSTITUTIONAL: No weakness, fevers or chills  EYES/ENT: No visual changes , no ear ache   NECK: No pain or stiffness  RESPIRATORY: No cough, wheezing,  No shortness of breath  CARDIOVASCULAR: No chest pain or palpitations  GASTROINTESTINAL: No abdominal pain  . No nausea, vomiting, or hematemesis; No diarrhea or constipation. No melena or hematochezia.  GENITOURINARY: No dysuria, frequency or hematuria  NEUROLOGICAL: No numbness or weakness  R shoulder  " bothering me but been few days                                                                                                                                                                                                                                                                         Medications:  MEDICATIONS  (STANDING):  cefepime   IVPB 2000 milliGRAM(s) IV Intermittent every 8 hours  chlorhexidine 2% Cloths 1 Application(s) Topical <User Schedule>  cyclobenzaprine 5 milliGRAM(s) Oral two times a day  dalfampridine ER 10 milliGRAM(s) Oral every 12 hours  DULoxetine 60 milliGRAM(s) Oral at bedtime  enoxaparin Injectable 40 milliGRAM(s) SubCutaneous every 24 hours  fentaNYL   Patch  12 MICROgram(s)/Hr 1 Patch Transdermal every 72 hours  FIRST- Mouthwash  BLM 10 milliLiter(s) Swish and Spit two times a day  multivitamin/minerals 1 Tablet(s) Oral daily  oxyCODONE  ER Tablet 10 milliGRAM(s) Oral every 12 hours  senna 2 Tablet(s) Oral at bedtime  sertraline 100 milliGRAM(s) Oral at bedtime  valACYclovir 1000 milliGRAM(s) Oral every 12 hours  Vibegron (Gemtesa) 75 milliGRAM(s),Vibegron (Gemtesa) 75mg tablet 1 Tablet(s) 1 Tablet(s) Oral daily    MEDICATIONS  (PRN):  acetaminophen     Tablet .. 650 milliGRAM(s) Oral every 6 hours PRN Temp greater or equal to 38C (100.4F), Mild Pain (1 - 3)  benzocaine/menthol Lozenge 1 Lozenge Oral three times a day PRN Sore Throat  clonazePAM  Tablet 0.5 milliGRAM(s) Oral three times a day PRN anxiety  HYDROmorphone   Tablet 4 milliGRAM(s) Oral every 6 hours PRN Severe Pain (7 - 10)  HYDROmorphone   Tablet 2 milliGRAM(s) Oral every 4 hours PRN Moderate Pain (4 - 6)  polyethylene glycol 3350 17 Gram(s) Oral two times a day PRN Constipation       Allergies    No Known Allergies    Intolerances      Vital Signs Last 24 Hrs  T(C): 36.9 (02 Jan 2024 13:16), Max: 36.9 (01 Jan 2024 18:00)  T(F): 98.5 (02 Jan 2024 13:16), Max: 98.5 (01 Jan 2024 18:00)  HR: 77 (02 Jan 2024 13:16) (71 - 90)  BP: 93/60 (02 Jan 2024 13:16) (90/55 - 100/68)  BP(mean): --  RR: 18 (02 Jan 2024 13:16) (18 - 18)  SpO2: 96% (02 Jan 2024 13:16) (96% - 99%)    Parameters below as of 02 Jan 2024 13:16  Patient On (Oxygen Delivery Method): room air      CAPILLARY BLOOD GLUCOSE          01-01 @ 07:01 - 01-02 @ 07:00  --------------------------------------------------------  IN: 920 mL / OUT: 50 mL / NET: 870 mL    01-02 @ 07:01  - 01-02 @ 15:29  --------------------------------------------------------  IN: 400 mL / OUT: 0 mL / NET: 400 mL      Physical Exam:    Daily     Daily   General:  "NAD   HEENT:  Nonicteric, PERRLA  CV:  RRR, S1S2   Lungs:  CTA B/L, no wheezes, rales, rhonchi  Abdomen:  Soft, non-tender, no distended, positive BS  Extremities:  2+ pulses, no c/c, no edema  Skin:  Warm and dry, no rashes  :  No stanford  Neuro:  AAOx3, non-focal, grossly intact                                                                                                                                                                                                                                                                                                LABS:                               8.0    10.30 )-----------( 530      ( 01 Jan 2024 07:33 )             26.0                      01-01    137  |  99  |  8   ----------------------------<  89  4.5   |  25  |  0.51    Ca    9.6      01 Jan 2024 07:33                         RADIOLOGY & ADDITIONAL TESTS         I personally reviewed: [  ]EKG   [  ]CXR    [  ] CT      A/P:         Discussed with :     [facility] consultants' Notes   Time spent :

## 2024-01-02 NOTE — PROGRESS NOTE ADULT - ASSESSMENT
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  She had a recent fall in November 2023 secondary to unsteadiness due to multiple sclerosis, with subsequent left chest wall and shoulder pain.  Was seen at [facility] with concern for MS exacerbation but no MRI brain and cervical spine with an without contrast were done at the time without enhancement.  At the time infectious workup was done and notable for UTI which was treated.  She continued to have pain.  In the interim, she had an outpatient MRI of left sternoclavicular joint with results significant for marrow edema and swelling of the left sternoclavicular joint with a 2 cm fluid collection and adjacent pleural edema in the left chest with concerns for infectious etiology.   CT chest w/ IV contrast suggestive of septic arthritis with surrounding erythema. Thoracic surgery consulted.  Patient s/p surgical debridement on 12/7/23    mri with 80 x 25 mm rim-enhancing fluid collection persists in the surgical cavity with well-placed drain within the fluid collection.   Marrow signal abnormality in the manubrium concerning for acute   osteomyelitis.    Low-grade partial-thickness bursal surface tear of the supraspinatus tendon.    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  Now likely pseudo-flare in setting of septic arthritis    Recommendations:  Management of septic arthritis per primary team  s/p surgical debridement 12/7/23  hold ocrelizumab for now given septic arthritis  continue home meds once able to take PO post-surgically  abx as per ID  defer to primary team for pain control   difficulty standing suspect due to weakness.  Needs aggressive physical therapy as tolerated   will benefit from rehab.  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  She had a recent fall in November 2023 secondary to unsteadiness due to multiple sclerosis, with subsequent left chest wall and shoulder pain.  Was seen at [facility] with concern for MS exacerbation but no MRI brain and cervical spine with an without contrast were done at the time without enhancement.  At the time infectious workup was done and notable for UTI which was treated.  She continued to have pain.  In the interim, she had an outpatient MRI of left sternoclavicular joint with results significant for marrow edema and swelling of the left sternoclavicular joint with a 2 cm fluid collection and adjacent pleural edema in the left chest with concerns for infectious etiology.   CT chest w/ IV contrast suggestive of septic arthritis with surrounding erythema. Thoracic surgery consulted.  Patient s/p surgical debridement on 12/7/23    mri with 80 x 25 mm rim-enhancing fluid collection persists in the surgical cavity with well-placed drain within the fluid collection.   Marrow signal abnormality in the manubrium concerning for acute   osteomyelitis.    Low-grade partial-thickness bursal surface tear of the supraspinatus tendon.    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  Now likely pseudo-flare in setting of septic arthritis    Recommendations:  Management of septic arthritis per primary team  s/p surgical debridement 12/7/23  hold ocrelizumab for now given septic arthritis  continue home meds once able to take PO post-surgically  abx as per ID  defer to primary team for pain control   difficulty standing suspect due to weakness.  Needs aggressive physical therapy as tolerated   will benefit from rehab.  hold off on ocralizumab until healed from wound and not infected

## 2024-01-02 NOTE — PROGRESS NOTE ADULT - SUBJECTIVE AND OBJECTIVE BOX
CC: F/U for Septic joint    Saw/spoke to patient. No fevers, no chills. No new complaints.    Allergies  No Known Allergies    ANTIMICROBIALS:  cefepime   IVPB 2000 every 8 hours  valACYclovir 1000 every 12 hours    PE:    Vital Signs Last 24 Hrs  T(C): 36.9 (02 Jan 2024 13:16), Max: 36.9 (01 Jan 2024 18:00)  T(F): 98.5 (02 Jan 2024 13:16), Max: 98.5 (01 Jan 2024 18:00)  HR: 77 (02 Jan 2024 13:16) (71 - 90)  BP: 93/60 (02 Jan 2024 13:16) (90/55 - 100/68)  RR: 18 (02 Jan 2024 13:16) (18 - 18)  SpO2: 96% (02 Jan 2024 13:16) (96% - 99%)    Gen: AOx3, NAD, non-toxic  CV: Nontachycardic  Resp: Breathing comfortably, RA  Abd: Soft, nontender  IV/Skin: No thrombophlebitis    LABS:                        8.0    10.30 )-----------( 530      ( 01 Jan 2024 07:33 )             26.0       01-01    137  |  99  |  8   ----------------------------<  89  4.5   |  25  |  0.51    Ca    9.6      01 Jan 2024 07:33    Urinalysis Basic - ( 01 Jan 2024 07:33 )    Color: x / Appearance: x / SG: x / pH: x  Gluc: 89 mg/dL / Ketone: x  / Bili: x / Urobili: x   Blood: x / Protein: x / Nitrite: x   Leuk Esterase: x / RBC: x / WBC x   Sq Epi: x / Non Sq Epi: x / Bacteria: x    MICROBIOLOGY:    Clean Catch Clean Catch (Midstream)  12-28-23   <10,000 CFU/mL Normal Urogenital Renae  --  --    .Blood Blood-Peripheral  12-28-23   No growth at 4 days  --  --    .Blood Blood-Peripheral  12-28-23   No growth at 4 days  --  --    Clean Catch Clean Catch (Midstream)  12-26-23   <10,000 CFU/mL Normal Urogenital Renae  --  --    .Blood Blood-Peripheral  12-25-23   No growth at 5 days  --  --    .Tissue Other  12-16-23   No growth  --    No polymorphonuclear cells seen per low power field  No organisms seen per oil power field    .Tissue Other  12-16-23   No growth  --    No polymorphonuclear cells seen per low power field  No organisms seen per oil power field    .Tissue Other  12-16-23   No growth  --    No polymorphonuclear cells seen per low power field  No organisms seen per oil power field    .Blood Blood-Peripheral  12-08-23   No growth at 5 days  --  --    .Tissue Other  12-07-23   No acid-fast bacilli isolated at 3 weeks.  --    No polymorphonuclear leukocytes seen per low power field  No organisms seen per oil power field    .Blood Blood  12-06-23   No growth at 5 days  --  --    .Blood Blood  12-06-23   No growth at 5 days  --  --    Rapid RVP Result: NotDetec (12-27 @ 12:10)    RADIOLOGY:    12/28 CT    IMPRESSION:  *  No source of infection identified within the abdomen or pelvis.  *  Moderate to large colonic stool burden. No bowel obstruction. No   evidence for acute bowel inflammation.  *  Unchanged small right and trace left pleural effusions.

## 2024-01-02 NOTE — PROGRESS NOTE ADULT - SUBJECTIVE AND OBJECTIVE BOX
Subjective: Patient seen and examined. No new events except as noted.   feels ok     REVIEW OF SYSTEMS:    CONSTITUTIONAL: +weakness, fevers or chills  EYES/ENT: No visual changes;  No vertigo or throat pain   NECK: No pain or stiffness  RESPIRATORY: No cough, wheezing, hemoptysis; No shortness of breath  CARDIOVASCULAR: No chest pain or palpitations  GASTROINTESTINAL: No abdominal or epigastric pain. No nausea, vomiting, or hematemesis; No diarrhea or constipation. No melena or hematochezia.  GENITOURINARY: No dysuria, frequency or hematuria  NEUROLOGICAL: No numbness or weakness  SKIN: No itching, burning, rashes, or lesions   All other review of systems is negative unless indicated above.    MEDICATIONS:  MEDICATIONS  (STANDING):  cefepime   IVPB 2000 milliGRAM(s) IV Intermittent every 8 hours  chlorhexidine 2% Cloths 1 Application(s) Topical <User Schedule>  cyclobenzaprine 5 milliGRAM(s) Oral two times a day  dalfampridine ER 10 milliGRAM(s) Oral every 12 hours  DULoxetine 60 milliGRAM(s) Oral at bedtime  enoxaparin Injectable 40 milliGRAM(s) SubCutaneous every 24 hours  fentaNYL   Patch  12 MICROgram(s)/Hr 1 Patch Transdermal every 72 hours  FIRST- Mouthwash  BLM 10 milliLiter(s) Swish and Spit two times a day  multivitamin/minerals 1 Tablet(s) Oral daily  oxyCODONE  ER Tablet 10 milliGRAM(s) Oral every 12 hours  senna 2 Tablet(s) Oral at bedtime  sertraline 100 milliGRAM(s) Oral at bedtime  valACYclovir 1000 milliGRAM(s) Oral every 12 hours  Vibegron (Gemtesa) 75 milliGRAM(s),Vibegron (Gemtesa) 75mg tablet 1 Tablet(s) 1 Tablet(s) Oral daily      PHYSICAL EXAM:  T(C): 36.8 (01-02-24 @ 05:18), Max: 37.3 (01-01-24 @ 10:30)  HR: 71 (01-02-24 @ 05:18) (71 - 90)  BP: 91/58 (01-02-24 @ 05:18) (91/58 - 110/75)  RR: 18 (01-02-24 @ 05:18) (18 - 18)  SpO2: 97% (01-02-24 @ 05:18) (97% - 99%)  Wt(kg): --  I&O's Summary    01 Jan 2024 07:01  -  02 Jan 2024 07:00  --------------------------------------------------------  IN: 920 mL / OUT: 50 mL / NET: 870 mL            Appearance: NAD  HEENT:   Normal oral mucosa, PERRL, EOMI	  Lymphatic: No lymphadenopathy , no edema   L clavicular region w/ black sponge CAV dsg secured w/ good seal, minimal serousag drainage noted in collection chamber  Cardiovascular: Normal S1 S2, No JVD, No murmurs , Peripheral pulses palpable 2+ bilaterally  Respiratory: Lungs clear to auscultation, normal effort 	  Gastrointestinal:  Soft, Non-tender, + BS	  Skin: No rashes, No ecchymoses, No cyanosis, warm to touch  Musculoskeletal: Normal range of motion, normal strength  Psychiatry:  lethargic weak   Ext: No edema          LABS:    CARDIAC MARKERS:                                8.0    10.30 )-----------( 530      ( 01 Jan 2024 07:33 )             26.0     01-01    137  |  99  |  8   ----------------------------<  89  4.5   |  25  |  0.51    Ca    9.6      01 Jan 2024 07:33      proBNP:   Lipid Profile:   HgA1c:   TSH:             TELEMETRY: 	    ECG:  	  RADIOLOGY:   DIAGNOSTIC TESTING:  [ ] Echocardiogram:  [ ]  Catheterization:  [ ] Stress Test:    OTHER:

## 2024-01-03 RX ORDER — FENTANYL CITRATE 50 UG/ML
1 INJECTION INTRAVENOUS
Refills: 0 | Status: DISCONTINUED | OUTPATIENT
Start: 2024-01-03 | End: 2024-01-05

## 2024-01-03 RX ADMIN — Medication 650 MILLIGRAM(S): at 11:52

## 2024-01-03 RX ADMIN — FENTANYL CITRATE 1 PATCH: 50 INJECTION INTRAVENOUS at 20:10

## 2024-01-03 RX ADMIN — DULOXETINE HYDROCHLORIDE 60 MILLIGRAM(S): 30 CAPSULE, DELAYED RELEASE ORAL at 21:16

## 2024-01-03 RX ADMIN — FENTANYL CITRATE 1 PATCH: 50 INJECTION INTRAVENOUS at 00:25

## 2024-01-03 RX ADMIN — CYCLOBENZAPRINE HYDROCHLORIDE 5 MILLIGRAM(S): 10 TABLET, FILM COATED ORAL at 18:00

## 2024-01-03 RX ADMIN — OXYCODONE HYDROCHLORIDE 10 MILLIGRAM(S): 5 TABLET ORAL at 18:18

## 2024-01-03 RX ADMIN — Medication 650 MILLIGRAM(S): at 10:52

## 2024-01-03 RX ADMIN — CEFEPIME 100 MILLIGRAM(S): 1 INJECTION, POWDER, FOR SOLUTION INTRAMUSCULAR; INTRAVENOUS at 00:28

## 2024-01-03 RX ADMIN — OXYCODONE HYDROCHLORIDE 10 MILLIGRAM(S): 5 TABLET ORAL at 07:40

## 2024-01-03 RX ADMIN — HYDROMORPHONE HYDROCHLORIDE 2 MILLIGRAM(S): 2 INJECTION INTRAMUSCULAR; INTRAVENOUS; SUBCUTANEOUS at 17:02

## 2024-01-03 RX ADMIN — CEFEPIME 100 MILLIGRAM(S): 1 INJECTION, POWDER, FOR SOLUTION INTRAMUSCULAR; INTRAVENOUS at 08:36

## 2024-01-03 RX ADMIN — CEFEPIME 100 MILLIGRAM(S): 1 INJECTION, POWDER, FOR SOLUTION INTRAMUSCULAR; INTRAVENOUS at 16:55

## 2024-01-03 RX ADMIN — SERTRALINE 100 MILLIGRAM(S): 25 TABLET, FILM COATED ORAL at 21:12

## 2024-01-03 RX ADMIN — DIPHENHYDRAMINE HYDROCHLORIDE AND LIDOCAINE HYDROCHLORIDE AND ALUMINUM HYDROXIDE AND MAGNESIUM HYDRO 10 MILLILITER(S): KIT at 07:38

## 2024-01-03 RX ADMIN — FENTANYL CITRATE 1 PATCH: 50 INJECTION INTRAVENOUS at 07:57

## 2024-01-03 RX ADMIN — CHLORHEXIDINE GLUCONATE 1 APPLICATION(S): 213 SOLUTION TOPICAL at 07:44

## 2024-01-03 RX ADMIN — Medication 0.5 MILLIGRAM(S): at 16:27

## 2024-01-03 RX ADMIN — ENOXAPARIN SODIUM 40 MILLIGRAM(S): 100 INJECTION SUBCUTANEOUS at 21:11

## 2024-01-03 RX ADMIN — HYDROMORPHONE HYDROCHLORIDE 2 MILLIGRAM(S): 2 INJECTION INTRAMUSCULAR; INTRAVENOUS; SUBCUTANEOUS at 16:02

## 2024-01-03 RX ADMIN — CYCLOBENZAPRINE HYDROCHLORIDE 5 MILLIGRAM(S): 10 TABLET, FILM COATED ORAL at 07:39

## 2024-01-03 RX ADMIN — DALFAMPRIDINE 10 MILLIGRAM(S): 10 TABLET, FILM COATED, EXTENDED RELEASE ORAL at 10:52

## 2024-01-03 RX ADMIN — Medication 0.5 MILLIGRAM(S): at 00:36

## 2024-01-03 RX ADMIN — SENNA PLUS 2 TABLET(S): 8.6 TABLET ORAL at 21:12

## 2024-01-03 RX ADMIN — VALACYCLOVIR 1000 MILLIGRAM(S): 500 TABLET, FILM COATED ORAL at 07:39

## 2024-01-03 RX ADMIN — Medication 1 TABLET(S): at 13:15

## 2024-01-03 RX ADMIN — DIPHENHYDRAMINE HYDROCHLORIDE AND LIDOCAINE HYDROCHLORIDE AND ALUMINUM HYDROXIDE AND MAGNESIUM HYDRO 10 MILLILITER(S): KIT at 18:01

## 2024-01-03 RX ADMIN — VALACYCLOVIR 1000 MILLIGRAM(S): 500 TABLET, FILM COATED ORAL at 18:18

## 2024-01-03 RX ADMIN — OXYCODONE HYDROCHLORIDE 10 MILLIGRAM(S): 5 TABLET ORAL at 08:10

## 2024-01-03 RX ADMIN — DALFAMPRIDINE 10 MILLIGRAM(S): 10 TABLET, FILM COATED, EXTENDED RELEASE ORAL at 21:16

## 2024-01-03 NOTE — PROGRESS NOTE ADULT - SUBJECTIVE AND OBJECTIVE BOX
Subjective: Patient seen and examined. No new events except as noted.     REVIEW OF SYSTEMS:    CONSTITUTIONAL: + weakness, fevers or chills  EYES/ENT: No visual changes;  No vertigo or throat pain   NECK: No pain or stiffness  RESPIRATORY: No cough, wheezing, hemoptysis; No shortness of breath  CARDIOVASCULAR: No chest pain or palpitations  GASTROINTESTINAL: No abdominal or epigastric pain. No nausea, vomiting, or hematemesis; No diarrhea or constipation. No melena or hematochezia.  GENITOURINARY: No dysuria, frequency or hematuria  NEUROLOGICAL: No numbness or weakness  SKIN: No itching, burning, rashes, or lesions   All other review of systems is negative unless indicated above.    MEDICATIONS:  MEDICATIONS  (STANDING):  cefepime   IVPB 2000 milliGRAM(s) IV Intermittent every 8 hours  chlorhexidine 2% Cloths 1 Application(s) Topical <User Schedule>  cyclobenzaprine 5 milliGRAM(s) Oral two times a day  dalfampridine ER 10 milliGRAM(s) Oral every 12 hours  DULoxetine 60 milliGRAM(s) Oral at bedtime  enoxaparin Injectable 40 milliGRAM(s) SubCutaneous every 24 hours  fentaNYL   Patch  12 MICROgram(s)/Hr 1 Patch Transdermal every 72 hours  FIRST- Mouthwash  BLM 10 milliLiter(s) Swish and Spit two times a day  multivitamin/minerals 1 Tablet(s) Oral daily  oxyCODONE  ER Tablet 10 milliGRAM(s) Oral every 12 hours  senna 2 Tablet(s) Oral at bedtime  sertraline 100 milliGRAM(s) Oral at bedtime  valACYclovir 1000 milliGRAM(s) Oral every 12 hours  Vibegron (Gemtesa) 75 milliGRAM(s),Vibegron (Gemtesa) 75mg tablet 1 Tablet(s) 1 Tablet(s) Oral daily      PHYSICAL EXAM:  T(C): 37.3 (01-03-24 @ 16:56), Max: 37.3 (01-03-24 @ 16:56)  HR: 90 (01-03-24 @ 16:56) (90 - 92)  BP: 111/70 (01-03-24 @ 16:56) (97/62 - 111/70)  RR: 18 (01-03-24 @ 16:56) (18 - 18)  SpO2: 99% (01-03-24 @ 16:56) (95% - 99%)  Wt(kg): --  I&O's Summary    02 Jan 2024 07:01  -  03 Jan 2024 07:00  --------------------------------------------------------  IN: 880 mL / OUT: 0 mL / NET: 880 mL    03 Jan 2024 07:01  -  03 Jan 2024 17:34  --------------------------------------------------------  IN: 240 mL / OUT: 0 mL / NET: 240 mL            Appearance: NAD  HEENT:   Normal oral mucosa, PERRL, EOMI	  Lymphatic: No lymphadenopathy , no edema   L clavicular region w/ black sponge CAV dsg secured w/ good seal, minimal serousag drainage noted in collection chamber  Cardiovascular: Normal S1 S2, No JVD, No murmurs , Peripheral pulses palpable 2+ bilaterally  Respiratory: Lungs clear to auscultation, normal effort 	  Gastrointestinal:  Soft, Non-tender, + BS	  Skin: No rashes, No ecchymoses, No cyanosis, warm to touch  Musculoskeletal: decreased range of motion and strength  Psychiatry:  lethargic weak   Ext: No edema              LABS:    CARDIAC MARKERS:                  proBNP:   Lipid Profile:   HgA1c:   TSH:             TELEMETRY: 	    ECG:  	  RADIOLOGY:   DIAGNOSTIC TESTING:  [ ] Echocardiogram:  [ ]  Catheterization:  [ ] Stress Test:    OTHER:

## 2024-01-03 NOTE — PROGRESS NOTE ADULT - SUBJECTIVE AND OBJECTIVE BOX
Date of service: 24 @ 14:34      Patient is a 43y old  Female who presents with a chief complaint of L shoulder and chest pain (2024 15:28)                                                               INTERVAL HPI/OVERNIGHT EVENTS:    REVIEW OF SYSTEMS:     CONSTITUTIONAL: No weakness, fevers or chills  EYES/ENT: No visual changes , no ear ache   NECK: No pain or stiffness  RESPIRATORY: No cough, wheezing,  No shortness of breath  CARDIOVASCULAR: No chest pain or palpitations  GASTROINTESTINAL: No abdominal pain  . No nausea, vomiting, or hematemesis; No diarrhea or constipation. No melena or hematochezia.  GENITOURINARY: No dysuria, frequency or hematuria  NEUROLOGICAL: No numbness or weakness  SKIN: No itching, burning, rashes, or lesions                                                                                                                                                                                                                                                                                 Medications:  MEDICATIONS  (STANDING):  cefepime   IVPB 2000 milliGRAM(s) IV Intermittent every 8 hours  chlorhexidine 2% Cloths 1 Application(s) Topical <User Schedule>  cyclobenzaprine 5 milliGRAM(s) Oral two times a day  dalfampridine ER 10 milliGRAM(s) Oral every 12 hours  DULoxetine 60 milliGRAM(s) Oral at bedtime  enoxaparin Injectable 40 milliGRAM(s) SubCutaneous every 24 hours  fentaNYL   Patch  12 MICROgram(s)/Hr 1 Patch Transdermal every 72 hours  FIRST- Mouthwash  BLM 10 milliLiter(s) Swish and Spit two times a day  multivitamin/minerals 1 Tablet(s) Oral daily  oxyCODONE  ER Tablet 10 milliGRAM(s) Oral every 12 hours  senna 2 Tablet(s) Oral at bedtime  sertraline 100 milliGRAM(s) Oral at bedtime  valACYclovir 1000 milliGRAM(s) Oral every 12 hours  Vibegron (Gemtesa) 75 milliGRAM(s),Vibegron (Gemtesa) 75mg tablet 1 Tablet(s) 1 Tablet(s) Oral daily    MEDICATIONS  (PRN):  acetaminophen     Tablet .. 650 milliGRAM(s) Oral every 6 hours PRN Temp greater or equal to 38C (100.4F), Mild Pain (1 - 3)  benzocaine/menthol Lozenge 1 Lozenge Oral three times a day PRN Sore Throat  clonazePAM  Tablet 0.5 milliGRAM(s) Oral three times a day PRN anxiety  HYDROmorphone   Tablet 4 milliGRAM(s) Oral every 6 hours PRN Severe Pain (7 - 10)  HYDROmorphone   Tablet 2 milliGRAM(s) Oral every 4 hours PRN Moderate Pain (4 - 6)  polyethylene glycol 3350 17 Gram(s) Oral two times a day PRN Constipation       Allergies    No Known Allergies    Intolerances      Vital Signs Last 24 Hrs  T(C): 36.5 (2024 13:37), Max: 36.8 (2024 21:06)  T(F): 97.7 (2024 13:37), Max: 98.2 (2024 21:06)  HR: 92 (2024 13:37) (75 - 92)  BP: 97/62 (2024 07:35) (95/54 - 105/62)  BP(mean): --  RR: 18 (2024 13:37) (17 - 18)  SpO2: 98% (2024 13:37) (95% - 98%)    Parameters below as of 2024 13:37  Patient On (Oxygen Delivery Method): room air      CAPILLARY BLOOD GLUCOSE           @ 07:01  -   @ 07:00  --------------------------------------------------------  IN: 880 mL / OUT: 0 mL / NET: 880 mL      Physical Exam:    Daily     Daily Weight in k (2024 08:50)  General:  Well appearing, NAD, not cachetic  HEENT:  Nonicteric, PERRLA  CV:  RRR, S1S2   Lungs:  CTA B/L, no wheezes, rales, rhonchi  Abdomen:  Soft, non-tender, no distended, positive BS  Extremities:  2+ pulses, no c/c, no edema  Skin:  Warm and dry, no rashes  :  No stanford  Neuro:  AAOx3, non-focal, grossly intact                                                                                                                                                                                                                                                                                                LABS:                                                     RADIOLOGY & ADDITIONAL TESTS         I personally reviewed: [  ]EKG   [  ]CXR    [  ] CT      A/P:         Discussed with :     [facility] consultants' Notes   Time spent :

## 2024-01-03 NOTE — PROGRESS NOTE ADULT - ASSESSMENT
43F PMHx of MS on Rituxan, presenting with left chest wall and shoulder pain w/ concern for infection now admitted with likely septic arthritis with need for possible washout and debridement       Problem/Plan - 1:  ·  Problem: Septic arthritis. left shoulder joint   ·  Plan: Imaging consistent with septic arthritis  s/p OR   cont abx and fu cultures   check ECHO : no vegetation   fu with ID   d/w with CTS at bedside : drainage adjusted and fx improved   cont current abx   pt with LUE pain : kaciley referred pain from L [id number] and chest however  Cervical MRI : no disciits / abcess   discussed with pt , family at length at bedside   washout and debridement of sternoclavicular joint; resection of medial manubrium; white and black wound vac placed  now s/p vac change  PICC in place   cont current abx   pt still with episodes of fever   cultures neg   ct chest no acute pathology   d/w ID will check CT a/p : no acute pathology   check Echo : no vegetation   check procal and esr /crp : noted   dental input appreciated   R shouulder pain : MR had been done and was with no acute pathology           # c/o left arm swelling and pain in elbow region :  -doppler ordered to r/o DVT : Negative      Problem/Plan - 2:  ·  Problem: Multiple sclerosis.   ·  Plan: Gets Rituxan every 6 months, next dose due : holding   -On Ampyra BID at home, need to bring home med     Problem/Plan - 3:  ·  Problem: Anxiety.   ·  Plan: ISTOP reviewed Reference #: [id number]  -Cont. Clonazepam 0.5mg QHS PRN  -Cont. Sertraline 100mg QHS  -Cont. Duloxetine 60mg QHS, pt unsure of dose  -Need full med rec.     Problem/Plan - 4:  ·  Problem: Anemia.   ·  Plan: hgb 9, lower than prior year. Pt endorses some recent anemia but cannot specify  - pt feeling weak and fatigued   will chemo one unit of prbc        Problem/Plan - 5:  ·  Problem: Prophylactic measure.   ·  Plan: DVT PPx    hyperkalemia : normalized     afeb   prepare for Dc   d/w pt , hcp and daughter   d/w pmd   d/w cm   awaiting auth   d/w acp   weekly labs   fu with surgery and ID      43F PMHx of MS on Rituxan, presenting with left chest wall and shoulder pain w/ concern for infection now admitted with likely septic arthritis with need for possible washout and debridement       Problem/Plan - 1:  ·  Problem: Septic arthritis. left shoulder joint   ·  Plan: Imaging consistent with septic arthritis  s/p OR   cont abx and fu cultures   check ECHO : no vegetation   fu with ID   d/w with CTS at bedside : drainage adjusted and fx improved   cont current abx   pt with LUE pain : kaciley referred pain from L [id number] and chest however  Cervical MRI : no disciits / abcess   discussed with pt , family at length at bedside   washout and debridement of sternoclavicular joint; resection of medial manubrium; white and black wound vac placed  now s/p vac change  PICC in place   cont current abx   pt still with episodes of fever   cultures neg   ct chest no acute pathology   d/w ID will check CT a/p : no acute pathology   check Echo : no vegetation   check procal and esr /crp : noted   dental input appreciated   R shouulder pain : MR had been done and was with no acute pathology           # c/o left arm swelling and pain in elbow region :  -doppler ordered to r/o DVT : Negative      Problem/Plan - 2:  ·  Problem: Multiple sclerosis.   ·  Plan: Gets Rituxan every 6 months, next dose due : holding   -On Ampyra BID at home, need to bring home med     Problem/Plan - 3:  ·  Problem: Anxiety.   ·  Plan: ISTOP reviewed Reference #: [id number]  -Cont. Clonazepam 0.5mg QHS PRN  -Cont. Sertraline 100mg QHS  -Cont. Duloxetine 60mg QHS, pt unsure of dose  -Need full med rec.     Problem/Plan - 4:  ·  Problem: Anemia.   ·  Plan: hgb 9, lower than prior year. Pt endorses some recent anemia but cannot specify  - pt feeling weak and fatigued   will chemo one unit of prbc        Problem/Plan - 5:  ·  Problem: Prophylactic measure.   ·  Plan: DVT PPx    hyperkalemia : normalized     afeb   prepare for Dc   d/w pt , hcp and daughter   d/w pmd   d/w cm   awaiting auth   d/w acp   weekly labs   fu with surgery and ID

## 2024-01-03 NOTE — PROGRESS NOTE ADULT - ASSESSMENT
43F PMHx of MS on immunosuppressive medication, presenting with left chest wall and shoulder pain. Recent history of fall secondary to unsteadiness due to MS. Obtained outpatient MRI of left sternoclavicular joint with results significant for marrow edema and swelling of the left sternoclavicular joint with a 2 cm fluid collection and adjacent pleural edema in the left chest with concerns for infectious etiology.  Patient initially saw orthopedics with for concerns for septic arthritis of this joint.  Impression of the read showed soft tissue abscess superficial to the joint.  Recommended CT scan with IV contrast of the chest.  Patient denies any fevers however endorsing erythema to the left sternoclavicular joint region. Patient states she was recently hospitalized over a week ago at [facility] for falls secondary to unsteadiness due to her MS.  Denies any recent falls today, vision changes. Endorsing headache. Denies any chest pain, shortness of breath, abdominal pain, nausea vomiting diarrhea, urinary complaints.  CT chest with IV in ED significant for Septic arthritis of the left sternoclavicular joint and involving the articulation of the sternum with the first costal cartilage.Extensive surrounding inflammation, with pneumonia in the underlying subpleural left upper lobe.No fluid collection is evident.  Workup significant for elevated alk phos (248), . VSS and afebrile  s/p Debridement of left sternoclavicular joint with excision of left clavicular head. Purulent drainage at the sternoclavicular joint. Tissue surrounding left clavicle found to be very inflamed. Copious irrigation of surgical site.   Concerned about pain at surgical site.    at bedside     43F PMHx of MS on immunosuppressive medication, presenting with left chest wall and shoulder pain. Recent history of fall secondary to unsteadiness due to MS. Obtained outpatient MRI of left sternoclavicular joint with results significant for marrow edema and swelling of the left sternoclavicular joint with a 2 cm fluid collection and adjacent pleural edema in the left chest with concerns for infectious etiology.  Patient initially saw orthopedics with for concerns for septic arthritis of this joint.  Impression of the read showed soft tissue abscess superficial to the joint.  Recommended CT scan with IV contrast of the chest.  Patient denies any fevers however endorsing erythema to the left sternoclavicular joint region. Patient states she was recently hospitalized over a week ago at [facility] for falls secondary to unsteadiness due to her MS.  Denies any recent falls today, vision changes. Endorsing headache. Denies any chest pain, shortness of breath, abdominal pain, nausea vomiting diarrhea, urinary complaints.  CT chest with IV in ED significant for Septic arthritis of the left sternoclavicular joint and involving the articulation of the sternum with the first costal cartilage.Extensive surrounding inflammation, with pneumonia in the underlying subpleural left upper lobe.No fluid collection is evident.  Workup significant for elevated alk phos (248), . VSS and afebrile  s/p Debridement of left sternoclavicular joint with excision of left clavicular head. Purulent drainage at the sternoclavicular joint. Tissue surrounding left clavicle found to be very inflamed. Copious irrigation of surgical site.   Concerned about pain at surgical site.    at bedside

## 2024-01-04 LAB
HCT VFR BLD CALC: 25.5 % — LOW (ref 34.5–45)
HCT VFR BLD CALC: 25.5 % — LOW (ref 34.5–45)
HGB BLD-MCNC: 7.8 G/DL — LOW (ref 11.5–15.5)
HGB BLD-MCNC: 7.8 G/DL — LOW (ref 11.5–15.5)
MCHC RBC-ENTMCNC: 23.8 PG — LOW (ref 27–34)
MCHC RBC-ENTMCNC: 23.8 PG — LOW (ref 27–34)
MCHC RBC-ENTMCNC: 30.6 GM/DL — LOW (ref 32–36)
MCHC RBC-ENTMCNC: 30.6 GM/DL — LOW (ref 32–36)
MCV RBC AUTO: 77.7 FL — LOW (ref 80–100)
MCV RBC AUTO: 77.7 FL — LOW (ref 80–100)
NRBC # BLD: 0 /100 WBCS — SIGNIFICANT CHANGE UP (ref 0–0)
NRBC # BLD: 0 /100 WBCS — SIGNIFICANT CHANGE UP (ref 0–0)
PLATELET # BLD AUTO: 536 K/UL — HIGH (ref 150–400)
PLATELET # BLD AUTO: 536 K/UL — HIGH (ref 150–400)
RBC # BLD: 3.28 M/UL — LOW (ref 3.8–5.2)
RBC # BLD: 3.28 M/UL — LOW (ref 3.8–5.2)
RBC # FLD: 16.6 % — HIGH (ref 10.3–14.5)
RBC # FLD: 16.6 % — HIGH (ref 10.3–14.5)
WBC # BLD: 8.22 K/UL — SIGNIFICANT CHANGE UP (ref 3.8–10.5)
WBC # BLD: 8.22 K/UL — SIGNIFICANT CHANGE UP (ref 3.8–10.5)
WBC # FLD AUTO: 8.22 K/UL — SIGNIFICANT CHANGE UP (ref 3.8–10.5)
WBC # FLD AUTO: 8.22 K/UL — SIGNIFICANT CHANGE UP (ref 3.8–10.5)

## 2024-01-04 PROCEDURE — 99233 SBSQ HOSP IP/OBS HIGH 50: CPT

## 2024-01-04 RX ADMIN — Medication 1 TABLET(S): at 11:14

## 2024-01-04 RX ADMIN — SENNA PLUS 2 TABLET(S): 8.6 TABLET ORAL at 21:13

## 2024-01-04 RX ADMIN — BENZOCAINE AND MENTHOL 1 LOZENGE: 5; 1 LIQUID ORAL at 05:13

## 2024-01-04 RX ADMIN — CYCLOBENZAPRINE HYDROCHLORIDE 5 MILLIGRAM(S): 10 TABLET, FILM COATED ORAL at 18:12

## 2024-01-04 RX ADMIN — DIPHENHYDRAMINE HYDROCHLORIDE AND LIDOCAINE HYDROCHLORIDE AND ALUMINUM HYDROXIDE AND MAGNESIUM HYDRO 10 MILLILITER(S): KIT at 21:14

## 2024-01-04 RX ADMIN — Medication 650 MILLIGRAM(S): at 12:25

## 2024-01-04 RX ADMIN — Medication 650 MILLIGRAM(S): at 19:00

## 2024-01-04 RX ADMIN — SERTRALINE 100 MILLIGRAM(S): 25 TABLET, FILM COATED ORAL at 21:13

## 2024-01-04 RX ADMIN — VALACYCLOVIR 1000 MILLIGRAM(S): 500 TABLET, FILM COATED ORAL at 18:15

## 2024-01-04 RX ADMIN — DIPHENHYDRAMINE HYDROCHLORIDE AND LIDOCAINE HYDROCHLORIDE AND ALUMINUM HYDROXIDE AND MAGNESIUM HYDRO 10 MILLILITER(S): KIT at 05:11

## 2024-01-04 RX ADMIN — Medication 650 MILLIGRAM(S): at 11:14

## 2024-01-04 RX ADMIN — Medication 650 MILLIGRAM(S): at 23:34

## 2024-01-04 RX ADMIN — FENTANYL CITRATE 1 PATCH: 50 INJECTION INTRAVENOUS at 23:59

## 2024-01-04 RX ADMIN — CEFEPIME 100 MILLIGRAM(S): 1 INJECTION, POWDER, FOR SOLUTION INTRAMUSCULAR; INTRAVENOUS at 00:21

## 2024-01-04 RX ADMIN — CEFEPIME 100 MILLIGRAM(S): 1 INJECTION, POWDER, FOR SOLUTION INTRAMUSCULAR; INTRAVENOUS at 09:07

## 2024-01-04 RX ADMIN — Medication 650 MILLIGRAM(S): at 05:12

## 2024-01-04 RX ADMIN — CHLORHEXIDINE GLUCONATE 1 APPLICATION(S): 213 SOLUTION TOPICAL at 11:14

## 2024-01-04 RX ADMIN — DULOXETINE HYDROCHLORIDE 60 MILLIGRAM(S): 30 CAPSULE, DELAYED RELEASE ORAL at 21:13

## 2024-01-04 RX ADMIN — DALFAMPRIDINE 10 MILLIGRAM(S): 10 TABLET, FILM COATED, EXTENDED RELEASE ORAL at 11:15

## 2024-01-04 RX ADMIN — FENTANYL CITRATE 1 PATCH: 50 INJECTION INTRAVENOUS at 07:53

## 2024-01-04 RX ADMIN — Medication 650 MILLIGRAM(S): at 18:13

## 2024-01-04 RX ADMIN — CEFEPIME 100 MILLIGRAM(S): 1 INJECTION, POWDER, FOR SOLUTION INTRAMUSCULAR; INTRAVENOUS at 15:48

## 2024-01-04 RX ADMIN — CYCLOBENZAPRINE HYDROCHLORIDE 5 MILLIGRAM(S): 10 TABLET, FILM COATED ORAL at 05:11

## 2024-01-04 RX ADMIN — VALACYCLOVIR 1000 MILLIGRAM(S): 500 TABLET, FILM COATED ORAL at 05:12

## 2024-01-04 RX ADMIN — ENOXAPARIN SODIUM 40 MILLIGRAM(S): 100 INJECTION SUBCUTANEOUS at 21:14

## 2024-01-04 RX ADMIN — CEFEPIME 100 MILLIGRAM(S): 1 INJECTION, POWDER, FOR SOLUTION INTRAMUSCULAR; INTRAVENOUS at 23:34

## 2024-01-04 RX ADMIN — DALFAMPRIDINE 10 MILLIGRAM(S): 10 TABLET, FILM COATED, EXTENDED RELEASE ORAL at 23:57

## 2024-01-04 RX ADMIN — Medication 0.5 MILLIGRAM(S): at 18:15

## 2024-01-04 RX ADMIN — POLYETHYLENE GLYCOL 3350 17 GRAM(S): 17 POWDER, FOR SOLUTION ORAL at 11:14

## 2024-01-04 RX ADMIN — Medication 650 MILLIGRAM(S): at 06:12

## 2024-01-04 NOTE — PROGRESS NOTE ADULT - SUBJECTIVE AND OBJECTIVE BOX
Date of service: 01-04-24 @ 17:44      Patient is a 43y old  Female who presents with a chief complaint of L shoulder and chest pain (04 Jan 2024 11:25)                                                               INTERVAL HPI/OVERNIGHT EVENTS:    REVIEW OF SYSTEMS:     CONSTITUTIONAL: No weakness, fevers or chills  RESPIRATORY: No cough, wheezing,  No shortness of breath  CARDIOVASCULAR: No chest pain or palpitations  GASTROINTESTINAL: No abdominal pain  . No nausea, vomiting, or hematemesis; No diarrhea or constipation. No melena or hematochezia.  GENITOURINARY: No dysuria, frequency or hematuria  NEUROLOGICAL: No numbness or weakness                                                                                                                                                                                                                                                                                   Medications:  MEDICATIONS  (STANDING):  cefepime   IVPB 2000 milliGRAM(s) IV Intermittent every 8 hours  chlorhexidine 2% Cloths 1 Application(s) Topical <User Schedule>  cyclobenzaprine 5 milliGRAM(s) Oral two times a day  dalfampridine ER 10 milliGRAM(s) Oral every 12 hours  DULoxetine 60 milliGRAM(s) Oral at bedtime  enoxaparin Injectable 40 milliGRAM(s) SubCutaneous every 24 hours  fentaNYL   Patch  12 MICROgram(s)/Hr 1 Patch Transdermal every 72 hours  FIRST- Mouthwash  BLM 10 milliLiter(s) Swish and Spit two times a day  multivitamin/minerals 1 Tablet(s) Oral daily  oxyCODONE  ER Tablet 10 milliGRAM(s) Oral every 12 hours  senna 2 Tablet(s) Oral at bedtime  sertraline 100 milliGRAM(s) Oral at bedtime  valACYclovir 1000 milliGRAM(s) Oral every 12 hours  Vibegron (Gemtesa) 75 milliGRAM(s),Vibegron (Gemtesa) 75mg tablet 1 Tablet(s) 1 Tablet(s) Oral daily    MEDICATIONS  (PRN):  acetaminophen     Tablet .. 650 milliGRAM(s) Oral every 6 hours PRN Temp greater or equal to 38C (100.4F), Mild Pain (1 - 3)  benzocaine/menthol Lozenge 1 Lozenge Oral three times a day PRN Sore Throat  clonazePAM  Tablet 0.5 milliGRAM(s) Oral three times a day PRN anxiety  HYDROmorphone   Tablet 4 milliGRAM(s) Oral every 6 hours PRN Severe Pain (7 - 10)  HYDROmorphone   Tablet 2 milliGRAM(s) Oral every 4 hours PRN Moderate Pain (4 - 6)  polyethylene glycol 3350 17 Gram(s) Oral two times a day PRN Constipation       Allergies    No Known Allergies    Intolerances      Vital Signs Last 24 Hrs  T(C): 36.7 (04 Jan 2024 14:23), Max: 37.7 (03 Jan 2024 20:58)  T(F): 98 (04 Jan 2024 14:23), Max: 99.9 (03 Jan 2024 20:58)  HR: 88 (04 Jan 2024 14:23) (76 - 100)  BP: 92/58 (04 Jan 2024 14:23) (92/58 - 97/67)  BP(mean): --  RR: 18 (04 Jan 2024 14:23) (18 - 18)  SpO2: 99% (04 Jan 2024 14:23) (97% - 99%)    Parameters below as of 04 Jan 2024 14:23  Patient On (Oxygen Delivery Method): room air      CAPILLARY BLOOD GLUCOSE          01-03 @ 07:01 - 01-04 @ 07:00  --------------------------------------------------------  IN: 520 mL / OUT: 0 mL / NET: 520 mL    01-04 @ 07:01  -  01-04 @ 17:44  --------------------------------------------------------  IN: 240 mL / OUT: 0 mL / NET: 240 mL      Physical Exam:    Daily     Daily   General:  Well appearing, NAD, not cachetic  HEENT:  Nonicteric, PERRLA  CV:  RRR, S1S2   Lungs:  CTA B/L, no wheezes, rales, rhonchi  Abdomen:  Soft, non-tender, no distended, positive BS  Extremities:  2+ pulses, no c/c, no edema  Skin:  Warm and dry, no rashes  :  No stanford  Neuro:  AAOx3, non-focal, grossly intact                                                                                                                                                                                                                                                                                                LABS:                               7.8    8.22  )-----------( 536      ( 04 Jan 2024 06:53 )             25.5                                               RADIOLOGY & ADDITIONAL TESTS         I personally reviewed: [  ]EKG   [  ]CXR    [  ] CT      A/P:         Discussed with :     [facility] consultants' Notes   Time spent :

## 2024-01-04 NOTE — PROGRESS NOTE ADULT - ASSESSMENT
43F PMHx of MS on Rituxan, presenting with left chest wall and shoulder pain w/ concern for infection now admitted with likely septic arthritis with need for possible washout and debridement       Problem/Plan - 1:  ·  Problem: Septic arthritis. left shoulder joint   ·  Plan: Imaging consistent with septic arthritis  s/p OR   cont abx and fu cultures   check ECHO : no vegetation   fu with ID   d/w with CTS at bedside : drainage adjusted and fx improved   cont current abx   pt with LUE pain : kaciley referred pain from L [id number] and chest however  Cervical MRI : no disciits / abcess   discussed with pt , family at length at bedside   washout and debridement of sternoclavicular joint; resection of medial manubrium; white and black wound vac placed  now s/p vac change  PICC in place   cont current abx   pt still with episodes of fever   cultures neg   ct chest no acute pathology   d/w ID will check CT a/p : no acute pathology   check Echo : no vegetation   check procal and esr /crp : noted   dental input appreciated   R shouulder pain : MR had been done and was with no acute pathology           # c/o left arm swelling and pain in elbow region :  -doppler ordered to r/o DVT : Negative      Problem/Plan - 2:  ·  Problem: Multiple sclerosis.   ·  Plan: Gets Rituxan every 6 months, next dose due : holding   -On Ampyra BID at home, need to bring home med     Problem/Plan - 3:  ·  Problem: Anxiety.   ·  Plan: ISTOP reviewed Reference #: [id number]  -Cont. Clonazepam 0.5mg QHS PRN  -Cont. Sertraline 100mg QHS  -Cont. Duloxetine 60mg QHS, pt unsure of dose  -Need full med rec.     Problem/Plan - 4:  ·  Problem: Anemia.   ·  Plan: hgb 9, lower than prior year. Pt endorses some recent anemia but cannot specify  - pt feeling weak and fatigued   will chemo one unit of prbc        Problem/Plan - 5:  ·  Problem: Prophylactic measure.   ·  Plan: DVT PPx    hyperkalemia : normalized     afeb   prepare for Dc   d/w pt , and mother    d/w cm    auth verbally granted   weekly labs   fu with surgery and ID      43F PMHx of MS on Rituxan, presenting with left chest wall and shoulder pain w/ concern for infection now admitted with likely septic arthritis with need for possible washout and debridement       Problem/Plan - 1:  ·  Problem: Septic arthritis. left shoulder joint   ·  Plan: Imaging consistent with septic arthritis  s/p OR   cont abx and fu cultures   check ECHO : no vegetation   fu with ID   d/w with CTS at bedside : drainage adjusted and fx improved   cont current abx   pt with LUE pain : kaciley referred pain from L [id number] and chest however  Cervical MRI : no disciits / abcess   discussed with pt , family at length at bedside   washout and debridement of sternoclavicular joint; resection of medial manubrium; white and black wound vac placed  now s/p vac change  PICC in place   cont current abx   pt still with episodes of fever   cultures neg   ct chest no acute pathology   d/w ID will check CT a/p : no acute pathology   check Echo : no vegetation   check procal and esr /crp : noted   dental input appreciated   R shouulder pain : MR had been done and was with no acute pathology           # c/o left arm swelling and pain in elbow region :  -doppler ordered to r/o DVT : Negative      Problem/Plan - 2:  ·  Problem: Multiple sclerosis.   ·  Plan: Gets Rituxan every 6 months, next dose due : holding   -On Ampyra BID at home, need to bring home med     Problem/Plan - 3:  ·  Problem: Anxiety.   ·  Plan: ISTOP reviewed Reference #: [id number]  -Cont. Clonazepam 0.5mg QHS PRN  -Cont. Sertraline 100mg QHS  -Cont. Duloxetine 60mg QHS, pt unsure of dose  -Need full med rec.     Problem/Plan - 4:  ·  Problem: Anemia.   ·  Plan: hgb 9, lower than prior year. Pt endorses some recent anemia but cannot specify  - pt feeling weak and fatigued   will chemo one unit of prbc        Problem/Plan - 5:  ·  Problem: Prophylactic measure.   ·  Plan: DVT PPx    hyperkalemia : normalized     afeb   prepare for Dc   d/w pt , and mother    d/w cm    auth verbally granted   weekly labs   fu with surgery and ID

## 2024-01-04 NOTE — PROGRESS NOTE ADULT - SUBJECTIVE AND OBJECTIVE BOX
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  She had a recent fall in 2023 secondary to unsteadiness due to multiple sclerosis, with subsequent left chest wall and shoulder pain.  Was seen at [facility] with concern for MS exacerbation but no MRI brain and cervical spine with an without contrast were done at the time without enhancement.  At the time infectious workup was done and notable for UTI which was treated.  She continued to have pain.  In the interim, she had an outpatient MRI of left sternoclavicular joint with results significant for marrow edema and swelling of the left sternoclavicular joint with a 2 cm fluid collection and adjacent pleural edema in the left chest with concerns for infectious etiology.   CT chest w/ IV contrast suggestive of septic arthritis with surrounding erythema. Thoracic surgery consulted.  Patient s/p surgical debridement on 23  mri with 80 x 25 mm rim-enhancing fluid collection persists in the surgical cavity with well-placed drain within the fluid collection.   Marrow signal abnormality in the manubrium concerning for acute   osteomyelitis.    Low-grade partial-thickness bursal surface tear of the supraspinatus tendon.      Int hx:  now with some right shoulder pain, MRI of the right shoulder did not show osteomyelitis  would benefit from rehab     ******        Past Medical History:  Multiple sclerosis [G35]        Past Surgical History:  History of  [Z98.891]        Social History:  No toxic habits    Family History:  FAMILY HISTORY:      Allergies:  No Known Allergies      ROS:  Constitutional: Patient offers no complaints of fevers or significant weight loss  Ears, Nose, Mouth and Throat: The patient presents with no abnormalities of the head, ears, eyes, nose or throat  Medications:  acetaminophen     Tablet .. 650 milliGRAM(s) Oral every 6 hours PRN  benzocaine/menthol Lozenge 1 Lozenge Oral three times a day PRN  cefepime   IVPB 2000 milliGRAM(s) IV Intermittent every 8 hours  chlorhexidine 2% Cloths 1 Application(s) Topical <User Schedule>  clonazePAM  Tablet 0.5 milliGRAM(s) Oral three times a day PRN  cyclobenzaprine 5 milliGRAM(s) Oral two times a day  dalfampridine ER 10 milliGRAM(s) Oral every 12 hours  DULoxetine 60 milliGRAM(s) Oral at bedtime  enoxaparin Injectable 40 milliGRAM(s) SubCutaneous every 24 hours  fentaNYL   Patch  12 MICROgram(s)/Hr 1 Patch Transdermal every 72 hours  FIRST- Mouthwash  BLM 10 milliLiter(s) Swish and Spit two times a day  HYDROmorphone   Tablet 4 milliGRAM(s) Oral every 6 hours PRN  HYDROmorphone   Tablet 2 milliGRAM(s) Oral every 4 hours PRN  multivitamin/minerals 1 Tablet(s) Oral daily  oxyCODONE  ER Tablet 10 milliGRAM(s) Oral every 12 hours  polyethylene glycol 3350 17 Gram(s) Oral two times a day PRN  senna 2 Tablet(s) Oral at bedtime  sertraline 100 milliGRAM(s) Oral at bedtime  valACYclovir 1000 milliGRAM(s) Oral every 12 hours  Vibegron (Gemtesa) 75 milliGRAM(s),Vibegron (Gemtesa) 75mg tablet 1 Tablet(s) 1 Tablet(s) Oral daily      Labs:  CBC Full  -  ( 2024 06:53 )  WBC Count : 8.22 K/uL  RBC Count : 3.28 M/uL  Hemoglobin : 7.8 g/dL  Hematocrit : 25.5 %  Platelet Count - Automated : 536 K/uL  Mean Cell Volume : 77.7 fl  Mean Cell Hemoglobin : 23.8 pg  Mean Cell Hemoglobin Concentration : 30.6 gm/dL  Auto Neutrophil # : x  Auto Lymphocyte # : x  Auto Monocyte # : x  Auto Eosinophil # : x  Auto Basophil # : x  Auto Neutrophil % : x  Auto Lymphocyte % : x  Auto Monocyte % : x  Auto Eosinophil % : x  Auto Basophil % : x          CAPILLARY BLOOD GLUCOSE                  Vitals:  Vital Signs Last 24 Hrs  T(C): 36.5 (2024 08:51), Max: 37.7 (2024 20:58)  T(F): 97.7 (2024 08:51), Max: 99.9 (2024 20:58)  HR: 76 (2024 08:51) (76 - 100)  BP: 94/60 (2024 08:51) (94/60 - 111/70)  BP(mean): --  RR: 18 (2024 08:51) (18 - 18)  SpO2: 98% (2024 08:51) (97% - 99%)    Parameters below as of 2024 08:51  Patient On (Oxygen Delivery Method): room air    Skin: Patient offers no concerns of new rashes or lesions  Respiratory: The patient presents with no abnormalities of the respiratory tract  Cardiovascular: The patient presents with no cardiac abnormalities  Gastrointestinal: The patient presents with no abnormalities of the GI system  Genitourinary: The patient presents with no dysuria, hematuria or frequent urination  Neurological: See HPI  Endocrine: Patient offers no complaints of excessive thirst, urination, or heat/cold intolerance                NEUROLOGICAL EXAM:    Mental status: Awake, alert, and in less apparent distress. Oriented to person, place and time. Language function is normal.  dysarthric    Cranial Nerves: Pupils were equal, round, reactive to light. Extraocular movements were intact. Visual field were full. Fundoscopic exam was deferred. Facial sensation was intact to light touch. There was slight left facial droop. The palate was upgoing symmetrically and tongue was midline.     Motor exam: Bulk and tone were normal.  Antigravity in all extremities without drift.  Chronic relative right hemiparesis.  Fine finger movements slower on the right    Reflexes: deferred     Sensation: Intact to light touch    Coordination: no gross dysmetria    Gait: deferred     sternal wound seen          ACC: [id number] EXAM:  MR SHOULDER WAW IC LT   ORDERED BY:  BRAN SANCHEZ     ACC: [id number] EXAM:  MR STERNOCLAVICULAR JNT LT   ORDERED BY: ANASTASIIA WALKER     PROCEDURE DATE:  2023          INTERPRETATION:  MRI OF THE LEFT STERNOCLAVICULAR JOINT AND SHOULDER    CLINICAL INFORMATION: Left sternal abscess status post washout with   persistent purulent drainage and left shoulder pain.  TECHNIQUE: Multisequence, multiplanar MRI of the left sternoclavicular   joint. The study was performed before and after the intravenous   administration of 6 ml Gadavist (1.5 cc discarded) .    COMPARISON: Chest CT 2023 and 2023.    FINDINGS:    STERNOCLAVICULAR JOINT:    BONE: Patient status post surgical resection of the left clavicular head.   The surgical margin is sharp with trace edema and no loss of T1   hyperintense signal. In the manubrium there is increased STIR signal with   loss of T1 hyperintense signal and postcontrast enhancement involving the   superolateral leftward aspect of the bone concerning for acute   osteomyelitis (6:9 5:9). No acute fracture. No osteonecrosis.    JOINTS: A residual rim-enhancing fluid collection is seen in the region   of the left sternoclavicular joint measuring 80 x 25 mm. A drainage   catheter is seen within the fluid collection.    SOFT TISSUE: Postsurgical changes are seen along the anterior aspect of   the left sternoclavicular joint. There is a mild amount of edema in the   adjacent pectoralis major muscle. No focal fluid collection in the   anterior mediastinum. Susceptibility artifact from surgical staples are   seen along the anterior chest wall.      SHOULDER JOINT:    ROTATOR CUFF: Low-grade partial-thickness bursal surface tearing of the   supraspinatus tendon. Rotator cuff is otherwise intact.  MUSCLES: No focal muscle edema or atrophy.  BICEPS TENDON: Normal in course and caliber.  GLENOID LABRUM AND GLENOHUMERAL LIGAMENTS: No displaced labral tear.   Inferior glenohumeral ligament is intact.  GLENOHUMERAL CARTILAGE AND SUBCHONDRAL BONE: No full-thickness chondral   loss.  AC JOINT: No AC joint arthropathy.  SYNOVIUM/JOINT FLUID: No glenohumeral joint effusion. No focal fluid in   the subacromial/subdeltoid bursa.  BONE MARROW: No fracture or osteonecrosis. No marrow signal change to   suggest acute osteomyelitis.  NEUROVASCULAR STRUCTURES: Structures of the suprascapular notch,   spinoglenoid notch, and quadrilateral space are normal in course and   caliber.  SUBCUTANEOUS SOFT TISSUES: Edema in the subcutaneous fat of the left   shoulder. No rim-enhancing fluid collection to suggest abscess.        IMPRESSION:  1.  Patient status post surgical resection of the left clavicular head   with washout of the left sternoclavicular joint.  2.  A 80 x 25 mm rim-enhancing fluid collection persists in the surgical   cavity with well-placed drain within the fluid collection.  3.  Marrow signal abnormality in the manubrium concerning for acute   osteomyelitis.  4.  Low-grade partial-thickness bursal surface tear of the supraspinatus   tendon.    --- End of Report ---            RASHIDA HYDE MD; Attending Radiologist  This document has been electronically signed. 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  She had a recent fall in 2023 secondary to unsteadiness due to multiple sclerosis, with subsequent left chest wall and shoulder pain.  Was seen at [facility] with concern for MS exacerbation but no MRI brain and cervical spine with an without contrast were done at the time without enhancement.  At the time infectious workup was done and notable for UTI which was treated.  She continued to have pain.  In the interim, she had an outpatient MRI of left sternoclavicular joint with results significant for marrow edema and swelling of the left sternoclavicular joint with a 2 cm fluid collection and adjacent pleural edema in the left chest with concerns for infectious etiology.   CT chest w/ IV contrast suggestive of septic arthritis with surrounding erythema. Thoracic surgery consulted.  Patient s/p surgical debridement on 23  mri with 80 x 25 mm rim-enhancing fluid collection persists in the surgical cavity with well-placed drain within the fluid collection.   Marrow signal abnormality in the manubrium concerning for acute   osteomyelitis.    Low-grade partial-thickness bursal surface tear of the supraspinatus tendon.      Int hx:  now with some right shoulder pain, MRI of the right shoulder did not show osteomyelitis  would benefit from rehab     ******        Past Medical History:  Multiple sclerosis [G35]        Past Surgical History:  History of  [Z98.891]        Social History:  No toxic habits    Family History:  FAMILY HISTORY:      Allergies:  No Known Allergies      ROS:  Constitutional: Patient offers no complaints of fevers or significant weight loss  Ears, Nose, Mouth and Throat: The patient presents with no abnormalities of the head, ears, eyes, nose or throat  Medications:  acetaminophen     Tablet .. 650 milliGRAM(s) Oral every 6 hours PRN  benzocaine/menthol Lozenge 1 Lozenge Oral three times a day PRN  cefepime   IVPB 2000 milliGRAM(s) IV Intermittent every 8 hours  chlorhexidine 2% Cloths 1 Application(s) Topical <User Schedule>  clonazePAM  Tablet 0.5 milliGRAM(s) Oral three times a day PRN  cyclobenzaprine 5 milliGRAM(s) Oral two times a day  dalfampridine ER 10 milliGRAM(s) Oral every 12 hours  DULoxetine 60 milliGRAM(s) Oral at bedtime  enoxaparin Injectable 40 milliGRAM(s) SubCutaneous every 24 hours  fentaNYL   Patch  12 MICROgram(s)/Hr 1 Patch Transdermal every 72 hours  FIRST- Mouthwash  BLM 10 milliLiter(s) Swish and Spit two times a day  HYDROmorphone   Tablet 4 milliGRAM(s) Oral every 6 hours PRN  HYDROmorphone   Tablet 2 milliGRAM(s) Oral every 4 hours PRN  multivitamin/minerals 1 Tablet(s) Oral daily  oxyCODONE  ER Tablet 10 milliGRAM(s) Oral every 12 hours  polyethylene glycol 3350 17 Gram(s) Oral two times a day PRN  senna 2 Tablet(s) Oral at bedtime  sertraline 100 milliGRAM(s) Oral at bedtime  valACYclovir 1000 milliGRAM(s) Oral every 12 hours  Vibegron (Gemtesa) 75 milliGRAM(s),Vibegron (Gemtesa) 75mg tablet 1 Tablet(s) 1 Tablet(s) Oral daily      Labs:  CBC Full  -  ( 2024 06:53 )  WBC Count : 8.22 K/uL  RBC Count : 3.28 M/uL  Hemoglobin : 7.8 g/dL  Hematocrit : 25.5 %  Platelet Count - Automated : 536 K/uL  Mean Cell Volume : 77.7 fl  Mean Cell Hemoglobin : 23.8 pg  Mean Cell Hemoglobin Concentration : 30.6 gm/dL  Auto Neutrophil # : x  Auto Lymphocyte # : x  Auto Monocyte # : x  Auto Eosinophil # : x  Auto Basophil # : x  Auto Neutrophil % : x  Auto Lymphocyte % : x  Auto Monocyte % : x  Auto Eosinophil % : x  Auto Basophil % : x          CAPILLARY BLOOD GLUCOSE                  Vitals:  Vital Signs Last 24 Hrs  T(C): 36.5 (2024 08:51), Max: 37.7 (2024 20:58)  T(F): 97.7 (2024 08:51), Max: 99.9 (2024 20:58)  HR: 76 (2024 08:51) (76 - 100)  BP: 94/60 (2024 08:51) (94/60 - 111/70)  BP(mean): --  RR: 18 (2024 08:51) (18 - 18)  SpO2: 98% (2024 08:51) (97% - 99%)    Parameters below as of 2024 08:51  Patient On (Oxygen Delivery Method): room air    Skin: Patient offers no concerns of new rashes or lesions  Respiratory: The patient presents with no abnormalities of the respiratory tract  Cardiovascular: The patient presents with no cardiac abnormalities  Gastrointestinal: The patient presents with no abnormalities of the GI system  Genitourinary: The patient presents with no dysuria, hematuria or frequent urination  Neurological: See HPI  Endocrine: Patient offers no complaints of excessive thirst, urination, or heat/cold intolerance                NEUROLOGICAL EXAM:    Mental status: Awake, alert, and in less apparent distress. Oriented to person, place and time. Language function is normal.  dysarthric    Cranial Nerves: Pupils were equal, round, reactive to light. Extraocular movements were intact. Visual field were full. Fundoscopic exam was deferred. Facial sensation was intact to light touch. There was slight left facial droop. The palate was upgoing symmetrically and tongue was midline.     Motor exam: Bulk and tone were normal.  Antigravity in all extremities without drift.  Chronic relative right hemiparesis.  Fine finger movements slower on the right    Reflexes: deferred     Sensation: Intact to light touch    Coordination: no gross dysmetria    Gait: deferred     sternal wound seen          ACC: [id number] EXAM:  MR SHOULDER WAW IC LT   ORDERED BY:  BRAN SANCHEZ     ACC: [id number] EXAM:  MR STERNOCLAVICULAR JNT LT   ORDERED BY: ANASTASIIA WALKER     PROCEDURE DATE:  2023          INTERPRETATION:  MRI OF THE LEFT STERNOCLAVICULAR JOINT AND SHOULDER    CLINICAL INFORMATION: Left sternal abscess status post washout with   persistent purulent drainage and left shoulder pain.  TECHNIQUE: Multisequence, multiplanar MRI of the left sternoclavicular   joint. The study was performed before and after the intravenous   administration of 6 ml Gadavist (1.5 cc discarded) .    COMPARISON: Chest CT 2023 and 2023.    FINDINGS:    STERNOCLAVICULAR JOINT:    BONE: Patient status post surgical resection of the left clavicular head.   The surgical margin is sharp with trace edema and no loss of T1   hyperintense signal. In the manubrium there is increased STIR signal with   loss of T1 hyperintense signal and postcontrast enhancement involving the   superolateral leftward aspect of the bone concerning for acute   osteomyelitis (6:9 5:9). No acute fracture. No osteonecrosis.    JOINTS: A residual rim-enhancing fluid collection is seen in the region   of the left sternoclavicular joint measuring 80 x 25 mm. A drainage   catheter is seen within the fluid collection.    SOFT TISSUE: Postsurgical changes are seen along the anterior aspect of   the left sternoclavicular joint. There is a mild amount of edema in the   adjacent pectoralis major muscle. No focal fluid collection in the   anterior mediastinum. Susceptibility artifact from surgical staples are   seen along the anterior chest wall.      SHOULDER JOINT:    ROTATOR CUFF: Low-grade partial-thickness bursal surface tearing of the   supraspinatus tendon. Rotator cuff is otherwise intact.  MUSCLES: No focal muscle edema or atrophy.  BICEPS TENDON: Normal in course and caliber.  GLENOID LABRUM AND GLENOHUMERAL LIGAMENTS: No displaced labral tear.   Inferior glenohumeral ligament is intact.  GLENOHUMERAL CARTILAGE AND SUBCHONDRAL BONE: No full-thickness chondral   loss.  AC JOINT: No AC joint arthropathy.  SYNOVIUM/JOINT FLUID: No glenohumeral joint effusion. No focal fluid in   the subacromial/subdeltoid bursa.  BONE MARROW: No fracture or osteonecrosis. No marrow signal change to   suggest acute osteomyelitis.  NEUROVASCULAR STRUCTURES: Structures of the suprascapular notch,   spinoglenoid notch, and quadrilateral space are normal in course and   caliber.  SUBCUTANEOUS SOFT TISSUES: Edema in the subcutaneous fat of the left   shoulder. No rim-enhancing fluid collection to suggest abscess.        IMPRESSION:  1.  Patient status post surgical resection of the left clavicular head   with washout of the left sternoclavicular joint.  2.  A 80 x 25 mm rim-enhancing fluid collection persists in the surgical   cavity with well-placed drain within the fluid collection.  3.  Marrow signal abnormality in the manubrium concerning for acute   osteomyelitis.  4.  Low-grade partial-thickness bursal surface tear of the supraspinatus   tendon.    --- End of Report ---            RASHIDA HYDE MD; Attending Radiologist  This document has been electronically signed. Dec 11 2023  5:11PM

## 2024-01-04 NOTE — PROGRESS NOTE ADULT - ASSESSMENT
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  She had a recent fall in November 2023 secondary to unsteadiness due to multiple sclerosis, with subsequent left chest wall and shoulder pain.  Was seen at [facility] with concern for MS exacerbation but no MRI brain and cervical spine with an without contrast were done at the time without enhancement.  At the time infectious workup was done and notable for UTI which was treated.  She continued to have pain.  In the interim, she had an outpatient MRI of left sternoclavicular joint with results significant for marrow edema and swelling of the left sternoclavicular joint with a 2 cm fluid collection and adjacent pleural edema in the left chest with concerns for infectious etiology.   CT chest w/ IV contrast suggestive of septic arthritis with surrounding erythema. Thoracic surgery consulted.  Patient s/p surgical debridement on 12/7/23    mri with 80 x 25 mm rim-enhancing fluid collection persists in the surgical cavity with well-placed drain within the fluid collection.   Marrow signal abnormality in the manubrium concerning for acute   osteomyelitis.    Low-grade partial-thickness bursal surface tear of the supraspinatus tendon.        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  Now likely pseudo-flare in setting of septic arthritis    Recommendations:  Management of septic arthritis per primary team  defer to ID for abx and for any underlying infection  s/p surgical debridement 12/7/23  hold ocrelizumab for now given septic arthritis  continue home meds  defer to primary team for pain control   difficulty standing suspect due to weakness.  Needs aggressive physical therapy as tolerated.  I suspect that she will benefit greatly from acute rehab   question of elevated ESR uncertain, doubt multiple sclerosis driving as highly elevated ESR not typically seen from multiple sclerosis.       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  She had a recent fall in November 2023 secondary to unsteadiness due to multiple sclerosis, with subsequent left chest wall and shoulder pain.  Was seen at [facility] with concern for MS exacerbation but no MRI brain and cervical spine with an without contrast were done at the time without enhancement.  At the time infectious workup was done and notable for UTI which was treated.  She continued to have pain.  In the interim, she had an outpatient MRI of left sternoclavicular joint with results significant for marrow edema and swelling of the left sternoclavicular joint with a 2 cm fluid collection and adjacent pleural edema in the left chest with concerns for infectious etiology.   CT chest w/ IV contrast suggestive of septic arthritis with surrounding erythema. Thoracic surgery consulted.  Patient s/p surgical debridement on 12/7/23    mri with 80 x 25 mm rim-enhancing fluid collection persists in the surgical cavity with well-placed drain within the fluid collection.   Marrow signal abnormality in the manubrium concerning for acute   osteomyelitis.    Low-grade partial-thickness bursal surface tear of the supraspinatus tendon.        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  Now likely pseudo-flare in setting of septic arthritis    Recommendations:  Management of septic arthritis per primary team  defer to ID for abx and for any underlying infection  s/p surgical debridement 12/7/23  hold ocrelizumab for now given septic arthritis  continue home meds  defer to primary team for pain control   difficulty standing suspect due to weakness.  Needs aggressive physical therapy as tolerated.  I suspect that she will benefit greatly from acute rehab   question of elevated ESR uncertain, doubt multiple sclerosis driving as highly elevated ESR not typically seen from multiple sclerosis.

## 2024-01-04 NOTE — PROGRESS NOTE ADULT - SUBJECTIVE AND OBJECTIVE BOX
Subjective: Patient seen and examined. No new events except as noted.     REVIEW OF SYSTEMS:    CONSTITUTIONAL: No weakness, fevers or chills  EYES/ENT: No visual changes;  No vertigo or throat pain   NECK: No pain or stiffness  RESPIRATORY: No cough, wheezing, hemoptysis; No shortness of breath  CARDIOVASCULAR: No chest pain or palpitations  GASTROINTESTINAL: No abdominal or epigastric pain. No nausea, vomiting, or hematemesis; No diarrhea or constipation. No melena or hematochezia.  GENITOURINARY: No dysuria, frequency or hematuria  NEUROLOGICAL: No numbness or weakness  SKIN: No itching, burning, rashes, or lesions   All other review of systems is negative unless indicated above.    MEDICATIONS:  MEDICATIONS  (STANDING):  cefepime   IVPB 2000 milliGRAM(s) IV Intermittent every 8 hours  chlorhexidine 2% Cloths 1 Application(s) Topical <User Schedule>  cyclobenzaprine 5 milliGRAM(s) Oral two times a day  dalfampridine ER 10 milliGRAM(s) Oral every 12 hours  DULoxetine 60 milliGRAM(s) Oral at bedtime  enoxaparin Injectable 40 milliGRAM(s) SubCutaneous every 24 hours  fentaNYL   Patch  12 MICROgram(s)/Hr 1 Patch Transdermal every 72 hours  FIRST- Mouthwash  BLM 10 milliLiter(s) Swish and Spit two times a day  multivitamin/minerals 1 Tablet(s) Oral daily  oxyCODONE  ER Tablet 10 milliGRAM(s) Oral every 12 hours  senna 2 Tablet(s) Oral at bedtime  sertraline 100 milliGRAM(s) Oral at bedtime  valACYclovir 1000 milliGRAM(s) Oral every 12 hours  Vibegron (Gemtesa) 75 milliGRAM(s),Vibegron (Gemtesa) 75mg tablet 1 Tablet(s) 1 Tablet(s) Oral daily      PHYSICAL EXAM:  T(C): 36.5 (01-04-24 @ 08:51), Max: 37.7 (01-03-24 @ 20:58)  HR: 76 (01-04-24 @ 08:51) (76 - 100)  BP: 94/60 (01-04-24 @ 08:51) (94/60 - 111/70)  RR: 18 (01-04-24 @ 08:51) (18 - 18)  SpO2: 98% (01-04-24 @ 08:51) (97% - 99%)  Wt(kg): --  I&O's Summary    03 Jan 2024 07:01  -  04 Jan 2024 07:00  --------------------------------------------------------  IN: 520 mL / OUT: 0 mL / NET: 520 mL              Appearance: NAD  HEENT:   Normal oral mucosa, PERRL, EOMI	  Lymphatic: No lymphadenopathy , no edema   L clavicular region w/ black sponge CAV dsg secured w/ good seal, minimal serousag drainage noted in collection chamber  Cardiovascular: Normal S1 S2, No JVD, No murmurs , Peripheral pulses palpable 2+ bilaterally  Respiratory: Lungs clear to auscultation, normal effort 	  Gastrointestinal:  Soft, Non-tender, + BS	  Skin: No rashes, No ecchymoses, No cyanosis, warm to touch  Musculoskeletal: decreased range of motion and strength  Psychiatry:  lethargic weak   Ext: No edema      LABS:    CARDIAC MARKERS:                                7.8    8.22  )-----------( 536      ( 04 Jan 2024 06:53 )             25.5           TELEMETRY: 	    ECG:  	  RADIOLOGY:   DIAGNOSTIC TESTING:  [ ] Echocardiogram:  [ ]  Catheterization:  [ ] Stress Test:    OTHER:

## 2024-01-04 NOTE — PROGRESS NOTE ADULT - SUBJECTIVE AND OBJECTIVE BOX
pain controlled  participating with therapy     REVIEW OF SYSTEMS  Constitutional - No fever,  No fatigue  HEENT - No vertigo, No neck pain  Neurological - No headaches, No memory loss  Psychiatric - No depression, + anxiety    FUNCTIONAL PROGRESS  1/2 PT  transfers min assist  gait min to mod assist x 20 feet   impaired balance, unsteady gait    1/2 OT  transfers min assist  LB dressing mod assist     VITALS  T(C): 36.5 (01-04-24 @ 08:51), Max: 37.7 (01-03-24 @ 20:58)  HR: 76 (01-04-24 @ 08:51) (76 - 100)  BP: 94/60 (01-04-24 @ 08:51) (94/60 - 111/70)  RR: 18 (01-04-24 @ 08:51) (18 - 18)  SpO2: 98% (01-04-24 @ 08:51) (97% - 99%)  Wt(kg): --    MEDICATIONS   acetaminophen     Tablet .. 650 milliGRAM(s) every 6 hours PRN  benzocaine/menthol Lozenge 1 Lozenge three times a day PRN  cefepime   IVPB 2000 milliGRAM(s) every 8 hours  chlorhexidine 2% Cloths 1 Application(s) <User Schedule>  clonazePAM  Tablet 0.5 milliGRAM(s) three times a day PRN  cyclobenzaprine 5 milliGRAM(s) two times a day  dalfampridine ER 10 milliGRAM(s) every 12 hours  DULoxetine 60 milliGRAM(s) at bedtime  enoxaparin Injectable 40 milliGRAM(s) every 24 hours  fentaNYL   Patch  12 MICROgram(s)/Hr 1 Patch every 72 hours  FIRST- Mouthwash  BLM 10 milliLiter(s) two times a day  HYDROmorphone   Tablet 2 milliGRAM(s) every 4 hours PRN  HYDROmorphone   Tablet 4 milliGRAM(s) every 6 hours PRN  multivitamin/minerals 1 Tablet(s) daily  oxyCODONE  ER Tablet 10 milliGRAM(s) every 12 hours  polyethylene glycol 3350 17 Gram(s) two times a day PRN  senna 2 Tablet(s) at bedtime  sertraline 100 milliGRAM(s) at bedtime  valACYclovir 1000 milliGRAM(s) every 12 hours  Vibegron (Gemtesa) 75 milliGRAM(s),Vibegron (Gemtesa) 75mg tablet 1 Tablet(s) 1 Tablet(s) daily      RECENT LABS - Reviewed                        7.8    8.22  )-----------( 536      ( 04 Jan 2024 06:53 )             25.5       ---------------------------------------------------------------------  PHYSICAL EXAM  Constitutional - NAD, Comfortable, in chair  +wound vac  Chest - Breathing comfortably  Cardiovascular - S1S2   Abdomen - Soft   Extremities - limited ROM b/l shoulders   Neurologic Exam -    follows commands                 Cognitive - Awake, Alert, AAO to self, place, date, year, situation     Communication - hypophonic        Motor - 5/5 within range        Sensory - Intact to LT     Psychiatric - stable   ----------------------------------------------------------------------------------------  ASSESSMENT/PLAN  43yFemale h/o MS with functional deficits after fall, septic arthritis  s/p debridement, left clavicular head excision, resection of medial manubrium, NWB LUE, wound vac  on cefepime, PICC, 6 weeks through 1/25/24  MS, secondary progressive on ocrelizumab, now on hold, with pseudo-flare   anemia, s/p transfusion, continue to monitor   anxiety, on clonazepam, sertraline, duloxetine   Pain - Tylenol oxycodone, dilaudid PO fentanyl   DVT PPX - SCDs lovenox   Rehab - Will continue to follow for ongoing rehab needs and recommendations.   patient with falls at home, requires min assist with transfers, mobility, decreased balance    Recommend ACUTE inpatient rehabilitation for the functional deficits consisting of 3 hours of therapy/day & x 4 weeks, 24 hour RN/daily PMR physician for comorbid medical management. Patient will be able to tolerate 3 hours a day.    d/w    peer to peer scheduled

## 2024-01-04 NOTE — PROGRESS NOTE ADULT - ASSESSMENT
43F PMHx of MS on immunosuppressive medication, presenting with left chest wall and shoulder pain. Recent history of fall secondary to unsteadiness due to MS. Obtained outpatient MRI of left sternoclavicular joint with results significant for marrow edema and swelling of the left sternoclavicular joint with a 2 cm fluid collection and adjacent pleural edema in the left chest with concerns for infectious etiology.  Patient initially saw orthopedics with for concerns for septic arthritis of this joint.  Impression of the read showed soft tissue abscess superficial to the joint.  Recommended CT scan with IV contrast of the chest.  Patient denies any fevers however endorsing erythema to the left sternoclavicular joint region. Patient states she was recently hospitalized over a week ago at [facility] for falls secondary to unsteadiness due to her MS.  Denies any recent falls today, vision changes. Endorsing headache. Denies any chest pain, shortness of breath, abdominal pain, nausea vomiting diarrhea, urinary complaints.  CT chest with IV in ED significant for Septic arthritis of the left sternoclavicular joint and involving the articulation of the sternum with the first costal cartilage.Extensive surrounding inflammation, with pneumonia in the underlying subpleural left upper lobe.No fluid collection is evident.  Workup significant for elevated alk phos (248), . VSS and afebrile  s/p Debridement of left sternoclavicular joint with excision of left clavicular head. Purulent drainage at the sternoclavicular joint. Tissue surrounding left clavicle found to be very inflamed. Copious irrigation of surgical site.   Concerned about pain at surgical site.    at bedside     43F PMHx of MS on immunosuppressive medication, presenting with left chest wall and shoulder pain. Recent history of fall secondary to unsteadiness due to MS. Obtained outpatient MRI of left sternoclavicular joint with results significant for marrow edema and swelling of the left sternoclavicular joint with a 2 cm fluid collection and adjacent pleural edema in the left chest with concerns for infectious etiology.  Patient initially saw orthopedics with for concerns for septic arthritis of this joint.  Impression of the read showed soft tissue abscess superficial to the joint.  Recommended CT scan with IV contrast of the chest.  Patient denies any fevers however endorsing erythema to the left sternoclavicular joint region. Patient states she was recently hospitalized over a week ago at [facility] for falls secondary to unsteadiness due to her MS.  Denies any recent falls today, vision changes. Endorsing headache. Denies any chest pain, shortness of breath, abdominal pain, nausea vomiting diarrhea, urinary complaints.  CT chest with IV in ED significant for Septic arthritis of the left sternoclavicular joint and involving the articulation of the sternum with the first costal cartilage.Extensive surrounding inflammation, with pneumonia in the underlying subpleural left upper lobe.No fluid collection is evident.  Workup significant for elevated alk phos (248), . VSS and afebrile  s/p Debridement of left sternoclavicular joint with excision of left clavicular head. Purulent drainage at the sternoclavicular joint. Tissue surrounding left clavicle found to be very inflamed. Copious irrigation of surgical site.   Concerned about pain at surgical site.    at bedside

## 2024-01-05 ENCOUNTER — TRANSCRIPTION ENCOUNTER (OUTPATIENT)
Age: 44
End: 2024-01-05

## 2024-01-05 ENCOUNTER — NON-APPOINTMENT (OUTPATIENT)
Age: 44
End: 2024-01-05

## 2024-01-05 VITALS
OXYGEN SATURATION: 95 % | SYSTOLIC BLOOD PRESSURE: 110 MMHG | HEART RATE: 81 BPM | TEMPERATURE: 98 F | DIASTOLIC BLOOD PRESSURE: 64 MMHG | RESPIRATION RATE: 18 BRPM

## 2024-01-05 PROCEDURE — 93970 EXTREMITY STUDY: CPT

## 2024-01-05 PROCEDURE — 82550 ASSAY OF CK (CPK): CPT

## 2024-01-05 PROCEDURE — 86403 PARTICLE AGGLUT ANTBDY SCRN: CPT

## 2024-01-05 PROCEDURE — 87075 CULTR BACTERIA EXCEPT BLOOD: CPT

## 2024-01-05 PROCEDURE — 87449 NOS EACH ORGANISM AG IA: CPT

## 2024-01-05 PROCEDURE — 73030 X-RAY EXAM OF SHOULDER: CPT

## 2024-01-05 PROCEDURE — 87206 SMEAR FLUORESCENT/ACID STAI: CPT

## 2024-01-05 PROCEDURE — 86860 RBC ANTIBODY ELUTION: CPT

## 2024-01-05 PROCEDURE — 74177 CT ABD & PELVIS W/CONTRAST: CPT

## 2024-01-05 PROCEDURE — 71260 CT THORAX DX C+: CPT

## 2024-01-05 PROCEDURE — 84145 PROCALCITONIN (PCT): CPT

## 2024-01-05 PROCEDURE — 73221 MRI JOINT UPR EXTREM W/O DYE: CPT

## 2024-01-05 PROCEDURE — 96374 THER/PROPH/DIAG INJ IV PUSH: CPT

## 2024-01-05 PROCEDURE — 83735 ASSAY OF MAGNESIUM: CPT

## 2024-01-05 PROCEDURE — 97110 THERAPEUTIC EXERCISES: CPT

## 2024-01-05 PROCEDURE — 86922 COMPATIBILITY TEST ANTIGLOB: CPT

## 2024-01-05 PROCEDURE — 96375 TX/PRO/DX INJ NEW DRUG ADDON: CPT

## 2024-01-05 PROCEDURE — 88311 DECALCIFY TISSUE: CPT

## 2024-01-05 PROCEDURE — 36430 TRANSFUSION BLD/BLD COMPNT: CPT

## 2024-01-05 PROCEDURE — 85730 THROMBOPLASTIN TIME PARTIAL: CPT

## 2024-01-05 PROCEDURE — 0225U NFCT DS DNA&RNA 21 SARSCOV2: CPT

## 2024-01-05 PROCEDURE — 36415 COLL VENOUS BLD VENIPUNCTURE: CPT

## 2024-01-05 PROCEDURE — 97605 NEG PRS WND THER DME<=50SQCM: CPT

## 2024-01-05 PROCEDURE — 97530 THERAPEUTIC ACTIVITIES: CPT

## 2024-01-05 PROCEDURE — C1751: CPT

## 2024-01-05 PROCEDURE — 87040 BLOOD CULTURE FOR BACTERIA: CPT

## 2024-01-05 PROCEDURE — 88342 IMHCHEM/IMCYTCHM 1ST ANTB: CPT

## 2024-01-05 PROCEDURE — 85014 HEMATOCRIT: CPT

## 2024-01-05 PROCEDURE — 93005 ELECTROCARDIOGRAM TRACING: CPT

## 2024-01-05 PROCEDURE — 87529 HSV DNA AMP PROBE: CPT

## 2024-01-05 PROCEDURE — 87640 STAPH A DNA AMP PROBE: CPT

## 2024-01-05 PROCEDURE — 85045 AUTOMATED RETICULOCYTE COUNT: CPT

## 2024-01-05 PROCEDURE — 85610 PROTHROMBIN TIME: CPT

## 2024-01-05 PROCEDURE — 84295 ASSAY OF SERUM SODIUM: CPT

## 2024-01-05 PROCEDURE — 82607 VITAMIN B-12: CPT

## 2024-01-05 PROCEDURE — 97116 GAIT TRAINING THERAPY: CPT

## 2024-01-05 PROCEDURE — 82784 ASSAY IGA/IGD/IGG/IGM EACH: CPT

## 2024-01-05 PROCEDURE — 82746 ASSAY OF FOLIC ACID SERUM: CPT

## 2024-01-05 PROCEDURE — 86480 TB TEST CELL IMMUN MEASURE: CPT

## 2024-01-05 PROCEDURE — 86850 RBC ANTIBODY SCREEN: CPT

## 2024-01-05 PROCEDURE — 84132 ASSAY OF SERUM POTASSIUM: CPT

## 2024-01-05 PROCEDURE — 93306 TTE W/DOPPLER COMPLETE: CPT

## 2024-01-05 PROCEDURE — 86901 BLOOD TYPING SEROLOGIC RH(D): CPT

## 2024-01-05 PROCEDURE — 99232 SBSQ HOSP IP/OBS MODERATE 35: CPT

## 2024-01-05 PROCEDURE — 85018 HEMOGLOBIN: CPT

## 2024-01-05 PROCEDURE — 80202 ASSAY OF VANCOMYCIN: CPT

## 2024-01-05 PROCEDURE — 87641 MR-STAPH DNA AMP PROBE: CPT

## 2024-01-05 PROCEDURE — P9016: CPT

## 2024-01-05 PROCEDURE — 93971 EXTREMITY STUDY: CPT

## 2024-01-05 PROCEDURE — 86140 C-REACTIVE PROTEIN: CPT

## 2024-01-05 PROCEDURE — 87635 SARS-COV-2 COVID-19 AMP PRB: CPT

## 2024-01-05 PROCEDURE — 82947 ASSAY GLUCOSE BLOOD QUANT: CPT

## 2024-01-05 PROCEDURE — 71045 X-RAY EXAM CHEST 1 VIEW: CPT

## 2024-01-05 PROCEDURE — 84550 ASSAY OF BLOOD/URIC ACID: CPT

## 2024-01-05 PROCEDURE — 83010 ASSAY OF HAPTOGLOBIN QUANT: CPT

## 2024-01-05 PROCEDURE — 82435 ASSAY OF BLOOD CHLORIDE: CPT

## 2024-01-05 PROCEDURE — 83615 LACTATE (LD) (LDH) ENZYME: CPT

## 2024-01-05 PROCEDURE — 86923 COMPATIBILITY TEST ELECTRIC: CPT

## 2024-01-05 PROCEDURE — 82330 ASSAY OF CALCIUM: CPT

## 2024-01-05 PROCEDURE — 97535 SELF CARE MNGMENT TRAINING: CPT

## 2024-01-05 PROCEDURE — 85652 RBC SED RATE AUTOMATED: CPT

## 2024-01-05 PROCEDURE — 80048 BASIC METABOLIC PNL TOTAL CA: CPT

## 2024-01-05 PROCEDURE — 87070 CULTURE OTHR SPECIMN AEROBIC: CPT

## 2024-01-05 PROCEDURE — C9399: CPT

## 2024-01-05 PROCEDURE — P9059: CPT

## 2024-01-05 PROCEDURE — 97164 PT RE-EVAL EST PLAN CARE: CPT

## 2024-01-05 PROCEDURE — 84100 ASSAY OF PHOSPHORUS: CPT

## 2024-01-05 PROCEDURE — 87015 SPECIMEN INFECT AGNT CONCNTJ: CPT

## 2024-01-05 PROCEDURE — 85027 COMPLETE CBC AUTOMATED: CPT

## 2024-01-05 PROCEDURE — 72156 MRI NECK SPINE W/O & W/DYE: CPT

## 2024-01-05 PROCEDURE — 81001 URINALYSIS AUTO W/SCOPE: CPT

## 2024-01-05 PROCEDURE — 93321 DOPPLER ECHO F-UP/LMTD STD: CPT

## 2024-01-05 PROCEDURE — 73223 MRI JOINT UPR EXTR W/O&W/DYE: CPT

## 2024-01-05 PROCEDURE — 84702 CHORIONIC GONADOTROPIN TEST: CPT

## 2024-01-05 PROCEDURE — 87102 FUNGUS ISOLATION CULTURE: CPT

## 2024-01-05 PROCEDURE — 99285 EMERGENCY DEPT VISIT HI MDM: CPT | Mod: 25

## 2024-01-05 PROCEDURE — 73020 X-RAY EXAM OF SHOULDER: CPT

## 2024-01-05 PROCEDURE — 87116 MYCOBACTERIA CULTURE: CPT

## 2024-01-05 PROCEDURE — 97606 NEG PRS WND THER DME>50 SQCM: CPT

## 2024-01-05 PROCEDURE — 88305 TISSUE EXAM BY PATHOLOGIST: CPT

## 2024-01-05 PROCEDURE — 82728 ASSAY OF FERRITIN: CPT

## 2024-01-05 PROCEDURE — 87086 URINE CULTURE/COLONY COUNT: CPT

## 2024-01-05 PROCEDURE — 73090 X-RAY EXAM OF FOREARM: CPT

## 2024-01-05 PROCEDURE — A9585: CPT

## 2024-01-05 PROCEDURE — 87798 DETECT AGENT NOS DNA AMP: CPT

## 2024-01-05 PROCEDURE — 97165 OT EVAL LOW COMPLEX 30 MIN: CPT

## 2024-01-05 PROCEDURE — 73222 MRI JOINT UPR EXTREM W/DYE: CPT

## 2024-01-05 PROCEDURE — 73060 X-RAY EXAM OF HUMERUS: CPT

## 2024-01-05 PROCEDURE — 85025 COMPLETE CBC W/AUTO DIFF WBC: CPT

## 2024-01-05 PROCEDURE — 97161 PT EVAL LOW COMPLEX 20 MIN: CPT

## 2024-01-05 PROCEDURE — 83550 IRON BINDING TEST: CPT

## 2024-01-05 PROCEDURE — 86900 BLOOD TYPING SEROLOGIC ABO: CPT

## 2024-01-05 PROCEDURE — 93308 TTE F-UP OR LMTD: CPT

## 2024-01-05 PROCEDURE — 80053 COMPREHEN METABOLIC PANEL: CPT

## 2024-01-05 PROCEDURE — 82803 BLOOD GASES ANY COMBINATION: CPT

## 2024-01-05 PROCEDURE — 87305 ASPERGILLUS AG IA: CPT

## 2024-01-05 PROCEDURE — 86880 COOMBS TEST DIRECT: CPT

## 2024-01-05 PROCEDURE — C1889: CPT

## 2024-01-05 PROCEDURE — 84443 ASSAY THYROID STIM HORMONE: CPT

## 2024-01-05 PROCEDURE — 36569 INSJ PICC 5 YR+ W/O IMAGING: CPT

## 2024-01-05 PROCEDURE — 83540 ASSAY OF IRON: CPT

## 2024-01-05 PROCEDURE — 83605 ASSAY OF LACTIC ACID: CPT

## 2024-01-05 RX ORDER — GABAPENTIN 400 MG/1
0 CAPSULE ORAL
Refills: 0 | DISCHARGE

## 2024-01-05 RX ORDER — MULTIVIT-MIN/FERROUS GLUCONATE 9 MG/15 ML
1 LIQUID (ML) ORAL
Qty: 0 | Refills: 0 | DISCHARGE
Start: 2024-01-05

## 2024-01-05 RX ORDER — CLONAZEPAM 1 MG
1 TABLET ORAL
Qty: 0 | Refills: 0 | DISCHARGE
Start: 2024-01-05

## 2024-01-05 RX ORDER — DALFAMPRIDINE 10 MG/1
1 TABLET, FILM COATED, EXTENDED RELEASE ORAL
Qty: 0 | Refills: 0 | DISCHARGE
Start: 2024-01-05

## 2024-01-05 RX ORDER — DULOXETINE HYDROCHLORIDE 30 MG/1
1 CAPSULE, DELAYED RELEASE ORAL
Qty: 0 | Refills: 0 | DISCHARGE
Start: 2024-01-05

## 2024-01-05 RX ORDER — OXYCODONE HYDROCHLORIDE 5 MG/1
1 TABLET ORAL
Qty: 0 | Refills: 0 | DISCHARGE
Start: 2024-01-05

## 2024-01-05 RX ORDER — BACLOFEN 100 %
1 POWDER (GRAM) MISCELLANEOUS
Refills: 0 | DISCHARGE

## 2024-01-05 RX ORDER — DIPHENHYDRAMINE HYDROCHLORIDE AND LIDOCAINE HYDROCHLORIDE AND ALUMINUM HYDROXIDE AND MAGNESIUM HYDRO
0 KIT
Qty: 0 | Refills: 0 | DISCHARGE
Start: 2024-01-05

## 2024-01-05 RX ORDER — VALACYCLOVIR 500 MG/1
1 TABLET, FILM COATED ORAL
Qty: 0 | Refills: 0 | DISCHARGE
Start: 2024-01-05

## 2024-01-05 RX ORDER — SERTRALINE 25 MG/1
1 TABLET, FILM COATED ORAL
Refills: 0 | DISCHARGE

## 2024-01-05 RX ORDER — HYDROMORPHONE HYDROCHLORIDE 2 MG/ML
1 INJECTION INTRAMUSCULAR; INTRAVENOUS; SUBCUTANEOUS
Qty: 0 | Refills: 0 | DISCHARGE
Start: 2024-01-05

## 2024-01-05 RX ORDER — SERTRALINE 25 MG/1
1 TABLET, FILM COATED ORAL
Qty: 0 | Refills: 0 | DISCHARGE
Start: 2024-01-05

## 2024-01-05 RX ORDER — CLONAZEPAM 1 MG
1 TABLET ORAL
Refills: 0 | DISCHARGE

## 2024-01-05 RX ORDER — POLYETHYLENE GLYCOL 3350 17 G/17G
17 POWDER, FOR SOLUTION ORAL
Qty: 0 | Refills: 0 | DISCHARGE
Start: 2024-01-05

## 2024-01-05 RX ORDER — SENNA PLUS 8.6 MG/1
2 TABLET ORAL
Qty: 0 | Refills: 0 | DISCHARGE
Start: 2024-01-05

## 2024-01-05 RX ORDER — ACETAMINOPHEN 500 MG
2 TABLET ORAL
Qty: 0 | Refills: 0 | DISCHARGE
Start: 2024-01-05

## 2024-01-05 RX ORDER — RITUXIMAB 10 MG/ML
375 INJECTION, SOLUTION INTRAVENOUS
Refills: 0 | DISCHARGE

## 2024-01-05 RX ORDER — FENTANYL CITRATE 50 UG/ML
1 INJECTION INTRAVENOUS
Qty: 0 | Refills: 0 | DISCHARGE
Start: 2024-01-05

## 2024-01-05 RX ORDER — DALFAMPRIDINE 10 MG/1
1 TABLET, FILM COATED, EXTENDED RELEASE ORAL
Refills: 0 | DISCHARGE

## 2024-01-05 RX ORDER — DULOXETINE HYDROCHLORIDE 30 MG/1
1 CAPSULE, DELAYED RELEASE ORAL
Refills: 0 | DISCHARGE

## 2024-01-05 RX ORDER — CYCLOBENZAPRINE HYDROCHLORIDE 10 MG/1
1 TABLET, FILM COATED ORAL
Qty: 0 | Refills: 0 | DISCHARGE
Start: 2024-01-05

## 2024-01-05 RX ORDER — CEFEPIME 1 G/1
2 INJECTION, POWDER, FOR SOLUTION INTRAMUSCULAR; INTRAVENOUS
Qty: 0 | Refills: 0 | DISCHARGE
Start: 2024-01-05

## 2024-01-05 RX ADMIN — OXYCODONE HYDROCHLORIDE 10 MILLIGRAM(S): 5 TABLET ORAL at 06:12

## 2024-01-05 RX ADMIN — DALFAMPRIDINE 10 MILLIGRAM(S): 10 TABLET, FILM COATED, EXTENDED RELEASE ORAL at 09:22

## 2024-01-05 RX ADMIN — Medication 650 MILLIGRAM(S): at 12:00

## 2024-01-05 RX ADMIN — VALACYCLOVIR 1000 MILLIGRAM(S): 500 TABLET, FILM COATED ORAL at 06:12

## 2024-01-05 RX ADMIN — Medication 650 MILLIGRAM(S): at 06:32

## 2024-01-05 RX ADMIN — Medication 650 MILLIGRAM(S): at 12:30

## 2024-01-05 RX ADMIN — CYCLOBENZAPRINE HYDROCHLORIDE 5 MILLIGRAM(S): 10 TABLET, FILM COATED ORAL at 06:12

## 2024-01-05 RX ADMIN — Medication 650 MILLIGRAM(S): at 07:02

## 2024-01-05 RX ADMIN — OXYCODONE HYDROCHLORIDE 10 MILLIGRAM(S): 5 TABLET ORAL at 07:02

## 2024-01-05 RX ADMIN — FENTANYL CITRATE 1 PATCH: 50 INJECTION INTRAVENOUS at 07:42

## 2024-01-05 RX ADMIN — CHLORHEXIDINE GLUCONATE 1 APPLICATION(S): 213 SOLUTION TOPICAL at 09:21

## 2024-01-05 RX ADMIN — Medication 1 TABLET(S): at 12:00

## 2024-01-05 RX ADMIN — BENZOCAINE AND MENTHOL 1 LOZENGE: 5; 1 LIQUID ORAL at 06:12

## 2024-01-05 RX ADMIN — DIPHENHYDRAMINE HYDROCHLORIDE AND LIDOCAINE HYDROCHLORIDE AND ALUMINUM HYDROXIDE AND MAGNESIUM HYDRO 10 MILLILITER(S): KIT at 06:13

## 2024-01-05 RX ADMIN — CEFEPIME 100 MILLIGRAM(S): 1 INJECTION, POWDER, FOR SOLUTION INTRAMUSCULAR; INTRAVENOUS at 09:21

## 2024-01-05 RX ADMIN — Medication 650 MILLIGRAM(S): at 00:04

## 2024-01-05 NOTE — PROGRESS NOTE ADULT - ASSESSMENT
43F PMHx of MS on immunosuppressive medication, presenting with left chest wall and shoulder pain. Recent history of fall secondary to unsteadiness due to MS. Obtained outpatient MRI of left sternoclavicular joint with results significant for marrow edema and swelling of the left sternoclavicular joint with a 2 cm fluid collection and adjacent pleural edema in the left chest with concerns for infectious etiology.  Patient initially saw orthopedics with for concerns for septic arthritis of this joint.  Impression of the read showed soft tissue abscess superficial to the joint.  Recommended CT scan with IV contrast of the chest.  Patient denies any fevers however endorsing erythema to the left sternoclavicular joint region. Patient states she was recently hospitalized over a week ago at [facility] for falls secondary to unsteadiness due to her MS.  Denies any recent falls today, vision changes. Endorsing headache. Denies any chest pain, shortness of breath, abdominal pain, nausea vomiting diarrhea, urinary complaints.  CT chest with IV in ED significant for Septic arthritis of the left sternoclavicular joint and involving the articulation of the sternum with the first costal cartilage.Extensive surrounding inflammation, with pneumonia in the underlying subpleural left upper lobe.No fluid collection is evident.  Workup significant for elevated alk phos (248), . VSS and afebrile  s/p Debridement of left sternoclavicular joint with excision of left clavicular head. Purulent drainage at the sternoclavicular joint. Tissue surrounding left clavicle found to be very inflamed. Copious irrigation of surgical site.   Concerned about pain at surgical site.    at bedside     43F PMHx of MS on immunosuppressive medication, presenting with left chest wall and shoulder pain. Recent history of fall secondary to unsteadiness due to MS. Obtained outpatient MRI of left sternoclavicular joint with results significant for marrow edema and swelling of the left sternoclavicular joint with a 2 cm fluid collection and adjacent pleural edema in the left chest with concerns for infectious etiology.  Patient initially saw orthopedics with for concerns for septic arthritis of this joint.  Impression of the read showed soft tissue abscess superficial to the joint.  Recommended CT scan with IV contrast of the chest.  Patient denies any fevers however endorsing erythema to the left sternoclavicular joint region. Patient states she was recently hospitalized over a week ago at [facility] for falls secondary to unsteadiness due to her MS.  Denies any recent falls today, vision changes. Endorsing headache. Denies any chest pain, shortness of breath, abdominal pain, nausea vomiting diarrhea, urinary complaints.  CT chest with IV in ED significant for Septic arthritis of the left sternoclavicular joint and involving the articulation of the sternum with the first costal cartilage.Extensive surrounding inflammation, with pneumonia in the underlying subpleural left upper lobe.No fluid collection is evident.  Workup significant for elevated alk phos (248), . VSS and afebrile  s/p Debridement of left sternoclavicular joint with excision of left clavicular head. Purulent drainage at the sternoclavicular joint. Tissue surrounding left clavicle found to be very inflamed. Copious irrigation of surgical site.   Concerned about pain at surgical site.    at bedside

## 2024-01-05 NOTE — DISCHARGE NOTE PROVIDER - NPI NUMBER (FOR [employer] USE ONLY) :
[[id number]] [[id number]]

## 2024-01-05 NOTE — DISCHARGE NOTE PROVIDER - PROVIDER TOKENS
PROVIDER:[TOKEN:[[id number]],FOLLOWUP:[1 week]],PROVIDER:[TOKEN:[[id number]],FOLLOWUP:[1 week]],PROVIDER:[TOKEN:[[id number]],FOLLOWUP:[Routine]],PROVIDER:[TOKEN:[[id number]],FOLLOWUP:[2 weeks]] PROVIDER:[TOKEN:[[id number]],FOLLOWUP:[1 week]],PROVIDER:[[id number]],FOLLOWUP:[1 week]],PROVIDER:[TOKEN:[[id number]],FOLLOWUP:[Routine]],PROVIDER:[TOKEN:[[id number]],FOLLOWUP:[2 weeks]]

## 2024-01-05 NOTE — PROGRESS NOTE ADULT - PROVIDER SPECIALTY LIST ADULT
Anesthesia
Anesthesia
Cardiology
Infectious Disease
Internal Medicine
Internal Medicine
Neurology
Rehab Medicine
Rehab Medicine
Thoracic Surgery
Internal Medicine
Neurology
Thoracic Surgery
Thoracic Surgery
Cardiology
Cardiology
Infectious Disease
Internal Medicine
Neurology
Rehab Medicine
Heme/Onc
Internal Medicine
Internal Medicine
Anesthesia
Cardiology
Infectious Disease
Internal Medicine
Neurology
Orthopedics
Thoracic Surgery
Cardiology
Infectious Disease
Internal Medicine
Internal Medicine
Cardiology
Heme/Onc
Heme/Onc
Infectious Disease
Internal Medicine
Neurology
Thoracic Surgery
Internal Medicine
CT Surgery
Internal Medicine
Thoracic Surgery
Thoracic Surgery
Internal Medicine
Thoracic Surgery
Thoracic Surgery
Cardiology
Cardiology
Thoracic Surgery
Cardiology

## 2024-01-05 NOTE — PROGRESS NOTE ADULT - SUBJECTIVE AND OBJECTIVE BOX
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  She had a recent fall in 2023 secondary to unsteadiness due to multiple sclerosis, with subsequent left chest wall and shoulder pain.  Was seen at [facility] with concern for MS exacerbation but no MRI brain and cervical spine with an without contrast were done at the time without enhancement.  At the time infectious workup was done and notable for UTI which was treated.  She continued to have pain.  In the interim, she had an outpatient MRI of left sternoclavicular joint with results significant for marrow edema and swelling of the left sternoclavicular joint with a 2 cm fluid collection and adjacent pleural edema in the left chest with concerns for infectious etiology.   CT chest w/ IV contrast suggestive of septic arthritis with surrounding erythema. Thoracic surgery consulted.  Patient s/p surgical debridement on 23  mri with 80 x 25 mm rim-enhancing fluid collection persists in the surgical cavity with well-placed drain within the fluid collection.   Marrow signal abnormality in the manubrium concerning for acute   osteomyelitis.    Low-grade partial-thickness bursal surface tear of the supraspinatus tendon.      Int hx:  now with some right shoulder pain, MRI of the right shoulder did not show osteomyelitis  plan for rehab     ******        Past Medical History:  Multiple sclerosis [G35]        Past Surgical History:  History of  [Z98.891]        Social History:  No toxic habits    Family History:  FAMILY HISTORY:      Allergies:  No Known Allergies      ROS:  Constitutional: Patient offers no complaints of fevers or significant weight loss  Ears, Nose, Mouth and Throat: The patient presents with no abnormalities of the head, ears, eyes, nose or throat  Skin: Patient offers no concerns of new rashes or lesions  Respiratory: The patient presents with no abnormalities of the respiratory tract  Cardiovascular: The patient presents with no cardiac abnormalities  Gastrointestinal: The patient presents with no abnormalities of the GI system  Genitourinary: The patient presents with no dysuria, hematuria or frequent urination  Neurological: See HPI  Endocrine: Patient offers no complaints of excessive thirst, urination, or heat/cold intolerance                NEUROLOGICAL EXAM:    Mental status: Awake, alert, and in less apparent distress. Oriented to person, place and time. Language function is normal.  dysarthric    Cranial Nerves: Pupils were equal, round, reactive to light. Extraocular movements were intact. Visual field were full. Fundoscopic exam was deferred. Facial sensation was intact to light touch. There was slight left facial droop. The palate was upgoing symmetrically and tongue was midline.     Motor exam: Bulk and tone were normal.  Antigravity in all extremities without drift.  Chronic relative right hemiparesis.  Fine finger movements slower on the right    Reflexes: deferred     Sensation: Intact to light touch    Coordination: no gross dysmetria    Gait: declined    sternal wound seen          ACC: [id number] EXAM:  MR SHOULDER WAW IC LT   ORDERED BY:  BRAN SANCHEZ     ACC: [id number] EXAM:  MR STERNOCLAVICULAR JNT LT   ORDERED BY: ANASTASIIA WALKER     PROCEDURE DATE:  2023          INTERPRETATION:  MRI OF THE LEFT STERNOCLAVICULAR JOINT AND SHOULDER    CLINICAL INFORMATION: Left sternal abscess status post washout with   persistent purulent drainage and left shoulder pain.  TECHNIQUE: Multisequence, multiplanar MRI of the left sternoclavicular   joint. The study was performed before and after the intravenous   administration of 6 ml Gadavist (1.5 cc discarded) .    COMPARISON: Chest CT 2023 and 2023.    FINDINGS:    STERNOCLAVICULAR JOINT:    BONE: Patient status post surgical resection of the left clavicular head.   The surgical margin is sharp with trace edema and no loss of T1   hyperintense signal. In the manubrium there is increased STIR signal with   loss of T1 hyperintense signal and postcontrast enhancement involving the   superolateral leftward aspect of the bone concerning for acute   osteomyelitis (6:9 5:9). No acute fracture. No osteonecrosis.    JOINTS: A residual rim-enhancing fluid collection is seen in the region   of the left sternoclavicular joint measuring 80 x 25 mm. A drainage   catheter is seen within the fluid collection.    SOFT TISSUE: Postsurgical changes are seen along the anterior aspect of   the left sternoclavicular joint. There is a mild amount of edema in the   adjacent pectoralis major muscle. No focal fluid collection in the   anterior mediastinum. Susceptibility artifact from surgical staples are   seen along the anterior chest wall.      SHOULDER JOINT:    ROTATOR CUFF: Low-grade partial-thickness bursal surface tearing of the   supraspinatus tendon. Rotator cuff is otherwise intact.  MUSCLES: No focal muscle edema or atrophy.  BICEPS TENDON: Normal in course and caliber.  GLENOID LABRUM AND GLENOHUMERAL LIGAMENTS: No displaced labral tear.   Inferior glenohumeral ligament is intact.  GLENOHUMERAL CARTILAGE AND SUBCHONDRAL BONE: No full-thickness chondral   loss.  AC JOINT: No AC joint arthropathy.  SYNOVIUM/JOINT FLUID: No glenohumeral joint effusion. No focal fluid in   the subacromial/subdeltoid bursa.  BONE MARROW: No fracture or osteonecrosis. No marrow signal change to   suggest acute osteomyelitis.  NEUROVASCULAR STRUCTURES: Structures of the suprascapular notch,   spinoglenoid notch, and quadrilateral space are normal in course and   caliber.  SUBCUTANEOUS SOFT TISSUES: Edema in the subcutaneous fat of the left   shoulder. No rim-enhancing fluid collection to suggest abscess.        IMPRESSION:  1.  Patient status post surgical resection of the left clavicular head   with washout of the left sternoclavicular joint.  2.  A 80 x 25 mm rim-enhancing fluid collection persists in the surgical   cavity with well-placed drain within the fluid collection.  3.  Marrow signal abnormality in the manubrium concerning for acute   osteomyelitis.  4.  Low-grade partial-thickness bursal surface tear of the supraspinatus   tendon.    --- End of Report ---            RASHIDA HYDE MD; Attending Radiologist  This document has been electronically signed. 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  She had a recent fall in 2023 secondary to unsteadiness due to multiple sclerosis, with subsequent left chest wall and shoulder pain.  Was seen at [facility] with concern for MS exacerbation but no MRI brain and cervical spine with an without contrast were done at the time without enhancement.  At the time infectious workup was done and notable for UTI which was treated.  She continued to have pain.  In the interim, she had an outpatient MRI of left sternoclavicular joint with results significant for marrow edema and swelling of the left sternoclavicular joint with a 2 cm fluid collection and adjacent pleural edema in the left chest with concerns for infectious etiology.   CT chest w/ IV contrast suggestive of septic arthritis with surrounding erythema. Thoracic surgery consulted.  Patient s/p surgical debridement on 23  mri with 80 x 25 mm rim-enhancing fluid collection persists in the surgical cavity with well-placed drain within the fluid collection.   Marrow signal abnormality in the manubrium concerning for acute   osteomyelitis.    Low-grade partial-thickness bursal surface tear of the supraspinatus tendon.      Int hx:  now with some right shoulder pain, MRI of the right shoulder did not show osteomyelitis  plan for rehab     ******        Past Medical History:  Multiple sclerosis [G35]        Past Surgical History:  History of  [Z98.891]        Social History:  No toxic habits    Family History:  FAMILY HISTORY:      Allergies:  No Known Allergies      ROS:  Constitutional: Patient offers no complaints of fevers or significant weight loss  Ears, Nose, Mouth and Throat: The patient presents with no abnormalities of the head, ears, eyes, nose or throat  Skin: Patient offers no concerns of new rashes or lesions  Respiratory: The patient presents with no abnormalities of the respiratory tract  Cardiovascular: The patient presents with no cardiac abnormalities  Gastrointestinal: The patient presents with no abnormalities of the GI system  Genitourinary: The patient presents with no dysuria, hematuria or frequent urination  Neurological: See HPI  Endocrine: Patient offers no complaints of excessive thirst, urination, or heat/cold intolerance                NEUROLOGICAL EXAM:    Mental status: Awake, alert, and in less apparent distress. Oriented to person, place and time. Language function is normal.  dysarthric    Cranial Nerves: Pupils were equal, round, reactive to light. Extraocular movements were intact. Visual field were full. Fundoscopic exam was deferred. Facial sensation was intact to light touch. There was slight left facial droop. The palate was upgoing symmetrically and tongue was midline.     Motor exam: Bulk and tone were normal.  Antigravity in all extremities without drift.  Chronic relative right hemiparesis.  Fine finger movements slower on the right    Reflexes: deferred     Sensation: Intact to light touch    Coordination: no gross dysmetria    Gait: declined    sternal wound seen          ACC: [id number] EXAM:  MR SHOULDER WAW IC LT   ORDERED BY:  BRAN SANCHEZ     ACC: [id number] EXAM:  MR STERNOCLAVICULAR JNT LT   ORDERED BY: ANASTASIIA WALKER     PROCEDURE DATE:  2023          INTERPRETATION:  MRI OF THE LEFT STERNOCLAVICULAR JOINT AND SHOULDER    CLINICAL INFORMATION: Left sternal abscess status post washout with   persistent purulent drainage and left shoulder pain.  TECHNIQUE: Multisequence, multiplanar MRI of the left sternoclavicular   joint. The study was performed before and after the intravenous   administration of 6 ml Gadavist (1.5 cc discarded) .    COMPARISON: Chest CT 2023 and 2023.    FINDINGS:    STERNOCLAVICULAR JOINT:    BONE: Patient status post surgical resection of the left clavicular head.   The surgical margin is sharp with trace edema and no loss of T1   hyperintense signal. In the manubrium there is increased STIR signal with   loss of T1 hyperintense signal and postcontrast enhancement involving the   superolateral leftward aspect of the bone concerning for acute   osteomyelitis (6:9 5:9). No acute fracture. No osteonecrosis.    JOINTS: A residual rim-enhancing fluid collection is seen in the region   of the left sternoclavicular joint measuring 80 x 25 mm. A drainage   catheter is seen within the fluid collection.    SOFT TISSUE: Postsurgical changes are seen along the anterior aspect of   the left sternoclavicular joint. There is a mild amount of edema in the   adjacent pectoralis major muscle. No focal fluid collection in the   anterior mediastinum. Susceptibility artifact from surgical staples are   seen along the anterior chest wall.      SHOULDER JOINT:    ROTATOR CUFF: Low-grade partial-thickness bursal surface tearing of the   supraspinatus tendon. Rotator cuff is otherwise intact.  MUSCLES: No focal muscle edema or atrophy.  BICEPS TENDON: Normal in course and caliber.  GLENOID LABRUM AND GLENOHUMERAL LIGAMENTS: No displaced labral tear.   Inferior glenohumeral ligament is intact.  GLENOHUMERAL CARTILAGE AND SUBCHONDRAL BONE: No full-thickness chondral   loss.  AC JOINT: No AC joint arthropathy.  SYNOVIUM/JOINT FLUID: No glenohumeral joint effusion. No focal fluid in   the subacromial/subdeltoid bursa.  BONE MARROW: No fracture or osteonecrosis. No marrow signal change to   suggest acute osteomyelitis.  NEUROVASCULAR STRUCTURES: Structures of the suprascapular notch,   spinoglenoid notch, and quadrilateral space are normal in course and   caliber.  SUBCUTANEOUS SOFT TISSUES: Edema in the subcutaneous fat of the left   shoulder. No rim-enhancing fluid collection to suggest abscess.        IMPRESSION:  1.  Patient status post surgical resection of the left clavicular head   with washout of the left sternoclavicular joint.  2.  A 80 x 25 mm rim-enhancing fluid collection persists in the surgical   cavity with well-placed drain within the fluid collection.  3.  Marrow signal abnormality in the manubrium concerning for acute   osteomyelitis.  4.  Low-grade partial-thickness bursal surface tear of the supraspinatus   tendon.    --- End of Report ---            RASHIDA HYDE MD; Attending Radiologist  This document has been electronically signed. Dec 11 2023  5:11PM

## 2024-01-05 NOTE — DISCHARGE NOTE PROVIDER - NSFOLLOWUPCLINICS_GEN_ALL_ED_FT
[facility]  Dental  [address] [place], [place] [address]  Phone: [phone]  Fax:   Follow Up Time: 1 week     [facility]  Dental  [address] [place], [place] [address]  Phone: [phone]  Fax:   Follow Up Time: 1 week

## 2024-01-05 NOTE — DISCHARGE NOTE NURSING/CASE MANAGEMENT/SOCIAL WORK - NSDCPEFALRISK_GEN_ALL_CORE
For information on Fall & Injury Prevention, visit: [email or web address] OR  [email or web address] OR  https://www.cdc.gov/steadi/patient.html For information on Fall & Injury Prevention, visit: [email or web address]/[email or web address] OR  [email or web address]/[email or web address]-[email or web address] OR  https://www.cdc.gov/steadi/patient.html

## 2024-01-05 NOTE — DISCHARGE NOTE PROVIDER - NSDCCPCAREPLAN_GEN_ALL_CORE_FT
PRINCIPAL DISCHARGE DIAGNOSIS  Diagnosis: Septic arthritis of sternoclavicular joint, left  Assessment and Plan of Treatment: Continue Cefepime 2g q 8 hours to complete 6 weeks of treatment  through 1/25/24 and continue Valtrex for 7 days then discontinue      SECONDARY DISCHARGE DIAGNOSES  Diagnosis: Multiple sclerosis  Assessment and Plan of Treatment: Continue close follow up with your neurologist    Diagnosis: Dental neglect  Assessment and Plan of Treatment: there are signs of chronic dental infection present in teeth #4, #14, #19, #31. Teeth #4 and #14 are possibly addressed by the patient's endodontist one month ago, but patient is unsure of the exact teeth. Odontogenic source is unlikely the source of septic arthritis, but cannot be fully ruled out due to chronic odontogenic infection.  Please follow up with outpatient dentist or [facility] dental clinic ([phone]) for treatment of teeth #4, #14, #19, #31, and for comprehensive dental care.       PRINCIPAL DISCHARGE DIAGNOSIS  Diagnosis: Septic arthritis of sternoclavicular joint, left  Assessment and Plan of Treatment: Continue Cefepime 2g q 8 hours to complete 6 weeks of treatment  through 1/25/24 and continue Valtrex for 7 days then discontinue      SECONDARY DISCHARGE DIAGNOSES  Diagnosis: Multiple sclerosis  Assessment and Plan of Treatment: Continue close follow up with your neurologist    Diagnosis: Dental neglect  Assessment and Plan of Treatment: there are signs of chronic dental infection present in teeth #4, #14, #19, #31. Teeth #4 and #14 are possibly addressed by the patient's endodontist one month ago, but patient is unsure of the exact teeth. Odontogenic source is unlikely the source of septic arthritis, but cannot be fully ruled out due to chronic odontogenic infection.  Please follow up with outpatient dentist or [facility] dental clinic ([phone]) for treatment of teeth #4, #14, #19, #31, and for comprehensive dental care.       PRINCIPAL DISCHARGE DIAGNOSIS  Diagnosis: Septic arthritis of sternoclavicular joint, left  Assessment and Plan of Treatment: Continue Cefepime 2g q 8 hours to complete 6 weeks of treatment  through 1/25/24 and continue Valtrex for 7 days then discontinue,  you need to f/up with CTS, PCP every week the following lab work--LFT, CBC, CMP, CRP, ESR.  Wet to dry dressing today and then VAC will be placed in the Acute Rehab , cont pain meds as ordered.      SECONDARY DISCHARGE DIAGNOSES  Diagnosis: Multiple sclerosis  Assessment and Plan of Treatment: Continue close follow up with your neurologist    Diagnosis: Dental neglect  Assessment and Plan of Treatment: there are signs of chronic dental infection present in teeth #4, #14, #19, #31. Teeth #4 and #14 are possibly addressed by the patient's endodontist one month ago, but patient is unsure of the exact teeth. Odontogenic source is unlikely the source of septic arthritis, but cannot be fully ruled out due to chronic odontogenic infection.  Please follow up with outpatient dentist or [facility] dental clinic ([phone]) for treatment of teeth #4, #14, #19, #31, and for comprehensive dental care.       PRINCIPAL DISCHARGE DIAGNOSIS  Diagnosis: Septic arthritis of sternoclavicular joint, left  Assessment and Plan of Treatment: Continue Cefepime 2g q 8 hours to complete 6 weeks of treatment  through 1/25/24 and continue Valtrex for 7 days then discontinue,  you need to f/up with CTS, PCP every week the following lab work--LFT, CBC, CMP, CRP, ESR.  Wet to dry dressing today and then VAC will be placed in the Acute Rehab , cont pain meds as ordered.      SECONDARY DISCHARGE DIAGNOSES  Diagnosis: Multiple sclerosis  Assessment and Plan of Treatment: Continue close follow up with your neurologist    Diagnosis: Dental neglect  Assessment and Plan of Treatment: there are signs of chronic dental infection present in teeth #4, #14, #19, #31. Teeth #4 and #14 are possibly addressed by the patient's endodontist one month ago, but patient is unsure of the exact teeth. Odontogenic source is unlikely the source of septic arthritis, but cannot be fully ruled out due to chronic odontogenic infection.  Please follow up with outpatient dentist or [facility] dental clinic ([phone]) for treatment of teeth #4, #14, #19, #31, and for comprehensive dental care.

## 2024-01-05 NOTE — PROGRESS NOTE ADULT - SUBJECTIVE AND OBJECTIVE BOX
pt stable for dc   d/w pt and hcp   d/w np   d/w cm  d/w op neuro   d/w ID   d/w CTS   see dc summary   d/s to rehab to compjosep abx   monitor CBC, CMP , ESR , CRP weekly   fu w CTS in two weeks   fu with neuro in two week s  fu with ID   repeat jmaging in few weeks prior to removal of picc   d/c >60min

## 2024-01-05 NOTE — PROGRESS NOTE ADULT - PROBLEM SELECTOR PROBLEM 3
Anemia
Dr Naresh Delacruz
Anemia

## 2024-01-05 NOTE — DISCHARGE NOTE PROVIDER - NSDCCAREPROVSEEN_GEN_ALL_CORE_FT
Fercho, Vel Baez, Espinoza Reynaga, Odalis Bravo, Graciela Robison, Mayra Belle, Ta Bautista, Deidre Mckeon, Williams Fierro, Etienne Colón, Irvin

## 2024-01-05 NOTE — DISCHARGE NOTE PROVIDER - NSDCFUADDAPPT_GEN_ALL_CORE_FT
APPTS ARE READY TO BE MADE: [x] YES    Best Family or Patient Contact (if needed):    Additional Information about above appointments (if needed):    1:  follow up with Dr. Herman or Dr. Bar in 1-2 weeks or upon discharge  2:   3:     Other comments or requests:    APPTS ARE READY TO BE MADE: [x] YES    Best Family or Patient Contact (if needed):    Additional Information about above appointments (if needed):    1:  follow up with Dr. Herman or Dr. Bar in 1-2 weeks or upon discharge  2:   3:     Other comments or requests:     Patient is being discharged to [facility]. Caregiver will arrange follow up.   APPTS ARE READY TO BE MADE: [x] YES    Best Family or Patient Contact (if needed):    Additional Information about above appointments (if needed):    1:  follow up with Dr. Herman or Dr. Bar in 1-2 weeks or upon discharge  2:   3:     Other comments or requests:     Patient is being discharged to [facility]. Caregiver will arrange follow up.

## 2024-01-05 NOTE — PROGRESS NOTE ADULT - REASON FOR ADMISSION
L shoulder and chest pain
sp Lt Sterno clavicular joint debridement
L shoulder and chest pain

## 2024-01-05 NOTE — CHART NOTE - NSCHARTNOTEFT_GEN_A_CORE
Notified by primary team that patient is planned for discharge today to Rehab but Rehab facility will not have wound vac available until tomorrow. Last wound vac dressing change was on 1/2, discussed with PT wound care Dustin who has been following, states patient may leave with a wet to dry dressing to be changed daily until wound vac placed at rehab facility. Patient may follow up with Dr. Bautista in 2 weeks after discharge. Please call [phone] to schedule an appointment. Notified by primary team that patient is planned for discharge today to Rehab but Rehab facility will not have wound vac available until tomorrow. Last wound vac dressing change was on 1/2, discussed with PT wound care Dustin who has been following, states patient may leave with a wet to dry dressing to be changed daily until wound vac placed at rehab facility. Patient may follow up with Dr. Bautista in 2 weeks after discharge. Please call [phone] to schedule an appointment.

## 2024-01-05 NOTE — PROGRESS NOTE ADULT - PROBLEM SELECTOR PROBLEM 2
Multiple sclerosis

## 2024-01-05 NOTE — PROGRESS NOTE ADULT - SUBJECTIVE AND OBJECTIVE BOX
Subjective: Patient seen and examined. No new events except as noted.     REVIEW OF SYSTEMS:    CONSTITUTIONAL: No weakness, fevers or chills  EYES/ENT: No visual changes;  No vertigo or throat pain   NECK: No pain or stiffness  RESPIRATORY: No cough, wheezing, hemoptysis; No shortness of breath  CARDIOVASCULAR: No chest pain or palpitations  GASTROINTESTINAL: No abdominal or epigastric pain. No nausea, vomiting, or hematemesis; No diarrhea or constipation. No melena or hematochezia.  GENITOURINARY: No dysuria, frequency or hematuria  NEUROLOGICAL: No numbness or weakness  SKIN: No itching, burning, rashes, or lesions   All other review of systems is negative unless indicated above.    MEDICATIONS:  MEDICATIONS  (STANDING):  cefepime   IVPB 2000 milliGRAM(s) IV Intermittent every 8 hours  chlorhexidine 2% Cloths 1 Application(s) Topical <User Schedule>  cyclobenzaprine 5 milliGRAM(s) Oral two times a day  dalfampridine ER 10 milliGRAM(s) Oral every 12 hours  DULoxetine 60 milliGRAM(s) Oral at bedtime  enoxaparin Injectable 40 milliGRAM(s) SubCutaneous every 24 hours  fentaNYL   Patch  12 MICROgram(s)/Hr 1 Patch Transdermal every 72 hours  FIRST- Mouthwash  BLM 10 milliLiter(s) Swish and Spit two times a day  multivitamin/minerals 1 Tablet(s) Oral daily  oxyCODONE  ER Tablet 10 milliGRAM(s) Oral every 12 hours  senna 2 Tablet(s) Oral at bedtime  sertraline 100 milliGRAM(s) Oral at bedtime  valACYclovir 1000 milliGRAM(s) Oral every 12 hours  Vibegron (Gemtesa) 75 milliGRAM(s),Vibegron (Gemtesa) 75mg tablet 1 Tablet(s) 1 Tablet(s) Oral daily      PHYSICAL EXAM:  T(C): 36.5 (01-05-24 @ 06:18), Max: 36.8 (01-04-24 @ 22:05)  HR: 82 (01-05-24 @ 06:18) (79 - 88)  BP: 95/60 (01-05-24 @ 06:18) (92/58 - 96/62)  RR: 18 (01-05-24 @ 06:18) (18 - 18)  SpO2: 95% (01-05-24 @ 06:18) (95% - 99%)  Wt(kg): --  I&O's Summary    04 Jan 2024 07:01  -  05 Jan 2024 07:00  --------------------------------------------------------  IN: 580 mL / OUT: 0 mL / NET: 580 mL        Appearance: NAD  HEENT:   Normal oral mucosa, PERRL, EOMI	  Lymphatic: No lymphadenopathy , no edema   L clavicular region w/ black sponge CAV dsg secured w/ good seal, minimal serousag drainage noted in collection chamber  Cardiovascular: Normal S1 S2, No JVD, No murmurs , Peripheral pulses palpable 2+ bilaterally  Respiratory: Lungs clear to auscultation, normal effort 	  Gastrointestinal:  Soft, Non-tender, + BS	  Skin: No rashes, No ecchymoses, No cyanosis, warm to touch  Musculoskeletal: decreased range of motion and strength  Psychiatry:  lethargic weak   Ext: No edema    LABS:    CARDIAC MARKERS:                                7.8    8.22  )-----------( 536      ( 04 Jan 2024 06:53 )             25.5           proBNP:   Lipid Profile:   HgA1c:   TSH:             TELEMETRY: 	    ECG:  	  RADIOLOGY:   DIAGNOSTIC TESTING:  [ ] Echocardiogram:  [ ]  Catheterization:  [ ] Stress Test:    OTHER:

## 2024-01-05 NOTE — PROGRESS NOTE ADULT - PROBLEM SELECTOR PLAN 2
Chronic   On Meds.

## 2024-01-05 NOTE — CHART NOTE - NSCHARTNOTESELECT_GEN_ALL_CORE
Event Note
Left upper arm fullness/pain/Event Note
Vanco trough 9.8/Event Note
Event Note
Event Note
Febrile/Event Note
Thoracic Surgery/Off Service Note

## 2024-01-05 NOTE — DISCHARGE NOTE PROVIDER - NSDCMRMEDTOKEN_GEN_ALL_CORE_FT
Ampyra 10 mg oral tablet, extended release: 1 tab(s) orally 2 times a day  baclofen 10 mg oral tablet: 1 tab(s) orally 3 times a day  clonazePAM 0.5 mg oral tablet: 1 tab(s) orally once a day (at bedtime)  DULoxetine 60 mg oral delayed release capsule: 1 cap(s) orally once a day (at bedtime)  gabapentin 300 mg oral tablet: orally as needed for  muscle spasm  Gemtesa 75 mg oral tablet: 1 tab(s) orally once a day  IV Cefepime 2 grams q8 hours: IV Cefepime through PICC until 1/25/24  Rituxan 10 mg/mL intravenous solution: 375 mg/m2 intravenously every 6 months  sertraline 100 mg oral tablet: 1 tab(s) orally once a day (at bedtime)   acetaminophen 325 mg oral tablet: 2 tab(s) orally every 6 hours As needed Temp greater or equal to 38C (100.4F), Mild Pain (1 - 3)  cefepime 2 g intravenous injection: 2 gram(s) intravenous every 8 hours until 01/25/24  clonazePAM 0.5 mg oral tablet: 1 tab(s) orally 3 times a day As needed anxiety  cyclobenzaprine 5 mg oral tablet: 1 tab(s) orally 2 times a day  dalfampridine 10 mg oral tablet, extended release: 1 tab(s) orally every 12 hours  diphenhydramine/lidocaine/aluminum hydroxide/magnesium hydroxide/simethicone mucous membrane suspension: mucous membrane 2 times a day swish and spit  DULoxetine 60 mg oral delayed release capsule: 1 cap(s) orally once a day (at bedtime)  fentaNYL 12 mcg/hr transdermal film, extended release: 1 patch transdermal every 72 hours  Gemtesa 75 mg oral tablet: 1 tab(s) orally once a day  HYDROmorphone 2 mg oral tablet: 1 tab(s) orally every 4 hours As needed Moderate Pain (4 - 6)  HYDROmorphone 4 mg oral tablet: 1 tab(s) orally every 6 hours As needed Severe Pain (7 - 10)  Multiple Vitamins with Minerals oral tablet: 1 tab(s) orally once a day  oxyCODONE 10 mg oral tablet, extended release: 1 tab(s) orally every 12 hours  polyethylene glycol 3350 oral powder for reconstitution: 17 gram(s) orally 2 times a day As needed Constipation  senna leaf extract oral tablet: 2 tab(s) orally once a day (at bedtime)  sertraline 100 mg oral tablet: 1 tab(s) orally once a day (at bedtime)  valACYclovir 1 g oral tablet: 1 tab(s) orally every 12 hours

## 2024-01-05 NOTE — DISCHARGE NOTE NURSING/CASE MANAGEMENT/SOCIAL WORK - PATIENT PORTAL LINK FT
You can access the FollowMyHealth Patient Portal offered by [facility] by registering at the following website: [email or web address]. By joining [employer]’s FollowMyHealth portal, you will also be able to view your health information using other applications (apps) compatible with our system. You can access the FollowMyHealth Patient Portal offered by [facility] by registering at the following website: [email or web address]. By joining [employer]’s FollowMyHealth portal, you will also be able to view your health information using other applications (apps) compatible with our system.

## 2024-01-05 NOTE — PROGRESS NOTE ADULT - PROBLEM SELECTOR PLAN 3
Monitor Hgb
Monitor Hgb  consider PRBC transfusion
Monitor Hgb
Monitor Hgb
Monitor Hgb  consider PRBC transfusion
Monitor Hgb
Monitor Hgb  consider PRBC transfusion
Monitor Hgb

## 2024-01-05 NOTE — PROGRESS NOTE ADULT - SUBJECTIVE AND OBJECTIVE BOX
patient complains of right shoulder pain     REVIEW OF SYSTEMS  Constitutional - No fever,  No fatigue  HEENT - No vertigo, No neck pain  Neurological - No headaches, No memory loss  Skin - No rashes, No lesions     FUNCTIONAL PROGRESS  1/4 PT  bed mobility min assist  transfers min assist   gait min assist x 20 feet     1/5 OT  transfers min assist   LB dressing mod assist     VITALS  T(C): 36.5 (01-05-24 @ 06:18), Max: 36.8 (01-04-24 @ 22:05)  HR: 82 (01-05-24 @ 06:18) (79 - 88)  BP: 95/60 (01-05-24 @ 06:18) (92/58 - 96/62)  RR: 18 (01-05-24 @ 06:18) (18 - 18)  SpO2: 95% (01-05-24 @ 06:18) (95% - 99%)  Wt(kg): --    MEDICATIONS   acetaminophen     Tablet .. 650 milliGRAM(s) every 6 hours PRN  benzocaine/menthol Lozenge 1 Lozenge three times a day PRN  cefepime   IVPB 2000 milliGRAM(s) every 8 hours  chlorhexidine 2% Cloths 1 Application(s) <User Schedule>  clonazePAM  Tablet 0.5 milliGRAM(s) three times a day PRN  cyclobenzaprine 5 milliGRAM(s) two times a day  dalfampridine ER 10 milliGRAM(s) every 12 hours  DULoxetine 60 milliGRAM(s) at bedtime  enoxaparin Injectable 40 milliGRAM(s) every 24 hours  fentaNYL   Patch  12 MICROgram(s)/Hr 1 Patch every 72 hours  FIRST- Mouthwash  BLM 10 milliLiter(s) two times a day  HYDROmorphone   Tablet 4 milliGRAM(s) every 6 hours PRN  HYDROmorphone   Tablet 2 milliGRAM(s) every 4 hours PRN  multivitamin/minerals 1 Tablet(s) daily  oxyCODONE  ER Tablet 10 milliGRAM(s) every 12 hours  polyethylene glycol 3350 17 Gram(s) two times a day PRN  senna 2 Tablet(s) at bedtime  sertraline 100 milliGRAM(s) at bedtime  valACYclovir 1000 milliGRAM(s) every 12 hours  Vibegron (Gemtesa) 75 milliGRAM(s),Vibegron (Gemtesa) 75mg tablet 1 Tablet(s) 1 Tablet(s) daily      RECENT LABS - Reviewed                        7.8    8.22  )-----------( 536      ( 04 Jan 2024 06:53 )             25.5           ---------------------------------------------------------------------  PHYSICAL EXAM  Constitutional - NAD, Comfortable, in chair  +wound vac  Chest - Breathing comfortably  Cardiovascular - S1S2   Abdomen - Soft   Extremities - limited ROM b/l shoulders RUE PICC   Neurologic Exam -    follows commands                 Cognitive - Awake, Alert, AAO to self, place, date, year, situation     Communication - hypophonic        Motor - 5/5 within range        Sensory - Intact to LT     Psychiatric - stable   ----------------------------------------------------------------------------------------  ASSESSMENT/PLAN  43yFemale h/o MS with functional deficits after fall, septic arthritis  s/p debridement, left clavicular head excision, resection of medial manubrium, NWB LUE, wound vac  on cefepime, PICC, 6 weeks through 1/25/24  MS, 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   patient with falls at home, requires min assist with transfers, mobility, decreased balance    Recommend ACUTE inpatient rehabilitation for the functional deficits consisting of 3 hours of therapy/day & x 4 weeks, 24 hour RN/daily PMR physician for comorbid medical management. Patient will be able to tolerate 3 hours a day.

## 2024-01-05 NOTE — PROGRESS NOTE ADULT - TIME-BASED
35
40
45
35
35
40
35
45
35
50
35
40
35
40
40
45
35
35
45
45
35
35
45
35
45
35

## 2024-01-05 NOTE — PROGRESS NOTE ADULT - ASSESSMENT
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  She had a recent fall in November 2023 secondary to unsteadiness due to multiple sclerosis, with subsequent left chest wall and shoulder pain.  Was seen at [facility] with concern for MS exacerbation but no MRI brain and cervical spine with an without contrast were done at the time without enhancement.  At the time infectious workup was done and notable for UTI which was treated.  She continued to have pain.  In the interim, she had an outpatient MRI of left sternoclavicular joint with results significant for marrow edema and swelling of the left sternoclavicular joint with a 2 cm fluid collection and adjacent pleural edema in the left chest with concerns for infectious etiology.   CT chest w/ IV contrast suggestive of septic arthritis with surrounding erythema. Thoracic surgery consulted.  Patient s/p surgical debridement on 12/7/23    mri with 80 x 25 mm rim-enhancing fluid collection persists in the surgical cavity with well-placed drain within the fluid collection.   Marrow signal abnormality in the manubrium concerning for acute   osteomyelitis.    Low-grade partial-thickness bursal surface tear of the supraspinatus tendon.        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  Now likely pseudo-flare in setting of septic arthritis    Recommendations:  Management of septic arthritis per primary team  defer to ID for abx and for any underlying infection  s/p surgical debridement 12/7/23  hold ocrelizumab for now given septic arthritis  continue home meds  defer to primary team for pain control   difficulty standing suspect due to weakness.  Needs aggressive physical therapy as tolerated.  I suspect that she will benefit greatly from acute rehab   question of elevated ESR uncertain, doubt multiple sclerosis driving as highly elevated ESR not typically seen from multiple sclerosis.    discussed yesterday with her outpatient neurologist Dr. 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  She had a recent fall in November 2023 secondary to unsteadiness due to multiple sclerosis, with subsequent left chest wall and shoulder pain.  Was seen at [facility] with concern for MS exacerbation but no MRI brain and cervical spine with an without contrast were done at the time without enhancement.  At the time infectious workup was done and notable for UTI which was treated.  She continued to have pain.  In the interim, she had an outpatient MRI of left sternoclavicular joint with results significant for marrow edema and swelling of the left sternoclavicular joint with a 2 cm fluid collection and adjacent pleural edema in the left chest with concerns for infectious etiology.   CT chest w/ IV contrast suggestive of septic arthritis with surrounding erythema. Thoracic surgery consulted.  Patient s/p surgical debridement on 12/7/23    mri with 80 x 25 mm rim-enhancing fluid collection persists in the surgical cavity with well-placed drain within the fluid collection.   Marrow signal abnormality in the manubrium concerning for acute   osteomyelitis.    Low-grade partial-thickness bursal surface tear of the supraspinatus tendon.        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  Now likely pseudo-flare in setting of septic arthritis    Recommendations:  Management of septic arthritis per primary team  defer to ID for abx and for any underlying infection  s/p surgical debridement 12/7/23  hold ocrelizumab for now given septic arthritis  continue home meds  defer to primary team for pain control   difficulty standing suspect due to weakness.  Needs aggressive physical therapy as tolerated.  I suspect that she will benefit greatly from acute rehab   question of elevated ESR uncertain, doubt multiple sclerosis driving as highly elevated ESR not typically seen from multiple sclerosis.    discussed yesterday with her outpatient neurologist Dr. Selby

## 2024-01-05 NOTE — DISCHARGE NOTE PROVIDER - CARE PROVIDERS DIRECT ADDRESSES
,DirectAddress_Unknown,DirectAddress_Unknown,[email or web address] ,DirectAddress_Unknown,[email or web address],[email or web address]

## 2024-01-05 NOTE — PROGRESS NOTE ADULT - PROBLEM SELECTOR PROBLEM 1
Septic arthritis

## 2024-01-05 NOTE — DISCHARGE NOTE NURSING/CASE MANAGEMENT/SOCIAL WORK - NSDCFUADDAPPT_GEN_ALL_CORE_FT
APPTS ARE READY TO BE MADE: [x] YES    Best Family or Patient Contact (if needed):    Additional Information about above appointments (if needed):    1:  follow up with Dr. Herman or Dr. Bar in 1-2 weeks or upon discharge  2:   3:     Other comments or requests:

## 2024-01-05 NOTE — DISCHARGE NOTE PROVIDER - HOSPITAL COURSE
HPI:  43F PMHx of MS on Rituxan, presenting with left chest wall and shoulder pain. Recent history of fall secondary to unsteadiness due to MS. On review of ortho notes, pt has had multiple falls in November. Obtained outpatient MRI of left sternoclavicular joint with results significant for marrow edema and swelling of the left sternoclavicular joint with a 2 cm fluid collection and adjacent pleural edema in the left chest with concerns for infectious etiology.  Patient initially saw orthopedics with for concerns for septic arthritis of this joint.  Impression of the read showed soft tissue abscess superficial to the joint.  Recommended CT scan with IV contrast of the chest.  Patient denies any fevers however endorsing erythema to the left sternoclavicular joint region. Patient states she was recently hospitalized over a week ago at [facility] for falls secondary to unsteadiness due to her MS and UTI. Hospital stay was for about 6 days. Denies any recent falls today, vision changes. Endorsing headache. Denies any chest pain, shortness of breath, abdominal pain, nausea vomiting diarrhea, urinary complaints    Patient obtained CT chest w/ IV contrast which showed likely septic arthritis with surrounding erythema. Thoracic surgery consulted, plan for OR today with washout and debridement.     In ER: Given IV Zosyn, IV Vancomycin, Tylenol 1gm IVPB, lidocaine patch (07 Dec 2023 02:49)    Hospital Course: Patient was admitted for further medical management.  s/p 12/7 Debridement of left sternoclavicular joint with excision of left clavicular head. 19Fr Tj Drain placed removed on 12/14, s/p PCA pump for pain. Patient underwent another washout on 12/15 w/VAC placement, pt declining intraarticular joint injections, vac changed intra-op on 12/18, MRI R shoulder neg for OM/abscess. CT chest w/ small R and trace L pleural effusions. Blood/urine cx remain neg. Patient will need to complete treatment course with Cefepime till 1/25/24; PICC placed 12/20       Hospital course was complicated by fever 1hr into blood transfusion. Transfusion was stopped and blood was sent back to blood bank. Blood/urine cx negative as well as covid/flu/rvp panel negative. Neurology also followed patient on this admission recommending hold ocrelizumab for now given septic arthritis.    Patient with recent dental work and noted with lesion in mouth-> cw valtrex, dental consult called recommending outpatient treatment of teeth #4, #14, #19, #31, and for comprehensive dental care.    Discharge/Dispo/Med rec discussed with attending  ____. Patient medically cleared for discharge ____ with outpatient follow up     Important Medication Changes and Reason:  --> cefepime 2g q8hr through 1/25/24 for septic arthritis    Active or Pending Issues Requiring Follow-up:  -> Plan to dc with vac per Thoracic  -> Can follow up with Dr. Herman or Dr. Bar in 1-2 weeks or upon discharge  -> Dental F/U with outpatient dentist or [facility] dental clinic ([phone]) for treatment of teeth #4, #14, #19, #31, and for comprehensive dental care.  -> F/u with outpatient neurologist Dr. Selby       Advanced Directives:   [x] Full code  [ ] DNR  [ ] Hospice    Discharge Diagnoses:         HPI:  43F PMHx of MS on Rituxan, presenting with left chest wall and shoulder pain. Recent history of fall secondary to unsteadiness due to MS. On review of ortho notes, pt has had multiple falls in November. Obtained outpatient MRI of left sternoclavicular joint with results significant for marrow edema and swelling of the left sternoclavicular joint with a 2 cm fluid collection and adjacent pleural edema in the left chest with concerns for infectious etiology.  Patient initially saw orthopedics with for concerns for septic arthritis of this joint.  Impression of the read showed soft tissue abscess superficial to the joint.  Recommended CT scan with IV contrast of the chest.  Patient denies any fevers however endorsing erythema to the left sternoclavicular joint region. Patient states she was recently hospitalized over a week ago at [facility] for falls secondary to unsteadiness due to her MS and UTI. Hospital stay was for about 6 days. Denies any recent falls today, vision changes. Endorsing headache. Denies any chest pain, shortness of breath, abdominal pain, nausea vomiting diarrhea, urinary complaints    Patient obtained CT chest w/ IV contrast which showed likely septic arthritis with surrounding erythema. Thoracic surgery consulted, plan for OR today with washout and debridement.     In ER: Given IV Zosyn, IV Vancomycin, Tylenol 1gm IVPB, lidocaine patch (07 Dec 2023 02:49)    Hospital Course: Patient was admitted for further medical management.  s/p 12/7 Debridement of left sternoclavicular joint with excision of left clavicular head. 19Fr Tj Drain placed removed on 12/14, s/p PCA pump for pain. Patient underwent another washout on 12/15 w/VAC placement, pt declining intraarticular joint injections, vac changed intra-op on 12/18, MRI R shoulder neg for OM/abscess. CT chest w/ small R and trace L pleural effusions. Blood/urine cx remain neg. Patient will need to complete treatment course with Cefepime till 1/25/24; PICC placed 12/20       Hospital course was complicated by fever 1hr into blood transfusion. Transfusion was stopped and blood was sent back to blood bank. Blood/urine cx negative as well as covid/flu/rvp panel negative. Neurology also followed patient on this admission recommending hold ocrelizumab for now given septic arthritis.    Patient with recent dental work and noted with lesion in mouth-> cw valtrex, dental consult called recommending outpatient treatment of teeth #4, #14, #19, #31, and for comprehensive dental care.    Discharge/Dispo/Med rec discussed with attending  ____. Patient medically cleared for discharge ____ with outpatient follow up     Important Medication Changes and Reason:  --> cefepime 2g q8hr through 1/25/24 for septic arthritis    Active or Pending Issues Requiring Follow-up:  -> Plan to dc with vac per Thoracic  -> Can follow up with Dr. Herman or Dr. Bar in 1-2 weeks or upon discharge  -> Dental F/U with outpatient dentist or [facility] dental clinic ([phone]) for treatment of teeth #4, #14, #19, #31, and for comprehensive dental care.  -> F/u with outpatient neurologist Dr. Selby       Advanced Directives:   [x] Full code  [ ] DNR  [ ] Hospice    Discharge Diagnoses:         HPI:  43F PMHx of MS on Rituxan, presenting with left chest wall and shoulder pain. Recent history of fall secondary to unsteadiness due to MS. On review of ortho notes, pt has had multiple falls in November. Obtained outpatient MRI of left sternoclavicular joint with results significant for marrow edema and swelling of the left sternoclavicular joint with a 2 cm fluid collection and adjacent pleural edema in the left chest with concerns for infectious etiology.  Patient initially saw orthopedics with for concerns for septic arthritis of this joint.  Impression of the read showed soft tissue abscess superficial to the joint.  Recommended CT scan with IV contrast of the chest.  Patient denies any fevers however endorsing erythema to the left sternoclavicular joint region. Patient states she was recently hospitalized over a week ago at [facility] for falls secondary to unsteadiness due to her MS and UTI. Hospital stay was for about 6 days. Denies any recent falls today, vision changes. Endorsing headache. Denies any chest pain, shortness of breath, abdominal pain, nausea vomiting diarrhea, urinary complaints    Patient obtained CT chest w/ IV contrast which showed likely septic arthritis with surrounding erythema. Thoracic surgery consulted, plan for OR today with washout and debridement.     In ER: Given IV Zosyn, IV Vancomycin, Tylenol 1gm IVPB, lidocaine patch (07 Dec 2023 02:49)    Hospital Course: Patient was admitted for further medical management.  s/p 12/7 Debridement of left sternoclavicular joint with excision of left clavicular head. 19Fr Tj Drain placed removed on 12/14, s/p PCA pump for pain. Patient underwent another washout on 12/15 w/VAC placement, pt declining intraarticular joint injections, vac changed intra-op on 12/18, MRI R shoulder neg for OM/abscess. CT chest w/ small R and trace L pleural effusions. Blood/urine cx remain neg. Patient will need to complete treatment course with Cefepime till 1/25/24; PICC placed 12/20       Hospital course was complicated by fever 1hr into blood transfusion. Transfusion was stopped and blood was sent back to blood bank. Blood/urine cx negative as well as covid/flu/rvp panel negative. Neurology also followed patient on this admission recommending hold ocrelizumab for now given septic arthritis.    Patient with recent dental work and noted with lesion in mouth-> cw valtrex, dental consult called recommending outpatient treatment of teeth #4, #14, #19, #31, and for comprehensive dental care.    Discharge/Dispo/Med rec discussed with attending Dr. Brantley. Patient medically cleared for discharge to acute rehab today with outpatient follow up , spoke to Attending, CM and CTS.    Important Medication Changes and Reason:  --> cefepime 2g q8hr through 1/25/24 for septic arthritis    Active or Pending Issues Requiring Follow-up:  -> Plan to dc with vac per Thoracic  -> Can follow up with Dr. Herman or Dr. Bar in 1-2 weeks or upon discharge  -> Dental F/U with outpatient dentist or [facility] dental clinic ([phone]) for treatment of teeth #4, #14, #19, #31, and for comprehensive dental care.  -> F/u with outpatient neurologist Dr. Selby       Advanced Directives:   [x] Full code  [ ] DNR  [ ] Hospice    Discharge Diagnoses:         HPI:  43F PMHx of MS on Rituxan, presenting with left chest wall and shoulder pain. Recent history of fall secondary to unsteadiness due to MS. On review of ortho notes, pt has had multiple falls in November. Obtained outpatient MRI of left sternoclavicular joint with results significant for marrow edema and swelling of the left sternoclavicular joint with a 2 cm fluid collection and adjacent pleural edema in the left chest with concerns for infectious etiology.  Patient initially saw orthopedics with for concerns for septic arthritis of this joint.  Impression of the read showed soft tissue abscess superficial to the joint.  Recommended CT scan with IV contrast of the chest.  Patient denies any fevers however endorsing erythema to the left sternoclavicular joint region. Patient states she was recently hospitalized over a week ago at [facility] for falls secondary to unsteadiness due to her MS and UTI. Hospital stay was for about 6 days. Denies any recent falls today, vision changes. Endorsing headache. Denies any chest pain, shortness of breath, abdominal pain, nausea vomiting diarrhea, urinary complaints    Patient obtained CT chest w/ IV contrast which showed likely septic arthritis with surrounding erythema. Thoracic surgery consulted, plan for OR today with washout and debridement.     In ER: Given IV Zosyn, IV Vancomycin, Tylenol 1gm IVPB, lidocaine patch (07 Dec 2023 02:49)    Hospital Course: Patient was admitted for further medical management.  s/p 12/7 Debridement of left sternoclavicular joint with excision of left clavicular head. 19Fr Tj Drain placed removed on 12/14, s/p PCA pump for pain. Patient underwent another washout on 12/15 w/VAC placement, pt declining intraarticular joint injections, vac changed intra-op on 12/18, MRI R shoulder neg for OM/abscess. CT chest w/ small R and trace L pleural effusions. Blood/urine cx remain neg. Patient will need to complete treatment course with Cefepime till 1/25/24; PICC placed 12/20       Hospital course was complicated by fever 1hr into blood transfusion. Transfusion was stopped and blood was sent back to blood bank. Blood/urine cx negative as well as covid/flu/rvp panel negative. Neurology also followed patient on this admission recommending hold ocrelizumab for now given septic arthritis.    Patient with recent dental work and noted with lesion in mouth-> cw valtrex, dental consult called recommending outpatient treatment of teeth #4, #14, #19, #31, and for comprehensive dental care.    Discharge/Dispo/Med rec discussed with attending Dr. Brantley. Patient medically cleared for discharge to acute rehab today with outpatient follow up , spoke to Attending, CM and CTS.    Important Medication Changes and Reason:  --> cefepime 2g q8hr through 1/25/24 for septic arthritis    Active or Pending Issues Requiring Follow-up:  -> Plan to dc with vac per Thoracic  -> Can follow up with Dr. Herman or Dr. Bar in 1-2 weeks or upon discharge  -> Dental F/U with outpatient dentist or [facility] dental clinic ([phone]) for treatment of teeth #4, #14, #19, #31, and for comprehensive dental care.  -> F/u with outpatient neurologist Dr. Selby       Advanced Directives:   [x] Full code  [ ] DNR  [ ] Hospice    Discharge Diagnoses:

## 2024-01-05 NOTE — DISCHARGE NOTE PROVIDER - CARE PROVIDER_API CALL
Elly Selby  Neurology  [address] [facility] [address], [address]  [place] [address]  Phone: [phone]  Fax: [phone]  Follow Up Time: 1 week    Braeden Mccallum  Pulmonary Disease  [address] [place] [address]  Phone: [phone]  Fax: [phone]  Follow Up Time: 1 week    Deidre Bautista  Thoracic Surgery  [address] [place] [address], [address] [place] [address]  Phone: [phone]  Fax: [phone]  Follow Up Time: Williams Villatoro  Infectious Disease  [address] [place] [address]  Phone: [phone]  Fax: [phone]  Follow Up Time: 2 weeks   Elly Selby  Neurology  [address] [facility], [address]  [place] [address]  Phone: [phone]  Fax: [phone]  Follow Up Time: 1 week    Braeden Mccallum  Pulmonary Disease  [address] [place] [address]  Phone: [phone]  Fax: [phone]  Follow Up Time: 1 week    Deidre Bautista  Thoracic Surgery  [address] [place] [address], [address] 3 [place] [address]  Phone: [phone]  Fax: [phone]  Follow Up Time: Williams Villatoro  Infectious Disease  [address] [place] [id number]  Phone: [phone]  Fax: [phone]  Follow Up Time: 2 weeks

## 2024-01-06 LAB

## 2024-01-08 ENCOUNTER — NON-APPOINTMENT (OUTPATIENT)
Age: 44
End: 2024-01-08

## 2024-01-13 LAB
CULTURE RESULTS: SIGNIFICANT CHANGE UP
SPECIMEN SOURCE: SIGNIFICANT CHANGE UP

## 2024-01-16 ENCOUNTER — NON-APPOINTMENT (OUTPATIENT)
Age: 44
End: 2024-01-16

## 2024-01-16 ENCOUNTER — APPOINTMENT (OUTPATIENT)
Dept: THORACIC SURGERY | Facility: CLINIC | Age: 44
End: 2024-01-16
Payer: COMMERCIAL

## 2024-01-16 VITALS — DIASTOLIC BLOOD PRESSURE: 47 MMHG | SYSTOLIC BLOOD PRESSURE: 103 MMHG | HEART RATE: 91 BPM

## 2024-01-16 VITALS — WEIGHT: 131 LBS | HEIGHT: 65.5 IN | BODY MASS INDEX: 21.56 KG/M2

## 2024-01-16 DIAGNOSIS — Z78.9 OTHER SPECIFIED HEALTH STATUS: ICD-10-CM

## 2024-01-16 PROCEDURE — 99024 POSTOP FOLLOW-UP VISIT: CPT

## 2024-01-16 NOTE — DISCUSSION/SUMMARY
[Slow Progress] : is progressing slowly [Fair Pain Control] : has fair pain control [No Sign of Infection] : is showing no signs of infection [2] : 2 [Remove Sutures/Staples] : removed sutures/staples [Dressing Change] : dressing change [Clinical Recheck] : clinical recheck

## 2024-01-17 NOTE — PHYSICAL EXAM
[] : no respiratory distress [Auscultation Breath Sounds / Voice Sounds] : lungs were clear to auscultation bilaterally [Heart Rate And Rhythm] : heart rate was normal and rhythm regular [Heart Sounds] : normal S1 and S2 [Heart Sounds Gallop] : no gallops [Murmurs] : no murmurs [Heart Sounds Pericardial Friction Rub] : no pericardial rub [Site: ___] : Site: [unfilled] [Clean] : clean [Dry] : dry [Healing Well] : healing well [No Edema] : no edema [Bleeding] : no active bleeding [Foul Odor] : no foul smell [Purulent Drainage] : no purulent drainage [Serosanguinous Drainage] : no serosanguinous drainage [Erythema] : not erythematous [Warm] : not warm [Tender] : not tender

## 2024-01-17 NOTE — ASSESSMENT
[FreeTextEntry1] : Ms. JAYA AGGARWAL, 43 year old female, never smoker, w/ hx of MS who presented with redness and tenderness over her left sternoclavicular joint. She had a CAT scan that demonstrated an erosion overlying the joint.    Now s/p incision and debridement of Lt sternoclavicular joint on 12/7/23. Path revealed Lt clavicle head periosteal connective tissue and cartilage shows focal acute inflammation and necrosis. Bone shows focal medullary fibrosis, focal reactive woven bone formation and osteoblastic rimming but no definitive osteomyelitis. Bone marrow is hypercellular and shows myeloid predominant trilineage hematopoiesis with increased eosinophils.   Now s/p re-exploration of left sternoclavicular joint wound, manubrial resection, portion of first rib resection, vacuum-assisted closure placement on 12/15/23.   I have reviewed the patient's medical records and diagnostic images at time of this office consultation and have made the following recommendation: 1. Wound checked today, healing well with no signs of infection, will continue wound VAC and RTC in 6 wks for clinical checkup.  2. May start light weight <20lbs bearing physical therapy to the Lt arm in the Rehab center.   I, Dr. LILLY, ESTER BASSBETH, personally performed the evaluation and management (E/M) services for this established patient who presents today with (a) new problem(s)/exacerbation of (an) existing condition(s).  That E/M includes conducting the examination, assessing all new/exacerbated conditions, and establishing a new plan of care.  Today, my ACP, YOSEF Yanes was here to observe my evaluation and management services for this new problem/exacerbated condition to be followed going forward.

## 2024-01-17 NOTE — REASON FOR VISIT
[Spouse] : spouse [Family Member] : family member [de-identified] : Incision and decridement of Lt sternoclavicular joint [de-identified] : 12/7/23 [de-identified] : On 12/15/23, pt went for re-exploration of left sternoclavicular joint wound, manubrial resection, portion of first rib resection, vacuum-assisted closure placement.

## 2024-01-24 LAB
CULTURE RESULTS: SIGNIFICANT CHANGE UP
SPECIMEN SOURCE: SIGNIFICANT CHANGE UP

## 2024-01-25 ENCOUNTER — APPOINTMENT (OUTPATIENT)
Dept: MRI IMAGING | Facility: CLINIC | Age: 44
End: 2024-01-25
Payer: COMMERCIAL

## 2024-01-25 ENCOUNTER — NON-APPOINTMENT (OUTPATIENT)
Age: 44
End: 2024-01-25

## 2024-01-25 DIAGNOSIS — M25.511 PAIN IN RIGHT SHOULDER: ICD-10-CM

## 2024-01-25 PROCEDURE — 73221 MRI JOINT UPR EXTREM W/O DYE: CPT | Mod: RT

## 2024-01-26 LAB
ALBUMIN SERPL ELPH-MCNC: 4.5 G/DL
ALP BLD-CCNC: 202 U/L
ALT SERPL-CCNC: 13 U/L
ANION GAP SERPL CALC-SCNC: 13 MMOL/L
AST SERPL-CCNC: 16 U/L
BASOPHILS # BLD AUTO: 0.09 K/UL
BASOPHILS NFR BLD AUTO: 0.9 %
BILIRUB SERPL-MCNC: <0.2 MG/DL
BUN SERPL-MCNC: 12 MG/DL
CALCIUM SERPL-MCNC: 9.6 MG/DL
CHLORIDE SERPL-SCNC: 102 MMOL/L
CO2 SERPL-SCNC: 24 MMOL/L
CREAT SERPL-MCNC: 0.54 MG/DL
CRP SERPL-MCNC: 75 MG/L
EGFR: 116 ML/MIN/1.73M2
EOSINOPHIL # BLD AUTO: 0.74 K/UL
EOSINOPHIL NFR BLD AUTO: 7.3 %
ERYTHROCYTE [SEDIMENTATION RATE] IN BLOOD BY WESTERGREN METHOD: 77 MM/HR
GLUCOSE SERPL-MCNC: 86 MG/DL
HCT VFR BLD CALC: 32 %
HGB BLD-MCNC: 9.4 G/DL
IMM GRANULOCYTES NFR BLD AUTO: 0.6 %
LYMPHOCYTES # BLD AUTO: 2.06 K/UL
LYMPHOCYTES NFR BLD AUTO: 20.2 %
MAN DIFF?: NORMAL
MCHC RBC-ENTMCNC: 23.2 PG
MCHC RBC-ENTMCNC: 29.4 GM/DL
MCV RBC AUTO: 78.8 FL
MONOCYTES # BLD AUTO: 1.11 K/UL
MONOCYTES NFR BLD AUTO: 10.9 %
NEUTROPHILS # BLD AUTO: 6.14 K/UL
NEUTROPHILS NFR BLD AUTO: 60.1 %
PLATELET # BLD AUTO: 432 K/UL
POTASSIUM SERPL-SCNC: 4.6 MMOL/L
PROT SERPL-MCNC: 6.7 G/DL
RBC # BLD: 4.06 M/UL
RBC # FLD: 18.1 %
SODIUM SERPL-SCNC: 138 MMOL/L
WBC # FLD AUTO: 10.2 K/UL

## 2024-01-30 ENCOUNTER — APPOINTMENT (OUTPATIENT)
Dept: INFECTIOUS DISEASE | Facility: CLINIC | Age: 44
End: 2024-01-30
Payer: COMMERCIAL

## 2024-01-30 VITALS
OXYGEN SATURATION: 92 % | DIASTOLIC BLOOD PRESSURE: 68 MMHG | BODY MASS INDEX: 22.29 KG/M2 | HEART RATE: 84 BPM | SYSTOLIC BLOOD PRESSURE: 108 MMHG | TEMPERATURE: 97.3 F | WEIGHT: 136 LBS

## 2024-01-30 DIAGNOSIS — M86.8X9: ICD-10-CM

## 2024-01-30 DIAGNOSIS — M86.112: ICD-10-CM

## 2024-01-30 PROCEDURE — 99214 OFFICE O/P EST MOD 30 MIN: CPT

## 2024-02-01 ENCOUNTER — APPOINTMENT (OUTPATIENT)
Dept: ORTHOPEDIC SURGERY | Facility: CLINIC | Age: 44
End: 2024-02-01
Payer: COMMERCIAL

## 2024-02-01 VITALS — HEIGHT: 65 IN | BODY MASS INDEX: 22.66 KG/M2 | WEIGHT: 136 LBS

## 2024-02-01 DIAGNOSIS — M75.01 ADHESIVE CAPSULITIS OF RIGHT SHOULDER: ICD-10-CM

## 2024-02-01 PROCEDURE — 99213 OFFICE O/P EST LOW 20 MIN: CPT

## 2024-02-01 NOTE — REASON FOR VISIT
[FreeTextEntry2] : 02/01/2024 :JAYA AGGARWAL , a 43 year old female, presents today for bilateral shoulders

## 2024-02-01 NOTE — ASSESSMENT
[FreeTextEntry1] : The patient was advised of the diagnosis. The natural history of the pathology was explained in full to the patient in layman's terms. All questions were answered. The risks and benefits of surgical and non-surgical treatment alternatives were explained in full to the patient.  Start PT F/u in 6 weeks

## 2024-02-01 NOTE — HISTORY OF PRESENT ILLNESS
[de-identified] : 2/1/24: went to [facility] for 4 weeks- had I&D x2 of the SC joint with bony debridement, now has a VAC.  12/5/2023: Patient is here today for evaluation of left shoulder.  Pt fell 11/2/23 and then again 11/11/23. saw Dr Gutierrez on 11/10/23.  and was in the [facility] 11/17/23 for 6 days.  Pt has not been able to use her left arm since.

## 2024-02-07 PROBLEM — M86.8X9: Status: ACTIVE | Noted: 2024-02-07

## 2024-02-07 PROBLEM — M86.112: Status: ACTIVE | Noted: 2024-02-07

## 2024-02-07 NOTE — REVIEW OF SYSTEMS
[Fever] : no fever [Chills] : no chills [Lower Ext Edema] : no lower extremity edema [Shortness Of Breath] : no shortness of breath [Cough] : no cough [Abdominal Pain] : no abdominal pain [Diarrhea] : no diarrhea [Joint Pain] : no joint pain [As Noted in HPI] : as noted in HPI [Skin Wound] : skin wound [Confused] : no confusion

## 2024-02-07 NOTE — HISTORY OF PRESENT ILLNESS
[FreeTextEntry1] : 43F PMHx of MS on (Rituxan, Ocrevus) presented with left chest wall and shoulder pain. She was admitted to [facility] on Last week of November and was found to have Rhinovirus and pseudomonas UTI. She completed 5-7 days course with Ciprofloxacin. Found to have Septic arthritis of SCM joint now s/p excision, underwent washout and debridement. Manubrium OM. s/p repeat washout and debridement.  oral ulcers noted during admission, now significantly improved.  Pt discharged on Cefepime 2g q 8, complete 6 weeks from last debridement procedure until 1/26/24. pt feeling much better. Still with wound vac, the wound has decreased in size significantly.  Rt shoulder discomfort- Rt Shoulder MRI with no acute infection. pt s/p removal of PICC, s/p recent blood work with normal WBC and markers of inflammation especially CRP improved significantly.

## 2024-02-07 NOTE — ASSESSMENT
[FreeTextEntry1] : 43F PMHx of MS on (Rituxan, Ocrevus) presented with left chest wall and shoulder pain. She was admitted to [facility] on Last week of November and was found to have Rhinovirus and pseudomonas UTI. She completed 5-7 days course with Ciprofloxacin. Found to have Septic arthritis of SCM joint now s/p excision, underwent washout and debridement. Manubrium OM. s/p repeat washout and debridement.  oral ulcers noted during admission, now significantly improved.  Pt discharged on Cefepime 2g q 8, complete 6 weeks from last debridement procedure until 1/26/24. pt feeling much better. Still with wound vac, the wound has decreased in size significantly.  pt s/p removal of PICC, s/p recent blood work with normal WBC and markers of inflammation especially CRP improved significantly.  will get repeat CT chest to evaluate any residual fluid collection.  pt to call with questions and concerns. All questions answered.

## 2024-02-07 NOTE — PHYSICAL EXAM
[General Appearance - Alert] : alert [General Appearance - In No Acute Distress] : in no acute distress [Sclera] : the sclera and conjunctiva were normal [Oropharynx] : the oropharynx was normal with no thrush [FreeTextEntry1] : lt sternoclavicular area open wound with seems like exposed clavicle laterally, otherwise granulation base. [Auscultation Breath Sounds / Voice Sounds] : lungs were clear to auscultation bilaterally [Heart Sounds] : normal S1 and S2 [Edema] : there was no peripheral edema [Abdomen Soft] : soft [Abdomen Tenderness] : non-tender [] : no rash [No Focal Deficits] : no focal deficits [Oriented To Time, Place, And Person] : oriented to person, place, and time

## 2024-02-15 NOTE — HISTORY OF PRESENT ILLNESS
[FreeTextEntry1] : Ms. JAYA AGGARWAL, 43 year old female, never smoker, w/ hx of MS who presented with redness and tenderness over her left sternoclavicular joint. She had a CAT scan that demonstrated an erosion overlying the joint.  Now s/p incision and debridement of Lt sternoclavicular joint on 12/7/23. Path revealed Lt clavicle head periosteal connective tissue and cartilage shows focal acute inflammation and necrosis. Bone shows focal medullary fibrosis, focal reactive woven bone formation and osteoblastic rimming but no definitive osteomyelitis. Bone marrow is hypercellular and shows myeloid predominant trilineage hematopoiesis with increased eosinophils.  Now s/p re-exploration of left sternoclavicular joint wound, manubrial resection, portion of first rib resection, vacuum-assisted closure placement on 12/15/23.  Wound vax in place ....  Pt presents today for clinical checkup.

## 2024-02-19 ENCOUNTER — NON-APPOINTMENT (OUTPATIENT)
Age: 44
End: 2024-02-19

## 2024-02-20 ENCOUNTER — APPOINTMENT (OUTPATIENT)
Dept: THORACIC SURGERY | Facility: CLINIC | Age: 44
End: 2024-02-20
Payer: COMMERCIAL

## 2024-02-20 VITALS
WEIGHT: 136 LBS | HEIGHT: 65 IN | SYSTOLIC BLOOD PRESSURE: 109 MMHG | OXYGEN SATURATION: 98 % | HEART RATE: 83 BPM | DIASTOLIC BLOOD PRESSURE: 70 MMHG | BODY MASS INDEX: 22.66 KG/M2

## 2024-02-20 PROCEDURE — 99024 POSTOP FOLLOW-UP VISIT: CPT

## 2024-02-20 NOTE — REASON FOR VISIT
[Spouse] : spouse [Family Member] : family member [de-identified] : Incision and decridement of Lt sternoclavicular joint [de-identified] : 12/7/23 [de-identified] : On 12/15/23, pt went for re-exploration of left sternoclavicular joint wound, manubrial resection, portion of first rib resection, vacuum-assisted closure placement.

## 2024-02-20 NOTE — HISTORY OF PRESENT ILLNESS
[FreeTextEntry1] : Ms. JAYA AGGARWAL, 43 year old female, never smoker, w/ hx of MS who presented with redness and tenderness over her left sternoclavicular joint. She had a CAT scan that demonstrated an erosion overlying the joint.  Now s/p incision and debridement of Lt sternoclavicular joint on 12/7/23. Path revealed Lt clavicle head periosteal connective tissue and cartilage shows focal acute inflammation and necrosis. Bone shows focal medullary fibrosis, focal reactive woven bone formation and osteoblastic rimming but no definitive osteomyelitis. Bone marrow is hypercellular and shows myeloid predominant trilineage hematopoiesis with increased eosinophils.  Now s/p re-exploration of left sternoclavicular joint wound, manubrial resection, portion of first rib resection, vacuum-assisted closure placement on 12/15/23.  Patient took off Wound vax on 02/19/2024 due to skin rash from surgical tape.   Pt presents today for clinical checkup.

## 2024-02-20 NOTE — ASSESSMENT
[FreeTextEntry1] :   I have reviewed the patient's medical records and diagnostic images at time of this office consultation and have made the following recommendation: 1.

## 2024-02-20 NOTE — PHYSICAL EXAM
[] : no respiratory distress [Auscultation Breath Sounds / Voice Sounds] : lungs were clear to auscultation bilaterally [Heart Rate And Rhythm] : heart rate was normal and rhythm regular [Heart Sounds] : normal S1 and S2 [Heart Sounds Gallop] : no gallops [Murmurs] : no murmurs [Heart Sounds Pericardial Friction Rub] : no pericardial rub [Erythema] : erythematous [No Edema] : no edema [Bleeding] : no active bleeding [Foul Odor] : no foul smell [Tender] : not tender

## 2024-02-20 NOTE — ASSESSMENT
[FreeTextEntry1] : Ms. JAYA AGGARWAL, 43 year old female, never smoker, w/ hx of MS who presented with redness and tenderness over her left sternoclavicular joint. She had a CAT scan that demonstrated an erosion overlying the joint.    Now s/p incision and debridement of Lt sternoclavicular joint on 12/7/23. Path revealed Lt clavicle head periosteal connective tissue and cartilage shows focal acute inflammation and necrosis. Bone shows focal medullary fibrosis, focal reactive woven bone formation and osteoblastic rimming but no definitive osteomyelitis. Bone marrow is hypercellular and shows myeloid predominant trilineage hematopoiesis with increased eosinophils.   Now s/p re-exploration of left sternoclavicular joint wound, manubrial resection, portion of first rib resection, vacuum-assisted closure placement on 12/15/23.   Patient took off wound VAC on 02/19/5024 due to skin irritation from surgical tape.   I have reviewed the patient's medical records and diagnostic images at time of this office consultation and have made the following recommendation: 1. Wound check today, no sign of infection. slow healing. Recommended to continue Wound VAC. If patient declines wound VAC, then wet to dry dressing was recommended. Patient was advised wet to dry dressing can have slow healing process than Wound VAC. I will see patient in 40 days for clinical check up.  2. Recommended Cavilon barrier film for skin irritation.   I, Dr. LILLY, ESTER KAUFFMAN, personally performed the evaluation and management (E/M) services for this established patient who follow up today with an existing condition.  That E/M includes conducting the examination, assessing all new/exacerbated/existing conditions, and establishing a plan of care.  Today, my ACP, Lynda Montejo, ANP-C, was here to observe my evaluation and management services for this existing condition to be followed going forward.

## 2024-03-04 ENCOUNTER — NON-APPOINTMENT (OUTPATIENT)
Age: 44
End: 2024-03-04

## 2024-03-14 ENCOUNTER — APPOINTMENT (OUTPATIENT)
Dept: ORTHOPEDIC SURGERY | Facility: CLINIC | Age: 44
End: 2024-03-14

## 2024-03-15 ENCOUNTER — APPOINTMENT (OUTPATIENT)
Dept: INFECTIOUS DISEASE | Facility: CLINIC | Age: 44
End: 2024-03-15
Payer: COMMERCIAL

## 2024-03-15 DIAGNOSIS — M00.9 PYOGENIC ARTHRITIS, UNSPECIFIED: ICD-10-CM

## 2024-03-15 PROCEDURE — 99214 OFFICE O/P EST MOD 30 MIN: CPT

## 2024-03-15 NOTE — REVIEW OF SYSTEMS
[As Noted in HPI] : as noted in HPI [Fever] : no fever [Chills] : no chills [Cough] : no cough [Sputum] : not coughing up ~M sputum [Abdominal Pain] : no abdominal pain [Vomiting] : no vomiting [Diarrhea] : no diarrhea [Dysuria] : no dysuria [FreeTextEntry5] : chest discomfort due to vac.  [FreeTextEntry9] : b/l shoulder pain, low back pain, both arms hurting.

## 2024-03-15 NOTE — HISTORY OF PRESENT ILLNESS
[FreeTextEntry1] : 43F PMHx of MS on (Rituxan, Ocrevus) presented with left chest wall and shoulder pain. She was admitted to [facility] on Last week of November and was found to have Rhinovirus and pseudomonas UTI. She completed 5-7 days course with Ciprofloxacin. Found to have Septic arthritis of SCM joint now s/p excision, underwent washout and debridement. Manubrium OM. s/p repeat washout and debridement.  oral ulcers noted during admission, now significantly improved.  Pt discharged on Cefepime 2g q 8, complete 6 weeks from last debridement procedure until 1/26/24.  Still with wound vac, the wound has decreased in size significantly.  pt feels tired, b/l shoulder discomfort, back pain and arm pain, concerned she has some sort of ongoing infection. Wound healing very well.  pt s/p removal of PICC, s/p recent blood work with her neurology and markers of inflammation were minimally elevated.

## 2024-03-15 NOTE — ASSESSMENT
[FreeTextEntry1] : 43F PMHx of MS on (Rituxan, Ocrevus) presented with left chest wall and shoulder pain. She was admitted to [facility] on Last week of November and was found to have Rhinovirus and pseudomonas UTI. She completed 5-7 days course with Ciprofloxacin. Found to have Septic arthritis of SCM joint now s/p excision, underwent washout and debridement. Manubrium OM. s/p repeat washout and debridement.  oral ulcers noted during admission, now significantly improved.  Pt discharged on Cefepime 2g q 8, complete 6 weeks from last debridement procedure until 1/26/24.  Still with wound vac, the wound has decreased in size significantly.  pt feels tired, b/l shoulder discomfort, back pain and arm pain, concerned she has some sort of ongoing infection. Wound healing very well.  s/p recent blood work with her neurology and markers of inflammation were minimally elevated.  Pt has been off her MS therapy since Nov, concern part of the symptoms might be due to MS.  Will check ESR/CRP, CBC and U/A to r/o infectious etiology. The wound does not appear infected and has healed significantly.  pt to call with questions and concerns. All questions answered.

## 2024-03-15 NOTE — PHYSICAL EXAM
[General Appearance - In No Acute Distress] : in no acute distress [General Appearance - Alert] : alert [Sclera] : the sclera and conjunctiva were normal [Heart Sounds] : normal S1 and S2 [Auscultation Breath Sounds / Voice Sounds] : lungs were clear to auscultation bilaterally [Edema] : there was no peripheral edema [Abdomen Soft] : soft [Range of Motion to Joints] : range of motion to joints [Abdomen Tenderness] : non-tender [] : no rash [Oriented To Time, Place, And Person] : oriented to person, place, and time [FreeTextEntry1] : no point tenderness of spine

## 2024-03-19 ENCOUNTER — APPOINTMENT (OUTPATIENT)
Dept: PLASTIC SURGERY | Facility: CLINIC | Age: 44
End: 2024-03-19
Payer: COMMERCIAL

## 2024-03-19 VITALS
TEMPERATURE: 97.9 F | RESPIRATION RATE: 16 BRPM | HEART RATE: 89 BPM | WEIGHT: 136 LBS | BODY MASS INDEX: 22.66 KG/M2 | OXYGEN SATURATION: 98 % | HEIGHT: 65 IN | DIASTOLIC BLOOD PRESSURE: 78 MMHG | SYSTOLIC BLOOD PRESSURE: 114 MMHG

## 2024-03-19 LAB
APPEARANCE: CLEAR
BACTERIA: NEGATIVE /HPF
BASOPHILS # BLD AUTO: 0.06 K/UL
BASOPHILS NFR BLD AUTO: 0.7 %
BILIRUBIN URINE: NEGATIVE
BLOOD URINE: NEGATIVE
CAST: 0 /LPF
COLOR: YELLOW
CRP SERPL-MCNC: 13 MG/L
EOSINOPHIL # BLD AUTO: 0.08 K/UL
EOSINOPHIL NFR BLD AUTO: 0.9 %
EPITHELIAL CELLS: 4 /HPF
ERYTHROCYTE [SEDIMENTATION RATE] IN BLOOD BY WESTERGREN METHOD: 31 MM/HR
GLUCOSE QUALITATIVE U: NEGATIVE MG/DL
HCT VFR BLD CALC: 35.6 %
HGB BLD-MCNC: 10.5 G/DL
IMM GRANULOCYTES NFR BLD AUTO: 0.2 %
KETONES URINE: NEGATIVE MG/DL
LEUKOCYTE ESTERASE URINE: NEGATIVE
LYMPHOCYTES # BLD AUTO: 1.81 K/UL
LYMPHOCYTES NFR BLD AUTO: 21.4 %
MAN DIFF?: NORMAL
MCHC RBC-ENTMCNC: 22 PG
MCHC RBC-ENTMCNC: 29.5 GM/DL
MCV RBC AUTO: 74.5 FL
MICROSCOPIC-UA: NORMAL
MONOCYTES # BLD AUTO: 0.72 K/UL
MONOCYTES NFR BLD AUTO: 8.5 %
NEUTROPHILS # BLD AUTO: 5.78 K/UL
NEUTROPHILS NFR BLD AUTO: 68.3 %
NITRITE URINE: NEGATIVE
PH URINE: 7
PLATELET # BLD AUTO: 319 K/UL
PROTEIN URINE: NEGATIVE MG/DL
RBC # BLD: 4.78 M/UL
RBC # FLD: 17.2 %
RED BLOOD CELLS URINE: 2 /HPF
SPECIFIC GRAVITY URINE: 1.02
UROBILINOGEN URINE: 0.2 MG/DL
WBC # FLD AUTO: 8.47 K/UL
WHITE BLOOD CELLS URINE: 0 /HPF

## 2024-03-19 PROCEDURE — 99204 OFFICE O/P NEW MOD 45 MIN: CPT

## 2024-03-20 ENCOUNTER — OUTPATIENT (OUTPATIENT)
Dept: OUTPATIENT SERVICES | Facility: HOSPITAL | Age: 44
LOS: 1 days | End: 2024-03-20
Payer: COMMERCIAL

## 2024-03-20 ENCOUNTER — APPOINTMENT (OUTPATIENT)
Dept: WOUND CARE | Facility: HOSPITAL | Age: 44
End: 2024-03-20
Payer: COMMERCIAL

## 2024-03-20 VITALS
BODY MASS INDEX: 21.05 KG/M2 | SYSTOLIC BLOOD PRESSURE: 111 MMHG | HEIGHT: 66 IN | WEIGHT: 131 LBS | DIASTOLIC BLOOD PRESSURE: 56 MMHG | TEMPERATURE: 98.1 F | OXYGEN SATURATION: 98 % | HEART RATE: 66 BPM

## 2024-03-20 DIAGNOSIS — Z98.891 HISTORY OF UTERINE SCAR FROM PREVIOUS SURGERY: Chronic | ICD-10-CM

## 2024-03-20 DIAGNOSIS — T81.89XA OTHER COMPLICATIONS OF PROCEDURES, NOT ELSEWHERE CLASSIFIED, INITIAL ENCOUNTER: ICD-10-CM

## 2024-03-20 PROCEDURE — 11042 DBRDMT SUBQ TIS 1ST 20SQCM/<: CPT

## 2024-03-20 PROCEDURE — G0463: CPT

## 2024-03-20 PROCEDURE — 99205 OFFICE O/P NEW HI 60 MIN: CPT | Mod: 25

## 2024-03-20 RX ORDER — CEPHALEXIN 500 MG/1
500 CAPSULE ORAL EVERY 6 HOURS
Qty: 28 | Refills: 0 | Status: DISCONTINUED | COMMUNITY
Start: 2020-11-10 | End: 2024-03-20

## 2024-03-20 NOTE — HISTORY OF PRESENT ILLNESS
[FreeTextEntry1] : Ms. JAYA AGGARWAL is a 43 year female with MS who presents to the office with a wound to the left chest wall since Dec 2023. Per report, patient fell and developed a hematoma near her left clavicle. This became infected and she develped septic arthritis of the left steroclavicular joint. She ultimately underwent surgical resection of left sternoclacicular joint, manubrium and partial left first rib resection on 12/7/23. A wound vac was placed for vacuum assisted closure, and she has continued wound vac therapy since that time. Has home nursing 3x/wk. Patient saw plastic surgery yesterday (3/19). Her left clavicle is exposed, and they discussed coverage options, plan to see her for follow up in April. She reports that the vac has helped the wound close significantly. Denies fevers or chills. No other complaints.

## 2024-03-20 NOTE — PHYSICAL EXAM
[Skin Ulcer] : ulcer [Normal Rate and Rhythm] : normal rate and rhythm [Oriented to Person] : oriented to person [Alert] : alert [Oriented to Place] : oriented to place [Oriented to Time] : oriented to time [Anxious] : anxious [Please See PDF for Tissue Analytics] : Please See PDF for Tissue Analytics. [de-identified] : atraumatic [de-identified] : well groomed, NAD [de-identified] : soft, non-tender [de-identified] : wound at base of neck on left/left chest wall with exposed clavicle [de-identified] : breathing comfortably [de-identified] : Ambulates with walker [de-identified] : left chest wall wound [de-identified] : weakness associated with MS

## 2024-03-20 NOTE — ASSESSMENT
[FreeTextEntry1] : 3/20/24 Patient here for evaluation of left chest wall wound. Has home nursing 3x/wk. Has been doing wound vac therapy. No fevers, no new complaints. On exam: Wound with yellow slough. cut end of left clavicle exposed. Rolled skin edge with skin fold above clavicle. Healed portion has keloid appearing scar.  moderate serosanguinous drainage. No s/s of infection.   Procedure Note: Excisional Debridement Location: left chest wall wound Anesthesia:  4% topical lidocaine cream Instruments used: Curette Tissue removed: slough and subcutaneous tissue Pre-debridement Depth: 0.1 Post-debridement Depth: 0.2cm      Hemostasis maintained throughout procedure

## 2024-03-20 NOTE — PLAN
[FreeTextEntry1] : 3/20/24 Discussed that we do not believe that wound vac therapy is indicated at this point. The wound is shallow, and concern about issues that might occur if we were to continue vac therapy including hypergranulation and damage to the new skin. Patient expressed wanting wound to heal as fast as possible. Discussed that though the wound vac helped her heal to this point, using it beyond when indicated could actually delay her healing. She voiced understanding and stated she would contact her CT surgeon to discuss. Plan: Remove dressing before nurse comes. Shower, wash with baby shampoo or sensitive skin soap and water, pat dry Place Vashe soaked gauze on the wound, leave for 5 min. Do not rinse. Elsie to wound bed, cover with adhesive foam. Change every other day Nursing orders provided Follow up in 3 weeks If patient chooses to continue vac therapy, she should follow up with plastic surgery who recommended continuing vac for management.

## 2024-03-20 NOTE — REVIEW OF SYSTEMS
[Skin Wound] : skin wound [Difficulty Walking] : difficulty walking [Limb Weakness] : limb weakness [Negative] : Genitourinary [FreeTextEntry9] : exposed left clavicle
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
## 2024-03-28 NOTE — PHYSICAL EXAM
[NI] : Normal [de-identified] : NAD, AxOx3  [de-identified] : nonlabored breathing  [de-identified] : normal HR [de-identified] : left upper chest with wound vac in place there is an open wound measuring 8.5cm in its entirety  there is a 5cm x 2cm residual opening  no depth  wound is granulating and corey  there is a 2cm x 1cm portion of the left clavicle exposed no evidence of infection [de-identified] : no edema  [de-identified] : as above  [de-identified] : grossly intact  [de-identified] : normal affect

## 2024-03-28 NOTE — END OF VISIT
[FreeTextEntry3] : All medical record entries made by the Scribe were at my, Dr. Lauren Shikowitz-Behr, MD, direction and personally dictated by me on 03/19/2024. I have reviewed the chart and agree that the record accurately reflects my personal performance of the history, physical exam, assessment and plan. I have also personally directed, reviewed, and agreed with the chart.

## 2024-03-28 NOTE — ADDENDUM
[FreeTextEntry1] :  I, Jana Rocha, documented this note as a scribe on behalf of Dr. Lauren Shikowitz-Behr, MD on 03/19/2024.

## 2024-04-02 ENCOUNTER — APPOINTMENT (OUTPATIENT)
Dept: THORACIC SURGERY | Facility: CLINIC | Age: 44
End: 2024-04-02
Payer: COMMERCIAL

## 2024-04-02 VITALS
HEIGHT: 66 IN | OXYGEN SATURATION: 98 % | BODY MASS INDEX: 20.89 KG/M2 | HEART RATE: 85 BPM | WEIGHT: 130 LBS | DIASTOLIC BLOOD PRESSURE: 54 MMHG | SYSTOLIC BLOOD PRESSURE: 109 MMHG
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
## 2024-04-02 DIAGNOSIS — S21.102S: ICD-10-CM

## 2024-04-02 DIAGNOSIS — T81.89XA OTHER COMPLICATIONS OF PROCEDURES, NOT ELSEWHERE CLASSIFIED, INITIAL ENCOUNTER: ICD-10-CM
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
## 2024-04-02 PROCEDURE — 99213 OFFICE O/P EST LOW 20 MIN: CPT

## 2024-04-02 NOTE — PHYSICAL EXAM
[Sclera] : the sclera and conjunctiva were normal [PERRL With Normal Accommodation] : pupils were equal in size, round, and reactive to light [Extraocular Movements] : extraocular movements were intact [Neck Appearance] : the appearance of the neck was normal [] : no respiratory distress [Auscultation Breath Sounds / Voice Sounds] : lungs were clear to auscultation bilaterally [Heart Rate And Rhythm] : heart rate was normal and rhythm regular [Heart Sounds] : normal S1 and S2 [Heart Sounds Gallop] : no gallops [Murmurs] : no murmurs [Heart Sounds Pericardial Friction Rub] : no pericardial rub

## 2024-04-02 NOTE — HISTORY OF PRESENT ILLNESS
[FreeTextEntry1] : Ms. JAYA AGGARWAL, 43 year old female, never smoker, w/ hx of MS who presented with redness and tenderness over her left sternoclavicular joint. She had a CAT scan that demonstrated an erosion overlying the joint.    Now s/p incision and debridement of Lt sternoclavicular joint on 12/7/23. Path revealed Lt clavicle head periosteal connective tissue and cartilage shows focal acute inflammation and necrosis. Bone shows focal medullary fibrosis, focal reactive woven bone formation and osteoblastic rimming but no definitive osteomyelitis. Bone marrow is hypercellular and shows myeloid predominant trilineage hematopoiesis with increased eosinophils.   Now s/p re-exploration of left sternoclavicular joint wound, manubrial resection, portion of first rib resection, vacuum-assisted closure placement on 12/15/23.   Patient took off wound VAC on 02/19/2024 due to skin irritation from surgical tape.   Patient presents today for follow up. She denies any fever, chills or any signs of infection. Wound care done by [facility] every MWF.

## 2024-04-02 NOTE — ASSESSMENT
[FreeTextEntry1] : Ms. JAYA AGGARWAL, 43 year old female, never smoker, w/ hx of MS who presented with redness and tenderness over her left sternoclavicular joint. She had a CAT scan that demonstrated an erosion overlying the joint.    Now s/p incision and debridement of Lt sternoclavicular joint on 12/7/23. Path revealed Lt clavicle head periosteal connective tissue and cartilage shows focal acute inflammation and necrosis. Bone shows focal medullary fibrosis, focal reactive woven bone formation and osteoblastic rimming but no definitive osteomyelitis. Bone marrow is hypercellular and shows myeloid predominant trilineage hematopoiesis with increased eosinophils.   Now s/p re-exploration of left sternoclavicular joint wound, manubrial resection, portion of first rib resection, vacuum-assisted closure placement on 12/15/23.   Patient took off wound VAC on 02/19/5024 due to skin irritation from surgical tape.   I have reviewed the patient's medical records and diagnostic images at time of this office consultation and have made the following recommendation: 1. Left clavicle wound with minimal purulent drainage, no signs of redness or swelling noted. Continue wound care 3xweek. 2. Patient is scheduled to see Dr. Lauren Beth Shikowitz -Behr (Plastic surgeon), will discuss case with Dr. Stearns.  I, Dr. LILLY, ESTER KAUFFMAN, personally performed the evaluation and management (E/M) services for this established patient who presents today with (a) new problem(s)/exacerbation of (an) existing condition(s).  That E/M includes conducting the examination, assessing all new/exacerbated conditions, and establishing a new plan of care.  Today, my ACP, Geetha Lynch/YOSEF Gomez, was here to observe my evaluation and management services for this new problem/exacerbated condition to be followed going forward.

## 2024-04-10 ENCOUNTER — APPOINTMENT (OUTPATIENT)
Dept: WOUND CARE | Facility: HOSPITAL | Age: 44
End: 2024-04-10
Payer: COMMERCIAL

## 2024-04-10 ENCOUNTER — OUTPATIENT (OUTPATIENT)
Dept: OUTPATIENT SERVICES | Facility: HOSPITAL | Age: 44
LOS: 1 days | End: 2024-04-10
Payer: COMMERCIAL

## 2024-04-10 VITALS
SYSTOLIC BLOOD PRESSURE: 120 MMHG | OXYGEN SATURATION: 97 % | TEMPERATURE: 97.6 F | HEART RATE: 76 BPM | DIASTOLIC BLOOD PRESSURE: 77 MMHG

## 2024-04-10 DIAGNOSIS — Z98.891 HISTORY OF UTERINE SCAR FROM PREVIOUS SURGERY: Chronic | ICD-10-CM

## 2024-04-10 PROCEDURE — 11042 DBRDMT SUBQ TIS 1ST 20SQCM/<: CPT

## 2024-04-10 NOTE — REVIEW OF SYSTEMS
[Skin Wound] : skin wound [Limb Weakness] : limb weakness [Difficulty Walking] : difficulty walking [Negative] : Psychiatric [FreeTextEntry9] : exposed left clavicle

## 2024-04-10 NOTE — REASON FOR VISIT
[Follow-Up: _____] : a [unfilled] follow-up visit [Spouse] : spouse [FreeTextEntry1] : left chest wall wound

## 2024-04-10 NOTE — PHYSICAL EXAM
[Skin Ulcer] : ulcer [Alert] : alert [Oriented to Person] : oriented to person [Oriented to Place] : oriented to place [Oriented to Time] : oriented to time [Anxious] : anxious [Please See PDF for Tissue Analytics] : Please See PDF for [employer] Analytics. [de-identified] : well groomed, NAD [de-identified] : atraumatic [de-identified] : wound at base of neck on left/left chest wall with exposed clavicle [de-identified] : breathing comfortably [de-identified] : Ambulates with walker [de-identified] : left chest wall wound [de-identified] : weakness associated with MS

## 2024-04-10 NOTE — PLAN
[FreeTextEntry1] : 3/20/24 Discussed that we do not believe that wound vac therapy is indicated at this point. The wound is shallow, and concern about issues that might occur if we were to continue vac therapy including hypergranulation and damage to the new skin. Patient expressed wanting wound to heal as fast as possible. Discussed that though the wound vac helped her heal to this point, using it beyond when indicated could actually delay her healing. She voiced understanding and stated she would contact her CT surgeon to discuss. Plan: Remove dressing before nurse comes. Shower, wash with baby shampoo or sensitive skin soap and water, pat dry Place Vashe soaked gauze on the wound, leave for 5 min. Do not rinse. Elsie to wound bed, cover with adhesive foam. Change every other day Nursing orders provided Follow up in 3 weeks If patient chooses to continue vac therapy, she should follow up with plastic surgery who recommended continuing vac for management.  4-10-24: Plan: left chest wall wound Remove dressing before nurse comes. Shower, wash with baby shampoo or sensitive skin soap and water, pat dry Place Vashe soaked gauze on the wound, leave for 5 min. Do not rinse. adaptic/aqaucel/foam 3x/week  Nursing orders given f/u 3 weeks

## 2024-04-10 NOTE — ASSESSMENT
[FreeTextEntry1] : 3/20/24 Patient here for evaluation of left chest wall wound. Has home nursing 3x/wk. Has been doing wound vac therapy. No fevers, no new complaints. On exam: Wound with yellow slough. cut end of left clavicle exposed. Rolled skin edge with skin fold above clavicle. Healed portion has keloid appearing scar.  moderate serosanguinous drainage. No s/s of infection.   Procedure Note: Excisional Debridement Location: left chest wall wound Anesthesia:  4% topical lidocaine cream Instruments used: Curette Tissue removed: slough and subcutaneous tissue Pre-debridement Depth: 0.1 Post-debridement Depth: 0.2cm      Hemostasis maintained throughout procedure  4-10-24: Pt here for f/u Accompanied by  No new complaints has home care nurse Pt sees Plastics next week  On exam: left chest wall wound: hypergranulation with slough, bone exposed.  No s/s of infection. s/p excisional debridement  s/p silver nitrate to areas of hypergran

## 2024-04-12 ENCOUNTER — APPOINTMENT (OUTPATIENT)
Dept: INTERNAL MEDICINE | Facility: CLINIC | Age: 44
End: 2024-04-12
Payer: COMMERCIAL

## 2024-04-12 VITALS
DIASTOLIC BLOOD PRESSURE: 65 MMHG | HEART RATE: 81 BPM | SYSTOLIC BLOOD PRESSURE: 98 MMHG | WEIGHT: 127 LBS | BODY MASS INDEX: 20.41 KG/M2 | HEIGHT: 66 IN | OXYGEN SATURATION: 97 %

## 2024-04-12 DIAGNOSIS — G35 MULTIPLE SCLEROSIS: ICD-10-CM

## 2024-04-12 DIAGNOSIS — Z87.39 PERSONAL HISTORY OF OTHER DISEASES OF THE MUSCULOSKELETAL SYSTEM AND CONNECTIVE TISSUE: ICD-10-CM

## 2024-04-12 DIAGNOSIS — F32.A ANXIETY DISORDER, UNSPECIFIED: ICD-10-CM

## 2024-04-12 DIAGNOSIS — Z00.00 ENCOUNTER FOR GENERAL ADULT MEDICAL EXAMINATION W/OUT ABNORMAL FINDINGS: ICD-10-CM

## 2024-04-12 DIAGNOSIS — F41.9 ANXIETY DISORDER, UNSPECIFIED: ICD-10-CM

## 2024-04-12 PROCEDURE — 99386 PREV VISIT NEW AGE 40-64: CPT

## 2024-04-12 PROCEDURE — 99396 PREV VISIT EST AGE 40-64: CPT

## 2024-04-12 RX ORDER — TRAMADOL HYDROCHLORIDE 50 MG/1
50 TABLET, COATED ORAL 3 TIMES DAILY
Qty: 21 | Refills: 0 | Status: DISCONTINUED | COMMUNITY
Start: 2023-11-10 | End: 2024-04-12

## 2024-04-12 RX ORDER — CLONAZEPAM 0.5 MG/1
0.5 TABLET ORAL
Refills: 0 | Status: ACTIVE | COMMUNITY
Start: 2024-04-12

## 2024-04-12 RX ORDER — DULOXETINE HYDROCHLORIDE 60 MG/1
60 CAPSULE, DELAYED RELEASE PELLETS ORAL
Refills: 0 | Status: ACTIVE | COMMUNITY
Start: 2024-04-12

## 2024-04-12 RX ORDER — VIBEGRON 75 MG/1
TABLET, FILM COATED ORAL DAILY
Refills: 0 | Status: ACTIVE | COMMUNITY

## 2024-04-12 RX ORDER — DALFAMPRIDINE 10 MG/1
10 TABLET, FILM COATED, EXTENDED RELEASE ORAL
Refills: 0 | Status: DISCONTINUED | COMMUNITY
End: 2024-04-12

## 2024-04-12 RX ORDER — RITUXIMAB 10 MG/ML
100 INJECTION, SOLUTION INTRAVENOUS
Refills: 0 | Status: DISCONTINUED | COMMUNITY
Start: 2019-10-25 | End: 2024-04-12

## 2024-04-12 RX ORDER — MODAFINIL 200 MG/1
TABLET ORAL DAILY
Refills: 0 | Status: ACTIVE | COMMUNITY

## 2024-04-12 RX ORDER — BACLOFEN 15 MG/1
TABLET ORAL 3 TIMES DAILY
Refills: 0 | Status: ACTIVE | COMMUNITY
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
## 2024-04-12 NOTE — HEALTH RISK ASSESSMENT
[Yes] : Yes [2 - 3 times a week (3 pts)] : 2 - 3  times a week (3 points) [1 or 2 (0 pts)] : 1 or 2 (0 points) [Never (0 pts)] : Never (0 points) [No] : In the past 12 months have you used drugs other than those required for medical reasons? No [0] : 1) Little interest or pleasure doing things: Not at all (0) [3] : 2) Feeling down, depressed, or hopeless for nearly every day (3) [PHQ-2 Positive] : PHQ-2 Positive [Several Days (1)] : 6.) Feeling bad about yourself, or that you are a failure, or have let yourself or your family down? Several days [Nearly Every Day (3)] : 8.) Moving or speaking so slowly that other people could have noticed, or the opposite, moving or speaking faster than usual? Nearly every day [Not at All (0)] : 9.) Thoughts that you would be off dead or of hurting yourself in some way? Not at all [Moderate] : Severity of Depression is Moderate [Patient reported mammogram was normal] : Patient reported mammogram was normal [Patient reported PAP Smear was normal] : Patient reported PAP Smear was normal [Never] : Never [Any fall with injury in past year] : Patient reported fall with injury in the past year [Audit-CScore] : 3 [IMN4Ifxyd] : 3 [[id number]] : 14 [MammogramDate] : 6 months ago [PapSmearDate] : 6 months ago

## 2024-04-12 NOTE — PHYSICAL EXAM
[Normal Sclera/Conjunctiva] : normal sclera/conjunctiva [PERRL] : pupils equal round and reactive to light [EOMI] : extraocular movements intact [Supple] : supple [Thyroid Normal, No Nodules] : the thyroid was normal and there were no nodules present [No Respiratory Distress] : no respiratory distress  [No Accessory Muscle Use] : no accessory muscle use [Clear to Auscultation] : lungs were clear to auscultation bilaterally [Normal Rate] : normal rate  [Regular Rhythm] : with a regular rhythm [No Edema] : there was no peripheral edema [Soft] : abdomen soft [Non Tender] : non-tender [Non-distended] : non-distended [Normal Posterior Cervical Nodes] : no posterior cervical lymphadenopathy [Normal Anterior Cervical Nodes] : no anterior cervical lymphadenopathy [No Focal Deficits] : no focal deficits [Normal] : affect was normal and insight and judgment were intact [de-identified] : Wound of L chest wall, clavicle exposed, currently healing

## 2024-04-15 DIAGNOSIS — D50.9 IRON DEFICIENCY ANEMIA, UNSPECIFIED: ICD-10-CM

## 2024-04-15 DIAGNOSIS — D64.9 ANEMIA, UNSPECIFIED: ICD-10-CM

## 2024-04-15 LAB
ALBUMIN SERPL ELPH-MCNC: 4.3 G/DL
ALP BLD-CCNC: 139 U/L
ALT SERPL-CCNC: 16 U/L
ANION GAP SERPL CALC-SCNC: 12 MMOL/L
AST SERPL-CCNC: 17 U/L
BILIRUB SERPL-MCNC: <0.2 MG/DL
BUN SERPL-MCNC: 8 MG/DL
CALCIUM SERPL-MCNC: 9.2 MG/DL
CHLORIDE SERPL-SCNC: 101 MMOL/L
CHOLEST SERPL-MCNC: 189 MG/DL
CO2 SERPL-SCNC: 24 MMOL/L
CREAT SERPL-MCNC: 0.54 MG/DL
EGFR: 116 ML/MIN/1.73M2
ESTIMATED AVERAGE GLUCOSE: 105 MG/DL
FERRITIN SERPL-MCNC: 10 NG/ML
FOLATE SERPL-MCNC: 9.2 NG/ML
GLUCOSE SERPL-MCNC: 81 MG/DL
HBA1C MFR BLD HPLC: 5.3 %
HCT VFR BLD CALC: 32.3 %
HDLC SERPL-MCNC: 58 MG/DL
HGB BLD-MCNC: 9.4 G/DL
IRON SATN MFR SERPL: 5 %
IRON SERPL-MCNC: 19 UG/DL
LDLC SERPL CALC-MCNC: 114 MG/DL
MCHC RBC-ENTMCNC: 22.2 PG
MCHC RBC-ENTMCNC: 29.1 GM/DL
MCV RBC AUTO: 76.4 FL
NONHDLC SERPL-MCNC: 131 MG/DL
PLATELET # BLD AUTO: 317 K/UL
POTASSIUM SERPL-SCNC: 4.8 MMOL/L
PROT SERPL-MCNC: 6.2 G/DL
RBC # BLD: 4.23 M/UL
RBC # FLD: 16.9 %
SODIUM SERPL-SCNC: 137 MMOL/L
TIBC SERPL-MCNC: 367 UG/DL
TRIGL SERPL-MCNC: 95 MG/DL
TSH SERPL-ACNC: 1.17 UIU/ML
UIBC SERPL-MCNC: 348 UG/DL
VIT B12 SERPL-MCNC: 740 PG/ML
WBC # FLD AUTO: 6.98 K/UL

## 2024-04-15 RX ORDER — CHLORHEXIDINE GLUCONATE 4 %
325 (65 FE) LIQUID (ML) TOPICAL
Qty: 45 | Refills: 3 | Status: ACTIVE | COMMUNITY
Start: 2024-04-15 | End: 1900-01-01

## 2024-04-16 ENCOUNTER — APPOINTMENT (OUTPATIENT)
Dept: PLASTIC SURGERY | Facility: CLINIC | Age: 44
End: 2024-04-16
Payer: COMMERCIAL

## 2024-04-16 PROCEDURE — 99214 OFFICE O/P EST MOD 30 MIN: CPT

## 2024-04-24 NOTE — END OF VISIT
[FreeTextEntry3] : All medical record entries made by the Scribe were at my, Dr. Lauren Shikowitz-Behr, MD, direction and personally dictated by me on 04/16/2024. I have reviewed the chart and agree that the record accurately reflects my personal performance of the history, physical exam, assessment and plan. I have also personally directed, reviewed, and agreed with the chart.

## 2024-04-24 NOTE — ADDENDUM
[FreeTextEntry1] :  I, Jana Rocha, documented this note as a scribe on behalf of Dr. Lauren Shikowitz-Behr, MD on 04/16/2024.

## 2024-04-24 NOTE — HISTORY OF PRESENT ILLNESS
[FreeTextEntry1] : JAYA AGGARWAL is a 43 year old female with history of multiple sclerosis and with worsening ataxia and subsequent fall 11/2023. Patient sustained what she was told was a hematoma, progressing to septic arthritis involving the left clavicle. Patient had prolonged hospitalization and partial resection of the sternum, left clavicle , and ribs .  Patient continues with followup at wound care clinic. Wound VAC has been discontinued. She is treating with aquacel to the site with continued, slow closure. Patient expresses frustration with delayed healing.

## 2024-04-24 NOTE — PHYSICAL EXAM
[de-identified] : NAd, AxOx3  [de-identified] : nonlabored breathing  [de-identified] : left upper chest wound is clean, dry, and intact  there is mild amount of  fibrinous tissue  area of clavicle exposure is decreasing and granulating over  rest of wound edges are corey  there is surrounding skin excoriation  no evidence of infection  [de-identified] : no edema  [de-identified] : as above [de-identified] : normal affect

## 2024-04-28 NOTE — PROGRESS NOTE ADULT - SUBJECTIVE AND OBJECTIVE BOX
Subjective:    V/S  T(C): 36.8 (23 @ 05:18), Max: 36.9 (23 @ 22:45)  HR: 84 (23 @ 05:18) (82 - 91)  BP: 106/69 (23 @ 05:18) (92/49 - 108/68)  RR: 18 (23 @ 05:18) (17 - 18)  SpO2: 97% (23 @ 05:18) (96% - 99%)                                               I&O's Detail    16 Dec 2023 07:01  -  17 Dec 2023 07:00  --------------------------------------------------------  IN:    Lactated Ringers: 300 mL    Oral Fluid: 1000 mL  Total IN: 1300 mL    OUT:  Total OUT: 0 mL    Total NET: 1300 mL    23 @ 07:01  -  23 @ 07:00  --------------------------------------------------------  IN: 1300 mL / OUT: 0 mL / NET: 1300 mL    MEDICATIONS  (STANDING):  cefepime   IVPB 2000 milliGRAM(s) IV Intermittent every 8 hours  chlorhexidine 2% Cloths 1 Application(s) Topical <User Schedule>  DULoxetine 60 milliGRAM(s) Oral at bedtime  enoxaparin Injectable 40 milliGRAM(s) SubCutaneous every 24 hours  lactated ringers. 1000 milliLiter(s) (75 mL/Hr) IV Continuous <Continuous>  senna 2 Tablet(s) Oral at bedtime  sertraline 100 milliGRAM(s) Oral at bedtime      12-15    136  |  99  |  8   ----------------------------<  122<H>  4.4   |  26  |  0.41<L>    Ca    8.9      15 Dec 2023 23:59  Phos  3.1     12-15  Mg     2.1     12-15    TPro  6.0  /  Alb  3.2<L>  /  TBili  0.2  /  DBili  x   /  AST  9<L>  /  ALT  10  /  AlkPhos  155<H>  12-15                               6.4    8.40  )-----------( 406      ( 16 Dec 2023 10:16 )             20.9          CXR:      Physical Exam:  Neuro: A&O x 3  Pulm: CTA  CV: S1S2  Abd: soft NT/ND +bs + bm   Extremities: no edema no calf tenderness    PAST MEDICAL & SURGICAL HISTORY:  Multiple sclerosis  History of              45.4

## 2024-05-03 ENCOUNTER — APPOINTMENT (OUTPATIENT)
Dept: WOUND CARE | Facility: HOSPITAL | Age: 44
End: 2024-05-03
Payer: COMMERCIAL

## 2024-05-03 ENCOUNTER — OUTPATIENT (OUTPATIENT)
Dept: OUTPATIENT SERVICES | Facility: HOSPITAL | Age: 44
LOS: 1 days | End: 2024-05-03
Payer: COMMERCIAL

## 2024-05-03 VITALS
OXYGEN SATURATION: 100 % | TEMPERATURE: 97.7 F | SYSTOLIC BLOOD PRESSURE: 120 MMHG | RESPIRATION RATE: 16 BRPM | DIASTOLIC BLOOD PRESSURE: 75 MMHG | HEART RATE: 90 BPM

## 2024-05-03 DIAGNOSIS — Z98.891 HISTORY OF UTERINE SCAR FROM PREVIOUS SURGERY: Chronic | ICD-10-CM

## 2024-05-03 PROCEDURE — 11042 DBRDMT SUBQ TIS 1ST 20SQCM/<: CPT

## 2024-05-03 PROCEDURE — 11045 DBRDMT SUBQ TISS EACH ADDL: CPT

## 2024-05-03 NOTE — PLAN
[FreeTextEntry1] : 3/20/24 Discussed that we do not believe that wound vac therapy is indicated at this point. The wound is shallow, and concern about issues that might occur if we were to continue vac therapy including hypergranulation and damage to the new skin. Patient expressed wanting wound to heal as fast as possible. Discussed that though the wound vac helped her heal to this point, using it beyond when indicated could actually delay her healing. She voiced understanding and stated she would contact her CT surgeon to discuss. Plan: Remove dressing before nurse comes. Shower, wash with baby shampoo or sensitive skin soap and water, pat dry Place Vashe soaked gauze on the wound, leave for 5 min. Do not rinse. Elsie to wound bed, cover with adhesive foam. Change every other day Nursing orders provided Follow up in 3 weeks If patient chooses to continue vac therapy, she should follow up with plastic surgery who recommended continuing vac for management.  4-10-24: Plan: left chest wall wound Remove dressing before nurse comes. Shower, wash with baby shampoo or sensitive skin soap and water, pat dry Place Vashe soaked gauze on the wound, leave for 5 min. Do not rinse. adaptic/aqaucel/foam 3x/week  Nursing orders given f/u 3 weeks   5-3-24: Plan: left chest wound: Shower, wash with baby shampoo or sensitive skin soap and water, pat dry Place Vashe soaked gauze on the wound, leave for 5 min. Do not rinse. adaptict touch/aqaucel/foam/tape every other day and prn supplies ordered f/u 3-4 weeks

## 2024-05-03 NOTE — ASSESSMENT
[FreeTextEntry1] : 3/20/24 Patient here for evaluation of left chest wall wound. Has home nursing 3x/wk. Has been doing wound vac therapy. No fevers, no new complaints. On exam: Wound with yellow slough. cut end of left clavicle exposed. Rolled skin edge with skin fold above clavicle. Healed portion has keloid appearing scar.  moderate serosanguinous drainage. No s/s of infection.   Procedure Note: Excisional Debridement Location: left chest wall wound Anesthesia:  4% topical lidocaine cream Instruments used: Curette Tissue removed: slough and subcutaneous tissue Pre-debridement Depth: 0.1 Post-debridement Depth: 0.2cm      Hemostasis maintained throughout procedure  4-10-24: Pt here for f/u Accompanied by  No new complaints has home care nurse Pt sees Plastics next week  On exam: left chest wall wound: hypergranulation with slough, bone exposed.  No s/s of infection. s/p excisional debridement  s/p silver nitrate to areas of hypergran  5-3-24: Pt here for f/u Accompanied by  Still with concerns over when wound will heal as well as about the scarring No longer has nurse.   does dressing On exam: left chest wound:  bone still exposed. slough present with hypergranulation tissue.  periwound maceration. s/p excisional debridement No s/s of infection.

## 2024-05-03 NOTE — PHYSICAL EXAM
[Skin Ulcer] : ulcer [Alert] : alert [Oriented to Person] : oriented to person [Oriented to Place] : oriented to place [Oriented to Time] : oriented to time [Anxious] : anxious [de-identified] : well groomed, NAD [de-identified] : atraumatic [de-identified] : wound at base of neck on left/left chest wall with exposed clavicle [de-identified] : breathing comfortably [de-identified] : Ambulates with walker [de-identified] : left chest wall wound [de-identified] : weakness associated with MS [Please See PDF for [employer]] : Please See PDF for [employer].

## 2024-05-06 ENCOUNTER — NON-APPOINTMENT (OUTPATIENT)
Age: 44
End: 2024-05-06

## 2024-05-08 DIAGNOSIS — S21.102S: ICD-10-CM

## 2024-05-08 DIAGNOSIS — T81.89XD OTHER COMPLICATIONS OF PROCEDURES, NOT ELSEWHERE CLASSIFIED, SUBSEQUENT ENCOUNTER: ICD-10-CM

## 2024-05-14 ENCOUNTER — APPOINTMENT (OUTPATIENT)
Dept: PLASTIC SURGERY | Facility: CLINIC | Age: 44
End: 2024-05-14
Payer: COMMERCIAL

## 2024-05-14 VITALS
SYSTOLIC BLOOD PRESSURE: 118 MMHG | DIASTOLIC BLOOD PRESSURE: 72 MMHG | HEIGHT: 66 IN | WEIGHT: 127 LBS | BODY MASS INDEX: 20.41 KG/M2

## 2024-05-14 DIAGNOSIS — S21.102S: ICD-10-CM

## 2024-05-14 DIAGNOSIS — T81.89XD OTHER COMPLICATIONS OF PROCEDURES, NOT ELSEWHERE CLASSIFIED, SUBSEQUENT ENCOUNTER: ICD-10-CM

## 2024-05-14 PROCEDURE — 99215 OFFICE O/P EST HI 40 MIN: CPT

## 2024-05-15 PROBLEM — T81.89XD DELAYED SURGICAL WOUND HEALING, SUBSEQUENT ENCOUNTER: Status: ACTIVE | Noted: 2024-04-10

## 2024-05-15 PROBLEM — S21.102S: Status: ACTIVE | Noted: 2024-03-19

## 2024-05-15 NOTE — END OF VISIT
[FreeTextEntry3] : All medical record entries made by the Scribe were at my, Dr. Lauren Shikowitz-Behr, MD, direction and personally dictated by me on 05/14/2024. I have reviewed the chart and agree that the record accurately reflects my personal performance of the history, physical exam, assessment and plan. I have also personally directed, reviewed, and agreed with the chart.

## 2024-05-15 NOTE — HISTORY OF PRESENT ILLNESS
[FreeTextEntry1] : JAYA AGGARWAL is a 43 year old female with history of multiple sclerosis and with worsening ataxia and subsequent fall 11/2023. Patient sustained what she and her  were told was a hematoma, progressing to septic arthritis involving the left clavicle. Patient had prolonged hospitalization and partial resection of the sternum, left clavicle , and ribs .  Patient denies ever having any referral to a plastic surgeon for reconstruction. INTERVAL HISTORY Patient seen and examined today. Patient states she continues with wound care. She expresses frustration with duration of healing. She otherwise has no other complaints today

## 2024-05-15 NOTE — ADDENDUM
[FreeTextEntry1] :  I, Jana Rocha, documented this note as a scribe on behalf of Dr. Lauren Shikowitz-Behr, MD on 05/14/2024.

## 2024-05-15 NOTE — PHYSICAL EXAM
[de-identified] : NAd, AxOx3  [de-identified] : nonlabored breathing  [de-identified] : no edema  [de-identified] : Left chest wound granulating  Previously exposed clavicle almost covered - epithelializing  Scar with areas of hypertrophy Lateral wound measuring 1.5cm x 1cm  Central wound measuring 1.4cm x 0.9cm No evidence of infection  [de-identified] : as above [de-identified] : normal affect

## 2024-05-29 PROBLEM — M86.10: Status: ACTIVE | Noted: 2024-02-07

## 2024-05-30 ENCOUNTER — APPOINTMENT (OUTPATIENT)
Dept: THORACIC SURGERY | Facility: CLINIC | Age: 44
End: 2024-05-30
Payer: COMMERCIAL

## 2024-05-30 VITALS
OXYGEN SATURATION: 100 % | HEART RATE: 77 BPM | BODY MASS INDEX: 20.57 KG/M2 | WEIGHT: 128 LBS | DIASTOLIC BLOOD PRESSURE: 49 MMHG | HEIGHT: 66 IN | SYSTOLIC BLOOD PRESSURE: 110 MMHG | RESPIRATION RATE: 16 BRPM

## 2024-05-30 DIAGNOSIS — S21.102A UNSPECIFIED OPEN WOUND OF LEFT FRONT WALL OF THORAX WITHOUT PENETRATION INTO THORACIC CAVITY, INITIAL ENCOUNTER: ICD-10-CM
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
## 2024-05-30 DIAGNOSIS — M86.10 OTHER ACUTE OSTEOMYELITIS, UNSPECIFIED SITE: ICD-10-CM

## 2024-05-30 DIAGNOSIS — T81.89XD OTHER COMPLICATIONS OF PROCEDURES, NOT ELSEWHERE CLASSIFIED, SUBSEQUENT ENCOUNTER: ICD-10-CM
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
## 2024-05-30 PROCEDURE — 99215 OFFICE O/P EST HI 40 MIN: CPT

## 2024-05-30 NOTE — ASSESSMENT
[FreeTextEntry1] : Ms. JAYA AGGARWAL, 43 year old female, never smoker, w/ hx of MS who presented with redness and tenderness over her left sternoclavicular joint. She had a CAT scan that demonstrated an erosion overlying the joint.    Now s/p incision and debridement of Lt sternoclavicular joint on 12/7/23. Path revealed Lt clavicle head periosteal connective tissue and cartilage shows focal acute inflammation and necrosis. Bone shows focal medullary fibrosis, focal reactive woven bone formation and osteoblastic rimming but no definitive osteomyelitis. Bone marrow is hypercellular and shows myeloid predominant trilineage hematopoiesis with increased eosinophils.   Now s/p re-exploration of left sternoclavicular joint wound, manubrial resection, portion of first rib resection, vacuum-assisted closure placement on 12/15/23.   Patient took off wound VAC on 02/19/2024 due to skin irritation from surgical tape.   Pt has been seeing Plastic Sx Dr. Stearns, now on scar care with Aquacel, massage scar and slver nitrate to existing wounds.   I have reviewed the patient's medical records and diagnostic images at time of this office consultation and have made the following recommendation: 1. Pt is upset that her wound has not completely healed by now and said the Plastic Surgeon she saw Dr. Stearns could not fix her wound. Pt claimed that if her wound "could have been handled with more care" at the time of surgery, then she would have a better recovery. I explained to her in detail that her surgery was handle with care and at the time of initial surgery, she had a very bad infection over her clavicle, and it was a life-threatening condition if leave untreated, pt would have had even worse outcome, such as death. I explained at the time of surgery all risks and potential outcome, I said to her and family I will do my best to preserve the appearance, but she will have an "ugly scar" due to the severity of the infection and her immunocompromised health status due to MS. Also I explained to pt that she has a prolonged healing of her wound due to the combination of her health status, hx of MS, and severity of the infection. Pt then claimed that [facility] gave her the injury and wound, they malpracticed on her. Again pt is upset and wanted to have other recommendation such as cream and ointment that she can use to expedite her wound healing. Again, I explained to her I do not have any creams to offer to heal this wound faster but can refer her to one of the Plastic Surgeons that I work with closely to see if he can fix the wound. I told pt I know she is upset about how the wound looks like and it was pt's choice to see Dr. Stearns. If Dr. Stearns could not fix the problem, I can definitely reach out to Dr. Foley to see if he is able to help. Pt finally agreed and will call pt once confirm with Dr. Foley.   I have spent 45 mins talking with pt regarding above plan.   I, Dr. LILLY, ESTER KAUFFMAN, personally performed the evaluation and management (E/M) services for this established patient who presents today with (a) new problem(s)/exacerbation of (an) existing condition(s).  That E/M includes conducting the examination, assessing all new/exacerbated conditions, and establishing a new plan of care.  Today, my ACP, Geetha Lynch, PASQUALE-BC was here to observe my evaluation and management services for this new problem/exacerbated condition to be followed going forward.

## 2024-05-30 NOTE — HISTORY OF PRESENT ILLNESS
[FreeTextEntry1] : Ms. JAYA AGGARWAL, 43 year old female, never smoker, w/ hx of MS who presented with redness and tenderness over her left sternoclavicular joint. She had a CAT scan that demonstrated an erosion overlying the joint.    Now s/p incision and debridement of Lt sternoclavicular joint on 12/7/23. Path revealed Lt clavicle head periosteal connective tissue and cartilage shows focal acute inflammation and necrosis. Bone shows focal medullary fibrosis, focal reactive woven bone formation and osteoblastic rimming but no definitive osteomyelitis. Bone marrow is hypercellular and shows myeloid predominant trilineage hematopoiesis with increased eosinophils.   Now s/p re-exploration of left sternoclavicular joint wound, manubrial resection, portion of first rib resection, vacuum-assisted closure placement on 12/15/23.   Patient took off wound VAC on 02/19/2024 due to skin irritation from surgical tape.   Pt has been seeing Plastic Sx Dr. Stearns, now on scar care with Aquacel, massage scar and slver nitrate to existing wounds.   Pt presents today for follow up. Pt is today very upset because she thinks her wound is not healing properly and it looks ugly.

## 2024-05-30 NOTE — DATA REVIEWED
[FreeTextEntry1] : I have independently reviewed the following: [No studies available for review at this time.] : No studies available for review at this time.

## 2024-05-30 NOTE — PHYSICAL EXAM
[FreeTextEntry1] : Lt supraclavicular wound looks clean without any infection. Two small ulceration noted.

## 2024-06-07 ENCOUNTER — APPOINTMENT (OUTPATIENT)
Dept: WOUND CARE | Facility: HOSPITAL | Age: 44
End: 2024-06-07

## 2024-06-11 ENCOUNTER — APPOINTMENT (OUTPATIENT)
Dept: PLASTIC SURGERY | Facility: CLINIC | Age: 44
End: 2024-06-11

## 2024-06-11 ENCOUNTER — APPOINTMENT (OUTPATIENT)
Dept: ORTHOPEDIC SURGERY | Facility: CLINIC | Age: 44
End: 2024-06-11
Payer: COMMERCIAL

## 2024-06-11 VITALS — HEIGHT: 66 IN | BODY MASS INDEX: 20.57 KG/M2 | WEIGHT: 128 LBS

## 2024-06-11 DIAGNOSIS — S50.01XA CONTUSION OF RIGHT ELBOW, INITIAL ENCOUNTER: ICD-10-CM

## 2024-06-11 PROCEDURE — 99213 OFFICE O/P EST LOW 20 MIN: CPT

## 2024-06-11 PROCEDURE — 73080 X-RAY EXAM OF ELBOW: CPT | Mod: RT

## 2024-06-11 NOTE — IMAGING
[de-identified] : Right elbow with mild swelling and ecchymosis no ligamentous laxity noted ROM is full with 5/5 strength in all planes minimal ttp over radial head. Right shoulder with no ttp noted / full ROM RUE is nvi. right shoulder with full and pain free ROM  Right elbow 4 view xray shows medial calcification / no acute fracture.

## 2024-06-11 NOTE — HISTORY OF PRESENT ILLNESS
[Dull/Aching] : dull/aching [Localized] : localized [Nothing helps with pain getting better] : Nothing helps with pain getting better [Exercising] : exercising [Full time] : Work status: full time [de-identified] : 6/11/2024: rhd 44 yo female here 4 days s/p trip and fall. Pt now complains of right elbow pain with ROM.  PMH: MS / previous septic infection s/p fall. Allergies: NKDA  [] : no

## 2024-06-11 NOTE — ASSESSMENT
[FreeTextEntry1] : The patient was advised of the diagnosis. The natural history of the pathology was explained in full to the patient in layman's terms. All questions were answered. The risks and benefits of surgical and non-surgical treatment alternatives were explained in full to the patient.  Family provided reassurance and will rto prn.

## 2024-06-11 NOTE — REASON FOR VISIT
[FreeTextEntry2] : 06/11/2024 :JAYA AGGARWAL , a 43 year old female, presents today for right elbow pain

## 2024-06-14 ENCOUNTER — APPOINTMENT (OUTPATIENT)
Dept: WOUND CARE | Facility: HOSPITAL | Age: 44
End: 2024-06-14
Payer: COMMERCIAL

## 2024-06-14 VITALS
SYSTOLIC BLOOD PRESSURE: 111 MMHG | TEMPERATURE: 97.6 F | RESPIRATION RATE: 16 BRPM | HEART RATE: 73 BPM | DIASTOLIC BLOOD PRESSURE: 63 MMHG

## 2024-06-14 DIAGNOSIS — S21.102A UNSPECIFIED OPEN WOUND OF LEFT FRONT WALL OF THORAX W/OUT PENETRATION INTO THORACIC CAVITY, INITIAL ENCOUNTER: ICD-10-CM

## 2024-06-14 PROCEDURE — 11042 DBRDMT SUBQ TIS 1ST 20SQCM/<: CPT

## 2024-06-14 NOTE — ASSESSMENT
[FreeTextEntry1] : 3/20/24 Patient here for evaluation of left chest wall wound. Has home nursing 3x/wk. Has been doing wound vac therapy. No fevers, no new complaints. On exam: Wound with yellow slough. cut end of left clavicle exposed. Rolled skin edge with skin fold above clavicle. Healed portion has keloid appearing scar.  moderate serosanguinous drainage. No s/s of infection.   Procedure Note: Excisional Debridement Location: left chest wall wound Anesthesia:  4% topical lidocaine cream Instruments used: Curette Tissue removed: slough and subcutaneous tissue Pre-debridement Depth: 0.1 Post-debridement Depth: 0.2cm      Hemostasis maintained throughout procedure  4-10-24: Pt here for f/u Accompanied by  No new complaints has home care nurse Pt sees Plastics next week  On exam: left chest wall wound: hypergranulation with slough, bone exposed.  No s/s of infection. s/p excisional debridement  s/p silver nitrate to areas of hypergran  5-3-24: Pt here for f/u Accompanied by  Still with concerns over when wound will heal as well as about the scarring No longer has nurse.   does dressing On exam: left chest wound:  bone still exposed. slough present with hypergranulation tissue.  periwound maceration. s/p excisional debridement No s/s of infection.   6-14-24: Pt here for f/u Accompanied by , previously thought to be )  Pt angry at all doctors for care related to wound. Does not feel like anyone is helping her.  Drainage improved.  On exam: left chest wound: keloid in periwound, slough to wound, bone exposed, dry serous fluid in periwound.  s/p excisional debridement.  wound appears smaller. No s/s of infection.

## 2024-06-14 NOTE — PLAN
[FreeTextEntry1] : 3/20/24 Discussed that we do not believe that wound vac therapy is indicated at this point. The wound is shallow, and concern about issues that might occur if we were to continue vac therapy including hypergranulation and damage to the new skin. Patient expressed wanting wound to heal as fast as possible. Discussed that though the wound vac helped her heal to this point, using it beyond when indicated could actually delay her healing. She voiced understanding and stated she would contact her CT surgeon to discuss. Plan: Remove dressing before nurse comes. Shower, wash with baby shampoo or sensitive skin soap and water, pat dry Place Vashe soaked gauze on the wound, leave for 5 min. Do not rinse. Elsie to wound bed, cover with adhesive foam. Change every other day Nursing orders provided Follow up in 3 weeks If patient chooses to continue vac therapy, she should follow up with plastic surgery who recommended continuing vac for management.  4-10-24: Plan: left chest wall wound Remove dressing before nurse comes. Shower, wash with baby shampoo or sensitive skin soap and water, pat dry Place Vashe soaked gauze on the wound, leave for 5 min. Do not rinse. adaptic/aqaucel/foam 3x/week  Nursing orders given f/u 3 weeks   5-3-24: Plan: left chest wound: Shower, wash with baby shampoo or sensitive skin soap and water, pat dry Place Vashe soaked gauze on the wound, leave for 5 min. Do not rinse. adaptict touch/aqaucel/foam/tape every other day and prn supplies ordered f/u 3-4 weeks    6-14-24: Plan: left chest wound: Shower, wash with baby shampoo or sensitive skin soap and water, pat dry Place Vashe soaked gauze on the wound, leave for 5 min. Do not rinse. adaptict touch/aqaucel/foam/tape every other day and prn Pt would like home care.  will order Hospital notes form  say septic arthritis; MRI concerning for acute manubrium OM, path not definitive for OM Pt interested in HBO.  Will set pt up for HBE f/u 2 weeks

## 2024-06-14 NOTE — PHYSICAL EXAM
[Skin Ulcer] : ulcer [Alert] : alert [Oriented to Person] : oriented to person [Oriented to Place] : oriented to place [Oriented to Time] : oriented to time [Anxious] : anxious [Please See PDF for Tissue Analytics] : Please See PDF for [employer] Analytics. [de-identified] : well groomed, NAD [de-identified] : atraumatic [de-identified] : wound at base of neck on left/left chest wall with exposed clavicle [de-identified] : breathing comfortably [de-identified] : Ambulates with walker [de-identified] : left chest wall wound [de-identified] : weakness associated with MS

## 2024-06-14 NOTE — HISTORY OF PRESENT ILLNESS
[FreeTextEntry1] : Ms. JAYA AGGARWAL is a 43 year female with MS who presents to the office with a wound to the left chest wall since Dec 2023. Per report, patient fell and developed a hematoma near her left clavicle. This became infected and she develped septic arthritis of the left steroclavicular joint. She ultimately underwent surgical resection of left sternoclacicular joint, manubrium and partial left first rib resection on 12/7/23. A wound vac was placed for vacuum assisted closure, and she has continued wound vac therapy since that time. Has home nursing 3x/wk. Patient saw plastic surgery yesterday (3/19). Her left clavicle is exposed, and they discussed coverage options, plan to see her for follow up in April. She reports that the vac has helped the wound close significantly. Denies fevers or chills. No other complaints. 
Greater than 5 Contractions in 30 minutes

## 2024-06-21 ENCOUNTER — APPOINTMENT (OUTPATIENT)
Dept: WOUND CARE | Facility: HOSPITAL | Age: 44
End: 2024-06-21

## 2024-06-24 ENCOUNTER — NON-APPOINTMENT (OUTPATIENT)
Age: 44
End: 2024-06-24

## 2024-07-03 NOTE — ED ADULT NURSE NOTE - SUICIDE SCREENING QUESTION 1
"Subjective:      Patient ID: Raymond Duarte is a 22 y.o. male.    Vitals:  height is 6' 2" (1.88 m) and weight is 84.4 kg (186 lb). His oral temperature is 97.9 °F (36.6 °C). His blood pressure is 143/93 (abnormal) and his pulse is 91. His respiration is 16 and oxygen saturation is 98%.     Chief Complaint: Shortness of Breath    22-year-old male with history of asthma presenting for shortness of breath, wheezing, chest tightness onset 30 minutes prior to arrival.  He has been out of his albuterol inhaler for quite some time.  Last asthma exacerbation April 20, 2024, during which he was admitted. Denies ever being intubated.  Denies fever, chills, chest pain congestion, nausea, vomiting, abdominal pain.    Shortness of Breath  This is a new problem. The current episode started today. The problem occurs constantly. The problem has been unchanged. The average episode lasts 30 minutes. Associated symptoms include chest pain and wheezing. Pertinent negatives include no abdominal pain, fever, headaches, neck pain or vomiting. Nothing aggravates the symptoms. He has tried nothing for the symptoms. His past medical history is significant for asthma.       Constitution: Negative for chills and fever.   Neck: Negative for neck pain and neck stiffness.   Cardiovascular:  Positive for chest pain.   Respiratory:  Positive for chest tightness, shortness of breath, wheezing and asthma.    Gastrointestinal:  Negative for abdominal pain, nausea, vomiting and diarrhea.   Genitourinary:  Negative for dysuria.   Musculoskeletal:  Negative for muscle ache.   Allergic/Immunologic: Positive for asthma. Negative for sneezing.   Neurological:  Negative for dizziness and headaches.      Objective:     Physical Exam   Constitutional: He is oriented to person, place, and time. He appears well-developed.   HENT:   Head: Normocephalic and atraumatic.   Ears:   Right Ear: External ear normal.   Left Ear: External ear normal.   Nose: Nose " normal.   Mouth/Throat: Oropharynx is clear and moist. Mucous membranes are moist. Oropharynx is clear.   Eyes: Conjunctivae, EOM and lids are normal. Pupils are equal, round, and reactive to light.   Neck: Trachea normal and phonation normal. Neck supple.   Cardiovascular: Normal rate and regular rhythm.   Pulmonary/Chest: Accessory muscle usage present. He has decreased breath sounds. He has wheezes.         Comments: Scattered inspiratory and expiratory wheezing.  Increased work of breathing with accessory muscle use.    Musculoskeletal: Normal range of motion.         General: Normal range of motion.   Neurological: He is alert and oriented to person, place, and time.   Skin: Skin is warm, dry and intact.   Psychiatric: His speech is normal and behavior is normal. Judgment and thought content normal.   Nursing note and vitals reviewed.      Assessment:     1. Exacerbation of asthma, unspecified asthma severity, unspecified whether persistent        Plan:       Exacerbation of asthma, unspecified asthma severity, unspecified whether persistent  -     dexAMETHasone injection 10 mg  -     albuterol nebulizer solution 2.5 mg  -     ipratropium 0.02 % nebulizer solution 0.5 mg  -     albuterol nebulizer solution 2.5 mg  -     ipratropium 0.02 % nebulizer solution 0.5 mg  -     Discontinue: albuterol (PROVENTIL HFA) 90 mcg/actuation inhaler; Inhale 2 puffs into the lungs every 6 (six) hours as needed for Wheezing. Rescue  Dispense: 18 g; Refill: 0  -     Discontinue: predniSONE (DELTASONE) 20 MG tablet; Take 2 tablets (40 mg total) by mouth once daily. for 5 days  Dispense: 10 tablet; Refill: 0  -     albuterol (PROVENTIL HFA) 90 mcg/actuation inhaler; Inhale 2 puffs into the lungs every 6 (six) hours as needed for Wheezing. Rescue  Dispense: 18 g; Refill: 0  -     predniSONE (DELTASONE) 20 MG tablet; Take 2 tablets (40 mg total) by mouth once daily. for 5 days  Dispense: 10 tablet; Refill: 0    22-year-old male with  history of asthma presenting with acute-onset shortness of breath, wheezing, chest tightness that started about 30 minutes PTA.  He is out of his albuterol inhaler.  At triage, patient was hypoxic to 92%.  Other vital signs are within normal limits.  There is scattered inspiratory and expiratory wheezing auscultation.  Increased work of breathing with accessory muscle use.  Decreased breath sounds.  X-ray unavailable in clinic today.  Patient was immediately started on DuoNebs and given dexamethasone IM.  Following steroids and 1st round of breathing treatments, O2 increased to 94 with presents.  Patient reports significant improvements and work of breathing.  No more inspiratory wheezing.  Still with some mild expiratory wheezing.  Second round of DuoNebs given.  Following the 2nd round, wheezing has resolved.  Oxygen levels have increased to and remained at 98%.  Patient is feeling fine.  He is stable for discharge at this time.  I have sent prednisone to his pharmacy as well as refilled his albuterol inhaler.  I have discussed strict ED precautions with patient if he is experiencing worsening shortness of breath or chest pain.  He verbalizes understanding and agrees with plan.          Patient Instructions   Asthma Discharge   If your condition worsens or fails to improve we recommend that you receive another evaluation at the ER immediately or contact your PCP to discuss your concerns or return here. You must understand that you've received an urgent care treatment only and that you may be released before all your medical problems are known or treated. You the patient will arrange for follouwp care as instructed.   Keep the scheduled appointment with your specialist as discussed.    You received a steroid shot on today   Rest and fluids are important  Take inhaler as prescribed and needed for wheezing  -  Flonase (fluticasone) is a nasal spray which is available over the counter and may help with your symptoms.  "  -  If you have hypertension avoid using the "D" which is the decongestant.  Instead you can use Coricidin HBP for cold and cough symptoms.    -  If you just have drainage you can take plain Zyrtec, Claritin or Allegra   -  If you just have a congested feeling you can take pseudoephedrine (unless you have high blood pressure) which you have to sign for behind the counter. Do not buy the phenylephrine which is on the shelf as it is not effective   -  Rest and fluids are also important.   -  Tylenol or ibuprofen can also be used as directed for pain unless you have an allergy to them or medical condition such as stomach ulcers, kidney or liver disease or blood thinners etc for which you should not be taking these type of medications.   Please follow up with your primary care doctor or specialist in the next 48-72hrs as needed and if no improvement  If you  smoke, please stop smoking.    " No

## 2024-07-08 ENCOUNTER — APPOINTMENT (OUTPATIENT)
Dept: INFECTIOUS DISEASE | Facility: CLINIC | Age: 44
End: 2024-07-08

## 2024-07-12 ENCOUNTER — APPOINTMENT (OUTPATIENT)
Dept: WOUND CARE | Facility: HOSPITAL | Age: 44
End: 2024-07-12

## 2024-07-31 ENCOUNTER — APPOINTMENT (OUTPATIENT)
Dept: WOUND CARE | Facility: HOSPITAL | Age: 44
End: 2024-07-31

## 2024-08-07 ENCOUNTER — OUTPATIENT (OUTPATIENT)
Dept: OUTPATIENT SERVICES | Facility: HOSPITAL | Age: 44
LOS: 1 days | End: 2024-08-07
Payer: COMMERCIAL

## 2024-08-07 ENCOUNTER — APPOINTMENT (OUTPATIENT)
Dept: WOUND CARE | Facility: HOSPITAL | Age: 44
End: 2024-08-07

## 2024-08-07 DIAGNOSIS — Z98.891 HISTORY OF UTERINE SCAR FROM PREVIOUS SURGERY: Chronic | ICD-10-CM

## 2024-08-07 PROCEDURE — 11042 DBRDMT SUBQ TIS 1ST 20SQCM/<: CPT

## 2024-08-07 NOTE — ASSESSMENT
[FreeTextEntry1] : 3/20/24 Patient here for evaluation of left chest wall wound. Has home nursing 3x/wk. Has been doing wound vac therapy. No fevers, no new complaints. On exam: Wound with yellow slough. cut end of left clavicle exposed. Rolled skin edge with skin fold above clavicle. Healed portion has keloid appearing scar.  moderate serosanguinous drainage. No s/s of infection.   Procedure Note: Excisional Debridement Location: left chest wall wound Anesthesia:  4% topical lidocaine cream Instruments used: Curette Tissue removed: slough and subcutaneous tissue Pre-debridement Depth: 0.1 Post-debridement Depth: 0.2cm      Hemostasis maintained throughout procedure  4-10-24: Pt here for f/u Accompanied by  No new complaints has home care nurse Pt sees Plastics next week  On exam: left chest wall wound: hypergranulation with slough, bone exposed.  No s/s of infection. s/p excisional debridement  s/p silver nitrate to areas of hypergran  5-3-24: Pt here for f/u Accompanied by  Still with concerns over when wound will heal as well as about the scarring No longer has nurse.   does dressing On exam: left chest wound:  bone still exposed. slough present with hypergranulation tissue.  periwound maceration. s/p excisional debridement No s/s of infection.   6-14-24: Pt here for f/u Accompanied by , previously thought to be )  Pt angry at all doctors for care related to wound. Does not feel like anyone is helping her.  Drainage improved.  On exam: left chest wound: keloid in periwound, slough to wound, bone exposed, dry serous fluid in periwound.  s/p excisional debridement.  wound appears smaller. No s/s of infection.   8-7-24: Pt here for f/u Pt upset with care that's been given by all doctors re: wound and how the wound has healed. Explained to patient that the wound is in a high tension area and the keloid is a result of this and how her skin heals in this area. She continues to be angry and verbally hostile to all clinic staff regarding her care.  On exam: left chest wound: keloid in periwound appears increased, slough to wound, smaller area of bone exposed compared to last visit, hypergranulation tissue on open area of wound.  s/p excisional debridement.  No s/s of infection. periwound maceration

## 2024-08-07 NOTE — HISTORY OF PRESENT ILLNESS
[FreeTextEntry1] : Ms. JAYA AGGARWAL is a 43 year female with MS who presents to the office with a wound to the left chest wall since Dec 2023. Per report, patient fell and developed a hematoma near her left clavicle. This became infected and she develped septic arthritis of the left steroclavicular joint. She ultimately underwent surgical resection of left sternoclacicular joint, manubrium and partial left first rib resection on 12/7/23. A wound vac was placed for vacuum assisted closure, and she has continued wound vac therapy since that time. Has home nursing 3x/wk. Patient saw plastic surgery yesterday (3/19). Her left clavicle is exposed, and they discussed coverage options, plan to see her for follow up in April. She reports that the vac has helped the wound close significantly. Denies fevers or chills. No other complaints.  8-7/24 Patient presents for follow up for left shoulder wound Continues to apply adaptiq and aquacel to wound  Has been having issues walking due to her MS although she's being treated has been doing the dressing changes herself Not currently working Took an uber here today

## 2024-08-07 NOTE — PHYSICAL EXAM
[Skin Ulcer] : ulcer [Alert] : alert [Oriented to Person] : oriented to person CHF exacerbation [Oriented to Place] : oriented to place [Oriented to Time] : oriented to time [Anxious] : anxious [Please See PDF for Tissue Analytics] : Please See PDF for [employer]. [de-identified] : well groomed, NAD [de-identified] : atraumatic [de-identified] : wound at base of neck on left/left chest wall with exposed small 2mm area of exposed clavicle [de-identified] : breathing comfortably [de-identified] : Ambulates with walker [de-identified] : left chest wall wound [de-identified] : weakness associated with MS

## 2024-08-07 NOTE — REVIEW OF SYSTEMS
[Skin Wound] : skin wound [Limb Weakness] : limb weakness [Difficulty Walking] : difficulty walking [Negative] : Psychiatric [FreeTextEntry9] : exposed left clavicle [de-identified] : left shoulder wound

## 2024-08-07 NOTE — PLAN
[FreeTextEntry1] : 3/20/24 Discussed that we do not believe that wound vac therapy is indicated at this point. The wound is shallow, and concern about issues that might occur if we were to continue vac therapy including hypergranulation and damage to the new skin. Patient expressed wanting wound to heal as fast as possible. Discussed that though the wound vac helped her heal to this point, using it beyond when indicated could actually delay her healing. She voiced understanding and stated she would contact her CT surgeon to discuss. Plan: Remove dressing before nurse comes. Shower, wash with baby shampoo or sensitive skin soap and water, pat dry Place Vashe soaked gauze on the wound, leave for 5 min. Do not rinse. Elsie to wound bed, cover with adhesive foam. Change every other day Nursing orders provided Follow up in 3 weeks If patient chooses to continue vac therapy, she should follow up with plastic surgery who recommended continuing vac for management.  4-10-24: Plan: left chest wall wound Remove dressing before nurse comes. Shower, wash with baby shampoo or sensitive skin soap and water, pat dry Place Vashe soaked gauze on the wound, leave for 5 min. Do not rinse. adaptic/aqaucel/foam 3x/week  Nursing orders given f/u 3 weeks   5-3-24: Plan: left chest wound: Shower, wash with baby shampoo or sensitive skin soap and water, pat dry Place Vashe soaked gauze on the wound, leave for 5 min. Do not rinse. adaptict touch/aqaucel/foam/tape every other day and prn supplies ordered f/u 3-4 weeks    6-14-24: Plan: left chest wound: Shower, wash with baby shampoo or sensitive skin soap and water, pat dry Place Vashe soaked gauze on the wound, leave for 5 min. Do not rinse. adaptict touch/aqaucel/foam/tape every other day and prn Pt would like home care.  will order Hospital notes form  say septic arthritis; MRI concerning for acute manubrium OM, path not definitive for OM Pt interested in HBO.  Will set pt up for HBE f/u 2 weeks   8-7-24: Plan: left chest wound: Shower, wash with baby shampoo or sensitive skin soap and water, pat dry Place Vashe soaked gauze on the wound, leave for 5 min. Do not rinse. aqaucel/foam/tape every other day and prn Pt again offered home health but refused Pt has plastic surgery appt on 8/20  Pt refusing HBO because it's too much of a commitment at this time DME supplies ordered Pt encouraged to obtain 2nd opinion with regards to wound care.  f/u 3-4 weeks

## 2024-08-13 ENCOUNTER — NON-APPOINTMENT (OUTPATIENT)
Age: 44
End: 2024-08-13

## 2024-08-16 ENCOUNTER — APPOINTMENT (OUTPATIENT)
Dept: INTERNAL MEDICINE | Facility: CLINIC | Age: 44
End: 2024-08-16
Payer: COMMERCIAL

## 2024-08-16 VITALS
TEMPERATURE: 97.6 F | BODY MASS INDEX: 20.89 KG/M2 | HEIGHT: 66 IN | DIASTOLIC BLOOD PRESSURE: 72 MMHG | HEART RATE: 70 BPM | SYSTOLIC BLOOD PRESSURE: 113 MMHG | WEIGHT: 130 LBS

## 2024-08-16 DIAGNOSIS — S21.102A UNSPECIFIED OPEN WOUND OF LEFT FRONT WALL OF THORAX W/OUT PENETRATION INTO THORACIC CAVITY, INITIAL ENCOUNTER: ICD-10-CM

## 2024-08-16 DIAGNOSIS — D64.9 ANEMIA, UNSPECIFIED: ICD-10-CM

## 2024-08-16 PROCEDURE — 99215 OFFICE O/P EST HI 40 MIN: CPT
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
## 2024-08-16 NOTE — PHYSICAL EXAM
[No Respiratory Distress] : no respiratory distress  [No Accessory Muscle Use] : no accessory muscle use [Normal] : affect was normal and insight and judgment were intact [de-identified] : L shoulder wound, no active purulence. No significant erythema or warmth around shoulder currently

## 2024-08-18 ENCOUNTER — INPATIENT (INPATIENT)
Facility: HOSPITAL | Age: 44
LOS: 1 days | Discharge: HOME | DRG: 603 | End: 2024-08-20
Attending: STUDENT IN AN ORGANIZED HEALTH CARE EDUCATION/TRAINING PROGRAM | Admitting: STUDENT IN AN ORGANIZED HEALTH CARE EDUCATION/TRAINING PROGRAM
Payer: COMMERCIAL

## 2024-08-18 VITALS
RESPIRATION RATE: 20 BRPM | WEIGHT: 143.96 LBS | OXYGEN SATURATION: 99 % | SYSTOLIC BLOOD PRESSURE: 126 MMHG | HEIGHT: 66 IN | DIASTOLIC BLOOD PRESSURE: 60 MMHG | HEART RATE: 66 BPM | TEMPERATURE: 98 F

## 2024-08-18 DIAGNOSIS — Z98.891 HISTORY OF UTERINE SCAR FROM PREVIOUS SURGERY: Chronic | ICD-10-CM

## 2024-08-18 DIAGNOSIS — T81.89XD OTHER COMPLICATIONS OF PROCEDURES, NOT ELSEWHERE CLASSIFIED, SUBSEQUENT ENCOUNTER: ICD-10-CM

## 2024-08-18 DIAGNOSIS — S21.102A UNSPECIFIED OPEN WOUND OF LEFT FRONT WALL OF THORAX WITHOUT PENETRATION INTO THORACIC CAVITY, INITIAL ENCOUNTER: ICD-10-CM
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
## 2024-08-18 PROCEDURE — 99285 EMERGENCY DEPT VISIT HI MDM: CPT

## 2024-08-18 NOTE — ED ADULT NURSE NOTE - CCCP TRG CHIEF CMPLNT
[facility]  [address] [address]  [phone]      Radha Pardo :1965 MRN:[id number]      10/3/2017 Time Session Began: 1:45pm  Time Session Ended: 2:33pm    Session Type:45 Minute Therapy (40756)    Others Present: NA    Intervention: Behavioral, Cognitive, Supportive    Suicide/Homicide/Violence Ideation: No    If Yes, explain: NA    Current Outpatient Prescriptions   Medication Sig   • clonazePAM (KLONOPIN) 0.5 MG tablet Take 1 tablet by mouth daily as needed for Anxiety.   • DULoxetine (CYMBALTA) 30 MG capsule Take 1 capsule by mouth daily.   • DULoxetine (CYMBALTA) 60 MG capsule Take 1 capsule by mouth daily.   • amLODIPine (NORVASC) 2.5 MG tablet Take 2.5 mg by mouth daily.   • carvedilol (COREG) 25 MG tablet Take 1 tablet by mouth 2 times daily (with meals).   • losartan-hydrochlorothiazide (HYZAAR) 100-12.5 MG per tablet Take 1 tablet by mouth daily.   • esomeprazole (NEXIUM) 40 MG capsule TAKE 1 CAPSULE BY MOUTH DAILY BEFORE BREAKFAST   • Calcium Carbonate-Vitamin D (CALCIUM 500 + D PO) Take 1 tablet by mouth daily.   • fluticasone (FLONASE) 50 MCG/ACT nasal spray Spray 2 sprays in each nostril daily as needed (Nasal Congestion).     No current facility-administered medications for this visit.        Change in Medication(s) Reported: No  If Yes, explain:        Patient/Family Education Provided: Yes  Patient/Family Displays Understanding: Yes    If No, explain: NA    Chief complaint in patient's own words: \"It's been a hard couple of weeks.\"    DARP:     D) Client attended follow-up individual psychotherapy session, reported no homicidal ideation, no suicidal ideation, and no AODA use concerns at this time.  Client reported that she was upset with herself for experiences she recently had after making personal mistakes.  Processed thoughts and feelings in the aftermath of her  experiences and noted her theme of \"being harder on myself than anyone else could be.\"  Reviewed ways the client could stay focused on her wellness and encouraged sharing her feelings with trusted individuals when she was ready.  A) Writer used open ended questions, restatements, and reflections of feelings to encourage exploration of issues and foster client insight into reported concerns.  Reflected client feelings and helped her to recognize aspects of gratitude in addition to her feelings of remorse.  R) Client was active and engaged in session, openly shared thoughts and feelings during discussions.  Client affect was appropriate to content of session, client verbalized her frustration with her situation and remorse for her mistakes.  P) Client to continue with psychotherapy, client to focus on attending her appointments to address her health needs and to keep her mind on aspects that she can influence/control.    Need for Community Resources Assessed: Yes    Resources Needed: No    If Yes, what resources: NA    Primary Diagnosis: Major Depression Recurrent, anxiety disorder NOS  : 2 Moderate    Treatment Plan: Modified    Discharge Plan: Continue with [facility]    Next Appointment: 10.17.17  Nain Sterling, [facility]     fall

## 2024-08-18 NOTE — ED ADULT NURSE NOTE - OBJECTIVE STATEMENT
Pt is a 44 y/o F with PMH of Multiple Sclerosis and Anemia presenting with redness and yellow discharge around L clavicle surgical sight. Pt endorses L clavicle surgery with prior infection in october. Currently endorsing L clavicle pain radiating to L sided neck, arm, and L leg. Endorses mechanical trip and fall 3 weeks ago with no head strike/LOC. Upon assessment pt is a/o x 3 speaking coherently in full sentences. Erythema and yellow drainage noted to surgical sight on anterior L clavicle. Pt is breathing unlabored and equal BL in no apparent distress, radial pulses are 2+ BL, abdomen is soft, nontender, and nondistended, skin is warm and dry. Pt denies fevers/chills, headaches/vision changes, chest pain/SOB, n/v/d, or changes in urinary/defecation habits. Pt is in stretcher in lowest position with side rails up.

## 2024-08-18 NOTE — ED ADULT NURSE NOTE - NSFALLRISKINTERV_ED_ALL_ED

## 2024-08-18 NOTE — ED ADULT NURSE NOTE - URINE CHARACTERISTICS
Notify Rika that her labs were all normal, including her tests for anemia and thyroid dysfunction.     MG
clear

## 2024-08-19 DIAGNOSIS — G35 MULTIPLE SCLEROSIS: ICD-10-CM

## 2024-08-19 DIAGNOSIS — S82.201A UNSPECIFIED FRACTURE OF SHAFT OF RIGHT TIBIA, INITIAL ENCOUNTER FOR CLOSED FRACTURE: Chronic | ICD-10-CM

## 2024-08-19 DIAGNOSIS — S72.91XA UNSPECIFIED FRACTURE OF RIGHT FEMUR, INITIAL ENCOUNTER FOR CLOSED FRACTURE: Chronic | ICD-10-CM

## 2024-08-19 DIAGNOSIS — L02.91 CUTANEOUS ABSCESS, UNSPECIFIED: ICD-10-CM

## 2024-08-19 DIAGNOSIS — M00.9 PYOGENIC ARTHRITIS, UNSPECIFIED: Chronic | ICD-10-CM

## 2024-08-19 DIAGNOSIS — M00.9 PYOGENIC ARTHRITIS, UNSPECIFIED: ICD-10-CM

## 2024-08-19 DIAGNOSIS — Z29.9 ENCOUNTER FOR PROPHYLACTIC MEASURES, UNSPECIFIED: ICD-10-CM

## 2024-08-19 LAB
ADD ON TEST-SPECIMEN IN LAB: SIGNIFICANT CHANGE UP
ALBUMIN SERPL ELPH-MCNC: 4.2 G/DL — SIGNIFICANT CHANGE UP (ref 3.3–5)
ALBUMIN SERPL ELPH-MCNC: 4.4 G/DL
ALP BLD-CCNC: 132 U/L
ALP SERPL-CCNC: 131 U/L — HIGH (ref 40–120)
ALT FLD-CCNC: 24 U/L — SIGNIFICANT CHANGE UP (ref 10–45)
ALT SERPL-CCNC: 29 U/L
ANION GAP SERPL CALC-SCNC: 11 MMOL/L
ANION GAP SERPL CALC-SCNC: 13 MMOL/L — SIGNIFICANT CHANGE UP (ref 5–17)
APPEARANCE UR: CLEAR — SIGNIFICANT CHANGE UP
APTT BLD: 32 SEC — SIGNIFICANT CHANGE UP (ref 24.5–35.6)
AST SERPL-CCNC: 19 U/L — SIGNIFICANT CHANGE UP (ref 10–40)
AST SERPL-CCNC: 25 U/L
BASOPHILS # BLD AUTO: 0.06 K/UL
BASOPHILS # BLD AUTO: 0.06 K/UL — SIGNIFICANT CHANGE UP (ref 0–0.2)
BASOPHILS NFR BLD AUTO: 0.8 %
BASOPHILS NFR BLD AUTO: 0.9 % — SIGNIFICANT CHANGE UP (ref 0–2)
BILIRUB SERPL-MCNC: 0.2 MG/DL
BILIRUB SERPL-MCNC: 0.2 MG/DL — SIGNIFICANT CHANGE UP (ref 0.2–1.2)
BILIRUB UR-MCNC: NEGATIVE — SIGNIFICANT CHANGE UP
BLD GP AB SCN SERPL QL: POSITIVE — SIGNIFICANT CHANGE UP
BUN SERPL-MCNC: 10 MG/DL
BUN SERPL-MCNC: 9 MG/DL — SIGNIFICANT CHANGE UP (ref 7–23)
CALCIUM SERPL-MCNC: 9.2 MG/DL
CALCIUM SERPL-MCNC: 9.7 MG/DL — SIGNIFICANT CHANGE UP (ref 8.4–10.5)
CHLORIDE SERPL-SCNC: 101 MMOL/L
CHLORIDE SERPL-SCNC: 103 MMOL/L — SIGNIFICANT CHANGE UP (ref 96–108)
CO2 SERPL-SCNC: 24 MMOL/L — SIGNIFICANT CHANGE UP (ref 22–31)
CO2 SERPL-SCNC: 25 MMOL/L
COLOR SPEC: YELLOW — SIGNIFICANT CHANGE UP
CREAT SERPL-MCNC: 0.6 MG/DL
CREAT SERPL-MCNC: 0.63 MG/DL — SIGNIFICANT CHANGE UP (ref 0.5–1.3)
CRP SERPL-MCNC: 29 MG/L — HIGH (ref 0–4)
DIFF PNL FLD: NEGATIVE — SIGNIFICANT CHANGE UP
EGFR: 112 ML/MIN/1.73M2 — SIGNIFICANT CHANGE UP
EGFR: 113 ML/MIN/1.73M2
EOSINOPHIL # BLD AUTO: 0.05 K/UL
EOSINOPHIL # BLD AUTO: 0.13 K/UL — SIGNIFICANT CHANGE UP (ref 0–0.5)
EOSINOPHIL NFR BLD AUTO: 0.7 %
EOSINOPHIL NFR BLD AUTO: 1.9 % — SIGNIFICANT CHANGE UP (ref 0–6)
ERYTHROCYTE [SEDIMENTATION RATE] IN BLOOD: 19 MM/HR — HIGH (ref 0–15)
FERRITIN SERPL-MCNC: 41 NG/ML
GAS PNL BLDV: SIGNIFICANT CHANGE UP
GLUCOSE SERPL-MCNC: 110 MG/DL
GLUCOSE SERPL-MCNC: 94 MG/DL — SIGNIFICANT CHANGE UP (ref 70–99)
GLUCOSE UR QL: NEGATIVE MG/DL — SIGNIFICANT CHANGE UP
HCT VFR BLD CALC: 37.1 % — SIGNIFICANT CHANGE UP (ref 34.5–45)
HCT VFR BLD CALC: 40.3 %
HGB BLD-MCNC: 11.6 G/DL — SIGNIFICANT CHANGE UP (ref 11.5–15.5)
HGB BLD-MCNC: 12.4 G/DL
IMM GRANULOCYTES NFR BLD AUTO: 0.1 %
IMM GRANULOCYTES NFR BLD AUTO: 0.3 % — SIGNIFICANT CHANGE UP (ref 0–0.9)
INR BLD: 1.04 RATIO — SIGNIFICANT CHANGE UP (ref 0.85–1.18)
IRON SATN MFR SERPL: 16 %
IRON SERPL-MCNC: 47 UG/DL
KETONES UR-MCNC: NEGATIVE MG/DL — SIGNIFICANT CHANGE UP
LEUKOCYTE ESTERASE UR-ACNC: NEGATIVE — SIGNIFICANT CHANGE UP
LYMPHOCYTES # BLD AUTO: 1.46 K/UL
LYMPHOCYTES # BLD AUTO: 2.44 K/UL — SIGNIFICANT CHANGE UP (ref 1–3.3)
LYMPHOCYTES # BLD AUTO: 35 % — SIGNIFICANT CHANGE UP (ref 13–44)
LYMPHOCYTES NFR BLD AUTO: 19.2 %
MAGNESIUM SERPL-MCNC: 2.1 MG/DL — SIGNIFICANT CHANGE UP (ref 1.6–2.6)
MAN DIFF?: NORMAL
MCHC RBC-ENTMCNC: 27.2 PG
MCHC RBC-ENTMCNC: 27.5 PG — SIGNIFICANT CHANGE UP (ref 27–34)
MCHC RBC-ENTMCNC: 30.8 GM/DL
MCHC RBC-ENTMCNC: 31.3 GM/DL — LOW (ref 32–36)
MCV RBC AUTO: 87.9 FL — SIGNIFICANT CHANGE UP (ref 80–100)
MCV RBC AUTO: 88.4 FL
MONOCYTES # BLD AUTO: 0.76 K/UL
MONOCYTES # BLD AUTO: 0.85 K/UL — SIGNIFICANT CHANGE UP (ref 0–0.9)
MONOCYTES NFR BLD AUTO: 10 %
MONOCYTES NFR BLD AUTO: 12.2 % — SIGNIFICANT CHANGE UP (ref 2–14)
MRSA PCR RESULT.: SIGNIFICANT CHANGE UP
NEUTROPHILS # BLD AUTO: 3.48 K/UL — SIGNIFICANT CHANGE UP (ref 1.8–7.4)
NEUTROPHILS # BLD AUTO: 5.28 K/UL
NEUTROPHILS NFR BLD AUTO: 49.7 % — SIGNIFICANT CHANGE UP (ref 43–77)
NEUTROPHILS NFR BLD AUTO: 69.2 %
NITRITE UR-MCNC: NEGATIVE — SIGNIFICANT CHANGE UP
NRBC # BLD: 0 /100 WBCS — SIGNIFICANT CHANGE UP (ref 0–0)
PH UR: 6.5 — SIGNIFICANT CHANGE UP (ref 5–8)
PLATELET # BLD AUTO: 315 K/UL — SIGNIFICANT CHANGE UP (ref 150–400)
PLATELET # BLD AUTO: 316 K/UL
POTASSIUM SERPL-MCNC: 3.9 MMOL/L — SIGNIFICANT CHANGE UP (ref 3.5–5.3)
POTASSIUM SERPL-SCNC: 3.9 MMOL/L — SIGNIFICANT CHANGE UP (ref 3.5–5.3)
POTASSIUM SERPL-SCNC: 4.6 MMOL/L
PROCALCITONIN SERPL-MCNC: <0.02 NG/ML — SIGNIFICANT CHANGE UP (ref 0.02–0.1)
PROT SERPL-MCNC: 6.4 G/DL
PROT SERPL-MCNC: 6.7 G/DL — SIGNIFICANT CHANGE UP (ref 6–8.3)
PROT UR-MCNC: NEGATIVE MG/DL — SIGNIFICANT CHANGE UP
PROTHROM AB SERPL-ACNC: 10.9 SEC — SIGNIFICANT CHANGE UP (ref 9.5–13)
RBC # BLD: 4.22 M/UL — SIGNIFICANT CHANGE UP (ref 3.8–5.2)
RBC # BLD: 4.56 M/UL
RBC # FLD: 13.6 % — SIGNIFICANT CHANGE UP (ref 10.3–14.5)
RBC # FLD: 14.5 %
RH IG SCN BLD-IMP: POSITIVE — SIGNIFICANT CHANGE UP
S AUREUS DNA NOSE QL NAA+PROBE: DETECTED
SODIUM SERPL-SCNC: 137 MMOL/L
SODIUM SERPL-SCNC: 140 MMOL/L — SIGNIFICANT CHANGE UP (ref 135–145)
SP GR SPEC: >1.03 — HIGH (ref 1–1.03)
TIBC SERPL-MCNC: 301 UG/DL
UIBC SERPL-MCNC: 254 UG/DL
UROBILINOGEN FLD QL: 0.2 MG/DL — SIGNIFICANT CHANGE UP (ref 0.2–1)
WBC # BLD: 6.98 K/UL — SIGNIFICANT CHANGE UP (ref 3.8–10.5)
WBC # FLD AUTO: 6.98 K/UL — SIGNIFICANT CHANGE UP (ref 3.8–10.5)
WBC # FLD AUTO: 7.62 K/UL

## 2024-08-19 PROCEDURE — 99222 1ST HOSP IP/OBS MODERATE 55: CPT

## 2024-08-19 PROCEDURE — 71046 X-RAY EXAM CHEST 2 VIEWS: CPT | Mod: 26

## 2024-08-19 PROCEDURE — 70491 CT SOFT TISSUE NECK W/DYE: CPT | Mod: 26,MC

## 2024-08-19 PROCEDURE — 73030 X-RAY EXAM OF SHOULDER: CPT | Mod: 26,LT

## 2024-08-19 PROCEDURE — 71260 CT THORAX DX C+: CPT | Mod: 26,MC

## 2024-08-19 PROCEDURE — 86077 PHYS BLOOD BANK SERV XMATCH: CPT

## 2024-08-19 PROCEDURE — 73060 X-RAY EXAM OF HUMERUS: CPT | Mod: 26,LT

## 2024-08-19 PROCEDURE — 73223 MRI JOINT UPR EXTR W/O&W/DYE: CPT | Mod: 26,LT

## 2024-08-19 PROCEDURE — 99223 1ST HOSP IP/OBS HIGH 75: CPT | Mod: GC

## 2024-08-19 RX ORDER — PIPERACILLIN SODIUM AND TAZOBACTAM SODIUM 3; .375 G/15ML; G/15ML
3.38 INJECTION, POWDER, FOR SOLUTION INTRAVENOUS ONCE
Refills: 0 | Status: COMPLETED | OUTPATIENT
Start: 2024-08-19 | End: 2024-08-19

## 2024-08-19 RX ORDER — SERTRALINE HYDROCHLORIDE 50 MG/1
100 TABLET, FILM COATED ORAL AT BEDTIME
Refills: 0 | Status: DISCONTINUED | OUTPATIENT
Start: 2024-08-19 | End: 2024-08-20

## 2024-08-19 RX ORDER — CHLORHEXIDINE GLUCONATE 40 MG/ML
1 SOLUTION TOPICAL
Refills: 0 | Status: DISCONTINUED | OUTPATIENT
Start: 2024-08-19 | End: 2024-08-20

## 2024-08-19 RX ORDER — OXYBUTYNIN CHLORIDE 5 MG/1
5 TABLET ORAL
Refills: 0 | Status: DISCONTINUED | OUTPATIENT
Start: 2024-08-19 | End: 2024-08-20

## 2024-08-19 RX ORDER — DULOXETINE HCL 30 MG
60 CAPSULE,DELAYED RELEASE (ENTERIC COATED) ORAL AT BEDTIME
Refills: 0 | Status: DISCONTINUED | OUTPATIENT
Start: 2024-08-19 | End: 2024-08-20

## 2024-08-19 RX ORDER — PIPERACILLIN SODIUM AND TAZOBACTAM SODIUM 3; .375 G/15ML; G/15ML
3.38 INJECTION, POWDER, FOR SOLUTION INTRAVENOUS EVERY 8 HOURS
Refills: 0 | Status: DISCONTINUED | OUTPATIENT
Start: 2024-08-19 | End: 2024-08-20

## 2024-08-19 RX ORDER — VANCOMYCIN/0.9 % SOD CHLORIDE 1.75G/25
1000 PLASTIC BAG, INJECTION (ML) INTRAVENOUS ONCE
Refills: 0 | Status: COMPLETED | OUTPATIENT
Start: 2024-08-19 | End: 2024-08-19

## 2024-08-19 RX ORDER — ACETAMINOPHEN 325 MG/1
650 TABLET ORAL EVERY 6 HOURS
Refills: 0 | Status: DISCONTINUED | OUTPATIENT
Start: 2024-08-19 | End: 2024-08-20

## 2024-08-19 RX ORDER — SODIUM CHLORIDE 9 MG/ML
1000 INJECTION INTRAMUSCULAR; INTRAVENOUS; SUBCUTANEOUS
Refills: 0 | Status: DISCONTINUED | OUTPATIENT
Start: 2024-08-19 | End: 2024-08-20

## 2024-08-19 RX ORDER — PIPERACILLIN SODIUM AND TAZOBACTAM SODIUM 3; .375 G/15ML; G/15ML
3.38 INJECTION, POWDER, FOR SOLUTION INTRAVENOUS ONCE
Refills: 0 | Status: DISCONTINUED | OUTPATIENT
Start: 2024-08-19 | End: 2024-08-19

## 2024-08-19 RX ADMIN — Medication 250 MILLIGRAM(S): at 01:22

## 2024-08-19 RX ADMIN — PIPERACILLIN SODIUM AND TAZOBACTAM SODIUM 200 GRAM(S): 3; .375 INJECTION, POWDER, FOR SOLUTION INTRAVENOUS at 00:16

## 2024-08-19 RX ADMIN — PIPERACILLIN SODIUM AND TAZOBACTAM SODIUM 25 GRAM(S): 3; .375 INJECTION, POWDER, FOR SOLUTION INTRAVENOUS at 08:05

## 2024-08-19 RX ADMIN — ACETAMINOPHEN 650 MILLIGRAM(S): 325 TABLET ORAL at 22:18

## 2024-08-19 RX ADMIN — SERTRALINE HYDROCHLORIDE 100 MILLIGRAM(S): 50 TABLET, FILM COATED ORAL at 22:18

## 2024-08-19 RX ADMIN — Medication 60 MILLIGRAM(S): at 22:18

## 2024-08-19 RX ADMIN — OXYBUTYNIN CHLORIDE 5 MILLIGRAM(S): 5 TABLET ORAL at 17:51

## 2024-08-19 RX ADMIN — ACETAMINOPHEN 650 MILLIGRAM(S): 325 TABLET ORAL at 23:00

## 2024-08-19 NOTE — H&P ADULT - NSHPPHYSICALEXAM_GEN_ALL_CORE
PHYSICAL EXAM:  Vital Signs Last 24 Hrs  T(C): 36.6 (08-19-24 @ 10:33)  T(F): 97.8 (08-19-24 @ 10:33), Max: 98 (08-18-24 @ 23:15)  HR: 66 (08-19-24 @ 10:33) (65 - 66)  BP: 105/62 (08-19-24 @ 10:33)  BP(mean): 76 (08-19-24 @ 10:33) (76 - 84)  RR: 18 (08-19-24 @ 10:33) (17 - 20)  SpO2: 99% (08-19-24 @ 10:33) (97% - 100%)  Wt(kg): --    Constitutional: NAD, awake and alert  EYES: EOMI  ENT:  Normal Hearing, no tonsillar exudates   Neck: Soft and supple, No JVD  Respiratory: Breath sounds are clear bilaterally, No wheezing, rales or rhonchi  Cardiovascular: S1 and S2, regular rate and rhythm, no Murmurs, gallops or rubs  Gastrointestinal: Bowel Sounds present, soft, nontender, nondistended, no guarding, no rebound  Extremities: No cyanosis or clubbing or edema; warm to touch; healed surgical scars on right shin and b/l thigh.   Vascular: 2+ peripheral pulses lower ex  Neurological: A/O x 3, reported chronic reduced sensation in RLE. Slight reported memory issues and slight confusion. Reports some general weakness from MS.   Musculoskeletal: Left shoulder and arm discomfort which limits ROM.   Skin: left upper chest wall/medial clavicle area with cheloid and red/pink wound with whitish discharge.   Psych: No depression or anhedonia  Heme: No bruises, no nose bleeds

## 2024-08-19 NOTE — H&P ADULT - NSHPLABSRESULTS_GEN_ALL_CORE
LABS:                          11.6   6.98  )-----------( 315      ( 19 Aug 2024 00:11 )             37.1       08-19    140  |  103  |  9   ----------------------------<  94  3.9   |  24  |  0.63    Ca    9.7      19 Aug 2024 00:11  Mg     2.1     08-19    TPro  6.7  /  Alb  4.2  /  TBili  0.2  /  DBili  x   /  AST  19  /  ALT  24  /  AlkPhos  131<H>  08-19          CAPILLARY BLOOD GLUCOSE    PT/INR - ( 19 Aug 2024 00:11 )   PT: 10.9 sec;   INR: 1.04 ratio         PTT - ( 19 Aug 2024 00:11 )  PTT:32.0 sec    Lactate Trend      Urinalysis Basic - ( 19 Aug 2024 00:11 )    Color: x / Appearance: x / SG: x / pH: x  Gluc: 94 mg/dL / Ketone: x  / Bili: x / Urobili: x   Blood: x / Protein: x / Nitrite: x   Leuk Esterase: x / RBC: x / WBC x   Sq Epi: x / Non Sq Epi: x / Bacteria: x    RADS:    < from: CT Neck Soft Tissue w/ IV Cont (08.19.24 @ 00:37) >        IMPRESSION:  Sequela from medial left clavicular resection with prominent sclerosis   and surrounding soft tissue thickening. No drainable fluid collections   definitively seen. Evaluation mildly limited due to streak artifact from   adjacent contrast. Additional prominent soft tissue   thickening/degenerative change at the right sternoclavicular joint.    No other acute findings in the neck.    --- End of Report ---    < end of copied text >    < from: Xray Shoulder 2 Views, Left (08.19.24 @ 00:55) >      FINDINGS/  IMPRESSION:  Prior left clavicle resection, better evaluated on same-day CT. No   subcutaneous tracking gas. No acute fracture.  No dislocation. Joint spaces are maintained. Partially imaged lungs are   clear.    --- End of Report ---    < end of copied text >

## 2024-08-19 NOTE — CONSULT NOTE ADULT - ASSESSMENT
43-year-old female with a past medical history of multiple sclerosis on rituximab with last dose in July 2024, left septic sternoclavicular joint status post debridement with left clavicular head excision in December 2023 had completed 6 weeks of cefepime on outpatient basis who is being admitted to the hospital due to wound site erythema with discharge.    #Left-sided clavicular wound  ##Abnormal imaging of the left shoulder  #Elevated inflammatory markers  #History of left sternoclavicular septic arthritis status post therapy    Recommendations  Left sided clavicular wound appears without evidence for infection - no drainage noted, minimal surrounding erythema, no tenderness  Wound care eval  Given clinical stability, patient without fever, leukocytosis would monitor off antibiotics at this time  CT with possible phlegmon, would obtain MRI for further evaluation  Low threshold to initiate empiric vancomycin and piperacillin/tazobactam if patient develops fever, HD instability  Follow pending blood cultures, follow urine culture  Follow fever curve and WBC count    Bipin Brown MD  Division of Infectious Diseases

## 2024-08-19 NOTE — PATIENT PROFILE ADULT - FUNCTIONAL ASSESSMENT - BASIC MOBILITY 6.
3-calculated by average/Not able to assess (calculate score using [facility] averaging method)

## 2024-08-19 NOTE — PHYSICAL THERAPY INITIAL EVALUATION ADULT - ADDITIONAL COMMENTS
Pt lives in a house with 2 CECE, and 10 steps + 3 steps to the bedroom.  Pt lives with her 5 children.  PTA pt independent with ambulation using rolling walker/cane (as of recent), independent with most ADLs, but has her kids help as needed.

## 2024-08-19 NOTE — H&P ADULT - PROBLEM SELECTOR PLAN 1
-H/o left septic sternoclavicular join infection s/p debridement of left sternoclavicular joint with left clavicular head excision on 12/7, followed by 6 weeks of IV cefepime outpatient; presenting with worsening of left clavicular excision wound site erythema with discharge with left shoulder pain and reduced ROM.   -S/p vanco/zosyn in ED, but would f/u ID recs regarding further abx and imaging.   -Consider wound culture. Consider MRI. But has no leukocytosis and no documented fever. CRP is elevated. -F/u blood cultures sent in ED.   -Reports increased freq urination and h/o Pseudomonas UTI at [place] per chart review, so will get UA/UCx.  -Wound care consult for left clavicular site wound with discharge.  -Pain control prn. -iSTOP in chart.

## 2024-08-19 NOTE — ED ADULT NURSE REASSESSMENT NOTE - NS ED NURSE REASSESS COMMENT FT1
Patient states she does not want to stay for admission and would like to go home. CONNIE Galdamez made aware.

## 2024-08-19 NOTE — OCCUPATIONAL THERAPY INITIAL EVALUATION ADULT - PERTINENT HX OF CURRENT PROBLEM, REHAB EVAL
43 year old female with PMH of MS on Rituxan (last dose ~July 2024), with a history of left septic sternoclavicular join infection s/p debridement of left sternoclavicular joint with left clavicular head excision on 12/7, followed by 6 weeks of IV cefepime outpatient; presenting with worsening of left clavicular excision wound site erythema with discharge with left shoulder pain and reduced ROM and weakness. Patient denies fevers at home, but does state she has been feeling lightheaded, some malaise, weakness, more unsteady on feet, increased frequency of urination, and slight confusion. Denies CP or SOB. Reports h/o right foot drop from MS. In ED received vanco and zosyn.   8/19 CT chest/neck: Sequela from medial left clavicular resection with prominent sclerosis and surrounding soft tissue thickening and subcentimeter hypodensity which may represent phlegmon. No drainable fluid collections definitively seen. Additional prominent soft tissue thickening/degenerative change at the right sternoclavicular joint.No other acute findings in the chest. No other acute findings in the neck. 8/19 XRay L shoulder, humerus, chest: Prior left clavicle resection, better evaluated on same-day CT. No subcutaneous tracking gas. No acute fracture.No dislocation. Joint spaces are maintained. Partially imaged lungs are clear.

## 2024-08-19 NOTE — PATIENT PROFILE ADULT - FALL HARM RISK - HARM RISK INTERVENTIONS
Assistance with ambulation/Assistance OOB with selected safe patient handling equipment/Communicate Risk of Fall with Harm to all staff/Discuss with provider need for PT consult/Monitor gait and stability/Reinforce activity limits and safety measures with patient and family/Tailored Fall Risk Interventions/Visual Cue: Yellow wristband and red socks/Bed in lowest position, wheels locked, appropriate side rails in place/Call bell, personal items and telephone in reach/Instruct patient to call for assistance before getting out of bed or chair/Non-slip footwear when patient is out of bed/[place] to call system/Physically safe environment - no spills, clutter or unnecessary equipment/Purposeful Proactive Rounding/Room/bathroom lighting operational, light cord in reach
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
## 2024-08-19 NOTE — H&P ADULT - ASSESSMENT
43 year old female with PMH of MS on Rituxan (last dose ~July 2024), with a history of left septic sternoclavicular join infection s/p debridement of left sternoclavicular joint with left clavicular head excision on 12/7, followed by 6 weeks of IV cefepime outpatient; presenting with worsening of left clavicular excision wound site erythema with discharge with left shoulder pain and reduced ROM. Patient denies fevers at home, but does state she has been feeling lightheaded, some malaise, weakness, more unsteady on feet, increased frequency of urination, and slight confusion. Admitted for further mgmt.

## 2024-08-19 NOTE — ED PROVIDER NOTE - ATTENDING CONTRIBUTION TO CARE
Alexander Vick DO: I have personally performed a face to face medical and diagnostic evaluation of the patient. I have discussed with and reviewed the Resident's and/or ACP's and/or Medical/PA/NP student's note and agree with the History, ROS, Physical Exam and MDM unless otherwise indicated. A brief summary of my personal evaluation and impression can be found below.     43F PMHx of MS on Rituxan With a history of left shoulder infection with concern for soft tissue abscess proceeded to concern for possible septic joint, had debridement of left sternoclavicular joint, left clavicular head excision, but is the ED for worsening of left shoulder erythema redness and discharge from left clavicle.  Patient denies fevers but does states she has been feeling lightheaded, nausea no vomiting.  No abdominal or other chest pain.  Patient is able to move her left shoulder.    CONSTITUTIONAL: NAD  SKIN: Warm dry, normal skin turgor  HEAD: NCAT  EYES: EOMI, PERRLA, no scleral icterus, conjunctiva pink  NECK: Supple; non tender. Full ROM.  CARD: RRR  RESP: No respiratory distress  ABD: non-distended  MSK:Left anterior chest wall has erythematous surgical scar with nonpurulent serous/fibrinous discharge over lateral left clavicle.  Warmth and erythema traveling down left upper extremity to area of left elbow.  Mildly tender to palpation, no crepitus, no pain out of proportion, left shoulder full passive range of motion.  PSYCH: Cooperative, appropriate.     Given history of abscess, question septic arthritis will obtain CT imaging of neck, Chest, will obtain x-rays of shoulder and left upper extremity, at this time no clinical concern for necrotizing infection however with patient on Rituxan will obtain x-rays of shoulder and left upper extremity to evaluate for subcutaneous gas, none palpated or appreciated on exam, with full range of motion of left upper extremity septic arthritis also not likely however patient will require MRI and admission for evaluation.

## 2024-08-19 NOTE — PHYSICAL THERAPY INITIAL EVALUATION ADULT - PLANNED THERAPY INTERVENTIONS, PT EVAL
Stairs: GOAL: Pt will ascend/descend 5 stairs independently using unilateral rail in order to return home safety in 2 weeks./balance training/bed mobility training/gait training/strengthening/transfer training

## 2024-08-19 NOTE — OCCUPATIONAL THERAPY INITIAL EVALUATION ADULT - ADL RETRAINING, OT EVAL
pt will perform UB dressing with I in 4 weeks using adaptive techniques / pt will perform grooming task with I in 4 weeks using adaptive techniques

## 2024-08-19 NOTE — PHYSICAL THERAPY INITIAL EVALUATION ADULT - ACTIVE RANGE OF MOTION EXAMINATION, REHAB EVAL
LUE AROM shoulder flexion 150 degrees, Left elbow flexion/extension WFL/Right UE Active ROM was WFL (within functional limits)/bilateral  lower extremity Active ROM was WFL (within functional limits)

## 2024-08-19 NOTE — H&P ADULT - PROBLEM SELECTOR PLAN 2
-Last Rituxan in ~July 2024.   -C/w home cymbalta and sertaline for mood.   -On Gemtesa 75mg daily at home for overactive bladder sx. In house we have oxybutynin, will do 5mg BID for now. If she can bring home meds, we can start Gemtasa instead or we could try Mybetriq which is on formulary.   -PT/OT.

## 2024-08-19 NOTE — H&P ADULT - HISTORY OF PRESENT ILLNESS
43 year old female with PMH of MS on Rituxan (last dose ~July 2024), with a history of left septic sternoclavicular join infection s/p debridement of left sternoclavicular joint with left clavicular head excision on 12/7, followed by 6 weeks of IV cefepime outpatient; presenting with worsening of left clavicular excision wound site erythema with discharge with left shoulder pain and reduced ROM. Patient denies fevers at home, but does state she has been feeling lightheaded, some malaise, weakness, more unsteady on feet, increased frequency of urination, and slight confusion. Denies CP or SOB.   In ED received vanco and zosyn.  43 year old female with PMH of MS on Rituxan (last dose ~July 2024), with a history of left septic sternoclavicular join infection s/p debridement of left sternoclavicular joint with left clavicular head excision on 12/7, followed by 6 weeks of IV cefepime outpatient; presenting with worsening of left clavicular excision wound site erythema with discharge with left shoulder pain and reduced ROM and weakness. Patient denies fevers at home, but does state she has been feeling lightheaded, some malaise, weakness, more unsteady on feet, increased frequency of urination, and slight confusion. Denies CP or SOB.   -Reports h/o right foot drop from MS.   In ED received vanco and zosyn.

## 2024-08-19 NOTE — OCCUPATIONAL THERAPY INITIAL EVALUATION ADULT - ADDITIONAL COMMENTS
pt reports lives in private home with 5 children (oldest child 22yo), 2 steps to enter, flight of stairs inside. Prior to admission required some assist for ADLs from children/ambulating with RW.

## 2024-08-19 NOTE — H&P ADULT - NSICDXPASTSURGICALHX_GEN_ALL_CORE_FT
PAST SURGICAL HISTORY:  Fracture of both femurs     Fracture of right tibia     History of       PAST SURGICAL HISTORY:  Fracture of both femurs     Fracture of right tibia     History of      Septic arthritis of left sternoclavicular joint

## 2024-08-19 NOTE — H&P ADULT - PROBLEM SELECTOR PLAN 3
-SCD's for DVT PPx for now, but if more prolonged stay favor lovenox given h/o MS and possible infection.     4. D/w ACP Denice.

## 2024-08-19 NOTE — PHYSICAL THERAPY INITIAL EVALUATION ADULT - LEVEL OF INDEPENDENCE: GAIT, REHAB EVAL
Enbrel Pregnancy And Lactation Text: This medication is Pregnancy Category B and is considered safe during pregnancy. It is unknown if this medication is excreted in breast milk. progressed to supervision/contact guard

## 2024-08-20 ENCOUNTER — TRANSCRIPTION ENCOUNTER (OUTPATIENT)
Age: 44
End: 2024-08-20

## 2024-08-20 ENCOUNTER — APPOINTMENT (OUTPATIENT)
Dept: PLASTIC SURGERY | Facility: CLINIC | Age: 44
End: 2024-08-20

## 2024-08-20 VITALS
SYSTOLIC BLOOD PRESSURE: 101 MMHG | OXYGEN SATURATION: 98 % | TEMPERATURE: 98 F | RESPIRATION RATE: 19 BRPM | DIASTOLIC BLOOD PRESSURE: 67 MMHG | HEART RATE: 89 BPM

## 2024-08-20 LAB
ALBUMIN SERPL ELPH-MCNC: 3.8 G/DL — SIGNIFICANT CHANGE UP (ref 3.3–5)
ALP SERPL-CCNC: 117 U/L — SIGNIFICANT CHANGE UP (ref 40–120)
ALT FLD-CCNC: 18 U/L — SIGNIFICANT CHANGE UP (ref 10–45)
ANION GAP SERPL CALC-SCNC: 13 MMOL/L — SIGNIFICANT CHANGE UP (ref 5–17)
AST SERPL-CCNC: 16 U/L — SIGNIFICANT CHANGE UP (ref 10–40)
BILIRUB SERPL-MCNC: 0.2 MG/DL — SIGNIFICANT CHANGE UP (ref 0.2–1.2)
BUN SERPL-MCNC: 9 MG/DL — SIGNIFICANT CHANGE UP (ref 7–23)
CALCIUM SERPL-MCNC: 9 MG/DL — SIGNIFICANT CHANGE UP (ref 8.4–10.5)
CHLORIDE SERPL-SCNC: 106 MMOL/L — SIGNIFICANT CHANGE UP (ref 96–108)
CO2 SERPL-SCNC: 23 MMOL/L — SIGNIFICANT CHANGE UP (ref 22–31)
CREAT SERPL-MCNC: 0.6 MG/DL — SIGNIFICANT CHANGE UP (ref 0.5–1.3)
CULTURE RESULTS: NO GROWTH — SIGNIFICANT CHANGE UP
EGFR: 113 ML/MIN/1.73M2 — SIGNIFICANT CHANGE UP
GLUCOSE SERPL-MCNC: 94 MG/DL — SIGNIFICANT CHANGE UP (ref 70–99)
HCT VFR BLD CALC: 37.6 % — SIGNIFICANT CHANGE UP (ref 34.5–45)
HGB BLD-MCNC: 12 G/DL — SIGNIFICANT CHANGE UP (ref 11.5–15.5)
MCHC RBC-ENTMCNC: 27.7 PG — SIGNIFICANT CHANGE UP (ref 27–34)
MCHC RBC-ENTMCNC: 31.9 GM/DL — LOW (ref 32–36)
MCV RBC AUTO: 86.8 FL — SIGNIFICANT CHANGE UP (ref 80–100)
NRBC # BLD: 0 /100 WBCS — SIGNIFICANT CHANGE UP (ref 0–0)
PLATELET # BLD AUTO: 289 K/UL — SIGNIFICANT CHANGE UP (ref 150–400)
POTASSIUM SERPL-MCNC: 4.1 MMOL/L — SIGNIFICANT CHANGE UP (ref 3.5–5.3)
POTASSIUM SERPL-SCNC: 4.1 MMOL/L — SIGNIFICANT CHANGE UP (ref 3.5–5.3)
PROT SERPL-MCNC: 6.1 G/DL — SIGNIFICANT CHANGE UP (ref 6–8.3)
RBC # BLD: 4.33 M/UL — SIGNIFICANT CHANGE UP (ref 3.8–5.2)
RBC # FLD: 13.9 % — SIGNIFICANT CHANGE UP (ref 10.3–14.5)
SODIUM SERPL-SCNC: 142 MMOL/L — SIGNIFICANT CHANGE UP (ref 135–145)
SPECIMEN SOURCE: SIGNIFICANT CHANGE UP
WBC # BLD: 4.89 K/UL — SIGNIFICANT CHANGE UP (ref 3.8–10.5)
WBC # FLD AUTO: 4.89 K/UL — SIGNIFICANT CHANGE UP (ref 3.8–10.5)

## 2024-08-20 PROCEDURE — 86922 COMPATIBILITY TEST ANTIGLOB: CPT

## 2024-08-20 PROCEDURE — 87640 STAPH A DNA AMP PROBE: CPT

## 2024-08-20 PROCEDURE — 85014 HEMATOCRIT: CPT

## 2024-08-20 PROCEDURE — 85652 RBC SED RATE AUTOMATED: CPT

## 2024-08-20 PROCEDURE — 73030 X-RAY EXAM OF SHOULDER: CPT

## 2024-08-20 PROCEDURE — 82435 ASSAY OF BLOOD CHLORIDE: CPT

## 2024-08-20 PROCEDURE — 99222 1ST HOSP IP/OBS MODERATE 55: CPT

## 2024-08-20 PROCEDURE — 85730 THROMBOPLASTIN TIME PARTIAL: CPT

## 2024-08-20 PROCEDURE — 85610 PROTHROMBIN TIME: CPT

## 2024-08-20 PROCEDURE — 71046 X-RAY EXAM CHEST 2 VIEWS: CPT

## 2024-08-20 PROCEDURE — 85027 COMPLETE CBC AUTOMATED: CPT

## 2024-08-20 PROCEDURE — 84132 ASSAY OF SERUM POTASSIUM: CPT

## 2024-08-20 PROCEDURE — 85018 HEMOGLOBIN: CPT

## 2024-08-20 PROCEDURE — 81003 URINALYSIS AUTO W/O SCOPE: CPT

## 2024-08-20 PROCEDURE — 86905 BLOOD TYPING RBC ANTIGENS: CPT

## 2024-08-20 PROCEDURE — 84295 ASSAY OF SERUM SODIUM: CPT

## 2024-08-20 PROCEDURE — 99285 EMERGENCY DEPT VISIT HI MDM: CPT | Mod: 25

## 2024-08-20 PROCEDURE — A9585: CPT

## 2024-08-20 PROCEDURE — 86140 C-REACTIVE PROTEIN: CPT

## 2024-08-20 PROCEDURE — 86870 RBC ANTIBODY IDENTIFICATION: CPT

## 2024-08-20 PROCEDURE — 97162 PT EVAL MOD COMPLEX 30 MIN: CPT

## 2024-08-20 PROCEDURE — 86901 BLOOD TYPING SEROLOGIC RH(D): CPT

## 2024-08-20 PROCEDURE — 73223 MRI JOINT UPR EXTR W/O&W/DYE: CPT | Mod: MC

## 2024-08-20 PROCEDURE — 82947 ASSAY GLUCOSE BLOOD QUANT: CPT

## 2024-08-20 PROCEDURE — 84145 PROCALCITONIN (PCT): CPT

## 2024-08-20 PROCEDURE — 71260 CT THORAX DX C+: CPT | Mod: MC

## 2024-08-20 PROCEDURE — 97166 OT EVAL MOD COMPLEX 45 MIN: CPT

## 2024-08-20 PROCEDURE — 82330 ASSAY OF CALCIUM: CPT

## 2024-08-20 PROCEDURE — 86900 BLOOD TYPING SEROLOGIC ABO: CPT

## 2024-08-20 PROCEDURE — 87040 BLOOD CULTURE FOR BACTERIA: CPT

## 2024-08-20 PROCEDURE — 80053 COMPREHEN METABOLIC PANEL: CPT

## 2024-08-20 PROCEDURE — 86880 COOMBS TEST DIRECT: CPT

## 2024-08-20 PROCEDURE — 70491 CT SOFT TISSUE NECK W/DYE: CPT | Mod: MC

## 2024-08-20 PROCEDURE — 73060 X-RAY EXAM OF HUMERUS: CPT

## 2024-08-20 PROCEDURE — 83605 ASSAY OF LACTIC ACID: CPT

## 2024-08-20 PROCEDURE — 96375 TX/PRO/DX INJ NEW DRUG ADDON: CPT

## 2024-08-20 PROCEDURE — 87086 URINE CULTURE/COLONY COUNT: CPT

## 2024-08-20 PROCEDURE — 96374 THER/PROPH/DIAG INJ IV PUSH: CPT

## 2024-08-20 PROCEDURE — 99239 HOSP IP/OBS DSCHRG MGMT >30: CPT | Mod: GC

## 2024-08-20 PROCEDURE — 73220 MRI UPPR EXTREMITY W/O&W/DYE: CPT

## 2024-08-20 PROCEDURE — 85025 COMPLETE CBC W/AUTO DIFF WBC: CPT

## 2024-08-20 PROCEDURE — 87641 MR-STAPH DNA AMP PROBE: CPT

## 2024-08-20 PROCEDURE — 99233 SBSQ HOSP IP/OBS HIGH 50: CPT

## 2024-08-20 PROCEDURE — 83735 ASSAY OF MAGNESIUM: CPT

## 2024-08-20 PROCEDURE — 82803 BLOOD GASES ANY COMBINATION: CPT

## 2024-08-20 PROCEDURE — 86850 RBC ANTIBODY SCREEN: CPT

## 2024-08-20 PROCEDURE — 73220 MRI UPPR EXTREMITY W/O&W/DYE: CPT | Mod: 26,LT

## 2024-08-20 RX ORDER — VANCOMYCIN/0.9 % SOD CHLORIDE 1.75G/25
1000 PLASTIC BAG, INJECTION (ML) INTRAVENOUS EVERY 12 HOURS
Refills: 0 | Status: DISCONTINUED | OUTPATIENT
Start: 2024-08-20 | End: 2024-08-20

## 2024-08-20 RX ADMIN — Medication 250 MILLIGRAM(S): at 00:20

## 2024-08-20 RX ADMIN — OXYBUTYNIN CHLORIDE 5 MILLIGRAM(S): 5 TABLET ORAL at 05:31

## 2024-08-20 RX ADMIN — PIPERACILLIN SODIUM AND TAZOBACTAM SODIUM 25 GRAM(S): 3; .375 INJECTION, POWDER, FOR SOLUTION INTRAVENOUS at 08:59

## 2024-08-20 RX ADMIN — PIPERACILLIN SODIUM AND TAZOBACTAM SODIUM 25 GRAM(S): 3; .375 INJECTION, POWDER, FOR SOLUTION INTRAVENOUS at 01:36

## 2024-08-20 RX ADMIN — SODIUM CHLORIDE 125 MILLILITER(S): 9 INJECTION INTRAMUSCULAR; INTRAVENOUS; SUBCUTANEOUS at 00:11

## 2024-08-20 RX ADMIN — CHLORHEXIDINE GLUCONATE 1 APPLICATION(S): 40 SOLUTION TOPICAL at 08:59

## 2024-08-20 NOTE — CONSULT NOTE ADULT - NS ATTEND AMEND GEN_ALL_CORE FT
Pt seen and examined with ACP.  Assessment and plan reviewed and discussed.  Agree with above.    Status of wounds and treatment recommendations d/w  pt and friend at bedside.  All questions answered.   Pt and friend expressed understanding.    I spent 55  minutes face to face w/ this pt of which more than 50% of the time was spent counseling & coordinating care of this pt.

## 2024-08-20 NOTE — PROGRESS NOTE ADULT - PROBLEM SELECTOR PLAN 2
-Last Rituxan in ~July 2024.   -C/w home cymbalta and sertaline for mood.   -On Gemtesa 75mg daily at home for overactive bladder sx. In house we have oxybutynin, will do 5mg BID for now. If she can bring home meds, we can start Gemtasa instead or we could try Mybetriq which is on formulary.   -PT/OT. -Last Rituxan in ~July 2024.   -C/w home cymbalta and sertaline for mood.   -On Gemtesa 75mg daily at home for overactive bladder sx. In house we have oxybutynin, will do 5mg BID for now. If she can bring home meds, we can start Gemtasa instead or we could try Mybetriq which is on formulary.   -PT/OT outpt

## 2024-08-20 NOTE — DISCHARGE NOTE PROVIDER - NSDCMRMEDTOKEN_GEN_ALL_CORE_FT
acetaminophen 325 mg oral tablet: 2 tab(s) orally every 6 hours As needed Temp greater or equal to 38C (100.4F), Mild Pain (1 - 3)  DULoxetine 60 mg oral delayed release capsule: 1 cap(s) orally once a day (at bedtime)  Gemtesa 75 mg oral tablet: 1 tab(s) orally once a day  Physical and occupational therapy: 3x per week outpatient  sertraline 100 mg oral tablet: 1 tab(s) orally once a day (at bedtime)

## 2024-08-20 NOTE — PROGRESS NOTE ADULT - ATTENDING COMMENTS
Patient is a 43 year old female, with PMH of MS on Rituxan, history of left septic sternoclavicular joint infection s/p debridement of left sternoclavicular joint with left clavicular head excision on 12/7, followed by 6 weeks of IV cefepime outpatient, who presented with worsening of left clavicular excision wound site erythema with discharge with left shoulder pain and reduced ROM.     - MRI chest results noted; discussed with ID; no concern for infection; will discontinue abx  - thoracic consult for noted MRI finding showing involvement of the adjacent left 1st rib costosternal junction; no concern for infection; no acute intervention   - no dysuria  - blood cx neg; UA neg   - CXR clear   - PT recs HPT but patient prefers outpatient PT and OT; provide scripts to patient     DISPO: DC planning to home today  40 mins spent on discharge     Rest as above. Discussed with HS. Discussed with CM and with ID. Patient is a 43 year old female, with PMH of MS on Rituxan, history of left septic sternoclavicular joint infection s/p debridement of left sternoclavicular joint with left clavicular head excision on 12/7, followed by 6 weeks of IV cefepime outpatient, who presented with worsening of left clavicular excision wound site erythema with discharge with left shoulder pain and reduced ROM.     - MRI chest results noted; discussed with ID; no concern for infection; will discontinue abx  - thoracic consult for noted MRI finding showing involvement of the adjacent left 1st rib costosternal junction; no concern for infection; no acute intervention   - no dysuria  - blood cx neg; UA neg   - CXR clear   - PT recs HPT but patient prefers outpatient PT and OT; provide scripts to patient     DISPO: DC planning to home today  40 mins spent on discharge     Rest as above. Discussed with HS. Discussed with CM, thoracic and with ID.

## 2024-08-20 NOTE — DISCHARGE NOTE NURSING/CASE MANAGEMENT/SOCIAL WORK - PATIENT PORTAL LINK FT
You can access the FollowMyHealth Patient Portal offered by [facility] by registering at the following website: [email or web address]. By joining [facility]’s FollowMyHealth portal, you will also be able to view your health information using other applications (apps) compatible with our system.

## 2024-08-20 NOTE — DISCHARGE NOTE PROVIDER - NSDCFUADDAPPT_GEN_ALL_CORE_FT
APPTS ARE READY TO BE MADE: [ ] YES    Best Family or Patient Contact (if needed):    Additional Information about above appointments (if needed):    1: PCP (1-2 weeks)  2: Thoracic with Dr. Bautista (1 week)  3: Neurologist (1-2 weeks)  4: Wound care  (1-2 weeks)    Other comments or requests:    APPTS ARE READY TO BE MADE: [x ] YES    Best Family or Patient Contact (if needed):    Additional Information about above appointments (if needed):    1: PCP (1-2 weeks)  2: Thoracic with Dr. Bautista (1 week)  3: Neurologist (1-2 weeks)  4: Wound care  (1-2 weeks)    Other comments or requests:

## 2024-08-20 NOTE — DISCHARGE NOTE PROVIDER - NSDCCPTREATMENT_GEN_ALL_CORE_FT
PRINCIPAL PROCEDURE  Procedure: MR clavicle LT w con  Findings and Treatment: FINDINGS:  OSSEOUS STRUCTURES    Fractures:  None.    Other Findings:  Prior resection of the medial left clavicle is again   seen. Enhancing soft tissue is present along the resected margin with   slight central fluid. These changes measuring up to approximately 1.7 x   3.2 x 2.2 cm. There is mild marrow edema and sclerosis of the adjacent   clavicular diaphysis with mild enhancement. Similar osseous changes   involve the adjacent 1st rib costosternal junction.  PARTIALLY IMAGED RIGHT STERNOCLAVICULAR JOINT    Articular Surfaces:  Hypertrophic osseous changes are again seen with   mild marrow heterogeneity and enhancement    Effusion/Synovitis:  There is mild synovial enhancement/synovitis.  MYOTENDINOUS STRUCTURES  Preserved.  OTHER SOFT TISSUES  There is mild edema and cutaneous thickening at the surgical site along   the resected left clavicle and right sternoclavicular joint.  IMAGED LUNGS  Although limited on MRI the visualized lungs are grossly clear.  IMPRESSION:  1.  Prior resection of the medial left clavicle is again seen with   enhancing soft tissue around the resected margin and marrow changes   extending into the diaphysis. These changes may reflect postoperative   granulation tissue or phlegmon with chronic or active possibilities with   the given history.  2.  There is involvement of the adjacent left 1st rib costosternal   junction.  3.  Hypertrophic changes in heterogeneous marrow of the partially imaged   right sternoclavicular joint is new from 12/26/2023 and prior infection   although active infection cannot be absolutely excluded.  4.  Consider follow-up CT or MRI to assess stability of these findings or   histologic correlation as warranted.  --- End of Report ---

## 2024-08-20 NOTE — PROGRESS NOTE ADULT - PROBLEM SELECTOR PLAN 1
-H/o left septic sternoclavicular join infection s/p debridement of left sternoclavicular joint with left clavicular head excision on 12/7, followed by 6 weeks of IV cefepime outpatient; presenting with worsening of left clavicular excision wound site erythema with discharge with left shoulder pain and reduced ROM.   -S/p vanco/zosyn in ED, but would f/u ID recs regarding further abx and imaging--initially stopped but now resumed after MR shoulder was back  -Consider wound culture. Consider MRI. But has no leukocytosis and no documented fever. CRP is elevated. -F/u blood cultures sent in ED.   -Reports increased freq urination and h/o Pseudomonas UTI at [place] per chart review, so will f/u  UA/UCx.  -Wound care consult for left clavicular site wound with discharge.  -Pain control prn. -iSTOP in chart. -H/o left septic sternoclavicular join infection s/p debridement of left sternoclavicular joint with left clavicular head excision on 12/7, followed by 6 weeks of IV cefepime outpatient; presenting with worsening of left clavicular excision wound site erythema with discharge with left shoulder pain and reduced ROM.   -S/p vanco/zosyn in ED, but would f/u ID recs regarding further abx and imaging--will hold for now  -Consider wound culture. Consider MRI. But has no leukocytosis and no documented fever. CRP is elevated. -F/u blood cultures sent in ED.   -Reports increased freq urination and h/o Pseudomonas UTI at [place] per chart review, however patient endorses poor bladder control iso MS. No dysuria.  -Wound care consult for left clavicular site wound with discharge.  -Pain control prn. -iSTOP in chart.  - f/u thoracic surgery -H/o left septic sternoclavicular joint infection s/p debridement of left sternoclavicular joint with left clavicular head excision on 12/7, followed by 6 weeks of IV cefepime outpatient; presenting with worsening of left clavicular excision wound site erythema with discharge with left shoulder pain and reduced ROM.   -S/p vanco/zosyn in ED, but would f/u ID recs regarding further abx and imaging--will hold for now  -Consider wound culture. Consider MRI. But has no leukocytosis and no documented fever. CRP is elevated. -F/u blood cultures sent in ED.   -Reports increased freq urination and h/o Pseudomonas UTI at [place] per chart review, however patient endorses poor bladder control iso MS. No dysuria.  -Wound care consult for left clavicular site wound with discharge.  -Pain control prn. -iSTOP in chart.  - f/u thoracic surgery

## 2024-08-20 NOTE — DISCHARGE NOTE PROVIDER - ATTENDING DISCHARGE PHYSICAL EXAMINATION:
CONSTITUTIONAL: NAD, well-developed   EYES: PERRLA; conjunctiva and sclera clear  ENMT: Moist oral mucosa, no pharyngeal injection or exudates   NECK: Supple  RESPIRATORY: Normal respiratory effort; lungs are clear to auscultation bilaterally  CARDIOVASCULAR: Regular rate and rhythm, normal S1 and S2   ABDOMEN: Nontender to palpation, normoactive bowel sounds  MUSCULOSKELETAL: no clubbing or cyanosis of digits; no joint swelling or tenderness to palpation  PSYCH: A+O to person, place, and time; affect appropriate  NEUROLOGY: no gross sensory deficits   SKIN: No rashes

## 2024-08-20 NOTE — DISCHARGE NOTE PROVIDER - CARE PROVIDER_API CALL
Elly Selby  Neurology  [address], [address]  [place] [address]  Phone: [phone]  Fax: [phone]  Follow Up Time:     Deidre Bautista  Thoracic Surgery  [address] [place] [address], [address] [place] [address]  Phone: [phone]  Fax: [phone]  Follow Up Time:

## 2024-08-20 NOTE — DISCHARGE NOTE PROVIDER - HOSPITAL COURSE
43 year old female with PMH of MS on Rituxan (last dose ~July 2024), with a history of left septic sternoclavicular join infection s/p debridement of left sternoclavicular joint with left clavicular head excision on 12/7, followed by 6 weeks of IV cefepime outpatient; presenting with worsening of left clavicular excision wound site erythema with discharge with left shoulder pain and reduced ROM and weakness. Patient denies fevers at home, but does state she has been feeling lightheaded, some malaise, weakness, more unsteady on feet, increased frequency of urination, and slight confusion. Denies CP or SOB.   -Reports h/o right foot drop from MS.   In ED received [facility] Course  Pt admitted to medicine and received imaging with initially unclear if signs of infection present on CT subsequent MR of chest and shoulder was obtained with findings showing post surgical vs infectious etiology. ID was consulted that recommended monitoring patient off antibiotics and also did not think there is an active infection going on. We also consulted thoracic surgery who also agreed. Patient will not receive any outpatient antibiotics and was recommended to follow Dr. Bautista outpatient.

## 2024-08-20 NOTE — PROGRESS NOTE ADULT - ASSESSMENT
patient with prior SCJ resection - with RTO for further debridement and longstanding wound healing.   had discussed with her possible pec flap if she was agreeable.   at this time - given continued healing she is deferring but knows she can call office when she is ready to meet for discussion regarding flap.   discussed she may also have keloid/hypertrophic scarring after that surgery.     at this time there is no active infection appreciated on wound. she has what appears to be phlegmon and/or granulation tissue but no fevers or leukocytosis.   recommend to follow up as outpatient 
43-year-old female with a past medical history of multiple sclerosis on rituximab with last dose in July 2024, left septic sternoclavicular joint status post debridement with left clavicular head excision in December 2023 had completed 6 weeks of cefepime on outpatient basis who is being admitted to the hospital due to wound site erythema with discharge.    #Left-sided clavicular wound  ##Abnormal imaging of the left shoulder  #Elevated inflammatory markers  #History of left   sternoclavicular septic arthritis status post therapy    Recommendations  Left sided clavicular wound appears without evidence for infection  Given clinical stability, patient without fever, leukocytosis would monitor off antibiotics at this time  ESR/CRP minimally elevated going against presence of infection   reviewed MRI with radiology, findings could be either past treated infection or active infection based on clinical presentation.   Based on her clinical presentation, findings more concerning for past treated infection.   Follow pending blood cultures, NTD  Follow WBC count, no leucocytosis   discussed with pt, friends at bedside, if any worsening of the wound, worsening persistant pain or change in clinical status can re-evaluate need for repeat imaging and abx.   thoracic following, low concern for infection at this point too.       Plan discussed with Medicine, Radiology Attending.    Odalis Reynaga  Please contact through MS Teams   If no response or past 5 pm/weekend call [phone].   
43 year old female with PMH of MS on Rituxan (last dose ~July 2024), with a history of left septic sternoclavicular join infection s/p debridement of left sternoclavicular joint with left clavicular head excision on 12/7, followed by 6 weeks of IV cefepime outpatient; presenting with worsening of left clavicular excision wound site erythema with discharge with left shoulder pain and reduced ROM. Patient denies fevers at home, but does state she has been feeling lightheaded, some malaise, weakness, more unsteady on feet, increased frequency of urination, and slight confusion. Admitted for further mgmt.

## 2024-08-20 NOTE — DISCHARGE NOTE PROVIDER - NSDCCPCAREPLAN_GEN_ALL_CORE_FT
PRINCIPAL DISCHARGE DIAGNOSIS  Diagnosis: Septic arthritis of left sternoclavicular joint  Assessment and Plan of Treatment: You presented with concers for an infection in your sternoclavicular region on admission. You were initially started on IV antibiotics. Your labs looked baseline and mostly normal. We consulted infectious disease team that did not think you had an active infection going on. We also talked to thoracic surgery who agreed and recommened to be followed up outpatient. If you start developing fevers, chills, nausea/vomiting/chest pain, shortness of breath, worsening pain, dizzinessness and lightheadedness, please come to the ED immediately.      SECONDARY DISCHARGE DIAGNOSES  Diagnosis: Multiple sclerosis  Assessment and Plan of Treatment: You also have a diagnosis of multiple sclerosis and are being managed by an outpatient neurologist. Your symptoms were described as stable on admission. We did not notice any new neurological abnormalities. Please continue following up with your neurologist to manage your MS. It is also important to continue with the prescribed medications regarding your bladder as well. If you start experiencing worsening symptoms such as worsening balance, weakness, shortness/difficulty breathing, pain while urination, excessive falls, or hit your head while falling, lightheadedness and dizziness, please come to the ED immediately.

## 2024-08-20 NOTE — DISCHARGE NOTE PROVIDER - NSDCFUSCHEDAPPT_GEN_ALL_CORE_FT
[facility]  WOUNDCARE [address]  Scheduled Appointment: 08/28/2024    Declan Martines  [facility]  INTMED [address]  Scheduled Appointment: 10/01/2024

## 2024-08-20 NOTE — CONSULT NOTE ADULT - REASON FOR ADMISSION
Worsening redness, discharge at upper chest wound and left arm/shoulder discomfort
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
## 2024-08-20 NOTE — PROGRESS NOTE ADULT - SUBJECTIVE AND OBJECTIVE BOX
SUBJECTIVE   : patient seen -reports she presented to hospital because was having left shoulder pain . no pain at prior incision or drainage.   no fevers/chills. no redness surrounding wound.       MEDICATIONS  (STANDING):  chlorhexidine 2% Cloths 1 Application(s) Topical <User Schedule>  DULoxetine 60 milliGRAM(s) Oral at bedtime  oxybutynin 5 milliGRAM(s) Oral two times a day  piperacillin/tazobactam IVPB.. 3.375 Gram(s) IV Intermittent every 8 hours  sertraline 100 milliGRAM(s) Oral at bedtime  sodium chloride 0.9%. 1000 milliLiter(s) (125 mL/Hr) IV Continuous <Continuous>  vancomycin  IVPB 1000 milliGRAM(s) IV Intermittent every 12 hours    MEDICATIONS  (PRN):  acetaminophen     Tablet .. 650 milliGRAM(s) Oral every 6 hours PRN Mild Pain (1 - 3), Moderate Pain (4 - 6)      CAPILLARY BLOOD GLUCOSE        I&O's Summary    19 Aug 2024 07:01  -  20 Aug 2024 07:00  --------------------------------------------------------  IN: 1425 mL / OUT: 450 mL / NET: 975 mL    20 Aug 2024 07:01  -  20 Aug 2024 11:36  --------------------------------------------------------  IN: 250 mL / OUT: 0 mL / NET: 250 mL        Vital Signs Last 24 Hrs  T(C): 36.6 (20 Aug 2024 04:29), Max: 37 (20 Aug 2024 00:10)  T(F): 97.9 (20 Aug 2024 04:29), Max: 98.6 (20 Aug 2024 00:10)  HR: 65 (20 Aug 2024 04:29) (61 - 85)  BP: 108/74 (20 Aug 2024 04:29) (100/70 - 117/57)  BP(mean): --  RR: 18 (20 Aug 2024 04:29) (18 - 19)  SpO2: 97% (20 Aug 2024 04:29) (95% - 97%)    Parameters below as of 20 Aug 2024 04:29  Patient On (Oxygen Delivery Method): room air        PHYSICAL EXAM:  GENERAL: no distress  PSYCH: A&O x3    NECK: Supple, wound with keloid scar except for punctate lesion at mid section of wound, no surrounding erythema .       LABS:                        12.0   4.89  )-----------( 289      ( 20 Aug 2024 07:35 )             37.6      08-20    142  |  106  |  9   ----------------------------<  94  4.1   |  23  |  0.60    Ca    9.0      20 Aug 2024 07:37  Mg     2.1     08-19    TPro  6.1  /  Alb  3.8  /  TBili  0.2  /  DBili  x   /  AST  16  /  ALT  18  /  AlkPhos  117  08-20    PT/INR - ( 19 Aug 2024 00:11 )   PT: 10.9 sec;   INR: 1.04 ratio         PTT - ( 19 Aug 2024 00:11 )  PTT:32.0 sec      Urinalysis Basic - ( 20 Aug 2024 07:37 )    Color: x / Appearance: x / SG: x / pH: x  Gluc: 94 mg/dL / Ketone: x  / Bili: x / Urobili: x   Blood: x / Protein: x / Nitrite: x   Leuk Esterase: x / RBC: x / WBC x   Sq Epi: x / Non Sq Epi: x / Bacteria: x        RADIOLOGY & ADDITIONAL TESTS:    CT chest : Sequela from medial left clavicular resection with prominent sclerosis   and surrounding soft tissue thickening and subcentimeter hypodensity   which may represent phlegmon. No drainable fluid collections definitively   seen. Additional prominent soft tissue thickening/degenerative change at   the right sternoclavicular joint.    MRI : impression:    1.  Prior resection of the medial left clavicle is again seen with   enhancing soft tissue around the resected margin and marrow changes   extending into the diaphysis. These changes may reflect postoperative   granulation tissue or phlegmon with chronic or active osteomyelitis with   the given history.

## 2024-08-20 NOTE — PROGRESS NOTE ADULT - SUBJECTIVE AND OBJECTIVE BOX
43yPatient is a 43y old  Female who presents with a chief complaint of Worsening redness, discharge at upper chest wound and left arm/shoulder discomfort (20 Aug 2024 15:02)      Interval history:  Afebrile, pain in lt shoulder improving, no increased pain at the site of surgery.       Allergies:   No Known Allergies      Antimicrobials:      REVIEW OF SYSTEMS:   No SOB  No abdominal pain  No rash.       Vital Signs Last 24 Hrs  T(C): 36.7 (08-20-24 @ 15:24), Max: 37 (08-20-24 @ 00:10)  T(F): 98 (08-20-24 @ 15:24), Max: 98.6 (08-20-24 @ 00:10)  HR: 89 (08-20-24 @ 15:24) (61 - 89)  BP: 101/67 (08-20-24 @ 15:24) (101/67 - 115/57)  BP(mean): --  RR: 19 (08-20-24 @ 15:24) (18 - 19)  SpO2: 98% (08-20-24 @ 15:24) (95% - 98%)      PHYSICAL EXAM:  Pt in no acute distress, alert, awake.   breathing comfortably   lt clavicular area wound which is mostly covered by granulatio tissue, tiny area of opening. No surrounding erythema or increased drainage.   non distended abdomen  no edema LE   no phlebitis                             12.0   4.89  )-----------( 289      ( 20 Aug 2024 07:35 )             37.6   08-20    142  |  106  |  9   ----------------------------<  94  4.1   |  23  |  0.60    Ca    9.0      20 Aug 2024 07:37  Mg     2.1     08-19    TPro  6.1  /  Alb  3.8  /  TBili  0.2  /  DBili  x   /  AST  16  /  ALT  18  /  AlkPhos  117  08-20      LIVER FUNCTIONS - ( 20 Aug 2024 07:37 )  Alb: 3.8 g/dL / Pro: 6.1 g/dL / ALK PHOS: 117 U/L / ALT: 18 U/L / AST: 16 U/L / GGT: x               Culture - Urine (collected 19 Aug 2024 12:04)  Source: Clean Catch Clean Catch (Midstream)  Final Report (20 Aug 2024 15:28):    No growth    Culture - Blood (collected 18 Aug 2024 21:15)  Source: .Blood Blood-Venous  Preliminary Report (20 Aug 2024 06:02):    No growth at 24 hours    Culture - Blood (collected 18 Aug 2024 21:00)  Source: .Blood Blood-Venous  Preliminary Report (20 Aug 2024 06:02):    No growth at 24 hours        Radiology:  < from: MR Clavicle w/wo IV Cont, Left (08.20.24 @ 10:50) >  1.  Prior resection of the medial left clavicle is again seen with   enhancing soft tissue around the resected margin and marrow changes   extending into the diaphysis. These changes may reflect postoperative   granulation tissue or phlegmon with chronic or active osteomyelitis with   the given history.  2.  There is involvement of the adjacent left 1st rib costosternal   junction.  3.  Hypertrophic changes in heterogeneous marrow of the partially imaged   right sternoclavicular joint is new from 12/26/2023 and prior infection   although active infection cannot be absolutely excluded.  4.  Consider follow-up CT or MRI to assess stability of these findings or   histologic correlation as warranted.        < from: MR Shoulder Joint w/wo IV Cont, Left (08.19.24 @ 17:11) >  IMPRESSION:  This MRI shoulder examination was performed according to a MRI shoulder   protocol and, notably, the area of suspected genuine interest on recent   chest CT status post medial left clavicular resection is not imaged on   this shoulder MRI. Partially imaged soft tissue edema is present   anteromedially at the chest wall and there is the suggestion of abnormal   marrow signal within the partially imaged mid clavicle, but this is not   evaluated more centrally. These findings do raise concern infection,   versus less likely chronic postsurgical/postinfectious changes. Given the   concerns raised on same day Chest CT, and the provided clinical concern   forabscess, urgently advise further evaluation with left chest wall MRI   without and with intravenous contrast, similar to the MRI chest protocol   performed on prior 12/11/2023 examination.    RECOMMENDATION: Left chest wall MRI without and with intravenous contrast.

## 2024-08-20 NOTE — CONSULT NOTE ADULT - ASSESSMENT
43 year old female with PMH of MS on Rituxan (last dose ~July 2024), with a history of left septic sternoclavicular join infection s/p debridement of left sternoclavicular joint with left clavicular head excision on 12/7, followed by 6 weeks of IV cefepime outpatient; presenting with worsening of left clavicular excision wound site erythema with discharge with left shoulder pain and reduced ROM. Patient denies fevers at home, but does state she has been feeling lightheaded, some malaise, weakness, more unsteady on feet, increased frequency of urination, and slight confusion. Admitted for further mgmt.     Wound Consult requested to assist w/ management of Lt clavicle surgical incision wound    Lt clavicle - medihoney dressing QD  Consider Plastics and Thoracic Surgery Follow up   Chest/ shoulder CT & MRI reviewed as above  Abx per Medicine/ ID  Moisturize intact skin w/ SWEEN cream BID  Nutrition Consult for optimization        encourage high quality protein, christian/ prosource, MVI & Vit C to promote wound healing  Continue turning and positioning w/ offloading assistive devices as per protocol  Buttocks/ Sacrum Myles BID and prn soiling        Continue w/ attends under pads and Pericare as per protocol  Waffle Cushion to chair when oob to chair  Continue w/ low air loss pressure redistribution bed surface   Care as per medicine, will follow w/ you  Upon discharge f/u as outpatient at Wound Center [address] [place], [place] [phone]  Seen w/ attng & RN and D/w team  Thank you for this consult  Roxanna Garcia PA-C [id number]  Nights/ Weekends/ Holidays please call:  General Surgery Consult pager [phone]) for emergencies  Wound PT for multilayer leg wrapping or VAC issues (x [phone])  I spent 55minutes face to face w/ this pt of which more than 50% of the time was spent counseling & coordinating care of this pt.     43 year old female with PMH of MS on Rituxan (last dose ~July 2024), with a history of left septic sternoclavicular join infection s/p debridement of left sternoclavicular joint with left clavicular head excision on 12/7, followed by 6 weeks of IV cefepime outpatient; presenting with worsening of left clavicular excision wound site erythema with discharge with left shoulder pain and reduced ROM. Patient denies fevers at home, but does state she has been feeling lightheaded, some malaise, weakness, more unsteady on feet, increased frequency of urination, and slight confusion. Admitted for further mgmt.     Wound Consult requested to assist w/ management of:  Lt clavicle surgical incision wound    Lt clavicle - medihoney dressing QD  Consider Plastics and Thoracic Surgery Follow up   Chest/ shoulder CT & MRI reviewed as above  Abx per Medicine/ ID  Moisturize intact skin w/ SWEEN cream BID  Nutrition Consult for optimization        encourage high quality protein, christian/ prosource, MVI & Vit C to promote wound healing  Continue turning and positioning w/ offloading assistive devices as per protocol  Buttocks/ Sacrum Myles BID and prn soiling        Continue w/ attends under pads and Pericare as per protocol  Waffle Cushion to chair when oob to chair  Continue w/ low air loss pressure redistribution bed surface   Care as per medicine, will follow w/ you  Upon discharge f/u as outpatient at Wound Center [address] [place] [address] [phone]  Seen w/ attng & RN and D/w team  Thank you for this consult  Roxanna Garcia PA-C [id number]  Nights/ Weekends/ Holidays please call:  General Surgery Consult pager [phone]) for emergencies  Wound PT for multilayer leg wrapping or VAC issues (x [phone])

## 2024-08-20 NOTE — DISCHARGE NOTE NURSING/CASE MANAGEMENT/SOCIAL WORK - NSDCFUADDAPPT_GEN_ALL_CORE_FT
APPTS ARE READY TO BE MADE: [ ] YES    Best Family or Patient Contact (if needed):    Additional Information about above appointments (if needed):    1: PCP (1-2 weeks)  2: Thoracic with Dr. Bautista (1 week)  3: Neurologist (1-2 weeks)  4: Wound care  (1-2 weeks)    Other comments or requests:
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
## 2024-08-20 NOTE — PROGRESS NOTE ADULT - SUBJECTIVE AND OBJECTIVE BOX
Vanessa Oneil PGY3  pager [phone] or check resident tab for coverage    Patient is a 43y old  Female who presents with a chief complaint of Worsening redness, discharge at upper chest wound and left arm/shoulder discomfort (20 Aug 2024 08:55)      SUBJECTIVE / OVERNIGHT EVENTS:    MEDICATIONS  (STANDING):  chlorhexidine 2% Cloths 1 Application(s) Topical <User Schedule>  DULoxetine 60 milliGRAM(s) Oral at bedtime  oxybutynin 5 milliGRAM(s) Oral two times a day  piperacillin/tazobactam IVPB.. 3.375 Gram(s) IV Intermittent every 8 hours  sertraline 100 milliGRAM(s) Oral at bedtime  sodium chloride 0.9%. 1000 milliLiter(s) (125 mL/Hr) IV Continuous <Continuous>  vancomycin  IVPB 1000 milliGRAM(s) IV Intermittent every 12 hours    MEDICATIONS  (PRN):  acetaminophen     Tablet .. 650 milliGRAM(s) Oral every 6 hours PRN Mild Pain (1 - 3), Moderate Pain (4 - 6)      CAPILLARY BLOOD GLUCOSE        I&O's Summary    19 Aug 2024 07:01  -  20 Aug 2024 07:00  --------------------------------------------------------  IN: 1425 mL / OUT: 450 mL / NET: 975 mL    20 Aug 2024 07:01  -  20 Aug 2024 11:36  --------------------------------------------------------  IN: 250 mL / OUT: 0 mL / NET: 250 mL        Vital Signs Last 24 Hrs  T(C): 36.6 (20 Aug 2024 04:29), Max: 37 (20 Aug 2024 00:10)  T(F): 97.9 (20 Aug 2024 04:29), Max: 98.6 (20 Aug 2024 00:10)  HR: 65 (20 Aug 2024 04:29) (61 - 85)  BP: 108/74 (20 Aug 2024 04:29) (100/70 - 117/57)  BP(mean): --  RR: 18 (20 Aug 2024 04:29) (18 - 19)  SpO2: 97% (20 Aug 2024 04:29) (95% - 97%)    Parameters below as of 20 Aug 2024 04:29  Patient On (Oxygen Delivery Method): room air        PHYSICAL EXAM:  GENERAL: no distress  PSYCH: A&O x3  HEAD: Atraumatic, Normocephalic  NECK: Supple, No JVD  CHEST/LUNG: clear to auscultation bilaterally  HEART: regular rate and rhythm, no murmurs  ABDOMEN: nontender to palpation, no rebound tenderness/guarding  EXTREMITIES: no edema on bilateral LE  NEUROLOGY: no focal neurologic deficit  SKIN: No rashes or lesions    LABS:                        12.0   4.89  )-----------( 289      ( 20 Aug 2024 07:35 )             37.6      08-20    142  |  106  |  9   ----------------------------<  94  4.1   |  23  |  0.60    Ca    9.0      20 Aug 2024 07:37  Mg     2.1     08-19    TPro  6.1  /  Alb  3.8  /  TBili  0.2  /  DBili  x   /  AST  16  /  ALT  18  /  AlkPhos  117  08-20    PT/INR - ( 19 Aug 2024 00:11 )   PT: 10.9 sec;   INR: 1.04 ratio         PTT - ( 19 Aug 2024 00:11 )  PTT:32.0 sec      Urinalysis Basic - ( 20 Aug 2024 07:37 )    Color: x / Appearance: x / SG: x / pH: x  Gluc: 94 mg/dL / Ketone: x  / Bili: x / Urobili: x   Blood: x / Protein: x / Nitrite: x   Leuk Esterase: x / RBC: x / WBC x   Sq Epi: x / Non Sq Epi: x / Bacteria: x        RADIOLOGY & ADDITIONAL TESTS:    Imaging Personally Reviewed:    Consultant(s) Notes Reviewed:      Care Discussed with Consultants/Other Providers:   Vanessa Oneil PGY3  pager [phone] or check resident tab for coverage    Patient is a 43y old  Female who presents with a chief complaint of Worsening redness, discharge at upper chest wound and left arm/shoulder discomfort (20 Aug 2024 08:55)      SUBJECTIVE / OVERNIGHT EVENTS: NAONE. Patient feels well. Endorses discharge at the site but states it is overall improving. Denies n/v/d/c. Denies fevers/chills. endorses shoulder pain. Endorses frequent falls; last 3 weeks ago in the setting of her MS that causes loss of balance and instability.    MEDICATIONS  (STANDING):  chlorhexidine 2% Cloths 1 Application(s) Topical <User Schedule>  DULoxetine 60 milliGRAM(s) Oral at bedtime  oxybutynin 5 milliGRAM(s) Oral two times a day  piperacillin/tazobactam IVPB.. 3.375 Gram(s) IV Intermittent every 8 hours  sertraline 100 milliGRAM(s) Oral at bedtime  sodium chloride 0.9%. 1000 milliLiter(s) (125 mL/Hr) IV Continuous <Continuous>  vancomycin  IVPB 1000 milliGRAM(s) IV Intermittent every 12 hours    MEDICATIONS  (PRN):  acetaminophen     Tablet .. 650 milliGRAM(s) Oral every 6 hours PRN Mild Pain (1 - 3), Moderate Pain (4 - 6)      CAPILLARY BLOOD GLUCOSE        I&O's Summary    19 Aug 2024 07:01  -  20 Aug 2024 07:00  --------------------------------------------------------  IN: 1425 mL / OUT: 450 mL / NET: 975 mL    20 Aug 2024 07:01  -  20 Aug 2024 11:36  --------------------------------------------------------  IN: 250 mL / OUT: 0 mL / NET: 250 mL        Vital Signs Last 24 Hrs  T(C): 36.6 (20 Aug 2024 04:29), Max: 37 (20 Aug 2024 00:10)  T(F): 97.9 (20 Aug 2024 04:29), Max: 98.6 (20 Aug 2024 00:10)  HR: 65 (20 Aug 2024 04:29) (61 - 85)  BP: 108/74 (20 Aug 2024 04:29) (100/70 - 117/57)  BP(mean): --  RR: 18 (20 Aug 2024 04:29) (18 - 19)  SpO2: 97% (20 Aug 2024 04:29) (95% - 97%)    Parameters below as of 20 Aug 2024 04:29  Patient On (Oxygen Delivery Method): room air        PHYSICAL EXAM:  GENERAL: no distress  PSYCH: A&O x3  HEAD: Atraumatic, Normocephalic  NECK: Supple, No JVD  CHEST/LUNG: clear to auscultation bilaterally  HEART: regular rate and rhythm, no murmurs  ABDOMEN: nontender to palpation, no rebound tenderness/guarding  EXTREMITIES: no edema on bilateral LE  NEUROLOGY: no focal neurologic deficit  SKIN: No rashes or lesions    LABS:                        12.0   4.89  )-----------( 289      ( 20 Aug 2024 07:35 )             37.6      08-20    142  |  106  |  9   ----------------------------<  94  4.1   |  23  |  0.60    Ca    9.0      20 Aug 2024 07:37  Mg     2.1     08-19    TPro  6.1  /  Alb  3.8  /  TBili  0.2  /  DBili  x   /  AST  16  /  ALT  18  /  AlkPhos  117  08-20    PT/INR - ( 19 Aug 2024 00:11 )   PT: 10.9 sec;   INR: 1.04 ratio         PTT - ( 19 Aug 2024 00:11 )  PTT:32.0 sec      Urinalysis Basic - ( 20 Aug 2024 07:37 )    Color: x / Appearance: x / SG: x / pH: x  Gluc: 94 mg/dL / Ketone: x  / Bili: x / Urobili: x   Blood: x / Protein: x / Nitrite: x   Leuk Esterase: x / RBC: x / WBC x   Sq Epi: x / Non Sq Epi: x / Bacteria: x        RADIOLOGY & ADDITIONAL TESTS:    Imaging Personally Reviewed:    Consultant(s) Notes Reviewed:      Care Discussed with Consultants/Other Providers:

## 2024-08-20 NOTE — CONSULT NOTE ADULT - ASSESSMENT
Impression:  43-year-old woman PMHx of multiple sclerosis on Rituxan (last dose ~July 2024) as well as recently started on ocralizumab by another neruolotist, with a history of left septic sternoclavicular join infection s/p debridement of left sternoclavicular joint with left clavicular head excision on 12/7, followed by 6 weeks of IV cefepime outpatient; presented to [facility] with left shoulder pain.      Diagnosis:  multiple sclerosis     Recommendations:  medical management of possible infection  no evidence of flare  continue excellent medical management  consider physical therpay consult to avoid deconditioning

## 2024-08-27 ENCOUNTER — LABORATORY RESULT (OUTPATIENT)
Age: 44
End: 2024-08-27

## 2024-08-27 ENCOUNTER — APPOINTMENT (OUTPATIENT)
Dept: INFECTIOUS DISEASE | Facility: CLINIC | Age: 44
End: 2024-08-27
Payer: COMMERCIAL

## 2024-08-27 VITALS
SYSTOLIC BLOOD PRESSURE: 98 MMHG | BODY MASS INDEX: 20.89 KG/M2 | OXYGEN SATURATION: 99 % | DIASTOLIC BLOOD PRESSURE: 68 MMHG | WEIGHT: 130 LBS | HEIGHT: 66 IN | TEMPERATURE: 97.8 F | HEART RATE: 84 BPM

## 2024-08-27 DIAGNOSIS — N30.00 ACUTE CYSTITIS W/OUT HEMATURIA: ICD-10-CM

## 2024-08-27 DIAGNOSIS — M00.9 PYOGENIC ARTHRITIS, UNSPECIFIED: ICD-10-CM

## 2024-08-27 DIAGNOSIS — R30.0 DYSURIA: ICD-10-CM
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
## 2024-08-27 PROCEDURE — 99215 OFFICE O/P EST HI 40 MIN: CPT

## 2024-08-28 ENCOUNTER — APPOINTMENT (OUTPATIENT)
Dept: WOUND CARE | Facility: HOSPITAL | Age: 44
End: 2024-08-28

## 2024-08-28 PROBLEM — R30.0 DYSURIA: Status: ACTIVE | Noted: 2024-08-28

## 2024-08-28 LAB
APPEARANCE: CLEAR
BASOPHILS # BLD AUTO: 0.05 K/UL
BASOPHILS NFR BLD AUTO: 0.5 %
BILIRUBIN URINE: NEGATIVE
BLOOD URINE: ABNORMAL
COLOR: YELLOW
CRP SERPL-MCNC: 44 MG/L
EOSINOPHIL # BLD AUTO: 0.04 K/UL
EOSINOPHIL NFR BLD AUTO: 0.4 %
ERYTHROCYTE [SEDIMENTATION RATE] IN BLOOD BY WESTERGREN METHOD: 31 MM/HR
GLUCOSE QUALITATIVE U: NEGATIVE MG/DL
HCT VFR BLD CALC: 39.7 %
HGB BLD-MCNC: 12.4 G/DL
IMM GRANULOCYTES NFR BLD AUTO: 0.2 %
KETONES URINE: NEGATIVE MG/DL
LEUKOCYTE ESTERASE URINE: ABNORMAL
LYMPHOCYTES # BLD AUTO: 1.51 K/UL
LYMPHOCYTES NFR BLD AUTO: 14.2 %
MAN DIFF?: NORMAL
MCHC RBC-ENTMCNC: 27.4 PG
MCHC RBC-ENTMCNC: 31.2 GM/DL
MCV RBC AUTO: 87.8 FL
MONOCYTES # BLD AUTO: 0.74 K/UL
MONOCYTES NFR BLD AUTO: 7 %
NEUTROPHILS # BLD AUTO: 8.24 K/UL
NEUTROPHILS NFR BLD AUTO: 77.7 %
NITRITE URINE: NEGATIVE
PH URINE: 7.5
PLATELET # BLD AUTO: 345 K/UL
PROTEIN URINE: NEGATIVE MG/DL
RBC # BLD: 4.52 M/UL
RBC # FLD: 13.6 %
SPECIFIC GRAVITY URINE: 1.01
UROBILINOGEN URINE: 0.2 MG/DL
WBC # FLD AUTO: 10.6 K/UL

## 2024-08-28 NOTE — PHYSICAL EXAM
[General Appearance - Alert] : alert [General Appearance - In No Acute Distress] : in no acute distress [] : no rash [FreeTextEntry1] : almost closed wound lt clavicular area. with some granulation tissue on th eleft side, rest healed and closed with keloid. Minimal erythema surrounding the wound.  [Oriented To Time, Place, And Person] : oriented to person, place, and time

## 2024-08-28 NOTE — ASSESSMENT
[FreeTextEntry1] : 43-year-old female with a past medical history of multiple sclerosis on rituximab with last dose in July 2024, left septic sternoclavicular joint status post debridement with left clavicular head excision in December 2023 had completed 6 weeks of cefepime on outpatient basis who is being admitted to the hospital due to wound site discharge and pain.  Pt underwent CT and MRI for evaluation of sternoclavicular septic arthritis status post therapy On exam Left sided clavicular wound appears without evidence for infection Given clinical stability, patient without fever, leukocytosis, ESR/CRP minimally elevated, all went against presence of infection  reviewed MRI with radiology attending, findings could be either past treated infection or active infection based on clinical presentation.  Based on her clinical presentation, findings more concerning for past treated infection.  discussed with pt, friends at bedside, if any worsening of the wound, worsening persistent pain or change in clinical status can re-evaluate need for repeat imaging and abx.  pt s/p thoracic sx evaluation and was thought to have low concern for infection. pt was discharged. She is here because of worsening pain again. pt with pain in shoulder for last few weeks, feels tired, achy, no energy. feels something is wrong. She also complains of dysuria.  Will recheck ESR/CRP U/A and urine cx  CBC discussed with pt and HCP, if there is increasing pain and she feels that some thing is wrong the only way to determine if underlying infection would be to do a bone bx. and send for cx and pathology.  will reach out to thoracic regarding the possibility. All questions answered.

## 2024-08-28 NOTE — HISTORY OF PRESENT ILLNESS
[FreeTextEntry1] : 43-year-old female with a past medical history of multiple sclerosis on rituximab with last dose in July 2024, left septic sternoclavicular joint status post debridement with left clavicular head excision in December 2023 had completed 6 weeks of cefepime on outpatient basis who is being admitted to the hospital due to wound site discharge and pain.  Pt underwent CT and MRI for evaluation of sternoclavicular septic arthritis status post therapy On exam Left sided clavicular wound appears without evidence for infection Given clinical stability, patient without fever, leukocytosis, ESR/CRP minimally elevated, all went against presence of infection  reviewed MRI with radiology attending, findings could be either past treated infection or active infection based on clinical presentation.  Based on her clinical presentation, findings more concerning for past treated infection.  discussed with pt, friends at bedside, if any worsening of the wound, worsening persistent pain or change in clinical status can re-evaluate need for repeat imaging and abx.  pt s/p thoracic sx evaluation and was thought to have low concern for infection. pt was discharged. She is here because of worsening pain again. pt with pain in shoulder for last few weeks, feels tired, achy, no energy. feels something is wrong. She also complains of dysuria.

## 2024-08-28 NOTE — REVIEW OF SYSTEMS
[Fever] : no fever [Chills] : no chills [Feeling Tired] : feeling tired [Abdominal Pain] : no abdominal pain [Dysuria] : dysuria [As Noted in HPI] : as noted in HPI [Skin Wound] : skin wound

## 2024-08-30 PROBLEM — N30.00 ACUTE CYSTITIS WITHOUT HEMATURIA: Status: ACTIVE | Noted: 2024-08-30

## 2024-08-30 RX ORDER — CEFPODOXIME PROXETIL 200 MG/1
200 TABLET, FILM COATED ORAL
Qty: 10 | Refills: 0 | Status: ACTIVE | COMMUNITY
Start: 2024-08-30 | End: 1900-01-01

## 2024-09-02 RX ADMIN — KETOROLAC TROMETHAMINE 10 MILLIGRAM(S): 10 TABLET, FILM COATED ORAL at 11:08

## 2024-09-03 ENCOUNTER — INPATIENT (INPATIENT)
Facility: HOSPITAL | Age: 44
LOS: 0 days | Discharge: LEFT BEFORE DISCHARGE | DRG: 863 | End: 2024-09-04
Attending: INTERNAL MEDICINE | Admitting: HOSPITALIST
Payer: SELF-PAY

## 2024-09-03 ENCOUNTER — APPOINTMENT (OUTPATIENT)
Dept: INTERNAL MEDICINE | Facility: CLINIC | Age: 44
End: 2024-09-03

## 2024-09-03 VITALS
TEMPERATURE: 98 F | HEIGHT: 66 IN | OXYGEN SATURATION: 98 % | RESPIRATION RATE: 17 BRPM | DIASTOLIC BLOOD PRESSURE: 69 MMHG | HEART RATE: 79 BPM | SYSTOLIC BLOOD PRESSURE: 107 MMHG | WEIGHT: 130.07 LBS

## 2024-09-03 DIAGNOSIS — M00.9 PYOGENIC ARTHRITIS, UNSPECIFIED: Chronic | ICD-10-CM

## 2024-09-03 DIAGNOSIS — S72.91XA UNSPECIFIED FRACTURE OF RIGHT FEMUR, INITIAL ENCOUNTER FOR CLOSED FRACTURE: Chronic | ICD-10-CM

## 2024-09-03 DIAGNOSIS — S82.201A UNSPECIFIED FRACTURE OF SHAFT OF RIGHT TIBIA, INITIAL ENCOUNTER FOR CLOSED FRACTURE: Chronic | ICD-10-CM

## 2024-09-03 DIAGNOSIS — Z98.891 HISTORY OF UTERINE SCAR FROM PREVIOUS SURGERY: Chronic | ICD-10-CM

## 2024-09-03 LAB
ALBUMIN SERPL ELPH-MCNC: 4.4 G/DL — SIGNIFICANT CHANGE UP (ref 3.3–5)
ALP SERPL-CCNC: 131 U/L — HIGH (ref 40–120)
ALT FLD-CCNC: 15 U/L — SIGNIFICANT CHANGE UP (ref 10–45)
ANION GAP SERPL CALC-SCNC: 16 MMOL/L — SIGNIFICANT CHANGE UP (ref 5–17)
APTT BLD: 32.1 SEC — SIGNIFICANT CHANGE UP (ref 24.5–35.6)
AST SERPL-CCNC: 15 U/L — SIGNIFICANT CHANGE UP (ref 10–40)
BASOPHILS # BLD AUTO: 0.05 K/UL — SIGNIFICANT CHANGE UP (ref 0–0.2)
BASOPHILS NFR BLD AUTO: 0.6 % — SIGNIFICANT CHANGE UP (ref 0–2)
BILIRUB SERPL-MCNC: 0.2 MG/DL — SIGNIFICANT CHANGE UP (ref 0.2–1.2)
BUN SERPL-MCNC: 8 MG/DL — SIGNIFICANT CHANGE UP (ref 7–23)
CALCIUM SERPL-MCNC: 9.8 MG/DL — SIGNIFICANT CHANGE UP (ref 8.4–10.5)
CHLORIDE SERPL-SCNC: 101 MMOL/L — SIGNIFICANT CHANGE UP (ref 96–108)
CO2 SERPL-SCNC: 21 MMOL/L — LOW (ref 22–31)
CREAT SERPL-MCNC: 0.53 MG/DL — SIGNIFICANT CHANGE UP (ref 0.5–1.3)
CRP SERPL-MCNC: 21 MG/L — HIGH (ref 0–4)
EGFR: 117 ML/MIN/1.73M2 — SIGNIFICANT CHANGE UP
EOSINOPHIL # BLD AUTO: 0.09 K/UL — SIGNIFICANT CHANGE UP (ref 0–0.5)
EOSINOPHIL NFR BLD AUTO: 1 % — SIGNIFICANT CHANGE UP (ref 0–6)
GAS PNL BLDV: SIGNIFICANT CHANGE UP
GLUCOSE SERPL-MCNC: 85 MG/DL — SIGNIFICANT CHANGE UP (ref 70–99)
HCG SERPL-ACNC: <2 MIU/ML — SIGNIFICANT CHANGE UP
HCT VFR BLD CALC: 38.9 % — SIGNIFICANT CHANGE UP (ref 34.5–45)
HGB BLD-MCNC: 12.5 G/DL — SIGNIFICANT CHANGE UP (ref 11.5–15.5)
IMM GRANULOCYTES NFR BLD AUTO: 0.4 % — SIGNIFICANT CHANGE UP (ref 0–0.9)
INR BLD: 0.93 RATIO — SIGNIFICANT CHANGE UP (ref 0.85–1.18)
LYMPHOCYTES # BLD AUTO: 2.28 K/UL — SIGNIFICANT CHANGE UP (ref 1–3.3)
LYMPHOCYTES # BLD AUTO: 25.2 % — SIGNIFICANT CHANGE UP (ref 13–44)
MCHC RBC-ENTMCNC: 27.5 PG — SIGNIFICANT CHANGE UP (ref 27–34)
MCHC RBC-ENTMCNC: 32.1 GM/DL — SIGNIFICANT CHANGE UP (ref 32–36)
MCV RBC AUTO: 85.7 FL — SIGNIFICANT CHANGE UP (ref 80–100)
MONOCYTES # BLD AUTO: 1.05 K/UL — HIGH (ref 0–0.9)
MONOCYTES NFR BLD AUTO: 11.6 % — SIGNIFICANT CHANGE UP (ref 2–14)
NEUTROPHILS # BLD AUTO: 5.53 K/UL — SIGNIFICANT CHANGE UP (ref 1.8–7.4)
NEUTROPHILS NFR BLD AUTO: 61.2 % — SIGNIFICANT CHANGE UP (ref 43–77)
NRBC # BLD: 0 /100 WBCS — SIGNIFICANT CHANGE UP (ref 0–0)
PLATELET # BLD AUTO: 342 K/UL — SIGNIFICANT CHANGE UP (ref 150–400)
POTASSIUM SERPL-MCNC: 4.2 MMOL/L — SIGNIFICANT CHANGE UP (ref 3.5–5.3)
POTASSIUM SERPL-SCNC: 4.2 MMOL/L — SIGNIFICANT CHANGE UP (ref 3.5–5.3)
PROT SERPL-MCNC: 7.1 G/DL — SIGNIFICANT CHANGE UP (ref 6–8.3)
PROTHROM AB SERPL-ACNC: 10.3 SEC — SIGNIFICANT CHANGE UP (ref 9.5–13)
RBC # BLD: 4.54 M/UL — SIGNIFICANT CHANGE UP (ref 3.8–5.2)
RBC # FLD: 13.3 % — SIGNIFICANT CHANGE UP (ref 10.3–14.5)
SODIUM SERPL-SCNC: 138 MMOL/L — SIGNIFICANT CHANGE UP (ref 135–145)
TROPONIN T, HIGH SENSITIVITY RESULT: <6 NG/L — SIGNIFICANT CHANGE UP (ref 0–51)
WBC # BLD: 9.04 K/UL — SIGNIFICANT CHANGE UP (ref 3.8–10.5)
WBC # FLD AUTO: 9.04 K/UL — SIGNIFICANT CHANGE UP (ref 3.8–10.5)

## 2024-09-03 PROCEDURE — 99285 EMERGENCY DEPT VISIT HI MDM: CPT

## 2024-09-03 PROCEDURE — 71046 X-RAY EXAM CHEST 2 VIEWS: CPT | Mod: 26

## 2024-09-03 RX ORDER — ACETAMINOPHEN 325 MG/1
1000 TABLET ORAL ONCE
Refills: 0 | Status: COMPLETED | OUTPATIENT
Start: 2024-09-03 | End: 2024-09-03

## 2024-09-03 RX ADMIN — ACETAMINOPHEN 400 MILLIGRAM(S): 325 TABLET ORAL at 21:43

## 2024-09-03 NOTE — ED PROVIDER NOTE - NSICDXPASTSURGICALHX_GEN_ALL_CORE_FT
PAST SURGICAL HISTORY:  Fracture of both femurs     Fracture of right tibia     History of      Septic arthritis of left sternoclavicular joint

## 2024-09-03 NOTE — ED PROVIDER NOTE - PATIENT PORTAL LINK FT
You can access the FollowMyHealth Patient Portal offered by [facility] by registering at the following website: [email or web address]. By joining [employer]’s FollowMyHealth portal, you will also be able to view your health information using other applications (apps) compatible with our system.

## 2024-09-03 NOTE — ED PROVIDER NOTE - PROGRESS NOTE DETAILS
Fish Lee MD PGY3: surgery consulted, will see pt. Fish eLe MD PGY3: labs clotted, to redraw Fish Lee MD PGY3: sgy at bedside. Fish Lee MD PGY3: Following Tylenol patient says pain is about the same but tolerable.  Discussed with  on the phone, patient and daughter analgesia, discussed need for admission versus discharge with analgesia regimen.  Patient comfortable with discharge, is to follow-up with thoracic surgery team on Monday at scheduled appointment. Discussed with patient all results including any incidentals. Discussed discharge, follow up, return precautions, medications. Patient verbalizes understanding and is amenable at this time. Answered patient questions. Attending MD Alex: Extensive discussion with patient and daughter discussing labs, imaging and CT surgery recs.  Requested time to discuss with family and friends. Attending MD Alex: Informed by DANE Calderon that at time of discharge, patient now requesting to stay.  Ordered morphine for improved pain control.  Will admit.
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
## 2024-09-03 NOTE — ED ADULT NURSE NOTE - OBJECTIVE STATEMENT
44 y/o F presents to ED via EMS with c/o left shoulder pain. Per EMS pt was recently discharged about two weeks ago for left shoulder pain, fluid build up and was septic at the time. PMH LT sternoclavicular resection, MS, anemia. Pt currently endorsing mid sternal chest discomfort d/t lt shoulder pain that radiates up left neck and down to left leg. Pt denies n/v/d, headache, blurred vision, LOC, blood thinner usage. respirations are spontaneous and unlabored. Pt a&ox3, VS see flow sheet. Pt placed in position of comfort. Bed in lowest position, wheels locked, appropriate side rails raised. Pt denies needs at this time. Teenage daughter at bedside, pt made aware that daughter will have to be picked up and visitor must be 18+

## 2024-09-03 NOTE — ED PROVIDER NOTE - ATTENDING CONTRIBUTION TO CARE
Attending MD Alex: I personally have seen and examined this patient.  Resident note reviewed and agree on plan of care and except where noted.  See below for details.     seen in [facility], accompanied by daughter    43F with PMH/PSH including MS on Rituxan, known R foot drop, L septic sternoclavicular join infection s/p debridement of left sternoclavicular joint with left clavicular head excision (12/7/23, Dr. Bautista) followed by 6 weeks of IV cefepime outpatient with recent admission 8/2024 for pain at L clavicular site MR 8/20/24 showed postoperative granulation tissue vs phlegmon with chronic or active, dc'ed without abx as per ID/CT Sx presents to the ED with pain at L clavicular site, reduced ROM.  Denies fevers, chills.  Reports wound overlying area is improving.  Reports started PT about 2 weeks ago.  Reports pain has been gradually worsening over last two weeks.  Reports pain feels similar to that which she had when she was diagnosed with the her joint infection.  Denies new shortness of breath, abdominal pain, vomiting, diarrhea, urinary complaints.  Reports thinks she needs another MRI.    Exam:   General: NAD  HENT: head NCAT, airway patent  Eyes: anicteric, no conjunctival injection   Lungs: lungs CTAB with good inspiratory effort, no wheezing, no rhonchi, no rales  Cardiac: +S1S2, no obvious m/r/g  GI: abdomen soft with +BS, NT, ND  : no CVAT  MSK: ranging neck and extremities freely, FROM at L shoulder and elbow, overlying 1cm healing wound, no fluctuance  Neuro: moving all extremities spontaneously, nonfocal  Psych: normal mood and affect  Skin: L clavicle with keloid, small ~1cm area of serous and fibrinous exudate, no purulence, no fluctuance    A/P: 43F with L clavicular pain, will obtain labs, including ESR/CRP, blood cultures, will obtain CXR, will consult CT surgery, ?increased pain will eval for infectious process vs ?increased use, no overt signs of infection, will attempt analgesia

## 2024-09-03 NOTE — ED PROVIDER NOTE - CARE PROVIDER_API CALL
Deidre Bautista  Thoracic Surgery  [id number] [place] [address], [address] [place] [address]  Phone: [phone]  Fax: [phone]  Follow Up Time:

## 2024-09-03 NOTE — ED PROVIDER NOTE - OBJECTIVE STATEMENT
44yo female with a PMHx of multiple sclerosis for 25+ years on Ocrevus presenting to the ED for increased pain in the left sternoclavicular area. Pt left sternoclavicular joint was found to be septic in December 2023 and underwent multiple bone resections at the time by cardiothoracic surgeon. She had no symptoms following surgery for a few months. Pt was in the ED on August 19th with presentation of pain in the left SC area; workup showed increase in inflammatory markers and was diagnosed with UTI and discharged with abx. Previous MRI was not remarkable and did not show changes that would explain her increased pain. Pt presents today with progressive pain of the left SC area. denies fever, n/v, burning with urination. Pain is radiating to left arm, up the neck, and left shoulder.

## 2024-09-03 NOTE — ED PROVIDER NOTE - NSFOLLOWUPINSTRUCTIONS_ED_ALL_ED_FT
You were seen in the ED for pain to your left clavicle    Your evaluation including blood test, x-ray showed no findings requiring further evaluation or treatment in the hospital at this time.    Please follow up with your thoracic surgeon as discussed within 1 week. Discuss your symptoms, medications, and results in this packet.  You have an appointment on Monday please keep this appointment.    You may take Tylenol up to 1000mg every 6 hours as needed for pain.  You may take Ibuprofen up to 400mg every 6 hours as needed for pain.  You were given a dose of Tylenol prior to leaving here in the emergency department    Seek immediate medical attention if you experience new or worsening symptoms, including fevers with worsening symptoms, worsening pus drainage to the area, worsening redness and swelling to the area, or if your pain becomes intolerable.

## 2024-09-03 NOTE — ED ADULT NURSE NOTE - NSFALLRISKINTERV_ED_ALL_ED

## 2024-09-03 NOTE — ED PROVIDER NOTE - PHYSICAL EXAMINATION
Gen: WDWN, appears stated age, appears in pain and in a bit of distress   Cardio: +s1/s2, RRR, no m/r/g  Lungs: CTA b/l, no w/r/r   MSK: pain expressed on palpation of left shoulder, left cervical area; patient unable to lift left arm  SKIN: erythema and granulation tissue to L sternoclavicular area with mild expression of yellowish discharge, no warmth, no crepitus surrounding.

## 2024-09-03 NOTE — ED ADULT TRIAGE NOTE - CHIEF COMPLAINT QUOTE
shoulder pain x 2 weeks, was recently admitted for sepsis  "last time was here she had fluid build up and bone resections" per ems

## 2024-09-03 NOTE — ED PROVIDER NOTE - NS ED ROS FT
Gen: denies headache, fever, chills   Cardio: admits to sternal pain but denies palpitations  Pulm: denies SOB and cough   MSK: admits to left SC pain, left shoulder pain, left neck pain, and left sided back and abd pain   : denies burning with urination and blood in urine

## 2024-09-03 NOTE — ED PROVIDER NOTE - CLINICAL SUMMARY MEDICAL DECISION MAKING FREE TEXT BOX
44yo female with a PMHx of MS on Ocrevus presenting with increased pain of the left SC area s/p bone resections due to septic arthritis of the left SC joint  in December. With skin changes possibly postsurgical vs lower likelihood infectious given workup aug 20th. DDx includes septic arthritis, bone tumor, pneumothorax, CAD lower likely given clinical findings, pneumonia/ptx. Will obtain labs, UA, blood cultures, inflammatory makers chest x-ray and consult thoracic surgery. Less likely to be CAD but will obtain EKG and troponin levels.

## 2024-09-03 NOTE — CONSULT NOTE ADULT - ASSESSMENT
Pt moved by IR staff from stretcher onto CT table into slight right decubitis position, feet first into the scanner. Pt remains on cardiac monitoring and was increased to 4L via N/C for conscious sedation procedure. Pt is quite uncomfortable on CT table and is voicing his discomfort.   43F with PMH of MS on Rituxan, with a history of left septic sternoclavicular join infection s/p debridement of left sternoclavicular joint with left clavicular head excision on 12/2023, followed by 6 weeks of IV cefepime outpatient; presenting with worsening of left clavicular excision wound site erythema with discharge with left shoulder pain and reduced ROM and weakness. Started 2 weeks ago. Otherwise denies fever/chills, SOB, n/v. In the ED, VSS,, afebrile, nl WBC. Thoracic Surgery consulted to assess L sternoclavicular joint.    PLAN:  - Pt was seen and examined  - Pt was discussed with Dr. Bautista  - No active infection present  - Reconstruction with plastics needed but pt has refused intervention  - Pt can follow up as an outpatient with Dr. Lily Hayes MD, PGY-3  Cardiothoracic Surgery  [id number]

## 2024-09-04 ENCOUNTER — TRANSCRIPTION ENCOUNTER (OUTPATIENT)
Age: 44
End: 2024-09-04

## 2024-09-04 VITALS
SYSTOLIC BLOOD PRESSURE: 110 MMHG | HEART RATE: 80 BPM | TEMPERATURE: 98 F | DIASTOLIC BLOOD PRESSURE: 70 MMHG | OXYGEN SATURATION: 100 % | RESPIRATION RATE: 18 BRPM

## 2024-09-04 DIAGNOSIS — G89.18 OTHER ACUTE POSTPROCEDURAL PAIN: ICD-10-CM

## 2024-09-04 DIAGNOSIS — M89.8X1 OTHER SPECIFIED DISORDERS OF BONE, SHOULDER: ICD-10-CM

## 2024-09-04 DIAGNOSIS — M25.512 PAIN IN LEFT SHOULDER: ICD-10-CM

## 2024-09-04 DIAGNOSIS — G35 MULTIPLE SCLEROSIS: ICD-10-CM

## 2024-09-04 DIAGNOSIS — F41.9 ANXIETY DISORDER, UNSPECIFIED: ICD-10-CM

## 2024-09-04 LAB
A1C WITH ESTIMATED AVERAGE GLUCOSE RESULT: 5.2 % — SIGNIFICANT CHANGE UP (ref 4–5.6)
ALBUMIN SERPL ELPH-MCNC: 3.7 G/DL — SIGNIFICANT CHANGE UP (ref 3.3–5)
ALP SERPL-CCNC: 114 U/L — SIGNIFICANT CHANGE UP (ref 40–120)
ALT FLD-CCNC: 12 U/L — SIGNIFICANT CHANGE UP (ref 10–45)
ANION GAP SERPL CALC-SCNC: 12 MMOL/L — SIGNIFICANT CHANGE UP (ref 5–17)
APTT BLD: 31.1 SEC — SIGNIFICANT CHANGE UP (ref 24.5–35.6)
AST SERPL-CCNC: 12 U/L — SIGNIFICANT CHANGE UP (ref 10–40)
BASOPHILS # BLD AUTO: 0.04 K/UL — SIGNIFICANT CHANGE UP (ref 0–0.2)
BASOPHILS NFR BLD AUTO: 0.6 % — SIGNIFICANT CHANGE UP (ref 0–2)
BILIRUB SERPL-MCNC: 0.2 MG/DL — SIGNIFICANT CHANGE UP (ref 0.2–1.2)
BUN SERPL-MCNC: 14 MG/DL — SIGNIFICANT CHANGE UP (ref 7–23)
CALCIUM SERPL-MCNC: 9.5 MG/DL — SIGNIFICANT CHANGE UP (ref 8.4–10.5)
CHLORIDE SERPL-SCNC: 104 MMOL/L — SIGNIFICANT CHANGE UP (ref 96–108)
CHOLEST SERPL-MCNC: 176 MG/DL — SIGNIFICANT CHANGE UP
CO2 SERPL-SCNC: 23 MMOL/L — SIGNIFICANT CHANGE UP (ref 22–31)
CREAT SERPL-MCNC: 0.53 MG/DL — SIGNIFICANT CHANGE UP (ref 0.5–1.3)
EGFR: 117 ML/MIN/1.73M2 — SIGNIFICANT CHANGE UP
EOSINOPHIL # BLD AUTO: 0.12 K/UL — SIGNIFICANT CHANGE UP (ref 0–0.5)
EOSINOPHIL NFR BLD AUTO: 1.7 % — SIGNIFICANT CHANGE UP (ref 0–6)
ERYTHROCYTE [SEDIMENTATION RATE] IN BLOOD: 17 MM/HR — HIGH (ref 0–15)
ESTIMATED AVERAGE GLUCOSE: 103 MG/DL — SIGNIFICANT CHANGE UP (ref 68–114)
GLUCOSE SERPL-MCNC: 100 MG/DL — HIGH (ref 70–99)
HCT VFR BLD CALC: 37.6 % — SIGNIFICANT CHANGE UP (ref 34.5–45)
HDLC SERPL-MCNC: 55 MG/DL — SIGNIFICANT CHANGE UP
HGB BLD-MCNC: 11.9 G/DL — SIGNIFICANT CHANGE UP (ref 11.5–15.5)
IMM GRANULOCYTES NFR BLD AUTO: 0.4 % — SIGNIFICANT CHANGE UP (ref 0–0.9)
INR BLD: 0.98 RATIO — SIGNIFICANT CHANGE UP (ref 0.85–1.18)
LIPID PNL WITH DIRECT LDL SERPL: 103 MG/DL — HIGH
LYMPHOCYTES # BLD AUTO: 1.78 K/UL — SIGNIFICANT CHANGE UP (ref 1–3.3)
LYMPHOCYTES # BLD AUTO: 25.3 % — SIGNIFICANT CHANGE UP (ref 13–44)
MCHC RBC-ENTMCNC: 27.7 PG — SIGNIFICANT CHANGE UP (ref 27–34)
MCHC RBC-ENTMCNC: 31.6 GM/DL — LOW (ref 32–36)
MCV RBC AUTO: 87.4 FL — SIGNIFICANT CHANGE UP (ref 80–100)
MONOCYTES # BLD AUTO: 1.08 K/UL — HIGH (ref 0–0.9)
MONOCYTES NFR BLD AUTO: 15.3 % — HIGH (ref 2–14)
NEUTROPHILS # BLD AUTO: 3.99 K/UL — SIGNIFICANT CHANGE UP (ref 1.8–7.4)
NEUTROPHILS NFR BLD AUTO: 56.7 % — SIGNIFICANT CHANGE UP (ref 43–77)
NON HDL CHOLESTEROL: 121 MG/DL — SIGNIFICANT CHANGE UP
NRBC # BLD: 0 /100 WBCS — SIGNIFICANT CHANGE UP (ref 0–0)
PLATELET # BLD AUTO: 334 K/UL — SIGNIFICANT CHANGE UP (ref 150–400)
POTASSIUM SERPL-MCNC: 4.1 MMOL/L — SIGNIFICANT CHANGE UP (ref 3.5–5.3)
POTASSIUM SERPL-SCNC: 4.1 MMOL/L — SIGNIFICANT CHANGE UP (ref 3.5–5.3)
PROT SERPL-MCNC: 6.3 G/DL — SIGNIFICANT CHANGE UP (ref 6–8.3)
PROTHROM AB SERPL-ACNC: 10.8 SEC — SIGNIFICANT CHANGE UP (ref 9.5–13)
RBC # BLD: 4.3 M/UL — SIGNIFICANT CHANGE UP (ref 3.8–5.2)
RBC # FLD: 13.5 % — SIGNIFICANT CHANGE UP (ref 10.3–14.5)
SODIUM SERPL-SCNC: 139 MMOL/L — SIGNIFICANT CHANGE UP (ref 135–145)
TRIGL SERPL-MCNC: 101 MG/DL — SIGNIFICANT CHANGE UP
WBC # BLD: 7.04 K/UL — SIGNIFICANT CHANGE UP (ref 3.8–10.5)
WBC # FLD AUTO: 7.04 K/UL — SIGNIFICANT CHANGE UP (ref 3.8–10.5)

## 2024-09-04 PROCEDURE — 99223 1ST HOSP IP/OBS HIGH 75: CPT

## 2024-09-04 PROCEDURE — 85610 PROTHROMBIN TIME: CPT

## 2024-09-04 PROCEDURE — 82947 ASSAY GLUCOSE BLOOD QUANT: CPT

## 2024-09-04 PROCEDURE — 99285 EMERGENCY DEPT VISIT HI MDM: CPT | Mod: 25

## 2024-09-04 PROCEDURE — 85014 HEMATOCRIT: CPT

## 2024-09-04 PROCEDURE — 73220 MRI UPPR EXTREMITY W/O&W/DYE: CPT | Mod: MC

## 2024-09-04 PROCEDURE — 84295 ASSAY OF SERUM SODIUM: CPT

## 2024-09-04 PROCEDURE — A9585: CPT

## 2024-09-04 PROCEDURE — 84484 ASSAY OF TROPONIN QUANT: CPT

## 2024-09-04 PROCEDURE — 82803 BLOOD GASES ANY COMBINATION: CPT

## 2024-09-04 PROCEDURE — 99222 1ST HOSP IP/OBS MODERATE 55: CPT

## 2024-09-04 PROCEDURE — 82435 ASSAY OF BLOOD CHLORIDE: CPT

## 2024-09-04 PROCEDURE — 85025 COMPLETE CBC W/AUTO DIFF WBC: CPT

## 2024-09-04 PROCEDURE — 85652 RBC SED RATE AUTOMATED: CPT

## 2024-09-04 PROCEDURE — 93005 ELECTROCARDIOGRAM TRACING: CPT

## 2024-09-04 PROCEDURE — 99254 IP/OBS CNSLTJ NEW/EST MOD 60: CPT

## 2024-09-04 PROCEDURE — 83605 ASSAY OF LACTIC ACID: CPT

## 2024-09-04 PROCEDURE — 84702 CHORIONIC GONADOTROPIN TEST: CPT

## 2024-09-04 PROCEDURE — 80061 LIPID PANEL: CPT

## 2024-09-04 PROCEDURE — 97161 PT EVAL LOW COMPLEX 20 MIN: CPT

## 2024-09-04 PROCEDURE — 87040 BLOOD CULTURE FOR BACTERIA: CPT

## 2024-09-04 PROCEDURE — 80053 COMPREHEN METABOLIC PANEL: CPT

## 2024-09-04 PROCEDURE — 85730 THROMBOPLASTIN TIME PARTIAL: CPT

## 2024-09-04 PROCEDURE — 96374 THER/PROPH/DIAG INJ IV PUSH: CPT

## 2024-09-04 PROCEDURE — 86140 C-REACTIVE PROTEIN: CPT

## 2024-09-04 PROCEDURE — 97165 OT EVAL LOW COMPLEX 30 MIN: CPT

## 2024-09-04 PROCEDURE — 83036 HEMOGLOBIN GLYCOSYLATED A1C: CPT

## 2024-09-04 PROCEDURE — 84132 ASSAY OF SERUM POTASSIUM: CPT

## 2024-09-04 PROCEDURE — 36415 COLL VENOUS BLD VENIPUNCTURE: CPT

## 2024-09-04 PROCEDURE — 82330 ASSAY OF CALCIUM: CPT

## 2024-09-04 PROCEDURE — 73220 MRI UPPR EXTREMITY W/O&W/DYE: CPT | Mod: 26,LT

## 2024-09-04 PROCEDURE — 71046 X-RAY EXAM CHEST 2 VIEWS: CPT

## 2024-09-04 PROCEDURE — 85018 HEMOGLOBIN: CPT

## 2024-09-04 RX ORDER — ACETAMINOPHEN 325 MG/1
650 TABLET ORAL EVERY 6 HOURS
Refills: 0 | Status: DISCONTINUED | OUTPATIENT
Start: 2024-09-04 | End: 2024-09-04

## 2024-09-04 RX ORDER — DULOXETINE HCL 30 MG
60 CAPSULE,DELAYED RELEASE (ENTERIC COATED) ORAL AT BEDTIME
Refills: 0 | Status: DISCONTINUED | OUTPATIENT
Start: 2024-09-04 | End: 2024-09-04

## 2024-09-04 RX ORDER — SENNA 187 MG
2 TABLET ORAL AT BEDTIME
Refills: 0 | Status: DISCONTINUED | OUTPATIENT
Start: 2024-09-04 | End: 2024-09-04

## 2024-09-04 RX ORDER — ACETAMINOPHEN 325 MG/1
1000 TABLET ORAL ONCE
Refills: 0 | Status: COMPLETED | OUTPATIENT
Start: 2024-09-04 | End: 2024-09-04

## 2024-09-04 RX ORDER — POLYETHYLENE GLYCOL 3350 17 G/17G
17 POWDER, FOR SOLUTION ORAL DAILY
Refills: 0 | Status: DISCONTINUED | OUTPATIENT
Start: 2024-09-04 | End: 2024-09-04

## 2024-09-04 RX ORDER — ONDANSETRON 2 MG/ML
4 INJECTION, SOLUTION INTRAMUSCULAR; INTRAVENOUS EVERY 8 HOURS
Refills: 0 | Status: DISCONTINUED | OUTPATIENT
Start: 2024-09-04 | End: 2024-09-04

## 2024-09-04 RX ORDER — BACLOFEN 0.5 MG/ML
5 INJECTION INTRATHECAL EVERY 8 HOURS
Refills: 0 | Status: DISCONTINUED | OUTPATIENT
Start: 2024-09-04 | End: 2024-09-04

## 2024-09-04 RX ORDER — CLONAZEPAM 1 MG
0.5 TABLET ORAL DAILY
Refills: 0 | Status: DISCONTINUED | OUTPATIENT
Start: 2024-09-04 | End: 2024-09-04

## 2024-09-04 RX ORDER — NALOXONE HCL 1 MG/ML
0.4 VIAL (ML) INJECTION ONCE
Refills: 0 | Status: DISCONTINUED | OUTPATIENT
Start: 2024-09-04 | End: 2024-09-04

## 2024-09-04 RX ORDER — MAGNESIUM, ALUMINUM HYDROXIDE 200-225/5
30 SUSPENSION, ORAL (FINAL DOSE FORM) ORAL EVERY 4 HOURS
Refills: 0 | Status: DISCONTINUED | OUTPATIENT
Start: 2024-09-04 | End: 2024-09-04

## 2024-09-04 RX ORDER — SERTRALINE HYDROCHLORIDE 50 MG/1
100 TABLET, FILM COATED ORAL AT BEDTIME
Refills: 0 | Status: DISCONTINUED | OUTPATIENT
Start: 2024-09-04 | End: 2024-09-04

## 2024-09-04 RX ORDER — ENOXAPARIN SODIUM 100 MG/ML
40 INJECTION SUBCUTANEOUS EVERY 24 HOURS
Refills: 0 | Status: DISCONTINUED | OUTPATIENT
Start: 2024-09-04 | End: 2024-09-04

## 2024-09-04 RX ORDER — FERROUS SULFATE 325(65) MG
1 TABLET ORAL
Refills: 0 | DISCHARGE

## 2024-09-04 RX ADMIN — ACETAMINOPHEN 1000 MILLIGRAM(S): 325 TABLET ORAL at 06:16

## 2024-09-04 RX ADMIN — ENOXAPARIN SODIUM 40 MILLIGRAM(S): 100 INJECTION SUBCUTANEOUS at 06:07

## 2024-09-04 RX ADMIN — ACETAMINOPHEN 400 MILLIGRAM(S): 325 TABLET ORAL at 08:24

## 2024-09-04 RX ADMIN — Medication 2 MILLIGRAM(S): at 04:14

## 2024-09-04 RX ADMIN — ACETAMINOPHEN 650 MILLIGRAM(S): 325 TABLET ORAL at 14:14

## 2024-09-04 RX ADMIN — Medication 2 MILLIGRAM(S): at 02:55

## 2024-09-04 NOTE — DISCHARGE NOTE NURSING/CASE MANAGEMENT/SOCIAL WORK - PATIENT PORTAL LINK FT
You can access the FollowMyHealth Patient Portal offered by [facility] by registering at the following website: [email or web address]. By joining [employer]’s FollowMyHealth portal, you will also be able to view your health information using other applications (apps) compatible with our system.

## 2024-09-04 NOTE — OCCUPATIONAL THERAPY INITIAL EVALUATION ADULT - PERTINENT HX OF CURRENT PROBLEM, REHAB EVAL
44yo f w pmh multiple sclerosis (unsteady gait w freq/recurrent falls, urinary incontinence), anx/dep, septic arthritis of left sternoclavicular joint s/p i+d x2 with wound vac and 6 week course of cefepime (12/2023-1/2024), p/w left shoulder pain; for the last few weeks, patient has been having worsening pain in left shoulder, a/w generalized weakness/fatigue. she describes the pain as similar to when she had an active infection. patient hospitalized for similar symptom 8/19-8/20/2024, at which time, labs and imaging of the joint showed no evidence of active infection; id and thoracic surgery consulted at the time and both with low concern for active infection; the latter did recommend, "patient with prior SCJ resection - with RTO for further debridement and longstanding wound healing. had discussed with her possible pec flap if she was agreeable. at this time - given continued healing she is deferring but knows she can call office when she is ready to meet for discussion regarding flap. discussed she may also have keloid/hypertrophic scarring after that surgery." patient was ultimately discharged with outpatient follow up. she was seen by id on 8/27/2024 and scheduled to see thoracic surgery on 9/9/2024; however, as the pain was becoming unbearable, patient felt she could not wait so presents to [facility] er for further mgmt; in er, c/f septic arthritis; admit to medicine for further mgmt  Chest Xray: Clear lungs.

## 2024-09-04 NOTE — PHYSICAL THERAPY INITIAL EVALUATION ADULT - PERTINENT HX OF CURRENT PROBLEM, REHAB EVAL
42yo f w pmh multiple sclerosis (unsteady gait w freq/recurrent falls, urinary incontinence), anx/dep, septic arthritis of left sternoclavicular joint s/p i+d x2 with wound vac and 6 week course of cefepime (12/2023-1/2024), p/w left shoulder pain; for the last few weeks, patient has been having worsening pain in left shoulder, a/w generalized weakness/fatigue. she describes the pain as similar to when she had an active infection. patient hospitalized for similar symptom 8/19-8/20/2024, at which time, labs and imaging of the joint showed no evidence of active infection; id and thoracic surgery consulted at the time and both with low concern for active infection; the latter did recommend, "patient with prior SCJ resection - with RTO for further debridement and longstanding wound healing. had discussed with her possible pec flap if she was agreeable. at this time - given continued healing she is deferring but knows she can call office when she is ready to meet for discussion regarding flap. discussed she may also have keloid/hypertrophic scarring after that surgery." patient was ultimately discharged with outpatient follow up. she was seen by id on 8/27/2024 and scheduled to see thoracic surgery on 9/9/2024; however, as the pain was becoming unbearable, patient felt she could not wait so presents to [facility] er for further mgmt; in er, c/f septic arthritis; admit to medicine for further mgmt  Chest Xray: Clear lungs.

## 2024-09-04 NOTE — ASSESSMENT
[FreeTextEntry1] : Ms. JAYA AGGARWAL, 43 year old female, never smoker, w/ hx of MS who presented with redness and tenderness over her left sternoclavicular joint. She had a CAT scan that demonstrated an erosion overlying the joint.    Now s/p incision and debridement of Lt sternoclavicular joint on 12/7/23. Path revealed Lt clavicle head periosteal connective tissue and cartilage shows focal acute inflammation and necrosis. Bone shows focal medullary fibrosis, focal reactive woven bone formation and osteoblastic rimming but no definitive osteomyelitis. Bone marrow is hypercellular and shows myeloid predominant trilineage hematopoiesis with increased eosinophils.   Now s/p re-exploration of left sternoclavicular joint wound, manubrial resection, portion of first rib resection, vacuum-assisted closure placement on 12/15/23.   Patient took off wound VAC on 02/19/2024 due to skin irritation from surgical tape.   Pt has been seeing Plastic Sx Dr. Stearns, now on scar care with Aquacel, massage scar and slver nitrate to existing wounds.   Of note, pt admitted to [facility] on 8/19-8/20 for worsening redness, discharge at upper chest wound and left arm/shoulder discomfort. ID did not think there is an active infection and recommended off Abx.   Pt saw ID Dr. Odalis Reynaga on 8/27/24. Based on her clinical presentation, findings more concerning for past treated infection. If there is increasing pain and she feels that something is wrong the only way to determine if underlying infection would be to do a bone bx. and send for cx and pathology.  I have reviewed the patient's medical records and diagnostic images at time of this office consultation and have made the following recommendation: 1.  I, ESTER Duncan, personally performed the evaluation and management (E/M) services for this established patient who presents today with (a) new problem(s)/exacerbation of (an) existing condition(s).  That E/M includes conducting the examination, assessing all new/exacerbated conditions, and establishing a new plan of care.  Today, my ACP, Geetha Lynch, [facility]-BC was here to observe my evaluation and management services for this new problem/exacerbated condition to be followed going forward.

## 2024-09-04 NOTE — DISCHARGE NOTE NURSING/CASE MANAGEMENT/SOCIAL WORK - NSDCPEFALRISK_GEN_ALL_CORE
For information on Fall & Injury Prevention, visit: [email or web address] OR  [email or web address] OR  https://www.cdc.gov/steadi/patient.html

## 2024-09-04 NOTE — OCCUPATIONAL THERAPY INITIAL EVALUATION ADULT - DIAGNOSIS, OT EVAL
Patient demonstrates decreased R shoulder strength and ROM, and decreased balance, limiting ADLs and functional transfers.

## 2024-09-04 NOTE — CONSULT NOTE ADULT - ASSESSMENT
A/P:42yo f w pmh multiple sclerosis (unsteady gait w freq/recurrent falls, urinary incontinence), anx/dep, septic arthritis of left sternoclavicular joint s/p i+d x2 with wound vac and 6 week course of cefepime (12/2023-1/2024), p/w left shoulder pain; for the last few weeks, patient has been having worsening pain in left shoulder, a/w generalized weakness/fatigue. she describes the pain as similar to when she had an active infection. patient hospitalized for similar symptom 8/19-8/20/2024, at which time, labs and imaging of the joint showed no evidence of active infection; id and thoracic surgery consulted at the time and both with low concern for active infection; the latter did recommend, "patient with prior SCJ resection - with RTO for further debridement and longstanding wound healing. had discussed with her possible pec flap if she was agreeable. at this time - given continued healing she is deferring but knows she can call office when she is ready to meet for discussion regarding flap. discussed she may also have keloid/hypertrophic scarring after that surgery.    Wound Consult requested to assist w/ management of  Left clavical keloid    Recommendations:   medihoney, aquacel daily    Consider CT left shoulder  Consider Plastic Surgery Follow up  Moisturize intact skin w/ SWEEN cream BID  Nutrition Consult for optimization in pt w/  Increased nutritional needs            encourage high quality protein, christian/ prosource, MVI & Vit C to promote wound healing  Continue turning and positioning w/ offloading assistive devices as per protocol  Prophylactic treatment: Buttocks/ Sacrum Myles BID and prn soiling        Continue w/ attends under pads and Pericare as per protocol  Waffle Cushion to chair when oob to chair  Continue w/ low air loss pressure redistribution bed surface   Care as per medicine, will follow w/ you  Upon discharge f/u as outpatient at Wound Center [address] [facility] [phone]  Seen w/ attng and D/w team & RN  Thank you for this consult,  GAIL Fried-BC, [facility]  [phone]  Nights/ Weekends/ Holidays please call:  General Surgery Consult pager ([phone]) for emergencies  Wound PT for multilayer leg wrapping or VAC issues ([phone])      A/P:42yo f w pmh multiple sclerosis (unsteady gait w freq/recurrent falls, urinary incontinence), anx/dep, septic arthritis of left sternoclavicular joint s/p i+d x2 with wound vac and 6 week course of cefepime (12/2023-1/2024), p/w left shoulder pain; for the last few weeks, patient has been having worsening pain in left shoulder, a/w generalized weakness/fatigue. she describes the pain as similar to when she had an active infection. patient hospitalized for similar symptom 8/19-8/20/2024, at which time, labs and imaging of the joint showed no evidence of active infection; id and thoracic surgery consulted at the time and both with low concern for active infection; the latter did recommend, "patient with prior SCJ resection - with RTO for further debridement and longstanding wound healing. had discussed with her possible pec flap if she was agreeable. at this time - given continued healing she is deferring but knows she can call office when she is ready to meet for discussion regarding flap. discussed she may also have keloid/hypertrophic scarring after that surgery.    Wound Consult requested to assist w/ management of  Left clavical/left upper chest wound    Recommendations:   medihoney, aquacel daily    consider CT surgery consult.   consider ID consult  Moisturize intact skin w/ SWEEN cream BID  Nutrition Consult for optimization in pt w/  Increased nutritional needs            encourage high quality protein, christian/ prosource, MVI & Vit C to promote wound healing  Continue turning and positioning w/ offloading assistive devices as per protocol  Prophylactic treatment: Buttocks/ Sacrum Myles BID and prn soiling        Continue w/ attends under pads and Pericare as per protocol  Waffle Cushion to chair when oob to chair  Continue w/ low air loss pressure redistribution bed surface   Care as per medicine, will follow w/ you  Upon discharge f/u as outpatient at Wound Center [address] [facility] [phone]  Seen w/ attng and D/w team & RN  Thank you for this consult,  GAIL Fried-BC, [facility]  [phone]  Nights/ Weekends/ Holidays please call:  General Surgery Consult pager ([phone]) for emergencies  Wound PT for multilayer leg wrapping or VAC issues (x [phone])

## 2024-09-04 NOTE — DISCHARGE NOTE PROVIDER - NSDCMRMEDTOKEN_GEN_ALL_CORE_FT
acetaminophen 325 mg oral tablet: 2 tab(s) orally every 6 hours As needed Temp greater or equal to 38C (100.4F), Mild Pain (1 - 3)  baclofen 10 mg oral tablet: 1 tab(s) orally 3 times a day  dalfampridine 10 mg oral tablet, extended release: 1 tab(s) orally every 12 hours  DULoxetine 60 mg oral delayed release capsule: 1 cap(s) orally once a day (at bedtime)  ferrous sulfate 324 mg (65 mg elemental iron) oral delayed release tablet: 1 tab(s) orally once a day  Gemtesa 75 mg oral tablet: 1 tab(s) orally once a day  KlonoPIN 0.5 mg oral tablet: 1 tab(s) orally once a day as needed for  anxiety  modafinil 200 mg oral tablet: 1 tab(s) orally once a day  sertraline 100 mg oral tablet: 1 tab(s) orally once a day (at bedtime)

## 2024-09-04 NOTE — CONSULT NOTE ADULT - ASSESSMENT
43-year-old female with a past medical history of multiple sclerosis, septic arthritis of left sternoclavicular joint status post I&D, wound VAC and 6-week course of cefepime completed in January 2024, who presents to the hospital due to worsening pain in the left shoulder.    #Left shoulder pain  #History of septic arthritis status post 6-week therapy of cefepime, completed January 2024  #Chronic left clavicular wound    Recommendations  Patient afebrile, no leukocytosis, wound appears without erythema, swelling, nontender, minimal drainage noted which is chronic  Would monitor off antibiotics at this time  Follow MRI ordered by ED  Recommending ID clinic follow-up if discharged  Advised for PT referral for further rehabilitation of left shoulder  Follow fever curve and WBC count    Bipin Brown MD  Division of Infectious Diseases

## 2024-09-04 NOTE — DISCHARGE NOTE PROVIDER - NSDCFUSCHEDAPPT_GEN_ALL_CORE_FT
Deidre Bautista  [facility] [id number]  Scheduled Appointment: 09/09/2024    Declan Martines  [facility]  INTMED [address]  Scheduled Appointment: 10/01/2024

## 2024-09-04 NOTE — DISCHARGE NOTE PROVIDER - NSDCCPCAREPLAN_GEN_ALL_CORE_FT
PRINCIPAL DISCHARGE DIAGNOSIS  Diagnosis: Clavicle pain  Assessment and Plan of Treatment: You came to the hospital for pain in your clavicle which previously had an infection. Currently it does not appear to have infection but the MRI you had is still waiting to be read for final results.  You left the hospital against medical advice before your tests were finalized. Please follow up with your primary doctor as soon as possible to review your test results.

## 2024-09-04 NOTE — CHART NOTE - NSCHARTNOTEFT_GEN_A_CORE
44yo f w pmh multiple sclerosis (unsteady gait w freq/recurrent falls, urinary incontinence), anx/dep, septic arthritis of left sternoclavicular joint s/p i+d x2 with wound vac and 6 week course of cefepime (12/2023-1/2024), p/w left shoulder pain; in er, c/f septic arthritis; admit to medicine for further mgmt    Seen and examined at bedside. Pt wants MRI to ensure no infection. MRI expedited and pending.   ID consulted, f/u recommendations.   dc if MRI negative, pt declined surgical intervention

## 2024-09-04 NOTE — HISTORY OF PRESENT ILLNESS
[FreeTextEntry1] : Ms. JAYA AGGARWAL, 43 year old female, never smoker, w/ hx of MS who presented with redness and tenderness over her left sternoclavicular joint. She had a CAT scan that demonstrated an erosion overlying the joint.    Now s/p incision and debridement of Lt sternoclavicular joint on 12/7/23. Path revealed Lt clavicle head periosteal connective tissue and cartilage shows focal acute inflammation and necrosis. Bone shows focal medullary fibrosis, focal reactive woven bone formation and osteoblastic rimming but no definitive osteomyelitis. Bone marrow is hypercellular and shows myeloid predominant trilineage hematopoiesis with increased eosinophils.   Now s/p re-exploration of left sternoclavicular joint wound, manubrial resection, portion of first rib resection, vacuum-assisted closure placement on 12/15/23.   Patient took off wound VAC on 02/19/2024 due to skin irritation from surgical tape.   Pt has been seeing Plastic Sx Dr. Stearns, now on scar care with Aquacel, massage scar and slver nitrate to existing wounds.   On last visit, Pt is upset that her wound has not completely healed by now and said the Plastic Surgeon she saw Dr. Stearns could not fix her wound. Pt claimed that if her wound "could have been handled with more care" at the time of surgery, then she would have a better recovery. I explained to her in detail that her surgery was handle with care and at the time of initial surgery, she had a very bad infection over her clavicle, and it was a life-threatening condition if leave untreated, pt would have had even worse outcome, such as death. I explained at the time of surgery all risks and potential outcome, I said to her and family I will do my best to preserve the appearance, but she will have an "ugly scar" due to the severity of the infection and her immunocompromised health status due to MS. Also I explained to pt that she has a prolonged healing of her wound due to the combination of her health status, hx of MS, and severity of the infection. Pt then claimed that [facility] gave her the injury and wound, they malpracticed on her. Again pt is upset and wanted to have other recommendation such as cream and ointment that she can use to expedite her wound healing. Again, I explained to her I do not have any creams to offer to heal this wound faster but can refer her to one of the Plastic Surgeons that I work with closely to see if he can fix the wound. I told pt I know she is upset about how the wound looks like and it was pt's choice to see Dr. Stearns. If Dr. Stearns could not fix the problem, I can definitely reach out to Dr. Foley to see if he is able to help. Pt finally agreed.   Called pt and referred her to Dr. Umang Dean.  Of note, pt admitted to [facility] on 8/19-8/20 for worsening redness, discharge at upper chest wound and left arm/shoulder discomfort. ID did not think there is an active infection and recommended off Abx.   Pt saw ID Dr. Odalis Reynaga on 8/27/24. Based on her clinical presentation, findings more concerning for past treated infection. If there is increasing pain and she feels that something is wrong the only way to determine if underlying infection would be to do a bone bx. and send for cx and pathology.  Pt presents today for follow up.

## 2024-09-04 NOTE — DISCHARGE NOTE PROVIDER - CARE PROVIDER_API CALL
Antonio Freeman  Follow Up Time:     Declan Martines  Gastroenterology  [address] [place] [address]  Phone: [phone]  Fax: [phone]  Follow Up Time:

## 2024-09-04 NOTE — OCCUPATIONAL THERAPY INITIAL EVALUATION ADULT - BALANCE DISTURBANCE, SYSTEM IMPAIRMENT CONTRIBUTE, REHAB EVAL
neuromuscular/musculoskeletal Venipuncture Paragraph: An alcohol pad was applied to the venipuncture site. Venipuncture was performed using a butterfly needle. Pressure and a bandaid was applied to the site. No complications were noted. Bill For Individual Tests Below?: no Number Of Tubes Drawn: 1 Detail Level: None
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
## 2024-09-04 NOTE — H&P ADULT - NSHPPHYSICALEXAM_GEN_ALL_CORE
T(C): 36.5 (09-03-24 @ 22:41), Max: 37.2 (09-03-24 @ 17:51)  HR: 78 (09-04-24 @ 02:27) (74 - 79)  BP: 100/70 (09-04-24 @ 02:27) (98/82 - 109/53)  RR: 19 (09-03-24 @ 22:41) (17 - 20)  SpO2: 97% (09-03-24 @ 22:41) (97% - 100%)  GENERAL: NAD, lying in bed   EYES: EOMI, PERRLA; conjunctiva and sclera clear  ENMT: Moist oral mucosa, no pharyngeal injection or exudates  NECK: Supple, no palpable masses; no JVD  RESPIRATORY: Normal respiratory effort; lungs are clear to auscultation bilaterally  CARDIOVASCULAR: Regular rate and rhythm, normal S1 and S2, no murmur/rub/gallop; No lower extremity edema; Peripheral pulses are 2+ bilaterally  ABDOMEN: Nontender to palpation, normoactive bowel sounds, no rebound/guarding   MUSCULOSKELETAL:  left sternoclavicular joint incision appears to be healing; decreased rom over left shoulder in lieu of pain  PSYCH: A+O to person, place, and time; affect appropriate  NEUROLOGY: CN 2-12 are intact and symmetric; mild decreased sensation and weakness in rle  SKIN: No rashes; no palpable lesions

## 2024-09-04 NOTE — OCCUPATIONAL THERAPY INITIAL EVALUATION ADULT - STRENGTHENING, PT EVAL
GOAL: Pt will improve LUE strength by half a muscle grade in order to improve ADL skills in 4 weeks.

## 2024-09-04 NOTE — DISCHARGE NOTE PROVIDER - HOSPITAL COURSE
HPI:  44yo f w pmh multiple sclerosis (unsteady gait w freq/recurrent falls, urinary incontinence), anx/dep, septic arthritis of left sternoclavicular joint s/p i+d x2 with wound vac and 6 week course of cefepime (12/2023-1/2024), p/w left shoulder pain; for the last few weeks, patient has been having worsening pain in left shoulder, a/w generalized weakness/fatigue. she describes the pain as similar to when she had an active infection. patient hospitalized for similar symptom 8/19-8/20/2024, at which time, labs and imaging of the joint showed no evidence of active infection; id and thoracic surgery consulted at the time and both with low concern for active infection; the latter did recommend, "patient with prior SCJ resection - with RTO for further debridement and longstanding wound healing. had discussed with her possible pec flap if she was agreeable. at this time - given continued healing she is deferring but knows she can call office when she is ready to meet for discussion regarding flap. discussed she may also have keloid/hypertrophic scarring after that surgery." patient was ultimately discharged with outpatient follow up. she was seen by id on 8/27/2024 and scheduled to see thoracic surgery on 9/9/2024; however, as the pain was becoming unbearable, patient felt she could not wait so presents to [facility] er for further mgmt; in er, c/f septic arthritis; admit to medicine for further mgmt (04 Sep 2024 02:46)    Hospital Course:  Patient admitted to rule out septic arthritis, was without leukocytosis or elevated inflammatory markers. Patient had MRI of clavicle which was performed but patient requested to leave against advice prior to test resulting. ID consulted recommended to monitor off abx, follow up results of MRI. Patient requested to leave against advice, is A+O x4, risks explained to patient who demonstrated understanding. The above was discussed with the attending.       Important Medication Changes and Reason: no changes    Active or Pending Issues Requiring Follow-up: Follow up with primary doctor to review MRI results.     Advanced Directives:   [ X ] Full code  [ ] DNR  [ ] Hospice    Discharge Diagnoses: Post operative clavicle pain  MS  Anxiety

## 2024-09-04 NOTE — H&P ADULT - NSHPREVIEWOFSYSTEMS_GEN_ALL_CORE
CONSTITUTIONAL: No fever. no weakness  ENMT:  No sinus or throat pain  RESPIRATORY: No cough, wheezing, chills or hemoptysis; No shortness of breath  CARDIOVASCULAR: No chest pain, palpitations, dizziness, or leg swelling  GASTROINTESTINAL: No abdominal or epigastric pain. No nausea, vomiting, or hematemesis; No diarrhea or constipation. No melena or hematochezia.  GENITOURINARY: No dysuria or incontinence  NEUROLOGICAL: No headaches, memory loss, loss of strength, numbness, or tremors  SKIN: No rashes,  No hives or eczema  ENDOCRINE: No heat or cold intolerance; No hair loss  MUSCULOSKELETAL: +left shoulder pain  PSYCHIATRIC: No depression, anxiety, mood swings, or difficulty sleeping  HEME/LYMPH: No easy bruising, or bleeding gums; no enlarged LN

## 2024-09-04 NOTE — H&P ADULT - PROBLEM SELECTOR PLAN 1
h/o septic arthritis of left sternoclavicular joint s/p i+d x2 with wound vac and 6 week course of cefepime (12/2023-1/2024)  mr 8/20/2024 showing, "postoperative granulation tissue or phlegmon with chronic or active osteomyelitis with the given history."  thoracic surgery documentation from 8/20/2024 reviewed; "patient with prior SCJ resection - with RTO for further debridement and longstanding wound healing. had discussed with her possible pec flap if she was agreeable. at this time - given continued healing she is deferring but knows she can call office when she is ready to meet for discussion regarding flap. discussed she may also have keloid/hypertrophic scarring after that surgery."  thoracic surgery consulted by er, "No active infection present... Reconstruction with plastics needed but pt has refused intervention"  currently, afebrile, no leukocytosis, hds  inflammatory markers mildly elevated, but overall improving  monitor for fever, worsening white count; hold off on abx for now pending id and thoracic surgery reeval and formal recs  maintain fall and fracture precautions  weight bearing as tolerated over lue  analgesics + bowel regimen as needed  ot eval  thoracic surgery follow up in am  id consult in am

## 2024-09-04 NOTE — H&P ADULT - ASSESSMENT
42yo f w pmh multiple sclerosis (unsteady gait w freq/recurrent falls, urinary incontinence), anx/dep, septic arthritis of left sternoclavicular joint s/p i+d (12/2023-1/2024), p/w left shoulder pain; in er, c/f septic arthritis; admit to medicine for further mgmt 42yo f w pmh multiple sclerosis (unsteady gait w freq/recurrent falls, urinary incontinence), anx/dep, septic arthritis of left sternoclavicular joint s/p i+d x2 with wound vac and 6 week course of cefepime (12/2023-1/2024), p/w left shoulder pain; in er, c/f septic arthritis; admit to medicine for further mgmt
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
## 2024-09-04 NOTE — OCCUPATIONAL THERAPY INITIAL EVALUATION ADULT - ADDITIONAL COMMENTS
Pt lives with her five children (school-aged) in a private home +2 steps to enter outside, and then another 14 steps to get to main living area; Pt has a walk in shower (just got rid of shower chair), owns a RW (does not like to use). PTA pt was independent with ADLs/mobility with extensive amount of falls.

## 2024-09-04 NOTE — H&P ADULT - HISTORY OF PRESENT ILLNESS
42yo f w pmh multiple sclerosis (unsteady gait w freq/recurrent falls, urinary incontinence), anx/dep, septic arthritis of left sternoclavicular joint s/p i+d (12/2023-1/2024), p/w left shoulder pain; in er, c/f septic arthritis; admit to medicine for further mgmt 44yo f w pmh multiple sclerosis (unsteady gait w freq/recurrent falls, urinary incontinence), anx/dep, septic arthritis of left sternoclavicular joint s/p i+d x2 with wound vac and 6 week course of cefepime (12/2023-1/2024), p/w left shoulder pain; for the last few weeks, patient has been having worsening pain in left shoulder, a/w generalized weakness/fatigue. she describes the pain as similar to when she had an active infection. patient hospitalized for similar symptom 8/19-8/20/2024, at which time, labs and imaging of the joint showed no evidence of active infection; id and thoracic surgery consulted at the time and both with low concern for active infection; the latter did recommend, "patient with prior SCJ resection - with RTO for further debridement and longstanding wound healing. had discussed with her possible pec flap if she was agreeable. at this time - given continued healing she is deferring but knows she can call office when she is ready to meet for discussion regarding flap. discussed she may also have keloid/hypertrophic scarring after that surgery." patient was ultimately discharged with outpatient follow up. she was seen by id on 8/27/2024 and scheduled to see thoracic surgery on 9/9/2024; however, as the pain was becoming unbearable, patient felt she could not wait so presents to [facility] er for further mgmt; in er, c/f septic arthritis; admit to medicine for further mgmt

## 2024-09-04 NOTE — H&P ADULT - PROBLEM SELECTOR PLAN 2
h/o unsteady gait w freq/recurrent falls, urinary incontinence  cont gemtesa  cont dalfampridine  pt eval in am h/o unsteady gait w freq/recurrent falls, urinary incontinence  had been on rituxan, ocrevus  cont gemtesa  cont dalfampridine  pt eval in am

## 2024-09-04 NOTE — PHYSICAL THERAPY INITIAL EVALUATION ADULT - ADDITIONAL COMMENTS
Pt lives with her 5 children in a private home, 2 steps to enter, one flight to bedroom. Pt was independent with all functional mobility and ADLs prior to admission without AD. Pt owns a RW but does not like using it.

## 2024-09-05 ENCOUNTER — TRANSCRIPTION ENCOUNTER (OUTPATIENT)
Age: 44
End: 2024-09-05

## 2024-09-05 ENCOUNTER — INPATIENT (INPATIENT)
Facility: HOSPITAL | Age: 44
LOS: 0 days | Discharge: HOME | DRG: 556 | End: 2024-09-06
Attending: STUDENT IN AN ORGANIZED HEALTH CARE EDUCATION/TRAINING PROGRAM | Admitting: STUDENT IN AN ORGANIZED HEALTH CARE EDUCATION/TRAINING PROGRAM
Payer: SELF-PAY

## 2024-09-05 VITALS
TEMPERATURE: 98 F | WEIGHT: 110.01 LBS | HEART RATE: 69 BPM | RESPIRATION RATE: 20 BRPM | OXYGEN SATURATION: 100 % | HEIGHT: 66 IN | SYSTOLIC BLOOD PRESSURE: 108 MMHG | DIASTOLIC BLOOD PRESSURE: 90 MMHG

## 2024-09-05 DIAGNOSIS — M00.9 PYOGENIC ARTHRITIS, UNSPECIFIED: Chronic | ICD-10-CM

## 2024-09-05 DIAGNOSIS — S72.91XA UNSPECIFIED FRACTURE OF RIGHT FEMUR, INITIAL ENCOUNTER FOR CLOSED FRACTURE: Chronic | ICD-10-CM

## 2024-09-05 DIAGNOSIS — M25.519 PAIN IN UNSPECIFIED SHOULDER: ICD-10-CM

## 2024-09-05 DIAGNOSIS — F41.9 ANXIETY DISORDER, UNSPECIFIED: ICD-10-CM

## 2024-09-05 DIAGNOSIS — Z29.9 ENCOUNTER FOR PROPHYLACTIC MEASURES, UNSPECIFIED: ICD-10-CM

## 2024-09-05 DIAGNOSIS — Z98.891 HISTORY OF UTERINE SCAR FROM PREVIOUS SURGERY: Chronic | ICD-10-CM

## 2024-09-05 DIAGNOSIS — D64.9 ANEMIA, UNSPECIFIED: ICD-10-CM

## 2024-09-05 DIAGNOSIS — G35 MULTIPLE SCLEROSIS: ICD-10-CM

## 2024-09-05 DIAGNOSIS — T81.31XS DISRUPTION OF EXTERNAL OPERATION (SURGICAL) WOUND, NOT ELSEWHERE CLASSIFIED, SEQUELA: ICD-10-CM

## 2024-09-05 DIAGNOSIS — M86.9 OSTEOMYELITIS, UNSPECIFIED: ICD-10-CM

## 2024-09-05 DIAGNOSIS — L02.91 CUTANEOUS ABSCESS, UNSPECIFIED: ICD-10-CM

## 2024-09-05 DIAGNOSIS — S82.201A UNSPECIFIED FRACTURE OF SHAFT OF RIGHT TIBIA, INITIAL ENCOUNTER FOR CLOSED FRACTURE: Chronic | ICD-10-CM

## 2024-09-05 LAB
ALBUMIN SERPL ELPH-MCNC: 4.5 G/DL — SIGNIFICANT CHANGE UP (ref 3.3–5)
ALP SERPL-CCNC: 135 U/L — HIGH (ref 40–120)
ALT FLD-CCNC: 13 U/L — SIGNIFICANT CHANGE UP (ref 10–45)
ANION GAP SERPL CALC-SCNC: 9 MMOL/L — SIGNIFICANT CHANGE UP (ref 5–17)
APPEARANCE UR: ABNORMAL
APTT BLD: 32.8 SEC — SIGNIFICANT CHANGE UP (ref 24.5–35.6)
AST SERPL-CCNC: 11 U/L — SIGNIFICANT CHANGE UP (ref 10–40)
BACTERIA # UR AUTO: ABNORMAL /HPF
BASOPHILS # BLD AUTO: 0.04 K/UL — SIGNIFICANT CHANGE UP (ref 0–0.2)
BASOPHILS NFR BLD AUTO: 0.3 % — SIGNIFICANT CHANGE UP (ref 0–2)
BILIRUB SERPL-MCNC: 0.2 MG/DL — SIGNIFICANT CHANGE UP (ref 0.2–1.2)
BILIRUB UR-MCNC: NEGATIVE — SIGNIFICANT CHANGE UP
BLD GP AB SCN SERPL QL: POSITIVE — SIGNIFICANT CHANGE UP
BUN SERPL-MCNC: 11 MG/DL — SIGNIFICANT CHANGE UP (ref 7–23)
CALCIUM SERPL-MCNC: 9.7 MG/DL — SIGNIFICANT CHANGE UP (ref 8.4–10.5)
CAST: 0 /LPF — SIGNIFICANT CHANGE UP (ref 0–4)
CHLORIDE SERPL-SCNC: 100 MMOL/L — SIGNIFICANT CHANGE UP (ref 96–108)
CO2 SERPL-SCNC: 25 MMOL/L — SIGNIFICANT CHANGE UP (ref 22–31)
COLOR SPEC: YELLOW — SIGNIFICANT CHANGE UP
CREAT SERPL-MCNC: 0.57 MG/DL — SIGNIFICANT CHANGE UP (ref 0.5–1.3)
DIFF PNL FLD: NEGATIVE — SIGNIFICANT CHANGE UP
EGFR: 115 ML/MIN/1.73M2 — SIGNIFICANT CHANGE UP
EOSINOPHIL # BLD AUTO: 0.04 K/UL — SIGNIFICANT CHANGE UP (ref 0–0.5)
EOSINOPHIL NFR BLD AUTO: 0.3 % — SIGNIFICANT CHANGE UP (ref 0–6)
GLUCOSE SERPL-MCNC: 80 MG/DL — SIGNIFICANT CHANGE UP (ref 70–99)
GLUCOSE UR QL: NEGATIVE MG/DL — SIGNIFICANT CHANGE UP
HCG SERPL-ACNC: <2 MIU/ML — SIGNIFICANT CHANGE UP
HCT VFR BLD CALC: 40.1 % — SIGNIFICANT CHANGE UP (ref 34.5–45)
HGB BLD-MCNC: 12.8 G/DL — SIGNIFICANT CHANGE UP (ref 11.5–15.5)
IMM GRANULOCYTES NFR BLD AUTO: 0.5 % — SIGNIFICANT CHANGE UP (ref 0–0.9)
INR BLD: 0.98 RATIO — SIGNIFICANT CHANGE UP (ref 0.85–1.18)
KETONES UR-MCNC: NEGATIVE MG/DL — SIGNIFICANT CHANGE UP
LEUKOCYTE ESTERASE UR-ACNC: ABNORMAL
LYMPHOCYTES # BLD AUTO: 1.37 K/UL — SIGNIFICANT CHANGE UP (ref 1–3.3)
LYMPHOCYTES # BLD AUTO: 11.6 % — LOW (ref 13–44)
MCHC RBC-ENTMCNC: 28.5 PG — SIGNIFICANT CHANGE UP (ref 27–34)
MCHC RBC-ENTMCNC: 31.9 GM/DL — LOW (ref 32–36)
MCV RBC AUTO: 89.3 FL — SIGNIFICANT CHANGE UP (ref 80–100)
MONOCYTES # BLD AUTO: 0.93 K/UL — HIGH (ref 0–0.9)
MONOCYTES NFR BLD AUTO: 7.9 % — SIGNIFICANT CHANGE UP (ref 2–14)
NEUTROPHILS # BLD AUTO: 9.36 K/UL — HIGH (ref 1.8–7.4)
NEUTROPHILS NFR BLD AUTO: 79.4 % — HIGH (ref 43–77)
NITRITE UR-MCNC: NEGATIVE — SIGNIFICANT CHANGE UP
NRBC # BLD: 0 /100 WBCS — SIGNIFICANT CHANGE UP (ref 0–0)
PH UR: 7.5 — SIGNIFICANT CHANGE UP (ref 5–8)
PLATELET # BLD AUTO: 340 K/UL — SIGNIFICANT CHANGE UP (ref 150–400)
POTASSIUM SERPL-MCNC: 4 MMOL/L — SIGNIFICANT CHANGE UP (ref 3.5–5.3)
POTASSIUM SERPL-SCNC: 4 MMOL/L — SIGNIFICANT CHANGE UP (ref 3.5–5.3)
PROT SERPL-MCNC: 7.3 G/DL — SIGNIFICANT CHANGE UP (ref 6–8.3)
PROT UR-MCNC: NEGATIVE MG/DL — SIGNIFICANT CHANGE UP
PROTHROM AB SERPL-ACNC: 10.8 SEC — SIGNIFICANT CHANGE UP (ref 9.5–13)
RBC # BLD: 4.49 M/UL — SIGNIFICANT CHANGE UP (ref 3.8–5.2)
RBC # FLD: 13.2 % — SIGNIFICANT CHANGE UP (ref 10.3–14.5)
RBC CASTS # UR COMP ASSIST: 13 /HPF — HIGH (ref 0–4)
REVIEW: SIGNIFICANT CHANGE UP
RH IG SCN BLD-IMP: POSITIVE — SIGNIFICANT CHANGE UP
SODIUM SERPL-SCNC: 134 MMOL/L — LOW (ref 135–145)
SP GR SPEC: 1.01 — SIGNIFICANT CHANGE UP (ref 1–1.03)
SQUAMOUS # UR AUTO: >36 /HPF — HIGH (ref 0–5)
UROBILINOGEN FLD QL: 0.2 MG/DL — SIGNIFICANT CHANGE UP (ref 0.2–1)
WBC # BLD: 11.8 K/UL — HIGH (ref 3.8–10.5)
WBC # FLD AUTO: 11.8 K/UL — HIGH (ref 3.8–10.5)
WBC UR QL: 2 /HPF — SIGNIFICANT CHANGE UP (ref 0–5)

## 2024-09-05 PROCEDURE — 86077 PHYS BLOOD BANK SERV XMATCH: CPT

## 2024-09-05 PROCEDURE — 99233 SBSQ HOSP IP/OBS HIGH 50: CPT | Mod: 57

## 2024-09-05 PROCEDURE — 99283 EMERGENCY DEPT VISIT LOW MDM: CPT

## 2024-09-05 PROCEDURE — 99223 1ST HOSP IP/OBS HIGH 75: CPT

## 2024-09-05 PROCEDURE — 99285 EMERGENCY DEPT VISIT HI MDM: CPT

## 2024-09-05 RX ORDER — SERTRALINE HYDROCHLORIDE 50 MG/1
100 TABLET, FILM COATED ORAL DAILY
Refills: 0 | Status: DISCONTINUED | OUTPATIENT
Start: 2024-09-05 | End: 2024-09-06

## 2024-09-05 RX ORDER — ACETAMINOPHEN 325 MG/1
750 TABLET ORAL ONCE
Refills: 0 | Status: COMPLETED | OUTPATIENT
Start: 2024-09-05 | End: 2024-09-05

## 2024-09-05 RX ORDER — BACLOFEN 0.5 MG/ML
10 INJECTION INTRATHECAL EVERY 8 HOURS
Refills: 0 | Status: DISCONTINUED | OUTPATIENT
Start: 2024-09-05 | End: 2024-09-06

## 2024-09-05 RX ORDER — SERTRALINE HYDROCHLORIDE 50 MG/1
1 TABLET, FILM COATED ORAL
Refills: 0 | DISCHARGE

## 2024-09-05 RX ORDER — CLONAZEPAM 1 MG
0.5 TABLET ORAL DAILY
Refills: 0 | Status: DISCONTINUED | OUTPATIENT
Start: 2024-09-05 | End: 2024-09-06

## 2024-09-05 RX ORDER — CICLOPIROX OLAMINE 7.7 MG/G
0 CREAM TOPICAL
Refills: 0 | DISCHARGE

## 2024-09-05 RX ADMIN — ACETAMINOPHEN 300 MILLIGRAM(S): 325 TABLET ORAL at 11:17

## 2024-09-05 NOTE — PROGRESS NOTE ADULT - SUBJECTIVE AND OBJECTIVE BOX
Subjective " hello "      Vital Signs Last 24 Hrs  T(C): 36.6 (09-05-24 @ 08:42), Max: 36.7 (09-04-24 @ 18:47)  T(F): 97.9 (09-05-24 @ 08:42), Max: 98 (09-04-24 @ 18:47)  HR: 69 (09-05-24 @ 08:42) (69 - 81)  BP: 108/90 (09-05-24 @ 08:42) (101/65 - 110/70)  RR: 20 (09-05-24 @ 08:42) (18 - 20)  SpO2: 100% (09-05-24 @ 08:42) (97% - 100%)                                   11.9   7.04  )-----------( 334      ( 04 Sep 2024 07:19 )             37.6       09-04    139  |  104  |  14  ----------------------------<  100<H>  4.1   |  23  |  0.53    Ca    9.5      04 Sep 2024 07:19    TPro  6.3  /  Alb  3.7  /  TBili  0.2  /  DBili  x   /  AST  12  /  ALT  12  /  AlkPhos  114  09-04      < from: MR Clavicle w/wo IV Cont, Left (09.04.24 @ 17:43) >  IMPRESSION:    1.  Prior resection of the medial left clavicle with rim-enhancing fluid   and surrounding phlegmon abutting the resected bony margin, which has   mildly enlarged compared to recent MRI of 8/22/2024. The findings   concerning for abscess in the area of previous infection and surgery.    2.  Only mild, vague internal bone marrow edema within the clavicular   stump, without convincing findings to suggest acute osteomyelitis,   although this could be masked by the extensive chronic sclerosis and   postsurgical change.    3.  Regional prominent lymph nodes are present, likely reactive.    < end of copied text >  < from: Xray Chest 2 Views PA/Lat (09.03.24 @ 18:10) >    IMPRESSION:  Clear lungs.    < end of copied text >                            PHYSICAL EXAM        Neurology: alert and oriented x 3, nonfocal, no gross deficits  S1S2     left clavicular wound keloid pink no odor no drainage    Lungs: b/l breath sound sroomair    Abdomen: soft, nontender, nondistended, positive bowel sounds    voids            Extremities:  warm well perfused equal strength throughout                                              Discussed with Cardiothoracic Team at AM rounds.

## 2024-09-05 NOTE — ED PROVIDER NOTE - NS ED MD DISPO DIVISION
February 22, 2024       Joaquim Rivera MD  [address]  [address]  [facility] [address]  Via Fax: [phone]      Patient: Varinder Gonzalez   YOB: 1952   Date of Visit: 2/22/2024       Dear Dr. Rivera:    Thank you for referring Varinder Gonzalez to me for evaluation. Below are my notes for this visit with him.    If you have questions, please do not hesitate to call me. I look forward to following your patient along with you.      Sincerely,        Hero Liu MD        CC: No Recipients  Hero Liu MD  2/22/2024  3:09 PM  Signed  LEFT HEART CATH/ RIGHT HEART CATH      Date of Surgery: ASAP    Varinder BRAVO Lisa       Performing MD:   [x] Dr. Liu    []    []     []       [] 1st available Physician    Diagnosis: severe AS, mod MS    Consent to Read:  [x] Left Heart Cath  [] Right Heart Cath   [] Left and Right Heart Cath   [] Right Heart Cath with Cardiomems  [] Peripheral Angiogram  [] Carotid Angiogram  [] Abdominal aortic aneursym angiogram/ endovascular repair  [] Other                       Time to allow for procedure 2  hours    Please initiate my standing orders with the following exceptions:      [x] Do not follow anesthesia adult diabetic protocol. See orders below  [x] If taking Plavix, Brilinta or Effient - DO NO STOP  [x] Begin or Continue taking aspirin      Diabetic Meds:  [x] If on Metformin/Glucophage Hold 24 Hrs prior to procedure and hold for 48 hours post procedure  [x] If on short-acting insulin, hold morning of cath  [x] If on long acting insulin, Take half-dose long acting/mixed insulin(I.e Lantus) if in the morning.    [x] If on Jardiance, Invokana or Farxiga (SGLT2) hold for 2 days prior to procedure.    Anti-Coag Meds:    [x] if on Xarelto/Eliquis/Pradaxa hold 2 days prior to procedure   [x] if on coumadin/warfarin hold for 4 days prior to procedure *unless INR>3.0 ask MD      Dye Allergy: [] Yes [] No  [] Rx sent to patients pharmacy:Prednisone  50mg 1tab 7hrs & 13hrs prior procedure (enter script to pharmacy)  [] To be given in day surgery:Benadryl 50mg & prednisone 50mg 1 hr prior to procedure    Renal Hydration: [] Yes [x] No  [] 0.9% NS at 80 cc/hour 4 hours prior to procedure  [] 0.9% NS at 125 cc/hour 2 hours prior to procedure         Labs Ordered:    [] Ok to use recent Labs on file   [] Repeat BMP 2-3 days post angiogram     [x]  If Labs are not ordered within 7days-Order *STAT* BMP, CBC upon arrival       REMINDER: Orders for Admission:   [x] initiate CAR IP AMB Interventional order.     MD Alexa Gerber Daniel, MD  2/22/2024  3:09 PM  Signed      STRUCTURAL CARDIOLOGY   OUTPATIENT CONSULTATION    PRIMARY CARDIOLOGIST: Ivan  PRIMARY EP: Popeye  CHIEF COMPLAINT:   Chief Complaint   Patient presents with   • Follow-up     HISTORY OF PRESENT ILLNESS:   Varinder Gonzalez is a 72 year old male with aortic stenosis, mitral stenosis, PAF s/p DCCV 2014 and cryoablation 2/24/22, HTN, HLD who is referred by his primary cardiologist for assessment of his valvular disease.     Last seen by Dr. Love last month 1/22/24. He reports he has been feeling fatigued with mild dyspnea for some time. Going to the gym intermittently without significant limitations. Denies chest pain, orthopnea, PND, syncope.     Most recently, his echo from 12/22/23 is concerning for possible paradoxical LFLG severe AS. He presents with his wife today.     PAST MEDICAL HISTORY:  Past Medical History:   Diagnosis Date   • Aortic stenosis    • Atrial fibrillation (CMD) 10/13/2011    S/P cardioversion,persistent >rhythm control with Tikosyn; atrial flutter, atrial tachycardia   • Echocardiogram abnormal      cardiac testing   • Hay fever    • History of echocardiography     AS - mild mean gradient 17mm 08/16, mean gradient 19mmHg 11/17   • HTN (hypertension)    • Mitral valve regurgitation     trace on echo 11/17   • Pulmonary hypertension (CMD)     PA's 50  mmHg ( echo 11/17)       SOCIAL HISTORY:  Social History     Tobacco Use   Smoking Status Never   Smokeless Tobacco Never     Social History     Substance and Sexual Activity   Alcohol Use No     Social History     Substance and Sexual Activity   Drug Use No       FAMILY HISTORY:  Family History   Problem Relation Age of Onset   • Other Mother         alzheimers    • Natural Causes Father        HOME MEDICATIONS:  Current Outpatient Medications   Medication Sig Dispense Refill   • furosemide (LASIX) 40 MG tablet TAKE 1 TABLET BY MOUTH EVERY DAY 90 tablet 3   • rosuvastatin (Crestor) 10 MG tablet Take 1 tablet by mouth daily. 90 tablet 3   • Klor-Con M 20 MEQ CR tablet TAKE 1 TABLET BY MOUTH TWICE A DAY IN THE MORNING AND AT BEDTIME 180 tablet 3   • enalapril (VASOTEC) 20 MG tablet TAKE 1 TABLET BY MOUTH TWICE A  tablet 3   • NIFEdipine CC (ADALAT CC) 90 MG 24 hr tablet TAKE 1 TABLET BY MOUTH EVERY DAY 90 tablet 3   • pantoprazole (PROTONIX) 40 MG tablet Take 1 tablet by mouth nightly. 30 tablet 0   • metoPROLOL succinate (TOPROL-XL) 50 MG 24 hr tablet TAKE 1 TABLET BY MOUTH TWICE A  tablet 3   • apixaBAN (ELIQUIS) 5 MG Tab Take by mouth every 12 hours. HAS STOP INSTRUCTIONS     • dofetilide (TIKOSYN) 250 MCG capsule Take 250 mcg by mouth 2 times daily.       No current facility-administered medications for this visit.       ALLERGIES:   Allergen Reactions   • Penicillin G Sodium RASH        REVIEW OF SYSTEMS:  10 out of 14 systems reviewed and negative unless stated in HPI.    PHYSICAL EXAM:  VS: /70 (BP Location: LUE - Left upper extremity)   Pulse 84   Ht 6' (1.829 m)   Wt 94.6 kg (208 lb 8.9 oz)   BMI 28.29 kg/m²   BSA 2.17 m²   GENERAL: No acute distress  EYES:  PERRL, EOMI, anicteric sclera  NECK: flat neck veins  RESPIRATORY: clear to auscultation bilaterally  CARDIOVASCULAR:  normal rate and regular rhythm with normal S1, S2. 2/6 ERIKA best heard RUSB  GASTROINTESTINAL: bowel sounds  present, nontender/nondistended, no organomegaly  CHEST:  no chest wall tenderness to palpitation  MUSCULOSKELETAL: no joint effusions; no asymmetry   EXTREMITIES: no LE edema, warm  NEURO:  grossly nonfocal, A&O x3  SKIN:  no rashes  PSYCH: appropriate mood and affect    LABS:   I personally reviewed and interpreted recent laboratory values in the chart.     Hemoglobin A1C (%)   Date Value   08/15/2022 5.3     CHOLESTEROL (mg/dL)   Date Value   08/15/2022 177     HDL (mg/dL)   Date Value   08/15/2022 40     TRIGLYCERIDE (mg/dL)   Date Value   08/15/2022 75     CALCULATED LDL (mg/dL)   Date Value   08/15/2022 122       CARDIAC TESTING:   I have reviewed the pertinent imaging study reports. These are the pertinent findings:    ECG 12/12/23 performed and personally reviewed - NSR, rate 73, nonspecific ST-T wave abnormality    TTE 12/22/23  1. Left ventricle: The cavity size is normal. Wall thickness is moderately     increased. Systolic function is normal. The ejection fraction was measured     by biplane method of disks. The ejection fraction is 73%.  2. Aortic valve: Velocity is increased. There is moderate to severe stenosis.     There is mild regurgitation. There is low flow low gradient aortic     stenosis. SV index of 22. The peak systolic velocity is 3.7m/sec.  3. Mitral valve: The findings are consistent with moderate stenosis.  4. Left atrium: The atrium is severely dilated.  5. Right ventricle: The cavity size is normal. Wall thickness is normal.     Systolic function is normal.  IMPRESSIONS:  Low flow low gradient aortic stenosis. Last echocardiogram was  severe AS. Has moderate Mitral stenosis.      ASSESSMENT & PLAN:  72 year old male with    Suspected paradoxical low-flow, low-gradient severe native aortic valve stenosis  IDRIS 1.2, however valve area index is <0.6 by VTI and velocity, PV 3.7, MG 28, DI 0.4, SVI 17, mild AI, LVEF 73%  Will obtain TAVR CTA to acquire aortic valve calcium scoring. If >2000,  then this is also likely consistent with severe AS.   It is possible that would soon require TAVR if develops symptoms vs surgical AVR + MVR given concomitant mitral stenosis.  Will refer to valve clinic as this requires complex multidisciplinary discussion  - Discussed diagnostic only selective coronary angiography and possible percutaneous coronary intervention. The risks, expected benefits, and alternatives to angiography and catheter-based intervention were discussed in detail with the patient which included, but were not limited to, bleeding, contrast reaction, renal impairment or failure, limb loss, nerve injury, emergency surgery, arrhythmia, MI, stroke, death, and the possibility of restenosis and/or need for repeat intervention. The patient voiced a clear understanding of the associated risks, benefits and alternatives of the aforementioned procedures. All questions were answered and the patient wishes to proceed.  - Proceed via radial access.    Moderate native mitral valve stenosis  Mean diastolic gradient of 7 mmHg at unspecified HR  Likely does at least have moderate mitral stenosis especially in the setting of severely dilated left atrium   See above plan     Paroxysmal atrial fibrillation/flutter, valvular  Managed by Dr. Popeye Samaniego, toprol xl and eliquis  Given presence of moderate stenosis and severely dilated LA, would need to be considered for switch to warfarin - will defer to primary EP    LE edema   Is on lasix and uses compression socks  This has been attributed to possible lymphedema but need to have high suspicion for possible HF in the setting of valvular disease noted on echo    Primary hypertension  BP today controlled  Continue nifedipine 90, enalapril 20  As per primary cardiologist    Mixed hyperlipidemia   as of 8/15/22  Recently started on rosuvastatin 10   Management as per primary cardiologist     There are no discontinued medications.  No orders of the defined  types were placed in this encounter.    No follow-ups on file. Keep follow up with Dr. Love  A letter has been sent to referring physician.   Thank you for allowing us to participate in the care of this patient. Please do not hesitate to call with any questions or concerns.    Hero Liu MD  Interventional Cardiology & Structural Heart Disease  [facility]  [facility]   [facility]

## 2024-09-05 NOTE — ED PROVIDER NOTE - CLINICAL SUMMARY MEDICAL DECISION MAKING FREE TEXT BOX
43-year-old female past medical history of MS, septic arthritis of the left sternoclavicular joint s/p I&D with about earlier this year presents emergency department for abnormal MRI, concerning for abscess in previous surgical area. Pt well appearing. No fevers or chills. + erythema surrounding raised scar over left proximal clavicle. Plan for thoracic surg marc, ID eval. Will get labs, UA/UC. Blood cultures pending from yesterday. Will defer abx until ID evaluation as pt not meeting any criteria for sepsis. Plan for admission for IV abx.

## 2024-09-05 NOTE — H&P ADULT - PROBLEM SELECTOR PLAN 1
Presenting with raised, erythematous skin wound overlying L sternoclavicular joint, s/p debridement and 6 weeks of cefepime for abscess in Dec 2023-Jan 2024. Has since following with ID, CT surg, and plastics outpatient. 2 weeks prior to current admission, reports wound has worsen progressive with purulent drainage and associated L shoulder pain.  - MRI from 9/4 showing rim-enhancing fluid and surrounding phlegmon concerning for abscess with mild vague internal bone marrow edema within the clavicular (acute osteomyelitis?) and regional lymphadenopathy, likely reactive.  - appreciate ID recs  - appreciate CT surgery recs  - as discussed with ID, CT surgery, and plastics, patient is scheduled for debridement and washout with CT surgery with combined muscle flap by plastics, tomorrow approximately 2pm.  - per ID, afterwards patient will be started on empiric antibiotics pending cultures taken in OR  - WBC mildly elevated to 11.8k  - coags wnl  - trend CBC, BMP daily   - T&S pending  - pending Bcx  - UA from ED was not clean catch and patient denies any urinary symptoms

## 2024-09-05 NOTE — ED ADULT NURSE NOTE - HOW PATIENT ADDRESSED, PROFILE
Head,  normocephalic,  atraumatic,  Face,  Face within normal limits,  Ears,  External ears within normal limits,  Nose/Nasopharynx,  External nose  normal appearance, Lips,  Appearance normal
Greta

## 2024-09-05 NOTE — DISCHARGE NOTE PROVIDER - NSDCFUSCHEDAPPT_GEN_ALL_CORE_FT
Deidre Bautista  [facility] [address]  Scheduled Appointment: 09/06/2024    Deidre Bautista  [facility] [address]  Scheduled Appointment: 09/09/2024    Declan Martines  [facility]  [address]  Scheduled Appointment: 10/01/2024

## 2024-09-05 NOTE — ED ADULT NURSE NOTE - NSFALLOOBATTEMPT_ED_ALL_ED
----- Message from Deloris Garay MD sent at 3/19/2018 10:24 AM CDT -----  Results normal. Continue current warfarin dosage.    If INR normal on next recheck can change to checking INR every 2 weeks.  
Informed pt of INR results 2.7, continue current dose warfarin 3mg Tues, Thurs and Sat and warfarin 4mg rest of week. Recheck INR on 3/23/18. Pt had no questions at this time. Informed if within range will go to checks every 2 weeks. No questions at this time.   
Writer spoke to Dr. Garay and agreeable to have pt continue current dosing over the weekend. Writer will call pt on Monday if there are any changes to dosing that needs to be made.Pt verbalized understanding with no questions at this time. She stated that phlebotomist is on their way now to draw. Pt had no questions at this time.   
No
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
## 2024-09-05 NOTE — H&P ADULT - PROBLEM SELECTOR PLAN 4
Patient has a history of iron deficiency anemia and is taking daily iron supplement  - c/w iron supplements  - monitor with AM CBCs

## 2024-09-05 NOTE — H&P ADULT - ATTENDING COMMENTS
42 y/o F with PMHx of  multiple sclerosis on rituxan, anxiety, depression, hx of septic arthritis of left sternoclavicular joint s/p I&Dx 2 with wound vac and 6 week course of cefepime (12/2023-1/2024), presented to ED with fluid collection noted in L sternoclavicular joint wound  as seen on MRI and now recommended for surgical intervention. [facility] ID and [facility] ID ( second opinion ) both recommend source control with OR/drainage/debridgement of abscess and I personally also recommended this, stressing importance of source control. Patient still is hesitant and wishes to think about it more. Will keep NPO for now for possible OR in AM

## 2024-09-05 NOTE — ED ADULT TRIAGE NOTE - INTERNATIONAL TRAVEL
[FreeTextEntry1] : Gradual transition to whole milk, add Poly-Vi-Sol with Iron daily. Dental care discussed. Rear-facing car seat in backseat if under 20 lbs. As per seat 's guidelines, may switch to forward-facing car seat in back seat of the car. Ensure home is safe since baby is increasingly mobile. Start weaning off bottle. fill affected ear canal with mineral oil  then rinse after one hour ,dry with a flat tissue and avoid the use of Q-tip .\par return at 15 months of age.\par 
No

## 2024-09-05 NOTE — H&P ADULT - NSHPLABSRESULTS_GEN_ALL_CORE
LABS:                          12.8   11.80 )-----------( 340      ( 05 Sep 2024 10:15 )             40.1     09-05    134<L>  |  100  |  11  ----------------------------<  80  4.0   |  25  |  0.57    Ca    9.7      05 Sep 2024 10:15    TPro  7.3  /  Alb  4.5  /  TBili  0.2  /  DBili  x   /  AST  11  /  ALT  13  /  AlkPhos  135<H>  09-05          LIVER FUNCTIONS - ( 05 Sep 2024 10:15 )  Alb: 4.5 g/dL / Pro: 7.3 g/dL / ALK PHOS: 135 U/L / ALT: 13 U/L / AST: 11 U/L / GGT: x           Urinalysis Basic - ( 05 Sep 2024 10:15 )    Color: x / Appearance: x / SG: x / pH: x  Gluc: 80 mg/dL / Ketone: x  / Bili: x / Urobili: x   Blood: x / Protein: x / Nitrite: x   Leuk Esterase: x / RBC: x / WBC x   Sq Epi: x / Non Sq Epi: x / Bacteria: x      PT/INR - ( 05 Sep 2024 10:15 )   PT: 10.8 sec;   INR: 0.98 ratio         PTT - ( 05 Sep 2024 10:15 )  PTT:32.8 sec    Culture - Blood (collected 03 Sep 2024 17:15)  Source: .Blood Blood-Peripheral  Preliminary Report (04 Sep 2024 22:02):    No growth at 24 hours    Culture - Blood (collected 03 Sep 2024 17:05)  Source: .Blood Blood-Peripheral  Preliminary Report (04 Sep 2024 22:02):    No growth at 24 hours

## 2024-09-05 NOTE — ED PROVIDER NOTE - OBJECTIVE STATEMENT
43-year-old female past medical history of MS, septic arthritis of the left sternoclavicular joint s/p I&D with about earlier this year presents emergency department for abnormal MRI.  Patient was here for 2 weeks of worsening left shoulder pain 2 days ago and was admitted for MRI.  Patient left AMA prior to receiving MRI results.  MRI shows changes concerning for possible abscess with surrounding phlegmon.  Patient instructed received phone call with results and was advised to come back to the ED.  Patient denies any increase in her pain, still states that she is having difficulty moving that left arm.  Patient denies fevers, chills, headache, vision changes, chest pain, trouble breathing, abdominal pain, nausea/pain, diarrhea/constipation, dysuria, hematuria
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
## 2024-09-05 NOTE — DISCHARGE NOTE PROVIDER - NSDCCPCAREPLAN_GEN_ALL_CORE_FT
PRINCIPAL DISCHARGE DIAGNOSIS  Diagnosis: Pain in clavicular joint  Assessment and Plan of Treatment: You were in the hospital due to an abscess in your L sternoclavicular joint that was found on imaging. You were recommended to get an incision and drainage with thoracic surgery and then culture-driven antibiotic treatment with ID. However, you and the consulting teams made the decision to follow up with thoracic surgery as scheduled on 9/9 to get the procedure as planned. ID recommended that we do not start you on antibiotics until you get the procedure. Please return to the ED if you start spiking fevers, have severe weakness in your arm, or bleeding/drainage from the wound that is new. Please make sure to follow up with your surgeon and your ID physician as scheduled.

## 2024-09-05 NOTE — H&P ADULT - NSHPREVIEWOFSYSTEMS_GEN_ALL_CORE
REVIEW OF SYSTEMS:  CONSTITUTIONAL: No fever, chills, night sweats  ENMT:  No difficulty hearing, tinnitus, vertigo; No sinus or throat pain  NECK: No pain or stiffness  RESPIRATORY: No cough, wheezing, or shortness of breath  CARDIOVASCULAR: No chest pain, palpitations,, or leg swelling  GASTROINTESTINAL: No abdominal or epigastric pain. No nausea, vomiting, or diarrhea.  GENITOURINARY: No dysuria or hematuria,   NEUROLOGICAL: No headaches  SKIN: Purulent drainage from L chest wound, No skin lesions elsewhere  MUSCULOSKELETAL: No joint pain diffusely. Reports having L leg pain unable to localize  PSYCHIATRIC: No depression, anxiety, mood swings, or difficulty sleeping  HEME/LYMPH: No hx easy bruising

## 2024-09-05 NOTE — H&P ADULT - ASSESSMENT
42 y/o F with PMHx of  multiple sclerosis on rituxan, anxiety, depression, hx of septic arthritis of left sternoclavicular joint s/p I&Dx 2 with wound vac and 6 week course of cefepime (12/2023-1/2024), presented to ED with fluid collection noted in L sternoclavicular joint wound in setting of two weeks of worsening left shoulder pain. Imaging from prior admission and elevated WBC today are suggest of abscess in prior surgical site.

## 2024-09-05 NOTE — CHART NOTE - NSCHARTNOTEFT_GEN_A_CORE
pt seen and evaluated. MRI reviewed, spoke to thoracic sx plan for debridement and flap with plastics.   Per discussion with pt, requesting second opinion from ID.     primary team notified. They will take over the care.     Odalis Reynaga  Please contact through MS Teams   If no response or past 5 pm/weekend call [phone].

## 2024-09-05 NOTE — H&P ADULT - PROBLEM SELECTOR PLAN 5
#DVT prophylaxis   - hold until after OR tomorrow    #Diet  - NPO at midnight prior to surgery  - Prior can be regular diet    # Dispo  - admitted to medicine #DVT prophylaxis   - hold until after OR tomorrow    #Diet  - NPO at midnight prior to surgery    # Dispo  - admitted to medicine

## 2024-09-05 NOTE — PROGRESS NOTE ADULT - NS ATTEND AMEND GEN_ALL_CORE FT
discussed plan at length with  and patient regarding recommendation for drainage/debridement of collection near previous surgical bed with plastics for muscle reconstruction. patient had an event planned for Sunday and requesting to return to get procedure done Monday. explained we would not be able to predict if she would get worsening infection/sepsis given her overall poor wound healing and would need prolonged course of abx.   despite recommendations patient would like to wait until Monday for debridement/muscle flap with Dr. Dean and myself.   discussed with ID - Dr. Brito and would prefer to hold off on abx until we obtain cultures from the OR .

## 2024-09-05 NOTE — CONSULT NOTE ADULT - ASSESSMENT
43F PMH MS with known L SCJ spetic arthritis sp i&D x2 in 12/23 with clavicle, partial first rib/manibrium resection, with  vac and completed 6 week course of abx in Januaey 2024. presented with further left shoulder pain on 9/4 with MRI, now called back after MRI finding questionable abscess. PRS consult for possible coverage    Plan      Graham Younger MD  Plastic and Reconstructive Surgery, PGY4  Available on [employer] Teams  [facility]: [id number], [place]: [phone] [facility]: Green Team [id number]  HPI:43F PMH MS with known L SCJ spetic arthritis sp i&D x2 in 12/23 with clavicle, partial first rib/manibrium resection, with  vac and completed 6 week course of abx in January 2024. presented with further left shoulder pain on 9/4 with MRI, now called back after MRI finding possible abscess. pt reports area is non painful however has been increasingly draining "yellow".  Had been following with Dr. Stearns PRS PRS consult for possible coverage    Plan  - had long discussion with pt, , about possible OR. At this time, pt is undecided if she wants surgery, and wants to discuss with Dr. Bautista's team  - possible OR tomorrow pending further team discussion with patient  - PRS may do muscle flap pending further discussions - discussed with pt and  risks and benefits of muscle flap coverage, including but not limited to cosmetic deformity that may improve with time, recurrence, scarring, bleeding - at this time pt wants to discuss with CT surgery plan for OR, and then have further discussion regarding flap coverage  - if pt agreeable to surgery, please make NPO after midnight, preop labs  - abx per primary/CTS  - will follow    dw attending Dr. Dianne Younger MD  Plastic and Reconstructive Surgery, PGY4  Available on [employer] Teams  [facility]: [id number], [place]: [phone] [facility]: Green Team [id number]
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
## 2024-09-05 NOTE — ED PROVIDER NOTE - ATTENDING CONTRIBUTION TO CARE
Attending MD Viveros:  I have seen and examined this patient and fully participated in the care of this patient as the teaching attending. I personally made/approved the management plan and take responsibility for the patient management.      43-year-old woman with history of MS, history of septic sternoclavicular joint status post resection of medial clavicle in 12/2023 is representing to the emergency department for evaluation of abnormal MRI that was performed yesterday.  The patient was admitted for several weeks of worsening pain to the left clavicle, patient had MRI performed in the hospital yesterday however she left AGAINST MEDICAL ADVICE prior to the results of this MRI.  MRI report states "prior resection of the left medial left clavicle with rim-enhancing fluid and surrounding phlegmon abutting the resected bony margin, which has mildly enlarged compared to recent MRI of 8/22/2024.  The findings concerning for abscess in the previous infection and surgery."  Patient reports increasing pain in the clavicle in this area and some drainage of yellow fluid from the wound.  Denies any fevers or chills.    Patient's vital signs are nonactionable.  She is sitting in the stretcher in no apparent distress.  Focused examination of the left chest wall reveals large area of raised pink tissue, central area is with yellow crusted material.  Very mild erythema surrounding wound site.  Extremities warm well-perfused    Patient is presenting to the hospital again for evaluation of ongoing pain at the area of the left medial clavicle at the site of previous clavicular resection for septic joint.  Possible abscess in the area of previous resection.  Plan for thoracic surgery consultation, ID consultation and readmission to the hospital.  For the time being, will defer antimicrobials until ID input has been obtained unless patient develops septic physiology which at this time she has not.      *The above represents an initial assessment/impression. Please refer to progress notes for potential changes in patient clinical course*

## 2024-09-05 NOTE — CONSULT NOTE ADULT - ASSESSMENT
This is a 43y year old Female with the below past medical history who is well known to my associate Dr Selby for multiple sclerosis, on rituxan and ocralizumab.  She has ah hx of left septic sternoclavicular joint infection s/p debridement of joing with left clavicular head excision on 12/7.      She was just discharged from [facility] but called back as MRI was noted for possible abscess (see report)  She says since 9/3/24 increasing pain in the shoulder which has decreased range of motion, but otherwise no new focal weakness/numbness, changes in speech, swallow, vision, bowel or bladder control.     exam notable for pain on left shoulder movement as well as right leg weakness and mild dysmetria (the later is old)    Overall no indication of worsening or flare of her baseline demyelinating disease, needs reconsultation with ID as well as CT surgery  No role for neuroimaging for further neurological workup  spent over 60 minutes, over 1/2 time reviewing films, old notes and discussing with pt and ER MD

## 2024-09-05 NOTE — ED PROVIDER NOTE - PHYSICAL EXAMINATION
Constitutional: VS reviewed. Alert and orientedx3, well appearing, no apparent distress  HEENT: Atraumatic, EOMI, PERRL   CV: RRR  Lungs: Clear and equal bilaterally, no wheezes, rales or crackles  Abdomen: Soft, nondistended, nontender  MSK: No deformities  Skin: Warm and dry. Raised scar over left medial clavicle with some TTP and erythema. No drainage.   Neuro: Strength and sensation intact.   Lymph: No pitting edema in extremities.

## 2024-09-05 NOTE — ED PROVIDER NOTE - PROGRESS NOTE DETAILS
Attending MD Viveros: ID consult requested, awaiting callback. Resident spoke with thoracic surgery who recommend ID consult and medicine admission, they will follow. Attending MD Viveros: still awaiting ID callback. Attending MD Viveros: Reiterated to patient that we are awaiting ID input prior to initiating antimicrobials.  Patient expresses extreme frustration that things are not happening quickly.  Explained to her that is important that we have the input from all specialist teams, ID, thoracic surgery and plastic surgery which we are awaiting the final recommendations of all these teams at this time.  Infectious disease reportedly is coming to see patient.  Patient speaking with nurse manager Alexander as well.

## 2024-09-05 NOTE — ED ADULT NURSE REASSESSMENT NOTE - NS ED NURSE REASSESS COMMENT FT1
Pt is very angry and upset about antibiotics not being ordered right away. Explained to pt that antibiotic is not ordered at this time.  NOLVIA Munoz notified. NOLVIA Munoz came and spoke to pt. Dr. Treviño notified that pt wants to speak to him. Dr. Viveros also spoke to pt and explained the situation. Admitting team at bedside right now.

## 2024-09-05 NOTE — H&P ADULT - PROBLEM SELECTOR PLAN 2
Maintained at home with q6 months rituxan, dalfampiridine 10mg BID, gemtesa 75mg QD, and modafinil 20mg QD  - c/w home medications Maintained at home with q6 months Rituxan, dalfampridine 10mg BID, gemtesa 75mg QD, and modafinil 20mg QD  - c/w home dalfampridine  - c/w home gemtesa  - c/w home modafinil

## 2024-09-05 NOTE — DISCHARGE NOTE PROVIDER - NSDCMRMEDTOKEN_GEN_ALL_CORE_FT
baclofen 10 mg oral tablet: 1 tab(s) orally 3 times a day  dalfampridine 10 mg oral tablet, extended release: 1 tab(s) orally every 12 hours  DULoxetine 60 mg oral delayed release capsule: 1 cap(s) orally once a day (at bedtime)  Gemtesa 75 mg oral tablet: 1 tab(s) orally once a day  Iron Tablet: 1 tablet orally once a day  KlonoPIN 0.5 mg oral tablet: 1 tab(s) orally once a day as needed for  anxiety  modafinil 200 mg oral tablet: 1 tab(s) orally once a day  sertraline 100 mg oral tablet: 1 tab(s) orally once a day Take with 25mg Sertraline for Total Dose:125mg

## 2024-09-05 NOTE — H&P ADULT - NSHPPHYSICALEXAM_GEN_ALL_CORE
OBJECTIVE    VITALS:   Vital Signs Last 24 Hrs  T(C): 36.6 (05 Sep 2024 08:42), Max: 36.7 (04 Sep 2024 18:47)  T(F): 97.9 (05 Sep 2024 08:42), Max: 98 (04 Sep 2024 18:47)  HR: 69 (05 Sep 2024 08:42) (69 - 80)  BP: 108/90 (05 Sep 2024 08:42) (108/90 - 110/70)  BP(mean): --  RR: 20 (05 Sep 2024 08:42) (18 - 20)  SpO2: 100% (05 Sep 2024 08:42) (100% - 100%)    Parameters below as of 05 Sep 2024 08:42  Patient On (Oxygen Delivery Method): room air      CAPILLARY BLOOD GLUCOSE        I&O's Summary      PHYSICAL EXAM:   GENERAL: NAD, lying in bed comfortably  HEAD:  Atraumatic, Normocephalic  ENT: Moist mucous membranes  NECK: Supple, No JVD  CHEST/LUNG: CTABL; No rales, rhonchi, wheezing, or rubs. Unlabored respirations.   HEART: RRR. No M/R/G  ABDOMEN: Soft, nontender, non-distended, normoactive BS  EXTREMITIES:  no joint tenderness along b/l UE (including shoulders) and LE. no leg tenderness to palpation b/l. no b/l leg swelling  NERVOUS SYSTEM:  Alert & Oriented X3, speech clear. No deficits, CN II-XII intact. Normal sensation   MSK: overlying L sternoclavicular joint is a raised, swollen erythematous lesion with yellow crusting centrally. nontender to touch  PSYCH: Normal affect, normal speech, normal behavior

## 2024-09-05 NOTE — H&P ADULT - PROBLEM SELECTOR PLAN 3
Maintained at home with duloxetine 60mg QHS, sertraline 100mg QD. Also takes Klonopin as needed 0.5mg for anxiety  - c/w home duloxetine  - c/w home sertraline  - prn Clonazepam for anxiety

## 2024-09-05 NOTE — DISCHARGE NOTE PROVIDER - HOSPITAL COURSE
42 y/o F with PMHx of  multiple sclerosis (unsteady gait w freq/recurrent falls, urinary incontinence) on rituxan, anxiety, depression, hx of septic arthritis of left sternoclavicular joint s/p I&Dx 2 with wound vac and 6 week course of cefepime (12/2023-1/2024), presented to ED with abnormal MRI finding in setting of two weeks of worsening left shoulder pain. Patient was having multiple falls in November 2024, due to unsteadiness secondary to MS and initially saw orthopedics. MRI of the left sternoclavicular joint at this time showed significant marrow edema and swelling with 2cm abscess superficial to the joint. She was admitted to [facility] 12/7/23 where she had debridements and washout with VAC placement with CT surgery and 6 weeks of cefepime completed outpatient with PICC line. On 08/20, pt was admitted to hospital again for L shoulder pain and reduced ROM where she had MRI that did not show any abscess or infectious process and thus was not started on antibiotics. She followed up with CT surgery and ID outpatient. Patient reports that in these past two weeks her L shoulder pain has continued to worsen and reports difficulty moving it secondary to both pain and stiffness. She was last seen by ID on 08/27/24 and was scheduled to see thoracic surgery on 09/09/24. On 09/03, patient returned to ED for progressive pain that has become unbearable. During this most recent admission, repeat MRI was done however patient left AMA before results were discussed. Continues to have pain largely in L shoulder with difficulty to move her arm, however symptoms have not worsened in the last 2 days. She reports that the raised wound on her chest, overlying the L sternoclavicular joint has been draining purulent fluid.    ED Course: ID, CT surgery, plastic surgery, IR were consulted. She received 750mg tylenol IV, UA/UCx. Blood cultures pending from hospital admission 2 days ago    ID and thoracic surgery were consulted and plan was made for patient to get her scheduled I&D with Dr. Bautista from thoracic surgery on Monday as planned. ID confirmed that patient did not require any antibiotics.      44 y/o F with PMHx of  multiple sclerosis (unsteady gait w freq/recurrent falls, urinary incontinence) on rituxan, anxiety, depression, hx of septic arthritis of left sternoclavicular joint s/p I&Dx 2 with wound vac and 6 week course of cefepime (12/2023-1/2024), presented to ED with abnormal MRI finding in setting of two weeks of worsening left shoulder pain. Patient was having multiple falls in November 2023, due to unsteadiness secondary to MS and initially saw orthopedics. MRI of the left sternoclavicular joint at this time showed significant marrow edema and swelling with 2cm abscess superficial to the joint. She was admitted to [facility] 12/7/23 where she had debridements and washout with VAC placement with CT surgery and 6 weeks of cefepime completed outpatient with PICC line. On 08/20, pt was admitted to hospital again for L shoulder pain and reduced ROM where she had MRI that did not show any abscess or infectious process and thus was not started on antibiotics. She followed up with CT surgery and ID outpatient. Patient reports that in these past two weeks her L shoulder pain has continued to worsen and reports difficulty moving it secondary to both pain and stiffness. She was last seen by ID on 08/27/24 and was scheduled to see thoracic surgery on 09/09/24. On 09/03, patient returned to ED for progressive pain that has become unbearable. During this most recent admission, repeat MRI was done however patient left AMA before results were discussed. Continues to have pain largely in L shoulder with difficulty to move her arm, however symptoms have not worsened in the last 2 days. She reports that the raised wound on her chest, overlying the L sternoclavicular joint has been draining purulent fluid.    ED Course (09/05): ID, CT surgery, plastic surgery, IR were consulted. She received 750mg tylenol IV, UA/UCx. Blood cultures pending from hospital admission 2 days ago    Admitted (09/05 -09/06): Originally surgery was scheduled with thoracic surgery for debridement and washout with plastics for muscle flap, starting empiric abx after surgery. However patient declined surgery at this time because her daughter's engagement party was in 2-3 days. ID and thoracic surgery were consulted and plan was made for patient to get her scheduled I&D with Dr. Bautista from thoracic surgery on Monday 9/9 as planned. ID confirmed that patient did not require any antibiotics.     On day of discharge, patient is clinically stable with no new exam findings or acute symptoms compared to prior. The patient was seen by the attending physician on the date of discharge and deemed stable and acceptable for discharge. The patient's chronic medical conditions were treated accordingly per the patient's home medication regimen and home insulin pump. The patient's medication reconciliation (with changes made to chronic medications), follow up appointments, discharge orders, instructions, and significant lab and diagnostic studies are as noted.     To follow up:  [ ] Thoracic Surgery with Dr. Bautista on 9/9/24

## 2024-09-05 NOTE — H&P ADULT - HISTORY OF PRESENT ILLNESS
44 y/o F with PMHx of  multiple sclerosis (unsteady gait w freq/recurrent falls, urinary incontinence) on rituxan, anxiety, depression, hx of septic arthritis of left sternoclavicular joint s/p I&Dx 2 with wound vac and 6 week course of cefepime (12/2023-1/2024), presented to ED with abnormal MRI finding in setting of two weeks of worsening left shoulder pain. Patient was having multiple falls in November 2024, due to unsteadiness secondary to MS and initially saw orthopedics. MRI of the left sternoclavicular joint at this time showed significant marrow edema and swelling with 2cm abscess superficial to the joint. She was admitted to [facility] 12/7/23 where she had debridements and washout with VAC placement with CT surgery and 6 weeks of cefepime completed outpatient with PICC line. Of note this hospital stay was complicated with fever 1 hour into blood transfusion. Since then, patient has been following up with CT surgery, plastic surgery, and infectious disease. Pain in the interm had resolved. On 08/20, pt was admitted to hospital again for L shoulder pain and reduced ROM where she had MRI that did not show any abscess or infectious process and thus was not started on antibiotics. She followed up with CT surgery and ID outpatient. Patient reports that in these past two weeks her L shoulder pain has continued to worsen and reports difficulty moving it secondary to both pain and stiffness. She was last seen by ID on 08/27/24 and was scheduled to see thoracic surgery on 09/09/24. On 09/03, patient returned to ED for progressive pain that has become unbearable. During this most recent admission, repeat MRI was done however patient left AMA before results were discussed. Patient reports that a family friend who is an interventional radiologist at [facility] was able to see her MRI report which was concerning for abscess and advised to come back to hospital. She currently has no fever, chest pain, shortness of breath, nausea, vomiting, or diarrhea. Patient still has pain largely in L shoulder with difficulty to move her arm, however symptoms have not worsened in the last 2 days. She reports that the raised wound on her chest, overlying the L sternoclavicular joint has been draining purulent fluid.    ED Course: ID, CT surgery, plastic surgery, IR were consulted. She received 750mg tylenol IV, UA/UCx. Blood cultures pending from hospital admission 2 days ago

## 2024-09-05 NOTE — CONSULT NOTE ADULT - ASSESSMENT
Patient is a 43 year old female with PMH of multiple sclerosis (unsteady gait w freq/recurrent falls, urinary incontinence) on rituxan, anxiety, depression, hx of septic arthritis of left sternoclavicular joint s/p I&Dx 2 with wound vac and 6 week course of cefepime (12/2023-1/2024), presented to ED with abnormal MRI finding in setting of two weeks of worsening left shoulder pain.   MRI L clavicle with prior resection of the medial left clavicle with rim-enhancing fluid and surrounding phlegmon abutting the resected bony margin, which has mildly enlarged compared to recent MRI of 8/22/2024 - findings concerning for abscess in the area of previous infection and surgery; no convincing findings to suggest acute osteomyelitis   Leukocytosis likely in setting of above, afebrile, nontoxic appearing at this time     Recommendations:   Thoracic surgery, PRS following for drainage/debridement of abscess, possible OR tomorrow   Discussed with patient, agree with proceeding with above and discussed importance of above for source control, obtaining cultures, and that antibiotics are adjunctive therapy to debridement, also discussed possible risks of delaying procedure    Patient will be followed by [facility]  ID will sign off at this time but remains available for any further questions/concerns.   D/w Dr. Juhi Danielson M.D.  [facility], Division of Infectious Diseases  [phone]  After 5pm on weekdays and all day on weekends - please call [phone]  Available on [employer] TEAMS Patient is a 43 year old female with PMH of multiple sclerosis (unsteady gait w freq/recurrent falls, urinary incontinence) on rituxan, anxiety, depression, hx of septic arthritis of left sternoclavicular joint s/p I&Dx 2 with wound vac and 6 week course of cefepime (12/2023-1/2024), prior recent hospital admission with discharge on 8/20 with similar presentation and MRI showed no evidence of active infection, she was off antibiotics and [facility] and was planned to see thoracic surgery 9/9 who now presented to ER with abnormal MRI finding iso two weeks of worsening left shoulder pain.   MRI L clavicle with prior resection of the medial left clavicle with rim-enhancing fluid and surrounding phlegmon abutting the resected bony margin, which has mildly enlarged compared to recent MRI of 8/22/2024 - findings concerning for abscess in the area of previous infection and surgery; no convincing findings to suggest acute osteomyelitis   Leukocytosis likely in setting of above, afebrile, nontoxic appearing at this time   9/3 Bcx NGTD x2     Recommendations:   Thoracic surgery, PRS following for drainage/debridement of abscess, possible OR tomorrow   Discussed with patient, agree with proceeding with above and discussed importance of above for source control, obtaining cultures, and that antibiotics are adjunctive therapy to debridement, also discussed possible risks of delaying procedure    Patient will be followed by [facility]  ID will sign off at this time but remains available for any further questions/concerns.   D/w Dr. Juhi Danielson M.D.  [facility], Division of Infectious Diseases  [phone]  After 5pm on weekdays and all day on weekends - please call [phone]  Available on [employer] TEAMS Patient is a 43 year old female with PMH of multiple sclerosis (unsteady gait w freq/recurrent falls, urinary incontinence) on rituxan, anxiety, depression, hx of septic arthritis of left sternoclavicular joint s/p I&Dx 2 with wound vac and 6 week course of cefepime (12/2023-1/2024), prior recent hospital admission with discharge on 8/20 with similar presentation and MRI showed no evidence of active infection, she was off antibiotics and followed up with [facility] and was planned to see thoracic surgery 9/9 who now presented to ER with abnormal MRI finding iso two weeks of worsening left shoulder pain.   MRI L clavicle with prior resection of the medial left clavicle with rim-enhancing fluid and surrounding phlegmon abutting the resected bony margin, which has mildly enlarged compared to recent MRI of 8/22/2024 - findings concerning for abscess in the area of previous infection and surgery; no convincing findings to suggest acute osteomyelitis   Leukocytosis likely in setting of above, afebrile, nontoxic appearing at this time   9/3 Bcx NGTD x2     Recommendations:   Thoracic surgery, PRS following for drainage/debridement of abscess, possible OR tomorrow   Discussed with patient, agree with proceeding with above and discussed importance of above for source control, obtaining cultures, and that antibiotics are adjunctive therapy to debridement, also discussed possible risks of delaying procedure    Patient will be followed by [facility]  [facility] will sign off at this time.   D/w Dr. Juhi Danielson M.D.  [facility], Division of Infectious Diseases  [phone]  After 5pm on weekdays and all day on weekends - please call [phone]  Available on [employer] TEAMS

## 2024-09-06 ENCOUNTER — TRANSCRIPTION ENCOUNTER (OUTPATIENT)
Age: 44
End: 2024-09-06

## 2024-09-06 VITALS
OXYGEN SATURATION: 96 % | SYSTOLIC BLOOD PRESSURE: 116 MMHG | HEART RATE: 82 BPM | TEMPERATURE: 98 F | RESPIRATION RATE: 18 BRPM | DIASTOLIC BLOOD PRESSURE: 74 MMHG

## 2024-09-06 LAB
CULTURE RESULTS: SIGNIFICANT CHANGE UP
SPECIMEN SOURCE: SIGNIFICANT CHANGE UP

## 2024-09-06 NOTE — PROGRESS NOTE ADULT - SUBJECTIVE AND OBJECTIVE BOX
Hudson Danielson, MS4 (INCOMPLETE)  Internal Medicine Team 4  SUBJECTIVE / OVERNIGHT EVENTS:  - Pt seen and examined at bedside  - MELANIE    MEDICATIONS  (STANDING):  sertraline 100 milliGRAM(s) Oral daily    MEDICATIONS  (PRN):  baclofen 10 milliGRAM(s) Oral every 8 hours PRN Muscle Spasm  clonazePAM  Tablet 0.5 milliGRAM(s) Oral daily PRN for anxiety    REVIEW OF SYSTEMS:  CONSTITUTIONAL: No fever, chills, night sweats, or fatigue  EYES: No eye pain, visual disturbances, or discharge  ENMT:  No difficulty hearing, tinnitus, vertigo; No sinus or throat pain  NECK: No pain or stiffness  RESPIRATORY: No cough, wheezing, or hemoptysis; No shortness of breath  CARDIOVASCULAR: No chest pain, palpitations, dizziness, or leg swelling  GASTROINTESTINAL: No abdominal or epigastric pain. No nausea, vomiting, or hematemesis; No diarrhea or constipation. No melena or hematochezia.  GENITOURINARY: No dysuria, frequency, hematuria, or incontinence  NEUROLOGICAL: No headaches, memory loss, loss of strength, numbness, or tremors  SKIN: No itching, burning, rashes, or lesions   LYMPH NODES: No enlarged glands  ENDOCRINE: No heat or cold intolerance; No hair loss  MUSCULOSKELETAL: No joint pain or swelling; No muscle, back, or extremity pain  PSYCHIATRIC: No depression, anxiety, mood swings, or difficulty sleeping  HEME/LYMPH: No easy bruising, or bleeding gums  ALLERGY AND IMMUNOLOGIC: No hives or eczema        PHYSICAL EXAM:  Vital Signs Last 24 Hrs  T(C): 36.6 (05 Sep 2024 21:30), Max: 36.7 (05 Sep 2024 15:45)  T(F): 97.9 (05 Sep 2024 21:30), Max: 98.1 (05 Sep 2024 15:45)  HR: 75 (05 Sep 2024 21:30) (69 - 76)  BP: 104/66 (05 Sep 2024 21:30) (103/68 - 108/90)  BP(mean): --  RR: 18 (05 Sep 2024 21:30) (16 - 20)  SpO2: 99% (05 Sep 2024 21:30) (99% - 100%)    Parameters below as of 05 Sep 2024 21:30  Patient On (Oxygen Delivery Method): room air        CAPILLARY BLOOD GLUCOSE        I&O's Summary      CONSTITUTIONAL: NAD  HEENT: NC/AT  RESPIRATORY: Normal respiratory effort; lungs are clear to auscultation bilaterally  CARDIOVASCULAR: Regular rate and rhythm, normal S1 and S2, no murmur/rub/gallop; No lower extremity edema; Peripheral pulses are 2+ bilaterally  ABDOMEN: Soft, non-distended, nontender to palpation, normoactive bowel sounds, no pain to percussion, no rebound/guarding; No hepatosplenomegaly  MUSCLOSKELETAL: no clubbing or cyanosis of digits; no joint swelling or tenderness to palpation  EXTREMITIES: no peripheral edema, distal pulses intact   NEURO: A&Ox3,  no focal deficits   PSYCH:  affect appropriate    LABS:                        12.8   11.80 )-----------( 340      ( 05 Sep 2024 10:15 )             40.1     09-05    134<L>  |  100  |  11  ----------------------------<  80  4.0   |  25  |  0.57    Ca    9.7      05 Sep 2024 10:15    TPro  7.3  /  Alb  4.5  /  TBili  0.2  /  DBili  x   /  AST  11  /  ALT  13  /  AlkPhos  135<H>  09-05    PT/INR - ( 05 Sep 2024 10:15 )   PT: 10.8 sec;   INR: 0.98 ratio         PTT - ( 05 Sep 2024 10:15 )  PTT:32.8 sec    LIVER FUNCTIONS - ( 05 Sep 2024 10:15 )  Alb: 4.5 g/dL / Pro: 7.3 g/dL / ALK PHOS: 135 U/L / ALT: 13 U/L / AST: 11 U/L / GGT: x               Urinalysis Basic - ( 05 Sep 2024 10:15 )    Color: x / Appearance: x / SG: x / pH: x  Gluc: 80 mg/dL / Ketone: x  / Bili: x / Urobili: x   Blood: x / Protein: x / Nitrite: x   Leuk Esterase: x / RBC: x / WBC x   Sq Epi: x / Non Sq Epi: x / Bacteria: x        Culture - Blood (collected 03 Sep 2024 17:15)  Source: .Blood Blood-Peripheral  Preliminary Report (05 Sep 2024 22:01):    No growth at 48 Hours    Culture - Blood (collected 03 Sep 2024 17:05)  Source: .Blood Blood-Peripheral  Preliminary Report (05 Sep 2024 22:01):    No growth at 48 Hours            IMAGING:    [X] All pertinent imaging reviewed by me Hudson Danielson, MS4   Internal Medicine Team 4  SUBJECTIVE / OVERNIGHT EVENTS:  - Pt seen and examined at bedside  - NAEON  - improved L shoulder pain  - patient reports having some oral fluids this AM    MEDICATIONS  (STANDING):  sertraline 100 milliGRAM(s) Oral daily    MEDICATIONS  (PRN):  baclofen 10 milliGRAM(s) Oral every 8 hours PRN Muscle Spasm  clonazePAM  Tablet 0.5 milliGRAM(s) Oral daily PRN for anxiety    REVIEW OF SYSTEMS:  CONSTITUTIONAL: No fever, chills, night sweats, or fatigue  ENMT:  No sinus or throat pain  NECK: No pain or stiffness  RESPIRATORY: No cough, wheezing, or shortness of breath  CARDIOVASCULAR: No chest pain, palpitations, dizziness, or leg swelling  GASTROINTESTINAL: No abdominal or epigastric pain. No nausea, vomiting, or diarrhea.  GENITOURINARY: No dysuria, frequency, hematuria  NEUROLOGICAL: No headaches,  SKIN: No itching, burning, rashes, or lesions   MSK: resolved L leg pain      PHYSICAL EXAM:  Vital Signs Last 24 Hrs  T(C): 36.6 (05 Sep 2024 21:30), Max: 36.7 (05 Sep 2024 15:45)  T(F): 97.9 (05 Sep 2024 21:30), Max: 98.1 (05 Sep 2024 15:45)  HR: 75 (05 Sep 2024 21:30) (69 - 76)  BP: 104/66 (05 Sep 2024 21:30) (103/68 - 108/90)  BP(mean): --  RR: 18 (05 Sep 2024 21:30) (16 - 20)  SpO2: 99% (05 Sep 2024 21:30) (99% - 100%)    Parameters below as of 05 Sep 2024 21:30  Patient On (Oxygen Delivery Method): room air        CAPILLARY BLOOD GLUCOSE        I&O's Summary      CONSTITUTIONAL: NAD  HEENT: NC/AT  RESPIRATORY: Normal respiratory effort; lungs are clear to auscultation bilaterally  CARDIOVASCULAR: Regular rate and rhythm, normal S1 and S2, no murmur/rub/gallop; No lower extremity edema;  ABDOMEN: Soft, non-distended, nontender to palpation, normoactive bowel sounds, no pain to percussion, no rebound/guarding  MUSCLOSKELETAL: no joint swelling or tenderness to palpation in b/l shoulders, knees, ankles, foot, hips  EXTREMITIES: no peripheral edema, distal pulses intact   SKIN: stable surgical wound overlying L sternoclavicular joint, no purulent discharge or tenderness to palpitation  NEURO: A&Ox3,  patient was observed ambulating independently with normal gait  PSYCH:  affect appropriate    LABS:                        12.8   11.80 )-----------( 340      ( 05 Sep 2024 10:15 )             40.1     09-05    134<L>  |  100  |  11  ----------------------------<  80  4.0   |  25  |  0.57    Ca    9.7      05 Sep 2024 10:15    TPro  7.3  /  Alb  4.5  /  TBili  0.2  /  DBili  x   /  AST  11  /  ALT  13  /  AlkPhos  135<H>  09-05    PT/INR - ( 05 Sep 2024 10:15 )   PT: 10.8 sec;   INR: 0.98 ratio         PTT - ( 05 Sep 2024 10:15 )  PTT:32.8 sec    LIVER FUNCTIONS - ( 05 Sep 2024 10:15 )  Alb: 4.5 g/dL / Pro: 7.3 g/dL / ALK PHOS: 135 U/L / ALT: 13 U/L / AST: 11 U/L / GGT: x               Urinalysis Basic - ( 05 Sep 2024 10:15 )    Color: x / Appearance: x / SG: x / pH: x  Gluc: 80 mg/dL / Ketone: x  / Bili: x / Urobili: x   Blood: x / Protein: x / Nitrite: x   Leuk Esterase: x / RBC: x / WBC x   Sq Epi: x / Non Sq Epi: x / Bacteria: x        Culture - Blood (collected 03 Sep 2024 17:15)  Source: .Blood Blood-Peripheral  Preliminary Report (05 Sep 2024 22:01):    No growth at 48 Hours    Culture - Blood (collected 03 Sep 2024 17:05)  Source: .Blood Blood-Peripheral  Preliminary Report (05 Sep 2024 22:01):    No growth at 48 Hours            IMAGING:    [X] All pertinent imaging reviewed by me

## 2024-09-06 NOTE — PROGRESS NOTE ADULT - SUBJECTIVE AND OBJECTIVE BOX
INTERVAL EVENTS: NAEO    SUBJECTIVE: Flap surgery discussed with the patient again. She is amenable to the surgery, but remains nervous and would like to discuss it further next week when going for her surgery with the CT team. The patient is being discharged today and will return next week for her I&D of her abscess.     --------------------------------------------------------------------------------------  VITAL SIGNS:  T(C): 36.6 (09-05-24 @ 21:30), Max: 36.7 (09-05-24 @ 15:45)  HR: 75 (09-05-24 @ 21:30) (69 - 76)  BP: 104/66 (09-05-24 @ 21:30) (103/68 - 108/90)  RR: 18 (09-05-24 @ 21:30) (16 - 20)  SpO2: 99% (09-05-24 @ 21:30) (99% - 100%)      --------------------------------------------------------------------------------------    EXAM    General: Well developed, well nourished, NAD  Neuro: Alert and oriented,  LUE: Keloid scar over medial aspect of prior incision over prior clavicle  some erythema, non tender, no expressible drainage at this time  ____________________________________________      --------------------------------------------------------------------------------------

## 2024-09-06 NOTE — PROGRESS NOTE ADULT - PROBLEM SELECTOR PLAN 5
#DVT prophylaxis   - held because of planned OR today    #Diet  - NPO at midnight, but now on regular diet as patient declined NPO    # Dispo  - d/c home with outpatient follow-up

## 2024-09-06 NOTE — PROGRESS NOTE ADULT - PROBLEM SELECTOR PLAN 3
Maintained at home with duloxetine 60mg QHS, sertraline 100mg QD. Also takes Klonopin as needed 0.5mg for anxiety  - c/w home duloxetine  - c/w home sertraline  - prn Clonazepam for anxiety.

## 2024-09-06 NOTE — PROGRESS NOTE ADULT - ASSESSMENT
This is a  44 y/o F with a PMH of multiple sclerosis, septic arthritis of left sternoclavicular joint status post I&D, wound VAC and 6-week course of cefepime completed in January 2024,  8/20/224 recent hospital admission  shoulder pain  where an MRI was done showing no evidence for active infection.  No antibiotics were given at the time.  And patient was discharged for follow-up.  Seen in clinic by ID on 8/27/2024  9/3  presents to the ED  due to worsening pain in the left shoulder on 9/3  underwent MRI and left AMA 9/4 9/5 Today presents to Ed sent in by PCP due to concerning finding - abcess on MRI . The findings   concerning for abscess in the area of previous infection and surgery.     Thoracic surgery recommends admission  ID consult antibiotics -  evaluation for possible surgical intervention.      .    
HPI:43F PMH MS with known L SCJ spetic arthritis sp i&D x2 in 12/23 with clavicle, partial first rib/manibrium resection, with  vac and completed 6 week course of abx in January 2024. presented with further left shoulder pain on 9/4 with MRI, now called back after MRI finding possible abscess. pt reports area is non painful however has been increasingly draining "yellow".  Had been following with Dr. Stearns PRS PRS consult for possible coverage    Plan  - return next week for surgery with thoracic team  - PRS may do muscle flap pending further discussions - discussed with pt and  risks and benefits of muscle flap coverage, including but not limited to cosmetic deformity that may improve with time, recurrence, scarring, bleeding - at this time pt has agreed to I&D of the abscess and would like more time to consider the pec flap surgery.      dw attending Dr. Dean  
42 y/o F with PMHx of  multiple sclerosis on rituxan, anxiety, depression, hx of septic arthritis of left sternoclavicular joint s/p I&Dx 2 with wound vac and 6 week course of cefepime (12/2023-1/2024), presented to ED with fluid collection noted in L sternoclavicular joint wound in setting of two weeks of worsening left shoulder pain. Patient originally scheduled for OR today, declined surgery and had PO fluids this AM and will reschedule next week.

## 2024-09-06 NOTE — DISCHARGE NOTE NURSING/CASE MANAGEMENT/SOCIAL WORK - NSDCPETBCESMAN_GEN_ALL_CORE
Silvia Mendoza, a 14 y.o. female presents to the ED w/ complaint of laceration to left upper arm s/p slip and fall. VSS and NADN.   If you are a smoker, it is important for your health to stop smoking. Please be aware that second hand smoke is also harmful.

## 2024-09-06 NOTE — PROGRESS NOTE ADULT - PROBLEM SELECTOR PLAN 2
admit to medicine    CBC Comp Type and screen PTT INR UA pregnancy test -  ID Consult- antibiotics  Thoracic surgery to follow   Care as per primary  plan d/w Dr Bautista
Maintained at home with q6 months Rituxan, dalfampridine 10mg BID, gemtesa 75mg QD, and modafinil 20mg QD  - c/w home dalfampridine  - c/w home gemtesa  - c/w home modafinil.

## 2024-09-06 NOTE — PROGRESS NOTE ADULT - PROBLEM SELECTOR PLAN 1
Presenting with raised, erythematous skin wound overlying L sternoclavicular joint, s/p debridement and 6 weeks of cefepime for abscess in Dec 2023-Jan 2024. Has since following with ID, CT surg, and plastics outpatient. 2 weeks prior to current admission, reports wound has worsen progressive with purulent drainage and associated L shoulder pain.  - MRI from 9/4 showing rim-enhancing fluid and surrounding phlegmon concerning for abscess with mild vague internal bone marrow edema within the clavicular (acute osteomyelitis?) and regional lymphadenopathy, likely reactive.  - appreciate ID recs  - appreciate CT surgery recs  - as discussed with ID, CT surgery, and plastics, patient is scheduled for debridement and washout with CT surgery with combined muscle flap by plastics, however was canceled as patient declined. Will reschedule for later time, patient will follow up with thoracic surgery in clinic on Monday.  - WBC mildly elevated to 11.8k  - coags wnl  - trend CBC, BMP daily   - T&S done  - BCx from prior admission neg at 48 hours, pending new Bcx
admit to medicine    CBC Comp Type and screen PTT INR UA pregnancy test -  ID Consult- antibiotics  Thoracic surgery to follow [id number]  Care as per primary  plan d/w Dr Bautista

## 2024-09-06 NOTE — DISCHARGE NOTE NURSING/CASE MANAGEMENT/SOCIAL WORK - PATIENT PORTAL LINK FT
You can access the FollowMyHealth Patient Portal offered by [facility] by registering at the following website: [email or web address]. By joining [facility]’s FollowMyHealth portal, you will also be able to view your health information using other applications (apps) compatible with our system.

## 2024-09-08 ENCOUNTER — TRANSCRIPTION ENCOUNTER (OUTPATIENT)
Age: 44
End: 2024-09-08

## 2024-09-09 ENCOUNTER — APPOINTMENT (OUTPATIENT)
Dept: THORACIC SURGERY | Facility: HOSPITAL | Age: 44
End: 2024-09-09

## 2024-09-09 ENCOUNTER — INPATIENT (INPATIENT)
Facility: HOSPITAL | Age: 44
LOS: 3 days | Discharge: HOME HEALTH | DRG: 951 | End: 2024-09-13
Attending: STUDENT IN AN ORGANIZED HEALTH CARE EDUCATION/TRAINING PROGRAM | Admitting: STUDENT IN AN ORGANIZED HEALTH CARE EDUCATION/TRAINING PROGRAM
Payer: COMMERCIAL

## 2024-09-09 ENCOUNTER — APPOINTMENT (OUTPATIENT)
Dept: THORACIC SURGERY | Facility: CLINIC | Age: 44
End: 2024-09-09

## 2024-09-09 ENCOUNTER — RESULT REVIEW (OUTPATIENT)
Age: 44
End: 2024-09-09

## 2024-09-09 VITALS
SYSTOLIC BLOOD PRESSURE: 131 MMHG | RESPIRATION RATE: 18 BRPM | DIASTOLIC BLOOD PRESSURE: 55 MMHG | HEART RATE: 85 BPM | OXYGEN SATURATION: 98 % | HEIGHT: 66 IN | WEIGHT: 130.07 LBS | TEMPERATURE: 98 F

## 2024-09-09 DIAGNOSIS — M00.9 PYOGENIC ARTHRITIS, UNSPECIFIED: Chronic | ICD-10-CM

## 2024-09-09 DIAGNOSIS — F41.9 ANXIETY DISORDER, UNSPECIFIED: ICD-10-CM

## 2024-09-09 DIAGNOSIS — S82.201A UNSPECIFIED FRACTURE OF SHAFT OF RIGHT TIBIA, INITIAL ENCOUNTER FOR CLOSED FRACTURE: Chronic | ICD-10-CM

## 2024-09-09 DIAGNOSIS — M86.8X9: ICD-10-CM

## 2024-09-09 DIAGNOSIS — Z98.891 HISTORY OF UTERINE SCAR FROM PREVIOUS SURGERY: Chronic | ICD-10-CM

## 2024-09-09 DIAGNOSIS — S72.91XA UNSPECIFIED FRACTURE OF RIGHT FEMUR, INITIAL ENCOUNTER FOR CLOSED FRACTURE: Chronic | ICD-10-CM

## 2024-09-09 DIAGNOSIS — Z01.818 ENCOUNTER FOR OTHER PREPROCEDURAL EXAMINATION: ICD-10-CM

## 2024-09-09 DIAGNOSIS — L02.91 CUTANEOUS ABSCESS, UNSPECIFIED: ICD-10-CM

## 2024-09-09 DIAGNOSIS — G35 MULTIPLE SCLEROSIS: ICD-10-CM

## 2024-09-09 DIAGNOSIS — Z87.828 PERSONAL HISTORY OF OTHER (HEALED) PHYSICAL INJURY AND TRAUMA: ICD-10-CM

## 2024-09-09 LAB
ALBUMIN SERPL ELPH-MCNC: 3.8 G/DL — SIGNIFICANT CHANGE UP (ref 3.3–5)
ALP SERPL-CCNC: 124 U/L — HIGH (ref 40–120)
ALT FLD-CCNC: 17 U/L — SIGNIFICANT CHANGE UP (ref 10–45)
ANION GAP SERPL CALC-SCNC: 11 MMOL/L — SIGNIFICANT CHANGE UP (ref 5–17)
APTT BLD: 16.6 SEC — LOW (ref 24.5–35.6)
AST SERPL-CCNC: 26 U/L — SIGNIFICANT CHANGE UP (ref 10–40)
BILIRUB SERPL-MCNC: 0.2 MG/DL — SIGNIFICANT CHANGE UP (ref 0.2–1.2)
BLD GP AB SCN SERPL QL: POSITIVE — SIGNIFICANT CHANGE UP
BUN SERPL-MCNC: 12 MG/DL — SIGNIFICANT CHANGE UP (ref 7–23)
CALCIUM SERPL-MCNC: 9.2 MG/DL — SIGNIFICANT CHANGE UP (ref 8.4–10.5)
CHLORIDE SERPL-SCNC: 102 MMOL/L — SIGNIFICANT CHANGE UP (ref 96–108)
CO2 SERPL-SCNC: 22 MMOL/L — SIGNIFICANT CHANGE UP (ref 22–31)
CREAT SERPL-MCNC: 0.51 MG/DL — SIGNIFICANT CHANGE UP (ref 0.5–1.3)
EGFR: 118 ML/MIN/1.73M2 — SIGNIFICANT CHANGE UP
GAS PNL BLDA: SIGNIFICANT CHANGE UP
GLUCOSE SERPL-MCNC: 96 MG/DL — SIGNIFICANT CHANGE UP (ref 70–99)
HCG SERPL-ACNC: <2 MIU/ML — SIGNIFICANT CHANGE UP
HCT VFR BLD CALC: 35.5 % — SIGNIFICANT CHANGE UP (ref 34.5–45)
HEPARINASE TEG R TIME: 5.2 MIN — SIGNIFICANT CHANGE UP (ref 4.3–8.3)
HGB BLD-MCNC: 11 G/DL — LOW (ref 11.5–15.5)
INR BLD: 0.95 RATIO — SIGNIFICANT CHANGE UP (ref 0.85–1.18)
MCHC RBC-ENTMCNC: 27.4 PG — SIGNIFICANT CHANGE UP (ref 27–34)
MCHC RBC-ENTMCNC: 31 GM/DL — LOW (ref 32–36)
MCV RBC AUTO: 88.3 FL — SIGNIFICANT CHANGE UP (ref 80–100)
NRBC # BLD: 0 /100 WBCS — SIGNIFICANT CHANGE UP (ref 0–0)
PLATELET # BLD AUTO: 228 K/UL — SIGNIFICANT CHANGE UP (ref 150–400)
POTASSIUM SERPL-MCNC: 4.9 MMOL/L — SIGNIFICANT CHANGE UP (ref 3.5–5.3)
POTASSIUM SERPL-SCNC: 4.9 MMOL/L — SIGNIFICANT CHANGE UP (ref 3.5–5.3)
PROT SERPL-MCNC: 6.5 G/DL — SIGNIFICANT CHANGE UP (ref 6–8.3)
PROTHROM AB SERPL-ACNC: 10 SEC — SIGNIFICANT CHANGE UP (ref 9.5–13)
RAPIDTEG MAXIMUM AMPLITUDE: 67.9 MM — SIGNIFICANT CHANGE UP (ref 52–70)
RBC # BLD: 4.02 M/UL — SIGNIFICANT CHANGE UP (ref 3.8–5.2)
RBC # FLD: 13.1 % — SIGNIFICANT CHANGE UP (ref 10.3–14.5)
RH IG SCN BLD-IMP: POSITIVE — SIGNIFICANT CHANGE UP
SODIUM SERPL-SCNC: 135 MMOL/L — SIGNIFICANT CHANGE UP (ref 135–145)
TEG FUNCTIONAL FIBRINOGEN: 26 MM — SIGNIFICANT CHANGE UP (ref 15–32)
TEG MAXIMUM AMPLITUDE: 66.7 MM — SIGNIFICANT CHANGE UP (ref 52–69)
TEG REACTION TIME: 6 MIN — SIGNIFICANT CHANGE UP (ref 4.6–9.1)
WBC # BLD: 11.34 K/UL — HIGH (ref 3.8–10.5)
WBC # FLD AUTO: 11.34 K/UL — HIGH (ref 3.8–10.5)

## 2024-09-09 PROCEDURE — 36415 COLL VENOUS BLD VENIPUNCTURE: CPT

## 2024-09-09 PROCEDURE — 85610 PROTHROMBIN TIME: CPT

## 2024-09-09 PROCEDURE — 85730 THROMBOPLASTIN TIME PARTIAL: CPT

## 2024-09-09 PROCEDURE — 80053 COMPREHEN METABOLIC PANEL: CPT

## 2024-09-09 PROCEDURE — 71045 X-RAY EXAM CHEST 1 VIEW: CPT | Mod: 26

## 2024-09-09 PROCEDURE — 81001 URINALYSIS AUTO W/SCOPE: CPT

## 2024-09-09 PROCEDURE — 86850 RBC ANTIBODY SCREEN: CPT

## 2024-09-09 PROCEDURE — 85025 COMPLETE CBC W/AUTO DIFF WBC: CPT

## 2024-09-09 PROCEDURE — 87086 URINE CULTURE/COLONY COUNT: CPT

## 2024-09-09 PROCEDURE — 15734 MUSCLE-SKIN GRAFT TRUNK: CPT

## 2024-09-09 PROCEDURE — 86880 COOMBS TEST DIRECT: CPT

## 2024-09-09 PROCEDURE — 99285 EMERGENCY DEPT VISIT HI MDM: CPT | Mod: 25

## 2024-09-09 PROCEDURE — 86901 BLOOD TYPING SEROLOGIC RH(D): CPT

## 2024-09-09 PROCEDURE — 88312 SPECIAL STAINS GROUP 1: CPT | Mod: 26

## 2024-09-09 PROCEDURE — 86922 COMPATIBILITY TEST ANTIGLOB: CPT

## 2024-09-09 PROCEDURE — 99285 EMERGENCY DEPT VISIT HI MDM: CPT

## 2024-09-09 PROCEDURE — 86870 RBC ANTIBODY IDENTIFICATION: CPT

## 2024-09-09 PROCEDURE — 11046 DBRDMT MUSC&/FSCA EA ADDL: CPT | Mod: 59

## 2024-09-09 PROCEDURE — 88305 TISSUE EXAM BY PATHOLOGIST: CPT | Mod: 26

## 2024-09-09 PROCEDURE — 23120 CLAVICULECTOMY PARTIAL: CPT | Mod: 22,LT

## 2024-09-09 PROCEDURE — 86900 BLOOD TYPING SEROLOGIC ABO: CPT

## 2024-09-09 PROCEDURE — 87040 BLOOD CULTURE FOR BACTERIA: CPT

## 2024-09-09 PROCEDURE — 84702 CHORIONIC GONADOTROPIN TEST: CPT

## 2024-09-09 PROCEDURE — 11043 DBRDMT MUSC&/FSCA 1ST 20/<: CPT | Mod: 59

## 2024-09-09 PROCEDURE — 99222 1ST HOSP IP/OBS MODERATE 55: CPT

## 2024-09-09 PROCEDURE — 99223 1ST HOSP IP/OBS HIGH 75: CPT

## 2024-09-09 PROCEDURE — 86077 PHYS BLOOD BANK SERV XMATCH: CPT

## 2024-09-09 DEVICE — LIGATING CLIPS WECK HORIZON MEDIUM (BLUE) 24: Type: IMPLANTABLE DEVICE | Status: FUNCTIONAL

## 2024-09-09 DEVICE — KIT A-LINE 1LUM 20G X 12CM SAFE KIT: Type: IMPLANTABLE DEVICE | Status: FUNCTIONAL

## 2024-09-09 DEVICE — CLIP APPLIER COVIDIEN SURGICLIP III 9" SM: Type: IMPLANTABLE DEVICE | Status: FUNCTIONAL

## 2024-09-09 DEVICE — AGENT HEMOSTATIC HEMOBLAST 1.65G 10CM: Type: IMPLANTABLE DEVICE | Status: FUNCTIONAL

## 2024-09-09 DEVICE — LIGATING CLIPS WECK HORIZON SMALL-WIDE (RED) 24: Type: IMPLANTABLE DEVICE | Status: FUNCTIONAL

## 2024-09-09 DEVICE — LIGATING CLIPS WECK HORIZON LARGE (ORANGE) 6: Type: IMPLANTABLE DEVICE | Status: FUNCTIONAL

## 2024-09-09 RX ORDER — HYDROMORPHONE HYDROCHLORIDE 2 MG/1
0.5 TABLET ORAL
Refills: 0 | Status: DISCONTINUED | OUTPATIENT
Start: 2024-09-09 | End: 2024-09-09

## 2024-09-09 RX ORDER — CLONAZEPAM 1 MG
0.5 TABLET ORAL DAILY
Refills: 0 | Status: DISCONTINUED | OUTPATIENT
Start: 2024-09-09 | End: 2024-09-09

## 2024-09-09 RX ORDER — VANCOMYCIN/0.9 % SOD CHLORIDE 1.75G/25
1000 PLASTIC BAG, INJECTION (ML) INTRAVENOUS EVERY 12 HOURS
Refills: 0 | Status: DISCONTINUED | OUTPATIENT
Start: 2024-09-09 | End: 2024-09-11

## 2024-09-09 RX ORDER — BACLOFEN 0.5 MG/ML
5 INJECTION INTRATHECAL EVERY 8 HOURS
Refills: 0 | Status: DISCONTINUED | OUTPATIENT
Start: 2024-09-09 | End: 2024-09-09

## 2024-09-09 RX ORDER — MEROPENEM 500 MG/10ML
1000 INJECTION, POWDER, FOR SOLUTION INTRAVENOUS EVERY 8 HOURS
Refills: 0 | Status: DISCONTINUED | OUTPATIENT
Start: 2024-09-09 | End: 2024-09-13

## 2024-09-09 RX ORDER — ACETAMINOPHEN 325 MG/1
975 TABLET ORAL EVERY 6 HOURS
Refills: 0 | Status: DISCONTINUED | OUTPATIENT
Start: 2024-09-09 | End: 2024-09-13

## 2024-09-09 RX ORDER — ACETAMINOPHEN 325 MG/1
1000 TABLET ORAL ONCE
Refills: 0 | Status: COMPLETED | OUTPATIENT
Start: 2024-09-09 | End: 2024-09-09

## 2024-09-09 RX ORDER — NALOXONE HCL 1 MG/ML
0.1 VIAL (ML) INJECTION
Refills: 0 | Status: DISCONTINUED | OUTPATIENT
Start: 2024-09-09 | End: 2024-09-11

## 2024-09-09 RX ORDER — SERTRALINE HYDROCHLORIDE 50 MG/1
100 TABLET, FILM COATED ORAL DAILY
Refills: 0 | Status: DISCONTINUED | OUTPATIENT
Start: 2024-09-09 | End: 2024-09-11

## 2024-09-09 RX ORDER — DULOXETINE HCL 30 MG
60 CAPSULE,DELAYED RELEASE (ENTERIC COATED) ORAL DAILY
Refills: 0 | Status: DISCONTINUED | OUTPATIENT
Start: 2024-09-09 | End: 2024-09-13

## 2024-09-09 RX ORDER — MODAFINIL 200 MG/1
200 TABLET ORAL DAILY
Refills: 0 | Status: DISCONTINUED | OUTPATIENT
Start: 2024-09-09 | End: 2024-09-11

## 2024-09-09 RX ORDER — MODAFINIL 200 MG/1
200 TABLET ORAL DAILY
Refills: 0 | Status: DISCONTINUED | OUTPATIENT
Start: 2024-09-09 | End: 2024-09-09

## 2024-09-09 RX ORDER — DULOXETINE HCL 30 MG
60 CAPSULE,DELAYED RELEASE (ENTERIC COATED) ORAL DAILY
Refills: 0 | Status: DISCONTINUED | OUTPATIENT
Start: 2024-09-09 | End: 2024-09-09

## 2024-09-09 RX ORDER — BACLOFEN 0.5 MG/ML
5 INJECTION INTRATHECAL EVERY 8 HOURS
Refills: 0 | Status: DISCONTINUED | OUTPATIENT
Start: 2024-09-09 | End: 2024-09-13

## 2024-09-09 RX ORDER — ONDANSETRON 2 MG/ML
4 INJECTION, SOLUTION INTRAMUSCULAR; INTRAVENOUS ONCE
Refills: 0 | Status: DISCONTINUED | OUTPATIENT
Start: 2024-09-09 | End: 2024-09-09

## 2024-09-09 RX ORDER — SERTRALINE HYDROCHLORIDE 50 MG/1
100 TABLET, FILM COATED ORAL DAILY
Refills: 0 | Status: DISCONTINUED | OUTPATIENT
Start: 2024-09-09 | End: 2024-09-09

## 2024-09-09 RX ORDER — ENOXAPARIN SODIUM 100 MG/ML
40 INJECTION SUBCUTANEOUS EVERY 24 HOURS
Refills: 0 | Status: DISCONTINUED | OUTPATIENT
Start: 2024-09-09 | End: 2024-09-09

## 2024-09-09 RX ORDER — HYDROMORPHONE HYDROCHLORIDE 2 MG/1
30 TABLET ORAL
Refills: 0 | Status: DISCONTINUED | OUTPATIENT
Start: 2024-09-09 | End: 2024-09-11

## 2024-09-09 RX ORDER — HYDROMORPHONE HYDROCHLORIDE 2 MG/1
0.5 TABLET ORAL
Refills: 0 | Status: DISCONTINUED | OUTPATIENT
Start: 2024-09-09 | End: 2024-09-11

## 2024-09-09 RX ORDER — CLONAZEPAM 1 MG
0.5 TABLET ORAL DAILY
Refills: 0 | Status: DISCONTINUED | OUTPATIENT
Start: 2024-09-09 | End: 2024-09-13

## 2024-09-09 RX ORDER — SODIUM CHLORIDE 9 MG/ML
1000 INJECTION INTRAMUSCULAR; INTRAVENOUS; SUBCUTANEOUS
Refills: 0 | Status: DISCONTINUED | OUTPATIENT
Start: 2024-09-09 | End: 2024-09-09

## 2024-09-09 RX ORDER — ONDANSETRON 2 MG/ML
4 INJECTION, SOLUTION INTRAMUSCULAR; INTRAVENOUS EVERY 6 HOURS
Refills: 0 | Status: DISCONTINUED | OUTPATIENT
Start: 2024-09-09 | End: 2024-09-11

## 2024-09-09 RX ORDER — HYDROMORPHONE HYDROCHLORIDE 2 MG/1
0.25 TABLET ORAL
Refills: 0 | Status: DISCONTINUED | OUTPATIENT
Start: 2024-09-09 | End: 2024-09-09

## 2024-09-09 RX ADMIN — HYDROMORPHONE HYDROCHLORIDE 0.5 MILLIGRAM(S): 2 TABLET ORAL at 20:00

## 2024-09-09 RX ADMIN — HYDROMORPHONE HYDROCHLORIDE 0.5 MILLIGRAM(S): 2 TABLET ORAL at 20:15

## 2024-09-09 RX ADMIN — HYDROMORPHONE HYDROCHLORIDE 0.25 MILLIGRAM(S): 2 TABLET ORAL at 20:45

## 2024-09-09 RX ADMIN — ACETAMINOPHEN 1000 MILLIGRAM(S): 325 TABLET ORAL at 02:57

## 2024-09-09 RX ADMIN — HYDROMORPHONE HYDROCHLORIDE 0.25 MILLIGRAM(S): 2 TABLET ORAL at 20:30

## 2024-09-09 RX ADMIN — HYDROMORPHONE HYDROCHLORIDE 0.5 MILLIGRAM(S): 2 TABLET ORAL at 19:45

## 2024-09-09 RX ADMIN — Medication 0.5 MILLIGRAM(S): at 14:20

## 2024-09-09 RX ADMIN — MODAFINIL 200 MILLIGRAM(S): 200 TABLET ORAL at 11:24

## 2024-09-09 RX ADMIN — HYDROMORPHONE HYDROCHLORIDE 30 MILLILITER(S): 2 TABLET ORAL at 23:45

## 2024-09-09 RX ADMIN — ACETAMINOPHEN 400 MILLIGRAM(S): 325 TABLET ORAL at 02:27

## 2024-09-09 RX ADMIN — ACETAMINOPHEN 400 MILLIGRAM(S): 325 TABLET ORAL at 12:07

## 2024-09-09 RX ADMIN — SERTRALINE HYDROCHLORIDE 100 MILLIGRAM(S): 50 TABLET, FILM COATED ORAL at 11:27

## 2024-09-09 RX ADMIN — Medication 60 MILLIGRAM(S): at 11:24

## 2024-09-09 RX ADMIN — HYDROMORPHONE HYDROCHLORIDE 30 MILLILITER(S): 2 TABLET ORAL at 22:31

## 2024-09-09 RX ADMIN — ACETAMINOPHEN 1000 MILLIGRAM(S): 325 TABLET ORAL at 02:45

## 2024-09-09 RX ADMIN — SODIUM CHLORIDE 50 MILLILITER(S): 9 INJECTION INTRAMUSCULAR; INTRAVENOUS; SUBCUTANEOUS at 06:38

## 2024-09-09 RX ADMIN — Medication 75 MILLILITER(S): at 20:00

## 2024-09-09 RX ADMIN — HYDROMORPHONE HYDROCHLORIDE 30 MILLILITER(S): 2 TABLET ORAL at 21:00

## 2024-09-09 RX ADMIN — ACETAMINOPHEN 1000 MILLIGRAM(S): 325 TABLET ORAL at 12:22
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
## 2024-09-09 NOTE — H&P ADULT - NSHPLABSRESULTS_GEN_ALL_CORE
11.0   11.34 )-----------( 228      ( 09 Sep 2024 02:19 )             35.5   09-09    135  |  102  |  12  ----------------------------<  96  4.9   |  22  |  0.51    Ca    9.2      09 Sep 2024 02:45    TPro  6.5  /  Alb  3.8  /  TBili  0.2  /  DBili  x   /  AST  26  /  ALT  17  /  AlkPhos  124<H>  09-09

## 2024-09-09 NOTE — H&P ADULT - PROBLEM SELECTOR PLAN 1
raised, erythematous skin wound overlying L sternoclavicular joint  s/p debridement and 6 weeks of cefepime Dec 2023-Jan 202  - MRI L shoulder from 9/4 showing rim-enhancing fluid and surrounding phlegmon concerning for abscess with mild vague internal bone marrow edema within the clavicular (acute osteomyelitis?) and regional lymphadenopathy, likely reactive.  - pt previously scheduled for debridement and washout with CT surgery and combined muscle flap by plastics, however was canceled as patient declined  - blood cx from last admission -ve  - f/u ID, CT and plastic surgery recs raised, erythematous skin wound overlying L sternoclavicular joint  s/p debridement and 6 weeks of cefepime Dec 2023-Jan 202  - MRI L shoulder from 9/4 showing rim-enhancing fluid and surrounding phlegmon concerning for abscess with mild vague internal bone marrow edema within the clavicular (acute osteomyelitis?) and regional lymphadenopathy, likely reactive.  - pt previously scheduled for debridement and washout with CT surgery and combined muscle flap by plastics, however was canceled as patient declined  - blood cx from last admission -ve  - f/u CT and plastic surgery recs  - planned for surgery today

## 2024-09-09 NOTE — PRE PROCEDURE NOTE - PRE PROCEDURE EVALUATION
patient for re - resection of left scj - debridement with possible rib resection/muscle flap with Dr. Dean -plastics.   discussed risks of surgery including and not limited to bleeding, worsening infection, scarring, injury to surrounding structures. pain. expressed understanding and agreeable to proceed. worsening redness extending from wound

## 2024-09-09 NOTE — H&P ADULT - NSICDXPASTSURGICALHX_GEN_ALL_CORE_FT
PAST SURGICAL HISTORY:  Fracture of both femurs     Fracture of right tibia     History of      Septic arthritis of left sternoclavicular joint     
PAST SURGICAL HISTORY:  Fracture of both femurs     Fracture of right tibia     History of      Septic arthritis of left sternoclavicular joint

## 2024-09-09 NOTE — ED ADULT NURSE NOTE - NSFALLASSISTNEEDED_ED_ALL_ED
You were admitted because of slurred speech. CT head and MRI head/neck did not reveal any evidence of stroke. UA was negative for infection. Your symptoms are thought to be related to a side effect from gabapentin. We discussed that your 10 year risk of heart disease is low. As such, we decided to not treat with aspirin or statin. Please see your PCP for further management.     Your MRI did reveal a small area which potentially represents a cavernoma. This likely did not cause your symptoms. We recommend that you repeat an MRI brain with contrast in 3 months. See below for full report.    MRI BRAIN IMPRESSION:  1. No evidence of acute infarction.  2. 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  Short-term follow-up MRI brain without and with intravenous contrast in 3 months  is recommended.  3. No high-grade stenosis of the major intracranial arteries.  4. No high-grade stenosis of the cervical carotid or vertebral arteries.   Standing/Walking

## 2024-09-09 NOTE — PATIENT PROFILE ADULT - FALL HARM RISK - RISK INTERVENTIONS

## 2024-09-09 NOTE — H&P ADULT - HISTORY OF PRESENT ILLNESS
43y Female with PMH of MS on rituxan, anxiety, depression, septic arthritis of L sternoclavicular joint s/p I&Dx2 and cefepime for 6 weeks (12/2023-1/2024), recent admission for muscular abscess around L clavicle (offered surgery, declined) presents from home with persistent L clavicular pain. Report she did not want to have surgery last admission few days ago due to daughter's engagement, however returns not to have the surgery. Does not endorse any fever, chills, neurological deficits, nausea, vomiting, chest pain, palpitations, shortness of breathe, abdominal pain, hematochezia, melena, hematemesis, diarrhea currently 43y Female with PMH of MS on rituxan, anxiety, depression, septic arthritis of L sternoclavicular joint s/p I&Dx2 and cefepime for 6 weeks (12/2023-1/2024), recent admission for muscular abscess around L clavicle (offered surgery, declined) presents from home with persistent L clavicular pain. Report she did not want to have surgery last admission few days ago due to daughter's engagement, however returned to have the surgery. Does not endorse any fever, chills, neurological deficits, nausea, vomiting, chest pain, palpitations, shortness of breathe, abdominal pain, hematochezia, melena, hematemesis, diarrhea currently

## 2024-09-09 NOTE — ED ADULT NURSE NOTE - NSFALLHARMRISKINTERV_ED_ALL_ED
Assistance OOB with selected safe patient handling equipment if applicable/Assistance with ambulation/Communicate risk of Fall with Harm to all staff, patient, and family/Monitor gait and stability/Provide visual cue: red socks, yellow wristband, yellow gown, etc/Reinforce activity limits and safety measures with patient and family/Bed in lowest position, wheels locked, appropriate side rails in place/Call bell, personal items and telephone in reach/Instruct patient to call for assistance before getting out of bed/chair/stretcher/Non-slip footwear applied when patient is off stretcher/[place] to call system/Physically safe environment - no spills, clutter or unnecessary equipment/Purposeful Proactive Rounding/Room/bathroom lighting operational, light cord in reach

## 2024-09-09 NOTE — PATIENT PROFILE ADULT - FUNCTIONAL ASSESSMENT - BASIC MOBILITY 6.
3-calculated by average/Not able to assess (calculate score using [facility] averaging method)

## 2024-09-09 NOTE — ED PROVIDER NOTE - PROGRESS NOTE DETAILS
Spoke to thoracic surgery ACP on call, will see patient in the ED but stated will likely be delayed 2/2 multiple surgery cases. -Marcia Johnson MD (Attending) Seen by thoracic surgery, patient scheduled for OR today later this afternoon, was supposed to present to surgery directly for same-day admission. Pending final recs on admission for procedure later today. -Marcia Johnson MD (Attending) Patient initially accepted to thoracic surgery for admission for OR later today. Subsequently discussed case with ED ANM and thoracic surgery daytime ACP, now clarifying that patient was supposed to present for same-day surgery and not to the ED. Currently not recommending primary admission to thoracic surgery. Accepted to medicine for admission. -Marcia Johnson MD (Attending)

## 2024-09-09 NOTE — H&P ADULT - PROBLEM SELECTOR PLAN 2
Maintained at home with q6 months Rituxan, dalfampridine 10mg BID, gemtesa 75mg QD, and modafinil 20mg QD  - c/w home dalfampridine  - c/w home gemtesa  - c/w home modafinil.

## 2024-09-09 NOTE — CHART NOTE - NSCHARTNOTEFT_GEN_A_CORE
Received brief signout from surgery team for this patient on the hospitalist service with recommendations as follows:  - Hold DVT ppx for at least 24 hours  - Baby aspirin 81mg to start tomorrow AM  - Pt to be on PCA pump (already ordered)  - Pt to be on abx per ID recommendations (meropenem and vancomycin already ordered)  - Tylenol can be given for pain control/fevers, NO NSAIDs  - Plastics or CTS to follow for dressing changes/assessment

## 2024-09-09 NOTE — H&P ADULT - NSHPPHYSICALEXAM_GEN_ALL_CORE
Gen: well appearing, NAD  Neuro: non-focal  Psych: pleasant, conversive  HEENT: no conjunctivitis  Cardiac: regular rate rhythm, normal S1S2  Chest: CTA BL, no wheeze or crackles  Abdomen: normal BS, soft, NT  Extremity: no gross deformity, good perfusion  Skin: Keloid scar over medial aspect of prior incision over prior clavicle  some erythema, non tender, no expressible drainage at this time

## 2024-09-09 NOTE — CHART NOTE - NSCHARTNOTEFT_GEN_A_CORE
Patient Demographic Information (PDI)       PDI	First Name	Last Name	Birth Date	Gender	Street Address	[facility] Code  A	Greta West	1980	Female	[address] [place]	[id number]  B	Greta West	1980	Female	[address] [place]	[id number]  C	Greta West	1980	Female	[address] [facility]	[id number]    Prescription Information      PDI Filter:    PDI	Current Rx	Drug Type	Rx Written	Rx Dispensed	Drug	Quantity	Days Supply	Prescriber Name	Prescriber YESICA #	Payment Method	Dispenser  A	Y	B	08/13/2024	08/23/2024	clonazepam 0.5 mg tablet	30	30	BrianKendell MD	[id number]	[facility]  B	Y	S	07/08/2024	07/26/2024	modafinil 200 mg tablet	90	90	BrianKendell MD	[id number]	[facility]  C	N	B	06/24/2024	07/08/2024	clonazepam 0.5 mg tablet	30	30	BiranKendell MD	[id number]	[facility]  C	N	B	05/31/2024	05/31/2024	clonazepam 0.5 mg tablet	30	30	BrianKendell MD	[id number]	[facility]  B	N	S	05/06/2024	05/08/2024	modafinil 200 mg tablet	90	90	BrianKendell MD	[id number]	[facility]  C	N	B	04/17/2024	04/19/2024	clonazepam 0.5 mg tablet	30	30	BrianKendell MD	[id number]	[facility]  C	N	B	03/14/2024	03/19/2024	clonazepam 0.5 mg tablet	30	30	BrianKendell MD	[id number]	[facility]  C	N	B	01/22/2024	01/22/2024	clonazepam 0.5 mg tablet	30	30	BrianKendell MD	[id number]	[facility]  B	N	S	01/19/2024	01/22/2024	modafinil 200 mg tablet	90	90	BrianKendell MD	[id number]	[facility]  C	N	O	01/19/2024	01/19/2024	hydromorphone 2 mg tablet	16	3	Justino Sharpe	[id number]	[facility]
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
## 2024-09-09 NOTE — ED PROVIDER NOTE - ATTENDING CONTRIBUTION TO CARE
I was the supervising attending. I have independently seen face-to-face and examined the patient with the resident. I have reviewed the history and physical and discussed the MDM with the resident. I agree with the assessment and plan as presented unless otherwise documented as follows:    43F hx multiple sclerosis presenting for planned thoracic surgery procedure for L sternoclavicular joint debridement/washout and muscle flap reconstruction. Patient with prior history of septic arthritis of same area, required multiple I&D, washout, wound vac, prolonged antibiotics. Was most recently admitted due to possible recurrent abscess, was recommended for OR but AMA'ed due to family event, is now re-presenting for planned procedure. Endorses continued chronic pain to the same area. Otherwise denies fevers/chills, focal weakness/paresthesias. Will obtain labs including pre-op workup, speak to thoracic surgery about plan for OR. -Marcia Johnson MD (Attending)

## 2024-09-09 NOTE — ED PROVIDER NOTE - CLINICAL SUMMARY MEDICAL DECISION MAKING FREE TEXT BOX
43-year-old female with PMH multiple sclerosis (unsteady gait w freq/recurrent falls, urinary incontinence) on rituxan, anxiety, depression, hx of septic arthritis of left sternoclavicular joint s/p I&Dx 2 with wound vac and 6 week course of cefepime (12/2023-1/2024) further left shoulder pain on 9/4 with MRI, called back after MRI finding possible abscess following with Dr. Stearns with recent admission from September 5 assessed timbre sixth for the abscess that was supposed to get a muscle flap but ultimately declined with the surgery originally scheduled for debridement and washout with CT surgery and combine muscle flap by plastic surgery and then rescheduled for next week presents today to get the surgery.  Patient states that she originally declined the surgery because her daughter was getting an engagement.  Patient returns stating that she still has pain in the area but not any worse than before and denies any fevers.    Vital Signs Stable  Gen: well appearing, NAD  HEENT: no conjunctivitis  Cardiac: regular rate rhythm, normal S1S2  Chest: CTA BL, no wheeze or crackles  Abdomen: normal BS, soft, NT  Extremity: no gross deformity, good perfusion  Skin: Keloid scar over medial aspect of prior incision over prior clavicle  some erythema, non tender, no expressible drainage at this time  Neuro: grossly normal    Will obtain labs and consult CT surgery to admit patient.

## 2024-09-09 NOTE — ED ADULT NURSE REASSESSMENT NOTE - NS ED NURSE REASSESS COMMENT FT1
pt received sleeping, easily to arouse "I need a Xanax, I need to sleep" pt alerted and oriented x3, no sings of acute distress noted.

## 2024-09-09 NOTE — H&P ADULT - NSHPPHYSICALEXAM_GEN_ALL_CORE
PHYSICAL EXAM  Vital Signs Last 24 Hrs  T(C): 36.8 (09 Sep 2024 08:06), Max: 36.8 (09 Sep 2024 08:06)  T(F): 98.2 (09 Sep 2024 08:06), Max: 98.2 (09 Sep 2024 08:06)  HR: 72 (09 Sep 2024 08:06) (65 - 85)  BP: 106/71 (09 Sep 2024 08:06) (106/55 - 131/55)  BP(mean): 74 (09 Sep 2024 06:56) (72 - 74)  RR: 18 (09 Sep 2024 08:06) (17 - 18)  SpO2: 96% (09 Sep 2024 08:06) (96% - 100%)    Parameters below as of 09 Sep 2024 08:06  Patient On (Oxygen Delivery Method): room air        CONSTITUTIONAL: Well-groomed, in no apparent distress;  EYES: No conjunctival or scleral injection, non-icteric;  ENMT: No external nasal lesions; Normal outer ears;  NECK: Trachea midline;  RESPIRATORY: Normal respiratory effort;   CARDIOVASCULAR: Regular rate and rhythm;  GASTROINTESTINAL: Non-distended;   EXTREMITIES:  No lower extremity edema; L clavicle joint tenderness, erythema  NEUROLOGY: A+O to person, place, and time; Does respond to commands appropriately;  PSYCHIATRY: Mood and Affect appropriate PHYSICAL EXAM  Vital Signs Last 24 Hrs  T(C): 36.8 (09 Sep 2024 08:06), Max: 36.8 (09 Sep 2024 08:06)  T(F): 98.2 (09 Sep 2024 08:06), Max: 98.2 (09 Sep 2024 08:06)  HR: 72 (09 Sep 2024 08:06) (65 - 85)  BP: 106/71 (09 Sep 2024 08:06) (106/55 - 131/55)  BP(mean): 74 (09 Sep 2024 06:56) (72 - 74)  RR: 18 (09 Sep 2024 08:06) (17 - 18)  SpO2: 96% (09 Sep 2024 08:06) (96% - 100%)    Parameters below as of 09 Sep 2024 08:06  Patient On (Oxygen Delivery Method): room air        CONSTITUTIONAL: Well-groomed, in no apparent distress;  EYES: No conjunctival or scleral injection, non-icteric;  ENMT: No external nasal lesions; Normal outer ears;  NECK: Trachea midline;  RESPIRATORY: Normal respiratory effort;   CARDIOVASCULAR: Regular rate and rhythm;  GASTROINTESTINAL: Non-distended;   EXTREMITIES:  No lower extremity edema; L clavicle joint tenderness, erythema, fluctuant mass  NEUROLOGY: A+O to person, place, and time; Does respond to commands appropriately;  PSYCHIATRY: Mood and Affect appropriate

## 2024-09-09 NOTE — ED ADULT NURSE NOTE - OBJECTIVE STATEMENT
42yo F w/ PMH multiple sclerosis (unsteady gait w freq/recurrent falls, urinary incontinence) on rituxan, anxiety, depression, hx of septic arthritis of left sternoclavicular joints presents to ED c/o left chest wall wound. Pt states having left shoulder pain radiating to left side of neck and down the shoulder. Unsure of how wound occurred. A&Ox3. Strong peripheral pulses. Wound noted to left upper chest wall, cover w/ dressing, no drainage noted. Skin otherwise warm dry intact and normal for ethnicity. Ambulatory w/ 1 assist with steady gait in ED. Stretcher locked and in lowest position, appropriate side rails up. Pt instructed to notify RN if assistance is needed.

## 2024-09-09 NOTE — PATIENT PROFILE ADULT - NSPRESCRALCSIXMORE_GEN_A_NUR
RE: Reschedule 7/18/23, [id number], Dr Bach to 8/29/23  Received: Today  Giovanna Quijano Dietra S  Pt has been rescheduled.      Never

## 2024-09-09 NOTE — H&P ADULT - HISTORY OF PRESENT ILLNESS
43-year-old female with PMH multiple sclerosis (unsteady gait w freq/recurrent falls, urinary incontinence) on rituxan, anxiety, depression, hx of septic arthritis of left sternoclavicular joint s/p I&Dx 2 with wound vac and 6 week course of cefepime (12/2023-1/2024) who endorses persistent left shoulder pain and underwent an MRI on 9/4 with results concerning for possible abscess.  Patient was contacted by ER and returned for evaluation. Muscle flap 1. Daily Shower  2. Weight yourself daily and notify any weight gain greater than 2-3 pounds in 24 hours.  3. Regular diet - low fat, low cholesterol, no added salt.  4. Cleanse Midsternal incision and leg incision daily while showering with warm water and mild soap, pat dry and maintain open to air.   5. Follow Cardiac Surgery Do's and Don'ts discharge instructions.   6. No driving until cleared by MD.   7. No heavy lifting nothing greater than 5 pounds until cleared by MD.   8. Call / Notify MD any fever greater than 101.0  9. Increase Activity as tolerated.as recommended  but patient declined because her daughter was getting engaged.      She presents this morning to the emergency department at 0200 for a same-day admission for surgery scheduled at 1500 with thoracic and plastic surgery.    Denies any fevers, chills, shortness of breath, cough, chest pain, abdominal pain, dysuria.

## 2024-09-09 NOTE — H&P ADULT - ASSESSMENT
43-year-old female with PMH multiple sclerosis (unsteady gait w freq/recurrent falls, urinary incontinence) on rituxan, anxiety, depression, hx of septic arthritis of left sternoclavicular joint s/p I&Dx 2 with wound vac and 6 week course of cefepime (12/2023-1/2024) who endorses persistent left shoulder pain and underwent an MRI on 9/4 with results concerning for possible abscess.  She presents this morning to the emergency department at 0200 for a same-day admission for surgery scheduled at 1500 with thoracic and plastic surgery.      Last po intake 2200  
43y Female with PMH of MS on rituxan, anxiety, depression, septic arthritis of L sternoclavicular joint s/p I&Dx2 and cefepime for 6 weeks (12/2023-1/2024), recent admission for muscular abscess around L clavicle (offered surgery, declined) presents from home with persistent L clavicular pain

## 2024-09-09 NOTE — ED PROVIDER NOTE - PHYSICAL EXAMINATION
see MDM GEN: awake and alert, well-appearing, no acute distress  SKIN: (+) warm/dry, (-) cyanosis, (-) rash  HEAD: (-) scalp swelling, (-) tenderness  EYES: (-) conjunctival pallor, (-) scleral icterus  ENMT: (+) moist mucous membranes, (+) airway patent, (-) stridor  NECK: (-) tenderness, (-) stiffness  CHEST: (+) irregular appearing linear area of raised scar tissue to L clavicle w/ overlying healing granulation tissue; (-) dehiscence, (-) drainage, (-) induration/fluctuance  CV: (+) RRR, (-) murmurs/rubs/gallops  RESP: (+) CTABL, (-) increased WOB, (-) rales, (-) rhonchi, (-) wheezing  ABD: (+) soft, (-) tenderness, (-) guarding  EXT: (-) joint deformities, (-) edema, (-) tenderness, (+) grossly intact ROM, (+) equal pulses in upper & lower extremities  NEURO: mental status as above, (-) gross strength/sensation deficits

## 2024-09-10 ENCOUNTER — NON-APPOINTMENT (OUTPATIENT)
Age: 44
End: 2024-09-10

## 2024-09-10 LAB
ALBUMIN SERPL ELPH-MCNC: 3.2 G/DL — LOW (ref 3.3–5)
ALP SERPL-CCNC: 98 U/L — SIGNIFICANT CHANGE UP (ref 40–120)
ALT FLD-CCNC: 13 U/L — SIGNIFICANT CHANGE UP (ref 10–45)
ANION GAP SERPL CALC-SCNC: 10 MMOL/L — SIGNIFICANT CHANGE UP (ref 5–17)
APTT BLD: 27.5 SEC — SIGNIFICANT CHANGE UP (ref 24.5–35.6)
AST SERPL-CCNC: 22 U/L — SIGNIFICANT CHANGE UP (ref 10–40)
BASOPHILS # BLD AUTO: 0.02 K/UL — SIGNIFICANT CHANGE UP (ref 0–0.2)
BASOPHILS NFR BLD AUTO: 0.2 % — SIGNIFICANT CHANGE UP (ref 0–2)
BILIRUB SERPL-MCNC: 0.3 MG/DL — SIGNIFICANT CHANGE UP (ref 0.2–1.2)
BUN SERPL-MCNC: 8 MG/DL — SIGNIFICANT CHANGE UP (ref 7–23)
CALCIUM SERPL-MCNC: 8.3 MG/DL — LOW (ref 8.4–10.5)
CHLORIDE SERPL-SCNC: 100 MMOL/L — SIGNIFICANT CHANGE UP (ref 96–108)
CO2 SERPL-SCNC: 24 MMOL/L — SIGNIFICANT CHANGE UP (ref 22–31)
CREAT SERPL-MCNC: 0.45 MG/DL — LOW (ref 0.5–1.3)
CULTURE RESULTS: SIGNIFICANT CHANGE UP
EGFR: 121 ML/MIN/1.73M2 — SIGNIFICANT CHANGE UP
EOSINOPHIL # BLD AUTO: 0.01 K/UL — SIGNIFICANT CHANGE UP (ref 0–0.5)
EOSINOPHIL NFR BLD AUTO: 0.1 % — SIGNIFICANT CHANGE UP (ref 0–6)
GLUCOSE SERPL-MCNC: 102 MG/DL — HIGH (ref 70–99)
GRAM STN FLD: SIGNIFICANT CHANGE UP
GRAM STN FLD: SIGNIFICANT CHANGE UP
HCT VFR BLD CALC: 26.2 % — LOW (ref 34.5–45)
HCT VFR BLD CALC: 28.5 % — LOW (ref 34.5–45)
HGB BLD-MCNC: 8.3 G/DL — LOW (ref 11.5–15.5)
HGB BLD-MCNC: 9.3 G/DL — LOW (ref 11.5–15.5)
IMM GRANULOCYTES NFR BLD AUTO: 0.4 % — SIGNIFICANT CHANGE UP (ref 0–0.9)
INR BLD: 1.08 RATIO — SIGNIFICANT CHANGE UP (ref 0.85–1.18)
LYMPHOCYTES # BLD AUTO: 1.55 K/UL — SIGNIFICANT CHANGE UP (ref 1–3.3)
LYMPHOCYTES # BLD AUTO: 18.8 % — SIGNIFICANT CHANGE UP (ref 13–44)
MAGNESIUM SERPL-MCNC: 2.1 MG/DL — SIGNIFICANT CHANGE UP (ref 1.6–2.6)
MCHC RBC-ENTMCNC: 27.9 PG — SIGNIFICANT CHANGE UP (ref 27–34)
MCHC RBC-ENTMCNC: 28.8 PG — SIGNIFICANT CHANGE UP (ref 27–34)
MCHC RBC-ENTMCNC: 31.7 GM/DL — LOW (ref 32–36)
MCHC RBC-ENTMCNC: 32.6 GM/DL — SIGNIFICANT CHANGE UP (ref 32–36)
MCV RBC AUTO: 88.2 FL — SIGNIFICANT CHANGE UP (ref 80–100)
MCV RBC AUTO: 88.2 FL — SIGNIFICANT CHANGE UP (ref 80–100)
MONOCYTES # BLD AUTO: 1.16 K/UL — HIGH (ref 0–0.9)
MONOCYTES NFR BLD AUTO: 14.1 % — HIGH (ref 2–14)
MRSA PCR RESULT.: SIGNIFICANT CHANGE UP
NEUTROPHILS # BLD AUTO: 5.48 K/UL — SIGNIFICANT CHANGE UP (ref 1.8–7.4)
NEUTROPHILS NFR BLD AUTO: 66.4 % — SIGNIFICANT CHANGE UP (ref 43–77)
NRBC # BLD: 0 /100 WBCS — SIGNIFICANT CHANGE UP (ref 0–0)
NRBC # BLD: 0 /100 WBCS — SIGNIFICANT CHANGE UP (ref 0–0)
PLATELET # BLD AUTO: 271 K/UL — SIGNIFICANT CHANGE UP (ref 150–400)
PLATELET # BLD AUTO: 321 K/UL — SIGNIFICANT CHANGE UP (ref 150–400)
POTASSIUM SERPL-MCNC: 4.4 MMOL/L — SIGNIFICANT CHANGE UP (ref 3.5–5.3)
POTASSIUM SERPL-SCNC: 4.4 MMOL/L — SIGNIFICANT CHANGE UP (ref 3.5–5.3)
PROT SERPL-MCNC: 5.4 G/DL — LOW (ref 6–8.3)
PROTHROM AB SERPL-ACNC: 11.3 SEC — SIGNIFICANT CHANGE UP (ref 9.5–13)
RBC # BLD: 2.97 M/UL — LOW (ref 3.8–5.2)
RBC # BLD: 3.23 M/UL — LOW (ref 3.8–5.2)
RBC # FLD: 13.1 % — SIGNIFICANT CHANGE UP (ref 10.3–14.5)
RBC # FLD: 13.2 % — SIGNIFICANT CHANGE UP (ref 10.3–14.5)
S AUREUS DNA NOSE QL NAA+PROBE: DETECTED
SODIUM SERPL-SCNC: 134 MMOL/L — LOW (ref 135–145)
SPECIMEN SOURCE: SIGNIFICANT CHANGE UP
WBC # BLD: 7.74 K/UL — SIGNIFICANT CHANGE UP (ref 3.8–10.5)
WBC # BLD: 8.25 K/UL — SIGNIFICANT CHANGE UP (ref 3.8–10.5)
WBC # FLD AUTO: 7.74 K/UL — SIGNIFICANT CHANGE UP (ref 3.8–10.5)
WBC # FLD AUTO: 8.25 K/UL — SIGNIFICANT CHANGE UP (ref 3.8–10.5)

## 2024-09-10 PROCEDURE — 99232 SBSQ HOSP IP/OBS MODERATE 35: CPT | Mod: GC

## 2024-09-10 PROCEDURE — 99232 SBSQ HOSP IP/OBS MODERATE 35: CPT

## 2024-09-10 PROCEDURE — 71045 X-RAY EXAM CHEST 1 VIEW: CPT | Mod: 26

## 2024-09-10 RX ORDER — CHLORHEXIDINE GLUCONATE 40 MG/ML
1 SOLUTION TOPICAL DAILY
Refills: 0 | Status: DISCONTINUED | OUTPATIENT
Start: 2024-09-10 | End: 2024-09-13

## 2024-09-10 RX ORDER — ACETAMINOPHEN 325 MG/1
1000 TABLET ORAL ONCE
Refills: 0 | Status: COMPLETED | OUTPATIENT
Start: 2024-09-10 | End: 2024-09-10

## 2024-09-10 RX ORDER — SODIUM CHLORIDE 9 MG/ML
500 INJECTION INTRAMUSCULAR; INTRAVENOUS; SUBCUTANEOUS
Refills: 0 | Status: DISCONTINUED | OUTPATIENT
Start: 2024-09-10 | End: 2024-09-11

## 2024-09-10 RX ADMIN — ACETAMINOPHEN 975 MILLIGRAM(S): 325 TABLET ORAL at 12:04

## 2024-09-10 RX ADMIN — ACETAMINOPHEN 400 MILLIGRAM(S): 325 TABLET ORAL at 06:56

## 2024-09-10 RX ADMIN — HYDROMORPHONE HYDROCHLORIDE 30 MILLILITER(S): 2 TABLET ORAL at 19:02

## 2024-09-10 RX ADMIN — Medication 0.5 MILLIGRAM(S): at 21:50

## 2024-09-10 RX ADMIN — ACETAMINOPHEN 1000 MILLIGRAM(S): 325 TABLET ORAL at 07:15

## 2024-09-10 RX ADMIN — Medication 250 MILLIGRAM(S): at 06:04

## 2024-09-10 RX ADMIN — HYDROMORPHONE HYDROCHLORIDE 30 MILLILITER(S): 2 TABLET ORAL at 07:28

## 2024-09-10 RX ADMIN — MEROPENEM 100 MILLIGRAM(S): 500 INJECTION, POWDER, FOR SOLUTION INTRAVENOUS at 00:04

## 2024-09-10 RX ADMIN — ACETAMINOPHEN 975 MILLIGRAM(S): 325 TABLET ORAL at 12:35

## 2024-09-10 RX ADMIN — Medication 250 MILLIGRAM(S): at 17:57

## 2024-09-10 RX ADMIN — Medication 60 MILLIGRAM(S): at 12:05

## 2024-09-10 RX ADMIN — SODIUM CHLORIDE 50 MILLILITER(S): 9 INJECTION INTRAMUSCULAR; INTRAVENOUS; SUBCUTANEOUS at 17:00

## 2024-09-10 RX ADMIN — SERTRALINE HYDROCHLORIDE 100 MILLIGRAM(S): 50 TABLET, FILM COATED ORAL at 12:04

## 2024-09-10 RX ADMIN — MEROPENEM 100 MILLIGRAM(S): 500 INJECTION, POWDER, FOR SOLUTION INTRAVENOUS at 16:53

## 2024-09-10 RX ADMIN — SODIUM CHLORIDE 50 MILLILITER(S): 9 INJECTION INTRAMUSCULAR; INTRAVENOUS; SUBCUTANEOUS at 06:04

## 2024-09-10 RX ADMIN — MODAFINIL 200 MILLIGRAM(S): 200 TABLET ORAL at 12:04

## 2024-09-10 RX ADMIN — ACETAMINOPHEN 975 MILLIGRAM(S): 325 TABLET ORAL at 18:30

## 2024-09-10 RX ADMIN — MEROPENEM 100 MILLIGRAM(S): 500 INJECTION, POWDER, FOR SOLUTION INTRAVENOUS at 09:11

## 2024-09-10 RX ADMIN — ACETAMINOPHEN 975 MILLIGRAM(S): 325 TABLET ORAL at 17:57

## 2024-09-10 NOTE — PROGRESS NOTE ADULT - SUBJECTIVE AND OBJECTIVE BOX
Vanessa Oneil PGY3  pager [phone] or check resident tab for coverage    Patient is a 44y old  Female who presents with a chief complaint of L shoulder pain (10 Sep 2024 10:40)      SUBJECTIVE / OVERNIGHT EVENTS: NAONE. Patient on PCA pump this AM bit lethargic. Able to converse. Wanting to leave. LISBETH drains with bloody output. Endorses dull pain in R humerus area. Endorses some skin changes in the region as well. Overall denies fevers/chills/n/v/d/c.    MEDICATIONS  (STANDING):  acetaminophen     Tablet .. 975 milliGRAM(s) Oral every 6 hours  DULoxetine 60 milliGRAM(s) Oral daily  HYDROmorphone PCA (1 mG/mL) 30 milliLiter(s) PCA Continuous PCA Continuous  meropenem  IVPB 1000 milliGRAM(s) IV Intermittent every 8 hours  modafinil 200 milliGRAM(s) Oral daily  sertraline 100 milliGRAM(s) Oral daily  sodium chloride 0.9%. 500 milliLiter(s) (50 mL/Hr) IV Continuous <Continuous>  vancomycin  IVPB 1000 milliGRAM(s) IV Intermittent every 12 hours    MEDICATIONS  (PRN):  baclofen 5 milliGRAM(s) Oral every 8 hours PRN Muscle Spasm  clonazePAM  Tablet 0.5 milliGRAM(s) Oral daily PRN for anxiety  HYDROmorphone PCA (1 mG/mL) Rescue Clinician Bolus 0.5 milliGRAM(s) IV Push every 15 minutes PRN for Pain Scale GREATER THAN 6  naloxone Injectable 0.1 milliGRAM(s) IV Push every 3 minutes PRN For ANY of the following changes in patient status:  A. RR LESS THAN 10 breaths per minute, B. Oxygen saturation LESS THAN 90%, C. Sedation score of 6  ondansetron Injectable 4 milliGRAM(s) IV Push every 6 hours PRN Nausea      CAPILLARY BLOOD GLUCOSE        I&O's Summary    09 Sep 2024 07:01  -  10 Sep 2024 07:00  --------------------------------------------------------  IN: 150 mL / OUT: 385 mL / NET: -235 mL    10 Sep 2024 07:01  -  10 Sep 2024 16:10  --------------------------------------------------------  IN: 0 mL / OUT: 110 mL / NET: -110 mL        Vital Signs Last 24 Hrs  T(C): 36.6 (10 Sep 2024 12:15), Max: 36.7 (10 Sep 2024 06:29)  T(F): 97.8 (10 Sep 2024 12:15), Max: 98 (10 Sep 2024 06:29)  HR: 78 (10 Sep 2024 12:15) (78 - 99)  BP: 97/61 (10 Sep 2024 12:15) (90/58 - 139/63)  BP(mean): 79 (09 Sep 2024 22:00) (78 - 91)  RR: 18 (10 Sep 2024 12:15) (16 - 18)  SpO2: 97% (10 Sep 2024 12:15) (93% - 100%)    Parameters below as of 10 Sep 2024 12:15  Patient On (Oxygen Delivery Method): room air        PHYSICAL EXAM:  GENERAL: no distress  PSYCH: A&O x3  HEAD: Atraumatic, Normocephalic  NECK: Supple, No JVD  CHEST/LUNG: clear to auscultation bilaterally  HEART: regular rate and rhythm, no murmurs  ABDOMEN: nontender to palpation, no rebound tenderness/guarding  EXTREMITIES: no edema on bilateral LE  NEUROLOGY: no focal neurologic deficit  SKIN: No rashes or lesions    LABS:                        9.3    8.25  )-----------( 321      ( 10 Sep 2024 09:09 )             28.5      09-10    134<L>  |  100  |  8   ----------------------------<  102<H>  4.4   |  24  |  0.45<L>    Ca    8.3<L>      10 Sep 2024 09:09  Mg     2.1     09-10    TPro  5.4<L>  /  Alb  3.2<L>  /  TBili  0.3  /  DBili  x   /  AST  22  /  ALT  13  /  AlkPhos  98  09-10    PT/INR - ( 10 Sep 2024 09:09 )   PT: 11.3 sec;   INR: 1.08 ratio         PTT - ( 10 Sep 2024 09:09 )  PTT:27.5 sec      Urinalysis Basic - ( 10 Sep 2024 09:09 )    Color: x / Appearance: x / SG: x / pH: x  Gluc: 102 mg/dL / Ketone: x  / Bili: x / Urobili: x   Blood: x / Protein: x / Nitrite: x   Leuk Esterase: x / RBC: x / WBC x   Sq Epi: x / Non Sq Epi: x / Bacteria: x        RADIOLOGY & ADDITIONAL TESTS:    Imaging Personally Reviewed:    Consultant(s) Notes Reviewed:      Care Discussed with Consultants/Other Providers:

## 2024-09-10 NOTE — PHYSICAL THERAPY INITIAL EVALUATION ADULT - ADDITIONAL COMMENTS
Pt lives in a house with 2 CECE, and 10 steps + 3 steps to the bedroom; unilateral handrails to hold throughout. Pt lives with her 5 children & spouse. PTA, pt was independent with ambulation using rolling walker/cane (as of recent), independent with most ADLs, but has her kids help as needed. Pt. & family reports hx of multiple falls in the past 6 months

## 2024-09-10 NOTE — PROGRESS NOTE ADULT - PROBLEM SELECTOR PLAN 1
s/p Left pectoralis advancement flap; L SCJ debridement by CT surgery and plastics  LISBETH# 1 120cc/24h  LISBETH# 2  65 cc /24 h  Increase activity  Daily CXR   Pulm toilet IS C&DB  Care per primary team

## 2024-09-10 NOTE — PROGRESS NOTE ADULT - SUBJECTIVE AND OBJECTIVE BOX
INTERVAL EVENTS: NAEO    SUBJECTIVE: Doing well POD1, AVSS    --------------------------------------------------------------------------------------  VITAL SIGNS:  T(C): 36.6 (09-05-24 @ 21:30), Max: 36.7 (09-05-24 @ 15:45)  HR: 75 (09-05-24 @ 21:30) (69 - 76)  BP: 104/66 (09-05-24 @ 21:30) (103/68 - 108/90)  RR: 18 (09-05-24 @ 21:30) (16 - 20)  SpO2: 99% (09-05-24 @ 21:30) (99% - 100%)      --------------------------------------------------------------------------------------    EXAM    General: Well developed, well nourished, NAD  Neuro: AOx3  Chest: mepilex dressing c/d/i; adjacent skin well-perfused without erythema/induration, LISBETH drains x2 with SS output    --------------------------------------------------------------------------------------

## 2024-09-10 NOTE — PROGRESS NOTE ADULT - SUBJECTIVE AND OBJECTIVE BOX
Patient is a 44y old  Female who presents with a chief complaint of L shoulder pain (10 Sep 2024 10:21)      Vital Signs Last 24 Hrs  T(C): 36.6 (09-10-24 @ 08:33), Max: 36.7 (09-10-24 @ 06:29)  T(F): 97.8 (09-10-24 @ 08:33), Max: 98 (09-10-24 @ 06:29)  HR: 78 (09-10-24 @ 08:33) (77 - 99)  BP: 97/61 (09-10-24 @ 08:33) (90/58 - 139/63)  RR: 18 (09-10-24 @ 08:33) (16 - 18)  SpO2: 97% (09-10-24 @ 08:33) (93% - 100%)                09-09-24 @ 07:01  -  09-10-24 @ 07:00  --------------------------------------------------------  IN: 150 mL / OUT: 385 mL / NET: -235 mL        Daily Height in cm: 167.64 (09 Sep 2024 16:04)    Daily                           9.3    8.25  )-----------( 321      ( 10 Sep 2024 09:09 )             28.5     09-10    134<L>  |  100  |  8   ----------------------------<  102<H>  4.4   |  24  |  0.45<L>    Ca    8.3<L>      10 Sep 2024 09:09  Mg     2.1     09-10    TPro  5.4<L>  /  Alb  3.2<L>  /  TBili  0.3  /  DBili  x   /  AST  22  /  ALT  13  /  AlkPhos  98  09-10          PHYSICAL EXAM  Neurology: A&Ox3,very sleepy on Dilaudid PCA,  NAD, no gross deficits  CV : RRR+S1S2  Lungs: Respirations non-labored, B/L BS  Abdomen: Soft, NT/ND, +BSx4Q  Extremities: no B/L LE edema, negative calf tenderness, +PP           MEDICATIONS  acetaminophen     Tablet .. 975 milliGRAM(s) Oral every 6 hours  baclofen 5 milliGRAM(s) Oral every 8 hours PRN  clonazePAM  Tablet 0.5 milliGRAM(s) Oral daily PRN  DULoxetine 60 milliGRAM(s) Oral daily  HYDROmorphone PCA (1 mG/mL) 30 milliLiter(s) PCA Continuous PCA Continuous  HYDROmorphone PCA (1 mG/mL) Rescue Clinician Bolus 0.5 milliGRAM(s) IV Push every 15 minutes PRN  meropenem  IVPB 1000 milliGRAM(s) IV Intermittent every 8 hours  modafinil 200 milliGRAM(s) Oral daily  naloxone Injectable 0.1 milliGRAM(s) IV Push every 3 minutes PRN  ondansetron Injectable 4 milliGRAM(s) IV Push every 6 hours PRN  sertraline 100 milliGRAM(s) Oral daily  sodium chloride 0.9%. 500 milliLiter(s) IV Continuous <Continuous>  vancomycin  IVPB 1000 milliGRAM(s) IV Intermittent every 12 hours

## 2024-09-10 NOTE — PHYSICAL THERAPY INITIAL EVALUATION ADULT - ACTIVE RANGE OF MOTION EXAMINATION, REHAB EVAL
LUE not tested 2/2 being in sling with NWB orders/Right UE Active ROM was WFL (within functional limits)/bilateral  lower extremity Active ROM was WFL (within functional limits)

## 2024-09-10 NOTE — PROGRESS NOTE ADULT - PROBLEM SELECTOR PLAN 1
raised, erythematous skin wound overlying L sternoclavicular joint  s/p debridement and 6 weeks of cefepime Dec 2023-Jan 202  - MRI L shoulder from 9/4 showing rim-enhancing fluid and surrounding phlegmon concerning for abscess with mild vague internal bone marrow edema within the clavicular and regional lymphadenopathy, likely reactive.  - pt previously scheduled for debridement and washout with CT surgery and combined muscle flap by plastics, however was canceled as patient declined  - blood cx from last admission -ve; on empiric vanc and zosyn  - f/u CT and plastic surgery recs  - s/p  I&D of L sterno-clavicular joint by CT surgery and coverage with L pec turnover flap by PRS  - f/u daily CXR

## 2024-09-10 NOTE — PROGRESS NOTE ADULT - ASSESSMENT
HPI:43F PMH MS with known L SCJ spetic arthritis sp i&D x2 in 12/23 with clavicle, partial first rib/manibrium resection, with  vac and completed 6 week course of abx in January 2024. presented with further left shoulder pain on 9/4 with MRI demonstrating possible abscess. Now s/p I&D of L sterno-clavicular joint by CT surgery and coverage with L pec turnover flap by PRS.     Plan  - may resume chemical DVT prophylaxis after 24 hours post-op  - will take down dressings on POD5  - continue monitoring LISBETH drain output

## 2024-09-10 NOTE — PROGRESS NOTE ADULT - ASSESSMENT
43y Female with PMH of MS on rituxan, anxiety, depression, septic arthritis of L sternoclavicular joint s/p I&Dx2 and cefepime for 6 weeks (12/2023-1/2024), recent admission for muscular abscess around L clavicle (offered surgery, declined) presents from home with persistent L clavicular pain. Report she did not want to have surgery last admission few days ago due to daughter's engagement, however returned to have the surgery. Does not endorse any fever, chills, neurological deficits, nausea, vomiting, chest pain, palpitations, shortness of breathe, abdominal pain, hematochezia, melena, hematemesis, diarrhea currently  9/9 s/p Left pectoralis advancement flap; L SCJ debridement by CT surgery; 2 reinaldo bulbs in place  9/10 VSS, REINALDO#1 120 since placement, REINALDO#2 65 since placement on Dilaudid PCA very lethargic

## 2024-09-10 NOTE — PHYSICAL THERAPY INITIAL EVALUATION ADULT - MANUAL MUSCLE TESTING RESULTS, REHAB EVAL
At least 3/5 RUE and BLE, LUE not tested at this time 2/2 being in sling with NWB orders/grossly assessed due to

## 2024-09-10 NOTE — PROGRESS NOTE ADULT - ASSESSMENT
43y Female with PMH of MS on Ocrevus anxiety, depression, septic arthritis of L sternoclavicular joint s/p I&Dx2 and cefepime for 6 weeks (12/2023-1/2024), recent admission for muscular abscess around L clavicle (offered surgery, declined) presents from home with persistent L clavicular pain. Report she did not want to have surgery last admission few days ago due to daughter's engagement, however returned to have the surgery.       Overall abnormal MRI, elevated ESR.   Abscess.        PLAN:  Observing off abx,  s/p surgery  check ESR/CRP  c/w empiric Meropenem and vanco empirically  monitor vancomycin trough and creatinine to avoid nephrotoxicity and ensure efficacy   MRSA PCR negative   f/u OR cx and pathology         Odalis Reynaga  Please contact through MS Teams   If no response or past 5 pm/weekend call [phone].

## 2024-09-10 NOTE — PROGRESS NOTE ADULT - SUBJECTIVE AND OBJECTIVE BOX
Day 1 of Anesthesia Pain Management Service    SUBJECTIVE: I'm doing ok    Pain Scale Score:	[X] Refer to charted pain scores    THERAPY:    [ ] IV PCA Morphine		[ ] 5 mg/mL	[ ] 1 mg/mL  [X] IV PCA Hydromorphone	[ ] 5 mg/mL	[X] 1 mg/mL  [ ] IV PCA Fentanyl		[ ] 50 micrograms/mL    Demand dose: 0.2 mg     Lockout: 6 minutes   Continuous Rate: 0 mg/hr  4 Hour Limit: 4 mg    MEDICATIONS  (STANDING):  acetaminophen     Tablet .. 975 milliGRAM(s) Oral every 6 hours  DULoxetine 60 milliGRAM(s) Oral daily  HYDROmorphone PCA (1 mG/mL) 30 milliLiter(s) PCA Continuous PCA Continuous  meropenem  IVPB 1000 milliGRAM(s) IV Intermittent every 8 hours  modafinil 200 milliGRAM(s) Oral daily  sertraline 100 milliGRAM(s) Oral daily  sodium chloride 0.9%. 500 milliLiter(s) (50 mL/Hr) IV Continuous <Continuous>  vancomycin  IVPB 1000 milliGRAM(s) IV Intermittent every 12 hours    MEDICATIONS  (PRN):  baclofen 5 milliGRAM(s) Oral every 8 hours PRN Muscle Spasm  clonazePAM  Tablet 0.5 milliGRAM(s) Oral daily PRN for anxiety  HYDROmorphone PCA (1 mG/mL) Rescue Clinician Bolus 0.5 milliGRAM(s) IV Push every 15 minutes PRN for Pain Scale GREATER THAN 6  naloxone Injectable 0.1 milliGRAM(s) IV Push every 3 minutes PRN For ANY of the following changes in patient status:  A. RR LESS THAN 10 breaths per minute, B. Oxygen saturation LESS THAN 90%, C. Sedation score of 6  ondansetron Injectable 4 milliGRAM(s) IV Push every 6 hours PRN Nausea      OBJECTIVE:    Sedation Score:	[ X] Alert 	[ ] Drowsy 	[ ] Arousable	[ ] Asleep	[ ] Unresponsive    Side Effects:	[X ] None	[ ] Nausea	[ ] Vomiting	[ ] Pruritus  		[ ] Other:    Vital Signs Last 24 Hrs  T(C): 36.6 (10 Sep 2024 08:33), Max: 36.7 (10 Sep 2024 06:29)  T(F): 97.8 (10 Sep 2024 08:33), Max: 98 (10 Sep 2024 06:29)  HR: 78 (10 Sep 2024 08:33) (77 - 99)  BP: 97/61 (10 Sep 2024 08:33) (90/58 - 139/63)  BP(mean): 79 (09 Sep 2024 22:00) (74 - 91)  RR: 18 (10 Sep 2024 08:33) (16 - 18)  SpO2: 97% (10 Sep 2024 08:33) (93% - 100%)    Parameters below as of 10 Sep 2024 08:33  Patient On (Oxygen Delivery Method): room air        ASSESSMENT/ PLAN    Therapy to  be:               [X] Continued   [ ] Discontinued   [ ] Changed to PRN Analgesics    Documentation and Verification of current medications:   [X] Done	[ ] Not done, not eligible    Comments: Endorsing effective analgesia while on PCA. Total PCA use 2.6mg / 12.5 hours. Reeducated to use.   Plan to transition to prn analgesics later today / tomorrow.

## 2024-09-10 NOTE — PHYSICAL THERAPY INITIAL EVALUATION ADULT - GAIT DEVIATIONS NOTED, PT EVAL
decreased adam/decreased velocity of limb motion/decreased step length/decreased weight-shifting ability

## 2024-09-10 NOTE — PROGRESS NOTE ADULT - SUBJECTIVE AND OBJECTIVE BOX
44y old  Female who presents with a chief complaint of L shoulder pain (10 Sep 2024 16:10)      Interval history:  Afebrile, s/p surgery.       Allergies:   No Known Allergies      Antimicrobials:  meropenem  IVPB 1000 milliGRAM(s) IV Intermittent every 8 hours  vancomycin  IVPB 1000 milliGRAM(s) IV Intermittent every 12 hours      REVIEW OF SYSTEMS:  No SOB  No abdominal pain  No rash.       Vital Signs Last 24 Hrs  T(C): 36.4 (09-10-24 @ 18:00), Max: 36.7 (09-10-24 @ 06:29)  T(F): 97.6 (09-10-24 @ 18:00), Max: 98 (09-10-24 @ 06:29)  HR: 76 (09-10-24 @ 18:00) (76 - 88)  BP: 118/- (09-10-24 @ 18:00) (90/58 - 118/-)  BP(mean): --  RR: 18 (09-10-24 @ 18:00) (17 - 18)  SpO2: 98% (09-10-24 @ 18:00) (93% - 98%)      PHYSICAL EXAM:  Pt in no acute distress, alert, awake.   lt chest dressing with vac   non distended abdomen  no edema LE   no phlebitis                             8.3    7.74  )-----------( 271      ( 10 Sep 2024 17:39 )             26.2   09-10    134<L>  |  100  |  8   ----------------------------<  102<H>  4.4   |  24  |  0.45<L>    Ca    8.3<L>      10 Sep 2024 09:09  Mg     2.1     09-10    TPro  5.4<L>  /  Alb  3.2<L>  /  TBili  0.3  /  DBili  x   /  AST  22  /  ALT  13  /  AlkPhos  98  09-10      LIVER FUNCTIONS - ( 10 Sep 2024 09:09 )  Alb: 3.2 g/dL / Pro: 5.4 g/dL / ALK PHOS: 98 U/L / ALT: 13 U/L / AST: 22 U/L / GGT: x                   < from: Xray Chest 1 View AP/PA (09.10.24 @ 08:41) >  IMPRESSION:  No effusion or pneumothorax.

## 2024-09-10 NOTE — PROGRESS NOTE ADULT - ASSESSMENT
43y Female with PMH of MS on rituxan, anxiety, depression, septic arthritis of L sternoclavicular joint s/p I&Dx2 and cefepime for 6 weeks (12/2023-1/2024), recent admission for muscular abscess around L clavicle (offered surgery, declined) presents from home with persistent L clavicular pain

## 2024-09-10 NOTE — PHYSICAL THERAPY INITIAL EVALUATION ADULT - PERTINENT HX OF CURRENT PROBLEM, REHAB EVAL
43y Female with PMH of MS on Rituxan, anxiety, depression, septic arthritis of L sternoclavicular joint s/p I&Dx2 and cefepime for 6 weeks (12/2023-1/2024), recent admission for muscular abscess around L clavicle (offered surgery, declined) presents from home with persistent L clavicular pain. Report she did not want to have surgery last admission few days ago due to daughter's engagement, however returned to have the surgery. Does not endorse any fever, chills, neurological deficits, nausea, vomiting, chest pain, palpitations, shortness of breathe, abdominal pain, hematochezia, melena, hematemesis, diarrhea currently. Hospital course: (9/9) CXR: Clear lungs. Pt. is now s/p Left pectoralis advancement flap (9/9)

## 2024-09-10 NOTE — PROGRESS NOTE ADULT - SUBJECTIVE AND OBJECTIVE BOX
Pain Management Attending Addendum    SUBJECTIVE: Patient doing well with IV PCA    Therapy:    [X] IV PCA         [ ] PRN Analgesics    OBJECTIVE:   [X] Pain appropriately controlled    [ ] Other:    Side Effects:  [X] None	             [ ] Nausea              [ ] Pruritis                	[ ] Other:    ASSESSMENT/PLAN: Continue current therapy    Comments: Pain well controlled on current regimen, will continue. Consider transition to PO analgesics if she is tolerating PO intake.

## 2024-09-10 NOTE — PHYSICAL THERAPY INITIAL EVALUATION ADULT - GENERAL OBSERVATIONS, REHAB EVAL
Pt. rec' d in bed, NAD, +IV Fluids, +PCA pump, LISBETH drain, family at the bedside, agreeable to PT eval.

## 2024-09-11 ENCOUNTER — TRANSCRIPTION ENCOUNTER (OUTPATIENT)
Age: 44
End: 2024-09-11

## 2024-09-11 LAB
ANION GAP SERPL CALC-SCNC: 10 MMOL/L — SIGNIFICANT CHANGE UP (ref 5–17)
BUN SERPL-MCNC: 10 MG/DL — SIGNIFICANT CHANGE UP (ref 7–23)
CALCIUM SERPL-MCNC: 8 MG/DL — LOW (ref 8.4–10.5)
CHLORIDE SERPL-SCNC: 105 MMOL/L — SIGNIFICANT CHANGE UP (ref 96–108)
CO2 SERPL-SCNC: 24 MMOL/L — SIGNIFICANT CHANGE UP (ref 22–31)
CREAT SERPL-MCNC: 0.43 MG/DL — LOW (ref 0.5–1.3)
CRP SERPL-MCNC: 76 MG/L — HIGH (ref 0–4)
EGFR: 122 ML/MIN/1.73M2 — SIGNIFICANT CHANGE UP
ERYTHROCYTE [SEDIMENTATION RATE] IN BLOOD: 8 MM/HR — SIGNIFICANT CHANGE UP (ref 0–15)
GLUCOSE SERPL-MCNC: 104 MG/DL — HIGH (ref 70–99)
HCT VFR BLD CALC: 25.6 % — LOW (ref 34.5–45)
HGB BLD-MCNC: 7.9 G/DL — LOW (ref 11.5–15.5)
MCHC RBC-ENTMCNC: 27.5 PG — SIGNIFICANT CHANGE UP (ref 27–34)
MCHC RBC-ENTMCNC: 30.9 GM/DL — LOW (ref 32–36)
MCV RBC AUTO: 89.2 FL — SIGNIFICANT CHANGE UP (ref 80–100)
NIGHT BLUE STAIN TISS: SIGNIFICANT CHANGE UP
NRBC # BLD: 0 /100 WBCS — SIGNIFICANT CHANGE UP (ref 0–0)
PLATELET # BLD AUTO: 269 K/UL — SIGNIFICANT CHANGE UP (ref 150–400)
POTASSIUM SERPL-MCNC: 3.7 MMOL/L — SIGNIFICANT CHANGE UP (ref 3.5–5.3)
POTASSIUM SERPL-SCNC: 3.7 MMOL/L — SIGNIFICANT CHANGE UP (ref 3.5–5.3)
RBC # BLD: 2.87 M/UL — LOW (ref 3.8–5.2)
RBC # FLD: 13.2 % — SIGNIFICANT CHANGE UP (ref 10.3–14.5)
SODIUM SERPL-SCNC: 139 MMOL/L — SIGNIFICANT CHANGE UP (ref 135–145)
SPECIMEN SOURCE: SIGNIFICANT CHANGE UP
VANCOMYCIN TROUGH SERPL-MCNC: 4.3 UG/ML — LOW (ref 10–20)
WBC # BLD: 8.56 K/UL — SIGNIFICANT CHANGE UP (ref 3.8–10.5)
WBC # FLD AUTO: 8.56 K/UL — SIGNIFICANT CHANGE UP (ref 3.8–10.5)

## 2024-09-11 PROCEDURE — 99232 SBSQ HOSP IP/OBS MODERATE 35: CPT

## 2024-09-11 PROCEDURE — 71045 X-RAY EXAM CHEST 1 VIEW: CPT | Mod: 26,76

## 2024-09-11 PROCEDURE — 99233 SBSQ HOSP IP/OBS HIGH 50: CPT | Mod: GC

## 2024-09-11 RX ORDER — SERTRALINE HYDROCHLORIDE 50 MG/1
100 TABLET, FILM COATED ORAL DAILY
Refills: 0 | Status: DISCONTINUED | OUTPATIENT
Start: 2024-09-11 | End: 2024-09-13

## 2024-09-11 RX ORDER — OXYCODONE HYDROCHLORIDE 5 MG/1
5 TABLET ORAL EVERY 4 HOURS
Refills: 0 | Status: DISCONTINUED | OUTPATIENT
Start: 2024-09-11 | End: 2024-09-13

## 2024-09-11 RX ORDER — VANCOMYCIN/0.9 % SOD CHLORIDE 1.75G/25
1 PLASTIC BAG, INJECTION (ML) INTRAVENOUS
Qty: 126 | Refills: 0
Start: 2024-09-11 | End: 2024-10-22

## 2024-09-11 RX ORDER — SODIUM CHLORIDE 9 MG/ML
1 INJECTION INTRAMUSCULAR; INTRAVENOUS; SUBCUTANEOUS
Qty: 12 | Refills: 4
Start: 2024-09-11 | End: 2025-04-08

## 2024-09-11 RX ORDER — DALFAMPRIDINE 10 MG/1
10 TABLET, EXTENDED RELEASE ORAL EVERY 12 HOURS
Refills: 0 | Status: DISCONTINUED | OUTPATIENT
Start: 2024-09-11 | End: 2024-09-13

## 2024-09-11 RX ORDER — MEROPENEM 500 MG/10ML
1000 INJECTION, POWDER, FOR SOLUTION INTRAVENOUS
Qty: 126 | Refills: 0
Start: 2024-09-11 | End: 2024-10-22

## 2024-09-11 RX ORDER — MODAFINIL 200 MG/1
200 TABLET ORAL DAILY
Refills: 0 | Status: DISCONTINUED | OUTPATIENT
Start: 2024-09-11 | End: 2024-09-13

## 2024-09-11 RX ORDER — SODIUM CHLORIDE 9 MG/ML
10 INJECTION INTRAMUSCULAR; INTRAVENOUS; SUBCUTANEOUS
Qty: 1260 | Refills: 4
Start: 2024-09-11 | End: 2025-04-08

## 2024-09-11 RX ORDER — VANCOMYCIN/0.9 % SOD CHLORIDE 1.75G/25
1000 PLASTIC BAG, INJECTION (ML) INTRAVENOUS EVERY 8 HOURS
Refills: 0 | Status: DISCONTINUED | OUTPATIENT
Start: 2024-09-11 | End: 2024-09-13

## 2024-09-11 RX ORDER — ENOXAPARIN SODIUM 100 MG/ML
40 INJECTION SUBCUTANEOUS EVERY 24 HOURS
Refills: 0 | Status: DISCONTINUED | OUTPATIENT
Start: 2024-09-11 | End: 2024-09-13

## 2024-09-11 RX ADMIN — MEROPENEM 100 MILLIGRAM(S): 500 INJECTION, POWDER, FOR SOLUTION INTRAVENOUS at 00:51

## 2024-09-11 RX ADMIN — ACETAMINOPHEN 975 MILLIGRAM(S): 325 TABLET ORAL at 00:55

## 2024-09-11 RX ADMIN — Medication 250 MILLIGRAM(S): at 21:19

## 2024-09-11 RX ADMIN — MEROPENEM 100 MILLIGRAM(S): 500 INJECTION, POWDER, FOR SOLUTION INTRAVENOUS at 23:20

## 2024-09-11 RX ADMIN — SERTRALINE HYDROCHLORIDE 100 MILLIGRAM(S): 50 TABLET, FILM COATED ORAL at 06:06

## 2024-09-11 RX ADMIN — OXYCODONE HYDROCHLORIDE 5 MILLIGRAM(S): 5 TABLET ORAL at 16:32

## 2024-09-11 RX ADMIN — MEROPENEM 100 MILLIGRAM(S): 500 INJECTION, POWDER, FOR SOLUTION INTRAVENOUS at 09:04

## 2024-09-11 RX ADMIN — ACETAMINOPHEN 975 MILLIGRAM(S): 325 TABLET ORAL at 23:20

## 2024-09-11 RX ADMIN — ACETAMINOPHEN 975 MILLIGRAM(S): 325 TABLET ORAL at 18:47

## 2024-09-11 RX ADMIN — MODAFINIL 200 MILLIGRAM(S): 200 TABLET ORAL at 06:06

## 2024-09-11 RX ADMIN — DALFAMPRIDINE 10 MILLIGRAM(S): 10 TABLET, EXTENDED RELEASE ORAL at 06:06

## 2024-09-11 RX ADMIN — Medication 60 MILLIGRAM(S): at 11:37

## 2024-09-11 RX ADMIN — ENOXAPARIN SODIUM 40 MILLIGRAM(S): 100 INJECTION SUBCUTANEOUS at 11:37

## 2024-09-11 RX ADMIN — HYDROMORPHONE HYDROCHLORIDE 30 MILLILITER(S): 2 TABLET ORAL at 07:21

## 2024-09-11 RX ADMIN — ACETAMINOPHEN 975 MILLIGRAM(S): 325 TABLET ORAL at 11:36

## 2024-09-11 RX ADMIN — Medication 0.5 MILLIGRAM(S): at 23:20

## 2024-09-11 RX ADMIN — MEROPENEM 100 MILLIGRAM(S): 500 INJECTION, POWDER, FOR SOLUTION INTRAVENOUS at 16:33

## 2024-09-11 RX ADMIN — OXYCODONE HYDROCHLORIDE 5 MILLIGRAM(S): 5 TABLET ORAL at 21:56

## 2024-09-11 RX ADMIN — OXYCODONE HYDROCHLORIDE 5 MILLIGRAM(S): 5 TABLET ORAL at 17:31

## 2024-09-11 RX ADMIN — OXYCODONE HYDROCHLORIDE 5 MILLIGRAM(S): 5 TABLET ORAL at 21:19

## 2024-09-11 RX ADMIN — HYDROMORPHONE HYDROCHLORIDE 30 MILLILITER(S): 2 TABLET ORAL at 03:08

## 2024-09-11 RX ADMIN — MODAFINIL 200 MILLIGRAM(S): 200 TABLET ORAL at 11:36

## 2024-09-11 RX ADMIN — SERTRALINE HYDROCHLORIDE 100 MILLIGRAM(S): 50 TABLET, FILM COATED ORAL at 11:36

## 2024-09-11 RX ADMIN — ACETAMINOPHEN 975 MILLIGRAM(S): 325 TABLET ORAL at 00:52

## 2024-09-11 RX ADMIN — ACETAMINOPHEN 975 MILLIGRAM(S): 325 TABLET ORAL at 17:19

## 2024-09-11 RX ADMIN — Medication 250 MILLIGRAM(S): at 06:07

## 2024-09-11 RX ADMIN — ACETAMINOPHEN 975 MILLIGRAM(S): 325 TABLET ORAL at 06:06

## 2024-09-11 RX ADMIN — ACETAMINOPHEN 975 MILLIGRAM(S): 325 TABLET ORAL at 07:36

## 2024-09-11 RX ADMIN — DALFAMPRIDINE 10 MILLIGRAM(S): 10 TABLET, EXTENDED RELEASE ORAL at 17:31

## 2024-09-11 RX ADMIN — ACETAMINOPHEN 975 MILLIGRAM(S): 325 TABLET ORAL at 12:13

## 2024-09-11 RX ADMIN — Medication 250 MILLIGRAM(S): at 13:15

## 2024-09-11 NOTE — DISCHARGE NOTE PROVIDER - HOSPITAL COURSE
43y Female with PMH of MS on rituxan, anxiety, depression, septic arthritis of L sternoclavicular joint s/p I&Dx2 and cefepime for 6 weeks (12/2023-1/2024), recent admission for muscular abscess around L clavicle (offered surgery, declined) presents from home with persistent L clavicular pain. Report she did not want to have surgery last admission few days ago due to daughter's engagement, however returned to have the surgery. Does not endorse any fever, chills, neurological deficits, nausea, vomiting, chest pain, palpitations, shortness of breathe, abdominal pain, hematochezia, melena, hematemesis, diarrhea currently    Hospital Course: Patient underwent I&D of L sterno-clavicular joint by CT surgery 9/10 and coverage with L pec turnover flap by PRS 9/11. Per infectious disease recommendations, she was started on empiric IV Meropenem and Vancomycin. 43y Female with PMH of MS on rituxan, anxiety, depression, septic arthritis of L sternoclavicular joint s/p I&Dx2 and cefepime for 6 weeks (12/2023-1/2024), recent admission for muscular abscess around L clavicle (offered surgery, declined) presents from home with persistent L clavicular pain. Report she did not want to have surgery last admission few days ago due to daughter's engagement, however returned to have the surgery. Does not endorse any fever, chills, neurological deficits, nausea, vomiting, chest pain, palpitations, shortness of breathe, abdominal pain, hematochezia, melena, hematemesis, diarrhea currently    Hospital Course: Patient underwent I&D of L sterno-clavicular joint by CT surgery and coverage with L pec turnover flap by PRS. Per infectious disease recommendations, she was started on empiric IV Meropenem and Vancomycin. In the OR, cultures and pathology from the abscess were sent out. The patient was on Lovenox prior to the procedure, and she was restarted on Lovenox 24 hours after. She was on a PCA pump post-procedure. However, she was seen by the pain service 9/11, and they determined there was no longer a need for PCA pump. The patient was thereafter started on PRN oxycodone. The patient is undergoing PICC placement 9/12 43y Female with PMH of MS on rituxan, anxiety, depression, septic arthritis of L sternoclavicular joint s/p I&Dx2 and cefepime for 6 weeks (12/2023-1/2024), recent admission for muscular abscess around L clavicle (offered surgery, declined) presents from home with persistent L clavicular pain. Report she did not want to have surgery last admission few days ago due to daughter's engagement, however returned to have the surgery. Does not endorse any fever, chills, neurological deficits, nausea, vomiting, chest pain, palpitations, shortness of breathe, abdominal pain, hematochezia, melena, hematemesis, diarrhea currently    Hospital Course: Patient underwent I&D of L sterno-clavicular joint by CT surgery and coverage with L pec turnover flap by PRS. Per infectious disease recommendations, she was started on empiric IV Meropenem and Vancomycin. In the OR, cultures and pathology from the abscess were sent out. The patient was on Lovenox prior to the procedure, and she was restarted on Lovenox 24 hours after. She was on a PCA pump post-procedure. However, she was seen by the pain service 9/11, and they determined there was no longer a need for PCA pump. The patient was thereafter started on PRN oxycodone. PICC for IV Abx placed 9/11 with no complications.    Overall patient stable for discharge on IV antibiotics with infectious disease, plastic and thoracic surgery followup.     Important Medication changes  meropenem 1g q8h  vancomycin 1g q8h   ferrous sulfate 43y Female with PMH of MS on rituxan, anxiety, depression, septic arthritis of L sternoclavicular joint s/p I&Dx2 and cefepime for 6 weeks (12/2023-1/2024), recent admission for muscular abscess around L clavicle (offered surgery, declined) presents from home with persistent L clavicular pain. Report she did not want to have surgery last admission few days ago due to daughter's engagement, however returned to have the surgery. Does not endorse any fever, chills, neurological deficits, nausea, vomiting, chest pain, palpitations, shortness of breathe, abdominal pain, hematochezia, melena, hematemesis, diarrhea currently    Hospital Course: Patient underwent I&D of L sterno-clavicular joint by CT surgery and coverage with L pec turnover flap by PRS. Per infectious disease recommendations, she was started on empiric IV Meropenem and Vancomycin. In the OR, cultures and pathology from the abscess were sent out. The patient was on Lovenox prior to the procedure, and she was restarted on Lovenox 24 hours after. She was on a PCA pump post-procedure. However, she was seen by the pain service 9/11, and they determined there was no longer a need for PCA pump. The patient was thereafter started on PRN oxycodone. PICC for IV Abx placed 9/11 with no complications.    Overall patient stable for discharge on IV antibiotics with infectious disease, plastic and thoracic surgery followup.     Important Medication changes  meropenem 1g q8h  vancomycin 1.25g q12h  ferrous sulfate   percocet for severe pain prn

## 2024-09-11 NOTE — PROGRESS NOTE ADULT - ASSESSMENT
HPI:43F PMH MS with known L SCJ spetic arthritis sp i&D x2 in 12/23 with clavicle, partial first rib/manibrium resection, with  vac and completed 6 week course of abx in January 2024. presented with further left shoulder pain on 9/4 with MRI demonstrating possible abscess. Now s/p I&D of L sterno-clavicular joint by CT surgery and coverage with L pec turnover flap by PRS.     Plan  - may resume chemical DVT prophylaxis after 24 hours post-op  - will take down dressings on POD5  - continue monitoring LISBETH drain output  - continue abx  - remainder of plan per primary team

## 2024-09-11 NOTE — PROGRESS NOTE ADULT - ASSESSMENT
43y Female with PMH of MS on Ocrevus anxiety, depression, septic arthritis of L sternoclavicular joint s/p I&Dx2 and cefepime for 6 weeks (12/2023-1/2024), recent admission for muscular abscess around L clavicle (offered surgery, declined) presents from home with persistent L clavicular pain. Report she did not want to have surgery last admission few days ago due to daughter's engagement, however returned to have the surgery.       Overall abnormal MRI, elevated ESR.   Abscess.        PLAN:  s/p surgery  ESR/CRP noted   c/w empiric Meropenem and vanco empirically  monitor vancomycin trough and creatinine to avoid nephrotoxicity and ensure efficacy   MRSA PCR negative   f/u OR cx, NTD   f/u pathology   plan for PICC, duration to be determined by pathology result.       Plan discussed with Medicine Attending.     Odalis Reynaga  Please contact through MS Teams   If no response or past 5 pm/weekend call [phone].

## 2024-09-11 NOTE — DISCHARGE NOTE PROVIDER - NSDCMRMEDTOKEN_GEN_ALL_CORE_FT
baclofen 10 mg oral tablet: 1 tab(s) orally 3 times a day  Complete Blood Count: CBC every week, send to Dr. Reynaga, Infectious Disease  dalfampridine 10 mg oral tablet, extended release: 1 tab(s) orally every 12 hours  DULoxetine 60 mg oral delayed release capsule: 1 cap(s) orally once a day (at bedtime)  Gemtesa 75 mg oral tablet: 1 tab(s) orally once a day  Iron Tablet: 1 tablet orally once a day  KlonoPIN 0.5 mg oral tablet: 1 tab(s) orally once a day as needed for  anxiety  meropenem 1000 mg intravenous injection: 1,000 milligram(s) intravenously every 8 hours 1g q8h for 6 weeks to end 10/24/24  modafinil 200 mg oral tablet: 1 tab(s) orally once a day  Normal Saline Flush 0.9% injectable solution: 10 milliliter(s) injectable every 8 hours flush for before and after administration of IV antibiotics  sertraline 100 mg oral tablet: 1 tab(s) orally once a day Take with 25mg Sertraline for Total Dose:125mg  vancomycin 1 g intravenous injection: 1 gram(s) intravenously every 8 hours 1g q8h for 6 weeks to end 10/24/24  Vancomycin Trough: after every 4th dose of vancomycin, send to Dr. Reynaga, Infectious Disease   baclofen 10 mg oral tablet: 1 tab(s) orally 3 times a day  Complete Blood Count: CBC every week, send to Dr. Reynaga, Infectious Disease  dalfampridine 10 mg oral tablet, extended release: 1 tab(s) orally every 12 hours  DULoxetine 60 mg oral delayed release capsule: 1 cap(s) orally once a day (at bedtime)  freetext medication     -Gemtesa 75mg qd: 1 tab(s) orally once a day  Gemtesa 75 mg oral tablet: 1 tab(s) orally once a day  KlonoPIN 0.5 mg oral tablet: 1 tab(s) orally once a day as needed for  anxiety  meropenem 1000 mg intravenous injection: 1,000 milligram(s) intravenously every 8 hours 1g q8h for 6 weeks to end 10/24/24  modafinil 200 mg oral tablet: 1 tab(s) orally once a day  Normal Saline Flush 0.9% injectable solution: 10 milliliter(s) injectable every 8 hours flush for before and after administration of IV antibiotics  Percocet 5 mg-325 mg oral tablet: 1 tab(s) orally 3 times a day as needed for  severe pain mad daily dosage: 3 MDD: 3  sertraline 100 mg oral tablet: 1 tab(s) orally once a day Take with 25mg Sertraline for Total Dose:125mg  vancomycin 1.25 g intravenous injection: 1.25 gram(s) intravenously 2 times a day 1.25g Q12hr for 6 weeks to end 10/24/2024  Vancomycin Trough: after every 4th dose of vancomycin, send to Dr. Reynaga, Infectious Disease   baclofen 10 mg oral tablet: 1 tab(s) orally 3 times a day  Complete Blood Count: CBC every week, send to Dr. Reynaga, Infectious Disease  dalfampridine 10 mg oral tablet, extended release: 1 tab(s) orally every 12 hours  DULoxetine 60 mg oral delayed release capsule: 1 cap(s) orally once a day (at bedtime)  Gemtesa 75 mg oral tablet: 1 tab(s) orally once a day  KlonoPIN 0.5 mg oral tablet: 1 tab(s) orally once a day as needed for  anxiety  meropenem 1000 mg intravenous injection: 1,000 milligram(s) intravenously every 8 hours 1g q8h for 6 weeks to end 10/24/24  modafinil 200 mg oral tablet: 1 tab(s) orally once a day  Normal Saline Flush 0.9% injectable solution: 10 milliliter(s) injectable every 8 hours flush for before and after administration of IV antibiotics  Percocet 5 mg-325 mg oral tablet: 1 tab(s) orally 3 times a day as needed for  severe pain mad daily dosage: 3 MDD: 3  sertraline 100 mg oral tablet: 1 tab(s) orally once a day Take with 25mg Sertraline for Total Dose:125mg  vancomycin 1.25 g intravenous injection: 1.25 gram(s) intravenously 2 times a day 1.25g Q12hr for 6 weeks to end 10/24/2024  Vancomycin Trough: after every 4th dose of vancomycin, send to Dr. Reynaga, Infectious Disease   baclofen 10 mg oral tablet: 1 tab(s) orally 3 times a day  dalfampridine 10 mg oral tablet, extended release: 1 tab(s) orally every 12 hours  DULoxetine 60 mg oral delayed release capsule: 1 cap(s) orally once a day (at bedtime)  Gemtesa 75 mg oral tablet: 1 tab(s) orally once a day  KlonoPIN 0.5 mg oral tablet: 1 tab(s) orally once a day as needed for  anxiety  meropenem 1000 mg intravenous injection: 1,000 milligram(s) intravenously every 8 hours 1g q8h for 6 weeks to end 10/24/24  modafinil 200 mg oral tablet: 1 tab(s) orally once a day  Percocet 5 mg-325 mg oral tablet: 1 tab(s) orally 3 times a day as needed for  severe pain mad daily dosage: 3 MDD: 3  sertraline 100 mg oral tablet: 1 tab(s) orally once a day Take with 25mg Sertraline for Total Dose:125mg  vancomycin 1.25 g intravenous injection: 1.25 gram(s) intravenously 2 times a day 1.25g Q12hr for 6 weeks to end 10/24/2024

## 2024-09-11 NOTE — PROGRESS NOTE ADULT - PROBLEM SELECTOR PLAN 1
raised, erythematous skin wound overlying L sternoclavicular joint  s/p debridement and 6 weeks of cefepime Dec 2023-Jan 202  - MRI L shoulder from 9/4 showing rim-enhancing fluid and surrounding phlegmon concerning for abscess with mild vague internal bone marrow edema within the clavicular and regional lymphadenopathy, likely reactive.  - pt previously scheduled for debridement and washout with CT surgery and combined muscle flap by plastics, however was canceled as patient declined  - blood cx from last admission -ve; on empiric vanc and zosyn  - f/u CT and plastic surgery recs  - s/p  I&D of L sterno-clavicular joint by CT surgery and coverage with L pec turnover flap by PRS  - f/u daily CXR raised, erythematous skin wound overlying L sternoclavicular joint  s/p debridement and 6 weeks of cefepime Dec 2023-Jan 2024  - MRI L shoulder from 9/4 showing rim-enhancing fluid and surrounding phlegmon concerning for abscess with mild vague internal bone marrow edema within the clavicular and regional lymphadenopathy, likely reactive.  - pt previously scheduled for debridement and washout with CT surgery and combined muscle flap by plastics, however was canceled as patient declined  - blood cx from last admission negative; on empiric vanc and zosyn  - f/u CT and plastic surgery recs  - s/p  I&D of L sterno-clavicular joint by CT surgery and coverage with L pec turnover flap by PRS  - f/u daily CXR  - cultures still with no growth  - currently on broad spectrum IV vancomycin, meropenem  - BCx negative thus far  - will target for total 6 weeks course given possibility of Osteomyelitis  - once bone path negative, will de-escalate and shorten the total course  - f/u ID recs  - pending PICC line 9/12  - No longer on PCA pump for pain. On PRN oxycodone for pain

## 2024-09-11 NOTE — PROGRESS NOTE ADULT - PROBLEM SELECTOR PLAN 1
s/p Left pectoralis advancement flap; L SCJ debridement by CT surgery and plastics  LISBETH# 1 80cc/24h  LISBETH# 2  120 cc /24 h  Increase activity  Daily CXR   Pulm toilet IS C&DB  Care per primary team

## 2024-09-11 NOTE — PROGRESS NOTE ADULT - ASSESSMENT
43y Female with PMH of MS on rituxan, anxiety, depression, septic arthritis of L sternoclavicular joint s/p I&Dx2 and cefepime for 6 weeks (12/2023-1/2024), recent admission for muscular abscess around L clavicle (offered surgery, declined) presents from home with persistent L clavicular pain 43y Female with PMH of MS on rituxan, anxiety, depression, septic arthritis of L sternoclavicular joint s/p I&Dx2 and cefepime for 6 weeks (12/2023-1/2024), recent admission for muscular abscess around L clavicle (offered surgery, declined) presents from home with persistent L clavicular pain, s/p I&D w/CT Surgery 9/9. s/p L pectoral flap with plastic surgery 9/11. On IV meropenem and vancomycin. Pain service recommends discontinuation of PCA pump and instead PRN pain meds. Patient pending PICC for IV abx. Pending

## 2024-09-11 NOTE — DISCHARGE NOTE PROVIDER - PROVIDER TOKENS
PROVIDER:[TOKEN:[[id number]],FOLLOWUP:[1 week]] PROVIDER:[TOKEN:[[id number]],FOLLOWUP:[1 week]],PROVIDER:[TOKEN:[[id number]],FOLLOWUP:[2 weeks]],PROVIDER:[TOKEN:[[id number]],FOLLOWUP:[2 weeks]],PROVIDER:[TOKEN:[[id number]],FOLLOWUP:[1 week]]

## 2024-09-11 NOTE — CONSULT NOTE ADULT - ASSESSMENT
43y Female with PMH of MS on Ocrevus anxiety, depression, septic arthritis of L sternoclavicular joint s/p I&Dx2 and cefepime for 6 weeks (12/2023-1/2024), recent admission for muscular abscess around L clavicle (offered surgery, declined) presents from home with persistent L clavicular pain. Report she did not want to have surgery last admission few days ago due to daughter's engagement, however returned to have the surgery.       Overall abnormal MRI, elevated ESR.   Abscess.        PLAN:  Observing off abx,  planned for surgery today.   check blood cx  check ESR/CRP  post procedure start empiric Meropenem and vanco empirically  check MRSA PCR       Plan discussed with Medicine ACP.     Odalis Reynaga  Please contact through MS Teams   If no response or past 5 pm/weekend call [phone].   
Impression:  43-year-old woman with PMHx of multiple sclerosis follows with my colleague Dr. Selby, anxiety, depression, septic arthritis of L sternoclavicular joint s/p I&Dx2 and cefepime for 6 weeks (12/2023-1/2024), recent admission for muscular abscess around L clavicle, presented to [facility] with persistent L clavicular pain.   s/p surgical debridement.,  No new neuro symptoms, though her chronic right leg weakness is a bit more pronounced in post-surgical state.      Diagnosis:  multiple sclerosis.  Do not suspect acute flare    Recommendations  continue excellent medical management  outpatient f/u with Dr. Selby

## 2024-09-11 NOTE — DISCHARGE NOTE PROVIDER - NSDCCPCAREPLAN_GEN_ALL_CORE_FT
PRINCIPAL DISCHARGE DIAGNOSIS  Diagnosis: Abscess of chest  Assessment and Plan of Treatment: You came in with an abscess of your SCJ joint. You were initially supposed to have surgery on this joint during your last admission, but you instead underwent surgery during this admission. You underwent an incision and drainage with Cardiothoracic Surgery and a L Pectoral Flap with PRS. You are currently taking IV antibiotics, and will be sent home with a PICC line to continue taking these antibiotics     PRINCIPAL DISCHARGE DIAGNOSIS  Diagnosis: Abscess of chest  Assessment and Plan of Treatment: You came in with an abscess of your SCJ joint. You were initially supposed to have surgery on this joint during your last admission, but you instead underwent surgery during this admission. You underwent an incision and drainage with Cardiothoracic Surgery and a L Pectoral Flap with PRS. You are currently taking IV antibiotics, and will be sent home with a PICC line to continue taking these antibiotics.  You will follow up with the infectious disease team, thoracic surgery and plastic surgery team fo further management.

## 2024-09-11 NOTE — DISCHARGE NOTE PROVIDER - CARE PROVIDER_API CALL
Elly Selby.  Neurology  [address] [facility], [address]  [place] [address]  Phone: [phone]  Fax: [phone]  Follow Up Time: 1 week   Elly Selby  Neurology  [address] [facility], [address]  [place] [address]  Phone: [phone]  Fax: [phone]  Follow Up Time: 1 week    Deidre Bautista  Thoracic Surgery  [address] [place] [address], [address] [place] [address]  Phone: [phone]  Fax: [phone]  Follow Up Time: 2 weeks    Umang Dean  Plastic Surgery  [address] [place] [address] [address]  [place] [address]  Phone: [phone]  Fax: [phone]  Follow Up Time: 2 weeks    Odalis Mendez  Infectious Disease  [address] [place] [id number]  Phone: [phone]  Fax: [phone]  Follow Up Time: 1 week

## 2024-09-11 NOTE — ADVANCED PRACTICE NURSE CONSULT - ASSESSMENT
PICC Insertion Note  Patient  educated about central line associated blood stream infection prevention practices.  Catheter type: 4 F SL PICC  : Bard  Power injectable: Yes  LOT# [id number]    Informed consent obtained by covering floor team.  Procedure assisted KATHY Albert RN  Time out was preformed, confirming the patient's first and last name, date of birth, procedure, and correct site prior to state of procedure.    Patient was placed in HOB 30 degrees position. Patient placement site was prepped with chlorhexidine solution, then draped using maximum sterile barrier protection. The area was injected with 3  ml of 1% lidocaine. Using the ultrasound, the catheter was introduced. Strict adherence to outline aseptic technique. Upon completion of line placement, the insertion site was covered with a sterile occlusive dressing. Pt tolerated procedure well. Minimal blood loss.  Vital sign stable.      All materials used for catheter insertion, including the intact guide wire, were accounted for at the end of the procedure.    Number of attempts: 1  Complications/Comments: None    Emergency Placement: No    Site: New  Anatomical Site of insertion: Right Basilic  Catheter size/length: 4 F 36 cm  US guided Bard SL/DL power picc placed    Post procedure verification with chest Xray as per orders.

## 2024-09-11 NOTE — PROGRESS NOTE ADULT - SUBJECTIVE AND OBJECTIVE BOX
Day 2 of Anesthesia Pain Management Service    SUBJECTIVE: Doing better    Pain Scale Score:	[X] Refer to charted pain scores    THERAPY:    [ ] IV PCA Morphine		        [ ] 5 mg/mL	[ ] 1 mg/mL  [X] IV PCA Hydromorphone	[ ] 5 mg/mL	[X] 1 mg/mL  [ ] IV PCA Fentanyl		        [ ] 50 micrograms/mL    Demand dose: 0.2 mg     Lockout: 6 minutes   Continuous Rate: 0 mg/hr  4 Hour Limit: 4 mg    MEDICATIONS  (STANDING):  acetaminophen     Tablet .. 975 milliGRAM(s) Oral every 6 hours  chlorhexidine 2% Cloths 1 Application(s) Topical daily  dalfampridine ER 10 milliGRAM(s) Oral every 12 hours  DULoxetine 60 milliGRAM(s) Oral daily  enoxaparin Injectable 40 milliGRAM(s) SubCutaneous every 24 hours  Gemtesa 75 milliGRAM(s) 1 Tablet(s) Oral daily  meropenem  IVPB 1000 milliGRAM(s) IV Intermittent every 8 hours  modafinil 200 milliGRAM(s) Oral daily  sertraline 100 milliGRAM(s) Oral daily  sodium chloride 0.9%. 500 milliLiter(s) (50 mL/Hr) IV Continuous <Continuous>  vancomycin  IVPB 1000 milliGRAM(s) IV Intermittent every 12 hours    MEDICATIONS  (PRN):  baclofen 5 milliGRAM(s) Oral every 8 hours PRN Muscle Spasm  clonazePAM  Tablet 0.5 milliGRAM(s) Oral daily PRN for anxiety      OBJECTIVE:    Sedation Score:	[ X] Alert	 [ ] Drowsy 	[ ] Arousable	[ ] Asleep	[ ] Unresponsive    Side Effects:	[X ] None	[ ] Nausea	[ ] Vomiting	[ ] Pruritus  		[ ] Other:    Vital Signs Last 24 Hrs  T(C): 36.8 (11 Sep 2024 07:22), Max: 37.2 (11 Sep 2024 03:04)  T(F): 98.2 (11 Sep 2024 07:22), Max: 99 (11 Sep 2024 03:04)  HR: 77 (11 Sep 2024 07:22) (76 - 80)  BP: 106/64 (11 Sep 2024 07:22) (97/61 - 118/-)  BP(mean): --  RR: 18 (11 Sep 2024 07:22) (16 - 18)  SpO2: 100% (11 Sep 2024 07:22) (97% - 100%)    Parameters below as of 11 Sep 2024 07:22  Patient On (Oxygen Delivery Method): room air        ASSESSMENT/ PLAN    Therapy to  be:               [  ] Continued   [X ] Discontinued   [ X] Changed to PRN Analgesics    Documentation and Verification of current medications:   [X] Done	[ ] Not done, not eligible    Comments: OOB in chair. Pain  improving. PCA d\c'd, analgesics as per primary service.   Recommend Oxycodone 5mg po q4hr prn.

## 2024-09-11 NOTE — ADVANCED PRACTICE NURSE CONSULT - REASON FOR CONSULT
Vascular Access Team    Evaluation for: Bedside SL PICC placement  Requested by name: Williams Mehta  Date/Time: 9/11 @ 14:36    Indication: Vancomycin  Allergy to CHG or Heparin or Lidocaine: NKDA    Platelets(>20): 269  INR(<3): 1.08  eGFR(>40): 122  Blood cultures sent: 9/5  Blood culture negative in 48hrs: neg  Anticoagulants: lovenox 40mg  Arms DVT: no  Mastectomy: no  Fistula: no  PPM/Defib: no  IR or Nephrology or ID clearance needed: no    Consent obtained: yes    Plan: Bedside picc order evaluated.   
Bedside picc order placed.   Indication: SL picc placement for long term antibiotics

## 2024-09-11 NOTE — PROGRESS NOTE ADULT - SUBJECTIVE AND OBJECTIVE BOX
INTERVAL EVENTS: NAEO    SUBJECTIVE: Patient is eating, voiding, and ambulating w/o issue. Site remains soft. Drains with S/S fluid at 50/60.    --------------------------------------------------------------------------------------  VITAL SIGNS:  T(C): 36.8 (09-11-24 @ 07:22), Max: 37.2 (09-11-24 @ 03:04)  HR: 77 (09-11-24 @ 07:22) (76 - 80)  BP: 106/64 (09-11-24 @ 07:22) (97/61 - 118/-)  RR: 18 (09-11-24 @ 07:22) (16 - 18)  SpO2: 100% (09-11-24 @ 07:22) (97% - 100%)      --------------------------------------------------------------------------------------    EXAM    General: Well developed, well nourished, NAD  Neuro: AOx3  Chest: mepilex dressing c/d/i; adjacent skin well-perfused without erythema/induration, LISBETH drains x2 with SS output. LJP1 at 50 and LJP2 at 60      --------------------------------------------------------------------------------------

## 2024-09-11 NOTE — DISCHARGE NOTE PROVIDER - CARE PROVIDERS DIRECT ADDRESSES
,DirectAddress_Unknown ,DirectAddress_Unknown,[email or web address]

## 2024-09-11 NOTE — PROGRESS NOTE ADULT - NS ATTEND AMEND GEN_ALL_CORE FT
patient seen - wound underlying dressing appears to be healing well, drains being managed by plastics.   patient expressed concern regarding the bulge of the muscle flap. I explained that Dr. Dean had explained how it would look prior to surgery. patient inquired if we could "reverse " the surgery and just bring the skin edges together - I reminded her we had tried that the first time and her wound did not heal - and this was the only other option. She expressed she was unhappy that her side would look different and I explained her wound was completely closed compared to prior where she was healing by secondary intention and had a very poor aesthetic outcome. She asked if we could shave the muscle off to which I explained we would not be manipulating the wound in any way unless there was further infection.   patient expressed understanding.

## 2024-09-11 NOTE — PROGRESS NOTE ADULT - SUBJECTIVE AND OBJECTIVE BOX
Patient is a 44y old  Female who presents with a chief complaint of L shoulder pain (11 Sep 2024 11:16)      Vital Signs Last 24 Hrs  T(C): 36.3 (24 @ 11:59), Max: 37.2 (24 @ 03:04)  T(F): 97.3 (24 @ 11:59), Max: 99 (24 @ 03:04)  HR: 75 (24 11:59) (75 - 80)  BP: 109/58 (24 @ 11:59) (106/64 - 118/-)  RR: 18 (24 @ 11:59) (16 - 18)  SpO2: 100% (24 @ 11:59) (98% - 100%)                09-10-24 @ 07:01  -  24 @ 07:00  --------------------------------------------------------  IN: 950 mL / OUT: 200 mL / NET: 750 mL    24 @ 07:01  -  24 @ 15:56  --------------------------------------------------------  IN: 600 mL / OUT: 0 mL / NET: 600 mL        Daily     Daily Weight in k.5 (11 Sep 2024 09:47)                          7.9    8.56  )-----------( 269      ( 11 Sep 2024 07:10 )             25.6     11    139  |  105  |  10  ----------------------------<  104<H>  3.7   |  24  |  0.43<L>    Ca    8.0<L>      11 Sep 2024 07:11  Mg     2.1     10    TPro  5.4<L>  /  Alb  3.2<L>  /  TBili  0.3  /  DBili  x   /  AST  22  /  ALT  13  /  AlkPhos  98  09-10          PHYSICAL EXAM  Neurology: A&Ox3, NAD, no gross deficits  CV : RRR+S1S2  Lungs: Respirations non-labored, B/L BS  Abdomen: Soft, NT/ND, +BSx4Q  Extremities: No B/L LE edema, negative calf tenderness, +PP           MEDICATIONS  acetaminophen     Tablet .. 975 milliGRAM(s) Oral every 6 hours  baclofen 5 milliGRAM(s) Oral every 8 hours PRN  chlorhexidine 2% Cloths 1 Application(s) Topical daily  clonazePAM  Tablet 0.5 milliGRAM(s) Oral daily PRN  dalfampridine ER 10 milliGRAM(s) Oral every 12 hours  DULoxetine 60 milliGRAM(s) Oral daily  enoxaparin Injectable 40 milliGRAM(s) SubCutaneous every 24 hours  Gemtesa 75 milliGRAM(s) 1 Tablet(s) Oral daily  meropenem  IVPB 1000 milliGRAM(s) IV Intermittent every 8 hours  modafinil 200 milliGRAM(s) Oral daily  oxyCODONE    IR 5 milliGRAM(s) Oral every 4 hours PRN  sertraline 100 milliGRAM(s) Oral daily  sodium chloride 0.9%. 500 milliLiter(s) IV Continuous <Continuous>  vancomycin  IVPB 1000 milliGRAM(s) IV Intermittent every 8 hours

## 2024-09-11 NOTE — PROGRESS NOTE ADULT - ASSESSMENT
43y Female with PMH of MS on rituxan, anxiety, depression, septic arthritis of L sternoclavicular joint s/p I&Dx2 and cefepime for 6 weeks (12/2023-1/2024), recent admission for muscular abscess around L clavicle (offered surgery, declined) presents from home with persistent L clavicular pain. Report she did not want to have surgery last admission few days ago due to daughter's engagement, however returned to have the surgery. Does not endorse any fever, chills, neurological deficits, nausea, vomiting, chest pain, palpitations, shortness of breathe, abdominal pain, hematochezia, melena, hematemesis, diarrhea currently  9/9 s/p Left pectoralis advancement flap; L SCJ debridement by CT surgery; 2 reinaldo bulbs in place  9/10 VSS, REINALDO#1 120 since placement, REINALDO#2 65 since placement on Dilaudid PCA very lethargic  9/11 VSS  REINALDO #1 30/80 REINALDO#2 60/120, Dilaudid d/c'd

## 2024-09-11 NOTE — PROGRESS NOTE ADULT - SUBJECTIVE AND OBJECTIVE BOX
44yPatient is a 44y old  Female who presents with a chief complaint of L shoulder pain (11 Sep 2024 15:55)      Interval history:  Afebrile, wants to go home.       Allergies:   No Known Allergies      Antimicrobials:  meropenem  IVPB 1000 milliGRAM(s) IV Intermittent every 8 hours  vancomycin  IVPB 1000 milliGRAM(s) IV Intermittent every 8 hours      REVIEW OF SYSTEMS:  No SOB  No abdominal pain  No rash.       Vital Signs Last 24 Hrs  T(C): 36.3 (09-11-24 @ 11:59), Max: 37.2 (09-11-24 @ 03:04)  T(F): 97.3 (09-11-24 @ 11:59), Max: 99 (09-11-24 @ 03:04)  HR: 75 (09-11-24 @ 11:59) (75 - 80)  BP: 109/58 (09-11-24 @ 11:59) (106/64 - 111/59)  BP(mean): --  RR: 18 (09-11-24 @ 11:59) (16 - 18)  SpO2: 100% (09-11-24 @ 11:59) (98% - 100%)      PHYSICAL EXAM:  Pt in no acute distress, alert, awake.   lt chest dressing with 2 drains.   non distended abdomen  no edema LE   no phlebitis                               7.9    8.56  )-----------( 269      ( 11 Sep 2024 07:10 )             25.6   09-11    139  |  105  |  10  ----------------------------<  104<H>  3.7   |  24  |  0.43<L>    Ca    8.0<L>      11 Sep 2024 07:11  Mg     2.1     09-10    TPro  5.4<L>  /  Alb  3.2<L>  /  TBili  0.3  /  DBili  x   /  AST  22  /  ALT  13  /  AlkPhos  98  09-10      LIVER FUNCTIONS - ( 10 Sep 2024 09:09 )  Alb: 3.2 g/dL / Pro: 5.4 g/dL / ALK PHOS: 98 U/L / ALT: 13 U/L / AST: 22 U/L / GGT: x               Culture - Acid Fast - Tissue w/Smear (collected 10 Sep 2024 11:22)  Source: Tissue #3 Lares of lt clavicle for culture    Culture - Fungal, Tissue (collected 10 Sep 2024 11:22)  Source: Tissue #2 Deep Clivicular tissue lt  Preliminary Report (11 Sep 2024 06:27):    Testing in progress    Culture - Acid Fast - Tissue w/Smear (collected 10 Sep 2024 11:22)  Source: Tissue #2 Deep Clivicular tissue lt    Culture - Acid Fast - Other w/Smear (collected 10 Sep 2024 11:22)  Source: .Other #1 deep clavicular culture swab lt#1 deep clavicular culture swab lt    Culture - Surgical Swab (collected 10 Sep 2024 11:22)  Source: Surgical Swab #1 deep clavicular culture swab lt  Preliminary Report (11 Sep 2024 12:17):    No growth to date.    Culture - Fungal, Other (collected 10 Sep 2024 11:22)  Source: .Other #1 deep clavicular culture swab lt  Preliminary Report (11 Sep 2024 06:27):    Testing in progress    Culture - Fungal, Tissue (collected 10 Sep 2024 11:22)  Source: Tissue #3 Shanell of lt clavicle for culture  Preliminary Report (11 Sep 2024 06:27):    Testing in progress    Culture - Tissue with Gram Stain (collected 10 Sep 2024 11:22)  Source: Tissue #2 Deep Clivicular tissue lt  Gram Stain (10 Sep 2024 22:58):    No polymorphonuclear cells seen per low power field    No organisms seen per oil power field  Preliminary Report (11 Sep 2024 16:00):    No growth    Culture - Tissue with Gram Stain (collected 10 Sep 2024 11:22)  Source: Tissue #3 Shanell of lt clavicle for culture  Gram Stain (10 Sep 2024 23:26):    No polymorphonuclear cells seen per low power field    No organisms seen per oil power field  Preliminary Report (11 Sep 2024 16:04):    No growth

## 2024-09-11 NOTE — DISCHARGE NOTE PROVIDER - NSDCFUADDAPPT_GEN_ALL_CORE_FT
APPTS ARE READY TO BE MADE: [X] YES    Best Family or Patient Contact (if needed):    Additional Information about above appointments (if needed):    1:   2:   3:     Other comments or requests:    APPTS ARE READY TO BE MADE: [X] YES    Best Family or Patient Contact (if needed):    Additional Information about above appointments (if needed):    1:   2:   3:     Other comments or requests:     Infectious Disease:  Provider's office was contacted to secure an appointment, however the office will follow up with the patient/caregiver directly. Task sent to I/D team requesting appointment.   APPTS ARE READY TO BE MADE: [X] YES    Best Family or Patient Contact (if needed):    Additional Information about above appointments (if needed):    1:   2:   3:     Other comments or requests:     Thoracic Surgery:  Prior to outreaching the patient, it was visible that the patient has secured a follow up appointment which was not scheduled by our team.  Dr. Deidre Bautista 9/30/2024 10:30 AM  [address] [place] [address], [address] [place] [address]    Infectious Disease:  Provider's office was contacted to secure an appointment, however the office will follow up with the patient/caregiver directly. Task sent to I/D team requesting appointment.   APPTS ARE READY TO BE MADE: [X] YES    Best Family or Patient Contact (if needed):    Additional Information about above appointments (if needed):    1:   2:   3:     Other comments or requests:      Neurology:  Appointment was scheduled by our team on the patient's behalf through the provider's office.  Dr. Elly Selby 09/18/2024     Thoracic Surgery:  Prior to outreaching the patient, it was visible that the patient has secured a follow up appointment which was not scheduled by our team.  Dr. Deidre Bautista 9/30/2024 10:30 AM  [address] [place] [address]    Appointment was scheduled by our team on the patient's behalf through the provider's office. Dr. Umang Dean 10/01/2024 at 10:00 AM   [address] [place] [address]    Infectious Disease:  Patient was outreached, however they advised they were readmitted to the hospital. As per Infectious Disease Department- Spoke with  -did not want to schedule patient is in the hospital.

## 2024-09-11 NOTE — DISCHARGE NOTE PROVIDER - NSDCFUSCHEDAPPT_GEN_ALL_CORE_FT
Declan Martines  [facility]  [facility] [address]   Scheduled Appointment: 10/01/2024     Deidre Bautista  [facility] [phone]  Scheduled Appointment: 09/30/2024    Declan Martines  [facility]  [facility] [address]  Scheduled Appointment: 10/01/2024

## 2024-09-11 NOTE — DISCHARGE NOTE PROVIDER - NSDCCPTREATMENT_GEN_ALL_CORE_FT
PRINCIPAL PROCEDURE  Procedure: Incision and drainage, thorax  Findings and Treatment: PROCEDURES:  Left pectoralis advancement flap 09-Sep-2024 15:23:43  Graham Younger SCJ debridement by CT surgery  L pec flap by PRS        SECONDARY PROCEDURE  Procedure: Left pectoralis advancement flap  Findings and Treatment: PROCEDURES:  Left pectoralis advancement flap 09-Sep-2024 15:23:43  Graham YoungerJ debridement by CT surgery  L pec flap by PRS
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
## 2024-09-11 NOTE — PROGRESS NOTE ADULT - PROBLEM SELECTOR PLAN 2
Maintained at home with q6 months Rituxan, dalfampridine 10mg BID, gemtesa 75mg QD, and modafinil 20mg QD  - c/w home dalfampridine  - c/w home gemtesa  - c/w home modafinil. Maintained at home with q6 months Rituxan, dalfampridine 10mg BID, gemtesa 75mg QD, and modafinil 20mg QD  - c/w home dalfampridine  - c/w home gemtesa  - c/w home modafinil  - f/u Dr. Selby

## 2024-09-11 NOTE — PROGRESS NOTE ADULT - SUBJECTIVE AND OBJECTIVE BOX
Patient is a 44y old  Female who presents with a chief complaint of L shoulder pain (10 Sep 2024 18:36)      SUBJECTIVE / OVERNIGHT EVENTS:    MEDICATIONS  (STANDING):  acetaminophen     Tablet .. 975 milliGRAM(s) Oral every 6 hours  chlorhexidine 2% Cloths 1 Application(s) Topical daily  dalfampridine ER 10 milliGRAM(s) Oral every 12 hours  DULoxetine 60 milliGRAM(s) Oral daily  Gemtesa 75 milliGRAM(s) 1 Tablet(s) Oral daily  HYDROmorphone PCA (1 mG/mL) 30 milliLiter(s) PCA Continuous PCA Continuous  meropenem  IVPB 1000 milliGRAM(s) IV Intermittent every 8 hours  modafinil 200 milliGRAM(s) Oral daily  sertraline 100 milliGRAM(s) Oral daily  sodium chloride 0.9%. 500 milliLiter(s) (50 mL/Hr) IV Continuous <Continuous>  vancomycin  IVPB 1000 milliGRAM(s) IV Intermittent every 12 hours    MEDICATIONS  (PRN):  baclofen 5 milliGRAM(s) Oral every 8 hours PRN Muscle Spasm  clonazePAM  Tablet 0.5 milliGRAM(s) Oral daily PRN for anxiety  HYDROmorphone PCA (1 mG/mL) Rescue Clinician Bolus 0.5 milliGRAM(s) IV Push every 15 minutes PRN for Pain Scale GREATER THAN 6  naloxone Injectable 0.1 milliGRAM(s) IV Push every 3 minutes PRN For ANY of the following changes in patient status:  A. RR LESS THAN 10 breaths per minute, B. Oxygen saturation LESS THAN 90%, C. Sedation score of 6  ondansetron Injectable 4 milliGRAM(s) IV Push every 6 hours PRN Nausea      CAPILLARY BLOOD GLUCOSE        I&O's Summary    10 Sep 2024 07:01  -  11 Sep 2024 07:00  --------------------------------------------------------  IN: 950 mL / OUT: 200 mL / NET: 750 mL        Vital Signs Last 24 Hrs  T(C): 37.2 (11 Sep 2024 03:04), Max: 37.2 (11 Sep 2024 03:04)  T(F): 99 (11 Sep 2024 03:04), Max: 99 (11 Sep 2024 03:04)  HR: 80 (11 Sep 2024 03:04) (76 - 80)  BP: 111/59 (11 Sep 2024 03:04) (97/61 - 118/-)  BP(mean): --  RR: 16 (11 Sep 2024 03:04) (16 - 18)  SpO2: 98% (11 Sep 2024 03:04) (97% - 98%)    Parameters below as of 11 Sep 2024 03:04  Patient On (Oxygen Delivery Method): room air        PHYSICAL EXAM:  GENERAL: NAD, well-developed, well-nourished  HEAD: Atraumatic, Normocephalic  EYES: EOMI, PERRLA, conjunctiva and sclera clear  NECK: Supple, No JVD  CHEST/LUNG: Clear to auscultation bilaterally; No wheezes or crackles  HEART: Normal S1/S2; Regular rate and rhythm; No murmurs, rubs, or gallops  ABDOMEN: Soft, Nontender, Nondistended; Bowel sounds present  EXTREMITIES: 2+ Peripheral Pulses; No clubbing, cyanosis, or edema  PSYCH: A&Ox3  NEUROLOGY: no focal neurologic deficit  SKIN: No rashes or lesions    LABS:                        8.3    7.74  )-----------( 271      ( 10 Sep 2024 17:39 )             26.2      09-10    134<L>  |  100  |  8   ----------------------------<  102<H>  4.4   |  24  |  0.45<L>    Ca    8.3<L>      10 Sep 2024 09:09  Mg     2.1     09-10    TPro  5.4<L>  /  Alb  3.2<L>  /  TBili  0.3  /  DBili  x   /  AST  22  /  ALT  13  /  AlkPhos  98  09-10    PT/INR - ( 10 Sep 2024 09:09 )   PT: 11.3 sec;   INR: 1.08 ratio         PTT - ( 10 Sep 2024 09:09 )  PTT:27.5 sec      Urinalysis Basic - ( 10 Sep 2024 09:09 )    Color: x / Appearance: x / SG: x / pH: x  Gluc: 102 mg/dL / Ketone: x  / Bili: x / Urobili: x   Blood: x / Protein: x / Nitrite: x   Leuk Esterase: x / RBC: x / WBC x   Sq Epi: x / Non Sq Epi: x / Bacteria: x        RADIOLOGY & ADDITIONAL TESTS:    Imaging Personally Reviewed:    Consultant(s) Notes Reviewed:      Care Discussed with Consultants/Other Providers:   Patient is a 44y old  Female who presents with a chief complaint of L shoulder pain (10 Sep 2024 18:36)      SUBJECTIVE / OVERNIGHT EVENTS:  No overnight events. Patient     MEDICATIONS  (STANDING):  acetaminophen     Tablet .. 975 milliGRAM(s) Oral every 6 hours  chlorhexidine 2% Cloths 1 Application(s) Topical daily  dalfampridine ER 10 milliGRAM(s) Oral every 12 hours  DULoxetine 60 milliGRAM(s) Oral daily  Gemtesa 75 milliGRAM(s) 1 Tablet(s) Oral daily  HYDROmorphone PCA (1 mG/mL) 30 milliLiter(s) PCA Continuous PCA Continuous  meropenem  IVPB 1000 milliGRAM(s) IV Intermittent every 8 hours  modafinil 200 milliGRAM(s) Oral daily  sertraline 100 milliGRAM(s) Oral daily  sodium chloride 0.9%. 500 milliLiter(s) (50 mL/Hr) IV Continuous <Continuous>  vancomycin  IVPB 1000 milliGRAM(s) IV Intermittent every 12 hours    MEDICATIONS  (PRN):  baclofen 5 milliGRAM(s) Oral every 8 hours PRN Muscle Spasm  clonazePAM  Tablet 0.5 milliGRAM(s) Oral daily PRN for anxiety  HYDROmorphone PCA (1 mG/mL) Rescue Clinician Bolus 0.5 milliGRAM(s) IV Push every 15 minutes PRN for Pain Scale GREATER THAN 6  naloxone Injectable 0.1 milliGRAM(s) IV Push every 3 minutes PRN For ANY of the following changes in patient status:  A. RR LESS THAN 10 breaths per minute, B. Oxygen saturation LESS THAN 90%, C. Sedation score of 6  ondansetron Injectable 4 milliGRAM(s) IV Push every 6 hours PRN Nausea      CAPILLARY BLOOD GLUCOSE        I&O's Summary    10 Sep 2024 07:01  -  11 Sep 2024 07:00  --------------------------------------------------------  IN: 950 mL / OUT: 200 mL / NET: 750 mL        Vital Signs Last 24 Hrs  T(C): 37.2 (11 Sep 2024 03:04), Max: 37.2 (11 Sep 2024 03:04)  T(F): 99 (11 Sep 2024 03:04), Max: 99 (11 Sep 2024 03:04)  HR: 80 (11 Sep 2024 03:04) (76 - 80)  BP: 111/59 (11 Sep 2024 03:04) (97/61 - 118/-)  BP(mean): --  RR: 16 (11 Sep 2024 03:04) (16 - 18)  SpO2: 98% (11 Sep 2024 03:04) (97% - 98%)    Parameters below as of 11 Sep 2024 03:04  Patient On (Oxygen Delivery Method): room air        PHYSICAL EXAM:  GENERAL: NAD, well-developed, well-nourished  HEAD: Atraumatic, Normocephalic  EYES: EOMI, PERRLA, conjunctiva and sclera clear  NECK: Supple, No JVD  CHEST/LUNG: Clear to auscultation bilaterally; No wheezes or crackles  HEART: Normal S1/S2; Regular rate and rhythm; No murmurs, rubs, or gallops  ABDOMEN: Soft, Nontender, Nondistended; Bowel sounds present  EXTREMITIES: 2+ Peripheral Pulses; No clubbing, cyanosis, or edema  PSYCH: A&Ox3  NEUROLOGY: no focal neurologic deficit  SKIN: Healing surgical site L chest and shoulder  LABS:                        8.3    7.74  )-----------( 271      ( 10 Sep 2024 17:39 )             26.2      09-10    134<L>  |  100  |  8   ----------------------------<  102<H>  4.4   |  24  |  0.45<L>    Ca    8.3<L>      10 Sep 2024 09:09  Mg     2.1     09-10    TPro  5.4<L>  /  Alb  3.2<L>  /  TBili  0.3  /  DBili  x   /  AST  22  /  ALT  13  /  AlkPhos  98  09-10    PT/INR - ( 10 Sep 2024 09:09 )   PT: 11.3 sec;   INR: 1.08 ratio         PTT - ( 10 Sep 2024 09:09 )  PTT:27.5 sec      Urinalysis Basic - ( 10 Sep 2024 09:09 )    Color: x / Appearance: x / SG: x / pH: x  Gluc: 102 mg/dL / Ketone: x  / Bili: x / Urobili: x   Blood: x / Protein: x / Nitrite: x   Leuk Esterase: x / RBC: x / WBC x   Sq Epi: x / Non Sq Epi: x / Bacteria: x        RADIOLOGY & ADDITIONAL TESTS:    Imaging Personally Reviewed:    Consultant(s) Notes Reviewed:      Care Discussed with Consultants/Other Providers:

## 2024-09-12 ENCOUNTER — TRANSCRIPTION ENCOUNTER (OUTPATIENT)
Age: 44
End: 2024-09-12

## 2024-09-12 LAB
ALBUMIN SERPL ELPH-MCNC: 3.1 G/DL — LOW (ref 3.3–5)
ALP SERPL-CCNC: 125 U/L — HIGH (ref 40–120)
ALT FLD-CCNC: 29 U/L — SIGNIFICANT CHANGE UP (ref 10–45)
ANION GAP SERPL CALC-SCNC: 8 MMOL/L — SIGNIFICANT CHANGE UP (ref 5–17)
AST SERPL-CCNC: 49 U/L — HIGH (ref 10–40)
BILIRUB SERPL-MCNC: 0.1 MG/DL — LOW (ref 0.2–1.2)
BUN SERPL-MCNC: 8 MG/DL — SIGNIFICANT CHANGE UP (ref 7–23)
CALCIUM SERPL-MCNC: 8.4 MG/DL — SIGNIFICANT CHANGE UP (ref 8.4–10.5)
CHLORIDE SERPL-SCNC: 104 MMOL/L — SIGNIFICANT CHANGE UP (ref 96–108)
CO2 SERPL-SCNC: 25 MMOL/L — SIGNIFICANT CHANGE UP (ref 22–31)
CREAT SERPL-MCNC: 0.43 MG/DL — LOW (ref 0.5–1.3)
EGFR: 122 ML/MIN/1.73M2 — SIGNIFICANT CHANGE UP
FERRITIN SERPL-MCNC: 69 NG/ML — SIGNIFICANT CHANGE UP (ref 15–150)
GLUCOSE SERPL-MCNC: 106 MG/DL — HIGH (ref 70–99)
HCT VFR BLD CALC: 25.6 % — LOW (ref 34.5–45)
HGB BLD-MCNC: 8.1 G/DL — LOW (ref 11.5–15.5)
IRON SATN MFR SERPL: 17 UG/DL — LOW (ref 30–160)
IRON SATN MFR SERPL: 7 % — LOW (ref 14–50)
MAGNESIUM SERPL-MCNC: 1.9 MG/DL — SIGNIFICANT CHANGE UP (ref 1.6–2.6)
MCHC RBC-ENTMCNC: 27.9 PG — SIGNIFICANT CHANGE UP (ref 27–34)
MCHC RBC-ENTMCNC: 31.6 GM/DL — LOW (ref 32–36)
MCV RBC AUTO: 88.3 FL — SIGNIFICANT CHANGE UP (ref 80–100)
NRBC # BLD: 0 /100 WBCS — SIGNIFICANT CHANGE UP (ref 0–0)
PHOSPHATE SERPL-MCNC: 2.3 MG/DL — LOW (ref 2.5–4.5)
PLATELET # BLD AUTO: 305 K/UL — SIGNIFICANT CHANGE UP (ref 150–400)
POTASSIUM SERPL-MCNC: 4 MMOL/L — SIGNIFICANT CHANGE UP (ref 3.5–5.3)
POTASSIUM SERPL-SCNC: 4 MMOL/L — SIGNIFICANT CHANGE UP (ref 3.5–5.3)
PROT SERPL-MCNC: 5.6 G/DL — LOW (ref 6–8.3)
RBC # BLD: 2.9 M/UL — LOW (ref 3.8–5.2)
RBC # FLD: 13 % — SIGNIFICANT CHANGE UP (ref 10.3–14.5)
SODIUM SERPL-SCNC: 137 MMOL/L — SIGNIFICANT CHANGE UP (ref 135–145)
TIBC SERPL-MCNC: 246 UG/DL — SIGNIFICANT CHANGE UP (ref 220–430)
UIBC SERPL-MCNC: 229 UG/DL — SIGNIFICANT CHANGE UP (ref 110–370)
VANCOMYCIN TROUGH SERPL-MCNC: 12.8 UG/ML — SIGNIFICANT CHANGE UP (ref 10–20)
WBC # BLD: 9.89 K/UL — SIGNIFICANT CHANGE UP (ref 3.8–10.5)
WBC # FLD AUTO: 9.89 K/UL — SIGNIFICANT CHANGE UP (ref 3.8–10.5)

## 2024-09-12 PROCEDURE — 99233 SBSQ HOSP IP/OBS HIGH 50: CPT | Mod: GC

## 2024-09-12 PROCEDURE — 99232 SBSQ HOSP IP/OBS MODERATE 35: CPT

## 2024-09-12 RX ORDER — ACETAMINOPHEN 325 MG/1
1000 TABLET ORAL ONCE
Refills: 0 | Status: COMPLETED | OUTPATIENT
Start: 2024-09-12 | End: 2024-09-12

## 2024-09-12 RX ORDER — MUPIROCIN 2 %
1 OINTMENT (GRAM) TOPICAL
Refills: 0 | Status: DISCONTINUED | OUTPATIENT
Start: 2024-09-12 | End: 2024-09-13

## 2024-09-12 RX ORDER — HYDROMORPHONE HYDROCHLORIDE 2 MG/1
0.2 TABLET ORAL ONCE
Refills: 0 | Status: DISCONTINUED | OUTPATIENT
Start: 2024-09-12 | End: 2024-09-12

## 2024-09-12 RX ORDER — SODIUM PHOSPHATE, DIBASIC, ANHYDROUS, POTASSIUM PHOSPHATE, MONOBASIC, AND SODIUM PHOSPHATE, MONOBASIC, MONOHYDRATE 852; 155; 130 MG/1; MG/1; MG/1
1 TABLET, COATED ORAL ONCE
Refills: 0 | Status: COMPLETED | OUTPATIENT
Start: 2024-09-12 | End: 2024-09-12

## 2024-09-12 RX ADMIN — OXYCODONE HYDROCHLORIDE 5 MILLIGRAM(S): 5 TABLET ORAL at 13:31

## 2024-09-12 RX ADMIN — OXYCODONE HYDROCHLORIDE 5 MILLIGRAM(S): 5 TABLET ORAL at 01:14

## 2024-09-12 RX ADMIN — MODAFINIL 200 MILLIGRAM(S): 200 TABLET ORAL at 11:39

## 2024-09-12 RX ADMIN — ACETAMINOPHEN 975 MILLIGRAM(S): 325 TABLET ORAL at 12:09

## 2024-09-12 RX ADMIN — DALFAMPRIDINE 10 MILLIGRAM(S): 10 TABLET, EXTENDED RELEASE ORAL at 17:21

## 2024-09-12 RX ADMIN — ACETAMINOPHEN 975 MILLIGRAM(S): 325 TABLET ORAL at 01:40

## 2024-09-12 RX ADMIN — SERTRALINE HYDROCHLORIDE 100 MILLIGRAM(S): 50 TABLET, FILM COATED ORAL at 11:39

## 2024-09-12 RX ADMIN — OXYCODONE HYDROCHLORIDE 5 MILLIGRAM(S): 5 TABLET ORAL at 17:32

## 2024-09-12 RX ADMIN — ACETAMINOPHEN 975 MILLIGRAM(S): 325 TABLET ORAL at 11:39

## 2024-09-12 RX ADMIN — OXYCODONE HYDROCHLORIDE 5 MILLIGRAM(S): 5 TABLET ORAL at 03:30

## 2024-09-12 RX ADMIN — MEROPENEM 100 MILLIGRAM(S): 500 INJECTION, POWDER, FOR SOLUTION INTRAVENOUS at 10:22

## 2024-09-12 RX ADMIN — Medication 60 MILLIGRAM(S): at 11:39

## 2024-09-12 RX ADMIN — DALFAMPRIDINE 10 MILLIGRAM(S): 10 TABLET, EXTENDED RELEASE ORAL at 05:57

## 2024-09-12 RX ADMIN — MEROPENEM 100 MILLIGRAM(S): 500 INJECTION, POWDER, FOR SOLUTION INTRAVENOUS at 18:01

## 2024-09-12 RX ADMIN — ACETAMINOPHEN 975 MILLIGRAM(S): 325 TABLET ORAL at 05:57

## 2024-09-12 RX ADMIN — OXYCODONE HYDROCHLORIDE 5 MILLIGRAM(S): 5 TABLET ORAL at 18:02

## 2024-09-12 RX ADMIN — SODIUM PHOSPHATE, DIBASIC, ANHYDROUS, POTASSIUM PHOSPHATE, MONOBASIC, AND SODIUM PHOSPHATE, MONOBASIC, MONOHYDRATE 1 PACKET(S): 852; 155; 130 TABLET, COATED ORAL at 08:36

## 2024-09-12 RX ADMIN — Medication 1 APPLICATION(S): at 17:21

## 2024-09-12 RX ADMIN — Medication 250 MILLIGRAM(S): at 13:27

## 2024-09-12 RX ADMIN — BACLOFEN 5 MILLIGRAM(S): 0.5 INJECTION INTRATHECAL at 18:36

## 2024-09-12 RX ADMIN — ACETAMINOPHEN 400 MILLIGRAM(S): 325 TABLET ORAL at 20:09

## 2024-09-12 RX ADMIN — Medication 250 MILLIGRAM(S): at 05:57

## 2024-09-12 RX ADMIN — Medication 250 MILLIGRAM(S): at 21:48

## 2024-09-12 RX ADMIN — ENOXAPARIN SODIUM 40 MILLIGRAM(S): 100 INJECTION SUBCUTANEOUS at 12:20

## 2024-09-12 NOTE — PROGRESS NOTE ADULT - ASSESSMENT
43y Female with PMH of MS on rituxan, anxiety, depression, septic arthritis of L sternoclavicular joint s/p I&Dx2 and cefepime for 6 weeks (12/2023-1/2024), recent admission for muscular abscess around L clavicle (offered surgery, declined) presents from home with persistent L clavicular pain. Report she did not want to have surgery last admission few days ago due to daughter's engagement, however returned to have the surgery. Does not endorse any fever, chills, neurological deficits, nausea, vomiting, chest pain, palpitations, shortness of breathe, abdominal pain, hematochezia, melena, hematemesis, diarrhea currently  9/9 s/p Left pectoralis advancement flap; L SCJ debridement by CT surgery; 2 reinaldo bulbs in place  9/10 VSS, REINALDO#1 120 since placement, REINALDO#2 65 since placement on Dilaudid PCA very lethargic  9/11 VSS  REINALDO #1 30/80 REINALDO#2 60/120, Dilaudid d/c'd   9/12 VSS. REINALDO#1 25/25 REINALDO#2 60/60. REINALDO managed by Plastics. Per patient, she is being discharged home today. Can follow up with Dr. Bautista after 2 weeks for care

## 2024-09-12 NOTE — DISCHARGE NOTE NURSING/CASE MANAGEMENT/SOCIAL WORK - PATIENT PORTAL LINK FT
You can access the FollowMyHealth Patient Portal offered by [facility] by registering at the following website: [email or web address]. By joining [facility]’s FollowMyHealth portal, you will also be able to view your health information using other applications (apps) compatible with our system.

## 2024-09-12 NOTE — PROGRESS NOTE ADULT - PROBLEM SELECTOR PLAN 2
Maintained at home with q6 months Rituxan, dalfampridine 10mg BID, gemtesa 75mg QD, and modafinil 20mg QD  - c/w home dalfampridine  - c/w home gemtesa  - c/w home modafinil  - f/u Dr. Selby

## 2024-09-12 NOTE — PROGRESS NOTE ADULT - ASSESSMENT
43y Female with PMH of MS on rituxan, anxiety, depression, septic arthritis of L sternoclavicular joint s/p I&Dx2 and cefepime for 6 weeks (12/2023-1/2024), recent admission for muscular abscess around L clavicle (offered surgery, declined) presents from home with persistent L clavicular pain, s/p I&D w/CT Surgery 9/9. s/p L pectoral flap with plastic surgery 9/11. On IV meropenem and vancomycin. Pain service recommends discontinuation of PCA pump and instead PRN pain meds. Patient pending PICC for IV abx. Pending  43y Female with PMH of MS on rituxan, anxiety, depression, septic arthritis of L sternoclavicular joint s/p I&Dx2 and cefepime for 6 weeks (12/2023-1/2024), recent admission for muscular abscess around L clavicle (offered surgery, declined) presents from home with persistent L clavicular pain, s/p I&D w/CT Surgery 9/9. s/p L pectoral flap with plastic surgery 9/11. On IV meropenem and vancomycin, pending insurance authorization

## 2024-09-12 NOTE — PROGRESS NOTE ADULT - ASSESSMENT
HPI:43F PMH MS with known L SCJ spetic arthritis sp i&D x2 in 12/23 with clavicle, partial first rib/manibrium resection, with  vac and completed 6 week course of abx in January 2024. presented with further left shoulder pain on 9/4 with MRI demonstrating possible abscess. Now s/p I&D of L sterno-clavicular joint by CT surgery and coverage with L pec turnover flap by PRS.     Plan  - will take down dressings on POD5  - continue monitoring LISBETH drain output  - continue abx, plan per ID  - sling per thoracic  - remainder of plan per primary team    Graham Younger MD  Plastic and Reconstructive Surgery, PGY4  Available on [employer] Teams  [facility]: [id number], [place]: [phone] [facility]: Green Team [id number]

## 2024-09-12 NOTE — DISCHARGE NOTE NURSING/CASE MANAGEMENT/SOCIAL WORK - NSDCPEFALRISK_GEN_ALL_CORE
For information on Fall & Injury Prevention, visit: [email or web address] OR  [email or web address] OR  https://www.cdc.gov/steadi/patient.html

## 2024-09-12 NOTE — PROGRESS NOTE ADULT - ATTENDING COMMENTS
# L shoulder abscess, cannot rule out OM  # MS    - pt with L shoulder abscess, possible OM, s/p course of IV cefepime x 6 weeks, now presents with worsening pain found to have abscess  - s/p I&D on 9/9, cultures still with no growth  - currently on broad spectrum IV vancomycin, meropenem  - BCx remains negative  - given there's a possibility of OM, will target for total 6 weeks course  - once bone path negative, will de-escalate and shorten the total course  - appreciate ID recs  - place PICC line  - CM for home infusion/VNS setup    Kaern Ahuja MD  Division of Hospital Medicine  Contact via Microsoft Teams  Office: [phone]    I have personally and independently provided 51 minutes in patient encounter, reviewing tests and imaging, independently obtaining a history, performing a physical examination, discussing the plan with the patient, ordering medications/tests, documenting clinical information, and coordinating care. This excludes any time spent on teaching.
Patient is a 43 year old female, with PMH of MS on rituxan, anxiety, depression, septic arthritis of L sternoclavicular joint s/p I&Dx2 and cefepime for 6 weeks (12/2023-1/2024), recent admission for muscular abscess around L clavicle (offered surgery, declined) presents from home with persistent L clavicular pain    - s/p I&D of left scj with pec flap with thoracic and plastics   - on merem and vanco per ID recs   - f/u pathology and cultures from OR   - PCA pump for pain control for now; anesthesia following for pump adjustments as needed  - daily CXR  - encourage IS use   - f/u mrsa pcr   - continue home meds    Rest as above. Discussed with HS.
# L shoulder abscess, cannot rule out OM  # MS    - pt with L shoulder abscess, possible OM, s/p course of IV cefepime x 6 weeks, now presents with worsening pain found to have abscess  - s/p I&D on 9/9, cultures still with no growth  - currently on broad spectrum IV vancomycin, meropenem  - BCx remains negative  - given there's a possibility of OM, will target for total 6 weeks course  - once bone path negative, will de-escalate and shorten the total course  - appreciate ID recs  - appreciate plastic surgery recs: plan to take down the dressing on 9/13  - s/p PICC line placement 9/11  - SW on board for home infusion/VNS setup    Karen Ahuja MD  Division of Hospital Medicine  Contact via Microsoft Teams  Office: [phone]    I have personally and independently provided 51 minutes in patient encounter, reviewing tests and imaging, independently obtaining a history, performing a physical examination, discussing the plan with the patient, ordering medications/tests, documenting clinical information, and coordinating care. This excludes any time spent on teaching.

## 2024-09-12 NOTE — PROVIDER CONTACT NOTE (OTHER) - REASON
pt complaining of pain, swollen, and redness at surgical site on L side pt complaining of pain, swollen, and redness at surgical site on L side, the arm is warm to touch, doctor came to speak with pt

## 2024-09-12 NOTE — PROGRESS NOTE ADULT - SUBJECTIVE AND OBJECTIVE BOX
SUBJECTIVE: "Hi." Denies acute chest pain, palpitations, or shortness of breath. Patient reports she is being discharged today.    Vital Signs Last 24 Hrs  T(C): 37.1 (09-12-24 @ 12:29), Max: 37.5 (09-11-24 @ 20:12)  T(F): 98.7 (09-12-24 @ 12:29), Max: 99.5 (09-11-24 @ 20:12)  HR: 86 (09-12-24 @ 12:29) (86 - 103)  BP: 112/68 (09-12-24 @ 12:29) (111/66 - 118/59)  RR: 18 (09-12-24 @ 12:29) (18 - 18)  SpO2: 100% (09-12-24 @ 12:29) (98% - 100%)           INPUT/OUTPUT:  09-11 @ 07:01  -  09-12 @ 07:00  11 Sep 2024 07:01  -  12 Sep 2024 07:00  --------------------------------------------------------  IN:    Oral Fluid: 840 mL  Total IN: 840 mL  OUT:    Bulb (mL): 25 mL    Bulb (mL): 60 mL  Total OUT: 85 mL  Total NET: 755 mL  12 Sep 2024 07:01  -  12 Sep 2024 14:39  --------------------------------------------------------  IN:    Oral Fluid: 250 mL  Total IN: 250 mL  OUT:    Bulb (mL): 30 mL    Bulb (mL): 50 mL  Total OUT: 80 mL  Total NET: 170 mL      LABS:  09-12  137  |  104  |  8   ----------------------------<  106<H>  4.0   |  25  |  0.43<L>  TPro  5.6<L>  /  Alb  3.1<L>  /  TBili  0.1<L>  /  DBili  x   /  AST  49<H>  /  ALT  29  /  AlkPhos  125<H>  09-12             8.1    9.89  )-----------( 305      ( 12 Sep 2024 07:26 )             25.6            PHYSICAL EXAM:  GEN: no acute distress  HEENT: NCAT  CVS: s1, s2  CHEST: LCW site c/d/i  RESP: nonlabored respirations with no use of accessory muscles  GI: abdomen soft, nontender, +bowel sounds  EXT: FROM, no edema, peripheral pulses intact  NEURO: AAOx3      ACTIVE MEDICATIONS:  acetaminophen     Tablet .. 975 milliGRAM(s) Oral every 6 hours  baclofen 5 milliGRAM(s) Oral every 8 hours PRN  chlorhexidine 2% Cloths 1 Application(s) Topical daily  clonazePAM  Tablet 0.5 milliGRAM(s) Oral daily PRN  dalfampridine ER 10 milliGRAM(s) Oral every 12 hours  DULoxetine 60 milliGRAM(s) Oral daily  enoxaparin Injectable 40 milliGRAM(s) SubCutaneous every 24 hours  Gemtesa 75 milliGRAM(s) 1 Tablet(s) Oral daily  meropenem  IVPB 1000 milliGRAM(s) IV Intermittent every 8 hours  modafinil 200 milliGRAM(s) Oral daily  mupirocin 2% Nasal 1 Application(s) Both Nostrils two times a day  oxyCODONE    IR 5 milliGRAM(s) Oral every 4 hours PRN  sertraline 100 milliGRAM(s) Oral daily  vancomycin  IVPB 1000 milliGRAM(s) IV Intermittent every 8 hours    Case discussed in detail with Thoracic Attending Dr. Bautista. Plan below as per discussion.

## 2024-09-12 NOTE — PROGRESS NOTE ADULT - SUBJECTIVE AND OBJECTIVE BOX
Plastic Surgery Progress Note (pg [facility]: [id number], [place]: [phone])    SUBJECTIVE  The patient was seen and examined. No acute events overnight.    OBJECTIVE  ___________________________________________________  VITAL SIGNS / I&O's   Vital Signs Last 24 Hrs  T(C): 37.4 (12 Sep 2024 06:00), Max: 37.5 (11 Sep 2024 20:12)  T(F): 99.3 (12 Sep 2024 06:00), Max: 99.5 (11 Sep 2024 20:12)  HR: 90 (12 Sep 2024 06:00) (75 - 103)  BP: 111/66 (12 Sep 2024 06:00) (106/64 - 118/59)  BP(mean): --  RR: 18 (12 Sep 2024 06:00) (18 - 18)  SpO2: 98% (12 Sep 2024 06:00) (98% - 100%)    Parameters below as of 12 Sep 2024 06:00  Patient On (Oxygen Delivery Method): room air          11 Sep 2024 07:01  -  12 Sep 2024 07:00  --------------------------------------------------------  IN:    Oral Fluid: 840 mL  Total IN: 840 mL    OUT:    Bulb (mL): 25 mL    Bulb (mL): 60 mL  Total OUT: 85 mL    Total NET: 755 mL        ___________________________________________________  PHYSICAL EXAM    -- CONSTITUTIONAL: NAD, lying in bed  -- NEURO: Awake, alert  Chest: mepilex dressing c/d/i; adjacent skin well-perfused without erythema/induration, increased edema this AM,   LISBETH drains x2 with SS output.  ___________________________________________________  LABS                        7.9    8.56  )-----------( 269      ( 11 Sep 2024 07:10 )             25.6     11 Sep 2024 07:11    139    |  105    |  10     ----------------------------<  104    3.7     |  24     |  0.43     Ca    8.0        11 Sep 2024 07:11  Mg     2.1       10 Sep 2024 09:09    TPro  5.4    /  Alb  3.2    /  TBili  0.3    /  DBili  x      /  AST  22     /  ALT  13     /  AlkPhos  98     10 Sep 2024 09:09    PT/INR - ( 10 Sep 2024 09:09 )   PT: 11.3 sec;   INR: 1.08 ratio         PTT - ( 10 Sep 2024 09:09 )  PTT:27.5 sec  CAPILLARY BLOOD GLUCOSE            Urinalysis Basic - ( 11 Sep 2024 07:11 )    Color: x / Appearance: x / SG: x / pH: x  Gluc: 104 mg/dL / Ketone: x  / Bili: x / Urobili: x   Blood: x / Protein: x / Nitrite: x   Leuk Esterase: x / RBC: x / WBC x   Sq Epi: x / Non Sq Epi: x / Bacteria: x      ___________________________________________________  MICRO  Recent Cultures:  Specimen Source: Surgical Swab #1 deep clavicular culture swab lt, 09-10 @ 11:22; Results   No growth to date.; Gram Stain:   No polymorphonuclear cells seen per low power field  No organisms seen per oil power field; Organism: --  Specimen Source: .Blood Blood-Peripheral, 09-05 @ 16:24; Results   No growth at 5 days; Gram Stain: --; Organism: --  Specimen Source: Clean Catch Clean Catch (Midstream), 09-05 @ 10:10; Results   <10,000 CFU/mL Normal Urogenital Renae; Gram Stain: --; Organism: --    ___________________________________________________  MEDICATIONS  (STANDING):  acetaminophen     Tablet .. 975 milliGRAM(s) Oral every 6 hours  chlorhexidine 2% Cloths 1 Application(s) Topical daily  dalfampridine ER 10 milliGRAM(s) Oral every 12 hours  DULoxetine 60 milliGRAM(s) Oral daily  enoxaparin Injectable 40 milliGRAM(s) SubCutaneous every 24 hours  Gemtesa 75 milliGRAM(s) 1 Tablet(s) Oral daily  meropenem  IVPB 1000 milliGRAM(s) IV Intermittent every 8 hours  modafinil 200 milliGRAM(s) Oral daily  sertraline 100 milliGRAM(s) Oral daily  vancomycin  IVPB 1000 milliGRAM(s) IV Intermittent every 8 hours    MEDICATIONS  (PRN):  baclofen 5 milliGRAM(s) Oral every 8 hours PRN Muscle Spasm  clonazePAM  Tablet 0.5 milliGRAM(s) Oral daily PRN for anxiety  oxyCODONE    IR 5 milliGRAM(s) Oral every 4 hours PRN Severe Pain (7 - 10)

## 2024-09-12 NOTE — PROVIDER CONTACT NOTE (OTHER) - SITUATION
pt complaining of pain, swollen, and redness at surgical site on L side pt complaining of pain, swollen, and redness at surgical site on L side. the arm is warm to touch, doctor came to speak with pt

## 2024-09-12 NOTE — PROGRESS NOTE ADULT - SUBJECTIVE AND OBJECTIVE BOX
JAYA AGGARWAL  44y  MRN: [id number]    Patient is a 44y old  Female who presents with a chief complaint of L shoulder pain (11 Sep 2024 16:58)      Subjective: no events ON. Denies fever, CP, SOB, abn pain, N/V, dysuria. Tolerating diet.      MEDICATIONS  (STANDING):  acetaminophen     Tablet .. 975 milliGRAM(s) Oral every 6 hours  chlorhexidine 2% Cloths 1 Application(s) Topical daily  dalfampridine ER 10 milliGRAM(s) Oral every 12 hours  DULoxetine 60 milliGRAM(s) Oral daily  enoxaparin Injectable 40 milliGRAM(s) SubCutaneous every 24 hours  Gemtesa 75 milliGRAM(s) 1 Tablet(s) Oral daily  meropenem  IVPB 1000 milliGRAM(s) IV Intermittent every 8 hours  modafinil 200 milliGRAM(s) Oral daily  sertraline 100 milliGRAM(s) Oral daily  vancomycin  IVPB 1000 milliGRAM(s) IV Intermittent every 8 hours    MEDICATIONS  (PRN):  baclofen 5 milliGRAM(s) Oral every 8 hours PRN Muscle Spasm  clonazePAM  Tablet 0.5 milliGRAM(s) Oral daily PRN for anxiety  oxyCODONE    IR 5 milliGRAM(s) Oral every 4 hours PRN Severe Pain (7 - 10)      Objective:    Vitals: Vital Signs Last 24 Hrs  T(C): 37.4 (09-12-24 @ 06:00), Max: 37.5 (09-11-24 @ 20:12)  T(F): 99.3 (09-12-24 @ 06:00), Max: 99.5 (09-11-24 @ 20:12)  HR: 90 (09-12-24 @ 06:00) (75 - 103)  BP: 111/66 (09-12-24 @ 06:00) (106/64 - 118/59)  BP(mean): --  RR: 18 (09-12-24 @ 06:00) (18 - 18)  SpO2: 98% (09-12-24 @ 06:00) (98% - 100%)            I&O's Summary    11 Sep 2024 07:01  -  12 Sep 2024 07:00  --------------------------------------------------------  IN: 840 mL / OUT: 85 mL / NET: 755 mL        PHYSICAL EXAM:  GENERAL: NAD  HEAD:  Atraumatic, Normocephalic  EYES: EOMI, conjunctiva and sclera clear  CHEST/LUNG: Clear to auscultation bilaterally; No rales, rhonchi, wheezing, or rubs  HEART: Regular rate and rhythm; No murmurs, rubs, or gallops  ABDOMEN: Soft, Nontender, Nondistended;   SKIN: No rashes or lesions  NERVOUS SYSTEM:  Alert & Oriented X3, no focal deficits    LABS:  09-11    139  |  105  |  10  ----------------------------<  104<H>  3.7   |  24  |  0.43<L>  09-10    134<L>  |  100  |  8   ----------------------------<  102<H>  4.4   |  24  |  0.45<L>    Ca    8.0<L>      11 Sep 2024 07:11  Ca    8.3<L>      10 Sep 2024 09:09  Mg     2.1     09-10    TPro  5.4<L>  /  Alb  3.2<L>  /  TBili  0.3  /  DBili  x   /  AST  22  /  ALT  13  /  AlkPhos  98  09-10      PT/INR - ( 10 Sep 2024 09:09 )   PT: 11.3 sec;   INR: 1.08 ratio         PTT - ( 10 Sep 2024 09:09 )  PTT:27.5 sec              Urinalysis Basic - ( 11 Sep 2024 07:11 )    Color: x / Appearance: x / SG: x / pH: x  Gluc: 104 mg/dL / Ketone: x  / Bili: x / Urobili: x   Blood: x / Protein: x / Nitrite: x   Leuk Esterase: x / RBC: x / WBC x   Sq Epi: x / Non Sq Epi: x / Bacteria: x                              7.9    8.56  )-----------( 269      ( 11 Sep 2024 07:10 )             25.6                         8.3    7.74  )-----------( 271      ( 10 Sep 2024 17:39 )             26.2                         9.3    8.25  )-----------( 321      ( 10 Sep 2024 09:09 )             28.5     CAPILLARY BLOOD GLUCOSE          RADIOLOGY & ADDITIONAL TESTS:    Imaging Personally Reviewed:  [x ] YES  [ ] NO    Consultants involved in case:   Consultant(s) Notes Reviewed:  [ x] YES  [ ] NO:   Care Discussed with Consultants/Other Providers [x ] YES  [ ] NO         JAYA AGGARWAL  44y  MRN: [id number]    Patient is a 44y old  Female who presents with a chief complaint of L shoulder pain (11 Sep 2024 16:58)      Subjective: no events ON. Denies fever, CP, SOB, abn pain, N/V, dysuria. Tolerating diet.  PICC line placement yday     MEDICATIONS  (STANDING):  acetaminophen     Tablet .. 975 milliGRAM(s) Oral every 6 hours  chlorhexidine 2% Cloths 1 Application(s) Topical daily  dalfampridine ER 10 milliGRAM(s) Oral every 12 hours  DULoxetine 60 milliGRAM(s) Oral daily  enoxaparin Injectable 40 milliGRAM(s) SubCutaneous every 24 hours  Gemtesa 75 milliGRAM(s) 1 Tablet(s) Oral daily  meropenem  IVPB 1000 milliGRAM(s) IV Intermittent every 8 hours  modafinil 200 milliGRAM(s) Oral daily  sertraline 100 milliGRAM(s) Oral daily  vancomycin  IVPB 1000 milliGRAM(s) IV Intermittent every 8 hours    MEDICATIONS  (PRN):  baclofen 5 milliGRAM(s) Oral every 8 hours PRN Muscle Spasm  clonazePAM  Tablet 0.5 milliGRAM(s) Oral daily PRN for anxiety  oxyCODONE    IR 5 milliGRAM(s) Oral every 4 hours PRN Severe Pain (7 - 10)      Objective:    Vitals: Vital Signs Last 24 Hrs  T(C): 37.4 (09-12-24 @ 06:00), Max: 37.5 (09-11-24 @ 20:12)  T(F): 99.3 (09-12-24 @ 06:00), Max: 99.5 (09-11-24 @ 20:12)  HR: 90 (09-12-24 @ 06:00) (75 - 103)  BP: 111/66 (09-12-24 @ 06:00) (106/64 - 118/59)  BP(mean): --  RR: 18 (09-12-24 @ 06:00) (18 - 18)  SpO2: 98% (09-12-24 @ 06:00) (98% - 100%)            I&O's Summary    11 Sep 2024 07:01  -  12 Sep 2024 07:00  --------------------------------------------------------  IN: 840 mL / OUT: 85 mL / NET: 755 mL        PHYSICAL EXAM:  GENERAL: NAD  HEAD:  Atraumatic, Normocephalic  EYES: EOMI, conjunctiva and sclera clear  CHEST/LUNG: Clear to auscultation bilaterally; No rales, rhonchi, wheezing, or rubs  HEART: Regular rate and rhythm; No murmurs, rubs, or gallops  ABDOMEN: Soft, Nontender, Nondistended;   SKIN: No rashes or lesions  NERVOUS SYSTEM:  Alert & Oriented X3, no focal deficits    LABS:  09-11    139  |  105  |  10  ----------------------------<  104<H>  3.7   |  24  |  0.43<L>  09-10    134<L>  |  100  |  8   ----------------------------<  102<H>  4.4   |  24  |  0.45<L>    Ca    8.0<L>      11 Sep 2024 07:11  Ca    8.3<L>      10 Sep 2024 09:09  Mg     2.1     09-10    TPro  5.4<L>  /  Alb  3.2<L>  /  TBili  0.3  /  DBili  x   /  AST  22  /  ALT  13  /  AlkPhos  98  09-10      PT/INR - ( 10 Sep 2024 09:09 )   PT: 11.3 sec;   INR: 1.08 ratio         PTT - ( 10 Sep 2024 09:09 )  PTT:27.5 sec              Urinalysis Basic - ( 11 Sep 2024 07:11 )    Color: x / Appearance: x / SG: x / pH: x  Gluc: 104 mg/dL / Ketone: x  / Bili: x / Urobili: x   Blood: x / Protein: x / Nitrite: x   Leuk Esterase: x / RBC: x / WBC x   Sq Epi: x / Non Sq Epi: x / Bacteria: x                              7.9    8.56  )-----------( 269      ( 11 Sep 2024 07:10 )             25.6                         8.3    7.74  )-----------( 271      ( 10 Sep 2024 17:39 )             26.2                         9.3    8.25  )-----------( 321      ( 10 Sep 2024 09:09 )             28.5     CAPILLARY BLOOD GLUCOSE          RADIOLOGY & ADDITIONAL TESTS:    Imaging Personally Reviewed:  [x ] YES  [ ] NO    Consultants involved in case:   Consultant(s) Notes Reviewed:  [ x] YES  [ ] NO:   Care Discussed with Consultants/Other Providers [x ] YES  [ ] NO

## 2024-09-12 NOTE — PROGRESS NOTE ADULT - PROBLEM SELECTOR PLAN 1
s/p Left pectoralis advancement flap; L SCJ debridement by CT surgery and plastics  LISBETH #1 and #2 being managed by Plastics  Increase activity  Pulm toilet IS C&DB  Global care per primary team  Per report, patient being discharged home today   Follow up post-op visit Dr Bautista  Surgery: Thoracic Surgery  [address], [place] [address]  Phone: [phone]  Fax: [phone]  Follow Up Time: 2 weeks   Above plan per d/w Attending Dr. Bautista   ----  For any questions or concerns, please contact thoracic @ [phone]

## 2024-09-12 NOTE — PROGRESS NOTE ADULT - PROBLEM SELECTOR PLAN 1
raised, erythematous skin wound overlying L sternoclavicular joint  s/p debridement and 6 weeks of cefepime Dec 2023-Jan 2024  - MRI L shoulder from 9/4 showing rim-enhancing fluid and surrounding phlegmon concerning for abscess with mild vague internal bone marrow edema within the clavicular and regional lymphadenopathy, likely reactive.  - pt previously scheduled for debridement and washout with CT surgery and combined muscle flap by plastics, however was canceled as patient declined  - blood cx from last admission negative; on empiric vanc and zosyn  - f/u CT and plastic surgery recs  - s/p  I&D of L sterno-clavicular joint by CT surgery and coverage with L pec turnover flap by PRS  - f/u daily CXR  - cultures still with no growth  - currently on broad spectrum IV vancomycin, meropenem  - BCx negative thus far  - will target for total 6 weeks course given possibility of Osteomyelitis  - once bone path negative, will de-escalate and shorten the total course  - f/u ID recs  - pending PICC line 9/12  - No longer on PCA pump for pain. On PRN oxycodone for pain

## 2024-09-13 ENCOUNTER — NON-APPOINTMENT (OUTPATIENT)
Age: 44
End: 2024-09-13

## 2024-09-13 VITALS
SYSTOLIC BLOOD PRESSURE: 115 MMHG | DIASTOLIC BLOOD PRESSURE: 53 MMHG | HEART RATE: 91 BPM | OXYGEN SATURATION: 95 % | RESPIRATION RATE: 18 BRPM | TEMPERATURE: 98 F

## 2024-09-13 LAB
GRAM STN FLD: ABNORMAL
GRAM STN FLD: ABNORMAL
S PYO AG SPEC QL IA: NEGATIVE — SIGNIFICANT CHANGE UP

## 2024-09-13 PROCEDURE — 87081 CULTURE SCREEN ONLY: CPT

## 2024-09-13 PROCEDURE — 99239 HOSP IP/OBS DSCHRG MGMT >30: CPT | Mod: GC

## 2024-09-13 PROCEDURE — 80048 BASIC METABOLIC PNL TOTAL CA: CPT

## 2024-09-13 PROCEDURE — 86140 C-REACTIVE PROTEIN: CPT

## 2024-09-13 PROCEDURE — 87075 CULTR BACTERIA EXCEPT BLOOD: CPT

## 2024-09-13 PROCEDURE — 84702 CHORIONIC GONADOTROPIN TEST: CPT

## 2024-09-13 PROCEDURE — 86922 COMPATIBILITY TEST ANTIGLOB: CPT

## 2024-09-13 PROCEDURE — 85396 CLOTTING ASSAY WHOLE BLOOD: CPT

## 2024-09-13 PROCEDURE — 97110 THERAPEUTIC EXERCISES: CPT

## 2024-09-13 PROCEDURE — 87186 SC STD MICRODIL/AGAR DIL: CPT

## 2024-09-13 PROCEDURE — 84295 ASSAY OF SERUM SODIUM: CPT

## 2024-09-13 PROCEDURE — 87116 MYCOBACTERIA CULTURE: CPT

## 2024-09-13 PROCEDURE — 71045 X-RAY EXAM CHEST 1 VIEW: CPT

## 2024-09-13 PROCEDURE — 87880 STREP A ASSAY W/OPTIC: CPT

## 2024-09-13 PROCEDURE — 85730 THROMBOPLASTIN TIME PARTIAL: CPT

## 2024-09-13 PROCEDURE — 82728 ASSAY OF FERRITIN: CPT

## 2024-09-13 PROCEDURE — 83540 ASSAY OF IRON: CPT

## 2024-09-13 PROCEDURE — 82330 ASSAY OF CALCIUM: CPT

## 2024-09-13 PROCEDURE — 36569 INSJ PICC 5 YR+ W/O IMAGING: CPT

## 2024-09-13 PROCEDURE — 85018 HEMOGLOBIN: CPT

## 2024-09-13 PROCEDURE — 80053 COMPREHEN METABOLIC PANEL: CPT

## 2024-09-13 PROCEDURE — 86900 BLOOD TYPING SEROLOGIC ABO: CPT

## 2024-09-13 PROCEDURE — 82947 ASSAY GLUCOSE BLOOD QUANT: CPT

## 2024-09-13 PROCEDURE — 99285 EMERGENCY DEPT VISIT HI MDM: CPT | Mod: 25

## 2024-09-13 PROCEDURE — 86880 COOMBS TEST DIRECT: CPT

## 2024-09-13 PROCEDURE — 86901 BLOOD TYPING SEROLOGIC RH(D): CPT

## 2024-09-13 PROCEDURE — 84132 ASSAY OF SERUM POTASSIUM: CPT

## 2024-09-13 PROCEDURE — 87015 SPECIMEN INFECT AGNT CONCNTJ: CPT

## 2024-09-13 PROCEDURE — 99232 SBSQ HOSP IP/OBS MODERATE 35: CPT

## 2024-09-13 PROCEDURE — 93005 ELECTROCARDIOGRAM TRACING: CPT

## 2024-09-13 PROCEDURE — 86850 RBC ANTIBODY SCREEN: CPT

## 2024-09-13 PROCEDURE — 36415 COLL VENOUS BLD VENIPUNCTURE: CPT

## 2024-09-13 PROCEDURE — 84100 ASSAY OF PHOSPHORUS: CPT

## 2024-09-13 PROCEDURE — 83550 IRON BINDING TEST: CPT

## 2024-09-13 PROCEDURE — 87102 FUNGUS ISOLATION CULTURE: CPT

## 2024-09-13 PROCEDURE — 97161 PT EVAL LOW COMPLEX 20 MIN: CPT

## 2024-09-13 PROCEDURE — 86870 RBC ANTIBODY IDENTIFICATION: CPT

## 2024-09-13 PROCEDURE — 85610 PROTHROMBIN TIME: CPT

## 2024-09-13 PROCEDURE — 96365 THER/PROPH/DIAG IV INF INIT: CPT

## 2024-09-13 PROCEDURE — 87640 STAPH A DNA AMP PROBE: CPT

## 2024-09-13 PROCEDURE — 82435 ASSAY OF BLOOD CHLORIDE: CPT

## 2024-09-13 PROCEDURE — 85025 COMPLETE CBC W/AUTO DIFF WBC: CPT

## 2024-09-13 PROCEDURE — 87206 SMEAR FLUORESCENT/ACID STAI: CPT

## 2024-09-13 PROCEDURE — 83735 ASSAY OF MAGNESIUM: CPT

## 2024-09-13 PROCEDURE — 85652 RBC SED RATE AUTOMATED: CPT

## 2024-09-13 PROCEDURE — 87070 CULTURE OTHR SPECIMN AEROBIC: CPT

## 2024-09-13 PROCEDURE — 88312 SPECIAL STAINS GROUP 1: CPT

## 2024-09-13 PROCEDURE — 85014 HEMATOCRIT: CPT

## 2024-09-13 PROCEDURE — 82803 BLOOD GASES ANY COMBINATION: CPT

## 2024-09-13 PROCEDURE — C1889: CPT

## 2024-09-13 PROCEDURE — 83605 ASSAY OF LACTIC ACID: CPT

## 2024-09-13 PROCEDURE — 80202 ASSAY OF VANCOMYCIN: CPT

## 2024-09-13 PROCEDURE — C9399: CPT

## 2024-09-13 PROCEDURE — C1751: CPT

## 2024-09-13 PROCEDURE — 97116 GAIT TRAINING THERAPY: CPT

## 2024-09-13 PROCEDURE — 87641 MR-STAPH DNA AMP PROBE: CPT

## 2024-09-13 PROCEDURE — 85027 COMPLETE CBC AUTOMATED: CPT

## 2024-09-13 PROCEDURE — C1769: CPT

## 2024-09-13 PROCEDURE — 88305 TISSUE EXAM BY PATHOLOGIST: CPT

## 2024-09-13 PROCEDURE — 87077 CULTURE AEROBIC IDENTIFY: CPT

## 2024-09-13 RX ORDER — OXYCODONE AND ACETAMINOPHEN 7.5; 325 MG/1; MG/1
1 TABLET ORAL
Qty: 15 | Refills: 0
Start: 2024-09-13 | End: 2024-09-17

## 2024-09-13 RX ORDER — VANCOMYCIN/0.9 % SOD CHLORIDE 1.75G/25
1.25 PLASTIC BAG, INJECTION (ML) INTRAVENOUS
Qty: 105 | Refills: 0
Start: 2024-09-13 | End: 2024-10-24

## 2024-09-13 RX ORDER — VANCOMYCIN/0.9 % SOD CHLORIDE 1.75G/25
1250 PLASTIC BAG, INJECTION (ML) INTRAVENOUS
Qty: 105 | Refills: 0
Start: 2024-09-13 | End: 2024-10-24

## 2024-09-13 RX ADMIN — ACETAMINOPHEN 975 MILLIGRAM(S): 325 TABLET ORAL at 00:19

## 2024-09-13 RX ADMIN — DALFAMPRIDINE 10 MILLIGRAM(S): 10 TABLET, EXTENDED RELEASE ORAL at 05:26

## 2024-09-13 RX ADMIN — HYDROMORPHONE HYDROCHLORIDE 0.2 MILLIGRAM(S): 2 TABLET ORAL at 01:10

## 2024-09-13 RX ADMIN — ACETAMINOPHEN 975 MILLIGRAM(S): 325 TABLET ORAL at 05:26

## 2024-09-13 RX ADMIN — ACETAMINOPHEN 975 MILLIGRAM(S): 325 TABLET ORAL at 01:10

## 2024-09-13 RX ADMIN — MODAFINIL 200 MILLIGRAM(S): 200 TABLET ORAL at 11:18

## 2024-09-13 RX ADMIN — Medication 250 MILLIGRAM(S): at 05:26

## 2024-09-13 RX ADMIN — HYDROMORPHONE HYDROCHLORIDE 0.2 MILLIGRAM(S): 2 TABLET ORAL at 00:19

## 2024-09-13 RX ADMIN — MEROPENEM 100 MILLIGRAM(S): 500 INJECTION, POWDER, FOR SOLUTION INTRAVENOUS at 08:24

## 2024-09-13 RX ADMIN — ACETAMINOPHEN 975 MILLIGRAM(S): 325 TABLET ORAL at 06:11

## 2024-09-13 RX ADMIN — ACETAMINOPHEN 975 MILLIGRAM(S): 325 TABLET ORAL at 13:01

## 2024-09-13 RX ADMIN — Medication 60 MILLIGRAM(S): at 11:18

## 2024-09-13 RX ADMIN — MEROPENEM 100 MILLIGRAM(S): 500 INJECTION, POWDER, FOR SOLUTION INTRAVENOUS at 01:00

## 2024-09-13 RX ADMIN — ENOXAPARIN SODIUM 40 MILLIGRAM(S): 100 INJECTION SUBCUTANEOUS at 11:18

## 2024-09-13 RX ADMIN — ACETAMINOPHEN 975 MILLIGRAM(S): 325 TABLET ORAL at 13:29

## 2024-09-13 RX ADMIN — Medication 1 APPLICATION(S): at 05:27

## 2024-09-13 RX ADMIN — SERTRALINE HYDROCHLORIDE 100 MILLIGRAM(S): 50 TABLET, FILM COATED ORAL at 11:18

## 2024-09-13 RX ADMIN — ACETAMINOPHEN 1000 MILLIGRAM(S): 325 TABLET ORAL at 03:11

## 2024-09-13 NOTE — PROGRESS NOTE ADULT - ASSESSMENT
43y Female with PMH of MS on Ocrevus anxiety, depression, septic arthritis of L sternoclavicular joint s/p I&Dx2 and cefepime for 6 weeks (12/2023-1/2024), recent admission for muscular abscess around L clavicle (offered surgery, declined) presents from home with persistent L clavicular pain. Report she did not want to have surgery last admission few days ago due to daughter's engagement, however returned to have the surgery.       Overall abnormal MRI, elevated ESR.   Abscess.        PLAN:  s/p surgery  ESR/CRP noted   c/w empiric Meropenem and vanco empirically  recommend to reduce dose to 1250 q12h based on AUC dosing   monitor vancomycin trough and creatinine to avoid nephrotoxicity and ensure efficacy   MRSA PCR negative   f/u OR cx, NTD   f/u pathology   s/p PICC, duration to be determined by pathology result.   tentative plan for 6 weeks, will determine duration and final abx recommendation based on cx.     Plan discussed with Medicine Attending.     Odalis Reynaga  Please contact through MS Teams   If no response or past 5 pm/weekend call [phone].

## 2024-09-13 NOTE — PROGRESS NOTE ADULT - PROBLEM SELECTOR PLAN 1
raised, erythematous skin wound overlying L sternoclavicular joint  s/p debridement and 6 weeks of cefepime Dec 2023-Jan 2024  - MRI L shoulder from 9/4 showing rim-enhancing fluid and surrounding phlegmon concerning for abscess with mild vague internal bone marrow edema within the clavicular and regional lymphadenopathy, likely reactive.  - pt previously scheduled for debridement and washout with CT surgery and combined muscle flap by plastics, however was canceled as patient declined  - blood cx from last admission negative; on empiric vanc and zosyn  - f/u CT and plastic surgery recs  - s/p  I&D of L sterno-clavicular joint by CT surgery and coverage with L pec turnover flap by PRS  - f/u daily CXR  - cultures still with no growth  - currently on broad spectrum IV vancomycin, meropenem  - BCx negative thus far  - will target for total 6 weeks course given possibility of Osteomyelitis  - once bone path negative, will de-escalate and shorten the total course  - f/u ID recs  - pending PICC line 9/12  - No longer on PCA pump for pain. On PRN oxycodone for pain raised, erythematous skin wound overlying L sternoclavicular joint  s/p debridement and 6 weeks of cefepime Dec 2023-Jan 2024  - MRI L shoulder from 9/4 showing rim-enhancing fluid and surrounding phlegmon concerning for abscess with mild vague internal bone marrow edema within the clavicular and regional lymphadenopathy, likely reactive.  - pt previously scheduled for debridement and washout with CT surgery and combined muscle flap by plastics, however was canceled as patient declined  - blood cx from last admission negative; on empiric vanc and zosyn  - f/u CT and plastic surgery recs  - s/p  I&D of L sterno-clavicular joint by CT surgery and coverage with L pec turnover flap by PRS  - f/u daily CXR  - cultures still with no growth  - currently on broad spectrum IV vancomycin, meropenem  - BCx negative thus far  - will target for total 6 weeks course given possibility of Osteomyelitis  - once bone path negative, will de-escalate and shorten the total course  - PICC line 9/12  - No longer on PCA pump for pain. Going home on percocet for pain  - Experienced swelling of LUE 9/12 night. Underwent Duplex LUE

## 2024-09-13 NOTE — PROGRESS NOTE ADULT - SUBJECTIVE AND OBJECTIVE BOX
Plastic Surgery Progress Note (pg [facility]: [id number], [facility]: [phone])    SUBJECTIVE  The patient was seen and examined. complaints of L upper arm pain    OBJECTIVE  ___________________________________________________  VITAL SIGNS / I&O's   Vital Signs Last 24 Hrs  T(C): 37 (13 Sep 2024 04:20), Max: 37.1 (12 Sep 2024 12:29)  T(F): 98.6 (13 Sep 2024 04:20), Max: 98.7 (12 Sep 2024 12:29)  HR: 77 (13 Sep 2024 04:20) (77 - 96)  BP: 126/66 (13 Sep 2024 04:20) (112/68 - 150/53)  BP(mean): --  RR: 18 (13 Sep 2024 04:20) (18 - 18)  SpO2: 99% (13 Sep 2024 04:20) (99% - 100%)    Parameters below as of 13 Sep 2024 04:20  Patient On (Oxygen Delivery Method): room air          12 Sep 2024 07:01  -  13 Sep 2024 07:00  --------------------------------------------------------  IN:    Oral Fluid: 490 mL  Total IN: 490 mL    OUT:    Bulb (mL): 30 mL    Bulb (mL): 50 mL    Voided (mL): 1450 mL  Total OUT: 1530 mL    Total NET: -1040 mL        ___________________________________________________  PHYSICAL EXAM    -- CONSTITUTIONAL: NAD, lying in bed  -- NEURO: Awake, alert  Chest:  dressing c/d/i; adjacent skin well-perfused without erythema/induration, increased edema this AM,   LISBETH drains x2 with SS output.  TTP along former borders of pec muscle  __________________________________    ___________________________________________________  LABS                        8.1    9.89  )-----------( 305      ( 12 Sep 2024 07:26 )             25.6     12 Sep 2024 07:28    137    |  104    |  8      ----------------------------<  106    4.0     |  25     |  0.43     Ca    8.4        12 Sep 2024 07:28  Phos  2.3       12 Sep 2024 07:28  Mg     1.9       12 Sep 2024 07:28    TPro  5.6    /  Alb  3.1    /  TBili  0.1    /  DBili  x      /  AST  49     /  ALT  29     /  AlkPhos  125    12 Sep 2024 07:28      CAPILLARY BLOOD GLUCOSE            Urinalysis Basic - ( 12 Sep 2024 07:28 )    Color: x / Appearance: x / SG: x / pH: x  Gluc: 106 mg/dL / Ketone: x  / Bili: x / Urobili: x   Blood: x / Protein: x / Nitrite: x   Leuk Esterase: x / RBC: x / WBC x   Sq Epi: x / Non Sq Epi: x / Bacteria: x      ___________________________________________________  MICRO  Recent Cultures:  Specimen Source: Surgical Swab #1 deep clavicular culture swab lt, 09-10 @ 11:22; Results   No growth to date.; Gram Stain:   No polymorphonuclear cells seen per low power field  No organisms seen per oil power field; Organism: --    ___________________________________________________  MEDICATIONS  (STANDING):  acetaminophen     Tablet .. 975 milliGRAM(s) Oral every 6 hours  chlorhexidine 2% Cloths 1 Application(s) Topical daily  dalfampridine ER 10 milliGRAM(s) Oral every 12 hours  DULoxetine 60 milliGRAM(s) Oral daily  enoxaparin Injectable 40 milliGRAM(s) SubCutaneous every 24 hours  Gemtesa 75 milliGRAM(s) 1 Tablet(s) Oral daily  meropenem  IVPB 1000 milliGRAM(s) IV Intermittent every 8 hours  modafinil 200 milliGRAM(s) Oral daily  mupirocin 2% Nasal 1 Application(s) Both Nostrils two times a day  sertraline 100 milliGRAM(s) Oral daily  vancomycin  IVPB 1000 milliGRAM(s) IV Intermittent every 8 hours    MEDICATIONS  (PRN):  baclofen 5 milliGRAM(s) Oral every 8 hours PRN Muscle Spasm  clonazePAM  Tablet 0.5 milliGRAM(s) Oral daily PRN for anxiety  oxyCODONE    IR 5 milliGRAM(s) Oral every 4 hours PRN Severe Pain (7 - 10)

## 2024-09-13 NOTE — PROVIDER CONTACT NOTE (OTHER) - BACKGROUND
pt has a soft tissue abscess wound and is s/p debridement
pt has a hx of cancer and had a debridement on the L arm

## 2024-09-13 NOTE — PROGRESS NOTE ADULT - PROBLEM SELECTOR PROBLEM 1
Soft tissue abscess

## 2024-09-13 NOTE — PROGRESS NOTE ADULT - SUBJECTIVE AND OBJECTIVE BOX
44yPatient is a 44y old  Female who presents with a chief complaint of L shoulder pain (13 Sep 2024 10:35)      Interval history:  Afebrile, s/p PICC.       Allergies:   No Known Allergies    Antimicrobials:  meropenem  IVPB 1000 milliGRAM(s) IV Intermittent every 8 hours  vancomycin  IVPB 1000 milliGRAM(s) IV Intermittent every 8 hours      REVIEW OF SYSTEMS:   No SOB  No abdominal pain  No rash.       Vital Signs Last 24 Hrs  T(C): 36.8 (09-13-24 @ 14:00), Max: 37 (09-13-24 @ 04:20)  T(F): 98.3 (09-13-24 @ 14:00), Max: 98.6 (09-13-24 @ 04:20)  HR: 91 (09-13-24 @ 14:00) (77 - 96)  BP: 115/53 (09-13-24 @ 14:00) (115/53 - 150/53)  BP(mean): --  RR: 18 (09-13-24 @ 14:00) (18 - 18)  SpO2: 95% (09-13-24 @ 14:00) (95% - 100%)      PHYSICAL EXAM:  Pt in no acute distress, alert, awake.   lt chest dressing with 2 drains.   non distended abdomen  no edema LE   + PICC                               8.1    9.89  )-----------( 305      ( 12 Sep 2024 07:26 )             25.6   09-12    137  |  104  |  8   ----------------------------<  106<H>  4.0   |  25  |  0.43<L>    Ca    8.4      12 Sep 2024 07:28  Phos  2.3     09-12  Mg     1.9     09-12    TPro  5.6<L>  /  Alb  3.1<L>  /  TBili  0.1<L>  /  DBili  x   /  AST  49<H>  /  ALT  29  /  AlkPhos  125<H>  09-12      LIVER FUNCTIONS - ( 12 Sep 2024 07:28 )  Alb: 3.1 g/dL / Pro: 5.6 g/dL / ALK PHOS: 125 U/L / ALT: 29 U/L / AST: 49 U/L / GGT: x

## 2024-09-13 NOTE — PROGRESS NOTE ADULT - SUBJECTIVE AND OBJECTIVE BOX
Patient is a 44y old  Female who presents with a chief complaint of L shoulder pain (13 Sep 2024 07:18)      SUBJECTIVE / OVERNIGHT EVENTS:    MEDICATIONS  (STANDING):  acetaminophen     Tablet .. 975 milliGRAM(s) Oral every 6 hours  chlorhexidine 2% Cloths 1 Application(s) Topical daily  dalfampridine ER 10 milliGRAM(s) Oral every 12 hours  DULoxetine 60 milliGRAM(s) Oral daily  enoxaparin Injectable 40 milliGRAM(s) SubCutaneous every 24 hours  Gemtesa 75 milliGRAM(s) 1 Tablet(s) Oral daily  meropenem  IVPB 1000 milliGRAM(s) IV Intermittent every 8 hours  modafinil 200 milliGRAM(s) Oral daily  mupirocin 2% Nasal 1 Application(s) Both Nostrils two times a day  sertraline 100 milliGRAM(s) Oral daily  vancomycin  IVPB 1000 milliGRAM(s) IV Intermittent every 8 hours    MEDICATIONS  (PRN):  baclofen 5 milliGRAM(s) Oral every 8 hours PRN Muscle Spasm  clonazePAM  Tablet 0.5 milliGRAM(s) Oral daily PRN for anxiety  oxyCODONE    IR 5 milliGRAM(s) Oral every 4 hours PRN Severe Pain (7 - 10)      CAPILLARY BLOOD GLUCOSE        I&O's Summary    12 Sep 2024 07:01  -  13 Sep 2024 07:00  --------------------------------------------------------  IN: 490 mL / OUT: 1630 mL / NET: -1140 mL        Vital Signs Last 24 Hrs  T(C): 37 (13 Sep 2024 04:20), Max: 37.1 (12 Sep 2024 12:29)  T(F): 98.6 (13 Sep 2024 04:20), Max: 98.7 (12 Sep 2024 12:29)  HR: 77 (13 Sep 2024 04:20) (77 - 96)  BP: 126/66 (13 Sep 2024 04:20) (112/68 - 150/53)  BP(mean): --  RR: 18 (13 Sep 2024 04:20) (18 - 18)  SpO2: 99% (13 Sep 2024 04:20) (99% - 100%)    Parameters below as of 13 Sep 2024 04:20  Patient On (Oxygen Delivery Method): room air        PHYSICAL EXAM:  GENERAL: NAD, well-developed, well-nourished  HEAD: Atraumatic, Normocephalic  EYES: EOMI, PERRLA, conjunctiva and sclera clear  NECK: Supple, No JVD  CHEST/LUNG: Clear to auscultation bilaterally; No wheezes or crackles  HEART: Normal S1/S2; Regular rate and rhythm; No murmurs, rubs, or gallops  ABDOMEN: Soft, Nontender, Nondistended; Bowel sounds present  EXTREMITIES: 2+ Peripheral Pulses; No clubbing, cyanosis, or edema  PSYCH: A&Ox3  NEUROLOGY: no focal neurologic deficit  SKIN: No rashes or lesions    LABS:                        8.1    9.89  )-----------( 305      ( 12 Sep 2024 07:26 )             25.6      09-12    137  |  104  |  8   ----------------------------<  106<H>  4.0   |  25  |  0.43<L>    Ca    8.4      12 Sep 2024 07:28  Phos  2.3     09-12  Mg     1.9     09-12    TPro  5.6<L>  /  Alb  3.1<L>  /  TBili  0.1<L>  /  DBili  x   /  AST  49<H>  /  ALT  29  /  AlkPhos  125<H>  09-12          Urinalysis Basic - ( 12 Sep 2024 07:28 )    Color: x / Appearance: x / SG: x / pH: x  Gluc: 106 mg/dL / Ketone: x  / Bili: x / Urobili: x   Blood: x / Protein: x / Nitrite: x   Leuk Esterase: x / RBC: x / WBC x   Sq Epi: x / Non Sq Epi: x / Bacteria: x        RADIOLOGY & ADDITIONAL TESTS:    Imaging Personally Reviewed:    Consultant(s) Notes Reviewed:      Care Discussed with Consultants/Other Providers:   Patient is a 44y old  Female who presents with a chief complaint of L shoulder pain (13 Sep 2024 07:18)      SUBJECTIVE / OVERNIGHT EVENTS:  Patient had swelling in LUE overnight and sore throat. Strep negative    MEDICATIONS  (STANDING):  acetaminophen     Tablet .. 975 milliGRAM(s) Oral every 6 hours  chlorhexidine 2% Cloths 1 Application(s) Topical daily  dalfampridine ER 10 milliGRAM(s) Oral every 12 hours  DULoxetine 60 milliGRAM(s) Oral daily  enoxaparin Injectable 40 milliGRAM(s) SubCutaneous every 24 hours  Gemtesa 75 milliGRAM(s) 1 Tablet(s) Oral daily  meropenem  IVPB 1000 milliGRAM(s) IV Intermittent every 8 hours  modafinil 200 milliGRAM(s) Oral daily  mupirocin 2% Nasal 1 Application(s) Both Nostrils two times a day  sertraline 100 milliGRAM(s) Oral daily  vancomycin  IVPB 1000 milliGRAM(s) IV Intermittent every 8 hours    MEDICATIONS  (PRN):  baclofen 5 milliGRAM(s) Oral every 8 hours PRN Muscle Spasm  clonazePAM  Tablet 0.5 milliGRAM(s) Oral daily PRN for anxiety  oxyCODONE    IR 5 milliGRAM(s) Oral every 4 hours PRN Severe Pain (7 - 10)      CAPILLARY BLOOD GLUCOSE        I&O's Summary    12 Sep 2024 07:01  -  13 Sep 2024 07:00  --------------------------------------------------------  IN: 490 mL / OUT: 1630 mL / NET: -1140 mL        Vital Signs Last 24 Hrs  T(C): 37 (13 Sep 2024 04:20), Max: 37.1 (12 Sep 2024 12:29)  T(F): 98.6 (13 Sep 2024 04:20), Max: 98.7 (12 Sep 2024 12:29)  HR: 77 (13 Sep 2024 04:20) (77 - 96)  BP: 126/66 (13 Sep 2024 04:20) (112/68 - 150/53)  BP(mean): --  RR: 18 (13 Sep 2024 04:20) (18 - 18)  SpO2: 99% (13 Sep 2024 04:20) (99% - 100%)    Parameters below as of 13 Sep 2024 04:20  Patient On (Oxygen Delivery Method): room air        PHYSICAL EXAM:  GENERAL: NAD, well-developed, well-nourished  HEAD: Atraumatic, Normocephalic  EYES: EOMI, PERRLA, conjunctiva and sclera clear  NECK: Supple, No JVD  CHEST/LUNG: Clear to auscultation bilaterally; No wheezes or crackles  HEART: Normal S1/S2; Regular rate and rhythm; No murmurs, rubs, or gallops  ABDOMEN: Soft, Nontender, Nondistended; Bowel sounds present  EXTREMITIES: 2+ Peripheral Pulses; Edema of LUE  PSYCH: A&Ox3  NEUROLOGY: no focal neurologic deficit  SKIN: No rashes or lesions    LABS:                        8.1    9.89  )-----------( 305      ( 12 Sep 2024 07:26 )             25.6      09-12    137  |  104  |  8   ----------------------------<  106<H>  4.0   |  25  |  0.43<L>    Ca    8.4      12 Sep 2024 07:28  Phos  2.3     09-12  Mg     1.9     09-12    TPro  5.6<L>  /  Alb  3.1<L>  /  TBili  0.1<L>  /  DBili  x   /  AST  49<H>  /  ALT  29  /  AlkPhos  125<H>  09-12          Urinalysis Basic - ( 12 Sep 2024 07:28 )    Color: x / Appearance: x / SG: x / pH: x  Gluc: 106 mg/dL / Ketone: x  / Bili: x / Urobili: x   Blood: x / Protein: x / Nitrite: x   Leuk Esterase: x / RBC: x / WBC x   Sq Epi: x / Non Sq Epi: x / Bacteria: x        RADIOLOGY & ADDITIONAL TESTS:    Imaging Personally Reviewed:    Consultant(s) Notes Reviewed:      Care Discussed with Consultants/Other Providers:

## 2024-09-13 NOTE — PROGRESS NOTE ADULT - PROBLEM SELECTOR PLAN 1
s/p Left pectoralis advancement flap; L SCJ debridement by CT surgery and plastics  LISBETH #1 and #2 being managed by Plastics  Increase activity  Pulm toilet IS C&DB  Global care per primary team  Per report, patient being discharged home today     Follow up post-op visit Dr Bautista  Surgery: Thoracic Surgery  [address], [place] [address]  Phone: [phone]  Fax: [phone]  Follow Up Time: 2 weeks   Above plan per d/w Attending Dr. Bautista   ----  For any questions or concerns, please contact thoracic @ [phone]

## 2024-09-13 NOTE — PROGRESS NOTE ADULT - SUBJECTIVE AND OBJECTIVE BOX
SUBJECTIVE: "Hi." Denies acute chest pain, palpitations, or shortness of breath. Patient reports she wants to go home.    Vital Signs Last 24 Hrs  T(C): 37 (09-13-24 @ 04:20), Max: 37.1 (09-12-24 @ 12:29)  T(F): 98.6 (09-13-24 @ 04:20), Max: 98.7 (09-12-24 @ 12:29)  HR: 77 (09-13-24 @ 04:20) (77 - 96)  BP: 126/66 (09-13-24 @ 04:20) (112/68 - 150/53)  RR: 18 (09-13-24 @ 04:20) (18 - 18)  SpO2: 99% (09-13-24 @ 04:20) (99% - 100%)           INPUT/OUTPUT:  12 Sep 2024 07:01  -  13 Sep 2024 07:00  --------------------------------------------------------  IN:    Oral Fluid: 490 mL  Total IN: 490 mL  OUT:    Bulb (mL): 80 mL    Bulb (mL): 100 mL    Voided (mL): 1450 mL  Total OUT: 1630 mL  Total NET: -1140 mL      LABS:  09-12  137  |  104  |  8   ----------------------------<  106<H>  4.0   |  25  |  0.43<L>  TPro  5.6<L>  /  Alb  3.1<L>  /  TBili  0.1<L>  /  DBili  x   /  AST  49<H>  /  ALT  29  /  AlkPhos  125<H>  09-12             8.1    9.89  )-----------( 305      ( 12 Sep 2024 07:26 )             25.6            PHYSICAL EXAM:  GEN: no acute distress  HEENT: NCAT  CVS: s1, s2  CHEST: LCW site c/d/i  RESP: nonlabored respirations with no use of accessory muscles  GI: abdomen soft, nontender, +bowel sounds  EXT: FROM, no edema, peripheral pulses intact  NEURO: AAOx3      ACTIVE MEDICATIONS:  acetaminophen     Tablet .. 975 milliGRAM(s) Oral every 6 hours  baclofen 5 milliGRAM(s) Oral every 8 hours PRN  chlorhexidine 2% Cloths 1 Application(s) Topical daily  clonazePAM  Tablet 0.5 milliGRAM(s) Oral daily PRN  dalfampridine ER 10 milliGRAM(s) Oral every 12 hours  DULoxetine 60 milliGRAM(s) Oral daily  enoxaparin Injectable 40 milliGRAM(s) SubCutaneous every 24 hours  Gemtesa 75 milliGRAM(s) 1 Tablet(s) Oral daily  meropenem  IVPB 1000 milliGRAM(s) IV Intermittent every 8 hours  modafinil 200 milliGRAM(s) Oral daily  mupirocin 2% Nasal 1 Application(s) Both Nostrils two times a day  oxyCODONE    IR 5 milliGRAM(s) Oral every 4 hours PRN  sertraline 100 milliGRAM(s) Oral daily  vancomycin  IVPB 1000 milliGRAM(s) IV Intermittent every 8 hours      Case discussed in detail with Thoracic Attending Dr. Bautista. Plan below as per discussion.

## 2024-09-13 NOTE — PROGRESS NOTE ADULT - PROVIDER SPECIALTY LIST ADULT
Anesthesia
Plastic Surgery
Plastic Surgery
Infectious Disease
Infectious Disease
Plastic Surgery
Thoracic Surgery
Infectious Disease
Internal Medicine
Plastic Surgery
Thoracic Surgery
Internal Medicine

## 2024-09-13 NOTE — CHART NOTE - NSCHARTNOTEFT_GEN_A_CORE
Received call from nurse about pt wanting strep test for sore throat. Came to assess pt at bedside. Pt was afebrile, satting 100% on RA and did not have increased WOB. Pt denied any fevers or cough. On PE, pt did not have tender or swollen anterior cervical lymph nodes or exudate or swelling on tonsils. Her Centor score was 0. However, pt and pt's daughter at bedside were adamant about having strep test done. Strep test was ordered. Received call from nurse about pt wanting strep test for sore throat. Came to assess pt at bedside. Pt was afebrile, satting 100% on RA and did not have increased WOB. Pt denied any fevers or cough. On PE, pt did not have tender or swollen anterior cervical lymph nodes or exudate or swelling on tonsils. Her Centor score was 0. However, pt and pt's daughter at bedside were adamant about having strep test done. Strep test was ordered.    Pt and pt's daughter endorsed pt having swelling, erythema, and pain in LUE. Pt unclear about when LUE edema and pain first started. She originally stated that it started 2 days ago, then stated it started 4 days ago, and then stated it started 3 weeks ago. After clarification, pt stated that LUE pain started 3 weeks ago, but LUE edema started 2-3 days ago. Pt's daughter stated that the LUE edema and erythema started yesterday. Pt is being followed by Thoracic Surgery and Plastic Surgery daily. When asked if pt has brought up this LUE edema to the two surgery teams as it began before tonight, pt stated that she forgot and that the teams have not taken a look at her LUE. On PE, mild tenderness, erythema, and non-pitting edema seen in LUE, +2 radial pulses felt b/l. Discussed with pt's daughter that pt has been afebrile, HDS, and is currently on 2 broad spectrum antibiotics, vancomycin and meropenem, so no emergent need for imaging at this time. Pt's daughter insistent on having CT surgery and ID consulted overnight. Received call from nurse about pt wanting strep test for sore throat. Came to assess pt at bedside. Pt was afebrile, satting 100% on RA and did not have increased WOB. Pt denied any fevers or cough. On PE, pt did not have tender or swollen anterior cervical lymph nodes or exudate or swelling on tonsils. Her Centor score was 0. However, pt and pt's daughter at bedside were adamant about having strep test done. Strep test was ordered.    Pt and pt's daughter endorsed pt having swelling, erythema, and pain in LUE. Pt unclear about when LUE edema and pain first started. She originally stated that it started 2 days ago, then stated it started 4 days ago, and then stated it started 3 weeks ago. After clarification, pt stated that LUE pain started 3 weeks ago, but LUE edema started 2-3 days ago. Pt's daughter stated that the LUE edema and erythema started yesterday. Pt is being followed by Thoracic Surgery and Plastic Surgery daily. When asked if pt has brought up this LUE edema to the two surgery teams as it began before tonight, pt stated that she forgot and that the teams have not taken a look at her LUE. On PE, mild tenderness, erythema, and non-pitting edema seen in LUE, +2 radial pulses felt b/l. Discussed with pt's daughter that pt has been afebrile, HDS, and is currently on 2 broad spectrum antibiotics, vancomycin and meropenem, so no emergent need for imaging at this time, but would discuss with primary team in the AM about further work-up. Pt's daughter insistent on having CT surgery and ID consulted overnight.    Was called by nurse Received call from nurse about pt wanting strep test for sore throat. Came to assess pt at bedside. Pt was afebrile, satting 100% on RA and did not have increased WOB. Pt denied any fevers or cough. On PE, pt did not have tender or swollen anterior cervical lymph nodes or exudate or swelling on tonsils. Her Centor score was 0. However, pt and pt's daughter at bedside were adamant about having strep test done. Strep test was ordered.    Pt and pt's daughter endorsed pt having swelling, erythema, and pain in LUE. Pt unclear about when LUE edema and pain first started. She originally stated that it started 2 days ago, then stated it started 4 days ago, and then stated it started 3 weeks ago. After clarification, pt stated that LUE pain started 3 weeks ago, but LUE edema started 2-3 days ago. Pt's daughter stated that the LUE edema and erythema started yesterday. Pt is being followed by Thoracic Surgery and Plastic Surgery daily. When asked if pt has brought up this LUE edema to the two surgery teams as it began before tonight, pt stated that she forgot and that the teams have not taken a look at her LUE. On PE, mild tenderness, erythema, and non-pitting edema seen in LUE, +2 radial pulses felt b/l. Discussed with pt's daughter that pt has been afebrile, HDS, and is currently on 2 broad spectrum antibiotics, vancomycin and meropenem, so no emergent need for imaging at this time, but would discuss with primary team in the AM about further work-up and discussion w/ Thoracic Surgery. Pt's daughter insistent on having CT surgery and ID consulted overnight.    Was called by nurse about pt's daughter wanting Doppler U/S done emergently overnight. Was told by nurse manager that Doppler U/S cannot be done emergently overnight. Discussed w/ pt's daughter about alternatively doing CT scan w/ IVC instead but w/ risk of causing contrast-induced Pt's daughter declined CT scan w/ IVC. Pt also concerned about L breast pain. On PE, drain site did not look inflamed, LISBETH drains were draining appropriately. Discussed how pain could have been from pulled breast muscle s/p L pec turnover flap procedure. Pt's daughter requested for Dilaudid because oxycodone doesn't seem to work for pt as per pt's daughter. Gave 1x Dilaudid 0.2mg IV. Discussed w/ pt's daughter that I would f/u w/ primary team in the AM and that Thoracic Surgery and Plastic Surgery would f/u in the AM as well. Received call from nurse about pt wanting strep test for sore throat. Came to assess pt at bedside. Pt was afebrile, satting 100% on RA and did not have increased WOB. Pt denied any fevers or cough. On PE, pt did not have tender or swollen anterior cervical lymph nodes or exudate or swelling on tonsils. Her Centor score was 0. However, pt and pt's daughter at bedside were adamant about having strep test done. Strep test was ordered.    Pt and pt's daughter endorsed pt having swelling, erythema, and pain in LUE. Pt unclear about when LUE edema and pain first started. She originally stated that it started 2 days ago, then stated it started 4 days ago, and then stated it started 3 weeks ago. After clarification, pt stated that LUE pain started 3 weeks ago, but LUE edema started 2-3 days ago. Pt's daughter stated that the LUE edema and erythema started yesterday. Pt is being followed by Thoracic Surgery and Plastic Surgery daily. When asked if pt has brought up this LUE edema to the two surgery teams as it began before tonight, pt stated that she forgot and that the teams have not taken a look at her LUE. On PE, mild tenderness, erythema, and non-pitting edema seen in LUE, +2 radial pulses felt b/l. Discussed with pt's daughter that pt has been afebrile, HDS, and is currently on 2 broad spectrum antibiotics, vancomycin and meropenem, so no emergent need for imaging at this time, but would discuss with primary team in the AM about further work-up and discussion w/ Thoracic Surgery. Pt's daughter insistent on having CT surgery and ID consulted overnight.    Was called by nurse about pt's daughter wanting Doppler U/S done emergently overnight. Came to assess pt at bedside again. Was told by nurse manager that Doppler U/S cannot be done emergently overnight. Discussed w/ pt's daughter about alternatively doing CT scan w/ IVC instead as recommended by MAR but w/ risk of causing contrast-induced nephropathy. Pt's daughter declined CT scan w/ IVC. Pt also concerned about L breast pain. On PE, mild tenderness present in area under L breast, drain site did not look inflamed, LISBETH drains were draining appropriately. Discussed how pain could have been from pulled breast muscle s/p L pec turnover flap procedure. Pt's daughter requested for Dilaudid because oxycodone doesn't seem to work for pt as per pt's daughter. Gave 1x Dilaudid 0.2mg IV. Discussed w/ pt's daughter that I would f/u w/ primary team in the AM and that Thoracic Surgery and Plastic Surgery would f/u in the AM as well. Received call from nurse about pt wanting strep test for sore throat. Came to assess pt at bedside. Pt was afebrile, satting 100% on RA and did not have increased WOB. Pt denied any fevers or cough. On PE, pt did not have tender or swollen anterior cervical lymph nodes or exudate or swelling on tonsils. Her Centor score was 0. However, pt and pt's daughter at bedside were adamant about having strep test done. Strep test was ordered.    Pt and pt's daughter endorsed pt having swelling, erythema, and pain in LUE. Pt unclear about when LUE edema and pain first started. She originally stated that it started 2 days ago, then stated it started 4 days ago, and then stated it started 3 weeks ago. After clarification, pt stated that LUE pain started 3 weeks ago, but LUE edema started 2-3 days ago. Pt's daughter stated that the LUE edema and erythema started yesterday. Pt is being followed by Thoracic Surgery and Plastic Surgery daily. When asked if pt has brought up this LUE edema to the two surgery teams as it began before tonight, pt stated that she forgot and that the teams have not taken a look at her LUE. On PE, mild tenderness, erythema, and non-pitting edema seen in LUE, +2 radial pulses felt b/l. Discussed with pt's daughter that pt has been afebrile, HDS, and is currently on 2 broad spectrum antibiotics, vancomycin and meropenem, so no emergent need for imaging at this time, but would discuss with primary team in the AM about further work-up and discussion w/ Thoracic Surgery. Pt's daughter insistent on having CT surgery and ID consulted overnight.    Was called by nurse about pt's daughter wanting Doppler U/S done emergently overnight. Came to assess pt at bedside again. Was told by nurse manager that Doppler U/S cannot be done emergently overnight. Discussed w/ pt's daughter about alternatively doing CT scan w/ IVC instead as recommended by MAR but w/ risk of causing contrast-induced nephropathy. Pt's daughter declined CT scan w/ IVC. Pt also concerned about L breast pain. On PE, mild tenderness present in area under L breast, drain site did not look inflamed, LISBETH drains were draining appropriately. Discussed how pain could have been from pulled breast muscle s/p L pec turnover flap procedure. Pt's daughter requested for Dilaudid because oxycodone doesn't seem to work for pt as per pt's daughter. Gave 1x Dilaudid 0.2mg IV. Discussed w/ pt's daughter that I would f/u w/ primary team in the AM and that Thoracic Surgery and Plastic Surgery would f/u in the AM as well and that Doppler U/S can be done in the AM.

## 2024-09-13 NOTE — PROGRESS NOTE ADULT - ASSESSMENT
43y Female with PMH of MS on rituxan, anxiety, depression, septic arthritis of L sternoclavicular joint s/p I&Dx2 and cefepime for 6 weeks (12/2023-1/2024), recent admission for muscular abscess around L clavicle (offered surgery, declined) presents from home with persistent L clavicular pain. Report she did not want to have surgery last admission few days ago due to daughter's engagement, however returned to have the surgery. Does not endorse any fever, chills, neurological deficits, nausea, vomiting, chest pain, palpitations, shortness of breathe, abdominal pain, hematochezia, melena, hematemesis, diarrhea currently  9/9 s/p Left pectoralis advancement flap; L SCJ debridement by CT surgery; 2 reinaldo bulbs in place  9/10 VSS, REINALDO#1 120 since placement, REINALDO#2 65 since placement on Dilaudid PCA very lethargic  9/11 VSS  REINALDO #1 30/80 REINALDO#2 60/120, Dilaudid d/c'd   9/12 VSS. REINALDO#1 25/25 REINALDO#2 60/60. REINALDO managed by Plastics. Per patient, she is being discharged home today. Can follow up with Dr. Bautista after 2 weeks for care  9/13 VSS. REINALDO#1 50/80, reinaldo#2 50/100. REINALDO managed by Plastics. Surgical site c/d/i. Per primary team, possible discharged today.     Follow up post-op visit Dr Bautista  Surgery: Thoracic Surgery  [address], Oncology [facility] [place] [address]  Phone: [phone]  Fax: [phone]  Follow Up Time: 2 weeks

## 2024-09-13 NOTE — PROGRESS NOTE ADULT - ASSESSMENT
43y Female with PMH of MS on rituxan, anxiety, depression, septic arthritis of L sternoclavicular joint s/p I&Dx2 and cefepime for 6 weeks (12/2023-1/2024), recent admission for muscular abscess around L clavicle (offered surgery, declined) presents from home with persistent L clavicular pain, s/p I&D w/CT Surgery 9/9. s/p L pectoral flap with plastic surgery 9/11. On IV meropenem and vancomycin, pending insurance authorization  43y Female with PMH of MS on rituxan, anxiety, depression, septic arthritis of L sternoclavicular joint s/p I&Dx2 and cefepime for 6 weeks (12/2023-1/2024), recent admission for muscular abscess around L clavicle (offered surgery, declined) presents from home with persistent L clavicular pain, s/p I&D w/CT Surgery 9/9. s/p L pectoral flap with plastic surgery 9/11. On IV meropenem and vancomycin, home today with PICC

## 2024-09-13 NOTE — PROGRESS NOTE ADULT - ASSESSMENT
43F PMH MS with known L SCJ spetic arthritis sp i&D x2 in 12/23 with clavicle, partial first rib/manibrium resection, with  vac and completed 6 week course of abx in January 2024. presented with further left shoulder pain on 9/4 with MRI demonstrating possible abscess. Now s/p I&D of L sterno-clavicular joint by CT surgery and coverage with L pec turnover flap by PRS.     Plan  - will take down dressings on POD5, or if leaving earlier will remove  - arm pain: u/s mentioned in medicine note, workup per medicine/thoracic  - continue monitoring LISBETH drain output  - continue abx, plan per ID  - sling per thoracic  - remainder of plan per primary team    Graham Younger MD  Plastic and Reconstructive Surgery, PGY4  Available on [employer] Teams  [facility]: [id number], [place]: [phone] [facility]: Green [facility] [id number]

## 2024-09-13 NOTE — PROGRESS NOTE ADULT - REASON FOR ADMISSION
L shoulder pain

## 2024-09-14 LAB
-  CLINDAMYCIN: SIGNIFICANT CHANGE UP
-  ERYTHROMYCIN: SIGNIFICANT CHANGE UP
-  GENTAMICIN: SIGNIFICANT CHANGE UP
-  OXACILLIN: SIGNIFICANT CHANGE UP
-  PENICILLIN: SIGNIFICANT CHANGE UP
-  RIFAMPIN: SIGNIFICANT CHANGE UP
-  TETRACYCLINE: SIGNIFICANT CHANGE UP
-  TRIMETHOPRIM/SULFAMETHOXAZOLE: SIGNIFICANT CHANGE UP
-  VANCOMYCIN: SIGNIFICANT CHANGE UP
CULTURE RESULTS: SIGNIFICANT CHANGE UP
METHOD TYPE: SIGNIFICANT CHANGE UP
SPECIMEN SOURCE: SIGNIFICANT CHANGE UP

## 2024-09-15 ENCOUNTER — INPATIENT (INPATIENT)
Facility: HOSPITAL | Age: 44
LOS: 15 days | Discharge: HOME | DRG: 856 | End: 2024-10-01
Attending: STUDENT IN AN ORGANIZED HEALTH CARE EDUCATION/TRAINING PROGRAM | Admitting: STUDENT IN AN ORGANIZED HEALTH CARE EDUCATION/TRAINING PROGRAM
Payer: SELF-PAY

## 2024-09-15 ENCOUNTER — NON-APPOINTMENT (OUTPATIENT)
Age: 44
End: 2024-09-15

## 2024-09-15 VITALS
SYSTOLIC BLOOD PRESSURE: 103 MMHG | HEART RATE: 110 BPM | TEMPERATURE: 98 F | HEIGHT: 66 IN | RESPIRATION RATE: 16 BRPM | OXYGEN SATURATION: 96 % | DIASTOLIC BLOOD PRESSURE: 54 MMHG | WEIGHT: 154.32 LBS

## 2024-09-15 DIAGNOSIS — G35 MULTIPLE SCLEROSIS: ICD-10-CM

## 2024-09-15 DIAGNOSIS — I26.99 OTHER PULMONARY EMBOLISM WITHOUT ACUTE COR PULMONALE: ICD-10-CM

## 2024-09-15 DIAGNOSIS — R65.10 SYSTEMIC INFLAMMATORY RESPONSE SYNDROME (SIRS) OF NON-INFECTIOUS ORIGIN WITHOUT ACUTE ORGAN DYSFUNCTION: ICD-10-CM

## 2024-09-15 DIAGNOSIS — S82.201A UNSPECIFIED FRACTURE OF SHAFT OF RIGHT TIBIA, INITIAL ENCOUNTER FOR CLOSED FRACTURE: Chronic | ICD-10-CM

## 2024-09-15 DIAGNOSIS — M00.9 PYOGENIC ARTHRITIS, UNSPECIFIED: ICD-10-CM

## 2024-09-15 DIAGNOSIS — Z98.891 HISTORY OF UTERINE SCAR FROM PREVIOUS SURGERY: Chronic | ICD-10-CM

## 2024-09-15 DIAGNOSIS — S72.91XA UNSPECIFIED FRACTURE OF RIGHT FEMUR, INITIAL ENCOUNTER FOR CLOSED FRACTURE: Chronic | ICD-10-CM

## 2024-09-15 DIAGNOSIS — Z29.9 ENCOUNTER FOR PROPHYLACTIC MEASURES, UNSPECIFIED: ICD-10-CM

## 2024-09-15 DIAGNOSIS — M00.9 PYOGENIC ARTHRITIS, UNSPECIFIED: Chronic | ICD-10-CM

## 2024-09-15 DIAGNOSIS — L03.90 CELLULITIS, UNSPECIFIED: ICD-10-CM

## 2024-09-15 LAB
ADD ON TEST-SPECIMEN IN LAB: SIGNIFICANT CHANGE UP
ALBUMIN SERPL ELPH-MCNC: 3.2 G/DL — LOW (ref 3.3–5)
ALP SERPL-CCNC: 316 U/L — HIGH (ref 40–120)
ALT FLD-CCNC: 78 U/L — HIGH (ref 10–45)
ANION GAP SERPL CALC-SCNC: 10 MMOL/L — SIGNIFICANT CHANGE UP (ref 5–17)
APPEARANCE UR: CLEAR — SIGNIFICANT CHANGE UP
APTT BLD: 31 SEC — SIGNIFICANT CHANGE UP (ref 24.5–35.6)
AST SERPL-CCNC: 119 U/L — HIGH (ref 10–40)
BACTERIA # UR AUTO: NEGATIVE /HPF — SIGNIFICANT CHANGE UP
BASOPHILS # BLD AUTO: 0.03 K/UL — SIGNIFICANT CHANGE UP (ref 0–0.2)
BASOPHILS NFR BLD AUTO: 0.2 % — SIGNIFICANT CHANGE UP (ref 0–2)
BILIRUB SERPL-MCNC: 0.1 MG/DL — LOW (ref 0.2–1.2)
BILIRUB UR-MCNC: NEGATIVE — SIGNIFICANT CHANGE UP
BUN SERPL-MCNC: 9 MG/DL — SIGNIFICANT CHANGE UP (ref 7–23)
CALCIUM SERPL-MCNC: 9 MG/DL — SIGNIFICANT CHANGE UP (ref 8.4–10.5)
CAST: 0 /LPF — SIGNIFICANT CHANGE UP (ref 0–4)
CHLORIDE SERPL-SCNC: 101 MMOL/L — SIGNIFICANT CHANGE UP (ref 96–108)
CO2 SERPL-SCNC: 26 MMOL/L — SIGNIFICANT CHANGE UP (ref 22–31)
COLOR SPEC: YELLOW — SIGNIFICANT CHANGE UP
CREAT SERPL-MCNC: 0.4 MG/DL — LOW (ref 0.5–1.3)
CRP SERPL-MCNC: 156 MG/L — HIGH (ref 0–4)
CULTURE RESULTS: ABNORMAL
CULTURE RESULTS: ABNORMAL
CULTURE RESULTS: SIGNIFICANT CHANGE UP
DIFF PNL FLD: NEGATIVE — SIGNIFICANT CHANGE UP
EGFR: 124 ML/MIN/1.73M2 — SIGNIFICANT CHANGE UP
EOSINOPHIL # BLD AUTO: 0.09 K/UL — SIGNIFICANT CHANGE UP (ref 0–0.5)
EOSINOPHIL NFR BLD AUTO: 0.7 % — SIGNIFICANT CHANGE UP (ref 0–6)
FLUAV AG NPH QL: SIGNIFICANT CHANGE UP
FLUBV AG NPH QL: SIGNIFICANT CHANGE UP
GAS PNL BLDV: SIGNIFICANT CHANGE UP
GLUCOSE SERPL-MCNC: 116 MG/DL — HIGH (ref 70–99)
GLUCOSE UR QL: NEGATIVE MG/DL — SIGNIFICANT CHANGE UP
HCG SERPL-ACNC: <2 MIU/ML — SIGNIFICANT CHANGE UP
HCT VFR BLD CALC: 25.1 % — LOW (ref 34.5–45)
HGB BLD-MCNC: 8.1 G/DL — LOW (ref 11.5–15.5)
IMM GRANULOCYTES NFR BLD AUTO: 0.5 % — SIGNIFICANT CHANGE UP (ref 0–0.9)
INR BLD: 1.11 RATIO — SIGNIFICANT CHANGE UP (ref 0.85–1.18)
KETONES UR-MCNC: NEGATIVE MG/DL — SIGNIFICANT CHANGE UP
LEUKOCYTE ESTERASE UR-ACNC: NEGATIVE — SIGNIFICANT CHANGE UP
LYMPHOCYTES # BLD AUTO: 0.79 K/UL — LOW (ref 1–3.3)
LYMPHOCYTES # BLD AUTO: 6.5 % — LOW (ref 13–44)
MCHC RBC-ENTMCNC: 28.3 PG — SIGNIFICANT CHANGE UP (ref 27–34)
MCHC RBC-ENTMCNC: 32.3 GM/DL — SIGNIFICANT CHANGE UP (ref 32–36)
MCV RBC AUTO: 87.8 FL — SIGNIFICANT CHANGE UP (ref 80–100)
MONOCYTES # BLD AUTO: 1.01 K/UL — HIGH (ref 0–0.9)
MONOCYTES NFR BLD AUTO: 8.3 % — SIGNIFICANT CHANGE UP (ref 2–14)
NEUTROPHILS # BLD AUTO: 10.21 K/UL — HIGH (ref 1.8–7.4)
NEUTROPHILS NFR BLD AUTO: 83.8 % — HIGH (ref 43–77)
NITRITE UR-MCNC: NEGATIVE — SIGNIFICANT CHANGE UP
NRBC # BLD: 0 /100 WBCS — SIGNIFICANT CHANGE UP (ref 0–0)
NT-PROBNP SERPL-SCNC: 149 PG/ML — SIGNIFICANT CHANGE UP (ref 0–300)
ORGANISM # SPEC MICROSCOPIC CNT: ABNORMAL
ORGANISM # SPEC MICROSCOPIC CNT: ABNORMAL
PH UR: 7 — SIGNIFICANT CHANGE UP (ref 5–8)
PLATELET # BLD AUTO: 368 K/UL — SIGNIFICANT CHANGE UP (ref 150–400)
POTASSIUM SERPL-MCNC: 4.2 MMOL/L — SIGNIFICANT CHANGE UP (ref 3.5–5.3)
POTASSIUM SERPL-SCNC: 4.2 MMOL/L — SIGNIFICANT CHANGE UP (ref 3.5–5.3)
PROT SERPL-MCNC: 5.9 G/DL — LOW (ref 6–8.3)
PROT UR-MCNC: NEGATIVE MG/DL — SIGNIFICANT CHANGE UP
PROTHROM AB SERPL-ACNC: 11.6 SEC — SIGNIFICANT CHANGE UP (ref 9.5–13)
RAPID RVP RESULT: SIGNIFICANT CHANGE UP
RBC # BLD: 2.86 M/UL — LOW (ref 3.8–5.2)
RBC # FLD: 13 % — SIGNIFICANT CHANGE UP (ref 10.3–14.5)
RBC CASTS # UR COMP ASSIST: 4 /HPF — SIGNIFICANT CHANGE UP (ref 0–4)
RSV RNA NPH QL NAA+NON-PROBE: SIGNIFICANT CHANGE UP
SARS-COV-2 RNA SPEC QL NAA+PROBE: SIGNIFICANT CHANGE UP
SARS-COV-2 RNA SPEC QL NAA+PROBE: SIGNIFICANT CHANGE UP
SODIUM SERPL-SCNC: 137 MMOL/L — SIGNIFICANT CHANGE UP (ref 135–145)
SP GR SPEC: 1.03 — SIGNIFICANT CHANGE UP (ref 1–1.03)
SPECIMEN SOURCE: SIGNIFICANT CHANGE UP
SQUAMOUS # UR AUTO: 1 /HPF — SIGNIFICANT CHANGE UP (ref 0–5)
TROPONIN T, HIGH SENSITIVITY RESULT: <6 NG/L — SIGNIFICANT CHANGE UP (ref 0–51)
UROBILINOGEN FLD QL: 1 MG/DL — SIGNIFICANT CHANGE UP (ref 0.2–1)
VANCOMYCIN TROUGH SERPL-MCNC: 5.5 UG/ML — LOW (ref 10–20)
WBC # BLD: 12.19 K/UL — HIGH (ref 3.8–10.5)
WBC # FLD AUTO: 12.19 K/UL — HIGH (ref 3.8–10.5)
WBC UR QL: 1 /HPF — SIGNIFICANT CHANGE UP (ref 0–5)

## 2024-09-15 PROCEDURE — 93970 EXTREMITY STUDY: CPT | Mod: 26

## 2024-09-15 PROCEDURE — 99223 1ST HOSP IP/OBS HIGH 75: CPT | Mod: GC

## 2024-09-15 PROCEDURE — 99222 1ST HOSP IP/OBS MODERATE 55: CPT

## 2024-09-15 PROCEDURE — 71045 X-RAY EXAM CHEST 1 VIEW: CPT | Mod: 26

## 2024-09-15 PROCEDURE — 73000 X-RAY EXAM OF COLLAR BONE: CPT | Mod: 26,LT

## 2024-09-15 PROCEDURE — 71260 CT THORAX DX C+: CPT | Mod: 26,MC

## 2024-09-15 PROCEDURE — 99285 EMERGENCY DEPT VISIT HI MDM: CPT

## 2024-09-15 PROCEDURE — 93308 TTE F-UP OR LMTD: CPT | Mod: 26

## 2024-09-15 RX ORDER — VANCOMYCIN HCL-SODIUM CHLORIDE IV SOLN 1.5 GM/250ML-0.9% 1.5-0.9/25 GM/ML-%
1250 SOLUTION INTRAVENOUS ONCE
Refills: 0 | Status: COMPLETED | OUTPATIENT
Start: 2024-09-15 | End: 2024-09-15

## 2024-09-15 RX ORDER — SODIUM CHLORIDE IRRIG SOLUTION 0.9 %
500 SOLUTION, IRRIGATION IRRIGATION ONCE
Refills: 0 | Status: COMPLETED | OUTPATIENT
Start: 2024-09-15 | End: 2024-09-15

## 2024-09-15 RX ORDER — VANCOMYCIN HCL-SODIUM CHLORIDE IV SOLN 1.5 GM/250ML-0.9% 1.5-0.9/25 GM/ML-%
1250 SOLUTION INTRAVENOUS EVERY 12 HOURS
Refills: 0 | Status: DISCONTINUED | OUTPATIENT
Start: 2024-09-15 | End: 2024-09-16

## 2024-09-15 RX ORDER — KETOROLAC TROMETHAMINE 10 MG/1
15 TABLET, FILM COATED ORAL EVERY 6 HOURS
Refills: 0 | Status: DISCONTINUED | OUTPATIENT
Start: 2024-09-15 | End: 2024-09-18

## 2024-09-15 RX ORDER — OXYCODONE HYDROCHLORIDE 30 MG/1
5 TABLET, FILM COATED, EXTENDED RELEASE ORAL EVERY 4 HOURS
Refills: 0 | Status: DISCONTINUED | OUTPATIENT
Start: 2024-09-15 | End: 2024-09-15

## 2024-09-15 RX ORDER — DALFAMPRIDINE 10 MG/1
10 TABLET, FILM COATED, EXTENDED RELEASE ORAL EVERY 12 HOURS
Refills: 0 | Status: DISCONTINUED | OUTPATIENT
Start: 2024-09-15 | End: 2024-09-17

## 2024-09-15 RX ORDER — TRAMADOL HYDROCHLORIDE 50 MG/1
50 TABLET, COATED ORAL EVERY 4 HOURS
Refills: 0 | Status: DISCONTINUED | OUTPATIENT
Start: 2024-09-15 | End: 2024-09-16

## 2024-09-15 RX ORDER — FENTANYL CITRATE-0.9 % NACL/PF 300MCG/30
25 PATIENT CONTROLLED ANALGESIA VIAL INJECTION ONCE
Refills: 0 | Status: DISCONTINUED | OUTPATIENT
Start: 2024-09-15 | End: 2024-09-15

## 2024-09-15 RX ORDER — ENOXAPARIN SODIUM 150 MG/ML
63 INJECTION SUBCUTANEOUS ONCE
Refills: 0 | Status: DISCONTINUED | OUTPATIENT
Start: 2024-09-15 | End: 2024-09-15

## 2024-09-15 RX ORDER — ACETAMINOPHEN 325 MG
1000 TABLET ORAL ONCE
Refills: 0 | Status: COMPLETED | OUTPATIENT
Start: 2024-09-15 | End: 2024-09-15

## 2024-09-15 RX ORDER — MULTI VITAMIN/MINERAL SUPPLEMENT WITH ASCORBIC ACID, NIACIN, PYRIDOXINE, PANTOTHENIC ACID, FOLIC ACID, RIBOFLAVIN, THIAMIN, BIOTIN, COBALAMIN AND ZINC. 60; 20; 12.5; 10; 10; 1.7; 1.5; 1; .3; .006 MG/1; MG/1; MG/1; MG/1; MG/1; MG/1; MG/1; MG/1; MG/1; MG/1
1 TABLET, COATED ORAL DAILY
Refills: 0 | Status: DISCONTINUED | OUTPATIENT
Start: 2024-09-15 | End: 2024-09-25

## 2024-09-15 RX ORDER — MODAFINIL 200 MG/1
1 TABLET ORAL
Refills: 0 | DISCHARGE

## 2024-09-15 RX ORDER — CLONAZEPAM 1 MG
1 TABLET ORAL
Refills: 0 | DISCHARGE

## 2024-09-15 RX ORDER — HYDROMORPHONE HYDROCHLORIDE 1 MG/ML
0.5 INJECTION, SOLUTION INTRAMUSCULAR; INTRAVENOUS; SUBCUTANEOUS ONCE
Refills: 0 | Status: DISCONTINUED | OUTPATIENT
Start: 2024-09-15 | End: 2024-09-15

## 2024-09-15 RX ORDER — ENOXAPARIN SODIUM 150 MG/ML
60 INJECTION SUBCUTANEOUS ONCE
Refills: 0 | Status: COMPLETED | OUTPATIENT
Start: 2024-09-15 | End: 2024-09-15

## 2024-09-15 RX ORDER — ENOXAPARIN SODIUM 150 MG/ML
40 INJECTION SUBCUTANEOUS EVERY 24 HOURS
Refills: 0 | Status: DISCONTINUED | OUTPATIENT
Start: 2024-09-15 | End: 2024-09-17

## 2024-09-15 RX ORDER — DULOXETINE HCL 20 MG
60 CAPSULE,DELAYED RELEASE (ENTERIC COATED) ORAL AT BEDTIME
Refills: 0 | Status: DISCONTINUED | OUTPATIENT
Start: 2024-09-15 | End: 2024-09-25

## 2024-09-15 RX ORDER — BACLOFEN 0.5 MG/ML
1 INJECTION INTRATHECAL
Refills: 0 | DISCHARGE

## 2024-09-15 RX ORDER — MODAFINIL 100 MG/1
200 TABLET ORAL DAILY
Refills: 0 | Status: DISCONTINUED | OUTPATIENT
Start: 2024-09-15 | End: 2024-09-21

## 2024-09-15 RX ORDER — DALFAMPRIDINE 10 MG/1
1 TABLET, EXTENDED RELEASE ORAL
Refills: 0 | DISCHARGE

## 2024-09-15 RX ORDER — SODIUM CHLORIDE 0.9 % (FLUSH) 0.9 %
1000 SYRINGE (ML) INJECTION ONCE
Refills: 0 | Status: COMPLETED | OUTPATIENT
Start: 2024-09-15 | End: 2024-09-15

## 2024-09-15 RX ORDER — SENNOSIDES 8.6 MG
2 TABLET ORAL AT BEDTIME
Refills: 0 | Status: DISCONTINUED | OUTPATIENT
Start: 2024-09-15 | End: 2024-09-25

## 2024-09-15 RX ORDER — BACLOFEN 100 %
10 POWDER (GRAM) MISCELLANEOUS EVERY 8 HOURS
Refills: 0 | Status: DISCONTINUED | OUTPATIENT
Start: 2024-09-15 | End: 2024-09-25

## 2024-09-15 RX ORDER — CLONAZEPAM 1 MG
0.5 TABLET ORAL DAILY
Refills: 0 | Status: DISCONTINUED | OUTPATIENT
Start: 2024-09-15 | End: 2024-09-21

## 2024-09-15 RX ORDER — TRAMADOL HYDROCHLORIDE 50 MG/1
25 TABLET, COATED ORAL EVERY 4 HOURS
Refills: 0 | Status: DISCONTINUED | OUTPATIENT
Start: 2024-09-15 | End: 2024-09-16

## 2024-09-15 RX ORDER — ALPRAZOLAM 0.5 MG/1
0.25 TABLET ORAL ONCE
Refills: 0 | Status: DISCONTINUED | OUTPATIENT
Start: 2024-09-15 | End: 2024-09-15

## 2024-09-15 RX ORDER — MEROPENEM 500 MG/20ML
1000 INJECTION INTRAVENOUS EVERY 8 HOURS
Refills: 0 | Status: COMPLETED | OUTPATIENT
Start: 2024-09-15 | End: 2024-09-22

## 2024-09-15 RX ORDER — SERTRALINE HYDROCHLORIDE 100 MG/1
125 TABLET, FILM COATED ORAL DAILY
Refills: 0 | Status: DISCONTINUED | OUTPATIENT
Start: 2024-09-15 | End: 2024-09-25

## 2024-09-15 RX ORDER — MORPHINE SULFATE 30 MG/1
2 TABLET, FILM COATED, EXTENDED RELEASE ORAL ONCE
Refills: 0 | Status: DISCONTINUED | OUTPATIENT
Start: 2024-09-15 | End: 2024-09-15

## 2024-09-15 RX ORDER — MEROPENEM 500 MG/20ML
1000 INJECTION INTRAVENOUS ONCE
Refills: 0 | Status: COMPLETED | OUTPATIENT
Start: 2024-09-15 | End: 2024-09-15

## 2024-09-15 RX ORDER — KETOROLAC TROMETHAMINE 10 MG/1
15 TABLET, FILM COATED ORAL ONCE
Refills: 0 | Status: DISCONTINUED | OUTPATIENT
Start: 2024-09-15 | End: 2024-09-15

## 2024-09-15 RX ORDER — KETOROLAC TROMETHAMINE 10 MG/1
15 TABLET, FILM COATED ORAL EVERY 6 HOURS
Refills: 0 | Status: DISCONTINUED | OUTPATIENT
Start: 2024-09-15 | End: 2024-09-15

## 2024-09-15 RX ORDER — SODIUM CHLORIDE IRRIG SOLUTION 0.9 %
1000 SOLUTION, IRRIGATION IRRIGATION
Refills: 0 | Status: DISCONTINUED | OUTPATIENT
Start: 2024-09-15 | End: 2024-09-17

## 2024-09-15 RX ORDER — OXYCODONE HYDROCHLORIDE 30 MG/1
5 TABLET, FILM COATED, EXTENDED RELEASE ORAL EVERY 8 HOURS
Refills: 0 | Status: DISCONTINUED | OUTPATIENT
Start: 2024-09-15 | End: 2024-09-15

## 2024-09-15 RX ORDER — OXYCODONE HYDROCHLORIDE 30 MG/1
10 TABLET, FILM COATED, EXTENDED RELEASE ORAL EVERY 4 HOURS
Refills: 0 | Status: DISCONTINUED | OUTPATIENT
Start: 2024-09-15 | End: 2024-09-15

## 2024-09-15 RX ORDER — HYDROMORPHONE HYDROCHLORIDE 1 MG/ML
2 INJECTION, SOLUTION INTRAMUSCULAR; INTRAVENOUS; SUBCUTANEOUS ONCE
Refills: 0 | Status: DISCONTINUED | OUTPATIENT
Start: 2024-09-15 | End: 2024-09-15

## 2024-09-15 RX ADMIN — Medication 60 MILLIGRAM(S): at 22:49

## 2024-09-15 RX ADMIN — HYDROMORPHONE HYDROCHLORIDE 0.5 MILLIGRAM(S): 1 INJECTION, SOLUTION INTRAMUSCULAR; INTRAVENOUS; SUBCUTANEOUS at 15:00

## 2024-09-15 RX ADMIN — Medication 1000 MILLILITER(S): at 05:35

## 2024-09-15 RX ADMIN — KETOROLAC TROMETHAMINE 15 MILLIGRAM(S): 10 TABLET, FILM COATED ORAL at 16:59

## 2024-09-15 RX ADMIN — Medication 1000 MILLILITER(S): at 04:32

## 2024-09-15 RX ADMIN — Medication 1000 MILLIGRAM(S): at 11:22

## 2024-09-15 RX ADMIN — Medication 25 MICROGRAM(S): at 04:44

## 2024-09-15 RX ADMIN — MEROPENEM 100 MILLIGRAM(S): 500 INJECTION INTRAVENOUS at 13:26

## 2024-09-15 RX ADMIN — Medication 10 MILLIGRAM(S): at 13:26

## 2024-09-15 RX ADMIN — Medication 1000 MILLILITER(S): at 19:14

## 2024-09-15 RX ADMIN — Medication 1000 MILLILITER(S): at 11:23

## 2024-09-15 RX ADMIN — HYDROMORPHONE HYDROCHLORIDE 0.5 MILLIGRAM(S): 1 INJECTION, SOLUTION INTRAMUSCULAR; INTRAVENOUS; SUBCUTANEOUS at 17:17

## 2024-09-15 RX ADMIN — SERTRALINE HYDROCHLORIDE 125 MILLIGRAM(S): 100 TABLET, FILM COATED ORAL at 23:16

## 2024-09-15 RX ADMIN — Medication 400 MILLIGRAM(S): at 05:36

## 2024-09-15 RX ADMIN — Medication 2 TABLET(S): at 22:49

## 2024-09-15 RX ADMIN — KETOROLAC TROMETHAMINE 15 MILLIGRAM(S): 10 TABLET, FILM COATED ORAL at 23:25

## 2024-09-15 RX ADMIN — ENOXAPARIN SODIUM 60 MILLIGRAM(S): 150 INJECTION SUBCUTANEOUS at 10:03

## 2024-09-15 RX ADMIN — MEROPENEM 100 MILLIGRAM(S): 500 INJECTION INTRAVENOUS at 10:03

## 2024-09-15 RX ADMIN — Medication 100 MILLILITER(S): at 16:59

## 2024-09-15 RX ADMIN — Medication 10 MILLIGRAM(S): at 22:48

## 2024-09-15 RX ADMIN — VANCOMYCIN HCL-SODIUM CHLORIDE IV SOLN 1.5 GM/250ML-0.9% 166.67 MILLIGRAM(S): 1.5-0.9/25 SOLUTION at 10:45

## 2024-09-15 RX ADMIN — Medication 25 MICROGRAM(S): at 11:23

## 2024-09-15 RX ADMIN — MEROPENEM 100 MILLIGRAM(S): 500 INJECTION INTRAVENOUS at 22:48

## 2024-09-15 RX ADMIN — Medication 25 MICROGRAM(S): at 10:37

## 2024-09-15 RX ADMIN — KETOROLAC TROMETHAMINE 15 MILLIGRAM(S): 10 TABLET, FILM COATED ORAL at 20:28

## 2024-09-15 RX ADMIN — HYDROMORPHONE HYDROCHLORIDE 2 MILLIGRAM(S): 1 INJECTION, SOLUTION INTRAMUSCULAR; INTRAVENOUS; SUBCUTANEOUS at 23:14

## 2024-09-15 RX ADMIN — Medication 0.5 MILLIGRAM(S): at 21:58

## 2024-09-15 NOTE — CONSULT NOTE ADULT - ASSESSMENT
44y Female with PMH of MS on Ocrevus anxiety, depression, septic arthritis of L sternoclavicular joint s/p I&Dx2 and cefepime for 6 weeks (12/2023-1/2024), recent admission 9/9-9/13 for L clavicular pain s/p I+D with L pectoralis flap sent home on meropenem and vancomycin for 6 week course presents for L shoulder pain, L sided pel chest pain, headache, fever at home. Son on phone also notes that the surgical site appeared more erythematous day prior to admission and had associated L arm swelling.    Admitted 9/9-9/13 most recently; MRI L clavicle with prior resection of the medial left clavicle with rim-enhancing fluid and surrounding phlegmon abutting the resected bony margin. S/p surgery on 9/9 with L SCJ debridement by CT surgery and L pec flap by PRS. Cultures grew Staph epi and Corynebacterium in fluid media, blood cultures negative. Discharged on tenatively 6 weeks vancomycin and meropenem for empiric coverage.    Here Tmax 100.1, mild tachycardia  WBC 12.1, neutrophilic. Lactate wnl  , ALT 78    CT chest: Small nonocclusive right lower lobe basilar subsegmental pulmonary   emboli, no evidence of right heart strain. 14 cm in greatest dimension expansile mostly soft tissue density structure filling and expanding the operative bed in the region of the left medial clavicle (thought to represent combination of muscle)    Blood cultures pending  Surgical path pending 44y Female with PMH of MS on Ocrevus anxiety, depression, septic arthritis of L sternoclavicular joint s/p I&Dx2 and cefepime for 6 weeks (12/2023-1/2024), recent admission 9/9-9/13 for L clavicular pain s/p I+D with L pectoralis flap sent home on meropenem and vancomycin for 6 week course presents for L shoulder pain, L sided pel chest pain, headache, fever at home. Son on phone also notes that the surgical site appeared more erythematous day prior to admission and had associated L arm swelling.    Admitted 9/9-9/13 most recently; MRI L clavicle with prior resection of the medial left clavicle with rim-enhancing fluid and surrounding phlegmon abutting the resected bony margin. S/p surgery on 9/9 with L SCJ debridement by CT surgery and L pec flap by PRS. Cultures grew Staph epi and Corynebacterium in fluid media, blood cultures negative. Discharged on tentatively 6 weeks vancomycin and meropenem for empiric coverage.    Here Tmax 100.1, mild tachycardia  WBC 12.1, neutrophilic. Lactate wnl  , ALT 78,     CT chest: Small nonocclusive right lower lobe basilar subsegmental pulmonary   emboli, no evidence of right heart strain. 14 cm in greatest dimension expansile mostly soft tissue density structure filling and expanding the operative bed in the region of the left medial clavicle (thought to represent combination of muscle)    Blood cultures pending  Surgical path pending    #Fever, leukocytosis  #Septic sternoclavicular joint s/p debridement and muscle flap L clavicle  #Rule out surgical site infection  #Elevated LFTs, Alk phos    Concerned for superficial surgical site infection in setting of erythema, tenderness, swelling of surgical site. No dehiscence drainage, or purulence noted. Patient already on vancomycin and meropenem.    - IV vancomcyin 1250 mg q12h  - IV meropenem 1g q8h  - check next vancomycin level with AM labs  - follow-up pending blood cultures  - thoracic/plastic surgery follow-up for further evaluation/management of possible surgical site infection  - monitor LFTs and alk phos    d/w attending.    Wang Martínez, PGY5  ID Fellow  [employer] Teams Preferred  After 5pm/weekends call [phone] 44y Female with PMH of MS on Ocrevus anxiety, depression, septic arthritis of L sternoclavicular joint s/p I&Dx2 and cefepime for 6 weeks (12/2023-1/2024), recent admission 9/9-9/13 for L clavicular pain s/p I+D with L pectoralis flap sent home on meropenem and vancomycin for 6 week course presents for L shoulder pain, L sided pel chest pain, headache, fever at home. Son on phone also notes that the surgical site appeared more erythematous day prior to admission and had associated L arm swelling.    Admitted 9/9-9/13 most recently; MRI L clavicle with prior resection of the medial left clavicle with rim-enhancing fluid and surrounding phlegmon abutting the resected bony margin. S/p surgery on 9/9 with L SCJ debridement by CT surgery and L pec flap by PRS. Cultures grew Staph epi and Corynebacterium in fluid media, blood cultures negative. Discharged on tentatively 6 weeks vancomycin and meropenem for empiric coverage.    Here Tmax 100.1, mild tachycardia  WBC 12.1, neutrophilic. Lactate wnl  , ALT 78,     CT chest: Small nonocclusive right lower lobe basilar subsegmental pulmonary   emboli, no evidence of right heart strain. 14 cm in greatest dimension expansile mostly soft tissue density structure filling and expanding the operative bed in the region of the left medial clavicle (thought to represent combination of muscle)    Blood cultures pending  Surgical path pending    #Fever, leukocytosis  #Septic sternoclavicular joint s/p debridement and muscle flap L clavicle  #Rule out surgical site infection  #Pulmonary embolism  #Elevated LFTs, Alk phos    Concerned for superficial surgical site infection in setting of erythema, tenderness, swelling of surgical site. No dehiscence drainage, or purulence noted. Patient already on vancomycin and meropenem.    - IV vancomcyin 1250 mg q12h  - IV meropenem 1g q8h  - check next vancomycin level with AM labs  - follow-up pending blood cultures  - thoracic/plastic surgery follow-up for further evaluation/management of possible surgical site infection  - monitor LFTs and alk phos    d/w attending.    Wang Martínez, PGY5  ID Fellow  [employer] Teams Preferred  After 5pm/weekends call [phone]

## 2024-09-15 NOTE — ED PROVIDER NOTE - CLINICAL SUMMARY MEDICAL DECISION MAKING FREE TEXT BOX
43F with history of multiple sclerosis, septic arthritis s/p multiple I&Ds most recently on 9/13 with L pectoralis muscle flap turnover, PICC line and LISBETH drain placement c/o L sided chest pain. Patient looks septic and with recent surgery, is most likely suffering a post operative complication of surgery, likely infectious. Will get labs, CT, give fluids, abx scheduled for 10 AM and 10:30 AM, contact thoracic surgery and reassess.

## 2024-09-15 NOTE — ED ADULT NURSE NOTE - OBJECTIVE STATEMENT
Pt is 44y F with PMH multiple sclerosis, PSH L pectoralis abscess drainage with advancement flap discharged last week, R bicep PICC line in place for daily vancomycin and meropenem treatment, BIBEMS for pain. Pt reports onset of worsening L clavicular pain around surgical site, 9/10 in severity. Reports consuming 2 edibles prior to going to sleep last night due to difficulty falling asleep. Upon assessment, A&Ox4, lethargic, able to follow commands, L clavicular surgical site closed with mild yellow dried drainage, 100.1F rectal temp, hypotensive 90s/50s, tachy 103bpm. R PICC last changed 9/13/2024, patent. Pt is 44y F with PMH multiple sclerosis, PSH L pectoralis abscess drainage with advancement flap discharged last week, R bicep PICC line in place for daily vancomycin and meropenem treatment, BIBEMS for pain. Pt reports onset of worsening L clavicular pain around surgical site, 9/10 in severity. Reports consuming 2 edibles prior to going to sleep last night due to difficulty falling asleep. Upon assessment, A&Ox4, lethargic, able to follow commands, L clavicular surgical site closed with mild yellow dried drainage, 100.1F rectal temp, hypotensive 90s/50s, tachy 103bpm. R PICC last changed 9/13/2024, patent. Two LISBETH drains near L lateral chest wall approximately 60cc of serosanguinous drainage each.

## 2024-09-15 NOTE — H&P ADULT - HISTORY OF PRESENT ILLNESS
43F with MS, anxiety, depression, septic arthritis of L sternoclavicular joint s/p multiple admissions requiring I&D and most recent admission (9/4-9/13) with I&D and L pectoral flap for evaluation of abscess now presenting to the ED with L sided chest pain with fever 100.7.   Patient most recently admission with I&D of L clavicular abscess by CTS and L pectoral flap surgery with plastic surgery. Multiple cultures pending in lab with deep clavicular tissue culture 9/10 showing S. epi with sensitivity to vancomycin. Evaluated by ID and patient was discharged home with meropenem 1g q8 and vancomycin 1250 q12 via RUE single lumen PICC. Patient reports she has not missed any doses of the antibiotics since discharge. A VNS did come by to her home to instruct and her daughter is a nurse and has been helping with administration.   Patient acutely developed L sided chest pain near her ribcage yesterday but without any shortness of breath. Also noted to have a fever 100.7. Also started noticing some increased swelling around her surgical site yesterday 43F with MS, anxiety, depression, septic arthritis of L sternoclavicular joint s/p multiple admissions requiring I&D and most recent admission (9/4-9/13) with I&D and L pectoral flap for evaluation of abscess now presenting to the ED with L sided chest pain with fever 100.7.   Patient most recently admission with I&D of L clavicular abscess by CTS and L pectoral flap surgery with plastic surgery. Multiple cultures pending in lab with deep clavicular tissue culture 9/10 showing S. epi with sensitivity to vancomycin. Evaluated by ID and patient was discharged home with meropenem 1g q8 and vancomycin 1250 q12 via RUE single lumen PICC. Patient reports she has not missed any doses of the antibiotics since discharge. A VNS did come by to her home to instruct and her daughter is a nurse and has been helping with administration.   Patient acutely developed L sided chest pain near her ribcage yesterday but without any shortness of breath. Also noted to have a fever 100.7. Also started noticing some increased swelling around her surgical site yesterday. Otherwise, denies any headaches, nausea, vomiting, diarrhea or urinary discomfort.

## 2024-09-15 NOTE — H&P ADULT - PROBLEM SELECTOR PLAN 4
History of MS on home baclofen, dalfampridine, and gemtesa.  - c/w baclofen 10 TID  - c/w modafinil 200 qd  - patient will need to bring in dalfampridine and gemtesa from home as non on formulary

## 2024-09-15 NOTE — H&P ADULT - PROBLEM SELECTOR PLAN 3
History of septic arthritis with multiple admissions s/p I&D with CTS and L pectoral flap with plastic surgery on last admission (9/4-9/13). Was discharged home on meropenem 1g q8 and van 1250 q12 with reportedly no missed doses.   - CT chest with evidence of 14 cm soft tissue density structure filling and expanding the operative bed in the region of the left medial clavicle but without definitive ring enhancing lesion  - no acute surgical intervention for surgical site erythema per plastic surgery   - c/w meropenem 1g q8 and vancomycin 1250 q12  - vanc level q4 dose  - monitor LISBETH drain output  - pain control with percocet 5 q8 PRN History of septic arthritis with multiple admissions s/p I&D with CTS and L pectoral flap with plastic surgery on last admission (9/4-9/13). Was discharged home on meropenem 1g q8 and van 1250 q12 with reportedly no missed doses.   - CT chest with evidence of 14 cm soft tissue density structure filling and expanding the operative bed in the region of the left medial clavicle but without definitive ring enhancing lesion  - no acute surgical intervention for surgical site erythema per plastic surgery   - c/w meropenem 1g q8 and vancomycin 1250 q12  - vanc level q4 dose  - monitor LISBETH drain output  - pain control with oxy 10 q4 PRN for severe and oxy 5 q4 PRN for moderate and toradol 15 q6 for breakthrough

## 2024-09-15 NOTE — ED PROVIDER NOTE - PHYSICAL EXAMINATION
Physical Exam:  Gen: patient is drifting in and out of consciousness, AOx3, toxic appearing, diaphoretic  HEENT: normal conjunctiva, tongue midline, oral mucosa dry, chapped lips  Lung: no respiratory distress, speaking in full sentences  CV: RRR, no murmurs, rubs or gallops, tachycardic, erythema around surgical scars with steri strips, LISBETH drain in tact, has chest wall tenderness  Abd: soft, NT, ND, no CVA tenderness   MSK: no visible deformities, no back pain, PICC line in place on R upper arm  Neuro: No focal sensory or motor deficits  Skin: diaphoretic
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
## 2024-09-15 NOTE — H&P ADULT - NSHPPHYSICALEXAM_GEN_ALL_CORE
T(C): 36.8 (09-15-24 @ 11:09), Max: 37.8 (09-15-24 @ 04:30)  HR: 87 (09-15-24 @ 11:09) (81 - 110)  BP: 105/64 (09-15-24 @ 11:09) (90/57 - 107/56)  RR: 18 (09-15-24 @ 11:09) (16 - 18)  SpO2: 100% (09-15-24 @ 11:09) (94% - 100%)    CONSTITUTIONAL: Well groomed, no apparent distress  EYES: PERRLA and symmetric, EOMI, No conjunctival or scleral injection, non-icteric  ENMT: Oral mucosa with moist membranes. Normal dentition; no pharyngeal injection or exudates  SKIN: L sternoclavicular region with L shaped surgical site with adhesed skin. erythema along surgical site border but no drainage   RESP: No respiratory distress, no use of accessory muscles; CTA b/l, no WRR  CV: RRR, +S1S2, no MRG; no JVD; no peripheral edema  GI: Soft, NT, ND, no rebound, no guarding; no palpable masses; no hepatosplenomegaly; no hernia palpated  MSK: Normal gait; No digital clubbing or cyanosis; examination of the (head/neck/spine/ribs/pelvis, RUE, LUE, RLE, LLE) without misalignment,            Normal ROM without pain, no spinal tenderness, normal muscle strength/tone  NEURO: CN II-XII intact; normal reflexes in upper and lower extremities, sensation intact in upper and lower extremities b/l to light touch   PSYCH: Appropriate insight/judgment; A+O x 3, mood and affect appropriate

## 2024-09-15 NOTE — H&P ADULT - PROBLEM SELECTOR PLAN 6
- Fluids: NA   - Electrolytes: Will replete to maintain K>4, Phos>3, and Mag>2  - Nutrition: regular   - Activity: OOB as tolerated   - DVT Prophylaxis: eliquis 5 BID  - Stress Ulcer/GI Prophylaxis: NA  - Disposition: admit to medicine - Fluids: NA   - Electrolytes: Will replete to maintain K>4, Phos>3, and Mag>2  - Nutrition: regular   - Activity: OOB as tolerated   - DVT Prophylaxis: lovenox 40 qd  - Stress Ulcer/GI Prophylaxis: NA  - Disposition: admit to medicine

## 2024-09-15 NOTE — CONSULT NOTE ADULT - ASSESSMENT
ASSESSMENT/PLAN:   JAYA AGGARWAL is a 44yFemale s/p     - Changes  - Wound care  - Diet  - Pain control  - IS  - Activity as tolerated  - Continue DVT prophylaxis  - Dispo    Jesusita Torres MD  Plastic and Reconstructive Surgery, PGY-2  [facility]: [id number]  NS: [phone] ASSESSMENT/PLAN:   43F with PMH MS on rituxan, anxiety, depression, septic arthritis of L sternoclavicular joint s/p multiple I&D 2023, recent recurrence s/p I&D with CTS and pec turnover flap with Dr. Dean on 9/9, d/c'ed with PICC line on vanc/bryson now presenting with redness and increased pain. PRS consulted for possible surgical site infection. CT chest shows normal postop changes with possible small hematoma around flap, LISBETH drains within area, no abscess/collection. Small PE without heart strain noted. Likely superficial cellulitic infection given erythema around incisions- no surgical intervention at this time.    - IV abx  - Obtain ID consult  - ok for AC if needed from surgical standpoint given PE  - no surgical intervention at this time  - LISBETH drain care- please record output  - PRS will follow      Plastic and Reconstructive Surgery, PGY-2  [facility]: [id number]  NS: [phone] ASSESSMENT/PLAN:   43F with PMH MS on rituxan, anxiety, depression, septic arthritis of L sternoclavicular joint s/p multiple I&D 2023, recent recurrence s/p I&D with CTS and pec turnover flap with Dr. Dean on 9/9, d/c'ed with PICC line on vanc/bryson now presenting with redness and increased pain. PRS consulted for possible surgical site infection. CT chest shows normal postop changes with possible small hematoma around flap, LISBETH drains within area, no abscess/collection. Small PE without heart strain noted.     - IV abx  - Obtain ID consult  - ok for AC if needed from surgical standpoint given PE  - no indication for surgical intervention at this time  - LISBETH drain care- please record output  - PRS will follow      Plastic and Reconstructive Surgery, PGY-2  [facility]: [id number]  NS: [phone]
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
## 2024-09-15 NOTE — CONSULT NOTE ADULT - ASSESSMENT
44F s/p left SCJ debridement and left pectoral flap on 9/9 presenting with superficial surgical site erythema and localized pain. Also found to have small subsegmental right lower lobe pulmonary emboli.    Recommendations:  -No acute thoracic surgery operative intervention at this time.  -PRS consult.  -ID consult.  -Dispo per PRS.    Discussed with CT surgery fellow on behalf of Dr. Walton.    Thoracic Surgery   [id number]

## 2024-09-15 NOTE — ED PROVIDER NOTE - ATTENDING CONTRIBUTION TO CARE
I was the supervising attending. I have independently seen face-to-face and examined the patient with the resident. I have reviewed the history and physical and discussed the MDM with the resident. 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 Endorses worsening swelling/redness/pain to surgical site over last few days since discharge. Additionally with measured temperatures at home to 99-100F. Is currently taking vancomycin 1250mg and meropenem 1000mg, last took vancomycin at 10AM and meropenem at 230AM. Otherwise denies SOB, n/v/d, drainage from surgical site, urinary complaints. Appears ill, awakens easily to voice, no acute distress. BP soft, borderline febrile on rectal temp at 100.1F and tachycardic. Exam w/ intact surgical site, no dehiscence, diffuse blanching erythema w/ warmth and mild swelling, questionable fluctuance, TTP. C/f sepsis i/s/o post-op complication e.g. abscess vs. recurrent osteomyelitis vs. alternate infection e.g. UTI, PNA. Will obtain broad w/u with labs, cultures, UA, XR and CT. Will order for next dose of patient's abx. -Marcia Johnson MD (Attending)

## 2024-09-15 NOTE — ED ADULT NURSE NOTE - NSFALLHARMRISKINTERV_ED_ALL_ED

## 2024-09-15 NOTE — H&P ADULT - ATTENDING COMMENTS
43F pmh MS, anxiety, depression, septic arthritis of L sternoclavicular joint s/p multiple admissions requiring I&D and most recent admission (9/4-9/13) with I&D and L pectoral flap for evaluation of abscess p/w worsening L sided chest pain and swelling a/w SIRS also incidentally noted to have small nonocclusive subsegmental RLL PE.      SIRS  - unclear if this is a post surgical inflammatory response vs antibiotic failure  - Pt is already on broad spectrum abx of Timbo and Vanco --> continue for now; will get ID eval for further recommendations  - supportive care with IV hydration  - follow up cultures    Acute nonocclusive subsegmental RLL PE  - this was an incidental finding. she was not symptomatic from it.  - will hold full dose anticoagulationation at this time. Will c/w lovenox 40mg daily  - will check LE duplex to r/o DVT as causes  - will get a vascular cards eval tomorrow for recommendations of anticoagulation    Left sided chest pain  - presumed to be from postoperative inflammatory changes.  - Surgery eval appreciated -->  Normal postop changes with possible small hematoma around flap, LISBETH drains within area, no abscess/collection.  - will start on standing toradol for pain control  - trial of tramadol prn. she states that oxycodone does not help with her pain.    further as above

## 2024-09-15 NOTE — ED PROVIDER NOTE - PROGRESS NOTE DETAILS
Labs w/ mild leukocytosis, worsening transaminitis. Lactate WNL. CT with small PE/no RH strain, questionable hematoma over surgical site, no overt abscess. Patient reassessed, HR improved but BP persistently low to ~90/50s, 2nd liter ordered. Will consult thoracic surgery. Trop <6, will hold off on heparin for PE right now given no RH strain, VS abnormalities more likely i/s/o sepsis, and in case of post-op hematoma. -Marcia Johnson MD (Attending) 43yo F with history of MS,   septic arthritis of SC joint status-post washout and PICC placement for abx presenting for fever and worsening pain. Soft BP in the ED, EF nl on prior team POCUS.  CT showing small PE, surgical site hematoma vs possible infection. Heparin held pending thoracic surgery recommendations. Patient reassessed, resting comfortably, thoracic team at bedside. Disposition pending Received signout.  Patient with improved blood pressure of 103/57.  Evaluated by plastic surgery, concern for overlying cellulitis.  States hematoma is small postop with continued serosanguineous drainage.  No concern for abscess.  Patient can also be started on AC for PE.  Discussed with thoracic, deferring AC use to primary team.  Will admit for cellulitis in setting of already on Vanco and bryson.  Timi Carmona PGY-3

## 2024-09-15 NOTE — H&P ADULT - NSHPLABSRESULTS_GEN_ALL_CORE
Labs:  CAPILLARY BLOOD GLUCOSE                              8.1    12.19 )-----------( 368      ( 15 Sep 2024 04:31 )             25.1       Auto Neutrophil %: 83.8 % (09-15-24 @ 04:31)  Auto Immature Granulocyte %: 0.5 % (09-15-24 @ 04:31)    09-15    137  |  101  |  9   ----------------------------<  116<H>  4.2   |  26  |  0.40<L>      Calcium: 9.0 mg/dL (09-15-24 @ 04:31)      LFTs:             5.9  | 0.1  | 119      ------------------[316     ( 15 Sep 2024 04:31 )  3.2  | x    | 78          Lipase:x      Amylase:x         Blood Gas Venous - Lactate: 1.0 mmol/L (09-15-24 @ 04:24)    ABG - ( 09 Sep 2024 17:40 )  pH: 7.42  /  pCO2: 41    /  pO2: 205   / HCO3: 27    / Base Excess: 1.9   /  SaO2: 100.0             Coags:     11.6   ----< 1.11    ( 15 Sep 2024 04:31 )     31.0        Urinalysis Basic - ( 15 Sep 2024 06:09 )    Color: Yellow / Appearance: Clear / S.028 / pH: x  Gluc: x / Ketone: Negative mg/dL  / Bili: Negative / Urobili: 1.0 mg/dL   Blood: x / Protein: Negative mg/dL / Nitrite: Negative   Leuk Esterase: Negative / RBC: 4 /HPF / WBC 1 /HPF   Sq Epi: x / Non Sq Epi: 1 /HPF / Bacteria: Negative /HPF        Culture - Group A Streptococcus (collected 13 Sep 2024 01:10)  Source: Throat  Final Report (14 Sep 2024 18:35):    No Streptococcus pyogenes (Group A) isolated      CT CHEST w IV Contrast (9/15/2024)    IMPRESSION:  Small nonocclusive right lower lobe basilar subsegmental pulmonary   emboli, no evidence of right heart strain.    14 cm in greatest dimension expansile mostly soft tissue density   structure filling and expanding the operative bed in the region of the   left medial clavicle. This probably represents a combination of muscle   given the history of recent left pectoralis advancement flap, with some   superimposed hematoma. No active bleeding is evident. 2 LISBETH drains extend   into this collection. Superimposed infection cannot be excluded though no   peripherally enhancing fluid collection or abscess is evident.

## 2024-09-15 NOTE — ED PROVIDER NOTE - OBJECTIVE STATEMENT
43F with history of mulptiple sclerosis, septic arthritis s/p multiple I&Ds most recently on 9/13 with simultaneous 43F with history of multiple sclerosis, septic arthritis s/p multiple I&Ds most recently on 9/13 with L pectoralis muscle flap turnover, PICC line and LISBETH drain placement c/o L sided chest pain. Admits to worsening pain since last night, and that she started noticing worsening redness around her surgical sites yesterday. Admits to some nausea, lightheadedness, SOB, chest pain 43F with history of multiple sclerosis, septic arthritis s/p multiple I&Ds most recently on 9/13 with L pectoralis muscle flap turnover, PICC line and LISBETH drain placement c/o L sided chest pain. Admits to worsening pain since last night, and that she started noticing worsening redness around her surgical sites yesterday. Admits to some nausea, lightheadedness, SOB, chest pain.

## 2024-09-15 NOTE — H&P ADULT - PROBLEM SELECTOR PLAN 1
Meeting SIRS criteria on admission with WBC > 12K, HR > 100 and temp 100.7 (at home). Multiple potential etiologies including PE newly diagnosed vs persistent septic arthritis/OM.   - refer to problem 2 and 3 for treatment of PE and septic arthritis
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
## 2024-09-15 NOTE — CONSULT NOTE ADULT - ATTENDING COMMENTS
44y Female with PMH of MS on Ocrevus anxiety, depression, septic arthritis of L sternoclavicular joint s/p I&Dx2 and cefepime for 6 weeks (12/2023-1/2024), recent admission 9/9-9/13 for L clavicular pain s/p I+D with L pectoralis flap sent home on meropenem and vancomycin for 6 week course presents for L shoulder pain, L sided pel chest pain, headache, fever at home. Son on phone also notes that the surgical site appeared more erythematous day prior to admission and had associated L arm swelling.    Admitted 9/9-9/13 most recently; MRI L clavicle with prior resection of the medial left clavicle with rim-enhancing fluid and surrounding phlegmon abutting the resected bony margin. S/p surgery on 9/9 with L SCJ debridement by CT surgery and L pec flap by PRS. Cultures grew Staph epi and Corynebacterium in fluid media, blood cultures negative. Discharged on tentatively 6 weeks vancomycin and meropenem for empiric coverage.    Here Tmax 100.1, mild tachycardia  WBC 12.1, neutrophilic. Lactate wnl  , ALT 78,     CT chest: Small nonocclusive right lower lobe basilar subsegmental pulmonary emboli, no evidence of right heart strain. 14 cm in greatest dimension expansile mostly soft tissue density structure filling and expanding the operative bed in the region of the left medial clavicle (thought to represent combination of muscle)    MICRO DATA:   09/15 BCX: IP   09/15 BCX: IP   09/10 AFB Cx: IP  09/10 Fungal CX: IP  09/10 Bacterial CX: fluid media only- corynebacterium striatum and staph epidermidis     # Fever, leukocytosis  # Septic sternoclavicular joint s/p debridement and muscle flap L clavicle  # Post-Operative Hematoma or Seroma on Ct chest   # Rule out surgical site infection  # Pulmonary embolism  # Elevated LFTs, Alk phos    Concerned for superficial surgical site infection in setting of erythema, tenderness,   Swelling of surgical site-- as per plastic surgery postop changes with possible small hematoma around flap,  No dehiscence drainage, or purulence noted.   Patient already on vancomycin and meropenem.      RECOMMENDATIONS:   - IV vancomcyin 1250 mg q12h  - Continue IV meropenem 1g q8h  - check next vancomycin level with AM labs and adjust dose,   - follow-up pending blood cultures  - thoracic/plastic surgery follow-up for further evaluation/management of possible surgical site infection  - monitor LFTs and alk phos- Obtain a RUQ ultrasound  - Check CRP  Trend WBC, monitor fever curve and hemodynamic status.   If febrile, please re-culture.   Rest of the care as per the primary team.      Discussed with the primary team    Marcy Garrett MD  Attending Physician, Division of Infectious Diseases  Department of Medicine   [facility]    Contact on TEAMS messaging from 9am - 5pm  Office: [phone] (after 5 PM or weekend)

## 2024-09-15 NOTE — H&P ADULT - ASSESSMENT
43F with MS, anxiety, depression, septic arthritis of L sternoclavicular joint s/p multiple admissions requiring I&D and most recent admission (9/4-9/13) with I&D and L pectoral flap for evaluation of abscess now presenting to the ED with L sided chest pain with newly noted small nonocclusive RLL PE.

## 2024-09-15 NOTE — ED ADULT NURSE REASSESSMENT NOTE - NS ED NURSE REASSESS COMMENT FT1
Pt complaining of pain and requesting Ofirmev and Dilaudid. Resident Josefina Brito made aware. States pt will not be receiving IV pain medications and may take oxycodone as needed. Pt refusing oxycodone and requesting Percocet. Awaiting for orders.
Pt now crying in pain after Percocet was provided. Resident Josefina Brito contacted and made aware. States she will order medication.
Report received from DANE FOREMAN Pt A/O x4. Pt made aware admitted to hospital, must await for available admission bed and for admitting providers to assess and speak to her about care plan. Family at bedside. Pt provided cookies as requested. Left arm repositioned.

## 2024-09-15 NOTE — H&P ADULT - PROBLEM SELECTOR PLAN 2
On imaging on admission, noted to have small nonocclusive RLL pulmonary embolism iso L sided chest pain.  - troponin and proBNP otherwise negative  - CT chest without evidence of R heart strain  - currently not requiring any O2 and saturating well on RA  - will consider full AC with eliquis 5 BID On imaging on admission, noted to have small nonocclusive RLL pulmonary embolism. May potentially be incidental finding iso recent surgery.  - troponin and proBNP otherwise negative  - CT chest without evidence of R heart strain  - currently not requiring any O2 and saturating well on RA  - duplex bilateral lower extremities to r/o DVT  - c/w prophylactic lovenox 40 qd and can consider full AC if any evidence of DVT

## 2024-09-16 ENCOUNTER — NON-APPOINTMENT (OUTPATIENT)
Age: 44
End: 2024-09-16

## 2024-09-16 DIAGNOSIS — L03.90 CELLULITIS, UNSPECIFIED: ICD-10-CM

## 2024-09-16 LAB
ALBUMIN SERPL ELPH-MCNC: 3 G/DL — LOW (ref 3.3–5)
ALP SERPL-CCNC: 354 U/L — HIGH (ref 40–120)
ALT FLD-CCNC: 86 U/L — HIGH (ref 10–45)
ANION GAP SERPL CALC-SCNC: 9 MMOL/L — SIGNIFICANT CHANGE UP (ref 5–17)
APTT BLD: 31.5 SEC — SIGNIFICANT CHANGE UP (ref 24.5–35.6)
AST SERPL-CCNC: 80 U/L — HIGH (ref 10–40)
BASOPHILS # BLD AUTO: 0.05 K/UL — SIGNIFICANT CHANGE UP (ref 0–0.2)
BASOPHILS NFR BLD AUTO: 0.5 % — SIGNIFICANT CHANGE UP (ref 0–2)
BILIRUB SERPL-MCNC: 0.2 MG/DL — SIGNIFICANT CHANGE UP (ref 0.2–1.2)
BUN SERPL-MCNC: 7 MG/DL — SIGNIFICANT CHANGE UP (ref 7–23)
CALCIUM SERPL-MCNC: 9.1 MG/DL — SIGNIFICANT CHANGE UP (ref 8.4–10.5)
CHLORIDE SERPL-SCNC: 102 MMOL/L — SIGNIFICANT CHANGE UP (ref 96–108)
CO2 SERPL-SCNC: 27 MMOL/L — SIGNIFICANT CHANGE UP (ref 22–31)
CREAT SERPL-MCNC: 0.41 MG/DL — LOW (ref 0.5–1.3)
CULTURE RESULTS: SIGNIFICANT CHANGE UP
EGFR: 124 ML/MIN/1.73M2 — SIGNIFICANT CHANGE UP
EOSINOPHIL # BLD AUTO: 0.11 K/UL — SIGNIFICANT CHANGE UP (ref 0–0.5)
EOSINOPHIL NFR BLD AUTO: 1 % — SIGNIFICANT CHANGE UP (ref 0–6)
GLUCOSE SERPL-MCNC: 100 MG/DL — HIGH (ref 70–99)
HCT VFR BLD CALC: 25.7 % — LOW (ref 34.5–45)
HGB BLD-MCNC: 8.1 G/DL — LOW (ref 11.5–15.5)
IMM GRANULOCYTES NFR BLD AUTO: 0.5 % — SIGNIFICANT CHANGE UP (ref 0–0.9)
INR BLD: 1.13 RATIO — SIGNIFICANT CHANGE UP (ref 0.85–1.18)
LYMPHOCYTES # BLD AUTO: 1.49 K/UL — SIGNIFICANT CHANGE UP (ref 1–3.3)
LYMPHOCYTES # BLD AUTO: 14 % — SIGNIFICANT CHANGE UP (ref 13–44)
MCHC RBC-ENTMCNC: 28.2 PG — SIGNIFICANT CHANGE UP (ref 27–34)
MCHC RBC-ENTMCNC: 31.5 GM/DL — LOW (ref 32–36)
MCV RBC AUTO: 89.5 FL — SIGNIFICANT CHANGE UP (ref 80–100)
MONOCYTES # BLD AUTO: 1.16 K/UL — HIGH (ref 0–0.9)
MONOCYTES NFR BLD AUTO: 10.9 % — SIGNIFICANT CHANGE UP (ref 2–14)
NEUTROPHILS # BLD AUTO: 7.8 K/UL — HIGH (ref 1.8–7.4)
NEUTROPHILS NFR BLD AUTO: 73.1 % — SIGNIFICANT CHANGE UP (ref 43–77)
NRBC # BLD: 0 /100 WBCS — SIGNIFICANT CHANGE UP (ref 0–0)
PLATELET # BLD AUTO: 406 K/UL — HIGH (ref 150–400)
POTASSIUM SERPL-MCNC: 4.2 MMOL/L — SIGNIFICANT CHANGE UP (ref 3.5–5.3)
POTASSIUM SERPL-SCNC: 4.2 MMOL/L — SIGNIFICANT CHANGE UP (ref 3.5–5.3)
PROT SERPL-MCNC: 5.8 G/DL — LOW (ref 6–8.3)
PROTHROM AB SERPL-ACNC: 11.8 SEC — SIGNIFICANT CHANGE UP (ref 9.5–13)
RBC # BLD: 2.87 M/UL — LOW (ref 3.8–5.2)
RBC # FLD: 13.1 % — SIGNIFICANT CHANGE UP (ref 10.3–14.5)
SODIUM SERPL-SCNC: 138 MMOL/L — SIGNIFICANT CHANGE UP (ref 135–145)
SPECIMEN SOURCE: SIGNIFICANT CHANGE UP
WBC # BLD: 10.66 K/UL — HIGH (ref 3.8–10.5)
WBC # FLD AUTO: 10.66 K/UL — HIGH (ref 3.8–10.5)

## 2024-09-16 PROCEDURE — 99232 SBSQ HOSP IP/OBS MODERATE 35: CPT

## 2024-09-16 PROCEDURE — 76705 ECHO EXAM OF ABDOMEN: CPT | Mod: 26

## 2024-09-16 PROCEDURE — 99233 SBSQ HOSP IP/OBS HIGH 50: CPT | Mod: GC

## 2024-09-16 PROCEDURE — 99233 SBSQ HOSP IP/OBS HIGH 50: CPT

## 2024-09-16 RX ORDER — DAPTOMYCIN 500 MG/10ML
400 INJECTION, POWDER, LYOPHILIZED, FOR SOLUTION INTRAVENOUS EVERY 24 HOURS
Refills: 0 | Status: DISCONTINUED | OUTPATIENT
Start: 2024-09-16 | End: 2024-09-25

## 2024-09-16 RX ORDER — INFLUENZA VIRUS VACCINE 15; 15; 15; 15 UG/.5ML; UG/.5ML; UG/.5ML; UG/.5ML
0.5 SUSPENSION INTRAMUSCULAR ONCE
Refills: 0 | Status: DISCONTINUED | OUTPATIENT
Start: 2024-09-16 | End: 2024-10-01

## 2024-09-16 RX ORDER — CHLORHEXIDINE GLUCONATE ORAL RINSE 1.2 MG/ML
1 SOLUTION DENTAL DAILY
Refills: 0 | Status: DISCONTINUED | OUTPATIENT
Start: 2024-09-16 | End: 2024-09-25

## 2024-09-16 RX ORDER — VANCOMYCIN HCL-SODIUM CHLORIDE IV SOLN 1.5 GM/250ML-0.9% 1.5-0.9/25 GM/ML-%
1000 SOLUTION INTRAVENOUS EVERY 8 HOURS
Refills: 0 | Status: DISCONTINUED | OUTPATIENT
Start: 2024-09-16 | End: 2024-09-16

## 2024-09-16 RX ADMIN — KETOROLAC TROMETHAMINE 15 MILLIGRAM(S): 10 TABLET, FILM COATED ORAL at 17:38

## 2024-09-16 RX ADMIN — VANCOMYCIN HCL-SODIUM CHLORIDE IV SOLN 1.5 GM/250ML-0.9% 166.67 MILLIGRAM(S): 1.5-0.9/25 SOLUTION at 00:03

## 2024-09-16 RX ADMIN — MODAFINIL 200 MILLIGRAM(S): 100 TABLET ORAL at 11:45

## 2024-09-16 RX ADMIN — ALPRAZOLAM 0.25 MILLIGRAM(S): 0.5 TABLET ORAL at 01:20

## 2024-09-16 RX ADMIN — Medication 17 GRAM(S): at 11:14

## 2024-09-16 RX ADMIN — DAPTOMYCIN 116 MILLIGRAM(S): 500 INJECTION, POWDER, LYOPHILIZED, FOR SOLUTION INTRAVENOUS at 17:38

## 2024-09-16 RX ADMIN — Medication 2 TABLET(S): at 21:42

## 2024-09-16 RX ADMIN — Medication 60 MILLIGRAM(S): at 21:42

## 2024-09-16 RX ADMIN — TRAMADOL HYDROCHLORIDE 50 MILLIGRAM(S): 50 TABLET, COATED ORAL at 12:42

## 2024-09-16 RX ADMIN — KETOROLAC TROMETHAMINE 15 MILLIGRAM(S): 10 TABLET, FILM COATED ORAL at 23:12

## 2024-09-16 RX ADMIN — ENOXAPARIN SODIUM 40 MILLIGRAM(S): 150 INJECTION SUBCUTANEOUS at 06:56

## 2024-09-16 RX ADMIN — MULTI VITAMIN/MINERAL SUPPLEMENT WITH ASCORBIC ACID, NIACIN, PYRIDOXINE, PANTOTHENIC ACID, FOLIC ACID, RIBOFLAVIN, THIAMIN, BIOTIN, COBALAMIN AND ZINC. 1 TABLET(S): 60; 20; 12.5; 10; 10; 1.7; 1.5; 1; .3; .006 TABLET, COATED ORAL at 11:15

## 2024-09-16 RX ADMIN — MEROPENEM 100 MILLIGRAM(S): 500 INJECTION INTRAVENOUS at 14:07

## 2024-09-16 RX ADMIN — MEROPENEM 100 MILLIGRAM(S): 500 INJECTION INTRAVENOUS at 21:42

## 2024-09-16 RX ADMIN — Medication 10 MILLIGRAM(S): at 21:41

## 2024-09-16 RX ADMIN — KETOROLAC TROMETHAMINE 15 MILLIGRAM(S): 10 TABLET, FILM COATED ORAL at 06:55

## 2024-09-16 RX ADMIN — Medication 10 MILLIGRAM(S): at 14:07

## 2024-09-16 RX ADMIN — SERTRALINE HYDROCHLORIDE 125 MILLIGRAM(S): 100 TABLET, FILM COATED ORAL at 11:14

## 2024-09-16 RX ADMIN — KETOROLAC TROMETHAMINE 15 MILLIGRAM(S): 10 TABLET, FILM COATED ORAL at 11:15

## 2024-09-16 RX ADMIN — MEROPENEM 100 MILLIGRAM(S): 500 INJECTION INTRAVENOUS at 06:55

## 2024-09-16 RX ADMIN — TRAMADOL HYDROCHLORIDE 50 MILLIGRAM(S): 50 TABLET, COATED ORAL at 11:48

## 2024-09-16 RX ADMIN — HYDROMORPHONE HYDROCHLORIDE 2 MILLIGRAM(S): 1 INJECTION, SOLUTION INTRAMUSCULAR; INTRAVENOUS; SUBCUTANEOUS at 01:14

## 2024-09-16 RX ADMIN — Medication 0.5 MILLIGRAM(S): at 23:11

## 2024-09-16 RX ADMIN — Medication 10 MILLIGRAM(S): at 06:55

## 2024-09-16 RX ADMIN — KETOROLAC TROMETHAMINE 15 MILLIGRAM(S): 10 TABLET, FILM COATED ORAL at 18:30

## 2024-09-16 RX ADMIN — KETOROLAC TROMETHAMINE 15 MILLIGRAM(S): 10 TABLET, FILM COATED ORAL at 00:25

## 2024-09-16 NOTE — PROGRESS NOTE ADULT - ASSESSMENT
44F with PMH of MS on Rituxan, anxiety, depression, septic arthritis of L sternoclavicular joint s/p multiple I&Ds in 2023, recent recurrence s/p I&D with CTS and pec turnover flap with Dr. Dean on 9/9, d/c'ed with PICC line on vanc/bryson, now presenting with redness and increased pain. PRS consulted for possible surgical site infection. CT chest shows normal postop changes with possible small hematoma around flap, LISBETH drains within area, no abscess/collection. Small PE without heart strain noted.     - IV abx, defer to ID for recommended treatment  - recommend full AC for treatment of PEs, bleeding risk at this stage is minimal  - no indication for surgical intervention at this time  - LISBETH drain care- please record output  - PRS will follow

## 2024-09-16 NOTE — PROGRESS NOTE ADULT - PROBLEM SELECTOR PLAN 1
Meeting SIRS criteria on admission with WBC > 12K, HR > 100 and temp 100.7 (at home). Multiple potential etiologies including PE newly diagnosed vs persistent septic arthritis/OM.   - refer to problem 2 and 3 for treatment of PE and septic arthritis Meeting SIRS criteria on admission with WBC > 12K, HR > 100 and temp 100.7 (at home). Multiple potential etiologies including PE newly diagnosed vs persistent septic arthritis/OM.   - refer to problem 2 and 3 for treatment of PE and septic arthritis  - RESOLVED

## 2024-09-16 NOTE — PROGRESS NOTE ADULT - PROBLEM SELECTOR PLAN 6
- Fluids: NA   - Electrolytes: Will replete to maintain K>4, Phos>3, and Mag>2  - Nutrition: regular   - Activity: OOB as tolerated   - DVT Prophylaxis: lovenox 40 qd  - Stress Ulcer/GI Prophylaxis: NA  - Disposition: admit to medicine

## 2024-09-16 NOTE — PROGRESS NOTE ADULT - SUBJECTIVE AND OBJECTIVE BOX
44yPatient is a 44y old  Female who presents with a chief complaint of L chest pain, SIRS, Acute Pulmonary Embolism (16 Sep 2024 09:07)      Interval history:  Low grade temp, feels achy.       Allergies:   No Known Allergies    Antimicrobials:  DAPTOmycin IVPB 400 milliGRAM(s) IV Intermittent every 24 hours  meropenem  IVPB 1000 milliGRAM(s) IV Intermittent every 8 hours      REVIEW OF SYSTEMS:  No SOB  No abdominal pain  No dysuria       Vital Signs Last 24 Hrs  T(C): 36.7 (09-16-24 @ 18:15), Max: 37.1 (09-15-24 @ 19:04)  T(F): 98 (09-16-24 @ 18:15), Max: 98.8 (09-15-24 @ 19:04)  HR: 88 (09-16-24 @ 18:15) (88 - 95)  BP: 102/56 (09-16-24 @ 18:15) (91/46 - 110/65)  BP(mean): --  RR: 17 (09-16-24 @ 18:15) (17 - 20)  SpO2: 96% (09-16-24 @ 18:15) (96% - 100%)      PHYSICAL EXAM:  Pt in no acute distress, alert, awake.   rt arm PICC   lt sided graft site with surrounding erythema   non distended abdomen  no edema LE   no phlebitis                             8.1    10.66 )-----------( 406      ( 16 Sep 2024 07:20 )             25.7   09-16    138  |  102  |  7   ----------------------------<  100<H>  4.2   |  27  |  0.41<L>    Ca    9.1      16 Sep 2024 07:20    TPro  5.8<L>  /  Alb  3.0<L>  /  TBili  0.2  /  DBili  x   /  AST  80<H>  /  ALT  86<H>  /  AlkPhos  354<H>  09-16      LIVER FUNCTIONS - ( 16 Sep 2024 07:20 )  Alb: 3.0 g/dL / Pro: 5.8 g/dL / ALK PHOS: 354 U/L / ALT: 86 U/L / AST: 80 U/L / GGT: x               Culture - Urine (collected 15 Sep 2024 06:09)  Source: Clean Catch Clean Catch (Midstream)  Final Report (16 Sep 2024 08:44):    <10,000 CFU/mL Normal Urogenital Renae    Culture - Blood (collected 15 Sep 2024 04:25)  Source: .Blood Blood-Peripheral  Preliminary Report (16 Sep 2024 09:02):    No growth at 24 hours    Culture - Blood (collected 15 Sep 2024 04:10)  Source: .Blood Blood-Peripheral  Preliminary Report (16 Sep 2024 09:02):    No growth at 24 hours        Radiology:    < from: US Abdomen Upper Quadrant Right (09.16.24 @ 13:40) >  IMPRESSION:    1. The gallbladder is moderately distended and otherwise unremarkable,   without stones. No biliary dilatation.  2. The liver is unremarkable without focal finding.      < from: VA Duplex Lower Ext Vein Scan, Bilat (09.15.24 @ 22:26) >  IMPRESSION:  No evidence of deep venous thrombosis in either lower extremity.

## 2024-09-16 NOTE — PROGRESS NOTE ADULT - PROBLEM SELECTOR PLAN 5
- c/w home klonipin 0.5 qd PRN  - c/w duloxetine 60 qd  - c/w sertraline 125 qd - c/w home klonipin 0.5 qd PRN  - c/w duloxetine 60 qd  - c/w sertraline 125 qd  - monitor for serotonergic symptoms

## 2024-09-16 NOTE — PROGRESS NOTE ADULT - ASSESSMENT
44y Female with PMH of MS on Ocrevus anxiety, depression, septic arthritis of L sternoclavicular joint s/p I&Dx2 and cefepime for 6 weeks (12/2023-1/2024), recent admission 9/9-9/13 for L clavicular pain s/p I+D with L pectoralis flap sent home on meropenem and vancomycin for 6 week course presents for L shoulder pain, L sided pel chest pain, headache, fever at home. Son on phone also notes that the surgical site appeared more erythematous day prior to admission and had associated L arm swelling.    Admitted 9/9-9/13 most recently; MRI L clavicle with prior resection of the medial left clavicle with rim-enhancing fluid and surrounding phlegmon abutting the resected bony margin. S/p surgery on 9/9 with L SCJ debridement by CT surgery and L pec flap by PRS. Cultures grew Staph epi and Corynebacterium in fluid media, blood cultures negative. Discharged on tentatively 6 weeks vancomycin and meropenem for empiric coverage.    Here Tmax 100.1, mild tachycardia  WBC 12.1, neutrophilic. Lactate wnl  , ALT 78,     CT chest: Small nonocclusive right lower lobe basilar subsegmental pulmonary emboli, no evidence of right heart strain. 14 cm in greatest dimension expansile mostly soft tissue density structure filling and expanding the operative bed in the region of the left medial clavicle (thought to represent combination of muscle)    MICRO DATA:   09/15 BCX: IP   09/15 BCX: IP   09/10 AFB Cx: IP  09/10 Fungal CX: IP  09/10 Bacterial CX: fluid media only- corynebacterium striatum and staph epidermidis     # Low grade Fever, leukocytosis  # Septic sternoclavicular joint s/p debridement and muscle flap L clavicle  # Post-Operative Hematoma or Seroma on Ct chest   # Rule out surgical site infection  # new Pulmonary embolism  # Elevated LFTs, Alk phos    Concerned for superficial surgical site infection in setting of erythema, tenderness,   Swelling of surgical site-- as per plastic surgery postop changes with possible small hematoma around flap,  No dehiscence drainage, or purulence noted. CT with no obvious collection.   Patient already on vancomycin and meropenem,       RECOMMENDATIONS:   - switched vanco to dapto 400 mg q24h  - check CPK in am   - Continue IV meropenem 1g q8h  - follow-up pending blood cultures, prelim NTD   - thoracic/plastic surgery follow-up for further evaluation/management of possible superimposed cellulitis vs hematoma   - monitor LFTs and alk phos- U/S noted, recommend CT abd/pelvis with contrast   - Check ESR/ CRP.   Trend WBC, leucocytosis downtrending       Plan discussed with Medicine and Pharmacy     Odalis Reynaga  Please contact through MS Teams   If no response or past 5 pm/weekend call [phone].

## 2024-09-16 NOTE — PROGRESS NOTE ADULT - SUBJECTIVE AND OBJECTIVE BOX
Plastic Surgery Progress Note    Subjective: seen on rounds, no issues    Objective:  Exam:   General: NAD  Neuro: Alert  Pulm: comfortable  Chest: soft, no collections, some reactive erythema at the incision line, drains with s/s output    Vital Signs Last 24 Hrs  T(C): 36.9 (16 Sep 2024 05:48), Max: 37.2 (15 Sep 2024 09:49)  T(F): 98.5 (16 Sep 2024 05:48), Max: 99 (15 Sep 2024 14:47)  HR: 88 (16 Sep 2024 05:48) (63 - 95)  BP: 110/65 (16 Sep 2024 05:48) (91/46 - 110/65)  BP(mean): 77 (15 Sep 2024 14:47) (70 - 77)  RR: 18 (16 Sep 2024 05:48) (18 - 20)  SpO2: 98% (16 Sep 2024 05:48) (94% - 100%)    Parameters below as of 15 Sep 2024 21:30  Patient On (Oxygen Delivery Method): room air        I&O's Detail    15 Sep 2024 07:01  -  16 Sep 2024 07:00  --------------------------------------------------------  IN:  Total IN: 0 mL    OUT:    Bulb (mL): 100 mL    Bulb (mL): 80 mL  Total OUT: 180 mL    Total NET: -180 mL      MEDICATIONS  (STANDING):  baclofen 10 milliGRAM(s) Oral every 8 hours  dalfampridine ER 10 milliGRAM(s) Oral every 12 hours  DULoxetine 60 milliGRAM(s) Oral at bedtime  enoxaparin Injectable 40 milliGRAM(s) SubCutaneous every 24 hours  ketorolac   Injectable 15 milliGRAM(s) IV Push every 6 hours  lactated ringers. 1000 milliLiter(s) (100 mL/Hr) IV Continuous <Continuous>  meropenem  IVPB 1000 milliGRAM(s) IV Intermittent every 8 hours  modafinil 200 milliGRAM(s) Oral daily  multivitamin 1 Tablet(s) Oral daily  polyethylene glycol 3350 17 Gram(s) Oral daily  senna 2 Tablet(s) Oral at bedtime  sertraline 125 milliGRAM(s) Oral daily  vancomycin  IVPB 1250 milliGRAM(s) IV Intermittent every 12 hours  Vibegron (Gemtesa) 75 mg tablet 1 Tablet(s)   Oral daily    MEDICATIONS  (PRN):  clonazePAM  Tablet 0.5 milliGRAM(s) Oral daily PRN for anxiety  traMADol 25 milliGRAM(s) Oral every 4 hours PRN Moderate Pain (4 - 6)  traMADol 50 milliGRAM(s) Oral every 4 hours PRN Severe Pain (7 - 10)      LABS:                        8.1    10.66 )-----------( 406      ( 16 Sep 2024 07:20 )             25.7     09-15    137  |  101  |  9   ----------------------------<  116<H>  4.2   |  26  |  0.40<L>    Ca    9.0      15 Sep 2024 04:31    TPro  5.9<L>  /  Alb  3.2<L>  /  TBili  0.1<L>  /  DBili  x   /  AST  119<H>  /  ALT  78<H>  /  AlkPhos  316<H>  09-15    PT/INR - ( 15 Sep 2024 04:31 )   PT: 11.6 sec;   INR: 1.11 ratio         PTT - ( 15 Sep 2024 04:31 )  PTT:31.0 sec  LIVER FUNCTIONS - ( 15 Sep 2024 04:31 )  Alb: 3.2 g/dL / Pro: 5.9 g/dL / ALK PHOS: 316 U/L / ALT: 78 U/L / AST: 119 U/L / GGT: x           Urinalysis Basic - ( 15 Sep 2024 06:09 )    Color: Yellow / Appearance: Clear / S.028 / pH: x  Gluc: x / Ketone: Negative mg/dL  / Bili: Negative / Urobili: 1.0 mg/dL   Blood: x / Protein: Negative mg/dL / Nitrite: Negative   Leuk Esterase: Negative / RBC: 4 /HPF / WBC 1 /HPF   Sq Epi: x / Non Sq Epi: 1 /HPF / Bacteria: Negative /HPF

## 2024-09-16 NOTE — PROGRESS NOTE ADULT - ATTENDING COMMENTS
43F pmh MS, anxiety, depression, septic arthritis of L sternoclavicular joint s/p multiple admissions requiring I&D and most recent admission (9/4-9/13) with I&D and L pectoral flap for evaluation of abscess p/w worsening L sided chest pain and swelling a/w SIRS also incidentally noted to have small nonocclusive subsegmental RLL PE.  #Surgical Site erythema  #Incidental PE  -c/f superficial site infection, already on vanc/meropenem. ID following->will change to dapto meropenem, monitor lfts CPK and inflammatory markers  -asymptomatic single subsegmental PE without DVT, will monitor on lovenox ppx per guidelines
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
## 2024-09-16 NOTE — PROGRESS NOTE ADULT - PROBLEM SELECTOR PLAN 1
- s/p left SCJ debridement and left pectoral flap on 9/9  - No acute thoracic surgery operative intervention at this time  - plastic follow up; LISBETH management by Plastics. Monitor output.   - recommend close ID follow up. Vanco trough level 5.5. Antibiotic dose/regimen as per ID.   - global care per primary team.   - thoracic surgery will continue to follow  - Plan d/w Attending Dr. Bautista - s/p left SCJ debridement and left pectoral flap on 9/9  - No acute thoracic surgery operative intervention at this time  - plastic follow up; LISBETH management by Plastics. Monitor output.   - recommend close ID follow up. Vanco trough level 5.5. Antibiotic dose/regimen as per ID.   - global care per primary team.   - thoracic surgery will continue to follow  - Plan d/w Attending Dr. Bautista  ----  For any questions or concerns, please contact thoracic @ [phone]

## 2024-09-16 NOTE — CHART NOTE - NSCHARTNOTEFT_GEN_A_CORE
43F PMHx of MS on Rituxan s/p CBI for hematuria noted to have increasing hematuria and passing clots. Male further states he has pain on passing the clots and has to push for clots to be expelled. Clots are large in size. He denies pubic pain, denies N/V/D, no other complaints. He has visible amounts of blood with multiple clots.  Bladder scan was 89 post residual.   Urology PA notified> Pt seen and examined by urology. No need for [place] at this time. Pt now passing peach colored urine. Urology will continue to follow no

## 2024-09-16 NOTE — PROGRESS NOTE ADULT - PROBLEM SELECTOR PLAN 3
History of septic arthritis with multiple admissions s/p I&D with CTS and L pectoral flap with plastic surgery on last admission (9/4-9/13). Was discharged home on meropenem 1g q8 and van 1250 q12 with reportedly no missed doses.   - CT chest with evidence of 14 cm soft tissue density structure filling and expanding the operative bed in the region of the left medial clavicle but without definitive ring enhancing lesion  - no acute surgical intervention for surgical site erythema per plastic surgery   - c/w meropenem 1g q8 and vancomycin 1250 q12  - vanc level q4 dose  - monitor LISBETH drain output  - pain control with oxy 10 q4 PRN for severe and oxy 5 q4 PRN for moderate and toradol 15 q6 for breakthrough History of septic arthritis with multiple admissions s/p I&D with CTS and L pectoral flap with plastic surgery on last admission (9/4-9/13). Was discharged home on meropenem 1g q8 and van 1250 q12 with reportedly no missed doses.   - CT chest with evidence of 14 cm soft tissue density structure filling and expanding the operative bed in the region of the left medial clavicle but without definitive ring enhancing lesion  - no acute surgical intervention for surgical site erythema per plastic surgery   - c/w meropenem 1g q8 and vancomycin 1g 8  - vanc level q4 dose  - monitor LISBETH drain output  - pain control with PO percocet and tylenol

## 2024-09-16 NOTE — CONSULT NOTE ADULT - ASSESSMENT
In summary, Ms. Javed is a pleasant 45 yo with  MS, anxiety, depression, septic arthritis of L sternoclavicular joint s/p multiple admissions requiring I&D and most recent admission (9/4-9/13) with I&D and L pectoral flap for evaluation of abscess now presenting to the ED with L sided chest pain with fever 100.7. Patient most recently admission with I&D of L clavicular abscess by CTS and L pectoral flap surgery with plastic surgery. Pt is complaining of chest pain. Pt found to have PE on CT chest.  She says limited movement of the left arm, but otherwise no new weakness, no changes in speech, swallow, vision, bowel or bladder control.  Pt is well known to my associate Dr. Selby for her multiple sclerosis.     exam notable for inability to raise left arm, chronic right leg weakness    On hx and exam no indication of change in her neurological status and thus there is no need for further inpt neurological workup or evaluation  Issue of PE/anticougulation as per primary team  appreciate ID consult on issue of antibiotics    Would have  consult, she is clearly overwhelmed by the fact she has had multiple admission, she has young children at home, support services need to be provided    As outpt, will follow with Dr Selby    d/w pt in detail

## 2024-09-16 NOTE — PROGRESS NOTE ADULT - NS ATTEND AMEND GEN_ALL_CORE FT
patient seen - return to ed for redness of wound, erythema extending approx. 1.5 cm from wound - no fluctuance and not tender but increased redness compared to discharge. patient reports fevers at home as well. she was discharged on bryson and vanco. she had refused to wait for finalized culture for ID to tailor her antibiotics prior to discharge. her vanco level here was low and per daughter at bedside, having to take abx five times a day is not sustainable and patient was compliant because daughter was present to help her. her drains have serosanguinous fluid, no main pus   d/w infectious disease Dr. Brito regarding findings and concern for cellulitis. During surgery we removed bone and debrided all tissue that appeared infected. no resistant bacteria growing on cultures. unclear if redness secondary to lower levels of vanco .   no drainable collections on ct.   continue management per primary team and ID .

## 2024-09-16 NOTE — PATIENT PROFILE ADULT - NSPROHMSYMPCOND_GEN_A_NUR
[Consultation] : a consultation visit [Patient] : patient [Mother] : mother [FreeTextEntry1] : Scoliosis neurologic

## 2024-09-16 NOTE — PROGRESS NOTE ADULT - SUBJECTIVE AND OBJECTIVE BOX
Pilar Flanagan  MS4  Preferred contact via [employer] Teams      Patient is a 44y old  Female who presents with a chief complaint of L chest pain (15 Sep 2024 14:14)      SUBJECTIVE / OVERNIGHT EVENTS:    MEDICATIONS  (STANDING):  baclofen 10 milliGRAM(s) Oral every 8 hours  dalfampridine ER 10 milliGRAM(s) Oral every 12 hours  DULoxetine 60 milliGRAM(s) Oral at bedtime  enoxaparin Injectable 40 milliGRAM(s) SubCutaneous every 24 hours  ketorolac   Injectable 15 milliGRAM(s) IV Push every 6 hours  lactated ringers. 1000 milliLiter(s) (100 mL/Hr) IV Continuous <Continuous>  meropenem  IVPB 1000 milliGRAM(s) IV Intermittent every 8 hours  modafinil 200 milliGRAM(s) Oral daily  multivitamin 1 Tablet(s) Oral daily  polyethylene glycol 3350 17 Gram(s) Oral daily  senna 2 Tablet(s) Oral at bedtime  sertraline 125 milliGRAM(s) Oral daily  vancomycin  IVPB 1250 milliGRAM(s) IV Intermittent every 12 hours  Vibegron (Gemtesa) 75 mg tablet 1 Tablet(s)   Oral daily    MEDICATIONS  (PRN):  clonazePAM  Tablet 0.5 milliGRAM(s) Oral daily PRN for anxiety  traMADol 50 milliGRAM(s) Oral every 4 hours PRN Severe Pain (7 - 10)  traMADol 25 milliGRAM(s) Oral every 4 hours PRN Moderate Pain (4 - 6)      CAPILLARY BLOOD GLUCOSE        I&O's Summary    15 Sep 2024 07:01  -  16 Sep 2024 07:00  --------------------------------------------------------  IN: 0 mL / OUT: 180 mL / NET: -180 mL        PHYSICAL EXAM: No acute overnight events. Pt seen and examined at bedside.      LABS:                        8.1    12.19 )-----------( 368      ( 15 Sep 2024 04:31 )             25.1      09-15    137  |  101  |  9   ----------------------------<  116<H>  4.2   |  26  |  0.40<L>    Ca    9.0      15 Sep 2024 04:31    TPro  5.9<L>  /  Alb  3.2<L>  /  TBili  0.1<L>  /  DBili  x   /  AST  119<H>  /  ALT  78<H>  /  AlkPhos  316<H>  15    PT/INR - ( 15 Sep 2024 04:31 )   PT: 11.6 sec;   INR: 1.11 ratio         PTT - ( 15 Sep 2024 04:31 )  PTT:31.0 sec      Urinalysis Basic - ( 15 Sep 2024 06:09 )    Color: Yellow / Appearance: Clear / S.028 / pH: x  Gluc: x / Ketone: Negative mg/dL  / Bili: Negative / Urobili: 1.0 mg/dL   Blood: x / Protein: Negative mg/dL / Nitrite: Negative   Leuk Esterase: Negative / RBC: 4 /HPF / WBC 1 /HPF   Sq Epi: x / Non Sq Epi: 1 /HPF / Bacteria: Negative /HPF        RADIOLOGY & ADDITIONAL TESTS:    Imaging Personally Reviewed:    Consultant(s) Notes Reviewed:      Care Discussed with Consultants/Other Providers:   Pilar Flanagan  MS4  Preferred contact via [employer] Teams      Patient is a 44y old  Female who presents with a chief complaint of L chest pain (15 Sep 2024 14:14)      SUBJECTIVE / OVERNIGHT EVENTS: No acute overnight events. patient seen and examined at bedside, in no acute distress. Patient complains of pain at surgical site and superficially on Left chest and neck. She is anxious about being in the hospital given her immunocompromised status, she also reports not sleeping well and not eating. Pt's pain is not well controlled, states IV dilaudid is her preferred method of pain control.     MEDICATIONS  (STANDING):  baclofen 10 milliGRAM(s) Oral every 8 hours  dalfampridine ER 10 milliGRAM(s) Oral every 12 hours  DULoxetine 60 milliGRAM(s) Oral at bedtime  enoxaparin Injectable 40 milliGRAM(s) SubCutaneous every 24 hours  ketorolac   Injectable 15 milliGRAM(s) IV Push every 6 hours  lactated ringers. 1000 milliLiter(s) (100 mL/Hr) IV Continuous <Continuous>  meropenem  IVPB 1000 milliGRAM(s) IV Intermittent every 8 hours  modafinil 200 milliGRAM(s) Oral daily  multivitamin 1 Tablet(s) Oral daily  polyethylene glycol 3350 17 Gram(s) Oral daily  senna 2 Tablet(s) Oral at bedtime  sertraline 125 milliGRAM(s) Oral daily  vancomycin  IVPB 1250 milliGRAM(s) IV Intermittent every 12 hours  Vibegron (Gemtesa) 75 mg tablet 1 Tablet(s)   Oral daily    MEDICATIONS  (PRN):  clonazePAM  Tablet 0.5 milliGRAM(s) Oral daily PRN for anxiety  traMADol 50 milliGRAM(s) Oral every 4 hours PRN Severe Pain (7 - 10)  traMADol 25 milliGRAM(s) Oral every 4 hours PRN Moderate Pain (4 - 6)      CAPILLARY BLOOD GLUCOSE        I&O's Summary    15 Sep 2024 07:01  -  16 Sep 2024 07:00  --------------------------------------------------------  IN: 0 mL / OUT: 180 mL / NET: -180 mL        PHYSICAL EXAM:     CONSTITUTIONAL: Well groomed, no apparent distress  EYES: PERRLA and symmetric, EOMI, No conjunctival or scleral injection, non-icteric  ENMT: Oral mucosa with moist membranes. Normal dentition; no pharyngeal injection or exudates  SKIN: L sternoclavicular region with L shaped surgical site with adhesed skin. erythema along surgical site border but no drainage   RESP: No respiratory distress, no use of accessory muscles; CTA b/l, no WRR  CV: RRR, +S1S2, no MRG; no JVD; no peripheral edema  GI: Soft, NT, ND, no rebound, no guarding; no palpable masses; no hepatosplenomegaly; no hernia palpated  MSK: Normal gait; No digital clubbing or cyanosis; examination of the (head/neck/spine/ribs/pelvis, RUE, LUE, RLE, LLE) without misalignment,   Normal ROM without pain, no spinal tenderness, normal muscle strength/tone  NEURO: Strength was 5/5 in all four extremities except cannot raise left arm but good arm .  Chronic right leg weakness 5-/5  PSYCH: Appropriate insight/judgment; A+O x 3, pt feeling anxious    LABS:                        8.1    12.19 )-----------( 368      ( 15 Sep 2024 04:31 )             25.1      09-15    137  |  101  |  9   ----------------------------<  116<H>  4.2   |  26  |  0.40<L>    Ca    9.0      15 Sep 2024 04:31    TPro  5.9<L>  /  Alb  3.2<L>  /  TBili  0.1<L>  /  DBili  x   /  AST  119<H>  /  ALT  78<H>  /  AlkPhos  316<H>  09-15    PT/INR - ( 15 Sep 2024 04:31 )   PT: 11.6 sec;   INR: 1.11 ratio         PTT - ( 15 Sep 2024 04:31 )  PTT:31.0 sec      Urinalysis Basic - ( 15 Sep 2024 06:09 )    Color: Yellow / Appearance: Clear / S.028 / pH: x  Gluc: x / Ketone: Negative mg/dL  / Bili: Negative / Urobili: 1.0 mg/dL   Blood: x / Protein: Negative mg/dL / Nitrite: Negative   Leuk Esterase: Negative / RBC: 4 /HPF / WBC 1 /HPF   Sq Epi: x / Non Sq Epi: 1 /HPF / Bacteria: Negative /HPF        RADIOLOGY & ADDITIONAL TESTS:    Imaging Personally Reviewed:    Consultant(s) Notes Reviewed:      Care Discussed with Consultants/Other Providers:

## 2024-09-16 NOTE — PATIENT PROFILE ADULT - NSPROMEDSDISPOSITION_GEN_A_NUR
Dalfampridine ER tablets 10mg (1 PO every 12 hours)    Vibegron 75mg one tablet daily/sent to pharmacy for id and relabeling

## 2024-09-16 NOTE — PROGRESS NOTE ADULT - PROBLEM SELECTOR PLAN 2
On imaging on admission, noted to have small nonocclusive RLL pulmonary embolism. May potentially be incidental finding iso recent surgery.  - troponin and proBNP otherwise negative  - CT chest without evidence of R heart strain  - currently not requiring any O2 and saturating well on RA  - duplex bilateral lower extremities to r/o DVT  - c/w prophylactic lovenox 40 qd and can consider full AC if any evidence of DVT On imaging on admission, noted to have small nonocclusive RLL pulmonary embolism. May potentially be incidental finding iso recent surgery.  - troponin and proBNP otherwise negative  - CT chest without evidence of R heart strain  - currently not requiring any O2 and saturating well on RA  - duplex bilateral lower extremities - no evidence of DVT  - c/w prophylactic lovenox 40 qd

## 2024-09-16 NOTE — PROGRESS NOTE ADULT - ASSESSMENT
44F s/p left SCJ debridement and left pectoral flap on 9/9 presenting with superficial surgical site erythema and localized pain. Also found to have small subsegmental right lower lobe pulmonary emboli.  Recommendations:  -No acute thoracic surgery operative intervention at this time.  -PRS consult.  -ID consult.  -Dispo per PRS    9/16: Evaluated at ED bedside by Thoracic Attending Dr. Bautista. Recommend close ID follow up for cellulitis of site. Vanco trough level noted to be 5.5, ? unclear if patient was taking antibiotics appropriately in outpatient setting.

## 2024-09-16 NOTE — PROGRESS NOTE ADULT - SUBJECTIVE AND OBJECTIVE BOX
Patient seen and evaluated at bedside with CTS team and on-call Attending during AM rounds.     SUBJECTIVE:     TELEMETRY:      VITAL SIGNS:    Vital Signs Last 24 Hrs  T(C): 36.9 (09-16-24 @ 12:31), Max: 37.1 (09-15-24 @ 19:04)  T(F): 98.5 (09-16-24 @ 12:31), Max: 98.8 (09-15-24 @ 19:04)  HR: 91 (09-16-24 @ 12:31) (88 - 95)  BP: 108/63 (09-16-24 @ 12:31) (91/46 - 110/65)  RR: 18 (09-16-24 @ 12:31) (18 - 20)  SpO2: 97% (09-16-24 @ 12:31) (97% - 100%)           INPUT/OUTPUT:    09-15 @ 07:01  -  09-16 @ 07:00  --------------------------------------------------------  IN: 250 mL / OUT: 180 mL / NET: 70 mL    09-16 @ 07:01  -  09-16 @ 15:07  --------------------------------------------------------  IN: 250 mL / OUT: 0 mL / NET: 250 mL        OR      I&O's Detail    15 Sep 2024 07:01  -  16 Sep 2024 07:00  --------------------------------------------------------  IN:    Oral Fluid: 250 mL  Total IN: 250 mL    OUT:    Bulb (mL): 100 mL    Bulb (mL): 80 mL  Total OUT: 180 mL    Total NET: 70 mL      16 Sep 2024 07:01  -  16 Sep 2024 15:07  --------------------------------------------------------  IN:    Oral Fluid: 250 mL  Total IN: 250 mL    OUT:  Total OUT: 0 mL    Total NET: 250 mL            LABS:  09-16    138  |  102  |  7   ----------------------------<  100<H>  4.2   |  27  |  0.41<L>    Ca    9.1      16 Sep 2024 07:20    TPro  5.8<L>  /  Alb  3.0<L>  /  TBili  0.2  /  DBili  x   /  AST  80<H>  /  ALT  86<H>  /  AlkPhos  354<H>  09-16                                 8.1    10.66 )-----------( 406      ( 16 Sep 2024 07:20 )             25.7          PT/INR - ( 16 Sep 2024 07:20 )   PT: 11.8 sec;   INR: 1.13 ratio         PTT - ( 16 Sep 2024 07:20 )  PTT:31.5 sec      Daily     Daily       CAPILLARY BLOOD GLUCOSE                DRAINS:     MS         [  ] Drainage:                 L Pleural  [  ]  Drainage:                R Pleural  [  ]  Drainage:  PACING WIRES        [  ]   Settings:                                  Isolated  [  ]  COUMADIN              [  ] YES          [  ]      NO         Reason:           PHYSICAL EXAM:  NEURO: alert and oriented; no gross, focal neurological deficits appreciated at time of evaluation  HEART: s1, s2  CHEST: +LCW incision site erythematous/ cellulitis   LUNG: nonlabored respirations with no use of accessory muscles   GI: soft, +bowel sounds      ACTIVE MEDICATIONS:  baclofen 10 milliGRAM(s) Oral every 8 hours  chlorhexidine 2% Cloths 1 Application(s) Topical daily  clonazePAM  Tablet 0.5 milliGRAM(s) Oral daily PRN  dalfampridine ER 10 milliGRAM(s) Oral every 12 hours  DULoxetine 60 milliGRAM(s) Oral at bedtime  enoxaparin Injectable 40 milliGRAM(s) SubCutaneous every 24 hours  ketorolac   Injectable 15 milliGRAM(s) IV Push every 6 hours  lactated ringers. 1000 milliLiter(s) IV Continuous <Continuous>  meropenem  IVPB 1000 milliGRAM(s) IV Intermittent every 8 hours  modafinil 200 milliGRAM(s) Oral daily  multivitamin 1 Tablet(s) Oral daily  oxycodone    5 mG/acetaminophen 325 mG 1 Tablet(s) Oral every 4 hours PRN  polyethylene glycol 3350 17 Gram(s) Oral daily  senna 2 Tablet(s) Oral at bedtime  sertraline 125 milliGRAM(s) Oral daily  vancomycin  IVPB 1000 milliGRAM(s) IV Intermittent every 8 hours  Vibegron (Gemtesa) 75 mg tablet 1 Tablet(s)   Oral daily    Case discussed in detail with Thoracic Attending Dr. Bautista. Plan below as per discussion. SUBJECTIVE: "Hi." Denies acute chest pain, palpitations, or shortness of breath. No acute overnight events reported.     Vital Signs Last 24 Hrs  T(C): 36.9 (09-16-24 @ 12:31), Max: 37.1 (09-15-24 @ 19:04)  T(F): 98.5 (09-16-24 @ 12:31), Max: 98.8 (09-15-24 @ 19:04)  HR: 91 (09-16-24 @ 12:31) (88 - 95)  BP: 108/63 (09-16-24 @ 12:31) (91/46 - 110/65)  RR: 18 (09-16-24 @ 12:31) (18 - 20)  SpO2: 97% (09-16-24 @ 12:31) (97% - 100%)           INPUT/OUTPUT:  15 Sep 2024 07:01  -  16 Sep 2024 07:00  --------------------------------------------------------  IN:    Oral Fluid: 250 mL  Total IN: 250 mL  OUT:    Bulb (mL): 100 mL    Bulb (mL): 80 mL  Total OUT: 180 mL  Total NET: 70 mL  16 Sep 2024 07:01  -  16 Sep 2024 15:07  --------------------------------------------------------  IN:    Oral Fluid: 250 mL  Total IN: 250 mL  OUT:  Total OUT: 0 mL  Total NET: 250 mL      LABS:  09-16  138  |  102  |  7   ----------------------------<  100<H>  4.2   |  27  |  0.41<L>  Ca    9.1      16 Sep 2024 07:20  TPro  5.8<L>  /  Alb  3.0<L>  /  TBili  0.2  /  DBili  x   /  AST  80<H>  /  ALT  86<H>  /  AlkPhos  354<H>  09-16                  8.1    10.66 )-----------( 406      ( 16 Sep 2024 07:20 )             25.7   PT/INR - ( 16 Sep 2024 07:20 )   PT: 11.8 sec;   INR: 1.13 ratio    PTT - ( 16 Sep 2024 07:20 )  PTT:31.5 sec      PHYSICAL EXAM:  NEURO: alert and oriented; no gross, focal neurological deficits appreciated at time of evaluation  HEART: s1, s2  CHEST: +LCW incision site erythematous/ cellulitis   LUNG: nonlabored respirations with no use of accessory muscles   GI: soft, +bowel sounds      ACTIVE MEDICATIONS:  baclofen 10 milliGRAM(s) Oral every 8 hours  chlorhexidine 2% Cloths 1 Application(s) Topical daily  clonazePAM  Tablet 0.5 milliGRAM(s) Oral daily PRN  dalfampridine ER 10 milliGRAM(s) Oral every 12 hours  DULoxetine 60 milliGRAM(s) Oral at bedtime  enoxaparin Injectable 40 milliGRAM(s) SubCutaneous every 24 hours  ketorolac   Injectable 15 milliGRAM(s) IV Push every 6 hours  lactated ringers. 1000 milliLiter(s) IV Continuous <Continuous>  meropenem  IVPB 1000 milliGRAM(s) IV Intermittent every 8 hours  modafinil 200 milliGRAM(s) Oral daily  multivitamin 1 Tablet(s) Oral daily  oxycodone    5 mG/acetaminophen 325 mG 1 Tablet(s) Oral every 4 hours PRN  polyethylene glycol 3350 17 Gram(s) Oral daily  senna 2 Tablet(s) Oral at bedtime  sertraline 125 milliGRAM(s) Oral daily  vancomycin  IVPB 1000 milliGRAM(s) IV Intermittent every 8 hours  Vibegron (Gemtesa) 75 mg tablet 1 Tablet(s)   Oral daily    Case discussed in detail with Thoracic Attending Dr. Bautista. Plan below as per discussion.

## 2024-09-16 NOTE — PROGRESS NOTE ADULT - PROBLEM SELECTOR PLAN 4
History of MS on home baclofen, dalfampridine, and gemtesa.  - c/w baclofen 10 TID  - c/w modafinil 200 qd  - patient will need to bring in dalfampridine and gemtesa from home as non on formulary History of MS on home baclofen, dalfampridine, and gemtesa.  - c/w baclofen 10 TID  - c/w modafinil 200 qd  - patient has home dalfampridine and gemtesa as not on formulary

## 2024-09-17 DIAGNOSIS — D64.9 ANEMIA, UNSPECIFIED: ICD-10-CM

## 2024-09-17 DIAGNOSIS — R79.89 OTHER SPECIFIED ABNORMAL FINDINGS OF BLOOD CHEMISTRY: ICD-10-CM

## 2024-09-17 LAB
ADD ON TEST-SPECIMEN IN LAB: SIGNIFICANT CHANGE UP
ADD ON TEST-SPECIMEN IN LAB: SIGNIFICANT CHANGE UP
ALBUMIN SERPL ELPH-MCNC: 3 G/DL — LOW (ref 3.3–5)
ALP SERPL-CCNC: 363 U/L — HIGH (ref 40–120)
ALT FLD-CCNC: 71 U/L — HIGH (ref 10–45)
ANION GAP SERPL CALC-SCNC: 9 MMOL/L — SIGNIFICANT CHANGE UP (ref 5–17)
AST SERPL-CCNC: 54 U/L — HIGH (ref 10–40)
BASOPHILS # BLD AUTO: 0.04 K/UL — SIGNIFICANT CHANGE UP (ref 0–0.2)
BASOPHILS NFR BLD AUTO: 0.4 % — SIGNIFICANT CHANGE UP (ref 0–2)
BILIRUB SERPL-MCNC: 0.1 MG/DL — LOW (ref 0.2–1.2)
BUN SERPL-MCNC: 14 MG/DL — SIGNIFICANT CHANGE UP (ref 7–23)
CALCIUM SERPL-MCNC: 9 MG/DL — SIGNIFICANT CHANGE UP (ref 8.4–10.5)
CHLORIDE SERPL-SCNC: 101 MMOL/L — SIGNIFICANT CHANGE UP (ref 96–108)
CK SERPL-CCNC: 22 U/L — LOW (ref 25–170)
CO2 SERPL-SCNC: 27 MMOL/L — SIGNIFICANT CHANGE UP (ref 22–31)
CREAT SERPL-MCNC: 0.4 MG/DL — LOW (ref 0.5–1.3)
EGFR: 124 ML/MIN/1.73M2 — SIGNIFICANT CHANGE UP
EOSINOPHIL # BLD AUTO: 0.17 K/UL — SIGNIFICANT CHANGE UP (ref 0–0.5)
EOSINOPHIL NFR BLD AUTO: 1.5 % — SIGNIFICANT CHANGE UP (ref 0–6)
GLUCOSE SERPL-MCNC: 90 MG/DL — SIGNIFICANT CHANGE UP (ref 70–99)
HCT VFR BLD CALC: 24.3 % — LOW (ref 34.5–45)
HGB BLD-MCNC: 7.7 G/DL — LOW (ref 11.5–15.5)
IMM GRANULOCYTES NFR BLD AUTO: 1 % — HIGH (ref 0–0.9)
LDH SERPL L TO P-CCNC: 181 U/L — SIGNIFICANT CHANGE UP (ref 50–242)
LYMPHOCYTES # BLD AUTO: 1.76 K/UL — SIGNIFICANT CHANGE UP (ref 1–3.3)
LYMPHOCYTES # BLD AUTO: 15.6 % — SIGNIFICANT CHANGE UP (ref 13–44)
MAGNESIUM SERPL-MCNC: 2.1 MG/DL — SIGNIFICANT CHANGE UP (ref 1.6–2.6)
MCHC RBC-ENTMCNC: 28.2 PG — SIGNIFICANT CHANGE UP (ref 27–34)
MCHC RBC-ENTMCNC: 31.7 GM/DL — LOW (ref 32–36)
MCV RBC AUTO: 89 FL — SIGNIFICANT CHANGE UP (ref 80–100)
MONOCYTES # BLD AUTO: 1.28 K/UL — HIGH (ref 0–0.9)
MONOCYTES NFR BLD AUTO: 11.3 % — SIGNIFICANT CHANGE UP (ref 2–14)
MRSA PCR RESULT.: SIGNIFICANT CHANGE UP
NEUTROPHILS # BLD AUTO: 7.95 K/UL — HIGH (ref 1.8–7.4)
NEUTROPHILS NFR BLD AUTO: 70.2 % — SIGNIFICANT CHANGE UP (ref 43–77)
NRBC # BLD: 0 /100 WBCS — SIGNIFICANT CHANGE UP (ref 0–0)
PHOSPHATE SERPL-MCNC: 3.8 MG/DL — SIGNIFICANT CHANGE UP (ref 2.5–4.5)
PLATELET # BLD AUTO: 414 K/UL — HIGH (ref 150–400)
POTASSIUM SERPL-MCNC: 4.2 MMOL/L — SIGNIFICANT CHANGE UP (ref 3.5–5.3)
POTASSIUM SERPL-SCNC: 4.2 MMOL/L — SIGNIFICANT CHANGE UP (ref 3.5–5.3)
PROT SERPL-MCNC: 5.8 G/DL — LOW (ref 6–8.3)
RBC # BLD: 2.73 M/UL — LOW (ref 3.8–5.2)
RBC # FLD: 13.1 % — SIGNIFICANT CHANGE UP (ref 10.3–14.5)
RETICS #: 68.1 K/UL — SIGNIFICANT CHANGE UP (ref 25–125)
RETICS/RBC NFR: 2.4 % — SIGNIFICANT CHANGE UP (ref 0.5–2.5)
S AUREUS DNA NOSE QL NAA+PROBE: SIGNIFICANT CHANGE UP
SODIUM SERPL-SCNC: 137 MMOL/L — SIGNIFICANT CHANGE UP (ref 135–145)
SURGICAL PATHOLOGY STUDY: SIGNIFICANT CHANGE UP
WBC # BLD: 11.31 K/UL — HIGH (ref 3.8–10.5)
WBC # FLD AUTO: 11.31 K/UL — HIGH (ref 3.8–10.5)

## 2024-09-17 PROCEDURE — 99232 SBSQ HOSP IP/OBS MODERATE 35: CPT

## 2024-09-17 PROCEDURE — 99233 SBSQ HOSP IP/OBS HIGH 50: CPT | Mod: GC

## 2024-09-17 RX ORDER — DALFAMPRIDINE 10 MG/1
10 TABLET, FILM COATED, EXTENDED RELEASE ORAL EVERY 12 HOURS
Refills: 0 | Status: DISCONTINUED | OUTPATIENT
Start: 2024-09-17 | End: 2024-09-25

## 2024-09-17 RX ADMIN — KETOROLAC TROMETHAMINE 15 MILLIGRAM(S): 10 TABLET, FILM COATED ORAL at 05:33

## 2024-09-17 RX ADMIN — Medication 10 MILLIGRAM(S): at 05:33

## 2024-09-17 RX ADMIN — KETOROLAC TROMETHAMINE 15 MILLIGRAM(S): 10 TABLET, FILM COATED ORAL at 18:38

## 2024-09-17 RX ADMIN — KETOROLAC TROMETHAMINE 15 MILLIGRAM(S): 10 TABLET, FILM COATED ORAL at 13:01

## 2024-09-17 RX ADMIN — MEROPENEM 100 MILLIGRAM(S): 500 INJECTION INTRAVENOUS at 05:30

## 2024-09-17 RX ADMIN — Medication 10 MILLIGRAM(S): at 16:11

## 2024-09-17 RX ADMIN — Medication 2 TABLET(S): at 21:07

## 2024-09-17 RX ADMIN — KETOROLAC TROMETHAMINE 15 MILLIGRAM(S): 10 TABLET, FILM COATED ORAL at 12:31

## 2024-09-17 RX ADMIN — DALFAMPRIDINE 10 MILLIGRAM(S): 10 TABLET, FILM COATED, EXTENDED RELEASE ORAL at 18:32

## 2024-09-17 RX ADMIN — DAPTOMYCIN 116 MILLIGRAM(S): 500 INJECTION, POWDER, LYOPHILIZED, FOR SOLUTION INTRAVENOUS at 15:17

## 2024-09-17 RX ADMIN — Medication 17 GRAM(S): at 12:32

## 2024-09-17 RX ADMIN — KETOROLAC TROMETHAMINE 15 MILLIGRAM(S): 10 TABLET, FILM COATED ORAL at 19:08

## 2024-09-17 RX ADMIN — Medication 60 MILLIGRAM(S): at 21:08

## 2024-09-17 RX ADMIN — MULTI VITAMIN/MINERAL SUPPLEMENT WITH ASCORBIC ACID, NIACIN, PYRIDOXINE, PANTOTHENIC ACID, FOLIC ACID, RIBOFLAVIN, THIAMIN, BIOTIN, COBALAMIN AND ZINC. 1 TABLET(S): 60; 20; 12.5; 10; 10; 1.7; 1.5; 1; .3; .006 TABLET, COATED ORAL at 12:31

## 2024-09-17 RX ADMIN — MEROPENEM 100 MILLIGRAM(S): 500 INJECTION INTRAVENOUS at 21:07

## 2024-09-17 RX ADMIN — CHLORHEXIDINE GLUCONATE ORAL RINSE 1 APPLICATION(S): 1.2 SOLUTION DENTAL at 12:32

## 2024-09-17 RX ADMIN — MODAFINIL 200 MILLIGRAM(S): 100 TABLET ORAL at 12:31

## 2024-09-17 RX ADMIN — SERTRALINE HYDROCHLORIDE 125 MILLIGRAM(S): 100 TABLET, FILM COATED ORAL at 16:05

## 2024-09-17 RX ADMIN — Medication 10 MILLIGRAM(S): at 21:07

## 2024-09-17 RX ADMIN — Medication 0.5 MILLIGRAM(S): at 21:09

## 2024-09-17 RX ADMIN — ENOXAPARIN SODIUM 40 MILLIGRAM(S): 150 INJECTION SUBCUTANEOUS at 05:30

## 2024-09-17 RX ADMIN — MEROPENEM 100 MILLIGRAM(S): 500 INJECTION INTRAVENOUS at 14:00

## 2024-09-17 NOTE — PROGRESS NOTE ADULT - PROBLEM SELECTOR PLAN 6
- Fluids: NA   - Electrolytes: Will replete to maintain K>4, Phos>3, and Mag>2  - Nutrition: regular   - Activity: OOB as tolerated   - DVT Prophylaxis: lovenox 40 qd  - Stress Ulcer/GI Prophylaxis: NA  - Disposition: admit to medicine - c/w home klonipin 0.5 qd PRN  - c/w duloxetine 60 qd  - c/w sertraline 125 qd  - monitor for serotonergic symptoms

## 2024-09-17 NOTE — PROGRESS NOTE ADULT - PROBLEM SELECTOR PLAN 4
History of MS on home baclofen, dalfampridine, and gemtesa.  - c/w baclofen 10 TID  - c/w modafinil 200 qd  - patient has home dalfampridine and gemtesa as not on formulary On imaging on admission, noted to have small nonocclusive RLL pulmonary embolism. May potentially be incidental finding iso recent surgery.  - troponin and proBNP otherwise negative  - CT chest without evidence of R heart strain  - currently not requiring any O2 and saturating well on RA  - duplex bilateral lower extremities - no evidence of DVT  - c/w prophylactic lovenox 40 qd On imaging on admission, noted to have small nonocclusive RLL pulmonary embolism. May potentially be incidental finding iso recent surgery.  - troponin and proBNP otherwise negative  - CT chest without evidence of R heart strain  - currently not requiring any O2 and saturating well on RA  - duplex bilateral lower extremities - no evidence of DVT  - holding prophylactic lovenox 40 qd iso potential bleed

## 2024-09-17 NOTE — PROGRESS NOTE ADULT - PROBLEM SELECTOR PLAN 5
- c/w home klonipin 0.5 qd PRN  - c/w duloxetine 60 qd  - c/w sertraline 125 qd  - monitor for serotonergic symptoms History of MS on home baclofen, dalfampridine, and gemtesa.  - c/w baclofen 10 TID  - c/w modafinil 200 qd  - patient has home dalfampridine and gemtesa as not on formulary

## 2024-09-17 NOTE — PROGRESS NOTE ADULT - PROBLEM SELECTOR PLAN 2
On imaging on admission, noted to have small nonocclusive RLL pulmonary embolism. May potentially be incidental finding iso recent surgery.  - troponin and proBNP otherwise negative  - CT chest without evidence of R heart strain  - currently not requiring any O2 and saturating well on RA  - duplex bilateral lower extremities - no evidence of DVT  - c/w prophylactic lovenox 40 qd Patient admitted with Hgb 12.2, now 7.7 c/f unknown source of bleeding  - trend CBC  - anemia workup Patient admitted with Hgb 12.2, now 7.7 c/f unknown source of bleeding, pt with known history of RAMONE  - trend CBC  - anemia workup  - Fe studies from 9/11/24 shows iron deficiency Patient admitted with Hgb 12.2, now 7.7 c/f unknown source of bleeding, pt with known history of RAMONE  - trend CBC  - F/u anemia workup labs  - Fe studies from 9/11/24 shows iron deficiency- holding Fe iso active infection  - hold lovenox iso of potential bleed

## 2024-09-17 NOTE — PROGRESS NOTE ADULT - SUBJECTIVE AND OBJECTIVE BOX
SUBJECTIVE: "Hi." Denies acute chest pain, palpitations, or shortness of breath    Vital Signs Last 24 Hrs  T(C): 36.5 (09-17-24 @ 08:36), Max: 37 (09-17-24 @ 05:28)  T(F): 97.7 (09-17-24 @ 08:36), Max: 98.6 (09-17-24 @ 05:28)  HR: 72 (09-17-24 @ 08:36) (72 - 103)  BP: 128/78 (09-17-24 @ 08:36) (97/59 - 128/78)  RR: 18 (09-17-24 @ 08:36) (17 - 18)  SpO2: 99% (09-17-24 @ 08:36) (96% - 99%)           INPUT/OUTPUT:  16 Sep 2024 07:01  -  17 Sep 2024 07:00  --------------------------------------------------------  IN:    Oral Fluid: 250 mL  Total IN: 250 mL  OUT:    Bulb (mL): 50 mL    Bulb (mL): 30 mL    Voided (mL): 250 mL  Total OUT: 330 mL  Total NET: -80 mL      LABS:  09-17  137  |  101  |  14  ----------------------------<  90  4.2   |  27  |  0.40[L]  Ca    9.0      17 Sep 2024 07:02  Phos  3.8     09-17  Mg     2.1     09-17  TPro  5.8[L]  /  Alb  3.0[L]  /  TBili  0.1[L]  /  DBili  x   /  AST  54[H]  /  ALT  71[H]  /  AlkPhos  363[H]  09-17             7.7    11.31 )-----------( 414      ( 17 Sep 2024 07:03 )             24.3   PT/INR - ( 16 Sep 2024 07:20 )   PT: 11.8 sec;   INR: 1.13 ratio    PTT - ( 16 Sep 2024 07:20 )  PTT:31.5 sec      PHYSICAL EXAM:  NEURO: alert and oriented; no gross, focal neurological deficits appreciated at time of evaluation  HEART: s1, s2  CHEST: +LCW incision site erythematous/ cellulitis  (improved from yesterday)  LUNG: nonlabored respirations with no use of accessory muscles   GI: soft, +bowel sounds      ACTIVE MEDICATIONS:  baclofen 10 milliGRAM(s) Oral every 8 hours  chlorhexidine 2% Cloths 1 Application(s) Topical daily  clonazePAM  Tablet 0.5 milliGRAM(s) Oral daily PRN  dalfampridine ER 10 milliGRAM(s) Oral every 12 hours  DAPTOmycin IVPB 400 milliGRAM(s) IV Intermittent every 24 hours  DULoxetine 60 milliGRAM(s) Oral at bedtime  influenza   Vaccine 0.5 milliLiter(s) IntraMuscular once  ketorolac   Injectable 15 milliGRAM(s) IV Push every 6 hours  meropenem  IVPB 1000 milliGRAM(s) IV Intermittent every 8 hours  modafinil 200 milliGRAM(s) Oral daily  multivitamin 1 Tablet(s) Oral daily  oxycodone  5 mG/acetaminophen 325 mG 1 Tablet(s) Oral every 4 hours PRN  polyethylene glycol 3350 17 Gram(s) Oral daily  senna 2 Tablet(s) Oral at bedtime  sertraline 125 milliGRAM(s) Oral daily  Vibegron (Gemtesa) 75 mg tablet 1 Tablet(s) 1 Tablet(s) Oral daily      Case discussed in detail with Thoracic Attending Dr. Bautista. Plan below as per discussion.

## 2024-09-17 NOTE — DISCHARGE NOTE PROVIDER - HOSPITAL COURSE
43F with MS, anxiety, depression, septic arthritis of L sternoclavicular joint s/p multiple admissions requiring I&D and most recent admission (9/4-9/13) with I&D and L pectoral flap for evaluation of abscess now presenting to the ED with L sided chest pain with fever 100.7.   Patient most recently admission with I&D of L clavicular abscess by CTS and L pectoral flap surgery with plastic surgery. Multiple cultures pending in lab with deep clavicular tissue culture 9/10 showing S. epi with sensitivity to vancomycin. Evaluated by ID and patient was discharged home with meropenem 1g q8 and vancomycin 1250 q12 via RUE single lumen PICC. Patient reports she has not missed any doses of the antibiotics since discharge. A VNS did come by to her home to instruct and her daughter is a nurse and has been helping with administration.     Patient acutely developed L sided chest pain near her ribcage yesterday but without any shortness of breath. Also noted to have a fever 100.7. Also started noticing some increased swelling around her surgical site yesterday. Otherwise, denies any headaches, nausea, vomiting, diarrhea or urinary discomfort.     ED Course: patient given medications for pain. Hemodynamically stable, admitted to medicine floors.    On floors, patient was followed by medicine team, plastic surgery, and CT surgery. 43F with MS, anxiety, depression, septic arthritis of L sternoclavicular joint s/p multiple admissions requiring I&D and most recent admission (9/4-9/13) with I&D and L pectoral flap for evaluation of abscess now presenting to the ED with L sided chest pain with fever 100.7.   Patient most recently admission with I&D of L clavicular abscess by CTS and L pectoral flap surgery with plastic surgery. Multiple cultures pending in lab with deep clavicular tissue culture 9/10 showing S. epi with sensitivity to vancomycin. Evaluated by ID and patient was discharged home with meropenem 1g q8 and vancomycin 1250 q12 via RUE single lumen PICC. Patient reports she has not missed any doses of the antibiotics since discharge. A VNS did come by to her home to instruct and her daughter is a nurse and has been helping with administration.     Patient acutely developed L sided chest pain near her ribcage yesterday but without any shortness of breath. Also noted to have a fever 100.7. Also started noticing some increased swelling around her surgical site yesterday. Otherwise, denies any headaches, nausea, vomiting, diarrhea or urinary discomfort.     ED Course: patient given medications for pain. Hemodynamically stable, admitted to medicine floors.    On floors, patient was followed by medicine team, plastic surgery, and thoracic surgery. Wound decreased in redness. Antibiotics tailored, vancomycin switched to daptomycin.     Patient hemodynamically stable on day of discharge. 43F with MS, anxiety, depression, septic arthritis of L sternoclavicular joint s/p multiple admissions requiring I&D and most recent admission (9/4-9/13) with I&D and L pectoral flap for evaluation of abscess now presenting to the ED with L sided chest pain with fever 100.7.   Patient most recently admission with I&D of L clavicular abscess by CTS and L pectoral flap surgery with plastic surgery. Multiple cultures pending in lab with deep clavicular tissue culture 9/10 showing S. epi with sensitivity to vancomycin. Evaluated by ID and patient was discharged home with meropenem 1g q8 and vancomycin 1250 q12 via RUE single lumen PICC. Patient reports she has not missed any doses of the antibiotics since discharge. A VNS did come by to her home to instruct and her daughter is a nurse and has been helping with administration.     Patient acutely developed L sided chest pain near her ribcage yesterday but without any shortness of breath. Also noted to have a fever 100.7. Also started noticing some increased swelling around her surgical site yesterday. Otherwise, denies any headaches, nausea, vomiting, diarrhea or urinary discomfort.     ED Course: patient given medications for pain. Hemodynamically stable, admitted to medicine floors.    On floors, patient was followed by medicine team, plastic surgery, and thoracic surgery. Treated with meropenem and vancomycin, vancomycin switched to daptomycin by infectious disease team. Pt with persistent mild-moderate leukocytosis. Pt's scans demonstrated heterogeneous tissue around surgical site and new ring enhancing lesion, no other source of infection in abdomen or pelvis, no evidence of active bleeding. Patient continued to have serosanguineous drain output, overall volume of output low.     Pt evaluated by plastic surgery and thoracic surgery daily with no further interventions planned. Patient hemodynamically stable on day of discharge and maintained on ___ antibiotics. Hospital Course:  43F with MS, anxiety, depression, septic arthritis of L sternoclavicular joint s/p multiple admissions requiring I&D and most recent admission (9/4-9/13) with I&D and L pectoral flap for evaluation of abscess now presenting to the ED with L sided chest pain with newly noted small nonocclusive RLL PE. Not treating PE as this was likely an incidental finding. Evaluated by ID and antibiotics initially adjusted from vancomycin to daptomycin; meropenem continued as previously prescribed. Course c/b anemia of unclear etiology and given concern for soft tissue mass as potential hematoma on initial CT chest, repeat CT chest was performed which did not show any enlarging of the soft tissue mass. CT chest however was indicative of a new rim enhancing lesion s/p abscess drainage by plastic surgery. Additionally, blood culture 9/15 came back positive for gram negative rods on 9/19 indicating some level of bacteremia but low grade.     Medication Changes:  START      STOP   Hospital Course:  43F with MS, anxiety, depression, septic arthritis of L sternoclavicular joint s/p multiple admissions requiring I&D and most recent admission (9/4-9/13) with I&D and L pectoral flap for evaluation of abscess now presenting to the ED with L sided chest pain with newly noted small nonocclusive RLL PE. Not treating PE as this was likely an incidental finding. Evaluated by ID and antibiotics initially adjusted from vancomycin to daptomycin; meropenem continued as previously prescribed. Course c/b anemia of unclear etiology and given concern for soft tissue mass as potential hematoma on initial CT chest, repeat CT chest was performed which did not show any enlarging of the soft tissue mass. CT chest however was indicative of a new rim enhancing lesion s/p abscess drainage by plastic surgery. Additionally, blood culture 9/15 came back positive for gram negative rods on 9/19 indicating some level of bacteremia but low grade. s/p PRS washout on 9/24. prior LISBETH-drains removed and new left chest LISBETH drain placed. OR Cultures remained negative and patient was hemodynamically stable for discharge to home with home services and [facility] for home infusions.    Medication Changes:  START  Timbo  Dapto      STOP  Vanco

## 2024-09-17 NOTE — PROGRESS NOTE ADULT - PROBLEM SELECTOR PLAN 1
Meeting SIRS criteria on admission with WBC > 12K, HR > 100 and temp 100.7 (at home). Multiple potential etiologies including PE newly diagnosed vs persistent septic arthritis/OM.   - refer to problem 2 and 3 for treatment of PE and septic arthritis  - RESOLVED History of septic arthritis with multiple admissions s/p I&D with CTS and L pectoral flap with plastic surgery on last admission (9/4-9/13). Was discharged home on meropenem 1g q8 and van 1250 q12 with reportedly no missed doses.   - CT chest with evidence of 14 cm soft tissue density structure filling and expanding the operative bed in the region of the left medial clavicle but without definitive ring enhancing lesion  - no acute surgical intervention for surgical site erythema per plastic surgery   - no acute surgical intervention per CT surgery  - c/w meropenem 1g q8  - ID: vanc changed to 400mg daptomycin qd  - monitor LISBETH drain output  - multimodal pain control with oxy and toradol History of septic arthritis with multiple admissions s/p I&D with CTS and L pectoral flap with plastic surgery on last admission (9/4-9/13). Was discharged home on meropenem 1g q8 and van 1250 q12 with reportedly no missed doses.   - CT chest with evidence of 14 cm soft tissue density structure filling and expanding the operative bed in the region of the left medial clavicle but without definitive ring enhancing lesion  - no acute surgical intervention for surgical site erythema per plastic surgery   - no acute surgical intervention per CT surgery  - c/w meropenem 1g q8  - ID: vanc changed to 400mg daptomycin qd, f/u CPK  - monitor LISBETH drain output  - multimodal pain control with oxy and toradol

## 2024-09-17 NOTE — DISCHARGE NOTE PROVIDER - PROVIDER TOKENS
PROVIDER:[TOKEN:[[id number]]],PROVIDER:[TOKEN:[[id number]]] PROVIDER:[TOKEN:[[id number]]],PROVIDER:[TOKEN:[[id number]]],PROVIDER:[TOKEN:[[id number]]]

## 2024-09-17 NOTE — PROGRESS NOTE ADULT - PROBLEM SELECTOR PLAN 3
History of septic arthritis with multiple admissions s/p I&D with CTS and L pectoral flap with plastic surgery on last admission (9/4-9/13). Was discharged home on meropenem 1g q8 and van 1250 q12 with reportedly no missed doses.   - CT chest with evidence of 14 cm soft tissue density structure filling and expanding the operative bed in the region of the left medial clavicle but without definitive ring enhancing lesion  - no acute surgical intervention for surgical site erythema per plastic surgery   - c/w meropenem 1g q8 and vancomycin 1g 8  - vanc level q4 dose  - monitor LISBETH drain output  - pain control with PO percocet and tylenol Pt with elevated ALP, AST, ALT, no clinical signs of cholestatic process  - RUQ US: distended gallbladder, no stones or obstruction  - ID: recommends CTAP w/IV con Pt with elevated ALP, AST, ALT, no clinical signs of cholestatic process, more likely 2/2 septic arthritis  - RUQ US: distended gallbladder, no stones or obstruction  - ID: f/u CTAP w/IV con

## 2024-09-17 NOTE — DISCHARGE NOTE PROVIDER - NSDCMRMEDTOKEN_GEN_ALL_CORE_FT
baclofen 10 mg oral tablet: 1 tab(s) orally 3 times a day  dalfampridine 10 mg oral tablet, extended release: 1 tab(s) orally every 12 hours  DULoxetine 60 mg oral delayed release capsule: 1 cap(s) orally once a day (at bedtime)  Gemtesa 75 mg oral tablet: 1 tab(s) orally once a day  KlonoPIN 0.5 mg oral tablet: 1 tab(s) orally once a day as needed for  anxiety  meropenem 1000 mg intravenous injection: 1,000 milligram(s) intravenously every 8 hours 1g q8h for 6 weeks to end 10/24/24  modafinil 200 mg oral tablet: 1 tab(s) orally once a day  Percocet 5 mg-325 mg oral tablet: 1 tab(s) orally 3 times a day as needed for  severe pain mad daily dosage: 3 MDD: 3  sertraline 100 mg oral tablet: 1 tab(s) orally once a day Take with 25mg Sertraline for Total Dose:125mg  vancomycin 1.25 g intravenous injection: 1.25 gram(s) intravenously 2 times a day 1.25g Q12hr for 6 weeks to end 10/24/2024   baclofen 10 mg oral tablet: 1 tab(s) orally 3 times a day  dalfampridine 10 mg oral tablet, extended release: 1 tab(s) orally every 12 hours  Daptomycin 400mg q24 IV: Daptomycin 400mg q24 IV through PICC line for diagnosis of septic arthritis (m00.80), continue through 10/24/24  DULoxetine 60 mg oral delayed release capsule: 1 cap(s) orally once a day (at bedtime)  Gemtesa 75 mg oral tablet: 1 tab(s) orally once a day  KlonoPIN 0.5 mg oral tablet: 1 tab(s) orally once a day as needed for  anxiety  meropenem 1000 mg intravenous injection: 1,000 milligram(s) intravenously every 8 hours 1g q8h for 6 weeks to end 10/24/24  Meropenem 1g IV q8h: Meropenem 1g IV q8h through RUE PICC, continue through 10/24/24 for diagnosis of septic arthritis (M00.80)  modafinil 200 mg oral tablet: 1 tab(s) orally once a day  Percocet 5 mg-325 mg oral tablet: 1 tab(s) orally 3 times a day as needed for  severe pain mad daily dosage: 3 MDD: 3  sertraline 100 mg oral tablet: 1 tab(s) orally once a day Take with 25mg Sertraline for Total Dose:125mg  vancomycin 1.25 g intravenous injection: 1.25 gram(s) intravenously 2 times a day 1.25g Q12hr for 6 weeks to end 10/24/2024  Weekly CBC, CMP, CK: Please obtain weekly labs (CBC, CMP, and CK), please send results to [email or web address], to follow up with provider Dr. PASQUALE Mendez (Fax: [phone])   baclofen 10 mg oral tablet: 1 tab(s) orally 3 times a day  dalfampridine 10 mg oral tablet, extended release: 1 tab(s) orally every 12 hours  Daptomycin 400mg q24 IV: Daptomycin 400mg q24 IV through PICC line for diagnosis of septic arthritis (m00.80), continue through 10/24/24  DULoxetine 60 mg oral delayed release capsule: 1 cap(s) orally once a day (at bedtime)  Gemtesa 75 mg oral tablet: 1 tab(s) orally once a day  KlonoPIN 0.5 mg oral tablet: 1 tab(s) orally once a day as needed for  anxiety  meropenem 1000 mg intravenous injection: 1,000 milligram(s) intravenously every 8 hours 1g q8h for 6 weeks to end 10/24/24  Meropenem 1g IV q8h: Meropenem 1g IV q8h through RUE PICC, continue through 10/24/24 for diagnosis of septic arthritis (M00.80)  modafinil 200 mg oral tablet: 1 tab(s) orally once a day  Percocet 5 mg-325 mg oral tablet: 1 tab(s) orally 3 times a day as needed for  severe pain mad daily dosage: 3 MDD: 3  sertraline 100 mg oral tablet: 1 tab(s) orally once a day Take with 25mg Sertraline for Total Dose:125mg  Weekly CBC, CMP, CK: Please obtain weekly labs (CBC, CMP, and CK), please send results to [email or web address], to follow up with provider Dr. PASQUALE Mendez (Fax: [phone])

## 2024-09-17 NOTE — DISCHARGE NOTE PROVIDER - CARE PROVIDER_API CALL
Deidre Bautista  Thoracic Surgery  [address] [address] [place] [address], [address] [place] [address]  Phone: [phone]  Fax: [phone]  Follow Up Time:     Umang Dean  Plastic Surgery  [address] [place] [address], [address]  [place] [address]  Phone: [phone]  Fax: [phone]  Follow Up Time:    Deidre Bautista  Thoracic Surgery  [address] [place] [address], [address] [place] [address]  Phone: [phone]  Fax: [phone]  Follow Up Time:     Umang Dean  Plastic Surgery  [address] [place] [address], [address]  [place] [address]  Phone: [phone]  Fax: [phone]  Follow Up Time:     Antonio Freeman  Follow Up Time:

## 2024-09-17 NOTE — DISCHARGE NOTE PROVIDER - CARE PROVIDERS DIRECT ADDRESSES
,[email or web address]_Unknown

## 2024-09-17 NOTE — PROGRESS NOTE ADULT - SUBJECTIVE AND OBJECTIVE BOX
Pilar Flanagan  MS4  Preferred contact via Microsoft Teams      Patient is a 44y old  Female who presents with a chief complaint of L chest pain, SIRS, Acute Pulmonary Embolism (17 Sep 2024 06:46)      SUBJECTIVE / OVERNIGHT EVENTS:    MEDICATIONS  (STANDING):  baclofen 10 milliGRAM(s) Oral every 8 hours  chlorhexidine 2% Cloths 1 Application(s) Topical daily  dalfampridine ER 10 milliGRAM(s) Oral every 12 hours  DAPTOmycin IVPB 400 milliGRAM(s) IV Intermittent every 24 hours  DULoxetine 60 milliGRAM(s) Oral at bedtime  enoxaparin Injectable 40 milliGRAM(s) SubCutaneous every 24 hours  influenza   Vaccine 0.5 milliLiter(s) IntraMuscular once  ketorolac   Injectable 15 milliGRAM(s) IV Push every 6 hours  lactated ringers. 1000 milliLiter(s) (100 mL/Hr) IV Continuous <Continuous>  meropenem  IVPB 1000 milliGRAM(s) IV Intermittent every 8 hours  modafinil 200 milliGRAM(s) Oral daily  multivitamin 1 Tablet(s) Oral daily  polyethylene glycol 3350 17 Gram(s) Oral daily  senna 2 Tablet(s) Oral at bedtime  sertraline 125 milliGRAM(s) Oral daily  Vibegron (Gemtesa) 75 mg tablet 1 Tablet(s)   Oral daily    MEDICATIONS  (PRN):  clonazePAM  Tablet 0.5 milliGRAM(s) Oral daily PRN for anxiety  oxycodone    5 mG/acetaminophen 325 mG 1 Tablet(s) Oral every 4 hours PRN Moderate Pain (4 - 6)      CAPILLARY BLOOD GLUCOSE        I&O's Summary    16 Sep 2024 07:01  -  17 Sep 2024 07:00  --------------------------------------------------------  IN: 250 mL / OUT: 330 mL / NET: -80 mL        PHYSICAL EXAM:  CONSTITUTIONAL: Well groomed, no apparent distress  EYES: PERRLA and symmetric, EOMI, No conjunctival or scleral injection, non-icteric  ENMT: Oral mucosa with moist membranes. Normal dentition; no pharyngeal injection or exudates  SKIN: L sternoclavicular region with L shaped surgical site with adhesed skin. erythema along surgical site border but no drainage   RESP: No respiratory distress, no use of accessory muscles; CTA b/l, no WRR  CV: RRR, +S1S2, no MRG; no JVD; no peripheral edema  GI: Soft, NT, ND, no rebound, no guarding; no palpable masses; no hepatosplenomegaly; no hernia palpated  MSK: Normal gait; No digital clubbing or cyanosis; examination of the (head/neck/spine/ribs/pelvis, RUE, LUE, RLE, LLE) without misalignment,   Normal ROM without pain, no spinal tenderness, normal muscle strength/tone  NEURO: Strength was 5/5 in all four extremities except cannot raise left arm but good arm .  Chronic right leg weakness 5-/5  PSYCH: Appropriate insight/judgment; A+O x 3, pt feeling anxious      LABS:                        8.1    10.66 )-----------( 406      ( 16 Sep 2024 07:20 )             25.7      09-16    138  |  102  |  7   ----------------------------<  100<H>  4.2   |  27  |  0.41<L>    Ca    9.1      16 Sep 2024 07:20    TPro  5.8<L>  /  Alb  3.0<L>  /  TBili  0.2  /  DBili  x   /  AST  80<H>  /  ALT  86<H>  /  AlkPhos  354<H>  09-16    PT/INR - ( 16 Sep 2024 07:20 )   PT: 11.8 sec;   INR: 1.13 ratio         PTT - ( 16 Sep 2024 07:20 )  PTT:31.5 sec      Urinalysis Basic - ( 16 Sep 2024 07:20 )    Color: x / Appearance: x / SG: x / pH: x  Gluc: 100 mg/dL / Ketone: x  / Bili: x / Urobili: x   Blood: x / Protein: x / Nitrite: x   Leuk Esterase: x / RBC: x / WBC x   Sq Epi: x / Non Sq Epi: x / Bacteria: x        RADIOLOGY & ADDITIONAL TESTS:    Imaging Personally Reviewed:    Consultant(s) Notes Reviewed:      Care Discussed with Consultants/Other Providers:   Pilar Flanagan  MS4  Preferred contact via Microsoft Teams      Patient is a 44y old  Female who presents with a chief complaint of L chest pain, SIRS, Acute Pulmonary Embolism (17 Sep 2024 06:46)      SUBJECTIVE / OVERNIGHT EVENTS: Patient complaining of tenderness at surgical site overnight, provider noted. No acute overnight events. Pt seen and examined at bedside this AM.     MEDICATIONS  (STANDING):  baclofen 10 milliGRAM(s) Oral every 8 hours  chlorhexidine 2% Cloths 1 Application(s) Topical daily  dalfampridine ER 10 milliGRAM(s) Oral every 12 hours  DAPTOmycin IVPB 400 milliGRAM(s) IV Intermittent every 24 hours  DULoxetine 60 milliGRAM(s) Oral at bedtime  enoxaparin Injectable 40 milliGRAM(s) SubCutaneous every 24 hours  influenza   Vaccine 0.5 milliLiter(s) IntraMuscular once  ketorolac   Injectable 15 milliGRAM(s) IV Push every 6 hours  lactated ringers. 1000 milliLiter(s) (100 mL/Hr) IV Continuous <Continuous>  meropenem  IVPB 1000 milliGRAM(s) IV Intermittent every 8 hours  modafinil 200 milliGRAM(s) Oral daily  multivitamin 1 Tablet(s) Oral daily  polyethylene glycol 3350 17 Gram(s) Oral daily  senna 2 Tablet(s) Oral at bedtime  sertraline 125 milliGRAM(s) Oral daily  Vibegron (Gemtesa) 75 mg tablet 1 Tablet(s)   Oral daily    MEDICATIONS  (PRN):  clonazePAM  Tablet 0.5 milliGRAM(s) Oral daily PRN for anxiety  oxycodone    5 mG/acetaminophen 325 mG 1 Tablet(s) Oral every 4 hours PRN Moderate Pain (4 - 6)      CAPILLARY BLOOD GLUCOSE        I&O's Summary    16 Sep 2024 07:01  -  17 Sep 2024 07:00  --------------------------------------------------------  IN: 250 mL / OUT: 330 mL / NET: -80 mL        PHYSICAL EXAM:  CONSTITUTIONAL: Well groomed, no apparent distress  EYES: PERRLA and symmetric, EOMI, No conjunctival or scleral injection, non-icteric  ENMT: Oral mucosa with moist membranes. Normal dentition; no pharyngeal injection or exudates  SKIN: L sternoclavicular region with L shaped surgical site with adhesed skin. erythema along surgical site border but no drainage   RESP: No respiratory distress, no use of accessory muscles; CTA b/l, no WRR  CV: RRR, +S1S2, no MRG; no JVD; no peripheral edema  GI: Soft, NT, ND, no rebound, no guarding; no palpable masses; no hepatosplenomegaly; no hernia palpated  MSK: Normal gait; No digital clubbing or cyanosis; examination of the (head/neck/spine/ribs/pelvis, RUE, LUE, RLE, LLE) without misalignment,   Normal ROM without pain, no spinal tenderness, normal muscle strength/tone  NEURO: Strength was 5/5 in all four extremities except cannot raise left arm but good arm .  Chronic right leg weakness 5-/5  PSYCH: Appropriate insight/judgment; A+O x 3, pt feeling anxious      LABS:                        8.1    10.66 )-----------( 406      ( 16 Sep 2024 07:20 )             25.7      09-16    138  |  102  |  7   ----------------------------<  100<H>  4.2   |  27  |  0.41<L>    Ca    9.1      16 Sep 2024 07:20    TPro  5.8<L>  /  Alb  3.0<L>  /  TBili  0.2  /  DBili  x   /  AST  80<H>  /  ALT  86<H>  /  AlkPhos  354<H>  09-16    PT/INR - ( 16 Sep 2024 07:20 )   PT: 11.8 sec;   INR: 1.13 ratio         PTT - ( 16 Sep 2024 07:20 )  PTT:31.5 sec      Urinalysis Basic - ( 16 Sep 2024 07:20 )    Color: x / Appearance: x / SG: x / pH: x  Gluc: 100 mg/dL / Ketone: x  / Bili: x / Urobili: x   Blood: x / Protein: x / Nitrite: x   Leuk Esterase: x / RBC: x / WBC x   Sq Epi: x / Non Sq Epi: x / Bacteria: x        RADIOLOGY & ADDITIONAL TESTS:    Imaging Personally Reviewed:    Consultant(s) Notes Reviewed:      Care Discussed with Consultants/Other Providers:   Pilar Flanagan  MS4  Preferred contact via Microsoft Teams      Patient is a 44y old  Female who presents with a chief complaint of L chest pain, SIRS, Acute Pulmonary Embolism (17 Sep 2024 06:46)      SUBJECTIVE / OVERNIGHT EVENTS: Patient complaining of tenderness at surgical site overnight, provider noted. No acute overnight events. Pt seen and examined at bedside this AM. Patient awake, alert, conversant. Endorses sleeping well enough. Pain is same as yesterday.     MEDICATIONS  (STANDING):  baclofen 10 milliGRAM(s) Oral every 8 hours  chlorhexidine 2% Cloths 1 Application(s) Topical daily  dalfampridine ER 10 milliGRAM(s) Oral every 12 hours  DAPTOmycin IVPB 400 milliGRAM(s) IV Intermittent every 24 hours  DULoxetine 60 milliGRAM(s) Oral at bedtime  enoxaparin Injectable 40 milliGRAM(s) SubCutaneous every 24 hours  influenza   Vaccine 0.5 milliLiter(s) IntraMuscular once  ketorolac   Injectable 15 milliGRAM(s) IV Push every 6 hours  lactated ringers. 1000 milliLiter(s) (100 mL/Hr) IV Continuous <Continuous>  meropenem  IVPB 1000 milliGRAM(s) IV Intermittent every 8 hours  modafinil 200 milliGRAM(s) Oral daily  multivitamin 1 Tablet(s) Oral daily  polyethylene glycol 3350 17 Gram(s) Oral daily  senna 2 Tablet(s) Oral at bedtime  sertraline 125 milliGRAM(s) Oral daily  Vibegron (Gemtesa) 75 mg tablet 1 Tablet(s)   Oral daily    MEDICATIONS  (PRN):  clonazePAM  Tablet 0.5 milliGRAM(s) Oral daily PRN for anxiety  oxycodone    5 mG/acetaminophen 325 mG 1 Tablet(s) Oral every 4 hours PRN Moderate Pain (4 - 6)      CAPILLARY BLOOD GLUCOSE        I&O's Summary    16 Sep 2024 07:01  -  17 Sep 2024 07:00  --------------------------------------------------------  IN: 250 mL / OUT: 330 mL / NET: -80 mL        PHYSICAL EXAM:  CONSTITUTIONAL: Well groomed, no apparent distress  EYES: PERRLA and symmetric, EOMI, No conjunctival or scleral injection, non-icteric  ENMT: Oral mucosa with moist membranes. Normal dentition; no pharyngeal injection or exudates  SKIN: L sternoclavicular region with L shaped surgical site with adhesed skin. erythema along surgical site border but no drainage, improved from last night  RESP: No respiratory distress, no use of accessory muscles; CTA b/l, no WRR  CV: RRR, +S1S2, no MRG; no JVD; no peripheral edema  GI: Soft, NT, ND, no rebound, no guarding; no palpable masses; no hepatosplenomegaly; no hernia palpated  MSK: Normal gait; No digital clubbing or cyanosis; examination of the (head/neck/spine/ribs/pelvis, RUE, LUE, RLE, LLE) without misalignment,   Normal ROM without pain, no spinal tenderness, normal muscle strength/tone  NEURO: Strength was 5/5 in all four extremities except cannot raise left arm but good arm .  Chronic right leg weakness 5-/5  PSYCH: Appropriate insight/judgment; A+O x 3, pt feeling anxious      LABS:                        8.1    10.66 )-----------( 406      ( 16 Sep 2024 07:20 )             25.7      09-16    138  |  102  |  7   ----------------------------<  100<H>  4.2   |  27  |  0.41<L>    Ca    9.1      16 Sep 2024 07:20    TPro  5.8<L>  /  Alb  3.0<L>  /  TBili  0.2  /  DBili  x   /  AST  80<H>  /  ALT  86<H>  /  AlkPhos  354<H>  09-16    PT/INR - ( 16 Sep 2024 07:20 )   PT: 11.8 sec;   INR: 1.13 ratio         PTT - ( 16 Sep 2024 07:20 )  PTT:31.5 sec      Urinalysis Basic - ( 16 Sep 2024 07:20 )    Color: x / Appearance: x / SG: x / pH: x  Gluc: 100 mg/dL / Ketone: x  / Bili: x / Urobili: x   Blood: x / Protein: x / Nitrite: x   Leuk Esterase: x / RBC: x / WBC x   Sq Epi: x / Non Sq Epi: x / Bacteria: x        RADIOLOGY & ADDITIONAL TESTS:    Imaging Personally Reviewed:    Consultant(s) Notes Reviewed:      Care Discussed with Consultants/Other Providers:

## 2024-09-17 NOTE — PROGRESS NOTE ADULT - ATTENDING COMMENTS
43F pmh MS, anxiety, depression, septic arthritis of L sternoclavicular joint s/p multiple admissions requiring I&D and most recent admission (9/4-9/13) with I&D and L pectoral flap for evaluation of abscess p/w worsening L sided chest pain and swelling a/w SIRS also incidentally noted to have small nonocclusive subsegmental RLL PE.  #Surgical Site infection  #Incidental PE  -c/f superficial site infection, already on vanc/meropenem. ID following->changed to dapto with improvement, ctm  -asymptomatic single subsegmental PE without DVT  -Slowly worsening anemia, known RAMONE, no current signs of bleeding, holding lovenox, ctm

## 2024-09-17 NOTE — PROGRESS NOTE ADULT - ASSESSMENT
44F with PMH of MS on Rituxan, anxiety, depression, septic arthritis of L sternoclavicular joint s/p multiple I&Ds in 2023, recent recurrence s/p I&D with CTS and pec turnover flap with Dr. Dean on 9/9, d/c'ed with PICC line on vanc/bryson, now presenting with redness and increased pain. PRS consulted for possible surgical site infection. CT chest shows normal postop changes with possible small hematoma around flap, LISBETH drains within area, no abscess/collection. Small PE without heart strain noted.     - IV abx, defer to ID for recommended treatment  - would still recommend full AC for treatment of PEs, bleeding risk at this stage is minimal  - no indication for surgical intervention at this time  - LISBETH drain care- please record output

## 2024-09-17 NOTE — PROGRESS NOTE ADULT - PROBLEM SELECTOR PLAN 1
- s/p left SCJ debridement and left pectoral flap on 9/9  - No acute thoracic surgery operative intervention at this time  - plastic follow up; LISBETH management by Plastics. Monitor output.   - recommend close ID follow up. Abx switched to Dapto per ID  - global care per primary team.   - thoracic surgery will continue to follow  - Plan d/w Attending Dr. Bautista  ----  For any questions or concerns, please contact thoracic @ [phone]

## 2024-09-17 NOTE — PROGRESS NOTE ADULT - ASSESSMENT
44y Female with PMH of MS on Ocrevus anxiety, depression, septic arthritis of L sternoclavicular joint s/p I&Dx2 and cefepime for 6 weeks (12/2023-1/2024), recent admission 9/9-9/13 for L clavicular pain s/p I+D with L pectoralis flap sent home on meropenem and vancomycin for 6 week course presents for L shoulder pain, L sided pel chest pain, headache, fever at home. Son on phone also notes that the surgical site appeared more erythematous day prior to admission and had associated L arm swelling.    Admitted 9/9-9/13 most recently; MRI L clavicle with prior resection of the medial left clavicle with rim-enhancing fluid and surrounding phlegmon abutting the resected bony margin. S/p surgery on 9/9 with L SCJ debridement by CT surgery and L pec flap by PRS. Cultures grew Staph epi and Corynebacterium in fluid media, blood cultures negative. Discharged on tentatively 6 weeks vancomycin and meropenem for empiric coverage.    Here Tmax 100.1, mild tachycardia  WBC 12.1, neutrophilic. Lactate wnl  , ALT 78,     CT chest: Small nonocclusive right lower lobe basilar subsegmental pulmonary emboli, no evidence of right heart strain. 14 cm in greatest dimension expansile mostly soft tissue density structure filling and expanding the operative bed in the region of the left medial clavicle (thought to represent combination of muscle)    MICRO DATA:   09/15 BCX: IP   09/15 BCX: IP   09/10 AFB Cx: IP  09/10 Fungal CX: IP  09/10 Bacterial CX: fluid media only- corynebacterium striatum and staph epidermidis     # Low grade Fever, leukocytosis  # Septic sternoclavicular joint s/p debridement and muscle flap L clavicle  # Post-Operative Hematoma or Seroma on Ct chest   # Rule out surgical site infection  # new Pulmonary embolism  # Elevated LFTs, Alk phos    Concerned for superficial surgical site infection in setting of erythema, tenderness,   Swelling of surgical site-- as per plastic surgery postop changes with possible small hematoma around flap,  No dehiscence drainage, or purulence noted. CT with no obvious collection.   Patient already on vancomycin and meropenem,       RECOMMENDATIONS:   - c/w dapto 400 mg q24h  - CPK noted.   - Continue IV meropenem 1g q8h  - follow-up pending blood cultures, prelim NTD   - thoracic/plastic surgery follow-up for further evaluation/management of possible superimposed cellulitis vs hematoma   - monitor LFTs and alk phos- U/S noted, pending CT abd/pelvis with contrast   Trend WBC, leucocytosis       Odalis Reynaga  Please contact through MS Teams   If no response or past 5 pm/weekend call [phone].

## 2024-09-17 NOTE — PROGRESS NOTE ADULT - SUBJECTIVE AND OBJECTIVE BOX
44yPatient is a 44y old  Female who presents with a chief complaint of L chest pain, SIRS, Acute Pulmonary Embolism (17 Sep 2024 11:19)      Interval history:  Afebrile, worried about her infection.       Allergies:   No Known Allergies      Antimicrobials:  DAPTOmycin IVPB 400 milliGRAM(s) IV Intermittent every 24 hours  meropenem  IVPB 1000 milliGRAM(s) IV Intermittent every 8 hours      REVIEW OF SYSTEMS:  No SOB  No abdominal pain  No rash.       Vital Signs Last 24 Hrs  T(C): 36.5 (09-17-24 @ 08:36), Max: 37 (09-17-24 @ 05:28)  T(F): 97.7 (09-17-24 @ 08:36), Max: 98.6 (09-17-24 @ 05:28)  HR: 72 (09-17-24 @ 08:36) (72 - 103)  BP: 128/78 (09-17-24 @ 08:36) (97/59 - 128/78)  BP(mean): --  RR: 18 (09-17-24 @ 08:36) (17 - 18)  SpO2: 99% (09-17-24 @ 08:36) (96% - 99%)      PHYSICAL EXAM:  Pt in no acute distress, alert, awake.   rt arm PICC   lt sided graft site with surrounding improved erythema   non distended abdomen  no edema LE   no phlebitis                               7.7    11.31 )-----------( 414      ( 17 Sep 2024 07:03 )             24.3   09-17    137  |  101  |  14  ----------------------------<  90  4.2   |  27  |  0.40[L]    Ca    9.0      17 Sep 2024 07:02  Phos  3.8     09-17  Mg     2.1     09-17    TPro  5.8[L]  /  Alb  3.0[L]  /  TBili  0.1[L]  /  DBili  x   /  AST  54[H]  /  ALT  71[H]  /  AlkPhos  363[H]  09-17      LIVER FUNCTIONS - ( 17 Sep 2024 07:02 )  Alb: 3.0 g/dL / Pro: 5.8 g/dL / ALK PHOS: 363 U/L / ALT: 71 U/L / AST: 54 U/L / GGT: x               Culture - Urine (collected 15 Sep 2024 06:09)  Source: Clean Catch Clean Catch (Midstream)  Final Report (16 Sep 2024 08:44):    <10,000 CFU/mL Normal Urogenital Renae    Culture - Blood (collected 15 Sep 2024 04:25)  Source: .Blood Blood-Peripheral  Preliminary Report (17 Sep 2024 09:01):    No growth at 48 Hours    Culture - Blood (collected 15 Sep 2024 04:10)  Source: .Blood Blood-Peripheral  Preliminary Report (17 Sep 2024 09:01):    No growth at 48 Hours

## 2024-09-17 NOTE — PROGRESS NOTE ADULT - SUBJECTIVE AND OBJECTIVE BOX
INTERVAL EVENTS: Abx swtiched from vanc to daptomycin. Continuing with meropenem    SUBJECTIVE: Incision seems to be improving. Less redness present.  to palpation. LISBETH output was serofibrinous. Patient remains very anxious about the state of her wound and infection    --------------------------------------------------------------------------------------  VITAL SIGNS:  T(C): 36.9 (09-17-24 @ 00:25), Max: 36.9 (09-16-24 @ 12:31)  HR: 103 (09-17-24 @ 00:25) (88 - 103)  BP: 97/59 (09-17-24 @ 00:25) (97/59 - 108/63)  RR: 17 (09-17-24 @ 00:25) (17 - 18)  SpO2: 98% (09-17-24 @ 00:25) (96% - 98%)  --------------------------------------------------------------------------------------    EXAM    General: NAD, resting in bed comfortably  Cardiac: Regular rate, warm and well perfused  Respiratory: Nonlabored respirations, normal cw expansion, on RA  Abdomen: Soft, nondistended, nontender to palpation  Extremities: Warm, well perfused. Surgical site with some redness and erythema. Tender to palpation. LISBETH with serofibrinous output    --------------------------------------------------------------------------------------     1 = Total assistance

## 2024-09-17 NOTE — DISCHARGE NOTE PROVIDER - NSDCCPCAREPLAN_GEN_ALL_CORE_FT
PRINCIPAL DISCHARGE DIAGNOSIS  Diagnosis: Septic arthritis  Assessment and Plan of Treatment: You were diagnosed with septic arthritis which is an infection in the joint. You previously underwent multiple surgeries to remove the source of the infection. During this visit, you were treated with multiple antibiotics including meropenem, vancomycin, and daptomycin. The plastic surgery and thoracic surgery teams determined antibiotics were the best way to treat the infection rather than performing surgery again. This infection can be painful so you were also prescribed various pain medications. You did not have a fever and your white blood cell count which is a marker of infection adn inflammation were normal before discharge.  Please  return to the hosiptal if you continue to have severe pain and fever.     PRINCIPAL DISCHARGE DIAGNOSIS  Diagnosis: Septic arthritis  Assessment and Plan of Treatment: You were diagnosed with septic arthritis which is an infection in the joint. You previously underwent multiple surgeries to remove the source of the infection. During this visit, you were treated with multiple antibiotics including meropenem, vancomycin, and daptomycin. You had CT scans performed which showed what appeared to be an abscess near your previous surgical sites. The plastic surgery team performed an abscess drainage. You were monitored clinically for infectious signs.   Please  return to the [facility] if you continue to have severe pain and fever.      SECONDARY DISCHARGE DIAGNOSES  Diagnosis: Pulmonary embolism on right  Assessment and Plan of Treatment: During your admission, you were initially noted to have a small pulmonary embolism in your right lung. This was followed up with a scan to make sure you did not have a blood clot in your legs which were negative. Ultimately, this was likely an incidental finding. You were being treated with a prophylactic dose of blood thinner to prevent formation of blood clots while you were in the hospital.    Diagnosis: Anemia  Assessment and Plan of Treatment: During your admission to the hospital, you were noted to have low hemoglobin numbers. You did not have any signs or symptoms of bleeding. A CT scan was performed to assess your previous surgical site but you did not appear to have any evidence of bleeding there. Your hemoglobin numbers remained stable. There was also concern that the anemia was multifactorial from iron deficiency and nutritional intake of tumeric shakes.

## 2024-09-17 NOTE — DISCHARGE NOTE PROVIDER - PREFACE STATEMENT FOR MINUTES SPENT
Pt returned call, conveyed message of normal results.    Pt states he is moving to [place] this week and will establish care with a new PCP there.   I personally spent

## 2024-09-17 NOTE — PROGRESS NOTE ADULT - ASSESSMENT
44F s/p left SCJ debridement and left pectoral flap on 9/9 presenting with superficial surgical site erythema and localized pain. Also found to have small subsegmental right lower lobe pulmonary emboli.  Recommendations:  -No acute thoracic surgery operative intervention at this time.  -PRS consult.  -ID consult.  -Dispo per PRS    9/16: Evaluated at ED bedside by Thoracic Attending Dr. Bautista. Recommend close ID follow up for cellulitis of site. Vanco trough level noted to be 5.5, ? unclear if patient was taking antibiotics appropriately in outpatient setting.   9/17 Antibiotic switched to dapto per ID. Site improving

## 2024-09-17 NOTE — DISCHARGE NOTE PROVIDER - NSDCFUSCHEDAPPT_GEN_ALL_CORE_FT
[facility]  PLASTICSUR [address] [facility]  Scheduled Appointment: 09/24/2024    Deidre Bautista  [facility]  THORSURG [address]  Scheduled Appointment: 09/30/2024    Declan Martines  [facility]  INTMED [address]   Scheduled Appointment: 10/01/2024     Deidre Bautista  [facility] [address]  Scheduled Appointment: 09/30/2024    Declan Martines  [facility]  INTMED [address]  Scheduled Appointment: 10/01/2024     Declan Martines  [facility]  INTMED [address]   Scheduled Appointment: 10/01/2024     Odalis Reynaga  [facility]  INFDISEASE [address]  Scheduled Appointment: 10/21/2024

## 2024-09-18 LAB
ANION GAP SERPL CALC-SCNC: 9 MMOL/L — SIGNIFICANT CHANGE UP (ref 5–17)
BUN SERPL-MCNC: 15 MG/DL — SIGNIFICANT CHANGE UP (ref 7–23)
CALCIUM SERPL-MCNC: 8.9 MG/DL — SIGNIFICANT CHANGE UP (ref 8.4–10.5)
CHLORIDE SERPL-SCNC: 101 MMOL/L — SIGNIFICANT CHANGE UP (ref 96–108)
CO2 SERPL-SCNC: 27 MMOL/L — SIGNIFICANT CHANGE UP (ref 22–31)
CREAT SERPL-MCNC: 0.44 MG/DL — LOW (ref 0.5–1.3)
DAT POLY-SP REAG RBC QL: NEGATIVE — SIGNIFICANT CHANGE UP
EGFR: 121 ML/MIN/1.73M2 — SIGNIFICANT CHANGE UP
FERRITIN SERPL-MCNC: 77 NG/ML — SIGNIFICANT CHANGE UP (ref 15–150)
FOLATE SERPL-MCNC: 7.5 NG/ML — SIGNIFICANT CHANGE UP
GLUCOSE SERPL-MCNC: 107 MG/DL — HIGH (ref 70–99)
HAPTOGLOB SERPL-MCNC: 286 MG/DL — HIGH (ref 34–200)
HCT VFR BLD CALC: 23.8 % — LOW (ref 34.5–45)
HGB BLD-MCNC: 7.5 G/DL — LOW (ref 11.5–15.5)
MAGNESIUM SERPL-MCNC: 2 MG/DL — SIGNIFICANT CHANGE UP (ref 1.6–2.6)
MCHC RBC-ENTMCNC: 27.9 PG — SIGNIFICANT CHANGE UP (ref 27–34)
MCHC RBC-ENTMCNC: 31.5 GM/DL — LOW (ref 32–36)
MCV RBC AUTO: 88.5 FL — SIGNIFICANT CHANGE UP (ref 80–100)
NRBC # BLD: 0 /100 WBCS — SIGNIFICANT CHANGE UP (ref 0–0)
PHOSPHATE SERPL-MCNC: 3.6 MG/DL — SIGNIFICANT CHANGE UP (ref 2.5–4.5)
PLATELET # BLD AUTO: 433 K/UL — HIGH (ref 150–400)
POTASSIUM SERPL-MCNC: 3.8 MMOL/L — SIGNIFICANT CHANGE UP (ref 3.5–5.3)
POTASSIUM SERPL-SCNC: 3.8 MMOL/L — SIGNIFICANT CHANGE UP (ref 3.5–5.3)
RBC # BLD: 2.69 M/UL — LOW (ref 3.8–5.2)
RBC # FLD: 13.1 % — SIGNIFICANT CHANGE UP (ref 10.3–14.5)
SODIUM SERPL-SCNC: 137 MMOL/L — SIGNIFICANT CHANGE UP (ref 135–145)
VIT B12 SERPL-MCNC: 618 PG/ML — SIGNIFICANT CHANGE UP (ref 232–1245)
WBC # BLD: 11.22 K/UL — HIGH (ref 3.8–10.5)
WBC # FLD AUTO: 11.22 K/UL — HIGH (ref 3.8–10.5)

## 2024-09-18 PROCEDURE — 99231 SBSQ HOSP IP/OBS SF/LOW 25: CPT

## 2024-09-18 PROCEDURE — 99232 SBSQ HOSP IP/OBS MODERATE 35: CPT | Mod: GC

## 2024-09-18 PROCEDURE — 74177 CT ABD & PELVIS W/CONTRAST: CPT | Mod: 26

## 2024-09-18 PROCEDURE — 71260 CT THORAX DX C+: CPT | Mod: 26

## 2024-09-18 RX ORDER — ACETAMINOPHEN 325 MG
650 TABLET ORAL ONCE
Refills: 0 | Status: COMPLETED | OUTPATIENT
Start: 2024-09-18 | End: 2024-09-18

## 2024-09-18 RX ADMIN — Medication 650 MILLIGRAM(S): at 23:32

## 2024-09-18 RX ADMIN — MEROPENEM 100 MILLIGRAM(S): 500 INJECTION INTRAVENOUS at 04:55

## 2024-09-18 RX ADMIN — Medication 10 MILLIGRAM(S): at 04:55

## 2024-09-18 RX ADMIN — Medication 60 MILLIGRAM(S): at 21:08

## 2024-09-18 RX ADMIN — DALFAMPRIDINE 10 MILLIGRAM(S): 10 TABLET, FILM COATED, EXTENDED RELEASE ORAL at 17:31

## 2024-09-18 RX ADMIN — CHLORHEXIDINE GLUCONATE ORAL RINSE 1 APPLICATION(S): 1.2 SOLUTION DENTAL at 11:42

## 2024-09-18 RX ADMIN — KETOROLAC TROMETHAMINE 15 MILLIGRAM(S): 10 TABLET, FILM COATED ORAL at 12:12

## 2024-09-18 RX ADMIN — DALFAMPRIDINE 10 MILLIGRAM(S): 10 TABLET, FILM COATED, EXTENDED RELEASE ORAL at 06:39

## 2024-09-18 RX ADMIN — MEROPENEM 100 MILLIGRAM(S): 500 INJECTION INTRAVENOUS at 21:08

## 2024-09-18 RX ADMIN — SERTRALINE HYDROCHLORIDE 125 MILLIGRAM(S): 100 TABLET, FILM COATED ORAL at 11:43

## 2024-09-18 RX ADMIN — Medication 0.5 MILLIGRAM(S): at 16:16

## 2024-09-18 RX ADMIN — KETOROLAC TROMETHAMINE 15 MILLIGRAM(S): 10 TABLET, FILM COATED ORAL at 11:42

## 2024-09-18 RX ADMIN — KETOROLAC TROMETHAMINE 15 MILLIGRAM(S): 10 TABLET, FILM COATED ORAL at 05:24

## 2024-09-18 RX ADMIN — MODAFINIL 200 MILLIGRAM(S): 100 TABLET ORAL at 11:44

## 2024-09-18 RX ADMIN — Medication 10 MILLIGRAM(S): at 14:34

## 2024-09-18 RX ADMIN — Medication 650 MILLIGRAM(S): at 23:02

## 2024-09-18 RX ADMIN — MEROPENEM 100 MILLIGRAM(S): 500 INJECTION INTRAVENOUS at 14:15

## 2024-09-18 RX ADMIN — DAPTOMYCIN 116 MILLIGRAM(S): 500 INJECTION, POWDER, LYOPHILIZED, FOR SOLUTION INTRAVENOUS at 14:57

## 2024-09-18 RX ADMIN — Medication 17 GRAM(S): at 11:43

## 2024-09-18 RX ADMIN — MULTI VITAMIN/MINERAL SUPPLEMENT WITH ASCORBIC ACID, NIACIN, PYRIDOXINE, PANTOTHENIC ACID, FOLIC ACID, RIBOFLAVIN, THIAMIN, BIOTIN, COBALAMIN AND ZINC. 1 TABLET(S): 60; 20; 12.5; 10; 10; 1.7; 1.5; 1; .3; .006 TABLET, COATED ORAL at 11:43

## 2024-09-18 RX ADMIN — KETOROLAC TROMETHAMINE 15 MILLIGRAM(S): 10 TABLET, FILM COATED ORAL at 04:54

## 2024-09-18 RX ADMIN — Medication 10 MILLIGRAM(S): at 21:09

## 2024-09-18 NOTE — PROGRESS NOTE ADULT - SUBJECTIVE AND OBJECTIVE BOX
Plastic Surgery Progress Note (pg [facility]: [id number], [facility]: [phone])    SUBJECTIVE  The patient was seen and examined. No acute events overnight.    OBJECTIVE  ___________________________________________________  VITAL SIGNS / I&O's   Vital Signs Last 24 Hrs  T(C): 36.7 (18 Sep 2024 04:57), Max: 37 (17 Sep 2024 23:51)  T(F): 98 (18 Sep 2024 04:57), Max: 98.6 (17 Sep 2024 23:51)  HR: 98 (18 Sep 2024 04:57) (72 - 105)  BP: 99/65 (18 Sep 2024 04:57) (99/65 - 128/78)  BP(mean): --  RR: 18 (18 Sep 2024 04:57) (18 - 18)  SpO2: 98% (18 Sep 2024 04:57) (95% - 100%)    Parameters below as of 18 Sep 2024 04:57  Patient On (Oxygen Delivery Method): room air          17 Sep 2024 07:01  -  18 Sep 2024 07:00  --------------------------------------------------------  IN:    IV PiggyBack: 100 mL  Total IN: 100 mL    OUT:    Bulb (mL): 60 mL    Bulb (mL): 90 mL    Stool (mL): 1 mL    Voided (mL): 500 mL  Total OUT: 651 mL    Total NET: -551 mL        ___________________________________________________  PHYSICAL EXAM    -- CONSTITUTIONAL: NAD, lying in bed  -- NEURO: Awake, alert  -- CHEST: L . Surgical site with some mild redness and erythema,improved. Tender to palpation. drains with thick milky color output    ___________________________________________________  LABS                        7.7    11.31 )-----------( 414      ( 17 Sep 2024 07:03 )             24.3     17 Sep 2024 07:02    137    |  101    |  14     ----------------------------<  90     4.2     |  27     |  0.40     Ca    9.0        17 Sep 2024 07:02  Phos  3.8       17 Sep 2024 07:02  Mg     2.1       17 Sep 2024 07:02    TPro  5.8    /  Alb  3.0    /  TBili  0.1    /  DBili  x      /  AST  54     /  ALT  71     /  AlkPhos  363    17 Sep 2024 07:02      CAPILLARY BLOOD GLUCOSE            Urinalysis Basic - ( 17 Sep 2024 07:02 )    Color: x / Appearance: x / SG: x / pH: x  Gluc: 90 mg/dL / Ketone: x  / Bili: x / Urobili: x   Blood: x / Protein: x / Nitrite: x   Leuk Esterase: x / RBC: x / WBC x   Sq Epi: x / Non Sq Epi: x / Bacteria: x      ___________________________________________________  MICRO  Recent Cultures:  Specimen Source: Clean Catch Clean Catch (Midstream), 09-15 @ 06:09; Results   <10,000 CFU/mL Normal Urogenital Renae; Gram Stain: --; Organism: --  Specimen Source: .Blood Blood-Peripheral, 09-15 @ 04:25; Results   No growth at 48 Hours; Gram Stain: --; Organism: --  Specimen Source: .Blood Blood-Peripheral, 09-15 @ 04:10; Results   No growth at 48 Hours; Gram Stain: --; Organism: --  Specimen Source: Throat, 09-13 @ 01:10; Results   No Streptococcus pyogenes (Group A) isolated; Gram Stain: --; Organism: --    ___________________________________________________  MEDICATIONS  (STANDING):  baclofen 10 milliGRAM(s) Oral every 8 hours  chlorhexidine 2% Cloths 1 Application(s) Topical daily  dalfampridine ER 10 milliGRAM(s) Oral every 12 hours  DAPTOmycin IVPB 400 milliGRAM(s) IV Intermittent every 24 hours  DULoxetine 60 milliGRAM(s) Oral at bedtime  influenza   Vaccine 0.5 milliLiter(s) IntraMuscular once  ketorolac   Injectable 15 milliGRAM(s) IV Push every 6 hours  meropenem  IVPB 1000 milliGRAM(s) IV Intermittent every 8 hours  modafinil 200 milliGRAM(s) Oral daily  multivitamin 1 Tablet(s) Oral daily  polyethylene glycol 3350 17 Gram(s) Oral daily  senna 2 Tablet(s) Oral at bedtime  sertraline 125 milliGRAM(s) Oral daily  Vibegron (Gemtesa) 75 mg tablet 1 Tablet(s) 1 Tablet(s) Oral daily    MEDICATIONS  (PRN):  clonazePAM  Tablet 0.5 milliGRAM(s) Oral daily PRN for anxiety  oxycodone    5 mG/acetaminophen 325 mG 1 Tablet(s) Oral every 4 hours PRN Moderate Pain (4 - 6)

## 2024-09-18 NOTE — PROGRESS NOTE ADULT - PROBLEM SELECTOR PLAN 1
- s/p left SCJ debridement and left pectoral flap on 9/9  - No acute thoracic surgery operative intervention at this time  - plastic follow up; LISBETH management by Plastics. Monitor output.   - recommend close ID follow up. Abx switched to Dapto per ID  - global care per primary team.   - thoracic surgery will continue to follow  - Plan d/w Attending Dr. Bautista  ----  For any questions or concerns, please contact thoracic @ [phone]

## 2024-09-18 NOTE — PHYSICAL THERAPY INITIAL EVALUATION ADULT - REFERRING PHYSICIAN, REHAB EVAL
Received a bone age x-ray report from [employer].  CA 11 y 6 mo       BA 9 y        These bone age predicts a final adult height around 59 inches, being the genetic potential (MPH=61 in +/- 2 in), which is reassuring.    Will call lisa Wahl M.D.  Pediatric Endocrinology      Leroy Mina

## 2024-09-18 NOTE — PROGRESS NOTE ADULT - PROBLEM SELECTOR PLAN 5
History of MS on home baclofen, dalfampridine, and gemtesa.  - c/w baclofen 10 TID  - c/w modafinil 200 qd  - patient has home dalfampridine and gemtesa as not on formulary

## 2024-09-18 NOTE — PROGRESS NOTE ADULT - PROBLEM SELECTOR PLAN 1
History of septic arthritis with multiple admissions s/p I&D with CTS and L pectoral flap with plastic surgery on last admission (9/4-9/13). Was discharged home on meropenem 1g q8 and van 1250 q12 with reportedly no missed doses.   - CT chest with evidence of 14 cm soft tissue density structure filling and expanding the operative bed in the region of the left medial clavicle but without definitive ring enhancing lesion  - no acute surgical intervention for surgical site erythema per plastic surgery   - no acute surgical intervention per CT surgery  - c/w meropenem 1g q8  - ID: daptomycin 400mg daptomycin  - monitor LISBETH drain output  - multimodal pain control with oxy and toradol History of septic arthritis with multiple admissions s/p I&D with CTS and L pectoral flap with plastic surgery on last admission (9/4-9/13). Was discharged home on meropenem 1g q8 and van 1250 q12 with reportedly no missed doses.   - CT chest with evidence of 14 cm soft tissue density structure filling and expanding the operative bed in the region of the left medial clavicle but without definitive ring enhancing lesion  - no acute surgical intervention for surgical site erythema per plastic surgery   - no acute surgical intervention per CT surgery  - c/w meropenem 1g q8  - ID: daptomycin 400mg   - monitor LISBETH drain output: serosang  - multimodal pain control with oxy and toradol  - f/u CT AP for infectious source
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
## 2024-09-18 NOTE — PROGRESS NOTE ADULT - PROBLEM SELECTOR PLAN 2
Patient admitted with Hgb 12.2, now 7.7 c/f unknown source of bleeding, pt with known history of RAMONE  - trend CBC  - F/u anemia workup labs  - Fe studies from 9/11/24 shows iron deficiency- holding Fe iso active infection  - hold lovenox iso of potential bleed Patient admitted with Hgb 12.2, now 7.7 c/f unknown source of bleeding, pt with known history of RAMONE  - trend CBC  - F/u anemia workup labs  - Fe studies from 9/11/24 shows iron deficiency- holding Fe iso active infection  - hold lovenox iso of potential bleed  - F/u CT chest

## 2024-09-18 NOTE — PROGRESS NOTE ADULT - PROBLEM SELECTOR PLAN 3
Pt with elevated ALP, AST, ALT, no clinical signs of cholestatic process, more likely 2/2 septic arthritis  - RUQ US: distended gallbladder, no stones or obstruction  - ID: f/u CTAP w/IV con

## 2024-09-18 NOTE — PROGRESS NOTE ADULT - PROBLEM SELECTOR PLAN 4
On imaging on admission, noted to have small nonocclusive RLL pulmonary embolism. May potentially be incidental finding iso recent surgery.  - troponin and proBNP otherwise negative  - CT chest without evidence of R heart strain  - currently not requiring any O2 and saturating well on RA  - duplex bilateral lower extremities - no evidence of DVT  - holding prophylactic lovenox 40 qd iso potential bleed

## 2024-09-18 NOTE — PHYSICAL THERAPY INITIAL EVALUATION ADULT - PERTINENT HX OF CURRENT PROBLEM, REHAB EVAL
43F with MS, anxiety, depression, septic arthritis of L sternoclavicular joint s/p multiple admissions requiring I&D and most recent admission (9/4-9/13) with I&D and L pectoral flap for evaluation of abscess now presenting to the ED with L sided chest pain with newly noted small nonocclusive RLL PE.    9/15: admitted. eval by CTS and plastics.   9/18: CTAP and CT C to r/o wrosening bleeding. waiting to hear abx timeline. potential dc tomorrow

## 2024-09-18 NOTE — PROGRESS NOTE ADULT - ASSESSMENT
44F with PMH of MS on Rituxan, anxiety, depression, septic arthritis of L sternoclavicular joint s/p multiple I&Ds in 2023, recent recurrence s/p I&D with CTS and pec turnover flap with Dr. Dean on 9/9, d/c'ed with PICC line on vanc/bryson, now presenting with redness and increased pain. PRS consulted for possible surgical site infection. CT chest shows normal postop changes with possible small hematoma around flap, LISBETH drains within area, no abscess/collection. Small PE without heart strain noted.       plan  - keep drains, will monitor  - abx per ID  - rest per primary

## 2024-09-18 NOTE — PROGRESS NOTE ADULT - ASSESSMENT
44F s/p left SCJ debridement and left pectoral flap on 9/9 presenting with superficial surgical site erythema and localized pain. Also found to have small subsegmental right lower lobe pulmonary emboli.  Recommendations:  -No acute thoracic surgery operative intervention at this time.  -PRS consult.  -ID consult.  -Dispo per PRS    9/16: Evaluated at ED bedside by Thoracic Attending Dr. Bautista. Recommend close ID follow up for cellulitis of site. Vanco trough level noted to be 5.5, ? unclear if patient was taking antibiotics appropriately in outpatient setting.   9/17 Antibiotic switched to dapto per ID. Site improving  9/18 VSS  afebrile.  site with decreased erythema

## 2024-09-18 NOTE — PROGRESS NOTE ADULT - PROBLEM SELECTOR PLAN 6
- c/w home klonipin 0.5 qd PRN  - c/w duloxetine 60 qd  - c/w sertraline 125 qd  - monitor for serotonergic symptoms

## 2024-09-18 NOTE — PROGRESS NOTE ADULT - ATTENDING COMMENTS
43F pmh MS, anxiety, depression, septic arthritis of L sternoclavicular joint s/p multiple admissions requiring I&D and most recent admission (9/4-9/13) with I&D and L pectoral flap for evaluation of abscess p/w worsening L sided chest pain and swelling a/w SIRS also incidentally noted to have small nonocclusive subsegmental RLL PE.  #Surgical Site infection  #Incidental PE  -c/f superficial site infection, vanc changed to dapto with improvement  -continue meropenem  -asymptomatic single subsegmental PE without DVT  -Slowly worsening anemia noiw stable, known RAMONE, no current signs of bleeding, holding lovenox, pending CT

## 2024-09-18 NOTE — PROGRESS NOTE ADULT - SUBJECTIVE AND OBJECTIVE BOX
Pilar Flanagan  MS4  Preferred contact via [employer] Teams      Patient is a 44y old  Female who presents with a chief complaint of L chest pain, SIRS, Acute Pulmonary Embolism (17 Sep 2024 15:47)      SUBJECTIVE / OVERNIGHT EVENTS: No acute overnight events. Pt seen and examined at bedside, in no acute distress.     MEDICATIONS  (STANDING):  baclofen 10 milliGRAM(s) Oral every 8 hours  chlorhexidine 2% Cloths 1 Application(s) Topical daily  dalfampridine ER 10 milliGRAM(s) Oral every 12 hours  DAPTOmycin IVPB 400 milliGRAM(s) IV Intermittent every 24 hours  DULoxetine 60 milliGRAM(s) Oral at bedtime  influenza   Vaccine 0.5 milliLiter(s) IntraMuscular once  ketorolac   Injectable 15 milliGRAM(s) IV Push every 6 hours  meropenem  IVPB 1000 milliGRAM(s) IV Intermittent every 8 hours  modafinil 200 milliGRAM(s) Oral daily  multivitamin 1 Tablet(s) Oral daily  polyethylene glycol 3350 17 Gram(s) Oral daily  senna 2 Tablet(s) Oral at bedtime  sertraline 125 milliGRAM(s) Oral daily  Vibegron (Gemtesa) 75 mg tablet 1 Tablet(s) 1 Tablet(s) Oral daily    MEDICATIONS  (PRN):  clonazePAM  Tablet 0.5 milliGRAM(s) Oral daily PRN for anxiety  oxycodone    5 mG/acetaminophen 325 mG 1 Tablet(s) Oral every 4 hours PRN Moderate Pain (4 - 6)      CAPILLARY BLOOD GLUCOSE        I&O's Summary    17 Sep 2024 07:01  -  18 Sep 2024 07:00  --------------------------------------------------------  IN: 100 mL / OUT: 651 mL / NET: -551 mL        PHYSICAL EXAM:  CONSTITUTIONAL: Well groomed, no apparent distress  EYES: PERRLA and symmetric, EOMI, No conjunctival or scleral injection, non-icteric  ENMT: Oral mucosa with moist membranes. Normal dentition; no pharyngeal injection or exudates  SKIN: L sternoclavicular region with L shaped surgical site with adhesed skin. erythema along surgical site border but no drainage, improved from last night  RESP: No respiratory distress, no use of accessory muscles; CTA b/l, no WRR  CV: RRR, +S1S2, no MRG; no JVD; no peripheral edema  GI: Soft, NT, ND, no rebound, no guarding; no palpable masses; no hepatosplenomegaly; no hernia palpated  MSK: Normal gait; No digital clubbing or cyanosis; examination of the (head/neck/spine/ribs/pelvis, RUE, LUE, RLE, LLE) without misalignment,   Normal ROM without pain, no spinal tenderness, normal muscle strength/tone  NEURO: Strength was 5/5 in all four extremities except cannot raise left arm but good arm .  Chronic right leg weakness 5-/5  PSYCH: Appropriate insight/judgment; A+O x 3, pt feeling anxious        LABS:                        7.7    11.31 )-----------( 414      ( 17 Sep 2024 07:03 )             24.3      09-17    137  |  101  |  14  ----------------------------<  90  4.2   |  27  |  0.40[L]    Ca    9.0      17 Sep 2024 07:02  Phos  3.8     09-17  Mg     2.1     09-17    TPro  5.8[L]  /  Alb  3.0[L]  /  TBili  0.1[L]  /  DBili  x   /  AST  54[H]  /  ALT  71[H]  /  AlkPhos  363[H]  09-17    PT/INR - ( 16 Sep 2024 07:20 )   PT: 11.8 sec;   INR: 1.13 ratio         PTT - ( 16 Sep 2024 07:20 )  PTT:31.5 sec      Urinalysis Basic - ( 17 Sep 2024 07:02 )    Color: x / Appearance: x / SG: x / pH: x  Gluc: 90 mg/dL / Ketone: x  / Bili: x / Urobili: x   Blood: x / Protein: x / Nitrite: x   Leuk Esterase: x / RBC: x / WBC x   Sq Epi: x / Non Sq Epi: x / Bacteria: x        RADIOLOGY & ADDITIONAL TESTS:    Imaging Personally Reviewed:    Consultant(s) Notes Reviewed:      Care Discussed with Consultants/Other Providers:   Pilar Flanagan  MS4  Preferred contact via [employer] Teams      Patient is a 44y old  Female who presents with a chief complaint of L chest pain, SIRS, Acute Pulmonary Embolism (17 Sep 2024 15:47)      SUBJECTIVE / OVERNIGHT EVENTS: No acute overnight events. Pt seen and examined at bedside, in no acute distress.     MEDICATIONS  (STANDING):  baclofen 10 milliGRAM(s) Oral every 8 hours  chlorhexidine 2% Cloths 1 Application(s) Topical daily  dalfampridine ER 10 milliGRAM(s) Oral every 12 hours  DAPTOmycin IVPB 400 milliGRAM(s) IV Intermittent every 24 hours  DULoxetine 60 milliGRAM(s) Oral at bedtime  influenza   Vaccine 0.5 milliLiter(s) IntraMuscular once  ketorolac   Injectable 15 milliGRAM(s) IV Push every 6 hours  meropenem  IVPB 1000 milliGRAM(s) IV Intermittent every 8 hours  modafinil 200 milliGRAM(s) Oral daily  multivitamin 1 Tablet(s) Oral daily  polyethylene glycol 3350 17 Gram(s) Oral daily  senna 2 Tablet(s) Oral at bedtime  sertraline 125 milliGRAM(s) Oral daily  Vibegron (Gemtesa) 75 mg tablet 1 Tablet(s) 1 Tablet(s) Oral daily    MEDICATIONS  (PRN):  clonazePAM  Tablet 0.5 milliGRAM(s) Oral daily PRN for anxiety  oxycodone    5 mG/acetaminophen 325 mG 1 Tablet(s) Oral every 4 hours PRN Moderate Pain (4 - 6)      CAPILLARY BLOOD GLUCOSE        I&O's Summary    17 Sep 2024 07:01  -  18 Sep 2024 07:00  --------------------------------------------------------  IN: 100 mL / OUT: 651 mL / NET: -551 mL        PHYSICAL EXAM:  CONSTITUTIONAL: Well groomed, no apparent distress  EYES: PERRLA and symmetric, EOMI, No conjunctival or scleral injection, non-icteric  ENMT: Oral mucosa with moist membranes. Normal dentition; no pharyngeal injection or exudates  SKIN: L sternoclavicular region with L shaped surgical site with adhesed skin. erythema along surgical site border but no drainage, improved from yesterday, serosanguinous drain output  RESP: No respiratory distress, no use of accessory muscles; CTA b/l, no WRR  CV: RRR, +S1S2, no MRG; no JVD; no peripheral edema  GI: Soft, NT, ND, no rebound, no guarding; no palpable masses; no hepatosplenomegaly; no hernia palpated  MSK: Normal gait; No digital clubbing or cyanosis; examination of the (head/neck/spine/ribs/pelvis, RUE, LUE, RLE, LLE) without misalignment,   Normal ROM without pain, no spinal tenderness, normal muscle strength/tone  NEURO: Strength was 5/5 in all four extremities except cannot raise left arm but good arm .  Chronic right leg weakness 5-/5  PSYCH: Appropriate insight/judgment; A+O x 3, pt feeling anxious        LABS:                        7.7    11.31 )-----------( 414      ( 17 Sep 2024 07:03 )             24.3      09-17    137  |  101  |  14  ----------------------------<  90  4.2   |  27  |  0.40[L]    Ca    9.0      17 Sep 2024 07:02  Phos  3.8     09-17  Mg     2.1     09-17    TPro  5.8[L]  /  Alb  3.0[L]  /  TBili  0.1[L]  /  DBili  x   /  AST  54[H]  /  ALT  71[H]  /  AlkPhos  363[H]  09-17    PT/INR - ( 16 Sep 2024 07:20 )   PT: 11.8 sec;   INR: 1.13 ratio         PTT - ( 16 Sep 2024 07:20 )  PTT:31.5 sec      Urinalysis Basic - ( 17 Sep 2024 07:02 )    Color: x / Appearance: x / SG: x / pH: x  Gluc: 90 mg/dL / Ketone: x  / Bili: x / Urobili: x   Blood: x / Protein: x / Nitrite: x   Leuk Esterase: x / RBC: x / WBC x   Sq Epi: x / Non Sq Epi: x / Bacteria: x        RADIOLOGY & ADDITIONAL TESTS:    Imaging Personally Reviewed:    Consultant(s) Notes Reviewed:      Care Discussed with Consultants/Other Providers:

## 2024-09-18 NOTE — PHYSICAL THERAPY INITIAL EVALUATION ADULT - SITTING BALANCE: DYNAMIC
Call bell/Explanation of exam/test/Fall precautions/Bedside visitors/Position of comfort/Side rails normal balance

## 2024-09-19 LAB
-  BLOOD PCR PANEL: SIGNIFICANT CHANGE UP
ADD ON TEST-SPECIMEN IN LAB: SIGNIFICANT CHANGE UP
ALBUMIN SERPL ELPH-MCNC: 3.2 G/DL — LOW (ref 3.3–5)
ALP SERPL-CCNC: 330 U/L — HIGH (ref 40–120)
ALT FLD-CCNC: 50 U/L — HIGH (ref 10–45)
ANION GAP SERPL CALC-SCNC: 10 MMOL/L — SIGNIFICANT CHANGE UP (ref 5–17)
AST SERPL-CCNC: 24 U/L — SIGNIFICANT CHANGE UP (ref 10–40)
BILIRUB DIRECT SERPL-MCNC: <0.1 MG/DL — SIGNIFICANT CHANGE UP (ref 0–0.3)
BILIRUB INDIRECT FLD-MCNC: >0 MG/DL — LOW (ref 0.2–1)
BILIRUB SERPL-MCNC: 0.1 MG/DL — LOW (ref 0.2–1.2)
BUN SERPL-MCNC: 10 MG/DL — SIGNIFICANT CHANGE UP (ref 7–23)
CALCIUM SERPL-MCNC: 8.8 MG/DL — SIGNIFICANT CHANGE UP (ref 8.4–10.5)
CHLORIDE SERPL-SCNC: 99 MMOL/L — SIGNIFICANT CHANGE UP (ref 96–108)
CO2 SERPL-SCNC: 27 MMOL/L — SIGNIFICANT CHANGE UP (ref 22–31)
CREAT SERPL-MCNC: 0.45 MG/DL — LOW (ref 0.5–1.3)
EGFR: 121 ML/MIN/1.73M2 — SIGNIFICANT CHANGE UP
GLUCOSE SERPL-MCNC: 91 MG/DL — SIGNIFICANT CHANGE UP (ref 70–99)
GRAM STN FLD: ABNORMAL
GRAM STN FLD: SIGNIFICANT CHANGE UP
HCT VFR BLD CALC: 24.5 % — LOW (ref 34.5–45)
HGB BLD-MCNC: 7.8 G/DL — LOW (ref 11.5–15.5)
MAGNESIUM SERPL-MCNC: 2.2 MG/DL — SIGNIFICANT CHANGE UP (ref 1.6–2.6)
MCHC RBC-ENTMCNC: 27.4 PG — SIGNIFICANT CHANGE UP (ref 27–34)
MCHC RBC-ENTMCNC: 31.8 GM/DL — LOW (ref 32–36)
MCV RBC AUTO: 86 FL — SIGNIFICANT CHANGE UP (ref 80–100)
METHOD TYPE: SIGNIFICANT CHANGE UP
NRBC # BLD: 0 /100 WBCS — SIGNIFICANT CHANGE UP (ref 0–0)
PHOSPHATE SERPL-MCNC: 3.1 MG/DL — SIGNIFICANT CHANGE UP (ref 2.5–4.5)
PLATELET # BLD AUTO: 468 K/UL — HIGH (ref 150–400)
POTASSIUM SERPL-MCNC: 4 MMOL/L — SIGNIFICANT CHANGE UP (ref 3.5–5.3)
POTASSIUM SERPL-SCNC: 4 MMOL/L — SIGNIFICANT CHANGE UP (ref 3.5–5.3)
PROT SERPL-MCNC: 5.9 G/DL — LOW (ref 6–8.3)
RBC # BLD: 2.85 M/UL — LOW (ref 3.8–5.2)
RBC # FLD: 13.2 % — SIGNIFICANT CHANGE UP (ref 10.3–14.5)
SODIUM SERPL-SCNC: 136 MMOL/L — SIGNIFICANT CHANGE UP (ref 135–145)
SPECIMEN SOURCE: SIGNIFICANT CHANGE UP
WBC # BLD: 13.5 K/UL — HIGH (ref 3.8–10.5)
WBC # FLD AUTO: 13.5 K/UL — HIGH (ref 3.8–10.5)

## 2024-09-19 PROCEDURE — 99233 SBSQ HOSP IP/OBS HIGH 50: CPT

## 2024-09-19 PROCEDURE — 99232 SBSQ HOSP IP/OBS MODERATE 35: CPT

## 2024-09-19 PROCEDURE — 99233 SBSQ HOSP IP/OBS HIGH 50: CPT | Mod: GC

## 2024-09-19 RX ORDER — KETOROLAC TROMETHAMINE 10 MG/1
10 TABLET, FILM COATED ORAL ONCE
Refills: 0 | Status: DISCONTINUED | OUTPATIENT
Start: 2024-09-19 | End: 2024-09-19

## 2024-09-19 RX ORDER — ACETAMINOPHEN 325 MG
650 TABLET ORAL EVERY 6 HOURS
Refills: 0 | Status: CANCELLED | OUTPATIENT
Start: 2024-09-19 | End: 2024-09-25

## 2024-09-19 RX ORDER — ACETAMINOPHEN 325 MG
650 TABLET ORAL ONCE
Refills: 0 | Status: COMPLETED | OUTPATIENT
Start: 2024-09-19 | End: 2024-09-19

## 2024-09-19 RX ORDER — OXYCODONE HYDROCHLORIDE 30 MG/1
5 TABLET, FILM COATED, EXTENDED RELEASE ORAL EVERY 4 HOURS
Refills: 0 | Status: DISCONTINUED | OUTPATIENT
Start: 2024-09-19 | End: 2024-09-21

## 2024-09-19 RX ORDER — OXYCODONE HYDROCHLORIDE 30 MG/1
10 TABLET, FILM COATED, EXTENDED RELEASE ORAL EVERY 4 HOURS
Refills: 0 | Status: DISCONTINUED | OUTPATIENT
Start: 2024-09-19 | End: 2024-09-21

## 2024-09-19 RX ORDER — BISACODYL 5 MG/1
10 TABLET, COATED ORAL DAILY
Refills: 0 | Status: DISCONTINUED | OUTPATIENT
Start: 2024-09-19 | End: 2024-09-23

## 2024-09-19 RX ADMIN — MEROPENEM 100 MILLIGRAM(S): 500 INJECTION INTRAVENOUS at 05:47

## 2024-09-19 RX ADMIN — DAPTOMYCIN 116 MILLIGRAM(S): 500 INJECTION, POWDER, LYOPHILIZED, FOR SOLUTION INTRAVENOUS at 15:55

## 2024-09-19 RX ADMIN — Medication 10 MILLIGRAM(S): at 05:47

## 2024-09-19 RX ADMIN — Medication 17 GRAM(S): at 13:23

## 2024-09-19 RX ADMIN — SERTRALINE HYDROCHLORIDE 125 MILLIGRAM(S): 100 TABLET, FILM COATED ORAL at 13:22

## 2024-09-19 RX ADMIN — OXYCODONE HYDROCHLORIDE 10 MILLIGRAM(S): 30 TABLET, FILM COATED, EXTENDED RELEASE ORAL at 16:53

## 2024-09-19 RX ADMIN — Medication 650 MILLIGRAM(S): at 09:06

## 2024-09-19 RX ADMIN — MEROPENEM 100 MILLIGRAM(S): 500 INJECTION INTRAVENOUS at 13:25

## 2024-09-19 RX ADMIN — Medication 60 MILLIGRAM(S): at 21:06

## 2024-09-19 RX ADMIN — OXYCODONE HYDROCHLORIDE 10 MILLIGRAM(S): 30 TABLET, FILM COATED, EXTENDED RELEASE ORAL at 21:06

## 2024-09-19 RX ADMIN — DALFAMPRIDINE 10 MILLIGRAM(S): 10 TABLET, FILM COATED, EXTENDED RELEASE ORAL at 19:52

## 2024-09-19 RX ADMIN — Medication 650 MILLIGRAM(S): at 08:04

## 2024-09-19 RX ADMIN — MODAFINIL 200 MILLIGRAM(S): 100 TABLET ORAL at 13:23

## 2024-09-19 RX ADMIN — Medication 10 MILLIGRAM(S): at 13:24

## 2024-09-19 RX ADMIN — OXYCODONE HYDROCHLORIDE 10 MILLIGRAM(S): 30 TABLET, FILM COATED, EXTENDED RELEASE ORAL at 21:36

## 2024-09-19 RX ADMIN — OXYCODONE HYDROCHLORIDE 10 MILLIGRAM(S): 30 TABLET, FILM COATED, EXTENDED RELEASE ORAL at 10:13

## 2024-09-19 RX ADMIN — KETOROLAC TROMETHAMINE 10 MILLIGRAM(S): 10 TABLET, FILM COATED ORAL at 23:36

## 2024-09-19 RX ADMIN — OXYCODONE HYDROCHLORIDE 10 MILLIGRAM(S): 30 TABLET, FILM COATED, EXTENDED RELEASE ORAL at 11:13

## 2024-09-19 RX ADMIN — DALFAMPRIDINE 10 MILLIGRAM(S): 10 TABLET, FILM COATED, EXTENDED RELEASE ORAL at 06:18

## 2024-09-19 RX ADMIN — Medication 2 TABLET(S): at 21:06

## 2024-09-19 RX ADMIN — Medication 10 MILLIGRAM(S): at 21:06

## 2024-09-19 RX ADMIN — OXYCODONE HYDROCHLORIDE 10 MILLIGRAM(S): 30 TABLET, FILM COATED, EXTENDED RELEASE ORAL at 15:53

## 2024-09-19 RX ADMIN — MEROPENEM 100 MILLIGRAM(S): 500 INJECTION INTRAVENOUS at 21:05

## 2024-09-19 RX ADMIN — MULTI VITAMIN/MINERAL SUPPLEMENT WITH ASCORBIC ACID, NIACIN, PYRIDOXINE, PANTOTHENIC ACID, FOLIC ACID, RIBOFLAVIN, THIAMIN, BIOTIN, COBALAMIN AND ZINC. 1 TABLET(S): 60; 20; 12.5; 10; 10; 1.7; 1.5; 1; .3; .006 TABLET, COATED ORAL at 13:23

## 2024-09-19 RX ADMIN — Medication 0.5 MILLIGRAM(S): at 15:52

## 2024-09-19 RX ADMIN — CHLORHEXIDINE GLUCONATE ORAL RINSE 1 APPLICATION(S): 1.2 SOLUTION DENTAL at 13:23

## 2024-09-19 NOTE — PROGRESS NOTE ADULT - PROBLEM SELECTOR PLAN 2
Patient admitted with Hgb 12.2, now 7.7 c/f unknown source of bleeding, pt with known history of RAMONE  - trend CBC  - F/u anemia workup labs  - Fe studies from 9/11/24 shows iron deficiency- holding Fe iso active infection  - hold lovenox iso of potential bleed  - Repeat CT CAP: no source of bleed Patient admitted with Hgb 12.2, now 7.7 c/f unknown source of bleeding, pt with known history of RAMONE  - trend CBC  - F/u anemia workup labs: negative  - Fe studies from 9/11/24 shows iron deficiency- holding Fe iso active infection  - hold lovenox iso of potential bleed  - Repeat CT CAP: no source of bleed

## 2024-09-19 NOTE — OCCUPATIONAL THERAPY INITIAL EVALUATION ADULT - ADDITIONAL COMMENTS
Pt lives in a house with 2 CECE, and 13 steps to the bedroom; unilateral handrails to hold throughout. Pt lives with her 5 children & spouse. PTA, pt was independent with ambulation using rolling walker, independent with most ADLs, but has her family helps as needed. has both walk in and tub shower

## 2024-09-19 NOTE — PROGRESS NOTE ADULT - ASSESSMENT
44F s/p left SCJ debridement and left pectoral flap on 9/9 presenting with superficial surgical site erythema and localized pain. Also found to have small subsegmental right lower lobe pulmonary emboli.  Recommendations:  -No acute thoracic surgery operative intervention at this time.  -PRS consult.  -ID consult.  -Dispo per PRS    9/16: Evaluated at ED bedside by Thoracic Attending Dr. Bautista. Recommend close ID follow up for cellulitis of site. Vanco trough level noted to be 5.5, ? unclear if patient was taking antibiotics appropriately in outpatient setting.   9/17 Antibiotic switched to dapto per ID. Site improving  9/18 VSS  afebrile.  site with decreased erythema  9/19 VSS afebile sit COLTON improving - large stool burden seenon CT d/w Primary team consider MOVANTIK / suppository  44F s/p left SCJ debridement and left pectoral flap on 9/9 presenting with superficial surgical site erythema and localized pain. Also found to have small subsegmental right lower lobe pulmonary emboli.  Recommendations:  -No acute thoracic surgery operative intervention at this time.  -PRS consult.  -ID consult.  -Dispo per PRS    9/16: Evaluated at ED bedside by Thoracic Attending Dr. Bautista. Recommend close ID follow up for cellulitis of site. Vanco trough level noted to be 5.5, ? unclear if patient was taking antibiotics appropriately in outpatient setting.   9/17 Antibiotic switched to dapto per ID. Site improving  9/18 VSS  afebrile.  site with decreased erythema  9/19 VSS afebile site  COLTON improving - OT daily-  large stool burden seen on CT d/w Primary team consider MOVANTIK / suppository

## 2024-09-19 NOTE — PROGRESS NOTE ADULT - ASSESSMENT
This is a 44y year old Female with the below past medical history of MS, anxiety, depression, septic arthritis of L sternoclavicular joint s/p multiple admissions requiring I&D and most recent admission (9/4-9/13) with I&D and L pectoral flap for evaluation of abscess now presenting to the ED with L sided chest pain with fever 100.7. Patient most recently admission with I&D of L clavicular abscess by CTS and L pectoral flap surgery with plastic surgery. Pt is complaining of chest pain. Pt found to have PE on CT chest.  She says limited movement of the left arm, but otherwise no new weakness, no changes in speech, swallow, vision, bowel or bladder control.  Pt is well known to my associate Dr. Selby for her multiple sclerosis.     9/19/24: found to have small PE on Lovenox.  Has PICC line for infection.      exam notable for inability to raise left arm, chronic right leg weakness    On hx and exam no indication of change in her neurological status and thus there is no need for further inpt neurological workup or evaluation  Issue of PE/anticougulation as per primary team  appreciate ID consult on issue of antibiotics  Please keep in mind that she does have multiple sclerosis and she may have muted response to infection in terms of WBC and fever     Would have  consult, she is clearly overwhelmed by the fact she has had multiple admission, she has young children at home, support services need to be provided    As outpt, will follow with Dr Selby    d/w pt and with her daughter over the phone      I spent >35 minutes on patient of which >50% was coordination of care and/or counseling

## 2024-09-19 NOTE — OCCUPATIONAL THERAPY INITIAL EVALUATION ADULT - TRANSFER SAFETY CONCERNS NOTED: SIT/STAND, REHAB EVAL
"Anesthesia Post Evaluation    Patient: Polo Jones    Procedure(s) Performed: Procedure(s) (LRB):  REPAIR-HERNIA-INGUINAL-INITIAL (5 YRS+) Open Right with Mesh (Right)    Final Anesthesia Type: general  Patient location during evaluation: PACU  Patient participation: Yes- Able to Participate  Level of consciousness: awake and alert  Pain management: adequate  Airway patency: patent  PONV status at discharge: No PONV  Anesthetic complications: no      Cardiovascular status: blood pressure returned to baseline  Respiratory status: unassisted  Hydration status: euvolemic  Follow-up not needed.        Visit Vitals  BP (!) 147/85 (BP Location: Right arm, Patient Position: Lying)   Pulse 89   Temp 36.4 °C (97.6 °F) (Axillary)   Resp 20   Ht 5' 8" (1.727 m)   Wt 69.4 kg (153 lb)   SpO2 97%   BMI 23.26 kg/m²       Pain/Kellie Score: Pain Assessment Performed: Yes (11/15/2017  9:30 AM)  Presence of Pain: complains of pain/discomfort (11/15/2017  9:30 AM)  Pain Rating Prior to Med Admin: 6 (11/15/2017 10:02 AM)  Kellie Score: 10 (11/15/2017  9:30 AM)      " decreased weight-shifting ability

## 2024-09-19 NOTE — PROGRESS NOTE ADULT - ASSESSMENT
44F with PMH of MS on Rituxan, anxiety, depression, septic arthritis of L sternoclavicular joint s/p multiple I&Ds in 2023, recent recurrence s/p I&D with CTS and pec turnover flap with Dr. Dean on 9/9, d/c'ed with PICC line on vanc/bryson, now presenting with redness and increased pain. PRS consulted for possible surgical site infection. CT chest shows normal postop changes with possible small hematoma around flap, LISBETH drains within area, no abscess/collection. Small PE without heart strain noted.       plan  - keep drains, will monitor  - abx per ID  - rest per primary   44F with PMH of MS on Rituxan, anxiety, depression, septic arthritis of L sternoclavicular joint s/p multiple I&Ds in 2023, recent recurrence s/p I&D with CTS and pec turnover flap with Dr. Dean on 9/9, d/c'ed with PICC line on vanc/bryson, now presenting with redness and increased pain. PRS consulted for possible surgical site infection. CT chest shows normal postop changes with possible small hematoma around flap, LISBETH drains within area, no abscess/collection. Small PE without heart strain noted. Repeat CT from 9/18 now shows rim enhancing collections at the surgical site.      plan  - keep drains, will monitor  - abx per ID  - will discuss surgical plan w/ attending  - rest per primary

## 2024-09-19 NOTE — PROGRESS NOTE ADULT - ASSESSMENT
44y Female with PMH of MS on Ocrevus anxiety, depression, septic arthritis of L sternoclavicular joint s/p I&Dx2 and cefepime for 6 weeks (12/2023-1/2024), recent admission 9/9-9/13 for L clavicular pain s/p I+D with L pectoralis flap sent home on meropenem and vancomycin for 6 week course presents for L shoulder pain, L sided pel chest pain, headache, fever at home. Son on phone also notes that the surgical site appeared more erythematous day prior to admission and had associated L arm swelling.    Admitted 9/9-9/13 most recently; MRI L clavicle with prior resection of the medial left clavicle with rim-enhancing fluid and surrounding phlegmon abutting the resected bony margin. S/p surgery on 9/9 with L SCJ debridement by CT surgery and L pec flap by PRS. Cultures grew Staph epi and Corynebacterium in fluid media, blood cultures negative. Discharged on tentatively 6 weeks vancomycin and meropenem for empiric coverage.    Here Tmax 100.1, mild tachycardia  WBC 12.1, neutrophilic. Lactate wnl  , ALT 78,     CT chest: Small nonocclusive right lower lobe basilar subsegmental pulmonary emboli, no evidence of right heart strain. 14 cm in greatest dimension expansile mostly soft tissue density structure filling and expanding the operative bed in the region of the left medial clavicle (thought to represent combination of muscle)    MICRO DATA:   09/15 BCX: IP   09/15 BCX: IP   09/10 AFB Cx: IP  09/10 Fungal CX: IP  09/10 Bacterial CX: fluid media only- corynebacterium striatum and staph epidermidis     # Low grade Fever, leukocytosis  # Septic sternoclavicular joint s/p debridement and muscle flap L clavicle  # Post-Operative abscess  # New GN bacteremia    # transaminitis     Abnormal CT with new rim enhancing collection in under the flap, purulent drainage noted from sternal side of the incision.         RECOMMENDATIONS:   - c/w dapto 400 mg q24h  - CPK noted.   - Continue IV meropenem 1g q8h  - follow-up final identification and sensitivity of GNR  - repeat blood cx   - thoracic/plastic surgery follow-up, discussed with plastics regarding the CT finding   - cx performed of the purulence noted.   - monitor LFTs, stable   Trend WBC, leucocytosis     Plan discussed with Medicine and plastics.       Odalis Reynaga  Please contact through MS Teams   If no response or past 5 pm/weekend call [phone].

## 2024-09-19 NOTE — PROGRESS NOTE ADULT - SUBJECTIVE AND OBJECTIVE BOX
Subjective  ' hello I am in pain"      Vital Signs Last 24 Hrs  T(C): 36.6 (09-19-24 @ 05:51), Max: 36.8 (09-19-24 @ 00:03)  T(F): 97.9 (09-19-24 @ 05:51), Max: 98.2 (09-19-24 @ 00:03)  HR: 83 (09-19-24 @ 05:51) (83 - 99)  BP: 83/51 (09-19-24 @ 05:51) (83/51 - 104/68)  RR: 18 (09-19-24 @ 05:51) (18 - 18)  SpO2: 100% (09-19-24 @ 05:51) (95% - 100%)           09-18 @ 07:01  -  09-19 @ 07:00  --------------------------------------------------------  IN: 100 mL / OUT: 915 mL / NET: -815 mL                            7.8    13.50 )-----------( 468      ( 19 Sep 2024 07:24 )             24.5     09-19    136  |  99  |  10  ----------------------------<  91  4.0   |  27  |  0.45[L]    Ca    8.8      19 Sep 2024 07:23  Phos  3.1     09-19  Mg     2.2     09-19    TPro  5.9[L]  /  Alb  3.2[L]  /  TBili  0.1[L]  /  DBili  <0.1  /  AST  24  /  ALT  50[H]  /  AlkPhos  330[H]  09-19    < from: CT Chest w/ IV Cont (09.18.24 @ 20:03) >    *  Small left pleural effusion with adjacent compressive atelectasis,   slightly increased.  *  Bulky heterogeneous soft tissue density in the postoperative bed   involving the left chest wall and medial clavicle with 2 drains in place,   similar to prior exam.  *  Right middle and lower lobe subsegmental PE noted on prior exam is not   seen.    CT ABDOMEN AND PELVIS:    *  No acute findings in the abdomen or pelvis.  *  Large colonic stool burden.      < end of copied text >      Sriram #1  69   Sriram #2 44                        PHYSICAL EXAM        Neurology: alert and oriented x 3, nonfocal, no gross deficits    CV :[id number]    SLeft clavicular  Wound :  win stable  Sriram  x2    Lungs: b/l breath sounds room air     Abdomen: soft, nontender, nondistended, positive bowel sounds,- large stool burden on CT    :      voids         Extremities:    warm well perfused equal strength throughout    B/lle + dp no calf  tenderness                                        Discussed with Cardiothoracic Team at AM rounds.

## 2024-09-19 NOTE — PROGRESS NOTE ADULT - PROBLEM SELECTOR PLAN 3
Pt with elevated ALP, AST, ALT, no clinical signs of cholestatic process, more likely 2/2 septic arthritis  - RUQ US: distended gallbladder, no stones or obstruction  - ID: repeat CT CAP- no source of abd or pelvic infection, chest redemonstrated surgical changes

## 2024-09-19 NOTE — PROGRESS NOTE ADULT - ATTENDING COMMENTS
43F pmh MS, anxiety, depression, septic arthritis of L sternoclavicular joint s/p multiple admissions requiring I&D and most recent admission (9/4-9/13) with I&D and L pectoral flap for evaluation of abscess p/w worsening L sided chest pain and swelling a/w SIRS also incidentally noted to have small nonocclusive subsegmental RLL PE.  #Surgical Site infection  #Incidental PE  #Bacteremia  -on dapto with improvement on exam  -continue meropenem  -Bcx drawn on admission now with GNR?, will remove PICC  -asymptomatic single subsegmental PE without DVT, given anemia as well monitoring off AC  -Slowly worsening anemia now stable, known RAMONE, no current signs of bleeding, holding lovenox  -Oxy prn increased for pain control

## 2024-09-19 NOTE — PROGRESS NOTE ADULT - PROBLEM SELECTOR PLAN 1
History of septic arthritis with multiple admissions s/p I&D with CTS and L pectoral flap with plastic surgery on last admission (9/4-9/13). Was discharged home on meropenem 1g q8 and van 1250 q12 with reportedly no missed doses.   - CT chest with evidence of 14 cm soft tissue density structure filling and expanding the operative bed in the region of the left medial clavicle but without definitive ring enhancing lesion  - no acute surgical intervention for surgical site erythema per plastic surgery   - no acute surgical intervention per CT surgery  - c/w meropenem 1g q8  - ID: daptomycin 400mg   - monitor LISBETH drain output: serosang  - multimodal pain control with oxy and toradol  - f/u CT AP for infectious source History of septic arthritis with multiple admissions s/p I&D with CTS and L pectoral flap with plastic surgery on last admission (9/4-9/13). Was discharged home on meropenem 1g q8 and van 1250 q12 with reportedly no missed doses.   - repeat CT: Status post left sternoclavicular resection with pectoralis flap. Mixed   density soft tissue and/or hematoma in the operative bed, similar in   size. Interval development of rim-enhancing collections, concerning for   infection.  - no acute surgical intervention for surgical site erythema per plastic surgery   - no acute surgical intervention per CT surgery  - c/w meropenem 1g q8  - ID: daptomycin 400mg   - monitor LISBETH drain output: serosang  - multimodal pain control with oxy 5/10 and tylenol PRN

## 2024-09-19 NOTE — OCCUPATIONAL THERAPY INITIAL EVALUATION ADULT - ADL RETRAINING, OT EVAL
GOAL: Patient will perform UB/LB dressing independently within 4 weeks. No significant past surgical history

## 2024-09-19 NOTE — PROGRESS NOTE ADULT - SUBJECTIVE AND OBJECTIVE BOX
Pilar Flanagan  MS4  Preferred contact via [employer] Teams      Patient is a 44y old  Female who presents with a chief complaint of L chest pain, SIRS, Acute Pulmonary Embolism (18 Sep 2024 10:05)      SUBJECTIVE / OVERNIGHT EVENTS: No acute overnight events, pt seen and examined at bedside.     MEDICATIONS  (STANDING):  baclofen 10 milliGRAM(s) Oral every 8 hours  chlorhexidine 2% Cloths 1 Application(s) Topical daily  dalfampridine ER 10 milliGRAM(s) Oral every 12 hours  DAPTOmycin IVPB 400 milliGRAM(s) IV Intermittent every 24 hours  DULoxetine 60 milliGRAM(s) Oral at bedtime  influenza   Vaccine 0.5 milliLiter(s) IntraMuscular once  meropenem  IVPB 1000 milliGRAM(s) IV Intermittent every 8 hours  modafinil 200 milliGRAM(s) Oral daily  multivitamin 1 Tablet(s) Oral daily  polyethylene glycol 3350 17 Gram(s) Oral daily  senna 2 Tablet(s) Oral at bedtime  sertraline 125 milliGRAM(s) Oral daily  Vibegron (Gemtesa) 75 mg tablet 1 Tablet(s) 1 Tablet(s) Oral daily    MEDICATIONS  (PRN):  clonazePAM  Tablet 0.5 milliGRAM(s) Oral daily PRN for anxiety  oxycodone    5 mG/acetaminophen 325 mG 1 Tablet(s) Oral every 4 hours PRN Moderate Pain (4 - 6)      CAPILLARY BLOOD GLUCOSE        I&O's Summary    17 Sep 2024 07:01  -  18 Sep 2024 07:00  --------------------------------------------------------  IN: 100 mL / OUT: 651 mL / NET: -551 mL    18 Sep 2024 07:01  -  19 Sep 2024 06:49  --------------------------------------------------------  IN: 100 mL / OUT: 915 mL / NET: -815 mL        PHYSICAL EXAM:  CONSTITUTIONAL: Well groomed, no apparent distress  EYES: PERRLA and symmetric, EOMI, No conjunctival or scleral injection, non-icteric  ENMT: Oral mucosa with moist membranes. Normal dentition; no pharyngeal injection or exudates  SKIN: L sternoclavicular region with L shaped surgical site with adhesed skin. erythema along surgical site border but no drainage, improved from yesterday, serosanguinous drain output  RESP: No respiratory distress, no use of accessory muscles; CTA b/l, no WRR  CV: RRR, +S1S2, no MRG; no JVD; no peripheral edema  GI: Soft, NT, ND, no rebound, no guarding; no palpable masses; no hepatosplenomegaly; no hernia palpated  MSK: Normal gait; No digital clubbing or cyanosis; examination of the (head/neck/spine/ribs/pelvis, RUE, LUE, RLE, LLE) without misalignment,   Normal ROM without pain, no spinal tenderness, normal muscle strength/tone  NEURO: Strength was 5/5 in all four extremities except cannot raise left arm but good arm .  Chronic right leg weakness 5-/5  PSYCH: Appropriate insight/judgment; A+O x 3, pt feeling anxious      LABS:                        7.5    11.22 )-----------( 433      ( 18 Sep 2024 07:02 )             23.8      09-18    137  |  101  |  15  ----------------------------<  107[H]  3.8   |  27  |  0.44[L]    Ca    8.9      18 Sep 2024 06:58  Phos  3.6     09-18  Mg     2.0     09-18    TPro  5.8[L]  /  Alb  3.0[L]  /  TBili  0.1[L]  /  DBili  x   /  AST  54[H]  /  ALT  71[H]  /  AlkPhos  363[H]  09-17          Urinalysis Basic - ( 18 Sep 2024 06:58 )    Color: x / Appearance: x / SG: x / pH: x  Gluc: 107 mg/dL / Ketone: x  / Bili: x / Urobili: x   Blood: x / Protein: x / Nitrite: x   Leuk Esterase: x / RBC: x / WBC x   Sq Epi: x / Non Sq Epi: x / Bacteria: x        RADIOLOGY & ADDITIONAL TESTS:    Imaging Personally Reviewed:    Consultant(s) Notes Reviewed:      Care Discussed with Consultants/Other Providers:   Pilar Flanagan  MS4  Preferred contact via [employer] Teams      Patient is a 44y old  Female who presents with a chief complaint of L chest pain, SIRS, Acute Pulmonary Embolism (18 Sep 2024 10:05)      SUBJECTIVE / OVERNIGHT EVENTS: No acute overnight events, pt seen and examined at bedside, in no acute distress. Patient reports she in still in pain with no improvement since admission. Is wanting to go home but scared about pain control. Redness and fullness around surgical site improved.    MEDICATIONS  (STANDING):  baclofen 10 milliGRAM(s) Oral every 8 hours  chlorhexidine 2% Cloths 1 Application(s) Topical daily  dalfampridine ER 10 milliGRAM(s) Oral every 12 hours  DAPTOmycin IVPB 400 milliGRAM(s) IV Intermittent every 24 hours  DULoxetine 60 milliGRAM(s) Oral at bedtime  influenza   Vaccine 0.5 milliLiter(s) IntraMuscular once  meropenem  IVPB 1000 milliGRAM(s) IV Intermittent every 8 hours  modafinil 200 milliGRAM(s) Oral daily  multivitamin 1 Tablet(s) Oral daily  polyethylene glycol 3350 17 Gram(s) Oral daily  senna 2 Tablet(s) Oral at bedtime  sertraline 125 milliGRAM(s) Oral daily  Vibegron (Gemtesa) 75 mg tablet 1 Tablet(s) 1 Tablet(s) Oral daily    MEDICATIONS  (PRN):  clonazePAM  Tablet 0.5 milliGRAM(s) Oral daily PRN for anxiety  oxycodone    5 mG/acetaminophen 325 mG 1 Tablet(s) Oral every 4 hours PRN Moderate Pain (4 - 6)      CAPILLARY BLOOD GLUCOSE        I&O's Summary    17 Sep 2024 07:01  -  18 Sep 2024 07:00  --------------------------------------------------------  IN: 100 mL / OUT: 651 mL / NET: -551 mL    18 Sep 2024 07:01  -  19 Sep 2024 06:49  --------------------------------------------------------  IN: 100 mL / OUT: 915 mL / NET: -815 mL        PHYSICAL EXAM:  CONSTITUTIONAL: Well groomed, no apparent distress  EYES: PERRLA and symmetric, EOMI, No conjunctival or scleral injection, non-icteric  ENMT: Oral mucosa with moist membranes. Normal dentition; no pharyngeal injection or exudates  SKIN: L sternoclavicular region with L shaped surgical site with adhesed skin. erythema along surgical site border but no drainage, again improved from yesterday, serosanguinous drain output  RESP: No respiratory distress, no use of accessory muscles; CTA b/l, no WRR  CV: RRR, +S1S2, no MRG; no JVD; no peripheral edema  GI: Soft, NT, ND, no rebound, no guarding; no palpable masses; no hepatosplenomegaly; no hernia palpated  MSK: Normal gait; No digital clubbing or cyanosis; examination of the (head/neck/spine/ribs/pelvis, RUE, LUE, RLE, LLE) without misalignment,   Normal ROM without pain, no spinal tenderness, normal muscle strength/tone  NEURO: Strength was 5/5 in all four extremities except cannot raise left arm but good arm .  Chronic right leg weakness 5-/5  PSYCH: Appropriate insight/judgment; A+O x 3, pt feeling anxious      LABS:                        7.5    11.22 )-----------( 433      ( 18 Sep 2024 07:02 )             23.8      09-18    137  |  101  |  15  ----------------------------<  107[H]  3.8   |  27  |  0.44[L]    Ca    8.9      18 Sep 2024 06:58  Phos  3.6     09-18  Mg     2.0     09-18    TPro  5.8[L]  /  Alb  3.0[L]  /  TBili  0.1[L]  /  DBili  x   /  AST  54[H]  /  ALT  71[H]  /  AlkPhos  363[H]  09-17          Urinalysis Basic - ( 18 Sep 2024 06:58 )    Color: x / Appearance: x / SG: x / pH: x  Gluc: 107 mg/dL / Ketone: x  / Bili: x / Urobili: x   Blood: x / Protein: x / Nitrite: x   Leuk Esterase: x / RBC: x / WBC x   Sq Epi: x / Non Sq Epi: x / Bacteria: x        RADIOLOGY & ADDITIONAL TESTS:    Imaging Personally Reviewed:    Consultant(s) Notes Reviewed:      Care Discussed with Consultants/Other Providers:

## 2024-09-19 NOTE — OCCUPATIONAL THERAPY INITIAL EVALUATION ADULT - VISUAL ASSESSMENT: EYE MUSCLE BALANCE
----- Message from Mayela Zazueta sent at 7/17/2018  1:25 PM EDT -----  Regarding: hospice  Contact: [phone]  Saundra with hospice is calling to see if they can do pain and symptom mangement   normal

## 2024-09-19 NOTE — PROGRESS NOTE ADULT - SUBJECTIVE AND OBJECTIVE BOX
44yPatient is a 44y old  Female who presents with a chief complaint of L chest pain, SIRS, Acute Pulmonary Embolism (19 Sep 2024 12:02)      Interval history:  Afebrile, upset about the bacteremia and infection. Pain continues.       Allergies:   No Known Allergies    Antimicrobials:  DAPTOmycin IVPB 400 milliGRAM(s) IV Intermittent every 24 hours  meropenem  IVPB 1000 milliGRAM(s) IV Intermittent every 8 hours      REVIEW OF SYSTEMS:  No SOB  No N/V  No rash.     Vital Signs Last 24 Hrs  T(C): 36.6 (09-19-24 @ 15:48), Max: 36.8 (09-19-24 @ 00:03)  T(F): 97.9 (09-19-24 @ 15:48), Max: 98.2 (09-19-24 @ 00:03)  HR: 96 (09-19-24 @ 15:48) (72 - 99)  BP: 98/47 (09-19-24 @ 15:48) (83/51 - 98/47)  BP(mean): --  RR: 18 (09-19-24 @ 15:48) (18 - 18)  SpO2: 96% (09-19-24 @ 15:48) (95% - 100%)      PHYSICAL EXAM:  Pt in no acute distress, alert, awake.   rt arm PICC   lt sided graft site with surrounding improved erythema, area of purulent drainage noted on the sternal side.   2 LISBETH's with purulence noted too.   non distended abdomen  no edema LE                                 7.8    13.50 )-----------( 468      ( 19 Sep 2024 07:24 )             24.5   09-19    136  |  99  |  10  ----------------------------<  91  4.0   |  27  |  0.45[L]    Ca    8.8      19 Sep 2024 07:23  Phos  3.1     09-19  Mg     2.2     09-19    TPro  5.9[L]  /  Alb  3.2[L]  /  TBili  0.1[L]  /  DBili  <0.1  /  AST  24  /  ALT  50[H]  /  AlkPhos  330[H]  09-19      LIVER FUNCTIONS - ( 19 Sep 2024 07:23 )  Alb: 3.2 g/dL / Pro: 5.9 g/dL / ALK PHOS: 330 U/L / ALT: 50 U/L / AST: 24 U/L / GGT: x             Culture - Blood (09.15.24 @ 04:25)   - Blood PCR Panel: NEG  Gram Stain:   Growth in aerobic bottle: Gram Negative Rods  Specimen Source: .Blood Blood-Peripheral  Organism: Blood Culture PCR  Culture Results:   Growth in aerobic bottle: Gram Negative Rods   Direct identification is available within approximately 3-5   hours either by Blood Panel Multiplexed PCR or Direct   MALDI-TOF. Details: [email or web address]  Organism Identification: Blood Culture PCR  Method Type: PCR      Radiology:  < from: CT Chest w/ IV Cont (09.18.24 @ 20:03) >  IMPRESSION:  Status post left sternoclavicular resection with pectoralis flap. Mixed   density soft tissue and/or hematoma in the operative bed, similar in   size. Interval development of rim-enhancing collections, concerning for   infection.    No acute intra-abdominal findings. Stool-filled colon.

## 2024-09-19 NOTE — PROGRESS NOTE ADULT - PROBLEM SELECTOR PLAN 1
- s/p left SCJ debridement and left pectoral flap on 9/9  - No acute thoracic surgery operative intervention at this time  - plastic follow up; LISBETH management by Plastics. Monitor output.   - recommend close ID follow up. Abx switched to Dapto per ID  - global care per primary team.  consider suppository/movantik for BM   - thoracic surgery will continue to follow  - Plan d/w Attending Dr. Bautista  ----  For any questions or concerns, please contact thoracic @ [phone] - s/p left SCJ debridement and left pectoral flap on 9/9  - No acute thoracic surgery operative intervention at this time  - plastic follow up; LISBETH management by Plastics. Monitor output.   - recommend close ID follow up. Abx switched to Dapto per ID  - global care per primary team.  consider suppository/movantik for BM   OT daily please  - thoracic surgery will continue to follow  - Plan d/w Attending Dr. Bautista  ----  For any questions or concerns, please contact thoracic @ [phone]
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
## 2024-09-19 NOTE — PROGRESS NOTE ADULT - PROBLEM SELECTOR PLAN 4
On imaging on admission, noted to have small nonocclusive RLL pulmonary embolism. May potentially be incidental finding iso recent surgery.  - troponin and proBNP otherwise negative  - CT chest without evidence of R heart strain  - currently not requiring any O2 and saturating well on RA  - duplex bilateral lower extremities - no evidence of DVT  - holding prophylactic lovenox 40 qd iso potential bleed On imaging on admission, noted to have small nonocclusive RLL pulmonary embolism. May potentially be incidental finding iso recent surgery.  - Resolved on repeat CT

## 2024-09-19 NOTE — PROGRESS NOTE ADULT - SUBJECTIVE AND OBJECTIVE BOX
INTERVAL EVENTS: NAEO    SUBJECTIVE: Patient continues to report some pain. Erythema around incision site appears stable. Drains have chalky output, 40/65.    --------------------------------------------------------------------------------------  VITAL SIGNS:  T(C): 36.4 (09-19-24 @ 08:30), Max: 36.8 (09-19-24 @ 00:03)  HR: 72 (09-19-24 @ 08:30) (72 - 99)  BP: 90/53 (09-19-24 @ 08:30) (83/51 - 104/68)  RR: 18 (09-19-24 @ 08:30) (18 - 18)  SpO2: 96% (09-19-24 @ 08:30) (95% - 100%)  --------------------------------------------------------------------------------------    EXAM    -- CONSTITUTIONAL: NAD, lying in bed  -- NEURO: Awake, alert  -- CHEST: L . Surgical site with some mild redness and erythema, improved slightly from yesterday. Tender to palpation. drains with thick milky color output    --------------------------------------------------------------------------------------

## 2024-09-20 DIAGNOSIS — R78.81 BACTEREMIA: ICD-10-CM

## 2024-09-20 LAB
ALBUMIN SERPL ELPH-MCNC: 3.1 G/DL — LOW (ref 3.3–5)
ALP SERPL-CCNC: 310 U/L — HIGH (ref 40–120)
ALT FLD-CCNC: 46 U/L — HIGH (ref 10–45)
ANION GAP SERPL CALC-SCNC: 9 MMOL/L — SIGNIFICANT CHANGE UP (ref 5–17)
AST SERPL-CCNC: 21 U/L — SIGNIFICANT CHANGE UP (ref 10–40)
BASOPHILS # BLD AUTO: 0 K/UL — SIGNIFICANT CHANGE UP (ref 0–0.2)
BASOPHILS NFR BLD AUTO: 0 % — SIGNIFICANT CHANGE UP (ref 0–2)
BILIRUB SERPL-MCNC: 0.1 MG/DL — LOW (ref 0.2–1.2)
BUN SERPL-MCNC: 11 MG/DL — SIGNIFICANT CHANGE UP (ref 7–23)
CALCIUM SERPL-MCNC: 9.2 MG/DL — SIGNIFICANT CHANGE UP (ref 8.4–10.5)
CHLORIDE SERPL-SCNC: 99 MMOL/L — SIGNIFICANT CHANGE UP (ref 96–108)
CK SERPL-CCNC: 19 U/L — LOW (ref 25–170)
CO2 SERPL-SCNC: 28 MMOL/L — SIGNIFICANT CHANGE UP (ref 22–31)
CREAT SERPL-MCNC: 0.52 MG/DL — SIGNIFICANT CHANGE UP (ref 0.5–1.3)
CULTURE RESULTS: SIGNIFICANT CHANGE UP
EGFR: 117 ML/MIN/1.73M2 — SIGNIFICANT CHANGE UP
EOSINOPHIL # BLD AUTO: 0 K/UL — SIGNIFICANT CHANGE UP (ref 0–0.5)
EOSINOPHIL NFR BLD AUTO: 0 % — SIGNIFICANT CHANGE UP (ref 0–6)
GLUCOSE SERPL-MCNC: 91 MG/DL — SIGNIFICANT CHANGE UP (ref 70–99)
HCT VFR BLD CALC: 24.1 % — LOW (ref 34.5–45)
HGB BLD-MCNC: 7.6 G/DL — LOW (ref 11.5–15.5)
LYMPHOCYTES # BLD AUTO: 2.73 K/UL — SIGNIFICANT CHANGE UP (ref 1–3.3)
LYMPHOCYTES # BLD AUTO: 21.6 % — SIGNIFICANT CHANGE UP (ref 13–44)
MAGNESIUM SERPL-MCNC: 2.2 MG/DL — SIGNIFICANT CHANGE UP (ref 1.6–2.6)
MANUAL SMEAR VERIFICATION: SIGNIFICANT CHANGE UP
MCHC RBC-ENTMCNC: 27.9 PG — SIGNIFICANT CHANGE UP (ref 27–34)
MCHC RBC-ENTMCNC: 31.5 GM/DL — LOW (ref 32–36)
MCV RBC AUTO: 88.6 FL — SIGNIFICANT CHANGE UP (ref 80–100)
MONOCYTES # BLD AUTO: 0.76 K/UL — SIGNIFICANT CHANGE UP (ref 0–0.9)
MONOCYTES NFR BLD AUTO: 6 % — SIGNIFICANT CHANGE UP (ref 2–14)
MYELOCYTES NFR BLD: 1.7 % — HIGH (ref 0–0)
NEUTROPHILS # BLD AUTO: 8.94 K/UL — HIGH (ref 1.8–7.4)
NEUTROPHILS NFR BLD AUTO: 70.7 % — SIGNIFICANT CHANGE UP (ref 43–77)
OVALOCYTES BLD QL SMEAR: SLIGHT — SIGNIFICANT CHANGE UP
PHOSPHATE SERPL-MCNC: 3.5 MG/DL — SIGNIFICANT CHANGE UP (ref 2.5–4.5)
PLAT MORPH BLD: NORMAL — SIGNIFICANT CHANGE UP
PLATELET # BLD AUTO: 497 K/UL — HIGH (ref 150–400)
POTASSIUM SERPL-MCNC: 4.3 MMOL/L — SIGNIFICANT CHANGE UP (ref 3.5–5.3)
POTASSIUM SERPL-SCNC: 4.3 MMOL/L — SIGNIFICANT CHANGE UP (ref 3.5–5.3)
PROT SERPL-MCNC: 5.9 G/DL — LOW (ref 6–8.3)
RBC # BLD: 2.72 M/UL — LOW (ref 3.8–5.2)
RBC # FLD: 13.2 % — SIGNIFICANT CHANGE UP (ref 10.3–14.5)
RBC BLD AUTO: ABNORMAL
SODIUM SERPL-SCNC: 136 MMOL/L — SIGNIFICANT CHANGE UP (ref 135–145)
SPECIMEN SOURCE: SIGNIFICANT CHANGE UP
WBC # BLD: 12.64 K/UL — HIGH (ref 3.8–10.5)
WBC # FLD AUTO: 12.64 K/UL — HIGH (ref 3.8–10.5)

## 2024-09-20 PROCEDURE — 71045 X-RAY EXAM CHEST 1 VIEW: CPT | Mod: 26

## 2024-09-20 PROCEDURE — 99232 SBSQ HOSP IP/OBS MODERATE 35: CPT

## 2024-09-20 PROCEDURE — 99232 SBSQ HOSP IP/OBS MODERATE 35: CPT | Mod: GC

## 2024-09-20 RX ORDER — KETOROLAC TROMETHAMINE 10 MG/1
15 TABLET, FILM COATED ORAL ONCE
Refills: 0 | Status: DISCONTINUED | OUTPATIENT
Start: 2024-09-20 | End: 2024-09-20

## 2024-09-20 RX ORDER — ENOXAPARIN SODIUM 150 MG/ML
40 INJECTION SUBCUTANEOUS EVERY 24 HOURS
Refills: 0 | Status: DISCONTINUED | OUTPATIENT
Start: 2024-09-20 | End: 2024-09-25

## 2024-09-20 RX ORDER — KETOROLAC TROMETHAMINE 10 MG/1
10 TABLET, FILM COATED ORAL
Refills: 0 | Status: DISCONTINUED | OUTPATIENT
Start: 2024-09-20 | End: 2024-09-20

## 2024-09-20 RX ORDER — SODIUM CHLORIDE IRRIG SOLUTION 0.9 %
500 SOLUTION, IRRIGATION IRRIGATION
Refills: 0 | Status: DISCONTINUED | OUTPATIENT
Start: 2024-09-20 | End: 2024-09-25

## 2024-09-20 RX ORDER — PANTOPRAZOLE SODIUM 40 MG/1
40 TABLET, DELAYED RELEASE ORAL
Refills: 0 | Status: DISCONTINUED | OUTPATIENT
Start: 2024-09-20 | End: 2024-09-25

## 2024-09-20 RX ORDER — MUPIROCIN 20 MG/G
1 OINTMENT TOPICAL
Refills: 0 | Status: DISCONTINUED | OUTPATIENT
Start: 2024-09-20 | End: 2024-09-25

## 2024-09-20 RX ADMIN — DAPTOMYCIN 116 MILLIGRAM(S): 500 INJECTION, POWDER, LYOPHILIZED, FOR SOLUTION INTRAVENOUS at 16:24

## 2024-09-20 RX ADMIN — Medication 10 MILLIGRAM(S): at 22:17

## 2024-09-20 RX ADMIN — OXYCODONE HYDROCHLORIDE 10 MILLIGRAM(S): 30 TABLET, FILM COATED, EXTENDED RELEASE ORAL at 17:24

## 2024-09-20 RX ADMIN — Medication 17 GRAM(S): at 13:51

## 2024-09-20 RX ADMIN — KETOROLAC TROMETHAMINE 10 MILLIGRAM(S): 10 TABLET, FILM COATED ORAL at 00:06

## 2024-09-20 RX ADMIN — Medication 10 MILLIGRAM(S): at 13:49

## 2024-09-20 RX ADMIN — DALFAMPRIDINE 10 MILLIGRAM(S): 10 TABLET, FILM COATED, EXTENDED RELEASE ORAL at 07:15

## 2024-09-20 RX ADMIN — Medication 0.5 MILLIGRAM(S): at 20:59

## 2024-09-20 RX ADMIN — PANTOPRAZOLE SODIUM 40 MILLIGRAM(S): 40 TABLET, DELAYED RELEASE ORAL at 10:12

## 2024-09-20 RX ADMIN — OXYCODONE HYDROCHLORIDE 10 MILLIGRAM(S): 30 TABLET, FILM COATED, EXTENDED RELEASE ORAL at 16:24

## 2024-09-20 RX ADMIN — Medication 10 MILLIGRAM(S): at 05:55

## 2024-09-20 RX ADMIN — DALFAMPRIDINE 10 MILLIGRAM(S): 10 TABLET, FILM COATED, EXTENDED RELEASE ORAL at 18:23

## 2024-09-20 RX ADMIN — Medication 60 MILLIGRAM(S): at 22:17

## 2024-09-20 RX ADMIN — MUPIROCIN 1 APPLICATION(S): 20 OINTMENT TOPICAL at 18:46

## 2024-09-20 RX ADMIN — KETOROLAC TROMETHAMINE 15 MILLIGRAM(S): 10 TABLET, FILM COATED ORAL at 23:48

## 2024-09-20 RX ADMIN — KETOROLAC TROMETHAMINE 10 MILLIGRAM(S): 10 TABLET, FILM COATED ORAL at 10:08

## 2024-09-20 RX ADMIN — ENOXAPARIN SODIUM 40 MILLIGRAM(S): 150 INJECTION SUBCUTANEOUS at 18:48

## 2024-09-20 RX ADMIN — CHLORHEXIDINE GLUCONATE ORAL RINSE 1 APPLICATION(S): 1.2 SOLUTION DENTAL at 13:47

## 2024-09-20 RX ADMIN — Medication 2 TABLET(S): at 22:18

## 2024-09-20 RX ADMIN — SERTRALINE HYDROCHLORIDE 125 MILLIGRAM(S): 100 TABLET, FILM COATED ORAL at 13:45

## 2024-09-20 RX ADMIN — MODAFINIL 200 MILLIGRAM(S): 100 TABLET ORAL at 13:47

## 2024-09-20 RX ADMIN — OXYCODONE HYDROCHLORIDE 10 MILLIGRAM(S): 30 TABLET, FILM COATED, EXTENDED RELEASE ORAL at 22:55

## 2024-09-20 RX ADMIN — MEROPENEM 100 MILLIGRAM(S): 500 INJECTION INTRAVENOUS at 13:48

## 2024-09-20 RX ADMIN — MEROPENEM 100 MILLIGRAM(S): 500 INJECTION INTRAVENOUS at 05:55

## 2024-09-20 RX ADMIN — MULTI VITAMIN/MINERAL SUPPLEMENT WITH ASCORBIC ACID, NIACIN, PYRIDOXINE, PANTOTHENIC ACID, FOLIC ACID, RIBOFLAVIN, THIAMIN, BIOTIN, COBALAMIN AND ZINC. 1 TABLET(S): 60; 20; 12.5; 10; 10; 1.7; 1.5; 1; .3; .006 TABLET, COATED ORAL at 13:47

## 2024-09-20 RX ADMIN — OXYCODONE HYDROCHLORIDE 10 MILLIGRAM(S): 30 TABLET, FILM COATED, EXTENDED RELEASE ORAL at 20:59

## 2024-09-20 RX ADMIN — Medication 100 MILLILITER(S): at 13:49

## 2024-09-20 RX ADMIN — KETOROLAC TROMETHAMINE 15 MILLIGRAM(S): 10 TABLET, FILM COATED ORAL at 23:17

## 2024-09-20 RX ADMIN — OXYCODONE HYDROCHLORIDE 5 MILLIGRAM(S): 30 TABLET, FILM COATED, EXTENDED RELEASE ORAL at 05:55

## 2024-09-20 RX ADMIN — MEROPENEM 100 MILLIGRAM(S): 500 INJECTION INTRAVENOUS at 22:17

## 2024-09-20 NOTE — PROGRESS NOTE ADULT - PROBLEM SELECTOR PLAN 1
History of septic arthritis with multiple admissions s/p I&D with CTS and L pectoral flap with plastic surgery on last admission (9/4-9/13). Was discharged home on meropenem 1g q8 and van 1250 q12 with reportedly no missed doses.   - repeat CT: Status post left sternoclavicular resection with pectoralis flap. Mixed density soft tissue and/or hematoma in the operative bed, similar in size. Interval development of rim-enhancing collections, concerning for infection.  - no acute surgical intervention for surgical site erythema per plastic surgery   - no acute surgical intervention per CT surgery  - c/w meropenem 1g q8  - ID: daptomycin 400mg   - monitor LISBETH drain output: serosang  - multimodal pain control with oxy 5/10 and tylenol PRN History of septic arthritis with multiple admissions s/p I&D with CTS and L pectoral flap with plastic surgery on last admission (9/4-9/13). Was discharged home on meropenem 1g q8 and van 1250 q12 with reportedly no missed doses.   - repeat CT: Status post left sternoclavicular resection with pectoralis flap. Mixed density soft tissue and/or hematoma in the operative bed, similar in size. Interval development of rim-enhancing collections, concerning for infection.  - no acute surgical intervention for surgical site erythema per plastic surgery   - no acute surgical intervention per CT surgery  - c/w meropenem 1g q8  - ID: daptomycin 400mg   - monitor LISBETH drain output: cloudy, serosang  - multimodal pain control with oxy 5/10 and tylenol PRN

## 2024-09-20 NOTE — PROGRESS NOTE ADULT - ASSESSMENT
44F with PMH of MS on Rituxan, anxiety, depression, septic arthritis of L sternoclavicular joint s/p multiple I&Ds in 2023, recent recurrence s/p I&D with CTS and pec turnover flap with Dr. Dean on 9/9, d/c'ed with PICC line on vanc/bryson, now presenting with redness and increased pain. PRS consulted for possible surgical site infection. CT chest shows normal postop changes with possible small hematoma around flap, LISBETH drains within area, no abscess/collection. Small PE without heart strain noted. Repeat CT from 9/18 now shows rim enhancing collections at the surgical site.      plan  - no acute surgical intervention at this time  - keep drains, will monitor  - local wound care with mupirocin and daily dressing changes  - abx per ID  - rest per primary

## 2024-09-20 NOTE — PROGRESS NOTE ADULT - ASSESSMENT
44y Female with PMH of MS on Ocrevus anxiety, depression, septic arthritis of L sternoclavicular joint s/p I&Dx2 and cefepime for 6 weeks (12/2023-1/2024), recent admission 9/9-9/13 for L clavicular pain s/p I+D with L pectoralis flap sent home on meropenem and vancomycin for 6 week course presents for L shoulder pain, L sided pel chest pain, headache, fever at home. Son on phone also notes that the surgical site appeared more erythematous day prior to admission and had associated L arm swelling.    Admitted 9/9-9/13 most recently; MRI L clavicle with prior resection of the medial left clavicle with rim-enhancing fluid and surrounding phlegmon abutting the resected bony margin. S/p surgery on 9/9 with L SCJ debridement by CT surgery and L pec flap by PRS. Cultures grew Staph epi and Corynebacterium in fluid media, blood cultures negative. Discharged on tentatively 6 weeks vancomycin and meropenem for empiric coverage.    Here Tmax 100.1, mild tachycardia  WBC 12.1, neutrophilic. Lactate wnl  , ALT 78,     CT chest: Small nonocclusive right lower lobe basilar subsegmental pulmonary emboli, no evidence of right heart strain. 14 cm in greatest dimension expansile mostly soft tissue density structure filling and expanding the operative bed in the region of the left medial clavicle (thought to represent combination of muscle)    MICRO DATA:   09/15 BCX: IP   09/15 BCX: IP   09/10 AFB Cx: IP  09/10 Fungal CX: IP  09/10 Bacterial CX: fluid media only- corynebacterium striatum and staph epidermidis     # Low grade Fever, leukocytosis  # Septic sternoclavicular joint s/p debridement and muscle flap L clavicle  # Post-Operative abscess  # New GN bacteremia    # transaminitis     Abnormal CT with new rim enhancing collection in under the flap, purulent drainage noted from sternal side of the incision.         RECOMMENDATIONS:   - c/w dapto 400 mg q24h  - CPK noted.   - Continue IV meropenem 1g q8h  - follow-up final identification and sensitivity of GNR  - repeat blood cx   - thoracic/plastic surgery following, cleaned up and sutured, new wound cx sent   - abscess cx prelim   - monitor LFTs, stable   Trend WBC, leucocytosis       Plan discussed with Medicine Attending.       Odalis Reynaga  Please contact through MS Teams   If no response or past 5 pm/weekend call [phone].

## 2024-09-20 NOTE — PROVIDER CONTACT NOTE (MEDICATION) - SITUATION
Patient c/o 9/10 pain originating from her surgical site located R clavicular site. As per patient, current PRN oxycodone orders provide partial relief.

## 2024-09-20 NOTE — PROGRESS NOTE ADULT - SUBJECTIVE AND OBJECTIVE BOX
44yPatient is a 44y old  Female who presents with a chief complaint of L chest pain, SIRS, Acute Pulmonary Embolism (20 Sep 2024 09:25)      Interval history:  Afebrile, s/p suturing of the draining areas after clean up.       Allergies:   No Known Allergies    Antimicrobials:  DAPTOmycin IVPB 400 milliGRAM(s) IV Intermittent every 24 hours  meropenem  IVPB 1000 milliGRAM(s) IV Intermittent every 8 hours      REVIEW OF SYSTEMS:  No SOB  No abdominal pain  No rash.     Vital Signs Last 24 Hrs  T(C): 36.4 (09-20-24 @ 08:39), Max: 37.6 (09-19-24 @ 19:38)  T(F): 97.6 (09-20-24 @ 08:39), Max: 99.7 (09-19-24 @ 19:38)  HR: 87 (09-20-24 @ 08:39) (87 - 99)  BP: 86/53 (09-20-24 @ 08:39) (86/53 - 97/61)  BP(mean): --  RR: 18 (09-20-24 @ 08:39) (18 - 18)  SpO2: 94% (09-20-24 @ 08:39) (92% - 94%)      PHYSICAL EXAM:  Pt in no acute distress, alert, awake.   rt arm PICC   lt sided graft site with surrounding improved erythema, new sutures   2 LISBETH's with cloudy fluid   non distended abdomen  no edema LE                               7.6    12.64 )-----------( 497      ( 20 Sep 2024 07:22 )             24.1   09-20    136  |  99  |  11  ----------------------------<  91  4.3   |  28  |  0.52    Ca    9.2      20 Sep 2024 07:22  Phos  3.5     09-20  Mg     2.2     09-20    TPro  5.9[L]  /  Alb  3.1[L]  /  TBili  0.1[L]  /  DBili  x   /  AST  21  /  ALT  46[H]  /  AlkPhos  310[H]  09-20      LIVER FUNCTIONS - ( 20 Sep 2024 07:22 )  Alb: 3.1 g/dL / Pro: 5.9 g/dL / ALK PHOS: 310 U/L / ALT: 46 U/L / AST: 21 U/L / GGT: x               Culture - Abscess with Gram Stain (collected 19 Sep 2024 15:59)  Source: .Abscess lt chest  Gram Stain (19 Sep 2024 23:43):    Rare polymorphonuclear leukocytes seen per low power field    No organisms seen per oil power field  Preliminary Report (20 Sep 2024 15:36):    No growth

## 2024-09-20 NOTE — PROGRESS NOTE ADULT - ATTENDING COMMENTS
43F pmh MS, anxiety, depression, septic arthritis of L sternoclavicular joint s/p multiple admissions requiring I&D and most recent admission (9/4-9/13) with I&D and L pectoral flap for evaluation of abscess p/w worsening L sided chest pain and swelling a/w SIRS also incidentally noted to have small nonocclusive subsegmental RLL PE.  #Surgical Site infection  #Incidental PE  #Bacteremia  -on dapto with continued improvement  -continue meropenem  -Bcx drawn on admission now with GNR?, no other clinical evidence, will discuss with micro lab. repeat bcx in process  -restarted dvt ppx given hgb stability   -pain more controlled on increased oxycodone dose  -Increasing bowel regimen

## 2024-09-20 NOTE — PROGRESS NOTE ADULT - PROBLEM SELECTOR PLAN 3
Patient admitted with Hgb 12.2, now 7.7 c/f unknown source of bleeding, pt with known history of RAMONE  - trend CBC  - F/u anemia workup labs: negative  - Fe studies from 9/11/24 shows iron deficiency- holding Fe iso active infection  - hold lovenox iso of potential bleed  - Repeat CT CAP: no source of bleed Patient admitted with Hgb 12.2, now 7.7 c/f unknown source of bleeding, pt with known history of RAMONE  - trend CBC  - F/u anemia workup labs: negative  - Fe studies from 9/11/24 shows iron deficiency- holding Fe iso active infection  - Hgb stable in mid 7's, restart lovenox  - Repeat CT CAP: no source of bleed

## 2024-09-20 NOTE — PROGRESS NOTE ADULT - SUBJECTIVE AND OBJECTIVE BOX
Pilar Flanagan  MS4  Preferred contact via [employer] Teams      Patient is a 44y old  Female who presents with a chief complaint of L chest pain, SIRS, Acute Pulmonary Embolism (19 Sep 2024 17:46)      SUBJECTIVE / OVERNIGHT EVENTS: Pt received one dose toradol overnight. Pt seen and examined at bedside, in no acute distress.    MEDICATIONS  (STANDING):  baclofen 10 milliGRAM(s) Oral every 8 hours  chlorhexidine 2% Cloths 1 Application(s) Topical daily  dalfampridine ER 10 milliGRAM(s) Oral every 12 hours  DAPTOmycin IVPB 400 milliGRAM(s) IV Intermittent every 24 hours  DULoxetine 60 milliGRAM(s) Oral at bedtime  influenza   Vaccine 0.5 milliLiter(s) IntraMuscular once  meropenem  IVPB 1000 milliGRAM(s) IV Intermittent every 8 hours  modafinil 200 milliGRAM(s) Oral daily  multivitamin 1 Tablet(s) Oral daily  polyethylene glycol 3350 17 Gram(s) Oral daily  senna 2 Tablet(s) Oral at bedtime  sertraline 125 milliGRAM(s) Oral daily  Vibegron (Gemtesa) 75 mg tablet 1 Tablet(s) 1 Tablet(s) Oral daily    MEDICATIONS  (PRN):  bisacodyl Suppository 10 milliGRAM(s) Rectal daily PRN Constipation  clonazePAM  Tablet 0.5 milliGRAM(s) Oral daily PRN for anxiety  oxyCODONE    IR 5 milliGRAM(s) Oral every 4 hours PRN Moderate Pain (4 - 6)  oxyCODONE    IR 10 milliGRAM(s) Oral every 4 hours PRN Severe Pain (7 - 10)      CAPILLARY BLOOD GLUCOSE        I&O's Summary    19 Sep 2024 07:01  -  20 Sep 2024 07:00  --------------------------------------------------------  IN: 480 mL / OUT: 315 mL / NET: 165 mL        PHYSICAL EXAM:  CONSTITUTIONAL: Well groomed, no apparent distress  EYES: PERRLA and symmetric, EOMI, No conjunctival or scleral injection, non-icteric  ENMT: Oral mucosa with moist membranes. Normal dentition; no pharyngeal injection or exudates  SKIN: L sternoclavicular region with L shaped surgical site with adhesed skin. erythema along surgical site border but no drainage, again improved from yesterday, serosanguinous drain output  RESP: No respiratory distress, no use of accessory muscles; CTA b/l, no WRR  CV: RRR, +S1S2, no MRG; no JVD; no peripheral edema  GI: Soft, NT, ND, no rebound, no guarding; no palpable masses; no hepatosplenomegaly; no hernia palpated  MSK: Normal gait; No digital clubbing or cyanosis; examination of the (head/neck/spine/ribs/pelvis, RUE, LUE, RLE, LLE) without misalignment,   Normal ROM without pain, no spinal tenderness, normal muscle strength/tone  NEURO: Strength was 5/5 in all four extremities except cannot raise left arm but good arm .  Chronic right leg weakness 5-/5  PSYCH: Appropriate insight/judgment; A+O x 3, pt feeling anxious    LABS:                        7.8    13.50 )-----------( 468      ( 19 Sep 2024 07:24 )             24.5      09-19    136  |  99  |  10  ----------------------------<  91  4.0   |  27  |  0.45[L]    Ca    8.8      19 Sep 2024 07:23  Phos  3.1     09-19  Mg     2.2     09-19    TPro  5.9[L]  /  Alb  3.2[L]  /  TBili  0.1[L]  /  DBili  <0.1  /  AST  24  /  ALT  50[H]  /  AlkPhos  330[H]  09-19          Urinalysis Basic - ( 19 Sep 2024 07:23 )    Color: x / Appearance: x / SG: x / pH: x  Gluc: 91 mg/dL / Ketone: x  / Bili: x / Urobili: x   Blood: x / Protein: x / Nitrite: x   Leuk Esterase: x / RBC: x / WBC x   Sq Epi: x / Non Sq Epi: x / Bacteria: x        RADIOLOGY & ADDITIONAL TESTS:    Imaging Personally Reviewed:    Consultant(s) Notes Reviewed:      Care Discussed with Consultants/Other Providers:   Pilar Flanagan  MS4  Preferred contact via [employer] Teams      Patient is a 44y old  Female who presents with a chief complaint of L chest pain, SIRS, Acute Pulmonary Embolism (19 Sep 2024 17:46)      SUBJECTIVE / OVERNIGHT EVENTS: Pt received one dose toradol overnight. Pt seen and examined at bedside, in no acute distress. Pt reports mostly feeling tired. Endorses better pain control with current regimen. Only 1 bowel movement since admission but does not feel constipated, is passing flatus. Ate dinner but appetite not at baseline.    MEDICATIONS  (STANDING):  baclofen 10 milliGRAM(s) Oral every 8 hours  chlorhexidine 2% Cloths 1 Application(s) Topical daily  dalfampridine ER 10 milliGRAM(s) Oral every 12 hours  DAPTOmycin IVPB 400 milliGRAM(s) IV Intermittent every 24 hours  DULoxetine 60 milliGRAM(s) Oral at bedtime  influenza   Vaccine 0.5 milliLiter(s) IntraMuscular once  meropenem  IVPB 1000 milliGRAM(s) IV Intermittent every 8 hours  modafinil 200 milliGRAM(s) Oral daily  multivitamin 1 Tablet(s) Oral daily  polyethylene glycol 3350 17 Gram(s) Oral daily  senna 2 Tablet(s) Oral at bedtime  sertraline 125 milliGRAM(s) Oral daily  Vibegron (Gemtesa) 75 mg tablet 1 Tablet(s) 1 Tablet(s) Oral daily    MEDICATIONS  (PRN):  bisacodyl Suppository 10 milliGRAM(s) Rectal daily PRN Constipation  clonazePAM  Tablet 0.5 milliGRAM(s) Oral daily PRN for anxiety  oxyCODONE    IR 5 milliGRAM(s) Oral every 4 hours PRN Moderate Pain (4 - 6)  oxyCODONE    IR 10 milliGRAM(s) Oral every 4 hours PRN Severe Pain (7 - 10)      CAPILLARY BLOOD GLUCOSE        I&O's Summary    19 Sep 2024 07:01  -  20 Sep 2024 07:00  --------------------------------------------------------  IN: 480 mL / OUT: 315 mL / NET: 165 mL        PHYSICAL EXAM:  CONSTITUTIONAL: Well groomed, no apparent distress  EYES: PERRLA and symmetric, EOMI, No conjunctival or scleral injection, non-icteric  ENMT: Oral mucosa with moist membranes. Normal dentition; no pharyngeal injection or exudates  SKIN: L sternoclavicular region with L shaped surgical site with adhesed skin. erythema along surgical site border but no drainage, again improved from yesterday, cloudy serosanguinous drain output  RESP: No respiratory distress, no use of accessory muscles; CTA b/l, no WRR  CV: RRR, +S1S2, no MRG; no JVD; no peripheral edema  GI: Soft, NT, ND, no rebound, no guarding; no palpable masses; no hepatosplenomegaly; no hernia palpated  MSK: Normal gait; No digital clubbing or cyanosis; examination of the (head/neck/spine/ribs/pelvis, RUE, LUE, RLE, LLE) without misalignment,   Normal ROM without pain, no spinal tenderness, normal muscle strength/tone  NEURO: Strength was 5/5 in all four extremities except cannot raise left arm but good arm .  Chronic right leg weakness 5-/5  PSYCH: Appropriate insight/judgment; A+O x 3, pt feeling anxious    LABS:                        7.8    13.50 )-----------( 468      ( 19 Sep 2024 07:24 )             24.5      09-19    136  |  99  |  10  ----------------------------<  91  4.0   |  27  |  0.45[L]    Ca    8.8      19 Sep 2024 07:23  Phos  3.1     09-19  Mg     2.2     09-19    TPro  5.9[L]  /  Alb  3.2[L]  /  TBili  0.1[L]  /  DBili  <0.1  /  AST  24  /  ALT  50[H]  /  AlkPhos  330[H]  09-19          Urinalysis Basic - ( 19 Sep 2024 07:23 )    Color: x / Appearance: x / SG: x / pH: x  Gluc: 91 mg/dL / Ketone: x  / Bili: x / Urobili: x   Blood: x / Protein: x / Nitrite: x   Leuk Esterase: x / RBC: x / WBC x   Sq Epi: x / Non Sq Epi: x / Bacteria: x        RADIOLOGY & ADDITIONAL TESTS:    Imaging Personally Reviewed:    Consultant(s) Notes Reviewed:      Care Discussed with Consultants/Other Providers:

## 2024-09-20 NOTE — PROGRESS NOTE ADULT - SUBJECTIVE AND OBJECTIVE BOX
INTERVAL EVENTS: NAEO    SUBJECTIVE: Surgical site had a small open hole where liquid could drain. The team cleaned the wound, placed nylon sutures, flushed the drains with irrisept and replaced the bulbs with fresh ones.    --------------------------------------------------------------------------------------  VITAL SIGNS:  T(C): 36.4 (09-20-24 @ 08:39), Max: 37.6 (09-19-24 @ 19:38)  HR: 87 (09-20-24 @ 08:39) (87 - 99)  BP: 86/53 (09-20-24 @ 08:39) (86/53 - 98/47)  RR: 18 (09-20-24 @ 08:39) (18 - 18)  SpO2: 94% (09-20-24 @ 08:39) (92% - 96%)  --------------------------------------------------------------------------------------    EXAM    General: NAD, resting in bed comfortably  Cardiac: Regular rate, warm and well perfused  Respiratory: Nonlabored respirations, normal cw expansion, on RA  Abdomen: Soft, nondistended, nontender to palpation  Extremities: Erythema around the wound has almost completely resolved. Small open area present that was closed with nylon sutures. LISBETH bulbs remain in place with chalky output.    --------------------------------------------------------------------------------------

## 2024-09-20 NOTE — PROGRESS NOTE ADULT - PROBLEM SELECTOR PLAN 2
- Bcx growing GNR but PCR negative  - C/w bryson and dapto as above - Bcx growing GNR but PCR negative  - C/w bryson and dapto as above  - f/u repeat cultures - Bcx growing GNR but PCR negative  - Will discuss results with lab  - C/w bryson and luiso as above  - f/u repeat cultures

## 2024-09-21 LAB
ALBUMIN SERPL ELPH-MCNC: 3.1 G/DL — LOW (ref 3.3–5)
ALP SERPL-CCNC: 274 U/L — HIGH (ref 40–120)
ALT FLD-CCNC: 34 U/L — SIGNIFICANT CHANGE UP (ref 10–45)
ANION GAP SERPL CALC-SCNC: 10 MMOL/L — SIGNIFICANT CHANGE UP (ref 5–17)
AST SERPL-CCNC: 11 U/L — SIGNIFICANT CHANGE UP (ref 10–40)
BILIRUB SERPL-MCNC: <0.1 MG/DL — LOW (ref 0.2–1.2)
BUN SERPL-MCNC: 10 MG/DL — SIGNIFICANT CHANGE UP (ref 7–23)
CALCIUM SERPL-MCNC: 8.9 MG/DL — SIGNIFICANT CHANGE UP (ref 8.4–10.5)
CHLORIDE SERPL-SCNC: 100 MMOL/L — SIGNIFICANT CHANGE UP (ref 96–108)
CO2 SERPL-SCNC: 26 MMOL/L — SIGNIFICANT CHANGE UP (ref 22–31)
CREAT SERPL-MCNC: 0.5 MG/DL — SIGNIFICANT CHANGE UP (ref 0.5–1.3)
EGFR: 118 ML/MIN/1.73M2 — SIGNIFICANT CHANGE UP
GLUCOSE SERPL-MCNC: 84 MG/DL — SIGNIFICANT CHANGE UP (ref 70–99)
HCT VFR BLD CALC: 22.8 % — LOW (ref 34.5–45)
HGB BLD-MCNC: 7.3 G/DL — LOW (ref 11.5–15.5)
MAGNESIUM SERPL-MCNC: 2.3 MG/DL — SIGNIFICANT CHANGE UP (ref 1.6–2.6)
MCHC RBC-ENTMCNC: 27 PG — SIGNIFICANT CHANGE UP (ref 27–34)
MCHC RBC-ENTMCNC: 32 GM/DL — SIGNIFICANT CHANGE UP (ref 32–36)
MCV RBC AUTO: 84.4 FL — SIGNIFICANT CHANGE UP (ref 80–100)
NRBC # BLD: 0 /100 WBCS — SIGNIFICANT CHANGE UP (ref 0–0)
PHOSPHATE SERPL-MCNC: 2.9 MG/DL — SIGNIFICANT CHANGE UP (ref 2.5–4.5)
PLATELET # BLD AUTO: 496 K/UL — HIGH (ref 150–400)
POTASSIUM SERPL-MCNC: 4.2 MMOL/L — SIGNIFICANT CHANGE UP (ref 3.5–5.3)
POTASSIUM SERPL-SCNC: 4.2 MMOL/L — SIGNIFICANT CHANGE UP (ref 3.5–5.3)
PROT SERPL-MCNC: 5.7 G/DL — LOW (ref 6–8.3)
RBC # BLD: 2.7 M/UL — LOW (ref 3.8–5.2)
RBC # FLD: 13.4 % — SIGNIFICANT CHANGE UP (ref 10.3–14.5)
SODIUM SERPL-SCNC: 136 MMOL/L — SIGNIFICANT CHANGE UP (ref 135–145)
WBC # BLD: 13.82 K/UL — HIGH (ref 3.8–10.5)
WBC # FLD AUTO: 13.82 K/UL — HIGH (ref 3.8–10.5)

## 2024-09-21 PROCEDURE — 99233 SBSQ HOSP IP/OBS HIGH 50: CPT | Mod: GC

## 2024-09-21 RX ORDER — ACETAMINOPHEN 325 MG
650 TABLET ORAL EVERY 6 HOURS
Refills: 0 | Status: DISCONTINUED | OUTPATIENT
Start: 2024-09-21 | End: 2024-09-25

## 2024-09-21 RX ORDER — MODAFINIL 100 MG/1
200 TABLET ORAL DAILY
Refills: 0 | Status: DISCONTINUED | OUTPATIENT
Start: 2024-09-21 | End: 2024-09-25

## 2024-09-21 RX ORDER — HYDROMORPHONE HYDROCHLORIDE 1 MG/ML
4 INJECTION, SOLUTION INTRAMUSCULAR; INTRAVENOUS; SUBCUTANEOUS EVERY 8 HOURS
Refills: 0 | Status: DISCONTINUED | OUTPATIENT
Start: 2024-09-21 | End: 2024-09-25

## 2024-09-21 RX ORDER — CLONAZEPAM 1 MG
0.5 TABLET ORAL DAILY
Refills: 0 | Status: DISCONTINUED | OUTPATIENT
Start: 2024-09-21 | End: 2024-09-25

## 2024-09-21 RX ORDER — HYDROMORPHONE HYDROCHLORIDE 1 MG/ML
2 INJECTION, SOLUTION INTRAMUSCULAR; INTRAVENOUS; SUBCUTANEOUS EVERY 6 HOURS
Refills: 0 | Status: DISCONTINUED | OUTPATIENT
Start: 2024-09-21 | End: 2024-09-25

## 2024-09-21 RX ORDER — HYDROMORPHONE HYDROCHLORIDE 1 MG/ML
2 INJECTION, SOLUTION INTRAMUSCULAR; INTRAVENOUS; SUBCUTANEOUS EVERY 6 HOURS
Refills: 0 | Status: DISCONTINUED | OUTPATIENT
Start: 2024-09-21 | End: 2024-09-21

## 2024-09-21 RX ADMIN — MEROPENEM 100 MILLIGRAM(S): 500 INJECTION INTRAVENOUS at 14:00

## 2024-09-21 RX ADMIN — CHLORHEXIDINE GLUCONATE ORAL RINSE 1 APPLICATION(S): 1.2 SOLUTION DENTAL at 12:12

## 2024-09-21 RX ADMIN — Medication 500 MILLIGRAM(S): at 23:26

## 2024-09-21 RX ADMIN — MEROPENEM 100 MILLIGRAM(S): 500 INJECTION INTRAVENOUS at 06:17

## 2024-09-21 RX ADMIN — Medication 650 MILLIGRAM(S): at 12:12

## 2024-09-21 RX ADMIN — Medication 60 MILLIGRAM(S): at 21:40

## 2024-09-21 RX ADMIN — ENOXAPARIN SODIUM 40 MILLIGRAM(S): 150 INJECTION SUBCUTANEOUS at 17:37

## 2024-09-21 RX ADMIN — HYDROMORPHONE HYDROCHLORIDE 4 MILLIGRAM(S): 1 INJECTION, SOLUTION INTRAMUSCULAR; INTRAVENOUS; SUBCUTANEOUS at 21:54

## 2024-09-21 RX ADMIN — HYDROMORPHONE HYDROCHLORIDE 4 MILLIGRAM(S): 1 INJECTION, SOLUTION INTRAMUSCULAR; INTRAVENOUS; SUBCUTANEOUS at 20:54

## 2024-09-21 RX ADMIN — Medication 500 MILLIGRAM(S): at 17:38

## 2024-09-21 RX ADMIN — MUPIROCIN 1 APPLICATION(S): 20 OINTMENT TOPICAL at 06:18

## 2024-09-21 RX ADMIN — PANTOPRAZOLE SODIUM 40 MILLIGRAM(S): 40 TABLET, DELAYED RELEASE ORAL at 06:17

## 2024-09-21 RX ADMIN — Medication 10 MILLIGRAM(S): at 14:00

## 2024-09-21 RX ADMIN — SERTRALINE HYDROCHLORIDE 125 MILLIGRAM(S): 100 TABLET, FILM COATED ORAL at 12:13

## 2024-09-21 RX ADMIN — HYDROMORPHONE HYDROCHLORIDE 2 MILLIGRAM(S): 1 INJECTION, SOLUTION INTRAMUSCULAR; INTRAVENOUS; SUBCUTANEOUS at 23:24

## 2024-09-21 RX ADMIN — MODAFINIL 200 MILLIGRAM(S): 100 TABLET ORAL at 12:13

## 2024-09-21 RX ADMIN — Medication 0.5 MILLIGRAM(S): at 20:06

## 2024-09-21 RX ADMIN — MEROPENEM 100 MILLIGRAM(S): 500 INJECTION INTRAVENOUS at 21:40

## 2024-09-21 RX ADMIN — Medication 2 TABLET(S): at 21:40

## 2024-09-21 RX ADMIN — HYDROMORPHONE HYDROCHLORIDE 2 MILLIGRAM(S): 1 INJECTION, SOLUTION INTRAMUSCULAR; INTRAVENOUS; SUBCUTANEOUS at 08:43

## 2024-09-21 RX ADMIN — HYDROMORPHONE HYDROCHLORIDE 4 MILLIGRAM(S): 1 INJECTION, SOLUTION INTRAMUSCULAR; INTRAVENOUS; SUBCUTANEOUS at 12:31

## 2024-09-21 RX ADMIN — OXYCODONE HYDROCHLORIDE 5 MILLIGRAM(S): 30 TABLET, FILM COATED, EXTENDED RELEASE ORAL at 06:25

## 2024-09-21 RX ADMIN — DALFAMPRIDINE 10 MILLIGRAM(S): 10 TABLET, FILM COATED, EXTENDED RELEASE ORAL at 06:37

## 2024-09-21 RX ADMIN — Medication 10 MILLIGRAM(S): at 21:40

## 2024-09-21 RX ADMIN — MUPIROCIN 1 APPLICATION(S): 20 OINTMENT TOPICAL at 17:39

## 2024-09-21 RX ADMIN — MULTI VITAMIN/MINERAL SUPPLEMENT WITH ASCORBIC ACID, NIACIN, PYRIDOXINE, PANTOTHENIC ACID, FOLIC ACID, RIBOFLAVIN, THIAMIN, BIOTIN, COBALAMIN AND ZINC. 1 TABLET(S): 60; 20; 12.5; 10; 10; 1.7; 1.5; 1; .3; .006 TABLET, COATED ORAL at 12:12

## 2024-09-21 RX ADMIN — DAPTOMYCIN 116 MILLIGRAM(S): 500 INJECTION, POWDER, LYOPHILIZED, FOR SOLUTION INTRAVENOUS at 14:43

## 2024-09-21 RX ADMIN — Medication 650 MILLIGRAM(S): at 21:06

## 2024-09-21 RX ADMIN — Medication 500 MILLIGRAM(S): at 12:12

## 2024-09-21 RX ADMIN — Medication 650 MILLIGRAM(S): at 20:06

## 2024-09-21 RX ADMIN — Medication 10 MILLIGRAM(S): at 06:18

## 2024-09-21 RX ADMIN — DALFAMPRIDINE 10 MILLIGRAM(S): 10 TABLET, FILM COATED, EXTENDED RELEASE ORAL at 17:58

## 2024-09-21 RX ADMIN — HYDROMORPHONE HYDROCHLORIDE 2 MILLIGRAM(S): 1 INJECTION, SOLUTION INTRAMUSCULAR; INTRAVENOUS; SUBCUTANEOUS at 09:13

## 2024-09-21 NOTE — PROGRESS NOTE ADULT - ATTENDING COMMENTS
43F pmh MS, anxiety, depression, septic arthritis of L sternoclavicular joint s/p multiple admissions requiring I&D and most recent admission (9/4-9/13) with I&D and L pectoral flap for evaluation of abscess p/w worsening L sided chest pain and swelling a/w SIRS also incidentally noted to have small nonocclusive subsegmental RLL PE.  #Surgical Site infection  #Incidental PE  #Bacteremia  -on dapto and meropenem, will continue  -repeat imaging with rim enhancing lesions, s/p drainage and resuturing, possible operative management pending   -Bcx drawn on admission now with GNR in 1/2, pending finalization, repeat bcx drawn  -restarted dvt ppx given hgb stabilit, tolerating  -continue pain control with po opioids, adding methocarbamol for augmentation  -monitoring bms closely

## 2024-09-21 NOTE — PROGRESS NOTE ADULT - SUBJECTIVE AND OBJECTIVE BOX
INTERVAL EVENTS: NAEO    SUBJECTIVE: Patient reports some continued pain around the drain site, but otherwise is comfortable. Erythema around the incision site remains stable. After irrisept drainage and cleaning yesterday, LISBETH output appears less cloudy and more S/S.    --------------------------------------------------------------------------------------  VITAL SIGNS:  T(C): 37.4 (09-21-24 @ 00:59), Max: 37.4 (09-21-24 @ 00:59)  HR: 98 (09-21-24 @ 00:59) (87 - 98)  BP: 88/50 (09-21-24 @ 00:59) (86/53 - 108/78)  RR: 18 (09-21-24 @ 00:59) (18 - 18)  SpO2: 94% (09-21-24 @ 00:59) (94% - 94%)  --------------------------------------------------------------------------------------    EXAM    General: NAD, resting in bed comfortably  Cardiac: Regular rate, warm and well perfused  Chest: Incision over left clavicle with some erythema around incision site, slight improvement from yesterday. 2xJP drains. One has S/S output, the other with slight chalky output, but seemingly improved from yesterday.  Respiratory: Nonlabored respirations, normal cw expansion, on RA  Abdomen: Soft, nondistended, nontender to palpation  Extremities: Warm, well perfused    --------------------------------------------------------------------------------------

## 2024-09-21 NOTE — PROGRESS NOTE ADULT - ASSESSMENT
44F s/p left SCJ debridement and left pectoral flap on 9/9 presenting with superficial surgical site erythema and localized pain. Also found to have small subsegmental right lower lobe pulmonary emboli.  Recommendations:  -No acute thoracic surgery operative intervention at this time.  -PRS consult.  -ID consult.  -Dispo per PRS    9/16: Evaluated at ED bedside by Thoracic Attending Dr. Bautista. Recommend close ID follow up for cellulitis of site. Vanco trough level noted to be 5.5, ? unclear if patient was taking antibiotics appropriately in outpatient setting.   9/17 Antibiotic switched to dapto per ID. Site improving  9/18 VSS  afebrile.  site with decreased erythema  9/19 VSS afebile site  COLTON improving - OT daily-  large stool burden seen on CT d/w Primary team consider MOVANTIK / suppository   9/21 maintain jpss. analgesics for pain, revision to be considered

## 2024-09-21 NOTE — PROGRESS NOTE ADULT - ASSESSMENT
43F with MS, anxiety, depression, septic arthritis of L sternoclavicular joint s/p multiple admissions requiring I&D and most recent admission (9/4-9/13) with I&D and L pectoral flap for evaluation of abscess now presenting to the ED with L sided chest pain with newly noted small nonocclusive RLL PE likely incidental. Course c/b anemia of unclear etiology. CT with evidence of new rim enhancing lesion s/p abscess drainage. Course further c/b low grade gram negative rods bacteremia on 9/15 pending plastics and ID reevaluation.

## 2024-09-21 NOTE — PROGRESS NOTE ADULT - ASSESSMENT
44F with PMH of MS on Rituxan, anxiety, depression, septic arthritis of L sternoclavicular joint s/p multiple I&Ds in 2023, recent recurrence s/p I&D with CTS and pec turnover flap with Dr. Dean on 9/9, d/c'ed with PICC line on vanc/bryson, now presenting with redness and increased pain. PRS consulted for possible surgical site infection. CT chest shows normal postop changes with possible small hematoma around flap, LISBETH drains within area, no abscess/collection. Small PE without heart strain noted. Repeat CT from 9/18 now shows rim enhancing collections at the surgical site.      plan  - no acute surgical intervention at this time - team is trying local, conservative measures - will continue to monitor to see if there is any improvement and if she needs to return to the OR  - keep drains, will monitor  - local wound care with mupirocin and daily dressing changes  - abx per ID  - rest per primary - please contact plastics if any concerns and if status changes

## 2024-09-21 NOTE — PROGRESS NOTE ADULT - PROBLEM SELECTOR PLAN 3
Patient admitted with Hgb 12.2, now 7.7 c/f unknown source of bleeding, pt with known history of RAMONE  - trend CBC  - F/u anemia workup labs: negative  - Fe studies from 9/11/24 shows iron deficiency- holding Fe iso active infection  - Hgb stable in mid 7's, restart lovenox  - Repeat CT CAP: no source of bleed

## 2024-09-21 NOTE — PROGRESS NOTE ADULT - PROBLEM SELECTOR PLAN 7
- Fluids: NA   - Electrolytes: Will replete to maintain K>4, Phos>3, and Mag>2  - Nutrition: regular   - Activity: OOB as tolerated   - DVT Prophylaxis: lovenox 40 qd  - Stress Ulcer/GI Prophylaxis: NA  - Disposition: pending medical management
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
## 2024-09-21 NOTE — PROGRESS NOTE ADULT - SUBJECTIVE AND OBJECTIVE BOX
Patient is a 44y old  Female who presents with a chief complaint of L chest pain, SIRS, Acute Pulmonary Embolism (20 Sep 2024 17:45)      SUBJECTIVE / OVERNIGHT EVENTS:  Patient seen and evaluated at bedside.  No events overnight.     Vital Signs Last 24 Hrs  T(C): 37.4 (21 Sep 2024 00:59), Max: 37.4 (21 Sep 2024 00:59)  T(F): 99.4 (21 Sep 2024 00:59), Max: 99.4 (21 Sep 2024 00:59)  HR: 98 (21 Sep 2024 00:59) (87 - 98)  BP: 88/50 (21 Sep 2024 00:59) (86/53 - 108/78)  BP(mean): --  RR: 18 (21 Sep 2024 00:59) (18 - 18)  SpO2: 94% (21 Sep 2024 00:59) (94% - 94%)    Parameters below as of 21 Sep 2024 00:59  Patient On (Oxygen Delivery Method): room air        PHYSICAL EXAM:  CONSTITUTIONAL: Well groomed, no apparent distress  SKIN: L sternoclavicular region with L shaped surgical site with adhesed skin. erythema along surgical site border but no drainage, again improved from yesterday, cloudy serosanguinous drain output  RESP: No respiratory distress, no use of accessory muscles; CTA b/l, no WRR  CV: RRR, +S1S2, no MRG; no JVD; no peripheral edema  GI: Soft, NT, ND, no rebound, no guarding; no palpable masses; no hepatosplenomegaly; no hernia palpated  NEURO: Strength was 5/5 in all four extremities except cannot raise left arm but good arm .  Chronic right leg weakness 5-/5  PSYCH: Appropriate insight/judgment; A+O x 3, pt feeling anxious    LABS:                        7.6    12.64 )-----------( 497      ( 20 Sep 2024 07:22 )             24.1     Hgb Trend: 7.6<--, 7.8<--, 7.5<--, 7.7<--, 8.1<--  09-20    136  |  99  |  11  ----------------------------<  91  4.3   |  28  |  0.52    Ca    9.2      20 Sep 2024 07:22  Phos  3.5     09-20  Mg     2.2     09-20    TPro  5.9[L]  /  Alb  3.1[L]  /  TBili  0.1[L]  /  DBili  x   /  AST  21  /  ALT  46[H]  /  AlkPhos  310[H]  09-20    Creatinine Trend: 0.52<--, 0.45<--, 0.44<--, 0.40<--, 0.41<--, 0.40<--  LIVER FUNCTIONS - ( 20 Sep 2024 07:22 )  Alb: 3.1 g/dL / Pro: 5.9 g/dL / ALK PHOS: 310 U/L / ALT: 46 U/L / AST: 21 U/L / GGT: x                 Urinalysis Basic - ( 20 Sep 2024 07:22 )    Color: x / Appearance: x / SG: x / pH: x  Gluc: 91 mg/dL / Ketone: x  / Bili: x / Urobili: x   Blood: x / Protein: x / Nitrite: x   Leuk Esterase: x / RBC: x / WBC x   Sq Epi: x / Non Sq Epi: x / Bacteria: x

## 2024-09-21 NOTE — PROGRESS NOTE ADULT - PROBLEM SELECTOR PLAN 1
History of septic arthritis with multiple admissions s/p I&D with CTS and L pectoral flap with plastic surgery on last admission (9/4-9/13). Was discharged home on meropenem 1g q8 and van 1250 q12 with reportedly no missed doses.   - repeat CT: Status post left sternoclavicular resection with pectoralis flap. Mixed density soft tissue and/or hematoma in the operative bed, similar in size. Interval development of rim-enhancing collections, concerning for infection.  - no acute surgical intervention for surgical site erythema per plastic surgery   - no acute surgical intervention per CT surgery  - c/w meropenem 1g q8  - ID: daptomycin 400mg   - monitor LISBETH drain output: cloudy, serosang  - multimodal pain control with oxy 5/10 and tylenol PRN

## 2024-09-21 NOTE — PROGRESS NOTE ADULT - PROBLEM SELECTOR PLAN 2
- Bcx 9/15 growing GNR but PCR negative  - Will discuss results with lab  - C/w bryson and dapto as above  - f/u repeat cultures  - hold off on PICC removal pending 9/19 blood culture data and additional plastics intervention

## 2024-09-21 NOTE — PROGRESS NOTE ADULT - PROBLEM SELECTOR PLAN 1
- s/p left SCJ debridement and left pectoral flap on 9/9  - No acute thoracic surgery operative intervention at this time  - plastic follow up; LISBETH management by Plastics. Monitor output.   - recommend close ID follow up. Abx switched to Dapto per ID  - global care per primary team.  consider suppository/movantik for BM   OT daily please  - thoracic surgery will continue to follow  - Plan d/w Attending Dr. Bautista  ----  For any questions or concerns, please contact thoracic @ [phone]

## 2024-09-21 NOTE — PROGRESS NOTE ADULT - PROBLEM SELECTOR PLAN 5
History of MS on home baclofen, dalfampridine, and gemtesa.  - c/w baclofen 10 TID  - c/w modafinil 200 qd  - c/w home dalfampridine and gemtesa as not on formulary

## 2024-09-21 NOTE — PROGRESS NOTE ADULT - PROBLEM SELECTOR PLAN 1
- s/p left SCJ debridement and left pectoral flap on 9/9  - No acute thoracic surgery operative intervention at this time  - plastic follow up; LISBETH management by Plastics. Monitor output.   - recommend close ID follow up. Abx switched to Dapto per ID  - global care per primary team.  consider suppository/movantik for BM   OT daily please  - thoracic surgery will continue to follow  - Plan d/w Attending Dr. Bautista  ----  For any questions or concerns, please contact thoracic @ [phone]

## 2024-09-21 NOTE — PROGRESS NOTE ADULT - ASSESSMENT
44F s/p left SCJ debridement and left pectoral flap on 9/9 presenting with superficial surgical site erythema and localized pain. Also found to have small subsegmental right lower lobe pulmonary emboli.  Recommendations:  -No acute thoracic surgery operative intervention at this time.  -PRS consult.  -ID consult.  -Dispo per PRS    9/16: Evaluated at ED bedside by Thoracic Attending Dr. Bautista. Recommend close ID follow up for cellulitis of site. Vanco trough level noted to be 5.5, ? unclear if patient was taking antibiotics appropriately in outpatient setting.   9/17 Antibiotic switched to dapto per ID. Site improving  9/18 VSS  afebrile.  site with decreased erythema  9/19 VSS afebile site  COLTON improving - OT daily-  large stool burden seen on CT d/w Primary team consider MOVANTIK / suppository   9/20 c/w dapto 400 mg q24h. Continue IV meropenem 1g q8h Follow-up final identification and sensitivity of GNR  Repeat blood cx

## 2024-09-22 LAB
ALBUMIN SERPL ELPH-MCNC: 3 G/DL — LOW (ref 3.3–5)
ALP SERPL-CCNC: 260 U/L — HIGH (ref 40–120)
ALT FLD-CCNC: 31 U/L — SIGNIFICANT CHANGE UP (ref 10–45)
ANION GAP SERPL CALC-SCNC: 11 MMOL/L — SIGNIFICANT CHANGE UP (ref 5–17)
AST SERPL-CCNC: 13 U/L — SIGNIFICANT CHANGE UP (ref 10–40)
BILIRUB SERPL-MCNC: <0.1 MG/DL — LOW (ref 0.2–1.2)
BUN SERPL-MCNC: 10 MG/DL — SIGNIFICANT CHANGE UP (ref 7–23)
CALCIUM SERPL-MCNC: 8.8 MG/DL — SIGNIFICANT CHANGE UP (ref 8.4–10.5)
CHLORIDE SERPL-SCNC: 100 MMOL/L — SIGNIFICANT CHANGE UP (ref 96–108)
CO2 SERPL-SCNC: 26 MMOL/L — SIGNIFICANT CHANGE UP (ref 22–31)
CREAT SERPL-MCNC: 0.48 MG/DL — LOW (ref 0.5–1.3)
EGFR: 119 ML/MIN/1.73M2 — SIGNIFICANT CHANGE UP
GLUCOSE SERPL-MCNC: 92 MG/DL — SIGNIFICANT CHANGE UP (ref 70–99)
HCT VFR BLD CALC: 24.2 % — LOW (ref 34.5–45)
HGB BLD-MCNC: 7.5 G/DL — LOW (ref 11.5–15.5)
MAGNESIUM SERPL-MCNC: 2.3 MG/DL — SIGNIFICANT CHANGE UP (ref 1.6–2.6)
MCHC RBC-ENTMCNC: 26.5 PG — LOW (ref 27–34)
MCHC RBC-ENTMCNC: 31 GM/DL — LOW (ref 32–36)
MCV RBC AUTO: 85.5 FL — SIGNIFICANT CHANGE UP (ref 80–100)
NRBC # BLD: 0 /100 WBCS — SIGNIFICANT CHANGE UP (ref 0–0)
PHOSPHATE SERPL-MCNC: 3.4 MG/DL — SIGNIFICANT CHANGE UP (ref 2.5–4.5)
PLATELET # BLD AUTO: 543 K/UL — HIGH (ref 150–400)
POTASSIUM SERPL-MCNC: 4.3 MMOL/L — SIGNIFICANT CHANGE UP (ref 3.5–5.3)
POTASSIUM SERPL-SCNC: 4.3 MMOL/L — SIGNIFICANT CHANGE UP (ref 3.5–5.3)
PROT SERPL-MCNC: 5.7 G/DL — LOW (ref 6–8.3)
RBC # BLD: 2.83 M/UL — LOW (ref 3.8–5.2)
RBC # FLD: 13.2 % — SIGNIFICANT CHANGE UP (ref 10.3–14.5)
SODIUM SERPL-SCNC: 137 MMOL/L — SIGNIFICANT CHANGE UP (ref 135–145)
WBC # BLD: 12.96 K/UL — HIGH (ref 3.8–10.5)
WBC # FLD AUTO: 12.96 K/UL — HIGH (ref 3.8–10.5)

## 2024-09-22 PROCEDURE — 99233 SBSQ HOSP IP/OBS HIGH 50: CPT | Mod: GC

## 2024-09-22 PROCEDURE — 99232 SBSQ HOSP IP/OBS MODERATE 35: CPT

## 2024-09-22 PROCEDURE — 99231 SBSQ HOSP IP/OBS SF/LOW 25: CPT

## 2024-09-22 RX ORDER — KETOROLAC TROMETHAMINE 10 MG/1
10 TABLET, FILM COATED ORAL ONCE
Refills: 0 | Status: DISCONTINUED | OUTPATIENT
Start: 2024-09-22 | End: 2024-09-22

## 2024-09-22 RX ADMIN — Medication 10 MILLIGRAM(S): at 06:01

## 2024-09-22 RX ADMIN — Medication 0.5 MILLIGRAM(S): at 21:05

## 2024-09-22 RX ADMIN — Medication 100 MILLILITER(S): at 18:03

## 2024-09-22 RX ADMIN — DAPTOMYCIN 116 MILLIGRAM(S): 500 INJECTION, POWDER, LYOPHILIZED, FOR SOLUTION INTRAVENOUS at 17:22

## 2024-09-22 RX ADMIN — CHLORHEXIDINE GLUCONATE ORAL RINSE 1 APPLICATION(S): 1.2 SOLUTION DENTAL at 11:31

## 2024-09-22 RX ADMIN — Medication 10 MILLIGRAM(S): at 21:05

## 2024-09-22 RX ADMIN — DALFAMPRIDINE 10 MILLIGRAM(S): 10 TABLET, FILM COATED, EXTENDED RELEASE ORAL at 17:23

## 2024-09-22 RX ADMIN — HYDROMORPHONE HYDROCHLORIDE 2 MILLIGRAM(S): 1 INJECTION, SOLUTION INTRAMUSCULAR; INTRAVENOUS; SUBCUTANEOUS at 00:24

## 2024-09-22 RX ADMIN — Medication 500 MILLIGRAM(S): at 11:28

## 2024-09-22 RX ADMIN — KETOROLAC TROMETHAMINE 10 MILLIGRAM(S): 10 TABLET, FILM COATED ORAL at 15:21

## 2024-09-22 RX ADMIN — KETOROLAC TROMETHAMINE 10 MILLIGRAM(S): 10 TABLET, FILM COATED ORAL at 16:00

## 2024-09-22 RX ADMIN — MODAFINIL 200 MILLIGRAM(S): 100 TABLET ORAL at 11:29

## 2024-09-22 RX ADMIN — Medication 17 GRAM(S): at 11:29

## 2024-09-22 RX ADMIN — ENOXAPARIN SODIUM 40 MILLIGRAM(S): 150 INJECTION SUBCUTANEOUS at 18:02

## 2024-09-22 RX ADMIN — PANTOPRAZOLE SODIUM 40 MILLIGRAM(S): 40 TABLET, DELAYED RELEASE ORAL at 06:01

## 2024-09-22 RX ADMIN — SERTRALINE HYDROCHLORIDE 125 MILLIGRAM(S): 100 TABLET, FILM COATED ORAL at 11:28

## 2024-09-22 RX ADMIN — Medication 10 MILLIGRAM(S): at 14:19

## 2024-09-22 RX ADMIN — MULTI VITAMIN/MINERAL SUPPLEMENT WITH ASCORBIC ACID, NIACIN, PYRIDOXINE, PANTOTHENIC ACID, FOLIC ACID, RIBOFLAVIN, THIAMIN, BIOTIN, COBALAMIN AND ZINC. 1 TABLET(S): 60; 20; 12.5; 10; 10; 1.7; 1.5; 1; .3; .006 TABLET, COATED ORAL at 11:28

## 2024-09-22 RX ADMIN — MUPIROCIN 1 APPLICATION(S): 20 OINTMENT TOPICAL at 06:02

## 2024-09-22 RX ADMIN — Medication 2 TABLET(S): at 21:05

## 2024-09-22 RX ADMIN — Medication 500 MILLIGRAM(S): at 17:21

## 2024-09-22 RX ADMIN — Medication 500 MILLIGRAM(S): at 06:01

## 2024-09-22 RX ADMIN — MUPIROCIN 1 APPLICATION(S): 20 OINTMENT TOPICAL at 17:22

## 2024-09-22 RX ADMIN — Medication 60 MILLIGRAM(S): at 21:05

## 2024-09-22 RX ADMIN — HYDROMORPHONE HYDROCHLORIDE 4 MILLIGRAM(S): 1 INJECTION, SOLUTION INTRAMUSCULAR; INTRAVENOUS; SUBCUTANEOUS at 06:01

## 2024-09-22 RX ADMIN — DALFAMPRIDINE 10 MILLIGRAM(S): 10 TABLET, FILM COATED, EXTENDED RELEASE ORAL at 06:07

## 2024-09-22 RX ADMIN — MEROPENEM 100 MILLIGRAM(S): 500 INJECTION INTRAVENOUS at 06:03

## 2024-09-22 NOTE — PROGRESS NOTE ADULT - PROBLEM SELECTOR PLAN 3
Patient admitted with Hgb 12.2, now 7.7 c/f unknown source of bleeding, pt with known history of RAMONE  - trend CBC  - F/u anemia workup labs: negative  - Fe studies from 9/11/24 shows iron deficiency- holding Fe iso active infection  - Hgb stable in mid 7's, restart lovenox  - Repeat CT CAP: no source of bleed Patient admitted with Hgb 12.2, now 7.7 c/f unknown source of bleeding, pt with known history of RAMONE  - trend CBC  - F/u anemia workup labs: negative  - Fe studies from 9/11/24 shows iron deficiency- holding Fe iso active infection  - Hgb stable in mid 7's, restarted lovenox  - Repeat CT CAP: no source of bleed

## 2024-09-22 NOTE — PROGRESS NOTE ADULT - PROBLEM SELECTOR PLAN 1
- s/p left SCJ debridement and left pectoral flap on 9/9  - No acute thoracic surgery operative intervention at this time  - plastic follow up; LISBETH management by Plastics. Monitor output.   - recommend close ID follow up. Abx switched to Dapto per ID  - global care per primary team.  consider suppository/movantik for BM   OT daily please  - thoracic surgery will continue to follow  - Plan d/w Attending Dr. Bautista  ----  For any questions or concerns, please contact thoracic @ [phone]

## 2024-09-22 NOTE — PROGRESS NOTE ADULT - SUBJECTIVE AND OBJECTIVE BOX
INTERVAL EVENTS: NAEO    SUBJECTIVE: Patient reports some continued pain around the drain site, but otherwise is comfortable. Reported drainage overnight but unable to express any fluid this AM on rounds. Dressing changed.    --------------------------------------------------------------------------------------  VITAL SIGNS:  Vital Signs Last 24 Hrs  T(C) 36.2 (22 Sep 2024 08:22), Max: 36.8 (22 Sep 2024 00:34)  T(F): 97.1 (22 Sep 2024 08:22), Max: 98.3 (22 Sep 2024 00:34)  HR: 79 (22 Sep 2024 08:22) (79 - 100)  BP: 86/54 (22 Sep 2024 08:22) (86/51 - 99/64)  BP(mean): --  ABP: --  ABP(mean): --  RR: 18 (22 Sep 2024 08:22) (18 - 18)  SpO2: 94% (22 Sep 2024 08:22) (94% - 97%)    O2 Parameters below as of 22 Sep 2024 08:22  Patient On (Oxygen Delivery Method): room air      --------------------------------------------------------------------------------------    EXAM    General: NAD, resting in bed comfortably  Cardiac: Regular rate, warm and well perfused  Chest: Incision over left clavicle with improving erythema around incision site. 2xJP drains. One has S/S output, the other with slight chalky output, but seemingly improved from yesterday.  Respiratory: Nonlabored respirations, normal cw expansion, on RA  Abdomen: Soft, nondistended, nontender to palpation  Extremities: Warm, well perfused    --------------------------------------------------------------------------------------

## 2024-09-22 NOTE — PROGRESS NOTE ADULT - SUBJECTIVE AND OBJECTIVE BOX
Follow Up:      Interval History/ROS:Patient is a 44y old  Female who presents with a chief complaint of L chest pain, SIRS, Acute Pulmonary Embolism (22 Sep 2024 09:14)  Seen this morning, family at bedside.   Afebrile, leukocytosis at 12.9K   continues to complain of chest pain and decrease L shoulder ROM    Allergies  No Known Allergies    ANTIMICROBIALS:    DAPTOmycin IVPB 400 every 24 hours      OTHER MEDS: MEDICATIONS  (STANDING):  acetaminophen     Tablet .. 650 every 6 hours PRN  baclofen 10 every 8 hours  bisacodyl Suppository 10 daily PRN  clonazePAM  Tablet 0.5 daily PRN  dalfampridine ER 10 every 12 hours  DULoxetine 60 at bedtime  enoxaparin Injectable 40 every 24 hours  HYDROmorphone   Tablet 4 every 8 hours PRN  HYDROmorphone   Tablet 2 every 6 hours PRN  influenza   Vaccine 0.5 once  ketorolac 10 once PRN  methocarbamol 500 four times a day  modafinil 200 daily  pantoprazole    Tablet 40 before breakfast  polyethylene glycol 3350 17 daily  senna 2 at bedtime  sertraline 125 daily      Vital Signs Last 24 Hrs  T(F): 97.1 (09-22-24 @ 08:22), Max: 99.7 (09-19-24 @ 19:38)    Vital Signs Last 24 Hrs  HR: 79 (09-22-24 @ 08:22) (79 - 100)  BP: 86/54 (09-22-24 @ 08:22) (86/51 - 99/64)  RR: 18 (09-22-24 @ 08:22)  SpO2: 94% (09-22-24 @ 08:22) (94% - 97%)  Wt(kg): --    EXAM:    GA: NAD  CV: nl S1/S2,   Lungs: CTAB, no distress  Abd: soft, nontender, no rebounding pain  Ext: L chest wall wounds with sutures with surrounding tenderness and erythema   Neuro: No focal deficits  Skin: Intact  IV: no phlebitis    Labs:                        7.5    12.96 )-----------( 543      ( 22 Sep 2024 06:52 )             24.2     09-22    137  |  100  |  10  ----------------------------<  92  4.3   |  26  |  0.48[L]    Ca    8.8      22 Sep 2024 06:52  Phos  3.4     09-22  Mg     2.3     09-22    TPro  5.7[L]  /  Alb  3.0[L]  /  TBili  <0.1[L]  /  DBili  x   /  AST  13  /  ALT  31  /  AlkPhos  260[H]  09-22      WBC Trend:  WBC Count: 12.96 (09-22-24 @ 06:52)  WBC Count: 13.82 (09-21-24 @ 08:42)  WBC Count: 12.64 (09-20-24 @ 07:22)  WBC Count: 13.50 (09-19-24 @ 07:24)      Creatine Trend:  Creatinine: 0.48 (09-22)  Creatinine: 0.50 (09-21)  Creatinine: 0.52 (09-20)  Creatinine: 0.45 (09-19)      Liver Biochemical Testing Trend:  Alanine Aminotransferase (ALT/SGPT): 31 (09-22)  Alanine Aminotransferase (ALT/SGPT): 34 (09-21)  Alanine Aminotransferase (ALT/SGPT): 46 *H* (09-20)  Alanine Aminotransferase (ALT/SGPT): 50 *H* (09-19)  Alanine Aminotransferase (ALT/SGPT): 71 *H* (09-17)  Aspartate Aminotransferase (AST/SGOT): 13 (09-22-24 @ 06:52)  Aspartate Aminotransferase (AST/SGOT): 11 (09-21-24 @ 08:42)  Aspartate Aminotransferase (AST/SGOT): 21 (09-20-24 @ 07:22)  Aspartate Aminotransferase (AST/SGOT): 24 (09-19-24 @ 07:23)  Aspartate Aminotransferase (AST/SGOT): 54 (09-17-24 @ 07:02)  Bilirubin Total: <0.1 (09-22)  Bilirubin Total: <0.1 (09-21)  Bilirubin Total: 0.1 (09-20)  Bilirubin Direct: <0.1 (09-19)  Bilirubin Total: 0.1 (09-19)      Trend LDH  09-17-24 @ 07:02  181  12-25-23 @ 21:55  165  12-08-23 @ 07:18  104      Urinalysis Basic - ( 22 Sep 2024 06:52 )    Color: x / Appearance: x / SG: x / pH: x  Gluc: 92 mg/dL / Ketone: x  / Bili: x / Urobili: x   Blood: x / Protein: x / Nitrite: x   Leuk Esterase: x / RBC: x / WBC x   Sq Epi: x / Non Sq Epi: x / Bacteria: x        MICROBIOLOGY:  Vancomycin Level, Trough: 5.5 (09-15 @ 10:17)  Vancomycin Level, Trough: 12.8 (09-12 @ 07:27)  Vancomycin Level, Trough: 4.3 (09-11 @ 07:14)    MRSA PCR Result.: NotDetec (09-17-24 @ 06:57)  MRSA PCR Result.: NotDetec (09-10-24 @ 12:04)  MRSA PCR Result.: NotDetec (08-19-24 @ 00:11)  MRSA PCR Result.: NotDetec (12-07-23 @ 20:28)      Culture - Blood (collected 19 Sep 2024 16:32)  Source: .Blood Blood-Peripheral  Preliminary Report:    No growth at 48 Hours    Culture - Blood (collected 19 Sep 2024 16:25)  Source: .Blood Blood-Peripheral  Preliminary Report:    No growth at 48 Hours    Culture - Abscess with Gram Stain (collected 19 Sep 2024 15:59)  Source: .Abscess lt chest  Preliminary Report:    No growth    Culture - Urine (collected 15 Sep 2024 06:09)  Source: Clean Catch Clean Catch (Midstream)  Final Report:    <10,000 CFU/mL Normal Urogenital Renae    Culture - Blood (collected 15 Sep 2024 04:25)  Source: .Blood Blood-Peripheral  Preliminary Report:    Growth in aerobic bottle: Gram Negative Rods    Direct identification is available within approximately 3-5    hours either by Blood Panel Multiplexed PCR or Direct    MALDI-TOF. Details: [email or web address]  Organism: Blood Culture PCR  Organism: Blood Culture PCR    Sensitivities:      Method Type: PCR      -  Blood PCR Panel: NEG    Culture - Blood (collected 15 Sep 2024 04:10)  Source: .Blood Blood-Peripheral  Final Report:    No growth at 5 days    Culture - Group A Streptococcus (collected 13 Sep 2024 01:10)  Source: Throat  Final Report:    No Streptococcus pyogenes (Group A) isolated    Culture - Acid Fast - Tissue w/Smear (collected 10 Sep 2024 11:22)  Source: Tissue #2 Deep Clivicular tissue lt  Preliminary Report:    No acid-fast bacilli isolated at 1 week. ****Acid-fast cultures are held    for 6 weeks.****    Culture - Fungal, Tissue (collected 10 Sep 2024 11:22)  Source: Tissue #2 Deep Clivicular tissue lt  Preliminary Report:    No fungus isolated at 1 week.    Culture - Tissue with Gram Stain (collected 10 Sep 2024 11:22)  Source: Tissue #2 Deep Clivicular tissue lt  Final Report:    Growth in fluid media only Staphylococcus epidermidis  Organism: Staphylococcus epidermidis    Sensitivities:      Method Type: RAFAEL      -  Clindamycin: S <=0.25      -  Erythromycin: R >4      -  Gentamicin: R >8 Should not be used as monotherapy      -  Oxacillin: R >2      -  Penicillin: R >8      -  Rifampin: S <=1 Should not be used as monotherapy      -  Tetracycline: R >8      -  Trimethoprim/Sulfamethoxazole: S <=0.5/9.5      -  Vancomycin: S 2  Organism: Staphylococcus epidermidis                                  Ferritin: 77 (09-18)      Lactate Dehydrogenase, Serum: 181 (09-17)          Sedimentation Rate, Erythrocyte: 8 mm/hr (09-11-24 @ 07:10)  Sedimentation Rate, Erythrocyte: 17 mm/hr (09-03-24 @ 18:32)  Sedimentation Rate, Erythrocyte: 19 mm/hr (08-19-24 @ 00:11)  Sedimentation Rate, Erythrocyte: 77 mm/hr (12-29-23 @ 09:19)  Sedimentation Rate, Erythrocyte: 53 mm/hr (12-23-23 @ 07:12)  Sedimentation Rate, Erythrocyte: 60 mm/hr (12-14-23 @ 07:23)  Sedimentation Rate, Erythrocyte: 109 mm/hr (12-06-23 @ 20:39)      RADIOLOGY:  imaging below personally reviewed    < from: CT Abdomen and Pelvis w/ IV Cont (09.18.24 @ 20:04) >  Status post left sternoclavicular resection with pectoralis flap. Mixed   density soft tissue and/or hematoma in the operative bed, similar in   size. Interval development of rim-enhancing collections, concerning for   infection.    No acute intra-abdominal findings. Stool-filled colon.          --- End of Report ---        < end of copied text >

## 2024-09-22 NOTE — PROGRESS NOTE ADULT - PROBLEM SELECTOR PLAN 2
- Bcx 9/15 growing GNR but PCR negative  - Will discuss results with lab  - C/w bryson and dapto as above  - f/u repeat cultures  - hold off on PICC removal pending 9/19 blood culture data and additional plastics intervention - Bcx 9/15 growing GNR but PCR negative; continue to f/u repeat cultures  - C/w dapto as above  - F/u ID recs  - hold off on PICC removal pending 9/19 blood culture data and additional plastics intervention

## 2024-09-22 NOTE — PROGRESS NOTE ADULT - ASSESSMENT
44F with PMH of MS on Rituxan, anxiety, depression, septic arthritis of L sternoclavicular joint s/p multiple I&Ds in 2023, recent recurrence s/p I&D with CTS and pec turnover flap with Dr. Dean on 9/9, d/c'ed with PICC line on vanc/bryson, now presenting with redness and increased pain. PRS consulted for possible surgical site infection. CT chest shows normal postop changes with possible small hematoma around flap, LISBETH drains within area, no abscess/collection. Small PE without heart strain noted. Repeat CT from 9/18 now shows rim enhancing collections at the surgical site.      plan  - no acute surgical intervention at this time - team is trying local, conservative measures - will continue to monitor to see if there is any improvement and if she needs to return to the OR  - keep drains, will monitor  - local wound care with mupirocin and daily dressing changes  - abx per ID  - rest per primary - please contact plastics if any concerns and if status changes    Avery Garciash  Plastic Surgery  #[phone] [facility] pager  [phone] [facility] pager  Available on teams

## 2024-09-22 NOTE — PROGRESS NOTE ADULT - ASSESSMENT
44F s/p left SCJ debridement and left pectoral flap on 9/9 presenting with superficial surgical site erythema and localized pain. Also found to have small subsegmental right lower lobe pulmonary emboli.  Recommendations:  -No acute thoracic surgery operative intervention at this time.  -PRS consult.  -ID consult.  -Dispo per PRS    9/16: Evaluated at ED bedside by Thoracic Attending Dr. Bautista. Recommend close ID follow up for cellulitis of site. Vanco trough level noted to be 5.5, ? unclear if patient was taking antibiotics appropriately in outpatient setting.   9/17 Antibiotic switched to dapto per ID. Site improving  9/18 VSS  afebrile.  site with decreased erythema  9/19 VSS afebile site  COLTON improving - OT daily-  large stool burden seen on CT d/w Primary team consider MOVANTIK / suppository   9/20 c/w dapto 400 mg q24h. Continue IV meropenem 1g q8h Follow-up final identification and sensitivity of GNR  Repeat blood cx   9/21 9/21 maintain jpss. analgesics for pain, revision to be considered  9/22 No events overnight. Pain better controlled

## 2024-09-22 NOTE — PROGRESS NOTE ADULT - PROBLEM SELECTOR PLAN 1
History of septic arthritis with multiple admissions s/p I&D with CTS and L pectoral flap with plastic surgery on last admission (9/4-9/13). Was discharged home on meropenem 1g q8 and van 1250 q12 with reportedly no missed doses.   - repeat CT: Status post left sternoclavicular resection with pectoralis flap. Mixed density soft tissue and/or hematoma in the operative bed, similar in size. Interval development of rim-enhancing collections, concerning for infection.  - no acute surgical intervention for surgical site erythema per plastic surgery   - no acute surgical intervention per CT surgery  - c/w meropenem 1g q8  - ID: daptomycin 400mg   - monitor LISBETH drain output: cloudy, serosang  - multimodal pain control with oxy 5/10 and tylenol PRN History of septic arthritis with multiple admissions s/p I&D with CTS and L pectoral flap with plastic surgery on last admission (9/4-9/13). Was discharged home on meropenem 1g q8 and van 1250 q12 with reportedly no missed doses.   - repeat CT: Status post left sternoclavicular resection with pectoralis flap. Mixed density soft tissue and/or hematoma in the operative bed, similar in size. Interval development of rim-enhancing collections, concerning for infection.  - no acute surgical intervention for surgical site erythema per plastic surgery (9/22)  - no acute surgical intervention per CT surgery  - s/p meropenem 1g q8  - ID: daptomycin 400mg   - monitor LISBETH drain output: cloudy, serosang  - multimodal pain control with oxy 5/10 and tylenol PRN

## 2024-09-22 NOTE — PROGRESS NOTE ADULT - ATTENDING COMMENTS
43F pmh MS, anxiety, depression, septic arthritis of L sternoclavicular joint s/p multiple admissions requiring I&D and most recent admission (9/4-9/13) with I&D and L pectoral flap for evaluation of abscess p/w worsening L sided chest pain and swelling a/w SIRS also incidentally noted to have small nonocclusive subsegmental RLL PE.  #Surgical Site infection  #Incidental PE  #Bacteremia  -on dapto and meropenem, will continue  -repeat imaging with rim enhancing lesions, s/p drainage and resuturing, possible operative management pending course   -Bcx drawn on admission now with GNR in 1/2, pending finalization, repeat bcx ngtd  -restarted dvt ppx given hgb stability, tolerating  -continue pain control with po opioids, multimodal pain control  -monitoring bms closely, ordered for bowel regimen including prn, not receiving prn

## 2024-09-22 NOTE — PROGRESS NOTE ADULT - ASSESSMENT
44y Female with PMH of MS on Ocrevus anxiety, depression, septic arthritis of L sternoclavicular joint s/p I&Dx2 and cefepime for 6 weeks (12/2023-1/2024), recent admission 9/9-9/13 for L clavicular pain s/p I+D with L pectoralis flap sent home on meropenem and vancomycin for 6 week course presents for L shoulder pain, L sided pel chest pain, headache, fever at home. Son on phone also notes that the surgical site appeared more erythematous day prior to admission and had associated L arm swelling.    Admitted 9/9-9/13 most recently; MRI L clavicle with prior resection of the medial left clavicle with rim-enhancing fluid and surrounding phlegmon abutting the resected bony margin. S/p surgery on 9/9 with L SCJ debridement by CT surgery and L pec flap by PRS. Cultures grew Staph epi and Corynebacterium in fluid media, blood cultures negative. Discharged on tentatively 6 weeks vancomycin and meropenem for empiric coverage.    Here Tmax 100.1, mild tachycardia  WBC 12.1, neutrophilic. Lactate wnl  , ALT 78,     CT chest: Small nonocclusive right lower lobe basilar subsegmental pulmonary emboli, no evidence of right heart strain. 14 cm in greatest dimension expansile mostly soft tissue density structure filling and expanding the operative bed in the region of the left medial clavicle (thought to represent combination of muscle)    MICRO DATA:   09/15 BCX: IP   09/15 BCX: IP   09/10 AFB Cx: IP  09/10 Fungal CX: IP  09/10 Bacterial CX: fluid media only- corynebacterium striatum and staph epidermidis     # Low grade Fever, leukocytosis  # Septic sternoclavicular joint s/p debridement and muscle flap L clavicle  # Post-Operative abscess  # New GN bacteremia    # transaminitis     Abnormal CT with new rim enhancing collection in under the flap, purulent drainage noted from sternal side of the incision.     RECOMMENDATIONS:   - Continue dapto 400 mg q24h (weekly CPK)  - Continue IV meropenem 1g q8h  - follow-up final identification and sensitivity of GNR  - follow up repeat blood cx   - thoracic/plastic surgery following, cleaned up and sutured, new wound cx sent: NGTD   - monitor LFTs, stable   -Trend WBC, leucocytosis     Roselyn Paiz MD  ID physician  [facility] Teams Preferred  After 5pm/weekends call [phone]
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
## 2024-09-22 NOTE — PROGRESS NOTE ADULT - SUBJECTIVE AND OBJECTIVE BOX
Patient is a 44y old  Female who presents with a chief complaint of L chest pain, SIRS, Acute Pulmonary Embolism (21 Sep 2024 15:23)      SUBJECTIVE / OVERNIGHT EVENTS:    MEDICATIONS  (STANDING):  baclofen 10 milliGRAM(s) Oral every 8 hours  chlorhexidine 2% Cloths 1 Application(s) Topical daily  dalfampridine ER 10 milliGRAM(s) Oral every 12 hours  DAPTOmycin IVPB 400 milliGRAM(s) IV Intermittent every 24 hours  DULoxetine 60 milliGRAM(s) Oral at bedtime  enoxaparin Injectable 40 milliGRAM(s) SubCutaneous every 24 hours  influenza   Vaccine 0.5 milliLiter(s) IntraMuscular once  lactated ringers. 500 milliLiter(s) (100 mL/Hr) IV Continuous <Continuous>  meropenem  IVPB 1000 milliGRAM(s) IV Intermittent every 8 hours  methocarbamol 500 milliGRAM(s) Oral four times a day  modafinil 200 milliGRAM(s) Oral daily  multivitamin 1 Tablet(s) Oral daily  mupirocin 2% Ointment 1 Application(s) Topical two times a day  pantoprazole    Tablet 40 milliGRAM(s) Oral before breakfast  polyethylene glycol 3350 17 Gram(s) Oral daily  senna 2 Tablet(s) Oral at bedtime  sertraline 125 milliGRAM(s) Oral daily  Vibegron (Gemtesa) 75 mg tablet 1 Tablet(s) 1 Tablet(s) Oral daily    MEDICATIONS  (PRN):  acetaminophen     Tablet .. 650 milliGRAM(s) Oral every 6 hours PRN Mild Pain (1 - 3)  bisacodyl Suppository 10 milliGRAM(s) Rectal daily PRN Constipation  clonazePAM  Tablet 0.5 milliGRAM(s) Oral daily PRN for anxiety  HYDROmorphone   Tablet 4 milliGRAM(s) Oral every 8 hours PRN Severe Pain (7 - 10)  HYDROmorphone   Tablet 2 milliGRAM(s) Oral every 6 hours PRN Moderate Pain (4 - 6)      CAPILLARY BLOOD GLUCOSE        I&O's Summary    20 Sep 2024 07:01  -  21 Sep 2024 07:00  --------------------------------------------------------  IN: 100 mL / OUT: 155 mL / NET: -55 mL    21 Sep 2024 07:01  -  22 Sep 2024 05:18  --------------------------------------------------------  IN: 150 mL / OUT: 0 mL / NET: 150 mL        Vital Signs Last 24 Hrs  T(C): 36.8 (22 Sep 2024 00:34), Max: 36.8 (22 Sep 2024 00:34)  T(F): 98.3 (22 Sep 2024 00:34), Max: 98.3 (22 Sep 2024 00:34)  HR: 100 (22 Sep 2024 00:34) (80 - 100)  BP: 86/51 (22 Sep 2024 00:34) (86/51 - 99/64)  BP(mean): --  RR: 18 (22 Sep 2024 00:34) (18 - 18)  SpO2: 95% (22 Sep 2024 00:34) (95% - 97%)    Parameters below as of 22 Sep 2024 00:34  Patient On (Oxygen Delivery Method): room air        PHYSICAL EXAM:  GENERAL: NAD, well-developed, well-nourished  HEAD: Atraumatic, Normocephalic  EYES: EOMI, PERRLA, conjunctiva and sclera clear  NECK: Supple, No JVD  CHEST/LUNG: Clear to auscultation bilaterally; No wheezes or crackles  HEART: Normal S1/S2; Regular rate and rhythm; No murmurs, rubs, or gallops  ABDOMEN: Soft, Nontender, Nondistended; Bowel sounds present  EXTREMITIES: 2+ Peripheral Pulses; No clubbing, cyanosis, or edema  PSYCH: A&Ox3  NEUROLOGY: no focal neurologic deficit  SKIN: No rashes or lesions    LABS:                        7.3    13.82 )-----------( 496      ( 21 Sep 2024 08:42 )             22.8      09-21    136  |  100  |  10  ----------------------------<  84  4.2   |  26  |  0.50    Ca    8.9      21 Sep 2024 08:42  Phos  2.9     09-21  Mg     2.3     09-21    TPro  5.7[L]  /  Alb  3.1[L]  /  TBili  <0.1[L]  /  DBili  x   /  AST  11  /  ALT  34  /  AlkPhos  274[H]  09-21          Urinalysis Basic - ( 21 Sep 2024 08:42 )    Color: x / Appearance: x / SG: x / pH: x  Gluc: 84 mg/dL / Ketone: x  / Bili: x / Urobili: x   Blood: x / Protein: x / Nitrite: x   Leuk Esterase: x / RBC: x / WBC x   Sq Epi: x / Non Sq Epi: x / Bacteria: x        RADIOLOGY & ADDITIONAL TESTS:    Imaging Personally Reviewed:    Consultant(s) Notes Reviewed:      Care Discussed with Consultants/Other Providers:   Patient is a 44y old  Female who presents with a chief complaint of L chest pain, SIRS, Acute Pulmonary Embolism (21 Sep 2024 15:23)      SUBJECTIVE / OVERNIGHT EVENTS: Some purulent drainage noted around surgical site. Today, patient endorses similar symptoms and notes similar tenderness in area around surgical site.    MEDICATIONS  (STANDING):  baclofen 10 milliGRAM(s) Oral every 8 hours  chlorhexidine 2% Cloths 1 Application(s) Topical daily  dalfampridine ER 10 milliGRAM(s) Oral every 12 hours  DAPTOmycin IVPB 400 milliGRAM(s) IV Intermittent every 24 hours  DULoxetine 60 milliGRAM(s) Oral at bedtime  enoxaparin Injectable 40 milliGRAM(s) SubCutaneous every 24 hours  influenza   Vaccine 0.5 milliLiter(s) IntraMuscular once  lactated ringers. 500 milliLiter(s) (100 mL/Hr) IV Continuous <Continuous>  meropenem  IVPB 1000 milliGRAM(s) IV Intermittent every 8 hours  methocarbamol 500 milliGRAM(s) Oral four times a day  modafinil 200 milliGRAM(s) Oral daily  multivitamin 1 Tablet(s) Oral daily  mupirocin 2% Ointment 1 Application(s) Topical two times a day  pantoprazole    Tablet 40 milliGRAM(s) Oral before breakfast  polyethylene glycol 3350 17 Gram(s) Oral daily  senna 2 Tablet(s) Oral at bedtime  sertraline 125 milliGRAM(s) Oral daily  Vibegron (Gemtesa) 75 mg tablet 1 Tablet(s) 1 Tablet(s) Oral daily    MEDICATIONS  (PRN):  acetaminophen     Tablet .. 650 milliGRAM(s) Oral every 6 hours PRN Mild Pain (1 - 3)  bisacodyl Suppository 10 milliGRAM(s) Rectal daily PRN Constipation  clonazePAM  Tablet 0.5 milliGRAM(s) Oral daily PRN for anxiety  HYDROmorphone   Tablet 4 milliGRAM(s) Oral every 8 hours PRN Severe Pain (7 - 10)  HYDROmorphone   Tablet 2 milliGRAM(s) Oral every 6 hours PRN Moderate Pain (4 - 6)      CAPILLARY BLOOD GLUCOSE        I&O's Summary    20 Sep 2024 07:01  -  21 Sep 2024 07:00  --------------------------------------------------------  IN: 100 mL / OUT: 155 mL / NET: -55 mL    21 Sep 2024 07:01  -  22 Sep 2024 05:18  --------------------------------------------------------  IN: 150 mL / OUT: 0 mL / NET: 150 mL        Vital Signs Last 24 Hrs  T(C): 36.8 (22 Sep 2024 00:34), Max: 36.8 (22 Sep 2024 00:34)  T(F): 98.3 (22 Sep 2024 00:34), Max: 98.3 (22 Sep 2024 00:34)  HR: 100 (22 Sep 2024 00:34) (80 - 100)  BP: 86/51 (22 Sep 2024 00:34) (86/51 - 99/64)  BP(mean): --  RR: 18 (22 Sep 2024 00:34) (18 - 18)  SpO2: 95% (22 Sep 2024 00:34) (95% - 97%)    Parameters below as of 22 Sep 2024 00:34  Patient On (Oxygen Delivery Method): room air        PHYSICAL EXAM:  CONSTITUTIONAL: Well groomed, no apparent distress, laying in bed  SKIN: Mild erythema around surgical site border with light brown drainage but generally clean; not significantly tender to palpation; two drains; serosanguinous output  RESP: No respiratory distress, no use of accessory muscles  GI: Soft, NT, ND  PSYCH: AOx 3, sad affect    LABS:                        7.3    13.82 )-----------( 496      ( 21 Sep 2024 08:42 )             22.8      09-21    136  |  100  |  10  ----------------------------<  84  4.2   |  26  |  0.50    Ca    8.9      21 Sep 2024 08:42  Phos  2.9     09-21  Mg     2.3     09-21    TPro  5.7[L]  /  Alb  3.1[L]  /  TBili  <0.1[L]  /  DBili  x   /  AST  11  /  ALT  34  /  AlkPhos  274[H]  09-21          Urinalysis Basic - ( 21 Sep 2024 08:42 )    Color: x / Appearance: x / SG: x / pH: x  Gluc: 84 mg/dL / Ketone: x  / Bili: x / Urobili: x   Blood: x / Protein: x / Nitrite: x   Leuk Esterase: x / RBC: x / WBC x   Sq Epi: x / Non Sq Epi: x / Bacteria: x        RADIOLOGY & ADDITIONAL TESTS:    Imaging Personally Reviewed:    Consultant(s) Notes Reviewed:      Care Discussed with Consultants/Other Providers:

## 2024-09-23 LAB
ALBUMIN SERPL ELPH-MCNC: 2.9 G/DL — LOW (ref 3.3–5)
ALP SERPL-CCNC: 245 U/L — HIGH (ref 40–120)
ALT FLD-CCNC: 28 U/L — SIGNIFICANT CHANGE UP (ref 10–45)
ANION GAP SERPL CALC-SCNC: 10 MMOL/L — SIGNIFICANT CHANGE UP (ref 5–17)
APTT BLD: 31.5 SEC — SIGNIFICANT CHANGE UP (ref 24.5–35.6)
AST SERPL-CCNC: 14 U/L — SIGNIFICANT CHANGE UP (ref 10–40)
BILIRUB SERPL-MCNC: <0.1 MG/DL — LOW (ref 0.2–1.2)
BLD GP AB SCN SERPL QL: NEGATIVE — SIGNIFICANT CHANGE UP
BUN SERPL-MCNC: 9 MG/DL — SIGNIFICANT CHANGE UP (ref 7–23)
CALCIUM SERPL-MCNC: 8.7 MG/DL — SIGNIFICANT CHANGE UP (ref 8.4–10.5)
CHLORIDE SERPL-SCNC: 103 MMOL/L — SIGNIFICANT CHANGE UP (ref 96–108)
CO2 SERPL-SCNC: 26 MMOL/L — SIGNIFICANT CHANGE UP (ref 22–31)
CREAT SERPL-MCNC: 0.47 MG/DL — LOW (ref 0.5–1.3)
CULTURE RESULTS: ABNORMAL
EGFR: 120 ML/MIN/1.73M2 — SIGNIFICANT CHANGE UP
GLUCOSE SERPL-MCNC: 91 MG/DL — SIGNIFICANT CHANGE UP (ref 70–99)
GRAM STN FLD: ABNORMAL
GRAM STN FLD: ABNORMAL
HCT VFR BLD CALC: 23 % — LOW (ref 34.5–45)
HGB BLD-MCNC: 7.1 G/DL — LOW (ref 11.5–15.5)
INR BLD: 1.04 RATIO — SIGNIFICANT CHANGE UP (ref 0.85–1.18)
MAGNESIUM SERPL-MCNC: 2.2 MG/DL — SIGNIFICANT CHANGE UP (ref 1.6–2.6)
MCHC RBC-ENTMCNC: 26.6 PG — LOW (ref 27–34)
MCHC RBC-ENTMCNC: 30.9 GM/DL — LOW (ref 32–36)
MCV RBC AUTO: 86.1 FL — SIGNIFICANT CHANGE UP (ref 80–100)
NRBC # BLD: 0 /100 WBCS — SIGNIFICANT CHANGE UP (ref 0–0)
ORGANISM # SPEC MICROSCOPIC CNT: ABNORMAL
ORGANISM # SPEC MICROSCOPIC CNT: ABNORMAL
PHOSPHATE SERPL-MCNC: 3.1 MG/DL — SIGNIFICANT CHANGE UP (ref 2.5–4.5)
PLATELET # BLD AUTO: 558 K/UL — HIGH (ref 150–400)
POTASSIUM SERPL-MCNC: 4.2 MMOL/L — SIGNIFICANT CHANGE UP (ref 3.5–5.3)
POTASSIUM SERPL-SCNC: 4.2 MMOL/L — SIGNIFICANT CHANGE UP (ref 3.5–5.3)
PROT SERPL-MCNC: 5.4 G/DL — LOW (ref 6–8.3)
PROTHROM AB SERPL-ACNC: 11.4 SEC — SIGNIFICANT CHANGE UP (ref 9.5–13)
RBC # BLD: 2.67 M/UL — LOW (ref 3.8–5.2)
RBC # FLD: 13.4 % — SIGNIFICANT CHANGE UP (ref 10.3–14.5)
RH IG SCN BLD-IMP: POSITIVE — SIGNIFICANT CHANGE UP
SODIUM SERPL-SCNC: 139 MMOL/L — SIGNIFICANT CHANGE UP (ref 135–145)
SPECIMEN SOURCE: SIGNIFICANT CHANGE UP
WBC # BLD: 13.1 K/UL — HIGH (ref 3.8–10.5)
WBC # FLD AUTO: 13.1 K/UL — HIGH (ref 3.8–10.5)

## 2024-09-23 PROCEDURE — 99232 SBSQ HOSP IP/OBS MODERATE 35: CPT

## 2024-09-23 PROCEDURE — 99232 SBSQ HOSP IP/OBS MODERATE 35: CPT | Mod: GC

## 2024-09-23 PROCEDURE — 99231 SBSQ HOSP IP/OBS SF/LOW 25: CPT

## 2024-09-23 RX ORDER — 5-HYDROXYTRYPTOPHAN (5-HTP) 100 MG
3 TABLET,DISINTEGRATING ORAL AT BEDTIME
Refills: 0 | Status: DISCONTINUED | OUTPATIENT
Start: 2024-09-23 | End: 2024-09-25

## 2024-09-23 RX ORDER — MEROPENEM 500 MG/20ML
INJECTION INTRAVENOUS
Refills: 0 | Status: DISCONTINUED | OUTPATIENT
Start: 2024-09-23 | End: 2024-09-25

## 2024-09-23 RX ORDER — MEROPENEM 500 MG/20ML
1000 INJECTION INTRAVENOUS EVERY 8 HOURS
Refills: 0 | Status: DISCONTINUED | OUTPATIENT
Start: 2024-09-23 | End: 2024-09-25

## 2024-09-23 RX ORDER — MEROPENEM 500 MG/20ML
1000 INJECTION INTRAVENOUS ONCE
Refills: 0 | Status: COMPLETED | OUTPATIENT
Start: 2024-09-23 | End: 2024-09-23

## 2024-09-23 RX ORDER — MEROPENEM 500 MG/20ML
1000 INJECTION INTRAVENOUS EVERY 8 HOURS
Refills: 0 | Status: DISCONTINUED | OUTPATIENT
Start: 2024-09-23 | End: 2024-09-23

## 2024-09-23 RX ADMIN — Medication 500 MILLIGRAM(S): at 17:49

## 2024-09-23 RX ADMIN — Medication 10 MILLIGRAM(S): at 14:34

## 2024-09-23 RX ADMIN — Medication 10 MILLIGRAM(S): at 21:34

## 2024-09-23 RX ADMIN — MUPIROCIN 1 APPLICATION(S): 20 OINTMENT TOPICAL at 05:32

## 2024-09-23 RX ADMIN — PANTOPRAZOLE SODIUM 40 MILLIGRAM(S): 40 TABLET, DELAYED RELEASE ORAL at 06:08

## 2024-09-23 RX ADMIN — Medication 10 MILLIGRAM(S): at 06:08

## 2024-09-23 RX ADMIN — Medication 3 MILLIGRAM(S): at 21:38

## 2024-09-23 RX ADMIN — ENOXAPARIN SODIUM 40 MILLIGRAM(S): 150 INJECTION SUBCUTANEOUS at 17:49

## 2024-09-23 RX ADMIN — Medication 60 MILLIGRAM(S): at 21:34

## 2024-09-23 RX ADMIN — HYDROMORPHONE HYDROCHLORIDE 4 MILLIGRAM(S): 1 INJECTION, SOLUTION INTRAMUSCULAR; INTRAVENOUS; SUBCUTANEOUS at 00:09

## 2024-09-23 RX ADMIN — CHLORHEXIDINE GLUCONATE ORAL RINSE 1 APPLICATION(S): 1.2 SOLUTION DENTAL at 11:45

## 2024-09-23 RX ADMIN — MODAFINIL 200 MILLIGRAM(S): 100 TABLET ORAL at 11:38

## 2024-09-23 RX ADMIN — HYDROMORPHONE HYDROCHLORIDE 2 MILLIGRAM(S): 1 INJECTION, SOLUTION INTRAMUSCULAR; INTRAVENOUS; SUBCUTANEOUS at 05:32

## 2024-09-23 RX ADMIN — Medication 500 MILLIGRAM(S): at 11:38

## 2024-09-23 RX ADMIN — Medication 2 TABLET(S): at 21:33

## 2024-09-23 RX ADMIN — Medication 500 MILLIGRAM(S): at 05:32

## 2024-09-23 RX ADMIN — MEROPENEM 100 MILLIGRAM(S): 500 INJECTION INTRAVENOUS at 21:37

## 2024-09-23 RX ADMIN — MEROPENEM 100 MILLIGRAM(S): 500 INJECTION INTRAVENOUS at 10:47

## 2024-09-23 RX ADMIN — MUPIROCIN 1 APPLICATION(S): 20 OINTMENT TOPICAL at 17:49

## 2024-09-23 RX ADMIN — Medication 17 GRAM(S): at 11:46

## 2024-09-23 RX ADMIN — Medication 650 MILLIGRAM(S): at 21:33

## 2024-09-23 RX ADMIN — DAPTOMYCIN 116 MILLIGRAM(S): 500 INJECTION, POWDER, LYOPHILIZED, FOR SOLUTION INTRAVENOUS at 14:35

## 2024-09-23 RX ADMIN — Medication 0.5 MILLIGRAM(S): at 21:34

## 2024-09-23 RX ADMIN — Medication 650 MILLIGRAM(S): at 22:33

## 2024-09-23 RX ADMIN — MULTI VITAMIN/MINERAL SUPPLEMENT WITH ASCORBIC ACID, NIACIN, PYRIDOXINE, PANTOTHENIC ACID, FOLIC ACID, RIBOFLAVIN, THIAMIN, BIOTIN, COBALAMIN AND ZINC. 1 TABLET(S): 60; 20; 12.5; 10; 10; 1.7; 1.5; 1; .3; .006 TABLET, COATED ORAL at 11:38

## 2024-09-23 RX ADMIN — Medication 500 MILLIGRAM(S): at 00:08

## 2024-09-23 RX ADMIN — HYDROMORPHONE HYDROCHLORIDE 4 MILLIGRAM(S): 1 INJECTION, SOLUTION INTRAMUSCULAR; INTRAVENOUS; SUBCUTANEOUS at 01:09

## 2024-09-23 RX ADMIN — SERTRALINE HYDROCHLORIDE 125 MILLIGRAM(S): 100 TABLET, FILM COATED ORAL at 11:38

## 2024-09-23 NOTE — PROGRESS NOTE ADULT - SUBJECTIVE AND OBJECTIVE BOX
Surgery Progress Note  Patient is a 44y old  Female who presents with a chief complaint of L chest pain, SIRS, Acute Pulmonary Embolism (22 Sep 2024 14:51)      SUBJECTIVE: Patient seen and examined at bedside with surgical team, patient without complaints. Dressing changed this AM by nursing team. Patient with questions about antibiotic regimen, will contact ID and primary team to follow up about plan.     Physical Exam  Constitutional: Resting in bed comfortably in no acute distress.   Respiratory: no increased work of breathing, no tachypnea, on RA comfortably  Chest: Incision over left clavicle draining scant amount of seropurulent fluid. Erythema around incision. LISBETH drain x2.             Both LISBETH drains with minimal seropurulent fluid in bulb.   Ext: warm and well perfused    Vital Signs Last 24 Hrs  T(C): 36.9 (22 Sep 2024 16:29), Max: 36.9 (22 Sep 2024 16:29)  T(F): 98.4 (22 Sep 2024 16:29), Max: 98.4 (22 Sep 2024 16:29)  HR: 90 (22 Sep 2024 16:29) (79 - 90)  BP: 95/63 (22 Sep 2024 16:29) (86/54 - 95/63)  BP(mean): --  RR: 18 (22 Sep 2024 16:29) (18 - 18)  SpO2: 97% (22 Sep 2024 16:29) (94% - 97%)    Parameters below as of 22 Sep 2024 16:29  Patient On (Oxygen Delivery Method): room air        I&O's Detail    22 Sep 2024 07:01  -  23 Sep 2024 07:00  --------------------------------------------------------  IN:    Lactated Ringers: 1200 mL  Total IN: 1200 mL    OUT:    Bulb (mL): 28 mL    Bulb (mL): 22 mL  Total OUT: 50 mL    Total NET: 1150 mL      MEDICATIONS  (STANDING):  baclofen 10 milliGRAM(s) Oral every 8 hours  chlorhexidine 2% Cloths 1 Application(s) Topical daily  dalfampridine ER 10 milliGRAM(s) Oral every 12 hours  DAPTOmycin IVPB 400 milliGRAM(s) IV Intermittent every 24 hours  DULoxetine 60 milliGRAM(s) Oral at bedtime  enoxaparin Injectable 40 milliGRAM(s) SubCutaneous every 24 hours  influenza   Vaccine 0.5 milliLiter(s) IntraMuscular once  lactated ringers. 500 milliLiter(s) (100 mL/Hr) IV Continuous <Continuous>  methocarbamol 500 milliGRAM(s) Oral four times a day  modafinil 200 milliGRAM(s) Oral daily  multivitamin 1 Tablet(s) Oral daily  mupirocin 2% Ointment 1 Application(s) Topical two times a day  pantoprazole    Tablet 40 milliGRAM(s) Oral before breakfast  polyethylene glycol 3350 17 Gram(s) Oral daily  senna 2 Tablet(s) Oral at bedtime  sertraline 125 milliGRAM(s) Oral daily  Vibegron (Gemtesa) 75 mg tablet 1 Tablet(s) 1 Tablet(s) Oral daily    MEDICATIONS  (PRN):  acetaminophen     Tablet .. 650 milliGRAM(s) Oral every 6 hours PRN Mild Pain (1 - 3)  bisacodyl Suppository 10 milliGRAM(s) Rectal daily PRN Constipation  clonazePAM  Tablet 0.5 milliGRAM(s) Oral daily PRN for anxiety  HYDROmorphone   Tablet 4 milliGRAM(s) Oral every 8 hours PRN Severe Pain (7 - 10)  HYDROmorphone   Tablet 2 milliGRAM(s) Oral every 6 hours PRN Moderate Pain (4 - 6)      LABS:                        7.5    12.96 )-----------( 543      ( 22 Sep 2024 06:52 )             24.2     09-22    137  |  100  |  10  ----------------------------<  92  4.3   |  26  |  0.48[L]    Ca    8.8      22 Sep 2024 06:52  Phos  3.4     09-22  Mg     2.3     09-22    TPro  5.7[L]  /  Alb  3.0[L]  /  TBili  <0.1[L]  /  DBili  x   /  AST  13  /  ALT  31  /  AlkPhos  260[H]  09-22      LIVER FUNCTIONS - ( 22 Sep 2024 06:52 )  Alb: 3.0 g/dL / Pro: 5.7 g/dL / ALK PHOS: 260 U/L / ALT: 31 U/L / AST: 13 U/L / GGT: x           Urinalysis Basic - ( 22 Sep 2024 06:52 )    Color: x / Appearance: x / SG: x / pH: x  Gluc: 92 mg/dL / Ketone: x  / Bili: x / Urobili: x   Blood: x / Protein: x / Nitrite: x   Leuk Esterase: x / RBC: x / WBC x   Sq Epi: x / Non Sq Epi: x / Bacteria: x

## 2024-09-23 NOTE — PROGRESS NOTE ADULT - PROBLEM SELECTOR PLAN 1
History of septic arthritis with multiple admissions s/p I&D with CTS and L pectoral flap with plastic surgery on last admission (9/4-9/13). Was discharged home on meropenem 1g q8 and van 1250 q12 with reportedly no missed doses.   - repeat CT: Status post left sternoclavicular resection with pectoralis flap. Mixed density soft tissue and/or hematoma in the operative bed, similar in size. Interval development of rim-enhancing collections, concerning for infection.  - no acute surgical intervention for surgical site erythema per plastic surgery (9/23)  - no acute surgical intervention per CT surgery  - ID: c/w daptomycin 400mg (9/16 - )  - ID: c/w meropenem 1g q8 (9/15 - )  - monitor LISBETH drain output: cloudy, serosang  - multimodal pain control with oxy 5/10 and tylenol PRN History of septic arthritis with multiple admissions s/p I&D with CTS and L pectoral flap with plastic surgery on last admission (9/4-9/13). Was discharged home on meropenem 1g q8 and van 1250 q12 with reportedly no missed doses.   - repeat CT: Status post left sternoclavicular resection with pectoralis flap. Mixed density soft tissue and/or hematoma in the operative bed, similar in size. Interval development of rim-enhancing collections, concerning for infection.  - no acute surgical intervention for surgical site erythema per plastic surgery (9/23)  - no acute surgical intervention per CT surgery  - ID: c/w daptomycin 400mg (9/16 - )  - ID: c/w meropenem 1g q8 (9/15 - )  - monitor LISBETH drain output: cloudy, seropurulent  - multimodal pain control with oxy 5/10 and tylenol PRN

## 2024-09-23 NOTE — PROGRESS NOTE ADULT - ASSESSMENT
44F with PMH of MS on Rituxan, anxiety, depression, septic arthritis of L sternoclavicular joint s/p multiple I&Ds in 2023, recent recurrence s/p I&D with CTS and pec turnover flap with Dr. Dean on 9/9, d/c'ed with PICC line on vanc/bryson, now presenting with redness and increased pain. PRS consulted for possible surgical site infection. CT chest shows normal postop changes with possible small hematoma around flap, LISBETH drains within area, no abscess/collection. Small PE without heart strain noted. Repeat CT from 9/18 now shows rim enhancing collections at the surgical site.      plan  - no acute surgical intervention at this time - team is trying local, conservative measures - will continue to monitor to see if there is any improvement and if she needs to return to the OR  - keep drains, will monitor  - local wound care with mupirocin and daily dressing changes  - abx per ID  - rest per primary - please contact plastics if any concerns and if status changes    Leroy Cagle, PGY1  Plastic Surgery   [phone] [facility] pager  Available on teams

## 2024-09-23 NOTE — PROGRESS NOTE ADULT - ASSESSMENT
43F with MS, anxiety, depression, septic arthritis of L sternoclavicular joint s/p multiple admissions requiring I&D and most recent admission (9/4-9/13) with I&D and L pectoral flap for evaluation of abscess now presenting to the ED with L sided chest pain with newly noted small nonocclusive RLL PE likely incidental. Course c/b anemia of unclear etiology. CT with evidence of new rim enhancing lesion s/p abscess drainage. Course further c/b low grade gram negative rods bacteremia on 9/15 started on meropenem and daptomycin by ID.

## 2024-09-23 NOTE — PROGRESS NOTE ADULT - ASSESSMENT
44y Female with PMH of MS on Ocrevus anxiety, depression, septic arthritis of L sternoclavicular joint s/p I&Dx2 and cefepime for 6 weeks (12/2023-1/2024), recent admission 9/9-9/13 for L clavicular pain s/p I+D with L pectoralis flap sent home on meropenem and vancomycin for 6 week course presents for L shoulder pain, L sided pel chest pain, headache, fever at home. Son on phone also notes that the surgical site appeared more erythematous day prior to admission and had associated L arm swelling.    Admitted 9/9-9/13 most recently; MRI L clavicle with prior resection of the medial left clavicle with rim-enhancing fluid and surrounding phlegmon abutting the resected bony margin. S/p surgery on 9/9 with L SCJ debridement by CT surgery and L pec flap by PRS. Cultures grew Staph epi and Corynebacterium in fluid media, blood cultures negative. Discharged on tentatively 6 weeks vancomycin and meropenem for empiric coverage.    Here Tmax 100.1, mild tachycardia  WBC 12.1, neutrophilic. Lactate wnl  , ALT 78,     CT chest: Small nonocclusive right lower lobe basilar subsegmental pulmonary emboli, no evidence of right heart strain. 14 cm in greatest dimension expansile mostly soft tissue density structure filling and expanding the operative bed in the region of the left medial clavicle (thought to represent combination of muscle)    MICRO DATA:   09/15 BCX: IP   09/15 BCX: IP   09/10 AFB Cx: IP  09/10 Fungal CX: IP  09/10 Bacterial CX: fluid media only- corynebacterium striatum and staph epidermidis     # Low grade Fever, leukocytosis  # Septic sternoclavicular joint s/p debridement and muscle flap L clavicle  # Post-Operative abscess      Abnormal CT with new rim enhancing collection in under the flap, purulent drainage noted from sternal side of the incision.         RECOMMENDATIONS:   - c/w dapto 400 mg q24h  - recent CPK noted, normal   - Continue IV meropenem 1g q8h  - blood cx with documented GNR, not real, artifact.   - repeat blood cx NTD   - thoracic/plastic surgery following, surgical plan per the specialities   - abscess cx prelim NTD   Trend WBC, leucocytosis   If plan for OR please send bacterial, AFB and fungal cx.       Plan discussed with Medicine Attending and plastics.     Odalis Reynaga  Please contact through MS Teams   If no response or past 5 pm/weekend call [phone].
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
## 2024-09-23 NOTE — PROGRESS NOTE ADULT - PROBLEM SELECTOR PLAN 2
- Bcx 9/15 growing GNR but PCR negative; continue to f/u repeat cultures  - C/w dapto and bryson as above  - F/u ID recs  - hold off on PICC removal pending 9/19 blood culture data and additional plastics intervention - Bcx 9/15 growing GNR but PCR negative, follow up speciation; continue to f/u repeat cultures  - C/w dapto and bryson as above  - F/u ID recs  - hold off on PICC removal pending 9/19 blood culture data and additional plastics intervention

## 2024-09-23 NOTE — PROGRESS NOTE ADULT - PROBLEM SELECTOR PLAN 1
- s/p left SCJ debridement and left pectoral flap on 9/9  - POSS Revision TUES 9/24/24 - pending   - plastic follow up; LISBETH management by Plastics. Monitor output.   - recommend close ID follow up. Abx switched to Dapto per ID  - global care per primary team.  consider suppository/movantik for BM   OT daily please  - thoracic surgery will continue to follow  - Plan d/w Attending Dr. Bautista  ----  For any questions or concerns, please contact thoracic @ [phone]

## 2024-09-23 NOTE — PROGRESS NOTE ADULT - SUBJECTIVE AND OBJECTIVE BOX
Patient is a 44y old  Female who presents with a chief complaint of L chest pain, SIRS, Acute Pulmonary Embolism (21 Sep 2024 15:23)      SUBJECTIVE / OVERNIGHT EVENTS:     MEDICATIONS  (STANDING):  baclofen 10 milliGRAM(s) Oral every 8 hours  chlorhexidine 2% Cloths 1 Application(s) Topical daily  dalfampridine ER 10 milliGRAM(s) Oral every 12 hours  DAPTOmycin IVPB 400 milliGRAM(s) IV Intermittent every 24 hours  DULoxetine 60 milliGRAM(s) Oral at bedtime  enoxaparin Injectable 40 milliGRAM(s) SubCutaneous every 24 hours  influenza   Vaccine 0.5 milliLiter(s) IntraMuscular once  lactated ringers. 500 milliLiter(s) (100 mL/Hr) IV Continuous <Continuous>  meropenem  IVPB 1000 milliGRAM(s) IV Intermittent every 8 hours  methocarbamol 500 milliGRAM(s) Oral four times a day  modafinil 200 milliGRAM(s) Oral daily  multivitamin 1 Tablet(s) Oral daily  mupirocin 2% Ointment 1 Application(s) Topical two times a day  pantoprazole    Tablet 40 milliGRAM(s) Oral before breakfast  polyethylene glycol 3350 17 Gram(s) Oral daily  senna 2 Tablet(s) Oral at bedtime  sertraline 125 milliGRAM(s) Oral daily  Vibegron (Gemtesa) 75 mg tablet 1 Tablet(s) 1 Tablet(s) Oral daily    MEDICATIONS  (PRN):  acetaminophen     Tablet .. 650 milliGRAM(s) Oral every 6 hours PRN Mild Pain (1 - 3)  bisacodyl Suppository 10 milliGRAM(s) Rectal daily PRN Constipation  clonazePAM  Tablet 0.5 milliGRAM(s) Oral daily PRN for anxiety  HYDROmorphone   Tablet 4 milliGRAM(s) Oral every 8 hours PRN Severe Pain (7 - 10)  HYDROmorphone   Tablet 2 milliGRAM(s) Oral every 6 hours PRN Moderate Pain (4 - 6)      CAPILLARY BLOOD GLUCOSE        I&O's Summary    20 Sep 2024 07:01  -  21 Sep 2024 07:00  --------------------------------------------------------  IN: 100 mL / OUT: 155 mL / NET: -55 mL    21 Sep 2024 07:01  -  22 Sep 2024 05:18  --------------------------------------------------------  IN: 150 mL / OUT: 0 mL / NET: 150 mL        Vital Signs Last 24 Hrs  T(C): 36.8 (22 Sep 2024 00:34), Max: 36.8 (22 Sep 2024 00:34)  T(F): 98.3 (22 Sep 2024 00:34), Max: 98.3 (22 Sep 2024 00:34)  HR: 100 (22 Sep 2024 00:34) (80 - 100)  BP: 86/51 (22 Sep 2024 00:34) (86/51 - 99/64)  BP(mean): --  RR: 18 (22 Sep 2024 00:34) (18 - 18)  SpO2: 95% (22 Sep 2024 00:34) (95% - 97%)    Parameters below as of 22 Sep 2024 00:34  Patient On (Oxygen Delivery Method): room air        PHYSICAL EXAM:  CONSTITUTIONAL: Well groomed, no apparent distress, laying in bed  SKIN: Mild erythema around surgical site border with light brown drainage but generally clean; not significantly tender to palpation; two drains; serosanguinous output  RESP: No respiratory distress, no use of accessory muscles  GI: Soft, NT, ND  PSYCH: AOx 3,  LABS:                        7.3    13.82 )-----------( 496      ( 21 Sep 2024 08:42 )             22.8      09-21    136  |  100  |  10  ----------------------------<  84  4.2   |  26  |  0.50    Ca    8.9      21 Sep 2024 08:42  Phos  2.9     09-21  Mg     2.3     09-21    TPro  5.7[L]  /  Alb  3.1[L]  /  TBili  <0.1[L]  /  DBili  x   /  AST  11  /  ALT  34  /  AlkPhos  274[H]  09-21          Urinalysis Basic - ( 21 Sep 2024 08:42 )    Color: x / Appearance: x / SG: x / pH: x  Gluc: 84 mg/dL / Ketone: x  / Bili: x / Urobili: x   Blood: x / Protein: x / Nitrite: x   Leuk Esterase: x / RBC: x / WBC x   Sq Epi: x / Non Sq Epi: x / Bacteria: x        RADIOLOGY & ADDITIONAL TESTS:    Imaging Personally Reviewed:    Consultant(s) Notes Reviewed:      Care Discussed with Consultants/Other Providers:   Patient is a 44y old  Female who presents with a chief complaint of L chest pain, SIRS, Acute Pulmonary Embolism (21 Sep 2024 15:23)      SUBJECTIVE / OVERNIGHT EVENTS: Patient reports continued pain and drainage of the surgical site.     MEDICATIONS  (STANDING):  baclofen 10 milliGRAM(s) Oral every 8 hours  chlorhexidine 2% Cloths 1 Application(s) Topical daily  dalfampridine ER 10 milliGRAM(s) Oral every 12 hours  DAPTOmycin IVPB 400 milliGRAM(s) IV Intermittent every 24 hours  DULoxetine 60 milliGRAM(s) Oral at bedtime  enoxaparin Injectable 40 milliGRAM(s) SubCutaneous every 24 hours  influenza   Vaccine 0.5 milliLiter(s) IntraMuscular once  lactated ringers. 500 milliLiter(s) (100 mL/Hr) IV Continuous <Continuous>  meropenem  IVPB 1000 milliGRAM(s) IV Intermittent every 8 hours  methocarbamol 500 milliGRAM(s) Oral four times a day  modafinil 200 milliGRAM(s) Oral daily  multivitamin 1 Tablet(s) Oral daily  mupirocin 2% Ointment 1 Application(s) Topical two times a day  pantoprazole    Tablet 40 milliGRAM(s) Oral before breakfast  polyethylene glycol 3350 17 Gram(s) Oral daily  senna 2 Tablet(s) Oral at bedtime  sertraline 125 milliGRAM(s) Oral daily  Vibegron (Gemtesa) 75 mg tablet 1 Tablet(s) 1 Tablet(s) Oral daily    MEDICATIONS  (PRN):  acetaminophen     Tablet .. 650 milliGRAM(s) Oral every 6 hours PRN Mild Pain (1 - 3)  bisacodyl Suppository 10 milliGRAM(s) Rectal daily PRN Constipation  clonazePAM  Tablet 0.5 milliGRAM(s) Oral daily PRN for anxiety  HYDROmorphone   Tablet 4 milliGRAM(s) Oral every 8 hours PRN Severe Pain (7 - 10)  HYDROmorphone   Tablet 2 milliGRAM(s) Oral every 6 hours PRN Moderate Pain (4 - 6)      CAPILLARY BLOOD GLUCOSE        I&O's Summary    20 Sep 2024 07:01  -  21 Sep 2024 07:00  --------------------------------------------------------  IN: 100 mL / OUT: 155 mL / NET: -55 mL    21 Sep 2024 07:01  -  22 Sep 2024 05:18  --------------------------------------------------------  IN: 150 mL / OUT: 0 mL / NET: 150 mL        Vital Signs Last 24 Hrs  T(C): 36.8 (22 Sep 2024 00:34), Max: 36.8 (22 Sep 2024 00:34)  T(F): 98.3 (22 Sep 2024 00:34), Max: 98.3 (22 Sep 2024 00:34)  HR: 100 (22 Sep 2024 00:34) (80 - 100)  BP: 86/51 (22 Sep 2024 00:34) (86/51 - 99/64)  BP(mean): --  RR: 18 (22 Sep 2024 00:34) (18 - 18)  SpO2: 95% (22 Sep 2024 00:34) (95% - 97%)    Parameters below as of 22 Sep 2024 00:34  Patient On (Oxygen Delivery Method): room air        PHYSICAL EXAM:  CONSTITUTIONAL: Well groomed, no apparent distress, sitting in chair  SKIN: Mild erythema around surgical site border with light brown drainage but generally clean; not significantly tender to palpation; two drains; seropurulent output  RESP: No respiratory distress, no use of accessory muscles  GI: Soft, NT, ND  PSYCH: AOx 3,  LABS:                        7.3    13.82 )-----------( 496      ( 21 Sep 2024 08:42 )             22.8      09-21    136  |  100  |  10  ----------------------------<  84  4.2   |  26  |  0.50    Ca    8.9      21 Sep 2024 08:42  Phos  2.9     09-21  Mg     2.3     09-21    TPro  5.7[L]  /  Alb  3.1[L]  /  TBili  <0.1[L]  /  DBili  x   /  AST  11  /  ALT  34  /  AlkPhos  274[H]  09-21          Urinalysis Basic - ( 21 Sep 2024 08:42 )    Color: x / Appearance: x / SG: x / pH: x  Gluc: 84 mg/dL / Ketone: x  / Bili: x / Urobili: x   Blood: x / Protein: x / Nitrite: x   Leuk Esterase: x / RBC: x / WBC x   Sq Epi: x / Non Sq Epi: x / Bacteria: x        RADIOLOGY & ADDITIONAL TESTS:    Imaging Personally Reviewed:    Consultant(s) Notes Reviewed:      Care Discussed with Consultants/Other Providers:

## 2024-09-23 NOTE — PROGRESS NOTE ADULT - SUBJECTIVE AND OBJECTIVE BOX
44yPatient is a 44y old  Female who presents with a chief complaint of L chest pain, SIRS, Acute Pulmonary Embolism (23 Sep 2024 10:24)      Interval history:  Afebrile, still with discharge from in between the sutures.       Allergies:   No Known Allergies      Antimicrobials:  DAPTOmycin IVPB 400 milliGRAM(s) IV Intermittent every 24 hours      meropenem  IVPB 1000 milliGRAM(s) IV Intermittent every 8 hours      REVIEW OF SYSTEMS:   No SOB  No abdominal pain  No rash.       Vital Signs Last 24 Hrs  T(C): 36.7 (09-23-24 @ 17:03), Max: 36.7 (09-23-24 @ 09:20)  T(F): 98.1 (09-23-24 @ 17:03), Max: 98.1 (09-23-24 @ 09:20)  HR: 94 (09-23-24 @ 17:03) (83 - 94)  BP: 100/67 (09-23-24 @ 17:03) (100/67 - 101/67)  BP(mean): --  RR: 18 (09-23-24 @ 17:03) (18 - 18)  SpO2: 99% (09-23-24 @ 17:03) (98% - 99%)      PHYSICAL EXAM:  Pt in no acute distress, alert, awake.   rt arm PICC   lt sided graft site with sutures, with cloudy discharge   2 LISBETH's with cloudy fluid   non distended abdomen  no edema LE                             7.1    13.10 )-----------( 558      ( 23 Sep 2024 07:28 )             23.0   09-23    139  |  103  |  9   ----------------------------<  91  4.2   |  26  |  0.47[L]    Ca    8.7      23 Sep 2024 07:08  Phos  3.1     09-23  Mg     2.2     09-23    TPro  5.4[L]  /  Alb  2.9[L]  /  TBili  <0.1[L]  /  DBili  x   /  AST  14  /  ALT  28  /  AlkPhos  245[H]  09-23      LIVER FUNCTIONS - ( 23 Sep 2024 07:08 )  Alb: 2.9 g/dL / Pro: 5.4 g/dL / ALK PHOS: 245 U/L / ALT: 28 U/L / AST: 14 U/L / GGT: x               Culture - Blood (collected 22 Sep 2024 06:52)  Source: .Blood Blood  Preliminary Report (23 Sep 2024 09:01):    No growth at 24 hours    Culture - Blood (collected 22 Sep 2024 06:52)  Source: .Blood Blood  Preliminary Report (23 Sep 2024 09:01):    No growth at 24 hours

## 2024-09-23 NOTE — PROGRESS NOTE ADULT - PROBLEM SELECTOR PLAN 3
Patient admitted with Hgb 12.2, now 7.7 c/f unknown source of bleeding, pt with known history of RAMONE  - trend CBC  - F/u anemia workup labs: negative  - Fe studies from 9/11/24 shows iron deficiency- holding Fe iso active infection  - Hgb stable in mid 7's, restarted lovenox  - Repeat CT CAP: no source of bleed

## 2024-09-23 NOTE — PROGRESS NOTE ADULT - ATTENDING COMMENTS
Patient seen and examined this morning. Plan of care discussed with the patient/spouse and the resident/student.   - Blood cx from 9/15 with prelim report of GNR now with no growth. Subsequent repeat blood cx have been all negative to date  - follow up wound cx result  - ID follow up regarding abx regimen (currently on Dapto and Meropenem)  - plastics/thoracic surgery follow up regarding possible revision this week?  - trial of mg citrate for constipation  - monitor H/H and will consider transfusion for Hgb <7 and/or develops symptoms. No overt sign of bleeding.  - +small nonocclusive right lower lobe basilar subsegmental PE noted on CTA chest (9/15), LE duplex neg (9/15), will defer full AC at this time as risk felt to outweigh benefit and possible plan for revision in OR. Plan to check serial LE duplex and continue prophylactic dose of lovenox for now. Patient seen and examined this morning. Plan of care discussed with the patient/spouse and the resident/student.     She reports overall pain is improving.     #Septic sternoclavicular joint s/p debridement and muscle flap L clavicle, postop abscess  #anemia likely from recent surgery and frequent phlebotomy  #subsegmental PE  - Abnormal CT with new rim enhancing collection in under the flap with purulent drainage from sternal side of the incision.    - Blood cx from 9/15 with prelim report of GNR now with no growth. Subsequent repeat blood cx have been all negative to date  - follow up wound cx result  - ID follow up regarding abx regimen (currently on Dapto and Meropenem)  - plastics/thoracic surgery follow up regarding possible revision this week?  - trial of mg citrate for constipation  - monitor H/H and will consider transfusion for Hgb <7 and/or develops symptoms. No overt sign of bleeding.  - +small nonocclusive right lower lobe basilar subsegmental PE noted on CTA chest (9/15), LE duplex neg (9/15), will defer full AC at this time as risk felt to outweigh benefit and possible plan for revision in OR. Plan to check serial LE duplex and continue prophylactic dose of lovenox for now.

## 2024-09-23 NOTE — PROGRESS NOTE ADULT - ASSESSMENT
44F s/p left SCJ debridement and left pectoral flap on 9/9 presenting with superficial surgical site erythema and localized pain. Also found to have small subsegmental right lower lobe pulmonary emboli.  Recommendations:  -No acute thoracic surgery operative intervention at this time.  -PRS consult.  -ID consult.  -Dispo per PRS    9/16: Evaluated at ED bedside by Thoracic Attending Dr. Bautista. Recommend close ID follow up for cellulitis of site. Vanco trough level noted to be 5.5, ? unclear if patient was taking antibiotics appropriately in outpatient setting.   9/17 Antibiotic switched to dapto per ID. Site improving  9/18 VSS  afebrile.  site with decreased erythema  9/19 VSS afebile site  COLTON improving - OT daily-  large stool burden seen on CT d/w Primary team consider MOVANTIK / suppository   9/20 c/w dapto 400 mg q24h. Continue IV meropenem 1g q8h Follow-up final identification and sensitivity of GNR  Repeat blood cx   9/21 9/21 maintain jpss. analgesics for pain, revision to be considered  9/22 No events overnight. Pain better controlled  9/23 VSS; Maintain LISBETH drains, wound care as per plastics.

## 2024-09-24 ENCOUNTER — APPOINTMENT (OUTPATIENT)
Dept: PLASTIC SURGERY | Facility: CLINIC | Age: 44
End: 2024-09-24

## 2024-09-24 DIAGNOSIS — I26.99 OTHER PULMONARY EMBOLISM WITHOUT ACUTE COR PULMONALE: ICD-10-CM

## 2024-09-24 LAB
ALBUMIN SERPL ELPH-MCNC: 2.8 G/DL — LOW (ref 3.3–5)
ALP SERPL-CCNC: 215 U/L — HIGH (ref 40–120)
ALT FLD-CCNC: 24 U/L — SIGNIFICANT CHANGE UP (ref 10–45)
ANION GAP SERPL CALC-SCNC: 9 MMOL/L — SIGNIFICANT CHANGE UP (ref 5–17)
APTT BLD: 31.8 SEC — SIGNIFICANT CHANGE UP (ref 24.5–35.6)
AST SERPL-CCNC: 14 U/L — SIGNIFICANT CHANGE UP (ref 10–40)
BASOPHILS # BLD AUTO: 0.07 K/UL — SIGNIFICANT CHANGE UP (ref 0–0.2)
BASOPHILS NFR BLD AUTO: 0.7 % — SIGNIFICANT CHANGE UP (ref 0–2)
BILIRUB SERPL-MCNC: <0.1 MG/DL — LOW (ref 0.2–1.2)
BUN SERPL-MCNC: 10 MG/DL — SIGNIFICANT CHANGE UP (ref 7–23)
CALCIUM SERPL-MCNC: 8.8 MG/DL — SIGNIFICANT CHANGE UP (ref 8.4–10.5)
CHLORIDE SERPL-SCNC: 103 MMOL/L — SIGNIFICANT CHANGE UP (ref 96–108)
CO2 SERPL-SCNC: 27 MMOL/L — SIGNIFICANT CHANGE UP (ref 22–31)
CREAT SERPL-MCNC: 0.51 MG/DL — SIGNIFICANT CHANGE UP (ref 0.5–1.3)
CULTURE RESULTS: ABNORMAL
CULTURE RESULTS: SIGNIFICANT CHANGE UP
CULTURE RESULTS: SIGNIFICANT CHANGE UP
EGFR: 117 ML/MIN/1.73M2 — SIGNIFICANT CHANGE UP
EOSINOPHIL # BLD AUTO: 0.32 K/UL — SIGNIFICANT CHANGE UP (ref 0–0.5)
EOSINOPHIL NFR BLD AUTO: 3 % — SIGNIFICANT CHANGE UP (ref 0–6)
GLUCOSE SERPL-MCNC: 89 MG/DL — SIGNIFICANT CHANGE UP (ref 70–99)
HCT VFR BLD CALC: 21.8 % — LOW (ref 34.5–45)
HGB BLD-MCNC: 6.8 G/DL — CRITICAL LOW (ref 11.5–15.5)
IMM GRANULOCYTES NFR BLD AUTO: 2.3 % — HIGH (ref 0–0.9)
INR BLD: 1.07 RATIO — SIGNIFICANT CHANGE UP (ref 0.85–1.18)
LYMPHOCYTES # BLD AUTO: 18.9 % — SIGNIFICANT CHANGE UP (ref 13–44)
LYMPHOCYTES # BLD AUTO: 2 K/UL — SIGNIFICANT CHANGE UP (ref 1–3.3)
MAGNESIUM SERPL-MCNC: 2.1 MG/DL — SIGNIFICANT CHANGE UP (ref 1.6–2.6)
MCHC RBC-ENTMCNC: 27.1 PG — SIGNIFICANT CHANGE UP (ref 27–34)
MCHC RBC-ENTMCNC: 31.2 GM/DL — LOW (ref 32–36)
MCV RBC AUTO: 86.9 FL — SIGNIFICANT CHANGE UP (ref 80–100)
MONOCYTES # BLD AUTO: 1.05 K/UL — HIGH (ref 0–0.9)
MONOCYTES NFR BLD AUTO: 9.9 % — SIGNIFICANT CHANGE UP (ref 2–14)
NEUTROPHILS # BLD AUTO: 6.9 K/UL — SIGNIFICANT CHANGE UP (ref 1.8–7.4)
NEUTROPHILS NFR BLD AUTO: 65.2 % — SIGNIFICANT CHANGE UP (ref 43–77)
NRBC # BLD: 0 /100 WBCS — SIGNIFICANT CHANGE UP (ref 0–0)
PHOSPHATE SERPL-MCNC: 3.9 MG/DL — SIGNIFICANT CHANGE UP (ref 2.5–4.5)
PLATELET # BLD AUTO: 527 K/UL — HIGH (ref 150–400)
POTASSIUM SERPL-MCNC: 4.2 MMOL/L — SIGNIFICANT CHANGE UP (ref 3.5–5.3)
POTASSIUM SERPL-SCNC: 4.2 MMOL/L — SIGNIFICANT CHANGE UP (ref 3.5–5.3)
PROT SERPL-MCNC: 5.3 G/DL — LOW (ref 6–8.3)
PROTHROM AB SERPL-ACNC: 11.2 SEC — SIGNIFICANT CHANGE UP (ref 9.5–13)
RBC # BLD: 2.51 M/UL — LOW (ref 3.8–5.2)
RBC # FLD: 13.5 % — SIGNIFICANT CHANGE UP (ref 10.3–14.5)
SODIUM SERPL-SCNC: 139 MMOL/L — SIGNIFICANT CHANGE UP (ref 135–145)
SPECIMEN SOURCE: SIGNIFICANT CHANGE UP
WBC # BLD: 10.58 K/UL — HIGH (ref 3.8–10.5)
WBC # FLD AUTO: 10.58 K/UL — HIGH (ref 3.8–10.5)

## 2024-09-24 PROCEDURE — 99231 SBSQ HOSP IP/OBS SF/LOW 25: CPT

## 2024-09-24 PROCEDURE — 99233 SBSQ HOSP IP/OBS HIGH 50: CPT | Mod: GC

## 2024-09-24 RX ORDER — ACETAMINOPHEN 325 MG
650 TABLET ORAL ONCE
Refills: 0 | Status: COMPLETED | OUTPATIENT
Start: 2024-09-24 | End: 2024-09-24

## 2024-09-24 RX ADMIN — Medication 60 MILLIGRAM(S): at 21:29

## 2024-09-24 RX ADMIN — Medication 10 MILLIGRAM(S): at 14:35

## 2024-09-24 RX ADMIN — MUPIROCIN 1 APPLICATION(S): 20 OINTMENT TOPICAL at 05:45

## 2024-09-24 RX ADMIN — MULTI VITAMIN/MINERAL SUPPLEMENT WITH ASCORBIC ACID, NIACIN, PYRIDOXINE, PANTOTHENIC ACID, FOLIC ACID, RIBOFLAVIN, THIAMIN, BIOTIN, COBALAMIN AND ZINC. 1 TABLET(S): 60; 20; 12.5; 10; 10; 1.7; 1.5; 1; .3; .006 TABLET, COATED ORAL at 11:44

## 2024-09-24 RX ADMIN — Medication 500 MILLIGRAM(S): at 11:44

## 2024-09-24 RX ADMIN — MODAFINIL 200 MILLIGRAM(S): 100 TABLET ORAL at 11:44

## 2024-09-24 RX ADMIN — DAPTOMYCIN 116 MILLIGRAM(S): 500 INJECTION, POWDER, LYOPHILIZED, FOR SOLUTION INTRAVENOUS at 15:05

## 2024-09-24 RX ADMIN — SERTRALINE HYDROCHLORIDE 125 MILLIGRAM(S): 100 TABLET, FILM COATED ORAL at 11:44

## 2024-09-24 RX ADMIN — MEROPENEM 100 MILLIGRAM(S): 500 INJECTION INTRAVENOUS at 14:32

## 2024-09-24 RX ADMIN — MEROPENEM 100 MILLIGRAM(S): 500 INJECTION INTRAVENOUS at 05:44

## 2024-09-24 RX ADMIN — Medication 650 MILLIGRAM(S): at 20:05

## 2024-09-24 RX ADMIN — Medication 650 MILLIGRAM(S): at 11:14

## 2024-09-24 RX ADMIN — Medication 10 MILLIGRAM(S): at 23:23

## 2024-09-24 RX ADMIN — Medication 2 TABLET(S): at 21:32

## 2024-09-24 RX ADMIN — Medication 650 MILLIGRAM(S): at 21:15

## 2024-09-24 RX ADMIN — MEROPENEM 100 MILLIGRAM(S): 500 INJECTION INTRAVENOUS at 21:28

## 2024-09-24 RX ADMIN — Medication 0.5 MILLIGRAM(S): at 21:29

## 2024-09-24 RX ADMIN — PANTOPRAZOLE SODIUM 40 MILLIGRAM(S): 40 TABLET, DELAYED RELEASE ORAL at 05:44

## 2024-09-24 RX ADMIN — ENOXAPARIN SODIUM 40 MILLIGRAM(S): 150 INJECTION SUBCUTANEOUS at 17:51

## 2024-09-24 RX ADMIN — Medication 650 MILLIGRAM(S): at 05:58

## 2024-09-24 RX ADMIN — Medication 500 MILLIGRAM(S): at 17:51

## 2024-09-24 RX ADMIN — Medication 3 MILLIGRAM(S): at 21:28

## 2024-09-24 RX ADMIN — Medication 500 MILLIGRAM(S): at 05:44

## 2024-09-24 RX ADMIN — Medication 10 MILLIGRAM(S): at 06:00

## 2024-09-24 RX ADMIN — Medication 500 MILLIGRAM(S): at 23:51

## 2024-09-24 RX ADMIN — CHLORHEXIDINE GLUCONATE ORAL RINSE 1 APPLICATION(S): 1.2 SOLUTION DENTAL at 11:48

## 2024-09-24 RX ADMIN — MUPIROCIN 1 APPLICATION(S): 20 OINTMENT TOPICAL at 17:51

## 2024-09-24 NOTE — PROGRESS NOTE ADULT - SUBJECTIVE AND OBJECTIVE BOX
Patient is a 44y old  Female who presents with a chief complaint of L chest pain, SIRS, Acute Pulmonary Embolism (21 Sep 2024 15:23)      SUBJECTIVE / OVERNIGHT EVENTS: Overnight patient refused duplex.     MEDICATIONS  (STANDING):  baclofen 10 milliGRAM(s) Oral every 8 hours  chlorhexidine 2% Cloths 1 Application(s) Topical daily  dalfampridine ER 10 milliGRAM(s) Oral every 12 hours  DAPTOmycin IVPB 400 milliGRAM(s) IV Intermittent every 24 hours  DULoxetine 60 milliGRAM(s) Oral at bedtime  enoxaparin Injectable 40 milliGRAM(s) SubCutaneous every 24 hours  influenza   Vaccine 0.5 milliLiter(s) IntraMuscular once  lactated ringers. 500 milliLiter(s) (100 mL/Hr) IV Continuous <Continuous>  meropenem  IVPB 1000 milliGRAM(s) IV Intermittent every 8 hours  methocarbamol 500 milliGRAM(s) Oral four times a day  modafinil 200 milliGRAM(s) Oral daily  multivitamin 1 Tablet(s) Oral daily  mupirocin 2% Ointment 1 Application(s) Topical two times a day  pantoprazole    Tablet 40 milliGRAM(s) Oral before breakfast  polyethylene glycol 3350 17 Gram(s) Oral daily  senna 2 Tablet(s) Oral at bedtime  sertraline 125 milliGRAM(s) Oral daily  Vibegron (Gemtesa) 75 mg tablet 1 Tablet(s) 1 Tablet(s) Oral daily    MEDICATIONS  (PRN):  acetaminophen     Tablet .. 650 milliGRAM(s) Oral every 6 hours PRN Mild Pain (1 - 3)  bisacodyl Suppository 10 milliGRAM(s) Rectal daily PRN Constipation  clonazePAM  Tablet 0.5 milliGRAM(s) Oral daily PRN for anxiety  HYDROmorphone   Tablet 4 milliGRAM(s) Oral every 8 hours PRN Severe Pain (7 - 10)  HYDROmorphone   Tablet 2 milliGRAM(s) Oral every 6 hours PRN Moderate Pain (4 - 6)      CAPILLARY BLOOD GLUCOSE        I&O's Summary    20 Sep 2024 07:01  -  21 Sep 2024 07:00  --------------------------------------------------------  IN: 100 mL / OUT: 155 mL / NET: -55 mL    21 Sep 2024 07:01  -  22 Sep 2024 05:18  --------------------------------------------------------  IN: 150 mL / OUT: 0 mL / NET: 150 mL        Vital Signs Last 24 Hrs  T(C): 36.8 (22 Sep 2024 00:34), Max: 36.8 (22 Sep 2024 00:34)  T(F): 98.3 (22 Sep 2024 00:34), Max: 98.3 (22 Sep 2024 00:34)  HR: 100 (22 Sep 2024 00:34) (80 - 100)  BP: 86/51 (22 Sep 2024 00:34) (86/51 - 99/64)  BP(mean): --  RR: 18 (22 Sep 2024 00:34) (18 - 18)  SpO2: 95% (22 Sep 2024 00:34) (95% - 97%)    Parameters below as of 22 Sep 2024 00:34  Patient On (Oxygen Delivery Method): room air        PHYSICAL EXAM:  CONSTITUTIONAL: Well groomed, no apparent distress, sitting in chair  SKIN: Mild erythema around surgical site border with light brown drainage but generally clean; not significantly tender to palpation; two drains; seropurulent output  RESP: No respiratory distress, no use of accessory muscles  GI: Soft, NT, ND  PSYCH: AOx 3,  LABS:                        7.3    13.82 )-----------( 496      ( 21 Sep 2024 08:42 )             22.8      09-21    136  |  100  |  10  ----------------------------<  84  4.2   |  26  |  0.50    Ca    8.9      21 Sep 2024 08:42  Phos  2.9     09-21  Mg     2.3     09-21    TPro  5.7[L]  /  Alb  3.1[L]  /  TBili  <0.1[L]  /  DBili  x   /  AST  11  /  ALT  34  /  AlkPhos  274[H]  09-21          Urinalysis Basic - ( 21 Sep 2024 08:42 )    Color: x / Appearance: x / SG: x / pH: x  Gluc: 84 mg/dL / Ketone: x  / Bili: x / Urobili: x   Blood: x / Protein: x / Nitrite: x   Leuk Esterase: x / RBC: x / WBC x   Sq Epi: x / Non Sq Epi: x / Bacteria: x        RADIOLOGY & ADDITIONAL TESTS:    Imaging Personally Reviewed:    Consultant(s) Notes Reviewed:      Care Discussed with Consultants/Other Providers:   Patient is a 44y old  Female who presents with a chief complaint of L chest pain, SIRS, Acute Pulmonary Embolism (21 Sep 2024 15:23)      SUBJECTIVE / OVERNIGHT EVENTS: Overnight patient declined duplex. Patient remains anxious.     MEDICATIONS  (STANDING):  baclofen 10 milliGRAM(s) Oral every 8 hours  chlorhexidine 2% Cloths 1 Application(s) Topical daily  dalfampridine ER 10 milliGRAM(s) Oral every 12 hours  DAPTOmycin IVPB 400 milliGRAM(s) IV Intermittent every 24 hours  DULoxetine 60 milliGRAM(s) Oral at bedtime  enoxaparin Injectable 40 milliGRAM(s) SubCutaneous every 24 hours  influenza   Vaccine 0.5 milliLiter(s) IntraMuscular once  lactated ringers. 500 milliLiter(s) (100 mL/Hr) IV Continuous <Continuous>  meropenem  IVPB 1000 milliGRAM(s) IV Intermittent every 8 hours  methocarbamol 500 milliGRAM(s) Oral four times a day  modafinil 200 milliGRAM(s) Oral daily  multivitamin 1 Tablet(s) Oral daily  mupirocin 2% Ointment 1 Application(s) Topical two times a day  pantoprazole    Tablet 40 milliGRAM(s) Oral before breakfast  polyethylene glycol 3350 17 Gram(s) Oral daily  senna 2 Tablet(s) Oral at bedtime  sertraline 125 milliGRAM(s) Oral daily  Vibegron (Gemtesa) 75 mg tablet 1 Tablet(s) 1 Tablet(s) Oral daily    MEDICATIONS  (PRN):  acetaminophen     Tablet .. 650 milliGRAM(s) Oral every 6 hours PRN Mild Pain (1 - 3)  bisacodyl Suppository 10 milliGRAM(s) Rectal daily PRN Constipation  clonazePAM  Tablet 0.5 milliGRAM(s) Oral daily PRN for anxiety  HYDROmorphone   Tablet 4 milliGRAM(s) Oral every 8 hours PRN Severe Pain (7 - 10)  HYDROmorphone   Tablet 2 milliGRAM(s) Oral every 6 hours PRN Moderate Pain (4 - 6)      CAPILLARY BLOOD GLUCOSE        I&O's Summary    20 Sep 2024 07:01  -  21 Sep 2024 07:00  --------------------------------------------------------  IN: 100 mL / OUT: 155 mL / NET: -55 mL    21 Sep 2024 07:01  -  22 Sep 2024 05:18  --------------------------------------------------------  IN: 150 mL / OUT: 0 mL / NET: 150 mL        Vital Signs Last 24 Hrs  T(C): 36.8 (22 Sep 2024 00:34), Max: 36.8 (22 Sep 2024 00:34)  T(F): 98.3 (22 Sep 2024 00:34), Max: 98.3 (22 Sep 2024 00:34)  HR: 100 (22 Sep 2024 00:34) (80 - 100)  BP: 86/51 (22 Sep 2024 00:34) (86/51 - 99/64)  BP(mean): --  RR: 18 (22 Sep 2024 00:34) (18 - 18)  SpO2: 95% (22 Sep 2024 00:34) (95% - 97%)    Parameters below as of 22 Sep 2024 00:34  Patient On (Oxygen Delivery Method): room air        PHYSICAL EXAM:  CONSTITUTIONAL: Well groomed, no apparent distress, sitting in chair  SKIN: Mild erythema around surgical site border with light brown drainage but generally clean; not significantly tender to palpation; two drains; seropurulent output one on drain and serosang on the other   RESP: No respiratory distress, no use of accessory muscles  GI: Soft, NT, ND  PSYCH: AOx 3,  LABS:                        7.3    13.82 )-----------( 496      ( 21 Sep 2024 08:42 )             22.8      09-21    136  |  100  |  10  ----------------------------<  84  4.2   |  26  |  0.50    Ca    8.9      21 Sep 2024 08:42  Phos  2.9     09-21  Mg     2.3     09-21    TPro  5.7[L]  /  Alb  3.1[L]  /  TBili  <0.1[L]  /  DBili  x   /  AST  11  /  ALT  34  /  AlkPhos  274[H]  09-21          Urinalysis Basic - ( 21 Sep 2024 08:42 )    Color: x / Appearance: x / SG: x / pH: x  Gluc: 84 mg/dL / Ketone: x  / Bili: x / Urobili: x   Blood: x / Protein: x / Nitrite: x   Leuk Esterase: x / RBC: x / WBC x   Sq Epi: x / Non Sq Epi: x / Bacteria: x        RADIOLOGY & ADDITIONAL TESTS:    Imaging Personally Reviewed:    Consultant(s) Notes Reviewed:      Care Discussed with Consultants/Other Providers:   Patient is a 44y old  Female who presents with a chief complaint of L chest pain, SIRS, Acute Pulmonary Embolism (21 Sep 2024 15:23)      SUBJECTIVE / OVERNIGHT EVENTS: Overnight patient declined duplex. Patient remains anxious.     MEDICATIONS  (STANDING):  baclofen 10 milliGRAM(s) Oral every 8 hours  chlorhexidine 2% Cloths 1 Application(s) Topical daily  dalfampridine ER 10 milliGRAM(s) Oral every 12 hours  DAPTOmycin IVPB 400 milliGRAM(s) IV Intermittent every 24 hours  DULoxetine 60 milliGRAM(s) Oral at bedtime  enoxaparin Injectable 40 milliGRAM(s) SubCutaneous every 24 hours  influenza   Vaccine 0.5 milliLiter(s) IntraMuscular once  lactated ringers. 500 milliLiter(s) (100 mL/Hr) IV Continuous <Continuous>  meropenem  IVPB 1000 milliGRAM(s) IV Intermittent every 8 hours  methocarbamol 500 milliGRAM(s) Oral four times a day  modafinil 200 milliGRAM(s) Oral daily  multivitamin 1 Tablet(s) Oral daily  mupirocin 2% Ointment 1 Application(s) Topical two times a day  pantoprazole    Tablet 40 milliGRAM(s) Oral before breakfast  polyethylene glycol 3350 17 Gram(s) Oral daily  senna 2 Tablet(s) Oral at bedtime  sertraline 125 milliGRAM(s) Oral daily  Vibegron (Gemtesa) 75 mg tablet 1 Tablet(s) 1 Tablet(s) Oral daily    MEDICATIONS  (PRN):  acetaminophen     Tablet .. 650 milliGRAM(s) Oral every 6 hours PRN Mild Pain (1 - 3)  bisacodyl Suppository 10 milliGRAM(s) Rectal daily PRN Constipation  clonazePAM  Tablet 0.5 milliGRAM(s) Oral daily PRN for anxiety  HYDROmorphone   Tablet 4 milliGRAM(s) Oral every 8 hours PRN Severe Pain (7 - 10)  HYDROmorphone   Tablet 2 milliGRAM(s) Oral every 6 hours PRN Moderate Pain (4 - 6)      CAPILLARY BLOOD GLUCOSE        I&O's Summary    20 Sep 2024 07:01  -  21 Sep 2024 07:00  --------------------------------------------------------  IN: 100 mL / OUT: 155 mL / NET: -55 mL    21 Sep 2024 07:01  -  22 Sep 2024 05:18  --------------------------------------------------------  IN: 150 mL / OUT: 0 mL / NET: 150 mL        Vital Signs Last 24 Hrs  T(C): 36.8 (22 Sep 2024 00:34), Max: 36.8 (22 Sep 2024 00:34)  T(F): 98.3 (22 Sep 2024 00:34), Max: 98.3 (22 Sep 2024 00:34)  HR: 100 (22 Sep 2024 00:34) (80 - 100)  BP: 86/51 (22 Sep 2024 00:34) (86/51 - 99/64)  BP(mean): --  RR: 18 (22 Sep 2024 00:34) (18 - 18)  SpO2: 95% (22 Sep 2024 00:34) (95% - 97%)    Parameters below as of 22 Sep 2024 00:34  Patient On (Oxygen Delivery Method): room air        PHYSICAL EXAM:  CONSTITUTIONAL: Well groomed, no apparent distress, sitting in chair  SKIN: Mild erythema around surgical site border with light brown drainage but generally clean; not significantly tender to palpation; two drains; one w/ seropurulent output and other w/ serosang output  RESP: No respiratory distress, no use of accessory muscles  GI: Soft, NT, ND  PSYCH: AOx 3,  LABS:                        7.3    13.82 )-----------( 496      ( 21 Sep 2024 08:42 )             22.8      09-21    136  |  100  |  10  ----------------------------<  84  4.2   |  26  |  0.50    Ca    8.9      21 Sep 2024 08:42  Phos  2.9     09-21  Mg     2.3     09-21    TPro  5.7[L]  /  Alb  3.1[L]  /  TBili  <0.1[L]  /  DBili  x   /  AST  11  /  ALT  34  /  AlkPhos  274[H]  09-21          Urinalysis Basic - ( 21 Sep 2024 08:42 )    Color: x / Appearance: x / SG: x / pH: x  Gluc: 84 mg/dL / Ketone: x  / Bili: x / Urobili: x   Blood: x / Protein: x / Nitrite: x   Leuk Esterase: x / RBC: x / WBC x   Sq Epi: x / Non Sq Epi: x / Bacteria: x        RADIOLOGY & ADDITIONAL TESTS:    Imaging Personally Reviewed:    Consultant(s) Notes Reviewed:      Care Discussed with Consultants/Other Providers:   Patient is a 44y old  Female who presents with a chief complaint of L chest pain, SIRS, Acute Pulmonary Embolism (21 Sep 2024 15:23)      SUBJECTIVE / OVERNIGHT EVENTS: Overnight patient declined duplex. Patient remains anxious.     MEDICATIONS  (STANDING):  baclofen 10 milliGRAM(s) Oral every 8 hours  chlorhexidine 2% Cloths 1 Application(s) Topical daily  dalfampridine ER 10 milliGRAM(s) Oral every 12 hours  DAPTOmycin IVPB 400 milliGRAM(s) IV Intermittent every 24 hours  DULoxetine 60 milliGRAM(s) Oral at bedtime  enoxaparin Injectable 40 milliGRAM(s) SubCutaneous every 24 hours  influenza   Vaccine 0.5 milliLiter(s) IntraMuscular once  lactated ringers. 500 milliLiter(s) (100 mL/Hr) IV Continuous <Continuous>  meropenem  IVPB 1000 milliGRAM(s) IV Intermittent every 8 hours  methocarbamol 500 milliGRAM(s) Oral four times a day  modafinil 200 milliGRAM(s) Oral daily  multivitamin 1 Tablet(s) Oral daily  mupirocin 2% Ointment 1 Application(s) Topical two times a day  pantoprazole    Tablet 40 milliGRAM(s) Oral before breakfast  polyethylene glycol 3350 17 Gram(s) Oral daily  senna 2 Tablet(s) Oral at bedtime  sertraline 125 milliGRAM(s) Oral daily  Vibegron (Gemtesa) 75 mg tablet 1 Tablet(s) 1 Tablet(s) Oral daily    MEDICATIONS  (PRN):  acetaminophen     Tablet .. 650 milliGRAM(s) Oral every 6 hours PRN Mild Pain (1 - 3)  bisacodyl Suppository 10 milliGRAM(s) Rectal daily PRN Constipation  clonazePAM  Tablet 0.5 milliGRAM(s) Oral daily PRN for anxiety  HYDROmorphone   Tablet 4 milliGRAM(s) Oral every 8 hours PRN Severe Pain (7 - 10)  HYDROmorphone   Tablet 2 milliGRAM(s) Oral every 6 hours PRN Moderate Pain (4 - 6)      CAPILLARY BLOOD GLUCOSE        I&O's Summary    20 Sep 2024 07:01  -  21 Sep 2024 07:00  --------------------------------------------------------  IN: 100 mL / OUT: 155 mL / NET: -55 mL    21 Sep 2024 07:01  -  22 Sep 2024 05:18  --------------------------------------------------------  IN: 150 mL / OUT: 0 mL / NET: 150 mL        Vital Signs Last 24 Hrs  T(C): 36.8 (22 Sep 2024 00:34), Max: 36.8 (22 Sep 2024 00:34)  T(F): 98.3 (22 Sep 2024 00:34), Max: 98.3 (22 Sep 2024 00:34)  HR: 100 (22 Sep 2024 00:34) (80 - 100)  BP: 86/51 (22 Sep 2024 00:34) (86/51 - 99/64)  BP(mean): --  RR: 18 (22 Sep 2024 00:34) (18 - 18)  SpO2: 95% (22 Sep 2024 00:34) (95% - 97%)    Parameters below as of 22 Sep 2024 00:34  Patient On (Oxygen Delivery Method): room air        PHYSICAL EXAM:  CONSTITUTIONAL: Well groomed, no apparent distress, sitting in chair  SKIN: Mild erythema around surgical site border with light brown drainage but generally clean; not significantly tender to palpation; two drains; one w/ seropurulent output and other w/ serosang output  RESP: No respiratory distress, no use of accessory muscles  GI: Soft, NT, ND  PSYCH: AOx 3,  LABS:                        7.3    13.82 )-----------( 496      ( 21 Sep 2024 08:42 )             22.8      09-21    136  |  100  |  10  ----------------------------<  84  4.2   |  26  |  0.50    Ca    8.9      21 Sep 2024 08:42  Phos  2.9     09-21  Mg     2.3     09-21    TPro  5.7[L]  /  Alb  3.1[L]  /  TBili  <0.1[L]  /  DBili  x   /  AST  11  /  ALT  34  /  AlkPhos  274[H]  09-21          Urinalysis Basic - ( 21 Sep 2024 08:42 )    Color: x / Appearance: x / SG: x / pH: x  Gluc: 84 mg/dL / Ketone: x  / Bili: x / Urobili: x   Blood: x / Protein: x / Nitrite: x   Leuk Esterase: x / RBC: x / WBC x   Sq Epi: x / Non Sq Epi: x / Bacteria: x        RADIOLOGY & ADDITIONAL TESTS:    Imaging Personally Reviewed.    Consultant(s) Notes Reviewed.    Care Discussed with Consultants/Other Providers.

## 2024-09-24 NOTE — PROVIDER CONTACT NOTE (CRITICAL VALUE NOTIFICATION) - BACKGROUND
Pt has hx of MS, septic arthritis who presents with chest pain with RLL PE.
Pt has hx of MS, depression, anxiety, septic arthritis admitted for chest pain with RLL PE, anemia with unclear etiology

## 2024-09-24 NOTE — PROGRESS NOTE ADULT - ASSESSMENT
44F with MS, anxiety, depression, septic arthritis of L sternoclavicular joint s/p multiple admissions requiring I&D and most recent admission (9/4-9/13) with I&D and L pectoral flap for evaluation of abscess now presenting to the ED with L sided chest pain with newly noted small nonocclusive RLL PE likely incidental. Course c/b anemia of unclear etiology. CT with evidence of new rim enhancing lesion s/p abscess drainage. Course further c/b low grade gram negative rods bacteremia on 9/15 started on meropenem and daptomycin by ID.   44F with MS, anxiety, depression, septic arthritis of L sternoclavicular joint s/p multiple admissions requiring I&D and most recent admission (9/4-9/13) with I&D and L pectoral flap for evaluation of abscess now presenting to the ED with L sided chest pain with newly noted small nonocclusive RLL PE likely incidental. Course c/b anemia of unclear etiology. CT with evidence of new rim enhancing lesion s/p abscess drainage and started on meropenem and daptomycin by ID.

## 2024-09-24 NOTE — PROGRESS NOTE ADULT - ASSESSMENT
This is a 44y year old Female with the below past medical history of MS, anxiety, depression, septic arthritis of L sternoclavicular joint s/p multiple admissions requiring I&D and most recent admission (9/4-9/13) with I&D and L pectoral flap for evaluation of abscess now presenting to the ED with L sided chest pain with fever 100.7. Patient most recently admission with I&D of L clavicular abscess by CTS and L pectoral flap surgery with plastic surgery. Pt is complaining of chest pain. Pt found to have PE on CT chest.  She says limited movement of the left arm, but otherwise no new weakness, no changes in speech, swallow, vision, bowel or bladder control.  Pt is well known to my associate Dr. Selby for her multiple sclerosis.     9/19/24: found to have small PE on Lovenox.  Has PICC line for infection.      exam notable for inability to raise left arm, chronic right leg weakness    On hx and exam no indication of change in her neurological status and thus there is no need for further inpt neurological workup or evaluation  Issue of PE/anticougulation as per primary team  appreciate ID consult on issue of antibiotics  Please keep in mind that she does have multiple sclerosis and she may have muted response to infection in terms of WBC and fever     Would have  consult, she is clearly overwhelmed by the fact she has had multiple admission, she has young children at home, support services need to be provided    follow up with Dr Selby as outpatient     d/w pt and with her daughter over the phone      I spent >35 minutes on patient of which >50% was coordination of care and/or counseling
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
## 2024-09-24 NOTE — PROGRESS NOTE ADULT - ASSESSMENT
44F with PMH of MS on Rituxan, anxiety, depression, septic arthritis of L sternoclavicular joint s/p multiple I&Ds in 2023, recent recurrence s/p I&D with CTS and pec turnover flap with Dr. Dean on 9/9, d/c'ed with PICC line on vanc/bryson, now presenting with redness and increased pain. PRS consulted for possible surgical site infection. CT chest shows normal postop changes with possible small hematoma around flap, LISBETH drains within area, no abscess/collection. Small PE without heart strain noted. Repeat CT from 9/18 now shows rim enhancing collections at the surgical site.      plan  - plan for return to OR for washout procedure tomorrow (9/25)   - please prepare patient for operating room:     NPO at midnight      IVF while NPO     Preop labs: CBC, BMP, Mg, Phos, T&S, Coags, hcg  - local wound care with mupirocin and daily dressing changes   - abx per ID   - remainder of care per primary - please contact plastics if any concerns and if status changes    Leroy Cagle, PGY1  Plastic Surgery   [phone] [facility] pager  Available on teams

## 2024-09-24 NOTE — PROGRESS NOTE ADULT - PROBLEM SELECTOR PLAN 1
History of septic arthritis with multiple admissions s/p I&D with CTS and L pectoral flap with plastic surgery on last admission (9/4-9/13). Was discharged home on meropenem 1g q8 and van 1250 q12 with reportedly no missed doses.   - repeat CT: Status post left sternoclavicular resection with pectoralis flap. Mixed density soft tissue and/or hematoma in the operative bed, similar in size. Interval development of rim-enhancing collections, concerning for infection.  - no acute surgical intervention for surgical site erythema per plastic surgery (9/23)  - no acute surgical intervention per CT surgery (possible OR 9/24)  - ID: c/w daptomycin 400mg (9/16 - )  - ID: c/w meropenem 1g q8 (9/15 - )  - monitor LISBETH drain output: cloudy, seropurulent  - multimodal pain control with oxy 5/10 and tylenol PRN History of septic arthritis with multiple admissions s/p I&D with CTS and L pectoral flap with plastic surgery on last admission (9/4-9/13). Was discharged home on meropenem 1g q8h and van 1250 q12h with reportedly no missed doses.   - repeat CT: Status post left sternoclavicular resection with pectoralis flap. Mixed density soft tissue and/or hematoma in the operative bed, similar in size. Interval development of rim-enhancing collections, concerning for infection.  - plan to go back to OR per plastic surgery tomorrow 9/25  - ID: c/w daptomycin 400mg daily (9/16 - )  - ID: c/w meropenem 1g q8h (9/15 - )  - monitor LISBETH drain output: cloudy, seropurulent  - multimodal pain control with Dilaudid 2/4 and Tylenol PRN

## 2024-09-24 NOTE — PROGRESS NOTE ADULT - SUBJECTIVE AND OBJECTIVE BOX
Surgery Progress Note  Patient is a 44y old  Female who presents with a chief complaint of L chest pain, SIRS, Acute Pulmonary Embolism (24 Sep 2024 11:43)      SUBJECTIVE: Patient seen and examined at bedside with surgical team. Patient endorses frustration about uncertainty of plan from surgical team. She wants to understand the plan moving forward and asked that the PRS team reach out to her HCP.     Physical Exam  Constitutional: Resting in bed comfortably, in no acute distress.   Respiratory: no increased WOB, no tachypnea  Chest: Allevyn dressing removed. Minimal seropurulent discharged expressed from wound. LISBETH drain x2 in place with seropurulent drainage in bulb. Allevyn pad changed.  Ext: warm and well perfused    Vital Signs Last 24 Hrs  T(C): 36.4 (24 Sep 2024 11:30), Max: 36.7 (23 Sep 2024 17:03)  T(F): 97.6 (24 Sep 2024 11:30), Max: 98.1 (23 Sep 2024 17:03)  HR: 70 (24 Sep 2024 11:30) (70 - 94)  BP: 91/58 (24 Sep 2024 11:30) (91/58 - 100/67)  BP(mean): --  RR: 18 (24 Sep 2024 11:30) (18 - 18)  SpO2: 96% (24 Sep 2024 11:30) (95% - 99%)    Parameters below as of 24 Sep 2024 11:30  Patient On (Oxygen Delivery Method): room air        I&O's Detail    23 Sep 2024 07:01  -  24 Sep 2024 07:00  --------------------------------------------------------  IN:    IV PiggyBack: 100 mL    Lactated Ringers: 1000 mL  Total IN: 1100 mL    OUT:    Bulb (mL): 15 mL    Bulb (mL): 25 mL  Total OUT: 40 mL    Total NET: 1060 mL      MEDICATIONS  (STANDING):  baclofen 10 milliGRAM(s) Oral every 8 hours  chlorhexidine 2% Cloths 1 Application(s) Topical daily  dalfampridine ER 10 milliGRAM(s) Oral every 12 hours  DAPTOmycin IVPB 400 milliGRAM(s) IV Intermittent every 24 hours  DULoxetine 60 milliGRAM(s) Oral at bedtime  enoxaparin Injectable 40 milliGRAM(s) SubCutaneous every 24 hours  influenza   Vaccine 0.5 milliLiter(s) IntraMuscular once  lactated ringers. 500 milliLiter(s) (100 mL/Hr) IV Continuous <Continuous>  magnesium citrate Oral Solution 296 milliLiter(s) Oral once  melatonin 3 milliGRAM(s) Oral at bedtime  meropenem  IVPB      meropenem  IVPB 1000 milliGRAM(s) IV Intermittent every 8 hours  methocarbamol 500 milliGRAM(s) Oral four times a day  modafinil 200 milliGRAM(s) Oral daily  multivitamin 1 Tablet(s) Oral daily  mupirocin 2% Ointment 1 Application(s) Topical two times a day  pantoprazole    Tablet 40 milliGRAM(s) Oral before breakfast  polyethylene glycol 3350 17 Gram(s) Oral two times a day  senna 2 Tablet(s) Oral at bedtime  sertraline 125 milliGRAM(s) Oral daily  Vibegron (Gemtesa) 75 mg tablet 1 Tablet(s) 1 Tablet(s) Oral daily    MEDICATIONS  (PRN):  acetaminophen     Tablet .. 650 milliGRAM(s) Oral every 6 hours PRN Mild Pain (1 - 3)  clonazePAM  Tablet 0.5 milliGRAM(s) Oral daily PRN for anxiety  HYDROmorphone   Tablet 4 milliGRAM(s) Oral every 8 hours PRN Severe Pain (7 - 10)  HYDROmorphone   Tablet 2 milliGRAM(s) Oral every 6 hours PRN Moderate Pain (4 - 6)      LABS:                        6.8    10.58 )-----------( 527      ( 24 Sep 2024 06:57 )             21.8     09-24    139  |  103  |  10  ----------------------------<  89  4.2   |  27  |  0.51    Ca    8.8      24 Sep 2024 06:57  Phos  3.9     09-24  Mg     2.1     09-24    TPro  5.3[L]  /  Alb  2.8[L]  /  TBili  <0.1[L]  /  DBili  x   /  AST  14  /  ALT  24  /  AlkPhos  215[H]  09-24    PT/INR - ( 24 Sep 2024 07:02 )   PT: 11.2 sec;   INR: 1.07 ratio         PTT - ( 24 Sep 2024 07:02 )  PTT:31.8 sec  LIVER FUNCTIONS - ( 24 Sep 2024 06:57 )  Alb: 2.8 g/dL / Pro: 5.3 g/dL / ALK PHOS: 215 U/L / ALT: 24 U/L / AST: 14 U/L / GGT: x           Urinalysis Basic - ( 24 Sep 2024 06:57 )    Color: x / Appearance: x / SG: x / pH: x  Gluc: 89 mg/dL / Ketone: x  / Bili: x / Urobili: x   Blood: x / Protein: x / Nitrite: x   Leuk Esterase: x / RBC: x / WBC x   Sq Epi: x / Non Sq Epi: x / Bacteria: x

## 2024-09-24 NOTE — PROVIDER CONTACT NOTE (CRITICAL VALUE NOTIFICATION) - SITUATION
Pt has critical value indicating low HgB of 6.8
Pt has corrected report from blood cultures from sept 15th showing no growth in aerobic bottle

## 2024-09-24 NOTE — PROGRESS NOTE ADULT - PROBLEM SELECTOR PLAN 2
Bcx 9/15 growing GNR, PCR negative but corrected to no growth, repeat culture NGTD. Abscess culture 9/19 on with Staph epi in fluid media.  - C/w dapto and bryson as above  - F/u ID recs  - hold off on PICC removal pending 9/19 blood culture data and additional plastics intervention

## 2024-09-24 NOTE — PROGRESS NOTE ADULT - PROBLEM SELECTOR PLAN 1
- s/p left SCJ debridement and left pectoral flap on 9/9  - POSS Revision TUES 9/24/24 - pending   - plastic follow up; LISBETH management by Plastics. Monitor output.   - recommend close ID follow up. Abx switched to Dapto per ID  - global care per primary team.  consider suppository/movantik for BM   OT daily please  - thoracic surgery will continue to follow  - Plan d/w Attending Dr. Bautista  ----  For any questions or concerns, please contact thoracic @ [phone]

## 2024-09-24 NOTE — PROGRESS NOTE ADULT - PROBLEM SELECTOR PLAN 5
History of MS on home baclofen, dalfampridine, and gemtesa.  - c/w baclofen 10 TID  - c/w modafinil 200 qd  - c/w home dalfampridine and gemtesa as not on formulary Pt with elevated ALP, AST, ALT, no clinical signs of cholestatic process, more likely 2/2 septic arthritis  - RUQ US: distended gallbladder, no stones or obstruction  - ID: repeat CT CAP- no source of abd or pelvic infection, chest redemonstrated surgical changes

## 2024-09-24 NOTE — PROGRESS NOTE ADULT - PROBLEM SELECTOR PLAN 4
Pt with elevated ALP, AST, ALT, no clinical signs of cholestatic process, more likely 2/2 septic arthritis  - RUQ US: distended gallbladder, no stones or obstruction  - ID: repeat CT CAP- no source of abd or pelvic infection, chest redemonstrated surgical changes CT on 9/15 demonstrated nonocclusive right lower lobe basilar subsegmental pulmonary emboli with no evidence of right heart strain.  -Holding AC d/t pending procedure and low risk profile  -9/15 Duplex with no evidence of DVT  -Repeat duplex ordered for 9/24

## 2024-09-24 NOTE — PROGRESS NOTE ADULT - PROBLEM SELECTOR PLAN 7
- Fluids: NA   - Electrolytes: Will replete to maintain K>4, Phos>3, and Mag>2  - Nutrition: regular   - Activity: OOB as tolerated   - DVT Prophylaxis: lovenox 40 qd  - Stress Ulcer/GI Prophylaxis: NA  - Disposition: pending medical management - c/w home klonipin 0.5 daily PRN  - c/w duloxetine 60 daily  - c/w sertraline 125 daily  - monitor for serotonergic symptoms

## 2024-09-24 NOTE — PROVIDER CONTACT NOTE (CRITICAL VALUE NOTIFICATION) - TEST AND RESULT REPORTED:
Corrected report from blood cultures collected september 15th with Aerobic bottle showing no growth
Blood culture from 9/15 ,5th preliminary growth in aerobic bottle = gram - rods
Hemoglobin of 6.8

## 2024-09-24 NOTE — PROGRESS NOTE ADULT - PROBLEM SELECTOR PLAN 3
Patient admitted with Hgb 12.2, now 7.7 c/f unknown source of bleeding, pt with known history of RAMONE  - trend CBC  - F/u anemia workup labs: negative  - Fe studies from 9/11/24 shows iron deficiency- holding Fe iso active infection  - Hgb stable in mid 7's, restarted lovenox  - Repeat CT CAP: no source of bleed  - HgB 6.8 on 9/24, consent for transfusion Patient admitted with Hgb 12.2, now 7.7 c/f unknown source of bleeding, pt with known history of RAMONE  - trend CBC  - F/u anemia workup labs: negative  - Fe studies from 9/11/24 shows iron deficiency- holding Fe iso active infection  - Hgb stable in mid 7's, restarted lovenox  - Repeat CT CAP: no source of bleed  - HgB 6.8 on 9/24, consented for transfusion

## 2024-09-24 NOTE — PROGRESS NOTE ADULT - PROBLEM SELECTOR PLAN 6
- c/w home klonipin 0.5 qd PRN  - c/w duloxetine 60 qd  - c/w sertraline 125 qd  - monitor for serotonergic symptoms History of MS on home baclofen, dalfampridine, and gemtesa.  - c/w baclofen 10 TID  - c/w modafinil 200 daily  - c/w home dalfampridine and gemtesa as not on formulary

## 2024-09-24 NOTE — PROGRESS NOTE ADULT - ATTENDING COMMENTS
Patient seen and examined this morning. Plan of care discussed with the patient/NADJA (HCP), plastics and ID attendings, the resident team.     She reports overall pain is improving. Had small bowel movement yesterday.     #Septic sternoclavicular joint s/p debridement and muscle flap L clavicle, postop abscess  #anemia likely from recent surgery and frequent phlebotomy +/- hemodilutional effect  #subsegmental PE  - Abnormal CT with new rim enhancing collections in under the flap with purulent drainage from sternal side of the incision and cloudy fluid in LISBETH drain  - Blood cx from 9/15 with prelim report of GNR now with no growth. Subsequent repeat blood cx have been all negative to date  - f/u wound cx result (fluid media + S. epi)  - currently on Dapto and Meropenem per ID  - plastics planning on OR for wound washout tomorrow, 9/25  - patient in agreement with [facility]BC transfusion   - +small nonocclusive right lower lobe basilar subsegmental PE noted on CTA chest (9/15), LE duplex neg (9/15), will defer full AC at this time as risk felt to outweigh benefit and plan for wound washout in OR. Check serial LE duplex and continue prophylactic dose of lovenox for now. Patient seen and examined this morning. Plan of care discussed with the patient/NADJA (HCP), plastics and ID attendings, the resident team.     She reports overall pain is improving. Had small bowel movement yesterday.     #Septic sternoclavicular joint s/p debridement and muscle flap L clavicle, postop abscess  #anemia likely from recent surgery and frequent phlebotomy +/- hemodilutional effect  #subsegmental PE  - Abnormal CT with new rim enhancing collections in under the flap with purulent drainage from sternal side of the incision and cloudy fluid in LISBETH drain  - Blood cx from 9/15 with prelim report of GNR now with no growth. Subsequent repeat blood cx have been all negative to date  - f/u wound cx result (fluid media + S. epi)  - currently on Dapto and Meropenem per ID  - plastics planning on OR for wound washout tomorrow, 9/25  - Hgb 6.8 today. patient in agreement with PRBC transfusion   - +small nonocclusive right lower lobe basilar subsegmental PE noted on CTA chest (9/15), LE duplex neg (9/15), will defer full AC at this time as risk felt to outweigh benefit and plan for wound washout in OR. Check serial LE duplex and continue prophylactic dose of lovenox for now.

## 2024-09-24 NOTE — PROGRESS NOTE ADULT - ASSESSMENT
44F s/p left SCJ debridement and left pectoral flap on 9/9 presenting with superficial surgical site erythema and localized pain. Also found to have small subsegmental right lower lobe pulmonary emboli.  Recommendations:  -No acute thoracic surgery operative intervention at this time.  -PRS consult.  -ID consult.  -Dispo per PRS    9/16: Evaluated at ED bedside by Thoracic Attending Dr. Bautista. Recommend close ID follow up for cellulitis of site. Vanco trough level noted to be 5.5, ? unclear if patient was taking antibiotics appropriately in outpatient setting.   9/17 Antibiotic switched to dapto per ID. Site improving  9/18 VSS  afebrile.  site with decreased erythema  9/19 VSS afebile site  COLTON improving - OT daily-  large stool burden seen on CT d/w Primary team consider MOVANTIK / suppository   9/20 c/w dapto 400 mg q24h. Continue IV meropenem 1g q8h Follow-up final identification and sensitivity of GNR  Repeat blood cx   9/21 9/21 maintain jpss. analgesics for pain, revision to be considered  9/22 No events overnight. Pain better controlled  9/23 VSS; Maintain LISBETH drains, wound care as per plastics.   9/24 VSS: As per plastic surgery, plan for flap revision today?

## 2024-09-24 NOTE — PROVIDER CONTACT NOTE (CRITICAL VALUE NOTIFICATION) - ACTION/TREATMENT ORDERED:
No action or treatment ordered yet at this time
Team ordered Blood cultures x 2 sets
No action or treatment ordered yet at this time

## 2024-09-24 NOTE — PROVIDER CONTACT NOTE (CRITICAL VALUE NOTIFICATION) - ASSESSMENT
Pt denies dizziness, fatigue, lightheadedness, and VSS. No active s/s of any bleeding
Pt remains afebrile
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
## 2024-09-25 LAB
ALBUMIN SERPL ELPH-MCNC: 2.8 G/DL — LOW (ref 3.3–5)
ALP SERPL-CCNC: 224 U/L — HIGH (ref 40–120)
ALT FLD-CCNC: 21 U/L — SIGNIFICANT CHANGE UP (ref 10–45)
ANION GAP SERPL CALC-SCNC: 10 MMOL/L — SIGNIFICANT CHANGE UP (ref 5–17)
APTT BLD: 32.4 SEC — SIGNIFICANT CHANGE UP (ref 24.5–35.6)
AST SERPL-CCNC: 17 U/L — SIGNIFICANT CHANGE UP (ref 10–40)
BASOPHILS # BLD AUTO: 0.07 K/UL — SIGNIFICANT CHANGE UP (ref 0–0.2)
BASOPHILS NFR BLD AUTO: 0.7 % — SIGNIFICANT CHANGE UP (ref 0–2)
BILIRUB SERPL-MCNC: <0.1 MG/DL — LOW (ref 0.2–1.2)
BLD GP AB SCN SERPL QL: NEGATIVE — SIGNIFICANT CHANGE UP
BUN SERPL-MCNC: 7 MG/DL — SIGNIFICANT CHANGE UP (ref 7–23)
CALCIUM SERPL-MCNC: 8.8 MG/DL — SIGNIFICANT CHANGE UP (ref 8.4–10.5)
CHLORIDE SERPL-SCNC: 105 MMOL/L — SIGNIFICANT CHANGE UP (ref 96–108)
CO2 SERPL-SCNC: 25 MMOL/L — SIGNIFICANT CHANGE UP (ref 22–31)
CREAT SERPL-MCNC: 0.46 MG/DL — LOW (ref 0.5–1.3)
EGFR: 120 ML/MIN/1.73M2 — SIGNIFICANT CHANGE UP
EOSINOPHIL # BLD AUTO: 0.29 K/UL — SIGNIFICANT CHANGE UP (ref 0–0.5)
EOSINOPHIL NFR BLD AUTO: 2.8 % — SIGNIFICANT CHANGE UP (ref 0–6)
GLUCOSE SERPL-MCNC: 86 MG/DL — SIGNIFICANT CHANGE UP (ref 70–99)
HCG SERPL-ACNC: <2 MIU/ML — SIGNIFICANT CHANGE UP
HCG SERPL-ACNC: <2 MIU/ML — SIGNIFICANT CHANGE UP
HCT VFR BLD CALC: 25.4 % — LOW (ref 34.5–45)
HGB BLD-MCNC: 8.2 G/DL — LOW (ref 11.5–15.5)
IMM GRANULOCYTES NFR BLD AUTO: 2.1 % — HIGH (ref 0–0.9)
INR BLD: 1.04 RATIO — SIGNIFICANT CHANGE UP (ref 0.85–1.16)
LYMPHOCYTES # BLD AUTO: 1.77 K/UL — SIGNIFICANT CHANGE UP (ref 1–3.3)
LYMPHOCYTES # BLD AUTO: 17.2 % — SIGNIFICANT CHANGE UP (ref 13–44)
MAGNESIUM SERPL-MCNC: 2.1 MG/DL — SIGNIFICANT CHANGE UP (ref 1.6–2.6)
MCHC RBC-ENTMCNC: 27.5 PG — SIGNIFICANT CHANGE UP (ref 27–34)
MCHC RBC-ENTMCNC: 32.3 GM/DL — SIGNIFICANT CHANGE UP (ref 32–36)
MCV RBC AUTO: 85.2 FL — SIGNIFICANT CHANGE UP (ref 80–100)
MONOCYTES # BLD AUTO: 0.95 K/UL — HIGH (ref 0–0.9)
MONOCYTES NFR BLD AUTO: 9.2 % — SIGNIFICANT CHANGE UP (ref 2–14)
NEUTROPHILS # BLD AUTO: 6.99 K/UL — SIGNIFICANT CHANGE UP (ref 1.8–7.4)
NEUTROPHILS NFR BLD AUTO: 68 % — SIGNIFICANT CHANGE UP (ref 43–77)
NRBC # BLD: 0 /100 WBCS — SIGNIFICANT CHANGE UP (ref 0–0)
PHOSPHATE SERPL-MCNC: 3.4 MG/DL — SIGNIFICANT CHANGE UP (ref 2.5–4.5)
PLATELET # BLD AUTO: 562 K/UL — HIGH (ref 150–400)
POTASSIUM SERPL-MCNC: 4.2 MMOL/L — SIGNIFICANT CHANGE UP (ref 3.5–5.3)
POTASSIUM SERPL-SCNC: 4.2 MMOL/L — SIGNIFICANT CHANGE UP (ref 3.5–5.3)
PROT SERPL-MCNC: 5.3 G/DL — LOW (ref 6–8.3)
PROTHROM AB SERPL-ACNC: 12 SEC — SIGNIFICANT CHANGE UP (ref 9.9–13.4)
RBC # BLD: 2.98 M/UL — LOW (ref 3.8–5.2)
RBC # FLD: 13.4 % — SIGNIFICANT CHANGE UP (ref 10.3–14.5)
RH IG SCN BLD-IMP: POSITIVE — SIGNIFICANT CHANGE UP
SODIUM SERPL-SCNC: 140 MMOL/L — SIGNIFICANT CHANGE UP (ref 135–145)
WBC # BLD: 10.29 K/UL — SIGNIFICANT CHANGE UP (ref 3.8–10.5)
WBC # FLD AUTO: 10.29 K/UL — SIGNIFICANT CHANGE UP (ref 3.8–10.5)

## 2024-09-25 PROCEDURE — 99232 SBSQ HOSP IP/OBS MODERATE 35: CPT

## 2024-09-25 PROCEDURE — 93970 EXTREMITY STUDY: CPT | Mod: 26

## 2024-09-25 PROCEDURE — 11042 DBRDMT SUBQ TIS 1ST 20SQCM/<: CPT | Mod: 78

## 2024-09-25 PROCEDURE — 99232 SBSQ HOSP IP/OBS MODERATE 35: CPT | Mod: GC

## 2024-09-25 DEVICE — BONE FILLER BIOCOMPOSITE STIMULAN RAPID CURE 10CC: Type: IMPLANTABLE DEVICE | Site: LEFT | Status: FUNCTIONAL

## 2024-09-25 RX ORDER — SODIUM CHLORIDE IRRIG SOLUTION 0.9 %
500 SOLUTION, IRRIGATION IRRIGATION
Refills: 0 | Status: DISCONTINUED | OUTPATIENT
Start: 2024-09-25 | End: 2024-09-26

## 2024-09-25 RX ORDER — PANTOPRAZOLE SODIUM 40 MG/1
40 TABLET, DELAYED RELEASE ORAL
Refills: 0 | Status: DISCONTINUED | OUTPATIENT
Start: 2024-09-25 | End: 2024-10-01

## 2024-09-25 RX ORDER — SENNOSIDES 8.6 MG
2 TABLET ORAL AT BEDTIME
Refills: 0 | Status: DISCONTINUED | OUTPATIENT
Start: 2024-09-25 | End: 2024-10-01

## 2024-09-25 RX ORDER — HYDROMORPHONE HYDROCHLORIDE 1 MG/ML
4 INJECTION, SOLUTION INTRAMUSCULAR; INTRAVENOUS; SUBCUTANEOUS EVERY 8 HOURS
Refills: 0 | Status: DISCONTINUED | OUTPATIENT
Start: 2024-09-25 | End: 2024-10-01

## 2024-09-25 RX ORDER — HYDROMORPHONE HYDROCHLORIDE 1 MG/ML
2 INJECTION, SOLUTION INTRAMUSCULAR; INTRAVENOUS; SUBCUTANEOUS EVERY 6 HOURS
Refills: 0 | Status: DISCONTINUED | OUTPATIENT
Start: 2024-09-25 | End: 2024-10-01

## 2024-09-25 RX ORDER — DAPTOMYCIN 500 MG/10ML
400 INJECTION, POWDER, LYOPHILIZED, FOR SOLUTION INTRAVENOUS EVERY 24 HOURS
Refills: 0 | Status: DISCONTINUED | OUTPATIENT
Start: 2024-09-26 | End: 2024-10-01

## 2024-09-25 RX ORDER — MODAFINIL 100 MG/1
200 TABLET ORAL DAILY
Refills: 0 | Status: DISCONTINUED | OUTPATIENT
Start: 2024-09-25 | End: 2024-10-01

## 2024-09-25 RX ORDER — 5-HYDROXYTRYPTOPHAN (5-HTP) 100 MG
3 TABLET,DISINTEGRATING ORAL AT BEDTIME
Refills: 0 | Status: DISCONTINUED | OUTPATIENT
Start: 2024-09-25 | End: 2024-10-01

## 2024-09-25 RX ORDER — SERTRALINE HYDROCHLORIDE 100 MG/1
125 TABLET, FILM COATED ORAL DAILY
Refills: 0 | Status: DISCONTINUED | OUTPATIENT
Start: 2024-09-25 | End: 2024-10-01

## 2024-09-25 RX ORDER — ONDANSETRON HCL/PF 4 MG/2 ML
4 VIAL (ML) INJECTION ONCE
Refills: 0 | Status: DISCONTINUED | OUTPATIENT
Start: 2024-09-25 | End: 2024-09-25

## 2024-09-25 RX ORDER — CHLORHEXIDINE GLUCONATE ORAL RINSE 1.2 MG/ML
1 SOLUTION DENTAL DAILY
Refills: 0 | Status: DISCONTINUED | OUTPATIENT
Start: 2024-09-25 | End: 2024-10-01

## 2024-09-25 RX ORDER — DULOXETINE HCL 20 MG
60 CAPSULE,DELAYED RELEASE (ENTERIC COATED) ORAL AT BEDTIME
Refills: 0 | Status: DISCONTINUED | OUTPATIENT
Start: 2024-09-25 | End: 2024-10-01

## 2024-09-25 RX ORDER — DAPTOMYCIN 500 MG/10ML
250 INJECTION, POWDER, LYOPHILIZED, FOR SOLUTION INTRAVENOUS EVERY 24 HOURS
Refills: 0 | Status: DISCONTINUED | OUTPATIENT
Start: 2024-09-25 | End: 2024-09-25

## 2024-09-25 RX ORDER — ENOXAPARIN SODIUM 150 MG/ML
40 INJECTION SUBCUTANEOUS EVERY 24 HOURS
Refills: 0 | Status: DISCONTINUED | OUTPATIENT
Start: 2024-09-25 | End: 2024-10-01

## 2024-09-25 RX ORDER — BACLOFEN 100 %
5 POWDER (GRAM) MISCELLANEOUS EVERY 8 HOURS
Refills: 0 | Status: DISCONTINUED | OUTPATIENT
Start: 2024-09-25 | End: 2024-10-01

## 2024-09-25 RX ORDER — CLONAZEPAM 1 MG
0.5 TABLET ORAL DAILY
Refills: 0 | Status: DISCONTINUED | OUTPATIENT
Start: 2024-09-25 | End: 2024-10-01

## 2024-09-25 RX ORDER — MEROPENEM 500 MG/20ML
1000 INJECTION INTRAVENOUS EVERY 8 HOURS
Refills: 0 | Status: DISCONTINUED | OUTPATIENT
Start: 2024-09-25 | End: 2024-10-01

## 2024-09-25 RX ORDER — ACETAMINOPHEN 325 MG
650 TABLET ORAL EVERY 6 HOURS
Refills: 0 | Status: DISCONTINUED | OUTPATIENT
Start: 2024-09-25 | End: 2024-10-01

## 2024-09-25 RX ORDER — OXYCODONE HYDROCHLORIDE 30 MG/1
5 TABLET, FILM COATED, EXTENDED RELEASE ORAL ONCE
Refills: 0 | Status: DISCONTINUED | OUTPATIENT
Start: 2024-09-25 | End: 2024-09-25

## 2024-09-25 RX ORDER — HYDROMORPHONE HYDROCHLORIDE 1 MG/ML
0.5 INJECTION, SOLUTION INTRAMUSCULAR; INTRAVENOUS; SUBCUTANEOUS
Refills: 0 | Status: DISCONTINUED | OUTPATIENT
Start: 2024-09-25 | End: 2024-09-25

## 2024-09-25 RX ORDER — MULTI VITAMIN/MINERAL SUPPLEMENT WITH ASCORBIC ACID, NIACIN, PYRIDOXINE, PANTOTHENIC ACID, FOLIC ACID, RIBOFLAVIN, THIAMIN, BIOTIN, COBALAMIN AND ZINC. 60; 20; 12.5; 10; 10; 1.7; 1.5; 1; .3; .006 MG/1; MG/1; MG/1; MG/1; MG/1; MG/1; MG/1; MG/1; MG/1; MG/1
1 TABLET, COATED ORAL DAILY
Refills: 0 | Status: DISCONTINUED | OUTPATIENT
Start: 2024-09-25 | End: 2024-10-01

## 2024-09-25 RX ADMIN — Medication 10 MILLIGRAM(S): at 14:00

## 2024-09-25 RX ADMIN — Medication 650 MILLIGRAM(S): at 03:25

## 2024-09-25 RX ADMIN — HYDROMORPHONE HYDROCHLORIDE 0.5 MILLIGRAM(S): 1 INJECTION, SOLUTION INTRAMUSCULAR; INTRAVENOUS; SUBCUTANEOUS at 20:12

## 2024-09-25 RX ADMIN — Medication 500 MILLIGRAM(S): at 11:08

## 2024-09-25 RX ADMIN — HYDROMORPHONE HYDROCHLORIDE 0.5 MILLIGRAM(S): 1 INJECTION, SOLUTION INTRAMUSCULAR; INTRAVENOUS; SUBCUTANEOUS at 20:11

## 2024-09-25 RX ADMIN — MEROPENEM 100 MILLIGRAM(S): 500 INJECTION INTRAVENOUS at 13:16

## 2024-09-25 RX ADMIN — Medication 650 MILLIGRAM(S): at 11:06

## 2024-09-25 RX ADMIN — Medication 2 TABLET(S): at 22:34

## 2024-09-25 RX ADMIN — DAPTOMYCIN 116 MILLIGRAM(S): 500 INJECTION, POWDER, LYOPHILIZED, FOR SOLUTION INTRAVENOUS at 14:11

## 2024-09-25 RX ADMIN — Medication 650 MILLIGRAM(S): at 02:58

## 2024-09-25 RX ADMIN — Medication 17 GRAM(S): at 06:10

## 2024-09-25 RX ADMIN — Medication 650 MILLIGRAM(S): at 12:06

## 2024-09-25 RX ADMIN — Medication 3 MILLIGRAM(S): at 22:34

## 2024-09-25 RX ADMIN — HYDROMORPHONE HYDROCHLORIDE 4 MILLIGRAM(S): 1 INJECTION, SOLUTION INTRAMUSCULAR; INTRAVENOUS; SUBCUTANEOUS at 22:35

## 2024-09-25 RX ADMIN — MULTI VITAMIN/MINERAL SUPPLEMENT WITH ASCORBIC ACID, NIACIN, PYRIDOXINE, PANTOTHENIC ACID, FOLIC ACID, RIBOFLAVIN, THIAMIN, BIOTIN, COBALAMIN AND ZINC. 1 TABLET(S): 60; 20; 12.5; 10; 10; 1.7; 1.5; 1; .3; .006 TABLET, COATED ORAL at 11:08

## 2024-09-25 RX ADMIN — HYDROMORPHONE HYDROCHLORIDE 0.5 MILLIGRAM(S): 1 INJECTION, SOLUTION INTRAMUSCULAR; INTRAVENOUS; SUBCUTANEOUS at 19:35

## 2024-09-25 RX ADMIN — ENOXAPARIN SODIUM 40 MILLIGRAM(S): 150 INJECTION SUBCUTANEOUS at 22:33

## 2024-09-25 RX ADMIN — CHLORHEXIDINE GLUCONATE ORAL RINSE 1 APPLICATION(S): 1.2 SOLUTION DENTAL at 11:10

## 2024-09-25 RX ADMIN — HYDROMORPHONE HYDROCHLORIDE 4 MILLIGRAM(S): 1 INJECTION, SOLUTION INTRAMUSCULAR; INTRAVENOUS; SUBCUTANEOUS at 23:35

## 2024-09-25 RX ADMIN — HYDROMORPHONE HYDROCHLORIDE 0.5 MILLIGRAM(S): 1 INJECTION, SOLUTION INTRAMUSCULAR; INTRAVENOUS; SUBCUTANEOUS at 19:20

## 2024-09-25 RX ADMIN — SERTRALINE HYDROCHLORIDE 125 MILLIGRAM(S): 100 TABLET, FILM COATED ORAL at 11:07

## 2024-09-25 RX ADMIN — MODAFINIL 200 MILLIGRAM(S): 100 TABLET ORAL at 11:08

## 2024-09-25 RX ADMIN — Medication 500 MILLIGRAM(S): at 06:08

## 2024-09-25 RX ADMIN — MUPIROCIN 1 APPLICATION(S): 20 OINTMENT TOPICAL at 06:08

## 2024-09-25 RX ADMIN — PANTOPRAZOLE SODIUM 40 MILLIGRAM(S): 40 TABLET, DELAYED RELEASE ORAL at 06:07

## 2024-09-25 RX ADMIN — HYDROMORPHONE HYDROCHLORIDE 0.5 MILLIGRAM(S): 1 INJECTION, SOLUTION INTRAMUSCULAR; INTRAVENOUS; SUBCUTANEOUS at 20:30

## 2024-09-25 RX ADMIN — MEROPENEM 100 MILLIGRAM(S): 500 INJECTION INTRAVENOUS at 22:25

## 2024-09-25 RX ADMIN — Medication 10 MILLIGRAM(S): at 07:58

## 2024-09-25 RX ADMIN — MEROPENEM 100 MILLIGRAM(S): 500 INJECTION INTRAVENOUS at 06:07

## 2024-09-25 RX ADMIN — Medication 500 MILLIGRAM(S): at 22:35

## 2024-09-25 RX ADMIN — Medication 60 MILLIGRAM(S): at 22:34

## 2024-09-25 NOTE — PROGRESS NOTE ADULT - ASSESSMENT
This is a 44y year old Female with the below past medical history of MS, anxiety, depression, septic arthritis of L sternoclavicular joint s/p multiple admissions requiring I&D and most recent admission (9/4-9/13) with I&D and L pectoral flap for evaluation of abscess now presenting to the ED with L sided chest pain with fever 100.7. Patient most recently admission with I&D of L clavicular abscess by CTS and L pectoral flap surgery with plastic surgery. Pt is complaining of chest pain. Pt found to have PE on CT chest.  She says limited movement of the left arm, but otherwise no new weakness, no changes in speech, swallow, vision, bowel or bladder control.  Pt is well known to my associate Dr. Selby for her multiple sclerosis.     9/19/24: found to have small PE on Lovenox.  Has PICC line for infection.      exam notable for inability to raise left arm, chronic right leg weakness    On hx and exam no indication of change in her neurological status and thus there is no need for further inpt neurological workup or evaluation  Issue of PE/anticougulation as per primary team  appreciate ID consult on issue of antibiotics  Please keep in mind that she does have multiple sclerosis and she may have muted response to infection in terms of WBC and fever   Pt to go to OR today, tried to provide some reassurance to the patient    Would have  consult, she is clearly overwhelmed by the fact she has had multiple admission, she has young children at home, support services need to be provided    follow up with Dr Selby as outpatient     d/w pt at bedside

## 2024-09-25 NOTE — PROGRESS NOTE ADULT - ASSESSMENT
44F s/p left SCJ debridement and left pectoral flap on 9/9 presenting with superficial surgical site erythema and localized pain. Also found to have small subsegmental right lower lobe pulmonary emboli.  Recommendations:  -No acute thoracic surgery operative intervention at this time.  -PRS consult.  -ID consult.  -Dispo per PRS    9/16: Evaluated at ED bedside by Thoracic Attending Dr. Bautista. Recommend close ID follow up for cellulitis of site. Vanco trough level noted to be 5.5, ? unclear if patient was taking antibiotics appropriately in outpatient setting.   9/17 Antibiotic switched to dapto per ID. Site improving  9/18 VSS  afebrile.  site with decreased erythema  9/19 VSS afebile site  COLTON improving - OT daily-  large stool burden seen on CT d/w Primary team consider MOVANTIK / suppository   9/20 c/w dapto 400 mg q24h. Continue IV meropenem 1g q8h Follow-up final identification and sensitivity of GNR  Repeat blood cx   9/21 9/21 maintain jpss. analgesics for pain, revision to be considered  9/22 No events overnight. Pain better controlled  9/23 VSS; Maintain LISBETH drains, wound care as per plastics.   9/24 VSS: As per plastic surgery, plan for flap revision today?   9/25 VSS NPO for Washout with Plastic Surgert today

## 2024-09-25 NOTE — PROGRESS NOTE ADULT - SUBJECTIVE AND OBJECTIVE BOX
Patient is a 44y old  Female who presents with a chief complaint of L chest pain, SIRS, Acute Pulmonary Embolism (21 Sep 2024 15:23)      SUBJECTIVE / OVERNIGHT EVENTS: Plan for back to OR today.     MEDICATIONS  (STANDING):  baclofen 10 milliGRAM(s) Oral every 8 hours  chlorhexidine 2% Cloths 1 Application(s) Topical daily  dalfampridine ER 10 milliGRAM(s) Oral every 12 hours  DAPTOmycin IVPB 400 milliGRAM(s) IV Intermittent every 24 hours  DULoxetine 60 milliGRAM(s) Oral at bedtime  enoxaparin Injectable 40 milliGRAM(s) SubCutaneous every 24 hours  influenza   Vaccine 0.5 milliLiter(s) IntraMuscular once  lactated ringers. 500 milliLiter(s) (100 mL/Hr) IV Continuous <Continuous>  meropenem  IVPB 1000 milliGRAM(s) IV Intermittent every 8 hours  methocarbamol 500 milliGRAM(s) Oral four times a day  modafinil 200 milliGRAM(s) Oral daily  multivitamin 1 Tablet(s) Oral daily  mupirocin 2% Ointment 1 Application(s) Topical two times a day  pantoprazole    Tablet 40 milliGRAM(s) Oral before breakfast  polyethylene glycol 3350 17 Gram(s) Oral daily  senna 2 Tablet(s) Oral at bedtime  sertraline 125 milliGRAM(s) Oral daily  Vibegron (Gemtesa) 75 mg tablet 1 Tablet(s) 1 Tablet(s) Oral daily    MEDICATIONS  (PRN):  acetaminophen     Tablet .. 650 milliGRAM(s) Oral every 6 hours PRN Mild Pain (1 - 3)  bisacodyl Suppository 10 milliGRAM(s) Rectal daily PRN Constipation  clonazePAM  Tablet 0.5 milliGRAM(s) Oral daily PRN for anxiety  HYDROmorphone   Tablet 4 milliGRAM(s) Oral every 8 hours PRN Severe Pain (7 - 10)  HYDROmorphone   Tablet 2 milliGRAM(s) Oral every 6 hours PRN Moderate Pain (4 - 6)      CAPILLARY BLOOD GLUCOSE        I&O's Summary    20 Sep 2024 07:01  -  21 Sep 2024 07:00  --------------------------------------------------------  IN: 100 mL / OUT: 155 mL / NET: -55 mL    21 Sep 2024 07:01  -  22 Sep 2024 05:18  --------------------------------------------------------  IN: 150 mL / OUT: 0 mL / NET: 150 mL        Vital Signs Last 24 Hrs  T(C): 36.8 (22 Sep 2024 00:34), Max: 36.8 (22 Sep 2024 00:34)  T(F): 98.3 (22 Sep 2024 00:34), Max: 98.3 (22 Sep 2024 00:34)  HR: 100 (22 Sep 2024 00:34) (80 - 100)  BP: 86/51 (22 Sep 2024 00:34) (86/51 - 99/64)  BP(mean): --  RR: 18 (22 Sep 2024 00:34) (18 - 18)  SpO2: 95% (22 Sep 2024 00:34) (95% - 97%)    Parameters below as of 22 Sep 2024 00:34  Patient On (Oxygen Delivery Method): room air        PHYSICAL EXAM:  CONSTITUTIONAL: Well groomed, no apparent distress, sitting in chair  SKIN: Mild erythema around surgical site border with light brown drainage but generally clean; not significantly tender to palpation; two drains; one w/ seropurulent output and other w/ serosang output  RESP: No respiratory distress, no use of accessory muscles  GI: Soft, NT, ND  PSYCH: AOx 3,  LABS:                        7.3    13.82 )-----------( 496      ( 21 Sep 2024 08:42 )             22.8      09-21    136  |  100  |  10  ----------------------------<  84  4.2   |  26  |  0.50    Ca    8.9      21 Sep 2024 08:42  Phos  2.9     09-21  Mg     2.3     09-21    TPro  5.7[L]  /  Alb  3.1[L]  /  TBili  <0.1[L]  /  DBili  x   /  AST  11  /  ALT  34  /  AlkPhos  274[H]  09-21          Urinalysis Basic - ( 21 Sep 2024 08:42 )    Color: x / Appearance: x / SG: x / pH: x  Gluc: 84 mg/dL / Ketone: x  / Bili: x / Urobili: x   Blood: x / Protein: x / Nitrite: x   Leuk Esterase: x / RBC: x / WBC x   Sq Epi: x / Non Sq Epi: x / Bacteria: x        RADIOLOGY & ADDITIONAL TESTS:    Imaging Personally Reviewed.    Consultant(s) Notes Reviewed.    Care Discussed with Consultants/Other Providers. Patient is a 44y old  Female who presents with a chief complaint of L chest pain, SIRS, Acute Pulmonary Embolism (21 Sep 2024 15:23)      SUBJECTIVE / OVERNIGHT EVENTS: Patient feels fatigued and worn out. Patient and her HCP express concerns about the surgery for today and would like to speak with the attending surgeon prior.     MEDICATIONS  (STANDING):  baclofen 10 milliGRAM(s) Oral every 8 hours  chlorhexidine 2% Cloths 1 Application(s) Topical daily  dalfampridine ER 10 milliGRAM(s) Oral every 12 hours  DAPTOmycin IVPB 400 milliGRAM(s) IV Intermittent every 24 hours  DULoxetine 60 milliGRAM(s) Oral at bedtime  enoxaparin Injectable 40 milliGRAM(s) SubCutaneous every 24 hours  influenza   Vaccine 0.5 milliLiter(s) IntraMuscular once  lactated ringers. 500 milliLiter(s) (100 mL/Hr) IV Continuous <Continuous>  meropenem  IVPB 1000 milliGRAM(s) IV Intermittent every 8 hours  methocarbamol 500 milliGRAM(s) Oral four times a day  modafinil 200 milliGRAM(s) Oral daily  multivitamin 1 Tablet(s) Oral daily  mupirocin 2% Ointment 1 Application(s) Topical two times a day  pantoprazole    Tablet 40 milliGRAM(s) Oral before breakfast  polyethylene glycol 3350 17 Gram(s) Oral daily  senna 2 Tablet(s) Oral at bedtime  sertraline 125 milliGRAM(s) Oral daily  Vibegron (Gemtesa) 75 mg tablet 1 Tablet(s) 1 Tablet(s) Oral daily    MEDICATIONS  (PRN):  acetaminophen     Tablet .. 650 milliGRAM(s) Oral every 6 hours PRN Mild Pain (1 - 3)  bisacodyl Suppository 10 milliGRAM(s) Rectal daily PRN Constipation  clonazePAM  Tablet 0.5 milliGRAM(s) Oral daily PRN for anxiety  HYDROmorphone   Tablet 4 milliGRAM(s) Oral every 8 hours PRN Severe Pain (7 - 10)  HYDROmorphone   Tablet 2 milliGRAM(s) Oral every 6 hours PRN Moderate Pain (4 - 6)      CAPILLARY BLOOD GLUCOSE        I&O's Summary    20 Sep 2024 07:01  -  21 Sep 2024 07:00  --------------------------------------------------------  IN: 100 mL / OUT: 155 mL / NET: -55 mL    21 Sep 2024 07:01  -  22 Sep 2024 05:18  --------------------------------------------------------  IN: 150 mL / OUT: 0 mL / NET: 150 mL        Vital Signs Last 24 Hrs  T(C): 36.8 (22 Sep 2024 00:34), Max: 36.8 (22 Sep 2024 00:34)  T(F): 98.3 (22 Sep 2024 00:34), Max: 98.3 (22 Sep 2024 00:34)  HR: 100 (22 Sep 2024 00:34) (80 - 100)  BP: 86/51 (22 Sep 2024 00:34) (86/51 - 99/64)  BP(mean): --  RR: 18 (22 Sep 2024 00:34) (18 - 18)  SpO2: 95% (22 Sep 2024 00:34) (95% - 97%)    Parameters below as of 22 Sep 2024 00:34  Patient On (Oxygen Delivery Method): room air        PHYSICAL EXAM:  CONSTITUTIONAL: Well groomed, no apparent distress, laying in bed   SKIN: Mild erythema around surgical site border with light brown drainage but generally clean; not significantly tender to palpation; two drains; both w/ seropurulent drainage (clearer than yesterday)  RESP: No respiratory distress, no use of accessory muscles  GI: Soft, NT, ND  PSYCH: AOx 3,  LABS:                        7.3    13.82 )-----------( 496      ( 21 Sep 2024 08:42 )             22.8      09-21    136  |  100  |  10  ----------------------------<  84  4.2   |  26  |  0.50    Ca    8.9      21 Sep 2024 08:42  Phos  2.9     09-21  Mg     2.3     09-21    TPro  5.7[L]  /  Alb  3.1[L]  /  TBili  <0.1[L]  /  DBili  x   /  AST  11  /  ALT  34  /  AlkPhos  274[H]  09-21          Urinalysis Basic - ( 21 Sep 2024 08:42 )    Color: x / Appearance: x / SG: x / pH: x  Gluc: 84 mg/dL / Ketone: x  / Bili: x / Urobili: x   Blood: x / Protein: x / Nitrite: x   Leuk Esterase: x / RBC: x / WBC x   Sq Epi: x / Non Sq Epi: x / Bacteria: x        RADIOLOGY & ADDITIONAL TESTS:    Imaging Personally Reviewed.    Consultant(s) Notes Reviewed.    Care Discussed with Consultants/Other Providers.

## 2024-09-25 NOTE — ASSESSMENT
[FreeTextEntry1] : Ms. JAYA AGGARWAL, 44 year old female, never smoker, w/ hx of MS who presented with redness and tenderness over her left sternoclavicular joint. She had a CAT scan that demonstrated an erosion overlying the joint.  Now s/p incision and debridement of Lt sternoclavicular joint on 12/7/23. Path revealed Lt clavicle head periosteal connective tissue and cartilage shows focal acute inflammation and necrosis. Bone shows focal medullary fibrosis, focal reactive woven bone formation and osteoblastic rimming but no definitive osteomyelitis. Bone marrow is hypercellular and shows myeloid predominant trilineage hematopoiesis with increased eosinophils.  Now s/p re-exploration of left sternoclavicular joint wound, manubrial resection, portion of first rib resection, vacuum-assisted closure placement on 12/15/23.  Patient took off wound VAC on 02/19/2024 due to skin irritation from surgical tape.  Pt has been seeing Plastic Sx Dr. Stearns, now on scar care with Aquacel, massage scar and slver nitrate to existing wounds.  On last visit, Pt is upset that her wound has not completely healed by now and said the Plastic Surgeon she saw Dr. Stearns could not fix her wound. Pt claimed that if her wound "could have been handled with more care" at the time of surgery, then she would have a better recovery. I explained to her in detail that her surgery was handle with care and at the time of initial surgery, she had a very bad infection over her clavicle, and it was a life-threatening condition if leave untreated, pt would have had even worse outcome, such as death. I explained at the time of surgery all risks and potential outcome, I said to her and family I will do my best to preserve the appearance, but she will have an "ugly scar" due to the severity of the infection and her immunocompromised health status due to MS. Also I explained to pt that she has a prolonged healing of her wound due to the combination of her health status, hx of MS, and severity of the infection. Pt then claimed that [facility] gave her the injury and wound, they malpracticed on her. Again pt is upset and wanted to have other recommendation such as cream and ointment that she can use to expedite her wound healing. Again, I explained to her I do not have any creams to offer to heal this wound faster but can refer her to one of the Plastic Surgeons that I work with closely to see if he can fix the wound. I told pt I know she is upset about how the wound looks like and it was pt's choice to see Dr. Stearns. If Dr. Stearns could not fix the problem, I can definitely reach out to Dr. Foley to see if he is able to help. Pt finally agreed.  Called pt and referred her to Dr. Umang Dean.  Of note, pt admitted to [facility] on 8/19-8/20 for worsening redness, discharge at upper chest wound and left arm/shoulder discomfort. ID did not think there is an active infection and recommended off Abx.  Pt saw ID Dr. Odalis Reynaga on 8/27/24. Based on her clinical presentation, findings more concerning for past treated infection. If there is increasing pain and she feels that something is wrong the only way to determine if underlying infection would be to do a bone bx. and send for cx and pathology.  Now s/p resection of sternoclavicular joint on 9/9/24. This was a joint procedure with Dr. Dean from Plastic Surgery, who created a pectoralis muscle flap. Path revealed scar, inflamed, with vascular congestion, associated granulation tissue, surface of abscess and overlying spongiotic dermatitis with suppurative inflammation.  Incidental intradermal melanocytic nevus was also noted. Clinical correlation is recommended.  CXR today----  I have reviewed the patient's medical records and diagnostic images at time of this office consultation and have made the following recommendation: 1.   I, ESTER Duncan, personally performed the evaluation and management (E/M) services for this established patient who presents today with (a) new problem(s)/exacerbation of (an) existing condition(s).  That E/M includes conducting the examination, assessing all new/exacerbated conditions, and establishing a new plan of care.  Today, my ACP, eGetha Lynch, [facility] was here to observe my evaluation and management services for this new problem/exacerbated condition to be followed going forward.

## 2024-09-25 NOTE — PROGRESS NOTE ADULT - PROBLEM SELECTOR PLAN 4
CT on 9/15 demonstrated nonocclusive right lower lobe basilar subsegmental pulmonary emboli with no evidence of right heart strain.  -Holding AC d/t pending procedure and low risk profile  -9/15 Duplex with no evidence of DVT  -Repeat duplex ordered for 9/24 CT on 9/15 demonstrated nonocclusive right lower lobe basilar subsegmental pulmonary emboli with no evidence of right heart strain.  -Holding AC d/t pending procedure and low risk profile  -9/15 Duplex with no evidence of DVT  -Repeat duplex done 9/25, f/u results

## 2024-09-25 NOTE — DIETITIAN INITIAL EVALUATION ADULT - PROBLEM SELECTOR PLAN 1
Use Enhanced Ndc?: Yes Meeting SIRS criteria on admission with WBC > 12K, HR > 100 and temp 100.7 (at home). Multiple potential etiologies including PE newly diagnosed vs persistent septic arthritis/OM.   - refer to problem 2 and 3 for treatment of PE and septic arthritis

## 2024-09-25 NOTE — PROGRESS NOTE ADULT - PROBLEM SELECTOR PLAN 2
Bcx 9/15 growing GNR, PCR negative but corrected to no growth, repeat culture NGTD. Abscess culture 9/19 on with Staph epi in fluid media.  - C/w dapto and bryson as above  - F/u ID recs  - hold off on PICC removal pending 9/19 blood culture data and additional plastics intervention Bcx 9/15 growing GNR, PCR negative but corrected to no growth, repeat culture NGTD. Abscess culture 9/19 on with Staph epi, Candida orthopsilosis, lactobacillus rhamnosus in fluid media.  - C/w dapto and bryson as above  - F/u ID recs  - hold off on PICC removal pending 9/19 blood culture data and additional plastics intervention

## 2024-09-25 NOTE — PROGRESS NOTE ADULT - PROBLEM SELECTOR PLAN 1
- s/p left SCJ debridement and left pectoral flap on 9/9  - Washout with Plastic surgery9/25/24 - pending   - plastic follow up; LISBETH management by Plastics. Monitor output.   - recommend close ID follow up. Abx switched to Dapto per ID  - global care per primary team.    consider suppository/movantik for BM   OT daily please  - thoracic surgery will continue to follow  - Plan d/w Attending Dr. Bautista  ----  For any questions or concerns, please contact thoracic @ [phone]

## 2024-09-25 NOTE — DIETITIAN INITIAL EVALUATION ADULT - ORAL INTAKE PTA/DIET HISTORY
Pt reports having a good appetite and PO intake PTA. Follows Kosher diet. Pt denies any known food allergies or intolerances. Pt reports taking Multivitamin, vitamin C, vitamin D, vitamin B, fish oil at home. Denies any difficulty chewing/swallowing at this time.  htn

## 2024-09-25 NOTE — PROGRESS NOTE ADULT - SUBJECTIVE AND OBJECTIVE BOX
44yPatient is a 44y old  Female who presents with a chief complaint of L chest pain, SIRS, Acute Pulmonary Embolism (25 Sep 2024 13:47)      Interval history:  Afebrile, still with drainage from the sternal side of the flap.     Allergies:   No Known Allergies    Antimicrobials:      REVIEW OF SYSTEMS:  No SOB  No abdominal pain  No rash.     Vital Signs Last 24 Hrs  T(C): 36.5 (09-25-24 @ 17:05), Max: 36.6 (09-25-24 @ 10:20)  T(F): 97.7 (09-25-24 @ 16:37), Max: 97.9 (09-25-24 @ 10:20)  HR: 98 (09-25-24 @ 17:05) (72 - 98)  BP: 108/71 (09-25-24 @ 17:05) (97/66 - 110/62)  BP(mean): 77 (09-25-24 @ 17:05) (77 - 77)  RR: 20 (09-25-24 @ 17:05) (17 - 20)  SpO2: 98% (09-25-24 @ 17:05) (97% - 98%)      PHYSICAL EXAM:  Pt in no acute distress, alert, awake.   rt arm PICC   lt sided graft site with sutures, with yellowish discharge   2 LISBETH's with cloudy fluid   non distended abdomen  no edema LE                             8.2    10.29 )-----------( 562      ( 25 Sep 2024 07:19 )             25.4   09-25    140  |  105  |  7   ----------------------------<  86  4.2   |  25  |  0.46[L]    Ca    8.8      25 Sep 2024 07:19  Phos  3.4     09-25  Mg     2.1     09-25    TPro  5.3[L]  /  Alb  2.8[L]  /  TBili  <0.1[L]  /  DBili  x   /  AST  17  /  ALT  21  /  AlkPhos  224[H]  09-25      LIVER FUNCTIONS - ( 25 Sep 2024 07:19 )  Alb: 2.8 g/dL / Pro: 5.3 g/dL / ALK PHOS: 224 U/L / ALT: 21 U/L / AST: 17 U/L / GGT: x             Culture - Blood (09.22.24 @ 06:52)   Specimen Source: .Blood Blood  Culture Results:   No growth at 72 Hours    Culture - Abscess with Gram Stain (09.19.24 @ 15:59)   Gram Stain:   Rare polymorphonuclear leukocytes seen per low power field   No organisms seen per oil power field  Specimen Source: .Abscess lt chest  Culture Results:   Growth in fluid media only Staphylococcus epidermidis "Susceptibilities   not performed"   Growth in fluid media only Candida orthopsilosis "Susceptibilities not   performed"   Growth in fluid media only Lactobacillus rhamnosus "Susceptibilities not   performed"          < from: [facility] Duplex Lower Ext Vein Scan, Bilat (09.25.24 @ 09:17) >    IMPRESSION:  No evidence of deep venous thrombosis in either lower extremity.

## 2024-09-25 NOTE — DIETITIAN INITIAL EVALUATION ADULT - PERTINENT MEDS FT
MEDICATIONS  (STANDING):  baclofen 10 milliGRAM(s) Oral every 8 hours  chlorhexidine 2% Cloths 1 Application(s) Topical daily  dalfampridine ER 10 milliGRAM(s) Oral every 12 hours  DAPTOmycin IVPB 400 milliGRAM(s) IV Intermittent every 24 hours  DULoxetine 60 milliGRAM(s) Oral at bedtime  enoxaparin Injectable 40 milliGRAM(s) SubCutaneous every 24 hours  influenza   Vaccine 0.5 milliLiter(s) IntraMuscular once  lactated ringers. 500 milliLiter(s) (100 mL/Hr) IV Continuous <Continuous>  magnesium citrate Oral Solution 296 milliLiter(s) Oral once  melatonin 3 milliGRAM(s) Oral at bedtime  meropenem  IVPB      meropenem  IVPB 1000 milliGRAM(s) IV Intermittent every 8 hours  methocarbamol 500 milliGRAM(s) Oral four times a day  modafinil 200 milliGRAM(s) Oral daily  multivitamin 1 Tablet(s) Oral daily  mupirocin 2% Ointment 1 Application(s) Topical two times a day  pantoprazole    Tablet 40 milliGRAM(s) Oral before breakfast  polyethylene glycol 3350 17 Gram(s) Oral two times a day  senna 2 Tablet(s) Oral at bedtime  sertraline 125 milliGRAM(s) Oral daily  Vibegron (Gemtesa) 75 mg tablet 1 Tablet(s) 1 Tablet(s) Oral daily    MEDICATIONS  (PRN):  acetaminophen     Tablet .. 650 milliGRAM(s) Oral every 6 hours PRN Mild Pain (1 - 3)  clonazePAM  Tablet 0.5 milliGRAM(s) Oral daily PRN for anxiety  HYDROmorphone   Tablet 2 milliGRAM(s) Oral every 6 hours PRN Moderate Pain (4 - 6)  HYDROmorphone   Tablet 4 milliGRAM(s) Oral every 8 hours PRN Severe Pain (7 - 10)

## 2024-09-25 NOTE — DIETITIAN INITIAL EVALUATION ADULT - OTHER CALCULATIONS
Fluid needs deferred to team.  Estimated needs using dosing wt with consideration for ***.  Fluid needs deferred to team.  Estimated needs using dosing wt with consideration for s/p thoracic surgical intervention, infection requiring PICC line for Abx.

## 2024-09-25 NOTE — PROGRESS NOTE ADULT - PROBLEM SELECTOR PLAN 3
Patient admitted with Hgb 12.2, now 7.7 c/f unknown source of bleeding, pt with known history of RAMONE  - trend CBC  - F/u anemia workup labs: negative  - Fe studies from 9/11/24 shows iron deficiency- holding Fe iso active infection  - Hgb stable in mid 7's, restarted lovenox  - Repeat CT CAP: no source of bleed  - HgB 6.8 on 9/24, consented for transfusion Patient admitted with Hgb 12.2, now 7.7 c/f unknown source of bleeding, pt with known history of RAMONE  - trend CBC  - F/u anemia workup labs: negative  - Fe studies from 9/11/24 shows iron deficiency- holding Fe iso active infection  - Hgb stable in mid 7's, restarted lovenox  - Repeat CT CAP: no source of bleed  - HgB 6.8 on 9/24, s/p 1unit pRBC 9/24, HgB 8.2 post transfusion

## 2024-09-25 NOTE — PROGRESS NOTE ADULT - ASSESSMENT
44y Female with PMH of MS on Ocrevus anxiety, depression, septic arthritis of L sternoclavicular joint s/p I&Dx2 and cefepime for 6 weeks (12/2023-1/2024), recent admission 9/9-9/13 for L clavicular pain s/p I+D with L pectoralis flap sent home on meropenem and vancomycin for 6 week course presents for L shoulder pain, L sided pel chest pain, headache, fever at home. Son on phone also notes that the surgical site appeared more erythematous day prior to admission and had associated L arm swelling.    Admitted 9/9-9/13 most recently; MRI L clavicle with prior resection of the medial left clavicle with rim-enhancing fluid and surrounding phlegmon abutting the resected bony margin. S/p surgery on 9/9 with L SCJ debridement by CT surgery and L pec flap by PRS. Cultures grew Staph epi and Corynebacterium in fluid media, blood cultures negative. Discharged on tentatively 6 weeks vancomycin and meropenem for empiric coverage.    Here Tmax 100.1, mild tachycardia  WBC 12.1, neutrophilic. Lactate wnl  , ALT 78,     CT chest: Small nonocclusive right lower lobe basilar subsegmental pulmonary emboli, no evidence of right heart strain. 14 cm in greatest dimension expansile mostly soft tissue density structure filling and expanding the operative bed in the region of the left medial clavicle (thought to represent combination of muscle)    MICRO DATA:   09/15 BCX: IP   09/15 BCX: IP   09/10 AFB Cx: IP  09/10 Fungal CX: IP  09/10 Bacterial CX: fluid media only- corynebacterium striatum and staph epidermidis     # Low grade Fever, leukocytosis  # Septic sternoclavicular joint s/p debridement and muscle flap L clavicle  # Post-Operative abscess      Abnormal CT with rim enhancing collection in under the flap, purulent drainage noted from sternal side of the incision.         RECOMMENDATIONS:   - c/w dapto 400 mg q24h  - recent CPK noted, normal   - Continue IV meropenem 1g q8h  - blood cx with documented GNR, not real, artifact.   - repeat blood cx NTD   - thoracic/plastic surgery following,   - discussed with plastics, planned for OR today,   - abscess cx prelim with polymicrobial growth in broth only, it was a superficial cx taken by me, can represent contamination.   Trend WBC, resolved leucocytosis       Plan discussed with Medicine and Plastics Attending.        Odalis Reynaga  Please contact through MS Teams   If no response or past 5 pm/weekend call [phone].

## 2024-09-25 NOTE — PROGRESS NOTE ADULT - PROBLEM SELECTOR PLAN 1
History of septic arthritis with multiple admissions s/p I&D with CTS and L pectoral flap with plastic surgery on last admission (9/4-9/13). Was discharged home on meropenem 1g q8h and van 1250 q12h with reportedly no missed doses.   - repeat CT: Status post left sternoclavicular resection with pectoralis flap. Mixed density soft tissue and/or hematoma in the operative bed, similar in size. Interval development of rim-enhancing collections, concerning for infection.  - plan to go back to OR per plastic surgery today 9/25  - ID: c/w daptomycin 400mg daily (9/16 - )  - ID: c/w meropenem 1g q8h (9/15 - )  - monitor LISBETH drain output: cloudy, seropurulent  - multimodal pain control with Dilaudid 2/4 and Tylenol PRN History of septic arthritis with multiple admissions s/p I&D with CTS and L pectoral flap with plastic surgery on last admission (9/4-9/13). Was discharged home on meropenem 1g q8h and van 1250 q12h with reportedly no missed doses.   - repeat CT: Status post left sternoclavicular resection with pectoralis flap. Mixed density soft tissue and/or hematoma in the operative bed, similar in size. Interval development of rim-enhancing collections, concerning for infection.  - plan to go back to OR per plastic surgery today 9/25  - no plans for OR by thoracic   - ID: c/w daptomycin 400mg daily (9/16 - )  - ID: c/w meropenem 1g q8h (9/15 - )  - monitor LISEBTH drain output: cloudy, seropurulent  - multimodal pain control with Dilaudid 2/4 and Tylenol PRN

## 2024-09-25 NOTE — DIETITIAN INITIAL EVALUATION ADULT - PROBLEM SELECTOR PLAN 2
On imaging on admission, noted to have small nonocclusive RLL pulmonary embolism. May potentially be incidental finding iso recent surgery.  - troponin and proBNP otherwise negative  - CT chest without evidence of R heart strain  - currently not requiring any O2 and saturating well on RA  - duplex bilateral lower extremities to r/o DVT  - c/w prophylactic lovenox 40 qd and can consider full AC if any evidence of DVT

## 2024-09-25 NOTE — BRIEF OPERATIVE NOTE - NSICDXBRIEFPOSTOP_GEN_ALL_CORE_FT
POST-OP DIAGNOSIS:  Septic arthritis of left sternoclavicular joint 25-Sep-2024 19:36:15  Marquis Carrillo

## 2024-09-25 NOTE — REASON FOR VISIT
[de-identified] : Resection of sternoclavicular joint      [de-identified] : 9/9/24 [de-identified] : This was a joint procedure with Dr. Dean from Plastic Surgery, who created a pectoralis muscle flap.

## 2024-09-25 NOTE — DIETITIAN INITIAL EVALUATION ADULT - PERTINENT LABORATORY DATA
09-25    140  |  105  |  7   ----------------------------<  86  4.2   |  25  |  0.46[L]    Ca    8.8      25 Sep 2024 07:19  Phos  3.4     09-25  Mg     2.1     09-25    TPro  5.3[L]  /  Alb  2.8[L]  /  TBili  <0.1[L]  /  DBili  x   /  AST  17  /  ALT  21  /  AlkPhos  224[H]  09-25  A1C with Estimated Average Glucose Result: 5.2 % (09-04-24 @ 07:19)

## 2024-09-25 NOTE — PROGRESS NOTE ADULT - ASSESSMENT
44F with PMH of MS on Rituxan, anxiety, depression, septic arthritis of L sternoclavicular joint s/p multiple I&Ds in 2023, recent recurrence s/p I&D with CTS and pec turnover flap with Dr. Dean on 9/9, d/c'ed with PICC line on vanc/bryson, now presenting with redness and increased pain. PRS consulted for possible surgical site infection. CT chest shows normal postop changes with possible small hematoma around flap, LISBETH drains within area, no abscess/collection. Small PE without heart strain noted. Repeat CT from 9/18 now shows rim enhancing collections at the surgical site.      plan  - plan for return to OR for washout procedure today (9/25), will return to discuss with patient and get consent form signed  - local wound care with mupirocin and daily dressing changes   - abx per ID   - remainder of care per primary - please contact plastics if any concerns and if status changes    Leroy Cagle, PGY1  Plastic Surgery   [phone] [facility] pager  Available on teams

## 2024-09-25 NOTE — PROGRESS NOTE ADULT - ASSESSMENT
44F with MS, anxiety, depression, septic arthritis of L sternoclavicular joint s/p multiple admissions requiring I&D and most recent admission (9/4-9/13) with I&D and L pectoral flap for evaluation of abscess now presenting to the ED with L sided chest pain with newly noted small nonocclusive RLL PE likely incidental. Course c/b anemia of unclear etiology. CT with evidence of new rim enhancing lesion s/p abscess drainage and started on meropenem and daptomycin by ID.

## 2024-09-25 NOTE — PRE-ANESTHESIA EVALUATION ADULT - NSANTHAIRWAYFT_ENT_ALL_CORE
Mallampati Class 2, interincisor gap >3 cm, thyromental distance > 3 fingerbreadths, neck full range of motion.

## 2024-09-25 NOTE — PRE-ANESTHESIA EVALUATION ADULT - NSANTHPMHFT_GEN_ALL_CORE
44F hx MS, anxiety, depression, septic arthritis of L sternoclavicular joint s/p multiple admissions requiring I&D now presenting for washout/debridement of left chest wall wound/abscess on 9/25/2024. Of note, pt during this admission (9/15) was found to have small subsegmental PE in RLL, no evidence of R heart strain, on RA. Course c/b anemia of unclear etiology, Hb 6.8 on 9/24 s/p 1u PRBC with appropriate response to Hb 8.2. Currently on dapto and bryson for abx via PICC.

## 2024-09-25 NOTE — PROGRESS NOTE ADULT - SUBJECTIVE AND OBJECTIVE BOX
Surgery Progress Note  Patient is a 44y old  Female who presents with a chief complaint of Cellulitis     (25 Sep 2024 11:10)      SUBJECTIVE: Patient seen and examined at bedside with surgical team, patient without complaints. Patient is aware of plans to go to OR today for washout of chest wound, would like to discuss and sign consent later in the day.     Physical Exam  Constitutional: resting in bed comfortably in no acute distress.   Respiratory: breathing comfortably on RA  Chest: Allevyn dressing changed at bedside. No discharge expressed from chest incision. Allevyn pad changed. LISBETH drain x2 in place with chalky drainage in bulb.   Ext: warm and well perfused.     Vital Signs Last 24 Hrs  T(C): 36.6 (25 Sep 2024 10:20), Max: 36.6 (25 Sep 2024 10:20)  T(F): 97.9 (25 Sep 2024 10:20), Max: 97.9 (25 Sep 2024 10:20)  HR: 75 (25 Sep 2024 10:20) (72 - 83)  BP: 97/66 (25 Sep 2024 10:20) (97/65 - 98/61)  BP(mean): --  RR: 18 (25 Sep 2024 10:20) (17 - 19)  SpO2: 97% (25 Sep 2024 10:20) (97% - 97%)    Parameters below as of 25 Sep 2024 10:20  Patient On (Oxygen Delivery Method): room air        I&O's Detail    24 Sep 2024 07:01  -  25 Sep 2024 07:00  --------------------------------------------------------  IN:    IV PiggyBack: 200 mL    Lactated Ringers: 1100 mL  Total IN: 1300 mL    OUT:    Bulb (mL): 28 mL    Bulb (mL): 10 mL    Voided (mL): 900 mL  Total OUT: 938 mL    Total NET: 362 mL      MEDICATIONS  (STANDING):  baclofen 10 milliGRAM(s) Oral every 8 hours  chlorhexidine 2% Cloths 1 Application(s) Topical daily  dalfampridine ER 10 milliGRAM(s) Oral every 12 hours  DAPTOmycin IVPB 400 milliGRAM(s) IV Intermittent every 24 hours  DULoxetine 60 milliGRAM(s) Oral at bedtime  enoxaparin Injectable 40 milliGRAM(s) SubCutaneous every 24 hours  influenza   Vaccine 0.5 milliLiter(s) IntraMuscular once  lactated ringers. 500 milliLiter(s) (100 mL/Hr) IV Continuous <Continuous>  magnesium citrate Oral Solution 296 milliLiter(s) Oral once  melatonin 3 milliGRAM(s) Oral at bedtime  meropenem  IVPB      meropenem  IVPB 1000 milliGRAM(s) IV Intermittent every 8 hours  methocarbamol 500 milliGRAM(s) Oral four times a day  modafinil 200 milliGRAM(s) Oral daily  multivitamin 1 Tablet(s) Oral daily  mupirocin 2% Ointment 1 Application(s) Topical two times a day  pantoprazole    Tablet 40 milliGRAM(s) Oral before breakfast  polyethylene glycol 3350 17 Gram(s) Oral two times a day  senna 2 Tablet(s) Oral at bedtime  sertraline 125 milliGRAM(s) Oral daily  Vibegron (Gemtesa) 75 mg tablet 1 Tablet(s) 1 Tablet(s) Oral daily    MEDICATIONS  (PRN):  acetaminophen     Tablet .. 650 milliGRAM(s) Oral every 6 hours PRN Mild Pain (1 - 3)  clonazePAM  Tablet 0.5 milliGRAM(s) Oral daily PRN for anxiety  HYDROmorphone   Tablet 2 milliGRAM(s) Oral every 6 hours PRN Moderate Pain (4 - 6)  HYDROmorphone   Tablet 4 milliGRAM(s) Oral every 8 hours PRN Severe Pain (7 - 10)      LABS:                        8.2    10.29 )-----------( 562      ( 25 Sep 2024 07:19 )             25.4     09-25    140  |  105  |  7   ----------------------------<  86  4.2   |  25  |  0.46[L]    Ca    8.8      25 Sep 2024 07:19  Phos  3.4     09-25  Mg     2.1     09-25    TPro  5.3[L]  /  Alb  2.8[L]  /  TBili  <0.1[L]  /  DBili  x   /  AST  17  /  ALT  21  /  AlkPhos  224[H]  09-25    PT/INR - ( 25 Sep 2024 07:19 )   PT: 12.0 sec;   INR: 1.04 ratio         PTT - ( 25 Sep 2024 07:19 )  PTT:32.4 sec  LIVER FUNCTIONS - ( 25 Sep 2024 07:19 )  Alb: 2.8 g/dL / Pro: 5.3 g/dL / ALK PHOS: 224 U/L / ALT: 21 U/L / AST: 17 U/L / GGT: x           Urinalysis Basic - ( 25 Sep 2024 07:19 )    Color: x / Appearance: x / SG: x / pH: x  Gluc: 86 mg/dL / Ketone: x  / Bili: x / Urobili: x   Blood: x / Protein: x / Nitrite: x   Leuk Esterase: x / RBC: x / WBC x   Sq Epi: x / Non Sq Epi: x / Bacteria: x      ABO Interpretation: TALYA (09-25-24 @ 08:36)

## 2024-09-25 NOTE — DIETITIAN INITIAL EVALUATION ADULT - NS FNS DIET ORDER
Diet, NPO after Midnight:      NPO Start Date: 24-Sep-2024,   NPO Start Time: 23:59  Except Medications (09-25-24 @ 02:48)  Diet, Regular:   Kosher (09-24-24 @ 11:11)

## 2024-09-25 NOTE — PROGRESS NOTE ADULT - SUBJECTIVE AND OBJECTIVE BOX
Subjective " hello "      Vital Signs Last 24 Hrs  T(C): 36.5 (09-24-24 @ 23:42), Max: 36.6 (09-24-24 @ 10:45)  T(F): 97.7 (09-24-24 @ 23:42), Max: 97.8 (09-24-24 @ 10:45)  HR: 72 (09-24-24 @ 23:42) (70 - 83)  BP: 98/61 (09-24-24 @ 23:42) (91/58 - 98/66)  RR: 17 (09-24-24 @ 23:42) (17 - 19)  SpO2: 97% (09-24-24 @ 23:42) (96% - 97%)           09-24 @ 07:01  -  09-25 @ 07:00  --------------------------------------------------------  IN: 1300 mL / OUT: 938 mL / NET: 362 mL                        8.2    10.29 )-----------( 562      ( 25 Sep 2024 07:19 )             25.4       09-25    140  |  105  |  7   ----------------------------<  86  4.2   |  25  |  0.46[L]    Ca    8.8      25 Sep 2024 07:19  Phos  3.4     09-25  Mg     2.1     09-25    TPro  5.3[L]  /  Alb  2.8[L]  /  TBili  <0.1[L]  /  DBili  x   /  AST  17  /  ALT  21  /  AlkPhos  224[H]  09-25        MEDICATIONS  (STANDING):  baclofen 10 milliGRAM(s) Oral every 8 hours  chlorhexidine 2% Cloths 1 Application(s) Topical daily  dalfampridine ER 10 milliGRAM(s) Oral every 12 hours  DAPTOmycin IVPB 400 milliGRAM(s) IV Intermittent every 24 hours  DULoxetine 60 milliGRAM(s) Oral at bedtime  enoxaparin Injectable 40 milliGRAM(s) SubCutaneous every 24 hours  influenza   Vaccine 0.5 milliLiter(s) IntraMuscular once  lactated ringers. 500 milliLiter(s) (100 mL/Hr) IV Continuous <Continuous>  magnesium citrate Oral Solution 296 milliLiter(s) Oral once  melatonin 3 milliGRAM(s) Oral at bedtime  meropenem  IVPB      meropenem  IVPB 1000 milliGRAM(s) IV Intermittent every 8 hours  methocarbamol 500 milliGRAM(s) Oral four times a day  modafinil 200 milliGRAM(s) Oral daily  multivitamin 1 Tablet(s) Oral daily  mupirocin 2% Ointment 1 Application(s) Topical two times a day  pantoprazole    Tablet 40 milliGRAM(s) Oral before breakfast  polyethylene glycol 3350 17 Gram(s) Oral two times a day  senna 2 Tablet(s) Oral at bedtime  sertraline 125 milliGRAM(s) Oral daily  Vibegron (Gemtesa) 75 mg tablet 1 Tablet(s) 1 Tablet(s) Oral daily    MEDICATIONS  (PRN):  acetaminophen     Tablet .. 650 milliGRAM(s) Oral every 6 hours PRN Mild Pain (1 - 3)  clonazePAM  Tablet 0.5 milliGRAM(s) Oral daily PRN for anxiety  HYDROmorphone   Tablet 2 milliGRAM(s) Oral every 6 hours PRN Moderate Pain (4 - 6)  HYDROmorphone   Tablet 4 milliGRAM(s) Oral every 8 hours PRN Severe Pain (7 - 10)       PHYSICAL EXAM  Neurology: alert and oriented x 3, nonfocal, no gross deficits    CV : S1s2  LEFT SCLincison erythema    Lungs: b/l cta on room air    Abdomen: soft, nontender, nondistended, positive bowel sounds, + BS     : voids              Extremities: warm well perfused equal strength throughout      B/ll e+ DP                                            Discussed with Cardiothoracic Team at AM rounds.

## 2024-09-25 NOTE — DIETITIAN INITIAL EVALUATION ADULT - NSFNSGIIOFT_GEN_A_CORE
Pt denies any current N/V/D/C. Per chart, last BM 9/23. Bowel regimen: magnesium citrate, miralax, senna.

## 2024-09-25 NOTE — DIETITIAN INITIAL EVALUATION ADULT - PROBLEM SELECTOR PLAN 3
History of septic arthritis with multiple admissions s/p I&D with CTS and L pectoral flap with plastic surgery on last admission (9/4-9/13). Was discharged home on meropenem 1g q8 and van 1250 q12 with reportedly no missed doses.   - CT chest with evidence of 14 cm soft tissue density structure filling and expanding the operative bed in the region of the left medial clavicle but without definitive ring enhancing lesion  - no acute surgical intervention for surgical site erythema per plastic surgery   - c/w meropenem 1g q8 and vancomycin 1250 q12  - vanc level q4 dose  - monitor LISBETH drain output  - pain control with oxy 10 q4 PRN for severe and oxy 5 q4 PRN for moderate and toradol 15 q6 for breakthrough

## 2024-09-25 NOTE — DIETITIAN INITIAL EVALUATION ADULT - OTHER INFO
- Wt hx:         - UBW: 130 pounds per pt; denies any wt changes.          - Dosing wt: 138 pounds (9/15)         - Wt hx per chart: 134.4 pounds (9/18, standing scale); Wt hx per [facility] HIE in pounds: 130 (9/09), 130 (8/16), 128 (5/30), 131 (1/16).          - RD to continue to monitor weight trends as able.   - Nutritionally Pertinent Meds in-house: Abx, IVF, Multivitamin.   - S/p left SCJ debridement and left pectoral flap on 9/09. Plan for OR today for wound washout.   - Endo:  	- A1c 5.2% (9/04) - indicates WNL.    - GI:  	- ordered for PPI.  - Wt hx:         - UBW: 130 pounds per pt; denies any wt changes.          - Dosing wt: 138 pounds (9/15)         - Wt hx per chart: 134.4 pounds (9/18, standing scale); Wt hx per [facility] HIE in pounds: 130 (9/09), 130 (8/16), 128 (5/30), 131 (1/16).          - RD to continue to monitor weight trends as able.   - Nutritionally Pertinent Meds in-house: Abx (PICC line in place), IVF, Multivitamin.   - S/p left SCJ debridement and left pectoral flap on 9/09. Plan for OR today for wound washout.   - Endo:  	- A1c 5.2% (9/04) - indicates WNL.    - GI:  	- ordered for PPI.
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
## 2024-09-25 NOTE — DIETITIAN INITIAL EVALUATION ADULT - ENERGY INTAKE
Adequate (%) Pt made NPO pending procedure after RD visit; pt reports good appetite but dislike of institutional foods/options of Kosher diet. Flowsheets indicate pt was consuming >75% of meals. Pt amenable to liberalizing to Regular diet free of therapeutic restrictions and ordering meals with menu for preferences; RD to recommend.

## 2024-09-25 NOTE — DIETITIAN INITIAL EVALUATION ADULT - ADD RECOMMEND
1) When feasible, Recommend Regular diet free of therapeutic restrictions as tolerated. Defer texture/consistency to SLP/team.   2) Continue Multivitamin daily pending no medical contraindications.  3) Continue to monitor PO intake, weight, labs, skin, GI status, and diet.

## 2024-09-25 NOTE — PROGRESS NOTE ADULT - PROBLEM SELECTOR PLAN 7
- c/w home klonipin 0.5 daily PRN  - c/w duloxetine 60 daily  - c/w sertraline 125 daily  - monitor for serotonergic symptoms

## 2024-09-25 NOTE — PROGRESS NOTE ADULT - PROBLEM SELECTOR PLAN 6
History of MS on home baclofen, dalfampridine, and gemtesa.  - c/w baclofen 10 TID  - c/w modafinil 200 daily  - c/w home dalfampridine and gemtesa as not on formulary

## 2024-09-26 LAB
ALBUMIN SERPL ELPH-MCNC: 3.2 G/DL — LOW (ref 3.3–5)
ALP SERPL-CCNC: 229 U/L — HIGH (ref 40–120)
ALT FLD-CCNC: 17 U/L — SIGNIFICANT CHANGE UP (ref 10–45)
ANION GAP SERPL CALC-SCNC: 12 MMOL/L — SIGNIFICANT CHANGE UP (ref 5–17)
AST SERPL-CCNC: 10 U/L — SIGNIFICANT CHANGE UP (ref 10–40)
BASOPHILS # BLD AUTO: 0.03 K/UL — SIGNIFICANT CHANGE UP (ref 0–0.2)
BASOPHILS NFR BLD AUTO: 0.2 % — SIGNIFICANT CHANGE UP (ref 0–2)
BILIRUB SERPL-MCNC: 0.1 MG/DL — LOW (ref 0.2–1.2)
BUN SERPL-MCNC: 10 MG/DL — SIGNIFICANT CHANGE UP (ref 7–23)
CALCIUM SERPL-MCNC: 9.3 MG/DL — SIGNIFICANT CHANGE UP (ref 8.4–10.5)
CHLORIDE SERPL-SCNC: 99 MMOL/L — SIGNIFICANT CHANGE UP (ref 96–108)
CO2 SERPL-SCNC: 25 MMOL/L — SIGNIFICANT CHANGE UP (ref 22–31)
CREAT SERPL-MCNC: 0.51 MG/DL — SIGNIFICANT CHANGE UP (ref 0.5–1.3)
CULTURE RESULTS: NO GROWTH — SIGNIFICANT CHANGE UP
EGFR: 117 ML/MIN/1.73M2 — SIGNIFICANT CHANGE UP
EOSINOPHIL # BLD AUTO: 0 K/UL — SIGNIFICANT CHANGE UP (ref 0–0.5)
EOSINOPHIL NFR BLD AUTO: 0 % — SIGNIFICANT CHANGE UP (ref 0–6)
GLUCOSE SERPL-MCNC: 113 MG/DL — HIGH (ref 70–99)
HCT VFR BLD CALC: 28.1 % — LOW (ref 34.5–45)
HGB BLD-MCNC: 8.8 G/DL — LOW (ref 11.5–15.5)
IMM GRANULOCYTES NFR BLD AUTO: 1.5 % — HIGH (ref 0–0.9)
LYMPHOCYTES # BLD AUTO: 1.3 K/UL — SIGNIFICANT CHANGE UP (ref 1–3.3)
LYMPHOCYTES # BLD AUTO: 10 % — LOW (ref 13–44)
MAGNESIUM SERPL-MCNC: 2.2 MG/DL — SIGNIFICANT CHANGE UP (ref 1.6–2.6)
MCHC RBC-ENTMCNC: 26.7 PG — LOW (ref 27–34)
MCHC RBC-ENTMCNC: 31.3 GM/DL — LOW (ref 32–36)
MCV RBC AUTO: 85.2 FL — SIGNIFICANT CHANGE UP (ref 80–100)
MONOCYTES # BLD AUTO: 0.61 K/UL — SIGNIFICANT CHANGE UP (ref 0–0.9)
MONOCYTES NFR BLD AUTO: 4.7 % — SIGNIFICANT CHANGE UP (ref 2–14)
NEUTROPHILS # BLD AUTO: 10.84 K/UL — HIGH (ref 1.8–7.4)
NEUTROPHILS NFR BLD AUTO: 83.6 % — HIGH (ref 43–77)
NIGHT BLUE STAIN TISS: SIGNIFICANT CHANGE UP
NIGHT BLUE STAIN TISS: SIGNIFICANT CHANGE UP
NRBC # BLD: 0 /100 WBCS — SIGNIFICANT CHANGE UP (ref 0–0)
PHOSPHATE SERPL-MCNC: 4.2 MG/DL — SIGNIFICANT CHANGE UP (ref 2.5–4.5)
PLATELET # BLD AUTO: 626 K/UL — HIGH (ref 150–400)
POTASSIUM SERPL-MCNC: 4.4 MMOL/L — SIGNIFICANT CHANGE UP (ref 3.5–5.3)
POTASSIUM SERPL-SCNC: 4.4 MMOL/L — SIGNIFICANT CHANGE UP (ref 3.5–5.3)
PROT SERPL-MCNC: 5.8 G/DL — LOW (ref 6–8.3)
RBC # BLD: 3.3 M/UL — LOW (ref 3.8–5.2)
RBC # FLD: 13.6 % — SIGNIFICANT CHANGE UP (ref 10.3–14.5)
SODIUM SERPL-SCNC: 136 MMOL/L — SIGNIFICANT CHANGE UP (ref 135–145)
SPECIMEN SOURCE: SIGNIFICANT CHANGE UP
WBC # BLD: 12.97 K/UL — HIGH (ref 3.8–10.5)
WBC # FLD AUTO: 12.97 K/UL — HIGH (ref 3.8–10.5)

## 2024-09-26 PROCEDURE — 99231 SBSQ HOSP IP/OBS SF/LOW 25: CPT

## 2024-09-26 PROCEDURE — 99232 SBSQ HOSP IP/OBS MODERATE 35: CPT

## 2024-09-26 PROCEDURE — 99232 SBSQ HOSP IP/OBS MODERATE 35: CPT | Mod: GC

## 2024-09-26 RX ORDER — DALFAMPRIDINE 10 MG/1
10 TABLET, FILM COATED, EXTENDED RELEASE ORAL EVERY 12 HOURS
Refills: 0 | Status: DISCONTINUED | OUTPATIENT
Start: 2024-09-26 | End: 2024-09-26

## 2024-09-26 RX ADMIN — MEROPENEM 100 MILLIGRAM(S): 500 INJECTION INTRAVENOUS at 13:30

## 2024-09-26 RX ADMIN — HYDROMORPHONE HYDROCHLORIDE 2 MILLIGRAM(S): 1 INJECTION, SOLUTION INTRAMUSCULAR; INTRAVENOUS; SUBCUTANEOUS at 20:20

## 2024-09-26 RX ADMIN — HYDROMORPHONE HYDROCHLORIDE 2 MILLIGRAM(S): 1 INJECTION, SOLUTION INTRAMUSCULAR; INTRAVENOUS; SUBCUTANEOUS at 13:29

## 2024-09-26 RX ADMIN — MULTI VITAMIN/MINERAL SUPPLEMENT WITH ASCORBIC ACID, NIACIN, PYRIDOXINE, PANTOTHENIC ACID, FOLIC ACID, RIBOFLAVIN, THIAMIN, BIOTIN, COBALAMIN AND ZINC. 1 TABLET(S): 60; 20; 12.5; 10; 10; 1.7; 1.5; 1; .3; .006 TABLET, COATED ORAL at 12:35

## 2024-09-26 RX ADMIN — MEROPENEM 100 MILLIGRAM(S): 500 INJECTION INTRAVENOUS at 21:13

## 2024-09-26 RX ADMIN — HYDROMORPHONE HYDROCHLORIDE 2 MILLIGRAM(S): 1 INJECTION, SOLUTION INTRAMUSCULAR; INTRAVENOUS; SUBCUTANEOUS at 04:56

## 2024-09-26 RX ADMIN — SERTRALINE HYDROCHLORIDE 125 MILLIGRAM(S): 100 TABLET, FILM COATED ORAL at 12:36

## 2024-09-26 RX ADMIN — Medication 500 MILLIGRAM(S): at 19:25

## 2024-09-26 RX ADMIN — Medication 3 MILLIGRAM(S): at 21:13

## 2024-09-26 RX ADMIN — PANTOPRAZOLE SODIUM 40 MILLIGRAM(S): 40 TABLET, DELAYED RELEASE ORAL at 05:27

## 2024-09-26 RX ADMIN — Medication 650 MILLIGRAM(S): at 10:29

## 2024-09-26 RX ADMIN — MEROPENEM 100 MILLIGRAM(S): 500 INJECTION INTRAVENOUS at 05:22

## 2024-09-26 RX ADMIN — Medication 500 MILLIGRAM(S): at 12:36

## 2024-09-26 RX ADMIN — Medication 60 MILLIGRAM(S): at 21:13

## 2024-09-26 RX ADMIN — HYDROMORPHONE HYDROCHLORIDE 2 MILLIGRAM(S): 1 INJECTION, SOLUTION INTRAMUSCULAR; INTRAVENOUS; SUBCUTANEOUS at 03:56

## 2024-09-26 RX ADMIN — DAPTOMYCIN 116 MILLIGRAM(S): 500 INJECTION, POWDER, LYOPHILIZED, FOR SOLUTION INTRAVENOUS at 14:43

## 2024-09-26 RX ADMIN — Medication 650 MILLIGRAM(S): at 23:24

## 2024-09-26 RX ADMIN — ENOXAPARIN SODIUM 40 MILLIGRAM(S): 150 INJECTION SUBCUTANEOUS at 23:27

## 2024-09-26 RX ADMIN — HYDROMORPHONE HYDROCHLORIDE 2 MILLIGRAM(S): 1 INJECTION, SOLUTION INTRAMUSCULAR; INTRAVENOUS; SUBCUTANEOUS at 19:24

## 2024-09-26 RX ADMIN — HYDROMORPHONE HYDROCHLORIDE 4 MILLIGRAM(S): 1 INJECTION, SOLUTION INTRAMUSCULAR; INTRAVENOUS; SUBCUTANEOUS at 22:12

## 2024-09-26 RX ADMIN — MODAFINIL 200 MILLIGRAM(S): 100 TABLET ORAL at 12:36

## 2024-09-26 RX ADMIN — Medication 500 MILLIGRAM(S): at 23:25

## 2024-09-26 RX ADMIN — CHLORHEXIDINE GLUCONATE ORAL RINSE 1 APPLICATION(S): 1.2 SOLUTION DENTAL at 10:05

## 2024-09-26 RX ADMIN — HYDROMORPHONE HYDROCHLORIDE 2 MILLIGRAM(S): 1 INJECTION, SOLUTION INTRAMUSCULAR; INTRAVENOUS; SUBCUTANEOUS at 14:15

## 2024-09-26 RX ADMIN — Medication 650 MILLIGRAM(S): at 11:15

## 2024-09-26 RX ADMIN — Medication 500 MILLIGRAM(S): at 05:22

## 2024-09-26 RX ADMIN — Medication 2 TABLET(S): at 21:13

## 2024-09-26 RX ADMIN — HYDROMORPHONE HYDROCHLORIDE 4 MILLIGRAM(S): 1 INJECTION, SOLUTION INTRAMUSCULAR; INTRAVENOUS; SUBCUTANEOUS at 21:12

## 2024-09-26 NOTE — PROGRESS NOTE ADULT - ASSESSMENT
44y Female with PMH of MS on Ocrevus anxiety, depression, septic arthritis of L sternoclavicular joint s/p I&Dx2 and cefepime for 6 weeks (12/2023-1/2024), recent admission 9/9-9/13 for L clavicular pain s/p I+D with L pectoralis flap sent home on meropenem and vancomycin for 6 week course presents for L shoulder pain, L sided pel chest pain, headache, fever at home. Son on phone also notes that the surgical site appeared more erythematous day prior to admission and had associated L arm swelling.    Admitted 9/9-9/13 most recently; MRI L clavicle with prior resection of the medial left clavicle with rim-enhancing fluid and surrounding phlegmon abutting the resected bony margin. S/p surgery on 9/9 with L SCJ debridement by CT surgery and L pec flap by PRS. Cultures grew Staph epi and Corynebacterium in fluid media, blood cultures negative. Discharged on tentatively 6 weeks vancomycin and meropenem for empiric coverage.    Here Tmax 100.1, mild tachycardia  WBC 12.1, neutrophilic. Lactate wnl  , ALT 78,     CT chest: Small nonocclusive right lower lobe basilar subsegmental pulmonary emboli, no evidence of right heart strain. 14 cm in greatest dimension expansile mostly soft tissue density structure filling and expanding the operative bed in the region of the left medial clavicle (thought to represent combination of muscle)    MICRO DATA:   09/15 BCX: IP   09/15 BCX: IP   09/10 AFB Cx: IP  09/10 Fungal CX: IP  09/10 Bacterial CX: fluid media only- corynebacterium striatum and staph epidermidis     # Low grade Fever, leukocytosis  # Septic sternoclavicular joint s/p debridement and muscle flap L clavicle  # Post-Operative abscess      Abnormal CT with rim enhancing collection in under the flap, purulent drainage noted from sternal side of the incision.       RECOMMENDATIONS:   - c/w dapto 400 mg q24h  - recent CPK noted, normal, check CPK once a week.   - Continue IV meropenem 1g q8h  - blood cx with documented GNR, not real, artifact.   - repeat blood cx NTD   - thoracic/plastic surgery following,   - s/p OR and wash out, follow up OR cx.   - abscess cx prelim with polymicrobial growth in broth only, it was a superficial cx taken by me, likely represents contamination.   Trend WBC, resolved leucocytosis         Odalis Reynaga  Please contact through MS Teams   If no response or past 5 pm/weekend call [phone].       My colleague will cover starting 9/27-9/29.

## 2024-09-26 NOTE — PROGRESS NOTE ADULT - PROBLEM SELECTOR PLAN 2
Bcx 9/15 growing GNR, PCR negative but corrected to no growth, repeat culture NGTD. Abscess culture 9/19 on with Staph epi, Candida orthopsilosis, lactobacillus rhamnosus in fluid media.  - C/w dapto and bryson as above  - F/u ID recs  - Polymicrobial may represent contamination per ID   - hold off on PICC removal pending 9/19 blood culture data and additional plastics intervention
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
## 2024-09-26 NOTE — PROGRESS NOTE ADULT - PROBLEM SELECTOR PLAN 1
- s/p left SCJ debridement and left pectoral flap on 9/9  - 9/25/24 -[place] I &D w/ plastics  - plastic follow up; LISBETH management by Plastics. Monitor output.   - recommend close ID follow up. Abx switched to Dapto per ID  - global care per primary team.    consider suppository/movantik for BM   OT daily please  - thoracic surgery will continue to follow  - Plan d/w Attending Dr. Bautista  ----  For any questions or concerns, please contact thoracic @ [phone]

## 2024-09-26 NOTE — PROGRESS NOTE ADULT - PROBLEM SELECTOR PLAN 6
History of MS on home baclofen, dalfampridine, and gemtesa.  - c/w baclofen 10 TID  - c/w modafinil 200 daily  - c/w home dalfampridine and gemtesa as not on formulary History of MS on home baclofen, dalfampridine, and gemtesa.  - c/w baclofen 10 TID  - c/w modafinil 200 daily  - c/w home dalfampridine and gemtesa as not on formulary  - Patient ran out of dalfampridine and needs to reorder

## 2024-09-26 NOTE — PROGRESS NOTE ADULT - SUBJECTIVE AND OBJECTIVE BOX
44yPatient is a 44y old  Female who presents with a chief complaint of L chest pain, SIRS, Acute Pulmonary Embolism (26 Sep 2024 09:49)      Interval history:  Afebrile, feeling better.       Allergies:   No Known Allergies    Antimicrobials:  DAPTOmycin IVPB 400 milliGRAM(s) IV Intermittent every 24 hours  meropenem  IVPB 1000 milliGRAM(s) IV Intermittent every 8 hours      REVIEW OF SYSTEMS:  No SOB  No abdominal pain  No rash.       Vital Signs Last 24 Hrs  T(C): 36.4 (09-26-24 @ 16:21), Max: 36.5 (09-25-24 @ 20:12)  T(F): 97.6 (09-26-24 @ 16:21), Max: 97.7 (09-25-24 @ 20:12)  HR: 89 (09-26-24 @ 16:21) (66 - 89)  BP: 100/64 (09-26-24 @ 16:21) (91/58 - 138/65)  BP(mean): 75 (09-25-24 @ 21:20) (75 - 97)  RR: 18 (09-26-24 @ 16:21) (13 - 18)  SpO2: 98% (09-26-24 @ 16:21) (92% - 98%)      PHYSICAL EXAM:  Pt in no acute distress, alert, awake.   rt arm PICC   lt sided graft site with dressing  2 LISBETH's with blood   non distended abdomen  no edema LE                             8.8    12.97 )-----------( 626      ( 26 Sep 2024 06:52 )             28.1   09-26    136  |  99  |  10  ----------------------------<  113[H]  4.4   |  25  |  0.51    Ca    9.3      26 Sep 2024 06:51  Phos  4.2     09-26  Mg     2.2     09-26    TPro  5.8[L]  /  Alb  3.2[L]  /  TBili  0.1[L]  /  DBili  x   /  AST  10  /  ALT  17  /  AlkPhos  229[H]  09-26      LIVER FUNCTIONS - ( 26 Sep 2024 06:51 )  Alb: 3.2 g/dL / Pro: 5.8 g/dL / ALK PHOS: 229 U/L / ALT: 17 U/L / AST: 10 U/L / GGT: x

## 2024-09-26 NOTE — PROGRESS NOTE ADULT - PROBLEM SELECTOR PLAN 1
History of septic arthritis with multiple admissions s/p I&D with CTS and L pectoral flap with plastic surgery on last admission (9/4-9/13). Was discharged home on meropenem 1g q8h and van 1250 q12h with reportedly no missed doses.   - repeat CT: Status post left sternoclavicular resection with pectoralis flap. Mixed density soft tissue and/or hematoma in the operative bed, similar in size. Interval development of rim-enhancing collections, concerning for infection.  - s/p 9/25 OR wash out by plastics with new L breast drain, pending cultures  - no plans for OR by thoracic   - ID: c/w daptomycin 400mg daily (9/16 - )  - ID: c/w meropenem 1g q8h (9/15 - )  - monitor  - multimodal pain control with Dilaudid 2/4 and Tylenol PRN

## 2024-09-26 NOTE — PROGRESS NOTE ADULT - ASSESSMENT
44F with PMH of MS on Rituxan, anxiety, depression, septic arthritis of L sternoclavicular joint s/p multiple I&Ds in 2023, recent recurrence s/p I&D with CTS and pec turnover flap with Dr. Dean on 9/9, d/c'ed with PICC line on vanc/bryson, now presenting with redness and increased pain. PRS consulted for possible surgical site infection. CT chest shows normal postop changes with possible small hematoma around flap, LISBETH drains within area, no abscess/collection. Small PE without heart strain noted. Repeat CT from 9/18 now shows rim enhancing collections at the surgical site. Patient was taken back to the OR for washout on 9/26.       plan  - Dressing to be taken down by PRS team on POD 3  - LISBETH drain to remain in place, please monitor drain output  - abx per ID   - remainder of care per primary - please contact plastics if any concerns and if status changes    Leroy Cagle, PGY1  Plastic Surgery   [phone] [facility] pager  Available on teams

## 2024-09-26 NOTE — PROGRESS NOTE ADULT - ATTENDING COMMENTS
Patient seen and examined this morning. Plan of care discussed with the patient/NADJA (HCP) and resident team.     s/p OR on 9/25 with wash out. no new complaints.     #Septic sternoclavicular joint s/p debridement and muscle flap L clavicle, postop abscess  #anemia likely from recent surgery and frequent phlebotomy +/- hemodilutional effect  #subsegmental PE  - Abnormal CT with new rim enhancing collections in under the flap with purulent drainage from sternal side of the incision and serosanguinous cloudy fluid in LISBETH drain.   - s/p OR for wash out on 9/25 per plastics  - Blood cx from 9/15 with prelim report of GNR now with no growth. Subsequent repeat blood cx have been all negative to date  - wound cx result (fluid media + S. epi, candida orthopsilosis, lactobacillus rhamnosus)  - currently on Dapto and Meropenem per ID. per discussion with ID attending, hold off on starting antifungal treatment until after OR.  - Hgb improved appropriately after PRBC transfusion on 9/24   - +small nonocclusive right lower lobe basilar subsegmental PE noted on CTA chest (9/15), LE duplex neg (9/15, 9/25), will defer full AC at this time as risk felt to outweigh benefit and f/u serial LE duplex and continue prophylactic dose of lovenox for now.

## 2024-09-26 NOTE — PROGRESS NOTE ADULT - ASSESSMENT
44F s/p left SCJ debridement and left pectoral flap on 9/9 presenting with superficial surgical site erythema and localized pain. Also found to have small subsegmental right lower lobe pulmonary emboli.  Recommendations:  -No acute thoracic surgery operative intervention at this time  -Dispo per PRS    9/16: Evaluated at ED bedside by Thoracic Attending Dr. Bautista. Recommend close ID follow up for cellulitis of site. Vanco trough level noted to be 5.5, ? unclear if patient was taking antibiotics appropriately in outpatient setting.   9/17 Antibiotic switched to dapto per ID. Site improving  9/18 VSS  afebrile.  site with decreased erythema  9/19 VSS afebile site  COLTON improving - OT daily-  large stool burden seen on CT d/w Primary team consider MOVANTIK / suppository   9/20 c/w dapto 400 mg q24h. Continue IV meropenem 1g q8h Follow-up final identification and sensitivity of GNR  Repeat blood cx   9/21 9/21 maintain jpss. analgesics for pain, revision to be considered  9/22 No events overnight. Pain better controlled  9/23 VSS; Maintain LISBETH drains, wound care as per plastics.   9/24 VSS: As per plastic surgery, plan for flap revision today?   9/25 VSS NPO for Washout with Plastic Surgert today   9/26 vss s/p washout w/ plastics  drains as per plastics     44F s/p left SCJ debridement and left pectoral flap on 9/9 presenting with superficial surgical site erythema and localized pain. Also found to have small subsegmental right lower lobe pulmonary emboli.  Recommendations:  -No acute thoracic surgery operative intervention at this time  -Dispo per PRS    9/16: Evaluated at ED bedside by Thoracic Attending Dr. Bautista. Recommend close ID follow up for cellulitis of site. Vanco trough level noted to be 5.5, ? unclear if patient was taking antibiotics appropriately in outpatient setting.   9/17 Antibiotic switched to dapto per ID. Site improving  9/18 VSS  afebrile.  site with decreased erythema  9/19 VSS afebile site  COLTON improving - OT daily-  large stool burden seen on CT d/w Primary team consider MOVANTIK / suppository   9/20 c/w dapto 400 mg q24h. Continue IV meropenem 1g q8h Follow-up final identification and sensitivity of GNR  Repeat blood cx   9/21 9/21 maintain jpss. analgesics for pain, revision to be considered  9/22 No events overnight. Pain better controlled  9/23 VSS; Maintain LISBETH drains, wound care as per plastics.   9/24 VSS: As per plastic surgery, plan for flap revision today?   9/25 VSS NPO for Washout with Plastic Surgert today   9/26 vss s/p washout w/ plastics  LISBETH drain as per plastics  -ss

## 2024-09-26 NOTE — PROGRESS NOTE ADULT - SUBJECTIVE AND OBJECTIVE BOX
Surgery Progress Note  Patient is a 44y old  Female who presents with a chief complaint of L chest pain, SIRS, Acute Pulmonary Embolism (25 Sep 2024 16:46)      SUBJECTIVE: Patient seen and examined at bedside with surgical team, patient without complaints. Pain is well controlled. Findings from procedure discussed with patient and she understands what was done. She would like to be discharged as soon as possible.     Physical Exam  Constitutional: NAD, resting in bed comfortably   Respiratory: no increased WOB, no tachypnea   Chest: Aquacell dressing in place with no strikethrough or leakage around dressing. LISBETH drain in place with serosanguinous discharge in bulb.   Ext: warm and well perfused     Vital Signs Last 24 Hrs  T(C): 36.3 (26 Sep 2024 00:40), Max: 36.6 (25 Sep 2024 10:20)  T(F): 97.4 (26 Sep 2024 00:40), Max: 97.9 (25 Sep 2024 10:20)  HR: 71 (26 Sep 2024 00:40) (71 - 98)  BP: 91/58 (26 Sep 2024 00:40) (91/58 - 138/65)  BP(mean): 75 (25 Sep 2024 21:20) (75 - 97)  RR: 18 (26 Sep 2024 00:40) (13 - 20)  SpO2: 94% (26 Sep 2024 00:40) (92% - 98%)    Parameters below as of 26 Sep 2024 00:40  Patient On (Oxygen Delivery Method): room air        I&O's Detail    25 Sep 2024 07:01  -  26 Sep 2024 07:00  --------------------------------------------------------  IN:    IV PiggyBack: 100 mL    Oral Fluid: 120 mL  Total IN: 220 mL    OUT:    Bulb (mL): 80 mL    Voided (mL): 375 mL  Total OUT: 455 mL    Total NET: -235 mL      MEDICATIONS  (STANDING):  chlorhexidine 2% Cloths 1 Application(s) Topical daily  DAPTOmycin IVPB 400 milliGRAM(s) IV Intermittent every 24 hours  DULoxetine 60 milliGRAM(s) Oral at bedtime  enoxaparin Injectable 40 milliGRAM(s) SubCutaneous every 24 hours  gemtesa tablet 75 milliGRAM(s) 75 milliGRAM(s) Oral daily  influenza   Vaccine 0.5 milliLiter(s) IntraMuscular once  lactated ringers. 500 milliLiter(s) (100 mL/Hr) IV Continuous <Continuous>  melatonin 3 milliGRAM(s) Oral at bedtime  meropenem  IVPB 1000 milliGRAM(s) IV Intermittent every 8 hours  methocarbamol 500 milliGRAM(s) Oral four times a day  modafinil 200 milliGRAM(s) Oral daily  multivitamin 1 Tablet(s) Oral daily  pantoprazole    Tablet 40 milliGRAM(s) Oral before breakfast  polyethylene glycol 3350 17 Gram(s) Oral two times a day  senna 2 Tablet(s) Oral at bedtime  sertraline 125 milliGRAM(s) Oral daily    MEDICATIONS  (PRN):  acetaminophen     Tablet .. 650 milliGRAM(s) Oral every 6 hours PRN Mild Pain (1 - 3)  baclofen 5 milliGRAM(s) Oral every 8 hours PRN Muscle Spasm  clonazePAM  Tablet 0.5 milliGRAM(s) Oral daily PRN for anxiety  HYDROmorphone   Tablet 4 milliGRAM(s) Oral every 8 hours PRN Severe Pain (7 - 10)  HYDROmorphone   Tablet 2 milliGRAM(s) Oral every 6 hours PRN Moderate Pain (4 - 6)      LABS:                        8.8    12.97 )-----------( 626      ( 26 Sep 2024 06:52 )             28.1     09-25    140  |  105  |  7   ----------------------------<  86  4.2   |  25  |  0.46[L]    Ca    8.8      25 Sep 2024 07:19  Phos  3.4     09-25  Mg     2.1     09-25    TPro  5.3[L]  /  Alb  2.8[L]  /  TBili  <0.1[L]  /  DBili  x   /  AST  17  /  ALT  21  /  AlkPhos  224[H]  09-25    PT/INR - ( 25 Sep 2024 07:19 )   PT: 12.0 sec;   INR: 1.04 ratio         PTT - ( 25 Sep 2024 07:19 )  PTT:32.4 sec  LIVER FUNCTIONS - ( 25 Sep 2024 07:19 )  Alb: 2.8 g/dL / Pro: 5.3 g/dL / ALK PHOS: 224 U/L / ALT: 21 U/L / AST: 17 U/L / GGT: x           Urinalysis Basic - ( 25 Sep 2024 07:19 )    Color: x / Appearance: x / SG: x / pH: x  Gluc: 86 mg/dL / Ketone: x  / Bili: x / Urobili: x   Blood: x / Protein: x / Nitrite: x   Leuk Esterase: x / RBC: x / WBC x   Sq Epi: x / Non Sq Epi: x / Bacteria: x      ABO Interpretation: A (09-25-24 @ 08:36)

## 2024-09-26 NOTE — PROGRESS NOTE ADULT - SUBJECTIVE AND OBJECTIVE BOX
Patient is a 44y old  Female who presents with a chief complaint of L chest pain, SIRS, Acute Pulmonary Embolism (21 Sep 2024 15:23)      SUBJECTIVE / OVERNIGHT EVENTS:    MEDICATIONS  (STANDING):  baclofen 10 milliGRAM(s) Oral every 8 hours  chlorhexidine 2% Cloths 1 Application(s) Topical daily  dalfampridine ER 10 milliGRAM(s) Oral every 12 hours  DAPTOmycin IVPB 400 milliGRAM(s) IV Intermittent every 24 hours  DULoxetine 60 milliGRAM(s) Oral at bedtime  enoxaparin Injectable 40 milliGRAM(s) SubCutaneous every 24 hours  influenza   Vaccine 0.5 milliLiter(s) IntraMuscular once  lactated ringers. 500 milliLiter(s) (100 mL/Hr) IV Continuous <Continuous>  meropenem  IVPB 1000 milliGRAM(s) IV Intermittent every 8 hours  methocarbamol 500 milliGRAM(s) Oral four times a day  modafinil 200 milliGRAM(s) Oral daily  multivitamin 1 Tablet(s) Oral daily  mupirocin 2% Ointment 1 Application(s) Topical two times a day  pantoprazole    Tablet 40 milliGRAM(s) Oral before breakfast  polyethylene glycol 3350 17 Gram(s) Oral daily  senna 2 Tablet(s) Oral at bedtime  sertraline 125 milliGRAM(s) Oral daily  Vibegron (Gemtesa) 75 mg tablet 1 Tablet(s) 1 Tablet(s) Oral daily    MEDICATIONS  (PRN):  acetaminophen     Tablet .. 650 milliGRAM(s) Oral every 6 hours PRN Mild Pain (1 - 3)  bisacodyl Suppository 10 milliGRAM(s) Rectal daily PRN Constipation  clonazePAM  Tablet 0.5 milliGRAM(s) Oral daily PRN for anxiety  HYDROmorphone   Tablet 4 milliGRAM(s) Oral every 8 hours PRN Severe Pain (7 - 10)  HYDROmorphone   Tablet 2 milliGRAM(s) Oral every 6 hours PRN Moderate Pain (4 - 6)      CAPILLARY BLOOD GLUCOSE        I&O's Summary    20 Sep 2024 07:01  -  21 Sep 2024 07:00  --------------------------------------------------------  IN: 100 mL / OUT: 155 mL / NET: -55 mL    21 Sep 2024 07:01  -  22 Sep 2024 05:18  --------------------------------------------------------  IN: 150 mL / OUT: 0 mL / NET: 150 mL        Vital Signs Last 24 Hrs  T(C): 36.8 (22 Sep 2024 00:34), Max: 36.8 (22 Sep 2024 00:34)  T(F): 98.3 (22 Sep 2024 00:34), Max: 98.3 (22 Sep 2024 00:34)  HR: 100 (22 Sep 2024 00:34) (80 - 100)  BP: 86/51 (22 Sep 2024 00:34) (86/51 - 99/64)  BP(mean): --  RR: 18 (22 Sep 2024 00:34) (18 - 18)  SpO2: 95% (22 Sep 2024 00:34) (95% - 97%)    Parameters below as of 22 Sep 2024 00:34  Patient On (Oxygen Delivery Method): room air        PHYSICAL EXAM:  CONSTITUTIONAL: Well groomed, no apparent distress, laying in bed   SKIN:   RESP: No respiratory distress, no use of accessory muscles  GI: Soft, NT, ND  PSYCH: AOx 3,  LABS:                        7.3    13.82 )-----------( 496      ( 21 Sep 2024 08:42 )             22.8      09-21    136  |  100  |  10  ----------------------------<  84  4.2   |  26  |  0.50    Ca    8.9      21 Sep 2024 08:42  Phos  2.9     09-21  Mg     2.3     09-21    TPro  5.7[L]  /  Alb  3.1[L]  /  TBili  <0.1[L]  /  DBili  x   /  AST  11  /  ALT  34  /  AlkPhos  274[H]  09-21          Urinalysis Basic - ( 21 Sep 2024 08:42 )    Color: x / Appearance: x / SG: x / pH: x  Gluc: 84 mg/dL / Ketone: x  / Bili: x / Urobili: x   Blood: x / Protein: x / Nitrite: x   Leuk Esterase: x / RBC: x / WBC x   Sq Epi: x / Non Sq Epi: x / Bacteria: x        RADIOLOGY & ADDITIONAL TESTS:    Imaging Personally Reviewed.    Consultant(s) Notes Reviewed.    Care Discussed with Consultants/Other Providers. Patient is a 44y old  Female who presents with a chief complaint of L chest pain, SIRS, Acute Pulmonary Embolism (21 Sep 2024 15:23)      SUBJECTIVE / OVERNIGHT EVENTS: Patient expresses feeling better after surgery and more awake than yesterday. Patient is hopeful to get out of the hospital as soon as possible.     MEDICATIONS  (STANDING):  baclofen 10 milliGRAM(s) Oral every 8 hours  chlorhexidine 2% Cloths 1 Application(s) Topical daily  dalfampridine ER 10 milliGRAM(s) Oral every 12 hours  DAPTOmycin IVPB 400 milliGRAM(s) IV Intermittent every 24 hours  DULoxetine 60 milliGRAM(s) Oral at bedtime  enoxaparin Injectable 40 milliGRAM(s) SubCutaneous every 24 hours  influenza   Vaccine 0.5 milliLiter(s) IntraMuscular once  lactated ringers. 500 milliLiter(s) (100 mL/Hr) IV Continuous <Continuous>  meropenem  IVPB 1000 milliGRAM(s) IV Intermittent every 8 hours  methocarbamol 500 milliGRAM(s) Oral four times a day  modafinil 200 milliGRAM(s) Oral daily  multivitamin 1 Tablet(s) Oral daily  mupirocin 2% Ointment 1 Application(s) Topical two times a day  pantoprazole    Tablet 40 milliGRAM(s) Oral before breakfast  polyethylene glycol 3350 17 Gram(s) Oral daily  senna 2 Tablet(s) Oral at bedtime  sertraline 125 milliGRAM(s) Oral daily  Vibegron (Gemtesa) 75 mg tablet 1 Tablet(s) 1 Tablet(s) Oral daily    MEDICATIONS  (PRN):  acetaminophen     Tablet .. 650 milliGRAM(s) Oral every 6 hours PRN Mild Pain (1 - 3)  bisacodyl Suppository 10 milliGRAM(s) Rectal daily PRN Constipation  clonazePAM  Tablet 0.5 milliGRAM(s) Oral daily PRN for anxiety  HYDROmorphone   Tablet 4 milliGRAM(s) Oral every 8 hours PRN Severe Pain (7 - 10)  HYDROmorphone   Tablet 2 milliGRAM(s) Oral every 6 hours PRN Moderate Pain (4 - 6)      CAPILLARY BLOOD GLUCOSE        I&O's Summary    20 Sep 2024 07:01  -  21 Sep 2024 07:00  --------------------------------------------------------  IN: 100 mL / OUT: 155 mL / NET: -55 mL    21 Sep 2024 07:01  -  22 Sep 2024 05:18  --------------------------------------------------------  IN: 150 mL / OUT: 0 mL / NET: 150 mL        Vital Signs Last 24 Hrs  T(C): 36.8 (22 Sep 2024 00:34), Max: 36.8 (22 Sep 2024 00:34)  T(F): 98.3 (22 Sep 2024 00:34), Max: 98.3 (22 Sep 2024 00:34)  HR: 100 (22 Sep 2024 00:34) (80 - 100)  BP: 86/51 (22 Sep 2024 00:34) (86/51 - 99/64)  BP(mean): --  RR: 18 (22 Sep 2024 00:34) (18 - 18)  SpO2: 95% (22 Sep 2024 00:34) (95% - 97%)    Parameters below as of 22 Sep 2024 00:34  Patient On (Oxygen Delivery Method): room air        PHYSICAL EXAM:  CONSTITUTIONAL: Well groomed, no apparent distress, sitting in chair   SKIN: Dressing over the left breast with single LISBETH drain with bloody fluid, not cloudy   RESP: No respiratory distress, no use of accessory muscles  GI: Soft, NT, ND  PSYCH: AOx 3,  LABS:                        7.3    13.82 )-----------( 496      ( 21 Sep 2024 08:42 )             22.8      09-21    136  |  100  |  10  ----------------------------<  84  4.2   |  26  |  0.50    Ca    8.9      21 Sep 2024 08:42  Phos  2.9     09-21  Mg     2.3     09-21    TPro  5.7[L]  /  Alb  3.1[L]  /  TBili  <0.1[L]  /  DBili  x   /  AST  11  /  ALT  34  /  AlkPhos  274[H]  09-21          Urinalysis Basic - ( 21 Sep 2024 08:42 )    Color: x / Appearance: x / SG: x / pH: x  Gluc: 84 mg/dL / Ketone: x  / Bili: x / Urobili: x   Blood: x / Protein: x / Nitrite: x   Leuk Esterase: x / RBC: x / WBC x   Sq Epi: x / Non Sq Epi: x / Bacteria: x        RADIOLOGY & ADDITIONAL TESTS:    Imaging Personally Reviewed.    Consultant(s) Notes Reviewed.    Care Discussed with Consultants/Other Providers.

## 2024-09-26 NOTE — PROGRESS NOTE ADULT - PROBLEM SELECTOR PLAN 3
Patient admitted with Hgb 12.2, now 7.7 c/f unknown source of bleeding, pt with known history of RAMONE  - trend CBC  - F/u anemia workup labs: negative  - Fe studies from 9/11/24 shows iron deficiency- holding Fe iso active infection  - Hgb stable in mid 7's, restarted lovenox  - Repeat CT CAP: no source of bleed  - HgB 6.8 on 9/24, s/p 1unit pRBC 9/24, HgB 8.2 post transfusion Patient admitted with Hgb 12.2, now 7.7 c/f unknown source of bleeding, pt with known history of RAMONE  - trend CBC  - F/u anemia workup labs: negative  - Fe studies from 9/11/24 shows iron deficiency- holding Fe iso active infection  - Hgb stable in mid 7's, restarted lovenox  - Repeat CT CAP: no source of bleed  - HgB 6.8 on 9/24, s/p 1unit pRBC 9/24, HgB 8.2 post transfusion  - Minimize blood draws

## 2024-09-26 NOTE — PROGRESS NOTE ADULT - ASSESSMENT
44F with MS, anxiety, depression, septic arthritis of L sternoclavicular joint s/p multiple admissions requiring I&D and most recent admission (9/4-9/13) with I&D and L pectoral flap for evaluation of abscess now presenting to the ED with L sided chest pain with newly noted small nonocclusive RLL PE likely incidental. Course c/b anemia of unclear etiology. CT with evidence of new rim enhancing lesion s/p abscess drainage and started on meropenem and daptomycin by ID.   44F with MS, anxiety, depression, septic arthritis of L sternoclavicular joint s/p multiple admissions requiring I&D and most recent admission (9/4-9/13) with I&D and L pectoral flap for evaluation of abscess now presenting to the ED with L sided chest pain with newly noted small nonocclusive RLL PE likely incidental. Course c/b anemia of unclear etiology. CT with evidence of new rim enhancing lesion s/p abscess drainage and started on meropenem and daptomycin by ID. Now s/p second OR drainage by plastics on 9/25.

## 2024-09-26 NOTE — PROGRESS NOTE ADULT - PROBLEM SELECTOR PLAN 4
CT on 9/15 demonstrated nonocclusive right lower lobe basilar subsegmental pulmonary emboli with no evidence of right heart strain.  -Holding AC d/t pending procedure and low risk profile  -9/15 Duplex with no evidence of DVT  -Repeat duplex done 9/25, f/u results CT on 9/15 demonstrated nonocclusive right lower lobe basilar subsegmental pulmonary emboli with no evidence of right heart strain.  -Holding AC d/t pending procedure and low risk profile  -9/15 Duplex with no evidence of DVT  -9/25 Repeat duplex with no evidence of DVT

## 2024-09-27 LAB
CK SERPL-CCNC: 39 U/L — SIGNIFICANT CHANGE UP (ref 25–170)
CULTURE RESULTS: SIGNIFICANT CHANGE UP
CULTURE RESULTS: SIGNIFICANT CHANGE UP
HCT VFR BLD CALC: 26.1 % — LOW (ref 34.5–45)
HGB BLD-MCNC: 8.3 G/DL — LOW (ref 11.5–15.5)
MCHC RBC-ENTMCNC: 27.2 PG — SIGNIFICANT CHANGE UP (ref 27–34)
MCHC RBC-ENTMCNC: 31.8 GM/DL — LOW (ref 32–36)
MCV RBC AUTO: 85.6 FL — SIGNIFICANT CHANGE UP (ref 80–100)
NRBC # BLD: 0 /100 WBCS — SIGNIFICANT CHANGE UP (ref 0–0)
PLATELET # BLD AUTO: 573 K/UL — HIGH (ref 150–400)
RBC # BLD: 3.05 M/UL — LOW (ref 3.8–5.2)
RBC # FLD: 14 % — SIGNIFICANT CHANGE UP (ref 10.3–14.5)
SPECIMEN SOURCE: SIGNIFICANT CHANGE UP
SPECIMEN SOURCE: SIGNIFICANT CHANGE UP
WBC # BLD: 16.67 K/UL — HIGH (ref 3.8–10.5)
WBC # FLD AUTO: 16.67 K/UL — HIGH (ref 3.8–10.5)

## 2024-09-27 PROCEDURE — 71045 X-RAY EXAM CHEST 1 VIEW: CPT | Mod: 26

## 2024-09-27 PROCEDURE — 99232 SBSQ HOSP IP/OBS MODERATE 35: CPT

## 2024-09-27 PROCEDURE — 99232 SBSQ HOSP IP/OBS MODERATE 35: CPT | Mod: GC

## 2024-09-27 RX ADMIN — HYDROMORPHONE HYDROCHLORIDE 2 MILLIGRAM(S): 1 INJECTION, SOLUTION INTRAMUSCULAR; INTRAVENOUS; SUBCUTANEOUS at 16:24

## 2024-09-27 RX ADMIN — HYDROMORPHONE HYDROCHLORIDE 2 MILLIGRAM(S): 1 INJECTION, SOLUTION INTRAMUSCULAR; INTRAVENOUS; SUBCUTANEOUS at 23:40

## 2024-09-27 RX ADMIN — Medication 650 MILLIGRAM(S): at 12:36

## 2024-09-27 RX ADMIN — Medication 650 MILLIGRAM(S): at 11:36

## 2024-09-27 RX ADMIN — Medication 60 MILLIGRAM(S): at 20:47

## 2024-09-27 RX ADMIN — MEROPENEM 100 MILLIGRAM(S): 500 INJECTION INTRAVENOUS at 13:31

## 2024-09-27 RX ADMIN — Medication 500 MILLIGRAM(S): at 06:06

## 2024-09-27 RX ADMIN — Medication 2 TABLET(S): at 20:46

## 2024-09-27 RX ADMIN — MEROPENEM 100 MILLIGRAM(S): 500 INJECTION INTRAVENOUS at 06:06

## 2024-09-27 RX ADMIN — HYDROMORPHONE HYDROCHLORIDE 2 MILLIGRAM(S): 1 INJECTION, SOLUTION INTRAMUSCULAR; INTRAVENOUS; SUBCUTANEOUS at 06:06

## 2024-09-27 RX ADMIN — Medication 500 MILLIGRAM(S): at 23:40

## 2024-09-27 RX ADMIN — DAPTOMYCIN 116 MILLIGRAM(S): 500 INJECTION, POWDER, LYOPHILIZED, FOR SOLUTION INTRAVENOUS at 15:24

## 2024-09-27 RX ADMIN — HYDROMORPHONE HYDROCHLORIDE 2 MILLIGRAM(S): 1 INJECTION, SOLUTION INTRAMUSCULAR; INTRAVENOUS; SUBCUTANEOUS at 15:24

## 2024-09-27 RX ADMIN — Medication 500 MILLIGRAM(S): at 17:04

## 2024-09-27 RX ADMIN — PANTOPRAZOLE SODIUM 40 MILLIGRAM(S): 40 TABLET, DELAYED RELEASE ORAL at 06:06

## 2024-09-27 RX ADMIN — Medication 650 MILLIGRAM(S): at 00:24

## 2024-09-27 RX ADMIN — CHLORHEXIDINE GLUCONATE ORAL RINSE 1 APPLICATION(S): 1.2 SOLUTION DENTAL at 11:41

## 2024-09-27 RX ADMIN — Medication 0.5 MILLIGRAM(S): at 18:50

## 2024-09-27 RX ADMIN — HYDROMORPHONE HYDROCHLORIDE 2 MILLIGRAM(S): 1 INJECTION, SOLUTION INTRAMUSCULAR; INTRAVENOUS; SUBCUTANEOUS at 07:06

## 2024-09-27 RX ADMIN — Medication 3 MILLIGRAM(S): at 20:46

## 2024-09-27 RX ADMIN — Medication 500 MILLIGRAM(S): at 11:36

## 2024-09-27 RX ADMIN — HYDROMORPHONE HYDROCHLORIDE 4 MILLIGRAM(S): 1 INJECTION, SOLUTION INTRAMUSCULAR; INTRAVENOUS; SUBCUTANEOUS at 21:46

## 2024-09-27 RX ADMIN — SERTRALINE HYDROCHLORIDE 125 MILLIGRAM(S): 100 TABLET, FILM COATED ORAL at 11:36

## 2024-09-27 RX ADMIN — MULTI VITAMIN/MINERAL SUPPLEMENT WITH ASCORBIC ACID, NIACIN, PYRIDOXINE, PANTOTHENIC ACID, FOLIC ACID, RIBOFLAVIN, THIAMIN, BIOTIN, COBALAMIN AND ZINC. 1 TABLET(S): 60; 20; 12.5; 10; 10; 1.7; 1.5; 1; .3; .006 TABLET, COATED ORAL at 11:36

## 2024-09-27 RX ADMIN — HYDROMORPHONE HYDROCHLORIDE 4 MILLIGRAM(S): 1 INJECTION, SOLUTION INTRAMUSCULAR; INTRAVENOUS; SUBCUTANEOUS at 20:46

## 2024-09-27 RX ADMIN — MODAFINIL 200 MILLIGRAM(S): 100 TABLET ORAL at 11:37

## 2024-09-27 RX ADMIN — MEROPENEM 100 MILLIGRAM(S): 500 INJECTION INTRAVENOUS at 20:45

## 2024-09-27 NOTE — PROGRESS NOTE ADULT - SUBJECTIVE AND OBJECTIVE BOX
Surgery Progress Note  Patient is a 44y old  Female who presents with a chief complaint of L chest pain, SIRS, Acute Pulmonary Embolism (27 Sep 2024 09:39)      SUBJECTIVE: Patient seen and examined at bedside with surgical team, patient has some concerns about strikethrough she is noticing on dressing and subjective paresthesias in her L arm/hand. Patient re-assured and dressing removal on POD3 was discussed.     Physical Exam  Constitutional: NAD, resting in bed comfortably   Respiratory: no increased WOB, no tachypnea. Comfortable on RA  Chest: Aquacell dressing in place with moderate strikethrough but no leakage around dressing. LISBETH drain in place with serosanguinous discharge in bulb, more serous and less sanguinous than prior exam.   Ext: warm and well perfused   Neuro: b/l upper extremity strength grossly intact. L upper extremity sensation grossly intact. No focal deficits identified on exam.     Vital Signs Last 24 Hrs  T(C): 36.6 (27 Sep 2024 15:43), Max: 36.8 (26 Sep 2024 23:19)  T(F): 97.9 (27 Sep 2024 15:43), Max: 98.3 (26 Sep 2024 23:19)  HR: 79 (27 Sep 2024 15:43) (79 - 86)  BP: 97/61 (27 Sep 2024 15:43) (97/61 - 108/68)  BP(mean): --  RR: 18 (27 Sep 2024 15:43) (18 - 18)  SpO2: 95% (27 Sep 2024 15:43) (95% - 98%)    Parameters below as of 27 Sep 2024 15:43  Patient On (Oxygen Delivery Method): room air        I&O's Detail    26 Sep 2024 07:01  -  27 Sep 2024 07:00  --------------------------------------------------------  IN:    IV PiggyBack: 50 mL    IV PiggyBack: 50 mL  Total IN: 100 mL    OUT:    Bulb (mL): 65 mL  Total OUT: 65 mL    Total NET: 35 mL      MEDICATIONS  (STANDING):  chlorhexidine 2% Cloths 1 Application(s) Topical daily  DAPTOmycin IVPB 400 milliGRAM(s) IV Intermittent every 24 hours  DULoxetine 60 milliGRAM(s) Oral at bedtime  enoxaparin Injectable 40 milliGRAM(s) SubCutaneous every 24 hours  gemtesa tablet 75 milliGRAM(s) 75 milliGRAM(s) Oral daily  influenza   Vaccine 0.5 milliLiter(s) IntraMuscular once  melatonin 3 milliGRAM(s) Oral at bedtime  meropenem  IVPB 1000 milliGRAM(s) IV Intermittent every 8 hours  methocarbamol 500 milliGRAM(s) Oral four times a day  modafinil 200 milliGRAM(s) Oral daily  multivitamin 1 Tablet(s) Oral daily  pantoprazole    Tablet 40 milliGRAM(s) Oral before breakfast  polyethylene glycol 3350 17 Gram(s) Oral two times a day  senna 2 Tablet(s) Oral at bedtime  sertraline 125 milliGRAM(s) Oral daily    MEDICATIONS  (PRN):  acetaminophen     Tablet .. 650 milliGRAM(s) Oral every 6 hours PRN Mild Pain (1 - 3)  baclofen 5 milliGRAM(s) Oral every 8 hours PRN Muscle Spasm  clonazePAM  Tablet 0.5 milliGRAM(s) Oral daily PRN for anxiety  HYDROmorphone   Tablet 4 milliGRAM(s) Oral every 8 hours PRN Severe Pain (7 - 10)  HYDROmorphone   Tablet 2 milliGRAM(s) Oral every 6 hours PRN Moderate Pain (4 - 6)      LABS:                        8.3    16.67 )-----------( 573      ( 27 Sep 2024 10:24 )             26.1     09-26    136  |  99  |  10  ----------------------------<  113[H]  4.4   |  25  |  0.51    Ca    9.3      26 Sep 2024 06:51  Phos  4.2     09-26  Mg     2.2     09-26    TPro  5.8[L]  /  Alb  3.2[L]  /  TBili  0.1[L]  /  DBili  x   /  AST  10  /  ALT  17  /  AlkPhos  229[H]  09-26      LIVER FUNCTIONS - ( 26 Sep 2024 06:51 )  Alb: 3.2 g/dL / Pro: 5.8 g/dL / ALK PHOS: 229 U/L / ALT: 17 U/L / AST: 10 U/L / GGT: x           Urinalysis Basic - ( 26 Sep 2024 06:51 )    Color: x / Appearance: x / SG: x / pH: x  Gluc: 113 mg/dL / Ketone: x  / Bili: x / Urobili: x   Blood: x / Protein: x / Nitrite: x   Leuk Esterase: x / RBC: x / WBC x   Sq Epi: x / Non Sq Epi: x / Bacteria: x

## 2024-09-27 NOTE — PROGRESS NOTE ADULT - ASSESSMENT
44y Female with PMH of MS on Ocrevus anxiety, depression, septic arthritis of L sternoclavicular joint s/p I&Dx2 and cefepime for 6 weeks (12/2023-1/2024), recent admission 9/9-9/13 for L clavicular pain s/p I+D with L pectoralis flap sent home on meropenem and vancomycin for 6 week course presents for L shoulder pain, L sided pel chest pain, headache, fever at home. Son on phone also notes that the surgical site appeared more erythematous day prior to admission and had associated L arm swelling.    Admitted 9/9-9/13 most recently; MRI L clavicle with prior resection of the medial left clavicle with rim-enhancing fluid and surrounding phlegmon abutting the resected bony margin. S/p surgery on 9/9 with L SCJ debridement by CT surgery and L pec flap by PRS. Cultures grew Staph epi and Corynebacterium in fluid media, blood cultures negative. Discharged on tentatively 6 weeks vancomycin and meropenem for empiric coverage.    Here Tmax 100.1, mild tachycardia  WBC 12.1, neutrophilic. Lactate wnl  , ALT 78,     CT chest: Small nonocclusive right lower lobe basilar subsegmental pulmonary emboli, no evidence of right heart strain. 14 cm in greatest dimension expansile mostly soft tissue density structure filling and expanding the operative bed in the region of the left medial clavicle (thought to represent combination of muscle)    MICRO DATA:   09/15 BCX: IP   09/15 BCX: IP   09/10 AFB Cx: IP  09/10 Fungal CX: IP  09/10 Bacterial CX: fluid media only- corynebacterium striatum and staph epidermidis        # Septic sternoclavicular joint s/p debridement and muscle flap L clavicle  # Post-Operative abscess s/p debridement               RECOMMENDATIONS:   - c/w dapto 400 mg q24h  - recent CPK noted, normal, check CPK once a week.   - Continue IV meropenem 1g q8h  - blood cx with documented GNR, not real, artifact.    - s/p OR and wash out, follow up OR cx.   - abscess cx prelim with polymicrobial growth in broth only, it was a superficial cx taken by me, likely represents contamination.          stable no fever wants to go home  She will need to continue IV antibiotics at home  Cultures from OR are pending but if they do not reveal anything new, current therapy will be continued  pt ill need help to do home IV antibiotics would get care coordinator to arrange as she can go home if it can be arranged   no change   await OR cultures

## 2024-09-27 NOTE — PROGRESS NOTE ADULT - ATTENDING COMMENTS
Patient seen and examined this morning. Plan of care discussed with the patient/NADJA (HCP) and resident team.     s/p OR on 9/25 with wash out. no new complaints.     #Septic sternoclavicular joint s/p debridement and muscle flap L clavicle, postop abscess  #anemia likely from recent surgery and frequent phlebotomy +/- hemodilutional effect  #subsegmental PE  - Abnormal CT with new rim enhancing collections in under the flap with purulent drainage from sternal side of the incision and serosanguinous cloudy fluid in LISBETH drain.   - s/p OR for wash out on 9/25 per plastics  - Blood cx from 9/15 with prelim report of GNR now with no growth. Subsequent repeat blood cx have been all negative to date  - wound cx result (fluid media + S. epi, candida orthopsilosis, lactobacillus rhamnosus)  - currently on Dapto and Meropenem per ID. per discussion with ID attending, hold off on starting antifungal treatment.  - f/u OR cultures, if no change, plan to continue with current abx regimen through 10/28/24. She already has RUE PICC in place.  - monitor leukocytosis  - Hgb improved appropriately after PRBC transfusion on 9/24   - +small nonocclusive right lower lobe basilar subsegmental PE noted on CTA chest (9/15), LE duplex neg (9/15, 9/25), will defer full AC at this time as risk felt to outweigh benefit and f/u serial LE duplex and continue prophylactic dose of lovenox for now.  - d/c plan home once pending OR cultures, abx regimen and duration finalized, and home care set up. Patient seen and examined this morning. Plan of care discussed with the patient/NADJA (HCP) and resident team.     s/p OR on 9/25 with wash out. no new complaints.     #Septic sternoclavicular joint s/p debridement and muscle flap L clavicle, postop abscess  #anemia likely from recent surgery and frequent phlebotomy +/- hemodilutional effect  #subsegmental PE  - Abnormal CT with new rim enhancing collections in under the flap with purulent drainage from sternal side of the incision and serosanguinous cloudy fluid in LISBETH drain.   - s/p OR for wound wash out with antibiotic bead placement on 9/25 per plastics  - Blood cx from 9/15 with prelim report of GNR now with no growth. Subsequent repeat blood cx have been all negative to date  - wound cx result (fluid media + S. epi, candida orthopsilosis, lactobacillus rhamnosus)  - currently on Dapto and Meropenem per ID. per discussion with ID attending, hold off on starting antifungal treatment.  - f/u OR cultures, if no change, plan to continue with current abx regimen through 10/28/24. She already has RUE PICC in place.  - monitor leukocytosis  - Hgb improved appropriately after PRBC transfusion on 9/24   - +small nonocclusive right lower lobe basilar subsegmental PE noted on CTA chest (9/15), LE duplex neg (9/15, 9/25), will defer full AC at this time as risk felt to outweigh benefit and f/u serial LE duplex and continue prophylactic dose of lovenox for now.  - d/c plan home once pending OR cultures, abx regimen and duration finalized, and home care set up.

## 2024-09-27 NOTE — PROGRESS NOTE ADULT - PROBLEM SELECTOR PLAN 3
Patient admitted with Hgb 12.2, now 7.7 c/f unknown source of bleeding, pt with known history of RAMONE  - trend CBC  - F/u anemia workup labs: negative  - Fe studies from 9/11/24 shows iron deficiency- holding Fe iso active infection  - Hgb stable in mid 7's, restarted lovenox  - Repeat CT CAP: no source of bleed  - HgB 6.8 on 9/24, s/p 1unit pRBC 9/24, HgB 8.2 post transfusion  - Minimize blood draws

## 2024-09-27 NOTE — PROGRESS NOTE ADULT - PROBLEM SELECTOR PLAN 1
- s/p left SCJ debridement and left pectoral flap on 9/9  - 9/25/24 -[place] I &D w/ plastics  - plastic follow up; LISBETH management by Plastics. Monitor output.   - recommend close ID follow up. Abx switched to Dapto per ID  - global care per primary team.    consider suppository/movantik for BM   OT daily please  - thoracic surgery will continue to follow  - Plan d/w Attending Dr. Bautista  ----  For any questions or concerns, please contact thoracic @ [phone]

## 2024-09-27 NOTE — PROGRESS NOTE ADULT - SUBJECTIVE AND OBJECTIVE BOX
infectious diseases progress note:    Patient is a 44y old  Female who presents with a chief complaint of L chest pain, SIRS, Acute Pulmonary Embolism (27 Sep 2024 08:31)        Cellulitis             Allergies    No Known Allergies    Intolerances        ANTIBIOTICS/RELEVANT:  antimicrobials  DAPTOmycin IVPB 400 milliGRAM(s) IV Intermittent every 24 hours  meropenem  IVPB 1000 milliGRAM(s) IV Intermittent every 8 hours    immunologic:  influenza   Vaccine 0.5 milliLiter(s) IntraMuscular once    OTHER:  acetaminophen     Tablet .. 650 milliGRAM(s) Oral every 6 hours PRN  baclofen 5 milliGRAM(s) Oral every 8 hours PRN  chlorhexidine 2% Cloths 1 Application(s) Topical daily  clonazePAM  Tablet 0.5 milliGRAM(s) Oral daily PRN  DULoxetine 60 milliGRAM(s) Oral at bedtime  enoxaparin Injectable 40 milliGRAM(s) SubCutaneous every 24 hours  gemtesa tablet 75 milliGRAM(s) 75 milliGRAM(s) Oral daily  HYDROmorphone   Tablet 4 milliGRAM(s) Oral every 8 hours PRN  HYDROmorphone   Tablet 2 milliGRAM(s) Oral every 6 hours PRN  melatonin 3 milliGRAM(s) Oral at bedtime  methocarbamol 500 milliGRAM(s) Oral four times a day  modafinil 200 milliGRAM(s) Oral daily  multivitamin 1 Tablet(s) Oral daily  pantoprazole    Tablet 40 milliGRAM(s) Oral before breakfast  polyethylene glycol 3350 17 Gram(s) Oral two times a day  senna 2 Tablet(s) Oral at bedtime  sertraline 125 milliGRAM(s) Oral daily      Objective:  Vital Signs Last 24 Hrs  T(C): 36.8 (26 Sep 2024 23:19), Max: 36.8 (26 Sep 2024 23:19)  T(F): 98.3 (26 Sep 2024 23:19), Max: 98.3 (26 Sep 2024 23:19)  HR: 86 (26 Sep 2024 23:19) (66 - 89)  BP: 108/68 (26 Sep 2024 23:19) (94/58 - 108/68)  BP(mean): --  RR: 18 (26 Sep 2024 23:19) (18 - 18)  SpO2: 98% (26 Sep 2024 23:19) (92% - 98%)    Parameters below as of 26 Sep 2024 23:19  Patient On (Oxygen Delivery Method): room air           Ear/Nose/Throat: no oral lesion, no sinus tenderness on percussion	  Neck:no JVD, no lymphadenopathy, supple  Respiratory: CTA nisha  Cardiovascular: S1S2 RRR, no murmurs  Gastrointestinal:soft, (+) BS, no HSM  Extremities:no e/e/c        LABS:                        8.8    12.97 )-----------( 626      ( 26 Sep 2024 06:52 )             28.1     09-26    136  |  99  |  10  ----------------------------<  113[H]  4.4   |  25  |  0.51    Ca    9.3      26 Sep 2024 06:51  Phos  4.2     09-26  Mg     2.2     09-26    TPro  5.8[L]  /  Alb  3.2[L]  /  TBili  0.1[L]  /  DBili  x   /  AST  10  /  ALT  17  /  AlkPhos  229[H]  09-26      Urinalysis Basic - ( 26 Sep 2024 06:51 )    Color: x / Appearance: x / SG: x / pH: x  Gluc: 113 mg/dL / Ketone: x  / Bili: x / Urobili: x   Blood: x / Protein: x / Nitrite: x   Leuk Esterase: x / RBC: x / WBC x   Sq Epi: x / Non Sq Epi: x / Bacteria: x          MICROBIOLOGY:    RECENT CULTURES:  09-25 @ 18:00 .Other                Testing in progress    09-22 @ 06:52 .Blood Blood                No growth at 4 days    09-20 @ 09:15 Skin/Wound Skin/Wound                No growth          RESPIRATORY CULTURES:              RADIOLOGY & ADDITIONAL STUDIES:        Pager [phone]  After 5 pm/weekends or if no response :[phone]

## 2024-09-27 NOTE — PROGRESS NOTE ADULT - PROBLEM SELECTOR PLAN 4
CT on 9/15 demonstrated nonocclusive right lower lobe basilar subsegmental pulmonary emboli with no evidence of right heart strain.  -Holding AC d/t pending procedure and low risk profile  -9/15 Duplex with no evidence of DVT  -9/25 Repeat duplex with no evidence of DVT

## 2024-09-27 NOTE — PROGRESS NOTE ADULT - SUBJECTIVE AND OBJECTIVE BOX
Subjective " I want to go home"      Vital Signs Last 24 Hrs  T(C): 36.7 (09-27-24 @ 09:05), Max: 36.8 (09-26-24 @ 23:19)  T(F): 98 (09-27-24 @ 09:05), Max: 98.3 (09-26-24 @ 23:19)  HR: 80 (09-27-24 @ 09:05) (80 - 89)  BP: 98/52 (09-27-24 @ 09:05) (98/52 - 108/68)  RR: 18 (09-27-24 @ 09:05) (18 - 18)  SpO2: 96% (09-27-24 @ 09:05) (96% - 98%)           09-26 @ 07:01  -  09-27 @ 07:00  --------------------------------------------------------  IN: 100 mL / OUT: 65 mL / NET: 35 mL                          8.8    12.97 )-----------( 626      ( 26 Sep 2024 06:52 )             28.1       09-26    136  |  99  |  10  ----------------------------<  113[H]  4.4   |  25  |  0.51    Ca    9.3      26 Sep 2024 06:51  Phos  4.2     09-26  Mg     2.2     09-26    TPro  5.8[L]  /  Alb  3.2[L]  /  TBili  0.1[L]  /  DBili  x   /  AST  10  /  ALT  17  /  AlkPhos  229[H]  09-26    MEDICATIONS  (STANDING):  chlorhexidine 2% Cloths 1 Application(s) Topical daily  DAPTOmycin IVPB 400 milliGRAM(s) IV Intermittent every 24 hours  DULoxetine 60 milliGRAM(s) Oral at bedtime  enoxaparin Injectable 40 milliGRAM(s) SubCutaneous every 24 hours  gemtesa tablet 75 milliGRAM(s) 75 milliGRAM(s) Oral daily  influenza   Vaccine 0.5 milliLiter(s) IntraMuscular once  melatonin 3 milliGRAM(s) Oral at bedtime  meropenem  IVPB 1000 milliGRAM(s) IV Intermittent every 8 hours  methocarbamol 500 milliGRAM(s) Oral four times a day  modafinil 200 milliGRAM(s) Oral daily  multivitamin 1 Tablet(s) Oral daily  pantoprazole    Tablet 40 milliGRAM(s) Oral before breakfast  polyethylene glycol 3350 17 Gram(s) Oral two times a day  senna 2 Tablet(s) Oral at bedtime  sertraline 125 milliGRAM(s) Oral daily    MEDICATIONS  (PRN):  acetaminophen     Tablet .. 650 milliGRAM(s) Oral every 6 hours PRN Mild Pain (1 - 3)  baclofen 5 milliGRAM(s) Oral every 8 hours PRN Muscle Spasm  clonazePAM  Tablet 0.5 milliGRAM(s) Oral daily PRN for anxiety  HYDROmorphone   Tablet 4 milliGRAM(s) Oral every 8 hours PRN Severe Pain (7 - 10)  HYDROmorphone   Tablet 2 milliGRAM(s) Oral every 6 hours PRN Moderate Pain (4 - 6)              Drains:   LISBETH ss 25/65                          PHYSICAL EXAM        Neurology: alert and oriented x 3, nonfocal, no gross deficits    CV : s1S2    LEFt SCL  Wound :  CDI , Stable  [facility] left breast     Lungs: b/l easy breath sounds on room air    Abdomen: soft, nontender, nondistended, positive bowel sounds,     :  voids             Extremities:   warm well perfused equal strength t throughout  B/ll e+ dp no edema no calf tenderness                                             Discussed with Cardiothoracic Team at AM rounds.

## 2024-09-27 NOTE — PROGRESS NOTE ADULT - SUBJECTIVE AND OBJECTIVE BOX
Patient is a 44y old  Female who presents with a chief complaint of L chest pain, SIRS, Acute Pulmonary Embolism (21 Sep 2024 15:23)      SUBJECTIVE / OVERNIGHT EVENTS:     MEDICATIONS  (STANDING):  baclofen 10 milliGRAM(s) Oral every 8 hours  chlorhexidine 2% Cloths 1 Application(s) Topical daily  dalfampridine ER 10 milliGRAM(s) Oral every 12 hours  DAPTOmycin IVPB 400 milliGRAM(s) IV Intermittent every 24 hours  DULoxetine 60 milliGRAM(s) Oral at bedtime  enoxaparin Injectable 40 milliGRAM(s) SubCutaneous every 24 hours  influenza   Vaccine 0.5 milliLiter(s) IntraMuscular once  lactated ringers. 500 milliLiter(s) (100 mL/Hr) IV Continuous <Continuous>  meropenem  IVPB 1000 milliGRAM(s) IV Intermittent every 8 hours  methocarbamol 500 milliGRAM(s) Oral four times a day  modafinil 200 milliGRAM(s) Oral daily  multivitamin 1 Tablet(s) Oral daily  mupirocin 2% Ointment 1 Application(s) Topical two times a day  pantoprazole    Tablet 40 milliGRAM(s) Oral before breakfast  polyethylene glycol 3350 17 Gram(s) Oral daily  senna 2 Tablet(s) Oral at bedtime  sertraline 125 milliGRAM(s) Oral daily  Vibegron (Gemtesa) 75 mg tablet 1 Tablet(s) 1 Tablet(s) Oral daily    MEDICATIONS  (PRN):  acetaminophen     Tablet .. 650 milliGRAM(s) Oral every 6 hours PRN Mild Pain (1 - 3)  bisacodyl Suppository 10 milliGRAM(s) Rectal daily PRN Constipation  clonazePAM  Tablet 0.5 milliGRAM(s) Oral daily PRN for anxiety  HYDROmorphone   Tablet 4 milliGRAM(s) Oral every 8 hours PRN Severe Pain (7 - 10)  HYDROmorphone   Tablet 2 milliGRAM(s) Oral every 6 hours PRN Moderate Pain (4 - 6)      CAPILLARY BLOOD GLUCOSE        I&O's Summary    20 Sep 2024 07:01  -  21 Sep 2024 07:00  --------------------------------------------------------  IN: 100 mL / OUT: 155 mL / NET: -55 mL    21 Sep 2024 07:01  -  22 Sep 2024 05:18  --------------------------------------------------------  IN: 150 mL / OUT: 0 mL / NET: 150 mL        Vital Signs Last 24 Hrs  T(C): 36.8 (22 Sep 2024 00:34), Max: 36.8 (22 Sep 2024 00:34)  T(F): 98.3 (22 Sep 2024 00:34), Max: 98.3 (22 Sep 2024 00:34)  HR: 100 (22 Sep 2024 00:34) (80 - 100)  BP: 86/51 (22 Sep 2024 00:34) (86/51 - 99/64)  BP(mean): --  RR: 18 (22 Sep 2024 00:34) (18 - 18)  SpO2: 95% (22 Sep 2024 00:34) (95% - 97%)    Parameters below as of 22 Sep 2024 00:34  Patient On (Oxygen Delivery Method): room air        PHYSICAL EXAM:  CONSTITUTIONAL: Well groomed, no apparent distress, sitting in chair   SKIN: Dressing over the left breast with single LISBETH drain with bloody fluid, not cloudy   RESP: No respiratory distress, no use of accessory muscles  GI: Soft, NT, ND  PSYCH: AOx 3,  LABS:                        7.3    13.82 )-----------( 496      ( 21 Sep 2024 08:42 )             22.8      09-21    136  |  100  |  10  ----------------------------<  84  4.2   |  26  |  0.50    Ca    8.9      21 Sep 2024 08:42  Phos  2.9     09-21  Mg     2.3     09-21    TPro  5.7[L]  /  Alb  3.1[L]  /  TBili  <0.1[L]  /  DBili  x   /  AST  11  /  ALT  34  /  AlkPhos  274[H]  09-21          Urinalysis Basic - ( 21 Sep 2024 08:42 )    Color: x / Appearance: x / SG: x / pH: x  Gluc: 84 mg/dL / Ketone: x  / Bili: x / Urobili: x   Blood: x / Protein: x / Nitrite: x   Leuk Esterase: x / RBC: x / WBC x   Sq Epi: x / Non Sq Epi: x / Bacteria: x        RADIOLOGY & ADDITIONAL TESTS:    Imaging Personally Reviewed.    Consultant(s) Notes Reviewed.    Care Discussed with Consultants/Other Providers. Patient is a 44y old  Female who presents with a chief complaint of L chest pain, SIRS, Acute Pulmonary Embolism (21 Sep 2024 15:23)      SUBJECTIVE / OVERNIGHT EVENTS: Patient expresses desire to go home. Requesting CBC to be done.     MEDICATIONS  (STANDING):  baclofen 10 milliGRAM(s) Oral every 8 hours  chlorhexidine 2% Cloths 1 Application(s) Topical daily  dalfampridine ER 10 milliGRAM(s) Oral every 12 hours  DAPTOmycin IVPB 400 milliGRAM(s) IV Intermittent every 24 hours  DULoxetine 60 milliGRAM(s) Oral at bedtime  enoxaparin Injectable 40 milliGRAM(s) SubCutaneous every 24 hours  influenza   Vaccine 0.5 milliLiter(s) IntraMuscular once  lactated ringers. 500 milliLiter(s) (100 mL/Hr) IV Continuous <Continuous>  meropenem  IVPB 1000 milliGRAM(s) IV Intermittent every 8 hours  methocarbamol 500 milliGRAM(s) Oral four times a day  modafinil 200 milliGRAM(s) Oral daily  multivitamin 1 Tablet(s) Oral daily  mupirocin 2% Ointment 1 Application(s) Topical two times a day  pantoprazole    Tablet 40 milliGRAM(s) Oral before breakfast  polyethylene glycol 3350 17 Gram(s) Oral daily  senna 2 Tablet(s) Oral at bedtime  sertraline 125 milliGRAM(s) Oral daily  Vibegron (Gemtesa) 75 mg tablet 1 Tablet(s) 1 Tablet(s) Oral daily    MEDICATIONS  (PRN):  acetaminophen     Tablet .. 650 milliGRAM(s) Oral every 6 hours PRN Mild Pain (1 - 3)  bisacodyl Suppository 10 milliGRAM(s) Rectal daily PRN Constipation  clonazePAM  Tablet 0.5 milliGRAM(s) Oral daily PRN for anxiety  HYDROmorphone   Tablet 4 milliGRAM(s) Oral every 8 hours PRN Severe Pain (7 - 10)  HYDROmorphone   Tablet 2 milliGRAM(s) Oral every 6 hours PRN Moderate Pain (4 - 6)      CAPILLARY BLOOD GLUCOSE        I&O's Summary    20 Sep 2024 07:01  -  21 Sep 2024 07:00  --------------------------------------------------------  IN: 100 mL / OUT: 155 mL / NET: -55 mL    21 Sep 2024 07:01  -  22 Sep 2024 05:18  --------------------------------------------------------  IN: 150 mL / OUT: 0 mL / NET: 150 mL        Vital Signs Last 24 Hrs  T(C): 36.8 (22 Sep 2024 00:34), Max: 36.8 (22 Sep 2024 00:34)  T(F): 98.3 (22 Sep 2024 00:34), Max: 98.3 (22 Sep 2024 00:34)  HR: 100 (22 Sep 2024 00:34) (80 - 100)  BP: 86/51 (22 Sep 2024 00:34) (86/51 - 99/64)  BP(mean): --  RR: 18 (22 Sep 2024 00:34) (18 - 18)  SpO2: 95% (22 Sep 2024 00:34) (95% - 97%)    Parameters below as of 22 Sep 2024 00:34  Patient On (Oxygen Delivery Method): room air        PHYSICAL EXAM:  CONSTITUTIONAL: Well groomed, no apparent distress, sitting in chair   SKIN: Dressing over the left breast with single LISBETH drain with bloody fluid, not cloudy but with speckles    RESP: No respiratory distress, no use of accessory muscles  GI: Soft, NT, ND  PSYCH: AOx 3,  LABS:                        7.3    13.82 )-----------( 496      ( 21 Sep 2024 08:42 )             22.8      09-21    136  |  100  |  10  ----------------------------<  84  4.2   |  26  |  0.50    Ca    8.9      21 Sep 2024 08:42  Phos  2.9     09-21  Mg     2.3     09-21    TPro  5.7[L]  /  Alb  3.1[L]  /  TBili  <0.1[L]  /  DBili  x   /  AST  11  /  ALT  34  /  AlkPhos  274[H]  09-21          Urinalysis Basic - ( 21 Sep 2024 08:42 )    Color: x / Appearance: x / SG: x / pH: x  Gluc: 84 mg/dL / Ketone: x  / Bili: x / Urobili: x   Blood: x / Protein: x / Nitrite: x   Leuk Esterase: x / RBC: x / WBC x   Sq Epi: x / Non Sq Epi: x / Bacteria: x        RADIOLOGY & ADDITIONAL TESTS:    Imaging Personally Reviewed.    Consultant(s) Notes Reviewed.    Care Discussed with Consultants/Other Providers.

## 2024-09-27 NOTE — PROGRESS NOTE ADULT - PROBLEM SELECTOR PLAN 2
Bcx 9/15 growing GNR, PCR negative but corrected to no growth, repeat culture NGTD. Abscess culture 9/19 on with Staph epi, Candida orthopsilosis, lactobacillus rhamnosus in fluid media.  - C/w dapto and bryson as above  - F/u ID recs  - Polymicrobial may represent contamination per ID   - hold off on PICC removal pending 9/19 blood culture data and additional plastics intervention Bcx 9/15 growing GNR, PCR negative but corrected to no growth, repeat culture NGTD. Abscess culture 9/19 on with Staph epi, Candida orthopsilosis, lactobacillus rhamnosus in fluid media.  - C/w dapto and bryson as above  - F/u ID recs  - Polymicrobial may represent contamination per ID   - hold off on PICC removal, patient to be discharged home on IV antibiotics

## 2024-09-27 NOTE — PROGRESS NOTE ADULT - TIME BILLING
reviewing emr, labs, coordination of care, discussion with patient, family, documentation. Time spent excludes teaching services.
reviewing emr, labs, previous imaging, coordination of care, discussion with patient, family, documentation. Time spent excludes teaching services.
reviewing medical record, evaluating the patient, discussing plan of care with patient/spouse, documenting in EMR.
reviewing emr, labs, imaging, coordination of care, discussion with patient, family, documentation. Time spent excludes teaching services.
reviewing emr, labs, coordination of care, discussion with patient, family, documentation. Time spent excludes teaching services.
reviewing emr, labs, coordination of care, discussion with patient, surgeon, documentation. Time spent excludes teaching services.
reviewing emr, labs, coordination of care, discussion with patient, family, documentation. Time spent excludes teaching services.
reviewing medical record, evaluating the patient, discussing plan of care with patient, HCP, and documenting in EMR.
reviewing medical record, evaluating the patient, discussing plan of care with patient/HCP, ID attending, documenting in EMR.
reviewing medical record, evaluating the patient, discussing plan of care with patient/HCP, plastics and ID attendings, documenting in EMR.
reviewing medical record, evaluating the patient, discussing plan of care with patient, HCP, plastics and ID attendings, documenting in EMR.
reviewing emr, labs, coordination of care, discussion with patient, family, documentation. Time spent excludes teaching services.

## 2024-09-27 NOTE — PROGRESS NOTE ADULT - ASSESSMENT
44F s/p left SCJ debridement and left pectoral flap on 9/9 presenting with superficial surgical site erythema and localized pain. Also found to have small subsegmental right lower lobe pulmonary emboli.  Recommendations:  -No acute thoracic surgery operative intervention at this time  -Dispo per PRS    9/16: Evaluated at ED bedside by Thoracic Attending Dr. Bautista. Recommend close ID follow up for cellulitis of site. Vanco trough level noted to be 5.5, ? unclear if patient was taking antibiotics appropriately in outpatient setting.   9/17 Antibiotic switched to dapto per ID. Site improving  9/18 VSS  afebrile.  site with decreased erythema  9/19 VSS afebile site  COLTON improving - OT daily-  large stool burden seen on CT d/w Primary team consider MOVANTIK / suppository   9/20 c/w dapto 400 mg q24h. Continue IV meropenem 1g q8h Follow-up final identification and sensitivity of GNR  Repeat blood cx   9/21 9/21 maintain jpss. analgesics for pain, revision to be considered  9/22 No events overnight. Pain better controlled  9/23 VSS; Maintain LISBETH drains, wound care as per plastics.   9/24 VSS: As per plastic surgery, plan for flap revision today?   9/25 VSS NPO for Washout with Plastic Surgert today   9/26 vss s/p washout w/ plastics  LISBETH drain as per plastics  -ss  9/27 VSS upin chair bright dressing CDI drain to self suction

## 2024-09-27 NOTE — PROGRESS NOTE ADULT - ASSESSMENT
44F with PMH of MS on Rituxan, anxiety, depression, septic arthritis of L sternoclavicular joint s/p multiple I&Ds in 2023, recent recurrence s/p I&D with CTS and pec turnover flap with Dr. Dean on 9/9, d/c'ed with PICC line on vanc/bryson, now presenting with redness and increased pain. PRS consulted for possible surgical site infection. CT chest shows normal postop changes with possible small hematoma around flap, LISBETH drains within area, no abscess/collection. Small PE without heart strain noted. Repeat CT from 9/18 now shows rim enhancing collections at the surgical site. Patient was taken back to the OR for washout on 9/26.       plan  - Dressing to be taken down by PRS team on POD 3  - LISBETH drain to remain in place, please monitor drain output  - abx per ID, abx for discharged will be planned based on intraop cultures  - remainder of care per primary - please contact plastics if any concerns and if status changes    Leroy Cagle, PGY1  Plastic Surgery   [phone] [facility] pager  Available on teams
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
## 2024-09-27 NOTE — PROGRESS NOTE ADULT - PROBLEM SELECTOR PLAN 1
History of septic arthritis with multiple admissions s/p I&D with CTS and L pectoral flap with plastic surgery on last admission (9/4-9/13). Was discharged home on meropenem 1g q8h and van 1250 q12h with reportedly no missed doses.   - repeat CT: Status post left sternoclavicular resection with pectoralis flap. Mixed density soft tissue and/or hematoma in the operative bed, similar in size. Interval development of rim-enhancing collections, concerning for infection.  - s/p 9/25 OR wash out by plastics with new L breast drain, pending cultures  - no plans for OR by thoracic   - ID: c/w daptomycin 400mg daily (9/16 - )  - ID: c/w meropenem 1g q8h (9/15 - )  - monitor  - multimodal pain control with Dilaudid 2/4 and Tylenol PRN History of septic arthritis with multiple admissions s/p I&D with CTS and L pectoral flap with plastic surgery on last admission (9/4-9/13). Was discharged home on meropenem 1g q8h and van 1250 q12h with reportedly no missed doses.   - repeat CT: Status post left sternoclavicular resection with pectoralis flap. Mixed density soft tissue and/or hematoma in the operative bed, similar in size. Interval development of rim-enhancing collections, concerning for infection.  - s/p 9/25 OR wash out by plastics with new L breast drain, pending cultures, plastics to take down dressing POD3  - no plans for OR by thoracic   - ID: c/w daptomycin 400mg daily (9/16 - )  - ID: c/w meropenem 1g q8h (9/15 - )  - monitor WBC   - multimodal pain control with Dilaudid 2/4 and Tylenol PRN

## 2024-09-27 NOTE — PROGRESS NOTE ADULT - SUBJECTIVE AND OBJECTIVE BOX
Subjective ' hello I amok "      Vital Signs Last 24 Hrs  T(C): 36.8 (09-26-24 @ 23:19), Max: 36.8 (09-26-24 @ 23:19)  T(F): 98.3 (09-26-24 @ 23:19), Max: 98.3 (09-26-24 @ 23:19)  HR: 86 (09-26-24 @ 23:19) (66 - 89)  BP: 108/68 (09-26-24 @ 23:19) (94/58 - 108/68)  RR: 18 (09-26-24 @ 23:19) (18 - 18)  SpO2: 98% (09-26-24 @ 23:19) (92% - 98%)             09-26 @ 07:01  -  09-27 @ 07:00  --------------------------------------------------------  IN: 100 mL / OUT: 65 mL / NET: 35 mL                          8.8    12.97 )-----------( 626      ( 26 Sep 2024 06:52 )             28.1       09-26    136  |  99  |  10  ----------------------------<  113[H]  4.4   |  25  |  0.51    Ca    9.3      26 Sep 2024 06:51  Phos  4.2     09-26  Mg     2.2     09-26    TPro  5.8[L]  /  Alb  3.2[L]  /  TBili  0.1[L]  /  DBili  x   /  AST  10  /  ALT  17  /  AlkPhos  229[H]  09-26      MEDICATIONS  (STANDING):  chlorhexidine 2% Cloths 1 Application(s) Topical daily  DAPTOmycin IVPB 400 milliGRAM(s) IV Intermittent every 24 hours  DULoxetine 60 milliGRAM(s) Oral at bedtime  enoxaparin Injectable 40 milliGRAM(s) SubCutaneous every 24 hours  gemtesa tablet 75 milliGRAM(s) 75 milliGRAM(s) Oral daily  influenza   Vaccine 0.5 milliLiter(s) IntraMuscular once  melatonin 3 milliGRAM(s) Oral at bedtime  meropenem  IVPB 1000 milliGRAM(s) IV Intermittent every 8 hours  methocarbamol 500 milliGRAM(s) Oral four times a day  modafinil 200 milliGRAM(s) Oral daily  multivitamin 1 Tablet(s) Oral daily  pantoprazole    Tablet 40 milliGRAM(s) Oral before breakfast  polyethylene glycol 3350 17 Gram(s) Oral two times a day  senna 2 Tablet(s) Oral at bedtime  sertraline 125 milliGRAM(s) Oral daily    MEDICATIONS  (PRN):  acetaminophen     Tablet .. 650 milliGRAM(s) Oral every 6 hours PRN Mild Pain (1 - 3)  baclofen 5 milliGRAM(s) Oral every 8 hours PRN Muscle Spasm  clonazePAM  Tablet 0.5 milliGRAM(s) Oral daily PRN for anxiety  HYDROmorphone   Tablet 4 milliGRAM(s) Oral every 8 hours PRN Severe Pain (7 - 10)  HYDROmorphone   Tablet 2 milliGRAM(s) Oral every 6 hours PRN Moderate Pain (4 - 6)        Daily     Daily         CAPILLARY BLOOD GLUCOSE              Drains:    LISBETH self suction                          PHYSICAL EXAM        Neurology: alert and oriented x 3, nonfocal, no gross deficits    CV :S1s12    Left scl inciusion :  CDI , Stable    Lungs:    Abdomen: soft, nontender, nondistended, positive bowel sounds, last bowel movement     :               Extremities:                                           Physical Therapy Rec:   Home  [  ]   Home w/ PT  [  ]  Rehab  [  ]    Discussed with Cardiothoracic Team at AM rounds.

## 2024-09-27 NOTE — PROGRESS NOTE ADULT - ASSESSMENT
44F with MS, anxiety, depression, septic arthritis of L sternoclavicular joint s/p multiple admissions requiring I&D and most recent admission (9/4-9/13) with I&D and L pectoral flap for evaluation of abscess now presenting to the ED with L sided chest pain with newly noted small nonocclusive RLL PE likely incidental. Course c/b anemia of unclear etiology. CT with evidence of new rim enhancing lesion s/p abscess drainage and started on meropenem and daptomycin by ID. Now s/p second OR drainage by plastics on 9/25.

## 2024-09-27 NOTE — PROGRESS NOTE ADULT - ASSESSMENT
This is a 44y year old Female with the below past medical history of MS, anxiety, depression, septic arthritis of L sternoclavicular joint s/p multiple admissions requiring I&D and most recent admission (9/4-9/13) with I&D and L pectoral flap for evaluation of abscess now presenting to the ED with L sided chest pain with fever 100.7. Patient most recently admission with I&D of L clavicular abscess by CTS and L pectoral flap surgery with plastic surgery. Pt is complaining of chest pain. Pt found to have PE on CT chest.  She says limited movement of the left arm, but otherwise no new weakness, no changes in speech, swallow, vision, bowel or bladder control.  Pt is well known to my associate Dr. Selby for her multiple sclerosis.     9/19/24: found to have small PE on Lovenox.  Has PICC line for infection.      exam notable for inability to raise left arm, chronic right leg weakness    On hx and exam no indication of change in her neurological status and thus there is no need for further inpt neurological workup or evaluation  Issue of PE/anticougulation as per primary team  appreciate ID consult on issue of antibiotics  Please keep in mind that she does have multiple sclerosis and she may have muted response to infection in terms of WBC and fever   d/p washout, has LISBETH drain as per plastics  no acute neurological issues at this time    Would have  consult, she is clearly overwhelmed by the fact she has had multiple admission, she has young children at home, support services need to be provided    follow up with Dr Selby as outpatient     d/w pt at bedside

## 2024-09-27 NOTE — PROGRESS NOTE ADULT - PROBLEM SELECTOR PLAN 6
History of MS on home baclofen, dalfampridine, and gemtesa.  - c/w baclofen 10 TID  - c/w modafinil 200 daily  - c/w home dalfampridine and gemtesa as not on formulary  - Patient ran out of dalfampridine and needs to reorder

## 2024-09-28 LAB
BASOPHILS # BLD AUTO: 0.04 K/UL — SIGNIFICANT CHANGE UP (ref 0–0.2)
BASOPHILS NFR BLD AUTO: 0.3 % — SIGNIFICANT CHANGE UP (ref 0–2)
EOSINOPHIL # BLD AUTO: 0.3 K/UL — SIGNIFICANT CHANGE UP (ref 0–0.5)
EOSINOPHIL NFR BLD AUTO: 2.3 % — SIGNIFICANT CHANGE UP (ref 0–6)
HCT VFR BLD CALC: 25.3 % — LOW (ref 34.5–45)
HGB BLD-MCNC: 8 G/DL — LOW (ref 11.5–15.5)
IMM GRANULOCYTES NFR BLD AUTO: 1.1 % — HIGH (ref 0–0.9)
LYMPHOCYTES # BLD AUTO: 15.6 % — SIGNIFICANT CHANGE UP (ref 13–44)
LYMPHOCYTES # BLD AUTO: 2.07 K/UL — SIGNIFICANT CHANGE UP (ref 1–3.3)
MCHC RBC-ENTMCNC: 26.8 PG — LOW (ref 27–34)
MCHC RBC-ENTMCNC: 31.6 GM/DL — LOW (ref 32–36)
MCV RBC AUTO: 84.9 FL — SIGNIFICANT CHANGE UP (ref 80–100)
MONOCYTES # BLD AUTO: 1.14 K/UL — HIGH (ref 0–0.9)
MONOCYTES NFR BLD AUTO: 8.6 % — SIGNIFICANT CHANGE UP (ref 2–14)
NEUTROPHILS # BLD AUTO: 9.59 K/UL — HIGH (ref 1.8–7.4)
NEUTROPHILS NFR BLD AUTO: 72.1 % — SIGNIFICANT CHANGE UP (ref 43–77)
NRBC # BLD: 0 /100 WBCS — SIGNIFICANT CHANGE UP (ref 0–0)
PLATELET # BLD AUTO: 534 K/UL — HIGH (ref 150–400)
RBC # BLD: 2.98 M/UL — LOW (ref 3.8–5.2)
RBC # FLD: 14.2 % — SIGNIFICANT CHANGE UP (ref 10.3–14.5)
WBC # BLD: 13.29 K/UL — HIGH (ref 3.8–10.5)
WBC # FLD AUTO: 13.29 K/UL — HIGH (ref 3.8–10.5)

## 2024-09-28 PROCEDURE — 99232 SBSQ HOSP IP/OBS MODERATE 35: CPT

## 2024-09-28 RX ORDER — MUPIROCIN 20 MG/G
1 OINTMENT TOPICAL
Refills: 0 | Status: DISCONTINUED | OUTPATIENT
Start: 2024-09-28 | End: 2024-10-01

## 2024-09-28 RX ADMIN — MEROPENEM 100 MILLIGRAM(S): 500 INJECTION INTRAVENOUS at 05:40

## 2024-09-28 RX ADMIN — Medication 60 MILLIGRAM(S): at 22:39

## 2024-09-28 RX ADMIN — Medication 500 MILLIGRAM(S): at 11:41

## 2024-09-28 RX ADMIN — MUPIROCIN 1 APPLICATION(S): 20 OINTMENT TOPICAL at 12:13

## 2024-09-28 RX ADMIN — Medication 650 MILLIGRAM(S): at 16:06

## 2024-09-28 RX ADMIN — MEROPENEM 100 MILLIGRAM(S): 500 INJECTION INTRAVENOUS at 13:24

## 2024-09-28 RX ADMIN — MODAFINIL 200 MILLIGRAM(S): 100 TABLET ORAL at 11:41

## 2024-09-28 RX ADMIN — Medication 650 MILLIGRAM(S): at 15:06

## 2024-09-28 RX ADMIN — HYDROMORPHONE HYDROCHLORIDE 4 MILLIGRAM(S): 1 INJECTION, SOLUTION INTRAMUSCULAR; INTRAVENOUS; SUBCUTANEOUS at 20:08

## 2024-09-28 RX ADMIN — Medication 500 MILLIGRAM(S): at 17:18

## 2024-09-28 RX ADMIN — Medication 500 MILLIGRAM(S): at 05:39

## 2024-09-28 RX ADMIN — Medication 17 GRAM(S): at 17:18

## 2024-09-28 RX ADMIN — MULTI VITAMIN/MINERAL SUPPLEMENT WITH ASCORBIC ACID, NIACIN, PYRIDOXINE, PANTOTHENIC ACID, FOLIC ACID, RIBOFLAVIN, THIAMIN, BIOTIN, COBALAMIN AND ZINC. 1 TABLET(S): 60; 20; 12.5; 10; 10; 1.7; 1.5; 1; .3; .006 TABLET, COATED ORAL at 11:41

## 2024-09-28 RX ADMIN — CHLORHEXIDINE GLUCONATE ORAL RINSE 1 APPLICATION(S): 1.2 SOLUTION DENTAL at 11:41

## 2024-09-28 RX ADMIN — Medication 2 TABLET(S): at 22:39

## 2024-09-28 RX ADMIN — SERTRALINE HYDROCHLORIDE 125 MILLIGRAM(S): 100 TABLET, FILM COATED ORAL at 11:41

## 2024-09-28 RX ADMIN — MEROPENEM 100 MILLIGRAM(S): 500 INJECTION INTRAVENOUS at 22:39

## 2024-09-28 RX ADMIN — PANTOPRAZOLE SODIUM 40 MILLIGRAM(S): 40 TABLET, DELAYED RELEASE ORAL at 05:39

## 2024-09-28 RX ADMIN — HYDROMORPHONE HYDROCHLORIDE 2 MILLIGRAM(S): 1 INJECTION, SOLUTION INTRAMUSCULAR; INTRAVENOUS; SUBCUTANEOUS at 11:02

## 2024-09-28 RX ADMIN — Medication 500 MILLIGRAM(S): at 23:35

## 2024-09-28 RX ADMIN — HYDROMORPHONE HYDROCHLORIDE 2 MILLIGRAM(S): 1 INJECTION, SOLUTION INTRAMUSCULAR; INTRAVENOUS; SUBCUTANEOUS at 12:02

## 2024-09-28 RX ADMIN — HYDROMORPHONE HYDROCHLORIDE 2 MILLIGRAM(S): 1 INJECTION, SOLUTION INTRAMUSCULAR; INTRAVENOUS; SUBCUTANEOUS at 00:40

## 2024-09-28 RX ADMIN — Medication 3 MILLIGRAM(S): at 22:39

## 2024-09-28 RX ADMIN — HYDROMORPHONE HYDROCHLORIDE 2 MILLIGRAM(S): 1 INJECTION, SOLUTION INTRAMUSCULAR; INTRAVENOUS; SUBCUTANEOUS at 18:33

## 2024-09-28 RX ADMIN — DAPTOMYCIN 116 MILLIGRAM(S): 500 INJECTION, POWDER, LYOPHILIZED, FOR SOLUTION INTRAVENOUS at 13:51

## 2024-09-28 RX ADMIN — HYDROMORPHONE HYDROCHLORIDE 2 MILLIGRAM(S): 1 INJECTION, SOLUTION INTRAMUSCULAR; INTRAVENOUS; SUBCUTANEOUS at 19:33

## 2024-09-28 RX ADMIN — Medication 650 MILLIGRAM(S): at 05:39

## 2024-09-28 RX ADMIN — Medication 650 MILLIGRAM(S): at 06:39

## 2024-09-28 RX ADMIN — HYDROMORPHONE HYDROCHLORIDE 4 MILLIGRAM(S): 1 INJECTION, SOLUTION INTRAMUSCULAR; INTRAVENOUS; SUBCUTANEOUS at 21:08

## 2024-09-28 NOTE — PROGRESS NOTE ADULT - PROBLEM SELECTOR PLAN 1
History of septic arthritis with multiple admissions s/p I&D with CTS and L pectoral flap with plastic surgery on last admission (9/4-9/13). Was discharged home on meropenem 1g q8h and van 1250 q12h with reportedly no missed doses.   - repeat CT: Status post left sternoclavicular resection with pectoralis flap. Mixed density soft tissue and/or hematoma in the operative bed, similar in size. Interval development of rim-enhancing collections, concerning for infection.  - s/p 9/25 OR wash out by plastics with new L breast drain, pending cultures, plastics to take down dressing POD3  - no plans for OR by thoracic   - ID: c/w daptomycin 400mg daily (9/16 - )  - ID: c/w meropenem 1g q8h (9/15 - )  - monitor WBC   - multimodal pain control with Dilaudid 2/4 and Tylenol PRN

## 2024-09-28 NOTE — PROVIDER CONTACT NOTE (OTHER) - ACTION/TREATMENT ORDERED:
No action or treatment ordered yet at this time.
Surgical resident came to bedside to inspect dressing. Dressing site seems saturated, but is intact. No intervention at this time/further action ordered.
No new ordered
MD made aware, no further orders given.
MD came to see patient at bedside. Blood cultures ordered. Continue with abx. Plastic surgery to follow up.

## 2024-09-28 NOTE — PROGRESS NOTE ADULT - SUBJECTIVE AND OBJECTIVE BOX
Patient is a 44y old  Female who presents with a chief complaint of L chest pain, SIRS, Acute Pulmonary Embolism (28 Sep 2024 08:19)      Vital Signs Last 24 Hrs  T(C): 36.6 (09-28-24 @ 09:06), Max: 36.8 (09-28-24 @ 05:37)  T(F): 97.8 (09-28-24 @ 09:06), Max: 98.2 (09-28-24 @ 05:37)  HR: 84 (09-28-24 @ 09:06) (79 - 94)  BP: 85/65 (09-28-24 @ 09:06) (85/65 - 104/67)  RR: 18 (09-28-24 @ 09:06) (18 - 18)  SpO2: 97% (09-28-24 @ 09:06) (95% - 97%)            09-27-24 @ 07:01  -  09-28-24 @ 07:00  --------------------------------------------------------  IN: 100 mL / OUT: 81 mL / NET: 19 mL                            8.0    13.29 )-----------( 534      ( 28 Sep 2024 07:13 )             25.3       PHYSICAL EXAM  Neurology: A&Ox3, NAD  CV : RRR+S1S2  Wounds:  Left SC with DSD +LISBETH drain with serosanguinous drainage  Lungs: Respirations non-labored, B/L BS CTA  Abdomen: Soft, NT/ND, +BSx4Q  Extremities: B/L LE warm, no edema, +PP             MEDICATIONS  acetaminophen     Tablet .. 650 milliGRAM(s) Oral every 6 hours PRN  baclofen 5 milliGRAM(s) Oral every 8 hours PRN  chlorhexidine 2% Cloths 1 Application(s) Topical daily  clonazePAM  Tablet 0.5 milliGRAM(s) Oral daily PRN  DAPTOmycin IVPB 400 milliGRAM(s) IV Intermittent every 24 hours  DULoxetine 60 milliGRAM(s) Oral at bedtime  enoxaparin Injectable 40 milliGRAM(s) SubCutaneous every 24 hours  gemtesa tablet 75 milliGRAM(s) 75 milliGRAM(s) Oral daily  HYDROmorphone   Tablet 4 milliGRAM(s) Oral every 8 hours PRN  HYDROmorphone   Tablet 2 milliGRAM(s) Oral every 6 hours PRN  influenza   Vaccine 0.5 milliLiter(s) IntraMuscular once  melatonin 3 milliGRAM(s) Oral at bedtime  meropenem  IVPB 1000 milliGRAM(s) IV Intermittent every 8 hours  methocarbamol 500 milliGRAM(s) Oral four times a day  modafinil 200 milliGRAM(s) Oral daily  multivitamin 1 Tablet(s) Oral daily  pantoprazole    Tablet 40 milliGRAM(s) Oral before breakfast  polyethylene glycol 3350 17 Gram(s) Oral two times a day  senna 2 Tablet(s) Oral at bedtime  sertraline 125 milliGRAM(s) Oral daily

## 2024-09-28 NOTE — PROGRESS NOTE ADULT - PROBLEM SELECTOR PLAN 2
Bcx 9/15 growing GNR, PCR negative but corrected to no growth, repeat culture NGTD. Abscess culture 9/19 on with Staph epi, Candida orthopsilosis, lactobacillus rhamnosus in fluid media.  - C/w dapto and bryson as above  - F/u ID recs  - Polymicrobial may represent contamination per ID   - hold off on PICC removal, patient to be discharged home on IV antibiotics

## 2024-09-28 NOTE — PROGRESS NOTE ADULT - PROBLEM SELECTOR PLAN 1
- s/p left SCJ debridement and left pectoral flap on 9/9  - 9/25/24 -Washout I &D w/ plastics  - plastic follow up; LISBETH management by Plastics. Monitor output.   - ID following, Abx switched to Dapto per ID  - global care per primary team  - OT daily please  - thoracic surgery will continue to follow  - Plan d/w Attending Dr. Bautista - s/p left SCJ debridement and left pectoral flap on 9/9  - 9/25/24 -Washout I &D w/ plastics  - LISBETH management by Plastics. Monitor output.   - ID following, Abx switched to Dapto per ID  - No further thoracic surgery intervention at this time, will sign off, reconsult prn  - Follow up with Dr. Bautista outpatient in 3 weeks after discharge, call [phone] to schedule an appointment.   - Continue management per plastic surgery and primary team

## 2024-09-28 NOTE — PROGRESS NOTE ADULT - ATTENDING COMMENTS
#Septic sternoclavicular joint s/p debridement and muscle flap L clavicle, postop abscess  #anemia likely from recent surgery and frequent phlebotomy +/- hemodilutional effect  #subsegmental PE  - Abnormal CT with new rim enhancing collections in under the flap with purulent drainage from sternal side of the incision and serosanguinous cloudy fluid in LISBETH drain.   - s/p OR for wound wash out with antibiotic bead placement on 9/25 per plastics  - Blood cx from 9/15-- no growth. Subsequent repeat blood cx have been all negative to date  - wound cx result (fluid media + S. epi, candida orthopsilosis, lactobacillus rhamnosus)  - currently on Dapto and Meropenem per ID. per discussion with ID attending, hold off on starting antifungal treatment.  - f/u OR cultures, if no change, plan to continue with current abx regimen through 10/28/24. She already has RUE PICC in place.  - monitor leukocytosis  - Hgb improved appropriately after PRBC transfusion on 9/24   - +small nonocclusive right lower lobe basilar subsegmental PE noted on CTA chest (9/15), LE duplex neg (9/15, 9/25), will defer full AC at this time as risk felt to outweigh benefit and f/u serial LE duplex and continue prophylactic dose of lovenox for now.  - d/c plan home once home care set up.

## 2024-09-28 NOTE — PROGRESS NOTE ADULT - ASSESSMENT
44F s/p left SCJ debridement and left pectoral flap on 9/9 presenting with superficial surgical site erythema and localized pain. Also found to have small subsegmental right lower lobe pulmonary emboli.  Recommendations:  -No acute thoracic surgery operative intervention at this time  -Dispo per PRS    9/16: Evaluated at ED bedside by Thoracic Attending Dr. Bautista. Recommend close ID follow up for cellulitis of site. Vanco trough level noted to be 5.5, ? unclear if patient was taking antibiotics appropriately in outpatient setting.   9/17 Antibiotic switched to dapto per ID. Site improving  9/18 VSS  afebrile.  site with decreased erythema  9/19 VSS afebile site  COLTON improving - OT daily-  large stool burden seen on CT d/w Primary team consider MOVANTIK / suppository   9/20 c/w dapto 400 mg q24h. Continue IV meropenem 1g q8h Follow-up final identification and sensitivity of GNR, Repeat blood cx   9/21 9/21 maintain jpss. analgesics for pain, revision to be considered  9/22 No events overnight. Pain better controlled  9/23 VSS; Maintain LISBETH drains, wound care as per plastics.   9/24 VSS: As per plastic surgery, plan for flap revision today?   9/25 VSS NPO for Washout with Plastic Surgert today   9/26 vss s/p washout w/ plastics  LISBETH drain as per plastics  -ss  9/27 VSS up in chair bright dressing CDI drain to self suction  9/28 VSS, afebrile, LISBETH bulb 80cc drainage.

## 2024-09-28 NOTE — PROGRESS NOTE ADULT - SUBJECTIVE AND OBJECTIVE BOX
Follow Up:  OM of left clavicle    Interval History/ROS:  notes dressing with fluid, mild discomfort   (right handed)    Allergies  No Known Allergies        ANTIMICROBIALS:  DAPTOmycin IVPB 400 every 24 hours  meropenem  IVPB 1000 every 8 hours      OTHER MEDS:  MEDICATIONS  (STANDING):  acetaminophen     Tablet .. 650 every 6 hours PRN  baclofen 5 every 8 hours PRN  clonazePAM  Tablet 0.5 daily PRN  DULoxetine 60 at bedtime  enoxaparin Injectable 40 every 24 hours  HYDROmorphone   Tablet 4 every 8 hours PRN  HYDROmorphone   Tablet 2 every 6 hours PRN  influenza   Vaccine 0.5 once  melatonin 3 at bedtime  methocarbamol 500 four times a day  modafinil 200 daily  pantoprazole    Tablet 40 before breakfast  polyethylene glycol 3350 17 two times a day  senna 2 at bedtime  sertraline 125 daily      Vital Signs Last 24 Hrs  T(C): 36.6 (28 Sep 2024 09:06), Max: 36.8 (28 Sep 2024 05:37)  T(F): 97.8 (28 Sep 2024 09:06), Max: 98.2 (28 Sep 2024 05:37)  HR: 84 (28 Sep 2024 09:06) (79 - 94)  BP: 85/65 (28 Sep 2024 09:06) (85/65 - 104/67)  BP(mean): --  RR: 18 (28 Sep 2024 09:06) (18 - 18)  SpO2: 97% (28 Sep 2024 09:06) (95% - 97%)    Parameters below as of 28 Sep 2024 09:06  Patient On (Oxygen Delivery Method): room air        PHYSICAL EXAM:  General:  NAD, Non-toxic  Neurology: A&Ox3, nonfocal  Respiratory: Clear to auscultation bilaterally  CV: RRR, S1S2, no murmurs, rubs or gallops  Abdominal: Soft, Non-tender, non-distended, normal bowel sounds  Extremities: No edema,   dressing over left shoulder and upper chest soaked  (RN aware, surgery to change dressing today)  Line Sites: Clear  Skin: No rash                          8.0    13.29 )-----------( 534      ( 28 Sep 2024 07:13 )             25.3     WBC Count: 13.29 (09-28 @ 07:13)  WBC Count: 16.67 (09-27 @ 10:24)  WBC Count: 12.97 (09-26 @ 06:52)  WBC Count: 10.29 (09-25 @ 07:19)  WBC Count: 10.58 (09-24 @ 06:57)      Creatinine: 0.51 (09-26 @ 06:51)    Creatinine: 0.46 (09-25 @ 07:19)    Creatinine: 0.51 (09-24 @ 06:57)        09.27.24 @ 10:24)  Creatine Kinase: 39 U/L    MICROBIOLOGY:  .Other  09-25-24   Testing in progress  --  --      .Blood Blood  09-22-24   No growth at 5 days  --  --      Skin/Wound Skin/Wound  09-20-24   No growth  --  --      .Blood Blood-Peripheral  09-19-24   No growth at 5 days  --  --      .Blood Blood-Peripheral  09-19-24   No growth at 5 days  --  --      .Abscess lt chest  09-19-24   Growth in fluid media only Staphylococcus epidermidis "Susceptibilities  not performed"  Growth in fluid media only Candida orthopsilosis "Susceptibilities not  performed"  Growth in fluid media only Lactobacillus rhamnosus "Susceptibilities not  performed"  --    Rare polymorphonuclear leukocytes seen per low power field  No organisms seen per oil power field      Clean Catch Clean Catch (Midstream)  09-15-24   <10,000 CFU/mL Normal Urogenital Renae  --  --      .Blood Blood-Peripheral  09-15-24   **Please Note**: This is a Corrected Report**  No organisms seen  Previously reported as:  Growth in aerobic bottle: Gram Negative Rods  Direct identification is available within approximately 3-5  hours either by Blood Panel Multiplexed PCR or Direct  MALDI-TOF. Details: [email or web address]  --  Blood Culture PCR      .Blood Blood-Peripheral  09-15-24   No growth at 5 days  --  --      Throat  09-13-24   No Streptococcus pyogenes (Group A) isolated  --  --      Surgical Swab #1 deep clavicular culture swab lt  09-10-24   No growth at 5 days  --  Staphylococcus epidermidis      .Blood Blood-Peripheral  09-05-24   No growth at 5 days  --  --      Clean Catch Clean Catch (Midstream)  09-05-24   <10,000 CFU/mL Normal Urogenital Renae  --  --      .Blood Blood-Peripheral  09-03-24   No growth at 5 days  --  --      .Blood Blood-Peripheral  09-03-24   No growth at 5 days  --  --        .Other    v          RADIOLOGY:    Ta Belle MD; Division of Infectious Disease; Pager: [phone]; nights and weekends: [phone]

## 2024-09-28 NOTE — PROGRESS NOTE ADULT - SUBJECTIVE AND OBJECTIVE BOX
Lisandro Sorenson | PGY3| [employer] TEAMS ONLY  Interval Events: No acute events overnight. Pt seen and examined at bedside.  Patient denies any complaints overnight. The patient denies any fevers, chills, nausea, vomiting, or increased pain.      REVIEW OF SYSTEMS:  CONSTITUTIONAL: No weakness, fevers or chills  EYES/ENT: No visual changes;  No vertigo or throat pain   NECK: No pain or stiffness  RESPIRATORY: No cough, wheezing, hemoptysis; No shortness of breath  CARDIOVASCULAR: No chest pain or palpitations  GASTROINTESTINAL: No abdominal or epigastric pain. No nausea, vomiting, or hematemesis; No diarrhea or constipation. No melena or hematochezia.  GENITOURINARY: No dysuria, frequency or hematuria  NEUROLOGICAL: No numbness or weakness  SKIN: No itching, burning, rashes, or lesions   All other review of systems is negative unless indicated above.    OBJECTIVE:  ICU Vital Signs Last 24 Hrs  T(C): 36.8 (28 Sep 2024 05:37), Max: 36.8 (28 Sep 2024 05:37)  T(F): 98.2 (28 Sep 2024 05:37), Max: 98.2 (28 Sep 2024 05:37)  HR: 94 (28 Sep 2024 05:37) (79 - 94)  BP: 104/67 (28 Sep 2024 05:37) (97/61 - 104/67)  BP(mean): --  ABP: --  ABP(mean): --  RR: 18 (28 Sep 2024 05:37) (18 - 18)  SpO2: 96% (28 Sep 2024 05:37) (95% - 96%)    O2 Parameters below as of 28 Sep 2024 05:37  Patient On (Oxygen Delivery Method): room air              09-27 @ 07:01  -  09-28 @ 07:00  --------------------------------------------------------  IN: 100 mL / OUT: 81 mL / NET: 19 mL      CAPILLARY BLOOD GLUCOSE        PHYSICAL EXAM:  General: NAD  Neurology: A&Ox3, nonfocal, CERON x 4  Eyes: PERRLA/ EOMI, Gross vision intact  ENT/Neck: Neck supple, trachea midline, No JVD, Gross hearing intact  Respiratory: CTA B/L, No wheezing, rales, rhonchi  CV: RRR, +S1/S2, -S3/S4, no murmurs, rubs or gallops  Abdominal: Soft, NT, ND +BS, No HSM, chest wall drainage appears serosang,   MSK: 5/5 strength UE/LE bilaterally  Extremities: No edema, 2+ peripheral pulses  Skin: No Rashes, Hematoma, Ecchymosis  Incisions:   Tubes:    HOSPITAL MEDICATIONS:  MEDICATIONS  (STANDING):  chlorhexidine 2% Cloths 1 Application(s) Topical daily  DAPTOmycin IVPB 400 milliGRAM(s) IV Intermittent every 24 hours  DULoxetine 60 milliGRAM(s) Oral at bedtime  enoxaparin Injectable 40 milliGRAM(s) SubCutaneous every 24 hours  gemtesa tablet 75 milliGRAM(s) 75 milliGRAM(s) Oral daily  influenza   Vaccine 0.5 milliLiter(s) IntraMuscular once  melatonin 3 milliGRAM(s) Oral at bedtime  meropenem  IVPB 1000 milliGRAM(s) IV Intermittent every 8 hours  methocarbamol 500 milliGRAM(s) Oral four times a day  modafinil 200 milliGRAM(s) Oral daily  multivitamin 1 Tablet(s) Oral daily  pantoprazole    Tablet 40 milliGRAM(s) Oral before breakfast  polyethylene glycol 3350 17 Gram(s) Oral two times a day  senna 2 Tablet(s) Oral at bedtime  sertraline 125 milliGRAM(s) Oral daily    MEDICATIONS  (PRN):  acetaminophen     Tablet .. 650 milliGRAM(s) Oral every 6 hours PRN Mild Pain (1 - 3)  baclofen 5 milliGRAM(s) Oral every 8 hours PRN Muscle Spasm  clonazePAM  Tablet 0.5 milliGRAM(s) Oral daily PRN for anxiety  HYDROmorphone   Tablet 4 milliGRAM(s) Oral every 8 hours PRN Severe Pain (7 - 10)  HYDROmorphone   Tablet 2 milliGRAM(s) Oral every 6 hours PRN Moderate Pain (4 - 6)      LABS:                        8.0    13.29 )-----------( 534      ( 28 Sep 2024 07:13 )             25.3     Hgb Trend: 8.0<--, 8.3<--, 8.8<--, 8.2<--, 6.8<--        Creatinine Trend: 0.51<--, 0.46<--, 0.51<--, 0.47<--, 0.48<--, 0.50<--            MICROBIOLOGY:     Culture - Wound Aerobic/Anaerobic (collected 25 Sep 2024 18:00)  Source: Swab  Preliminary Report (27 Sep 2024 19:59):    No growth to date    Culture - Wound Aerobic/Anaerobic (collected 25 Sep 2024 18:00)  Source: .Surgical Swab  Preliminary Report (27 Sep 2024 19:57):    No growth to date    Culture - Acid Fast - Other w/Smear (collected 25 Sep 2024 18:00)  Source: .Other    Culture - Fungal, Other (collected 25 Sep 2024 18:00)  Source: .Other  Preliminary Report (27 Sep 2024 08:02):    Testing in progress    Culture - Acid Fast - Other w/Smear (collected 25 Sep 2024 18:00)  Source: .Other    Culture - Fungal, Other (collected 25 Sep 2024 18:00)  Source: .Other  Preliminary Report (27 Sep 2024 08:02):    Testing in progress

## 2024-09-28 NOTE — PROGRESS NOTE ADULT - ASSESSMENT
44F with PMH of MS on Rituxan, anxiety, depression, septic arthritis of L sternoclavicular joint s/p multiple I&Ds in 2023, recent recurrence s/p I&D with CTS and pec turnover flap with Dr. Dean on 9/9, d/c'ed with PICC line on vanc/bryson, now presenting with redness and increased pain. PRS consulted for possible surgical site infection. CT chest shows normal postop changes with possible small hematoma around flap, LISBETH drains within area, no abscess/collection. Small PE without heart strain noted. Repeat CT from 9/18 now shows rim enhancing collections at the surgical site. Patient was taken back to the OR for washout on 9/26.       plan  - BID dressing changes by nursing team: Mupirocin ointment and dry gauze  - LISBETH drain to remain in place, please monitor drain output  - abx per ID, abx for discharged will be planned based on intraop cultures  - remainder of care per primary - please contact plastics if any concerns and if status changes    Leroy Cagle, PGY1  Plastic Surgery   [phone] [facility] pager  Available on teams

## 2024-09-28 NOTE — PROGRESS NOTE ADULT - SUBJECTIVE AND OBJECTIVE BOX
Surgery Progress Note  Patient is a 44y old  Female who presents with a chief complaint of L chest pain, SIRS, Acute Pulmonary Embolism (28 Sep 2024 09:45)      SUBJECTIVE: Patient seen and examined at bedside with surgical team, patient without complaints. Patient wants to go home, plan to tailor abx based on OR cultures discussed. Patient understands and will speak with primary team during the day today about discharge.     Physical Exam  Constitutional: NAD, resting in bed comfortably   Respiratory: no increased WOB, no tachypnea. Comfortable on RA  Chest: Aquacell dressing taken down. Incision c/d/i with no active bleeding or drainage. No surrounding erythema. Area around incision appropriately tender to palpation, no fluctuance or palpable masses. Dressing replaced with mupirocin ointment and dry gauze  Ext: warm and well perfused     Vital Signs Last 24 Hrs  T(C): 36.6 (28 Sep 2024 09:06), Max: 36.8 (28 Sep 2024 05:37)  T(F): 97.8 (28 Sep 2024 09:06), Max: 98.2 (28 Sep 2024 05:37)  HR: 84 (28 Sep 2024 09:06) (79 - 94)  BP: 85/65 (28 Sep 2024 09:06) (85/65 - 104/67)  BP(mean): --  RR: 18 (28 Sep 2024 09:06) (18 - 18)  SpO2: 97% (28 Sep 2024 09:06) (95% - 97%)    Parameters below as of 28 Sep 2024 09:06  Patient On (Oxygen Delivery Method): room air        I&O's Detail    27 Sep 2024 07:01  -  28 Sep 2024 07:00  --------------------------------------------------------  IN:    IV PiggyBack: 100 mL  Total IN: 100 mL    OUT:    Bulb (mL): 81 mL  Total OUT: 81 mL    Total NET: 19 mL      MEDICATIONS  (STANDING):  chlorhexidine 2% Cloths 1 Application(s) Topical daily  DAPTOmycin IVPB 400 milliGRAM(s) IV Intermittent every 24 hours  DULoxetine 60 milliGRAM(s) Oral at bedtime  enoxaparin Injectable 40 milliGRAM(s) SubCutaneous every 24 hours  gemtesa tablet 75 milliGRAM(s) 75 milliGRAM(s) Oral daily  influenza   Vaccine 0.5 milliLiter(s) IntraMuscular once  melatonin 3 milliGRAM(s) Oral at bedtime  meropenem  IVPB 1000 milliGRAM(s) IV Intermittent every 8 hours  methocarbamol 500 milliGRAM(s) Oral four times a day  modafinil 200 milliGRAM(s) Oral daily  multivitamin 1 Tablet(s) Oral daily  mupirocin 2% Ointment 1 Application(s) Topical two times a day  pantoprazole    Tablet 40 milliGRAM(s) Oral before breakfast  polyethylene glycol 3350 17 Gram(s) Oral two times a day  senna 2 Tablet(s) Oral at bedtime  sertraline 125 milliGRAM(s) Oral daily    MEDICATIONS  (PRN):  acetaminophen     Tablet .. 650 milliGRAM(s) Oral every 6 hours PRN Mild Pain (1 - 3)  baclofen 5 milliGRAM(s) Oral every 8 hours PRN Muscle Spasm  clonazePAM  Tablet 0.5 milliGRAM(s) Oral daily PRN for anxiety  HYDROmorphone   Tablet 4 milliGRAM(s) Oral every 8 hours PRN Severe Pain (7 - 10)  HYDROmorphone   Tablet 2 milliGRAM(s) Oral every 6 hours PRN Moderate Pain (4 - 6)      LABS:                        8.0    13.29 )-----------( 534      ( 28 Sep 2024 07:13 )             25.3

## 2024-09-28 NOTE — PROVIDER CONTACT NOTE (OTHER) - ASSESSMENT
Pt A&Ox4. Site is warm to the touch.
Pt alert and oriented x4 asymptomatic
Dressing is intact, semisaturated with serous drainage. [place] incision site
VSS on room air. Pt remains afebrile. Drainage from incision appears light brown in color. No blood noted. Dressing changed twice.
Pt states she is "worried about the surgical site", and "I need to speak to a Doctor and not a resident who doesn't know what they are doing".

## 2024-09-28 NOTE — PROGRESS NOTE ADULT - ASSESSMENT
44y Female with PMH of MS on Ocrevus anxiety, depression, septic arthritis of L sternoclavicular joint s/p I&Dx2 and cefepime for 6 weeks (12/2023-1/2024), recent admission 9/9-9/13 for L clavicular pain s/p I+D with L pectoralis flap sent home on meropenem and vancomycin for 6 week course presents for L shoulder pain, L sided pel chest pain, headache, fever at home. Son on phone also notes that the surgical site appeared more erythematous day prior to admission and had associated L arm swelling.    Admitted 9/9-9/13 most recently; MRI L clavicle with prior resection of the medial left clavicle with rim-enhancing fluid and surrounding phlegmon abutting the resected bony margin. S/p surgery on 9/9 with L SCJ debridement by CT surgery and L pec flap by PRS. Cultures grew Staph epi and Corynebacterium in fluid media, blood cultures negative. Discharged on tentatively 6 weeks vancomycin and meropenem for empiric coverage.    Here Tmax 100.1, mild tachycardia  WBC 12.1, neutrophilic. Lactate wnl  , ALT 78,     CT chest: Small nonocclusive right lower lobe basilar subsegmental pulmonary emboli, no evidence of right heart strain. 14 cm in greatest dimension expansile mostly soft tissue density structure filling and expanding the operative bed in the region of the left medial clavicle (thought to represent combination of muscle)    MICRO DATA:   09/15 BCX: IP   09/15 BCX: IP   09/10 AFB Cx: IP  09/10 Fungal CX: IP  09/10 Bacterial CX: fluid media only- corynebacterium striatum and staph epidermidis     9/25  OR: Incision and drainage, fluid collection; Minimal fat necrosis encountered, small amount of drainage from small opening at the medial edge of the healthy muscle flap and this was in continuity with draining skin opening.  OR cultures  NO Growth To Date       # Septic sternoclavicular joint s/p debridement and muscle flap L clavicle  # Post-Operative abscess s/p debridement               RECOMMENDATIONS:   - c/w dapto 400 mg q24h  - recent CPK noted, normal, check CPK once a week.   - Continue IV meropenem 1g q8h  - blood cx with documented GNR, not real, artifact.     She will need to continue IV antibiotics at home  x 6 week course through 10/24/24, with weekly CBC,  CMP, CK [email results to [email or web address]) follow up Dr PASQUALE Mendez

## 2024-09-29 PROCEDURE — 99232 SBSQ HOSP IP/OBS MODERATE 35: CPT

## 2024-09-29 RX ORDER — DAPTOMYCIN 500 MG/10ML
400 INJECTION, POWDER, LYOPHILIZED, FOR SOLUTION INTRAVENOUS
Qty: 0 | Refills: 0 | DISCHARGE
Start: 2024-09-29

## 2024-09-29 RX ADMIN — HYDROMORPHONE HYDROCHLORIDE 4 MILLIGRAM(S): 1 INJECTION, SOLUTION INTRAMUSCULAR; INTRAVENOUS; SUBCUTANEOUS at 22:44

## 2024-09-29 RX ADMIN — HYDROMORPHONE HYDROCHLORIDE 2 MILLIGRAM(S): 1 INJECTION, SOLUTION INTRAMUSCULAR; INTRAVENOUS; SUBCUTANEOUS at 09:57

## 2024-09-29 RX ADMIN — SERTRALINE HYDROCHLORIDE 125 MILLIGRAM(S): 100 TABLET, FILM COATED ORAL at 13:56

## 2024-09-29 RX ADMIN — Medication 500 MILLIGRAM(S): at 13:57

## 2024-09-29 RX ADMIN — HYDROMORPHONE HYDROCHLORIDE 2 MILLIGRAM(S): 1 INJECTION, SOLUTION INTRAMUSCULAR; INTRAVENOUS; SUBCUTANEOUS at 18:36

## 2024-09-29 RX ADMIN — Medication 17 GRAM(S): at 05:49

## 2024-09-29 RX ADMIN — Medication 60 MILLIGRAM(S): at 21:26

## 2024-09-29 RX ADMIN — PANTOPRAZOLE SODIUM 40 MILLIGRAM(S): 40 TABLET, DELAYED RELEASE ORAL at 06:18

## 2024-09-29 RX ADMIN — MULTI VITAMIN/MINERAL SUPPLEMENT WITH ASCORBIC ACID, NIACIN, PYRIDOXINE, PANTOTHENIC ACID, FOLIC ACID, RIBOFLAVIN, THIAMIN, BIOTIN, COBALAMIN AND ZINC. 1 TABLET(S): 60; 20; 12.5; 10; 10; 1.7; 1.5; 1; .3; .006 TABLET, COATED ORAL at 13:57

## 2024-09-29 RX ADMIN — ENOXAPARIN SODIUM 40 MILLIGRAM(S): 150 INJECTION SUBCUTANEOUS at 21:41

## 2024-09-29 RX ADMIN — MUPIROCIN 1 APPLICATION(S): 20 OINTMENT TOPICAL at 17:57

## 2024-09-29 RX ADMIN — Medication 17 GRAM(S): at 17:57

## 2024-09-29 RX ADMIN — HYDROMORPHONE HYDROCHLORIDE 2 MILLIGRAM(S): 1 INJECTION, SOLUTION INTRAMUSCULAR; INTRAVENOUS; SUBCUTANEOUS at 09:27

## 2024-09-29 RX ADMIN — MEROPENEM 100 MILLIGRAM(S): 500 INJECTION INTRAVENOUS at 21:41

## 2024-09-29 RX ADMIN — Medication 3 MILLIGRAM(S): at 21:26

## 2024-09-29 RX ADMIN — HYDROMORPHONE HYDROCHLORIDE 2 MILLIGRAM(S): 1 INJECTION, SOLUTION INTRAMUSCULAR; INTRAVENOUS; SUBCUTANEOUS at 18:06

## 2024-09-29 RX ADMIN — Medication 0.5 MILLIGRAM(S): at 21:26

## 2024-09-29 RX ADMIN — Medication 2 TABLET(S): at 21:26

## 2024-09-29 RX ADMIN — Medication 500 MILLIGRAM(S): at 22:59

## 2024-09-29 RX ADMIN — DAPTOMYCIN 116 MILLIGRAM(S): 500 INJECTION, POWDER, LYOPHILIZED, FOR SOLUTION INTRAVENOUS at 13:58

## 2024-09-29 RX ADMIN — MUPIROCIN 1 APPLICATION(S): 20 OINTMENT TOPICAL at 05:49

## 2024-09-29 RX ADMIN — HYDROMORPHONE HYDROCHLORIDE 4 MILLIGRAM(S): 1 INJECTION, SOLUTION INTRAMUSCULAR; INTRAVENOUS; SUBCUTANEOUS at 23:44

## 2024-09-29 RX ADMIN — Medication 500 MILLIGRAM(S): at 05:49

## 2024-09-29 RX ADMIN — Medication 500 MILLIGRAM(S): at 19:03

## 2024-09-29 RX ADMIN — MODAFINIL 200 MILLIGRAM(S): 100 TABLET ORAL at 13:57

## 2024-09-29 RX ADMIN — MEROPENEM 100 MILLIGRAM(S): 500 INJECTION INTRAVENOUS at 15:46

## 2024-09-29 RX ADMIN — Medication 650 MILLIGRAM(S): at 05:49

## 2024-09-29 RX ADMIN — CHLORHEXIDINE GLUCONATE ORAL RINSE 1 APPLICATION(S): 1.2 SOLUTION DENTAL at 13:44

## 2024-09-29 RX ADMIN — MEROPENEM 100 MILLIGRAM(S): 500 INJECTION INTRAVENOUS at 05:48

## 2024-09-29 NOTE — PROVIDER CONTACT NOTE (OTHER) - DATE AND TIME:
27-Sep-2024 15:03
16-Sep-2024 22:36
29-Sep-2024 21:53
21-Sep-2024 01:45
22-Sep-2024 01:30
28-Sep-2024 09:50

## 2024-09-29 NOTE — PROGRESS NOTE ADULT - SUBJECTIVE AND OBJECTIVE BOX
*******************************  Jessica Mcclendon MD (PGY-2)  Internal Medicine  Contact via [employer] TEAMS  *******************************    INTERVAL HPI/OVERNIGHT EVENTS:      OBJECTIVE  T(C): 36.9 (09-29-24 @ 05:44), Max: 36.9 (09-29-24 @ 05:44)  HR: 76 (09-29-24 @ 05:44) (76 - 85)  BP: 91/56 (09-29-24 @ 05:44) (85/65 - 91/58)  RR: 18 (09-29-24 @ 05:44) (18 - 18)  SpO2: 98% (09-29-24 @ 05:44) (97% - 99%)    09-28-24 @ 07:01  -  09-29-24 @ 07:00  --------------------------------------------------------  IN: 100 mL / OUT: 655 mL / NET: -555 mL        PHYSICAL EXAM  General:  HEENT:  Cardiovascular:  Pulmonary:  GI:  :  Neuro:  Psych:          IMAGING:     *******************************  Jessica Mcclendon MD (PGY-2)  Internal Medicine  Contact via [employer] TEAMS  *******************************    INTERVAL HPI/OVERNIGHT EVENTS:  No acute overnight events.    OBJECTIVE  T(C): 36.9 (09-29-24 @ 05:44), Max: 36.9 (09-29-24 @ 05:44)  HR: 76 (09-29-24 @ 05:44) (76 - 85)  BP: 91/56 (09-29-24 @ 05:44) (85/65 - 91/58)  RR: 18 (09-29-24 @ 05:44) (18 - 18)  SpO2: 98% (09-29-24 @ 05:44) (97% - 99%)    09-28-24 @ 07:01  -  09-29-24 @ 07:00  --------------------------------------------------------  IN: 100 mL / OUT: 655 mL / NET: -555 mL        PHYSICAL EXAM:  General: NAD  Neurology: A&Ox3, nonfocal, CERON x 4  Eyes: PERRLA/ EOMI, Gross vision intact  ENT/Neck: Neck supple, trachea midline, No JVD, Gross hearing intact  Respiratory: CTA B/L, No wheezing, rales, rhonchi  CV: RRR, +S1/S2, -S3/S4, no murmurs, rubs or gallops  Abdominal: Soft, NT, ND +BS, No HSM, chest wall drainage appears serosang,   MSK: 5/5 strength UE/LE bilaterally  Extremities: No edema, 2+ peripheral pulses  Skin: No Rashes, Hematoma, Ecchymosis            IMAGING:

## 2024-09-29 NOTE — PROGRESS NOTE ADULT - ATTENDING COMMENTS
Pt seen with Dr. Mcclendon this morning. She expressed anxiety regarding plan for IV antibiotics at home. States she does not think she can manage on her own, given her hx of MS and her other responsibilities as a mother of young children. Interested in home services where someone can administer all of her IV antibiotics for her. D/w CM, pt looking into private hire care.    #Septic sternoclavicular joint s/p debridement and muscle flap L clavicle, postop abscess  #anemia likely from recent surgery and frequent phlebotomy +/- hemodilutional effect  #subsegmental PE  - Abnormal CT with new rim enhancing collections in under the flap with purulent drainage from sternal side of the incision and serosanguinous cloudy fluid in LISBETH drain.   - s/p OR for wound wash out with antibiotic bead placement on 9/25 per plastics  - Blood cx from 9/15-- no growth. Subsequent repeat blood cx have been all negative to date  - wound cx result (fluid media + S. epi, candida orthopsilosis, lactobacillus rhamnosus)  - currently on Dapto and Meropenem per ID. per discussion with ID attending, hold off on starting antifungal treatment.  - f/u OR cultures, if no change, plan to continue with current abx regimen through 10/28/24. She already has RUE PICC in place.  - monitor leukocytosis  - Hgb improved appropriately after PRBC transfusion on 9/24   - +small nonocclusive right lower lobe basilar subsegmental PE noted on CTA chest (9/15), LE duplex neg (9/15, 9/25), will defer full AC at this time as risk felt to outweigh benefit and f/u serial LE duplex and continue prophylactic dose of lovenox for now.  - d/c plan home once home care set up.

## 2024-09-29 NOTE — PROVIDER CONTACT NOTE (OTHER) - SITUATION
Drainage from incision
Patient BP 88/50, refusing LR 500ml
Pt c/o tenderness @  left clavicular incision site.
Pt ambulates with walker and requires assistane. Pt refusing to wait for help and refusing bed alarm
Pt concerned about wound care/incision site follow up.
Pt wants chest dressing and incision to be looked at by plastics/surgery team

## 2024-09-29 NOTE — PROGRESS NOTE ADULT - SUBJECTIVE AND OBJECTIVE BOX
Plastic Surgery Progress Note (pg [facility]: [id number], [facility]: [phone])    SUBJECTIVE  The patient was seen and examined.     OBJECTIVE  ___________________________________________________  VITAL SIGNS / I&O's   Vital Signs Last 24 Hrs  T(C): 36.9 (29 Sep 2024 05:44), Max: 36.9 (29 Sep 2024 05:44)  T(F): 98.4 (29 Sep 2024 05:44), Max: 98.4 (29 Sep 2024 05:44)  HR: 76 (29 Sep 2024 05:44) (76 - 85)  BP: 91/56 (29 Sep 2024 05:44) (91/56 - 91/58)  BP(mean): --  RR: 18 (29 Sep 2024 05:44) (18 - 18)  SpO2: 98% (29 Sep 2024 05:44) (98% - 99%)    Parameters below as of 29 Sep 2024 05:44  Patient On (Oxygen Delivery Method): room air          28 Sep 2024 07:01  -  29 Sep 2024 07:00  --------------------------------------------------------  IN:    IV PiggyBack: 100 mL  Total IN: 100 mL    OUT:    Bulb (mL): 55 mL    Voided (mL): 600 mL  Total OUT: 655 mL    Total NET: -555 mL        ___________________________________________________    Physical Exam  Constitutional: NAD, resting in bed comfortably   Respiratory: no increased WOB, no tachypnea. Comfortable on RA  Chest: dressing taken down. Incision c/d/i with no active bleeding or drainage. No surrounding erythema. Area around incision appropriately tender to palpation, no fluctuance or palpable masses. Dressing replaced with mupirocin ointment and dry gauze  Ext: warm and well perfused     ___________________________________________________  LABS                        8.0    13.29 )-----------( 534      ( 28 Sep 2024 07:13 )             25.3             CAPILLARY BLOOD GLUCOSE              ___________________________________________________  MICRO  Recent Cultures:  Specimen Source: .Other, 09-25 @ 18:00; Results   Culture is being performed. Fungal cultures are held for 4 weeks.; Gram Stain: --; Organism: --    ___________________________________________________  MEDICATIONS  (STANDING):  chlorhexidine 2% Cloths 1 Application(s) Topical daily  DAPTOmycin IVPB 400 milliGRAM(s) IV Intermittent every 24 hours  DULoxetine 60 milliGRAM(s) Oral at bedtime  enoxaparin Injectable 40 milliGRAM(s) SubCutaneous every 24 hours  gemtesa tablet 75 milliGRAM(s) 75 milliGRAM(s) Oral daily  influenza   Vaccine 0.5 milliLiter(s) IntraMuscular once  magnesium citrate Oral Solution 296 milliLiter(s) Oral once  melatonin 3 milliGRAM(s) Oral at bedtime  meropenem  IVPB 1000 milliGRAM(s) IV Intermittent every 8 hours  methocarbamol 500 milliGRAM(s) Oral four times a day  modafinil 200 milliGRAM(s) Oral daily  multivitamin 1 Tablet(s) Oral daily  mupirocin 2% Ointment 1 Application(s) Topical two times a day  pantoprazole    Tablet 40 milliGRAM(s) Oral before breakfast  polyethylene glycol 3350 17 Gram(s) Oral two times a day  senna 2 Tablet(s) Oral at bedtime  sertraline 125 milliGRAM(s) Oral daily    MEDICATIONS  (PRN):  acetaminophen     Tablet .. 650 milliGRAM(s) Oral every 6 hours PRN Mild Pain (1 - 3)  baclofen 5 milliGRAM(s) Oral every 8 hours PRN Muscle Spasm  clonazePAM  Tablet 0.5 milliGRAM(s) Oral daily PRN for anxiety  HYDROmorphone   Tablet 4 milliGRAM(s) Oral every 8 hours PRN Severe Pain (7 - 10)  HYDROmorphone   Tablet 2 milliGRAM(s) Oral every 6 hours PRN Moderate Pain (4 - 6)

## 2024-09-29 NOTE — PROVIDER CONTACT NOTE (OTHER) - NAME OF MD/NP/PA/DO NOTIFIED:
Leroy Smith
Williams Mehta MD
Dickson Earl; Lisandro Flores; Leroy Smith.
Resident Geneva Leroy
MD Geneva Leroy
Geneva Ashley
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
## 2024-09-29 NOTE — PROVIDER CONTACT NOTE (OTHER) - REASON
Drainage from incision
Pt refusing bed alarm
Pt c/o tenderness @  left clavicular incision site.
Pt requesting chest dressing change to be done by plastics/surgery
Patient BP 88/50, refusing LR 500ml
Patient concerned about chest incision site, dressing, and amount of drainage from site.

## 2024-09-29 NOTE — PROGRESS NOTE ADULT - ASSESSMENT
44F with PMH of MS on Rituxan, anxiety, depression, septic arthritis of L sternoclavicular joint s/p multiple I&Ds in 2023, recent recurrence s/p I&D with CTS and pec turnover flap with Dr. Dean on 9/9, d/c'ed with PICC line on vanc/bryson, now presenting with redness and increased pain. PRS consulted for possible surgical site infection. CT chest shows normal postop changes with possible small hematoma around flap, LISBETH drains within area, no abscess/collection. Small PE without heart strain noted. Repeat CT from 9/18 now shows rim enhancing collections at the surgical site. Patient was taken back to the OR for washout on 9/26.       plan  - BID dressing changes by nursing team: Mupirocin ointment and dry gauze  - LISBETH drain to remain in place, please monitor drain output  - abx per ID, abx for discharged will be planned based on intraop cultures  - remainder of care per primary - please contact plastics if any concerns and if status changes    Larry Cee, PGY2  Plastic Surgery   [phone] [facility] pager  Available on

## 2024-09-30 ENCOUNTER — TRANSCRIPTION ENCOUNTER (OUTPATIENT)
Age: 44
End: 2024-09-30

## 2024-09-30 ENCOUNTER — APPOINTMENT (OUTPATIENT)
Dept: THORACIC SURGERY | Facility: CLINIC | Age: 44
End: 2024-09-30

## 2024-09-30 DIAGNOSIS — M86.10 OTHER ACUTE OSTEOMYELITIS, UNSPECIFIED SITE: ICD-10-CM

## 2024-09-30 LAB
HCT VFR BLD CALC: 27.2 % — LOW (ref 34.5–45)
HGB BLD-MCNC: 8.6 G/DL — LOW (ref 11.5–15.5)
MCHC RBC-ENTMCNC: 26.7 PG — LOW (ref 27–34)
MCHC RBC-ENTMCNC: 31.6 GM/DL — LOW (ref 32–36)
MCV RBC AUTO: 84.5 FL — SIGNIFICANT CHANGE UP (ref 80–100)
NRBC # BLD: 0 /100 WBCS — SIGNIFICANT CHANGE UP (ref 0–0)
PLATELET # BLD AUTO: 516 K/UL — HIGH (ref 150–400)
RBC # BLD: 3.22 M/UL — LOW (ref 3.8–5.2)
RBC # FLD: 13.9 % — SIGNIFICANT CHANGE UP (ref 10.3–14.5)
WBC # BLD: 15.42 K/UL — HIGH (ref 3.8–10.5)
WBC # FLD AUTO: 15.42 K/UL — HIGH (ref 3.8–10.5)

## 2024-09-30 PROCEDURE — 99233 SBSQ HOSP IP/OBS HIGH 50: CPT

## 2024-09-30 PROCEDURE — 71260 CT THORAX DX C+: CPT | Mod: 26

## 2024-09-30 PROCEDURE — 99239 HOSP IP/OBS DSCHRG MGMT >30: CPT

## 2024-09-30 RX ORDER — 5-HYDROXYTRYPTOPHAN (5-HTP) 100 MG
3 TABLET,DISINTEGRATING ORAL ONCE
Refills: 0 | Status: COMPLETED | OUTPATIENT
Start: 2024-09-30 | End: 2024-09-30

## 2024-09-30 RX ADMIN — MODAFINIL 200 MILLIGRAM(S): 100 TABLET ORAL at 12:01

## 2024-09-30 RX ADMIN — SERTRALINE HYDROCHLORIDE 125 MILLIGRAM(S): 100 TABLET, FILM COATED ORAL at 12:02

## 2024-09-30 RX ADMIN — MUPIROCIN 1 APPLICATION(S): 20 OINTMENT TOPICAL at 17:40

## 2024-09-30 RX ADMIN — HYDROMORPHONE HYDROCHLORIDE 4 MILLIGRAM(S): 1 INJECTION, SOLUTION INTRAMUSCULAR; INTRAVENOUS; SUBCUTANEOUS at 09:12

## 2024-09-30 RX ADMIN — Medication 3 MILLIGRAM(S): at 02:17

## 2024-09-30 RX ADMIN — MEROPENEM 100 MILLIGRAM(S): 500 INJECTION INTRAVENOUS at 21:47

## 2024-09-30 RX ADMIN — MUPIROCIN 1 APPLICATION(S): 20 OINTMENT TOPICAL at 13:43

## 2024-09-30 RX ADMIN — Medication 3 MILLIGRAM(S): at 21:48

## 2024-09-30 RX ADMIN — Medication 17 GRAM(S): at 17:40

## 2024-09-30 RX ADMIN — Medication 500 MILLIGRAM(S): at 23:50

## 2024-09-30 RX ADMIN — Medication 2 TABLET(S): at 21:48

## 2024-09-30 RX ADMIN — Medication 650 MILLIGRAM(S): at 17:44

## 2024-09-30 RX ADMIN — Medication 500 MILLIGRAM(S): at 17:40

## 2024-09-30 RX ADMIN — MEROPENEM 100 MILLIGRAM(S): 500 INJECTION INTRAVENOUS at 06:00

## 2024-09-30 RX ADMIN — MULTI VITAMIN/MINERAL SUPPLEMENT WITH ASCORBIC ACID, NIACIN, PYRIDOXINE, PANTOTHENIC ACID, FOLIC ACID, RIBOFLAVIN, THIAMIN, BIOTIN, COBALAMIN AND ZINC. 1 TABLET(S): 60; 20; 12.5; 10; 10; 1.7; 1.5; 1; .3; .006 TABLET, COATED ORAL at 12:01

## 2024-09-30 RX ADMIN — Medication 500 MILLIGRAM(S): at 12:01

## 2024-09-30 RX ADMIN — CHLORHEXIDINE GLUCONATE ORAL RINSE 1 APPLICATION(S): 1.2 SOLUTION DENTAL at 13:44

## 2024-09-30 RX ADMIN — Medication 60 MILLIGRAM(S): at 21:47

## 2024-09-30 RX ADMIN — Medication 500 MILLIGRAM(S): at 05:59

## 2024-09-30 RX ADMIN — ENOXAPARIN SODIUM 40 MILLIGRAM(S): 150 INJECTION SUBCUTANEOUS at 21:47

## 2024-09-30 RX ADMIN — DAPTOMYCIN 116 MILLIGRAM(S): 500 INJECTION, POWDER, LYOPHILIZED, FOR SOLUTION INTRAVENOUS at 13:43

## 2024-09-30 RX ADMIN — HYDROMORPHONE HYDROCHLORIDE 4 MILLIGRAM(S): 1 INJECTION, SOLUTION INTRAMUSCULAR; INTRAVENOUS; SUBCUTANEOUS at 08:12

## 2024-09-30 RX ADMIN — MEROPENEM 100 MILLIGRAM(S): 500 INJECTION INTRAVENOUS at 13:44

## 2024-09-30 NOTE — DISCHARGE NOTE NURSING/CASE MANAGEMENT/SOCIAL WORK - NSSCTYPOFSERV_GEN_ALL_CORE
[facility] at home (Registered Nurse/Physical Therapy/Occupational therapy)/[facility] at Home Infusion ([facility]) IV antibiotics

## 2024-09-30 NOTE — PROGRESS NOTE ADULT - ASSESSMENT
44F with PMH of MS on Rituxan, anxiety, depression, septic arthritis of L sternoclavicular joint s/p multiple I&Ds in 2023, recent recurrence s/p I&D with CTS and pec turnover flap with Dr. Dean on 9/9, d/c'ed with PICC line on vanc/bryson, now presenting with redness and increased pain. PRS consulted for possible surgical site infection. CT chest shows normal postop changes with possible small hematoma around flap, LISBETH drains within area, no abscess/collection. Small PE without heart strain noted. Repeat CT from 9/18 now shows rim enhancing collections at the surgical site. Patient was taken back to the OR for washout on 9/26. Recovering appropriately, no barriers to discharge from PRS perspective at this time. Patient to follow up with Dr. Dean as outpatient 1-week after discharge.       Plan:  - BID dressing changes by nursing team: Mupirocin ointment and dry gauze  - LISBETH drain to remain in place, please monitor drain output  - abx per ID, abx for discharged will be planned based on intraop cultures  - remainder of care per primary - please contact plastics if any concerns and if status changes  - No barriers to discharge from PRS perspective  - Patient to follow up with Dr. Dean in his office 1-week after discharge     Leroy Cagle, PGY1  Plastic Surgery   [phone] [facility] pager  Available on teams

## 2024-09-30 NOTE — PROGRESS NOTE ADULT - ASSESSMENT
44y Female with PMH of MS on Ocrevus anxiety, depression, septic arthritis of L sternoclavicular joint s/p I&Dx2 and cefepime for 6 weeks (12/2023-1/2024), recent admission 9/9-9/13 for L clavicular pain s/p I+D with L pectoralis flap sent home on meropenem and vancomycin for 6 week course presents for L shoulder pain, L sided pel chest pain, headache, fever at home. Son on phone also notes that the surgical site appeared more erythematous day prior to admission and had associated L arm swelling.    Admitted 9/9-9/13 most recently; MRI L clavicle with prior resection of the medial left clavicle with rim-enhancing fluid and surrounding phlegmon abutting the resected bony margin. S/p surgery on 9/9 with L SCJ debridement by CT surgery and L pec flap by PRS. Cultures grew Staph epi and Corynebacterium in fluid media, blood cultures negative. Discharged on tentatively 6 weeks vancomycin and meropenem for empiric coverage.    Here Tmax 100.1, mild tachycardia  WBC 12.1, neutrophilic. Lactate wnl  , ALT 78,     CT chest: Small nonocclusive right lower lobe basilar subsegmental pulmonary emboli, no evidence of right heart strain. 14 cm in greatest dimension expansile mostly soft tissue density structure filling and expanding the operative bed in the region of the left medial clavicle (thought to represent combination of muscle)    MICRO DATA:   09/15 BCX: IP   09/15 BCX: IP   09/10 AFB Cx: IP  09/10 Fungal CX: IP  09/10 Bacterial CX: fluid media only- corynebacterium striatum and staph epidermidis     # Low grade Fever, leukocytosis  # Septic sternoclavicular joint s/p debridement and muscle flap L clavicle  # Post-Operative abscess      Abnormal CT with rim enhancing collection in under the flap, purulent drainage noted from sternal side of the incision.       RECOMMENDATIONS:   - c/w dapto 400 mg q24h  - recent CPK noted, normal, check CPK once a week.   - Continue IV meropenem 1g q8h  - blood cx with documented GNR, not real, artifact.   - repeat blood cx NTD   - thoracic/plastic surgery following,   - s/p OR and wash out, follow up OR cx. NTD  Trend WBC, rising leucocytosis, given continued discharge recommend repeat CT chest with contrast.         Plan discussed with Medicine       Odalis Reynaga  Please contact through MS Teams   If no response or past 5 pm/weekend call [phone].

## 2024-09-30 NOTE — PROGRESS NOTE ADULT - SUBJECTIVE AND OBJECTIVE BOX
Surgery Progress Note  Patient is a 44y old  Female who presents with a chief complaint of L chest pain, SIRS, Acute Pulmonary Embolism (29 Sep 2024 11:22)      SUBJECTIVE: Patient seen and examined at bedside with surgical team, patient without complaints.     Physical Exam  Constitutional: resting in bed comfortably, in no acute distress.   Respiratory: breathing comfortably on RA, no increased WOB, no tachypnea   Chest: Dressing taken down to examine incision. Incision c/d/i with no active drainage or bleeding. LISBETH in place with SS output in bulb. Mupirocin ointment applied to incision and dressing replaced.   Ext: warm and well perfused    Vital Signs Last 24 Hrs  T(C): 36.3 (30 Sep 2024 04:46), Max: 36.7 (29 Sep 2024 16:13)  T(F): 97.4 (30 Sep 2024 04:46), Max: 98 (29 Sep 2024 16:13)  HR: 87 (30 Sep 2024 04:46) (80 - 87)  BP: 95/62 (30 Sep 2024 04:46) (95/57 - 95/62)  BP(mean): --  RR: 18 (30 Sep 2024 04:46) (18 - 18)  SpO2: 92% (30 Sep 2024 04:46) (92% - 99%)    Parameters below as of 30 Sep 2024 04:46  Patient On (Oxygen Delivery Method): room air        I&O's Detail    29 Sep 2024 07:01  -  30 Sep 2024 07:00  --------------------------------------------------------  IN:  Total IN: 0 mL    OUT:    Bulb (mL): 55 mL    Voided (mL): 700 mL  Total OUT: 755 mL    Total NET: -755 mL      MEDICATIONS  (STANDING):  chlorhexidine 2% Cloths 1 Application(s) Topical daily  DAPTOmycin IVPB 400 milliGRAM(s) IV Intermittent every 24 hours  DULoxetine 60 milliGRAM(s) Oral at bedtime  enoxaparin Injectable 40 milliGRAM(s) SubCutaneous every 24 hours  gemtesa tablet 75 milliGRAM(s) 75 milliGRAM(s) Oral daily  influenza   Vaccine 0.5 milliLiter(s) IntraMuscular once  magnesium citrate Oral Solution 296 milliLiter(s) Oral once  melatonin 3 milliGRAM(s) Oral at bedtime  meropenem  IVPB 1000 milliGRAM(s) IV Intermittent every 8 hours  methocarbamol 500 milliGRAM(s) Oral four times a day  modafinil 200 milliGRAM(s) Oral daily  multivitamin 1 Tablet(s) Oral daily  mupirocin 2% Ointment 1 Application(s) Topical two times a day  pantoprazole    Tablet 40 milliGRAM(s) Oral before breakfast  polyethylene glycol 3350 17 Gram(s) Oral two times a day  senna 2 Tablet(s) Oral at bedtime  sertraline 125 milliGRAM(s) Oral daily    MEDICATIONS  (PRN):  acetaminophen     Tablet .. 650 milliGRAM(s) Oral every 6 hours PRN Mild Pain (1 - 3)  baclofen 5 milliGRAM(s) Oral every 8 hours PRN Muscle Spasm  clonazePAM  Tablet 0.5 milliGRAM(s) Oral daily PRN for anxiety  HYDROmorphone   Tablet 4 milliGRAM(s) Oral every 8 hours PRN Severe Pain (7 - 10)  HYDROmorphone   Tablet 2 milliGRAM(s) Oral every 6 hours PRN Moderate Pain (4 - 6)      LABS:

## 2024-09-30 NOTE — DISCHARGE NOTE NURSING/CASE MANAGEMENT/SOCIAL WORK - NSSCCONTNUM_GEN_ALL_CORE
[facility] at home (phone [phone]  )/ [facility] at Home Infusion ([facility] care)  [phone]

## 2024-09-30 NOTE — DISCHARGE NOTE NURSING/CASE MANAGEMENT/SOCIAL WORK - NSDCPEFALRISK_GEN_ALL_CORE
For information on Fall & Injury Prevention, visit: [email or web address] OR  [email or web address] OR  https://www.cdc.gov/steadi/patient.html

## 2024-09-30 NOTE — DISCHARGE NOTE NURSING/CASE MANAGEMENT/SOCIAL WORK - PATIENT PORTAL LINK FT
You can access the FollowMyHealth Patient Portal offered by [facility] by registering at the following website: [email or web address]. By joining [employer]’s FollowMyHealth portal, you will also be able to view your health information using other applications (apps) compatible with our system.

## 2024-09-30 NOTE — DISCHARGE NOTE NURSING/CASE MANAGEMENT/SOCIAL WORK - NSSCNAMETXT_GEN_ALL_CORE
[facility] At Home (home care/RN/PT)/ [facility] at Home Infusion ([facility])

## 2024-09-30 NOTE — PROGRESS NOTE ADULT - ATTENDING COMMENTS
Pt seen with housestaff, Dr. Ahuja, this morning. Pt's daughter at bedside. Pt and family expressing anxiety regarding discharge planning. Requesting repeat lab work to be performed, requesting imaging to reassess infection. Discussed with the patient and family that pt's blood work may not necessarily be 'normal' if we were to check it; however, given plan for ongoing treatment with IV antibiotics this still would not preclude discharge as the patient remains clinically stable. They understand that the patient will need long course IV antibiotics, at least until 10/24, as recommended by ID. Pt and family requesting to speak with ID.    #Septic sternoclavicular joint s/p debridement and muscle flap L clavicle, postop abscess  #anemia likely from recent surgery and frequent phlebotomy +/- hemodilutional effect  #subsegmental PE  - Abnormal CT with new rim enhancing collections in under the flap with purulent drainage from sternal side of the incision and serosanguinous cloudy fluid in LISBETH drain.   - s/p OR for wound wash out with antibiotic bead placement on 9/25 per plastics  - Blood cx from 9/15-- no growth. Subsequent repeat blood cx have been all negative to date  - wound cx result (fluid media + S. epi, candida orthopsilosis, lactobacillus rhamnosus)  - currently on Dapto and Meropenem per ID. per discussion with ID attending, hold off on starting antifungal treatment.  - f/u OR cultures, if no change, plan to continue with current abx regimen through 10/28/24. She already has RUE PICC in place.  - monitor leukocytosis  - Hgb improved appropriately after PRBC transfusion on 9/24   - +small nonocclusive right lower lobe basilar subsegmental PE noted on CTA chest (9/15), LE duplex neg (9/15, 9/25), will defer full AC at this time as risk felt to outweigh benefit and f/u serial LE duplex and continue prophylactic dose of lovenox for now.  - d/c plan home once home care set up.     Will ask ID to speak with the patient. Plan to discharge today after their discussion so long as home care and home IV antibiotics is set up.

## 2024-09-30 NOTE — PROGRESS NOTE ADULT - SUBJECTIVE AND OBJECTIVE BOX
44yPatient is a 44y old  Female who presents with a chief complaint of L chest pain, SIRS, Acute Pulmonary Embolism (30 Sep 2024 08:28)      Interval history:  Afebrile, oozing from the distal aspect of the incision laterally.       Allergies:   No Known Allergies      Antimicrobials:  DAPTOmycin IVPB 400 milliGRAM(s) IV Intermittent every 24 hours  meropenem  IVPB 1000 milliGRAM(s) IV Intermittent every 8 hours      REVIEW OF SYSTEMS:   No SOB  No abdominal pain  No rash.       Vital Signs Last 24 Hrs  T(C): 36.7 (09-30-24 @ 17:03), Max: 36.7 (09-30-24 @ 08:48)  T(F): 98.1 (09-30-24 @ 17:03), Max: 98.1 (09-30-24 @ 17:03)  HR: 80 (09-30-24 @ 17:03) (80 - 87)  BP: 99/67 (09-30-24 @ 17:03) (95/62 - 99/67)  BP(mean): --  RR: 18 (09-30-24 @ 17:03) (18 - 18)  SpO2: 98% (09-30-24 @ 17:03) (92% - 98%)      PHYSICAL EXAM:  Pt in no acute distress, alert, awake.   rt arm PICC   lt sided graft site with dressing, oozing from lateral incision   1 LISBETH's with thick cloudy appearing blood   non distended abdomen  no edema LE                             8.6    15.42 )-----------( 516      ( 30 Sep 2024 10:20 )             27.2             Culture - Wound Aerobic/Anaerobic (09.25.24 @ 18:00)   Specimen Source: Swab  Culture Results:   No growth to date

## 2024-09-30 NOTE — PROGRESS NOTE ADULT - SUBJECTIVE AND OBJECTIVE BOX
***************************************************************  Greta Ahuja, PGY-3  Internal Medicine   pager: [phone]  ***************************************************************    GRETA AGGARWAL  MRN: [id number]    Patient is a 44y old Female who presents with a chief complaint of L chest pain, SIRS, Acute Pulmonary Embolism (30 Sep 2024 07:58)    Subjective: No acute events overnight.      MEDICATIONS  (STANDING):  chlorhexidine 2% Cloths 1 Application(s) Topical daily  DAPTOmycin IVPB 400 milliGRAM(s) IV Intermittent every 24 hours  DULoxetine 60 milliGRAM(s) Oral at bedtime  enoxaparin Injectable 40 milliGRAM(s) SubCutaneous every 24 hours  gemtesa tablet 75 milliGRAM(s) 75 milliGRAM(s) Oral daily  influenza   Vaccine 0.5 milliLiter(s) IntraMuscular once  magnesium citrate Oral Solution 296 milliLiter(s) Oral once  melatonin 3 milliGRAM(s) Oral at bedtime  meropenem  IVPB 1000 milliGRAM(s) IV Intermittent every 8 hours  methocarbamol 500 milliGRAM(s) Oral four times a day  modafinil 200 milliGRAM(s) Oral daily  multivitamin 1 Tablet(s) Oral daily  mupirocin 2% Ointment 1 Application(s) Topical two times a day  pantoprazole    Tablet 40 milliGRAM(s) Oral before breakfast  polyethylene glycol 3350 17 Gram(s) Oral two times a day  senna 2 Tablet(s) Oral at bedtime  sertraline 125 milliGRAM(s) Oral daily    MEDICATIONS  (PRN):  acetaminophen     Tablet .. 650 milliGRAM(s) Oral every 6 hours PRN Mild Pain (1 - 3)  baclofen 5 milliGRAM(s) Oral every 8 hours PRN Muscle Spasm  clonazePAM  Tablet 0.5 milliGRAM(s) Oral daily PRN for anxiety  HYDROmorphone   Tablet 4 milliGRAM(s) Oral every 8 hours PRN Severe Pain (7 - 10)  HYDROmorphone   Tablet 2 milliGRAM(s) Oral every 6 hours PRN Moderate Pain (4 - 6)    Objective:  Vitals: Vital Signs Last 24 Hrs  T(C): 36.3 (09-30-24 @ 04:46), Max: 36.7 (09-29-24 @ 16:13)  T(F): 97.4 (09-30-24 @ 04:46), Max: 98 (09-29-24 @ 16:13)  HR: 87 (09-30-24 @ 04:46) (80 - 87)  BP: 95/62 (09-30-24 @ 04:46) (95/57 - 95/62)  RR: 18 (09-30-24 @ 04:46) (18 - 18)  SpO2: 92% (09-30-24 @ 04:46) (92% - 99%)               I&O's Summary    29 Sep 2024 07:01  -  30 Sep 2024 07:00  --------------------------------------------------------  IN: 0 mL / OUT: 755 mL / NET: -755 mL    PHYSICAL EXAM:  General: NAD, resting in bed, on RA  HEENT: AT/NC, EOMI, PERRLA, clear conjunctiva and sclera  Lungs: clear to auscultation in anterior lung fields, no wheezes/rhonchi/rales  Heart: +s1/s2, RRR, no murmurs/gallops/rubs  Abdomen: soft, non-distended, non-tender to palpation  Extremities: +DP pulse bilaterally, no cyanosis/clubbing/edema    LABS:                        8.0    13.29 )-----------( 534      ( 28 Sep 2024 07:13 )             25.3                         8.3    16.67 )-----------( 573      ( 27 Sep 2024 10:24 )             26.1     CAPILLARY BLOOD GLUCOSE:    RADIOLOGY & ADDITIONAL TESTS:    Imaging Personally Reviewed:  [x] YES  [ ] NO    Consultants involved in case:   Consultant(s) Notes Reviewed:  [x] YES  [ ] NO:   Care Discussed with Consultants/Other Providers [x] YES  [ ] NO

## 2024-10-01 ENCOUNTER — APPOINTMENT (OUTPATIENT)
Dept: INTERNAL MEDICINE | Facility: CLINIC | Age: 44
End: 2024-10-01

## 2024-10-01 VITALS
HEART RATE: 80 BPM | OXYGEN SATURATION: 98 % | RESPIRATION RATE: 18 BRPM | SYSTOLIC BLOOD PRESSURE: 110 MMHG | DIASTOLIC BLOOD PRESSURE: 74 MMHG | TEMPERATURE: 98 F

## 2024-10-01 LAB
BASOPHILS # BLD AUTO: 0.02 K/UL — SIGNIFICANT CHANGE UP (ref 0–0.2)
BASOPHILS NFR BLD AUTO: 0.2 % — SIGNIFICANT CHANGE UP (ref 0–2)
CULTURE RESULTS: SIGNIFICANT CHANGE UP
CULTURE RESULTS: SIGNIFICANT CHANGE UP
EOSINOPHIL # BLD AUTO: 0.51 K/UL — HIGH (ref 0–0.5)
EOSINOPHIL NFR BLD AUTO: 4.1 % — SIGNIFICANT CHANGE UP (ref 0–6)
HCT VFR BLD CALC: 27.7 % — LOW (ref 34.5–45)
HGB BLD-MCNC: 8.6 G/DL — LOW (ref 11.5–15.5)
IMM GRANULOCYTES NFR BLD AUTO: 0.7 % — SIGNIFICANT CHANGE UP (ref 0–0.9)
LYMPHOCYTES # BLD AUTO: 1.67 K/UL — SIGNIFICANT CHANGE UP (ref 1–3.3)
LYMPHOCYTES # BLD AUTO: 13.3 % — SIGNIFICANT CHANGE UP (ref 13–44)
MCHC RBC-ENTMCNC: 26.2 PG — LOW (ref 27–34)
MCHC RBC-ENTMCNC: 31 GM/DL — LOW (ref 32–36)
MCV RBC AUTO: 84.5 FL — SIGNIFICANT CHANGE UP (ref 80–100)
MONOCYTES # BLD AUTO: 1.02 K/UL — HIGH (ref 0–0.9)
MONOCYTES NFR BLD AUTO: 8.1 % — SIGNIFICANT CHANGE UP (ref 2–14)
NEUTROPHILS # BLD AUTO: 9.25 K/UL — HIGH (ref 1.8–7.4)
NEUTROPHILS NFR BLD AUTO: 73.6 % — SIGNIFICANT CHANGE UP (ref 43–77)
NRBC # BLD: 0 /100 WBCS — SIGNIFICANT CHANGE UP (ref 0–0)
PLATELET # BLD AUTO: 566 K/UL — HIGH (ref 150–400)
RBC # BLD: 3.28 M/UL — LOW (ref 3.8–5.2)
RBC # FLD: 14.1 % — SIGNIFICANT CHANGE UP (ref 10.3–14.5)
SPECIMEN SOURCE: SIGNIFICANT CHANGE UP
SPECIMEN SOURCE: SIGNIFICANT CHANGE UP
WBC # BLD: 12.56 K/UL — HIGH (ref 3.8–10.5)
WBC # FLD AUTO: 12.56 K/UL — HIGH (ref 3.8–10.5)

## 2024-10-01 PROCEDURE — 86901 BLOOD TYPING SEROLOGIC RH(D): CPT

## 2024-10-01 PROCEDURE — 97161 PT EVAL LOW COMPLEX 20 MIN: CPT

## 2024-10-01 PROCEDURE — 71045 X-RAY EXAM CHEST 1 VIEW: CPT

## 2024-10-01 PROCEDURE — 84702 CHORIONIC GONADOTROPIN TEST: CPT

## 2024-10-01 PROCEDURE — 86902 BLOOD TYPE ANTIGEN DONOR EA: CPT

## 2024-10-01 PROCEDURE — 85018 HEMOGLOBIN: CPT

## 2024-10-01 PROCEDURE — 84295 ASSAY OF SERUM SODIUM: CPT

## 2024-10-01 PROCEDURE — 83615 LACTATE (LD) (LDH) ENZYME: CPT

## 2024-10-01 PROCEDURE — 86850 RBC ANTIBODY SCREEN: CPT

## 2024-10-01 PROCEDURE — 97110 THERAPEUTIC EXERCISES: CPT

## 2024-10-01 PROCEDURE — 85027 COMPLETE CBC AUTOMATED: CPT

## 2024-10-01 PROCEDURE — 96366 THER/PROPH/DIAG IV INF ADDON: CPT

## 2024-10-01 PROCEDURE — 99232 SBSQ HOSP IP/OBS MODERATE 35: CPT

## 2024-10-01 PROCEDURE — 73000 X-RAY EXAM OF COLLAR BONE: CPT

## 2024-10-01 PROCEDURE — 93970 EXTREMITY STUDY: CPT

## 2024-10-01 PROCEDURE — 85025 COMPLETE CBC W/AUTO DIFF WBC: CPT

## 2024-10-01 PROCEDURE — 87086 URINE CULTURE/COLONY COUNT: CPT

## 2024-10-01 PROCEDURE — 80048 BASIC METABOLIC PNL TOTAL CA: CPT

## 2024-10-01 PROCEDURE — 87641 MR-STAPH DNA AMP PROBE: CPT

## 2024-10-01 PROCEDURE — 80076 HEPATIC FUNCTION PANEL: CPT

## 2024-10-01 PROCEDURE — 83010 ASSAY OF HAPTOGLOBIN QUANT: CPT

## 2024-10-01 PROCEDURE — 36415 COLL VENOUS BLD VENIPUNCTURE: CPT

## 2024-10-01 PROCEDURE — 87077 CULTURE AEROBIC IDENTIFY: CPT

## 2024-10-01 PROCEDURE — 87206 SMEAR FLUORESCENT/ACID STAI: CPT

## 2024-10-01 PROCEDURE — 96375 TX/PRO/DX INJ NEW DRUG ADDON: CPT

## 2024-10-01 PROCEDURE — 86900 BLOOD TYPING SEROLOGIC ABO: CPT

## 2024-10-01 PROCEDURE — C9399: CPT

## 2024-10-01 PROCEDURE — 84132 ASSAY OF SERUM POTASSIUM: CPT

## 2024-10-01 PROCEDURE — P9040: CPT

## 2024-10-01 PROCEDURE — 83735 ASSAY OF MAGNESIUM: CPT

## 2024-10-01 PROCEDURE — 36430 TRANSFUSION BLD/BLD COMPNT: CPT

## 2024-10-01 PROCEDURE — 82330 ASSAY OF CALCIUM: CPT

## 2024-10-01 PROCEDURE — 85730 THROMBOPLASTIN TIME PARTIAL: CPT

## 2024-10-01 PROCEDURE — 87637 SARSCOV2&INF A&B&RSV AMP PRB: CPT

## 2024-10-01 PROCEDURE — 80053 COMPREHEN METABOLIC PANEL: CPT

## 2024-10-01 PROCEDURE — 84484 ASSAY OF TROPONIN QUANT: CPT

## 2024-10-01 PROCEDURE — 87102 FUNGUS ISOLATION CULTURE: CPT

## 2024-10-01 PROCEDURE — 71260 CT THORAX DX C+: CPT | Mod: MC

## 2024-10-01 PROCEDURE — 85610 PROTHROMBIN TIME: CPT

## 2024-10-01 PROCEDURE — 81001 URINALYSIS AUTO W/SCOPE: CPT

## 2024-10-01 PROCEDURE — 82947 ASSAY GLUCOSE BLOOD QUANT: CPT

## 2024-10-01 PROCEDURE — 82803 BLOOD GASES ANY COMBINATION: CPT

## 2024-10-01 PROCEDURE — 87075 CULTR BACTERIA EXCEPT BLOOD: CPT

## 2024-10-01 PROCEDURE — 87040 BLOOD CULTURE FOR BACTERIA: CPT

## 2024-10-01 PROCEDURE — 83605 ASSAY OF LACTIC ACID: CPT

## 2024-10-01 PROCEDURE — 86922 COMPATIBILITY TEST ANTIGLOB: CPT

## 2024-10-01 PROCEDURE — 84145 PROCALCITONIN (PCT): CPT

## 2024-10-01 PROCEDURE — 87070 CULTURE OTHR SPECIMN AEROBIC: CPT

## 2024-10-01 PROCEDURE — 96365 THER/PROPH/DIAG IV INF INIT: CPT

## 2024-10-01 PROCEDURE — C1713: CPT

## 2024-10-01 PROCEDURE — 97116 GAIT TRAINING THERAPY: CPT

## 2024-10-01 PROCEDURE — 82550 ASSAY OF CK (CPK): CPT

## 2024-10-01 PROCEDURE — 80202 ASSAY OF VANCOMYCIN: CPT

## 2024-10-01 PROCEDURE — 99233 SBSQ HOSP IP/OBS HIGH 50: CPT

## 2024-10-01 PROCEDURE — 97165 OT EVAL LOW COMPLEX 30 MIN: CPT

## 2024-10-01 PROCEDURE — 97530 THERAPEUTIC ACTIVITIES: CPT

## 2024-10-01 PROCEDURE — 82607 VITAMIN B-12: CPT

## 2024-10-01 PROCEDURE — 74177 CT ABD & PELVIS W/CONTRAST: CPT | Mod: MC

## 2024-10-01 PROCEDURE — 84100 ASSAY OF PHOSPHORUS: CPT

## 2024-10-01 PROCEDURE — 87116 MYCOBACTERIA CULTURE: CPT

## 2024-10-01 PROCEDURE — 83880 ASSAY OF NATRIURETIC PEPTIDE: CPT

## 2024-10-01 PROCEDURE — 87640 STAPH A DNA AMP PROBE: CPT

## 2024-10-01 PROCEDURE — 86880 COOMBS TEST DIRECT: CPT

## 2024-10-01 PROCEDURE — 82728 ASSAY OF FERRITIN: CPT

## 2024-10-01 PROCEDURE — 85014 HEMATOCRIT: CPT

## 2024-10-01 PROCEDURE — 85045 AUTOMATED RETICULOCYTE COUNT: CPT

## 2024-10-01 PROCEDURE — 0225U NFCT DS DNA&RNA 21 SARSCOV2: CPT

## 2024-10-01 PROCEDURE — 87205 SMEAR GRAM STAIN: CPT

## 2024-10-01 PROCEDURE — 82435 ASSAY OF BLOOD CHLORIDE: CPT

## 2024-10-01 PROCEDURE — 99285 EMERGENCY DEPT VISIT HI MDM: CPT | Mod: 25

## 2024-10-01 PROCEDURE — 87150 DNA/RNA AMPLIFIED PROBE: CPT

## 2024-10-01 PROCEDURE — 86140 C-REACTIVE PROTEIN: CPT

## 2024-10-01 PROCEDURE — 82746 ASSAY OF FOLIC ACID SERUM: CPT

## 2024-10-01 PROCEDURE — 93308 TTE F-UP OR LMTD: CPT

## 2024-10-01 PROCEDURE — 76705 ECHO EXAM OF ABDOMEN: CPT

## 2024-10-01 RX ADMIN — Medication 500 MILLIGRAM(S): at 12:04

## 2024-10-01 RX ADMIN — HYDROMORPHONE HYDROCHLORIDE 2 MILLIGRAM(S): 1 INJECTION, SOLUTION INTRAMUSCULAR; INTRAVENOUS; SUBCUTANEOUS at 11:15

## 2024-10-01 RX ADMIN — PANTOPRAZOLE SODIUM 40 MILLIGRAM(S): 40 TABLET, DELAYED RELEASE ORAL at 06:23

## 2024-10-01 RX ADMIN — MEROPENEM 100 MILLIGRAM(S): 500 INJECTION INTRAVENOUS at 06:22

## 2024-10-01 RX ADMIN — MODAFINIL 200 MILLIGRAM(S): 100 TABLET ORAL at 12:04

## 2024-10-01 RX ADMIN — Medication 650 MILLIGRAM(S): at 03:57

## 2024-10-01 RX ADMIN — MEROPENEM 100 MILLIGRAM(S): 500 INJECTION INTRAVENOUS at 14:43

## 2024-10-01 RX ADMIN — DAPTOMYCIN 116 MILLIGRAM(S): 500 INJECTION, POWDER, LYOPHILIZED, FOR SOLUTION INTRAVENOUS at 13:33

## 2024-10-01 RX ADMIN — CHLORHEXIDINE GLUCONATE ORAL RINSE 1 APPLICATION(S): 1.2 SOLUTION DENTAL at 12:03

## 2024-10-01 RX ADMIN — Medication 500 MILLIGRAM(S): at 06:23

## 2024-10-01 RX ADMIN — Medication 650 MILLIGRAM(S): at 02:57

## 2024-10-01 RX ADMIN — Medication 17 GRAM(S): at 06:23

## 2024-10-01 RX ADMIN — MULTI VITAMIN/MINERAL SUPPLEMENT WITH ASCORBIC ACID, NIACIN, PYRIDOXINE, PANTOTHENIC ACID, FOLIC ACID, RIBOFLAVIN, THIAMIN, BIOTIN, COBALAMIN AND ZINC. 1 TABLET(S): 60; 20; 12.5; 10; 10; 1.7; 1.5; 1; .3; .006 TABLET, COATED ORAL at 12:04

## 2024-10-01 RX ADMIN — HYDROMORPHONE HYDROCHLORIDE 2 MILLIGRAM(S): 1 INJECTION, SOLUTION INTRAMUSCULAR; INTRAVENOUS; SUBCUTANEOUS at 10:16

## 2024-10-01 RX ADMIN — MUPIROCIN 1 APPLICATION(S): 20 OINTMENT TOPICAL at 06:23

## 2024-10-01 RX ADMIN — SERTRALINE HYDROCHLORIDE 125 MILLIGRAM(S): 100 TABLET, FILM COATED ORAL at 12:05

## 2024-10-01 NOTE — PROGRESS NOTE ADULT - ATTENDING SUPERVISION STATEMENT
Resident
Student
Resident
Resident/Student
Resident
Resident

## 2024-10-01 NOTE — PROGRESS NOTE ADULT - PROBLEM SELECTOR PLAN 1
- s/p left SCJ debridement and left pectoral flap on 9/9 with plastics and thoracic surgery  - 9/25/24 -Washout I &D w/ plastics  - LISBETH management by Plastics. Monitor output.   - ID following, currently on IV Dapto and bryson per ID  -10/1 Thoracic surgery reconsulted for purulent drainage from wound incision site and repeat CT chest completed yesterday, showed slightly decreased collection left anterior chest wall fluid, left clavicular resection with unchanged sclerosis and erosion; and very small erosion anterior left third rib. CT Chest reviewed by Dr. Bautista, no acute surgical intervention recommended at this time, can continue medical management on IV abx per ID.  - No further thoracic surgery intervention at this time, will sign off, reconsult prn  - Follow up with Dr. Bautista outpatient in 3 weeks after discharge, call [phone] to schedule an appointment.   - Continue management per plastic surgery and primary team

## 2024-10-01 NOTE — PROGRESS NOTE ADULT - PROBLEM/PLAN-1
DISPLAY PLAN FREE TEXT
Walking

## 2024-10-01 NOTE — PROGRESS NOTE ADULT - PROBLEM SELECTOR PLAN 8
- Fluids: NA   - Electrolytes: Will replete to maintain K>4, Phos>3, and Mag>2  - Nutrition: regular   - Activity: OOB as tolerated   - DVT Prophylaxis: lovenox 40 daily  - Stress Ulcer/GI Prophylaxis: NA  - Disposition: pending medical management

## 2024-10-01 NOTE — PROGRESS NOTE ADULT - PROBLEM SELECTOR PLAN 1
History of septic arthritis with multiple admissions s/p I&D with CTS and L pectoral flap with plastic surgery on last admission (9/4-9/13). Was discharged home on meropenem 1g q8h and van 1250 q12h with reportedly no missed doses.   - repeat CT: Status post left sternoclavicular resection with pectoralis flap. Mixed density soft tissue and/or hematoma in the operative bed, similar in size. Interval development of rim-enhancing collections, concerning for infection.  - s/p 9/25 OR wash out by plastics with new L breast drain, pending cultures, plastics to take down dressing POD3  - no plans for OR by thoracic/PRS  - CT 9/30: Slight decreased size of left anterior chest wall fluid collection. Status post partial resection of the left clavicle. Unchanged sclerosis and erosions at the right sternoclavicular joint and the left first sternocostal joint. New very small erosion of the anterior left third rib. New peripheral groundglass and consolidation in the left upper and lower lobes, likely infectious in the appropriate clinical setting.  - ID: c/w daptomycin 400mg daily (9/16 - 10/24/24)  - ID: c/w meropenem 1g q8h (9/15 - 10/24/24)  - monitor WBC   - multimodal pain control with Dilaudid 2/4 and Tylenol PRN

## 2024-10-01 NOTE — PROGRESS NOTE ADULT - SUBJECTIVE AND OBJECTIVE BOX
Patient is a 44y old  Female who presents with a chief complaint of L chest pain, SIRS, Acute Pulmonary Embolism (01 Oct 2024 14:17)      Vital Signs Last 24 Hrs  T(C): 36.5 (10-01-24 @ 08:45), Max: 36.7 (09-30-24 @ 17:03)  T(F): 97.7 (10-01-24 @ 08:45), Max: 98.1 (09-30-24 @ 17:03)  HR: 78 (10-01-24 @ 08:45) (78 - 80)  BP: 109/72 (10-01-24 @ 08:45) (96/62 - 109/72)  RR: 18 (10-01-24 @ 08:45) (18 - 18)  SpO2: 96% (10-01-24 @ 08:45) (96% - 98%)            09-30-24 @ 07:01  -  10-01-24 @ 07:00  --------------------------------------------------------  IN: 240 mL / OUT: 290 mL / NET: -50 mL                          8.6    12.56 )-----------( 566      ( 01 Oct 2024 07:24 )             27.7       PHYSICAL EXAM  Neurology: A&Ox3, NAD  CV : RRR+S1S2  Lungs: Respirations non-labored, B/L BS CTA   +Left SC with DSD +LSIBETH drain with purulent drainage  Abdomen: Soft, NT/ND, +BSx4Q  Extremities: B/L LE warm, no edema, +PP             MEDICATIONS  acetaminophen     Tablet .. 650 milliGRAM(s) Oral every 6 hours PRN  baclofen 5 milliGRAM(s) Oral every 8 hours PRN  chlorhexidine 2% Cloths 1 Application(s) Topical daily  clonazePAM  Tablet 0.5 milliGRAM(s) Oral daily PRN  DAPTOmycin IVPB 400 milliGRAM(s) IV Intermittent every 24 hours  DULoxetine 60 milliGRAM(s) Oral at bedtime  enoxaparin Injectable 40 milliGRAM(s) SubCutaneous every 24 hours  gemtesa tablet 75 milliGRAM(s) 75 milliGRAM(s) Oral daily  HYDROmorphone   Tablet 4 milliGRAM(s) Oral every 8 hours PRN  HYDROmorphone   Tablet 2 milliGRAM(s) Oral every 6 hours PRN  influenza   Vaccine 0.5 milliLiter(s) IntraMuscular once  magnesium citrate Oral Solution 296 milliLiter(s) Oral once  melatonin 3 milliGRAM(s) Oral at bedtime  meropenem  IVPB 1000 milliGRAM(s) IV Intermittent every 8 hours  methocarbamol 500 milliGRAM(s) Oral four times a day  modafinil 200 milliGRAM(s) Oral daily  multivitamin 1 Tablet(s) Oral daily  mupirocin 2% Ointment 1 Application(s) Topical two times a day  pantoprazole    Tablet 40 milliGRAM(s) Oral before breakfast  polyethylene glycol 3350 17 Gram(s) Oral two times a day  senna 2 Tablet(s) Oral at bedtime  sertraline 125 milliGRAM(s) Oral daily

## 2024-10-01 NOTE — PROGRESS NOTE ADULT - ATTENDING COMMENTS
#Septic sternoclavicular joint s/p debridement and muscle flap L clavicle, postop abscess  #anemia likely from recent surgery and frequent phlebotomy +/- hemodilutional effect  #subsegmental PE  - Abnormal CT with new rim enhancing collections in under the flap with purulent drainage from sternal side of the incision and serosanguinous cloudy fluid in LISBETH drain.   - s/p OR for wound wash out with antibiotic bead placement on 9/25 per plastics  - Blood cx from 9/15-- no growth. Subsequent repeat blood cx have been all negative to date  - wound cx result (fluid media + S. epi, candida orthopsilosis, lactobacillus rhamnosus)  - currently on Dapto and Meropenem per ID. per discussion with ID attending, hold off on starting antifungal treatment.  - f/u OR cultures, if no change, plan to continue with current abx regimen through 10/28/24. She already has RUE PICC in place.  - monitor leukocytosis  - Hgb improved appropriately after PRBC transfusion on 9/24   - +small nonocclusive right lower lobe basilar subsegmental PE noted on CTA chest (9/15), LE duplex neg (9/15, 9/25), will defer full AC at this time as risk felt to outweigh benefit and f/u serial LE duplex and continue prophylactic dose of lovenox for now.  - CT chest completed yesterday, showed slightly decreased collection left anterior chest wall fluid, left clavicular resection with unchanged sclerosis and erosion; and very small erosion anterior left third rib. Team d/w plastics and thoracic surgery regarding the erosion left third rib. Recommended for continued abx treatment; no plan for procedure.    Plan to discharge home today.

## 2024-10-01 NOTE — PROGRESS NOTE ADULT - PROVIDER SPECIALTY LIST ADULT
Infectious Disease
Infectious Disease
Internal Medicine
Plastic Surgery
Thoracic Surgery
Thoracic Surgery
Infectious Disease
Neurology
Neurology
Plastic Surgery
Thoracic Surgery
Thoracic Surgery
CT Surgery
Infectious Disease
Neurology
Neurology
Plastic Surgery
Plastic Surgery
Thoracic Surgery
Thoracic Surgery
CT Surgery
CT Surgery
Infectious Disease
Internal Medicine
Internal Medicine
Plastic Surgery
Thoracic Surgery
Internal Medicine
Thoracic Surgery
Internal Medicine

## 2024-10-01 NOTE — PROGRESS NOTE ADULT - ASSESSMENT
44y Female with PMH of MS on Ocrevus anxiety, depression, septic arthritis of L sternoclavicular joint s/p I&Dx2 and cefepime for 6 weeks (12/2023-1/2024), recent admission 9/9-9/13 for L clavicular pain s/p I+D with L pectoralis flap sent home on meropenem and vancomycin for 6 week course presents for L shoulder pain, L sided pel chest pain, headache, fever at home. Son on phone also notes that the surgical site appeared more erythematous day prior to admission and had associated L arm swelling.    Admitted 9/9-9/13 most recently; MRI L clavicle with prior resection of the medial left clavicle with rim-enhancing fluid and surrounding phlegmon abutting the resected bony margin. S/p surgery on 9/9 with L SCJ debridement by CT surgery and L pec flap by PRS. Cultures grew Staph epi and Corynebacterium in fluid media, blood cultures negative. Discharged on tentatively 6 weeks vancomycin and meropenem for empiric coverage.    Here Tmax 100.1, mild tachycardia  WBC 12.1, neutrophilic. Lactate wnl  , ALT 78,     CT chest: Small nonocclusive right lower lobe basilar subsegmental pulmonary emboli, no evidence of right heart strain. 14 cm in greatest dimension expansile mostly soft tissue density structure filling and expanding the operative bed in the region of the left medial clavicle (thought to represent combination of muscle)    MICRO DATA:   09/15 BCX: IP   09/15 BCX: IP   09/10 AFB Cx: IP  09/10 Fungal CX: IP  09/10 Bacterial CX: fluid media only- corynebacterium striatum and staph epidermidis     # Low grade Fever, leukocytosis  # Septic sternoclavicular joint s/p debridement and muscle flap L clavicle  # Post-Operative abscess      Abnormal CT with rim enhancing collection in under the flap, purulent drainage noted from sternal side of the incision.       RECOMMENDATIONS:   - c/w dapto 400 mg q24h  - recent CPK noted, normal, check CPK once a week.   - Continue IV meropenem 1g q8h  - blood cx with documented GNR, not real, artifact.   - repeat blood cx NTD   - thoracic/plastic surgery following,   - s/p OR and wash out, follow up OR cx. NTD  Trend WBC, downtrending leucocytosis, s/p repeat CT chest with contrast, no obvious collection, new erosion of 3rd rib.   Discussed with thoracic, no intervention planned.  discussed with plastics, regarding drainage, c/w abx.  Reviewed CT with radiology.   plan for 6 weeks of abx until 11/6/24. cbc, cmp, cpk once a week.   pt to follow with me in 3 weeks, appt provided.         Plan discussed with Medicine       Odalis Reynaga  Please contact through [employer] Teams   If no response or past 5 pm/weekend call [phone].

## 2024-10-01 NOTE — PROGRESS NOTE ADULT - ASSESSMENT
44F s/p left SCJ debridement and left pectoral flap on 9/9 presenting with superficial surgical site erythema and localized pain. Also found to have small subsegmental right lower lobe pulmonary emboli.  Recommendations:  -No acute thoracic surgery operative intervention at this time  -Dispo per PRS    9/16: Evaluated at ED bedside by Thoracic Attending Dr. Bautista. Recommend close ID follow up for cellulitis of site. Vanco trough level noted to be 5.5, ? unclear if patient was taking antibiotics appropriately in outpatient setting.   9/17 Antibiotic switched to dapto per ID. Site improving  9/18 VSS  afebrile.  site with decreased erythema  9/19 VSS afebile site  COLTON improving - OT daily-  large stool burden seen on CT d/w Primary team consider MOVANTIK / suppository   9/20 c/w dapto 400 mg q24h. Continue IV meropenem 1g q8h Follow-up final identification and sensitivity of GNR, Repeat blood cx   9/21 9/21 maintain jpss. analgesics for pain, revision to be considered  9/22 No events overnight. Pain better controlled  9/23 VSS; Maintain LISBETH drains, wound care as per plastics.   9/24 VSS: As per plastic surgery, plan for flap revision today?   9/25 VSS NPO for Washout with Plastic Surgert today   9/26 vss s/p washout w/ plastics  LISBETH drain as per plastics  -ss  9/27 VSS up in chair bright dressing CDI drain to self suction  9/28 VSS, afebrile, LISBETH bulb 80cc drainage.  10/1 VSS, Thoracic surgery reconsulted for purulent drainage from wound incision site and repeat CT chest completed yesterday, showed slightly decreased collection left anterior chest wall fluid, left clavicular resection with unchanged sclerosis and erosion; and very small erosion anterior left third rib. CT Chest reviewed by Dr. Bautista, no acute surgical intervention recommended at this time, can continue medical management on IV abx per ID. Pt to be discharged home by primary team.

## 2024-10-01 NOTE — PROGRESS NOTE ADULT - SUBJECTIVE AND OBJECTIVE BOX
Lisandro Sorenson | PGY3| [employer] TEAMS ONLY  Interval Events: No acute events overnight. Pt seen and examined at bedside.  Patient denies any complaints overnight. The patient denies any fevers, chills, nausea, vomiting, or increased pain.  Drain continues to put out sero/sang discharge, with 90cc output.     OBJECTIVE:  ICU Vital Signs Last 24 Hrs  T(C): 36.5 (01 Oct 2024 08:45), Max: 36.7 (30 Sep 2024 17:03)  T(F): 97.7 (01 Oct 2024 08:45), Max: 98.1 (30 Sep 2024 17:03)  HR: 78 (01 Oct 2024 08:45) (78 - 80)  BP: 109/72 (01 Oct 2024 08:45) (96/62 - 109/72)  BP(mean): --  ABP: --  ABP(mean): --  RR: 18 (01 Oct 2024 08:45) (18 - 18)  SpO2: 96% (01 Oct 2024 08:45) (96% - 98%)    O2 Parameters below as of 01 Oct 2024 08:45  Patient On (Oxygen Delivery Method): room air      09-30 @ 07:01  -  10-01 @ 07:00  --------------------------------------------------------  IN: 240 mL / OUT: 290 mL / NET: -50 mL      CAPILLARY BLOOD GLUCOSE        PHYSICAL EXAM:  General: WN/WD NAD  Neurology: A&Ox3, nonfocal, CERON x 4  Eyes: PERRLA/ EOMI, Gross vision intact  ENT/Neck: Neck supple, trachea midline, No JVD, Gross hearing intact  Respiratory: CTA B/L, No wheezing, rales, rhonchi  CV: RRR, +S1/S2, -S3/S4, no murmurs, rubs or gallops  Abdominal: Soft, NT, ND +BS, No HSM  MSK: 5/5 strength UE/LE bilaterally  Extremities: No edema, 2+ peripheral pulses  Skin: No Rashes, Hematoma, Ecchymosis  Incisions:   Tubes: LISBETH drain with mixed serous and sanguinous discharge.     HOSPITAL MEDICATIONS:  MEDICATIONS  (STANDING):  chlorhexidine 2% Cloths 1 Application(s) Topical daily  DAPTOmycin IVPB 400 milliGRAM(s) IV Intermittent every 24 hours  DULoxetine 60 milliGRAM(s) Oral at bedtime  enoxaparin Injectable 40 milliGRAM(s) SubCutaneous every 24 hours  gemtesa tablet 75 milliGRAM(s) 75 milliGRAM(s) Oral daily  influenza   Vaccine 0.5 milliLiter(s) IntraMuscular once  magnesium citrate Oral Solution 296 milliLiter(s) Oral once  melatonin 3 milliGRAM(s) Oral at bedtime  meropenem  IVPB 1000 milliGRAM(s) IV Intermittent every 8 hours  methocarbamol 500 milliGRAM(s) Oral four times a day  modafinil 200 milliGRAM(s) Oral daily  multivitamin 1 Tablet(s) Oral daily  mupirocin 2% Ointment 1 Application(s) Topical two times a day  pantoprazole    Tablet 40 milliGRAM(s) Oral before breakfast  polyethylene glycol 3350 17 Gram(s) Oral two times a day  senna 2 Tablet(s) Oral at bedtime  sertraline 125 milliGRAM(s) Oral daily    MEDICATIONS  (PRN):  acetaminophen     Tablet .. 650 milliGRAM(s) Oral every 6 hours PRN Mild Pain (1 - 3)  baclofen 5 milliGRAM(s) Oral every 8 hours PRN Muscle Spasm  clonazePAM  Tablet 0.5 milliGRAM(s) Oral daily PRN for anxiety  HYDROmorphone   Tablet 4 milliGRAM(s) Oral every 8 hours PRN Severe Pain (7 - 10)  HYDROmorphone   Tablet 2 milliGRAM(s) Oral every 6 hours PRN Moderate Pain (4 - 6)      LABS:                        8.6    12.56 )-----------( 566      ( 01 Oct 2024 07:24 )             27.7     Hgb Trend: 8.6<--, 8.6<--, 8.0<--, 8.3<--, 8.8<--        Creatinine Trend: 0.51<--, 0.46<--, 0.51<--, 0.47<--, 0.48<--, 0.50<--            MICROBIOLOGY:

## 2024-10-01 NOTE — PROGRESS NOTE ADULT - ASSESSMENT
44F with PMH of MS on Rituxan, anxiety, depression, septic arthritis of L sternoclavicular joint s/p multiple I&Ds in 2023, recent recurrence s/p I&D with CTS and pec turnover flap with Dr. Dean on 9/9, d/c'ed with PICC line on vanc/bryson, now presenting with redness and increased pain. PRS consulted for possible surgical site infection. CT chest shows normal postop changes with possible small hematoma around flap, LISBETH drains within area, no abscess/collection. Small PE without heart strain noted. Repeat CT from 9/18 now shows rim enhancing collections at the surgical site. Patient was taken back to the OR for washout on 9/26. Recovering appropriately, no barriers to discharge from PRS perspective at this time. Patient to follow up with Dr. Dean as outpatient 1-week after discharge.       Plan:  - BID dressing changes by nursing team: Mupirocin ointment and dry gauze  - LISBETH drain to remain in place, please monitor drain output  - abx per ID, abx for discharged will be planned based on intraop cultures  - remainder of care per primary - please contact plastics if any concerns and if status changes  - No barriers to discharge from PRS perspective  - Patient to follow up with Dr. Dean in his office 1-week after discharge     Leroy Cagle, PGY1  Plastic Surgery   [phone] [facility] pager  Available on teams

## 2024-10-01 NOTE — PROGRESS NOTE ADULT - SUBJECTIVE AND OBJECTIVE BOX
44yPatient is a 44y old  Female who presents with a chief complaint of L chest pain, SIRS, Acute Pulmonary Embolism (01 Oct 2024 15:34)      Interval history:  Afebrile, still with pain in shoulder area.       Allergies:   No Known Allergies      Antimicrobials:  DAPTOmycin IVPB 400 milliGRAM(s) IV Intermittent every 24 hours  meropenem  IVPB 1000 milliGRAM(s) IV Intermittent every 8 hours      REVIEW OF SYSTEMS:  No SOB  No abdominal pain  No rash.       Vital Signs Last 24 Hrs  T(C): 36.6 (10-01-24 @ 15:45), Max: 36.7 (10-01-24 @ 00:00)  T(F): 97.8 (10-01-24 @ 15:45), Max: 98 (10-01-24 @ 00:00)  HR: 80 (10-01-24 @ 15:45) (78 - 80)  BP: 110/74 (10-01-24 @ 15:45) (96/62 - 110/74)  BP(mean): --  RR: 18 (10-01-24 @ 15:45) (18 - 18)  SpO2: 98% (10-01-24 @ 15:45) (96% - 98%)      PHYSICAL EXAM:  Pt in no acute distress, alert, awake.   rt arm PICC   lt sided graft site with dressing, some oozing from lateral incision   1 LISBETH's with cloudy appearing blood   non distended abdomen  no edema LE                             8.6    12.56 )-----------( 566      ( 01 Oct 2024 07:24 )             27.7           Culture - Wound Aerobic/Anaerobic (09.25.24 @ 18:00)   Specimen Source: Swab  Culture Results:   No growth to date        Radiology:      < from: CT Chest w/ IV Cont (09.30.24 @ 16:17) >  IMPRESSION:    Slight decreased size of left anterior chest wall fluid collection.    Status post partial resection of the left clavicle. Unchanged sclerosis   and erosions at the right sternoclavicular joint and the left first   sternocostal joint. New very small erosion of the anterior left third rib.    New peripheral groundglass and consolidation in the left upper and lower   lobes, likely infectious in the appropriate clinical setting.    Unchanged small left pleural effusion.    The ovoid density in the anterior chest wall represents a muscle flap,   rather than a collection as described in the original report. There are   multiple antibiotic seeds around the muscle flap. There is no finding to   suggest a recurrent abscess.

## 2024-10-01 NOTE — PROGRESS NOTE ADULT - REASON FOR ADMISSION
L chest pain, SIRS, Acute Pulmonary Embolism

## 2024-10-01 NOTE — PROGRESS NOTE ADULT - SUBJECTIVE AND OBJECTIVE BOX
Surgery Progress Note  Patient is a 44y old  Female who presents with a chief complaint of L chest pain, SIRS, Acute Pulmonary Embolism (01 Oct 2024 09:23)      SUBJECTIVE: Patient seen and examined at bedside with surgical team, patient without complaints.     Physical Exam  Constitutional: resting in bed comfortably, in no acute distress.   Respiratory: breathing comfortably on RA, no increased WOB, no tachypnea   Chest: Incision c/d/i with no active bleeding. LISBETH in place with SS/chalky output in bulb.  Ext: warm and well perfused    Vital Signs Last 24 Hrs  T(C): 36.5 (01 Oct 2024 08:45), Max: 36.7 (30 Sep 2024 17:03)  T(F): 97.7 (01 Oct 2024 08:45), Max: 98.1 (30 Sep 2024 17:03)  HR: 78 (01 Oct 2024 08:45) (78 - 80)  BP: 109/72 (01 Oct 2024 08:45) (96/62 - 109/72)  BP(mean): --  RR: 18 (01 Oct 2024 08:45) (18 - 18)  SpO2: 96% (01 Oct 2024 08:45) (96% - 98%)    Parameters below as of 01 Oct 2024 08:45  Patient On (Oxygen Delivery Method): room air        I&O's Detail    30 Sep 2024 07:01  -  01 Oct 2024 07:00  --------------------------------------------------------  IN:    Oral Fluid: 240 mL  Total IN: 240 mL    OUT:    Bulb (mL): 90 mL    Voided (mL): 200 mL  Total OUT: 290 mL    Total NET: -50 mL      MEDICATIONS  (STANDING):  chlorhexidine 2% Cloths 1 Application(s) Topical daily  DAPTOmycin IVPB 400 milliGRAM(s) IV Intermittent every 24 hours  DULoxetine 60 milliGRAM(s) Oral at bedtime  enoxaparin Injectable 40 milliGRAM(s) SubCutaneous every 24 hours  gemtesa tablet 75 milliGRAM(s) 75 milliGRAM(s) Oral daily  influenza   Vaccine 0.5 milliLiter(s) IntraMuscular once  magnesium citrate Oral Solution 296 milliLiter(s) Oral once  melatonin 3 milliGRAM(s) Oral at bedtime  meropenem  IVPB 1000 milliGRAM(s) IV Intermittent every 8 hours  methocarbamol 500 milliGRAM(s) Oral four times a day  modafinil 200 milliGRAM(s) Oral daily  multivitamin 1 Tablet(s) Oral daily  mupirocin 2% Ointment 1 Application(s) Topical two times a day  pantoprazole    Tablet 40 milliGRAM(s) Oral before breakfast  polyethylene glycol 3350 17 Gram(s) Oral two times a day  senna 2 Tablet(s) Oral at bedtime  sertraline 125 milliGRAM(s) Oral daily    MEDICATIONS  (PRN):  acetaminophen     Tablet .. 650 milliGRAM(s) Oral every 6 hours PRN Mild Pain (1 - 3)  baclofen 5 milliGRAM(s) Oral every 8 hours PRN Muscle Spasm  clonazePAM  Tablet 0.5 milliGRAM(s) Oral daily PRN for anxiety  HYDROmorphone   Tablet 4 milliGRAM(s) Oral every 8 hours PRN Severe Pain (7 - 10)  HYDROmorphone   Tablet 2 milliGRAM(s) Oral every 6 hours PRN Moderate Pain (4 - 6)      LABS:                        8.6    12.56 )-----------( 566      ( 01 Oct 2024 07:24 )             27.7

## 2024-10-02 RX ORDER — OXYCODONE AND ACETAMINOPHEN 5; 325 MG/1; MG/1
1 TABLET ORAL
Qty: 15 | Refills: 0
Start: 2024-10-02 | End: 2024-10-06

## 2024-10-02 NOTE — CHART NOTE - NSCHARTNOTEFT_GEN_A_CORE
Patient requires a 3:1 Commode. Patient is confined to a single level without bathroom.
I received a message that the patient was unable to  her prescription for percocet as the pharmacy was closed. Pt requesting meds to be sent to [facility] in [place]. New prescription for percocet 5-325mg q8hr PRN sent. Pt notified over the phone.
I reviewed the case with   patient and proxy and Dr. Mendez.  Examined wound which clearly has purulence   Current antibiotics are fine and the negative cultures suggest some necrotic or devascularized material  agree with local debridement and leaving wd open or using a small vac, although she does not want a VAC

## 2024-10-03 ENCOUNTER — APPOINTMENT (OUTPATIENT)
Dept: PLASTIC SURGERY | Facility: CLINIC | Age: 44
End: 2024-10-03
Payer: COMMERCIAL

## 2024-10-03 PROCEDURE — 99024 POSTOP FOLLOW-UP VISIT: CPT

## 2024-10-03 NOTE — HISTORY OF PRESENT ILLNESS
[FreeTextEntry1] :  Pt is a 44 year female s/p left chest wound washout and drain placement. DOS 9/25/24 Patient accompanied by family member. Pt reported >45cc in LISBETH bulb. She is currently on long term abx. No further complaints at this time. Denies fever, chills, pain, rash.

## 2024-10-03 NOTE — PHYSICAL EXAM
[de-identified] : left clavicular incision healing well, small 0.5 cm superficial dehiscence clean, incision dressed with bacitracin, minor erythema along incision, no drainage/bleeding noted, left lateral chest LISBETH with white drainage

## 2024-10-08 ENCOUNTER — APPOINTMENT (OUTPATIENT)
Dept: PLASTIC SURGERY | Facility: CLINIC | Age: 44
End: 2024-10-08
Payer: COMMERCIAL

## 2024-10-08 DIAGNOSIS — M86.112: ICD-10-CM

## 2024-10-08 PROCEDURE — 99024 POSTOP FOLLOW-UP VISIT: CPT

## 2024-10-09 ENCOUNTER — APPOINTMENT (OUTPATIENT)
Dept: INTERNAL MEDICINE | Facility: CLINIC | Age: 44
End: 2024-10-09

## 2024-10-10 ENCOUNTER — APPOINTMENT (OUTPATIENT)
Dept: INTERNAL MEDICINE | Facility: CLINIC | Age: 44
End: 2024-10-10

## 2024-10-10 ENCOUNTER — APPOINTMENT (OUTPATIENT)
Dept: INTERNAL MEDICINE | Facility: CLINIC | Age: 44
End: 2024-10-10
Payer: COMMERCIAL

## 2024-10-10 ENCOUNTER — NON-APPOINTMENT (OUTPATIENT)
Age: 44
End: 2024-10-10

## 2024-10-10 VITALS
OXYGEN SATURATION: 99 % | HEIGHT: 66 IN | BODY MASS INDEX: 20.89 KG/M2 | WEIGHT: 130 LBS | SYSTOLIC BLOOD PRESSURE: 100 MMHG | DIASTOLIC BLOOD PRESSURE: 64 MMHG | HEART RATE: 81 BPM

## 2024-10-10 DIAGNOSIS — D64.9 ANEMIA, UNSPECIFIED: ICD-10-CM

## 2024-10-10 DIAGNOSIS — M00.9 PYOGENIC ARTHRITIS, UNSPECIFIED: ICD-10-CM

## 2024-10-10 DIAGNOSIS — Z09 ENCOUNTER FOR FOLLOW-UP EXAMINATION AFTER COMPLETED TREATMENT FOR CONDITIONS OTHER THAN MALIGNANT NEOPLASM: ICD-10-CM

## 2024-10-10 DIAGNOSIS — S21.102A UNSPECIFIED OPEN WOUND OF LEFT FRONT WALL OF THORAX W/OUT PENETRATION INTO THORACIC CAVITY, INITIAL ENCOUNTER: ICD-10-CM

## 2024-10-10 DIAGNOSIS — F41.8 OTHER SPECIFIED ANXIETY DISORDERS: ICD-10-CM

## 2024-10-10 DIAGNOSIS — G35 MULTIPLE SCLEROSIS: ICD-10-CM

## 2024-10-10 DIAGNOSIS — D72.829 ELEVATED WHITE BLOOD CELL COUNT, UNSPECIFIED: ICD-10-CM

## 2024-10-10 PROCEDURE — 99214 OFFICE O/P EST MOD 30 MIN: CPT

## 2024-10-10 PROCEDURE — 36415 COLL VENOUS BLD VENIPUNCTURE: CPT

## 2024-10-11 ENCOUNTER — NON-APPOINTMENT (OUTPATIENT)
Age: 44
End: 2024-10-11

## 2024-10-13 PROBLEM — J90 PLEURAL EFFUSION: Status: ACTIVE | Noted: 2024-10-13

## 2024-10-13 LAB
ALBUMIN SERPL ELPH-MCNC: 3.7 G/DL
ALP BLD-CCNC: 231 U/L
ALT SERPL-CCNC: 12 U/L
ANION GAP SERPL CALC-SCNC: 14 MMOL/L
APPEARANCE: CLEAR
AST SERPL-CCNC: 11 U/L
BASOPHILS # BLD AUTO: 0.1 K/UL
BASOPHILS NFR BLD AUTO: 0.8 %
BILIRUB SERPL-MCNC: <0.2 MG/DL
BILIRUBIN URINE: NEGATIVE
BLOOD URINE: NEGATIVE
BUN SERPL-MCNC: 9 MG/DL
CALCIUM SERPL-MCNC: 9.3 MG/DL
CHLORIDE SERPL-SCNC: 99 MMOL/L
CO2 SERPL-SCNC: 25 MMOL/L
COLOR: YELLOW
CREAT SERPL-MCNC: 0.51 MG/DL
CRP SERPL-MCNC: 108 MG/L
EGFR: 117 ML/MIN/1.73M2
EOSINOPHIL # BLD AUTO: 0.43 K/UL
EOSINOPHIL NFR BLD AUTO: 3.5 %
ERYTHROCYTE [SEDIMENTATION RATE] IN BLOOD BY WESTERGREN METHOD: 38 MM/HR
GLUCOSE QUALITATIVE U: NEGATIVE MG/DL
GLUCOSE SERPL-MCNC: 87 MG/DL
HCT VFR BLD CALC: 31.6 %
HGB BLD-MCNC: 9.1 G/DL
IMM GRANULOCYTES NFR BLD AUTO: 0.5 %
IRON SATN MFR SERPL: 6 %
IRON SERPL-MCNC: 16 UG/DL
KETONES URINE: NEGATIVE MG/DL
LEUKOCYTE ESTERASE URINE: NEGATIVE
LYMPHOCYTES # BLD AUTO: 1.9 K/UL
LYMPHOCYTES NFR BLD AUTO: 15.4 %
MAN DIFF?: NORMAL
MCHC RBC-ENTMCNC: 25.3 PG
MCHC RBC-ENTMCNC: 28.8 GM/DL
MCV RBC AUTO: 88 FL
MONOCYTES # BLD AUTO: 1.01 K/UL
MONOCYTES NFR BLD AUTO: 8.2 %
NEUTROPHILS # BLD AUTO: 8.81 K/UL
NEUTROPHILS NFR BLD AUTO: 71.6 %
NITRITE URINE: NEGATIVE
PH URINE: 7.5
PLATELET # BLD AUTO: 574 K/UL
POTASSIUM SERPL-SCNC: 4.5 MMOL/L
PROT SERPL-MCNC: 6.2 G/DL
PROTEIN URINE: NEGATIVE MG/DL
RBC # BLD: 3.59 M/UL
RBC # FLD: 14.6 %
SODIUM SERPL-SCNC: 137 MMOL/L
SPECIFIC GRAVITY URINE: 1.01
TIBC SERPL-MCNC: 271 UG/DL
UIBC SERPL-MCNC: 255 UG/DL
UROBILINOGEN URINE: 0.2 MG/DL
VANCOMYCIN TROUGH SERPL-MCNC: <4 UG/ML
WBC # FLD AUTO: 12.31 K/UL

## 2024-10-15 ENCOUNTER — APPOINTMENT (OUTPATIENT)
Dept: INTERNAL MEDICINE | Facility: CLINIC | Age: 44
End: 2024-10-15

## 2024-10-15 RX ORDER — SODIUM HYPOCHLORITE 1.25 MG/ML
0.12 SOLUTION TOPICAL
Qty: 1 | Refills: 2 | Status: ACTIVE | COMMUNITY
Start: 2024-10-15 | End: 1900-01-01

## 2024-10-16 ENCOUNTER — APPOINTMENT (OUTPATIENT)
Dept: ORTHOPEDIC SURGERY | Facility: CLINIC | Age: 44
End: 2024-10-16

## 2024-10-17 ENCOUNTER — EMERGENCY (EMERGENCY)
Facility: HOSPITAL | Age: 44
LOS: 1 days | End: 2024-10-17
Admitting: EMERGENCY MEDICINE
Payer: SELF-PAY

## 2024-10-17 ENCOUNTER — NON-APPOINTMENT (OUTPATIENT)
Age: 44
End: 2024-10-17

## 2024-10-17 VITALS
WEIGHT: 128.09 LBS | OXYGEN SATURATION: 99 % | DIASTOLIC BLOOD PRESSURE: 72 MMHG | RESPIRATION RATE: 16 BRPM | HEART RATE: 81 BPM | SYSTOLIC BLOOD PRESSURE: 92 MMHG | HEIGHT: 66 IN | TEMPERATURE: 98 F

## 2024-10-17 DIAGNOSIS — S72.91XA UNSPECIFIED FRACTURE OF RIGHT FEMUR, INITIAL ENCOUNTER FOR CLOSED FRACTURE: Chronic | ICD-10-CM

## 2024-10-17 DIAGNOSIS — S82.201A UNSPECIFIED FRACTURE OF SHAFT OF RIGHT TIBIA, INITIAL ENCOUNTER FOR CLOSED FRACTURE: Chronic | ICD-10-CM

## 2024-10-17 DIAGNOSIS — Z98.891 HISTORY OF UTERINE SCAR FROM PREVIOUS SURGERY: Chronic | ICD-10-CM

## 2024-10-17 DIAGNOSIS — M00.9 PYOGENIC ARTHRITIS, UNSPECIFIED: Chronic | ICD-10-CM

## 2024-10-17 PROCEDURE — L9991: CPT

## 2024-10-17 NOTE — ED ADULT TRIAGE NOTE - CHIEF COMPLAINT QUOTE
fall yesterday from 4 steps onto concrete. went to [facility], had CT head with staples placed. denies having xray of chest done. reports rib pain worse with inhalation and SOB.

## 2024-10-21 ENCOUNTER — APPOINTMENT (OUTPATIENT)
Dept: INFECTIOUS DISEASE | Facility: CLINIC | Age: 44
End: 2024-10-21

## 2024-10-23 ENCOUNTER — APPOINTMENT (OUTPATIENT)
Dept: PLASTIC SURGERY | Facility: CLINIC | Age: 44
End: 2024-10-23

## 2024-11-19 ENCOUNTER — APPOINTMENT (OUTPATIENT)
Dept: PULMONOLOGY | Facility: CLINIC | Age: 44
End: 2024-11-19

## 2024-12-11 NOTE — PROGRESS NOTE ADULT - PROBLEM/PLAN-3
Medication:  PDMP  11/11/2024 11/11/2024 LORazepam (Tablet) 30.0 30 1 MG NA AWILDA LANGE  Active agreement on file -Yes         
DISPLAY PLAN FREE TEXT

## 2025-02-18 ENCOUNTER — APPOINTMENT (OUTPATIENT)
Dept: INTERNAL MEDICINE | Facility: CLINIC | Age: 45
End: 2025-02-18
Payer: COMMERCIAL

## 2025-02-18 ENCOUNTER — NON-APPOINTMENT (OUTPATIENT)
Age: 45
End: 2025-02-18

## 2025-02-18 ENCOUNTER — APPOINTMENT (OUTPATIENT)
Dept: INTERNAL MEDICINE | Facility: CLINIC | Age: 45
End: 2025-02-18

## 2025-02-18 VITALS
HEIGHT: 66 IN | BODY MASS INDEX: 20.89 KG/M2 | OXYGEN SATURATION: 98 % | DIASTOLIC BLOOD PRESSURE: 78 MMHG | SYSTOLIC BLOOD PRESSURE: 120 MMHG | TEMPERATURE: 97.4 F | WEIGHT: 130 LBS | HEART RATE: 68 BPM

## 2025-02-18 DIAGNOSIS — G35 MULTIPLE SCLEROSIS: ICD-10-CM

## 2025-02-18 DIAGNOSIS — J02.9 ACUTE PHARYNGITIS, UNSPECIFIED: ICD-10-CM

## 2025-02-18 PROCEDURE — 99214 OFFICE O/P EST MOD 30 MIN: CPT

## 2025-02-20 LAB — BACTERIA THROAT CULT: NORMAL

## 2025-02-24 ENCOUNTER — NON-APPOINTMENT (OUTPATIENT)
Age: 45
End: 2025-02-24

## 2025-03-31 ENCOUNTER — APPOINTMENT (OUTPATIENT)
Dept: ORTHOPEDIC SURGERY | Facility: CLINIC | Age: 45
End: 2025-03-31

## 2025-03-31 VITALS — WEIGHT: 130 LBS | BODY MASS INDEX: 20.89 KG/M2 | HEIGHT: 66 IN

## 2025-03-31 DIAGNOSIS — M79.652 PAIN IN LEFT THIGH: ICD-10-CM

## 2025-03-31 PROCEDURE — 99213 OFFICE O/P EST LOW 20 MIN: CPT | Mod: 25

## 2025-03-31 PROCEDURE — 73552 X-RAY EXAM OF FEMUR 2/>: CPT | Mod: LT

## 2025-04-03 NOTE — ED ADULT NURSE NOTE - NS ED NURSE DISCH DISPOSITION
Quality 110: Preventive Care And Screening: Influenza Immunization: Influenza Immunization not Administered for Documented Reasons.
Detail Level: Detailed
Admitted

## 2025-04-23 ENCOUNTER — APPOINTMENT (OUTPATIENT)
Dept: MRI IMAGING | Facility: CLINIC | Age: 45
End: 2025-04-23
Payer: COMMERCIAL

## 2025-04-23 PROCEDURE — 73718 MRI LOWER EXTREMITY W/O DYE: CPT | Mod: LT

## 2025-05-23 ENCOUNTER — APPOINTMENT (OUTPATIENT)
Dept: ORTHOPEDIC SURGERY | Facility: CLINIC | Age: 45
End: 2025-05-23

## 2025-05-23 ENCOUNTER — NON-APPOINTMENT (OUTPATIENT)
Age: 45
End: 2025-05-23

## 2025-05-29 ENCOUNTER — APPOINTMENT (OUTPATIENT)
Dept: ORTHOPEDIC SURGERY | Facility: CLINIC | Age: 45
End: 2025-05-29
Payer: COMMERCIAL

## 2025-05-29 VITALS — WEIGHT: 130 LBS | HEIGHT: 66 IN | BODY MASS INDEX: 20.89 KG/M2

## 2025-05-29 DIAGNOSIS — M79.652 PAIN IN LEFT THIGH: ICD-10-CM

## 2025-05-29 PROCEDURE — 99213 OFFICE O/P EST LOW 20 MIN: CPT

## 2025-06-22 NOTE — DISCHARGE NOTE PROVIDER - NSDCACTIVITY_GEN_ALL_CORE
Problem: Chronic Conditions and Co-morbidities  Goal: Patient's chronic conditions and co-morbidity symptoms are monitored and maintained or improved  Outcome: Progressing     Problem: Discharge Planning  Goal: Discharge to home or other facility with appropriate resources  Outcome: Progressing     Problem: Seizure Precautions  Goal: Remains free of injury related to seizures activity  Outcome: Progressing     Problem: Skin/Tissue Integrity  Goal: Skin integrity remains intact  Description: 1.  Monitor for areas of redness and/or skin breakdown  2.  Assess vascular access sites hourly  3.  Every 4-6 hours minimum:  Change oxygen saturation probe site  4.  Every 4-6 hours:  If on nasal continuous positive airway pressure, respiratory therapy assess nares and determine need for appliance change or resting period  Outcome: Progressing     Problem: Safety - Adult  Goal: Free from fall injury  Outcome: Progressing      No restrictions

## 2025-06-30 NOTE — PROGRESS NOTE ADULT - PROBLEM/PLAN-7
DISPLAY PLAN FREE TEXT
Mother states woke up today- sore throat, fever, headache and one emesis in the car ride over.  
DISPLAY PLAN FREE TEXT

## 2025-07-23 NOTE — OCCUPATIONAL THERAPY INITIAL EVALUATION ADULT - RANGE OF MOTION EXAMINATION, UPPER EXTREMITY
[Negative] : Heme/Lymph
[Negative] : Heme/Lymph
bilateral UE Active ROM was WFL  (within functional limits)

## (undated) DEVICE — DRSG AQUACEL 3.5 X 12"

## (undated) DEVICE — DRAPE MAYO STAND 30"

## (undated) DEVICE — DRAPE LIGHT HANDLE COVER (BLUE)

## (undated) DEVICE — CANISTER W/GEL INFOVAC 1000ML

## (undated) DEVICE — DRSG OPSITE 2.5 X 2"

## (undated) DEVICE — DRSG XEROFORM 1 X 8"

## (undated) DEVICE — DRSG VAC GRANUFOAM MEDIUM (BLACK)

## (undated) DEVICE — DRAPE MAGNETIC INSTRUMENT MEDIUM

## (undated) DEVICE — STAPLER SKIN VISI-STAT 35 WIDE

## (undated) DEVICE — SUT POLYSORB 2-0 30" GS-21 UNDYED

## (undated) DEVICE — PREP CHLORAPREP HI-LITE ORANGE 26ML

## (undated) DEVICE — GLV 7.5 PROTEXIS (WHITE)

## (undated) DEVICE — SUT DOUBLE 6 WIRE STERNAL

## (undated) DEVICE — BLADE SCALPEL SAFETYLOCK #11

## (undated) DEVICE — WARMING BLANKET UPPER ADULT

## (undated) DEVICE — DISSECTOR ENDO PEANUT 5MM

## (undated) DEVICE — SPECIMEN CONTAINER 100ML

## (undated) DEVICE — DRSG TEGADERM 6"X8"

## (undated) DEVICE — DRAIN JACKSON PRATT 10MM FLAT FULL NO TROCAR

## (undated) DEVICE — POSITIONER FOAM EGG CRATE ULNAR 2PCS (PINK)

## (undated) DEVICE — BLADE SCALPEL SAFETYLOCK #15

## (undated) DEVICE — SUCTION YANKAUER NO CONTROL VENT

## (undated) DEVICE — DRAPE TOWEL BLUE 17" X 24"

## (undated) DEVICE — GLV 8.5 PROTEXIS (WHITE)

## (undated) DEVICE — SUT MONOSOF 3-0 18" C-14

## (undated) DEVICE — DRSG VAC GRANUFOAM LARGE (BLACK)

## (undated) DEVICE — STAPLER COVIDIEN ENDO GIA STANDARD HANDLE

## (undated) DEVICE — TRAP SPECIMEN SPUTUM 40CC

## (undated) DEVICE — SUT POLYSORB 0 30" GS-21 UNDYED

## (undated) DEVICE — WOUND IRR IRRISEPT W 0.5 CHG

## (undated) DEVICE — GLV 8 PROTEXIS (WHITE)

## (undated) DEVICE — SUT STAINLESS STEEL 5 18" SCC

## (undated) DEVICE — CANISTER KCI 500ML GEL SENSA TRAC

## (undated) DEVICE — DRAPE 1/2 SHEET 40X57"

## (undated) DEVICE — FOLEY TRAY 16FR 5CC LTX UMETER CLOSED

## (undated) DEVICE — DRSG OPSITE 13.75 X 4"

## (undated) DEVICE — SOL NORMOSOL-R PH7.4 1000ML

## (undated) DEVICE — DRSG VAC GRANUFOAM SMALL (BLACK)

## (undated) DEVICE — LAP PAD 18 X 18"

## (undated) DEVICE — SUT BLUNT SZ 5

## (undated) DEVICE — DRSG VAC WHITEFOAM LARGE (WHITE)

## (undated) DEVICE — SUT BIOSYN 4-0 18" P-12

## (undated) DEVICE — SUT PROLENE 4-0 36" RB-1

## (undated) DEVICE — BLADE SCALPEL SAFETYLOCK #10

## (undated) DEVICE — GLV 7 PROTEXIS (WHITE)

## (undated) DEVICE — GOWN TRIMAX LG

## (undated) DEVICE — BUR WIRE PASSER MED 1.5MMX 19MM

## (undated) DEVICE — DRSG STERISTRIPS 0.5 X 4"

## (undated) DEVICE — GLV 8 PROTEXIS ORTHO (CREAM)

## (undated) DEVICE — PACK BREAST MAJOR

## (undated) DEVICE — DRSG STOCKINETTE IMPERVIOUS MED

## (undated) DEVICE — DRSG XEROFORM 5 X 9"

## (undated) DEVICE — FOLEY TRAY 16FR 5CC LF LUBRISIL ADVANCE TEMP CLOSED

## (undated) DEVICE — SUT POLYSORB 0 36" GS-25 UNDYED

## (undated) DEVICE — ELCTR AQUAMANTYS BIPOLAR SEALER 6.0

## (undated) DEVICE — DRAPE INSTRUMENT POUCH 6.75" X 11"

## (undated) DEVICE — TAPE SILK 3"

## (undated) DEVICE — SAW BLADE MICROAIRE STERNUM 1X34X9.4MM

## (undated) DEVICE — MARKING PEN W RULER

## (undated) DEVICE — DRAIN JACKSON PRATT 7MM FLAT FULL NO TROCAR

## (undated) DEVICE — SOL IRR POUR H2O 250ML

## (undated) DEVICE — VISITEC 4X4

## (undated) DEVICE — DRSG TAPE UMBILICAL COTTON 2" X 30 X 1/8"

## (undated) DEVICE — PACK EXTREMITY

## (undated) DEVICE — SUT SOFSILK 2-0 30" V-20

## (undated) DEVICE — WARMING BLANKET LOWER ADULT

## (undated) DEVICE — GLV 6.5 PROTEXIS (WHITE)

## (undated) DEVICE — PACK MAJOR ABDOMINAL SUPINE

## (undated) DEVICE — SOL IRR POUR NS 0.9% 500ML

## (undated) DEVICE — SOL IRR BAG NS 0.9% 3000ML

## (undated) DEVICE — ELCTR BOVIE TIP BLADE INSULATED 2.75" EDGE

## (undated) DEVICE — DRAIN RESERVOIR FOR JACKSON PRATT 100CC CARDINAL

## (undated) DEVICE — LIGASURE IMPACT

## (undated) DEVICE — DRAPE 3/4 SHEET W REINFORCEMENT 56X77"

## (undated) DEVICE — GOWN XXXL

## (undated) DEVICE — SUCTION CATH ARGYLE WHISTLE TIP 14FR STRAIGHT PACKED

## (undated) DEVICE — DRAPE CV 106" X 135"

## (undated) DEVICE — VENODYNE/SCD SLEEVE CALF LARGE

## (undated) DEVICE — MEDICATION LABELS W MARKER

## (undated) DEVICE — NDL COUNTER FOAM AND MAGNET 40-70

## (undated) DEVICE — ELCTR BOVIE PENCIL HANDPIECE ROCKER SWITCH 15FT

## (undated) DEVICE — SUT VICRYL 2-0 27" CT-1 UNDYED

## (undated) DEVICE — VENODYNE/SCD SLEEVE CALF MEDIUM

## (undated) DEVICE — Device

## (undated) DEVICE — ELCTR BOVIE TIP BLADE INSULATED 6.5" EDGE

## (undated) DEVICE — DRSG STERISTRIPS 0.25 X 3"

## (undated) DEVICE — TUBING SUCTION 20FT

## (undated) DEVICE — SUT VICRYL 0 27" CT-1 UNDYED

## (undated) DEVICE — CANISTER W/GEL INFOVAC 500ML

## (undated) DEVICE — PACK UNIVERSAL CARDIAC SUPPLEMNTAL B

## (undated) DEVICE — SYR ASEPTO

## (undated) DEVICE — GOWN LG

## (undated) DEVICE — DRAPE IOBAN 23" X 23"

## (undated) DEVICE — PACK THYROID HEAD NECK

## (undated) DEVICE — SUT SURGICAL STEEL 6 30" BP-1

## (undated) DEVICE — SUT SOFSILK 0 30" V-20

## (undated) DEVICE — ELCTR BOVIE PENCIL HANDPIECE

## (undated) DEVICE — MEDTRONIC RADIALUX LIGHTED RETRACTOR DISP

## (undated) DEVICE — DRAIN 24 FR ROUND HUBLESS FULL FLUTED SILICONE

## (undated) DEVICE — STRYKER INTERPULSE HANDPIECE W IRR SUCTION TUBE

## (undated) DEVICE — PACK UNIVERSAL CARDIAC

## (undated) DEVICE — SUT POLYSORB 2 30" GS-26

## (undated) DEVICE — SUT PROLENE 4-0 30" SH-1

## (undated) DEVICE — CHEST DRAIN PLEUR-EVAC WET/WET ADULT-PEDS SINGLE (QUICK)

## (undated) DEVICE — DRAIN CHANNEL 19FR ROUND FULL FLUTED

## (undated) DEVICE — DRSG DERMABOND PRINEO 22CM

## (undated) DEVICE — WARMING BLANKET DUO-THERM HYPER/HYPOTHERM ADULT

## (undated) DEVICE — SUT MONOCRYL 3-0 18" PS-2 UNDYED

## (undated) DEVICE — SUT PROLENE 4-0 36" SH

## (undated) DEVICE — PACK GENERAL MINOR